# Patient Record
Sex: FEMALE | Race: WHITE | NOT HISPANIC OR LATINO | Employment: OTHER | URBAN - METROPOLITAN AREA
[De-identification: names, ages, dates, MRNs, and addresses within clinical notes are randomized per-mention and may not be internally consistent; named-entity substitution may affect disease eponyms.]

---

## 2017-01-06 ENCOUNTER — PATIENT MESSAGE (OUTPATIENT)
Dept: INTERNAL MEDICINE | Facility: CLINIC | Age: 72
End: 2017-01-06

## 2017-01-06 RX ORDER — LORAZEPAM 0.5 MG/1
TABLET ORAL
Qty: 30 TABLET | Refills: 1 | Status: SHIPPED | OUTPATIENT
Start: 2017-01-06 | End: 2017-02-16 | Stop reason: SDUPTHER

## 2017-01-06 RX ORDER — GABAPENTIN 300 MG/1
CAPSULE ORAL
Qty: 90 CAPSULE | Refills: 1 | Status: SHIPPED | OUTPATIENT
Start: 2017-01-06 | End: 2017-02-16 | Stop reason: SDUPTHER

## 2017-01-09 ENCOUNTER — OFFICE VISIT (OUTPATIENT)
Dept: INTERNAL MEDICINE | Facility: CLINIC | Age: 72
End: 2017-01-09
Payer: MEDICARE

## 2017-01-09 VITALS
HEART RATE: 92 BPM | BODY MASS INDEX: 32.55 KG/M2 | SYSTOLIC BLOOD PRESSURE: 102 MMHG | HEIGHT: 61 IN | WEIGHT: 172.38 LBS | DIASTOLIC BLOOD PRESSURE: 64 MMHG

## 2017-01-09 DIAGNOSIS — E11.69 DIABETES MELLITUS TYPE 2 IN OBESE: ICD-10-CM

## 2017-01-09 DIAGNOSIS — D50.0 IRON DEFICIENCY ANEMIA DUE TO CHRONIC BLOOD LOSS: ICD-10-CM

## 2017-01-09 DIAGNOSIS — Z01.818 PRE-OP EXAM: Primary | ICD-10-CM

## 2017-01-09 DIAGNOSIS — K25.9 MULTIPLE GASTRIC ULCERS: ICD-10-CM

## 2017-01-09 DIAGNOSIS — E66.9 DIABETES MELLITUS TYPE 2 IN OBESE: ICD-10-CM

## 2017-01-09 PROCEDURE — 1159F MED LIST DOCD IN RCRD: CPT | Mod: GC,S$GLB,, | Performed by: INTERNAL MEDICINE

## 2017-01-09 PROCEDURE — 1126F AMNT PAIN NOTED NONE PRSNT: CPT | Mod: GC,S$GLB,, | Performed by: INTERNAL MEDICINE

## 2017-01-09 PROCEDURE — 3078F DIAST BP <80 MM HG: CPT | Mod: GC,S$GLB,, | Performed by: INTERNAL MEDICINE

## 2017-01-09 PROCEDURE — 99999 PR PBB SHADOW E&M-EST. PATIENT-LVL IV: CPT | Mod: PBBFAC,GC,, | Performed by: INTERNAL MEDICINE

## 2017-01-09 PROCEDURE — 3074F SYST BP LT 130 MM HG: CPT | Mod: GC,S$GLB,, | Performed by: INTERNAL MEDICINE

## 2017-01-09 PROCEDURE — 1157F ADVNC CARE PLAN IN RCRD: CPT | Mod: GC,S$GLB,, | Performed by: INTERNAL MEDICINE

## 2017-01-09 PROCEDURE — 99213 OFFICE O/P EST LOW 20 MIN: CPT | Mod: GC,S$GLB,, | Performed by: INTERNAL MEDICINE

## 2017-01-09 PROCEDURE — 3044F HG A1C LEVEL LT 7.0%: CPT | Mod: GC,S$GLB,, | Performed by: INTERNAL MEDICINE

## 2017-01-09 PROCEDURE — 3060F POS MICROALBUMINURIA REV: CPT | Mod: GC,S$GLB,, | Performed by: INTERNAL MEDICINE

## 2017-01-09 PROCEDURE — 1160F RVW MEDS BY RX/DR IN RCRD: CPT | Mod: GC,S$GLB,, | Performed by: INTERNAL MEDICINE

## 2017-01-09 RX ORDER — TRETINOIN 1 MG/G
CREAM TOPICAL
COMMUNITY
Start: 2016-11-03 | End: 2017-04-02 | Stop reason: SDUPTHER

## 2017-01-09 NOTE — PROGRESS NOTES
I have personally interviewed and examined this patient and agree with resident's note as below.   Mckenna Monroe MD

## 2017-01-09 NOTE — MR AVS SNAPSHOT
[facility] Internal Medicine  [address] [address]  Phone: [phone]  Fax: [phone]                  Paris Burris   2017 1:00 PM   Office Visit    Description:  Female : 1945   Provider:  Roderick Miles MD   Department:  [facility] - Internal Medicine           Reason for Visit     Pre-op Exam           Diagnoses this Visit        Comments    Multiple gastric ulcers    -  Primary     Iron deficiency anemia due to chronic blood loss         Diabetes mellitus type 2 in obese                To Do List           Future Appointments        Provider Department Dept Phone    1/10/2017 1:00 PM [facility] Internal Medicine [phone]    2/3/2017 7:30 AM LAB, APPOINTMENT [facility] [phone]    2017 2:20 PM Mandi Huynh MD [facility] Internal Medicine [phone]    2/15/2017 10:40 AM Elina Morejon MD [facility] - Neurology [phone]    3/20/2017 10:00 AM Dev Hadley PA-C [facility] - Gastroenterology [phone]      Goals (5 Years of Data)     None      Follow-Up and Disposition     Return if symptoms worsen or fail to improve.      [facility]     [facility] -  Assistance  Registered nurses in the [facility] provide clinical advisement, health education, appointment booking, and other advisory services.  Call for this free service at [phone].             Medications           Message regarding Medications     Verify the changes and/or additions to your medication regime listed below are the same as discussed with your clinician today.  If any of these changes or additions are incorrect, please notify your healthcare provider.        STOP taking these medications     mirtazapine (REMERON) 7.5 MG Tab Take 1 tablet (7.5 mg total) by mouth every evening.           Verify that the below list of medications is an accurate representation of the medications  you are currently taking.  If none reported, the list may be blank. If incorrect, please contact your healthcare provider. Carry this list with you in case of emergency.           Current Medications     ascorbic acid (VITAMIN C) 500 MG tablet Take 1,000 mg by mouth once daily.     aspirin (ECOTRIN) 81 MG EC tablet Take 1 tablet (81 mg total) by mouth once daily.    blood sugar diagnostic (ACCU-CHEK MARIAN PLUS TEST STRP) Strp 2 strips by Misc.(Non-Drug; Combo Route) route 2 (two) times daily.    blood sugar diagnostic Strp 1 strip by Misc.(Non-Drug; Combo Route) route 2 (two) times daily.    DUREZOL 0.05 % Drop ophthalmic solution     ferrous sulfate 325 mg (65 mg iron) Tab tablet Take 1 tablet (325 mg total) by mouth 2 (two) times daily.    gabapentin (NEURONTIN) 300 MG capsule Take 1 cap nightly x 1 week; then take 1 cap twice daily x 1 week; then take 1 cap 3x/daily onwards.    ketorolac 0.5% (ACULAR) 0.5 % Drop     lancets Misc 1 lancet by Misc.(Non-Drug; Combo Route) route 2 (two) times daily.    lorazepam (ATIVAN) 0.5 MG tablet TAKE 1 TABLET BY MOUTH EVERY DAY AS NEEDED    metformin (GLUCOPHAGE-XR) 500 MG 24 hr tablet Take 1 tablet (500 mg total) by mouth 2 (two) times daily with meals.    nystatin-triamcinolone (MYCOLOG) ointment     omega-3 fatty acids-fish oil (FISH OIL) 340-1,000 mg Cap Take 1 capsule by mouth once daily.    omeprazole (PRILOSEC) 20 MG capsule Take 1 capsule (20 mg total) by mouth once daily.    pravastatin (PRAVACHOL) 80 MG tablet TAKE 1 TABLET EVERY DAY    sucralfate (CARAFATE) 1 gram tablet Take 1 tablet (1 g total) by mouth 2 (two) times daily.    tretinoin (RETIN-A) 0.05 % cream 1 application every evening. At bedtime    tretinoin (RETIN-A) 0.1 % cream     venlafaxine (EFFEXOR) 75 MG tablet Take 1 tablet (75 mg total) by mouth 2 (two) times daily.           Clinical Reference Information           Vital Signs - Last Recorded  Most recent update: 1/9/2017  1:17 PM by Simona Barbour,  "MA    BP Pulse Ht Wt BMI    102/64 92 5' 1" (1.549 m) 78.2 kg (172 lb 6.4 oz) 32.57 kg/m2      Blood Pressure          Most Recent Value    BP  102/64      Allergies as of 1/9/2017     Topamax [Topiramate]      Immunizations Administered on Date of Encounter - 1/9/2017     None      Orders Placed During Today's Visit     Future Labs/Procedures Expected by Expires    CBC auto differential  1/9/2017 3/10/2018      "

## 2017-01-09 NOTE — PROGRESS NOTES
Subjective:       Patient ID: Paris Burris is a 71 y.o. female.    Chief Complaint: Pre-op Exam    HPI     Ms. Burris is a 71 year old lady with PMH of anxiety, depression, cataracts, DM (well controlled), HLD, anemia secondary to GIB from gastric ulcers (now resolved), and migraine's here for pre-operative evaluation for cataract surgery. She is feeling well and has no complaints today. She does not have a personal nor family history of heart disease. She has had surgery in the past with general anesthesia with no complications. She is a never smoker and rare drinker. She is able to walk up a flight of stairs without difficulty and is overall very active engaging in water-based exercises regularly.     Cardiovascular Risk Assessment:  Non-emergent surgery.  No active cardiac problems (such as unstable angina, decompensated heart failure, significant uncontrolled arrhythmias or severe valvular disease).  Low risk surgery.  Functional Status: able to climb a flight of stairs (> 4 METS)  Her revised cardiac risk index is 1.     1 pt Each: Ischemic Heart Disease, Cerebrovascular Disease,                     CHF, DM, Creatinine > 2                 Recommendation:  1. Proceed to Surgery.  2. Continue medications      Review of Systems   Constitutional: Negative for activity change, appetite change, fatigue, fever and unexpected weight change.   HENT: Negative for congestion, postnasal drip, rhinorrhea, sinus pressure, sore throat, trouble swallowing and voice change.    Eyes: Negative for photophobia and visual disturbance.   Respiratory: Negative for cough, chest tightness, shortness of breath and wheezing.    Cardiovascular: Negative for chest pain and palpitations.   Gastrointestinal: Negative for abdominal distention, abdominal pain, blood in stool, constipation, diarrhea, nausea and vomiting.   Genitourinary: Negative for difficulty urinating, dysuria, flank pain, frequency, hematuria and urgency.    Musculoskeletal: Negative for arthralgias, back pain, gait problem, joint swelling, myalgias, neck pain and neck stiffness.   Skin: Negative for color change, pallor, rash and wound.   Neurological: Negative for dizziness, tremors, weakness, numbness and headaches.       Objective:      Physical Exam   Constitutional: She is oriented to person, place, and time. She appears well-developed and well-nourished. No distress.   HENT:   Head: Normocephalic and atraumatic.   Right Ear: External ear normal.   Left Ear: External ear normal.   Nose: Nose normal.   Mouth/Throat: Oropharynx is clear and moist. No oropharyngeal exudate.   Eyes: Conjunctivae and EOM are normal. Pupils are equal, round, and reactive to light. Right eye exhibits no discharge. Left eye exhibits no discharge. No scleral icterus.   Neck: Normal range of motion. Neck supple. No JVD present. No tracheal deviation present. No thyromegaly present.   Cardiovascular: Normal rate, regular rhythm, normal heart sounds and intact distal pulses.  Exam reveals no gallop and no friction rub.    No murmur heard.  Pulmonary/Chest: Effort normal and breath sounds normal. No stridor. No respiratory distress. She has no wheezes. She has no rales. She exhibits no tenderness.   Abdominal: Soft. Bowel sounds are normal. She exhibits no distension and no mass. There is no tenderness. There is no rebound and no guarding. No hernia.   Musculoskeletal: She exhibits no edema, tenderness or deformity.   Lymphadenopathy:     She has no cervical adenopathy.   Neurological: She is alert and oriented to person, place, and time.   Skin: Skin is warm and dry. No rash noted. She is not diaphoretic. No erythema. No pallor.   Psychiatric: She has a normal mood and affect. Her behavior is normal. Judgment and thought content normal.       Assessment:       1. Pre-op exam    2. Multiple gastric ulcers    3. Iron deficiency anemia due to chronic blood loss    4. Diabetes mellitus type 2  in obese        Plan:       Paris was seen today for pre-op exam.    Diagnoses and all orders for this visit:    Pre-op exam  -- low risk for a low risk surgery  -- proceed without modification to medication regimen    Multiple gastric ulcers  -     CBC auto differential; Future  -- given recent history of severe anemia due to asymptomatic gastric ulcers, will re-check CBC  -- do not anticipate this will pose barrier to surgery    Iron deficiency anemia due to chronic blood loss  -     CBC auto differential; Future    Diabetes mellitus type 2 in obese  -- well controlled      F/u with PCP MELANIE Miles MD  PGY-3

## 2017-01-10 ENCOUNTER — CLINICAL SUPPORT (OUTPATIENT)
Dept: INTERNAL MEDICINE | Facility: CLINIC | Age: 72
End: 2017-01-10
Payer: MEDICARE

## 2017-01-10 ENCOUNTER — PATIENT MESSAGE (OUTPATIENT)
Dept: INTERNAL MEDICINE | Facility: CLINIC | Age: 72
End: 2017-01-10

## 2017-01-10 VITALS — WEIGHT: 169.06 LBS | BODY MASS INDEX: 31.95 KG/M2

## 2017-01-10 PROCEDURE — 99999 PR PBB SHADOW E&M-EST. PATIENT-LVL I: CPT | Mod: PBBFAC,,,

## 2017-01-10 NOTE — PROGRESS NOTES
Health  Follow-Up Note   Date:   1/10/17               Time:  1300  [x] Office  [] Phone  Notes from previous session reviewed.   [x] Previous Session Goals unchanged.   [x] Patient/Caregiver Change in Goals.  Goals added or changed by Patient/Caregiver since program participation:  1.     Cataract surgery on 1/25/17     Additional/Changed support that patient/caregiver has experienced/sought?  (Indicate readiness, support from family, friends, others, community groups, etc)  1.      Additional/Changed obstacles that could prevent patient/caregiver from reaching their goals?  1.  Snacking between meals    Feedback provided:  1.  Praised for great job, gain of only 1.5 lbs with holidays and vacation to [place]    Diagnostic values/Desriptors for follow-up as needed for chronic condition(s)   Weight: 76.7 kg 169 lb 1.5 oz gain 1.5 lb/ total loss 17 lbs since last year  Blood Glucose:   139- 7 day  134-14 day  126-30 day  123- 90 day    Interventions:   1. Health  listened reflectively, validated thoughts and feelings, offered support and encouragement.   2. Allowed patient to express themselves in a non-biased atmosphere.  3. Health  assisted pt to problem-solve obstacles such as being in a challenging environment and dealing with these challenges.   4. Motivational Interviewed interventions utilized (OARS).   5. Patient responded favorably to interventions and remained actively engaged in the session.   6. Health  will remain available and connected for patient by phone and/or office visits.   7. Positive reinforcement, emotional support and encouragement provided.   8. Focused Education: MI, alternative snacks,  fiber    Plan:  [x] Pt will work on goals as stated above.   [x] Pt will contact Health  for any questions, concerns or needs.  [x] Pt will follow up with Health  in office on 2/7/17 at 1400.       [] Pt will follow up with Health  on phone in:        [x] Health   will remain available.   [] Health  will contact patient by phone in:        [] Health  will consult:        [] Health  will inform Provider via EPIC messaging.     Impression:  1. Behavior is consistent with   Action    Stage of Change.   2. Participation level:       [x] Receptive      [x] Interactive      [] Guarded and Resistant      [x] Self Motivated      [] Refused/Declined to participate   3. [x] Pt voiced understanding of all information presented.       [] Pt voiced needing further information/education. This will be arranged.       [x] Pt would benefit from further education/information as identified by this health . This will be arranged.     Jacqueline Perry RN HC

## 2017-01-11 ENCOUNTER — TELEPHONE (OUTPATIENT)
Dept: INTERNAL MEDICINE | Facility: CLINIC | Age: 72
End: 2017-01-11

## 2017-01-11 ENCOUNTER — PATIENT MESSAGE (OUTPATIENT)
Dept: INTERNAL MEDICINE | Facility: CLINIC | Age: 72
End: 2017-01-11

## 2017-01-11 DIAGNOSIS — E55.9 VITAMIN D DEFICIENCY: ICD-10-CM

## 2017-01-11 DIAGNOSIS — E66.9 DIABETES MELLITUS TYPE 2 IN OBESE: Primary | ICD-10-CM

## 2017-01-11 DIAGNOSIS — E11.69 DIABETES MELLITUS TYPE 2 IN OBESE: Primary | ICD-10-CM

## 2017-01-11 NOTE — TELEPHONE ENCOUNTER
----- Message from Jacqueline Perry RN sent at 1/10/2017  2:40 PM CST -----  Regarding: vitamin D and A1c  Hello,   Did you want Paris to repeat her A1c when she has her labs done next month prior to your visit?   Also could we repeat her vitamin D level to make sure she is staying within normal limits if not improving?   Thanks Jacqueline

## 2017-01-12 ENCOUNTER — DOCUMENTATION ONLY (OUTPATIENT)
Dept: REHABILITATION | Facility: HOSPITAL | Age: 72
End: 2017-01-12

## 2017-01-12 NOTE — PROGRESS NOTES
PHYSICAL THERAPY DISCHARGE SUMMARY     Name: Paris Burris  Clinic Number: [id number]    Past Medical History   Diagnosis Date    Allergy     Anemia     Anxiety     Cancer 2002     L breast s/p mastectomy    Decreased hearing     Depression     Diabetes mellitus     Gastric ulcer 9/10/13     EGD    Hiatal hernia     Hyperlipidemia     Migraine headache     Migraine headache 3/20/2015    Multiple gastric ulcers     Parathyroid disorder     Pre-diabetes     Substance abuse     Wrist fracture      Visit # 7 of 25  Evaluation date on 7/5/16  Insurance: [employer] authorized by 12/31/16    Initial visit: 7/5/2016  Date of Last visit: 11/28/16  Date of Discharge Note:  1/12/2017   Total Visits Received: 18  Missed Visits: 3  ASSESSMENT   Status Towards Goals Met:     Short Term GOALS: 3 weeks. Pt agrees with goals set.  1. Patient demonstrates independence with HEP for aquatic therapy. MET    Long Term GOALS: 7 weeks. Pt agrees with goals set.  1. Patient demonstrates increased lumbar AROM to 75% to improve tolerance to functional activities pain free.not met   2. Patient demonstrates increased strength BLE's in hip abductors and hip extensors BL to 5/5 or greater to improve tolerance to functional activities pain free. Not met  3. Patient demonstrates improved overall function per FOTO Lumbar Survey to 40% Limitation or less. MET 10/18/16    Goals Not achieved and why: spoke with patient in December after her last pool session as she was rescheduling to come into therapy. Her main goal was to be independent with her HEP for therapy. She stated that she was independent and felt she knew what she needed to do in the pool to be independent with her HEP. She decided that she did not need further therapy at that time and would need a MD order to continue therapy.     Discharge reason : Patient has maximized benefit from PT at this time    PLAN   This patient is discharged from Physical Therapy  Services.

## 2017-01-14 DIAGNOSIS — J31.0 CHRONIC RHINITIS: ICD-10-CM

## 2017-01-17 ENCOUNTER — PATIENT MESSAGE (OUTPATIENT)
Dept: INTERNAL MEDICINE | Facility: CLINIC | Age: 72
End: 2017-01-17

## 2017-01-17 RX ORDER — FLUTICASONE PROPIONATE 50 MCG
SPRAY, SUSPENSION (ML) NASAL
Qty: 16 G | Refills: 0 | Status: SHIPPED | OUTPATIENT
Start: 2017-01-17 | End: 2017-05-09 | Stop reason: SDUPTHER

## 2017-01-17 RX ORDER — TRAMADOL HYDROCHLORIDE 50 MG/1
TABLET ORAL
Qty: 60 TABLET | Refills: 0 | Status: SHIPPED | OUTPATIENT
Start: 2017-01-17 | End: 2017-02-15 | Stop reason: SDUPTHER

## 2017-01-18 ENCOUNTER — TELEPHONE (OUTPATIENT)
Dept: INTERNAL MEDICINE | Facility: CLINIC | Age: 72
End: 2017-01-18

## 2017-01-18 DIAGNOSIS — M54.6 CHRONIC THORACIC BACK PAIN, UNSPECIFIED BACK PAIN LATERALITY: Primary | ICD-10-CM

## 2017-01-18 DIAGNOSIS — G89.29 CHRONIC THORACIC BACK PAIN, UNSPECIFIED BACK PAIN LATERALITY: Primary | ICD-10-CM

## 2017-01-18 DIAGNOSIS — R29.898 WEAKNESS OF BOTH LOWER EXTREMITIES: ICD-10-CM

## 2017-01-18 NOTE — TELEPHONE ENCOUNTER
----- Message from Mandi Huynh MD sent at 1/11/2017  1:08 PM CST -----  May I have a referral to physical therapy? My lower back is better but my upper back is worse and my legs are not strong.

## 2017-02-03 ENCOUNTER — LAB VISIT (OUTPATIENT)
Dept: LAB | Facility: HOSPITAL | Age: 72
End: 2017-02-03
Attending: INTERNAL MEDICINE
Payer: MEDICARE

## 2017-02-03 ENCOUNTER — CLINICAL SUPPORT (OUTPATIENT)
Dept: INTERNAL MEDICINE | Facility: CLINIC | Age: 72
End: 2017-02-03
Payer: MEDICARE

## 2017-02-03 DIAGNOSIS — E78.5 HYPERLIPIDEMIA, UNSPECIFIED HYPERLIPIDEMIA TYPE: ICD-10-CM

## 2017-02-03 DIAGNOSIS — D50.9 IRON DEFICIENCY ANEMIA: ICD-10-CM

## 2017-02-03 DIAGNOSIS — E66.9 DIABETES MELLITUS TYPE 2 IN OBESE: ICD-10-CM

## 2017-02-03 DIAGNOSIS — E11.69 DIABETES MELLITUS TYPE 2 IN OBESE: ICD-10-CM

## 2017-02-03 DIAGNOSIS — E55.9 VITAMIN D DEFICIENCY: ICD-10-CM

## 2017-02-03 LAB
25(OH)D3+25(OH)D2 SERPL-MCNC: 51 NG/ML
ALBUMIN SERPL BCP-MCNC: 4.1 G/DL
ALP SERPL-CCNC: 60 U/L
ALT SERPL W/O P-5'-P-CCNC: 16 U/L
ANION GAP SERPL CALC-SCNC: 9 MMOL/L
AST SERPL-CCNC: 23 U/L
BASOPHILS # BLD AUTO: 0.03 K/UL
BASOPHILS NFR BLD: 0.4 %
BILIRUB SERPL-MCNC: 0.4 MG/DL
BUN SERPL-MCNC: 17 MG/DL
CALCIUM SERPL-MCNC: 9.4 MG/DL
CHLORIDE SERPL-SCNC: 102 MMOL/L
CHOLEST/HDLC SERPL: 6.4 {RATIO}
CO2 SERPL-SCNC: 29 MMOL/L
CREAT SERPL-MCNC: 0.8 MG/DL
DIFFERENTIAL METHOD: NORMAL
EOSINOPHIL # BLD AUTO: 0.2 K/UL
EOSINOPHIL NFR BLD: 2.4 %
ERYTHROCYTE [DISTWIDTH] IN BLOOD BY AUTOMATED COUNT: 14.3 %
EST. GFR  (AFRICAN AMERICAN): >60 ML/MIN/1.73 M^2
EST. GFR  (NON AFRICAN AMERICAN): >60 ML/MIN/1.73 M^2
ESTIMATED AVG GLUCOSE: 137 MG/DL
FERRITIN SERPL-MCNC: 29 NG/ML
GLUCOSE SERPL-MCNC: 124 MG/DL
HBA1C MFR BLD HPLC: 6.4 %
HCT VFR BLD AUTO: 47.3 %
HDL/CHOLESTEROL RATIO: 15.6 %
HDLC SERPL-MCNC: 218 MG/DL
HDLC SERPL-MCNC: 34 MG/DL
HGB BLD-MCNC: 15.5 G/DL
IRON SERPL-MCNC: 71 UG/DL
LDLC SERPL CALC-MCNC: 135 MG/DL
LYMPHOCYTES # BLD AUTO: 3.7 K/UL
LYMPHOCYTES NFR BLD: 46.2 %
MCH RBC QN AUTO: 29.2 PG
MCHC RBC AUTO-ENTMCNC: 32.8 %
MCV RBC AUTO: 89 FL
MONOCYTES # BLD AUTO: 0.6 K/UL
MONOCYTES NFR BLD: 7.4 %
NEUTROPHILS # BLD AUTO: 3.5 K/UL
NEUTROPHILS NFR BLD: 43.6 %
NONHDLC SERPL-MCNC: 184 MG/DL
PLATELET # BLD AUTO: 181 K/UL
PMV BLD AUTO: 11 FL
POTASSIUM SERPL-SCNC: 4.7 MMOL/L
PROT SERPL-MCNC: 7.9 G/DL
RBC # BLD AUTO: 5.31 M/UL
SATURATED IRON: 17 %
SODIUM SERPL-SCNC: 140 MMOL/L
TOTAL IRON BINDING CAPACITY: 422 UG/DL
TRANSFERRIN SERPL-MCNC: 285 MG/DL
TRIGL SERPL-MCNC: 245 MG/DL
WBC # BLD AUTO: 7.99 K/UL

## 2017-02-03 PROCEDURE — 82728 ASSAY OF FERRITIN: CPT

## 2017-02-03 PROCEDURE — 83036 HEMOGLOBIN GLYCOSYLATED A1C: CPT

## 2017-02-03 PROCEDURE — 85025 COMPLETE CBC W/AUTO DIFF WBC: CPT

## 2017-02-03 PROCEDURE — 36415 COLL VENOUS BLD VENIPUNCTURE: CPT

## 2017-02-03 PROCEDURE — 80061 LIPID PANEL: CPT

## 2017-02-03 PROCEDURE — 83540 ASSAY OF IRON: CPT

## 2017-02-03 PROCEDURE — 82306 VITAMIN D 25 HYDROXY: CPT

## 2017-02-03 PROCEDURE — 80053 COMPREHEN METABOLIC PANEL: CPT

## 2017-02-03 NOTE — PROGRESS NOTES
Health  Follow-Up Note   Date:  2/3/17                Time: 0800  [x] Office  [] Phone  Notes from previous session reviewed.   [x] Previous Session Goals unchanged.   [] Patient/Caregiver Change in Goals.  Goals added or changed by Patient/Caregiver since program participation:  1.    Continue same plan   2. Adding salad to breakfast     Additional/Changed support that patient/caregiver has experienced/sought?  (Indicate readiness, support from family, friends, others, community groups, etc)  1.   continue to work with her  2. Back at PT for back next week for almost daily back pain  3. Check on Healthy back program with Dr. Huynh    Additional/Changed obstacles that could prevent patient/caregiver from reaching their goals?  1.  stress from daughter/ depression    Feedback provided:  1.  Praised for great job down 3 lbs    Diagnostic values/Desriptors for follow-up as needed for chronic condition(s)   Weight: 75.4 kg  Blood Glucose:  7 d- 111  14-d 116  30 d-117  90 d-121    Interventions:   1. Health  listened reflectively, validated thoughts and feelings, offered support and encouragement.   2. Allowed patient to express themselves in a non-biased atmosphere.  3. Health  assisted pt to problem-solve obstacles such as being in a challenging environment and dealing with these challenges.   4. Motivational Interviewed interventions utilized (OARS).   5. Patient responded favorably to interventions and remained actively engaged in the session.   6. Health  will remain available and connected for patient by phone and/or office visits.   7. Positive reinforcement, emotional support and encouragement provided.   8. Focused Education: MI, healthy options, supplements    Plan:  [x] Pt will work on goals as stated above.   [x] Pt will contact Health  for any questions, concerns or needs.  [x] Pt will follow up with Health  in office 2/24/17 @0800        [] Pt will follow up with Health   on phone in:        [x] Health  will remain available.   [] Health  will contact patient by phone in:        [] Health  will consult:        [] Health  will inform Provider via EPIC messaging.     Impression:  1. Behavior is consistent with   Action    Stage of Change.   2. Participation level:       [x] Receptive      [x] Interactive      [] Guarded and Resistant      [x] Self Motivated      [] Refused/Declined to participate   3. [x] Pt voiced understanding of all information presented.       [] Pt voiced needing further information/education. This will be arranged.       [x] Pt would benefit from further education/information as identified by this health . This will be arranged.     Jacqueline Perry RN HC

## 2017-02-06 ENCOUNTER — PATIENT MESSAGE (OUTPATIENT)
Dept: INTERNAL MEDICINE | Facility: CLINIC | Age: 72
End: 2017-02-06

## 2017-02-06 RX ORDER — ROSUVASTATIN CALCIUM 20 MG/1
20 TABLET, COATED ORAL DAILY
Qty: 90 TABLET | Refills: 3 | Status: SHIPPED | OUTPATIENT
Start: 2017-02-06 | End: 2018-05-21 | Stop reason: SDUPTHER

## 2017-02-08 ENCOUNTER — PATIENT MESSAGE (OUTPATIENT)
Dept: PSYCHIATRY | Facility: CLINIC | Age: 72
End: 2017-02-08

## 2017-02-09 ENCOUNTER — OFFICE VISIT (OUTPATIENT)
Dept: INTERNAL MEDICINE | Facility: CLINIC | Age: 72
End: 2017-02-09
Payer: MEDICARE

## 2017-02-09 ENCOUNTER — CLINICAL SUPPORT (OUTPATIENT)
Dept: REHABILITATION | Facility: HOSPITAL | Age: 72
End: 2017-02-09
Attending: INTERNAL MEDICINE
Payer: MEDICARE

## 2017-02-09 ENCOUNTER — PATIENT MESSAGE (OUTPATIENT)
Dept: INTERNAL MEDICINE | Facility: CLINIC | Age: 72
End: 2017-02-09

## 2017-02-09 VITALS
RESPIRATION RATE: 17 BRPM | WEIGHT: 169.56 LBS | TEMPERATURE: 98 F | BODY MASS INDEX: 32.01 KG/M2 | DIASTOLIC BLOOD PRESSURE: 60 MMHG | HEART RATE: 87 BPM | HEIGHT: 61 IN | SYSTOLIC BLOOD PRESSURE: 101 MMHG

## 2017-02-09 DIAGNOSIS — E78.5 HYPERLIPIDEMIA, UNSPECIFIED HYPERLIPIDEMIA TYPE: ICD-10-CM

## 2017-02-09 DIAGNOSIS — F33.41 MDD (MAJOR DEPRESSIVE DISORDER), RECURRENT, IN PARTIAL REMISSION: ICD-10-CM

## 2017-02-09 DIAGNOSIS — Z12.31 ENCOUNTER FOR SCREENING MAMMOGRAM FOR BREAST CANCER: ICD-10-CM

## 2017-02-09 DIAGNOSIS — Z78.0 POSTMENOPAUSAL ESTROGEN DEFICIENCY: ICD-10-CM

## 2017-02-09 DIAGNOSIS — R29.898 LEG WEAKNESS, BILATERAL: ICD-10-CM

## 2017-02-09 DIAGNOSIS — R27.0 ATAXIA: Primary | ICD-10-CM

## 2017-02-09 DIAGNOSIS — E66.9 DIABETES MELLITUS TYPE 2 IN OBESE: ICD-10-CM

## 2017-02-09 DIAGNOSIS — E55.9 VITAMIN D DEFICIENCY: ICD-10-CM

## 2017-02-09 DIAGNOSIS — M54.6 THORACIC BACK PAIN, UNSPECIFIED BACK PAIN LATERALITY, UNSPECIFIED CHRONICITY: Primary | ICD-10-CM

## 2017-02-09 DIAGNOSIS — F10.21 ALCOHOL DEPENDENCE IN REMISSION: ICD-10-CM

## 2017-02-09 DIAGNOSIS — M51.36 DDD (DEGENERATIVE DISC DISEASE), LUMBAR: ICD-10-CM

## 2017-02-09 DIAGNOSIS — K76.0 FATTY LIVER: ICD-10-CM

## 2017-02-09 DIAGNOSIS — E11.42 DIABETIC POLYNEUROPATHY ASSOCIATED WITH TYPE 2 DIABETES MELLITUS: ICD-10-CM

## 2017-02-09 DIAGNOSIS — E11.69 DIABETES MELLITUS TYPE 2 IN OBESE: ICD-10-CM

## 2017-02-09 DIAGNOSIS — D50.0 IRON DEFICIENCY ANEMIA DUE TO CHRONIC BLOOD LOSS: ICD-10-CM

## 2017-02-09 PROBLEM — Z01.818 PRE-OP EXAM: Status: RESOLVED | Noted: 2017-01-09 | Resolved: 2017-02-09

## 2017-02-09 PROCEDURE — G8984 CARRY CURRENT STATUS: HCPCS | Mod: CJ

## 2017-02-09 PROCEDURE — 99999 PR PBB SHADOW E&M-EST. PATIENT-LVL III: CPT | Mod: PBBFAC,,, | Performed by: INTERNAL MEDICINE

## 2017-02-09 PROCEDURE — 97140 MANUAL THERAPY 1/> REGIONS: CPT

## 2017-02-09 PROCEDURE — 3060F POS MICROALBUMINURIA REV: CPT | Mod: S$GLB,,, | Performed by: INTERNAL MEDICINE

## 2017-02-09 PROCEDURE — 3078F DIAST BP <80 MM HG: CPT | Mod: S$GLB,,, | Performed by: INTERNAL MEDICINE

## 2017-02-09 PROCEDURE — 99215 OFFICE O/P EST HI 40 MIN: CPT | Mod: S$GLB,,, | Performed by: INTERNAL MEDICINE

## 2017-02-09 PROCEDURE — 1126F AMNT PAIN NOTED NONE PRSNT: CPT | Mod: S$GLB,,, | Performed by: INTERNAL MEDICINE

## 2017-02-09 PROCEDURE — G8985 CARRY GOAL STATUS: HCPCS | Mod: CJ

## 2017-02-09 PROCEDURE — 99499 UNLISTED E&M SERVICE: CPT | Mod: S$PBB,,, | Performed by: INTERNAL MEDICINE

## 2017-02-09 PROCEDURE — 3074F SYST BP LT 130 MM HG: CPT | Mod: S$GLB,,, | Performed by: INTERNAL MEDICINE

## 2017-02-09 PROCEDURE — 3044F HG A1C LEVEL LT 7.0%: CPT | Mod: S$GLB,,, | Performed by: INTERNAL MEDICINE

## 2017-02-09 PROCEDURE — 1160F RVW MEDS BY RX/DR IN RCRD: CPT | Mod: S$GLB,,, | Performed by: INTERNAL MEDICINE

## 2017-02-09 PROCEDURE — 1159F MED LIST DOCD IN RCRD: CPT | Mod: S$GLB,,, | Performed by: INTERNAL MEDICINE

## 2017-02-09 PROCEDURE — 97162 PT EVAL MOD COMPLEX 30 MIN: CPT

## 2017-02-09 PROCEDURE — 1157F ADVNC CARE PLAN IN RCRD: CPT | Mod: S$GLB,,, | Performed by: INTERNAL MEDICINE

## 2017-02-09 NOTE — PROGRESS NOTES
Physical Therapy Evaluation           Name: Paris Burris  Clinic Number: [id number]    Diagnosis:   Encounter Diagnoses   Name Primary?    Thoracic back pain, unspecified back pain laterality, unspecified chronicity Yes    Leg weakness, bilateral      Physician: Mandi Huynh MD  Treatment Orders: PT Eval and Treat  Past Medical History   Diagnosis Date    Allergy     Anemia     Anxiety     Cancer 2002     L breast s/p mastectomy    Decreased hearing     Depression     Diabetes mellitus     Gastric ulcer 9/10/13     EGD    Hiatal hernia     Hyperlipidemia     Migraine headache     Migraine headache 3/20/2015    Multiple gastric ulcers     Parathyroid disorder     Pre-diabetes     Substance abuse     Wrist fracture      Visit # 1 of 30  Evaluation date 2/9/2017   Insurance: [employer]are   Referring Physician: Tamika Huynh MD  Time in: 10:00am  Total out: 11:15am  Certification period 4/6/17.   Subjective     History:  Pt presents to OP physical therapy with dx of thoracic pain and leg weakness. Pt is familiar to me from her therapy last year for her back. She reports that she has been compliant with her pool therapy and her low back is feeling much better. She recently asked her primary care for order for leg weakness and has been experiencing pain her thoracic spine. She denies any trauma and her pain has been progressively getting worse. She also states that her pain is depressing her and feels that is contributing to her depression. Pt has an appt with psych soon to address these issues. Her goal for therapy is to decrease her thoracic pain, start a land-based exercise program to increase strength in this area. Based on FOTO survey given at start of session pt reports limitations in: recreational activities, cleaning the house, climbing stairs, lifting and carrying groceries.     Onset: insidious  Radicular symptoms:  denies  Aggravating  factors:  Unable to determine  Easing factors:  Rest, heat and tramadol  Prior Therapy: in 2016 for low back pain  Home Environment (Steps/Adaptations): stairs, lives with    Prior functional status: pt has been exercising at fitness center, able to drive and do all usual housework  DME owned/used: none  Occupation:  Retired teacher, but currently volunteers 1x week                    Pts goals:  Decrease pain in thoracic spine, get back to walking several miles     FMRI: Multilevel lumbar spondylosis most pronounced at T12-L1 noting moderate degenerative disc disease with mild associated vertebral endplate edema/inflammation. No significant spinal canal stenosis.  Severe bilateral facet arthropathy at L4-L5 with mild associated bone marrow edema/inflammation.  X-ray: AP lateral lumbar spine demonstrates the bones are demineralized.  Vertebral body heights and alignment is satisfactory.  There is disk space narrowing and hypertrophic osteophyte formation at the T12 -- L1 level.  No spondylolisthesis. Calcified plaque in the aorta.    Pain Scale: Paris rates her pain today to be a 1-2/10 in thoracic spine at T1-2 junction     Objective     Posture Alignment: slouched posture, L shoulder elevated, rounded shoulders - pt unable to perform scap retraction without manual cues, L shoulder girdle appears more anterior compared to R  Palpation: tender to palpation in BL traps, T1-2 junction along spinous processes    CERVICAL SPINE AROM:   Flexion: WFL   Extension: 10%   Left Sidebend: 25%   Right Sidebend: 25%   Left Rotation: 60%   Right Rotation: 60%     LUMBAR SPINE AROM:   Flexion: 60%   Extension: 10%   Left Sidebend: 50%   Right Sidebend: 50%   Left Rotation: 25%   Right Rotation: 25%     SEGMENTAL MOBILITY: decreased grossly as observed with AROM     LOWER EXTREMITY STRENGTH:   Left Right   Quadriceps 5/5 5/5   Hamstrings 5/5 5/5     Iliopsoas 4/5 4/5   PGM 4/5 4/5   Hip IR 4/5 4/5   Hip ER 4-/5 4-/5   Hip  Ext 4/5 4/5     UE strength measured  STRENGTH LEFT RIGHT   Flexion 4-/5 4-/5   Abduction 3+/5 3+/5   Extension 4-/5 4-/5   IR (neutral) 3+/5 3+/5   ER (neutral) 4-/5 4- /5   Middle Trap 3+/5 3+/5   Lower Trap 3+/5 3+/5     GAIT: Paris ambulates with no assistive device with independently.   GAIT DEVIATIONS: Paris displays slight antalgic gait    Pt/family was provided educational information, including: role of PT, goals for PT, scheduling - pt verbalized understanding.     Functional Limitations Reports - G Codes  Category: carrying  Tool: FOTO thoracic spine  Score: 39% Limitation   Modifier  Impairment Limitation Restriction    CH  0 % impaired, limited or restricted    CI  @ least 1% but less than 20% impaired, limited or restricted    CJ  @ least 20%<40% impaired, limited or restricted    CK  @ least 40%<60% impaired, limited or restricted    CL  @ least 60% <80% impaired, limited or restricted    CM  @ least 80%<100% impaired limited or restricted    CN  100% impaired, limited or restricted     Current/  CJ = 20-40% limitation  Goal/ : CJ =  20-40% limitation    TREATMENT      Time In: 10:45am  Time Out: 11:15am    PT Evaluation Completed? Yes  Discussed Plan of Care with patient: Yes    Pt received 15 minutes of therapeutic exercise & instruction including:  HEP for postural strengthening/stretching:  scap retraction, chest stretch as well as brugger horizontal retraction with orange TB - perform 1x day 3x 15 reps and stretching 1-2 day with 30 sec hold    Pt received 15 minutes of manual therapy including:  Manual therapy techniques were performed to improve joint and soft tissue mobility, decrease muscle tightness and pain to upper thoracic spine. Vacuum/cupping STM with manual therapy techniques was performed to  Thoracic spine and upper traps to decrease muscle tightness, increase circulation and promote healing process. The pt's skin was monitored for redness adjusting pressure as needed. The  pt was instructed in possible side effects of bruising and/or soreness. After manual therapy, pt with decreased pain and improved tissue mobility    Written Home Exercises Provided: see above under Ther Ex  Pt demo good understanding of the education provided. Patient demo good return demo of skill of exercises.    Assessment   Pt is a 72 yo female that present to OP PT with medical dx of thoracic pain and leg weakness. Pt presents with UE weakness and postural dysfunction as well as tenderness and increased tone along upper traps and thoracic spine. Pt also reports that her dependence on taking medication to decrease pain, her home situation as well as her hearing loss are depressing her as well. Pt reports being compliant with HEP given last year for LBP in the aquatic setting and states that her LBP is much better. Pt does report leg weakness however, will address thoracic pain impairments first then address LE as tolerated. Pt will benefit from OP PT to address her weakness and pain as well as instruct her in HEP to strengthen her UE weakness. Pt prognosis is Good.  Pt will benefit from skilled outpatient physical therapy to address the below stated deficits, provide pt/family education and to maximize pt's level of independence.     Medical necessity is demonstrated by the following IMPAIRMENTS/PROBLEMS:    History  Co-morbidities and personal factors that may impact the plan of care Examination  Body Structures and Functions, activity limitations and participation restrictions that may impact the plan of care Clinical Presentation   Decision Making/ Complexity Score   Co-morbidities:   - s/p mastectomy on L side   - hearing loss - has trouble hearing during visits       Personal Factors:   - depression regarding condition presently as well as hearing loss she finds depressing  - reports  is also experiencing some depression that is not helping her current condition Body Regions:  -thoracic spine  -  LE  Body Systems:   Musculoskeletal  - pain in thoracic spine  - decreased cervical and lumbar ROM  - decrease strength in UE and scap stabilizers  - impaired posture  - tender to palpation in traps and upper thoracic spine     Activity limitations:   Learning and applying knowledge:  - hearing loss, trouble listening/hearing instructions   Mobility:  - lifting and carrying objects is difficult  - negotiating steps  - walking long distances  Domestic Life:  - some difficulty doing usual housework    Participation Restrictions:          Evolving clinical presentation with changing clinical characteristics  moderate     Pt's spiritual, cultural and educational needs considered and pt agreeable to plan of care and goals as stated below:     Anticipated Barriers for physical therapy: chronic pain, depression, hearing loss    Short Term GOALS: 4 weeks. Pt agrees with goals set.  1. Patient will demonstrate independence with HEP for UE/postural strengthening.   2. Pt will report decrease in thoracic pain to 0/10 at best.     Long Term GOALS: 8 weeks. Pt agrees with goals set.  1. Patient will demonstrate increased strength in shoulder abduction 4+/5 in order to improve ability to do usual housework pain free.   2. Patient will demonstrate increased strength BUEs in middle trap and lower trap BL to 4/5 or greater to improve tolerance to functional activities pain free.   3. Patient demonstrates improved overall function per FOTO thoracic spine survey to 35% Limitation or less.   4. Patient will report an overall decrease in pain to 0/10 with all functional activities.     PLAN     Outpatient physical therapy 2 times weekly to include: pt ed, hep, therapeutic exercises, neuromuscular re-education/ balance exercises, manual therapy, integrative dry needling,  aquatic therapy and modalities prn. Cont PT for  6 more weeks. Pt may be seen by PTA as part of the rehabilitation team.   Certification period 4/6/17.     Therapist:  Shweta Guerrero, PT  2/9/2017

## 2017-02-09 NOTE — MR AVS SNAPSHOT
[facility]  [address]  [facility] [address]  Phone: [phone]  Fax: [phone]                  Paris Burris   2017 2:20 PM   Office Visit    Description:  Female : 1945   Provider:  Mandi Huynh MD   Department:  [facility]           Reason for Visit     Annual Exam           Diagnoses this Visit        Comments    Ataxia    -  Primary     Diabetic polyneuropathy associated with type 2 diabetes mellitus         Iron deficiency anemia due to chronic blood loss         Diabetes mellitus type 2 in obese         Vitamin D deficiency         DDD (degenerative disc disease), lumbar         MDD (major depressive disorder), recurrent, in partial remission         Alcohol dependence in remission         Fatty liver         Encounter for screening mammogram for breast cancer         Postmenopausal estrogen deficiency         Hyperlipidemia, unspecified hyperlipidemia type                To Do List           Future Appointments        Provider Department Dept Phone    2017 3:00 PM Domonique Holloway PTA [facility] [place]     2/15/2017 10:40 AM Elina Morejon MD [facility] [phone]    2017 3:00 PM Domonique Holloway PTA [facility]     2017 1:00 PM Shweta Guerrero PT [facility]     2017 1:00 PM Shweta Guerrero PT [facility]       Goals (5 Years of Data)     None      [facility]     [facility] Care [facility] - 24/7 Assistance  Registered nurses in the [facility] provide clinical advisement, health education, appointment booking, and other advisory services.  Call for this free service at [phone].             Medications           Message regarding Medications     Verify the changes and/or additions to your medication regime listed below are the same as discussed with your clinician today.  If any of these changes or additions are incorrect, please notify your  healthcare provider.        STOP taking these medications     nystatin-triamcinolone (MYCOLOG) ointment            Verify that the below list of medications is an accurate representation of the medications you are currently taking.  If none reported, the list may be blank. If incorrect, please contact your healthcare provider. Carry this list with you in case of emergency.           Current Medications     ascorbic acid (VITAMIN C) 500 MG tablet Take 1,000 mg by mouth once daily.     blood sugar diagnostic (ACCU-CHEK MARIAN PLUS TEST STRP) Strp 2 strips by Misc.(Non-Drug; Combo Route) route 2 (two) times daily.    blood sugar diagnostic Strp 1 strip by Misc.(Non-Drug; Combo Route) route 2 (two) times daily.    DUREZOL 0.05 % Drop ophthalmic solution     ferrous sulfate 325 mg (65 mg iron) Tab tablet Take 1 tablet (325 mg total) by mouth 2 (two) times daily.    fluticasone (FLONASE) 50 mcg/actuation nasal spray SPRAY ONCE IN EACH NOSTRIL EVERY DAY    gabapentin (NEURONTIN) 300 MG capsule Take 1 cap nightly x 1 week; then take 1 cap twice daily x 1 week; then take 1 cap 3x/daily onwards.    ketorolac 0.5% (ACULAR) 0.5 % Drop     lancets Misc 1 lancet by Misc.(Non-Drug; Combo Route) route 2 (two) times daily.    lorazepam (ATIVAN) 0.5 MG tablet TAKE 1 TABLET BY MOUTH EVERY DAY AS NEEDED    metformin (GLUCOPHAGE-XR) 500 MG 24 hr tablet Take 1 tablet (500 mg total) by mouth 2 (two) times daily with meals.    omega-3 fatty acids-fish oil (FISH OIL) 340-1,000 mg Cap Take 1 capsule by mouth once daily.    omeprazole (PRILOSEC) 20 MG capsule Take 1 capsule (20 mg total) by mouth once daily.    rosuvastatin (CRESTOR) 20 MG tablet Take 1 tablet (20 mg total) by mouth once daily.    sucralfate (CARAFATE) 1 gram tablet Take 1 tablet (1 g total) by mouth 2 (two) times daily.    tramadol (ULTRAM) 50 mg tablet TAKE 1 TABLET BY MOUTH EVERY 8 HOURS AS NEEDED FOR PAIN    tretinoin (RETIN-A) 0.05 % cream 1 application every evening. At  "bedtime    tretinoin (RETIN-A) 0.1 % cream     venlafaxine (EFFEXOR) 75 MG tablet Take 1 tablet (75 mg total) by mouth 2 (two) times daily.    aspirin (ECOTRIN) 81 MG EC tablet Take 1 tablet (81 mg total) by mouth once daily.           Clinical Reference Information           Your Vitals Were     BP Pulse Temp Resp Height Weight    101/60 87 98.3 °F (36.8 °C) (Oral) 17 5' 1" (1.549 m) 76.9 kg (169 lb 8.5 oz)    BMI                32.03 kg/m2          Blood Pressure          Most Recent Value    BP  101/60      Allergies as of 2/9/2017     Topamax [Topiramate]      Immunizations Administered on Date of Encounter - 2/9/2017     None      Orders Placed During Today's Visit     Future Labs/Procedures Expected by Expires    Comprehensive metabolic panel  2/9/2017 (Approximate) 4/10/2018    Lipid panel  2/9/2017 4/10/2018    Vitamin B12 Deficiency Panel  2/9/2017 4/10/2018    Mammo Digital Screening Bilateral With CAD  3/19/2017 4/9/2018    DXA Bone Density Spine And Hip_Axial Skeleton  4/1/2017 2/9/2018      Language Assistance Services     ATTENTION: Language assistance services are available, free of charge. Please call [phone].      ATENCIÓN: Si daniel av, tiene a ricks disposición servicios gratuitos de asistencia lingüística. Llame al [phone].     CHÚ Ý: N?u b?n nói Ti?ng Vi?t, có các d?ch v? h? tr? ngôn ng? mi?n phí dành cho b?n. G?i s? [phone].         [facility] complies with applicable Federal civil rights laws and does not discriminate on the basis of race, color, national origin, age, disability, or sex.        "

## 2017-02-09 NOTE — PLAN OF CARE
Physical Therapy Evaluation           Name: Paris Burris  Clinic Number: [id number]    Diagnosis:   Encounter Diagnoses   Name Primary?    Thoracic back pain, unspecified back pain laterality, unspecified chronicity Yes    Leg weakness, bilateral      Physician: Mandi Huynh MD  Treatment Orders: PT Eval and Treat  Past Medical History   Diagnosis Date    Allergy     Anemia     Anxiety     Cancer 2002     L breast s/p mastectomy    Decreased hearing     Depression     Diabetes mellitus     Gastric ulcer 9/10/13     EGD    Hiatal hernia     Hyperlipidemia     Migraine headache     Migraine headache 3/20/2015    Multiple gastric ulcers     Parathyroid disorder     Pre-diabetes     Substance abuse     Wrist fracture      Visit # 1 of 30  Evaluation date 2/9/2017   Insurance: [employer]are   Referring Physician: Tamika Huynh MD  Time in: 10:00am  Total out: 11:15am  Certification period 4/6/17.   Subjective     History:  Pt presents to OP physical therapy with dx of thoracic pain and leg weakness. Pt is familiar to me from her therapy last year for her back. She reports that she has been compliant with her pool therapy and her low back is feeling much better. She recently asked her primary care for order for leg weakness and has been experiencing pain her thoracic spine. She denies any trauma and her pain has been progressively getting worse. She also states that her pain is depressing her and feels that is contributing to her depression. Pt has an appt with psych soon to address these issues. Her goal for therapy is to decrease her thoracic pain, start a land-based exercise program to increase strength in this area. Based on FOTO survey given at start of session pt reports limitations in: recreational activities, cleaning the house, climbing stairs, lifting and carrying groceries.     Onset: insidious  Radicular symptoms:  denies  Aggravating  factors:  Unable to determine  Easing factors:  Rest, heat and tramadol  Prior Therapy: in 2016 for low back pain  Home Environment (Steps/Adaptations): stairs, lives with    Prior functional status: pt has been exercising at fitness center, able to drive and do all usual housework  DME owned/used: none  Occupation:  Retired teacher, but currently volunteers 1x week                    Pts goals:  Decrease pain in thoracic spine, get back to walking several miles     FMRI: Multilevel lumbar spondylosis most pronounced at T12-L1 noting moderate degenerative disc disease with mild associated vertebral endplate edema/inflammation. No significant spinal canal stenosis.  Severe bilateral facet arthropathy at L4-L5 with mild associated bone marrow edema/inflammation.  X-ray: AP lateral lumbar spine demonstrates the bones are demineralized.  Vertebral body heights and alignment is satisfactory.  There is disk space narrowing and hypertrophic osteophyte formation at the T12 -- L1 level.  No spondylolisthesis. Calcified plaque in the aorta.    Pain Scale: Paris rates her pain today to be a 1-2/10 in thoracic spine at T1-2 junction     Objective     Posture Alignment: slouched posture, L shoulder elevated, rounded shoulders - pt unable to perform scap retraction without manual cues, L shoulder girdle appears more anterior compared to R  Palpation: tender to palpation in BL traps, T1-2 junction along spinous processes    CERVICAL SPINE AROM:   Flexion: WFL   Extension: 10%   Left Sidebend: 25%   Right Sidebend: 25%   Left Rotation: 60%   Right Rotation: 60%     LUMBAR SPINE AROM:   Flexion: 60%   Extension: 10%   Left Sidebend: 50%   Right Sidebend: 50%   Left Rotation: 25%   Right Rotation: 25%     SEGMENTAL MOBILITY: decreased grossly as observed with AROM     LOWER EXTREMITY STRENGTH:   Left Right   Quadriceps 5/5 5/5   Hamstrings 5/5 5/5     Iliopsoas 4/5 4/5   PGM 4/5 4/5   Hip IR 4/5 4/5   Hip ER 4-/5 4-/5   Hip  Ext 4/5 4/5     UE strength measured  STRENGTH LEFT RIGHT   Flexion 4-/5 4-/5   Abduction 3+/5 3+/5   Extension 4-/5 4-/5   IR (neutral) 3+/5 3+/5   ER (neutral) 4-/5 4- /5   Middle Trap 3+/5 3+/5   Lower Trap 3+/5 3+/5     GAIT: Paris ambulates with no assistive device with independently.   GAIT DEVIATIONS: Paris displays slight antalgic gait    Pt/family was provided educational information, including: role of PT, goals for PT, scheduling - pt verbalized understanding.     Functional Limitations Reports - G Codes  Category: carrying  Tool: FOTO thoracic spine  Score: 39% Limitation   Modifier  Impairment Limitation Restriction    CH  0 % impaired, limited or restricted    CI  @ least 1% but less than 20% impaired, limited or restricted    CJ  @ least 20%<40% impaired, limited or restricted    CK  @ least 40%<60% impaired, limited or restricted    CL  @ least 60% <80% impaired, limited or restricted    CM  @ least 80%<100% impaired limited or restricted    CN  100% impaired, limited or restricted     Current/  CJ = 20-40% limitation  Goal/ : CJ =  20-40% limitation    TREATMENT      Time In: 10:45am  Time Out: 11:15am    PT Evaluation Completed? Yes  Discussed Plan of Care with patient: Yes    Pt received 15 minutes of therapeutic exercise & instruction including:  HEP for postural strengthening/stretching:  scap retraction, chest stretch as well as brugger horizontal retraction with orange TB - perform 1x day 3x 15 reps and stretching 1-2 day with 30 sec hold    Pt received 15 minutes of manual therapy including:  Manual therapy techniques were performed to improve joint and soft tissue mobility, decrease muscle tightness and pain to upper thoracic spine. Vacuum/cupping STM with manual therapy techniques was performed to  Thoracic spine and upper traps to decrease muscle tightness, increase circulation and promote healing process. The pt's skin was monitored for redness adjusting pressure as needed. The  pt was instructed in possible side effects of bruising and/or soreness. After manual therapy, pt with decreased pain and improved tissue mobility    Written Home Exercises Provided: see above under Ther Ex  Pt demo good understanding of the education provided. Patient demo good return demo of skill of exercises.    Assessment   Pt is a 70 yo female that present to OP PT with medical dx of thoracic pain and leg weakness. Pt presents with UE weakness and postural dysfunction as well as tenderness and increased tone along upper traps and thoracic spine. Pt also reports that her dependence on taking medication to decrease pain, her home situation as well as her hearing loss are depressing her as well. Pt reports being compliant with HEP given last year for LBP in the aquatic setting and states that her LBP is much better. Pt does report leg weakness however, will address thoracic pain impairments first then address LE as tolerated. Pt will benefit from OP PT to address her weakness and pain as well as instruct her in HEP to strengthen her UE weakness. Pt prognosis is Good.  Pt will benefit from skilled outpatient physical therapy to address the below stated deficits, provide pt/family education and to maximize pt's level of independence.     Medical necessity is demonstrated by the following IMPAIRMENTS/PROBLEMS:    History  Co-morbidities and personal factors that may impact the plan of care Examination  Body Structures and Functions, activity limitations and participation restrictions that may impact the plan of care Clinical Presentation   Decision Making/ Complexity Score   Co-morbidities:   - s/p mastectomy on L side   - hearing loss - has trouble hearing during visits       Personal Factors:   - depression regarding condition presently as well as hearing loss she finds depressing  - reports  is also experiencing some depression that is not helping her current condition Body Regions:  -thoracic spine  -  LE  Body Systems:   Musculoskeletal  - pain in thoracic spine  - decreased cervical and lumbar ROM  - decrease strength in UE and scap stabilizers  - impaired posture  - tender to palpation in traps and upper thoracic spine     Activity limitations:   Learning and applying knowledge:  - hearing loss, trouble listening/hearing instructions   Mobility:  - lifting and carrying objects is difficult  - negotiating steps  - walking long distances  Domestic Life:  - some difficulty doing usual housework    Participation Restrictions:          Evolving clinical presentation with changing clinical characteristics  moderate     Pt's spiritual, cultural and educational needs considered and pt agreeable to plan of care and goals as stated below:     Anticipated Barriers for physical therapy: chronic pain, depression, hearing loss    Short Term GOALS: 4 weeks. Pt agrees with goals set.  1. Patient will demonstrate independence with HEP for UE/postural strengthening.   2. Pt will report decrease in thoracic pain to 0/10 at best.     Long Term GOALS: 8 weeks. Pt agrees with goals set.  1. Patient will demonstrate increased strength in shoulder abduction 4+/5 in order to improve ability to do usual housework pain free.   2. Patient will demonstrate increased strength BUEs in middle trap and lower trap BL to 4/5 or greater to improve tolerance to functional activities pain free.   3. Patient demonstrates improved overall function per FOTO thoracic spine survey to 35% Limitation or less.   4. Patient will report an overall decrease in pain to 0/10 with all functional activities.     PLAN     Outpatient physical therapy 2 times weekly to include: pt ed, hep, therapeutic exercises, neuromuscular re-education/ balance exercises, manual therapy, integrative dry needling,  aquatic therapy and modalities prn. Cont PT for  6 more weeks. Pt may be seen by PTA as part of the rehabilitation team.   Certification period 4/6/17.     Therapist:  Shweta Guerrero, PT  2/9/2017

## 2017-02-09 NOTE — PROGRESS NOTES
Subjective:       Patient ID: Paris Burris is a 71 y.o. female.    Chief Complaint: wellness visit    HPI pt known to me.     DM2 - metformin XR 500mg BID  a1c 2/3/17 6.4.  MAC 9/2016    COOKIE - not using the CPAP; was not able to get a right pressure that helped her fall asleep.     H/o breast CA s/p L mastectomy 14 yrs ago.  Last MMG 3/18/16 - neg    HLD - crestor 20mg daily (recently changed from pravastatin 80mg daily due to uncontrolled LDL). Just started it.     Depression/anxiety/insomnia - effexor 75mg bid. Seeing Dr. Sweeney next week.     Migraine HA - hasn't had any migraines. Reports waking up more w/ HAs.     Multiple gastric ulcers on EGD 2015 - carafate 1g BID. Repeat EGD 12/2/16 - 7cm hiatal hernia, gastritis. ESTEFANIA on ferrous sulfate 325mg bid. Anemia resolved.     Fatty liver - US abdomen 10/13/16.    Increased thoracic back pain (L>R) and B leg weakness and undergoing PT w/ Shweta Pérezw. Had a few sessions of water therapy. Difficult to tell what movements may cause thoracic back pain. 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 No numbness/tingling in the hands.   TSPINE XR - 8/4/16 - aortic plaque. Moderate DJD and DISH. Chronic compression deformity mild at one of the lower thoracic level.  CSPINE XR 9/16/16 - C 7 is partially obscured by overlying soft tissue. Mild subluxation of C4 on C5. Disc narrowing C5-C6. Mild DJD of C spine.   L SPINE XR 8/2/16 - slight worsening of the focal T12-L1 kyphosis in the intervertebral disc height loss.     Some numbness in the L 3rd toe - gabapentin 3x/day works. Uncomfortable. Improves w/ walking. ABIs 6/2016 are normal.     Intentional weight loss of 20lbs - exercising and watching diet closely.     Hasn't gone back to drinking. Sober for many years.    Review of Systems  no abdominal pain/constipation. Comprehensive review of systems otherwise negative. See history/subjective section for more details.    HM  FLU 9/28/16 - yearly in the fall.  PREVNAR  "3/14/16  tDAP 6/16/16. Td q 10 yrs.   Pneumovax - 3/2017  Zostavax - done at outside facility 2008.  MMG - DUE AFTER 3/18/17. q1-2 yrs.  DEXA 3/31/15 - repeat in 2 yrs. Osteopenia.  Cscope 12/2/16 - repeat in 5 yrs.     Objective:      Physical Exam    Visit Vitals    /60    Pulse 87    Temp 98.3 °F (36.8 °C) (Oral)    Resp 17    Ht 5' 1" (1.549 m)    Wt 76.9 kg (169 lb 8.5 oz)    BMI 32.03 kg/m2     GEN - A+OX4, NAD   HEENT - PERRL, EOMI, OP clear. B hearing aides in place. MMM.   Neck - No thyromegaly or cervical LAD. Parathyroidectomy scar well healed.   CV - RRR  Chest - CTAB, no wheezing or rhonchi  Abd - S/NT/ND/+BS.   Ext - 2+BDP and radial pulses. No LE edema.   Neuro - PERRL, EOMI, no nystagmus, eyebrow raise, facial sensation, hearing, m of mastication, smile, palatal raise, shoulder shrug, tongue protrusion symmetric and intact. 5/5 BUE and BLE strength. Good toe dorsi and plantar flexion. Sensation to light touch intact. 1+ DTRs.   MSK - no spinal tenderness to palpation.   Skin - No rash.    Labs reviewed w/ pt.     Assessment/Plan     Paris was seen today for annual exam.    Diagnoses and all orders for this visit:    Ataxia  -     Vitamin B12 Deficiency Panel; Future    Diabetic polyneuropathy associated with type 2 diabetes mellitus - Stable and controlled. Continue current medications.    Iron deficiency anemia due to chronic blood loss - repeated. Anemia resolved. Repeat EGD w/ resolution of gastric ulcers. Recent EGD w/ large hiatal hernia w/ gastritis. On carafate.     Diabetes mellitus type 2 in obese - weight loss of over 25lbs! Continue diet and exercise.     Vitamin D deficiency - repleted    DDD (degenerative disc disease), lumbar - currently w/ minimal to no lower back pain. Mid/upper back pain occasionally. Undergoing PT. Doing well.     MDD (major depressive disorder), recurrent, in partial remission - cont effexor. F/u w/ Dr. Sweeney as scheduled.     Alcohol dependence in " remission - still sober and doing well!    Fatty liver - intentional weight loss. H/o transaminitis resolved.     Encounter for screening mammogram for breast cancer  -     Mammo Digital Screening Bilateral With CAD; Future    Postmenopausal estrogen deficiency  -     DXA Bone Density Spine And Hip_Axial Skeleton; Future    Hyperlipidemia, unspecified hyperlipidemia type - cont crestor 20. Recheck FLP and CMP. Consider increasing to 40 depending on levels.  -     Lipid panel; Future  -     Comprehensive metabolic panel; Future    45 minutes was spent on patient with over half the time was spent in coordination of care and/or counseling.  Return in about 4 months (around 6/9/2017).      Mandi Huynh MD  Department of Internal Medicine - [facility]  2:43 PM

## 2017-02-14 ENCOUNTER — CLINICAL SUPPORT (OUTPATIENT)
Dept: REHABILITATION | Facility: HOSPITAL | Age: 72
End: 2017-02-14
Attending: INTERNAL MEDICINE
Payer: MEDICARE

## 2017-02-14 DIAGNOSIS — M54.6 THORACIC BACK PAIN, UNSPECIFIED BACK PAIN LATERALITY, UNSPECIFIED CHRONICITY: ICD-10-CM

## 2017-02-14 DIAGNOSIS — R29.898 LEG WEAKNESS, BILATERAL: ICD-10-CM

## 2017-02-14 PROCEDURE — 97140 MANUAL THERAPY 1/> REGIONS: CPT

## 2017-02-14 PROCEDURE — 97110 THERAPEUTIC EXERCISES: CPT

## 2017-02-14 NOTE — PROGRESS NOTES
"                                                    Physical Therapy Daily Note     Name: Paris Burris  Clinic Number: [id number]  Diagnosis: No diagnosis found.  Physician: Mandi Huynh MD  Precautions: Standard  Visit #: 2 of 12  PTA Visit #: 1  Time In: 3:00 pm  Time Out: 4:05 pm  Total Treatment Time 1:1: 35 mins    Evaluation Date: 2/9/2017  Plan of care Expiration: 6 weeks - 3/23/2017    Cancellations: 0 No Shows: 0    Subjective     Pt reports: pain for a couple of days after previous session.   Pain Scale: Paris rates pain on a scale of 0-10 to be 1-2 currently.    Objective     Paris received individual therapeutic exercises to develop strength, endurance, ROM, flexibility and posture for 40 minutes (1:1 with PTA for 25 mins) including:  Supine lying on 1/2 roll X 3 minutes  Open book 10X each side  UE Bruegger's OTB 20X  Pec stretch 10X10"  SL Shoulder Flexion 10X BL  SL Shoulder Abduction 10X BL  SL Shoulder Horizontal Abd 10X BL  SL Shoulder ER 10X BL  SLR's BL 2X10  Squeezies RTB 30X  SL Clams RTB 2X10 BL    Paris received the following manual therapy techniques: Soft tissue Mobilization were applied to the: thoracic spine and upper trap for 10 minutes including:  Vacuum/cupping STM with manual therapy techniques was performed to thoracic spine and upper traps to decrease muscle tightness, increase circulation and promote healing process. The pt's skin was monitored for redness adjusting pressure as needed. The pt was instructed in possible side effects of bruising and/or soreness.      The patient received the following supervised modalities after being cleared for contradictions: MHP to thoracic spine x 15 mins post-tx.     Written Home Exercises Provided: added SL clams, SLR's, pec stretch, open book (2/14/17)  Pt demo good understanding of the education provided. Paris demonstrated good return demonstration of activities.     Education provided re: proper technique and mm activation  Paris verbalized " good understanding of education provided.   No spiritual or educational barriers to learning provided    Assessment     Patient tolerated treatment well. Pt denied increase in pain and reported relief with vacuum/cupping. VC's for proper mm activation with periscap exercises.   This is a 71 y.o. female referred to outpatient physical therapy and presents with a medical diagnosis of thoracic mid back pain and leg weakness and demonstrates limitations as described in the problem list. Pt prognosis is Good. Pt will continue to benefit from skilled outpatient physical therapy to address the deficits listed in the problem list, provide pt/family education and to maximize pt's level of independence in the home and community environment.     Goals as follows:  Short Term GOALS: 4 weeks. Pt agrees with goals set.  1. Patient will demonstrate independence with HEP for UE/postural strengthening.   2. Pt will report decrease in thoracic pain to 0/10 at best.     Long Term GOALS: 8 weeks. Pt agrees with goals set.  1. Patient will demonstrate increased strength in shoulder abduction 4+/5 in order to improve ability to do usual housework pain free.   2. Patient will demonstrate increased strength BUEs in middle trap and lower trap BL to 4/5 or greater to improve tolerance to functional activities pain free.   3. Patient demonstrates improved overall function per FOTO thoracic spine survey to 35% Limitation or less.   4. Patient will report an overall decrease in pain to 0/10 with all functional activities.      Plan     Continue with established Plan of Care towards PT goals.    Therapist: Domonique Holloway, PTA  2/14/2017

## 2017-02-15 ENCOUNTER — OFFICE VISIT (OUTPATIENT)
Dept: NEUROLOGY | Facility: CLINIC | Age: 72
End: 2017-02-15
Payer: MEDICARE

## 2017-02-15 VITALS
HEIGHT: 61 IN | HEART RATE: 54 BPM | SYSTOLIC BLOOD PRESSURE: 114 MMHG | WEIGHT: 169.56 LBS | DIASTOLIC BLOOD PRESSURE: 66 MMHG | BODY MASS INDEX: 32.01 KG/M2

## 2017-02-15 DIAGNOSIS — E11.42 DIABETIC POLYNEUROPATHY ASSOCIATED WITH TYPE 2 DIABETES MELLITUS: ICD-10-CM

## 2017-02-15 DIAGNOSIS — R29.898 LEG WEAKNESS, BILATERAL: Primary | ICD-10-CM

## 2017-02-15 DIAGNOSIS — M51.36 DDD (DEGENERATIVE DISC DISEASE), LUMBAR: ICD-10-CM

## 2017-02-15 PROCEDURE — 3060F POS MICROALBUMINURIA REV: CPT | Mod: S$GLB,,, | Performed by: PSYCHIATRY & NEUROLOGY

## 2017-02-15 PROCEDURE — 3044F HG A1C LEVEL LT 7.0%: CPT | Mod: S$GLB,,, | Performed by: PSYCHIATRY & NEUROLOGY

## 2017-02-15 PROCEDURE — 1126F AMNT PAIN NOTED NONE PRSNT: CPT | Mod: S$GLB,,, | Performed by: PSYCHIATRY & NEUROLOGY

## 2017-02-15 PROCEDURE — 99213 OFFICE O/P EST LOW 20 MIN: CPT | Mod: S$GLB,,, | Performed by: PSYCHIATRY & NEUROLOGY

## 2017-02-15 PROCEDURE — 1157F ADVNC CARE PLAN IN RCRD: CPT | Mod: S$GLB,,, | Performed by: PSYCHIATRY & NEUROLOGY

## 2017-02-15 PROCEDURE — 1159F MED LIST DOCD IN RCRD: CPT | Mod: S$GLB,,, | Performed by: PSYCHIATRY & NEUROLOGY

## 2017-02-15 PROCEDURE — 3078F DIAST BP <80 MM HG: CPT | Mod: S$GLB,,, | Performed by: PSYCHIATRY & NEUROLOGY

## 2017-02-15 PROCEDURE — 3074F SYST BP LT 130 MM HG: CPT | Mod: S$GLB,,, | Performed by: PSYCHIATRY & NEUROLOGY

## 2017-02-15 PROCEDURE — 99499 UNLISTED E&M SERVICE: CPT | Mod: S$GLB,,, | Performed by: PSYCHIATRY & NEUROLOGY

## 2017-02-15 PROCEDURE — 1160F RVW MEDS BY RX/DR IN RCRD: CPT | Mod: S$GLB,,, | Performed by: PSYCHIATRY & NEUROLOGY

## 2017-02-15 PROCEDURE — 99999 PR PBB SHADOW E&M-EST. PATIENT-LVL III: CPT | Mod: PBBFAC,,, | Performed by: PSYCHIATRY & NEUROLOGY

## 2017-02-15 RX ORDER — TRAMADOL HYDROCHLORIDE 50 MG/1
50 TABLET ORAL EVERY 8 HOURS PRN
Qty: 60 TABLET | Refills: 2 | Status: SHIPPED | OUTPATIENT
Start: 2017-02-15 | End: 2017-04-26 | Stop reason: SDUPTHER

## 2017-02-15 NOTE — LETTER
February 15, 2017      Mandi Huynh MD  [address]  [place] [address]           [facility]  [address]  [facility] [address]  Phone: [phone]  Fax: [phone]          Patient: Paris Burris   MR Number: [id number]   YOB: 1945   Date of Visit: 2/15/2017       Dear Dr. Mandi Huynh:    Thank you for referring Paris Burris to me for evaluation. Attached you will find relevant portions of my assessment and plan of care.    If you have questions, please do not hesitate to call me. I look forward to following Paris Burris along with you.    Sincerely,    Elina Morejon MD    Enclosure  CC:  No Recipients    If you would like to receive this communication electronically, please contact [email or web address] or [phone] to request more information on [facility] Link access.    For providers and/or their staff who would like to refer a patient to [facility], please contact us through our one-stop-shop provider referral line, [facility] , at [phone].    If you feel you have received this communication in error or would no longer like to receive these types of communications, please e-mail [email or web address]

## 2017-02-15 NOTE — ASSESSMENT & PLAN NOTE
Continued, but static intermittent weakness of legs.  Work-up has been unrevealing for vascular, spine or neuropathic causes, but I suspect she could have transient neurogenic claudication.   - No further work-up   - RTC if symptoms worsen

## 2017-02-15 NOTE — ASSESSMENT & PLAN NOTE
I am not so sure diagnosis is accurate (EMG neg), but her symptoms are stable and currently satisfied with gabapentin therapy.

## 2017-02-16 ENCOUNTER — PATIENT MESSAGE (OUTPATIENT)
Dept: INTERNAL MEDICINE | Facility: CLINIC | Age: 72
End: 2017-02-16

## 2017-02-16 RX ORDER — GABAPENTIN 300 MG/1
CAPSULE ORAL
Qty: 90 CAPSULE | Refills: 1 | Status: SHIPPED | OUTPATIENT
Start: 2017-02-16 | End: 2017-05-09 | Stop reason: SDUPTHER

## 2017-02-16 RX ORDER — LORAZEPAM 0.5 MG/1
0.5 TABLET ORAL DAILY PRN
Qty: 30 TABLET | Refills: 1 | Status: SHIPPED | OUTPATIENT
Start: 2017-02-16 | End: 2017-03-20 | Stop reason: SDUPTHER

## 2017-02-17 ENCOUNTER — PATIENT MESSAGE (OUTPATIENT)
Dept: PSYCHIATRY | Facility: CLINIC | Age: 72
End: 2017-02-17

## 2017-02-17 ENCOUNTER — PATIENT MESSAGE (OUTPATIENT)
Dept: INTERNAL MEDICINE | Facility: CLINIC | Age: 72
End: 2017-02-17

## 2017-02-17 ENCOUNTER — OFFICE VISIT (OUTPATIENT)
Dept: PSYCHIATRY | Facility: CLINIC | Age: 72
End: 2017-02-17
Payer: MEDICARE

## 2017-02-17 VITALS
HEART RATE: 95 BPM | SYSTOLIC BLOOD PRESSURE: 126 MMHG | HEIGHT: 61 IN | DIASTOLIC BLOOD PRESSURE: 60 MMHG | BODY MASS INDEX: 32.28 KG/M2 | WEIGHT: 171 LBS

## 2017-02-17 DIAGNOSIS — F10.21 ALCOHOL DEPENDENCE IN REMISSION: Primary | ICD-10-CM

## 2017-02-17 DIAGNOSIS — F41.1 GENERALIZED ANXIETY DISORDER: ICD-10-CM

## 2017-02-17 DIAGNOSIS — F33.41 RECURRENT MAJOR DEPRESSION IN PARTIAL REMISSION: ICD-10-CM

## 2017-02-17 DIAGNOSIS — R52 PAIN DISORDER: ICD-10-CM

## 2017-02-17 PROCEDURE — 99499 UNLISTED E&M SERVICE: CPT | Mod: S$GLB,,, | Performed by: PSYCHIATRY & NEUROLOGY

## 2017-02-17 PROCEDURE — 1159F MED LIST DOCD IN RCRD: CPT | Mod: S$GLB,,, | Performed by: PSYCHIATRY & NEUROLOGY

## 2017-02-17 PROCEDURE — 1157F ADVNC CARE PLAN IN RCRD: CPT | Mod: S$GLB,,, | Performed by: PSYCHIATRY & NEUROLOGY

## 2017-02-17 PROCEDURE — 3074F SYST BP LT 130 MM HG: CPT | Mod: S$GLB,,, | Performed by: PSYCHIATRY & NEUROLOGY

## 2017-02-17 PROCEDURE — 3078F DIAST BP <80 MM HG: CPT | Mod: S$GLB,,, | Performed by: PSYCHIATRY & NEUROLOGY

## 2017-02-17 PROCEDURE — 90833 PSYTX W PT W E/M 30 MIN: CPT | Mod: S$GLB,,, | Performed by: PSYCHIATRY & NEUROLOGY

## 2017-02-17 PROCEDURE — 1160F RVW MEDS BY RX/DR IN RCRD: CPT | Mod: S$GLB,,, | Performed by: PSYCHIATRY & NEUROLOGY

## 2017-02-17 PROCEDURE — 99999 PR PBB SHADOW E&M-EST. PATIENT-LVL III: CPT | Mod: PBBFAC,,, | Performed by: PSYCHIATRY & NEUROLOGY

## 2017-02-17 PROCEDURE — 99215 OFFICE O/P EST HI 40 MIN: CPT | Mod: S$GLB,,, | Performed by: PSYCHIATRY & NEUROLOGY

## 2017-02-17 RX ORDER — VENLAFAXINE HYDROCHLORIDE 150 MG/1
150 CAPSULE, EXTENDED RELEASE ORAL DAILY
Qty: 30 CAPSULE | Refills: 11 | Status: SHIPPED | OUTPATIENT
Start: 2017-02-17 | End: 2017-03-15

## 2017-02-17 NOTE — PROGRESS NOTES
Outpatient Psychiatry Follow-Up Visit (MD/NP)    2/17/2017 last seen June 2016     Clinical Status of Patient:  Outpatient (Ambulatory)    Chief Complaint:  Paris Burris is a 71 y.o. female who presents today for follow-up of depression and alcohol problem .  Met with patient.  Call Paris ; friend of Dr Rachel . Annita Braun.     Interval History and Content of Current Session:  Interim Events/Subjective Report/Content of Current Session: Pt s/p ABU program in Spring 2014 for alcohol dependence . 2-10-14 sober date . Now 3 years .      Grkids at their own  home are 14( [place])- anger issues / destroys property . 15 Fatuma andrade  Is well  .  Nikky has a [place] xchnge female .  Oldest grson Delfino ( 17) ADHD  ,  going  [employer]  [employer]. Younger kids at  [facility] . Very engaged in AA and sponsor attending 3-4 x per week . She completed 1st series of 12 steps . . Mood has been more anxious over the past few months . More nail biting    . In past she was Not sure she wanted to reengage with  Dr Lauren .     Pt has hyperparathyroidism  and had surgery in nov, 2015- stable .  Sleep apnea confirmed  by sleep specialist  . Did trauma therapy at [facility].  She and Annita Braun have moved into 2nf floor apmnt away from [facility]. 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    Has lost son yrs ago .     Dtr Yue ( Vira Rosas- employed with )  had had recent 4th suicide attempt ( 1st was OD  acetomin; 2nd and 3rd had gun)  and was admitted to [facility] on 4-9-15  X 5 weeks .    Yue also has been to tx in [facility] . Yue now in therapy  plus a group with [facility].  Pt does not ask details anymore .   Yue ( kaylie patel).  Yue continues to  cut herself off from others that were close to her  . Yue agreed to lunch with her mo in law    Yue has distanced santana songone's wife ( Yazmin) away . Santana is a family friend.     Dtr Yue ( Constantino rosendo) going though bitter divorce where Yue wants others to avoid [place]. Kids shuttle .  [place]'s live in gf has 3 kids in their home .       Diabetes controlled with hgb A1c -- 6.2 BUT has new onset  Neuropathy     S/p right cataract - went well     Psychiatric Review Of Systems - Is patient experiencing or having changes in:  sleep: no,   appetite: no, controlled   weight: no, loss of 20# 171 ( feb 2017)    energy/anergy: no, exercising at [place] with free  bc of comorbid condition; has PT incl in pool ( Shweta -very good)  as well  with  mechanical cupping ( upper back tension post Breast CA)  and accupuncture   interest/pleasure/anhedonia: no, volunteering   somatic symptoms: yes as above   libido: no  anxiety/panic: yes , worse  ; biting nails ; obsessive thinking about dtr   guilty/hopelessness: no  concentration: not baseline ; slower to  complete tasks   S.I.B.s/risky behavior: no  Irritability: no  Racing thoughts: no  Impulsive behaviors: no  Paranoia:no  AVH:no      Has compression fx in high thoracic area .  New onset  HbA1c from 10 plus to 6.2 . Dx 'd in Jan 2016 .No DM in family .     Santana Rachel MD is  fam friend .     Meds  effexor xr 37.5 mg  2 po q day; Vit D supplement ; tramadol 50 mg tid prn ( usually takes 50 mg  2 q day) ; Ativan prn     Past meds remeron - not needed     Her pain doc is concerned about potential interaction of tramadol and ssri in past     Psychotherapy:  · Target symptoms: alcohol abuse, depression  · Why chosen therapy is appropriate versus another modality: relevant to diagnosis, patient responds to this modality, evidence based practice  · Outcome monitoring methods: self-report, observation  · Therapeutic intervention type: supportive psychotherapy  · Topics discussed/themes: relationships difficulties, substance  abuse  · The patient's response to the intervention is accepting. The patient's progress toward treatment goals is good.   · Duration of intervention: 30  minutes.    Review of Systems   · Prob Pert. 1 sys, Ext. psych +2 add., Comp. 10-14 sys  · PSYCHIATRIC: Pertinant items are noted in the narrative.  · CONSTITUTIONAL: No weight gain or loss. Wedding dress size 5 . She is about a 14-16 .   · MUSCULOSKELETAL: Positive for joint stiffness, toe neuropathic  Pain ( DM JAN 2016). Leg weakness still but in PT  ( possibly vascular- neg neuro test )   · NEUROLOGIC: No weakness, sensory changes, seizures, confusion, memory loss, tremor or other abnormal movements.  · ENDOCRINE: No polydipsia or polyuria.  · INTEGUMENTARY: No rashes or lacerations.  · EYES: No exophthalmos, jaundice or blindness.  · ENT: No dizziness, tinnitus or hearing loss.  · RESPIRATORY: No shortness of breath.  · CARDIOVASCULAR: No tachycardia or chest pain.  · GASTROINTESTINAL: No nausea, vomiting, pain, constipation or diarrhea.  · GENITOURINARY: No frequency, dysuria or sexual dysfunction. S/p recent uti series and now kidney stones awaiting lithotripsy   · HEMATOLOGIC/LYMPHATIC: No excessive bleeding, prolonged or excessive bleeding after dental extraction/injury.  · ALLERGIC/IMMUNOLOGIC: No allergic response to materials, foods or animals at this time.    Past Medical, Family and Social History: The patient's past medical, family and social history have been reviewed and updated as appropriate within the electronic medical record - see encounter notes.    Compliance: yes    Side effects:  Sweating when others dont    Risk Parameters:  Patient reports no suicidal ideation  Patient reports no homicidal ideation  Patient reports no self-injurious behavior  Patient reports no violent behavior    Exam (detailed: at least 9 elements; comprehensive: all 15 elements)   Constitutional  Vitals:  Most recent vital signs, dated less than 90 days prior to this  "appointment, were reviewed.   Vitals:    02/17/17 0906   BP: 126/60   Pulse: 95   Weight: 77.6 kg (171 lb)   Height: 5' 1" (1.549 m)        General:  unremarkable, age appropriate, neatly groomed     Musculoskeletal  Muscle Strength/Tone:  no spasicity, no rigidity   Gait & Station:  non-ataxic     Psychiatric  Speech:  no latency; no press   Mood & Affect:  euthymic  congruent and appropriate   Thought Process:  normal and logical   Associations:  intact   Thought Content:  normal, no suicidality, no homicidality, delusions, or paranoia   Insight:  has awareness of illness   Judgement: behavior is adequate to circumstances   Orientation:  grossly intact   Memory: intact for content of interview   Language: grossly intact   Attention Span & Concentration:  able to focus   Fund of Knowledge:  intact and appropriate to age and level of education     Assessment and Diagnosis   Status/Progress: Based on the examination today, the patient's problem(s) is/are worsening.  New problems have been presented today.   Co-morbidities are complicating management of the primary condition.  There are no active rule-out diagnoses for this patient at this time.     General Impression: worsening anxiety ; more family discord; new onset diabetic neuropathy     1. Alcohol dependence in remission     2. Generalized anxiety disorder     3. Recurrent major depression in partial remission     4. Pain disorder               Intervention/Counseling/Treatment Plan   · Medication Management: Increase Effexor XR to 150 mg q day  The risks and benefits of medication were discussed with the patient regarding serotonin syndrome and withdrawal .   · Counseling provided with patient as follows: importance of compliance with chosen treatment options was emphasized, risks and benefits of treatment options, including medications, were discussed with the patient  · AA/NA/CA/ACOA/Abstinence       Return to Clinic: 6 months    "

## 2017-02-18 ENCOUNTER — PATIENT MESSAGE (OUTPATIENT)
Dept: PSYCHIATRY | Facility: CLINIC | Age: 72
End: 2017-02-18

## 2017-02-21 ENCOUNTER — CLINICAL SUPPORT (OUTPATIENT)
Dept: REHABILITATION | Facility: HOSPITAL | Age: 72
End: 2017-02-21
Attending: INTERNAL MEDICINE
Payer: MEDICARE

## 2017-02-21 DIAGNOSIS — R29.898 LEG WEAKNESS, BILATERAL: ICD-10-CM

## 2017-02-21 DIAGNOSIS — M54.6 THORACIC BACK PAIN, UNSPECIFIED BACK PAIN LATERALITY, UNSPECIFIED CHRONICITY: ICD-10-CM

## 2017-02-21 PROCEDURE — 97110 THERAPEUTIC EXERCISES: CPT

## 2017-02-21 PROCEDURE — 97140 MANUAL THERAPY 1/> REGIONS: CPT

## 2017-02-21 NOTE — PROGRESS NOTES
"                                                    Physical Therapy Daily Note     Name: Paris Burris  Clinic Number: [id number]  Diagnosis: B LE Weakness, Thoracic Back Pain  Physician: Mandi Huynh MD  Precautions: Standard  Visit #: 3 of 12  PTA Visit #: 2  Time In: 9:05 am   Time Out: 10:00 am  Total Treatment Time 1:1: 40 mins    Evaluation Date: 2/9/2017  Plan of care Expiration: 6 weeks - 3/23/2017    Cancellations: 0 No Shows: 0    Subjective     Pt reports: "I had a pain along my Right upper trap after last session." Pt states that her pain felt like a bruise.  Pain Scale: Paris rates pain on a scale of 0-10 to be 2 currently.    Objective     Paris received individual therapeutic exercises to develop strength, endurance, ROM, flexibility and posture for 45 minutes (1:1 with PTA for 30 mins) including:  Supine lying on 1/2 roll X 3 minutes  Open book 15X each side  UE Bruegger's OTB 20X  Pec stretch 10X10"  SL Shoulder Flexion 20X BL  SL Shoulder Abduction 20X BL  SL Shoulder Horizontal Abd 20X BL  SL Shoulder ER 20X BL  SLR's BL 3X10  Squeezies RTB 30X  SL Clams RTB 3X10 BL    Paris received the following manual therapy techniques: Soft tissue Mobilization were applied to the: thoracic spine and upper trap for 10 minutes including:   Vacuum/cupping STM with manual therapy techniques was performed to thoracic spine and upper traps to decrease muscle tightness, increase circulation and promote healing process. The pt's skin was monitored for redness adjusting pressure as needed. The pt was instructed in possible side effects of bruising and/or soreness.      The patient received the following supervised modalities after being cleared for contradictions: MHP to thoracic spine x 00 mins post-tx.     Written Home Exercises Provided: added SL clams, SLR's, pec stretch, open book (2/14/17)  Pt demo good understanding of the education provided. Paris demonstrated good return demonstration of activities. "     Education provided re: proper technique and mm activation  Paris verbalized good understanding of education provided.   No spiritual or educational barriers to learning provided    Assessment     Patient tolerated treatment well and denied increase in pain. VC's needed for proper mm activation with periscap exercises.    This is a 71 y.o. female referred to outpatient physical therapy and presents with a medical diagnosis of thoracic mid back pain and leg weakness and demonstrates limitations as described in the problem list. Pt prognosis is Good. Pt will continue to benefit from skilled outpatient physical therapy to address the deficits listed in the problem list, provide pt/family education and to maximize pt's level of independence in the home and community environment.     Goals as follows:  Short Term GOALS: 4 weeks. Pt agrees with goals set.  1. Patient will demonstrate independence with HEP for UE/postural strengthening.   2. Pt will report decrease in thoracic pain to 0/10 at best.     Long Term GOALS: 8 weeks. Pt agrees with goals set.  1. Patient will demonstrate increased strength in shoulder abduction 4+/5 in order to improve ability to do usual housework pain free.   2. Patient will demonstrate increased strength BUEs in middle trap and lower trap BL to 4/5 or greater to improve tolerance to functional activities pain free.   3. Patient demonstrates improved overall function per FOTO thoracic spine survey to 35% Limitation or less.   4. Patient will report an overall decrease in pain to 0/10 with all functional activities.      Plan     Continue with established Plan of Care towards PT goals.    Therapist: Domonique Holloway, PTA  2/21/2017

## 2017-02-23 ENCOUNTER — CLINICAL SUPPORT (OUTPATIENT)
Dept: REHABILITATION | Facility: HOSPITAL | Age: 72
End: 2017-02-23
Attending: INTERNAL MEDICINE
Payer: MEDICARE

## 2017-02-23 DIAGNOSIS — R29.898 LEG WEAKNESS, BILATERAL: ICD-10-CM

## 2017-02-23 DIAGNOSIS — M54.6 THORACIC BACK PAIN, UNSPECIFIED BACK PAIN LATERALITY, UNSPECIFIED CHRONICITY: ICD-10-CM

## 2017-02-23 PROCEDURE — 97140 MANUAL THERAPY 1/> REGIONS: CPT

## 2017-02-23 PROCEDURE — 97110 THERAPEUTIC EXERCISES: CPT

## 2017-02-23 NOTE — PROGRESS NOTES
"                                                    Physical Therapy Daily Note     Name: Paris Burris  Clinic Number: [id number]  Diagnosis: B LE Weakness, Thoracic Back Pain  Physician: Mandi Huynh MD  Precautions: Standard  Visit #: 4 of 12  PTA Visit #: 3  Time In: 9:00 am   Time Out: 9:50 am  Total Treatment Time 1:1: 40 mins    Evaluation Date: 2/9/2017  Plan of care Expiration: 6 weeks - 3/23/2017    Cancellations: 0 No Shows: 0    Subjective     Pt reports: "I've been trying to do my exercises at home."   Pain Scale: Paris rates pain on a scale of 0-10 to be 1-2 currently.    Objective     Paris received individual therapeutic exercises to develop strength, endurance, ROM, flexibility and posture for 35 minutes (1:1 with PTA for 25 mins) including:  Supine lying on 1/2 roll X 4 minutes  Open book 15X each side   UE Bruegger's OTB 30X  Pec stretch 10X10"   SL Shoulder Flexion 20X BL  SL Shoulder Abduction 20X BL  SL Shoulder Horizontal Abd 20X BL  SL Shoulder ER 20X BL  SLR's BL 3X10   Squeezies RTB 30X  SL Clams RTB 3X10 BL    Paris received the following manual therapy techniques: Soft tissue Mobilization were applied to the: thoracic spine and upper trap for 15 minutes including:   Vacuum/cupping STM with manual therapy techniques was performed to thoracic spine and upper traps to decrease muscle tightness, increase circulation and promote healing process. The pt's skin was monitored for redness adjusting pressure as needed. The pt was instructed in possible side effects of bruising and/or soreness.      The patient received the following supervised modalities after being cleared for contradictions: MHP to thoracic spine x 00 mins post-tx.     Written Home Exercises Provided: added SL clams, SLR's, pec stretch, open book (2/14/17)  Pt demo good understanding of the education provided. Paris demonstrated good return demonstration of activities.     Education provided re: proper technique and mm " activation  Paris verbalized good understanding of education provided.   No spiritual or educational barriers to learning provided    Assessment     Patient tolerated treatment well and denied increase in pain with exercises. VC's needed for proper mm activation with periscap exercises.  Pt continues to report relief with vacuum/cupping to bilateral UT's and periscapular muscles.   This is a 71 y.o. female referred to outpatient physical therapy and presents with a medical diagnosis of thoracic mid back pain and leg weakness and demonstrates limitations as described in the problem list. Pt prognosis is Good. Pt will continue to benefit from skilled outpatient physical therapy to address the deficits listed in the problem list, provide pt/family education and to maximize pt's level of independence in the home and community environment.     Goals as follows:  Short Term GOALS: 4 weeks. Pt agrees with goals set.  1. Patient will demonstrate independence with HEP for UE/postural strengthening.   2. Pt will report decrease in thoracic pain to 0/10 at best.     Long Term GOALS: 8 weeks. Pt agrees with goals set.  1. Patient will demonstrate increased strength in shoulder abduction 4+/5 in order to improve ability to do usual housework pain free.   2. Patient will demonstrate increased strength BUEs in middle trap and lower trap BL to 4/5 or greater to improve tolerance to functional activities pain free.   3. Patient demonstrates improved overall function per FOTO thoracic spine survey to 35% Limitation or less.   4. Patient will report an overall decrease in pain to 0/10 with all functional activities.      Plan     Continue with established Plan of Care towards PT goals.    Therapist: Domonique Holloway, PTA  2/23/2017

## 2017-02-24 ENCOUNTER — CLINICAL SUPPORT (OUTPATIENT)
Dept: INTERNAL MEDICINE | Facility: CLINIC | Age: 72
End: 2017-02-24
Payer: MEDICARE

## 2017-02-24 VITALS — BODY MASS INDEX: 31.49 KG/M2 | WEIGHT: 166.69 LBS

## 2017-02-24 NOTE — PROGRESS NOTES
Health  Follow-Up Note   Date:   2/24/17               Time:  800  [x] Office  [] Phone  Notes from previous session reviewed.   [x] Previous Session Goals unchanged.   [] Patient/Caregiver Change in Goals.  Goals added or changed by Patient/Caregiver since program participation:  1.     Lose 2-3 lbs a month  2. Eat more veggies, salads 2 x day  3. Drink more water  4. Fruit 1 per day     Additional/Changed support that patient/caregiver has experienced/sought?  (Indicate readiness, support from family, friends, others, community groups, etc)  1.      Feedback provided:  1.  praised for great job maintaining weight    Diagnostic values/Desriptors for follow-up as needed for chronic condition(s)   Weight: 75.6 kg  Blood Glucose: 116 this am  90 day 118 on meter    Interventions:   1. Health  listened reflectively, validated thoughts and feelings, offered support and encouragement.   2. Allowed patient to express themselves in a non-biased atmosphere.  3. Health  assisted pt to problem-solve obstacles such as being in a challenging environment and dealing with these challenges.   4. Motivational Interviewed interventions utilized (OARS).   5. Patient responded favorably to interventions and remained actively engaged in the session.   6. Health  will remain available and connected for patient by phone and/or office visits.   7. Positive reinforcement, emotional support and encouragement provided.   8. Focused Education: MI    Plan:  [x] Pt will work on goals as stated above.   [x] Pt will contact Health  for any questions, concerns or needs.  [x] Pt will follow up with Health  in office on 3/17/17 at 0800.        [] Pt will follow up with Health  on phone in:        [x] Health  will remain available.   [] Health  will contact patient by phone in:        [] Health  will consult:        [] Health  will inform Provider via EPIC messaging.     Impression:  1.  Behavior is consistent with   Action    Stage of Change.   2. Participation level:       [x] Receptive      [x] Interactive      [] Guarded and Resistant      [x] Self Motivated      [] Refused/Declined to participate   3. [x] Pt voiced understanding of all information presented.       [] Pt voiced needing further information/education. This will be arranged.       [x] Pt would benefit from further education/information as identified by this health . This will be arranged.     Jacqueline Perry RN HC

## 2017-02-27 ENCOUNTER — TELEPHONE (OUTPATIENT)
Dept: INTERNAL MEDICINE | Facility: CLINIC | Age: 72
End: 2017-02-27

## 2017-02-27 NOTE — TELEPHONE ENCOUNTER
----- Message from Anisha Doe sent at 2/27/2017  8:41 AM CST -----  Contact: self [phone]  Patient need an pre op appointment with MD for Eye  surgery , stated she need appointment before 03/25. Please advise Thanks !

## 2017-03-01 NOTE — TELEPHONE ENCOUNTER
Left message for patient to call office.  Patient needs a pre-op appt for eye surgery okay per Dr. Huynh to use a UC spot.

## 2017-03-02 ENCOUNTER — CLINICAL SUPPORT (OUTPATIENT)
Dept: REHABILITATION | Facility: HOSPITAL | Age: 72
End: 2017-03-02
Attending: INTERNAL MEDICINE
Payer: MEDICARE

## 2017-03-02 DIAGNOSIS — M54.6 THORACIC BACK PAIN, UNSPECIFIED BACK PAIN LATERALITY, UNSPECIFIED CHRONICITY: Primary | ICD-10-CM

## 2017-03-02 PROCEDURE — 97140 MANUAL THERAPY 1/> REGIONS: CPT

## 2017-03-02 PROCEDURE — 97110 THERAPEUTIC EXERCISES: CPT

## 2017-03-02 NOTE — PROGRESS NOTES
"                                                    Physical Therapy Daily Note     Name: Paris Burris  Clinic Number: [id number]  Diagnosis: B LE Weakness, Thoracic Back Pain  Physician: Mandi Huynh MD  Precautions: Standard  Visit #: 5 of 12  PTA Visit #: 3  Time In: 2:15 pm   Time Out: 3:15 pm  Total Treatment Time 1:1: 40 mins    Evaluation Date: 2/9/2017  Plan of care Expiration: 6 weeks - 3/23/2017    Cancellations: 0 No Shows: 0    Subjective     Pt reports: "I've been trying to do my exercises at home. The small pictures are hard to see" Pt reports not feeling well the last few days and had been sleeping for 12 hours a day.   Pain Scale: Paris rates pain on a scale of 0-10 to be 2/10 in her head (headache) - thinks it may be allergies. Says that she has been having some lower back pain with her exercises.      Objective     Paris received individual therapeutic exercises to develop strength, endurance, ROM, flexibility and posture for 40 mins  including:  Supine lying on 1/2 roll X 4 minutes  Open book 15X each side   UE Bruegger's OTB 30X - pt having difficulty performing this exercise with correct form   Standing rows with orange TB - 3 x 15 reps - added to HEP this session, manual cues for correct form   SL Shoulder Flexion 20X BL  SL Shoulder Abduction 20X BL using 2lb weight  SL Shoulder Horizontal Abd 20X BL NP  SL Shoulder ER 20X BL using 2lb weight   Reviewed HEP with patient given last session     Paris received the following manual therapy techniques: Soft tissue Mobilization were applied to the: thoracic spine and upper traps for 15 minutes    to decrease muscle tightness, increase circulation and promote healing process.     The patient received the following supervised modalities after being cleared for contradictions:no heat applied this session    Written Home Exercises Provided: continue HEP given last session added upright rows with orange TB  Pt demo good understanding of the education " provided. Paris demonstrated good return demonstration of activities.     Education provided re: proper technique and mm activation  Paris verbalized good understanding of education provided.   No spiritual or educational barriers to learning provided    Assessment     Patient reports that she is feeling better overall and that therapy is helping. Pt states that she has been compliant with her HEP. Pt progressed this session to adding weight to some of her exercises and her cervical/thoracic musculature has less tone - indicating cupping and soft tissue work is helping. Pt was given new exercises and is progressing well towards her goals - no goals met this session..   This is a 71 y.o. female referred to outpatient physical therapy and presents with a medical diagnosis of thoracic mid back pain and leg weakness and demonstrates limitations as described in the problem list. Pt prognosis is Good. Pt will continue to benefit from skilled outpatient physical therapy to address the deficits listed in the problem list, provide pt/family education and to maximize pt's level of independence in the home and community environment.     Goals as follows:  Short Term GOALS: 4 weeks. Pt agrees with goals set.  1. Patient will demonstrate independence with HEP for UE/postural strengthening. (progressing, not met)  2. Pt will report decrease in thoracic pain to 0/10 at best. (progressing, not met)    Long Term GOALS: 8 weeks. Pt agrees with goals set.  1. Patient will demonstrate increased strength in shoulder abduction 4+/5 in order to improve ability to do usual housework pain free. (progressing, not met)  2. Patient will demonstrate increased strength BUEs in middle trap and lower trap BL to 4/5 or greater to improve tolerance to functional activities pain free. (progressing, not met)  3. Patient demonstrates improved overall function per FOTO thoracic spine survey to 35% Limitation or less. (progressing, not met)  4. Patient  will report an overall decrease in pain to 0/10 with all functional activities. (progressing, not met)     Plan     Continue with established Plan of Care towards PT goals.    Therapist: Shweta Guerrero, PT  3/2/2017

## 2017-03-03 ENCOUNTER — PATIENT MESSAGE (OUTPATIENT)
Dept: PSYCHIATRY | Facility: CLINIC | Age: 72
End: 2017-03-03

## 2017-03-03 ENCOUNTER — PATIENT MESSAGE (OUTPATIENT)
Dept: INTERNAL MEDICINE | Facility: CLINIC | Age: 72
End: 2017-03-03

## 2017-03-07 ENCOUNTER — PATIENT MESSAGE (OUTPATIENT)
Dept: INTERNAL MEDICINE | Facility: CLINIC | Age: 72
End: 2017-03-07

## 2017-03-07 ENCOUNTER — PATIENT MESSAGE (OUTPATIENT)
Dept: PSYCHIATRY | Facility: CLINIC | Age: 72
End: 2017-03-07

## 2017-03-08 ENCOUNTER — CLINICAL SUPPORT (OUTPATIENT)
Dept: REHABILITATION | Facility: HOSPITAL | Age: 72
End: 2017-03-08
Attending: INTERNAL MEDICINE
Payer: MEDICARE

## 2017-03-08 PROCEDURE — 97110 THERAPEUTIC EXERCISES: CPT

## 2017-03-08 NOTE — PROGRESS NOTES
"                                                    Physical Therapy Daily Note     Name: Paris Burris  Clinic Number: [id number]  Diagnosis: B LE Weakness, Thoracic Back Pain  Physician: Mandi Huynh MD  Precautions: Standard  Visit #: 6 of 12  PTA Visit #: 1  Time In: 9:05 am   Time Out: 10:00 am  Total Treatment Time 1:1: 55 mins    Evaluation Date: 2/9/2017  Plan of care Expiration: 6 weeks - 3/23/2017    Cancellations: 0 No Shows: 0    Subjective     Pt reports: pain in BL legs at the hip joint this morning. Pt states the she feels better standing up.   Pain Scale: Paris rates pain on a scale of 0-10 to be 3/10 in BL legs around the hip.     Objective     Functional Limitations Reports  Tool: FOTO Thoracic Spine Survey  Score: 45% Limitation (39% at initial eval)     Paris received individual therapeutic exercises to develop strength, endurance, ROM, flexibility and posture for 55 mins including:  Supine lying on 1/2 roll X 4 minutes  Open book 15X each side   UE Bruegger's OTB 30X - heavy verbal/tactile cues for proper form  Standing rows with orange TB - 3 x 15 reps - added to HEP this session, manual cues for correct form   SL Shoulder Flexion 20X BL  SL Shoulder Abduction 20X BL using 2lb weight  SL Shoulder Horizontal Abd 20X BL   SL Shoulder ER 20X BL using 2lb weight   Active pec stretch /c towel roll 10X5" hold  L pec stretch at column 10X10" hold    Paris received the following manual therapy techniques: Soft tissue Mobilization were applied to the: thoracic spine and upper traps for 00 minutes to decrease muscle tightness, increase circulation and promote healing process.     The patient received the following supervised modalities after being cleared for contradictions: no heat applied this session    Written Home Exercises Provided: continue HEP given last session added upright rows with orange TB  Pt demo good understanding of the education provided. Paris demonstrated good return demonstration of " activities.     Education provided re: exercises on HEP to continue improving strength and ROM outside of therapy  Paris verbalized good understanding of education provided.   No spiritual or educational barriers to learning provided    Assessment     Patient tolerated treatment well. Added pec stretching exercises to open chest area. FOTO score increased 6% since initial eval despite subjective reports of improvement with ADL's and decreased pain. Reviewed questions on FOTO with pt to clarify change in responses to previous questions asked. Pt explained that a few questions were answered based on the pain in her legs and feet rather than her thoracic spine. Pt was educated on HEP again with a new handout supplied 2* to pt misplacing the first handout. Pt verbal understanding on exercises. No STM performed this session due to time constraints and focus on education of HEP.   This is a 71 y.o. female referred to outpatient physical therapy and presents with a medical diagnosis of thoracic mid back pain and leg weakness and demonstrates limitations as described in the problem list. Pt prognosis is Good. Pt will continue to benefit from skilled outpatient physical therapy to address the deficits listed in the problem list, provide pt/family education and to maximize pt's level of independence in the home and community environment.     Goals as follows:  Short Term GOALS: 4 weeks. Pt agrees with goals set.  1. Patient will demonstrate independence with HEP for UE/postural strengthening. (progressing, not met)  2. Pt will report decrease in thoracic pain to 0/10 at best. (progressing, not met)    Long Term GOALS: 8 weeks. Pt agrees with goals set.  1. Patient will demonstrate increased strength in shoulder abduction 4+/5 in order to improve ability to do usual housework pain free. (progressing, not met)  2. Patient will demonstrate increased strength BUEs in middle trap and lower trap BL to 4/5 or greater to improve  tolerance to functional activities pain free. (progressing, not met)  3. Patient demonstrates improved overall function per FOTO thoracic spine survey to 35% Limitation or less. (progressing, not met)  4. Patient will report an overall decrease in pain to 0/10 with all functional activities. (progressing, not met)     Plan     Continue with established Plan of Care towards PT goals.    Therapist: Domonique Holloway PTA  3/8/2017     Shweta Guerrero PT and Domonique Holloway PTA met face to face to discuss patient's treatment plan and progress towards established goals.  Treatment will be continued as described in initial report/eval and progress notes. Patient will be seen by physical therapist every sixth visit and minimally once per month.    Additional information: cont per POC

## 2017-03-10 ENCOUNTER — CLINICAL SUPPORT (OUTPATIENT)
Dept: REHABILITATION | Facility: HOSPITAL | Age: 72
End: 2017-03-10
Attending: INTERNAL MEDICINE
Payer: MEDICARE

## 2017-03-10 PROCEDURE — G8985 CARRY GOAL STATUS: HCPCS | Mod: CJ

## 2017-03-10 PROCEDURE — G8984 CARRY CURRENT STATUS: HCPCS | Mod: CK

## 2017-03-10 PROCEDURE — 97110 THERAPEUTIC EXERCISES: CPT

## 2017-03-10 NOTE — PROGRESS NOTES
"                                                    Physical Therapy Daily Note     Name: Paris Burris  Clinic Number: [id number]  Diagnosis: B LE Weakness, Thoracic Back Pain  Physician: Mandi Huynh MD  Precautions: Standard  Visit #: 7 of 12  PTA Visit #: 2  Time In: 10:05 am   Time Out: 10:50 am  Total Treatment Time 1:1: 45 mins    Evaluation Date: 2/9/2017  Plan of care Expiration: 6 weeks - 3/23/2017    Cancellations: 0 No Shows: 0    Subjective     Pt reports: "I stand up at the counter and read on my computer and I don't have any pain."  Pain Scale: Paris rates pain on a scale of 0-10 to be 1/10 along top of the shoulders.     Objective     Functional Limitations Reports  Tool: FOTO Thoracic Spine Survey  Score: 14% Limitation (39% at initial eval)     Paris received individual therapeutic exercises to develop strength, endurance, ROM, flexibility and posture for 45  mins including:  Supine lying on 1/2 roll X 4 minutes  Open book 15X each side   UE Bruegger's OTB 30X - heavy verbal/tactile cues for proper form  Standing rows with orange TB - 3 x 15 reps - added to HEP this session, manual cues for correct form   SL Shoulder Flexion 20X BL  SL Shoulder Abduction 20X BL using 2lb weight  SL Shoulder Horizontal Abd 20X BL   SL Shoulder ER 20X BL using 2lb weight   Active pec stretch /c towel roll 10X5" hold  L pec stretch at column 10X10" hold    Paris received the following manual therapy techniques: Soft tissue Mobilization were applied to the: thoracic spine and upper traps for 00 minutes to decrease muscle tightness, increase circulation and promote healing process.     The patient received the following supervised modalities after being cleared for contradictions: no heat applied this session    Written Home Exercises Provided: continue HEP given last session added upright rows with orange TB  Pt demo good understanding of the education provided. Paris demonstrated good return demonstration of activities. "     Education provided re: proper form and mm activation with exercises   Paris verbalized good understanding of education provided.   No spiritual or educational barriers to learning provided    Assessment     Patient tolerated treatment well. Session focused on education of HEP and reviewing proper technique and mm activation. FOTO was redone today, score decreased 25% since initial evaluation. Pt progressing well with current POC towards PT goals.   This is a 71 y.o. female referred to outpatient physical therapy and presents with a medical diagnosis of thoracic mid back pain and leg weakness and demonstrates limitations as described in the problem list. Pt prognosis is Good. Pt will continue to benefit from skilled outpatient physical therapy to address the deficits listed in the problem list, provide pt/family education and to maximize pt's level of independence in the home and community environment.     Goals as follows:  Short Term GOALS: 4 weeks. Pt agrees with goals set.  1. Patient will demonstrate independence with HEP for UE/postural strengthening. (progressing, not met)  2. Pt will report decrease in thoracic pain to 0/10 at best. (progressing, not met)    Long Term GOALS: 8 weeks. Pt agrees with goals set.  1. Patient will demonstrate increased strength in shoulder abduction 4+/5 in order to improve ability to do usual housework pain free. (progressing, not met)  2. Patient will demonstrate increased strength BUEs in middle trap and lower trap BL to 4/5 or greater to improve tolerance to functional activities pain free. (progressing, not met)  3. Patient demonstrates improved overall function per FOTO thoracic spine survey to 35% Limitation or less. (progressing, not met)  4. Patient will report an overall decrease in pain to 0/10 with all functional activities. (progressing, not met)     Plan     Continue with established Plan of Care towards PT goals.    Therapist: Domonique Holloway, PTA  3/10/2017      Shweta Guerrero, PT and Domonique Holloway PTA met face to face to discuss patient's treatment plan and progress towards established goals.  Treatment will be continued as described in initial report/eval and progress notes. Patient will be seen by physical therapist every sixth visit and minimally once per month.  Shweta Guerrero, PT 3/10/17   Additional information: cont per POC

## 2017-03-13 ENCOUNTER — PATIENT MESSAGE (OUTPATIENT)
Dept: INTERNAL MEDICINE | Facility: CLINIC | Age: 72
End: 2017-03-13

## 2017-03-14 ENCOUNTER — TELEPHONE (OUTPATIENT)
Dept: INTERNAL MEDICINE | Facility: CLINIC | Age: 72
End: 2017-03-14

## 2017-03-14 ENCOUNTER — CLINICAL SUPPORT (OUTPATIENT)
Dept: REHABILITATION | Facility: HOSPITAL | Age: 72
End: 2017-03-14
Attending: INTERNAL MEDICINE
Payer: MEDICARE

## 2017-03-14 DIAGNOSIS — M54.6 THORACIC BACK PAIN, UNSPECIFIED BACK PAIN LATERALITY, UNSPECIFIED CHRONICITY: ICD-10-CM

## 2017-03-14 DIAGNOSIS — M79.675 TOE PAIN, LEFT: ICD-10-CM

## 2017-03-14 DIAGNOSIS — M79.672 LEFT FOOT PAIN: Primary | ICD-10-CM

## 2017-03-14 DIAGNOSIS — R29.898 LEG WEAKNESS, BILATERAL: ICD-10-CM

## 2017-03-14 PROCEDURE — 97110 THERAPEUTIC EXERCISES: CPT

## 2017-03-14 PROCEDURE — 97140 MANUAL THERAPY 1/> REGIONS: CPT

## 2017-03-14 NOTE — PROGRESS NOTES
"                                                    Physical Therapy Daily Note     Name: Paris Burris  Clinic Number: [id number]  Diagnosis: B LE Weakness, Thoracic Back Pain  Physician: Mandi Huynh MD  Precautions: Standard  Visit #: 8 of 12  PTA Visit #: 3  Time In: 9:00 am   Time Out: 9:50 am  Total Treatment Time 1:1: 50 mins    Evaluation Date: 2/9/2017  Plan of care Expiration: 6 weeks - 3/23/2017    Cancellations: 0 No Shows: 0    Subjective     Pt reports: "I have pain across my low back today." Pt denies pain along top of her shoulders. Pt reports currently holding off on hip abd/add   Pain Scale: Paris rates pain on a scale of 0-10 to be 2 out of 10 in her LB.     Objective     Paris received individual therapeutic exercises to develop strength, endurance, ROM, flexibility and posture for 35 mins including:  Supine lying on 1/2 roll X 5 minutes - increased from last session  Open book 20X each side   UE Dhruv's OTB 30X - NP this session - heavy verbal/tactile cues for proper form  Standing rows with orange TB - 3X15 reps - manual cues for correct form   SL Shoulder Flexion 30X BL  SL Shoulder Abduction 30X BL using 2lb weight  SL Shoulder Horizontal Abd 30X BL   SL Shoulder ER 30X BL using 2lb weight   Active pec stretch /c towel roll 10X5" hold  L pec stretch at column 5X20" hold - increased from last session     Paris received the following manual therapy techniques: Soft tissue Mobilization were applied to the: thoracic spine and upper traps for 15 minutes to decrease muscle tightness, increase circulation and promote healing process.   Manual L pec stretch 5X20" hold  STM to L upper trap, pec, and periscap musculature    The patient received the following supervised modalities after being cleared for contradictions: no heat applied this session    Written Home Exercises Provided: continue HEP given in previous session, updated duration of stretches  Pt demo good understanding of the education " provided. Paris demonstrated good return demonstration of activities.     Education provided re: proper form and mm activation with exercises; increasing duration of stretches as tolerated  Paris verbalized good understanding of education provided.   No spiritual or educational barriers to learning provided    Assessment     Patient tolerated treatment well. STM performed this session to L upper trap, pec, and periscap musculature to decrease tightness and improve L pec ROM. Manual L pec stretch performed after STM with increased ROM and pt report of relief following treatment. Duration of supine lying on 1/2 roll and L pec stretch at column increased this session, pt tolerated well without adverse reaction.   This is a 71 y.o. female referred to outpatient physical therapy and presents with a medical diagnosis of thoracic mid back pain and leg weakness and demonstrates limitations as described in the problem list. Pt prognosis is Good. Pt will continue to benefit from skilled outpatient physical therapy to address the deficits listed in the problem list, provide pt/family education and to maximize pt's level of independence in the home and community environment.     Goals as follows:  Short Term GOALS: 4 weeks. Pt agrees with goals set.  1. Patient will demonstrate independence with HEP for UE/postural strengthening. (progressing, not met)  2. Pt will report decrease in thoracic pain to 0/10 at best. (progressing, not met)    Long Term GOALS: 8 weeks. Pt agrees with goals set.  1. Patient will demonstrate increased strength in shoulder abduction 4+/5 in order to improve ability to do usual housework pain free. (progressing, not met)  2. Patient will demonstrate increased strength BUEs in middle trap and lower trap BL to 4/5 or greater to improve tolerance to functional activities pain free. (progressing, not met)  3. Patient demonstrates improved overall function per FOTO thoracic spine survey to 35% Limitation or  less. (progressing, not met)  4. Patient will report an overall decrease in pain to 0/10 with all functional activities. (progressing, not met)     Plan     Continue with established Plan of Care towards PT goals.    Therapist: Domonique Holloway PTA  3/14/2017     Shweta Guerrero PT and Domonique Holloway PTA met face to face to discuss patient's treatment plan and progress towards established goals.  Treatment will be continued as described in initial report/eval and progress notes. Patient will be seen by physical therapist every sixth visit and minimally once per month.    Additional information: cont per POC

## 2017-03-15 ENCOUNTER — PATIENT MESSAGE (OUTPATIENT)
Dept: PSYCHIATRY | Facility: CLINIC | Age: 72
End: 2017-03-15

## 2017-03-15 ENCOUNTER — OFFICE VISIT (OUTPATIENT)
Dept: INTERNAL MEDICINE | Facility: CLINIC | Age: 72
End: 2017-03-15
Payer: MEDICARE

## 2017-03-15 VITALS
DIASTOLIC BLOOD PRESSURE: 72 MMHG | TEMPERATURE: 98 F | SYSTOLIC BLOOD PRESSURE: 114 MMHG | RESPIRATION RATE: 17 BRPM | WEIGHT: 170.44 LBS | BODY MASS INDEX: 32.18 KG/M2 | HEART RATE: 86 BPM | HEIGHT: 61 IN

## 2017-03-15 DIAGNOSIS — Z01.818 PRE-OP EXAMINATION: Primary | ICD-10-CM

## 2017-03-15 DIAGNOSIS — H25.9 SENILE CATARACT OF LEFT EYE, UNSPECIFIED AGE-RELATED CATARACT TYPE: ICD-10-CM

## 2017-03-15 DIAGNOSIS — M79.672 LEFT FOOT PAIN: ICD-10-CM

## 2017-03-15 DIAGNOSIS — E78.5 HYPERLIPIDEMIA ASSOCIATED WITH TYPE 2 DIABETES MELLITUS: ICD-10-CM

## 2017-03-15 DIAGNOSIS — E11.69 DIABETES MELLITUS TYPE 2 IN OBESE: ICD-10-CM

## 2017-03-15 DIAGNOSIS — E66.9 DIABETES MELLITUS TYPE 2 IN OBESE: ICD-10-CM

## 2017-03-15 DIAGNOSIS — E11.69 HYPERLIPIDEMIA ASSOCIATED WITH TYPE 2 DIABETES MELLITUS: ICD-10-CM

## 2017-03-15 PROCEDURE — 3074F SYST BP LT 130 MM HG: CPT | Mod: S$GLB,,, | Performed by: INTERNAL MEDICINE

## 2017-03-15 PROCEDURE — 1157F ADVNC CARE PLAN IN RCRD: CPT | Mod: S$GLB,,, | Performed by: INTERNAL MEDICINE

## 2017-03-15 PROCEDURE — 99214 OFFICE O/P EST MOD 30 MIN: CPT | Mod: S$GLB,,, | Performed by: INTERNAL MEDICINE

## 2017-03-15 PROCEDURE — 3078F DIAST BP <80 MM HG: CPT | Mod: S$GLB,,, | Performed by: INTERNAL MEDICINE

## 2017-03-15 PROCEDURE — 3060F POS MICROALBUMINURIA REV: CPT | Mod: S$GLB,,, | Performed by: INTERNAL MEDICINE

## 2017-03-15 PROCEDURE — 1125F AMNT PAIN NOTED PAIN PRSNT: CPT | Mod: S$GLB,,, | Performed by: INTERNAL MEDICINE

## 2017-03-15 PROCEDURE — 1159F MED LIST DOCD IN RCRD: CPT | Mod: S$GLB,,, | Performed by: INTERNAL MEDICINE

## 2017-03-15 PROCEDURE — 1160F RVW MEDS BY RX/DR IN RCRD: CPT | Mod: S$GLB,,, | Performed by: INTERNAL MEDICINE

## 2017-03-15 PROCEDURE — 99999 PR PBB SHADOW E&M-EST. PATIENT-LVL III: CPT | Mod: PBBFAC,,, | Performed by: INTERNAL MEDICINE

## 2017-03-15 PROCEDURE — 3044F HG A1C LEVEL LT 7.0%: CPT | Mod: S$GLB,,, | Performed by: INTERNAL MEDICINE

## 2017-03-15 PROCEDURE — 99499 UNLISTED E&M SERVICE: CPT | Mod: S$GLB,,, | Performed by: INTERNAL MEDICINE

## 2017-03-15 NOTE — PROGRESS NOTES
Subjective:       Patient ID: Paris Burris is a 71 y.o. female.    Chief Complaint: Pre-op Exam (left neuropathy 3)    HPI     Depression - Saw Dr. Sweeney in psychiatry 2/17/17. Increased Effexor XR to 150mg daily. Pt reports still w/ nightmares    EMG (done b/c c/o BLE intermittent weakness) - Tibial motor conduction borderline and all other nerves normal. C/o L foot tingling. Previously controlled on gabapentin 300mg tid but worsened symptoms - painful at the balls of her L foot. reports when this happens, she'll feel unsteady. Previously saw Dr. Fish but would like to see a new podiatrist. Sometimes also w/ L hip pain. Reports that when she wakes up the top of the L foot hurts, which improves w/ walking - pain improves after 10 minutes. No swelling in the feet.    Also here for preop exam for 2nd cataract surgery L. S/p R eye cataract removal and seeing well out of the R eye.   DM2 - metformin xr 500mg bid. a1c 2/3/17 was 6.4.  No CKD.   LDL controlled on crestor - 2/24/16 89.6.   No chest pains/palpitations/SOB. Still going w/ PT. No issues w/ exercising.   Has health  w/ Jacqueline.  Last EKG 12/2015 unchanged from 2014.    Past Medical History:   Diagnosis Date    Allergy     Anemia     Anxiety     Cancer 2002    L breast s/p mastectomy    Decreased hearing     Depression     Diabetes mellitus     Gastric ulcer 9/10/13    EGD    Hiatal hernia     Hyperlipidemia     Migraine headache     Migraine headache 3/20/2015    Multiple gastric ulcers     Parathyroid disorder     Pre-diabetes     Substance abuse     Wrist fracture      Current Outpatient Prescriptions on File Prior to Visit   Medication Sig Dispense Refill    ascorbic acid (VITAMIN C) 500 MG tablet Take 1,000 mg by mouth once daily.       blood sugar diagnostic (ACCU-CHEK MARIAN PLUS TEST STRP) Strp 2 strips by Misc.(Non-Drug; Combo Route) route 2 (two) times daily. 100 strip 6    blood sugar diagnostic Strp 1 strip by  Misc.(Non-Drug; Combo Route) route 2 (two) times daily. 300 each 10    DUREZOL 0.05 % Drop ophthalmic solution       ferrous sulfate 325 mg (65 mg iron) Tab tablet Take 1 tablet (325 mg total) by mouth 2 (two) times daily. 60 tablet 11    fluticasone (FLONASE) 50 mcg/actuation nasal spray SPRAY ONCE IN EACH NOSTRIL EVERY DAY 16 g 0    gabapentin (NEURONTIN) 300 MG capsule Take 1 cap nightly x 1 week; then take 1 cap twice daily x 1 week; then take 1 cap 3x/daily onwards. 90 capsule 1    ketorolac 0.5% (ACULAR) 0.5 % Drop       lancets Misc 1 lancet by Misc.(Non-Drug; Combo Route) route 2 (two) times daily. 100 each 6    lorazepam (ATIVAN) 0.5 MG tablet Take 1 tablet (0.5 mg total) by mouth daily as needed. 30 tablet 1    metformin (GLUCOPHAGE-XR) 500 MG 24 hr tablet Take 1 tablet (500 mg total) by mouth 2 (two) times daily with meals. 180 tablet 3    omega-3 fatty acids-fish oil (FISH OIL) 340-1,000 mg Cap Take 1 capsule by mouth once daily.      omeprazole (PRILOSEC) 20 MG capsule Take 1 capsule (20 mg total) by mouth once daily. 90 capsule 3    rosuvastatin (CRESTOR) 20 MG tablet Take 1 tablet (20 mg total) by mouth once daily. 90 tablet 3    sucralfate (CARAFATE) 1 gram tablet Take 1 tablet (1 g total) by mouth 2 (two) times daily. 60 tablet 1    tramadol (ULTRAM) 50 mg tablet Take 1 tablet (50 mg total) by mouth every 8 (eight) hours as needed. 60 tablet 2    tretinoin (RETIN-A) 0.05 % cream 1 application every evening. At bedtime  6    tretinoin (RETIN-A) 0.1 % cream       venlafaxine (EFFEXOR) 75 MG tablet Take 1 tablet (75 mg total) by mouth 2 (two) times daily. 60 tablet 11    [DISCONTINUED] venlafaxine (EFFEXOR-XR) 150 MG Cp24 Take 1 capsule (150 mg total) by mouth once daily. (Patient taking differently: Take 75 mg by mouth once daily. ) 30 capsule 11    aspirin (ECOTRIN) 81 MG EC tablet Take 1 tablet (81 mg total) by mouth once daily. 90 tablet 3     No current facility-administered  "medications on file prior to visit.      Review of patient's allergies indicates:   Allergen Reactions    Topamax [topiramate] Other (See Comments)     hallucinations       Review of Systems  as above in HPI.     Objective:      Physical Exam    /72  Pulse 86  Temp 98.3 °F (36.8 °C) (Oral)   Resp 17  Ht 5' 1" (1.549 m)  Wt 77.3 kg (170 lb 6.7 oz)  BMI 32.2 kg/m2    GEN - A+OX4, NAD   HEENT - PERRL, EOMI, OP clear. MMM. Hearing aids in place.  Neck - No thyromegaly or cervical LAD. No thyroid masses felt.  CV - RRR, no m/r   Chest - CTAB, no wheezing or rhonchi  Abd - S/NT/ND/+BS.   Ext - 2+BDP and radial pulses. No LE edema.   Neuro - 5/5 BUE and BLE strength. 2+ DTRs. PERRL, EOMI, no nystagmus, eyebrow raise, facial sensation, hearing, m of mastication, smile, palatal raise, shoulder shrug, tongue protrusion symmetric and intact. Sensation to light touch and monofilament intact.  Skin - No rash.    Assessment/Plan     Paris was seen today for pre-op exam.    Diagnoses and all orders for this visit:    Pre-op examination - DM controlled on current medicine. Medically optimized for cataract surgery. Will send letter to [facility].    Senile cataract of left eye, unspecified age-related cataract type - as above.     Diabetes mellitus type 2 in obese - controlled on metformin.     Hyperlipidemia associated with type 2 diabetes mellitus - cont crestor.     Left foot pain - foot xray and referred to podiatry.    Return in about 4 months (around 7/15/2017).      Mandi Huynh MD  Department of Internal Medicine - [facility]  10:02 AM  "

## 2017-03-15 NOTE — MR AVS SNAPSHOT
[facility]  [address]  [facility] [address]  Phone: [phone]  Fax: [phone]                  Paris Burris   3/15/2017 9:40 AM   Office Visit    Description:  Female : 1945   Provider:  Mandi Huynh MD   Department:  [facility]           Reason for Visit     Pre-op Exam           Diagnoses this Visit        Comments    Pre-op examination    -  Primary     Senile cataract of left eye, unspecified age-related cataract type         Diabetes mellitus type 2 in obese         Hyperlipidemia associated with type 2 diabetes mellitus         Left foot pain                To Do List           Future Appointments        Provider Department Dept Phone    3/16/2017 10:00 AM Domonique Holloway PTA [facility]     3/17/2017 8:00 AM [facility] - Internal Medicine [phone]    3/20/2017 10:00 AM Dev Hadley PA-C [facility] - Gastroenterology [phone]    3/21/2017 10:00 AM Shweta Guerrero PT [facility]     4/3/2017 8:20 AM [facility], DEXA1 [facility] [phone]      Goals (5 Years of Data)     None      Follow-Up and Disposition     Return in about 4 months (around 7/15/2017).      [facility]     [facility] -  Assistance  Registered nurses in the [facility] provide clinical advisement, health education, appointment booking, and other advisory services.  Call for this free service at [phone].             Medications           Message regarding Medications     Verify the changes and/or additions to your medication regime listed below are the same as discussed with your clinician today.  If any of these changes or additions are incorrect, please notify your healthcare provider.        STOP taking these medications     venlafaxine (EFFEXOR-XR) 150 MG Cp24 Take 1 capsule (150 mg total) by mouth once daily.           Verify that the below list of  medications is an accurate representation of the medications you are currently taking.  If none reported, the list may be blank. If incorrect, please contact your healthcare provider. Carry this list with you in case of emergency.           Current Medications     ascorbic acid (VITAMIN C) 500 MG tablet Take 1,000 mg by mouth once daily.     blood sugar diagnostic (ACCU-CHEK MARIAN PLUS TEST STRP) Strp 2 strips by Misc.(Non-Drug; Combo Route) route 2 (two) times daily.    blood sugar diagnostic Strp 1 strip by Misc.(Non-Drug; Combo Route) route 2 (two) times daily.    DUREZOL 0.05 % Drop ophthalmic solution     ferrous sulfate 325 mg (65 mg iron) Tab tablet Take 1 tablet (325 mg total) by mouth 2 (two) times daily.    fluticasone (FLONASE) 50 mcg/actuation nasal spray SPRAY ONCE IN EACH NOSTRIL EVERY DAY    gabapentin (NEURONTIN) 300 MG capsule Take 1 cap nightly x 1 week; then take 1 cap twice daily x 1 week; then take 1 cap 3x/daily onwards.    ketorolac 0.5% (ACULAR) 0.5 % Drop     lancets Misc 1 lancet by Misc.(Non-Drug; Combo Route) route 2 (two) times daily.    lorazepam (ATIVAN) 0.5 MG tablet Take 1 tablet (0.5 mg total) by mouth daily as needed.    metformin (GLUCOPHAGE-XR) 500 MG 24 hr tablet Take 1 tablet (500 mg total) by mouth 2 (two) times daily with meals.    omega-3 fatty acids-fish oil (FISH OIL) 340-1,000 mg Cap Take 1 capsule by mouth once daily.    omeprazole (PRILOSEC) 20 MG capsule Take 1 capsule (20 mg total) by mouth once daily.    rosuvastatin (CRESTOR) 20 MG tablet Take 1 tablet (20 mg total) by mouth once daily.    sucralfate (CARAFATE) 1 gram tablet Take 1 tablet (1 g total) by mouth 2 (two) times daily.    tramadol (ULTRAM) 50 mg tablet Take 1 tablet (50 mg total) by mouth every 8 (eight) hours as needed.    tretinoin (RETIN-A) 0.05 % cream 1 application every evening. At bedtime    tretinoin (RETIN-A) 0.1 % cream     venlafaxine (EFFEXOR) 75 MG tablet Take 1 tablet (75 mg total) by mouth  "2 (two) times daily.    aspirin (ECOTRIN) 81 MG EC tablet Take 1 tablet (81 mg total) by mouth once daily.           Clinical Reference Information           Your Vitals Were     BP Pulse Temp Resp Height Weight    114/72 86 98.3 °F (36.8 °C) (Oral) 17 5' 1" (1.549 m) 77.3 kg (170 lb 6.7 oz)    BMI                32.2 kg/m2          Blood Pressure          Most Recent Value    BP  114/72      Allergies as of 3/15/2017     Topamax [Topiramate]      Immunizations Administered on Date of Encounter - 3/15/2017     None      Language Assistance Services     ATTENTION: Language assistance services are available, free of charge. Please call [phone].      ATENCIÓN: Si habla av, tiene a ricks disposición servicios gratuitos de asistencia lingüística. Llame al [phone].     JOVAN Ý: N?u b?n nói Ti?ng Vi?t, có các d?ch v? h? tr? ngôn ng? mi?n phí dành cho b?n. G?i s? [phone].         [facility] complies with applicable Federal civil rights laws and does not discriminate on the basis of race, color, national origin, age, disability, or sex.        "

## 2017-03-16 ENCOUNTER — CLINICAL SUPPORT (OUTPATIENT)
Dept: REHABILITATION | Facility: HOSPITAL | Age: 72
End: 2017-03-16
Attending: INTERNAL MEDICINE
Payer: MEDICARE

## 2017-03-16 PROCEDURE — 97110 THERAPEUTIC EXERCISES: CPT

## 2017-03-16 NOTE — PROGRESS NOTES
"                                                    Physical Therapy Daily Note     Name: Paris Burris  Clinic Number: [id number]  Diagnosis: B LE Weakness, Thoracic Back Pain  Physician: Mandi Huynh MD  Precautions: Standard  Visit #: 9 of 12  PTA Visit #: 4  Time In: 10:05 am   Time Out: 10:30 am  Total Treatment Time 1:1: 25 mins    Evaluation Date: 2/9/2017  Plan of care Expiration: 6 weeks - 3/23/2017    Cancellations: 0 No Shows: 0    Subjective     Pt reports: pain on the outside of her legs. Pt denies thoracic back pain.   Pain Scale: Paris rates pain on a scale of 0-10 to be 2 out of 10 in her LB.     Objective     Paris received individual therapeutic exercises to develop strength, endurance, ROM, flexibility and posture for 25 mins (1:1 with PTA for 25 mins) including:  Supine lying on 1/2 roll X 5 minutes - NP this session per pt request  Open book 20X each side   UE Rachealegger's OTB 30X - NP this session - heavy verbal/tactile cues for proper form  Standing rows with orange TB - 3X15 reps - manual cues for correct form   SL Shoulder Flexion 30X BL  SL Shoulder Abduction 30X BL using 2lb weight - pt reported used 1# weight this session   SL Shoulder Horizontal Abd 30X BL   SL Shoulder ER 30X BL using 2lb weight   Active pec stretch /c towel roll 10X5" hold  L pec stretch at column 5X20" hold - increased from last session     Paris received the following manual therapy techniques: Soft tissue Mobilization were applied to the: thoracic spine and upper traps for 00 minutes to decrease muscle tightness, increase circulation and promote healing process.   Manual L pec stretch 5X20" hold  STM to L upper trap, pec, and periscap musculature    The patient received the following supervised modalities after being cleared for contradictions: no heat applied this session    Written Home Exercises Provided: continue HEP given in previous session, updated duration of stretches  Pt demo good understanding of the education " provided. Paris demonstrated good return demonstration of activities.     Education provided re: proper form and mm activation with exercises; increasing duration of stretches as tolerated  Paris verbalized good understanding of education provided.   No spiritual or educational barriers to learning provided    Assessment     Patient tolerated treatment well. Pt reported difficulty with 2# weight with sidelying exercises. Weight was decreased to 1# and pt was able to tolerate with no increase in pain or difficulty. No STM or supine on 1/2 roll performed this session per pt reqest. Pt progressing well with current POC. Pt will have cataract surgery next Wednesday, March 22nd.   This is a 71 y.o. female referred to outpatient physical therapy and presents with a medical diagnosis of thoracic mid back pain and leg weakness and demonstrates limitations as described in the problem list. Pt prognosis is Good. Pt will continue to benefit from skilled outpatient physical therapy to address the deficits listed in the problem list, provide pt/family education and to maximize pt's level of independence in the home and community environment.     Goals as follows:  Short Term GOALS: 4 weeks. Pt agrees with goals set.  1. Patient will demonstrate independence with HEP for UE/postural strengthening. (progressing, not met)  2. Pt will report decrease in thoracic pain to 0/10 at best. (progressing, not met)    Long Term GOALS: 8 weeks. Pt agrees with goals set.  1. Patient will demonstrate increased strength in shoulder abduction 4+/5 in order to improve ability to do usual housework pain free. (progressing, not met)  2. Patient will demonstrate increased strength BUEs in middle trap and lower trap BL to 4/5 or greater to improve tolerance to functional activities pain free. (progressing, not met)  3. Patient demonstrates improved overall function per FOTO thoracic spine survey to 35% Limitation or less. (progressing, not met)  4.  Patient will report an overall decrease in pain to 0/10 with all functional activities. (progressing, not met)     Plan     Continue with established Plan of Care towards PT goals.    Therapist: Domonique Holloway PTA  3/16/2017     Shweta Guerrero PT and Domonique Holloway PTA met face to face to discuss patient's treatment plan and progress towards established goals.  Treatment will be continued as described in initial report/eval and progress notes. Patient will be seen by physical therapist every sixth visit and minimally once per month.    Additional information: cont per POC

## 2017-03-17 ENCOUNTER — CLINICAL SUPPORT (OUTPATIENT)
Dept: INTERNAL MEDICINE | Facility: CLINIC | Age: 72
End: 2017-03-17
Payer: MEDICARE

## 2017-03-17 VITALS — BODY MASS INDEX: 31.78 KG/M2 | WEIGHT: 168.19 LBS

## 2017-03-17 NOTE — PROGRESS NOTES
Health  Follow-Up Note   [x] Office  [] Phone  Notes from previous session reviewed.   [x] Previous Session Goals unchanged.   [] Patient/Caregiver Change in Goals.  Goals added or changed by Patient/Caregiver since program participation:  1.     Lower cholesterol     Additional/Changed support that patient/caregiver has experienced/sought?  (Indicate readiness, support from family, friends, others, community groups, etc)  1.  [place] [facility]    Additional/Changed obstacles that could prevent patient/caregiver from reaching their goals?  1.  Pain, weak legs    Feedback provided:  1.  Praised for continued effort and great job BS 92 today in office    Diagnostic values/Desriptors for follow-up as needed for chronic condition(s)   Weight: 76.3 kg 168 lb 3.4 oz gain 1.6 lbs from last visit  Blood Glucose: 92 in office today  7 d-108  14 d 107  30 d 108  90 d115    Interventions:   1. Health  listened reflectively, validated thoughts and feelings, offered support and encouragement.   2. Allowed patient to express themselves in a non-biased atmosphere.  3. Health  assisted pt to problem-solve obstacles such as being in a challenging environment and dealing with these challenges.   4. Motivational Interviewed interventions utilized (OARS).   5. Patient responded favorably to interventions and remained actively engaged in the session.   6. Health  will remain available and connected for patient by phone and/or office visits.   7. Positive reinforcement, emotional support and encouragement provided.   8. Focused Education: MI, more veggies, sugar    Plan:  [x] Pt will work on goals as stated above.   [x] Pt will contact Health  for any questions, concerns or needs.  [x] Pt will follow up with Health  in office on 3/31/17 at 0930      [] Pt will follow up with Health  on phone :        [x] Health  will remain available.   [] Health  will contact patient by phone in:        [] Health   will consult:        [] Health  will inform Provider via EPIC messaging.     Impression:  1. Behavior is consistent with  Action     Stage of Change.   2. Participation level:       [x] Receptive      [x] Interactive      [] Guarded and Resistant      [x] Self Motivated      [] Refused/Declined to participate   3. [x] Pt voiced understanding of all information presented.       [] Pt voiced needing further information/education. This will be arranged.       [x] Pt would benefit from further education/information as identified by this health . This will be arranged.     Jacqueline Perry RN HC

## 2017-03-20 ENCOUNTER — OFFICE VISIT (OUTPATIENT)
Dept: GASTROENTEROLOGY | Facility: CLINIC | Age: 72
End: 2017-03-20
Payer: MEDICARE

## 2017-03-20 ENCOUNTER — OFFICE VISIT (OUTPATIENT)
Dept: PSYCHIATRY | Facility: CLINIC | Age: 72
End: 2017-03-20
Payer: MEDICARE

## 2017-03-20 VITALS
DIASTOLIC BLOOD PRESSURE: 74 MMHG | HEART RATE: 83 BPM | SYSTOLIC BLOOD PRESSURE: 118 MMHG | HEIGHT: 61 IN | BODY MASS INDEX: 31.97 KG/M2 | WEIGHT: 169.31 LBS

## 2017-03-20 VITALS
WEIGHT: 168 LBS | SYSTOLIC BLOOD PRESSURE: 127 MMHG | BODY MASS INDEX: 31.72 KG/M2 | HEIGHT: 61 IN | DIASTOLIC BLOOD PRESSURE: 75 MMHG | HEART RATE: 98 BPM

## 2017-03-20 DIAGNOSIS — F41.1 GAD (GENERALIZED ANXIETY DISORDER): ICD-10-CM

## 2017-03-20 DIAGNOSIS — R52 PAIN DISORDER: ICD-10-CM

## 2017-03-20 DIAGNOSIS — K29.70 GASTRITIS, PRESENCE OF BLEEDING UNSPECIFIED, UNSPECIFIED CHRONICITY, UNSPECIFIED GASTRITIS TYPE: Primary | ICD-10-CM

## 2017-03-20 DIAGNOSIS — F10.21 ALCOHOL DEPENDENCE IN REMISSION: ICD-10-CM

## 2017-03-20 DIAGNOSIS — F33.41 RECURRENT MAJOR DEPRESSION IN PARTIAL REMISSION: Primary | ICD-10-CM

## 2017-03-20 PROCEDURE — 1160F RVW MEDS BY RX/DR IN RCRD: CPT | Mod: S$GLB,,, | Performed by: PSYCHIATRY & NEUROLOGY

## 2017-03-20 PROCEDURE — 3078F DIAST BP <80 MM HG: CPT | Mod: S$GLB,,, | Performed by: PSYCHIATRY & NEUROLOGY

## 2017-03-20 PROCEDURE — 3078F DIAST BP <80 MM HG: CPT | Mod: S$GLB,,, | Performed by: PHYSICIAN ASSISTANT

## 2017-03-20 PROCEDURE — 1159F MED LIST DOCD IN RCRD: CPT | Mod: S$GLB,,, | Performed by: PHYSICIAN ASSISTANT

## 2017-03-20 PROCEDURE — 99499 UNLISTED E&M SERVICE: CPT | Mod: S$GLB,,, | Performed by: PSYCHIATRY & NEUROLOGY

## 2017-03-20 PROCEDURE — 90833 PSYTX W PT W E/M 30 MIN: CPT | Mod: S$GLB,,, | Performed by: PSYCHIATRY & NEUROLOGY

## 2017-03-20 PROCEDURE — 3074F SYST BP LT 130 MM HG: CPT | Mod: S$GLB,,, | Performed by: PSYCHIATRY & NEUROLOGY

## 2017-03-20 PROCEDURE — 99214 OFFICE O/P EST MOD 30 MIN: CPT | Mod: S$GLB,,, | Performed by: PSYCHIATRY & NEUROLOGY

## 2017-03-20 PROCEDURE — 3074F SYST BP LT 130 MM HG: CPT | Mod: S$GLB,,, | Performed by: PHYSICIAN ASSISTANT

## 2017-03-20 PROCEDURE — 99999 PR PBB SHADOW E&M-EST. PATIENT-LVL IV: CPT | Mod: PBBFAC,,, | Performed by: PHYSICIAN ASSISTANT

## 2017-03-20 PROCEDURE — 1160F RVW MEDS BY RX/DR IN RCRD: CPT | Mod: S$GLB,,, | Performed by: PHYSICIAN ASSISTANT

## 2017-03-20 PROCEDURE — 1157F ADVNC CARE PLAN IN RCRD: CPT | Mod: S$GLB,,, | Performed by: PHYSICIAN ASSISTANT

## 2017-03-20 PROCEDURE — 1159F MED LIST DOCD IN RCRD: CPT | Mod: S$GLB,,, | Performed by: PSYCHIATRY & NEUROLOGY

## 2017-03-20 PROCEDURE — 1126F AMNT PAIN NOTED NONE PRSNT: CPT | Mod: S$GLB,,, | Performed by: PHYSICIAN ASSISTANT

## 2017-03-20 PROCEDURE — 99999 PR PBB SHADOW E&M-EST. PATIENT-LVL III: CPT | Mod: PBBFAC,,, | Performed by: PSYCHIATRY & NEUROLOGY

## 2017-03-20 PROCEDURE — 1157F ADVNC CARE PLAN IN RCRD: CPT | Mod: S$GLB,,, | Performed by: PSYCHIATRY & NEUROLOGY

## 2017-03-20 PROCEDURE — 99213 OFFICE O/P EST LOW 20 MIN: CPT | Mod: S$GLB,,, | Performed by: PHYSICIAN ASSISTANT

## 2017-03-20 RX ORDER — MIRTAZAPINE 15 MG/1
15 TABLET, FILM COATED ORAL NIGHTLY
COMMUNITY
End: 2017-04-05 | Stop reason: SDUPTHER

## 2017-03-20 RX ORDER — LEVOMEFOLATE CALCIUM 7.5 MG
7.5 TABLET ORAL DAILY
Qty: 30 TABLET | Refills: 11 | Status: SHIPPED | OUTPATIENT
Start: 2017-03-20 | End: 2017-04-21 | Stop reason: SDUPTHER

## 2017-03-20 RX ORDER — LORAZEPAM 0.5 MG/1
0.5 TABLET ORAL DAILY PRN
Qty: 30 TABLET | Refills: 2 | Status: SHIPPED | OUTPATIENT
Start: 2017-03-20 | End: 2017-05-01 | Stop reason: SDUPTHER

## 2017-03-20 RX ORDER — VENLAFAXINE 75 MG/1
75 TABLET ORAL 2 TIMES DAILY
Qty: 60 TABLET | Refills: 11 | Status: SHIPPED | OUTPATIENT
Start: 2017-03-20 | End: 2017-05-01 | Stop reason: SDUPTHER

## 2017-03-20 NOTE — PROGRESS NOTES
[facility] Consultation Note    Reason for Consult:  The encounter diagnosis was Gastritis, presence of bleeding unspecified, unspecified chronicity, unspecified gastritis type.    PCP:   Mandi Gonzalez MD:  No referring provider defined for this encounter.    HPI:  This is a 71 y.o. female here to follow up on her ESTEFANIA.   She reports taking carafate 1g BID for 2 months  Today She has no complaints. She denies abdominal pain, nausea, vomiting, GERD, dysphagia, constipation, diarrhea, BRBPR, or melena.  Still taking iron  2/3/17 Hgb 15.5 compared to Hgb 8.3 6 months ago  Taking asa 81mg on an empty stomach    Her 12/2/16 EGD revealed gastric erosions, H. Pylori negative. 12/2/16 colonoscopy revealed two hyperplastic polyps which were removed.    2015 EGD gastric ulcers - she was not symptomatic for ulcers at this time, except for the anemia    ROS:  Constitutional: No fevers, chills, No weight loss  ENT: No allergies  CV: No chest pain  Pulm: No cough, No shortness of breath  Ophtho: No vision changes  GI: see HPI  Derm: No rash  Heme: No lymphadenopathy, No bruising  MSK: No arthritis  : No dysuria, No hematuria  Endo: No hot or cold intolerance  Neuro: No syncope, No seizure  Psych: No anxiety, No depression    Medical History:  has a past medical history of Allergy; Anemia; Anxiety; Cancer (2002); Decreased hearing; Depression; Diabetes mellitus; Gastric ulcer (9/10/13); Hiatal hernia; Hyperlipidemia; Migraine headache; Migraine headache (3/20/2015); Multiple gastric ulcers; Parathyroid disorder; Pre-diabetes; Substance abuse; and Wrist fracture.    Surgical History:  has a past surgical history that includes lipoma removal (1999); Breast lumpectomy; and Colonoscopy (N/A, 12/2/2016).    Family History: family history includes Alcohol abuse in her father; Depression in her mother; Diabetes in her sister; Headaches in her father; Suicide in her mother..     Social History:  reports  that she has never smoked. She has never used smokeless tobacco. She reports that she uses illicit drugs. She reports that she does not drink alcohol.    Review of patient's allergies indicates:   Allergen Reactions    Topamax [topiramate] Other (See Comments)     hallucinations       Current Outpatient Prescriptions on File Prior to Visit   Medication Sig Dispense Refill    ascorbic acid (VITAMIN C) 500 MG tablet Take 1,000 mg by mouth once daily.       aspirin (ECOTRIN) 81 MG EC tablet Take 1 tablet (81 mg total) by mouth once daily. 90 tablet 3    blood sugar diagnostic (ACCU-CHEK MARIAN PLUS TEST STRP) Strp 2 strips by Misc.(Non-Drug; Combo Route) route 2 (two) times daily. 100 strip 6    blood sugar diagnostic Strp 1 strip by Misc.(Non-Drug; Combo Route) route 2 (two) times daily. 300 each 10    DUREZOL 0.05 % Drop ophthalmic solution       ferrous sulfate 325 mg (65 mg iron) Tab tablet Take 1 tablet (325 mg total) by mouth 2 (two) times daily. 60 tablet 11    fluticasone (FLONASE) 50 mcg/actuation nasal spray SPRAY ONCE IN EACH NOSTRIL EVERY DAY 16 g 0    gabapentin (NEURONTIN) 300 MG capsule Take 1 cap nightly x 1 week; then take 1 cap twice daily x 1 week; then take 1 cap 3x/daily onwards. 90 capsule 1    ketorolac 0.5% (ACULAR) 0.5 % Drop       lancets Misc 1 lancet by Misc.(Non-Drug; Combo Route) route 2 (two) times daily. 100 each 6    metformin (GLUCOPHAGE-XR) 500 MG 24 hr tablet Take 1 tablet (500 mg total) by mouth 2 (two) times daily with meals. 180 tablet 3    omega-3 fatty acids-fish oil (FISH OIL) 340-1,000 mg Cap Take 1 capsule by mouth once daily.      omeprazole (PRILOSEC) 20 MG capsule Take 1 capsule (20 mg total) by mouth once daily. 90 capsule 3    rosuvastatin (CRESTOR) 20 MG tablet Take 1 tablet (20 mg total) by mouth once daily. 90 tablet 3    sucralfate (CARAFATE) 1 gram tablet Take 1 tablet (1 g total) by mouth 2 (two) times daily. 60 tablet 1    tramadol (ULTRAM) 50 mg  "tablet Take 1 tablet (50 mg total) by mouth every 8 (eight) hours as needed. 60 tablet 2    tretinoin (RETIN-A) 0.05 % cream 1 application every evening. At bedtime  6    tretinoin (RETIN-A) 0.1 % cream       [DISCONTINUED] lorazepam (ATIVAN) 0.5 MG tablet Take 1 tablet (0.5 mg total) by mouth daily as needed. 30 tablet 1    [DISCONTINUED] venlafaxine (EFFEXOR) 75 MG tablet Take 1 tablet (75 mg total) by mouth 2 (two) times daily. 60 tablet 11     No current facility-administered medications on file prior to visit.          Objective Findings:    Vital Signs:  /74  Pulse 83  Ht 5' 1" (1.549 m)  Wt 76.8 kg (169 lb 5 oz)  BMI 31.99 kg/m2  Body mass index is 31.99 kg/(m^2).    Physical Exam:  General Appearance: Well appearing in no acute distress  Head:   Normocephalic, without obvious abnormality  Eyes:    No scleral icterus  ENT: Neck supple, Lips, mucosa, and tongue normal  Lungs: CTA bilaterally in anterior and posterior fields, no wheezes, no crackles.  Heart:  Regular rate and rhythm, S1, S2 normal, no murmurs heard  Abdomen: Soft, non tender, non distended with positive bowel sounds in all four quadrants. Extremities: 2+ pulses, no edema  Skin: No rash  Neurologic: AAO x 3      Labs:  Lab Results   Component Value Date    WBC 7.99 02/03/2017    HGB 15.5 02/03/2017    HCT 47.3 02/03/2017     02/03/2017    CHOL 165 02/24/2017    TRIG 237 (H) 02/24/2017    HDL 28 (L) 02/24/2017    ALT 17 02/24/2017    AST 25 02/24/2017     02/24/2017    K 4.4 02/24/2017     02/24/2017    CREATININE 0.8 02/24/2017    BUN 20 02/24/2017    CO2 28 02/24/2017    TSH 1.216 09/14/2016    INR 0.9 04/24/2015    HGBA1C 6.4 (H) 02/03/2017     Lab Results   Component Value Date    IRON 71 02/03/2017    TIBC 422 02/03/2017    FERRITIN 29 02/03/2017       ttg - wnl    Imaging:  U/S abdomen 10/13/16:  Hepatomegaly, unchanged.    Hepatic steatosis, slightly improved from prior study.  Right hepatic lobe cyst, " unchanged.  One tiny gallstone.  Left renal cyst.    Endoscopy:    Multiple gastric ulcers seen in 2013 and in 2015 by EGD. Her gastrin level was normal. Negative for H pylori in two separate occasions.     History of intestinal metaplasia of the gastric mucosa - negative H pylori stains     History of adenomatous polyp of the colon removed 2013.    Assessment:  1. Gastritis, presence of bleeding unspecified, unspecified chronicity, unspecified gastritis type    7cm Hiatal hernia - asymptomatic    Iron deficiency anemia, responded to  Started iron therapy 9/2016    I suspect that erosions from ASA use may have contributed to her ESTEFANIA.    Recommendations:  1. She will likely benefit from staying on a PPI considering her Hx of ulcers, and 7cm hiatal hernia.     Has an upcoming Bone density scan. She says that her prior osteopenia was related to hyperparathyroidism, and she has since had a parathyroidectomy. Will f/u these results    2. Advise taking her aspirin with food    Consider VCE if CBC drops in future      Return in about 6 months (around 9/20/2017). with MD to determine whether VCE is necessary      Order summary:         Thank you so much for allowing me to participate in the care of Paris Hadley PA-C

## 2017-03-20 NOTE — PROGRESS NOTES
Outpatient Psychiatry Follow-Up Visit (MD/NP)    3/20/2017 last seen June 2016     Clinical Status of Patient:  Outpatient (Ambulatory)    Chief Complaint:  Paris Burris is a 71 y.o. female who presents today for follow-up of depression and alcohol problem .  Met with patient.  Call Paris ; friend of Dr Rachel . Annita Braun ( 9 yrs )     Interval History and Content of Current Session:  Interim Events/Subjective Report/Content of Current Session: Pt s/p ABU program in Spring 2014 for alcohol dependence . 2-10-14 sober date . Now 3 years .      Grkids at their own  home are 14( Tyr[place])- anger issues / destroys property . 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  Nikky has a [place] xchnge female .  Oldest grson Delfino ( 17) ADHD  ,  going  [employer]. Younger kids at  [facility] . Very engaged in AA and sponsor attending 3-4 x per week . She completed 1st series of 12 steps . . Mood has been more anxious over the past few months . More nail biting    . In past she was Not sure she wanted to reengage with  Dr Lauren .     Pt has hyperparathyroidism  and had surgery in nov, 2015- stable .  Sleep apnea confirmed  by sleep specialist  . Did trauma therapy at [facility].  She and Annita Braun have moved into 2nf floor apmnt away from [facility] [place]. 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    Has lost son yrs ago .     Dtr Yue ( Vira Rosas- employed with )  had had recent 4th suicide attempt ( 1st was OD  acetomin; 2nd and 3rd had gun)  and was admitted to [facility] on 4-9-15  X 5 weeks .    Yue also has been to tx in [facility] . Yue now in therapy  plus a group with [facility].  Pt does not ask details anymore .   Yue ( kaylie patel).  Yue continues to  cut herself off from others that were  close to her . Yue agreed to lunch with her mo in law    Yue has distanced santana daniela's wife ( Yazmin) away . Santana is a family friend.   Dtr Yue ( Constantino rosendo) going though bitter divorce where Yue wants others to avoid [place]. Kids shuttle .  [place]'s live in gf has 3 kids in their home .     Diabetes controlled with hgb A1c --  I just above 6  has relatively new onset  Neuropathy       Since Feb 2017 visit - she could not tolerate effexor XR at  Increased dose to 150 mg ( anxiety and nightmares increased ) then called me and we decreased to 75 mg ; She then added remeron 15 mg q hs . She  feels her mood has improved in response .     Psychiatric Review Of Systems - Is patient experiencing or having changes in:  sleep:  Added back  mirtazepine 15 mg q hs to help sleep   appetite: no, controlled   weight: no; 171 ( feb 2017)  ; 168 ( March 2017)   energy/anergy: no, less exercising at [place] with free  bc of comorbid condition; doing land based PT and   accupuncture   interest/pleasure/anhedonia: no, volunteering with teacher Mandi   somatic symptoms: yes as above and below - neck tension better with mechanical cupping   libido: no  anxiety/panic: yes , worse  ; still  biting nails ; obsessive thinking about dtr   guilty/hopelessness: no  concentration: not baseline ;  Improving as she is quicker  to  complete tasks   S.I.B.s/risky behavior: no  Irritability: no  Racing thoughts: no  Impulsive behaviors: no  Paranoia:no  AVH:no      Has compression fx in high thoracic area .  New onset  HbA1c from 10 plus to 6.2 . Dx 'd in Jan 2016 .No DM in family .     Santana Rachel MD is  fam friend .     Meds  effexor xr 75  mg   q day;  mirtazepine 7.5 mg q hs Vit D supplement ; tramadol 50 mg tid prn ( usually takes 50 mg  2 q day) ; Ativan 0.5 mg  prn     Past meds remeron - not needed     Her pain doc is concerned about potential interaction of tramadol and ssri in past     Reading book form health  --never  be sick again -- coffee, milk , white flour , sugar     Psychotherapy:  · Target symptoms: alcohol abuse, depression  · Why chosen therapy is appropriate versus another modality: relevant to diagnosis, patient responds to this modality, evidence based practice  · Outcome monitoring methods: self-report, observation  · Therapeutic intervention type: supportive psychotherapy  · Topics discussed/themes: relationships difficulties, substance abuse  · The patient's response to the intervention is accepting. The patient's progress toward treatment goals is good.   · Duration of intervention: 30  minutes.    Review of Systems   · Prob Pert. 1 sys, Ext. psych +2 add., Comp. 10-14 sys  · PSYCHIATRIC: Pertinant items are noted in the narrative.  · CONSTITUTIONAL: No weight gain or loss. Wedding dress size 5 . She is about a 14-16 .   · MUSCULOSKELETAL: Positive for joint stiffness, toe neuropathic  Pain ( DM JAN 2016). Leg ( hip and thighs)  weakness still but in PT  ( possibly vascular- neg neuro test ) ;mechanical cupping ( upper back tension post Breast CA)  and  · NEUROLOGIC: No weakness, sensory changes, seizures, confusion, memory loss, tremor or other abnormal movements.  · ENDOCRINE: No polydipsia or polyuria.  · INTEGUMENTARY: No rashes or lacerations.  · EYES: No exophthalmos, jaundice or blindness.  · ENT: No dizziness, tinnitus or hearing loss.  · RESPIRATORY: No shortness of breath.  · CARDIOVASCULAR: No tachycardia or chest pain.  · GASTROINTESTINAL: No nausea, vomiting, pain, constipation or diarrhea.  · GENITOURINARY: No frequency, dysuria or sexual dysfunction. S/p recent uti series and now kidney stones awaiting lithotripsy   · HEMATOLOGIC/LYMPHATIC: No excessive bleeding, prolonged or excessive bleeding after dental extraction/injury.  · ALLERGIC/IMMUNOLOGIC: No allergic response to materials, foods or animals at this time.    Past Medical, Family and Social History: The patient's past medical, family and  "social history have been reviewed and updated as appropriate within the electronic medical record - see encounter notes.    Compliance: yes    Side effects:  Sweating when others dont    Risk Parameters:  Patient reports no suicidal ideation  Patient reports no homicidal ideation  Patient reports no self-injurious behavior  Patient reports no violent behavior    Exam (detailed: at least 9 elements; comprehensive: all 15 elements)   Constitutional  Vitals:  Most recent vital signs, dated less than 90 days prior to this appointment, were reviewed.   Vitals:    03/20/17 0904   BP: 127/75   Pulse: 98   Weight: 76.2 kg (168 lb)   Height: 5' 1" (1.549 m)        General:  unremarkable, age appropriate, neatly groomed     Musculoskeletal  Muscle Strength/Tone:  no spasicity, no rigidity   Gait & Station:  non-ataxic     Psychiatric  Speech:  no latency; no press   Mood & Affect:  euthymic  congruent and appropriate   Thought Process:  normal and logical   Associations:  intact   Thought Content:  normal, no suicidality, no homicidality, delusions, or paranoia   Insight:  has awareness of illness   Judgement: behavior is adequate to circumstances   Orientation:  grossly intact   Memory: intact for content of interview   Language: grossly intact   Attention Span & Concentration:  able to focus   Fund of Knowledge:  intact and appropriate to age and level of education     Assessment and Diagnosis   Status/Progress: Based on the examination today, the patient's problem(s) is/are worsening.  New problems have been presented today.   Co-morbidities are complicating management of the primary condition.  There are no active rule-out diagnoses for this patient at this time.     General Impression:  Depression still present     1. Recurrent major depression in partial remission     2. GABBY (generalized anxiety disorder)     3. Alcohol dependence in remission     4. Pain disorder               Intervention/Counseling/Treatment Plan "   · Medication Management: Maintain Effexor XR at 75 mg q day   And mirtazepine 7.5 mg qhs  Add Deplin though it may not be covered The risks and benefits of medication were discussed with the patient regarding serotonin syndrome and withdrawal .   · Counseling provided with patient as follows: importance of compliance with chosen treatment options was emphasized, risks and benefits of treatment options, including medications, were discussed with the patient  · AA/NA/CA/ACOA/Abstinence       Return to Clinic: 6 months

## 2017-03-20 NOTE — MR AVS SNAPSHOT
[facility]  [address] [address]  Phone: [phone]  Fax: [phone]                  Paris Burris   3/20/2017 10:00 AM   Office Visit    Description:  Female : 1945   Provider:  Dev Hadley PA-C   Department:  [facility]           Reason for Visit     Follow-up           Diagnoses this Visit        Comments    Gastritis, presence of bleeding unspecified, unspecified chronicity, unspecified gastritis type    -  Primary            To Do List           Future Appointments        Provider Department Dept Phone    3/21/2017 10:00 AM Domonique Holloway PTA [facility]     3/31/2017 9:30 AM [facility] Internal Medicine [phone]    4/3/2017 8:20 AM [facility], DEXA1 [facility] [phone]    7/10/2017 8:40 AM Mandi Huynh MD [facility] Internal Medicine [phone]      Goals (5 Years of Data)     None      [facility]     [facility] -  Assistance  Registered nurses in the [facility] provide clinical advisement, health education, appointment booking, and other advisory services.  Call for this free service at [phone].             Medications           Message regarding Medications     Verify the changes and/or additions to your medication regime listed below are the same as discussed with your clinician today.  If any of these changes or additions are incorrect, please notify your healthcare provider.             Verify that the below list of medications is an accurate representation of the medications you are currently taking.  If none reported, the list may be blank. If incorrect, please contact your healthcare provider. Carry this list with you in case of emergency.           Current Medications     ascorbic acid (VITAMIN C) 500 MG tablet Take 1,000 mg by mouth once daily.     aspirin (ECOTRIN) 81 MG EC tablet Take 1 tablet (81 mg  "total) by mouth once daily.    blood sugar diagnostic (ACCU-CHEK MARIAN PLUS TEST STRP) Strp 2 strips by Misc.(Non-Drug; Combo Route) route 2 (two) times daily.    blood sugar diagnostic Strp 1 strip by Misc.(Non-Drug; Combo Route) route 2 (two) times daily.    DUREZOL 0.05 % Drop ophthalmic solution     ferrous sulfate 325 mg (65 mg iron) Tab tablet Take 1 tablet (325 mg total) by mouth 2 (two) times daily.    fluticasone (FLONASE) 50 mcg/actuation nasal spray SPRAY ONCE IN EACH NOSTRIL EVERY DAY    gabapentin (NEURONTIN) 300 MG capsule Take 1 cap nightly x 1 week; then take 1 cap twice daily x 1 week; then take 1 cap 3x/daily onwards.    ketorolac 0.5% (ACULAR) 0.5 % Drop     lancets Misc 1 lancet by Misc.(Non-Drug; Combo Route) route 2 (two) times daily.    lorazepam (ATIVAN) 0.5 MG tablet Take 1 tablet (0.5 mg total) by mouth daily as needed.    metformin (GLUCOPHAGE-XR) 500 MG 24 hr tablet Take 1 tablet (500 mg total) by mouth 2 (two) times daily with meals.    mirtazapine (REMERON) 15 MG tablet Take 15 mg by mouth every evening.    omega-3 fatty acids-fish oil (FISH OIL) 340-1,000 mg Cap Take 1 capsule by mouth once daily.    omeprazole (PRILOSEC) 20 MG capsule Take 1 capsule (20 mg total) by mouth once daily.    rosuvastatin (CRESTOR) 20 MG tablet Take 1 tablet (20 mg total) by mouth once daily.    sucralfate (CARAFATE) 1 gram tablet Take 1 tablet (1 g total) by mouth 2 (two) times daily.    tramadol (ULTRAM) 50 mg tablet Take 1 tablet (50 mg total) by mouth every 8 (eight) hours as needed.    tretinoin (RETIN-A) 0.05 % cream 1 application every evening. At bedtime    tretinoin (RETIN-A) 0.1 % cream     venlafaxine (EFFEXOR) 75 MG tablet Take 1 tablet (75 mg total) by mouth 2 (two) times daily.    levomefolate calcium (DEPLIN) 7.5 mg Tab tablet Take 1 tablet (7.5 mg total) by mouth once daily.           Clinical Reference Information           Your Vitals Were     BP Pulse Height Weight BMI    118/74 83 5' 1" " (1.549 m) 76.8 kg (169 lb 5 oz) 31.99 kg/m2      Blood Pressure          Most Recent Value    BP  118/74      Allergies as of 3/20/2017     Topamax [Topiramate]      Immunizations Administered on Date of Encounter - 3/20/2017     None      Instructions    Take your aspirin with food       Language Assistance Services     ATTENTION: Language assistance services are available, free of charge. Please call [phone].      ATENCIÓN: Si habla español, tiene a ricks disposición servicios gratuitos de asistencia lingüística. Llame al [phone].     CHÚ Ý: N?u b?n nói Ti?ng Vi?t, có các d?ch v? h? tr? ngôn ng? mi?n phí dành cho b?n. G?i s? [phone].         [facility] complies with applicable Federal civil rights laws and does not discriminate on the basis of race, color, national origin, age, disability, or sex.

## 2017-03-21 ENCOUNTER — CLINICAL SUPPORT (OUTPATIENT)
Dept: REHABILITATION | Facility: HOSPITAL | Age: 72
End: 2017-03-21
Attending: INTERNAL MEDICINE
Payer: MEDICARE

## 2017-03-21 PROCEDURE — 97110 THERAPEUTIC EXERCISES: CPT

## 2017-03-21 NOTE — PROGRESS NOTES
"                                                    Physical Therapy Daily Note     Name: Paris Burris  Clinic Number: [id number]  Diagnosis: B LE Weakness, Thoracic Back Pain  Physician: Mandi Huynh MD  Precautions: Standard  Visit #: 10 of 12  PTA Visit #: 5  Time In: 10:00 am   Time Out: 10:35 am  Total Treatment Time 1:1: 30 mins    Evaluation Date: 2/9/2017  Plan of care Expiration: 6 weeks - 3/23/2017    Cancellations: 0 No Shows: 0    Subjective     Pt reports: "I am feeling better and have not had to take any Tramadol recently." Pt is having cataract surgery tomorrow, 3/22, and hopes to return to PT on 3/30. Pt would like another round of therapy secondary to feeling improvement with her ADL's and decrease in overall subjective pain level.   Pain Scale: Paris rates pain on a scale of 0-10 to be 2 out of 10.     Objective     Functional Limitations Reports  Tool: FOTO Thoracic Spine Survey  Score: 14% Limitation     Paris received individual therapeutic exercises to develop strength, endurance, ROM, flexibility and posture for 35 mins (1:1 with PTA for 30 mins) including:  Supine lying on 1/2 roll X 5 minutes   Open book 20X each side   UE Bruegger's OTB 30X -  heavy verbal/tactile cues for proper form  Standing rows with orange TB - 3X15 reps - manual cues for correct form   SL Shoulder Flexion 30X BL using 1lb weight  SL Shoulder Abduction 30X BL using 2lb weight - 1lb weight used this session   SL Shoulder Horizontal Abd 30X BL   SL Shoulder ER 30X BL using 2lb weight   Active pec stretch /c towel roll 10X5" hold  L pec stretch at column 5X20" hold - increased from last session     Paris received the following manual therapy techniques: Soft tissue Mobilization were applied to the: thoracic spine and upper traps for 00 minutes to decrease muscle tightness, increase circulation and promote healing process.   Manual L pec stretch 5X20" hold  STM to L upper trap, pec, and periscap musculature    The patient " received the following supervised modalities after being cleared for contradictions: no heat applied this session    Written Home Exercises Provided: continue HEP given in previous session, updated duration of stretches  Pt demo good understanding of the education provided. Paris demonstrated good return demonstration of activities.     Education provided re: proper form and mm activation with exercises; increasing duration of stretches as tolerated  Paris verbalized good understanding of education provided.   No spiritual or educational barriers to learning provided    Assessment     Patient tolerated treatment well. Pt able to perform all exercises with very few verbal and tactile cues required from therapist. FOTO score remained at 14%, which is a 25% decrease since initial evaluation. Pt arrived to session upset because she was unable to see the PT who initially evaluated her (Shweta Guerrero) to discuss her concerns with only getting 10 out of 12 of her visits on her POC. At a previous appointment on 3/16/2017, pt spoke with both PTA and  about today being her last visit secondary to upcoming cataract surgery on March 22nd. When pt arrived to appointment today pt stated that she was upset over not receiving all 12 of her visits and would like the PT, Shweta Guerrero, to send her doctor an updated progress note to receive more visits secondary to subjective reports of decreased overall pain level and improvement with ADL's.   This is a 71 y.o. female referred to outpatient physical therapy and presents with a medical diagnosis of thoracic mid back pain and leg weakness and demonstrates limitations as described in the problem list. Pt prognosis is Good. Pt will continue to benefit from skilled outpatient physical therapy to address the deficits listed in the problem list, provide pt/family education and to maximize pt's level of independence in the home and community environment.     Goals as follows:  Short Term  GOALS: 4 weeks. Pt agrees with goals set.  1. Patient will demonstrate independence with HEP for UE/postural strengthening. (progressing, not met)  2. Pt will report decrease in thoracic pain to 0/10 at best. (progressing, not met)    Long Term GOALS: 8 weeks. Pt agrees with goals set.  1. Patient will demonstrate increased strength in shoulder abduction 4+/5 in order to improve ability to do usual housework pain free. (progressing, not met)  2. Patient will demonstrate increased strength BUEs in middle trap and lower trap BL to 4/5 or greater to improve tolerance to functional activities pain free. (progressing, not met)  3. Patient demonstrates improved overall function per FOTO thoracic spine survey to 35% Limitation or less. (progressing, not met)  4. Patient will report an overall decrease in pain to 0/10 with all functional activities. (progressing, not met)     Plan     Continue with established Plan of Care towards PT goals.    Therapist: Domonique Holloway PTA  3/21/2017     Shweta Guerrero PT and Domonique Holloway PTA met face to face to discuss patient's treatment plan and progress towards established goals.  Treatment will be continued as described in initial report/eval and progress notes. Patient will be seen by physical therapist every sixth visit and minimally once per month.

## 2017-03-22 ENCOUNTER — PATIENT MESSAGE (OUTPATIENT)
Dept: INTERNAL MEDICINE | Facility: CLINIC | Age: 72
End: 2017-03-22

## 2017-03-23 ENCOUNTER — PATIENT MESSAGE (OUTPATIENT)
Dept: PSYCHIATRY | Facility: CLINIC | Age: 72
End: 2017-03-23

## 2017-03-26 ENCOUNTER — PATIENT MESSAGE (OUTPATIENT)
Dept: INTERNAL MEDICINE | Facility: CLINIC | Age: 72
End: 2017-03-26

## 2017-03-28 ENCOUNTER — TELEPHONE (OUTPATIENT)
Dept: INTERNAL MEDICINE | Facility: CLINIC | Age: 72
End: 2017-03-28

## 2017-03-28 ENCOUNTER — PATIENT MESSAGE (OUTPATIENT)
Dept: INTERNAL MEDICINE | Facility: CLINIC | Age: 72
End: 2017-03-28

## 2017-03-30 ENCOUNTER — PATIENT MESSAGE (OUTPATIENT)
Dept: INTERNAL MEDICINE | Facility: CLINIC | Age: 72
End: 2017-03-30

## 2017-03-30 DIAGNOSIS — E66.9 OBESITY (BMI 30-39.9): Primary | ICD-10-CM

## 2017-03-30 DIAGNOSIS — E11.69 DIABETES MELLITUS TYPE 2 IN OBESE: ICD-10-CM

## 2017-03-30 DIAGNOSIS — E66.9 DIABETES MELLITUS TYPE 2 IN OBESE: ICD-10-CM

## 2017-04-02 ENCOUNTER — OFFICE VISIT (OUTPATIENT)
Dept: INTERNAL MEDICINE | Facility: CLINIC | Age: 72
End: 2017-04-02
Payer: MEDICARE

## 2017-04-02 VITALS
HEART RATE: 95 BPM | SYSTOLIC BLOOD PRESSURE: 101 MMHG | BODY MASS INDEX: 31.51 KG/M2 | HEIGHT: 61 IN | TEMPERATURE: 98 F | OXYGEN SATURATION: 95 % | WEIGHT: 166.88 LBS | DIASTOLIC BLOOD PRESSURE: 66 MMHG

## 2017-04-02 DIAGNOSIS — K11.8 SALIVARY GLAND OBSTRUCTION: Primary | ICD-10-CM

## 2017-04-02 PROCEDURE — 1157F ADVNC CARE PLAN IN RCRD: CPT | Mod: S$GLB,,, | Performed by: INTERNAL MEDICINE

## 2017-04-02 PROCEDURE — 3074F SYST BP LT 130 MM HG: CPT | Mod: S$GLB,,, | Performed by: INTERNAL MEDICINE

## 2017-04-02 PROCEDURE — 1125F AMNT PAIN NOTED PAIN PRSNT: CPT | Mod: S$GLB,,, | Performed by: INTERNAL MEDICINE

## 2017-04-02 PROCEDURE — 1159F MED LIST DOCD IN RCRD: CPT | Mod: S$GLB,,, | Performed by: INTERNAL MEDICINE

## 2017-04-02 PROCEDURE — 99999 PR PBB SHADOW E&M-EST. PATIENT-LVL III: CPT | Mod: PBBFAC,,, | Performed by: INTERNAL MEDICINE

## 2017-04-02 PROCEDURE — 3078F DIAST BP <80 MM HG: CPT | Mod: S$GLB,,, | Performed by: INTERNAL MEDICINE

## 2017-04-02 PROCEDURE — 1160F RVW MEDS BY RX/DR IN RCRD: CPT | Mod: S$GLB,,, | Performed by: INTERNAL MEDICINE

## 2017-04-02 PROCEDURE — 99213 OFFICE O/P EST LOW 20 MIN: CPT | Mod: S$GLB,,, | Performed by: INTERNAL MEDICINE

## 2017-04-02 RX ORDER — AMOXICILLIN 875 MG/1
875 TABLET, FILM COATED ORAL 2 TIMES DAILY
Qty: 20 TABLET | Refills: 0 | Status: SHIPPED | OUTPATIENT
Start: 2017-04-02 | End: 2017-05-01 | Stop reason: ALTCHOICE

## 2017-04-02 NOTE — PROGRESS NOTES
Subjective:       Patient ID: Paris Burris is a 71 y.o. female.    Chief Complaint: Adenopathy    HPI Comments: Complains of swollen gland in left neck for a week.  No fever, chills sore throat.  Size seems to change during the day    Review of Systems   Constitutional: Negative for activity change, chills, fatigue and fever.   HENT: Negative for congestion, ear pain, nosebleeds, postnasal drip, sinus pressure and sore throat.    Eyes: Negative.  Negative for visual disturbance.   Respiratory: Negative for cough, chest tightness, shortness of breath and wheezing.    Cardiovascular: Negative for chest pain.   Gastrointestinal: Negative for abdominal pain, diarrhea, nausea and vomiting.   Genitourinary: Negative for difficulty urinating, dysuria, frequency and urgency.   Musculoskeletal: Negative for arthralgias and neck stiffness.   Skin: Negative for rash.   Neurological: Negative for dizziness, weakness and headaches.   Psychiatric/Behavioral: Negative for sleep disturbance. The patient is not nervous/anxious.        Objective:      Physical Exam   HENT:   Mouth/Throat:       Neck:           Assessment:       1. Salivary gland obstruction        Plan:   Paris was seen today for adenopathy.    Diagnoses and all orders for this visit:    Salivary gland obstruction  -     Ambulatory consult to ENT    Other orders  -     amoxicillin (AMOXIL) 875 MG tablet; Take 1 tablet (875 mg total) by mouth 2 (two) times daily.

## 2017-04-02 NOTE — MR AVS SNAPSHOT
[facility]  [address]  [place] [address]  Phone: [phone]  Fax: [phone]                  Paris Burris   2017 10:00 AM   Office Visit    Description:  Female : 1945   Provider:  Fifi Lizama MD   Department:  [facility]           Reason for Visit     Adenopathy           Diagnoses this Visit        Comments    Salivary gland obstruction    -  Primary            To Do List           Future Appointments        Provider Department Dept Phone    4/3/2017 10:00 AM NOM [id number] 485 LB LIMIT [facility] [phone]    2017 10:30 AM NOM [id number] SCREEN INT MED [facility] [phone]    2017 12:40 PM NOMC, DEXA1 [facility] [phone]    2017 10:00 AM [facility] [phone]    2017 1:00 PM Lyly Mansfield MD [facility] - Bariatric Surgery [phone]      Goals (5 Years of Data)     None       These Medications        Disp Refills Start End    amoxicillin (AMOXIL) 875 MG tablet 20 tablet 0 2017     Take 1 tablet (875 mg total) by mouth 2 (two) times daily. - Oral    Pharmacy: [facility]/pharmacy #[id number] - [place], [place] - [address] #: [phone]         [facility]     [facility] -  Assistance  Unless otherwise directed by your provider, please contact [facility], our nurse care line that is available for  assistance.     Registered nurses in the [facility] provide: appointment scheduling, clinical advisement, health education, and other advisory services.  Call: [phone] (toll free)               Medications           Message regarding Medications     Verify the changes and/or additions to your medication regime listed below are the same as discussed with your clinician today.  If any of these changes or additions are incorrect, please notify your  healthcare provider.        START taking these NEW medications        Refills    amoxicillin (AMOXIL) 875 MG tablet 0    Sig: Take 1 tablet (875 mg total) by mouth 2 (two) times daily.    Class: Normal    Route: Oral      STOP taking these medications     tretinoin (RETIN-A) 0.1 % cream            Verify that the below list of medications is an accurate representation of the medications you are currently taking.  If none reported, the list may be blank. If incorrect, please contact your healthcare provider. Carry this list with you in case of emergency.           Current Medications     ascorbic acid (VITAMIN C) 500 MG tablet Take 1,000 mg by mouth once daily.     aspirin (ECOTRIN) 81 MG EC tablet Take 1 tablet (81 mg total) by mouth once daily.    DUREZOL 0.05 % Drop ophthalmic solution     ferrous sulfate 325 mg (65 mg iron) Tab tablet Take 1 tablet (325 mg total) by mouth 2 (two) times daily.    fluticasone (FLONASE) 50 mcg/actuation nasal spray SPRAY ONCE IN EACH NOSTRIL EVERY DAY    gabapentin (NEURONTIN) 300 MG capsule Take 1 cap nightly x 1 week; then take 1 cap twice daily x 1 week; then take 1 cap 3x/daily onwards.    ketorolac 0.5% (ACULAR) 0.5 % Drop     levomefolate calcium (DEPLIN) 7.5 mg Tab tablet Take 1 tablet (7.5 mg total) by mouth once daily.    lorazepam (ATIVAN) 0.5 MG tablet Take 1 tablet (0.5 mg total) by mouth daily as needed.    metformin (GLUCOPHAGE-XR) 500 MG 24 hr tablet Take 1 tablet (500 mg total) by mouth 2 (two) times daily with meals.    mirtazapine (REMERON) 15 MG tablet Take 15 mg by mouth every evening.    omega-3 fatty acids-fish oil (FISH OIL) 340-1,000 mg Cap Take 1 capsule by mouth once daily.    omeprazole (PRILOSEC) 20 MG capsule Take 1 capsule (20 mg total) by mouth once daily.    rosuvastatin (CRESTOR) 20 MG tablet Take 1 tablet (20 mg total) by mouth once daily.    sucralfate (CARAFATE) 1 gram tablet Take 1 tablet (1 g total) by mouth 2 (two) times daily.    tramadol  "(ULTRAM) 50 mg tablet Take 1 tablet (50 mg total) by mouth every 8 (eight) hours as needed.    tretinoin (RETIN-A) 0.05 % cream 1 application every evening. At bedtime    venlafaxine (EFFEXOR) 75 MG tablet Take 1 tablet (75 mg total) by mouth 2 (two) times daily.    amoxicillin (AMOXIL) 875 MG tablet Take 1 tablet (875 mg total) by mouth 2 (two) times daily.    blood sugar diagnostic (ACCU-CHEK MARIAN PLUS TEST STRP) Strp 2 strips by Misc.(Non-Drug; Combo Route) route 2 (two) times daily.    blood sugar diagnostic Strp 1 strip by Misc.(Non-Drug; Combo Route) route 2 (two) times daily.    lancets Misc 1 lancet by Misc.(Non-Drug; Combo Route) route 2 (two) times daily.           Clinical Reference Information           Your Vitals Were     BP Pulse Temp Height Weight SpO2    101/66 95 97.8 °F (36.6 °C) (Oral) 5' 1" (1.549 m) 75.7 kg (166 lb 14.2 oz) 95%    BMI                31.53 kg/m2          Blood Pressure          Most Recent Value    BP  101/66      Allergies as of 4/2/2017     Topamax [Topiramate]      Immunizations Administered on Date of Encounter - 4/2/2017     None      Orders Placed During Today's Visit      Normal Orders This Visit    Ambulatory consult to ENT       Language Assistance Services     ATTENTION: Language assistance services are available, free of charge. Please call [phone].      ATENCIÓN: Si habla radhaañol, tiene a ricks disposición servicios gratuitos de asistencia lingüística. Llame al [phone].     JOVAN Ý: N?u b?n nói Ti?ng Vi?t, có các d?ch v? h? tr? ngôn ng? mi?n phí dành cho b?n. G?i s? [phone].         [facility] complies with applicable Federal civil rights laws and does not discriminate on the basis of race, color, national origin, age, disability, or sex.        "

## 2017-04-03 ENCOUNTER — HOSPITAL ENCOUNTER (OUTPATIENT)
Dept: RADIOLOGY | Facility: HOSPITAL | Age: 72
Discharge: HOME | End: 2017-04-03
Attending: INTERNAL MEDICINE
Payer: MEDICARE

## 2017-04-03 DIAGNOSIS — M79.672 LEFT FOOT PAIN: ICD-10-CM

## 2017-04-03 DIAGNOSIS — M79.675 TOE PAIN, LEFT: ICD-10-CM

## 2017-04-03 PROCEDURE — 73630 X-RAY EXAM OF FOOT: CPT | Mod: 26,LT,, | Performed by: RADIOLOGY

## 2017-04-03 PROCEDURE — 73630 X-RAY EXAM OF FOOT: CPT | Mod: TC,LT

## 2017-04-04 ENCOUNTER — HOSPITAL ENCOUNTER (OUTPATIENT)
Dept: RADIOLOGY | Facility: HOSPITAL | Age: 72
Discharge: HOME | End: 2017-04-04
Attending: INTERNAL MEDICINE
Payer: MEDICARE

## 2017-04-04 DIAGNOSIS — Z12.31 ENCOUNTER FOR SCREENING MAMMOGRAM FOR BREAST CANCER: ICD-10-CM

## 2017-04-04 PROCEDURE — 77067 SCR MAMMO BI INCL CAD: CPT | Mod: 26,,, | Performed by: RADIOLOGY

## 2017-04-04 PROCEDURE — 77067 SCR MAMMO BI INCL CAD: CPT | Mod: TC

## 2017-04-05 ENCOUNTER — PATIENT MESSAGE (OUTPATIENT)
Dept: INTERNAL MEDICINE | Facility: CLINIC | Age: 72
End: 2017-04-05

## 2017-04-05 RX ORDER — MIRTAZAPINE 15 MG/1
30 TABLET, FILM COATED ORAL NIGHTLY
Qty: 60 TABLET | Refills: 3 | Status: SHIPPED | OUTPATIENT
Start: 2017-04-05 | End: 2017-05-01 | Stop reason: SDUPTHER

## 2017-04-05 NOTE — TELEPHONE ENCOUNTER
Sure. Insurance may only cover 1 of the 30mg daily though. Sent in for 2 of 15mg nightly. Let me know if issues.

## 2017-04-07 ENCOUNTER — HOSPITAL ENCOUNTER (OUTPATIENT)
Dept: RADIOLOGY | Facility: CLINIC | Age: 72
Discharge: HOME | End: 2017-04-07
Attending: INTERNAL MEDICINE
Payer: MEDICARE

## 2017-04-07 DIAGNOSIS — Z78.0 POSTMENOPAUSAL ESTROGEN DEFICIENCY: ICD-10-CM

## 2017-04-07 PROCEDURE — 77080 DXA BONE DENSITY AXIAL: CPT | Mod: TC

## 2017-04-07 PROCEDURE — 77080 DXA BONE DENSITY AXIAL: CPT | Mod: 26,,, | Performed by: INTERNAL MEDICINE

## 2017-04-08 ENCOUNTER — PATIENT MESSAGE (OUTPATIENT)
Dept: INTERNAL MEDICINE | Facility: CLINIC | Age: 72
End: 2017-04-08

## 2017-04-11 ENCOUNTER — CLINICAL SUPPORT (OUTPATIENT)
Dept: INTERNAL MEDICINE | Facility: CLINIC | Age: 72
End: 2017-04-11
Payer: MEDICARE

## 2017-04-11 ENCOUNTER — PATIENT MESSAGE (OUTPATIENT)
Dept: INTERNAL MEDICINE | Facility: CLINIC | Age: 72
End: 2017-04-11

## 2017-04-11 VITALS — WEIGHT: 166.44 LBS | BODY MASS INDEX: 31.45 KG/M2

## 2017-04-11 DIAGNOSIS — G89.29 CHRONIC MIDLINE LOW BACK PAIN WITHOUT SCIATICA: Primary | ICD-10-CM

## 2017-04-11 DIAGNOSIS — M54.50 CHRONIC MIDLINE LOW BACK PAIN WITHOUT SCIATICA: Primary | ICD-10-CM

## 2017-04-11 NOTE — PROGRESS NOTES
Health  Follow-Up Note   [x] Office  [] Phone  Notes from previous session reviewed.   [x] Previous Session Goals unchanged.   [] Patient/Caregiver Change in Goals.  Goals added or changed by Patient/Caregiver since program participation:  1.     Eliminating bread, decreasing carbs     Additional/Changed support that patient/caregiver has experienced/sought?  (Indicate readiness, support from family, friends, others, community groups, etc)  1.      Additional/Changed obstacles that could prevent patient/caregiver from reaching their goals?  1.  pain back and hips    Feedback provided:  1.  Praised for continued effort and progress down 2 lbs total loss 20 lbs since 1/20/16    Diagnostic values/Desriptors for follow-up as needed for chronic condition(s)   Weight: 75.5 kg 166 lb 7.2 oz  Blood Glucose: glucose averages  7 d-113  14 - 115  30 d- 113  90 d -113  Pain: back and hips going to PT going to request referral to [facility] from Dr. Huynh    Interventions:   1. Health  listened reflectively, validated thoughts and feelings, offered support and encouragement.   2. Allowed patient to express themselves in a non-biased atmosphere.  3. Health  assisted pt to problem-solve obstacles such as being in a challenging environment and dealing with these challenges.   4. Motivational Interviewed interventions utilized (OARS).   5. Patient responded favorably to interventions and remained actively engaged in the session.   6. Health  will remain available and connected for patient by phone and/or office visits.   7. Positive reinforcement, emotional support and encouragement provided.   8. Focused Education: MI, fiber, pairing    Plan:  [x] Pt will work on goals as stated above.   [x] Pt will contact Health  for any questions, concerns or needs.  [x] Pt will follow up with Health  in office on 5/11/17 at 1000        [] Pt will follow up with Health  on phone in:        [x] [employer]   will remain available.   [] Health  will contact patient by phone in:        [] Health  will consult:        [] Health  will inform Provider via EPIC messaging.     Impression:  1. Behavior is consistent with     Action  Stage of Change.   2. Participation level:       [x] Receptive      [x] Interactive      [] Guarded and Resistant      [x] Self Motivated      [] Refused/Declined to participate   3. [x] Pt voiced understanding of all information presented.       [] Pt voiced needing further information/education. This will be arranged.       [x] Pt would benefit from further education/information as identified by this health . This will be arranged.     Jacqueline Perry RN HC

## 2017-04-13 ENCOUNTER — PATIENT MESSAGE (OUTPATIENT)
Dept: INTERNAL MEDICINE | Facility: CLINIC | Age: 72
End: 2017-04-13

## 2017-04-13 ENCOUNTER — OFFICE VISIT (OUTPATIENT)
Dept: PODIATRY | Facility: CLINIC | Age: 72
End: 2017-04-13
Payer: MEDICARE

## 2017-04-13 VITALS
RESPIRATION RATE: 18 BRPM | DIASTOLIC BLOOD PRESSURE: 77 MMHG | HEART RATE: 93 BPM | SYSTOLIC BLOOD PRESSURE: 130 MMHG | BODY MASS INDEX: 31.72 KG/M2 | HEIGHT: 61 IN | WEIGHT: 168 LBS

## 2017-04-13 DIAGNOSIS — E11.9 DIABETES MELLITUS WITHOUT COMPLICATION: ICD-10-CM

## 2017-04-13 DIAGNOSIS — E11.42 DIABETIC POLYNEUROPATHY ASSOCIATED WITH TYPE 2 DIABETES MELLITUS: Primary | ICD-10-CM

## 2017-04-13 PROCEDURE — 1126F AMNT PAIN NOTED NONE PRSNT: CPT | Mod: S$GLB,,, | Performed by: PODIATRIST

## 2017-04-13 PROCEDURE — 1157F ADVNC CARE PLAN IN RCRD: CPT | Mod: 8P,S$GLB,, | Performed by: PODIATRIST

## 2017-04-13 PROCEDURE — 3078F DIAST BP <80 MM HG: CPT | Mod: S$GLB,,, | Performed by: PODIATRIST

## 2017-04-13 PROCEDURE — 99499 UNLISTED E&M SERVICE: CPT | Mod: S$GLB,,, | Performed by: PODIATRIST

## 2017-04-13 PROCEDURE — 3060F POS MICROALBUMINURIA REV: CPT | Mod: 8P,S$GLB,, | Performed by: PODIATRIST

## 2017-04-13 PROCEDURE — 99999 PR PBB SHADOW E&M-EST. PATIENT-LVL III: CPT | Mod: PBBFAC,,, | Performed by: PODIATRIST

## 2017-04-13 PROCEDURE — 1160F RVW MEDS BY RX/DR IN RCRD: CPT | Mod: S$GLB,,, | Performed by: PODIATRIST

## 2017-04-13 PROCEDURE — 3044F HG A1C LEVEL LT 7.0%: CPT | Mod: S$GLB,,, | Performed by: PODIATRIST

## 2017-04-13 PROCEDURE — 1159F MED LIST DOCD IN RCRD: CPT | Mod: S$GLB,,, | Performed by: PODIATRIST

## 2017-04-13 PROCEDURE — 3075F SYST BP GE 130 - 139MM HG: CPT | Mod: S$GLB,,, | Performed by: PODIATRIST

## 2017-04-13 PROCEDURE — 99213 OFFICE O/P EST LOW 20 MIN: CPT | Mod: S$GLB,,, | Performed by: PODIATRIST

## 2017-04-14 NOTE — PROGRESS NOTES
Subjective:      Patient ID: Paris Burris is a 71 y.o. female.    Chief Complaint: PCP (Fifi Lizama MD 4/02/17); Peripheral Neuropathy (pain and numbness ); and Foot Problem    Paris is a 71 y.o. female who presents to the clinic upon referral from Dr. Patricia nj. provider found  for evaluation and treatment of foot numbness. Paris has a past medical history of Allergy; Anemia; Anxiety; Cancer (2002); Decreased hearing; Depression; Diabetes mellitus; Gastric ulcer (9/10/13); Hiatal hernia; Hyperlipidemia; Migraine headache; Migraine headache (3/20/2015); Multiple gastric ulcers; Parathyroid disorder; Pre-diabetes; Substance abuse; and Wrist fracture. Patient relates burning and tingling has increased     Hemoglobin A1C   Date Value Ref Range Status   02/03/2017 6.4 (H) 4.5 - 6.2 % Final     Comment:     According to ADA guidelines, hemoglobin A1C <7.0% represents  optimal control in non-pregnant diabetic patients.  Different  metrics may apply to specific populations.   Standards of Medical Care in Diabetes - 2016.  For the purpose of screening for the presence of diabetes:  <5.7%     Consistent with the absence of diabetes  5.7-6.4%  Consistent with increasing risk for diabetes   (prediabetes)  >or=6.5%  Consistent with diabetes  Currently no consensus exists for use of hemoglobin A1C  for diagnosis of diabetes for children.     09/14/2016 6.2 4.5 - 6.2 % Final     Comment:     According to ADA guidelines, hemoglobin A1C <7.0% represents  optimal control in non-pregnant diabetic patients.  Different  metrics may apply to specific populations.   Standards of Medical Care in Diabetes - 2016.  For the purpose of screening for the presence of diabetes:  <5.7%     Consistent with the absence of diabetes  5.7-6.4%  Consistent with increasing risk for diabetes   (prediabetes)  >or=6.5%  Consistent with diabetes  Currently no consensus exists for use of hemoglobin A1C  for diagnosis of diabetes for children.      06/16/2016 6.3 (H) 4.5 - 6.2 % Final           Review of Systems   Constitution: Negative for chills, decreased appetite and fever.   Cardiovascular: Negative for leg swelling.   Musculoskeletal: Negative for arthritis, joint pain, joint swelling and myalgias.   Gastrointestinal: Negative for nausea and vomiting.   Neurological: Positive for numbness. Negative for loss of balance and paresthesias.           Objective:      Physical Exam   Constitutional: She is oriented to person, place, and time. She appears well-developed and well-nourished.   Cardiovascular:   Pulses:       Dorsalis pedis pulses are 2+ on the right side, and 2+ on the left side.        Posterior tibial pulses are 2+ on the right side, and 2+ on the left side.   Musculoskeletal: She exhibits no edema or tenderness.        Right ankle: Normal.        Left ankle: Normal.        Right foot: There is no swelling, no crepitus and no deformity.        Left foot: There is no swelling, no crepitus and no deformity.   Adequate joint range of motion without pain, limitation, nor crepitation Bilateral feet and ankle joints. Muscle strength is 5/5 in all groups bilaterally.         Feet:   Right Foot:   Protective Sensation: 10 sites tested. 10 sites sensed.   Left Foot:   Protective Sensation: 10 sites sensed.   Lymphadenopathy:   No palpable lymph nodes   Neurological: She is alert and oriented to person, place, and time. She has normal strength.   Skin: Skin is warm, dry and intact. No rash noted. No erythema. Nails show no clubbing.   Psychiatric: She has a normal mood and affect. Her behavior is normal.             Assessment:       Encounter Diagnosis   Name Primary?    Diabetic polyneuropathy associated with type 2 diabetes mellitus Yes         Plan:       Paris was seen today for pcp, peripheral neuropathy and foot problem.    Diagnoses and all orders for this visit:    Diabetic polyneuropathy associated with type 2 diabetes mellitus      I  counseled the patient on her conditions, their implications and medical management.    Discussed the  importance of maintaining  low blood sugar levels, and the direct affect elevated blood sugars have on progression of neuropathic symptoms  Will plan to increase gabapentin to 1 pill bid

## 2017-04-17 ENCOUNTER — CLINICAL SUPPORT (OUTPATIENT)
Dept: REHABILITATION | Facility: HOSPITAL | Age: 72
End: 2017-04-17
Attending: INTERNAL MEDICINE
Payer: MEDICARE

## 2017-04-17 DIAGNOSIS — M54.9 CHRONIC BACK PAIN, UNSPECIFIED BACK LOCATION, UNSPECIFIED BACK PAIN LATERALITY: Primary | ICD-10-CM

## 2017-04-17 DIAGNOSIS — G89.29 CHRONIC BACK PAIN, UNSPECIFIED BACK LOCATION, UNSPECIFIED BACK PAIN LATERALITY: Primary | ICD-10-CM

## 2017-04-17 DIAGNOSIS — R29.898 LEG WEAKNESS, BILATERAL: ICD-10-CM

## 2017-04-17 DIAGNOSIS — M51.36 DDD (DEGENERATIVE DISC DISEASE), LUMBAR: ICD-10-CM

## 2017-04-17 DIAGNOSIS — M62.81 WEAKNESS OF TRUNK MUSCULATURE: ICD-10-CM

## 2017-04-17 PROCEDURE — G8978 MOBILITY CURRENT STATUS: HCPCS | Mod: CJ | Performed by: INTERNAL MEDICINE

## 2017-04-17 PROCEDURE — G8979 MOBILITY GOAL STATUS: HCPCS | Mod: CJ | Performed by: INTERNAL MEDICINE

## 2017-04-17 PROCEDURE — 97110 THERAPEUTIC EXERCISES: CPT | Performed by: INTERNAL MEDICINE

## 2017-04-17 NOTE — PLAN OF CARE
"Please sign and return plan of care. Thank you for this referral.                                                        Physical Therapy Daily Note     Name: Paris Burris  Clinic Number: [id number]  Diagnosis: B LE Weakness, Thoracic Back Pain  Physician: Mandi Huynh MD  Precautions: Standard  Visit #: 11 of 12  PTA Visit #: na  Total Treatment Time 1:1: 55 mins    Evaluation Date: 2/9/2017      Subjective     Pt reports: "I am feeling better and have not had to take any Tramadol today." Pt had cataract surgery  And now would like to cont PT. She reports doing hep  And feeling stronger, but she cont with weakness with steps B hips. Pt would like another round of therapy secondary to feeling improvement with her ADL's and decrease in overall subjective pain level.   Pain Scale: Paris rates pain on a scale of 0-10 to be 0  out of 10 today. 4 at worst upper and LB    Objective       4/17 Functional Limitations Reports score 67  Tool: FOTO Thoracic Spine Survey  Score: 20-40% Limitation     cerv flex wfl  Ext 50 %  SB R 50 % R UT  SB L 50 % R UT    UE B 5/5 shoulders, biceps, triceps  Mid traps, lats, low traps B 4+/5    Lb flex arom 60 % l spine tightness  Ext 50 %   SB B 75%    B hip flex 4+ R post hip pain  Hip abd R 4, L 4-  Hip ext B 4+  ke B 4+  KF B 4+  DF B 5    slr neg b  HS- mild tightness B  fabers neg B  fadmr neg B      Paris received individual therapeutic exercises to develop strength, endurance, ROM, flexibility and posture for 55 mins (1:1 with PT for 30 mins) including:    Supine lying on 1/2 roll X 5 minutes pec stretch  Open book 20X each side   UE Bruegger's OTB 30X -    Standing rows with orange TB - 3X15 reps - manual cues for correct form   scaption 30x    sktc 20 sec x 3  hss seated 20 sec x 3  Prone hip ext 20x- vc to fire glut max  Sl clams 30x  Sl hip abd 20x    Cupping to B LB paraspinals  3 cups each side x 5 min .  Pt ed on potential bruising side effects and agreed to Rx.    Written Home " Exercises Provided: continue HEP given in previous session  Pt demo good understanding of the education provided. Paris demonstrated good return demonstration of activities.     Education provided re: proper form and mm activation with exercises  Paris verbalized good understanding of education provided.   No spiritual or educational barriers to learning provided    Assessment     Barry session well with no c/o and making good progress with improved B UE/ LE MMT and improved c and L spine arom. Cont stiffness LB / B LEs and weakness core/ hips.  This is a 71 y.o. female referred to outpatient physical therapy and presents with a medical diagnosis of thoracic mid back pain and leg weakness and demonstrates limitations as described in the problem list. Pt prognosis is Good. Pt will continue to benefit from skilled outpatient physical therapy to address the deficits listed in the problem list, provide pt/family education and to maximize pt's level of independence in the home and community environment.     Goals as follows:  Short Term GOALS: 4 weeks. Pt agrees with goals set.  1. Patient will demonstrate independence with HEP for UE/postural strengthening. (met)  2. Pt will report decrease in thoracic pain to 0/10 at best. ( met)    Long Term GOALS: 12 weeks. Pt agrees with goals set.  1. Patient will demonstrate increased strength in shoulder abduction 4+/5 in order to improve ability to do usual housework pain free. ( not met)  2. Patient will demonstrate increased strength BUEs in middle trap and lower trap BL to 4/5 or greater to improve tolerance to functional activities pain free. ( met)  3. Patient demonstrates improved overall function per FOTO thoracic spine survey to 35% Limitation or less. (progressing, not met)  4. Patient will report an overall decrease in pain to 0/10 with all functional activities. (progressing, not met)  5.Pt will report no weakness with stair amb 1 flight     Plan     Continue with  established Plan of Care towards PT goals.  PT/PTA met face to face to discuss patient's treatment plan and progress towards established goals.  Treatment will be continued as described in initial report/eval and progress notes.  Patient will be seen by physical therapist every sixth visit and minimally once per month.    I certify the need for these services furnished under this plan of treatment and while under my care    ____________________________________  Physician/Referring Practitioner    _______________  Date of Signature

## 2017-04-17 NOTE — PROGRESS NOTES
"                                                    Physical Therapy Daily Note     Name: Paris Burris  Clinic Number: [id number]  Diagnosis: B LE Weakness, Thoracic Back Pain  Physician: Mandi Huynh MD  Precautions: Standard  Visit #: 11 of 12  PTA Visit #: na  Total Treatment Time 1:1: 55 mins    Evaluation Date: 2/9/2017      Subjective     Pt reports: "I am feeling better and have not had to take any Tramadol today." Pt had cataract surgery  And now would like to cont PT. She reports doing hep  And feeling stronger, but she cont with weakness with steps B hips. Pt would like another round of therapy secondary to feeling improvement with her ADL's and decrease in overall subjective pain level.   Pain Scale: Paris rates pain on a scale of 0-10 to be 0  out of 10 today. 4 at worst upper and LB    Objective       4/17 Functional Limitations Reports score 67  Tool: FOTO Thoracic Spine Survey  Score: 20-40% Limitation     cerv flex wfl  Ext 50 %  SB R 50 % R UT  SB L 50 % R UT    UE B 5/5 shoulders, biceps, triceps  Mid traps, lats, low traps B 4+/5    Lb flex arom 60 % l spine tightness  Ext 50 %   SB B 75%    B hip flex 4+ R post hip pain  Hip abd R 4, L 4-  Hip ext B 4+  ke B 4+  KF B 4+  DF B 5    slr neg b  HS- mild tightness B  fabers neg B  fadmr neg B      Paris received individual therapeutic exercises to develop strength, endurance, ROM, flexibility and posture for 55 mins (1:1 with PT for 30 mins) including:    Supine lying on 1/2 roll X 5 minutes pec stretch  Open book 20X each side   UE Bruegger's OTB 30X -    Standing rows with orange TB - 3X15 reps - manual cues for correct form   scaption 30x    sktc 20 sec x 3  hss seated 20 sec x 3  Prone hip ext 20x- vc to fire glut max  Sl clams 30x  Sl hip abd 20x    Cupping to B LB paraspinals  3 cups each side x 5 min .  Pt ed on potential bruising side effects and agreed to Rx.    Written Home Exercises Provided: continue HEP given in previous session  Pt demo " good understanding of the education provided. Paris demonstrated good return demonstration of activities.     Education provided re: proper form and mm activation with exercises  Paris verbalized good understanding of education provided.   No spiritual or educational barriers to learning provided    Assessment     Barry session well with no c/o and making good progress with improved B UE/ LE MMT and improved c and L spine arom. Cont stiffness LB / B LEs and weakness core/ hips.  This is a 71 y.o. female referred to outpatient physical therapy and presents with a medical diagnosis of thoracic mid back pain and leg weakness and demonstrates limitations as described in the problem list. Pt prognosis is Good. Pt will continue to benefit from skilled outpatient physical therapy to address the deficits listed in the problem list, provide pt/family education and to maximize pt's level of independence in the home and community environment.     Goals as follows:  Short Term GOALS: 4 weeks. Pt agrees with goals set.  1. Patient will demonstrate independence with HEP for UE/postural strengthening. (met)  2. Pt will report decrease in thoracic pain to 0/10 at best. ( met)    Long Term GOALS: 12 weeks. Pt agrees with goals set.  1. Patient will demonstrate increased strength in shoulder abduction 4+/5 in order to improve ability to do usual housework pain free. ( not met)  2. Patient will demonstrate increased strength BUEs in middle trap and lower trap BL to 4/5 or greater to improve tolerance to functional activities pain free. ( met)  3. Patient demonstrates improved overall function per FOTO thoracic spine survey to 35% Limitation or less. (progressing, not met)  4. Patient will report an overall decrease in pain to 0/10 with all functional activities. (progressing, not met)  5.Pt will report no weakness with stair amb 1 flight     Plan     Continue with established Plan of Care towards PT goals.  PT/PTA met face to face to  discuss patient's treatment plan and progress towards established goals.  Treatment will be continued as described in initial report/eval and progress notes.  Patient will be seen by physical therapist every sixth visit and minimally once per month.    Domonique Holloway PTA    I certify the need for these services furnished under this plan of treatment and while under my care    ____________________________________  Physician/Referring Practitioner    _______________  Date of Signature

## 2017-04-18 ENCOUNTER — OFFICE VISIT (OUTPATIENT)
Dept: INTERNAL MEDICINE | Facility: CLINIC | Age: 72
End: 2017-04-18
Payer: MEDICARE

## 2017-04-18 VITALS
OXYGEN SATURATION: 95 % | BODY MASS INDEX: 32.67 KG/M2 | DIASTOLIC BLOOD PRESSURE: 70 MMHG | SYSTOLIC BLOOD PRESSURE: 123 MMHG | HEART RATE: 86 BPM | TEMPERATURE: 98 F | WEIGHT: 173.06 LBS | HEIGHT: 61 IN

## 2017-04-18 DIAGNOSIS — R59.0 CERVICAL ADENOPATHY: Primary | ICD-10-CM

## 2017-04-18 PROCEDURE — 1160F RVW MEDS BY RX/DR IN RCRD: CPT | Mod: S$GLB,,, | Performed by: NURSE PRACTITIONER

## 2017-04-18 PROCEDURE — 3078F DIAST BP <80 MM HG: CPT | Mod: S$GLB,,, | Performed by: NURSE PRACTITIONER

## 2017-04-18 PROCEDURE — 3074F SYST BP LT 130 MM HG: CPT | Mod: S$GLB,,, | Performed by: NURSE PRACTITIONER

## 2017-04-18 PROCEDURE — 1125F AMNT PAIN NOTED PAIN PRSNT: CPT | Mod: S$GLB,,, | Performed by: NURSE PRACTITIONER

## 2017-04-18 PROCEDURE — 99213 OFFICE O/P EST LOW 20 MIN: CPT | Mod: S$GLB,,, | Performed by: NURSE PRACTITIONER

## 2017-04-18 PROCEDURE — 1159F MED LIST DOCD IN RCRD: CPT | Mod: S$GLB,,, | Performed by: NURSE PRACTITIONER

## 2017-04-18 PROCEDURE — 99999 PR PBB SHADOW E&M-EST. PATIENT-LVL V: CPT | Mod: PBBFAC,,, | Performed by: NURSE PRACTITIONER

## 2017-04-18 NOTE — MR AVS SNAPSHOT
[facility]  [address] [address]  Phone: [phone]  Fax: [phone]                  Paris Burris   2017 7:00 PM   Office Visit    Description:  Female : 1945   Provider:  Elena Navas NP   Department:  [facility] - Internal Medicine           Reason for Visit     Adenopathy           Diagnoses this Visit        Comments    Cervical adenopathy    -  Primary            To Do List           Future Appointments        Provider Department Dept Phone    2017 3:30 PM [facility] 11 ALL [facility] [phone]    2017 9:00 AM Lamin Lara MD [facility] Otorhinolaryngology [phone]    2017 10:00 AM [facility] Internal [facility] [phone]    2017 1:00 PM Lyly Mansfield MD [facility] - Bariatric Surgery [phone]    2017 8:40 AM Mandi Huynh MD [facility] [phone]      Goals (5 Years of Data)     None      [facility]     [facility] -  Assistance  Unless otherwise directed by your provider, please contact [facility], our nurse care line that is available for  assistance.     Registered nurses in the [facility] provide: appointment scheduling, clinical advisement, health education, and other advisory services.  Call: [phone] (toll free)               Medications           Message regarding Medications     Verify the changes and/or additions to your medication regime listed below are the same as discussed with your clinician today.  If any of these changes or additions are incorrect, please notify your healthcare provider.             Verify that the below list of medications is an accurate representation of the medications you are currently taking.  If none reported, the list may be blank. If incorrect, please contact your healthcare provider. Carry this list with you in case of emergency.            Current Medications     ascorbic acid (VITAMIN C) 500 MG tablet Take 1,000 mg by mouth once daily.     aspirin (ECOTRIN) 81 MG EC tablet Take 1 tablet (81 mg total) by mouth once daily.    DUREZOL 0.05 % Drop ophthalmic solution     ferrous sulfate 325 mg (65 mg iron) Tab tablet Take 1 tablet (325 mg total) by mouth 2 (two) times daily.    fluticasone (FLONASE) 50 mcg/actuation nasal spray SPRAY ONCE IN EACH NOSTRIL EVERY DAY    gabapentin (NEURONTIN) 300 MG capsule Take 1 cap nightly x 1 week; then take 1 cap twice daily x 1 week; then take 1 cap 3x/daily onwards.    ketorolac 0.5% (ACULAR) 0.5 % Drop     levomefolate calcium (DEPLIN) 7.5 mg Tab tablet Take 1 tablet (7.5 mg total) by mouth once daily.    lorazepam (ATIVAN) 0.5 MG tablet Take 1 tablet (0.5 mg total) by mouth daily as needed.    metformin (GLUCOPHAGE-XR) 500 MG 24 hr tablet Take 1 tablet (500 mg total) by mouth 2 (two) times daily with meals.    mirtazapine (REMERON) 15 MG tablet Take 2 tablets (30 mg total) by mouth every evening.    omega-3 fatty acids-fish oil (FISH OIL) 340-1,000 mg Cap Take 1 capsule by mouth once daily.    omeprazole (PRILOSEC) 20 MG capsule Take 1 capsule (20 mg total) by mouth once daily.    rosuvastatin (CRESTOR) 20 MG tablet Take 1 tablet (20 mg total) by mouth once daily.    tramadol (ULTRAM) 50 mg tablet Take 1 tablet (50 mg total) by mouth every 8 (eight) hours as needed.    tretinoin (RETIN-A) 0.05 % cream 1 application every evening. At bedtime    venlafaxine (EFFEXOR) 75 MG tablet Take 1 tablet (75 mg total) by mouth 2 (two) times daily.    amoxicillin (AMOXIL) 875 MG tablet Take 1 tablet (875 mg total) by mouth 2 (two) times daily.    blood sugar diagnostic (ACCU-CHEK MARIAN PLUS TEST STRP) Strp 2 strips by Misc.(Non-Drug; Combo Route) route 2 (two) times daily.    blood sugar diagnostic Strp 1 strip by Misc.(Non-Drug; Combo Route) route 2 (two) times daily.    lancets Misc 1 lancet by Misc.(Non-Drug; Combo Route)  "route 2 (two) times daily.    sucralfate (CARAFATE) 1 gram tablet Take 1 tablet (1 g total) by mouth 2 (two) times daily.           Clinical Reference Information           Your Vitals Were     BP Pulse Temp Height Weight SpO2    123/70 86 98 °F (36.7 °C) (Oral) 5' 1" (1.549 m) 78.5 kg (173 lb 1 oz) 95%    BMI                32.7 kg/m2          Blood Pressure          Most Recent Value    BP  123/70      Allergies as of 4/18/2017     Topamax [Topiramate]      Immunizations Administered on Date of Encounter - 4/18/2017     None      Orders Placed During Today's Visit     Future Labs/Procedures Expected by Expires    US Soft Tissue Head Neck Thyroid  4/18/2017 4/18/2018      Language Assistance Services     ATTENTION: Language assistance services are available, free of charge. Please call [phone].      ATENCIÓN: Si habla radhaañol, tiene a ricks disposición servicios gratuitos de asistencia lingüística. Llame al [phone].     JOVAN Ý: N?u b?n nói Ti?ng Vi?t, có các d?ch v? h? tr? ngôn ng? mi?n phí dành cho b?n. G?i s? [phone].         [facility] complies with applicable Federal civil rights laws and does not discriminate on the basis of race, color, national origin, age, disability, or sex.        "

## 2017-04-19 ENCOUNTER — HOSPITAL ENCOUNTER (OUTPATIENT)
Dept: RADIOLOGY | Facility: HOSPITAL | Age: 72
Discharge: HOME | End: 2017-04-19
Attending: NURSE PRACTITIONER
Payer: MEDICARE

## 2017-04-19 DIAGNOSIS — R59.0 CERVICAL ADENOPATHY: ICD-10-CM

## 2017-04-19 PROCEDURE — 76536 US EXAM OF HEAD AND NECK: CPT | Mod: TC

## 2017-04-19 PROCEDURE — 76536 US EXAM OF HEAD AND NECK: CPT | Mod: 26,,, | Performed by: RADIOLOGY

## 2017-04-19 NOTE — PROGRESS NOTES
Subjective:       Patient ID: Paris Burris is a 71 y.o. female.    Chief Complaint: Adenopathy (Neck)  Ms. Burris presents today for recurrent adenopathy of the left submental and anterior cerivcal region. She was seen in urgent care for a similar complaint on 4/2/17 and diagnosed with a salivary gland obstruction.  She was placed on amoxil and told to follow up with ENT.  She has not scheduled a follow up with ENT.  She has numerous dental issues and has a dental appointment scheduled.   She took the entire course of prescribed amoxil and the swollen node resolved. However, within 4 days of completion, the lymph node was again palpable and tender to touch. So, Ms. Burris made a follow up appointment in this clinic.    HPI  Review of Systems   Constitutional: Negative for diaphoresis, fatigue and fever.   HENT: Negative for facial swelling, mouth sores, postnasal drip, rhinorrhea, sore throat and trouble swallowing.    Respiratory: Negative for cough and shortness of breath.    Cardiovascular: Negative for chest pain.   Gastrointestinal: Negative for diarrhea, nausea and vomiting.   Genitourinary: Negative for dysuria.   Skin: Negative for rash.   Neurological: Negative for headaches.   Hematological: Positive for adenopathy.   Psychiatric/Behavioral: Negative for confusion.       Objective:      Physical Exam   Constitutional: She is oriented to person, place, and time. She appears well-developed and well-nourished. No distress.   HENT:   Head: Normocephalic and atraumatic.   Right Ear: Tympanic membrane and ear canal normal.   Left Ear: Tympanic membrane and ear canal normal.   Nose: No mucosal edema, rhinorrhea or sinus tenderness. Right sinus exhibits no maxillary sinus tenderness and no frontal sinus tenderness. Left sinus exhibits no maxillary sinus tenderness and no frontal sinus tenderness.   Mouth/Throat: No oropharyngeal exudate, posterior oropharyngeal edema or posterior oropharyngeal erythema. Tonsils  are 0 on the right. Tonsils are 0 on the left. No tonsillar exudate.   Eyes: No scleral icterus.   Neck: Normal range of motion. Neck supple.   Cardiovascular: Normal rate, regular rhythm and normal heart sounds.    Pulmonary/Chest: Effort normal and breath sounds normal. No respiratory distress. She has no wheezes. She has no rales.   Musculoskeletal: Normal range of motion.   Lymphadenopathy:        Head (left side): Submental and submandibular adenopathy present.     She has cervical adenopathy.        Left cervical: Superficial cervical adenopathy present. No deep cervical and no posterior cervical adenopathy present.   Neurological: She is alert and oriented to person, place, and time.   Skin: She is not diaphoretic.   Nursing note and vitals reviewed.      Assessment:       1. Cervical adenopathy        Plan:   1. Cervical adenopathy  - Follow up with ENT scheduled  - US Soft Tissue Head Neck Thyroid; Future  - Reassurance given  - Suspect possible dental issue as cause    Pt has been given instructions populated from [employer] database and has verbalized understanding of the after visit summary and information contained wherein.    Follow up with a primary care provider. May go to ER for acute shortness of breath, lightheadedness, fever, or any other emergent complaints or changes in condition.

## 2017-04-21 RX ORDER — LEVOMEFOLATE CALCIUM 7.5 MG
7.5 TABLET ORAL DAILY
Qty: 30 TABLET | Refills: 5 | Status: SHIPPED | OUTPATIENT
Start: 2017-04-21 | End: 2017-05-01

## 2017-04-21 RX ORDER — BLOOD SUGAR DIAGNOSTIC
STRIP MISCELLANEOUS
Qty: 300 STRIP | Refills: 1 | Status: SHIPPED | OUTPATIENT
Start: 2017-04-21 | End: 2017-10-19 | Stop reason: SDUPTHER

## 2017-04-23 DIAGNOSIS — E11.9 DIABETES MELLITUS TYPE 2, CONTROLLED: ICD-10-CM

## 2017-04-24 ENCOUNTER — PATIENT MESSAGE (OUTPATIENT)
Dept: INTERNAL MEDICINE | Facility: CLINIC | Age: 72
End: 2017-04-24

## 2017-04-24 RX ORDER — METFORMIN HYDROCHLORIDE 500 MG/1
TABLET, EXTENDED RELEASE ORAL
Qty: 180 TABLET | Refills: 3 | Status: SHIPPED | OUTPATIENT
Start: 2017-04-24 | End: 2017-10-19

## 2017-04-26 RX ORDER — TRAMADOL HYDROCHLORIDE 50 MG/1
50 TABLET ORAL EVERY 8 HOURS PRN
Qty: 60 TABLET | Refills: 0 | Status: SHIPPED | OUTPATIENT
Start: 2017-04-26 | End: 2017-06-28 | Stop reason: SDUPTHER

## 2017-05-01 ENCOUNTER — OFFICE VISIT (OUTPATIENT)
Dept: PSYCHIATRY | Facility: CLINIC | Age: 72
End: 2017-05-01
Payer: MEDICARE

## 2017-05-01 VITALS
HEART RATE: 107 BPM | HEIGHT: 61 IN | BODY MASS INDEX: 32.1 KG/M2 | DIASTOLIC BLOOD PRESSURE: 75 MMHG | SYSTOLIC BLOOD PRESSURE: 162 MMHG | WEIGHT: 170 LBS

## 2017-05-01 DIAGNOSIS — F33.41 RECURRENT MAJOR DEPRESSION IN PARTIAL REMISSION: Primary | ICD-10-CM

## 2017-05-01 DIAGNOSIS — R52 PAIN DISORDER: ICD-10-CM

## 2017-05-01 DIAGNOSIS — F10.21 ALCOHOL DEPENDENCE IN REMISSION: ICD-10-CM

## 2017-05-01 DIAGNOSIS — F51.01 PRIMARY INSOMNIA: ICD-10-CM

## 2017-05-01 DIAGNOSIS — F41.1 GAD (GENERALIZED ANXIETY DISORDER): ICD-10-CM

## 2017-05-01 PROCEDURE — 90836 PSYTX W PT W E/M 45 MIN: CPT | Mod: S$GLB,,, | Performed by: PSYCHIATRY & NEUROLOGY

## 2017-05-01 PROCEDURE — 1159F MED LIST DOCD IN RCRD: CPT | Mod: S$GLB,,, | Performed by: PSYCHIATRY & NEUROLOGY

## 2017-05-01 PROCEDURE — 99214 OFFICE O/P EST MOD 30 MIN: CPT | Mod: S$GLB,,, | Performed by: PSYCHIATRY & NEUROLOGY

## 2017-05-01 PROCEDURE — 3078F DIAST BP <80 MM HG: CPT | Mod: S$GLB,,, | Performed by: PSYCHIATRY & NEUROLOGY

## 2017-05-01 PROCEDURE — 1160F RVW MEDS BY RX/DR IN RCRD: CPT | Mod: S$GLB,,, | Performed by: PSYCHIATRY & NEUROLOGY

## 2017-05-01 PROCEDURE — 99499 UNLISTED E&M SERVICE: CPT | Mod: S$PBB,,, | Performed by: PSYCHIATRY & NEUROLOGY

## 2017-05-01 PROCEDURE — 3077F SYST BP >= 140 MM HG: CPT | Mod: S$GLB,,, | Performed by: PSYCHIATRY & NEUROLOGY

## 2017-05-01 PROCEDURE — 99999 PR PBB SHADOW E&M-EST. PATIENT-LVL III: CPT | Mod: PBBFAC,,, | Performed by: PSYCHIATRY & NEUROLOGY

## 2017-05-01 RX ORDER — LORAZEPAM 0.5 MG/1
0.5 TABLET ORAL DAILY PRN
Qty: 30 TABLET | Refills: 3 | Status: SHIPPED | OUTPATIENT
Start: 2017-05-01 | End: 2017-07-21 | Stop reason: SDUPTHER

## 2017-05-01 RX ORDER — VENLAFAXINE 75 MG/1
75 TABLET ORAL 2 TIMES DAILY
Qty: 60 TABLET | Refills: 3 | Status: SHIPPED | OUTPATIENT
Start: 2017-05-01 | End: 2017-07-21 | Stop reason: SDUPTHER

## 2017-05-01 RX ORDER — MIRTAZAPINE 15 MG/1
30 TABLET, FILM COATED ORAL NIGHTLY
Qty: 60 TABLET | Refills: 3 | Status: SHIPPED | OUTPATIENT
Start: 2017-05-01 | End: 2017-07-17

## 2017-05-01 NOTE — PROGRESS NOTES
Outpatient Psychiatry Follow-Up Visit (MD/NP)    5/1/2017 last seen 3-20-17     Clinical Status of Patient:  Outpatient (Ambulatory)    Chief Complaint:  Paris Burris is a 71 y.o. female who presents today for follow-up of depression and alcohol problem .  Met with patient.  Call Paris ; friend of Dr Rachel . Annita Braun ( 9 yrs ) .  to [place] .    Interval History and Content of Current Session:  Interim Events/Subjective Report/Content of Current Session: Pt s/p ABU program in Spring 2014 for alcohol dependence . 2-10-14 sober date . Now 3 years .      Grkids at their own  home are 14( [place])- anger issues / destroys property . 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  Nikky has a [place] xchnge female .  Oldest grson Delfino ( 17) ADHD  ,  going  [place]  [employer]. Younger kids at  [facility] . Very engaged in AA and sponsor attending 3-4 x per week . She completed 1st series of 12 steps . .Mood has been more anxious over the past few months . More nail biting    . In past she was Not sure she wanted to reengage with  Dr Lauren .     Pt has hyperparathyroidism  and had surgery in nov, 2015- stable .  Sleep apnea confirmed  by sleep specialist  . Did trauma therapy at [facility].  She and Annita Braun have moved into 2nf floor apmnt away from [facility]. 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    Has lost son yrs ago .      EX  Son in law - [place] -- good terms ; Dtr Yue ( Vira Rosas- employed with )  had had recent 4th suicide attempt ( 1st was OD  acetomin; 2nd and 3rd had gun)  and was admitted to [facility] on 4-9-15  X 5 weeks .    Yue also has been to tx in [facility] . Yue now in therapy  plus a group with [facility].  Pt does not ask details anymore .   Yue ( kaylie patel).  Yue  continues to  cut herself off from others that were close to her . Yue has  Been even keeled .     Yue has distanced santana daniela's wife ( Yazmin) away . Santana is a family friend.   Dtr Yue ( Vira robbins) going though bitter divorce where Yue wants others to avoid [place]. Kids shuttle .  [place]'s live in  has 3 kids in their home .     Diabetes controlled with hgb A1c --   Feb 2017 --6.4   has relatively new onset  Neuropathy up to pain       Since Feb 2017 visit - she could not tolerate effexor XR at  Increased dose to 150 mg ( anxiety and nightmares increased ) then called me and we decreased to 75 mg ; She then added remeron 15 mg q hs . She  feels her mood has improved in response .     Psychiatric Review Of Systems - Is patient experiencing or having changes in:  sleep:  Added back  mirtazepine 15 mg q hs to help sleep   appetite: no, controlled   weight: no; 171 ( feb 2017)  ; 168 ( March 2017)   energy/anergy: no, less exercising at [employer] with free  bc of comorbid condition; doing land based PT and   accupuncture ; Joining healthy back program   interest/pleasure/anhedonia: no, volunteering with teacher , [employer]i ; enjoyed trip to [place] to see Delfino ramírez  for parents day at boarding school .   somatic symptoms: yes as above and below - neck tension better with mechanical cupping   libido: no  anxiety/panic: yes , worse  ; still  biting nails ; obsessive thinking about dtr   guilty/hopelessness: no  concentration: not baseline ;  Improving as she is quicker  to  complete tasks   S.I.B.s/risky behavior: no  Irritability: no  Racing thoughts: no  Impulsive behaviors: no  Paranoia:no  AVH:no      Has compression fx in high thoracic area .  New onset  HbA1c from 10 plus to 6.2 . Dx 'd in Jan 2016 .No DM in family .     Santana Rachel MD is  fam friend .     Meds  effexor xr 75  mg   q day;  mirtazepine 7.5 mg q hs;  Vit D supplement ; tramadol 50 mg tid prn ( usually takes 50 mg  2 q day)  Max would  be 300 in literature  ; Ativan 0.5 mg  prn     Did not fill deplin     Past meds remeron - not needed     Her pain doc is concerned about potential interaction of tramadol and ssri in past     Reading book form health  --never be sick again --  Avoiding  milk , white flour , sugar ; limited coffee    Psychotherapy:  · Target symptoms: alcohol abuse, depression  · Why chosen therapy is appropriate versus another modality: relevant to diagnosis, patient responds to this modality, evidence based practice  · Outcome monitoring methods: self-report, observation  · Therapeutic intervention type: supportive psychotherapy  · Topics discussed/themes: relationships difficulties, substance abuse  · The patient's response to the intervention is accepting. The patient's progress toward treatment goals is good.   · Duration of intervention: 45   minutes.    Review of Systems   · Prob Pert. 1 sys, Ext. psych +2 add., Comp. 10-14 sys  · PSYCHIATRIC: Pertinant items are noted in the narrative.  · CONSTITUTIONAL: No weight gain or loss. Wedding dress size 5 . She is about a 14-16 .   · MUSCULOSKELETAL: Positive for joint stiffness, toe neuropathic  Pain ( DM JAN 2016). Leg ( hip and thighs)  weakness still but in PT  ( possibly vascular- neg neuro test ) ;mechanical cupping ( upper back tension post Breast CA)  and  · NEUROLOGIC: No weakness, sensory changes, seizures, confusion, memory loss, tremor or other abnormal movements.  · ENDOCRINE: No polydipsia or polyuria.  · INTEGUMENTARY: No rashes or lacerations.  · EYES: No exophthalmos, jaundice or blindness.  · ENT: No dizziness, tinnitus or hearing loss.  · RESPIRATORY: No shortness of breath.  · CARDIOVASCULAR: No tachycardia or chest pain.  · GASTROINTESTINAL: No nausea, vomiting, pain, constipation or diarrhea.  · GENITOURINARY: No frequency, dysuria or sexual dysfunction. S/p recent uti series and now kidney stones awaiting lithotripsy   · HEMATOLOGIC/LYMPHATIC: No  "excessive bleeding, prolonged or excessive bleeding after dental extraction/injury.  · ALLERGIC/IMMUNOLOGIC: No allergic response to materials, foods or animals at this time.    Past Medical, Family and Social History: The patient's past medical, family and social history have been reviewed and updated as appropriate within the electronic medical record - see encounter notes.    Compliance: yes    Side effects:  Sweating when others dont    Risk Parameters:  Patient reports no suicidal ideation  Patient reports no homicidal ideation  Patient reports no self-injurious behavior  Patient reports no violent behavior    Exam (detailed: at least 9 elements; comprehensive: all 15 elements)   Constitutional  Vitals:  Most recent vital signs, dated less than 90 days prior to this appointment, were reviewed.   Vitals:    05/01/17 1459   BP: (!) 162/75   Pulse: 107   Weight: 77.1 kg (170 lb)   Height: 5' 1" (1.549 m)        General:  unremarkable, age appropriate, neatly groomed     Musculoskeletal  Muscle Strength/Tone:  no spasicity, no rigidity   Gait & Station:  non-ataxic     Psychiatric  Speech:  no latency; no press   Mood & Affect:  euthymic  congruent and appropriate   Thought Process:  normal and logical   Associations:  intact   Thought Content:  normal, no suicidality, no homicidality, delusions, or paranoia   Insight:  has awareness of illness   Judgement: behavior is adequate to circumstances   Orientation:  grossly intact   Memory: intact for content of interview   Language: grossly intact   Attention Span & Concentration:  able to focus   Fund of Knowledge:  intact and appropriate to age and level of education     Assessment and Diagnosis   Status/Progress: Based on the examination today, the patient's problem(s) is/are worsening.  New problems have been presented today.   Co-morbidities are complicating management of the primary condition.  There are no active rule-out diagnoses for this patient at this time. "     General Impression:  Depression still present     1. Recurrent major depression in partial remission     2. GABBY (generalized anxiety disorder)     3. Alcohol dependence in remission     4. Primary insomnia     5. Pain disorder               Intervention/Counseling/Treatment Plan   · Medication Management: Maintain Effexor XR at 75 mg q day   And mirtazepine 7.5 mg qhs .  The risks and benefits of medication were discussed with the patient regarding serotonin syndrome and withdrawal .   · Counseling provided with patient as follows: importance of compliance with chosen treatment options was emphasized, risks and benefits of treatment options, including medications, were discussed with the patient  · AA/NA/CA/ACOA/Abstinence       Return to Clinic: 6 months

## 2017-05-02 ENCOUNTER — PATIENT MESSAGE (OUTPATIENT)
Dept: PSYCHIATRY | Facility: CLINIC | Age: 72
End: 2017-05-02

## 2017-05-05 ENCOUNTER — CLINICAL SUPPORT (OUTPATIENT)
Dept: REHABILITATION | Facility: OTHER | Age: 72
End: 2017-05-05
Attending: INTERNAL MEDICINE
Payer: MEDICARE

## 2017-05-05 DIAGNOSIS — M51.36 DDD (DEGENERATIVE DISC DISEASE), LUMBAR: Primary | ICD-10-CM

## 2017-05-05 PROCEDURE — 97110 THERAPEUTIC EXERCISES: CPT

## 2017-05-05 PROCEDURE — 97161 PT EVAL LOW COMPLEX 20 MIN: CPT

## 2017-05-05 PROCEDURE — G8978 MOBILITY CURRENT STATUS: HCPCS | Mod: CK

## 2017-05-05 PROCEDURE — G8979 MOBILITY GOAL STATUS: HCPCS | Mod: CJ

## 2017-05-05 NOTE — PROGRESS NOTES
[facility] - PHYSICAL THERAPY EVALUATION     Name: Paris Burris  Clinic Number: [id number]    Paris is a 71 y.o. female evaluated on 05/05/2017.   Time In: 10:30  Time out: 12:00    Diagnosis:   Encounter Diagnosis   Name Primary?    DDD (degenerative disc disease), lumbar Yes     Physician: Mandi Huynh MD  Treatment Orders: PT Eval and Treat    Past Medical History:   Diagnosis Date    Allergy     Anemia     Anxiety     Cancer 2002    L breast s/p mastectomy    Decreased hearing     Depression     Diabetes mellitus     Gastric ulcer 9/10/13    EGD    Hiatal hernia     Hyperlipidemia     Migraine headache     Migraine headache 3/20/2015    Multiple gastric ulcers     Parathyroid disorder     Pre-diabetes     Wrist fracture      Current Outpatient Prescriptions   Medication Sig    ACCU-CHEK SMARTVIEW TEST STRIP Strp TEST BLOOD SUGAR TWICE A DAY    ascorbic acid (VITAMIN C) 500 MG tablet Take 1,000 mg by mouth once daily.     aspirin (ECOTRIN) 81 MG EC tablet Take 1 tablet (81 mg total) by mouth once daily.    blood sugar diagnostic (ACCU-CHEK MARIAN PLUS TEST STRP) Strp 2 strips by Misc.(Non-Drug; Combo Route) route 2 (two) times daily.    ferrous sulfate 325 mg (65 mg iron) Tab tablet Take 1 tablet (325 mg total) by mouth 2 (two) times daily.    fluticasone (FLONASE) 50 mcg/actuation nasal spray SPRAY ONCE IN EACH NOSTRIL EVERY DAY    gabapentin (NEURONTIN) 300 MG capsule Take 1 cap nightly x 1 week; then take 1 cap twice daily x 1 week; then take 1 cap 3x/daily onwards.    lancets Misc 1 lancet by Misc.(Non-Drug; Combo Route) route 2 (two) times daily.    lorazepam (ATIVAN) 0.5 MG tablet Take 1 tablet (0.5 mg total) by mouth daily as needed.    metformin (GLUCOPHAGE-XR) 500 MG 24 hr tablet TAKE 1 TABLET BY MOUTH TWICE A DAY WITH MEALS    mirtazapine (REMERON) 15 MG tablet Take 2 tablets (30 mg total) by mouth every evening.    omega-3 fatty acids-fish oil (FISH OIL) 340-1,000  mg Cap Take 1 capsule by mouth once daily.    omeprazole (PRILOSEC) 20 MG capsule Take 1 capsule (20 mg total) by mouth once daily.    rosuvastatin (CRESTOR) 20 MG tablet Take 1 tablet (20 mg total) by mouth once daily.    tramadol (ULTRAM) 50 mg tablet Take 1 tablet (50 mg total) by mouth every 8 (eight) hours as needed.    tretinoin (RETIN-A) 0.05 % cream 1 application every evening. At bedtime    venlafaxine (EFFEXOR) 75 MG tablet Take 1 tablet (75 mg total) by mouth 2 (two) times daily.     No current facility-administered medications for this visit.      Review of patient's allergies indicates:   Allergen Reactions    Topamax [topiramate] Other (See Comments)     hallucinations     Precautions: SEE PMH, Cx,neuropathy in L foot     Visit # authorized: 1  Authorization period: 4/13/2018  Plan of care Expiration: 8/5/2017      HISTORY   History of Present Illness: History: Patient presents to therapy with low back pain.  Patient was a patient at [facility] and was not making progress like she wanted.  Pt presented to [facility] physical therapy with dx of thoracic pain and leg weakness. Pt has tried aquatic therapy in the past and has not had great success.She denies any trauma and her pain has been progressively getting worse. She also states that her pain is depressing her and feels that is contributing to her depression. Pt has gone for a psychiatric counseling. Her goal for therapy is to decrease her thoracic pain, start a land-based exercise program to increase strength in this area.    Diagnostic Tests: From EPIC Radiographs  Technique: AP and lateral views of the lumbar spine including lateral flexion/extension views and focal lateral L5/sacral views.    Comparison: Radiograph 1/11/16, MR 4/13/16    Findings: Slight interval worsening of the focal kyphosis at T12-L1 with intervertebral disc space narrowing. There is also intervertebral disc height loss at L5-S1, unchanged. Multilevel facet hypertrophy. Remaining  intervertebral discs appear unchanged from prior exam.   Impression    Slight worsening of the focal T12-L1 kyphosis in intervertebral disc height loss compared to prior radiograph. Remaining degenerative changes of the lumbar spine are not significantly changed from prior.       Pain Scale: Paris rates pain on a scale of 0-10 to be 7 at worst; 0 currently; 3 at best using VAS.   Pain location: low back with B neuropathy    Aggravating factors: squatting, reading in sitting  Easing Factors: morning, evening, standing  Disturbed Sleep: no    Pattern of pain questions:  1.  Where is your pain the worst? back  2.  Is your pain constant or intermittent? intermittent  3.  Does bending forward make your typical pain worse? yes  4.  Since the start of your back pain, has there been a change in your bowel or bladder? Slight increase in time it takes to void  5.  What can't you do now that you use to be able to do? Sit for longer, stoop, bend down, clean, descending stairs easier.    Prior Treatment: PT at [facility]  Prior functional status: Independent  DME owned/used: no  Occupation:  Retired (taught reading)   Leisure: garden, walking, read, knit                     Pts goals:  Be able to sit longer, be able to descend stairs easier.     Red Flag Screening:   Cough  Sneeze  Strain: no  Bladder/ bowel: no  Falls: not recently  General health: good  Night pain: no  Unexplained weight loss: no    OBJECTIVE     POSTURE  Posture Alignment :slouched posture  Postural examination/scapula alignment: Rounded shoulder, Head forward and Affected scapula abducted  Joint integrity: WNL  Skin integrity: n/a  Edema: n/a  Sitting: poor  Standing: poor  Correction of posture: better with lumbar roll    MOVEMENT LOSS    ROM Loss   Flexion moderate loss   Extension minimal loss   Side bending Right moderate loss   Side bending Left moderate loss   Rotation Right moderate loss   Rotation Left moderate loss     Lower Extremity  Strength  Right LE  Left LE    Hip flexion: 4/5 Hip flexion: 4/5   Hip extension:  4/5 Hip extension: 4/5   Hip abduction: 4/5 Hip abduction: 4/5   Hip adduction:  5/5 Hip adduction:  5/5   Hip Internal rotation   nt Hip Internal rotation nt   Knee Flexion 5/5 Knee Flexion 4+/5   Knee Extension 5/5 Knee Extension 5/5   Ankle dorsiflexion: 5/5 Ankle dorsiflexion: 5/5   Ankle plantarflexion: 5/5 Ankle plantarflexion: 5/5       GAIT:  Assistive Device used: no assistive device  Level of Assistance: independent  Patient displays the following gait deviations:  decreased step length and increased base of support.     Special Tests:   Test Name  Test Result   Prone Instability Test (+)   SI Joint Provocation Test (--)   Straight Leg Raise (--)   Neural Tension Test (+)   Crossed Straight Leg Raise (--)   Walking on toes (+)   Walking on heels  (+)       NEUROLOGICAL SCREENING     Sensory deficit: no    Reflexes:    Left Right   Patella Tendon 2+ 2+   Achilles Tendon 2+ 2+   Babinski  (--) (--)   Clonus (--) (--)     REPEATED TEST MOVEMENTS:  Repeated Flexion in Standing no better   Repeated Extension in Standing better   Repeated Flexion in lying better   Repeated Extension in lying  better       STATIC TESTS   Sitting slouched  worse   Sitting erect better   Standing slouched worse   Standing erect  no effect   Lying prone in extension  better   Long sitting   NT       Baseline Isometric Testing on Med X equipment: Testing administered by PT      FOTO: Focus on Therapeutic Outcomes   Category: lumbar   % Impaired: 33  Current Score  = CJ = at least 20% but < 40% impaired, limited or restricted  Goal at Discharge Score = CI = at least 1% but < 20% impaired, limited or restricted    Score interpretation is as follows:     TEST SCORE  Modifier  Impairment Limitation Restriction    0/50  CH  0 % impaired, limited or restricted   1-9/50  CI  @ least 1% but less than 20% impaired, limited or restricted   10-19/50  CJ  @ least  20%<40% impaired, limited or restricted   20-29/50  CK  @ least 40%<60% impaired, limited or restricted   30-39/50  CL  @ least 60% <80% impaired, limited or restricted   40-49/50  CM  @ least 80%<100% impaired limited or restricted   50/50  CN  100% impaired, limited or restricted       Treatment   Time In: 11:30   Time Out: 12:00    PT Evaluation Completed? Yes  Discussed Plan of Care with patient: Yes      Written Home Exercises Provided:   Handouts were given to the patient. Pt demo good understanding of the education provided. Paris demonstrated good return demonstration of activities.     - Patient received education regarding proper posture and body mechanics.    - Natalie roll tried, recommended, and purchase information was provided.    - Patient received a handout regarding anticipated muscular soreness following the isometric test and strategies for management were reviewed with patient including stretching, using ice and scheduled rest.       Pt was instructed in and performed the following:   Cardiovascular exercise and therapeutic exercise to improve posture, lumbarROM, strength, and muscular endurance as follows:     Paris received therapeutic exercises to develop/improve posture, lumbar/cervical ROM, strength and muscular endurance for 30 minutes including the following exercises: medx lumbar testing, standing extensions, extensions in lying, flexion in lying.   [facility] 5/5/2017   Visit Number 1   VAS Pain Rating 0   Treadmill Time (in min.) 5   Speed 2   Extension in Lying 10   Extension in Standing 10   Flexion in Lying 10   Lumbar Extension Seat Pad 1   Femur Restraint 4   Top Dead Center 24   Counterweight 130   Lumbar Flexion 30   Lumbar Extension 0   Lumbar Peak Torque 111   Ice - Z Lie (in min.) 10     Percent from normal (12.33)  Assessment   This is a 71 y.o. female referred to [facility] and presents with a medical diagnosis of   Encounter Diagnosis   Name Primary?     DDD (degenerative disc disease), lumbar Yes    and demonstrates limitations as described below in the problem list. Pt rehab potential is Good. Pt presents with lumbar dysfunction, instability, poor posture.     Pain Pattern: 1 PEP       Patient received education on the [facility] program, purpose of the isometric test, progression of back strengthening as well as wellness approach and systemic strengthening.  Details of the program were discussed.  Reviewed that patient should feel support/pressure from med ex restraints but no pain or discomfort and patient expressed understanding.    Based on the above history and physical examination an active physical therapy program is recommended.  Pt will continue to benefit from skilled outpatient physical therapy to address the deficits listed below in the chart, provide pt/family education and to maximize pt's level of independence in the home and community environment. .     No environmental, cultural, spiritual, developmental or education needs expressed or noted    Medical necessity is demonstrated by the following problem list.    Pt presents with the following impairments:   History  Co-morbidities and personal factors that may impact the plan of care Examination  Body Structures and Functions, activity limitations and participation restrictions that may impact the plan of care Clinical Presentation   Decision Making/ Complexity Score   Co-morbidities:   advanced age        Personal Factors:   age Body Regions:   back  lower extremities    Body Systems:   ROM  strength    Activity limitations:   Learning and applying knowledge  no deficits    General Tasks and Commands  no deficits    Communication  communicating with/receiving spoken language  no deficits    Mobility  no deficits    Self care  no deficits    Domestic Life  no deficits    Interactions/Relationships  no deficits    Life Areas  no deficits    Community and Social Life  no  "deficits    Participation Restrictions:   Cleaning, reading in sitting     stable and uncomplicated   low         GOALS: Pt is in agreement with the following goals.    Short term goals:  6 weeks or 10 visits   1.  Pt will demonstrate increased lumbar ROM by at least 3 degrees from the initial ROM value with improvements noted in functional ROM and ability to perform ADLs  2.  Pt will demonstrate increased  3.  Patient report a reduction in worst pain score by 1-2 points for improved tolerance during work and recreational activities  4.  Pt able to perform HEP correctly with minimal cueing or supervision for therapist      Long term goals: 13 weeks or 20 visits   1. Pt will demonstrate increased lumbar ROM by at least 3 degrees from initial ROM value, resulting in improved ability to perform functional fwd bending while standing and sitting.   2. Pt will demonstrate increased maximum isometric torque value by 5% when compared to the initial value resulting in improved ability to perform bending, lifting, and carrying activities safely, confidently.  3. Pt to demonstrate ability to independently control and reduce their pain through posture positioning and mechanical movements throughout a typical day.  4.  Patient will demonstrate improved overall function per FOTO Survey to CI = at least 1% but < 20% impaired, limited or restricted score or less.      Plan   Outpatient physical therapy 2x week for 13 weeks or 20 visits to include the following:   - Patient education  - Therapeutic exercise  - Manual therapy  - Performance testing   - Neuromuscular Re-education  - Therapeutic activity   - Modalities    Pt may be seen by PTA as part of the rehabilitation team.     Therapist: Dustin Queen, PT  5/5/2017    "I certify the need for these services furnished under this plan of treatment and while under my care."    ____________________________________  Physician/Referring Practitioner    _______________  Date of " Signature

## 2017-05-05 NOTE — PLAN OF CARE
[facility] - PHYSICAL THERAPY EVALUATION     Name: Paris Burris  Clinic Number: [id number]    Paris is a 71 y.o. female evaluated on 05/05/2017.   Time In: 10:30  Time out: 12:00    Diagnosis:   Encounter Diagnosis   Name Primary?    DDD (degenerative disc disease), lumbar Yes     Physician: Mandi Huynh MD  Treatment Orders: PT Eval and Treat    Past Medical History:   Diagnosis Date    Allergy     Anemia     Anxiety     Cancer 2002    L breast s/p mastectomy    Decreased hearing     Depression     Diabetes mellitus     Gastric ulcer 9/10/13    EGD    Hiatal hernia     Hyperlipidemia     Migraine headache     Migraine headache 3/20/2015    Multiple gastric ulcers     Parathyroid disorder     Pre-diabetes     Wrist fracture      Current Outpatient Prescriptions   Medication Sig    ACCU-CHEK SMARTVIEW TEST STRIP Strp TEST BLOOD SUGAR TWICE A DAY    ascorbic acid (VITAMIN C) 500 MG tablet Take 1,000 mg by mouth once daily.     aspirin (ECOTRIN) 81 MG EC tablet Take 1 tablet (81 mg total) by mouth once daily.    blood sugar diagnostic (ACCU-CHEK MARIAN PLUS TEST STRP) Strp 2 strips by Misc.(Non-Drug; Combo Route) route 2 (two) times daily.    ferrous sulfate 325 mg (65 mg iron) Tab tablet Take 1 tablet (325 mg total) by mouth 2 (two) times daily.    fluticasone (FLONASE) 50 mcg/actuation nasal spray SPRAY ONCE IN EACH NOSTRIL EVERY DAY    gabapentin (NEURONTIN) 300 MG capsule Take 1 cap nightly x 1 week; then take 1 cap twice daily x 1 week; then take 1 cap 3x/daily onwards.    lancets Misc 1 lancet by Misc.(Non-Drug; Combo Route) route 2 (two) times daily.    lorazepam (ATIVAN) 0.5 MG tablet Take 1 tablet (0.5 mg total) by mouth daily as needed.    metformin (GLUCOPHAGE-XR) 500 MG 24 hr tablet TAKE 1 TABLET BY MOUTH TWICE A DAY WITH MEALS    mirtazapine (REMERON) 15 MG tablet Take 2 tablets (30 mg total) by mouth every evening.    omega-3 fatty acids-fish oil (FISH OIL) 340-1,000  mg Cap Take 1 capsule by mouth once daily.    omeprazole (PRILOSEC) 20 MG capsule Take 1 capsule (20 mg total) by mouth once daily.    rosuvastatin (CRESTOR) 20 MG tablet Take 1 tablet (20 mg total) by mouth once daily.    tramadol (ULTRAM) 50 mg tablet Take 1 tablet (50 mg total) by mouth every 8 (eight) hours as needed.    tretinoin (RETIN-A) 0.05 % cream 1 application every evening. At bedtime    venlafaxine (EFFEXOR) 75 MG tablet Take 1 tablet (75 mg total) by mouth 2 (two) times daily.     No current facility-administered medications for this visit.      Review of patient's allergies indicates:   Allergen Reactions    Topamax [topiramate] Other (See Comments)     hallucinations     Precautions: SEE PMH, Cx,neuropathy in L foot     Visit # authorized: 1  Authorization period: 4/13/2018  Plan of care Expiration: 8/5/2017      HISTORY   History of Present Illness: History: Patient presents to therapy with low back pain.  Patient was a patient at [facility] and was not making progress like she wanted.  Pt presented to [facility] physical therapy with dx of thoracic pain and leg weakness. Pt has tried aquatic therapy in the past and has not had great success.She denies any trauma and her pain has been progressively getting worse. She also states that her pain is depressing her and feels that is contributing to her depression. Pt has gone for a psychiatric counseling. Her goal for therapy is to decrease her thoracic pain, start a land-based exercise program to increase strength in this area.    Diagnostic Tests: From EPIC Radiographs  Technique: AP and lateral views of the lumbar spine including lateral flexion/extension views and focal lateral L5/sacral views.    Comparison: Radiograph 1/11/16, MR 4/13/16    Findings: Slight interval worsening of the focal kyphosis at T12-L1 with intervertebral disc space narrowing. There is also intervertebral disc height loss at L5-S1, unchanged. Multilevel facet hypertrophy. Remaining  intervertebral discs appear unchanged from prior exam.   Impression    Slight worsening of the focal T12-L1 kyphosis in intervertebral disc height loss compared to prior radiograph. Remaining degenerative changes of the lumbar spine are not significantly changed from prior.       Pain Scale: Paris rates pain on a scale of 0-10 to be 7 at worst; 0 currently; 3 at best using VAS.   Pain location: low back with B neuropathy    Aggravating factors: squatting, reading in sitting  Easing Factors: morning, evening, standing  Disturbed Sleep: no    Pattern of pain questions:  1.  Where is your pain the worst? back  2.  Is your pain constant or intermittent? intermittent  3.  Does bending forward make your typical pain worse? yes  4.  Since the start of your back pain, has there been a change in your bowel or bladder? Slight increase in time it takes to void  5.  What can't you do now that you use to be able to do? Sit for longer, stoop, bend down, clean, descending stairs easier.    Prior Treatment: PT at [facility]  Prior functional status: Independent  DME owned/used: no  Occupation:  Retired (taught reading)   Leisure: garden, walking, read, knit                     Pts goals:  Be able to sit longer, be able to descend stairs easier.     Red Flag Screening:   Cough  Sneeze  Strain: no  Bladder/ bowel: no  Falls: not recently  General health: good  Night pain: no  Unexplained weight loss: no    OBJECTIVE     POSTURE  Posture Alignment :slouched posture  Postural examination/scapula alignment: Rounded shoulder, Head forward and Affected scapula abducted  Joint integrity: WNL  Skin integrity: n/a  Edema: n/a  Sitting: poor  Standing: poor  Correction of posture: better with lumbar roll    MOVEMENT LOSS    ROM Loss   Flexion moderate loss   Extension minimal loss   Side bending Right moderate loss   Side bending Left moderate loss   Rotation Right moderate loss   Rotation Left moderate loss     Lower Extremity  Strength  Right LE  Left LE    Hip flexion: 4/5 Hip flexion: 4/5   Hip extension:  4/5 Hip extension: 4/5   Hip abduction: 4/5 Hip abduction: 4/5   Hip adduction:  5/5 Hip adduction:  5/5   Hip Internal rotation   nt Hip Internal rotation nt   Knee Flexion 5/5 Knee Flexion 4+/5   Knee Extension 5/5 Knee Extension 5/5   Ankle dorsiflexion: 5/5 Ankle dorsiflexion: 5/5   Ankle plantarflexion: 5/5 Ankle plantarflexion: 5/5       GAIT:  Assistive Device used: no assistive device  Level of Assistance: independent  Patient displays the following gait deviations:  decreased step length and increased base of support.     Special Tests:   Test Name  Test Result   Prone Instability Test (+)   SI Joint Provocation Test (--)   Straight Leg Raise (--)   Neural Tension Test (+)   Crossed Straight Leg Raise (--)   Walking on toes (+)   Walking on heels  (+)       NEUROLOGICAL SCREENING     Sensory deficit: no    Reflexes:    Left Right   Patella Tendon 2+ 2+   Achilles Tendon 2+ 2+   Babinski  (--) (--)   Clonus (--) (--)     REPEATED TEST MOVEMENTS:  Repeated Flexion in Standing no better   Repeated Extension in Standing better   Repeated Flexion in lying better   Repeated Extension in lying  better       STATIC TESTS   Sitting slouched  worse   Sitting erect better   Standing slouched worse   Standing erect  no effect   Lying prone in extension  better   Long sitting   NT       Baseline Isometric Testing on Med X equipment: Testing administered by PT      FOTO: Focus on Therapeutic Outcomes   Category: lumbar   % Impaired: see FOTO (next visit)  Current Score  = CK = at least 40% but < 60% impaired, limited or restricted  Goal at Discharge Score = CJ = at least 20% but < 40% impaired, limited or restricted    Score interpretation is as follows:     TEST SCORE  Modifier  Impairment Limitation Restriction    0/50  CH  0 % impaired, limited or restricted   1-9/50  CI  @ least 1% but less than 20% impaired, limited or restricted    10-19/50  CJ  @ least 20%<40% impaired, limited or restricted   20-29/50  CK  @ least 40%<60% impaired, limited or restricted   30-39/50  CL  @ least 60% <80% impaired, limited or restricted   40-49/50  CM  @ least 80%<100% impaired limited or restricted   50/50  CN  100% impaired, limited or restricted       Treatment   Time In: 11:30   Time Out: 12:00    PT Evaluation Completed? Yes  Discussed Plan of Care with patient: Yes      Written Home Exercises Provided:   Handouts were given to the patient. Pt demo good understanding of the education provided. Paris demonstrated good return demonstration of activities.     - Patient received education regarding proper posture and body mechanics.    - Natalie roll tried, recommended, and purchase information was provided.    - Patient received a handout regarding anticipated muscular soreness following the isometric test and strategies for management were reviewed with patient including stretching, using ice and scheduled rest.       Pt was instructed in and performed the following:   Cardiovascular exercise and therapeutic exercise to improve posture, lumbarROM, strength, and muscular endurance as follows:     Paris received therapeutic exercises to develop/improve posture, lumbar/cervical ROM, strength and muscular endurance for 30 minutes including the following exercises: medx lumbar testing, standing extensions, extensions in lying, flexion in lying.   [facility] 5/5/2017   Visit Number 1   VAS Pain Rating 0   Treadmill Time (in min.) 5   Speed 2   Extension in Lying 10   Extension in Standing 10   Flexion in Lying 10   Lumbar Extension Seat Pad 1   Femur Restraint 4   Top Dead Center 24   Counterweight 130   Lumbar Flexion 30   Lumbar Extension 0   Lumbar Peak Torque 111   Ice - Z Lie (in min.) 10     Percent from normal (12.33)  Assessment   This is a 71 y.o. female referred to [facility] and presents with a medical diagnosis of   Encounter  Diagnosis   Name Primary?    DDD (degenerative disc disease), lumbar Yes    and demonstrates limitations as described below in the problem list. Pt rehab potential is Good. Pt presents with lumbar dysfunction, instability, poor posture.     Pain Pattern: 1 PEP       Patient received education on the [facility] program, purpose of the isometric test, progression of back strengthening as well as wellness approach and systemic strengthening.  Details of the program were discussed.  Reviewed that patient should feel support/pressure from med ex restraints but no pain or discomfort and patient expressed understanding.    Based on the above history and physical examination an active physical therapy program is recommended.  Pt will continue to benefit from skilled outpatient physical therapy to address the deficits listed below in the chart, provide pt/family education and to maximize pt's level of independence in the home and community environment. .     No environmental, cultural, spiritual, developmental or education needs expressed or noted    Medical necessity is demonstrated by the following problem list.    Pt presents with the following impairments:   History  Co-morbidities and personal factors that may impact the plan of care Examination  Body Structures and Functions, activity limitations and participation restrictions that may impact the plan of care Clinical Presentation   Decision Making/ Complexity Score   Co-morbidities:   advanced age        Personal Factors:   age Body Regions:   back  lower extremities    Body Systems:   ROM  strength    Activity limitations:   Learning and applying knowledge  no deficits    General Tasks and Commands  no deficits    Communication  communicating with/receiving spoken language  no deficits    Mobility  no deficits    Self care  no deficits    Domestic Life  no deficits    Interactions/Relationships  no deficits    Life Areas  no deficits    Community and Social  "Life  no deficits    Participation Restrictions:   Cleaning, reading in sitting     stable and uncomplicated   low         GOALS: Pt is in agreement with the following goals.    Short term goals:  6 weeks or 10 visits   1.  Pt will demonstrate increased lumbar ROM by at least 3 degrees from the initial ROM value with improvements noted in functional ROM and ability to perform ADLs  2.  Pt will demonstrate increased  3.  Patient report a reduction in worst pain score by 1-2 points for improved tolerance during work and recreational activities  4.  Pt able to perform HEP correctly with minimal cueing or supervision for therapist      Long term goals: 13 weeks or 20 visits   1. Pt will demonstrate increased lumbar ROM by at least 3 degrees from initial ROM value, resulting in improved ability to perform functional fwd bending while standing and sitting.   2. Pt will demonstrate increased maximum isometric torque value by 5% when compared to the initial value resulting in improved ability to perform bending, lifting, and carrying activities safely, confidently.  3. Pt to demonstrate ability to independently control and reduce their pain through posture positioning and mechanical movements throughout a typical day.  4.  Patient will demonstrate improved overall function per FOTO Survey to CJ = at least 20% but < 40% impaired, limited or restricted score or less.      Plan   Outpatient physical therapy 2x week for 13 weeks or 20 visits to include the following:   - Patient education  - Therapeutic exercise  - Manual therapy  - Performance testing   - Neuromuscular Re-education  - Therapeutic activity   - Modalities    Pt may be seen by PTA as part of the rehabilitation team.     Therapist: Dustin Queen, PT  5/5/2017    "I certify the need for these services furnished under this plan of treatment and while under my care."    ____________________________________  Physician/Referring Practitioner    _______________  Date of " Signature

## 2017-05-08 ENCOUNTER — PATIENT MESSAGE (OUTPATIENT)
Dept: INTERNAL MEDICINE | Facility: CLINIC | Age: 72
End: 2017-05-08

## 2017-05-08 DIAGNOSIS — M54.9 UPPER BACK PAIN: Primary | ICD-10-CM

## 2017-05-09 DIAGNOSIS — J31.0 CHRONIC RHINITIS: ICD-10-CM

## 2017-05-09 RX ORDER — FLUTICASONE PROPIONATE 50 MCG
SPRAY, SUSPENSION (ML) NASAL
Qty: 16 G | Refills: 0 | Status: SHIPPED | OUTPATIENT
Start: 2017-05-09 | End: 2017-06-05 | Stop reason: SDUPTHER

## 2017-05-09 RX ORDER — GABAPENTIN 300 MG/1
300 CAPSULE ORAL 3 TIMES DAILY
Qty: 270 CAPSULE | Refills: 1 | Status: SHIPPED | OUTPATIENT
Start: 2017-05-09 | End: 2017-06-05

## 2017-05-10 ENCOUNTER — CLINICAL SUPPORT (OUTPATIENT)
Dept: REHABILITATION | Facility: OTHER | Age: 72
End: 2017-05-10
Attending: INTERNAL MEDICINE
Payer: MEDICARE

## 2017-05-10 DIAGNOSIS — M51.36 DDD (DEGENERATIVE DISC DISEASE), LUMBAR: Primary | ICD-10-CM

## 2017-05-10 PROCEDURE — 97110 THERAPEUTIC EXERCISES: CPT | Performed by: PHYSICAL MEDICINE & REHABILITATION

## 2017-05-10 NOTE — PROGRESS NOTES
[facility] Physical Therapy Treatment      Name: Paris Burris  Clinic Number: [id number]  Date of Treatment: 05/10/2017   Diagnosis:   Encounter Diagnosis   Name Primary?    DDD (degenerative disc disease), lumbar Yes     Physician: Mandi Huynh MD    Pain pattern determined: 1, PEP extensions and flexion tolerated    Plan of care signed: 5/8/17 with patient requesting by email orders for upper back and orders noted for upper back 5/8/17 - discussed with patient, continue POC for lumbar spine and address mid and upper back issues as approp, may recommend follow up with Dr. Woodall if neck orders needed, patient agreeable    POC due: 8/8/17      Time in: 9:30 am  Time Out: 10:30 am  Total Treatment time: 50 min    Precautions: SEE PMH, Cx,neuropathy in L foot      Visit # authorized: 12  Authorization period: 4/13/2018  Plan of care Expiration: 8/5/2017    Visit #: 2    Evaluation:  5/5/17  Assessment due:  6/5/17      Subjective   Paris reports improvement of symptoms.  She reports that she had no pain this morning when she woke up and no pain during her visit here.  She reports she did her HEP, but has had wrist issues in the past, and we modified her ext in lie to on ext using up on elbows, and encouraged ext in standing more.  She bought the lumbar roll and likes it a lot.  She is an [facility] member and was encouraged to use treadmill there, and start walking program, 10-15 min 3/week.  She agreed.  Discussed healthy lifestyle for back care.   She reports she called her doctor and had the orders changed for her upper back.  She feels her pain is worse in her upper back recently.  Discussed continuing plan of care for lower back - as managing low back pain will assist with  Mid and upper back pain.  Also discussed that lumbar med ex will strengthen paraspinal and multifidus musculature which will aid in low back and thoracic pain.  Will recommend follow up with Dr. Woodall if neck orders or  other orders needed in future, patient agreeable.     Patient reports their pain to be 0/10 on a 0-10 scale with 0 being no pain and 10 being the worst pain imaginable.  Pain Location: low back and mid-upper back     Prior Treatment: PT at [facility]  Prior functional status: Independent  DME owned/used: no  Occupation:  Retired (taught reading)   Leisure: garden, walking, read, knit   Pts goals: Be able to sit longer, be able to descend stairs easier.          Objective     Baseline IM Testing Results:   Date of testin2017  ROM 0-30 deg   Max Peak Torque 111 ft lbs    Min Peak Torque   28 ft lbs   Flex/Ext Ratio  4/1   % below normative data 12% below       Treatment    Pt was instructed in and performed the following:     Paris received therapeutic exercises to develop/improved posture, cardiovascular endurance, muscular endurance, lumbar  ROM, strength and muscular endurance for 50 minutes including the following exercises:     Ext in lie modified to ext using elbows to push up, tolerated well, due to right wrist irritation    [facility] 5/10/2017   Visit Number 2   VAS Pain Rating 0   Treadmill Time (in min.) 10   Speed 2   Extension in Lying 10   Extension in Standing 10   Flexion in Lying 10   Lumbar Extension Seat Pad -   Femur Restraint -   Top Dead Center -   Counterweight -   Lumbar Flexion -   Lumbar Extension -   Lumbar Peak Torque -   Lumbar Weight 48   Repetitions 20   Rating of Perceived Exertion 4   Ice - Z Lie (in min.) 10           Peripheral muscle strengthening which included 1 set of 15-20 repetitions at a slow, controlled 7 second per rep pace focused on strengthening supporting musculature for improved body mechanics and functional mobility.  Pt and therapist focused on proper form during treatment to ensure optimal strengthening of each targeted muscle group.  Machines were utilized including torso rotation, leg extension, leg curl, chest press.  Plan to introduce upright  row, tricep extension, bicep curl, leg press, and hip abduction.   Discussed exertion scale, slow progressive resistance protocol to promote safe and healthy strengthening of supportive musculature  by performing 15-20 reps, 7 sec per rep, and increasing weight by 5 % at 20 reps only if there ex done safely, slowly, using correct musculature, exertion and no pain.  Patient expressed understanding.                 Written Home Exercises Provided:   -walking at [facility] 10-15 min 2-3/week recommended  -use of lumbar roll -yes  -ext in standing 3/day   -flexion in lie 3/day  -ext on elbows prone 2/day  Handouts were given to the patient. Pt demo fair understanding of the education provided. Paris demonstrated fair return demonstration of activities.   Lumbar roll use compliance: yes  Additional exercises taught this treatment session: walking program    Assessment       Patient is making fair progress towards established goals.  She did mention mid back pain and new orders placed by Physician per her request.  Discussed strengthening, and postural changes will assist with mid back and upper back pain.   She had no pain today during visit and appeared happy to be in the program.  She tolerated introduction to dynamic strengthening and mechanical methods of reducing pain well.  She tolerated treadmill, stretching, lumbar strengthening with considerable cuing needed to activate back musculature, and peripheral strengthening well.   Pt will continue to benefit from skilled outpatient physical therapy to address the deficits stated in the impairment chart, provide pt/family education and to maximize pt's level of independence in the home and community environment.       Pt's spiritual, cultural and educational needs considered and pt agreeable to plan of care and goals as stated below:   Medical necessity is demonstrated by the following problem list.   Pt presents with the following impairments:    History  Co-morbidities and personal factors that may impact the plan of care Examination  Body Structures and Functions, activity limitations and participation restrictions that may impact the plan of care Clinical Presentation Decision Making/ Complexity Score   Co-morbidities:   advanced age           Personal Factors:   age Body Regions:   back  lower extremities     Body Systems:   ROM  strength     Activity limitations:   Learning and applying knowledge  no deficits     General Tasks and Commands  no deficits     Communication  communicating with/receiving spoken language  no deficits     Mobility  no deficits     Self care  no deficits     Domestic Life  no deficits     Interactions/Relationships  no deficits     Life Areas  no deficits     Community and Social Life  no deficits     Participation Restrictions:   Cleaning, reading in sitting    stable and uncomplicated    low            GOALS: Pt is in agreement with the following goals.     Short term goals: 6 weeks or 10 visits   1. Pt will demonstrate increased lumbar ROM by at least 3 degrees from the initial ROM value with improvements noted in functional ROM and ability to perform ADLs  2. Pt will demonstrate increased  3. Patient report a reduction in worst pain score by 1-2 points for improved tolerance during work and recreational activities  4. Pt able to perform HEP correctly with minimal cueing or supervision for therapist        Long term goals: 13 weeks or 20 visits   1. Pt will demonstrate increased lumbar ROM by at least 3 degrees from initial ROM value, resulting in improved ability to perform functional fwd bending while standing and sitting.   2. Pt will demonstrate increased maximum isometric torque value by 5% when compared to the initial value resulting in improved ability to perform bending, lifting, and carrying activities safely, confidently.  3. Pt to demonstrate ability to independently control and reduce their pain through posture  positioning and mechanical movements throughout a typical day.  4. Patient will demonstrate improved overall function per FOTO Survey to CJ = at least 20% but < 40% impaired, limited or restricted score or less.             Plan   Continue with established Plan of Care towards established PT goals.

## 2017-05-11 ENCOUNTER — OFFICE VISIT (OUTPATIENT)
Dept: OTOLARYNGOLOGY | Facility: CLINIC | Age: 72
End: 2017-05-11
Payer: MEDICARE

## 2017-05-11 ENCOUNTER — CLINICAL SUPPORT (OUTPATIENT)
Dept: INTERNAL MEDICINE | Facility: CLINIC | Age: 72
End: 2017-05-11
Payer: MEDICARE

## 2017-05-11 VITALS
HEIGHT: 61 IN | DIASTOLIC BLOOD PRESSURE: 78 MMHG | WEIGHT: 168.44 LBS | HEART RATE: 106 BPM | SYSTOLIC BLOOD PRESSURE: 121 MMHG | BODY MASS INDEX: 31.8 KG/M2

## 2017-05-11 VITALS — BODY MASS INDEX: 31.49 KG/M2 | WEIGHT: 166.69 LBS

## 2017-05-11 DIAGNOSIS — H61.23 IMPACTED CERUMEN OF BOTH EARS: ICD-10-CM

## 2017-05-11 DIAGNOSIS — H90.3 SENSORINEURAL HEARING LOSS OF BOTH EARS: ICD-10-CM

## 2017-05-11 DIAGNOSIS — K11.21 ACUTE SIALOADENITIS: Primary | ICD-10-CM

## 2017-05-11 PROCEDURE — 99999 PR PBB SHADOW E&M-EST. PATIENT-LVL III: CPT | Mod: PBBFAC,,, | Performed by: OTOLARYNGOLOGY

## 2017-05-11 PROCEDURE — 1125F AMNT PAIN NOTED PAIN PRSNT: CPT | Mod: S$GLB,,, | Performed by: OTOLARYNGOLOGY

## 2017-05-11 PROCEDURE — 3074F SYST BP LT 130 MM HG: CPT | Mod: S$GLB,,, | Performed by: OTOLARYNGOLOGY

## 2017-05-11 PROCEDURE — 99214 OFFICE O/P EST MOD 30 MIN: CPT | Mod: 25,S$GLB,, | Performed by: OTOLARYNGOLOGY

## 2017-05-11 PROCEDURE — 1160F RVW MEDS BY RX/DR IN RCRD: CPT | Mod: S$GLB,,, | Performed by: OTOLARYNGOLOGY

## 2017-05-11 PROCEDURE — 69210 REMOVE IMPACTED EAR WAX UNI: CPT | Mod: S$GLB,,, | Performed by: OTOLARYNGOLOGY

## 2017-05-11 PROCEDURE — 99999 PR PBB SHADOW E&M-EST. PATIENT-LVL I: CPT | Mod: PBBFAC,,,

## 2017-05-11 PROCEDURE — 3078F DIAST BP <80 MM HG: CPT | Mod: S$GLB,,, | Performed by: OTOLARYNGOLOGY

## 2017-05-11 PROCEDURE — 1159F MED LIST DOCD IN RCRD: CPT | Mod: S$GLB,,, | Performed by: OTOLARYNGOLOGY

## 2017-05-11 NOTE — Clinical Note
May 11, 2017      Fifi Lizama MD  [address]  [facility] [address]           [facility] O[facility]hinol[facility]ology  [address]  [facility] [address]  Phone: [phone]  Fax: [phone]          Patient: Paris Burris   MR Number: [id number]   YOB: 1945   Date of Visit: 5/11/2017       Dear Dr. Fifi Lizama:    Thank you for referring Paris Burris to me for evaluation. Attached you will find relevant portions of my assessment and plan of care.    If you have questions, please do not hesitate to call me. I look forward to following Paris Burris along with you.    Sincerely,    Lamin Lara MD    Enclosure  CC:  No Recipients    If you would like to receive this communication electronically, please contact [email or web address] or [phone] to request more information on [employer] Link access.    For providers and/or their staff who would like to refer a patient to [facility], please contact us through our one-stop-shop provider referral line, [facility], at [phone].    If you feel you have received this communication in error or would no longer like to receive these types of communications, please e-mail [email or web address]

## 2017-05-11 NOTE — LETTER
May 11, 2017    Fifi Lizama MD  [address] [place] [address]           OTOLARYNGOLOGY AND COMMUNICATION SCIENCES    Cam Sarkar MD, FACS          SURGICAL AND MEDICAL DISEASES OF HEAD AND NECK  MD Cam Winkler MD, FACS  Marty Laureano III, MD    OTOLOGY, NEUROTOLOGY and SKULL-BASE SURGERY  Aditya Betancur MD, FACS  Pranav Griffith MD, Director    PEDIATRIC OTOLARYNGOLOGY & OTOLOGY  DYLAN Guillermo MD, FAAP  Debbie Crocker MD, FACS    FACIAL PLASTIC and RECONSTRUCTIVE SURGERY  LUC Teixeira III, MD, FACS    RHINOLOGY and SINUS SURGERY  Lamin Lara MD, MPH, FACS  Director   LUC Teixeira III, MD, FACS    LARYNGOLOGY  Rohit Burch MD    SPEECH-LANGUAGE PATHOLOGY  Toshia Acosta, PhD, [facility]-SLP  Tanisha Ro, MS, CCC-SLP  Allison Mccauley, MS, CCC-SLP  Maira Mancuso MA, [facility]-SLP    AUDIOLOGY SECTION  Erica Godfrey, MS, CCC-A  MK Srivastava, CCC-A  Silverio Sinclair, CCC-A  Silverio Dougherty, CCC-A  William Goodrich Jr., MK, CCA-A  MK Beyer, CCC-A  Silverio Ramirez, CCC-A    [facility]  Head and Neck Surgical Oncology  FREDERICK Franz, FNP-C  Pedatric Otolaryngology  FREDERICK Bentley, PNP-C       Re:  Paris Burris  :  1945    Dear Dr. Lizama,    Thank you for referring your patient, Paris Burris, to us for evaluation and treatment.  I have enclosed a copy of my clinic note for your review and records.  If you have any questions please do not hesitate to contact our office.     Thank you for allowing me to participate in the care of your patient.    Sincerely,        Lamin Lara MD, MPH, FACS    Director, Rhinology and Sinus Surgery  Department of Otorhinolaryngology  [facility]    Encl:  Progress note       [facility] [address] [address]  phone [phone]  fax [phone]  [email or web address]

## 2017-05-11 NOTE — PROCEDURES
Ear Cerumen Removal  Date/Time: 5/11/2017 10:30 AM  Performed by: LUZ MARIA VANESSA  Authorized by: LUZ MARIA VANESSA     Consent Done?:  Yes (Verbal)    Local anesthetic:  None  Location details:  Both ears  Procedure type: curette    Cerumen  Removal Results:  Cerumen completely removed  Patient tolerance:  Patient tolerated the procedure well with no immediate complications

## 2017-05-11 NOTE — PROGRESS NOTES
Health  Follow-Up Note   [x] Office  [] Phone  Notes from previous session reviewed.   [x] Previous Session Goals unchanged.   [] Patient/Caregiver Change in Goals.  Goals added or changed by Patient/Caregiver since program participation:  1.   Continue same plan  2. Started Healthy back has been 2 x really likes       Additional/Changed support that patient/caregiver has experienced/sought?  (Indicate readiness, support from family, friends, others, community groups, etc)  1.    and sister    Additional/Changed obstacles that could prevent patient/caregiver from reaching their goals?  1.  none identified    Feedback provided:  1.  Praised for continued effort and determination    Diagnostic values/Desriptors for follow-up as needed for chronic condition(s)   Weight: 75.6 kg 166 lb 10.7 oz maintained from last visit, total lost 20 lbs  Blood Glucose: 93 in office today  Range 94 - 142  7-d-116  14-d-113  30-d-113  90-d-113  Pain: Started Healthy Back has been 2 x (evaluation and once with PT)    Interventions:   1. Health  listened reflectively, validated thoughts and feelings, offered support and encouragement.   2. Allowed patient to express themselves in a non-biased atmosphere.  3. Health  assisted pt to problem-solve obstacles such as being in a challenging environment and dealing with these challenges.   4. Motivational Interviewed interventions utilized (OARS).   5. Patient responded favorably to interventions and remained actively engaged in the session.   6. Health  will remain available and connected for patient by phone and/or office visits.   7. Positive reinforcement, emotional support and encouragement provided.   8. Focused Education: MI, recipes    Plan:  [x] Pt will work on goals as stated above.   [x] Pt will contact Health  for any questions, concerns or needs.  [x] Pt will follow up with Health  in office on on 6/14/17 at 1000.        [] Pt will follow up with  Health  on phone in:        [x] Health  will remain available.   [] Health  will contact patient by phone in:        [] Health  will consult:        [] Health  will inform Provider via EPIC messaging.     Impression:  1. Behavior is consistent with Action     Stage of Change.   2. Participation level:       [x] Receptive      [x] Interactive      [] Guarded and Resistant      [x] Self Motivated      [] Refused/Declined to participate   3. [x] Pt voiced understanding of all information presented.       [] Pt voiced needing further information/education. This will be arranged.       [x] Pt would benefit from further education/information as identified by this health . This will be arranged.     Jacqueline Perry RN HC

## 2017-05-11 NOTE — PROGRESS NOTES
Subjective:      Paris Burris is a 71 y.o. female who was referred to me by Dr. Fifi Lizama in consultation for swollen glands.    She was in her usual state of health until about 1 month ago when she had the acute onset of a left-sided swelling underneath the posterior mandible, with associated dysphagia, sore throat and tenderness to the neck.  She was seen in walk-in clinic and treated with amoxicillin.  The symptoms have resolved except for mild residual non-tender enlargement of the gland.  She denies throat pain or voice change now.  She denies prior similar episodes.  She denies sinonasal complaints.  She does wear hearing aids and feels that she is not hearing as well as previously in both ears.    QOL assessment deferred.    Global QOL = 65%    Days missed = Less than 5    PTC = deferred      Past Medical History  She has a past medical history of Allergy; Anemia; Anxiety; Cancer; Decreased hearing; Depression; Diabetes mellitus; Gastric ulcer; Hiatal hernia; Hyperlipidemia; Migraine headache; Migraine headache; Multiple gastric ulcers; Parathyroid disorder; Pre-diabetes; and Wrist fracture.    Past Surgical History  She has a past surgical history that includes lipoma removal (1999); Breast lumpectomy; and Colonoscopy (N/A, 12/2/2016).    Family History  Her family history includes Alcohol abuse in her father; Depression in her mother; Diabetes in her sister; Headaches in her father; Suicide in her mother.    Social History  She reports that she has never smoked. She has never used smokeless tobacco. She reports that she uses illicit drugs. She reports that she does not drink alcohol.    Allergies  She is allergic to topamax [topiramate].    Medications   She has a current medication list which includes the following prescription(s): accu-chek smartview test strip, ascorbic acid (vitamin c), blood sugar diagnostic, ferrous sulfate, fluticasone, gabapentin, lancets, lorazepam, metformin,  "mirtazapine, omega-3 fatty acids-fish oil, omeprazole, rosuvastatin, tramadol, tretinoin, venlafaxine, and aspirin.    Review of Systems  Review of Systems   Constitutional: Negative for fatigue, fever and unexpected weight change.   HENT: Positive for hearing loss. Negative for congestion, dental problem, ear discharge, ear pain, facial swelling, hoarse voice, nosebleeds, postnasal drip, rhinorrhea, sinus pressure, sore throat, tinnitus, trouble swallowing and voice change.    Eyes: Negative for photophobia, discharge, itching and visual disturbance.   Respiratory: Negative for apnea, cough, shortness of breath and wheezing.    Cardiovascular: Negative for chest pain and palpitations.   Gastrointestinal: Negative for abdominal pain, nausea and vomiting.   Endocrine: Positive for heat intolerance. Negative for cold intolerance.   Genitourinary: Negative for difficulty urinating.   Musculoskeletal: Negative for arthralgias, back pain, myalgias and neck pain.   Skin: Negative for rash.   Allergic/Immunologic: Negative for environmental allergies and food allergies.   Neurological: Negative for dizziness, seizures, syncope, weakness and headaches.   Hematological: Positive for adenopathy. Does not bruise/bleed easily.   Psychiatric/Behavioral: Positive for dysphoric mood. Negative for decreased concentration and sleep disturbance. The patient is nervous/anxious.           Objective:     /78  Pulse 106  Ht 5' 1" (1.549 m)  Wt 76.4 kg (168 lb 6.9 oz)  BMI 31.82 kg/m2       Constitutional:   She appears well-developed. She is cooperative. Normal speech.  No hoarse voice.      Head:  Normocephalic. Salivary glands normal.  Facial strength is normal.      Ears:    Right Ear: No drainage or tenderness. Tympanic membrane is not perforated. Tympanic membrane mobility is normal. No middle ear effusion. No decreased hearing is noted.   Left Ear: No drainage or tenderness. Tympanic membrane is not perforated. Tympanic " membrane mobility is normal.  No middle ear effusion. No decreased hearing is noted.     Nose:  No mucosal edema, rhinorrhea, septal deviation or polyps. No epistaxis. Turbinates normal, no turbinate masses and no turbinate hypertrophy.  Right sinus exhibits no maxillary sinus tenderness and no frontal sinus tenderness. Left sinus exhibits no maxillary sinus tenderness and no frontal sinus tenderness.     Mouth/Throat  Oropharynx clear and moist without lesions or asymmetry and normal uvula midline. She does not have dentures. Normal dentition. No oral lesions or mucous membrane lesions. No oropharyngeal exudate or posterior oropharyngeal erythema. Mirror exam not performed due to patient tolerance.  Mirror exam not performed due to patient tolerance.    Floor of mouth soft, no stone.      Neck:  Neck normal without thyromegaly masses, asymmetry, normal tracheal structure, crepitus, and tenderness, thyroid normal, trachea normal and no adenopathy. Normal range of motion present.     She has no cervical adenopathy.   Very slight enlargement of left SMG, nontender, no mass.     Cardiovascular:   Regular rhythm.      Pulmonary/Chest:   Effort normal.     Psychiatric:   She has a normal mood and affect. Her speech is normal and behavior is normal.     Neurological:   No cranial nerve deficit.     Skin:   No rash noted.       Procedure    Cerumen removal performed.  See procedure note.        Data Reviewed    WBC (K/uL)   Date Value   02/03/2017 7.99     Eosinophil% (%)   Date Value   02/03/2017 2.4     Eos # (K/uL)   Date Value   02/03/2017 0.2     Platelets (K/uL)   Date Value   02/03/2017 181     Glucose (mg/dL)   Date Value   02/24/2017 115 (H)          Assessment:     1. Acute sialoadenitis    2. Impacted cerumen of both ears    3. Sensorineural hearing loss of both ears         Plan:     I had a long discussion with the patient regarding her condition and the further workup and management options.  I reassured her  as to the benign nature of today's findings, including the absence of a salivary stone or other residual process.  If the problem recurs, then CT imaging would be considered.  I advised annual ear cleaning since she is at risk due to hearing aid use.    Return if symptoms worsen or fail to improve.

## 2017-05-12 ENCOUNTER — CLINICAL SUPPORT (OUTPATIENT)
Dept: REHABILITATION | Facility: OTHER | Age: 72
End: 2017-05-12
Attending: INTERNAL MEDICINE
Payer: MEDICARE

## 2017-05-12 DIAGNOSIS — M51.36 DDD (DEGENERATIVE DISC DISEASE), LUMBAR: Primary | ICD-10-CM

## 2017-05-12 PROCEDURE — 97110 THERAPEUTIC EXERCISES: CPT | Performed by: PHYSICAL THERAPIST

## 2017-05-12 NOTE — PROGRESS NOTES
[facility]      Name: Paris Burris  Clinic Number: [id number]  Date of Treatment: 05/12/2017   Diagnosis:   Encounter Diagnosis   Name Primary?    DDD (degenerative disc disease), lumbar Yes     Physician: Mandi Huynh MD    Pain pattern determined: 1, PEP extensions and flexion tolerated    Plan of care signed: 5/8/17 with patient requesting by email orders for upper back and orders noted for upper back 5/8/17 - discussed with patient, continue POC for lumbar spine and address mid and upper back issues as approp, may recommend follow up with Dr. Woodall if neck orders needed, patient agreeable    POC due: 8/8/17      Time in: 845am  Time Out: 945 am  Total Treatment time: 50 min    Precautions: SEE PMH, Cx,neuropathy in L foot      Visit # authorized: 12  Authorization period: 4/13/2018  Plan of care Expiration: 8/5/2017    Visit #: 3    Evaluation:  5/5/17  Assessment due:  6/5/17      Subjective       Paris reports improvement of symptoms overall.  She noted a gym type soreness after her last visit.   She reports that she had no pain this morning when she woke up and no pain during her visit here.  She reports she did her HEP, RICK and standing extensions due to wrist pain with EIL. She does not have a ball yet but will get this weekend.  She bought the lumbar roll and likes it a lot.  She is an [facility] member and was encouraged to use treadmill there, and start walking program, 10-15 min 3/week.   Also discussed that lumbar med ex will strengthen paraspinal and multifidus musculature which will aid in low back and thoracic pain.  Will recommend follow up with Dr. Woodall if neck orders or other orders needed in future, patient agreeable.     Patient reports their pain to be 0/10 on a 0-10 scale with 0 being no pain and 10 being the worst pain imaginable.  Pain Location: low back and mid-upper back     Prior Treatment: PT at [facility]  Prior functional status:  Independent  DME owned/used: no  Occupation:  Retired (taught reading)   Leisure: garden, walking, read, knit   Pts goals: Be able to sit longer, be able to descend stairs easier.          Objective     Baseline IM Testing Results:   Date of testin2017  ROM 0-30 deg   Max Peak Torque 111 ft lbs    Min Peak Torque   28 ft lbs   Flex/Ext Ratio  4/1   % below normative data 12% below       Treatment    Pt was instructed in and performed the following:     Prais received therapeutic exercises to develop/improved posture, cardiovascular endurance, muscular endurance, lumbar  ROM, strength and muscular endurance for 50 minutes including the following exercises:     Ext in lie modified to ext using elbows to push up, tolerated well, due to right wrist irritation    [facility] 2017   Visit Number 3   VAS Pain Rating 0   Treadmill Time (in min.) 10   Speed 2   Extension in Lying 10   Extension in Standing 10   Flexion in Lying 10   Lumbar Weight 50   Repetitions 15   Rating of Perceived Exertion 5   Ice - Z Lie (in min.) 10           Peripheral muscle strengthening which included 1 set of 15-20 repetitions at a slow, controlled 7 second per rep pace focused on strengthening supporting musculature for improved body mechanics and functional mobility.  Pt and therapist focused on proper form during treatment to ensure optimal strengthening of each targeted muscle group.  Machines were utilized including torso rotation, leg extension, leg curl, chest press,  upright row, tricep extension.  Discussed exertion scale, slow progressive resistance protocol to promote safe and healthy strengthening of supportive musculature  by performing 15-20 reps, 7 sec per rep, and increasing weight by 5 % at 20 reps only if there ex done safely, slowly, using correct musculature, exertion and no pain.  Patient expressed understanding.       Written Home Exercises Provided:   -walking at [facility] [employer] [place] 10-15 min  2-3/week recommended  -use of lumbar roll -yes  -ext in standing 3/day   -flexion in lie 3/day  -ext on elbows prone 2/day ( not doing as much 5/12/17)  Handouts were given to the patient. Pt demo fair understanding of the education provided. Paris demonstrated fair return demonstration of activities.   Lumbar roll use compliance: yes  Additional exercises taught this treatment session: walking program    Assessment       Patient is making fair progress towards established goals.  She did mention mid back pain and new orders placed by Physician per her request.  Continue with  strengthening, and postural changes to assist with mid back and upper back pain.   She had no pain today during visit and appeared happy to be in the program.  She tolerated dynamic strengthening and mechanical methods of reducing pain well.  She tolerated treadmill, stretching, lumbar strengthening with considerable cuing needed to activate back musculature, and peripheral strengthening well.   Pt will continue to benefit from skilled outpatient physical therapy to address the deficits stated in the impairment chart, provide pt/family education and to maximize pt's level of independence in the home and community environment.       Pt's spiritual, cultural and educational needs considered and pt agreeable to plan of care and goals as stated below:   Medical necessity is demonstrated by the following problem list.   Pt presents with the following impairments:   History  Co-morbidities and personal factors that may impact the plan of care Examination  Body Structures and Functions, activity limitations and participation restrictions that may impact the plan of care Clinical Presentation Decision Making/ Complexity Score   Co-morbidities:   advanced age           Personal Factors:   age Body Regions:   back  lower extremities     Body Systems:   ROM  strength     Activity limitations:   Learning and applying knowledge  no deficits     General  Tasks and Commands  no deficits     Communication  communicating with/receiving spoken language  no deficits     Mobility  no deficits     Self care  no deficits     Domestic Life  no deficits     Interactions/Relationships  no deficits     Life Areas  no deficits     Community and Social Life  no deficits     Participation Restrictions:   Cleaning, reading in sitting    stable and uncomplicated    low            GOALS: Pt is in agreement with the following goals.     Short term goals: 6 weeks or 10 visits   1. Pt will demonstrate increased lumbar ROM by at least 3 degrees from the initial ROM value with improvements noted in functional ROM and ability to perform ADLs  2. Pt will demonstrate increased  3. Patient report a reduction in worst pain score by 1-2 points for improved tolerance during work and recreational activities  4. Pt able to perform HEP correctly with minimal cueing or supervision for therapist        Long term goals: 13 weeks or 20 visits   1. Pt will demonstrate increased lumbar ROM by at least 3 degrees from initial ROM value, resulting in improved ability to perform functional fwd bending while standing and sitting.   2. Pt will demonstrate increased maximum isometric torque value by 5% when compared to the initial value resulting in improved ability to perform bending, lifting, and carrying activities safely, confidently.  3. Pt to demonstrate ability to independently control and reduce their pain through posture positioning and mechanical movements throughout a typical day.  4. Patient will demonstrate improved overall function per FOTO Survey to CJ = at least 20% but < 40% impaired, limited or restricted score or less.       Plan   Continue with established Plan of Care towards established PT goals.

## 2017-05-12 NOTE — MR AVS SNAPSHOT
[facility]  [address]  [facility] [address]  Phone: [phone]                  Paris Burris   2017 9:00 AM   Clinical Support    Description:  Female : 1945   Provider:  Leti Edmond, PT   Department:  [facility]           Reason for Visit     Back Pain           Diagnoses this Visit        Comments    DDD (degenerative disc disease), lumbar    -  Primary            To Do List           Future Appointments        Provider Department Dept Phone    2017 9:00 AM Domonique Gallego, PTA [facility] [phone]    2017 9:00 AM Dustin Queen, PT [facility] [phone] 9:30 AM Lindsay Guerrero, PT [facility] [phone]    2017 1:00 PM Lyly Mansfield MD [facility] - Bariatric Surgery [phone]    2017 9:00 AM Lamar Brooke, PT [facility] [phone]      Goals (5 Years of Data)     None      [facility]     [facility] Care [facility] -  Assistance  Unless otherwise directed by your provider, please contact [facility], our nurse care line that is available for  assistance.     Registered nurses in the [facility] provide: appointment scheduling, clinical advisement, health education, and other advisory services.  Call: [phone] (toll free)               Medications           Message regarding Medications     Verify the changes and/or additions to your medication regime listed below are the same as discussed with your clinician today.  If any of these changes or additions are incorrect, please notify your healthcare provider.             Verify that the below list of medications is an accurate representation of the medications you are currently taking.  If none reported, the list may be blank. If incorrect, please contact your healthcare provider. Carry this list with you in case  of emergency.           Current Medications     ACCU-CHEK SMARTVIEW TEST STRIP Strp TEST BLOOD SUGAR TWICE A DAY    ascorbic acid (VITAMIN C) 500 MG tablet Take 1,000 mg by mouth once daily.     aspirin (ECOTRIN) 81 MG EC tablet Take 1 tablet (81 mg total) by mouth once daily.    blood sugar diagnostic (ACCU-CHEK MARIAN PLUS TEST STRP) Strp 2 strips by Misc.(Non-Drug; Combo Route) route 2 (two) times daily.    ferrous sulfate 325 mg (65 mg iron) Tab tablet Take 1 tablet (325 mg total) by mouth 2 (two) times daily.    fluticasone (FLONASE) 50 mcg/actuation nasal spray USE 1 SPRAY IN EACH NOSTRIL EVERY DAY    gabapentin (NEURONTIN) 300 MG capsule Take 1 capsule (300 mg total) by mouth 3 (three) times daily.    lancets Misc 1 lancet by Misc.(Non-Drug; Combo Route) route 2 (two) times daily.    lorazepam (ATIVAN) 0.5 MG tablet Take 1 tablet (0.5 mg total) by mouth daily as needed.    metformin (GLUCOPHAGE-XR) 500 MG 24 hr tablet TAKE 1 TABLET BY MOUTH TWICE A DAY WITH MEALS    mirtazapine (REMERON) 15 MG tablet Take 2 tablets (30 mg total) by mouth every evening.    omega-3 fatty acids-fish oil (FISH OIL) 340-1,000 mg Cap Take 1 capsule by mouth once daily.    omeprazole (PRILOSEC) 20 MG capsule Take 1 capsule (20 mg total) by mouth once daily.    rosuvastatin (CRESTOR) 20 MG tablet Take 1 tablet (20 mg total) by mouth once daily.    tramadol (ULTRAM) 50 mg tablet Take 1 tablet (50 mg total) by mouth every 8 (eight) hours as needed.    tretinoin (RETIN-A) 0.05 % cream 1 application every evening. At bedtime    venlafaxine (EFFEXOR) 75 MG tablet Take 1 tablet (75 mg total) by mouth 2 (two) times daily.           Clinical Reference Information           Allergies as of 5/12/2017     Topamax [Topiramate]      Immunizations Administered on Date of Encounter - 5/12/2017     None      Language Assistance Services     ATTENTION: Language assistance services are available, free of charge. Please call [phone].       ATENCIÓN: Si habla español, tiene a ricks disposición servicios gratuitos de asistencia lingüística. Anirudh al [phone].     CHÚ Ý: N?u b?n nói Ti?ng Vi?t, có các d?ch v? h? tr? ngôn ng? mi?n phí dành cho b?n. G?i s? [phone].         [facility] complies with applicable Federal civil rights laws and does not discriminate on the basis of race, color, national origin, age, disability, or sex.

## 2017-05-12 NOTE — PROGRESS NOTES
Health  met with patient to review initial FOTO outcomes and to answer questions regarding program. Patient states she feels more flexible already, but that she would like to move slowly with weight and repetition progressions. She is enrolled in the lumbar program.

## 2017-05-17 ENCOUNTER — CLINICAL SUPPORT (OUTPATIENT)
Dept: REHABILITATION | Facility: OTHER | Age: 72
End: 2017-05-17
Attending: INTERNAL MEDICINE
Payer: MEDICARE

## 2017-05-17 DIAGNOSIS — M51.36 DDD (DEGENERATIVE DISC DISEASE), LUMBAR: Primary | ICD-10-CM

## 2017-05-17 PROCEDURE — 97110 THERAPEUTIC EXERCISES: CPT

## 2017-05-17 NOTE — PROGRESS NOTES
[facility]      Name: Paris Burris  Clinic Number: [id number]  Date of Treatment: 2017   Diagnosis:   Encounter Diagnosis   Name Primary?    DDD (degenerative disc disease), lumbar Yes     Physician: Mandi Huynh MD    Pain pattern determined: 1, PEP extensions and flexion tolerated    Plan of care signed: 17 with patient requesting by email orders for upper back and orders noted for upper back 17 - discussed with patient, continue POC for lumbar spine and address mid and upper back issues as approp, may recommend follow up with Dr. Woodall if neck orders needed, patient agreeable    POC due: 17      Time in: 9:00 am  Time Out: 10:00am  Total Treatment time: 50 min    Precautions: SEE PMH, Cx,neuropathy in L foot      Visit # authorized: 12  Authorization period: 2018  Plan of care Expiration: 2017    Visit #: 4    Evaluation:  17  Reasessment due:  17  Face to Face discussion of patient was done between PT and PTA.       Subjective     Paris reports improvement of symptoms overall. 4/10 LBP today.She noted a gym type soreness after her last visit.   She reports she did her HEP, RICK and standing extensions due to wrist pain with EIL. She purchased a theraball. She bought the lumbar roll and likes it a lot.  She will possibly follow up with Dr. Woodall to start  Neck program due to pt has neck pain also.     Patient reports their pain to be 4/10 on a 0-10 scale with 0 being no pain and 10 being the worst pain imaginable.  Pain Location: low back and mid-upper back     Prior Treatment: PT at [facility]  Prior functional status: Independent  DME owned/used: no  Occupation:  Retired (taught reading)   Leisure: garden, walking, read, knit   Pts goals: Be able to sit longer, be able to descend stairs easier.          Objective     Baseline IM Testing Results:   Date of testin2017  ROM 0-30 deg   Max Peak Torque 111 ft lbs    Min Peak Torque    28 ft lbs   Flex/Ext Ratio  4/1   % below normative data 12% below       Treatment    Pt was instructed in and performed the following:     Paris received therapeutic exercises to develop/improved posture, cardiovascular endurance, muscular endurance, lumbar  ROM, strength and muscular endurance for 50 minutes including the following exercises:   [facility] 5/17/2017   Visit Number 4   VAS Pain Rating 4   Treadmill Time (in min.) 10   Speed 2   Extension in Lying 10   Extension in Standing 10   Flexion in Lying 10   Lumbar Weight 50   Repetitions 20   Rating of Perceived Exertion 3   Ice - Z Lie (in min.) 10       Ext in lie modified to ext using elbows to push up, tolerated well, due to right wrist irritation            Peripheral muscle strengthening which included 1 set of 15-20 repetitions at a slow, controlled 7 second per rep pace focused on strengthening supporting musculature for improved body mechanics and functional mobility.  Pt and therapist focused on proper form during treatment to ensure optimal strengthening of each targeted muscle group.  Machines were utilized including torso rotation, leg extension, leg curl, chest press,  upright row, tricep extension.  Discussed exertion scale, slow progressive resistance protocol to promote safe and healthy strengthening of supportive musculature  by performing 15-20 reps, 7 sec per rep, and increasing weight by 5 % at 20 reps only if there ex done safely, slowly, using correct musculature, exertion and no pain.  Patient expressed understanding.       Written Home Exercises Provided:   -walking at [facility] 10-15 min 2-3/week recommended  -use of lumbar roll -yes  -ext in standing 3/day   -flexion in lie 3/day   -ext on elbows prone 2/day ( not doing as much 5/12/17)  Handouts were given to the patient. Pt demo fair understanding of the education provided. Paris demonstrated fair return demonstration of activities.   Lumbar roll use  compliance: yes and has ball.   Additional exercises taught this treatment session: walking program    Assessment   Patients pain decreased during and post session. She demo her HEP well with mod vc for tech.  She tolerated dynamic strengthening and mechanical methods of reducing pain well.  She tolerated treadmill, stretching, lumbar strengthening with considerable cuing needed to activate back musculature, and peripheral strengthening well. Last visit she felt the the medx machines where hard, but today she verbalized she felt more comfortable working out on machines today.  Pt will continue to benefit from skilled outpatient physical therapy to address the deficits stated in the impairment chart, provide pt/family education and to maximize pt's level of independence in the home and community environment.       Pt's spiritual, cultural and educational needs considered and pt agreeable to plan of care and goals as stated below:   Medical necessity is demonstrated by the following problem list.   Pt presents with the following impairments:   History  Co-morbidities and personal factors that may impact the plan of care Examination  Body Structures and Functions, activity limitations and participation restrictions that may impact the plan of care Clinical Presentation Decision Making/ Complexity Score   Co-morbidities:   advanced age           Personal Factors:   age Body Regions:   back  lower extremities     Body Systems:   ROM  strength     Activity limitations:   Learning and applying knowledge  no deficits     General Tasks and Commands  no deficits     Communication  communicating with/receiving spoken language  no deficits     Mobility  no deficits     Self care  no deficits     Domestic Life  no deficits     Interactions/Relationships  no deficits     Life Areas  no deficits     Community and Social Life  no deficits     Participation Restrictions:   Cleaning, reading in sitting    stable and uncomplicated     low            GOALS: Pt is in agreement with the following goals.     Short term goals: 6 weeks or 10 visits   1. Pt will demonstrate increased lumbar ROM by at least 3 degrees from the initial ROM value with improvements noted in functional ROM and ability to perform ADLs  2. Pt will demonstrate increased  3. Patient report a reduction in worst pain score by 1-2 points for improved tolerance during work and recreational activities  4. Pt able to perform HEP correctly with minimal cueing or supervision for therapist        Long term goals: 13 weeks or 20 visits   1. Pt will demonstrate increased lumbar ROM by at least 3 degrees from initial ROM value, resulting in improved ability to perform functional fwd bending while standing and sitting.   2. Pt will demonstrate increased maximum isometric torque value by 5% when compared to the initial value resulting in improved ability to perform bending, lifting, and carrying activities safely, confidently.  3. Pt to demonstrate ability to independently control and reduce their pain through posture positioning and mechanical movements throughout a typical day.  4. Patient will demonstrate improved overall function per FOTO Survey to CJ = at least 20% but < 40% impaired, limited or restricted score or less.       Plan   Continue with established Plan of Care towards established PT goals.

## 2017-05-19 ENCOUNTER — CLINICAL SUPPORT (OUTPATIENT)
Dept: REHABILITATION | Facility: OTHER | Age: 72
End: 2017-05-19
Attending: INTERNAL MEDICINE
Payer: MEDICARE

## 2017-05-19 DIAGNOSIS — M51.36 DDD (DEGENERATIVE DISC DISEASE), LUMBAR: Primary | ICD-10-CM

## 2017-05-19 PROCEDURE — 97110 THERAPEUTIC EXERCISES: CPT

## 2017-05-19 NOTE — PROGRESS NOTES
[facility]      Name: Paris Burris  Clinic Number: [id number]  Date of Treatment: 2017   Diagnosis:   No diagnosis found.  Physician: Mandi Huynh MD    Pain pattern determined: 1, PEP extensions and flexion tolerated    Plan of care signed: 17 with patient requesting by email orders for upper back and orders noted for upper back 17 - discussed with patient, continue POC for lumbar spine and address mid and upper back issues as approp, may recommend follow up with Dr. Woodall if neck orders needed, patient agreeable    POC due: 17      Time in: 9:00 am  Time Out: 10:00am  Total Treatment time: 50 min    Precautions: SEE PMH, Cx,neuropathy in L foot      Visit # authorized: 12  Authorization period: 2018  Plan of care Expiration: 2017    Visit #: 5    Evaluation:  17  Reasessment due:  17  Face to Face discussion of patient was done between PT and PTA.       Subjective     Paris reports improvement of symptoms overall. She states that she has 3/10 pain today and can tell that each time it gets better.  She states that she has been doing her HEP regularly and it is going well.     Patient reports their pain to be 4/10 on a 0-10 scale with 0 being no pain and 10 being the worst pain imaginable.  Pain Location: low back and mid-upper back     Prior Treatment: PT at [facility]  Prior functional status: Independent  DME owned/used: no  Occupation:  Retired (taught reading)   Leisure: garden, walking, read, knit   Pts goals: Be able to sit longer, be able to descend stairs easier.          Objective     Baseline IM Testing Results:   Date of testin2017  ROM 0-30 deg   Max Peak Torque 111 ft lbs    Min Peak Torque   28 ft lbs   Flex/Ext Ratio  4/1   % below normative data 12% below       Treatment    Pt was instructed in and performed the following:     Paris received therapeutic exercises to develop/improved posture, cardiovascular endurance,  muscular endurance, lumbar  ROM, strength and muscular endurance for 50 minutes including the following exercises:     [facility] 5/19/2017   Visit Number 5   VAS Pain Rating 3   Treadmill Time (in min.) 10   Speed 2   Extension in Lying 10   Extension in Standing 10   Flexion in Lying 10   Lumbar Extension Seat Pad -   Femur Restraint -   Top Dead Center -   Counterweight -   Lumbar Flexion -   Lumbar Extension -   Lumbar Peak Torque -   Lumbar Weight 53   Repetitions 19   Rating of Perceived Exertion 3   Ice - Z Lie (in min.) 10     Modified piriformis stretch 10 s/h 3x B  LTR 10x B    Ext in lie modified to ext using elbows to push up, tolerated well, due to right wrist irritation    Peripheral muscle strengthening which included 1 set of 15-20 repetitions at a slow, controlled 7 second per rep pace focused on strengthening supporting musculature for improved body mechanics and functional mobility.  Pt and therapist focused on proper form during treatment to ensure optimal strengthening of each targeted muscle group.  Machines were utilized including torso rotation, leg extension, leg curl, chest press,  upright row, tricep extension.  Discussed exertion scale, slow progressive resistance protocol to promote safe and healthy strengthening of supportive musculature  by performing 15-20 reps, 7 sec per rep, and increasing weight by 5 % at 20 reps only if there ex done safely, slowly, using correct musculature, exertion and no pain.  Patient expressed understanding.       Written Home Exercises Provided:   -[facility]ing at [facility] 10-15 min 2-3/week recommended  -use of lumbar roll -yes  -ext in standing 3/day   -flexion in lie 3/day   -ext on elbows prone 2/day ( not doing as much 5/12/17)  Handouts were given to the patient. Pt demo fair understanding of the education provided. Paris demonstrated fair return demonstration of activities.   Lumbar roll use compliance: yes and has ball.    Additional exercises taught this treatment session: Modified piriformis, LTR.     Assessment     Patients pain decreased during and post session. She demo her HEP well with mod vc for tech.  She tolerated dynamic strengthening and mechanical methods of reducing pain well.  She tolerated treadmill, stretching, lumbar strengthening with considerable cuing needed to activate back musculature, and peripheral strengthening well. Patient was able to tolerate increased weight on lumbar machine with no increased symptoms.   Pt will continue to benefit from skilled outpatient physical therapy to address the deficits stated in the impairment chart, provide pt/family education and to maximize pt's level of independence in the home and community environment.       Pt's spiritual, cultural and educational needs considered and pt agreeable to plan of care and goals as stated below:   Medical necessity is demonstrated by the following problem list.   Pt presents with the following impairments:   History  Co-morbidities and personal factors that may impact the plan of care Examination  Body Structures and Functions, activity limitations and participation restrictions that may impact the plan of care Clinical Presentation Decision Making/ Complexity Score   Co-morbidities:   advanced age           Personal Factors:   age Body Regions:   back  lower extremities     Body Systems:   ROM  strength     Activity limitations:   Learning and applying knowledge  no deficits     General Tasks and Commands  no deficits     Communication  communicating with/receiving spoken language  no deficits     Mobility  no deficits     Self care  no deficits     Domestic Life  no deficits     Interactions/Relationships  no deficits     Life Areas  no deficits     Community and Social Life  no deficits     Participation Restrictions:   Cleaning, reading in sitting    stable and uncomplicated    low            GOALS: Pt is in agreement with the following  goals.     Short term goals: 6 weeks or 10 visits   1. Pt will demonstrate increased lumbar ROM by at least 3 degrees from the initial ROM value with improvements noted in functional ROM and ability to perform ADLs  2. Pt will demonstrate increased  3. Patient report a reduction in worst pain score by 1-2 points for improved tolerance during work and recreational activities  4. Pt able to perform HEP correctly with minimal cueing or supervision for therapist        Long term goals: 13 weeks or 20 visits   1. Pt will demonstrate increased lumbar ROM by at least 3 degrees from initial ROM value, resulting in improved ability to perform functional fwd bending while standing and sitting.   2. Pt will demonstrate increased maximum isometric torque value by 5% when compared to the initial value resulting in improved ability to perform bending, lifting, and carrying activities safely, confidently.  3. Pt to demonstrate ability to independently control and reduce their pain through posture positioning and mechanical movements throughout a typical day.  4. Patient will demonstrate improved overall function per FOTO Survey to CJ = at least 1% but < 20% impaired, limited or restricted score or less.     FOTO:    1st visit: 33% limited  5th visit: 41% limited  Plan   Continue with established Plan of Care towards established PT goals.

## 2017-05-22 ENCOUNTER — CLINICAL SUPPORT (OUTPATIENT)
Dept: REHABILITATION | Facility: OTHER | Age: 72
End: 2017-05-22
Attending: INTERNAL MEDICINE
Payer: MEDICARE

## 2017-05-22 DIAGNOSIS — M51.36 DDD (DEGENERATIVE DISC DISEASE), LUMBAR: Primary | ICD-10-CM

## 2017-05-22 PROCEDURE — 97110 THERAPEUTIC EXERCISES: CPT | Performed by: PHYSICAL MEDICINE & REHABILITATION

## 2017-05-22 NOTE — PROGRESS NOTES
[facility]      Name: Paris Burris  Clinic Number: [id number]  Date of Treatment: 2017   Diagnosis:   Encounter Diagnosis   Name Primary?    DDD (degenerative disc disease), lumbar Yes     Physician: Mandi Huynh MD    Pain pattern determined: 1, PEP extensions and flexion tolerated    Plan of care signed: 17 with patient requesting by email orders for upper back and orders noted for upper back 17 - discussed with patient, continue POC for lumbar spine and address mid and upper back issues as approp, may recommend follow up with Dr. Woodall if neck orders needed, patient agreeable    POC signed 17  POC due: 17      Time in: 9:30 am  Time Out: 10:30am  Total Treatment time: 50 min    Precautions: SEE PMH, Cx,neuropathy in L foot      Visit # authorized: 12  Authorization period: 2018  Plan of care Expiration: 2017    Visit #: 6    Evaluation:  17  Reasessment due:  17  Face to Face discussion of patient was done between PT and PTA.       Subjective     Paris reports improvement of symptoms overall. She states that she has 0/10 pain today. She states that she has been doing her HEP regularly and it is going well. She is sitting with lumbar support. She has ball for stretches.  She feels the program is helping her.    Patient reports their pain to be 0/10 on a 0-10 scale with 0 being no pain and 10 being the worst pain imaginable.  Pain Location: low back and mid-upper back     Prior Treatment: PT at [facility]  Prior functional status: Independent  DME owned/used: no  Occupation:  Retired (taught reading)   Leisure: garden, walking, read, knit   Pts goals: Be able to sit longer, be able to descend stairs easier.          Objective     Baseline IM Testing Results:   Date of testin2017  ROM 0-30 deg   Max Peak Torque 111 ft lbs    Min Peak Torque   28 ft lbs   Flex/Ext Ratio  4/1   % below normative data 12% below       Treatment    Pt  was instructed in and performed the following:     Paris received therapeutic exercises to develop/improved posture, cardiovascular endurance, muscular endurance, lumbar  ROM, strength and muscular endurance for 50 minutes including the following exercises:     Flexion in lie  Modified piriformis stretch 10 s/h 3x B  LTR 10x B  Ext in standing  Ext in lie modified to ext using elbows to push up, tolerated well, due to right wrist irritation    [facility] 5/22/2017   Visit Number 6   VAS Pain Rating 0   Treadmill Time (in min.) 10   Speed 2   Extension in Lying 10   Extension in Standing 10   Flexion in Lying 10   Lumbar Extension Seat Pad -   Femur Restraint -   Top Dead Center -   Counterweight -   Lumbar Flexion -   Lumbar Extension -   Lumbar Peak Torque -   Lumbar Weight 53   Repetitions 20   Rating of Perceived Exertion 3   Ice - Z Lie (in min.) 10           Peripheral muscle strengthening which included 1 set of 15-20 repetitions at a slow, controlled 7 second per rep pace focused on strengthening supporting musculature for improved body mechanics and functional mobility.  Pt and therapist focused on proper form during treatment to ensure optimal strengthening of each targeted muscle group.  Machines were utilized including torso rotation, leg extension, leg curl, chest press,  upright row, tricep extension, biceps, leg press and hip abduction..  Discussed exertion scale, slow progressive resistance protocol to promote safe and healthy strengthening of supportive musculature  by performing 15-20 reps, 7 sec per rep, and increasing weight by 5 % at 20 reps only if there ex done safely, slowly, using correct musculature, exertion and no pain.  Patient expressed understanding.       Written Home Exercises Provided:   -walking at [facility] 10-15 min 2-3/week recommended  -use of lumbar roll -yes  -ext in standing 3/day   -flexion in lie 3/day   -ext on elbows prone 2/day ( not doing as much  5/12/17)   Modified piriformis, LTR.   Handouts were given to the patient. Pt demo fair understanding of the education provided. Paris demonstrated fair return demonstration of activities.   Lumbar roll use compliance: yes and has ball.   Additional exercises taught this treatment session:reviewed HEP      Assessment     Patients had no pain today. She demo her HEP well with mod vc for tech.  She tolerated dynamic strengthening and is responding to mechanical methods of reducing pain well.  She tolerated treadmill, stretching, lumbar strengthening with considerable cuing needed to activate back musculature, and peripheral strengthening well. Patient was able to tolerate 20 reps lumbar machine with no increased symptoms.   Pt will continue to benefit from skilled outpatient physical therapy to address the deficits stated in the impairment chart, provide pt/family education and to maximize pt's level of independence in the home and community environment.       Pt's spiritual, cultural and educational needs considered and pt agreeable to plan of care and goals as stated below:   Medical necessity is demonstrated by the following problem list.   Pt presents with the following impairments:   History  Co-morbidities and personal factors that may impact the plan of care Examination  Body Structures and Functions, activity limitations and participation restrictions that may impact the plan of care Clinical Presentation Decision Making/ Complexity Score   Co-morbidities:   advanced age           Personal Factors:   age Body Regions:   back  lower extremities     Body Systems:   ROM  strength     Activity limitations:   Learning and applying knowledge  no deficits     General Tasks and Commands  no deficits     Communication  communicating with/receiving spoken language  no deficits     Mobility  no deficits     Self care  no deficits     Domestic Life  no deficits     Interactions/Relationships  no deficits     Life  Areas  no deficits     Community and Social Life  no deficits     Participation Restrictions:   Cleaning, reading in sitting    stable and uncomplicated    low            GOALS: Pt is in agreement with the following goals.     Short term goals: 6 weeks or 10 visits   1. Pt will demonstrate increased lumbar ROM by at least 3 degrees from the initial ROM value with improvements noted in functional ROM and ability to perform ADLs  2. Pt will demonstrate increased  3. Patient report a reduction in worst pain score by 1-2 points for improved tolerance during work and recreational activities  4. Pt able to perform HEP correctly with minimal cueing or supervision for therapist        Long term goals: 13 weeks or 20 visits   1. Pt will demonstrate increased lumbar ROM by at least 3 degrees from initial ROM value, resulting in improved ability to perform functional fwd bending while standing and sitting.   2. Pt will demonstrate increased maximum isometric torque value by 5% when compared to the initial value resulting in improved ability to perform bending, lifting, and carrying activities safely, confidently.  3. Pt to demonstrate ability to independently control and reduce their pain through posture positioning and mechanical movements throughout a typical day.  4. Patient will demonstrate improved overall function per FOTO Survey to CJ = at least 1% but < 20% impaired, limited or restricted score or less.     FOTO:    1st visit: 33% limited  5th visit: 41% limited  Plan   Continue with established Plan of Care towards established PT goals.

## 2017-05-26 ENCOUNTER — CLINICAL SUPPORT (OUTPATIENT)
Dept: REHABILITATION | Facility: OTHER | Age: 72
End: 2017-05-26
Attending: INTERNAL MEDICINE
Payer: MEDICARE

## 2017-05-26 DIAGNOSIS — M51.36 DDD (DEGENERATIVE DISC DISEASE), LUMBAR: Primary | ICD-10-CM

## 2017-05-26 PROCEDURE — 97110 THERAPEUTIC EXERCISES: CPT

## 2017-05-26 NOTE — PROGRESS NOTES
[facility]      Name: Paris Burris  Clinic Number: [id number]  Date of Treatment: 2017   Diagnosis:   No diagnosis found.  Physician: Mandi Huynh MD    Pain pattern determined: 1, PEP extensions and flexion tolerated    Plan of care signed: 17 with patient requesting by email orders for upper back and orders noted for upper back 17 - discussed with patient, continue POC for lumbar spine and address mid and upper back issues as approp, may recommend follow up with Dr. Woodall if neck orders needed, patient agreeable    POC signed 17  POC due: 17      Time in: 9:30 am  Time Out: 10:30am  Total Treatment time: 50 min    Precautions: SEE PMH, Cx,neuropathy in L foot      Visit # authorized: 12  Authorization period: 2018  Plan of care Expiration: 2017    Visit #: 7    Evaluation:  17  Reasessment due:  17  Face to Face discussion of patient was done between PT and PTA.       Subjective     Paris reports improvement of symptoms overall. She states that she has 0/10 pain today. She states that she has been doing her HEP regularly and it is going well. She is sitting with lumbar support. She has ball for stretches.  She feels the program is helping her. She notes some upper back pain but no significant difference compared to last session.     Patient reports their pain to be 0/10 on a 0-10 scale with 0 being no pain and 10 being the worst pain imaginable.  Pain Location: low back and mid-upper back     Prior Treatment: PT at [facility]  Prior functional status: Independent  DME owned/used: no  Occupation:  Retired (taught reading)   Leisure: garden, walking, read, knit   Pts goals: Be able to sit longer, be able to descend stairs easier.          Objective     Baseline IM Testing Results:   Date of testin2017  ROM 0-30 deg   Max Peak Torque 111 ft lbs    Min Peak Torque   28 ft lbs   Flex/Ext Ratio  4/1   % below normative data 12% below        Treatment    Pt was instructed in and performed the following:     Paris received therapeutic exercises to develop/improved posture, cardiovascular endurance, muscular endurance, lumbar  ROM, strength and muscular endurance for 50 minutes including the following exercises:     [facility] 5/26/2017   Visit Number 7   VAS Pain Rating 0   Treadmill Time (in min.) 10   Speed 2   Extension in Lying 3   Extension in Standing 10   Flexion in Lying 10   Lumbar Extension Seat Pad -   Femur Restraint -   Top Dead Center -   Counterweight -   Lumbar Flexion -   Lumbar Extension -   Lumbar Peak Torque -   Lumbar Weight 56   Repetitions 18   Rating of Perceived Exertion 3   Ice - Z Lie (in min.) 10       Flexion in lie  Modified piriformis stretch 10 s/h 3x B  LTR 10x B  Ext in standing  Ext in lie modified to ext using elbows to push up, (Pt expressed increase wrist pain after 4 reps, defer for EIS)  Scapular retractions 10x       Peripheral muscle strengthening which included 1 set of 15-20 repetitions at a slow, controlled 7 second per rep pace focused on strengthening supporting musculature for improved body mechanics and functional mobility.  Pt and therapist focused on proper form during treatment to ensure optimal strengthening of each targeted muscle group.  Machines were utilized including torso rotation, leg extension, leg curl, chest press,  upright row, tricep extension, biceps, leg press and hip abduction.    Written Home Exercises Provided:   -walking at [facility] 10-15 min 2-3/week recommended  -use of lumbar roll -yes  -ext in standing 3/day   -flexion in lie 3/day   -ext on elbows prone 2/day ( not doing as much 5/12/17)   Modified piriformis, LTR.   Handouts were given to the patient. Pt demo fair understanding of the education provided. Paris demonstrated fair return demonstration of activities.   Lumbar roll use compliance: yes and has ball.   Additional exercises taught this  treatment session:  Scapular retractions       Assessment     Patients had no pain today. She demo her HEP well with mild vc for tech.  She tolerated dynamic strengthening and is responding to mechanical methods of reducing pain well. Added scapular retractions to improve postural alignment with good pt response.  She tolerated treadmill, stretching, lumbar strengthening with considerable cuing needed to activate back musculature, and peripheral strengthening well. Patient was able to tolerate 18 reps lumbar machine with no increased symptoms.   Pt will continue to benefit from skilled outpatient physical therapy to address the deficits stated in the impairment chart, provide pt/family education and to maximize pt's level of independence in the home and community environment.       Pt's spiritual, cultural and educational needs considered and pt agreeable to plan of care and goals as stated below:   Medical necessity is demonstrated by the following problem list.   Pt presents with the following impairments:   History  Co-morbidities and personal factors that may impact the plan of care Examination  Body Structures and Functions, activity limitations and participation restrictions that may impact the plan of care Clinical Presentation Decision Making/ Complexity Score   Co-morbidities:   advanced age           Personal Factors:   age Body Regions:   back  lower extremities     Body Systems:   ROM  strength     Activity limitations:   Learning and applying knowledge  no deficits     General Tasks and Commands  no deficits     Communication  communicating with/receiving spoken language  no deficits     Mobility  no deficits     Self care  no deficits     Domestic Life  no deficits     Interactions/Relationships  no deficits     Life Areas  no deficits     Community and Social Life  no deficits     Participation Restrictions:   Cleaning, reading in sitting    stable and uncomplicated    low            GOALS: Pt is in  agreement with the following goals.     Short term goals: 6 weeks or 10 visits   1. Pt will demonstrate increased lumbar ROM by at least 3 degrees from the initial ROM value with improvements noted in functional ROM and ability to perform ADLs  2. Pt will demonstrate increased  3. Patient report a reduction in worst pain score by 1-2 points for improved tolerance during work and recreational activities  4. Pt able to perform HEP correctly with minimal cueing or supervision for therapist        Long term goals: 13 weeks or 20 visits   1. Pt will demonstrate increased lumbar ROM by at least 3 degrees from initial ROM value, resulting in improved ability to perform functional fwd bending while standing and sitting.   2. Pt will demonstrate increased maximum isometric torque value by 5% when compared to the initial value resulting in improved ability to perform bending, lifting, and carrying activities safely, confidently.  3. Pt to demonstrate ability to independently control and reduce their pain through posture positioning and mechanical movements throughout a typical day.  4. Patient will demonstrate improved overall function per FOTO Survey to CJ = at least 1% but < 20% impaired, limited or restricted score or less.     FOTO:    1st visit: 33% limited  5th visit: 41% limited  Plan   Continue with established Plan of Care towards established PT goals.

## 2017-05-31 ENCOUNTER — CLINICAL SUPPORT (OUTPATIENT)
Dept: REHABILITATION | Facility: OTHER | Age: 72
End: 2017-05-31
Attending: INTERNAL MEDICINE
Payer: MEDICARE

## 2017-05-31 DIAGNOSIS — M51.36 DDD (DEGENERATIVE DISC DISEASE), LUMBAR: Primary | ICD-10-CM

## 2017-05-31 PROCEDURE — 97110 THERAPEUTIC EXERCISES: CPT | Performed by: PHYSICAL MEDICINE & REHABILITATION

## 2017-05-31 NOTE — PROGRESS NOTES
[facility]      Name: Paris Burris  Clinic Number: [id number]  Date of Treatment: 05/31/2017   Diagnosis:   Encounter Diagnosis   Name Primary?    DDD (degenerative disc disease), lumbar Yes     Physician: Mandi Huynh MD    Pain pattern determined: 1, PEP extensions and flexion tolerated    Plan of care signed: 5/8/17 with patient requesting by email orders for upper back and orders noted for upper back 5/8/17 - discussed with patient, continue POC for lumbar spine and address mid and upper back issues as approp, may recommend follow up with Dr. Woodall if neck orders needed, patient agreeable.  Patient expressing neck pain -she will schedule with Dr. Woodall 5/31/17    POC signed 5/8/17  POC due: 8/8/17      Time in: 9:30 am  Time Out: 10:30am  Total Treatment time: 50 min    Precautions: SEE PMH, Cx,neuropathy in L foot      Visit # authorized: 12  Authorization period: 4/13/2018  Plan of care Expiration: 8/5/2017    Visit #: 8    Evaluation:  5/5/17  Reasessment due:  6/5/17 done 5/31/17  Assessment due: 6/30/17  Face to Face discussion of patient was done between PT and PTA.       Subjective     Paris reports improvement of symptoms overall. She states that she has 0/10 back pain today. She states that she has been doing her HEP regularly and it is going well. She is sitting with lumbar support. She has ball for stretches.  She feels the program is helping her. She notes some upper back pain but no significant difference compared to last session, she wants to start program for neck and upper back issues and scheduled with Dr. Woodall.      Patient reports their pain to be 0/10 on a 0-10 scale with 0 being no pain and 10 being the worst pain imaginable.  Pain Location: low back and mid-upper back     Prior Treatment: PT at [facility]  Prior functional status: Independent  DME owned/used: no  Occupation:  Retired (taught reading)   Leisure: garden, walking, read, knit   Pts  goals: Be able to sit longer, be able to descend stairs easier.          Objective     Baseline IM Testing Results:   Date of testin2017  ROM 0-30 deg   Max Peak Torque 111 ft lbs    Min Peak Torque   28 ft lbs   Flex/Ext Ratio  4/1   % below normative data 12% below       MOVEMENT LOSS back-reassessed 17   ROM Loss   Flexion moderate loss improved to min loss   Extension minimal loss     Side bending Right moderate loss -improved to min loss   Side bending Left moderate loss - improved to min loss   Rotation Right moderate loss - improved to min loss   Rotation Left moderate loss - improved to min loss       Treatment    Pt was instructed in and performed the following:     Paris received therapeutic exercises to develop/improved posture, cardiovascular endurance, muscular endurance, lumbar  ROM, strength and muscular endurance for 50 minutes including the following exercises:           Flexion in lie  Modified piriformis stretch 10 s/h 3x B  LTR 10x B  Ext in standing  Ext in lie modified to ext using elbows to push up, (Pt expressed increase wrist pain after 4 reps, defer for EIS)  Scapular retractions 10x     [facility] 2017   Visit Number 8   VAS Pain Rating 0   Treadmill Time (in min.) 10   Speed 2   Extension in Lying 10   Extension in Standing 10   Flexion in Lying 10   Lumbar Extension Seat Pad -   Femur Restraint -   Top Dead Center -   Counterweight -   Lumbar Flexion -   Lumbar Extension -   Lumbar Peak Torque -   Lumbar Weight 56   Repetitions 20   Rating of Perceived Exertion 2   Ice - Z Lie (in min.) 10           Peripheral muscle strengthening which included 1 set of 15-20 repetitions at a slow, controlled 7 second per rep pace focused on strengthening supporting musculature for improved body mechanics and functional mobility.  Pt and therapist focused on proper form during treatment to ensure optimal strengthening of each targeted muscle group.  Machines were utilized  including torso rotation, leg extension, leg curl, chest press,  upright row, tricep extension, biceps, leg press and hip abduction.    Written Home Exercises Provided:   -walking at [facility] 10-15 min 2-3/week recommended  -use of lumbar roll -yes  -ext in standing 3/day   -flexion in lie 3/day   -ext on elbows prone 2/day ( not doing as much 5/12/17)   Modified piriformis, LTR.   Handouts were given to the patient. Pt demo fair understanding of the education provided. Paris demonstrated fair return demonstration of activities.   Lumbar roll use compliance: yes and has ball.   Additional exercises taught this treatment session:  Scapular retractions       Assessment     Patients had no pain today. She demo her HEP well with mild vc for tech.  She tolerated dynamic strengthening and is responding to mechanical methods of reducing pain well. Added scapular retractions to improve postural alignment with good pt response.  She tolerated treadmill, stretching, lumbar strengthening with considerable cuing needed to activate back musculature, and peripheral strengthening well. Patient was able to tolerate 18 reps lumbar machine with no increased symptoms.   Pt will continue to benefit from skilled outpatient physical therapy to address the deficits stated in the impairment chart, provide pt/family education and to maximize pt's level of independence in the home and community environment.   She reports her neck pain bothers her more than her back pain and she would like to start program for her neck.  She scheduled with Dr. Woodall.      Pt's spiritual, cultural and educational needs considered and pt agreeable to plan of care and goals as stated below:   Medical necessity is demonstrated by the following problem list.   Pt presents with the following impairments:   History  Co-morbidities and personal factors that may impact the plan of care Examination  Body Structures and Functions, activity limitations  and participation restrictions that may impact the plan of care Clinical Presentation Decision Making/ Complexity Score   Co-morbidities:   advanced age           Personal Factors:   age Body Regions:   back  lower extremities     Body Systems:   ROM  strength     Activity limitations:   Learning and applying knowledge  no deficits     General Tasks and Commands  no deficits     Communication  communicating with/receiving spoken language  no deficits     Mobility  no deficits     Self care  no deficits     Domestic Life  no deficits     Interactions/Relationships  no deficits     Life Areas  no deficits     Community and Social Life  no deficits     Participation Restrictions:   Cleaning, reading in sitting    stable and uncomplicated    low            GOALS: Pt is in agreement with the following goals.     Short term goals: 6 weeks or 10 visits   1. Pt will demonstrate increased lumbar ROM by at least 3 degrees from the initial ROM value with improvements noted in functional ROM and ability to perform ADLs  2. Pt will demonstrate increased  3. Patient report a reduction in worst pain score by 1-2 points for improved tolerance during work and recreational activities  4. Pt able to perform HEP correctly with minimal cueing or supervision for therapist        Long term goals: 13 weeks or 20 visits   1. Pt will demonstrate increased lumbar ROM by at least 3 degrees from initial ROM value, resulting in improved ability to perform functional fwd bending while standing and sitting.   2. Pt will demonstrate increased maximum isometric torque value by 5% when compared to the initial value resulting in improved ability to perform bending, lifting, and carrying activities safely, confidently.  3. Pt to demonstrate ability to independently control and reduce their pain through posture positioning and mechanical movements throughout a typical day.  4. Patient will demonstrate improved overall function per FOTO Survey to CJ = at  least 1% but < 20% impaired, limited or restricted score or less.     FOTO:    1st visit: 33% limited  5th visit: 41% limited  Plan   Continue with established Plan of Care towards established PT goals.   Patient scheduled with dr. Woodall for neck evaluation.

## 2017-06-01 ENCOUNTER — CLINICAL SUPPORT (OUTPATIENT)
Dept: REHABILITATION | Facility: OTHER | Age: 72
End: 2017-06-01
Attending: INTERNAL MEDICINE
Payer: MEDICARE

## 2017-06-01 ENCOUNTER — CLINICAL SUPPORT (OUTPATIENT)
Dept: REHABILITATION | Facility: OTHER | Age: 72
End: 2017-06-01
Attending: PHYSICAL MEDICINE & REHABILITATION
Payer: MEDICARE

## 2017-06-01 VITALS
BODY MASS INDEX: 30.96 KG/M2 | HEART RATE: 96 BPM | HEIGHT: 61 IN | DIASTOLIC BLOOD PRESSURE: 82 MMHG | WEIGHT: 164 LBS | SYSTOLIC BLOOD PRESSURE: 124 MMHG

## 2017-06-01 DIAGNOSIS — M50.30 DDD (DEGENERATIVE DISC DISEASE), CERVICAL: Primary | ICD-10-CM

## 2017-06-01 DIAGNOSIS — M54.2 NECK PAIN: ICD-10-CM

## 2017-06-01 DIAGNOSIS — M51.36 DDD (DEGENERATIVE DISC DISEASE), LUMBAR: ICD-10-CM

## 2017-06-01 PROCEDURE — 99499 UNLISTED E&M SERVICE: CPT | Mod: ,,, | Performed by: PHYSICAL MEDICINE & REHABILITATION

## 2017-06-01 PROCEDURE — 99203 OFFICE O/P NEW LOW 30 MIN: CPT | Mod: ,,, | Performed by: PHYSICAL MEDICINE & REHABILITATION

## 2017-06-01 NOTE — PROGRESS NOTES
Subjective:      Patient ID: Paris Burris is a 71 y.o. female.    Chief Complaint: No chief complaint on file.    Referred by: Mandi Huynh MD     Ms Burris is a 70 yo female here  for evaluation for the healthy back cervical program.  She was enrolled for the lumbar program and has been doing well with therapy, but would like to address upper back and neck pain.  she has had neck pain for 1 year, and it has gradually been getting worse.  The pain is middle neck dominant.  There is no radiating pain. There is no numbness and tingling.   The pain is intermittent ranging from 0-5/10.  The pain is an achy pain.  It is worse with reading, using the computer, and standing.  She feels it is better lying on her back, walking and using a thin pillow.  Her goals are to be able to read, watch movies, and stand for extended periods using the computer.  She has not seen anyone but her PCP, she has never done PT for her neck.  Pattern 1    X-ray cervical 8/2016  Cervical spine, 5 views with flexion and extension    C7 is partially obscured by overlying soft tissue.  Mild subluxation of C4 on C5 on flexion view.  Spinal alignment is otherwise maintained.  Intervertebral disc space narrowing C5-C6.  Mild degenerative changes of the cervical spine.  No prevertebral soft tissue thickening.  Odontoid is intact.  No evidence of fracture or dislocation.    X-ray thoracic 8/2016  2 views: There is aortic plaque.  There is mild kyphosis.  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  There is a chronic compression deformity mild one of the lower thoracic levels.  There is no change from 1/11/2016.    Past Medical History:  No date: Allergy  No date: Anemia  No date: Anxiety  2002: Cancer      Comment: L breast s/p mastectomy  No date: Decreased hearing  No date: Depression  No date: Diabetes mellitus  9/10/13: Gastric ulcer      Comment: EGD  No date: Hiatal hernia  No date: Hyperlipidemia  No date: Migraine headache  3/20/2015: Migraine headache  No  date: Multiple gastric ulcers  No date: Parathyroid disorder  No date: Pre-diabetes  No date: Wrist fracture    Past Surgical History:  No date: BREAST LUMPECTOMY  12/2/2016: COLONOSCOPY N/A      Comment: Procedure: COLONOSCOPY;  Surgeon: Dustin Patterson MD;  Location: [address];                 Service: Endoscopy;  Laterality: N/A;  PM prep  1999: lipoma removal    Review of patient's family history indicates:    Suicide                        Mother                    Depression                     Mother                    Alcohol abuse                  Father                    Headaches                      Father                    Diabetes                       Sister                      Social History    Marital status:              Spouse name:                       Years of education:                 Number of children:               Occupational History  Occupation          Employer            Comment                supervisor                            Social History Main Topics    Smoking status: Never Smoker                                                                Smokeless tobacco: Never Used                        Alcohol use: No                 Comment: quit 2014    Drug use: Yes             Sexual activity: Yes               Partners with: Male        Current Outpatient Prescriptions:  ACCU-CHEK SMARTVIEW TEST STRIP Strp, TEST BLOOD SUGAR TWICE A DAY, Disp: 300 strip, Rfl: 1  ascorbic acid (VITAMIN C) 500 MG tablet, Take 1,000 mg by mouth once daily. , Disp: , Rfl:   aspirin (ECOTRIN) 81 MG EC tablet, Take 1 tablet (81 mg total) by mouth once daily., Disp: 90 tablet, Rfl: 3  blood sugar diagnostic (ACCU-CHEK MARIAN PLUS TEST STRP) Strp, 2 strips by Misc.(Non-Drug; Combo Route) route 2 (two) times daily., Disp: 100 strip, Rfl: 6  ferrous sulfate 325 mg (65 mg iron) Tab tablet, Take 1 tablet (325 mg total) by mouth 2 (two) times daily., Disp: 60 tablet,  Rfl: 11  fluticasone (FLONASE) 50 mcg/actuation nasal spray, USE 1 SPRAY IN EACH NOSTRIL EVERY DAY, Disp: 16 g, Rfl: 0  gabapentin (NEURONTIN) 300 MG capsule, Take 1 capsule (300 mg total) by mouth 3 (three) times daily., Disp: 270 capsule, Rfl: 1  lancets Misc, 1 lancet by Misc.(Non-Drug; Combo Route) route 2 (two) times daily., Disp: 100 each, Rfl: 6  lorazepam (ATIVAN) 0.5 MG tablet, Take 1 tablet (0.5 mg total) by mouth daily as needed., Disp: 30 tablet, Rfl: 3  metformin (GLUCOPHAGE-XR) 500 MG 24 hr tablet, TAKE 1 TABLET BY MOUTH TWICE A DAY WITH MEALS, Disp: 180 tablet, Rfl: 3  mirtazapine (REMERON) 15 MG tablet, Take 2 tablets (30 mg total) by mouth every evening., Disp: 60 tablet, Rfl: 3  omega-3 fatty acids-fish oil (FISH OIL) 340-1,000 mg Cap, Take 1 capsule by mouth once daily., Disp: , Rfl:   omeprazole (PRILOSEC) 20 MG capsule, Take 1 capsule (20 mg total) by mouth once daily., Disp: 90 capsule, Rfl: 3  rosuvastatin (CRESTOR) 20 MG tablet, Take 1 tablet (20 mg total) by mouth once daily., Disp: 90 tablet, Rfl: 3  tramadol (ULTRAM) 50 mg tablet, Take 1 tablet (50 mg total) by mouth every 8 (eight) hours as needed., Disp: 60 tablet, Rfl: 0  tretinoin (RETIN-A) 0.05 % cream, 1 application every evening. At bedtime, Disp: , Rfl: 6  venlafaxine (EFFEXOR) 75 MG tablet, Take 1 tablet (75 mg total) by mouth 2 (two) times daily., Disp: 60 tablet, Rfl: 3    No current facility-administered medications for this visit.       Review of patient's allergies indicates:   -- Topamax (topiramate) -- Other (See Comments)    --  hallucinations                Review of Systems   Constitution: Negative for weight gain and weight loss.   Cardiovascular: Negative for chest pain.   Respiratory: Negative for shortness of breath.    Musculoskeletal: Positive for back pain and neck pain. Negative for joint pain and joint swelling.   Gastrointestinal: Negative for abdominal pain and bowel incontinence.   Genitourinary: Negative for  bladder incontinence.   Neurological: Negative for numbness.           Objective:        General    Vitals reviewed.  Constitutional: She is oriented to person, place, and time. She appears well-developed and well-nourished.   HENT:   Head: Normocephalic and atraumatic.   Pulmonary/Chest: Effort normal.   Neurological: She is alert and oriented to person, place, and time.   Psychiatric: She has a normal mood and affect. Her behavior is normal. Judgment and thought content normal.     General Musculoskeletal Exam   Gait: normal     Right Ankle/Foot Exam     Tests   Heel Walk: able to perform  Tiptoe Walk: able to perform    Left Ankle/Foot Exam     Tests   Heel Walk: able to perform  Tiptoe Walk: able to perform  Back (L-Spine & T-Spine) / Neck (C-Spine) Exam     Tenderness Right paramedian tenderness of the Lower C-Spine. Left paramedian tenderness of the Lower C-Spine.     Neck (C-Spine) Range of Motion   Flexion:     > 40  Extension: 40Right Lateral Bend: 10 Left Lateral Bend: 10 Right Rotation: 40 (mild pain) Left Rotation: 40 (mild pain)     Spinal Sensation   Right Side Sensation  C-Spine Level: normal   L-Spine Level: normal  S-Spine Level: normal  Left Side Sensation  C-Spine Level: normal  L-Spine Level: normal  S-Spine Level: normal    Back (L-Spine & T-Spine) Tests   Right Side Tests  Straight leg raise:      Sitting SLR: > 70 degrees      Left Side Tests  Straight leg raise:     Sitting SLR: > 70 degrees          Other She has no scoliosis .  Spinal Kyphosis:  Absent      Muscle Strength   Right Upper Extremity   Biceps: 5/5/5   Deltoid:  5/5  Triceps:  5/5  Wrist Extension: 5/5/5   Finger Flexors:  5/5  Left Upper Extremity  Biceps: 5/5/5   Deltoid:  5/5  Triceps:  5/5  Wrist Extension: 5/5/5   Finger Flexors:  5/5  Right Lower Extremity   Hip Flexion: 5/5   Quadriceps:  5/5   Anterior tibial:  5/5/5  EHL:  5/5  Left Lower Extremity   Hip Flexion: 5/5   Quadriceps:  5/5   Anterior tibial:  5/5/5   EHL:   5/5    Reflexes     Left Side  Biceps:  2+  Triceps:  2+  Brachioradialis:  2+  Quadriceps:  2+  Achilles:  2+  Left Garcia's Sign:  Absent  Babinski Sign:  absent    Right Side   Biceps:  2+  Triceps:  2+  Brachioradialis:  2+  Quadriceps:  2+  Achilles:  2+  Right Garcia's Sign:  absent  Babinski Sign:  absent    Vascular Exam     Right Pulses        Carotid:                  2+    Left Pulses        Carotid:                  2+        Assessment:       Encounter Diagnoses   Name Primary?    DDD (degenerative disc disease), cervical Yes    Neck pain     DDD (degenerative disc disease), lumbar          Plan:       Diagnoses and all orders for this visit:    DDD (degenerative disc disease), cervical  -     Ambulatory Referral to Physical/Occupational Therapy    Neck pain  -     Ambulatory Referral to Physical/Occupational Therapy    DDD (degenerative disc disease), lumbar         The patient has had a history of neck pain with limitations in there activities of Daily living    Previous treatment has not provided relief.    The situation was discussed at length with the patient.  More than 50% of the total time of 45 minutes was spent in counseling.  We discussed different causes of neck pain and different treatment options.  We discussed the importance of stretching and strengthening.  We discussed posture, and trying to keep head above spine.  We discussed the pros and cons of further diagnostic testing, alternative treatment and Medications    Based on the history, physical exam, and functional index, an active physical therapy program is recommended.  The goal is to restore the patients function and reduce pain.  A program of progressive resistance exercises, biomechanical, and mobility maneuvers, instructions in proper body mechanics, aerobic conditioning and HEP will be utilized. The program will continue as long as making improvements.    An assessment of patients progress will be made at each visit to  document change in status.    The patient will be actively involved in there own treatment, and responsible for appointments and home program    The patient's cervical isometric strength will be tested and they will be placed in a program of isolated strength training based on 50% of their total functional torque and advanced as clinically appropriate.      Directional preference of pain will further influence the patients active rehabilitation program    The patient was instructed there might be an initial increase in discomfort    They are enrolled in cervical program with good prognosis  Pattern 1

## 2017-06-01 NOTE — PROGRESS NOTES
Health  met with patient to complete initial outcomes for the Healthy Back cervical program.  Questions were reviewed with patient and discussed with Dr. Woodall. The patient will meet with Dr. Woodall to determine program enrollment.   HealthyBack Questionnaire  6/1/2017   Please select the location of your worst pain:  Neck   When did this pain begin?  1 yr   Please select the location of any additional pain: (hold down the control key, and click to select multiple responses) Low back   Did any event trigger your pain?  No   How long has this pain been going on?  1 year   Please indicate how the pain is changing.  No Change   What is the WORST level of pain that you have experienced in the last week?  5   What is the LEAST level of pain that you have experienced in the past week?  0   If you have any comments about your pain level, please provide below:  -   What can you NOT do not that you used to be able to do?  No limitations, but does feel more aware of pain while standing   Are your limitations mainly due to your pain?  Yes   What are your additional complaints, if any? (hold down the control key, and click to select multiple responses) None   Is there ever a time during the day when your pain stops, even for a brief moment, before returning? Yes   If yes, when your pain stops, does it disappear completely? Is it totally gone? Yes   Does looking down make your typical pain worse? Yes   Morning: Better during   Afternoon: Better during   Evening:  Worse during   Nighttime: Worse during   Standing:  Worse   Lying on stomach: Same   Lying on back: Better   Sitting:  Same   Walking: Better   Using a pillow: Better   Have all of your attempts to treat your neck/arm pain resulted in failure?  Yes   Do you believe your doctor(s) do NOT understand how much pain you have?  No   Do you believe that you have one or more conditions that have not been diagnosed by your doctor(s)?  Yes   Do you believe that you  deserve more understanding from others including your family than you are getting?  Yes   Do you feel relatively calm about how your neck/arm pain has impacted your life?  Yes   Are you OK with treatment taking a very long time (even years) before you feel relief from your neck/arm pain?  Yes   Do you believe that your pain has caused you to be more forgetful?  Yes   Do you feel that you have not received enough treatment for your pain?  Yes   Do you believe that your doctor(s) do not have the right training to treat your neck/arm pain effectively?  No   Do you believe you should not be allowed to work or attend school because of your neck/arm pain?  No   When did this pain begin?  1 yr   Did the pain begin after an injury or an accident? No   Is the pain work related?  No   Please fabien which of the following over-the-counter medicines you take. (hold down the control key, and click to select multiple responses) None   Please fabien which of the following prescription medicines you take. (hold down the control key, and click to select multiple responses) Narcotics   Emergency department  No   Health care providers (hold down the control key, and click to select multiple responses) Family doctor   Investigations done (hold down the control key, and click to select multiple responses) MRI   Procedures (hold down the control key, and click to select multiple responses) None   Emergency department  No   Health care providers (hold down the control key, and click to select multiple responses) None   Investigations done (hold down the control key, and click to select multiple responses) None   Procedures (hold down the control key, and click to select multiple responses) None   Have you had any surgery on your neck? No   Please fabien what you are experiencing. (hold down the control key, and click to select multiple responses) Problems with bowel functions, Excessive sweating, Arthritic joint pain, Difficulty with walking or  balance   First activity you would like to perform better:  Reading   Current ability score to perform first activity: 6   Second activity you would like to perform better: Watching movies   Current ability score to perform second activity: 5   Third activity you would like to perform better: Standing   Current ability score to perform third activity: 6   Pain intensity:  The pain is mild at the moment.   Personal care (washing, dressing, etc.): I can look after myself without causing extra pain.   Lifting: I cannot lift or carry anything at all.   Reading: I can read as much as I want with moderate pain in my neck.   Headaches: I have moderate headaches, which come frequently.   Concentration: I can concentrate fully when I want to with slight difficulty.   Working: I can do most of my usual work but no more.   Driving: I can drive my car as long as I want with slight pain in my neck.   Sleeping: I have no trouble sleeping.   Recreation: I am able to engage in all of my recreation activities, with some pain in my neck.   Do you need any help looking after yourself? I need no help at all.   When doing household tasks, e.g., preparing food, gardening, using the video recorder, radio, telephone, or washing the car: Occasionally I need some help with household tasks.   Thinking about how easily you can get around your home and community: I get around my home and community by myself without any difficulty.   Because of your health, your relationships, e.g., your friends, partner or parents, generally: Are sometimes close and warm   Thinking about your relationships with other people: Although I have friends, I am occasionally lonely.   Thinking about your health and your relationship with your  family:  There are some parts of my family role I cannot carry out.   Thinking about your vision, including when using your glasses or contact lenses if needed: I see normally.   Thinking about your hearing, including using your  hearing aid if needed: I have some difficulty hearing, or I do not hear clearly, e.g., I ask people to speak up or turn up the TV radio volume.   When you communicate with others, e.g., talking, listening, writing, or signing: I have no trouble speaking to them or understanding what they are saying.   Thinking about how you sleep: I am able to sleep without difficulty most of the time.   Thinking about how you generally feel: I am slightly anxious, worried or depressed.   How much pain or discomfort do you experience? I have moderate pain.   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 What are your hobbies? Knitting, reading, gardening   What is your highest level of education? Advanced/graduate   What is your work status? Retired   How did you hear about the Healthyback program?  Physician

## 2017-06-02 ENCOUNTER — CLINICAL SUPPORT (OUTPATIENT)
Dept: REHABILITATION | Facility: OTHER | Age: 72
End: 2017-06-02
Attending: INTERNAL MEDICINE
Payer: MEDICARE

## 2017-06-02 DIAGNOSIS — M51.36 DDD (DEGENERATIVE DISC DISEASE), LUMBAR: Primary | ICD-10-CM

## 2017-06-02 PROCEDURE — 97110 THERAPEUTIC EXERCISES: CPT | Performed by: PHYSICAL MEDICINE & REHABILITATION

## 2017-06-02 NOTE — PROGRESS NOTES
[facility]      Name: Paris Bruris  Clinic Number: [id number]  Date of Treatment: 06/02/2017   Diagnosis:   Encounter Diagnosis   Name Primary?    DDD (degenerative disc disease), lumbar Yes     Physician: Mandi Huynh MD    Pain pattern determined: 1, PEP extensions and flexion tolerated    Plan of care signed: 5/8/17 for low back with patient starting neck program in future, seen by Dr. Woodall with neck orders for program  POC signed 5/8/17  POC due: 8/8/17      Time in: 9:30 am  Time Out: 10:30am  Total Treatment time: 50 min    Precautions: SEE PMH, Cx,neuropathy in L foot      Visit # authorized: 12  Authorization period: 4/13/2018  Plan of care Expiration: 8/5/2017    Visit #: 9 lumbar    Evaluation:  5/5/17  Reasessment due:  6/5/17 done 5/31/17  Assessment due: 6/30/17  Face to Face discussion of patient was done between PT and PTA.       Subjective     Paris reports improvement of symptoms overall. She states that she has 2/10 back pain today which abolished with walking and stretching.  She reports she always feels better after coming. She states that she has been doing her HEP regularly and it is going well.  She did need cuing for hand position with extensions, and she asked for pictures of retractions.  We gave new handouts today for all there ex.  She did report she may be involved in alzheimer study but her memory is fair.  She is sitting with lumbar support. She has ball for stretches.  She feels the program is helping her. She saw Dr. Woodall and is eager to start program for her neck.      Patient reports their pain to be 0/10 on a 0-10 scale with 0 being no pain and 10 being the worst pain imaginable.  Pain Location: low back and mid-upper back     Prior Treatment: PT at [facility]  Prior functional status: Independent  DME owned/used: no  Occupation:  Retired (taught reading)   Leisure: garden, walking, read, knit   Pts goals: Be able to sit longer, be  able to descend stairs easier.          Objective     Baseline IM Testing Results:   Date of testin2017  ROM 0-30 deg   Max Peak Torque 111 ft lbs    Min Peak Torque   28 ft lbs   Flex/Ext Ratio  4/1   % below normative data 12% below       MOVEMENT LOSS back-reassessed 17   ROM Loss   Flexion moderate loss improved to min loss   Extension minimal loss     Side bending Right moderate loss -improved to min loss   Side bending Left moderate loss - improved to min loss   Rotation Right moderate loss - improved to min loss   Rotation Left moderate loss - improved to min loss       Treatment    Pt was instructed in and performed the following:     Paris received therapeutic exercises to develop/improved posture, cardiovascular endurance, muscular endurance, lumbar  ROM, strength and muscular endurance for 50 minutes including the following exercises:           Flexion in lie  Modified piriformis stretch 10 s/h 3x B  LTR 10x B  Ext in standing  Ext in lie modified to ext using elbows to push up, (Pt expressed increase wrist pain after 4 reps, defer for EIS)  Scapular retractions 10x   Handouts given for all there ex 17    [facility] 2017   Visit Number 9   VAS Pain Rating 2   Treadmill Time (in min.) 10   Speed 2.8   Extension in Lying 10   Extension in Standing 10   Flexion in Lying 10   Lumbar Extension Seat Pad -   Femur Restraint -   Top Dead Center -   Counterweight -   Lumbar Flexion -   Lumbar Extension -   Lumbar Peak Torque -   Lumbar Weight 59   Repetitions 18   Rating of Perceived Exertion 3   Ice - Z Lie (in min.) 10           Peripheral muscle strengthening which included 1 set of 15-20 repetitions at a slow, controlled 7 second per rep pace focused on strengthening supporting musculature for improved body mechanics and functional mobility.  Pt and therapist focused on proper form during treatment to ensure optimal strengthening of each targeted muscle group.  Machines were  utilized including torso rotation, leg extension, leg curl, chest press,  upright row, tricep extension, biceps, leg press and hip abduction.    Written Home Exercises Provided:   -walking at [facility] 10-15 min 2-3/week recommended  -use of lumbar roll -yes  -ext in standing 3/day   -flexion in lie 3/day   -ext on elbows prone 2/day ( not doing as much 5/12/17)   Modified piriformis, LTR.   Scapular retractions   Handouts were given to the patient. Pt demo fair understanding of the education provided. Paris demonstrated fair return demonstration of activities.   Lumbar roll use compliance: yes and has ball.   Additional exercises taught this treatment session:  Reviewed handouts and printed for patient again 6/2/17      Assessment     Patients had minimal pain today which abolished with stretching.   She demo her HEP well with mild vc for tech. She is not sure where her handouts are so all exercises reprinted.  She tolerated dynamic strengthening and is responding to mechanical methods of reducing pain well.   She tolerated treadmill, stretching, lumbar strengthening with considerable cuing needed to activate back musculature, and peripheral strengthening well. Pt will continue to benefit from skilled outpatient physical therapy to address the deficits stated in the impairment chart, provide pt/family education and to maximize pt's level of independence in the home and community environment.   She reports her neck pain bothers her more than her back pain and she would like to start program for her neck.  She scheduled with Dr. Woodall and is referred to the program for her neck.      Pt's spiritual, cultural and educational needs considered and pt agreeable to plan of care and goals as stated below:   Medical necessity is demonstrated by the following problem list.   Pt presents with the following impairments:   History  Co-morbidities and personal factors that may impact the plan of care  Examination  Body Structures and Functions, activity limitations and participation restrictions that may impact the plan of care Clinical Presentation Decision Making/ Complexity Score   Co-morbidities:   advanced age           Personal Factors:   age Body Regions:   back  lower extremities     Body Systems:   ROM  strength     Activity limitations:   Learning and applying knowledge  no deficits     General Tasks and Commands  no deficits     Communication  communicating with/receiving spoken language  no deficits     Mobility  no deficits     Self care  no deficits     Domestic Life  no deficits     Interactions/Relationships  no deficits     Life Areas  no deficits     Community and Social Life  no deficits     Participation Restrictions:   Cleaning, reading in sitting    stable and uncomplicated    low            GOALS: Pt is in agreement with the following goals.     Short term goals: 6 weeks or 10 visits   1. Pt will demonstrate increased lumbar ROM by at least 3 degrees from the initial ROM value with improvements noted in functional ROM and ability to perform ADLs  2. Pt will demonstrate increased  3. Patient report a reduction in worst pain score by 1-2 points for improved tolerance during work and recreational activities  4. Pt able to perform HEP correctly with minimal cueing or supervision for therapist        Long term goals: 13 weeks or 20 visits   1. Pt will demonstrate increased lumbar ROM by at least 3 degrees from initial ROM value, resulting in improved ability to perform functional fwd bending while standing and sitting.   2. Pt will demonstrate increased maximum isometric torque value by 5% when compared to the initial value resulting in improved ability to perform bending, lifting, and carrying activities safely, confidently.  3. Pt to demonstrate ability to independently control and reduce their pain through posture positioning and mechanical movements throughout a typical day.  4. Patient will  demonstrate improved overall function per FOTO Survey to CJ = at least 1% but < 20% impaired, limited or restricted score or less.     FOTO:    1st visit: 33% limited  5th visit: 41% limited  Plan   Continue with established Plan of Care towards established PT goals.

## 2017-06-05 ENCOUNTER — PATIENT MESSAGE (OUTPATIENT)
Dept: PODIATRY | Facility: CLINIC | Age: 72
End: 2017-06-05

## 2017-06-05 DIAGNOSIS — J31.0 CHRONIC RHINITIS: ICD-10-CM

## 2017-06-05 RX ORDER — FLUTICASONE PROPIONATE 50 MCG
SPRAY, SUSPENSION (ML) NASAL
Qty: 16 G | Refills: 0 | Status: SHIPPED | OUTPATIENT
Start: 2017-06-05 | End: 2017-07-07 | Stop reason: SDUPTHER

## 2017-06-05 RX ORDER — GABAPENTIN 600 MG/1
600 TABLET ORAL 3 TIMES DAILY
Qty: 90 TABLET | Refills: 11 | Status: SHIPPED | OUTPATIENT
Start: 2017-06-05 | End: 2017-07-17

## 2017-06-07 ENCOUNTER — PATIENT MESSAGE (OUTPATIENT)
Dept: PODIATRY | Facility: CLINIC | Age: 72
End: 2017-06-07

## 2017-06-08 ENCOUNTER — PATIENT MESSAGE (OUTPATIENT)
Dept: PODIATRY | Facility: CLINIC | Age: 72
End: 2017-06-08

## 2017-06-14 ENCOUNTER — OFFICE VISIT (OUTPATIENT)
Dept: INTERNAL MEDICINE | Facility: CLINIC | Age: 72
End: 2017-06-14
Payer: MEDICARE

## 2017-06-14 ENCOUNTER — CLINICAL SUPPORT (OUTPATIENT)
Dept: INTERNAL MEDICINE | Facility: CLINIC | Age: 72
End: 2017-06-14
Payer: MEDICARE

## 2017-06-14 VITALS
WEIGHT: 167.75 LBS | HEIGHT: 61 IN | SYSTOLIC BLOOD PRESSURE: 118 MMHG | BODY MASS INDEX: 31.67 KG/M2 | HEART RATE: 68 BPM | DIASTOLIC BLOOD PRESSURE: 78 MMHG

## 2017-06-14 VITALS — WEIGHT: 165.56 LBS | BODY MASS INDEX: 31.28 KG/M2

## 2017-06-14 DIAGNOSIS — I70.0 AORTIC ATHEROSCLEROSIS: ICD-10-CM

## 2017-06-14 DIAGNOSIS — E21.3 HYPERPARATHYROIDISM: ICD-10-CM

## 2017-06-14 DIAGNOSIS — G47.33 OBSTRUCTIVE SLEEP APNEA: ICD-10-CM

## 2017-06-14 DIAGNOSIS — Z85.3 HISTORY OF BREAST CANCER IN FEMALE: ICD-10-CM

## 2017-06-14 DIAGNOSIS — Z87.81 HISTORY OF VERTEBRAL COMPRESSION FRACTURE: ICD-10-CM

## 2017-06-14 DIAGNOSIS — E11.21 CONTROLLED TYPE 2 DIABETES MELLITUS WITH DIABETIC NEPHROPATHY, WITHOUT LONG-TERM CURRENT USE OF INSULIN: ICD-10-CM

## 2017-06-14 DIAGNOSIS — F33.41 MDD (MAJOR DEPRESSIVE DISORDER), RECURRENT, IN PARTIAL REMISSION: ICD-10-CM

## 2017-06-14 DIAGNOSIS — F10.21 ALCOHOL DEPENDENCE, IN REMISSION: ICD-10-CM

## 2017-06-14 DIAGNOSIS — D50.9 IRON DEFICIENCY ANEMIA, UNSPECIFIED IRON DEFICIENCY ANEMIA TYPE: ICD-10-CM

## 2017-06-14 DIAGNOSIS — E55.9 VITAMIN D DEFICIENCY: ICD-10-CM

## 2017-06-14 DIAGNOSIS — G89.29 CHRONIC MIDLINE THORACIC BACK PAIN: ICD-10-CM

## 2017-06-14 DIAGNOSIS — M54.6 CHRONIC MIDLINE THORACIC BACK PAIN: ICD-10-CM

## 2017-06-14 DIAGNOSIS — Z00.00 ENCOUNTER FOR PREVENTIVE HEALTH EXAMINATION: Primary | ICD-10-CM

## 2017-06-14 DIAGNOSIS — Z87.11 HISTORY OF GASTRIC ULCER: ICD-10-CM

## 2017-06-14 DIAGNOSIS — E66.9 OBESITY (BMI 30.0-34.9): ICD-10-CM

## 2017-06-14 DIAGNOSIS — E78.5 HYPERLIPIDEMIA, UNSPECIFIED HYPERLIPIDEMIA TYPE: ICD-10-CM

## 2017-06-14 DIAGNOSIS — E11.40 TYPE 2 DIABETES MELLITUS WITH DIABETIC NEUROPATHY, WITHOUT LONG-TERM CURRENT USE OF INSULIN: ICD-10-CM

## 2017-06-14 DIAGNOSIS — M51.36 DDD (DEGENERATIVE DISC DISEASE), LUMBAR: ICD-10-CM

## 2017-06-14 DIAGNOSIS — E66.9 DIABETES MELLITUS TYPE 2 IN OBESE: ICD-10-CM

## 2017-06-14 DIAGNOSIS — N18.2 STAGE 2 CHRONIC KIDNEY DISEASE: ICD-10-CM

## 2017-06-14 DIAGNOSIS — K76.0 FATTY LIVER: ICD-10-CM

## 2017-06-14 DIAGNOSIS — M47.812 CERVICAL SPINE ARTHRITIS: ICD-10-CM

## 2017-06-14 DIAGNOSIS — E11.42 DIABETIC POLYNEUROPATHY ASSOCIATED WITH TYPE 2 DIABETES MELLITUS: ICD-10-CM

## 2017-06-14 DIAGNOSIS — G47.00 INSOMNIA, UNSPECIFIED TYPE: ICD-10-CM

## 2017-06-14 DIAGNOSIS — E11.69 DIABETES MELLITUS TYPE 2 IN OBESE: ICD-10-CM

## 2017-06-14 PROBLEM — E66.811 OBESITY (BMI 30.0-34.9): Status: ACTIVE | Noted: 2017-06-14

## 2017-06-14 PROBLEM — E11.29 TYPE 2 DIABETES MELLITUS, CONTROLLED, WITH RENAL COMPLICATIONS: Status: ACTIVE | Noted: 2017-06-14

## 2017-06-14 PROCEDURE — 99999 PR PBB SHADOW E&M-EST. PATIENT-LVL I: CPT | Mod: PBBFAC,,,

## 2017-06-14 PROCEDURE — 99999 PR PBB SHADOW E&M-EST. PATIENT-LVL V: CPT | Mod: PBBFAC,,, | Performed by: NURSE PRACTITIONER

## 2017-06-14 PROCEDURE — 99499 UNLISTED E&M SERVICE: CPT | Mod: S$GLB,,, | Performed by: NURSE PRACTITIONER

## 2017-06-14 PROCEDURE — G0439 PPPS, SUBSEQ VISIT: HCPCS | Mod: S$GLB,,, | Performed by: NURSE PRACTITIONER

## 2017-06-14 RX ORDER — VITAMIN B COMPLEX
1 CAPSULE ORAL DAILY
COMMUNITY
End: 2022-02-24

## 2017-06-14 RX ORDER — IBUPROFEN 200 MG
1 CAPSULE ORAL DAILY
COMMUNITY
End: 2017-10-19

## 2017-06-14 RX ORDER — CHOLECALCIFEROL (VITAMIN D3) 25 MCG
0.5 TABLET ORAL DAILY
COMMUNITY
End: 2024-03-01

## 2017-06-14 RX ORDER — LANOLIN ALCOHOL/MO/W.PET/CERES
1 CREAM (GRAM) TOPICAL DAILY
COMMUNITY
End: 2017-12-14

## 2017-06-14 RX ORDER — UBIDECARENONE 30 MG
100 CAPSULE ORAL 3 TIMES DAILY
COMMUNITY
End: 2018-08-07

## 2017-06-14 NOTE — PATIENT INSTRUCTIONS
Counseling and Referral of Other Preventative  (Italic type indicates deductible and co-insurance are waived)    Patient Name: Paris Burris  Today's Date: 6/14/2017      SERVICE LIMITATIONS RECOMMENDATION    Vaccines    · Pneumococcal (once after 65)    · Influenza (annually)    · Hepatitis B (if medium/high risk)    · Prevnar 13      Hepatitis B medium/high risk factors:       - End-stage renal disease       - Hemophiliacs who received Factor VII or         IX concentrates       - Clients of institutions for the mentally             retarded       - Persons who live in the same house as          a HepB carrier       - Homosexual men       - Illicit injectable drug abusers     Pneumococcal: Done, no repeat necessary     Influenza: Due fall 2017     Hepatitis B: N/A     Prevnar 13: Done, no repeat necessary    Mammogram (biennial age 50-74)  Annually (age 40 or over)  Done this year, repeat every year    Pap (up to age 70 and after 70 if unknown history or abnormal study last 10 years)    N/A     The USPSTF recommends against screening for cervical cancer in women older than age 65 years who have had adequate prior screening and are not otherwise at high risk for cervical cancer.      Colorectal cancer screening (to age 75)    · Fecal occult blood test (annual)  · Flexible sigmoidoscopy (5y)  · Screening colonoscopy (10y)  · Barium enema   Last done 12/2016, recommend to repeat every 5  years    Diabetes self-management training (no USPSTF recommendations)  Requires referral by treating physician for patient with diabetes or renal disease. 10 hours of initial DSMT sessions of no less than 30 minutes each in a continuous 12-month period. 2 hours of follow-up DSMT in subsequent years.  Health     Bone mass measurements (age 65 & older, biennial)  Requires diagnosis related to osteoporosis or estrogen deficiency. Biennial benefit unless patient has history of long-term glucocorticoid  N/A - no repeat needed     Glaucoma screening (no USPSTF recommendation)  Diabetes mellitus, family history   , age 50 or over    American, age 65 or over  Last done 9/2016, recommend to repeat every 1  years    Medical nutrition therapy for diabetes or renal disease (no recommended schedule)  Requires referral by treating physician for patient with diabetes or renal disease or kidney transplant within the past 3 years.  Can be provided in same year as diabetes self-management training (DSMT), and CMS recommends medical nutrition therapy take place after DSMT. Up to 3 hours for initial year and 2 hours in subsequent years.  Health     Cardiovascular screening blood tests (every 5 years)  · Fasting lipid panel  Order as a panel if possible  Done this year, repeat every year    Diabetes screening tests (at least every 3 years, Medicare covers annually or at 6-month intervals for prediabetic patients)  · Fasting blood sugar (FBS) or glucose tolerance test (GTT)  Patient must be diagnosed with one of the following:       - Hypertension       - Dyslipidemia       - Obesity (BMI 30kg/m2)       - Previous elevated impaired FBS or GTT       ... or any two of the following:       - Overweight (BMI 25 but <30)       - Family history of diabetes       - Age 65 or older       - History of gestational diabetes or birth of baby weighing more than 9 pounds  Last done 2/2017, recommend to repeat every 4-6  months    Abdominal aortic aneurysm screening (once)  · Sonogram   Limited to patients who meet one of the following criteria:       - Men who are 65-75 years old and have smoked more than 100 cigarette in their lifetime       - Anyone with a family history of abdominal aortic aneurysm       - Anyone recommended for screening by the USPSTF  N/A    HIV screening (annually for increased risk patients)  · HIV-1 and HIV-2 by EIA, or ANDREE, rapid antibody test or oral mucosa transudate  Patients must be at increased risk for HIV  infection per USPSTF guidelines or pregnant. Tests covered annually for patient at increased risk or as requested by the patient. Pregnant patients may receive up to 3 tests during pregnancy.  Risks discussed, screening is not recommended    Smoking cessation counseling (up to 8 sessions per year)  Patients must be asymptomatic of tobacco-related conditions to receive as a preventative service.  Non-smoker    Subsequent annual wellness visit  At least 12 months since last AWV  Return in one year     The following information is provided to all patients.  This information is to help you find resources for any of the problems found today that may be affecting your health:                Living healthy guide: www.[facility].[place].[facility]      Understanding Diabetes: www.diabetes.org      Eating healthy: www.cdc.gov/healthyweight      CDC home safety checklist: www.cdc.gov/steadi/patient.html      Agency on Aging: www.goea.[place].[facility]      Alcoholics anonymous (AA): www.aa.org      Physical Activity: www.jenny.nih.gov/yk6pmbf      Tobacco use: www.quitwithusla.org

## 2017-06-14 NOTE — PROGRESS NOTES
Health  Follow-Up Note   [x] Office  [] Phone  Notes from previous session reviewed.   [x] Previous Session Goals unchanged.   [] Patient/Caregiver Change in Goals.  Goals added or changed by Patient/Caregiver since program participation:  1.     Continue same plan  2. Be diligent while on vacation to stay on track     Additional/Changed support that patient/caregiver has experienced/sought?  (Indicate readiness, support from family, friends, others, community groups, etc)  1.   eating same as she does now    Additional/Changed obstacles that could prevent patient/caregiver from reaching their goals?  1.  Vacation to [place] for 3 weeks    Feedback provided:  1.  Praised for continued effort and determination    Diagnostic values/Desriptors for follow-up as needed for chronic condition(s)   Weight: 75.1 kg 165 lb 9.1 oz  Blood Glucose: avg 90 day -115    Interventions:   1. Health  listened reflectively, validated thoughts and feelings, offered support and encouragement.   2. Allowed patient to express themselves in a non-biased atmosphere.  3. Health  assisted pt to problem-solve obstacles such as being in a challenging environment and dealing with these challenges.   4. Motivational Interviewed interventions utilized (OARS).   5. Patient responded favorably to interventions and remained actively engaged in the session.   6. Health  will remain available and connected for patient by phone and/or office visits.   7. Positive reinforcement, emotional support and encouragement provided.   8. Focused Education: MI    Plan:  [x] Pt will work on goals as stated above.   [x] Pt will contact Health  for any questions, concerns or needs.  [x] Pt will follow up with Health  in office on  7/18/17 at 1000      [] Pt will follow up with Health  on phone in:        [x] Health  will remain available.   [] Health  will contact patient by phone in:        [] Health  will  consult:        [] Health  will inform Provider via EPIC messaging.     Impression:  1. Behavior is consistent with   Action    Stage of Change.   2. Participation level:       [x] Receptive      [x] Interactive      [] Guarded and Resistant      [x] Self Motivated      [] Refused/Declined to participate   3. [x] Pt voiced understanding of all information presented.       [] Pt voiced needing further information/education. This will be arranged.       [x] Pt would benefit from further education/information as identified by this health . This will be arranged.     Jacqueline Perry RN HC

## 2017-06-14 NOTE — PROGRESS NOTES
"Paris Burris presented for a  Medicare AWV and comprehensive Health Risk Assessment today. The following components were reviewed and updated:    · Medical history  · Family History  · Social history  · Allergies and Current Medications  · Health Risk Assessment  · Health Maintenance  · Care Team     ** See Completed Assessments for Annual Wellness Visit within the encounter summary.**       The following assessments were completed:  · Living Situation  · CAGE  · Depression Screening  · Timed Get Up and Go  · Whisper Test  · Cognitive Function Screening  ·   ·   ·   · Nutrition Screening  · ADL Screening  · PAQ Screening    Vitals:    06/14/17 0903   BP: 118/78   BP Location: Left arm   Patient Position: Lying   BP Method: Manual   Pulse: 68   Weight: 76.1 kg (167 lb 12.3 oz)   Height: 5' 1" (1.549 m)     Body mass index is 31.7 kg/m².  Physical Exam   Constitutional: She is oriented to person, place, and time.   Obese   HENT:   Head: Normocephalic and atraumatic.   Cardiovascular: Normal rate, regular rhythm and normal heart sounds.    No murmur heard.  Pulmonary/Chest: Effort normal and breath sounds normal.   Abdominal: Soft. Bowel sounds are normal.   Obese   Musculoskeletal: Normal range of motion. She exhibits no edema.   Neurological: She is alert and oriented to person, place, and time.   Skin: Skin is warm and dry.   Psychiatric: She has a normal mood and affect. Her behavior is normal. Judgment and thought content normal.   Nursing note and vitals reviewed.        Diagnoses and health risks identified today and associated recommendations/orders:    1. Encounter for preventive health examination  Here for Health Risk Assessment. Follow up in one year.    2. Aortic atherosclerosis  Chronic, stable on current medications. Noted on CT chest 10/08/15. Followed by PCP.    3. Controlled type 2 diabetes mellitus with diabetic nephropathy, without long-term current use of insulin  Chronic, stable on current " medication. Followed by PCP.    4. Stage 2 chronic kidney disease  Chronic, mild, stable. GFR 72.  Followed by PCP.    5. Type 2 diabetes mellitus with diabetic neuropathy, without long-term current use of insulin  Chronic, stable on current medication. Followed by PCP.    6. Diabetic polyneuropathy associated with type 2 diabetes mellitus  Chronic, stable on current medications. Followed by PCP, Podiatry.    7. Hyperparathyroidism  Chronic, stable. Followed by PCP.    8. Diabetes mellitus type 2 in obese  Chronic, stable on current medication. Followed by PCP.    9. MDD (major depressive disorder), recurrent, in partial remission  Chronic, stable on current medication. PHQ-2 score 2. Followed by Psychiatry.    10. Insomnia, unspecified type  Chronic, stable on current medication. Followed by PCP, Psychiatry.    11. Obstructive sleep apnea  Chronic,unable to tolerate CPAP. Followed by PCP.    12. History of breast cancer in female  Stable. Followed by PCP.    13. Iron deficiency anemia, unspecified iron deficiency anemia type  Chronic, stable on current medication. Followed by PCP.    14. History of gastric ulcer  Stable. Followed by PCP.    15. Alcohol dependence, in remission  Stable, in remission.  Followed by PCP, Psychiatry.    16. Fatty liver  Chronic, stable. Followed by PCP.    17. Hyperlipidemia, unspecified hyperlipidemia type  Chronic, stable on current medications. Followed by PCP.    18. Vitamin D deficiency  Chronic, stable on current medications. Followed by PCP.    19. DDD (degenerative disc disease), lumbar  Chronic, stable on current medications. Followed by PCP.    20. Chronic midline thoracic back pain  Chronic, stable on current medications. Followed by PCP.    21. Cervical spine arthritis  Chronic, stable on current medications. Noted on XR Cervical Spine 1/11/16. Followed by PCP.    22. History of vertebral compression fracture  Stable mild T10 fracture. Followed by PCP.    23. Obesity (BMI  30.0 - 34.9)  Chronic, reports she has lost 18 pounds, working with Health . Followed by PCP.      Provided Paris with a 5-10 year written screening schedule and personal prevention plan. Recommendations were developed using the USPSTF age appropriate recommendations. Education, counseling, and referrals were provided as needed. After Visit Summary printed and given to patient which includes a list of additional screenings\tests needed.    Return in 5 weeks (on 7/17/2017).with PCP.    Yary Ferrari NP

## 2017-06-16 ENCOUNTER — CLINICAL SUPPORT (OUTPATIENT)
Dept: REHABILITATION | Facility: OTHER | Age: 72
End: 2017-06-16
Attending: PHYSICAL MEDICINE & REHABILITATION
Payer: MEDICARE

## 2017-06-16 DIAGNOSIS — M50.30 DDD (DEGENERATIVE DISC DISEASE), CERVICAL: Primary | ICD-10-CM

## 2017-06-16 PROCEDURE — G8979 MOBILITY GOAL STATUS: HCPCS | Mod: CI

## 2017-06-16 PROCEDURE — G8978 MOBILITY CURRENT STATUS: HCPCS | Mod: CJ

## 2017-06-16 PROCEDURE — 97110 THERAPEUTIC EXERCISES: CPT

## 2017-06-16 PROCEDURE — 97164 PT RE-EVAL EST PLAN CARE: CPT

## 2017-06-16 NOTE — PLAN OF CARE
[facility] - PHYSICAL THERAPY EVALUATION     Name: Paris Burris  Clinic Number: [id number]    Paris is a 71 y.o. female evaluated on 06/16/2017.   Time in: 8:40  Time out:10:00    Diagnosis: No diagnosis found.  Physician: Cee Woodall, *  Treatment Orders: PT Eval and Treat    Past Medical History:   Diagnosis Date    Allergy     Anemia     Anxiety     Cancer 2002    L breast s/p mastectomy    Decreased hearing     Depression     Diabetes mellitus     Diabetes with neurologic complications     Gastric ulcer 9/10/13    EGD    Hiatal hernia     Hyperlipidemia     Migraine headache     Migraine headache 3/20/2015    Multiple gastric ulcers     Parathyroid disorder     Pre-diabetes     Renal manifestation of secondary diabetes mellitus     Sleep apnea     no CPAP    Type 2 diabetes mellitus, controlled, 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    Wrist fracture      Current Outpatient Prescriptions   Medication Sig    ACCU-CHEK SMARTVIEW TEST STRIP Strp TEST BLOOD SUGAR TWICE A DAY    ascorbic acid (VITAMIN C) 500 MG tablet Take 1,000 mg by mouth once daily.     aspirin (ECOTRIN) 81 MG EC tablet Take 1 tablet (81 mg total) by mouth once daily.    b complex vitamins capsule Take 1 capsule by mouth once daily.    BERB TAO/HERBAL COMPLEX NO.18 (BERBERINE-HERBAL COMB NO.18 ORAL) Take 900 mg by mouth once daily at 6am.    blood sugar diagnostic (ACCU-CHEK MARIAN PLUS TEST STRP) Strp 2 strips by Misc.(Non-Drug; Combo Route) route 2 (two) times daily.    calcium citrate (CALCITRATE) 200 mg (950 mg) tablet Take 1 tablet by mouth once daily.    calcium citrate-vitamin D3 315-200 mg (CITRACAL+D) 315-200 mg-unit per tablet Take 1 tablet by mouth once daily. +magnesium    CITICOLINE SODIUM (CITICOLINE ORAL) Take 1 tablet by mouth once daily at 6am.    co-enzyme Q-10 30 mg capsule Take 100 mg by mouth 3 (three) times daily.    ferrous sulfate 325 mg (65 mg iron) Tab tablet Take 1 tablet  (325 mg total) by mouth 2 (two) times daily.    fluticasone (FLONASE) 50 mcg/actuation nasal spray USE 1 SPRAY IN EACH NOSTRIL EVERY DAY    gabapentin (NEURONTIN) 600 MG tablet Take 1 tablet (600 mg total) by mouth 3 (three) times daily. (Patient taking differently: Take 600 mg by mouth 3 (three) times daily. Taking 300 mg 2 times daily and 600 HG)    lancets Misc 1 lancet by Misc.(Non-Drug; Combo Route) route 2 (two) times daily.    lorazepam (ATIVAN) 0.5 MG tablet Take 1 tablet (0.5 mg total) by mouth daily as needed.    metformin (GLUCOPHAGE-XR) 500 MG 24 hr tablet TAKE 1 TABLET BY MOUTH TWICE A DAY WITH MEALS    mirtazapine (REMERON) 15 MG tablet Take 2 tablets (30 mg total) by mouth every evening. (Patient taking differently: Take 15 mg by mouth every evening. )    NON FORMULARY MEDICATION Take 90 mg by mouth once daily at 6am. AdrenaCort    omega-3 fatty acids-fish oil (FISH OIL) 340-1,000 mg Cap Take 1 capsule by mouth once daily.    rosuvastatin (CRESTOR) 20 MG tablet Take 1 tablet (20 mg total) by mouth once daily.    tramadol (ULTRAM) 50 mg tablet Take 1 tablet (50 mg total) by mouth every 8 (eight) hours as needed. (Patient taking differently: Take 100 mg by mouth once daily at 6am. )    tretinoin (RETIN-A) 0.05 % cream 1 application every evening. At bedtime    TURMERIC (CURCUMIN MISC) Take 800 mg by mouth once daily at 6am.    venlafaxine (EFFEXOR) 75 MG tablet Take 1 tablet (75 mg total) by mouth 2 (two) times daily.    vitamin D 1000 units Tab Take 500 Units by mouth once daily. With K2 50 mch     No current facility-administered medications for this visit.      Review of patient's allergies indicates:   Allergen Reactions    Topamax [topiramate] Other (See Comments)     hallucinations     Precautions: anxiety, cancer, depression, DM, wrist fx, neuropathy in L foot.     Visit # authorized: 1  Authorization period: 4/13/2018  Plan of care Expiration: 6/16/2017    HISTORY     History of  Present Illness: Ms Burris is a 70 yo female who presents with neck pain. She was enrolled for the lumbar program and has been doing well with therapy, but would like to address upper back and neck pain.  She has had neck pain for 1 year, and it has gradually been getting worse.  The pain is middle neck dominant.  There is no radiating pain. There is no numbness and tingling.   The pain is intermittent ranging from 0-5/10.  The pain is an achy pain.  It is worse with reading, using the computer, and standing.  She feels it is better lying on her back, walking and using a thin pillow.  Her goals are to be able to read, watch movies, and stand for extended periods using the computer.  She has not seen anyone but her PCP, she has never done PT for her neck.  Pattern 1      Diagnostic Tests: From EPIC Radiographs    X-ray cervical 8/2016  Cervical spine, 5 views with flexion and extension     C7 is partially obscured by overlying soft tissue.  Mild subluxation of C4 on C5 on flexion view.  Spinal alignment is otherwise maintained.  Intervertebral disc space narrowing C5-C6.  Mild degenerative changes of the cervical spine.  No prevertebral soft tissue thickening.  Odontoid is intact.  No evidence of fracture or dislocation.     X-ray thoracic 8/2016  2 views: There is aortic plaque.  There is mild kyphosis.  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  There is a chronic compression deformity mild one of the lower thoracic levels.  There is no change from 1/11/2016.    Pain Scale: Paris rates pain on a scale of 0-10 to be 5 at worst; 3 currently; 0 at best using VAS.   Pain location: Central neck( lower cervical upper thoracic spine)    Aggravating factors: sitting,turning head, looking down,   Easing Factors: lying, heat, stretches, standing  Disturbed Sleep: no    Pattern of pain questions:  1.  Where is your pain the worst? Neck  2.  Is your pain constant or intermittent? Intermittent  3.  Does bending forward make your typical  pain worse? Yes   4.  Since the start of your back pain, has there been a change in your bowel or bladder?  No  5.  What can't you do now that you use to be able to do? no    Prior Treatment: PT at [facility]  Prior functional status: Independent    DME owned/used: None    Occupation:  Retired (taught reading)   Leisure: garden, walking, read, knit, member of [facility] [employer]                  Pts goals:  Be able to sit longer, be able to descend stairs easier.     Red Flag Screening:   Cough  Sneeze  Strain: No  Bladder/ bowel: No  Falls:  Not recently  General health: Good   Night pain: No   Unexplained weight loss: No  Swallowing: No  Dizziness: No    OBJECTIVE     POSTURE  Posture Alignment :slouched posture  Postural examination/scapula alignment: Rounded shoulder, Head forward and Affected scapula abducted  Joint integrity: Slightly hypomobile as seen through spring testing  Skin integrity: WNL  Edema:Not significant   Sitting: Fair  Standing: Fair  Correction of posture: Better with slimline roll    Range of Motion - MOVEMENT LOSS    ROM Loss   Flexion minimal loss   Extension minimal loss   Side bending Right moderate loss   Side bending Left moderate loss   Rotation Right minimal loss   Rotation Left moderate loss   Protraction moderate loss   Retraction  moderate loss       Upper Extremity Strength  (R) UE  (L) UE    Shoulder flexion: 5/5 Shoulder flexion: 5/5   Shoulder Abduction: 5/5 Shoulder abduction: 5/5   Elbow flexion: 5/5 Elbow flexion: 5/5   Elbow extension: 5/5 Elbow extension: 5/5   Wrist flexion: 5/5 Wrist flexion: 5/5   Wrist extension: 5/5 Wrist extension: 5/5    5/5 : 5/5     NEUROLOGICAL SCREEN    Sensory deficit: Negative    Special Tests:   Test Name  Testing Result   Compression (--)   Distraction (+)   Neural Tension Test (--)   Saddle Sensation (--)     Reflexes:    Left Right   Biceps  2+ 2+   Brachioradialis  2+ 2+   Clonus - -   Babinski NT NT       REPEATED TEST  MOVEMENTS:   Repeated Protraction in Sitting better   Repeated Flexion in Sitting better   Repeated Retraction in Sitting  better   Repeated Retraction Extension in Sitting better   Repeated Protraction in lying better   Repeated Flexion in lying no effect   Repeated Retraction in lying better   Repeated Retraction Extension in lying better       Baseline Isometric Testing on Med X equipment:  Testing administered by PT    Baseline IM Testing Results:   Date of testin2017  ROM 45 - 99 deg   Max Peak Torque 226    Min Peak Torque 112    Flex/Ext Ratio 1.76:1   % below normative data -6.67     GAIT:  Assistive Device used: None  Level of Assistance: Independent  Patient displays the following gait deviations:  No significant limitations.     FOTO: Focus on Therapeutic Outcomes   Category: cervical   % Impaired: 33  Current Score  = CJ = at least 20% but < 40% impaired, limited or restricted  Goal  = CI = at least 1% but < 20% impaired, limited or restricted    Score interpretation is as follows:   TEST SCORE  Modifier  Impairment Limitation Restriction    0/50  CH  0 % impaired, limited or restricted   1-9/50  CI  @ least 1% but less than 20% impaired, limited or restricted   10-19/50  CJ  @ least 20%<40% impaired, limited or restricted   20-29/50  CK  @ least 40%<60% impaired, limited or restricted   30-39/50  CL  @ least 60% <80% impaired, limited or restricted   40-49/50  CM  @ least 80%<100% impaired limited or restricted   50/50  CN  100% impaired, limited or restricted       Treatment   Time In: 9:30  Time Out: 10:00    PT Evaluation Completed? Yes  Discussed Plan of Care with patient: Yes    Written Home Exercises Provided:   Handouts were given to the patient. Pt demo good understanding of the education provided. Paris demonstrated good return demonstration of activities.     - Patient received education regarding proper posture and body mechanics.    - Natalie patel tried, recommended, and purchase  information was provided.    - Patient received a handout regarding anticipated muscular soreness following the isometric test and strategies for management were reviewed with patient including stretching, using ice and scheduled rest.     HEP as follows    Retractions in sitting 10x 3x/day  Scapular retractions 10x 3x/day    Pt was instructed in and performed the following:   Cardiovascular exercise and therapeutic exercise to improve posture, lumbar/cervical ROM, strength, and muscular endurance as follows:     Paris received therapeutic exercises to develop/improve posture, lumbar/cervical ROM, strength and muscular endurance for 30 minutes including the following exercises: cervical med-x testing.     [facility] 6/16/2017   Visit Number 10   VAS Pain Rating 3   Treadmill Time (in min.) 10   Speed 2.8   Retraction in Sitting 10   Scapular Retraction 10   Extension in Lying -   Extension in Standing -   Flexion in Lying -   Cervical Extension Seat Pad 2   Seat Adjustment 453   Top Dead Center 66   Counterweight 1   Cervical Flexion 99   Cervical Extension 45   Cervical Peak Torque 226   Lumbar Extension Seat Pad -   Femur Restraint -   Top Dead Center -   Counterweight -   Lumbar Flexion -   Lumbar Extension -   Lumbar Peak Torque -   Min Torque -   Percent From Norm -   Lumbar Weight -   Repetitions -   Rating of Perceived Exertion -   Ice - Z Lie (in min.) 10       Assessment   This is a 71 y.o. female referred to [facility] and presents with a medical diagnosis of No diagnosis found. and demonstrates limitations as described below in the problem list. Pt rehab potential is Good. Pt presents with cervical dysfunction and poor posture.     Pain Pattern: 1 REP       Patient received education on the [facility] program, purpose of the isometric test, progression of back strengthening as well as wellness approach and systemic strengthening.  Details of the program were discussed.  Reviewed that  patient should feel support/pressure from med ex restraints but no pain or discomfort and patient expressed understanding.    Based on the above history and physical examination an active physical therapy program is recommended.  Pt will continue to benefit from skilled outpatient physical therapy to address the deficits listed below in the chart, provide pt/family education and to maximize pt's level of independence in the home and community environment. .     No environmental, cultural, spiritual, developmental or education needs expressed or noted    Medical necessity is demonstrated by the following problem list.    Pt presents with the following impairments:   History  Co-morbidities and personal factors that may impact the plan of care Examination  Body Structures and Functions, activity limitations and participation restrictions that may impact the plan of care Clinical Presentation   Decision Making/ Complexity Score   Co-morbidities:   see PMH        Personal Factors:   no deficits Body Regions:   neck  back  upper extremities    Body Systems:   gross symmetry  ROM  strength  motor control    Activity limitations:   Learning and applying knowledge  no deficits    General Tasks and Commands  no deficits    Communication  no deficits    Mobility  no deficits    Self care  no deficits    Domestic Life  no deficits    Interactions/Relationships  no deficits    Life Areas  no deficits    Community and Social Life  recreation and leisure    Participation Restrictions:  reading     stable and uncomplicated   low     GOALS: Pt is in agreement with the following goals.    Short term goals: 6 weeks or 10 visits   1.  Pt will demonstrate increased isometric torque by 5% from initial test indicating positive muscular response to program of progressive resistance training  2. Pt will demonstrate reduced pain and improved functional outcomes as reported on the patient centered questionnaires. Report 2-4 points reduction  "NDI indicating improvement in function and pain  3.. Pt will demonstrate independence with reducing or controlling symptoms with ther ex, movement, or position independently, able to reduce pain 1-2 points on pain scale using strategies taught in therapy      Long term goals: 13 weeks or 20 visits  1. Pt will demonstratte increased cervical ROM as measured by med ex by 6 degrees from initial test which results in full functional ROM of neck for ease with ADLs and driving  2. Pt will demonstrate increased isometric torque by 10% from initial test to improve ability to lift and carry.   3.Patient will demonstrate improved overall function per FOTO Survey to CI = at least 1% but < 20% impaired, limited or restricted score or less.   4. Pt will demonstrate independence with reducing or controlling symptoms with ther ex, movement, or position independently, able to reduce pain 2-4 points on pain scale using strategies taught in therapy  5. Pt will demonstrate independence with the HEP at discharge.     Plan   Outpatient physical therapy 2x week for 13 weeks or 20 visits to include the following:   - Patient education  - Therapeutic exercise  - Manual therapy  - Performance testing   - Neuromuscular Re-education  - Therapeutic activity   - Modalities    Pt may be seen by PTA as part of the rehabilitation team.     Therapist: Dustin Queen, PT  6/16/2017      "I certify the need for these services furnished under this plan of treatment and while under my care."    ____________________________________  Physician/Referring Practitioner    _______________  Date of Signature          "

## 2017-06-16 NOTE — PROGRESS NOTES
[facility] - PHYSICAL THERAPY EVALUATION     Name: Paris Burris  Clinic Number: [id number]    Paris is a 71 y.o. female evaluated on 06/16/2017.   Time in: 8:40  Time out:10:00    Diagnosis: No diagnosis found.  Physician: Cee Woodall, *  Treatment Orders: PT Eval and Treat    Past Medical History:   Diagnosis Date    Allergy     Anemia     Anxiety     Cancer 2002    L breast s/p mastectomy    Decreased hearing     Depression     Diabetes mellitus     Diabetes with neurologic complications     Gastric ulcer 9/10/13    EGD    Hiatal hernia     Hyperlipidemia     Migraine headache     Migraine headache 3/20/2015    Multiple gastric ulcers     Parathyroid disorder     Pre-diabetes     Renal manifestation of secondary diabetes mellitus     Sleep apnea     no CPAP    Type 2 diabetes mellitus, controlled, 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    Wrist fracture      Current Outpatient Prescriptions   Medication Sig    ACCU-CHEK SMARTVIEW TEST STRIP Strp TEST BLOOD SUGAR TWICE A DAY    ascorbic acid (VITAMIN C) 500 MG tablet Take 1,000 mg by mouth once daily.     aspirin (ECOTRIN) 81 MG EC tablet Take 1 tablet (81 mg total) by mouth once daily.    b complex vitamins capsule Take 1 capsule by mouth once daily.    BERB TAO/HERBAL COMPLEX NO.18 (BERBERINE-HERBAL COMB NO.18 ORAL) Take 900 mg by mouth once daily at 6am.    blood sugar diagnostic (ACCU-CHEK MARIAN PLUS TEST STRP) Strp 2 strips by Misc.(Non-Drug; Combo Route) route 2 (two) times daily.    calcium citrate (CALCITRATE) 200 mg (950 mg) tablet Take 1 tablet by mouth once daily.    calcium citrate-vitamin D3 315-200 mg (CITRACAL+D) 315-200 mg-unit per tablet Take 1 tablet by mouth once daily. +magnesium    CITICOLINE SODIUM (CITICOLINE ORAL) Take 1 tablet by mouth once daily at 6am.    co-enzyme Q-10 30 mg capsule Take 100 mg by mouth 3 (three) times daily.    ferrous sulfate 325 mg (65 mg iron) Tab tablet Take 1 tablet  (325 mg total) by mouth 2 (two) times daily.    fluticasone (FLONASE) 50 mcg/actuation nasal spray USE 1 SPRAY IN EACH NOSTRIL EVERY DAY    gabapentin (NEURONTIN) 600 MG tablet Take 1 tablet (600 mg total) by mouth 3 (three) times daily. (Patient taking differently: Take 600 mg by mouth 3 (three) times daily. Taking 300 mg 2 times daily and 600 HG)    lancets Misc 1 lancet by Misc.(Non-Drug; Combo Route) route 2 (two) times daily.    lorazepam (ATIVAN) 0.5 MG tablet Take 1 tablet (0.5 mg total) by mouth daily as needed.    metformin (GLUCOPHAGE-XR) 500 MG 24 hr tablet TAKE 1 TABLET BY MOUTH TWICE A DAY WITH MEALS    mirtazapine (REMERON) 15 MG tablet Take 2 tablets (30 mg total) by mouth every evening. (Patient taking differently: Take 15 mg by mouth every evening. )    NON FORMULARY MEDICATION Take 90 mg by mouth once daily at 6am. AdrenaCort    omega-3 fatty acids-fish oil (FISH OIL) 340-1,000 mg Cap Take 1 capsule by mouth once daily.    rosuvastatin (CRESTOR) 20 MG tablet Take 1 tablet (20 mg total) by mouth once daily.    tramadol (ULTRAM) 50 mg tablet Take 1 tablet (50 mg total) by mouth every 8 (eight) hours as needed. (Patient taking differently: Take 100 mg by mouth once daily at 6am. )    tretinoin (RETIN-A) 0.05 % cream 1 application every evening. At bedtime    TURMERIC (CURCUMIN MISC) Take 800 mg by mouth once daily at 6am.    venlafaxine (EFFEXOR) 75 MG tablet Take 1 tablet (75 mg total) by mouth 2 (two) times daily.    vitamin D 1000 units Tab Take 500 Units by mouth once daily. With K2 50 mch     No current facility-administered medications for this visit.      Review of patient's allergies indicates:   Allergen Reactions    Topamax [topiramate] Other (See Comments)     hallucinations     Precautions: anxiety, cancer, depression, DM, wrist fx, neuropathy in L foot.     Visit # authorized: 1  Authorization period: 4/13/2018  Plan of care Expiration: 6/16/2017    HISTORY     History of  Present Illness: Ms Burris is a 70 yo female who presents with neck pain. She was enrolled for the lumbar program and has been doing well with therapy, but would like to address upper back and neck pain.  She has had neck pain for 1 year, and it has gradually been getting worse.  The pain is middle neck dominant.  There is no radiating pain. There is no numbness and tingling.   The pain is intermittent ranging from 0-5/10.  The pain is an achy pain.  It is worse with reading, using the computer, and standing.  She feels it is better lying on her back, walking and using a thin pillow.  Her goals are to be able to read, watch movies, and stand for extended periods using the computer.  She has not seen anyone but her PCP, she has never done PT for her neck.  Pattern 1      Diagnostic Tests: From EPIC Radiographs    X-ray cervical 8/2016  Cervical spine, 5 views with flexion and extension     C7 is partially obscured by overlying soft tissue.  Mild subluxation of C4 on C5 on flexion view.  Spinal alignment is otherwise maintained.  Intervertebral disc space narrowing C5-C6.  Mild degenerative changes of the cervical spine.  No prevertebral soft tissue thickening.  Odontoid is intact.  No evidence of fracture or dislocation.     X-ray thoracic 8/2016  2 views: There is aortic plaque.  There is mild kyphosis.  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  There is a chronic compression deformity mild one of the lower thoracic levels.  There is no change from 1/11/2016.    Pain Scale: Paris rates pain on a scale of 0-10 to be 5 at worst; 3 currently; 0 at best using VAS.   Pain location: Central neck( lower cervical upper thoracic spine)    Aggravating factors: sitting,turning head, looking down,   Easing Factors: lying, heat, stretches, standing  Disturbed Sleep: no    Pattern of pain questions:  1.  Where is your pain the worst? Neck  2.  Is your pain constant or intermittent? Intermittent  3.  Does bending forward make your typical  pain worse? Yes   4.  Since the start of your back pain, has there been a change in your bowel or bladder?  No  5.  What can't you do now that you use to be able to do? no    Prior Treatment: PT at [facility]  Prior functional status: Independent    DME owned/used: None    Occupation:  Retired (taught reading)   Leisure: garden, walking, read, knit, member of [facility] [employer]                  Pts goals:  Be able to sit longer, be able to descend stairs easier.     Red Flag Screening:   Cough  Sneeze  Strain: No  Bladder/ bowel: No  Falls:  Not recently  General health: Good   Night pain: No   Unexplained weight loss: No  Swallowing: No  Dizziness: No    OBJECTIVE     POSTURE  Posture Alignment :slouched posture  Postural examination/scapula alignment: Rounded shoulder, Head forward and Affected scapula abducted  Joint integrity: Slightly hypomobile as seen through spring testing  Skin integrity: WNL  Edema:Not significant   Sitting: Fair  Standing: Fair  Correction of posture: Better with slimline roll    Range of Motion - MOVEMENT LOSS    ROM Loss   Flexion minimal loss   Extension minimal loss   Side bending Right moderate loss   Side bending Left moderate loss   Rotation Right minimal loss   Rotation Left moderate loss   Protraction moderate loss   Retraction  moderate loss       Upper Extremity Strength  (R) UE  (L) UE    Shoulder flexion: 5/5 Shoulder flexion: 5/5   Shoulder Abduction: 5/5 Shoulder abduction: 5/5   Elbow flexion: 5/5 Elbow flexion: 5/5   Elbow extension: 5/5 Elbow extension: 5/5   Wrist flexion: 5/5 Wrist flexion: 5/5   Wrist extension: 5/5 Wrist extension: 5/5    5/5 : 5/5     NEUROLOGICAL SCREEN    Sensory deficit: Negative    Special Tests:   Test Name  Testing Result   Compression (--)   Distraction (+)   Neural Tension Test (--)   Saddle Sensation (--)     Reflexes:    Left Right   Biceps  2+ 2+   Brachioradialis  2+ 2+   Clonus - -   Babinski NT NT       REPEATED TEST  MOVEMENTS:   Repeated Protraction in Sitting better   Repeated Flexion in Sitting better   Repeated Retraction in Sitting  better   Repeated Retraction Extension in Sitting better   Repeated Protraction in lying better   Repeated Flexion in lying no effect   Repeated Retraction in lying better   Repeated Retraction Extension in lying better       Baseline Isometric Testing on Med X equipment:  Testing administered by PT    Baseline IM Testing Results:   Date of testin2017  ROM 45 - 99 deg   Max Peak Torque 226    Min Peak Torque 112    Flex/Ext Ratio 1.76:1   % below normative data -6.67     GAIT:  Assistive Device used: None  Level of Assistance: Independent  Patient displays the following gait deviations:  No significant limitations.     FOTO: Focus on Therapeutic Outcomes   Category: cervical   % Impaired: 33  Current Score  = CJ = at least 20% but < 40% impaired, limited or restricted  Goal  = CI = at least 1% but < 20% impaired, limited or restricted    Score interpretation is as follows:   TEST SCORE  Modifier  Impairment Limitation Restriction    0/50  CH  0 % impaired, limited or restricted   1-9/50  CI  @ least 1% but less than 20% impaired, limited or restricted   10-19/50  CJ  @ least 20%<40% impaired, limited or restricted   20-29/50  CK  @ least 40%<60% impaired, limited or restricted   30-39/50  CL  @ least 60% <80% impaired, limited or restricted   40-49/50  CM  @ least 80%<100% impaired limited or restricted   50/50  CN  100% impaired, limited or restricted       Treatment   Time In: 9:30  Time Out: 10:00    PT Evaluation Completed? Yes  Discussed Plan of Care with patient: Yes    Written Home Exercises Provided:   Handouts were given to the patient. Pt demo good understanding of the education provided. Paris demonstrated good return demonstration of activities.     - Patient received education regarding proper posture and body mechanics.    - Natalie patel tried, recommended, and purchase  information was provided.    - Patient received a handout regarding anticipated muscular soreness following the isometric test and strategies for management were reviewed with patient including stretching, using ice and scheduled rest.     HEP as follows    Retractions in sitting 10x 3x/day  Scapular retractions 10x 3x/day    Pt was instructed in and performed the following:   Cardiovascular exercise and therapeutic exercise to improve posture, lumbar/cervical ROM, strength, and muscular endurance as follows:     Paris received therapeutic exercises to develop/improve posture, lumbar/cervical ROM, strength and muscular endurance for 30 minutes including the following exercises: cervical med-x testing.     [facility] 6/16/2017   Visit Number 10   VAS Pain Rating 3   Treadmill Time (in min.) 10   Speed 2.8   Retraction in Sitting 10   Scapular Retraction 10   Extension in Lying -   Extension in Standing -   Flexion in Lying -   Cervical Extension Seat Pad 2   Seat Adjustment 453   Top Dead Center 66   Counterweight 1   Cervical Flexion 99   Cervical Extension 45   Cervical Peak Torque 226   Lumbar Extension Seat Pad -   Femur Restraint -   Top Dead Center -   Counterweight -   Lumbar Flexion -   Lumbar Extension -   Lumbar Peak Torque -   Min Torque -   Percent From Norm -   Lumbar Weight -   Repetitions -   Rating of Perceived Exertion -   Ice - Z Lie (in min.) 10       Assessment   This is a 71 y.o. female referred to [facility] and presents with a medical diagnosis of No diagnosis found. and demonstrates limitations as described below in the problem list. Pt rehab potential is Good. Pt presents with cervical dysfunction and poor posture.     Pain Pattern: 1 REP       Patient received education on the [facility] program, purpose of the isometric test, progression of back strengthening as well as wellness approach and systemic strengthening.  Details of the program were discussed.  Reviewed that  patient should feel support/pressure from med ex restraints but no pain or discomfort and patient expressed understanding.    Based on the above history and physical examination an active physical therapy program is recommended.  Pt will continue to benefit from skilled outpatient physical therapy to address the deficits listed below in the chart, provide pt/family education and to maximize pt's level of independence in the home and community environment. .     No environmental, cultural, spiritual, developmental or education needs expressed or noted    Medical necessity is demonstrated by the following problem list.    Pt presents with the following impairments:   History  Co-morbidities and personal factors that may impact the plan of care Examination  Body Structures and Functions, activity limitations and participation restrictions that may impact the plan of care Clinical Presentation   Decision Making/ Complexity Score   Co-morbidities:   see PMH        Personal Factors:   no deficits Body Regions:   neck  back  upper extremities    Body Systems:   gross symmetry  ROM  strength  motor control    Activity limitations:   Learning and applying knowledge  no deficits    General Tasks and Commands  no deficits    Communication  no deficits    Mobility  no deficits    Self care  no deficits    Domestic Life  no deficits    Interactions/Relationships  no deficits    Life Areas  no deficits    Community and Social Life  recreation and leisure    Participation Restrictions:  reading     stable and uncomplicated   low     GOALS: Pt is in agreement with the following goals.    Short term goals: 6 weeks or 10 visits   1.  Pt will demonstrate increased isometric torque by 5% from initial test indicating positive muscular response to program of progressive resistance training  2. Pt will demonstrate reduced pain and improved functional outcomes as reported on the patient centered questionnaires. Report 2-4 points reduction  "NDI indicating improvement in function and pain  3.. Pt will demonstrate independence with reducing or controlling symptoms with ther ex, movement, or position independently, able to reduce pain 1-2 points on pain scale using strategies taught in therapy      Long term goals: 13 weeks or 20 visits  1. Pt will demonstratte increased cervical ROM as measured by med ex by 6 degrees from initial test which results in full functional ROM of neck for ease with ADLs and driving  2. Pt will demonstrate increased isometric torque by 10% from initial test to improve ability to lift and carry.   3.Patient will demonstrate improved overall function per FOTO Survey to CI = at least 1% but < 20% impaired, limited or restricted score or less.   4. Pt will demonstrate independence with reducing or controlling symptoms with ther ex, movement, or position independently, able to reduce pain 2-4 points on pain scale using strategies taught in therapy  5. Pt will demonstrate independence with the HEP at discharge.     Plan   Outpatient physical therapy 2x week for 13 weeks or 20 visits to include the following:   - Patient education  - Therapeutic exercise  - Manual therapy  - Performance testing   - Neuromuscular Re-education  - Therapeutic activity   - Modalities    Pt may be seen by PTA as part of the rehabilitation team.     Therapist: Dustin Queen, PT  6/16/2017      "I certify the need for these services furnished under this plan of treatment and while under my care."    ____________________________________  Physician/Referring Practitioner    _______________  Date of Signature        "

## 2017-06-20 ENCOUNTER — CLINICAL SUPPORT (OUTPATIENT)
Dept: REHABILITATION | Facility: OTHER | Age: 72
End: 2017-06-20
Attending: INTERNAL MEDICINE
Payer: MEDICARE

## 2017-06-20 DIAGNOSIS — M50.30 DDD (DEGENERATIVE DISC DISEASE), CERVICAL: Primary | ICD-10-CM

## 2017-06-20 DIAGNOSIS — M51.36 DDD (DEGENERATIVE DISC DISEASE), LUMBAR: ICD-10-CM

## 2017-06-20 PROCEDURE — 97110 THERAPEUTIC EXERCISES: CPT | Performed by: PHYSICAL MEDICINE & REHABILITATION

## 2017-06-20 PROCEDURE — 97750 PHYSICAL PERFORMANCE TEST: CPT | Performed by: PHYSICAL MEDICINE & REHABILITATION

## 2017-06-20 NOTE — PROGRESS NOTES
[facility]        Neck program started 5/5/17 and back program started 6/16/17, VISIT 10 for back, IM retest and visit 2 neck, visit 11 for program    Name: Paris Burris  Clinic Number: [id number]  Date of Treatment: 06/20/2017   Diagnosis:   No diagnosis found.  Physician: Mandi Huynh MD    Pain pattern determined: 1, PEP extensions and flexion tolerated for back and 1 REP for neck with retractions    Plan of care signed: 5/8/17 for low back and 6/16/17 for neck program  POC signed 5/8/17 and 6/16/17  POC due: 8/8/17      Time in:  6:50 am  Time Out: 8:00 am  Total Treatment time: 50 min    Precautions: SEE PMH,  anxiety, cancer, depression, DM, wrist fx, neuropathy in L foot.          Visit # authorized: 12  Authorization period: 4/13/2018  Plan of care Expiration: 8/5/2017    Visit #: 10 lumbar and 2 cervical, visit 11 program    Evaluation:  5/5/17  Reasessment due:  6/5/17 done 5/31/17  Assessment due: 6/30/17 done 6/20/17  Assess neck and back 7/20/17      Subjective     Paris reports mild soreness after neck test.  No neck pain today, doing the retractions without difficulty.  She is happy to start the neck.  She notes improvement of back symptoms overall. She states that she has 0/10 back pain today .  She reports she always feels better after coming. She states that she has been doing her HEP regularly and it is going well.  She did need cuing for hand position with extensions, and she asked for pictures of retractions.  We gave new handouts today for all there ex.  She did report she may be involved in alzheimer study but her memory is fair.  She is sitting with lumbar support. She has ball for stretches.  She feels the program is helping her.     Patient reports their pain to be 0/10 on a 0-10 scale with 0 being no pain and 10 being the worst pain imaginable.  Pain Location: low back and mid-upper back     Prior Treatment: PT at [facility]  Prior functional status:  Independent  DME owned/used: no  Occupation:  Retired (taught reading)   Leisure: garden, walking, read, knit   Pts goals: Be able to sit longer, be able to descend stairs easier.          Objective     Baseline IM Testing Results: back 17  Date of testin2017  ROM 0-30 deg   Max Peak Torque 111 ft lbs    Min Peak Torque   28 ft lbs   Flex/Ext Ratio  4/1   % below normative data 12% below         [facility]  IM Testing Results: back 17  Date of testing:   ROM 0-42 degrees   Max Peak Torque 140 ft lbs    Min Peak Torque   61 ft lbs   Flex/Ext Ratio  2.5/1   % below normative data 1% below     44% gain in average strength      MOVEMENT LOSS back-reassessed 17   ROM Loss   Flexion moderate loss improved to min loss   Extension minimal loss     Side bending Right moderate loss -improved to min loss   Side bending Left moderate loss - improved to min loss   Rotation Right moderate loss - improved to min loss   Rotation Left moderate loss - improved to min loss       Baseline IM Testing Results:   neck  Date of testin2017  ROM 45 - 99 deg   Max Peak Torque 226    Min Peak Torque 112    Flex/Ext Ratio 1.76:1   % below normative data -6.67              Treatment    Pt was instructed in and performed the following:     Paris received therapeutic exercises to develop/improved posture, cardiovascular endurance, muscular endurance, lumbar  ROM, strength and muscular endurance for 50 minutes including the following exercises:           Flexion in lie  Modified piriformis stretch 10 s/h 3x B  LTR 10x B  Ext in standing  Ext in lie modified to ext using elbows to push up, (Pt expressed increase wrist pain after 4 reps, defer for EIS)  Scapular retractions 10x   Handouts given for all there ex 17    Neck:  Retractions, min cuing  scap retractions    [facility] 2017   Visit Number 11   VAS Pain Rating 3   Treadmill Time (in min.) 10   Speed 3   Retraction in Sitting 10   Scapular  Retraction 10   Extension in Lying 10   Extension in Standing 10   Flexion in Lying 10   Cervical Extension Seat Pad -   Seat Adjustment -   Top Dead Center -   Counterweight -   Cervical Flexion -   Cervical Extension -   Cervical Peak Torque -   Cervical Weight 162   Repetitions 15   Rating of Perceived Exertion 4   Lumbar Flexion 42   Lumbar Extension 0   Lumbar Peak Torque 140   Min Torque 61   Percent From Norm 1   Percent Change from Initial 44   Lumbar Weight -   Repetitions -   Rating of Perceived Exertion -   Ice - Z Lie (in min.) 10           Peripheral muscle strengthening which included 1 set of 15-20 repetitions at a slow, controlled 7 second per rep pace focused on strengthening supporting musculature for improved body mechanics and functional mobility.  Pt and therapist focused on proper form during treatment to ensure optimal strengthening of each targeted muscle group.  Machines were utilized including torso rotation, leg extension, leg curl, chest press,  upright row, tricep extension, biceps, leg press and hip abduction.    Written Home Exercises Provided: BACK  -walking at [facility] 10-15 min 2-3/week recommended  -use of lumbar roll -yes  -ext in standing 3/day   -flexion in lie 3/day   -ext on elbows prone 2/day ( not doing as much 5/12/17)   Modified piriformis, LTR.   Scapular retractions   Handouts were given to the patient. Pt demo fair understanding of the education provided. Paris demonstrated fair return demonstration of activities.   Lumbar roll use compliance: yes and has ball.   HEP NECK  Retractions in sitting 10x 3x/day  Scapular retractions 10x 3x/day     Additional exercises taught this treatment session:  Reviewed handouts and printed for patient again 6/2/17      Assessment     Patient has attended 11 visits, 10 back and neck eval at [facility] for her low back which included MD evaluation, PT evaluation with isometric testing, and physical therapy  treatment including HEP instruction, education, aerobic work, dynamic strengthening on med ex equipment for the spine, and whole body strengthening on med ex equipment with increasing weight loads.  Patient  is demonstrating increased ability to reduce back symptoms, improved posture, improved  Back  ROM, and improved lumbar   strength on med ex test by  44% Average and within normative data.     She recently was evaluated for her neck and neck dynamic strengthening started today.  She tolerated cervical med ex well, with minimal cuing for speed, no pain. She tolerated back IM test well.  She demo her HEP well with mild vc for tech. She has asked for handouts regularly and suspect memory issues.  She tolerated dynamic strengthening for neck and IM retest for back and is responding to mechanical methods of reducing pain well.    Pt will continue to benefit from skilled outpatient physical therapy to address the deficits stated in the impairment chart, provide pt/family education and to maximize pt's level of independence in the home and community environment.   She reports her neck pain bothers her more than her back pain and she is happy to have started  program for her neck.      Pt's spiritual, cultural and educational needs considered and pt agreeable to plan of care and goals as stated below:   Medical necessity is demonstrated by the following problem list.   Pt presents with the following impairments:   History  Co-morbidities and personal factors that may impact the plan of care Examination  Body Structures and Functions, activity limitations and participation restrictions that may impact the plan of care Clinical Presentation Decision Making/ Complexity Score   Co-morbidities:   advanced age           Personal Factors:   age Body Regions:   back  lower extremities     Body Systems:   ROM  strength     Activity limitations:   Learning and applying knowledge  no deficits     General Tasks and Commands  no  deficits     Communication  communicating with/receiving spoken language  no deficits     Mobility  no deficits     Self care  no deficits     Domestic Life  no deficits     Interactions/Relationships  no deficits     Life Areas  no deficits     Community and Social Life  no deficits     Participation Restrictions:   Cleaning, reading in sitting    stable and uncomplicated    low            GOALS: Pt is in agreement with the following goals.     Short term goals: 6 weeks or 10 visits BACK  1. Pt will demonstrate increased lumbar ROM by at least 3 degrees from the initial ROM value with improvements noted in functional ROM and ability to perform ADLs  MET  2. Pt will demonstrate increased by > 10 % MET  3. Patient report a reduction in worst pain score by 1-2 points for improved tolerance during work and recreational activities  MET  4. Pt able to perform HEP correctly with minimal cueing or supervision for therapist  MET        Long term goals: 13 weeks or 20 visits BACK  1. Pt will demonstrate increased lumbar ROM by at least 3 degrees from initial ROM value, resulting in improved ability to perform functional fwd bending while standing and sitting. MET  2. Pt will demonstrate increased maximum isometric torque value by 5% when compared to the initial value resulting in improved ability to perform bending, lifting, and carrying activities safely, confidently.  MET  3. Pt to demonstrate ability to independently control and reduce their pain through posture positioning and mechanical movements throughout a typical day.  4. Patient will demonstrate improved overall function per FOTO Survey to CJ = at least 1% but < 20% impaired, limited or restricted score or less.    Short term goals: 6 weeks or 10 visits NECK  1.  Pt will demonstrate increased isometric torque by 5% from initial test indicating positive muscular response to program of progressive resistance training  2. Pt will demonstrate reduced pain and improved  functional outcomes as reported on the patient centered questionnaires. Report 2-4 points reduction NDI indicating improvement in function and pain  3.. Pt will demonstrate independence with reducing or controlling symptoms with ther ex, movement, or position independently, able to reduce pain 1-2 points on pain scale using strategies taught in therapy        Long term goals: 13 weeks or 20 visits  NECK  1. Pt will demonstratte increased cervical ROM as measured by med ex by 6 degrees from initial test which results in full functional ROM of neck for ease with ADLs and driving  2. Pt will demonstrate increased isometric torque by 10% from initial test to improve ability to lift and carry.   3.Patient will demonstrate improved overall function per FOTO Survey to CI = at least 1% but < 20% impaired, limited or restricted score or less.   4. Pt will demonstrate independence with reducing or controlling symptoms with ther ex, movement, or position independently, able to reduce pain 2-4 points on pain scale using strategies taught in therapy  5. Pt will demonstrate independence with the HEP at discharge.         FOTO:    1st visit: 33% limited  5th visit: 41% limited back  FOTO BACK VISIT 10 33 %  VISIT FOR NECK 6/16/17 % Impaired: 33      Plan   Continue with established Plan of Care towards established PT goals.

## 2017-06-26 NOTE — TELEPHONE ENCOUNTER
Please call to see if pt is taking the hydrocodone/APAP from podiatrist. If so, then do not combine w/ tramadol.

## 2017-06-28 ENCOUNTER — PATIENT MESSAGE (OUTPATIENT)
Dept: INTERNAL MEDICINE | Facility: CLINIC | Age: 72
End: 2017-06-28

## 2017-06-28 NOTE — TELEPHONE ENCOUNTER
----- Message from Shilpa Dunbar sent at 6/28/2017 11:50 AM CDT -----  Contact: [facility]/ [facility]/ [phone]   Type: Rx    Name of medication(s): tramadol (ULTRAM) 50 mg tablet    Is this a refill? New rx? Refill     Who prescribed medication? Dr. Huynh    Pharmacy Name, Phone, & Location: [facility] on file     Comments: Malu is calling to have a refill on the medication above. Please call and advise       Thank you

## 2017-06-29 ENCOUNTER — PATIENT MESSAGE (OUTPATIENT)
Dept: INTERNAL MEDICINE | Facility: CLINIC | Age: 72
End: 2017-06-29

## 2017-06-29 ENCOUNTER — CLINICAL SUPPORT (OUTPATIENT)
Dept: REHABILITATION | Facility: OTHER | Age: 72
End: 2017-06-29
Attending: INTERNAL MEDICINE
Payer: MEDICARE

## 2017-06-29 DIAGNOSIS — M54.2 NECK PAIN: ICD-10-CM

## 2017-06-29 DIAGNOSIS — M50.30 DDD (DEGENERATIVE DISC DISEASE), CERVICAL: ICD-10-CM

## 2017-06-29 PROCEDURE — 97110 THERAPEUTIC EXERCISES: CPT

## 2017-06-29 RX ORDER — TRAMADOL HYDROCHLORIDE 50 MG/1
100 TABLET ORAL DAILY
Qty: 60 TABLET | Refills: 2 | Status: SHIPPED | OUTPATIENT
Start: 2017-06-29 | End: 2018-01-07 | Stop reason: SDUPTHER

## 2017-06-29 RX ORDER — TRAMADOL HYDROCHLORIDE 50 MG/1
50 TABLET ORAL EVERY 8 HOURS PRN
Qty: 60 TABLET | Refills: 2 | OUTPATIENT
Start: 2017-06-29

## 2017-06-29 NOTE — PROGRESS NOTES
[facility]        Neck program started 5/5/17 and back program started 6/16/17, VISIT 11 for back, IM retest and visit 3 neck, visit 12 for program    Name: Paris Burris  Clinic Number: [id number]  Date of Treatment: 06/29/2017   Diagnosis:   No diagnosis found.  Physician: Cee Woodall, *    Pain pattern determined: 1, PEP extensions and flexion tolerated for back and 1 REP for neck with retractions    Plan of care signed: 5/8/17 for low back and 6/16/17 for neck program  POC signed 5/8/17 and 6/16/17  POC due: 8/8/17      Time in:  10;05 am  Time Out: 11:05 am  Total Treatment time: 50 min    Precautions: SEE PMH,  anxiety, cancer, depression, DM, wrist fx, neuropathy in L foot.          Visit # authorized: 12  Authorization period: 4/13/2018  Plan of care Expiration: 8/5/2017    Visit #: 11 lumbar and 2 cervical, visit 12 program    Evaluation:  5/5/17  Reasessment due:  6/5/17 done 5/31/17  Assessment due: 6/30/17 done 6/20/17  Assess neck and back 7/20/17      Subjective     Paris reports mild soreness after last session. Mild neck and low back pain today. She is performing HEP 1x daily in the morning.) She reports she always feels better after coming.   She did report she may be involved in alzheimer study but her memory is fair.  She is sitting with lumbar support. She has ball for stretches.  She feels the program is helping her. She notes she has some depression about increased neuropathy in feet and it makes it hard to be motivated to perform HEP.     Patient reports their pain to be 2/10 on a 0-10 scale with 0 being no pain and 10 being the worst pain imaginable.  Pain Location: low back and mid-upper back     Prior Treatment: PT at [facility]  Prior functional status: Independent  DME owned/used: no  Occupation:  Retired (taught reading)   Leisure: garden, walking, read, knit   Pts goals: Be able to sit longer, be able to descend stairs easier.           Objective     Baseline IM Testing Results: back 17  Date of testin2017  ROM 0-30 deg   Max Peak Torque 111 ft lbs    Min Peak Torque   28 ft lbs   Flex/Ext Ratio  4/1   % below normative data 12% below         [facility]  IM Testing Results: back 17  Date of testing:   ROM 0-42 degrees   Max Peak Torque 140 ft lbs    Min Peak Torque   61 ft lbs   Flex/Ext Ratio  2.5/1   % below normative data 1% below     44% gain in average strength      MOVEMENT LOSS back-reassessed 17   ROM Loss   Flexion moderate loss improved to min loss   Extension minimal loss     Side bending Right moderate loss -improved to min loss   Side bending Left moderate loss - improved to min loss   Rotation Right moderate loss - improved to min loss   Rotation Left moderate loss - improved to min loss       Baseline IM Testing Results:   neck  Date of testin2017  ROM 45 - 99 deg   Max Peak Torque 226    Min Peak Torque 112    Flex/Ext Ratio 1.76:1   % below normative data -6.67              Treatment    Pt was instructed in and performed the following:     Paris received therapeutic exercises to develop/improved posture, cardiovascular endurance, muscular endurance, lumbar  ROM, strength and muscular endurance for 50 minutes including the following exercises:     [facility] 2017   Visit Number 12   VAS Pain Rating 2   Treadmill Time (in min.) 5   Speed 3   Retraction in Sitting 10   Scapular Retraction 10   Extension in Lying    Extension in Standing 10   Flexion in Lying 10   Cervical Extension Seat Pad -   Seat Adjustment -   Top Dead Center -   Counterweight -   Cervical Flexion -   Cervical Extension -   Cervical Peak Torque -   Cervical Weight 162   Repetitions 20   Rating of Perceived Exertion 3   Lumbar Extension Seat Pad -   Femur Restraint -   Top Dead Center -   Counterweight -   Lumbar Flexion -   Lumbar Extension -   Lumbar Peak Torque -   Min Torque -   Percent From Norm -   Percent  Change from Initial -   Lumbar Weight 59   Repetitions 20   Rating of Perceived Exertion 3   Ice - Z Lie (in min.) 10     Flexion in lie  Modified piriformis stretch 10 s/h 3x B  LTR 10x B  Ext in standing 10x  Handouts given for all there ex 6/2/17    Neck:  Retractions 10x min cuing  scap retractions 15x      Peripheral muscle strengthening which included 1 set of 15-20 repetitions at a slow, controlled 7 second per rep pace focused on strengthening supporting musculature for improved body mechanics and functional mobility.  Pt and therapist focused on proper form during treatment to ensure optimal strengthening of each targeted muscle group.  Machines were utilized including torso rotation, leg extension, leg curl, chest press,  upright row, tricep extension, biceps, leg press and hip abduction.    Written Home Exercises Provided: BACK  -walking at [facility] 10-15 min 2-3/week recommended  -use of lumbar roll -yes  -ext in standing 3/day   -flexion in lie 3/day   -ext on elbows prone 2/day ( not doing as much 5/12/17)   Modified piriformis, LTR.   Scapular retractions   Handouts were given to the patient. Pt demo fair understanding of the education provided. Paris demonstrated fair return demonstration of activities.   Lumbar roll use compliance: yes and has ball.   HEP NECK  Retractions in sitting 10x 3x/day  Scapular retractions 10x 3x/day     Additional exercises taught this treatment session:  Reviewed handouts and printed for patient again 6/2/17      Assessment     Pt presents with mild low back and neck pain which improved during and post session. She tolerated cervical med ex well, with minimal cuing for speed, no pain. She tolerated Med X lumbar extension with no reports of pain or discomfort. She demo her HEP well with mild vc for tech. She tolerated all exercises without reports of pain or discomfort today.   Pt will continue to benefit from skilled outpatient physical therapy to address  the deficits stated in the impairment chart, provide pt/family education and to maximize pt's level of independence in the home and community environment.   She reports her neck pain bothers her more than her back pain and she is happy to have started  program for her neck.      Pt's spiritual, cultural and educational needs considered and pt agreeable to plan of care and goals as stated below:   Medical necessity is demonstrated by the following problem list.   Pt presents with the following impairments:   History  Co-morbidities and personal factors that may impact the plan of care Examination  Body Structures and Functions, activity limitations and participation restrictions that may impact the plan of care Clinical Presentation Decision Making/ Complexity Score   Co-morbidities:   advanced age           Personal Factors:   age Body Regions:   back  lower extremities     Body Systems:   ROM  strength     Activity limitations:   Learning and applying knowledge  no deficits     General Tasks and Commands  no deficits     Communication  communicating with/receiving spoken language  no deficits     Mobility  no deficits     Self care  no deficits     Domestic Life  no deficits     Interactions/Relationships  no deficits     Life Areas  no deficits     Community and Social Life  no deficits     Participation Restrictions:   Cleaning, reading in sitting    stable and uncomplicated    low            GOALS: Pt is in agreement with the following goals.     Short term goals: 6 weeks or 10 visits BACK  1. Pt will demonstrate increased lumbar ROM by at least 3 degrees from the initial ROM value with improvements noted in functional ROM and ability to perform ADLs  MET  2. Pt will demonstrate increased by > 10 % MET  3. Patient report a reduction in worst pain score by 1-2 points for improved tolerance during work and recreational activities  MET  4. Pt able to perform HEP correctly with minimal cueing or supervision for  therapist  MET        Long term goals: 13 weeks or 20 visits BACK  1. Pt will demonstrate increased lumbar ROM by at least 3 degrees from initial ROM value, resulting in improved ability to perform functional fwd bending while standing and sitting. MET  2. Pt will demonstrate increased maximum isometric torque value by 5% when compared to the initial value resulting in improved ability to perform bending, lifting, and carrying activities safely, confidently.  MET  3. Pt to demonstrate ability to independently control and reduce their pain through posture positioning and mechanical movements throughout a typical day.  4. Patient will demonstrate improved overall function per FOTO Survey to CJ = at least 1% but < 20% impaired, limited or restricted score or less.    Short term goals: 6 weeks or 10 visits NECK  1.  Pt will demonstrate increased isometric torque by 5% from initial test indicating positive muscular response to program of progressive resistance training  2. Pt will demonstrate reduced pain and improved functional outcomes as reported on the patient centered questionnaires. Report 2-4 points reduction NDI indicating improvement in function and pain  3.. Pt will demonstrate independence with reducing or controlling symptoms with ther ex, movement, or position independently, able to reduce pain 1-2 points on pain scale using strategies taught in therapy        Long term goals: 13 weeks or 20 visits  NECK  1. Pt will demonstratte increased cervical ROM as measured by med ex by 6 degrees from initial test which results in full functional ROM of neck for ease with ADLs and driving  2. Pt will demonstrate increased isometric torque by 10% from initial test to improve ability to lift and carry.   3.Patient will demonstrate improved overall function per FOTO Survey to CI = at least 1% but < 20% impaired, limited or restricted score or less.   4. Pt will demonstrate independence with reducing or controlling  symptoms with ther ex, movement, or position independently, able to reduce pain 2-4 points on pain scale using strategies taught in therapy  5. Pt will demonstrate independence with the HEP at discharge.         FOTO:    1st visit: 33% limited  5th visit: 41% limited back  FOTO BACK VISIT 10 33 %  VISIT FOR NECK 6/16/17 % Impaired: 33      Plan   Continue with established Plan of Care towards established PT goals.

## 2017-07-05 ENCOUNTER — CLINICAL SUPPORT (OUTPATIENT)
Dept: REHABILITATION | Facility: OTHER | Age: 72
End: 2017-07-05
Attending: INTERNAL MEDICINE
Payer: MEDICARE

## 2017-07-05 DIAGNOSIS — M50.30 DDD (DEGENERATIVE DISC DISEASE), CERVICAL: Primary | ICD-10-CM

## 2017-07-05 PROCEDURE — 97110 THERAPEUTIC EXERCISES: CPT

## 2017-07-05 NOTE — PROGRESS NOTES
[facility]        Neck program started 5/5/17 and back program started 6/16/17, VISIT 12 for back, IM retest and visit 4 neck, visit 13 for program    Name: Paris Burris  Clinic Number: [id number]  Date of Treatment: 07/05/2017   Diagnosis:   Encounter Diagnosis   Name Primary?    DDD (degenerative disc disease), cervical Yes     Physician: Cee Woodall, *    Pain pattern determined: 1, PEP extensions and flexion tolerated for back and 1 REP for neck with retractions    Plan of care signed: 5/8/17 for low back and 6/16/17 for neck program  POC signed 5/8/17 and 6/16/17  POC due: 8/8/17      Time in:  10:05 am  Time Out: 11:30 am  Total Treatment time: 60 min    Precautions: SEE PMH,  anxiety, cancer, depression, DM, wrist fx, neuropathy in L foot.          Visit # authorized: 12  Authorization period: 4/13/2018  Plan of care Expiration: 8/5/2017    Visit #: 12 lumbar and 3 cervical, visit 13 program    Evaluation:  5/5/17  Reasessment due:  6/5/17 done 5/31/17  Assessment due: 6/30/17 done 6/20/17  Assess neck and back 7/20/17      Subjective     Paris reports mild soreness after last session. No neck and low back pain today. She is performing HEP 1x daily in the morning. She reports she always feels better after coming.     She is sitting with lumbar support. She has ball for stretches.  She feels the program is helping her. She notes she has increased neuropathy in feet sometimes, but not today.     Patient reports their pain to be 2/10 on a 0-10 scale with 0 being no pain and 10 being the worst pain imaginable.  Pain Location: low back and mid-upper back     Prior Treatment: PT at [facility]  Prior functional status: Independent  DME owned/used: no  Occupation:  Retired (taught reading)   Leisure: garden, walking, read, knit   Pts goals: Be able to sit longer, be able to descend stairs easier.          Objective     Baseline IM Testing Results: back 5/5/17  Date  of testin2017  ROM 0-30 deg   Max Peak Torque 111 ft lbs    Min Peak Torque   28 ft lbs   Flex/Ext Ratio  4/1   % below normative data 12% below         [facility]  IM Testing Results: back 17  Date of testing:   ROM 0-42 degrees   Max Peak Torque 140 ft lbs    Min Peak Torque   61 ft lbs   Flex/Ext Ratio  2.5/1   % below normative data 1% below     44% gain in average strength      MOVEMENT LOSS back-reassessed 17   ROM Loss   Flexion moderate loss improved to min loss   Extension minimal loss     Side bending Right moderate loss -improved to min loss   Side bending Left moderate loss - improved to min loss   Rotation Right moderate loss - improved to min loss   Rotation Left moderate loss - improved to min loss       Baseline IM Testing Results:   neck  Date of testin2017  ROM 45 - 99 deg   Max Peak Torque 226    Min Peak Torque 112    Flex/Ext Ratio 1.76:1   % below normative data -6.67              Treatment    Pt was instructed in and performed the following:     Paris received therapeutic exercises to develop/improved posture, cardiovascular endurance, muscular endurance, lumbar  ROM, strength and muscular endurance for 50 minutes including the following exercises:   [facility] 2017   Visit Number 13   VAS Pain Rating 0   Treadmill Time (in min.) 10   Speed 3   Retraction in Sitting 10   Scapular Retraction 10   Extension in Lying -   Extension in Standing 10   Flexion in Lying 10   Cervical Weight 168   Repetitions 18   Rating of Perceived Exertion 3   Lumbar Weight 62   Repetitions 18   Rating of Perceived Exertion 3   Ice - Z Lie (in min.) 10         Flexion in lie  Modified piriformis stretch 10 s/h 3x B  LTR 10x B  Ext in standing 10x  Handouts given for all there ex 17    Neck:  Retractions 10x min cuing  scap retractions 15x      Peripheral muscle strengthening which included 1 set of 15-20 repetitions at a slow, controlled 7 second per rep pace focused on  strengthening supporting musculature for improved body mechanics and functional mobility.  Pt and therapist focused on proper form during treatment to ensure optimal strengthening of each targeted muscle group.  Machines were utilized including torso rotation, leg extension, leg curl, chest press,  upright row, tricep extension, biceps, leg press and hip abduction.    Written Home Exercises Provided: BACK  -walking at [facility] 10-15 min 2-3/week recommended  -use of lumbar roll -yes  -ext in standing 3/day   -flexion in lie 3/day   -ext on elbows prone 2/day ( not doing as much 5/12/17)   Modified piriformis, LTR.   Scapular retractions   Handouts were given to the patient. Pt demo fair understanding of the education provided. Paris demonstrated fair return demonstration of activities.   Lumbar roll use compliance: yes and has ball.   HEP NECK  Retractions in sitting 10x 3x/day  Scapular retractions 10x 3x/day     Additional exercises taught this treatment session:  Reviewed handouts and printed for patient again 6/2/17      Assessment     Pt presents with no low back and neck pain pre, during and ,post session. She tolerated cervical med ex well, with minimal cuing for speed, no pain. She tolerated Med X lumbar extension with no reports of pain or discomfort. She demo her HEP well with mild vc for tech. She tolerated all exercises without reports of pain or discomfort today.  Overall, pt is doing better since starting the program. Pt will continue to benefit from skilled outpatient physical therapy to address the deficits stated in the impairment chart, provide pt/family education and to maximize pt's level of independence in the home and community environment.   She reports her neck pain bothers her more than her back pain and she is happy to have started  program for her neck.      Pt's spiritual, cultural and educational needs considered and pt agreeable to plan of care and goals as stated below:    Medical necessity is demonstrated by the following problem list.   Pt presents with the following impairments:   History  Co-morbidities and personal factors that may impact the plan of care Examination  Body Structures and Functions, activity limitations and participation restrictions that may impact the plan of care Clinical Presentation Decision Making/ Complexity Score   Co-morbidities:   advanced age           Personal Factors:   age Body Regions:   back  lower extremities     Body Systems:   ROM  strength     Activity limitations:   Learning and applying knowledge  no deficits     General Tasks and Commands  no deficits     Communication  communicating with/receiving spoken language  no deficits     Mobility  no deficits     Self care  no deficits     Domestic Life  no deficits     Interactions/Relationships  no deficits     Life Areas  no deficits     Community and Social Life  no deficits     Participation Restrictions:   Cleaning, reading in sitting    stable and uncomplicated    low            GOALS: Pt is in agreement with the following goals.     Short term goals: 6 weeks or 10 visits BACK  1. Pt will demonstrate increased lumbar ROM by at least 3 degrees from the initial ROM value with improvements noted in functional ROM and ability to perform ADLs  MET  2. Pt will demonstrate increased by > 10 % MET  3. Patient report a reduction in worst pain score by 1-2 points for improved tolerance during work and recreational activities  MET  4. Pt able to perform HEP correctly with minimal cueing or supervision for therapist  MET        Long term goals: 13 weeks or 20 visits BACK  1. Pt will demonstrate increased lumbar ROM by at least 3 degrees from initial ROM value, resulting in improved ability to perform functional fwd bending while standing and sitting. MET  2. Pt will demonstrate increased maximum isometric torque value by 5% when compared to the initial value resulting in improved ability to perform  bending, lifting, and carrying activities safely, confidently.  MET  3. Pt to demonstrate ability to independently control and reduce their pain through posture positioning and mechanical movements throughout a typical day.  4. Patient will demonstrate improved overall function per FOTO Survey to CJ = at least 1% but < 20% impaired, limited or restricted score or less.    Short term goals: 6 weeks or 10 visits NECK  1.  Pt will demonstrate increased isometric torque by 5% from initial test indicating positive muscular response to program of progressive resistance training  2. Pt will demonstrate reduced pain and improved functional outcomes as reported on the patient centered questionnaires. Report 2-4 points reduction NDI indicating improvement in function and pain  3.. Pt will demonstrate independence with reducing or controlling symptoms with ther ex, movement, or position independently, able to reduce pain 1-2 points on pain scale using strategies taught in therapy        Long term goals: 13 weeks or 20 visits  NECK  1. Pt will demonstratte increased cervical ROM as measured by med ex by 6 degrees from initial test which results in full functional ROM of neck for ease with ADLs and driving  2. Pt will demonstrate increased isometric torque by 10% from initial test to improve ability to lift and carry.   3.Patient will demonstrate improved overall function per FOTO Survey to CI = at least 1% but < 20% impaired, limited or restricted score or less.   4. Pt will demonstrate independence with reducing or controlling symptoms with ther ex, movement, or position independently, able to reduce pain 2-4 points on pain scale using strategies taught in therapy  5. Pt will demonstrate independence with the HEP at discharge.         FOTO:    1st visit: 33% limited  5th visit: 41% limited back  FOTO BACK VISIT 10 33 %  VISIT FOR NECK 6/16/17 % Impaired: 33      Plan   Continue with established Plan of Care towards established  PT goals.

## 2017-07-07 ENCOUNTER — CLINICAL SUPPORT (OUTPATIENT)
Dept: REHABILITATION | Facility: OTHER | Age: 72
End: 2017-07-07
Attending: INTERNAL MEDICINE
Payer: MEDICARE

## 2017-07-07 DIAGNOSIS — J31.0 CHRONIC RHINITIS: ICD-10-CM

## 2017-07-07 DIAGNOSIS — M50.30 DDD (DEGENERATIVE DISC DISEASE), CERVICAL: Primary | ICD-10-CM

## 2017-07-07 PROCEDURE — 97110 THERAPEUTIC EXERCISES: CPT

## 2017-07-07 RX ORDER — FLUTICASONE PROPIONATE 50 MCG
1 SPRAY, SUSPENSION (ML) NASAL DAILY
Qty: 48 G | Refills: 3 | Status: SHIPPED | OUTPATIENT
Start: 2017-07-07 | End: 2017-10-19

## 2017-07-07 NOTE — PROGRESS NOTES
[facility]        Neck program started 5/5/17 and back program started 6/16/17, VISIT 13 for back, IM retest and visit 5 neck, visit 14 for program    Name: Paris Burris  Clinic Number: [id number]  Date of Treatment: 07/07/2017   Diagnosis:   Encounter Diagnosis   Name Primary?    DDD (degenerative disc disease), lumbar Yes     Physician: Cee Woodall, *    Pain pattern determined: 1, PEP extensions and flexion tolerated for back and 1 REP for neck with retractions    Plan of care signed: 5/8/17 for low back and 6/16/17 for neck program  POC signed 5/8/17 and 6/16/17  POC due: 8/8/17      Time in:  10:05 am  Time Out: 11:30 am  Total Treatment time: 60 min    Precautions: SEE PMH,  anxiety, cancer, depression, DM, wrist fx, neuropathy in L foot.          Visit # authorized: 12  Authorization period: 4/13/2018  Plan of care Expiration: 8/5/2017    Visit #: 13 lumbar and 4 cervical, visit 14 program    Evaluation:  5/5/17  Reasessment due:  6/5/17 done 5/31/17  Assessment due: 6/30/17 done 6/20/17  Assess neck and back 7/20/17      Subjective     Paris reports mild soreness after last session. Minimal neck and low back pain today. She is performing HEP 1x daily in the morning. She reports she always feels better after coming.     She is sitting with lumbar support. She has ball for stretches.  She feels the program is helping her. She notes she has increased neuropathy in feet sometimes, but not today.     Patient reports their pain to be 2/10 on a 0-10 scale with 0 being no pain and 10 being the worst pain imaginable.  Pain Location: low back and mid-upper back     Prior Treatment: PT at [facility]  Prior functional status: Independent  DME owned/used: no  Occupation:  Retired (taught reading)   Leisure: garden, walking, read, knit   Pts goals: Be able to sit longer, be able to descend stairs easier.          Objective     Baseline IM Testing Results: back  17  Date of testin2017  ROM 0-30 deg   Max Peak Torque 111 ft lbs    Min Peak Torque   28 ft lbs   Flex/Ext Ratio  4/1   % below normative data 12% below         [facility]  IM Testing Results: back 17  Date of testing:   ROM 0-42 degrees   Max Peak Torque 140 ft lbs    Min Peak Torque   61 ft lbs   Flex/Ext Ratio  2.5/1   % below normative data 1% below     44% gain in average strength      MOVEMENT LOSS back-reassessed 17   ROM Loss   Flexion moderate loss improved to min loss   Extension minimal loss     Side bending Right moderate loss -improved to min loss   Side bending Left moderate loss - improved to min loss   Rotation Right moderate loss - improved to min loss   Rotation Left moderate loss - improved to min loss       Baseline IM Testing Results:   neck  Date of testin2017  ROM 45 - 99 deg   Max Peak Torque 226    Min Peak Torque 112    Flex/Ext Ratio 1.76:1   % below normative data -6.67              Treatment    Pt was instructed in and performed the following:     Paris received therapeutic exercises to develop/improved posture, cardiovascular endurance, muscular endurance, lumbar  ROM, strength and muscular endurance for 50 minutes including the following exercises:       [facility] 2017   Visit Number 14   VAS Pain Rating 2   Treadmill Time (in min.) 10   Speed 3   Retraction in Sitting 10   Rotation 5   Scapular Retraction 10   Extension in Lying -   Extension in Standing 10   Flexion in Lying 10   Cervical Extension Seat Pad -   Seat Adjustment -   Top Dead Center -   Counterweight -   Cervical Flexion -   Cervical Extension -   Cervical Peak Torque -   Cervical Weight 168   Repetitions 20   Rating of Perceived Exertion 3   Ice - Z Lie (in min.) 10       Flexion in lie  Modified piriformis stretch 10 s/h 3x B  LTR 10x B  Ext in standing 10x  Handouts given for all there ex 17    Neck:  Retractions 10x min cuing  scap retractions 15x      Peripheral muscle  strengthening which included 1 set of 15-20 repetitions at a slow, controlled 7 second per rep pace focused on strengthening supporting musculature for improved body mechanics and functional mobility.  Pt and therapist focused on proper form during treatment to ensure optimal strengthening of each targeted muscle group.  Machines were utilized including torso rotation, leg extension, leg curl, chest press,  upright row, tricep extension, biceps, leg press and hip abduction.    Written Home Exercises Provided: BACK  -walking at [facility] 10-15 min 2-3/week recommended  -use of lumbar roll -yes  -ext in standing 3/day   -flexion in lie 3/day   -ext on elbows prone 2/day ( not doing as much 5/12/17)   Modified piriformis, LTR.   Scapular retractions   Handouts were given to the patient. Pt demo fair understanding of the education provided. Paris demonstrated fair return demonstration of activities.   Lumbar roll use compliance: yes and has ball.   HEP NECK  Retractions in sitting 10x 3x/day  Scapular retractions 10x 3x/day     Additional exercises taught this treatment session:  Reviewed handouts and printed for patient again 6/2/17      Assessment     Pt presents with minimal low back and neck pain pre, that did decrease during and ,post session. She tolerated cervical med ex well, with minimal cuing for speed, no pain. She tolerated No Med X lumbar machine today due to already exercised on it last session.  She demo her HEP well with mild vc for tech. Pt tolerated cervical medx machine at same weight with no c/o pain.She tolerated all exercises without reports of pain or discomfort today.  Overall, pt is doing better since starting the program. Pt will continue to benefit from skilled outpatient physical therapy to address the deficits stated in the impairment chart, provide pt/family education and to maximize pt's level of independence in the home and community environment.   She reports her neck pain  bothers her more than her back pain and she is happy to have started  program for her neck.      Pt's spiritual, cultural and educational needs considered and pt agreeable to plan of care and goals as stated below:   Medical necessity is demonstrated by the following problem list.   Pt presents with the following impairments:   History  Co-morbidities and personal factors that may impact the plan of care Examination  Body Structures and Functions, activity limitations and participation restrictions that may impact the plan of care Clinical Presentation Decision Making/ Complexity Score   Co-morbidities:   advanced age           Personal Factors:   age Body Regions:   back  lower extremities     Body Systems:   ROM  strength     Activity limitations:   Learning and applying knowledge  no deficits     General Tasks and Commands  no deficits     Communication  communicating with/receiving spoken language  no deficits     Mobility  no deficits     Self care  no deficits     Domestic Life  no deficits     Interactions/Relationships  no deficits     Life Areas  no deficits     Community and Social Life  no deficits     Participation Restrictions:   Cleaning, reading in sitting    stable and uncomplicated    low            GOALS: Pt is in agreement with the following goals.     Short term goals: 6 weeks or 10 visits BACK  1. Pt will demonstrate increased lumbar ROM by at least 3 degrees from the initial ROM value with improvements noted in functional ROM and ability to perform ADLs  MET  2. Pt will demonstrate increased by > 10 % MET  3. Patient report a reduction in worst pain score by 1-2 points for improved tolerance during work and recreational activities  MET  4. Pt able to perform HEP correctly with minimal cueing or supervision for therapist  MET        Long term goals: 13 weeks or 20 visits BACK  1. Pt will demonstrate increased lumbar ROM by at least 3 degrees from initial ROM value, resulting in improved ability  to perform functional fwd bending while standing and sitting. MET  2. Pt will demonstrate increased maximum isometric torque value by 5% when compared to the initial value resulting in improved ability to perform bending, lifting, and carrying activities safely, confidently.  MET  3. Pt to demonstrate ability to independently control and reduce their pain through posture positioning and mechanical movements throughout a typical day.  4. Patient will demonstrate improved overall function per FOTO Survey to CJ = at least 1% but < 20% impaired, limited or restricted score or less.    Short term goals: 6 weeks or 10 visits NECK  1.  Pt will demonstrate increased isometric torque by 5% from initial test indicating positive muscular response to program of progressive resistance training  2. Pt will demonstrate reduced pain and improved functional outcomes as reported on the patient centered questionnaires. Report 2-4 points reduction NDI indicating improvement in function and pain  3.. Pt will demonstrate independence with reducing or controlling symptoms with ther ex, movement, or position independently, able to reduce pain 1-2 points on pain scale using strategies taught in therapy        Long term goals: 13 weeks or 20 visits  NECK  1. Pt will demonstratte increased cervical ROM as measured by med ex by 6 degrees from initial test which results in full functional ROM of neck for ease with ADLs and driving  2. Pt will demonstrate increased isometric torque by 10% from initial test to improve ability to lift and carry.   3.Patient will demonstrate improved overall function per FOTO Survey to CI = at least 1% but < 20% impaired, limited or restricted score or less.   4. Pt will demonstrate independence with reducing or controlling symptoms with ther ex, movement, or position independently, able to reduce pain 2-4 points on pain scale using strategies taught in therapy  5. Pt will demonstrate independence with the HEP at  discharge.         FOTO:    1st visit: 33% limited  5th visit: 41% limited back  FOTO BACK VISIT 10 33 %  VISIT FOR NECK 6/16/17 % Impaired: 33      Plan   Continue with established Plan of Care towards established PT goals.

## 2017-07-10 ENCOUNTER — CLINICAL SUPPORT (OUTPATIENT)
Dept: REHABILITATION | Facility: OTHER | Age: 72
End: 2017-07-10
Attending: INTERNAL MEDICINE
Payer: MEDICARE

## 2017-07-10 DIAGNOSIS — M50.30 DDD (DEGENERATIVE DISC DISEASE), CERVICAL: Primary | ICD-10-CM

## 2017-07-10 PROCEDURE — 97110 THERAPEUTIC EXERCISES: CPT

## 2017-07-10 NOTE — PROGRESS NOTES
[facility]        Neck program started 5/5/17 and back program started 6/16/17, VISIT 14 for back, IM retest and visit 6 neck, visit 15 for program    Name: Paris Burris  Clinic Number: [id number]  Date of Treatment: 07/10/2017   Diagnosis:   Encounter Diagnosis   Name Primary?    DDD (degenerative disc disease), cervical Yes     Physician: Cee Woodall, *    Pain pattern determined: 1, PEP extensions and flexion tolerated for back and 1 REP for neck with retractions    Plan of care signed: 5/8/17 for low back and 6/16/17 for neck program  POC signed 5/8/17 and 6/16/17  POC due: 8/8/17      Time in:  9:00 am  Time Out: 10:00 am  Total Treatment time: 60 min    Precautions: SEE PMH,  anxiety, cancer, depression, DM, wrist fx, neuropathy in L foot.          Visit # authorized: 12  Authorization period: 4/13/2018  Plan of care Expiration: 8/5/2017    Visit #: 14 lumbar and 5 cervical, visit 15 program    Evaluation:  5/5/17  Reasessment due:  6/5/17 done 5/31/17  Assessment due: 6/30/17 done 6/20/17  Assess neck and back 7/20/17      Subjective     Paris reports mild soreness after last session. No  neck and low back pain today. She is performing HEP 1x daily in the morning. She reports she always feels better after coming.     She is sitting with lumbar support. She has ball for stretches.  She feels the program is helping her. She notes she has increased neuropathy in feet sometimes, but not today.     Patient reports their pain to be 2/10 on a 0-10 scale with 0 being no pain and 10 being the worst pain imaginable.  Pain Location: low back and mid-upper back     Prior Treatment: PT at [facility]  Prior functional status: Independent  DME owned/used: no  Occupation:  Retired (taught reading)   Leisure: garden, walking, read, knit   Pts goals: Be able to sit longer, be able to descend stairs easier.          Objective     Baseline IM Testing Results: back 5/5/17  Date  of testin2017  ROM 0-30 deg   Max Peak Torque 111 ft lbs    Min Peak Torque   28 ft lbs   Flex/Ext Ratio  4/1   % below normative data 12% below         [facility]  IM Testing Results: back 17  Date of testing:   ROM 0-42 degrees   Max Peak Torque 140 ft lbs    Min Peak Torque   61 ft lbs   Flex/Ext Ratio  2.5/1   % below normative data 1% below     44% gain in average strength      MOVEMENT LOSS back-reassessed 17   ROM Loss   Flexion moderate loss improved to min loss   Extension minimal loss     Side bending Right moderate loss -improved to min loss   Side bending Left moderate loss - improved to min loss   Rotation Right moderate loss - improved to min loss   Rotation Left moderate loss - improved to min loss       Baseline IM Testing Results:   neck  Date of testin2017  ROM 45 - 99 deg   Max Peak Torque 226    Min Peak Torque 112    Flex/Ext Ratio 1.76:1   % below normative data -6.67              Treatment    Pt was instructed in and performed the following:     Paris received therapeutic exercises to develop/improved posture, cardiovascular endurance, muscular endurance, lumbar  ROM, strength and muscular endurance for 50 minutes including the following exercises:     [facility] 7/10/2017   Visit Number 15   VAS Pain Rating 2   Treadmill Time (in min.) 10   Speed 3   Retraction in Sitting 10   Rotation 5   Scapular Retraction 10   Extension in Lying -   Extension in Standing 10   Flexion in Lying 10   Cervical Extension Seat Pad -   Seat Adjustment -   Top Dead Center -   Counterweight -   Cervical Flexion -   Cervical Extension -   Cervical Peak Torque -   Cervical Weight 174   Repetitions 18   Rating of Perceived Exertion 3   Lumbar Weight 62   Repetitions 20   Rating of Perceived Exertion 3   Ice - Z Lie (in min.) 10         Flexion in lie  Modified piriformis stretch 10 s/h 3x B  LTR 10x B  Ext in standing 10x  Handouts given for all there ex 17    Neck:  Retractions  10x min cuing  scap retractions 15x      Peripheral muscle strengthening which included 1 set of 15-20 repetitions at a slow, controlled 7 second per rep pace focused on strengthening supporting musculature for improved body mechanics and functional mobility.  Pt and therapist focused on proper form during treatment to ensure optimal strengthening of each targeted muscle group.  Machines were utilized including torso rotation, leg extension, leg curl, chest press,  upright row, tricep extension, biceps, leg press and hip abduction.    Written Home Exercises Provided: BACK  -walking at [facility] 10-15 min 2-3/week recommended  -use of lumbar roll -yes  -ext in standing 3/day   -flexion in lie 3/day   -ext on elbows prone 2/day ( not doing as much 5/12/17)   Modified piriformis, LTR.   Scapular retractions   Handouts were given to the patient. Pt demo fair understanding of the education provided. Paris demonstrated fair return demonstration of activities.   Lumbar roll use compliance: yes and has ball.   HEP NECK  Retractions in sitting 10x 3x/day  Scapular retractions 10x 3x/day     Additional exercises taught this treatment session:  Reviewed handouts and printed for patient again 6/2/17      Assessment     Pt presents with minimal low back and no neck pain pre, that did decrease during and ,post session. She tolerated cervical med ex well, with minimal cuing for speed, no pain. She tolerated Med X lumbar machine today with no c/o LBP.  She demo her HEP well with mild vc for tech. She tolerated all exercises without reports of pain or discomfort today.  Overall, pt is doing better since starting the program and states she is complaint with her HEP.  Pt will continue to benefit from skilled outpatient physical therapy to address the deficits stated in the impairment chart, provide pt/family education and to maximize pt's level of independence in the home and community environment.   She reports her neck  pain bothers her more than her back pain and she is happy to have started  program for her neck.      Pt's spiritual, cultural and educational needs considered and pt agreeable to plan of care and goals as stated below:   Medical necessity is demonstrated by the following problem list.   Pt presents with the following impairments:   History  Co-morbidities and personal factors that may impact the plan of care Examination  Body Structures and Functions, activity limitations and participation restrictions that may impact the plan of care Clinical Presentation Decision Making/ Complexity Score   Co-morbidities:   advanced age           Personal Factors:   age Body Regions:   back  lower extremities     Body Systems:   ROM  strength     Activity limitations:   Learning and applying knowledge  no deficits     General Tasks and Commands  no deficits     Communication  communicating with/receiving spoken language  no deficits     Mobility  no deficits     Self care  no deficits     Domestic Life  no deficits     Interactions/Relationships  no deficits     Life Areas  no deficits     Community and Social Life  no deficits     Participation Restrictions:   Cleaning, reading in sitting    stable and uncomplicated    low            GOALS: Pt is in agreement with the following goals.     Short term goals: 6 weeks or 10 visits BACK  1. Pt will demonstrate increased lumbar ROM by at least 3 degrees from the initial ROM value with improvements noted in functional ROM and ability to perform ADLs  MET  2. Pt will demonstrate increased by > 10 % MET  3. Patient report a reduction in worst pain score by 1-2 points for improved tolerance during work and recreational activities  MET  4. Pt able to perform HEP correctly with minimal cueing or supervision for therapist  MET        Long term goals: 13 weeks or 20 visits BACK  1. Pt will demonstrate increased lumbar ROM by at least 3 degrees from initial ROM value, resulting in improved  ability to perform functional fwd bending while standing and sitting. MET  2. Pt will demonstrate increased maximum isometric torque value by 5% when compared to the initial value resulting in improved ability to perform bending, lifting, and carrying activities safely, confidently.  MET  3. Pt to demonstrate ability to independently control and reduce their pain through posture positioning and mechanical movements throughout a typical day.  4. Patient will demonstrate improved overall function per FOTO Survey to CJ = at least 1% but < 20% impaired, limited or restricted score or less.    Short term goals: 6 weeks or 10 visits NECK  1.  Pt will demonstrate increased isometric torque by 5% from initial test indicating positive muscular response to program of progressive resistance training  2. Pt will demonstrate reduced pain and improved functional outcomes as reported on the patient centered questionnaires. Report 2-4 points reduction NDI indicating improvement in function and pain  3.. Pt will demonstrate independence with reducing or controlling symptoms with ther ex, movement, or position independently, able to reduce pain 1-2 points on pain scale using strategies taught in therapy        Long term goals: 13 weeks or 20 visits  NECK  1. Pt will demonstratte increased cervical ROM as measured by med ex by 6 degrees from initial test which results in full functional ROM of neck for ease with ADLs and driving  2. Pt will demonstrate increased isometric torque by 10% from initial test to improve ability to lift and carry.   3.Patient will demonstrate improved overall function per FOTO Survey to CI = at least 1% but < 20% impaired, limited or restricted score or less.   4. Pt will demonstrate independence with reducing or controlling symptoms with ther ex, movement, or position independently, able to reduce pain 2-4 points on pain scale using strategies taught in therapy  5. Pt will demonstrate independence with the  HEP at discharge.         FOTO:    1st visit: 33% limited  5th visit: 41% limited back  FOTO BACK VISIT 10 33 %  VISIT FOR NECK 6/16/17 % Impaired: 33      Plan   Continue with established Plan of Care towards established PT goals.

## 2017-07-12 ENCOUNTER — CLINICAL SUPPORT (OUTPATIENT)
Dept: REHABILITATION | Facility: OTHER | Age: 72
End: 2017-07-12
Attending: INTERNAL MEDICINE
Payer: MEDICARE

## 2017-07-12 DIAGNOSIS — M51.36 DDD (DEGENERATIVE DISC DISEASE), LUMBAR: Primary | ICD-10-CM

## 2017-07-12 DIAGNOSIS — M50.30 DDD (DEGENERATIVE DISC DISEASE), CERVICAL: ICD-10-CM

## 2017-07-12 PROCEDURE — 97110 THERAPEUTIC EXERCISES: CPT

## 2017-07-17 ENCOUNTER — CLINICAL SUPPORT (OUTPATIENT)
Dept: INTERNAL MEDICINE | Facility: CLINIC | Age: 72
End: 2017-07-17
Payer: MEDICARE

## 2017-07-17 ENCOUNTER — OFFICE VISIT (OUTPATIENT)
Dept: INTERNAL MEDICINE | Facility: CLINIC | Age: 72
End: 2017-07-17
Payer: MEDICARE

## 2017-07-17 VITALS — BODY MASS INDEX: 31.66 KG/M2 | WEIGHT: 167.56 LBS

## 2017-07-17 VITALS
WEIGHT: 169.06 LBS | SYSTOLIC BLOOD PRESSURE: 120 MMHG | HEIGHT: 61 IN | OXYGEN SATURATION: 95 % | HEART RATE: 88 BPM | TEMPERATURE: 98 F | BODY MASS INDEX: 31.92 KG/M2 | DIASTOLIC BLOOD PRESSURE: 68 MMHG

## 2017-07-17 DIAGNOSIS — E55.9 VITAMIN D DEFICIENCY: ICD-10-CM

## 2017-07-17 DIAGNOSIS — E78.5 HYPERLIPIDEMIA ASSOCIATED WITH TYPE 2 DIABETES MELLITUS: ICD-10-CM

## 2017-07-17 DIAGNOSIS — E11.69 HYPERLIPIDEMIA ASSOCIATED WITH TYPE 2 DIABETES MELLITUS: ICD-10-CM

## 2017-07-17 DIAGNOSIS — F33.42 RECURRENT MAJOR DEPRESSIVE DISORDER, IN FULL REMISSION: ICD-10-CM

## 2017-07-17 DIAGNOSIS — G62.9 NEUROPATHY: ICD-10-CM

## 2017-07-17 DIAGNOSIS — D50.0 IRON DEFICIENCY ANEMIA DUE TO CHRONIC BLOOD LOSS: ICD-10-CM

## 2017-07-17 DIAGNOSIS — E66.9 DIABETES MELLITUS TYPE 2 IN OBESE: Primary | ICD-10-CM

## 2017-07-17 DIAGNOSIS — E11.69 DIABETES MELLITUS TYPE 2 IN OBESE: Primary | ICD-10-CM

## 2017-07-17 PROCEDURE — 99499 UNLISTED E&M SERVICE: CPT | Mod: S$GLB,,, | Performed by: INTERNAL MEDICINE

## 2017-07-17 PROCEDURE — 1125F AMNT PAIN NOTED PAIN PRSNT: CPT | Mod: S$GLB,,, | Performed by: INTERNAL MEDICINE

## 2017-07-17 PROCEDURE — 99999 PR PBB SHADOW E&M-EST. PATIENT-LVL III: CPT | Mod: PBBFAC,,, | Performed by: INTERNAL MEDICINE

## 2017-07-17 PROCEDURE — 3044F HG A1C LEVEL LT 7.0%: CPT | Mod: S$GLB,,, | Performed by: INTERNAL MEDICINE

## 2017-07-17 PROCEDURE — 99214 OFFICE O/P EST MOD 30 MIN: CPT | Mod: S$GLB,,, | Performed by: INTERNAL MEDICINE

## 2017-07-17 PROCEDURE — 99999 PR PBB SHADOW E&M-EST. PATIENT-LVL I: CPT | Mod: PBBFAC,,,

## 2017-07-17 PROCEDURE — 1159F MED LIST DOCD IN RCRD: CPT | Mod: S$GLB,,, | Performed by: INTERNAL MEDICINE

## 2017-07-17 RX ORDER — GABAPENTIN 300 MG/1
300 CAPSULE ORAL 2 TIMES DAILY
COMMUNITY
End: 2018-05-21 | Stop reason: SDUPTHER

## 2017-07-17 RX ORDER — MIRTAZAPINE 7.5 MG/1
7.5 TABLET, FILM COATED ORAL NIGHTLY
Qty: 30 TABLET | Refills: 11
Start: 2017-07-17 | End: 2017-10-19

## 2017-07-17 RX ORDER — ALPHA LIPOIC ACID 300 MG
300 CAPSULE ORAL 2 TIMES DAILY
COMMUNITY
End: 2021-11-12

## 2017-07-17 NOTE — PROGRESS NOTES
Health  Follow-Up Note   [x] Office  [] Phone  Notes from previous session reviewed.   [x] Previous Session Goals unchanged.   [] Patient/Caregiver Change in Goals.  Goals added or changed by Patient/Caregiver since program participation:  1.     Continue to decrease gabapentin and increase the alpha lipoic acid per doctors orders  2. Continue same plan     Additional/Changed support that patient/caregiver has experienced/sought?  (Indicate readiness, support from family, friends, others, community groups, etc)  1.       Additional/Changed obstacles that could prevent patient/caregiver from reaching their goals?  1.  neuropathy pain left foot mainly middle toe    Feedback provided:  1.  Praised for continued effort still down 19 lbs.     Diagnostic values/Desriptors for follow-up as needed for chronic condition(s)   Weight: 76 kg 167 lb 8.8 oz gain 2 lb  Blood Glucose: 105   7 d -112  14 d -115  30 d- 130  90 d -123  Pain: Still having neuropathy improving and able to decrease the gabapentin taking alpha lipoic acid 3 x day    Interventions:   1. Health  listened reflectively, validated thoughts and feelings, offered support and encouragement.   2. Allowed patient to express themselves in a non-biased atmosphere.  3. Health  assisted pt to problem-solve obstacles such as being in a challenging environment and dealing with these challenges.   4. Motivational Interviewed interventions utilized (OARS).   5. Patient responded favorably to interventions and remained actively engaged in the session.   6. Health  will remain available and connected for patient by phone and/or office visits.   7. Positive reinforcement, emotional support and encouragement provided.   8. Focused Education: MI, exercise    Plan:  [x] Pt will work on goals as stated above.   [x] Pt will contact Health  for any questions, concerns or needs.  [x] Pt will follow up with Health  in office on  8/9/17 at 0900.       [] Pt will follow up with Health  on phone in:        [x] Health  will remain available.   [] Health  will contact patient by phone in:        [] Health  will consult:        [] Health  will inform Provider via EPIC messaging.     Impression:  1. Behavior is consistent with  Action     Stage of Change.   2. Participation level:       [x] Receptive      [x] Interactive      [] Guarded and Resistant      [x] Self Motivated      [] Refused/Declined to participate   3. [x] Pt voiced understanding of all information presented.       [] Pt voiced needing further information/education. This will be arranged.       [x] Pt would benefit from further education/information as identified by this health . This will be arranged.     Jacqueline Perry RN HC

## 2017-07-17 NOTE — PROGRESS NOTES
Subjective:       Patient ID: Paris Burris is a 71 y.o. female.    Chief Complaint: Follow-up    HPI   Chronic LBP w/ DDD. Undergoing healthy back program.  Reports stopped hydrocodone. Decreased gabapentin to 600mg BID. Started alpha lipoic acid 300mg daily, which helps.     DM2 - metformin xr 500mg bid. a1c 2/3/17 was 6.4. Reports she had a reading over 250. Thinks this was due to hydrocodone (caused a lot of anxiety and pain).   Checks glucose regularly - 80s-140s.  LDL controlled on crestor - 2/24/17 89.6.     COOKIE - not using the CPAP; was not able to get a right pressure that helped her fall asleep.     H/o breast CA s/p L mastectomy 14 yrs ago.  Last MMG 2017 - neg    Multiple gastric ulcers on EGD 2015 - carafate 1g BID. Repeat EGD 12/2/16 - 7cm hiatal hernia, gastritis. ESTEFANIA on ferrous sulfate 325mg bid. Anemia resolved.   EGD 12/2/16 w/ hiatal hernia, gastric erosions and H pylori neg. On carafate 1g BID x 2 mo. Saw KEYLA Allen and Dr. Rodríguez 3/20/17. ESTEFANIA on iron therapy since 9/2016.    Anxiety/MDD - on effexor xr 75mg daily. Added remeron 7.5mg qhs 5/1/17 by Dr. Sweeney. Upped to 15mg nightly. Kade has been depressed lately. Helped w/ sleep. Will try to lower dose.    Has been w/ Health , Jacqueline.     H/o sudden weakness of legs and almost falls.    Review of Systems   Constitutional: Positive for fatigue.   Respiratory: Negative for shortness of breath.    Cardiovascular: Negative for chest pain.   Gastrointestinal: Negative for blood in stool.   Endocrine: Positive for polyphagia. Negative for polydipsia and polyuria.   Musculoskeletal: Positive for back pain.   Skin: Negative for pallor.   Neurological: Positive for weakness. Negative for dizziness, tremors, seizures and headaches.   Psychiatric/Behavioral: Negative for confusion. The patient is nervous/anxious.          Objective:      Physical Exam    /68 (BP Location: Left arm, Patient Position: Sitting, BP Method: Manual)   Pulse  "88   Temp 97.6 °F (36.4 °C) (Oral)   Ht 5' 1" (1.549 m)   Wt 76.7 kg (169 lb 1.5 oz)   SpO2 95%   BMI 31.95 kg/m²     Gen - A+OX4, NAD  HEENT - PERRL, OP clear. MMM. Hearing aid in the L ear. Difficulty hearing.   Neck - no LAD  CV - RRR  Chest - CTAB, no wheezing/rhonchi/crackles.  Abd - S/NT/ND/+BS  Ext -2 + B radial pulses. 2+ L DP and 1+ R DP. No LE edema.   MSK - normal gait. Mild tenderness to palpation of lower back.     Labs reviewed w/ pt.      Assessment/Plan     Paris was seen today for follow-up.    Diagnoses and all orders for this visit:    Diabetes mellitus type 2 in obese - metformin XR 500mg daily.   -     INSULIN, RANDOM; Future  -     Hemoglobin A1c; Future  -     Comprehensive metabolic panel; Future    Neuropathy - L 3rd toe w/ tingling and pain. Increase alpha lipoic acid to 600mg daily from 300mg daily.     Recurrent major depressive disorder, in full remission  -     mirtazapine (REMERON) 7.5 MG Tab; Take 1 tablet (7.5 mg total) by mouth every evening.  -     Comprehensive metabolic panel; Future  -     TSH; Future    Iron deficiency anemia due to chronic blood loss  -     CBC auto differential; Future  -     Ferritin; Future  -     Iron and TIBC; Future    Hyperlipidemia associated with type 2 diabetes mellitus - controlled on crestor daily.     Vit D def - pt stopped taking her Vit D. Will recheck.     Return in about 3 months (around 10/17/2017).      Mandi Huynh MD  Department of Internal Medicine - [facility]  9:01 AM  "

## 2017-07-18 ENCOUNTER — PATIENT MESSAGE (OUTPATIENT)
Dept: PSYCHIATRY | Facility: CLINIC | Age: 72
End: 2017-07-18

## 2017-07-18 ENCOUNTER — CLINICAL SUPPORT (OUTPATIENT)
Dept: REHABILITATION | Facility: OTHER | Age: 72
End: 2017-07-18
Attending: INTERNAL MEDICINE
Payer: MEDICARE

## 2017-07-18 DIAGNOSIS — M50.30 DDD (DEGENERATIVE DISC DISEASE), CERVICAL: Primary | ICD-10-CM

## 2017-07-18 DIAGNOSIS — M51.36 DDD (DEGENERATIVE DISC DISEASE), LUMBAR: ICD-10-CM

## 2017-07-18 PROCEDURE — 97750 PHYSICAL PERFORMANCE TEST: CPT | Performed by: PHYSICAL MEDICINE & REHABILITATION

## 2017-07-18 PROCEDURE — G8979 MOBILITY GOAL STATUS: HCPCS | Mod: CJ | Performed by: PHYSICAL MEDICINE & REHABILITATION

## 2017-07-18 PROCEDURE — G8980 MOBILITY D/C STATUS: HCPCS | Mod: CJ | Performed by: PHYSICAL MEDICINE & REHABILITATION

## 2017-07-18 PROCEDURE — 97110 THERAPEUTIC EXERCISES: CPT | Performed by: PHYSICAL MEDICINE & REHABILITATION

## 2017-07-18 NOTE — PROGRESS NOTES
[facility]        IM RETEST OF NECK ON VISIT 8 NECK AND DISCHARGE NOTE - Neck program started 5/5/17 and back program started 6/16/17, VISIT 16 for back, IM retest and visit 8 neck, visit 16 for program    Name: Paris Burris  Clinic Number: [id number]  Date of Treatment: 07/18/2017   Diagnosis:   Encounter Diagnoses   Name Primary?    DDD (degenerative disc disease), cervical Yes    DDD (degenerative disc disease), lumbar      Physician: Cee Woodall, *    Pain pattern determined: 1, PEP extensions and flexion tolerated for back and 1 REP for neck with retractions    Plan of care signed: 5/8/17 for low back and 6/16/17 for neck program  POC signed 5/8/17 and 6/16/17      Time in:  9:00 am  Time Out: 10:30 am    Total Treatment time: 60 min    Precautions: SEE PMH,  anxiety, cancer, depression, DM, wrist fx, neuropathy in L foot.          Authorization period: 4/13/2018  Plan of care Expiration: 8/5/2017    Visit #: 16 lumbar and 8 cervical,  Discharge note      Evaluation:  5/5/17  Reasessment due:  6/5/17 done 5/31/17  Assessment due: 6/30/17 done 6/20/17  Assess neck and back 7/20/17 done 7/18/17 and discharge       Subjective     Paris reports no neck or back pain today.  She reports intermittent neck and back pain, less then when she started and manageable with her HEP.  She feels ready for wellness.  She is functioning fully at home, cooking, cleaning, and reports she rests and uses z lie and ice and stretches for symptoms as needed.   She states that she is sore after sitting for an extended period of time, she uses support to sit.  She states that she has no neck pain.  She feels stronger in her legs.  Pleased with the progress she has made so far.     Patient reports their pain to be 0/10 on a 0-10 scale with 0 being no pain and 10 being the worst pain imaginable.  Pain Location: low back and mid-upper back     Prior Treatment: PT at [facility]  Prior  functional status: Independent  DME owned/used: no  Occupation:  Retired (taught reading)   Leisure: garden, walking, read, knit   Pts goals: Be able to sit longer, be able to descend stairs easier. Reports met but sitting long periods still hard         Objective     Baseline IM Testing Results: back 17  Date of testin2017  ROM 0-30 deg   Max Peak Torque 111 ft lbs    Min Peak Torque   28 ft lbs   Flex/Ext Ratio  4/1   % below normative data 12% below         [facility]  IM Testing Results: back 17  Date of testing:   ROM 0-42 degrees   Max Peak Torque 140 ft lbs    Min Peak Torque   61 ft lbs   Flex/Ext Ratio  2.5/1   % below normative data 1% below     44% gain in average strength      MOVEMENT LOSS back-reassessed 17   ROM Loss   Flexion moderate loss improved to min loss   Extension minimal loss     Side bending Right moderate loss -improved to min loss   Side bending Left moderate loss - improved to min loss   Rotation Right moderate loss - improved to min loss   Rotation Left moderate loss - improved to min loss       Baseline IM Testing Results:   neck  Date of testin2017  ROM 45 - 99 deg   Max Peak Torque 226    Min Peak Torque 112    Flex/Ext Ratio 1.76:1   % below normative data -6.67        [facility]  IM Testing Results:   neck  Date of testin2017  ROM  deg   Max Peak Torque 294   Min Peak Torque 116   Flex/Ext Ratio 2.5/1   % below normative data -5       5.5% gain in average neck strength    Neck ROM 17  Flexion full  Retraction full  Ext full  Side bend min loss bilat  Rotation WFL    Treatment    Pt was instructed in and performed the following:     Paris received therapeutic exercises to develop/improved posture, cardiovascular endurance, muscular endurance, lumbar  ROM, strength and muscular endurance for 50 minutes including the following exercises:             Flexion in lie  Modified piriformis stretch 10 s/h 3x B  LTR 10x B  Ext in standing  10x  Handouts given for all there ex 6/2/17    Neck:  Retractions 10x min cuing  scap retractions 15x      [facility] 7/18/2017   Visit Number 17   VAS Pain Rating 0   Treadmill Time (in min.) 10   Speed 3   Retraction in Sitting 10   Rotation 5   Scapular Retraction 10   Extension in Lying -   Extension in Standing 10   Flexion in Lying 10   Cervical Extension Seat Pad -   Seat Adjustment -   Top Dead Center -   Counterweight -   Cervical Flexion 108   Cervical Extension 36   Cervical Peak Torque 294   Cervical Weight -   Repetitions -   Rating of Perceived Exertion -   Lumbar Extension Seat Pad -   Femur Restraint -   Top Dead Center -   Counterweight -   Lumbar Flexion -   Lumbar Extension -   Lumbar Peak Torque -   Min Torque -   Percent From Norm -   Percent Change from Initial -   Lumbar Weight 66   Repetitions 20   Rating of Perceived Exertion 4   Ice - Z Lie (in min.) 10         Peripheral muscle strengthening which included 1 set of 15-20 repetitions at a slow, controlled 7 second per rep pace focused on strengthening supporting musculature for improved body mechanics and functional mobility.  Pt and therapist focused on proper form during treatment to ensure optimal strengthening of each targeted muscle group.  Machines were utilized including torso rotation, leg extension, leg curl, chest press,  upright row, tricep extension, biceps, leg press and hip abduction.    Written Home Exercises Provided: BACK  -walking at [facility] 10-15 min 2-3/week recommended  -use of lumbar roll -yes  -ext in standing 3/day   -flexion in lie 3/day   -ext on elbows prone 2/day ( not doing as much 5/12/17)   Modified piriformis, LTR.   Scapular retractions   Handouts were given to the patient. Pt demo fair understanding of the education provided. Paris demonstrated fair return demonstration of activities.   Lumbar roll use compliance: yes and has ball.   HEP NECK  Retractions in sitting 10x  3x/day  Scapular retractions 10x 3x/day     Additional exercises taught this treatment session:  Reviewed handouts and printed for patient again 6/2/17 and again at discharge 7/18/17      Assessment       Patient has attended 17 visits of healthy back, 16 for lumbar and 8 for neck.  Retested neck today.   Visits of the [facility] program for aerobic work, med ex isometric testing with dynamic strengthening on med ex equipment for spine, whole body strengthening on med ex equipment, HEP, education.  Patient has completed Healthy Back Program and is ready to be transitioned into wellness program.  Importance of wellness program, and attending 2/month to maintain strength stressed.  Importance of continuing there ex and body mech and ergonomics stressed.   Patient demonstrates improvement in ability to reduce symptoms, improved posture, improved ROM and improved strength.   Reviewed HEP, and importance of maintaining a healthy spine through continued stretching and performance of HEP, good posture, good ergonomics, good body mech with ADL, leisure, and work.  Discharge handout with HEP given, and discussed aspects of exercise program, cardio, strengthening, and stretching.    Patient expressed understanding information given.  Patient plans to attend wellness and is ready to transition to wellness.  She has HEP which was reprinted today. She tolerated visit well without pain.  Testing reveals improvement of neck and back strength, and patient feels ready to manage symptoms with posture, stretching, and wellness.             GOALS: Pt is in agreement with the following goals.     Short term goals: 6 weeks or 10 visits BACK  1. Pt will demonstrate increased lumbar ROM by at least 3 degrees from the initial ROM value with improvements noted in functional ROM and ability to perform ADLs  MET  2. Pt will demonstrate increased by > 10 % MET  3. Patient report a reduction in worst pain score by 1-2 points for improved  tolerance during work and recreational activities  MET  4. Pt able to perform HEP correctly with minimal cueing or supervision for therapist  MET        Long term goals: 13 weeks or 20 visits BACK  1. Pt will demonstrate increased lumbar ROM by at least 3 degrees from initial ROM value, resulting in improved ability to perform functional fwd bending while standing and sitting. MET  2. Pt will demonstrate increased maximum isometric torque value by 5% when compared to the initial value resulting in improved ability to perform bending, lifting, and carrying activities safely, confidently.  MET  3. Pt to demonstrate ability to independently control and reduce their pain through posture positioning and mechanical movements throughout a typical day.MET  4. Patient will demonstrate improved overall function per FOTO Survey to CJ = at least 1% but < 20% impaired, limited or restricted score or less.   FROM 33 % to 31%    Short term goals: 6 weeks or 10 visits NECK  1.  Pt will demonstrate increased isometric torque by 5% from initial test indicating positive muscular response to program of progressive resistance training   MET  2. Pt will demonstrate reduced pain and improved functional outcomes as reported on the patient centered questionnaires. Report 2-4 points reduction NDI indicating improvement in function and pain  FOTO 33 % limited to 31%  3.. Pt will demonstrate independence with reducing or controlling symptoms with ther ex, movement, or position independently, able to reduce pain 1-2 points on pain scale using strategies taught in therapy  MET        Long term goals: 13 weeks or 20 visits  NECK  1. Pt will demonstratte increased cervical ROM as measured by med ex by 6 degrees from initial test which results in full functional ROM of neck for ease with ADLs and driving  MET  2. Pt will demonstrate increased isometric torque by 10% from initial test to improve ability to lift and carry. MET  3.Patient will  demonstrate improved overall function per FOTO Survey to CI = at least 1% but < 20% impaired, limited or restricted score or less.   FOTO 33% to 31%  4. Pt will demonstrate independence with reducing or controlling symptoms with ther ex, movement, or position independently, able to reduce pain 2-4 points on pain scale using strategies taught in therapy   MET  5. Pt will demonstrate independence with the HEP at discharge.   MET        FOTO:    1st visit: 33% limited  5th visit: 41% limited back  FOTO BACK VISIT 10 33 %  VISIT FOR NECK 6/16/17 % Impaired: 33  FOTO AT DISCHARGE 31%      Plan     Education reviewed, handouts with HEP regiven, she plans to attend wellness.  Discharge therapy.

## 2017-07-21 ENCOUNTER — OFFICE VISIT (OUTPATIENT)
Dept: PSYCHIATRY | Facility: CLINIC | Age: 72
End: 2017-07-21
Payer: MEDICARE

## 2017-07-21 ENCOUNTER — DOCUMENTATION ONLY (OUTPATIENT)
Dept: REHABILITATION | Facility: OTHER | Age: 72
End: 2017-07-21
Attending: INTERNAL MEDICINE
Payer: MEDICARE

## 2017-07-21 VITALS
HEART RATE: 106 BPM | DIASTOLIC BLOOD PRESSURE: 80 MMHG | SYSTOLIC BLOOD PRESSURE: 126 MMHG | WEIGHT: 169 LBS | BODY MASS INDEX: 31.91 KG/M2 | HEIGHT: 61 IN

## 2017-07-21 DIAGNOSIS — F41.1 GAD (GENERALIZED ANXIETY DISORDER): ICD-10-CM

## 2017-07-21 DIAGNOSIS — R52 PAIN DISORDER: ICD-10-CM

## 2017-07-21 DIAGNOSIS — F33.41 RECURRENT MAJOR DEPRESSION IN PARTIAL REMISSION: Primary | ICD-10-CM

## 2017-07-21 DIAGNOSIS — F10.21 ALCOHOL DEPENDENCE IN REMISSION: ICD-10-CM

## 2017-07-21 PROCEDURE — 99214 OFFICE O/P EST MOD 30 MIN: CPT | Mod: S$GLB,,, | Performed by: PSYCHIATRY & NEUROLOGY

## 2017-07-21 PROCEDURE — 1159F MED LIST DOCD IN RCRD: CPT | Mod: S$GLB,,, | Performed by: PSYCHIATRY & NEUROLOGY

## 2017-07-21 PROCEDURE — 90833 PSYTX W PT W E/M 30 MIN: CPT | Mod: S$GLB,,, | Performed by: PSYCHIATRY & NEUROLOGY

## 2017-07-21 PROCEDURE — 99999 PR PBB SHADOW E&M-EST. PATIENT-LVL II: CPT | Mod: PBBFAC,,, | Performed by: PSYCHIATRY & NEUROLOGY

## 2017-07-21 PROCEDURE — 99499 UNLISTED E&M SERVICE: CPT | Mod: S$GLB,,, | Performed by: PSYCHIATRY & NEUROLOGY

## 2017-07-21 RX ORDER — LORAZEPAM 0.5 MG/1
0.5 TABLET ORAL DAILY PRN
Qty: 30 TABLET | Refills: 3 | Status: SHIPPED | OUTPATIENT
Start: 2017-07-21 | End: 2018-02-05 | Stop reason: SDUPTHER

## 2017-07-21 RX ORDER — VENLAFAXINE 75 MG/1
75 TABLET ORAL 2 TIMES DAILY
Qty: 60 TABLET | Refills: 3 | Status: SHIPPED | OUTPATIENT
Start: 2017-07-21 | End: 2017-07-27

## 2017-07-21 NOTE — PROGRESS NOTES
"Outpatient Psychiatry Follow-Up Visit (MD/NP)    7/21/2017 last seen 5-1-17     Clinical Status of Patient:  Outpatient (Ambulatory)    Chief Complaint:   "Paris"   Paris Burris is a 71 y.o. female who presents today for follow-up of depression and alcohol problem .  Met with patient.   ; friend of Dr Rachel . Annita Braun ( 9 yrs ) .  to [place] .    Interval History and Content of Current Session:  Interim Events/Subjective Report/Content of Current Session: Pt s/p ABU program in Spring 2014 for alcohol dependence . 2-10-14 sober date . Now 3 years .      Grkids at their own  home are 14( [place])- anger issues / destroys property . 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  Nikky has a [place] xchnge female .  Oldest grson Delfino ( 17) ADHD  ,  going  [employer]. Younger kids at  [facility] . Very engaged in AA and sponsor attending 3-4 x per week . She completed 1st series of 12 steps . .Mood has been more anxious over the past few months . More nail biting    . In past she was not sure she wanted to reengage with  Dr Lauren .     Pt has hyperparathyroidism  and had surgery in nov, 2015- stable .  Sleep apnea confirmed  by sleep specialist  . Did trauma therapy at [facility].  She and Annita Braun have moved into 2nf floor apmnt away from [facility]. 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          EX  Son in law - [place] -- good terms ; Dtr Yue ( Vira Rosas- employed with )  had had recent 4th suicide attempt ( 1st was OD  acetomin; 2nd and 3rd had gun)  and was admitted to [facility] on 4-9-15  X 5 weeks .    Yue also has been to tx in [facility] . Yue now in therapy  plus a group with [facility].  Pt does not ask details anymore .   Yue ( kaylie patel).  Yue continues to  cut " herself off from others that were close to her . Yue has  Been even keeled .     Yue has distanced santana suarez's wife ( Yazmin) away . Santana is a family friend.   Dtr Yue ( Vira robbins) going though bitter divorce where Yue wants others to avoid [place]. Kids shuttle .  Quincy's live in gf has 3 kids in their home .     Diabetes controlled with hgb A1c --   Feb 2017 --6.4   has relatively new onset  Neuropathic  pain  is worse from toe to balll of foot to heel then associated hip  Pian .  Podiatry a has added Neurontin 300 mg bid  To tid  near early 2017  but may prevent weight loss for her  . Sitting is worse for her pain.       Since Feb 2017 visit - she could not tolerate effexor XR at  Increased dose to 150 mg ( anxiety and nightmares increased ) then decreased to 75 mg ; She then added remeron 15 mg q hs and is at half tab q hs  .     Jacqueline Burns [facility]  has been very helpful to dec HgbA1c     Psychiatric Review Of Systems - Is patient experiencing or having changes in:  sleep:  Added back  mirtazepine 15 mg  Half tab q hs to help sleep   appetite: no, controlled   weight: no; 171 ( feb 2017)  ; 168 ( March 2017)  169  ( July 2017)  Wants ot lose more ; A1c is good at 5.9 down from >10     energy/anergy: no, less exercising at [place] with free  bc of comorbid condition; still doing land based PT and   accupuncture ; doing  healthy back program at [facility] with machines ; helps hus   interest/pleasure/anhedonia: no, volunteering with teacher   somatic symptoms: yes as above and below - neck tension better with mechanical cupping   libido: no  anxiety/panic: yes , still  biting nails ; obsessive thinking about dtr   guilty/hopelessness: no  concentration: not baseline ;  Improving as she is quicker  to  complete tasks   S.I.B.s/risky behavior: no  Irritability: no  Racing thoughts: no  Impulsive behaviors: no  Paranoia:no  AVH:no      Has compression fx in high thoracic area .  New onset  HbA1c  from 10 plus to 5.9 Dx 'd in Jan 2016 .No DM in family .     Santana Rachel MD is  fam friend .     Meds  effexor xr 75  mg   q day;  mirtazepine 7.5 mg q hs;  Vit D supplement ; tramadol 50 mg tid prn ( usually takes 50 mg  2 q day)  Max would be 300 in literature  ; Ativan 0.5 mg  prn     Did not fill deplin     Past meds remeron - not needed     Her pain doc is concerned about potential interaction of tramadol and ssri in past     Reading book form health  --never be sick again --  Avoiding  milk , white flour , sugar ; limited coffee    Psychotherapy:  · Target symptoms: alcohol abuse, depression  · Why chosen therapy is appropriate versus another modality: relevant to diagnosis, patient responds to this modality, evidence based practice  · Outcome monitoring methods: self-report, observation  · Therapeutic intervention type: supportive psychotherapy  · Topics discussed/themes: relationships difficulties, substance abuse  · The patient's response to the intervention is accepting. The patient's progress toward treatment goals is good.   · Duration of intervention: 35 minutes.    Review of Systems   · Prob Pert. 1 sys, Ext. psych +2 add., Comp. 10-14 sys  · PSYCHIATRIC: Pertinant items are noted in the narrative.  · CONSTITUTIONAL: No weight gain or loss. Wedding dress size 5 . She is about a 14-16 .   · MUSCULOSKELETAL: Positive for joint stiffness, toe neuropathic  Pain ( DM JAN 2016). Leg ( hip and thighs)  weakness still but in PT  ( possibly vascular- neg neuro test ) ;mechanical cupping ( upper back tension post Breast CA)  and  · NEUROLOGIC: No weakness, sensory changes, seizures, confusion, memory loss, tremor or other abnormal movements.  · ENDOCRINE: No polydipsia or polyuria.  · INTEGUMENTARY: No rashes or lacerations.  · EYES: No exophthalmos, jaundice or blindness.  · ENT: No dizziness, tinnitus or hearing loss.  · RESPIRATORY: No shortness of breath.  · CARDIOVASCULAR: No tachycardia or chest  "pain.  · GASTROINTESTINAL: No nausea, vomiting, pain, constipation or diarrhea.  · GENITOURINARY: No frequency, dysuria or sexual dysfunction. S/p recent uti series and now kidney stones awaiting lithotripsy   · HEMATOLOGIC/LYMPHATIC: No excessive bleeding, prolonged or excessive bleeding after dental extraction/injury.  · ALLERGIC/IMMUNOLOGIC: No allergic response to materials, foods or animals at this time.    Past Medical, Family and Social History: The patient's past medical, family and social history have been reviewed and updated as appropriate within the electronic medical record - see encounter notes.  Has lost son yrs ago .    Compliance: yes    Side effects:  Sweating when others dont    Risk Parameters:  Patient reports no suicidal ideation  Patient reports no homicidal ideation  Patient reports no self-injurious behavior  Patient reports no violent behavior    Exam (detailed: at least 9 elements; comprehensive: all 15 elements)   Constitutional  Vitals:  Most recent vital signs, dated less than 90 days prior to this appointment, were reviewed.   Vitals:    07/21/17 0833   BP: 126/80   Pulse: 106   Weight: 76.7 kg (169 lb)   Height: 5' 1" (1.549 m)        General:  unremarkable, age appropriate, neatly groomed     Musculoskeletal  Muscle Strength/Tone:  no spasicity, no rigidity   Gait & Station:  non-ataxic     Psychiatric  Speech:  no latency; no press   Mood & Affect:  euthymic  congruent and appropriate   Thought Process:  normal and logical   Associations:  intact   Thought Content:  normal, no suicidality, no homicidality, delusions, or paranoia   Insight:  has awareness of illness   Judgement: behavior is adequate to circumstances   Orientation:  grossly intact   Memory: intact for content of interview   Language: grossly intact   Attention Span & Concentration:  able to focus   Fund of Knowledge:  intact and appropriate to age and level of education     Assessment and Diagnosis   Status/Progress: " Based on the examination today, the patient's problem(s) is/are worsening.  New problems have been presented today.   Co-morbidities are complicating management of the primary condition.  There are no active rule-out diagnoses for this patient at this time.     General Impression:  Depression still present     1. Recurrent major depression in partial remission     2. GABBY (generalized anxiety disorder)     3. Alcohol dependence in remission     4. Pain disorder               Intervention/Counseling/Treatment Plan   · Medication Management: Maintain Effexor XR at 75 mg q day   And mirtazepine 7.5 mg qhs .  The risks and benefits of medication were discussed with the patient regarding serotonin syndrome and withdrawal .   · Counseling provided with patient as follows: importance of compliance with chosen treatment options was emphasized, risks and benefits of treatment options, including medications, were discussed with the patient  · AA/NA/CA/ACOA/Abstinence     Consider Lyrica for pain - to see podiatry       Return to Clinic: 6 months

## 2017-07-21 NOTE — PROGRESS NOTES
Health  Wellness Visit Note    Name: Paris Burris  Clinic Number: [id number]  Physician: Cee Woodall, *  Diagnosis: No diagnosis found.  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   Time In: 11:00 AM  Time Out: 11:55 AM  Total Treatment Time: 55 minutes    Subjective:   Patient reports her neck and back have been feeling well since her last PT visit.  Was able to warm up going .1 faster on treadmill and reports no increase in symptoms or soreness after IM Testing. States she is pleased with progress. Is exercising 1x/wk at [facility].  Would like to learn core lumbar setup and to get a copy of the exercise card so she can try it at [facility].     Objective:   Paris completed therapeutic stretches (EIS, EIL, RICK, Neck Retractions, scapular retractions) and the following MedX exercise machines: core cervical, core lumbar, torso rotation l/r, leg extension, leg curl, upright row, chest press, biceps curl, triceps extension, leg press    Please see exercise log in patient folder for rate of exertion and repetitions completed.     Assessment:   Patient tolerated all exercises without complaint or increase in symptoms.     Plan:   Patient will attend 2nd complimentary visit. Give copy of exercise card for [facility]  and show core lumbar set-up.  Discuss plan purchase for Wellness with patient.

## 2017-07-25 ENCOUNTER — DOCUMENTATION ONLY (OUTPATIENT)
Dept: REHABILITATION | Facility: OTHER | Age: 72
End: 2017-07-25
Attending: INTERNAL MEDICINE
Payer: MEDICARE

## 2017-07-25 NOTE — PROGRESS NOTES
"Health  Wellness Visit Note    Name: Paris Burris  Clinic Number: [id number]  Physician: Cee Woodall, *  Diagnosis: No diagnosis found.  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   Time In: 9:30 AM  Time Out: 10:27 AM  Total Treatment Time: 57 minutes    Subjective:   Patient reports that her neck is feeling fine this morning-does not have any neck pain presently; states that her back was slightly painful over the weekend-says that she did not stretch as frequently as she does normally; mentioned that she was not feeling too well over the weekend-was feeling very fatigued and tired; says that her  was a "little under the weather" as well but she is feeling better today.    Objective:   Paris completed therapeutic stretches (EIS, EIL, RICK, Neck Retractions, scapular retractions) and the following MedX exercise machines: core cervical, core lumbar, torso rotation l/r, leg extension, leg curl, upright row, chest press, biceps curl, triceps extension, leg press    Please see exercise log in patient folder for rate of exertion and repetitions completed.     Assessment:   Patient tolerated all exercises without complaint or increase in symptoms.     Plan:    Continue with established plan of care towards wellness goals; patient " will continue to attend wellness with her ; is unsure of what plan she will purchase.

## 2017-07-26 ENCOUNTER — PATIENT MESSAGE (OUTPATIENT)
Dept: INTERNAL MEDICINE | Facility: CLINIC | Age: 72
End: 2017-07-26

## 2017-07-27 RX ORDER — VENLAFAXINE HYDROCHLORIDE 75 MG/1
75 CAPSULE, EXTENDED RELEASE ORAL DAILY
Qty: 30 CAPSULE | Refills: 11 | Status: SHIPPED | OUTPATIENT
Start: 2017-07-27 | End: 2017-10-19

## 2017-07-28 ENCOUNTER — DOCUMENTATION ONLY (OUTPATIENT)
Dept: REHABILITATION | Facility: OTHER | Age: 72
End: 2017-07-28
Attending: INTERNAL MEDICINE
Payer: MEDICARE

## 2017-07-28 NOTE — PROGRESS NOTES
"Health  Wellness Visit Note    Name: Paris Burris  Clinic Number: [id number]  Physician: Cee Woodall, *  Diagnosis: No diagnosis found.  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   Time In: 8:00AM  Time Out: 8:55AM  Total Treatment Time: 55 minutes    Subjective:   Patient reports that her neck is feeling fine this morning-does not have any neck pain presently. Not having any back pain or stiffness as well. Says "I'm feeling pretty good today". Completes her HEP and ices daily.Icing helps with her diabetic neuropathy. No new symptoms to reports, says she wants to transition to [facility] in a couple of months.     Objective:   Paris completed therapeutic stretches (EIS, EIL, RICK, Neck Retractions, scapular retractions) and the following MedX exercise machines: core cervical, core lumbar, torso rotation l/r, leg extension, leg curl, upright row, chest press, biceps curl, triceps extension, leg press    Please see exercise log in patient folder for rate of exertion and repetitions completed.     Assessment:   Patient tolerated all exercises without complaint or increase in symptoms.     Plan:    Continue with established plan of care towards wellness goals> Continue with Plan A for about 2-3 months then transition to [facility].      "

## 2017-07-31 ENCOUNTER — DOCUMENTATION ONLY (OUTPATIENT)
Dept: REHABILITATION | Facility: OTHER | Age: 72
End: 2017-07-31
Attending: INTERNAL MEDICINE
Payer: MEDICARE

## 2017-07-31 NOTE — PROGRESS NOTES
"Health  Wellness Visit Note    Name: Paris Burris  Clinic Number: [id number]  Physician: Cee Woodall, *  Diagnosis: No diagnosis found.  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   Time In: 9:51 AM  Time Out: 10:30 AM  Total Treatment Time: 39 minutes    Subjective:   Patient reports that she is not feeling too well today; states that she recently began a new medication for her neuropathy and it has her feeling a little "out of it"; says that she was feeling nauseated this morning and vomited after she ate breakfast-unsure if it is related to her new medication; advised patient to notify HC of any type of sickness prior to her wellness session; did not feel nauseated as she was exercising; does not have any neck or lower back pain-both are continuing to feel well.  Objective:   Paris completed therapeutic stretches (EIS, EIL, RICK, Neck Retractions, scapular retractions) and the following [employer] exercise machines: core cervical, core lumbar, torso rotation l/r, leg extension, leg curl, upright row, chest press, biceps curl, triceps extension, leg press    Please see exercise log in patient folder for rate of exertion and repetitions completed.     Assessment:   Patient tolerated all exercises without complaint or increase in symptoms; " skipped ice.    Plan:    Continue with established plan of care towards wellness goals> Continue with Plan A for about 2-3 months then transition to [facility].

## 2017-08-02 ENCOUNTER — DOCUMENTATION ONLY (OUTPATIENT)
Dept: REHABILITATION | Facility: OTHER | Age: 72
End: 2017-08-02
Attending: PHYSICAL MEDICINE & REHABILITATION
Payer: MEDICARE

## 2017-08-02 NOTE — PROGRESS NOTES
Health  Wellness Visit Note    Name: Paris Burris  Clinic Number: [id number]  Physician: Cee Woodall, *  Diagnosis: No diagnosis found.  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   Time In: 10:02 AM  Time Out: 10:58 AM  Total Treatment Time: 56 minutes    Subjective:   Patient reports that she is feeling better compared to her previous visit; says that her neuropathy has worsened since she began her new medication; noticed more numbness in her big toe and heel on her left foot; will be contacting her podiatrist to discuss her symptoms; says that her neck and lower back are both symptom free; patient has been compliant with her stretching routine; mentioned that she would like to begin exercising at [facility] at least twice a week in addition to attending her wellness visits; advised patient to only perform resistance training once a week so that she can allow a day to recuperate-also discussed swimming.    Objective:   Paris completed therapeutic stretches (EIS, EIL, RICK, Neck Retractions, scapular retractions) and the following MedX exercise machines: core cervical, core lumbar, torso rotation l/r, leg extension, leg curl, upright row, chest press, biceps curl, triceps extension, leg press    Please see exercise log in patient  folder for rate of exertion and repetitions completed.     Assessment:   Patient tolerated all exercises without complaint or increase in symptoms; iced lower back after completion of exercises.    Plan:    Continue with established plan of care towards wellness goals> Continue with Plan A for about 2-3 months then transition to [facility].

## 2017-08-09 ENCOUNTER — DOCUMENTATION ONLY (OUTPATIENT)
Dept: REHABILITATION | Facility: OTHER | Age: 72
End: 2017-08-09
Attending: PHYSICAL MEDICINE & REHABILITATION
Payer: MEDICARE

## 2017-08-09 NOTE — PROGRESS NOTES
Health  Wellness Visit Note    Name: Paris Burris  Clinic Number: [id number]  Physician: Cee Woodall, *  Diagnosis: No diagnosis found.  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   Time In: 10:00AM  Time Out: 10:45 AM  Total Treatment Time: 45 minutes    Subjective:   Patient reports that she is feeling sluggish and fatigued today. Complained of her legs being tired, while walking on the treadmill she was ok. Only feeling fatigued when walking on the ground. Her Podiatrist changed her medicine last week, only has been on her new scrip for 4 days, hoping that this will change her energy level. Going to [place] in August, hoping that her nuropathy will be under control so she can manage flying. Has not been to [facility] yet due to feeling like this. No complaints of symptoms in her low back or neck.     Objective:   Paris completed therapeutic stretches (EIS, EIL, RICK, Neck Retractions, scapular retractions) and the following MedX exercise machines: core cervical, core lumbar, torso rotation l/r, leg extension, leg curl, upright row, chest press, biceps curl, triceps extension, leg press    Please see exercise log in patient folder for rate of exertion and repetitions completed.     Assessment:   Patient tolerated all exercises without complaint  or increase in symptoms; iced lower back after completion of exercises.    Plan:    Continue with established plan of care towards wellness goals. Continue with Plan A for about 2-3 months then transition to [facility].

## 2017-08-10 ENCOUNTER — CLINICAL SUPPORT (OUTPATIENT)
Dept: INTERNAL MEDICINE | Facility: CLINIC | Age: 72
End: 2017-08-10
Payer: MEDICARE

## 2017-08-10 VITALS — BODY MASS INDEX: 31.62 KG/M2 | WEIGHT: 167.31 LBS

## 2017-08-10 NOTE — PROGRESS NOTES
Health  Follow-Up Note   [x] Office  [] Phone  Notes from previous session reviewed.   [x] Previous Session Goals unchanged.   [] Patient/Caregiver Change in Goals.  Goals added or changed by Patient/Caregiver since program participation:  1.  Continue same plan     2. Stopped the lyrica due to side effects and is back on the gabapentin     Additional/Changed obstacles that could prevent patient/caregiver from reaching their goals?  1.   not feeling well so has not wanted to leave him to go out to exercise    Feedback provided:  1.  Praised for great job and determination  2. Praised for decrease in A1c down to 5.7 from 6.4 and from 10.6- 01/16     7/21/17 2/3/17 9/14/16 6/16/16 3/16/16 1/8/16 1/6/16    Hemoglobin A1C 4.0 - 5.6 % 5.7  6.4R, CM  6.2R, CM 6.3R  6.8R  10.4R  10.6R        Diagnostic values/Desriptors for follow-up as needed for chronic condition(s)   Weight: 75.9 kg 167 lb 5.3 oz  Blood Glucose: Averages  7 d -110  14 d 110  30 d -112  90 d -123    Interventions:   1. Health  listened reflectively, validated thoughts and feelings, offered support and encouragement.   2. Allowed patient to express themselves in a non-biased atmosphere.  3. Health  assisted pt to problem-solve obstacles such as being in a challenging environment and dealing with these challenges.   4. Motivational Interviewed interventions utilized (OARS).   5. Patient responded favorably to interventions and remained actively engaged in the session.   6. Health  will remain available and connected for patient by phone and/or office visits.   7. Positive reinforcement, emotional support and encouragement provided.   8. Focused Education: MI    Plan:  [x] Pt will work on goals as stated above.   [x] Pt will contact Health  for any questions, concerns or needs.  [x] Pt will follow up with Health  in office on  8/31/17 at 0900      [] Pt will follow up with Health  on phone in:        [x] [employer]   will remain available.   [] Health  will contact patient by phone in:        [] Health  will consult:        [] Health  will inform Provider via EPIC messaging.     Impression:  1. Behavior is consistent with   Action    Stage of Change.   2. Participation level:       [x] Receptive      [x] Interactive      [] Guarded and Resistant      [x] Self Motivated      [] Refused/Declined to participate   3. [x] Pt voiced understanding of all information presented.       [] Pt voiced needing further information/education. This will be arranged.       [x] Pt would benefit from further education/information as identified by this health . This will be arranged.     Jacqueline Perry RN HC

## 2017-08-11 ENCOUNTER — DOCUMENTATION ONLY (OUTPATIENT)
Dept: REHABILITATION | Facility: OTHER | Age: 72
End: 2017-08-11
Attending: PHYSICAL MEDICINE & REHABILITATION
Payer: MEDICARE

## 2017-08-11 NOTE — PROGRESS NOTES
Health  Wellness Visit Note    Name: Paris Burris  Clinic Number: [id number]  Physician: Cee Woodall, *  Diagnosis: No diagnosis found.  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   Time In: 10:00 AM  Time Out: 10:50  AM  Total Treatment Time: 50 minutes    Subjective:   Patient reports feeling more energized and that both her back and neck are doing well.  Has not yet been to [facility]; looking forward to upcoming trip to [place].  States she feels she is getting stronger and is pleased with her overall progress thus far.  Is continuing to increase in weights and repetitions.     Objective:   Paris completed therapeutic stretches (EIS, EIL, RICK, Neck Retractions, scapular retractions) and the following MedX exercise machines: core cervical, core lumbar, torso rotation l/r, leg extension, leg curl, upright row, chest press, biceps curl, triceps extension, leg press    Please see exercise log in patient folder for rate of exertion and repetitions completed.     Assessment:   Patient tolerated all exercises without complaint or increase in symptoms; iced lower back after completion of exercises. Skipped ice.     Plan:    Continue with established plan of care towards wellness goals. Continue with Plan A for about 2-3 months then transition to  [facility].

## 2017-08-14 ENCOUNTER — DOCUMENTATION ONLY (OUTPATIENT)
Dept: REHABILITATION | Facility: OTHER | Age: 72
End: 2017-08-14
Attending: PHYSICAL MEDICINE & REHABILITATION
Payer: MEDICARE

## 2017-08-14 NOTE — PROGRESS NOTES
Health  Wellness Visit Note    Name: Paris Burris  Clinic Number: [id number]  Physician: Cee Woodall, *  Diagnosis: No diagnosis found.  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   Time In: 10:02 AM  Time Out: 10:53  AM  Total Treatment Time: 53 minutes    Subjective:   Patient reports she is feeling well overall today; does not have any lower back pain or symptoms today; neck is feeling symptom free as well; has been compliant with her stretching routine; mentioned that she often uses essential oils to alleviate any other problematic symptoms (peppermint, lavender). Patient will be traveling to [place] in the upcoming weeks and is interested in continuing her workout routine while she is on vacation; has not attended [facility] but would try to go this week-will try swimming and weight training.    Objective:   Paris completed therapeutic stretches (EIS, EIL, RICK, Neck Retractions, scapular retractions) and the following [employer] exercise machines: core cervical, core lumbar, torso rotation l/r, leg extension, leg curl, upright row, chest press, biceps curl, triceps extension, leg press    Please see exercise log in patient folder for rate of exertion and repetitions completed.     Assessment:   Patient tolerated all exercises  without complaint or increase in symptoms. Skipped ice.     Plan:    Continue with established plan of care towards wellness goals. Continue with Plan A for about 2-3 months then transition to [facility].

## 2017-08-16 ENCOUNTER — DOCUMENTATION ONLY (OUTPATIENT)
Dept: REHABILITATION | Facility: OTHER | Age: 72
End: 2017-08-16
Attending: PHYSICAL MEDICINE & REHABILITATION
Payer: MEDICARE

## 2017-08-16 NOTE — PROGRESS NOTES
Health  Wellness Visit Note    Name: Paris Burris  Clinic Number: [id number]  Physician: Cee Woodall, *  Diagnosis: No diagnosis found.  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   Time In: 10:00 AM  Time Out: 11:00 AM  Total Treatment Time: 60 minutes    Subjective:   Patient reports that she experienced some lower back discomfort this morning; states that she was sitting down at her computer responding to emails, reading the news, etc.for about 50 minutes; says that when she was standing up, she felt soreness/discomfort in her lower back; used her lumbar roll while she was sitting down; advised patient to take breaks from sitting and perform her stretches to help with symptoms; neck is continuing to feel well-says that it feels better than her lower back.    Objective:   Paris completed therapeutic stretches (EIS, EIL, RICK, Neck Retractions, scapular retractions) and the following [employer] exercise machines: core cervical, core lumbar, torso rotation l/r, leg extension, leg curl, upright row, chest press, biceps curl, triceps extension, leg press    Please see exercise log in patient folder for rate of exertion and repetitions completed.     Assessment:   Patient tolerated all exercises without complaint or increase in symptoms. Iced  neck and lower back after completion of exercises.    Plan:    Continue with established plan of care towards wellness goals. Continue with Plan A for about 2-3 months then transition to [facility].

## 2017-08-21 ENCOUNTER — PATIENT MESSAGE (OUTPATIENT)
Dept: INTERNAL MEDICINE | Facility: CLINIC | Age: 72
End: 2017-08-21

## 2017-08-21 ENCOUNTER — DOCUMENTATION ONLY (OUTPATIENT)
Dept: REHABILITATION | Facility: OTHER | Age: 72
End: 2017-08-21
Attending: PHYSICAL MEDICINE & REHABILITATION
Payer: MEDICARE

## 2017-08-21 NOTE — PROGRESS NOTES
Health  Wellness Visit Note    Name: Paris Burris  Clinic Number: [id number]  Physician: Cee Woodall, *  Diagnosis: No diagnosis found.  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   Time In: 9:53 AM  Time Out: 10:45 AM  Total Treatment Time: 52 minutes    Subjective:   Patient reports that she is having some soreness in her lower back this morning; states that she was sitting at her computer reading, which caused her to have soreness; did not take a break today as discussed previously but has incorporated breaks since her last visit; believes that it has helped. Patient says that her neck is continuing to feel fine-no issues or symptoms.    Objective:   Paris completed therapeutic stretches (EIS, EIL, RICK, Neck Retractions, scapular retractions) and the following MedX exercise machines: core cervical, core lumbar, torso rotation l/r, leg extension, leg curl, upright row, chest press, biceps curl, triceps extension, leg press    Please see exercise log in patient folder for rate of exertion and repetitions completed.     Assessment:   Patient tolerated all exercises without complaint or increase in symptoms. Iced neck and lower back after completion of exercises.    Plan:    Continue with established plan of care towards wellness  goals. Continue with Plan A for about 2-3 months then transition to [facility].

## 2017-08-22 RX ORDER — DEXTROSE 4 G
1 TABLET,CHEWABLE ORAL 2 TIMES DAILY
Qty: 1 EACH | Refills: 0 | Status: SHIPPED | OUTPATIENT
Start: 2017-08-22 | End: 2018-12-31 | Stop reason: SDUPTHER

## 2017-08-23 ENCOUNTER — DOCUMENTATION ONLY (OUTPATIENT)
Dept: REHABILITATION | Facility: OTHER | Age: 72
End: 2017-08-23
Attending: PHYSICAL MEDICINE & REHABILITATION
Payer: MEDICARE

## 2017-08-23 NOTE — PROGRESS NOTES
Health  Wellness Visit Note    Name: Paris Burris  Clinic Number: [id number]  Physician: Cee Woodall, *  Diagnosis: No diagnosis found.  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   Time In: 10:26 AM  Time Out: 11:20 AM  Total Treatment Time: 56 minutes    Subjective:   Patient reports that both her neck and lower back are feeling well this morning; says that there are times when her neck feels better  states that she experienced some discomfort in her lower back after standing for about 30 minutes. Patient did not remember to stretch or take a break while standing; reminded patient that she could perform the EIS stretch to prevent her lower back from stiffening; mentioned that she will feel increased discomfort in her quadriceps as she walking down stairs-advised patient to perform quadricep stretches.    Objective:   Paris completed therapeutic stretches (EIS, EIL, RICK, Neck Retractions, scapular retractions) and the following [employer] exercise machines: core cervical, core lumbar, torso rotation l/r, leg extension, leg curl, upright row, chest press, biceps curl, triceps extension, leg press    Please see exercise log in patient folder for rate of exertion and repetitions completed.     Assessment:   Patient tolerated all exercises  without complaint or increase in symptoms. Iced neck and lower back after completion of exercises.    Plan:    Continue with established plan of care towards wellness goals. Continue with Plan A for about 2-3 months then transition to [facility].

## 2017-08-30 ENCOUNTER — DOCUMENTATION ONLY (OUTPATIENT)
Dept: REHABILITATION | Facility: OTHER | Age: 72
End: 2017-08-30
Attending: PHYSICAL MEDICINE & REHABILITATION
Payer: MEDICARE

## 2017-08-30 NOTE — PROGRESS NOTES
"Health  Wellness Visit Note    Name: Paris Burris  Clinic Number: [id number]  Physician: Cee Woodall, *  Diagnosis: No diagnosis found.  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   Time In: 9:52 AM  Time Out: 10:55 AM  Total Treatment Time: 61 minutes    Subjective:   Patient reports that both her neck and lower back are feeling relatively well; does not have any lower back or neck pain; mentioned that she was experiencing some increased neuropathy in her foot yesterday-says that she was feeling "really bad" yesterday and was about to take her medication; says that normally her neuropathy feels like "an achyness"-today her symptoms have improved; patient says that she did not have any lower back pain this morning as she was reading the news-has been making a conscious effort to take breaks and stretch while reading ; patient will be traveling to [place] next week-advised her to bring her lumbar roll while flying and to stretch and ice frequently.    Objective:   Paris completed therapeutic stretches (EIS, EIL, RICK, Neck Retractions, scapular retractions) and the following [employer] exercise machines: core cervical, core lumbar, torso rotation l/r, leg extension, leg curl, upright row, chest press, biceps curl, triceps extension, " leg press    Please see exercise log in patient folder for rate of exertion and repetitions completed.     Assessment:   Patient tolerated all exercises without complaint or increase in symptoms. Iced neck and lower back after completion of exercises.    Plan:    Continue with established plan of care towards wellness goals. Continue with Plan A for about 2-3 months then transition to [facility].

## 2017-08-31 ENCOUNTER — CLINICAL SUPPORT (OUTPATIENT)
Dept: INTERNAL MEDICINE | Facility: CLINIC | Age: 72
End: 2017-08-31
Payer: MEDICARE

## 2017-08-31 VITALS — BODY MASS INDEX: 31.53 KG/M2 | WEIGHT: 166.88 LBS

## 2017-08-31 NOTE — PROGRESS NOTES
Health  Follow-Up Note   [x] Office  [] Phone  Notes from previous session reviewed.   [x] Previous Session Goals unchanged.   [] Patient/Caregiver Change in Goals.  Goals added or changed by Patient/Caregiver since program participation:  1.  Continue same plan  2. Eat healthy at sisters house    3. Start doing my fitness pal      Additional/Changed support that patient/caregiver has experienced/sought?  (Indicate readiness, support from family, friends, others, community groups, etc)  1.      Additional/Changed obstacles that could prevent patient/caregiver from reaching their goals?  1.  Going on trip to [place] to see sister    Feedback provided:  1.  Praised for continued effort and determination    Diagnostic values/Desriptors for follow-up as needed for chronic condition(s)   Weight: 75.7 kg 166.8 lb down 0.5 lb  Blood Glucose: Averages   7 d 117  14 d 116  30 d 116  90 d 116    Interventions:   1. Health  listened reflectively, validated thoughts and feelings, offered support and encouragement.   2. Allowed patient to express themselves in a non-biased atmosphere.  3. Health  assisted pt to problem-solve obstacles such as being in a challenging environment and dealing with these challenges.   4. Motivational Interviewed interventions utilized (OARS).   5. Patient responded favorably to interventions and remained actively engaged in the session.   6. Health  will remain available and connected for patient by phone and/or office visits.   7. Positive reinforcement, emotional support and encouragement provided.   8. Focused Education: MI    Plan:  [x] Pt will work on goals as stated above.   [x] Pt will contact Health  for any questions, concerns or needs.  [x] Pt will follow up with Health  in office on   9/26/17 0900     [] Pt will follow up with Health  on phone in:        [x] Health  will remain available.   [] Health  will contact patient by phone in:         [] Health  will consult:        [] Health  will inform Provider via EPIC messaging.     Impression:  1. Behavior is consistent with   Action    Stage of Change.   2. Participation level:       [x] Receptive      [x] Interactive      [] Guarded and Resistant      [x] Self Motivated      [] Refused/Declined to participate   3. [x] Pt voiced understanding of all information presented.       [] Pt voiced needing further information/education. This will be arranged.       [x] Pt would benefit from further education/information as identified by this health . This will be arranged.     Jacqueline Perry RN HC

## 2017-09-11 ENCOUNTER — PATIENT MESSAGE (OUTPATIENT)
Dept: PSYCHIATRY | Facility: CLINIC | Age: 72
End: 2017-09-11

## 2017-09-12 ENCOUNTER — PATIENT MESSAGE (OUTPATIENT)
Dept: INTERNAL MEDICINE | Facility: CLINIC | Age: 72
End: 2017-09-12

## 2017-09-26 ENCOUNTER — CLINICAL SUPPORT (OUTPATIENT)
Dept: INTERNAL MEDICINE | Facility: CLINIC | Age: 72
End: 2017-09-26
Payer: MEDICARE

## 2017-09-26 VITALS — WEIGHT: 164.69 LBS | BODY MASS INDEX: 31.12 KG/M2

## 2017-09-27 ENCOUNTER — DOCUMENTATION ONLY (OUTPATIENT)
Dept: REHABILITATION | Facility: OTHER | Age: 72
End: 2017-09-27
Attending: PHYSICAL MEDICINE & REHABILITATION
Payer: MEDICARE

## 2017-09-27 NOTE — PROGRESS NOTES
Health  Wellness Visit Note    Name: Paris Burris  Clinic Number: [id number]  Physician: Cee Woodall, *  Diagnosis: No diagnosis found.  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   Time In: 9:30 AM  Time Out: 10:15 AM  Total Treatment Time: 45 minutes    Subjective:   Patient reports that both her neck and lower back are feeling relatively well; does not have any lower back or neck pain. Has been in [place] the past couple of weeks, did not have any pain or symptoms while there. Was able to walk around and be very active without any disruptions. Stayed compliant with HEP, praised pt. While doing the cervical machine she had some soreness immediately after,but after exercising the soreness went away. Compliant with HEP and icing.   .    Objective:   Paris completed therapeutic stretches (EIS, EIL, RICK, Neck Retractions, scapular retractions) and the following MedX exercise machines: core cervical, core lumbar, torso rotation l/r, leg extension, leg curl, upright row, chest press, biceps curl, triceps extension, leg press    Please see exercise log in patient folder for rate of exertion and repetitions completed.     Assessment:   Patient tolerated all exercises without complaint or increase in symptoms. Iced neck and lower back  after completion of exercises.    Plan:    Continue with established plan of care towards wellness goals. Continue with Plan A for about 2-3 months then transition to [facility].

## 2017-09-29 ENCOUNTER — DOCUMENTATION ONLY (OUTPATIENT)
Dept: REHABILITATION | Facility: OTHER | Age: 72
End: 2017-09-29
Attending: PHYSICAL MEDICINE & REHABILITATION
Payer: MEDICARE

## 2017-09-29 NOTE — PROGRESS NOTES
Health  Wellness Visit Note    Name: Paris Burris  Clinic Number: [id number]  Physician: Cee Woodall, *  Diagnosis: No diagnosis found.  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   Time In: 8:00 AM  Time Out: 8:50AM  Total Treatment Time: 50 minutes    Subjective:   Patient reports that both her neck and lower back are feeling fine today. Says she feels stronger today and more energized since returning from her trip to [place]. Was able to do 20 reps on all the machines except for chest press. No added soreness after doing the cervical machine today. Compliant with HEP, no other symptoms to report.     Objective:   Paris completed therapeutic stretches (EIS, EIL, RICK, Neck Retractions, scapular retractions) and the following MedX exercise machines: core cervical, core lumbar, torso rotation l/r, leg extension, leg curl, upright row, chest press, biceps curl, triceps extension, leg press    Please see exercise log in patient folder for rate of exertion and repetitions completed.     Assessment:   Patient tolerated all exercises without complaint or increase in symptoms. Iced neck and lower back after completion of exercises.    Plan:    Continue with established plan of care towards wellness goals. Continue with Plan A for about  2-3 months then transition to [facility].

## 2017-10-03 ENCOUNTER — DOCUMENTATION ONLY (OUTPATIENT)
Dept: REHABILITATION | Facility: OTHER | Age: 72
End: 2017-10-03
Attending: PHYSICAL MEDICINE & REHABILITATION
Payer: MEDICARE

## 2017-10-03 NOTE — PROGRESS NOTES
Health  Wellness Visit Note    Name: Paris Burris  Clinic Number: [id number]  Physician: Cee Woodall, *  Diagnosis: No diagnosis found.  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   Time In:  9:39 AM  Time Out: 10:15  AM  Total Treatment Time:  36 minutes    Subjective:   Patient reports that both her neck and lower back are feeling good today.  She has been stretching daily and icing at least 3 times per week.  PT is working up to stretching twice per day.  She has been walking 5 times per week for 30 minutes.  Patient felt pain on her ribs (right side) today when doing the rotary torso, she was only able to do 15 reps.  Shoulders were burning on the Chest press, she stopped at 15 reps. Paris will be going up on weights for the upright row and leg press machine next time.  Compliant with HEP, no other symptoms to report.     Objective:   Paris completed therapeutic stretches (EIS, EIL, RICK, Neck Retractions, scapular retractions) and the following MedX exercise machines: core cervical, core lumbar, torso rotation l/r, leg extension, leg curl, upright row, chest press, biceps curl, triceps extension, leg press    Please see exercise log in patient folder for rate of exertion and repetitions completed.     Assessment:   Patient  tolerated all exercises without complaint or increase in symptoms. Iced neck and lower back after completion of exercises.    Plan:    Continue with established plan of care towards wellness goals. Continue with Plan A for about 2-3 months then transition to [facility].

## 2017-10-05 ENCOUNTER — DOCUMENTATION ONLY (OUTPATIENT)
Dept: REHABILITATION | Facility: OTHER | Age: 72
End: 2017-10-05
Attending: PHYSICAL MEDICINE & REHABILITATION
Payer: MEDICARE

## 2017-10-05 NOTE — PROGRESS NOTES
Health  Wellness Visit Note    Name: Paris Burris  Clinic Number: [id number]  Physician: Cee Woodall, *  Diagnosis: No diagnosis found.  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   Time In:  10:12 AM  Time Out: 10:59 AM  Total Treatment Time: 47 minutes    Subjective:   Patient reports that her neck and lower back are continuing to feel well today; states that she is pain free and symptom free in both areas; mentioned that she is only stretching once per day and is working towards twice per day; continues to walk roughly 30 minutes per day; patient has a FitBit that she uses to track her steps; encouraged patient to wear her FitBit to her wellness sessions so that she can monitor her caloric output.  Objective:   Paris completed therapeutic stretches (EIS, EIL, RICK, Neck Retractions, scapular retractions) and the following [employer] exercise machines: core cervical, core lumbar, torso rotation l/r, leg extension, leg curl, upright row, chest press, biceps curl, triceps extension, leg press    Please see exercise log in patient folder for rate of exertion and repetitions completed.     Assessment:   Patient tolerated all exercises without complaint or increase in symptoms. Iced neck and lower back after completion of exercises.    Plan:     Continue with established plan of care towards wellness goals. Continue with Plan A for about 2-3 months then transition to [facility].

## 2017-10-10 ENCOUNTER — DOCUMENTATION ONLY (OUTPATIENT)
Dept: REHABILITATION | Facility: OTHER | Age: 72
End: 2017-10-10
Attending: PHYSICAL MEDICINE & REHABILITATION
Payer: MEDICARE

## 2017-10-10 NOTE — PROGRESS NOTES
Health  Wellness Visit Note    Name: Paris Burris  Clinic Number: [id number]  Physician: Cee Woodall, *  Diagnosis: No diagnosis found.  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   Time In:  9:35 AM  Time Out: 10:25 AM  Total Treatment Time: 50 minutes    Subjective:   Patient reports that she is feeling tired/fatigued today; states that she has been having trouble sleeping so she decided to take some medication (has not taken sleeping medication in a while); believes it maybe having a residual effect on her this morning; neck is feeling slightly stiff-reminded patient to stretch in the mornings even before she attends her wellness session; having some moderate back pain as well; encouraged patient to start icing consistently    Objective:   Paris completed therapeutic stretches (EIS, EIL, RICK, Neck Retractions, scapular retractions) and the following [employer] exercise machines: core cervical, core lumbar, torso rotation l/r, leg extension, leg curl, upright row, chest press, biceps curl, triceps extension, leg press    Please see exercise log in patient folder for rate of exertion and repetitions completed.     Assessment:   Patient tolerated all exercises without complaint or increase in symptoms. Iced neck and lower back after  completion of exercises.    Plan:    Continue with established plan of care towards wellness goals. Continue with Plan A for about 2-3 months then transition to [facility].

## 2017-10-13 ENCOUNTER — DOCUMENTATION ONLY (OUTPATIENT)
Dept: REHABILITATION | Facility: OTHER | Age: 72
End: 2017-10-13
Attending: PHYSICAL MEDICINE & REHABILITATION
Payer: MEDICARE

## 2017-10-13 NOTE — PROGRESS NOTES
Health  Wellness Visit Note    Name: Paris Burris  Clinic Number: [id number]  Physician: Cee Woodall, *  Diagnosis: No diagnosis found.  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   Time In:  8:39 AM  Time Out:  9:26 AM  Total Treatment Time:  47 minutes    Subjective:   Patient reports that she feels tightness in the area under her neck toward the middle section. PT does not recall doing anything different this week, not sure why her neck feels so tight.   She has been stretching daily but not icing.  I recommend that she ice daily.  She has only walked three times for 30-40 minutes this week; PT usually likes to walk every day.  Her and her  are taking a couple of their grandchildren to [place] for their birthdays this weekend. She is looking forward to the outing with her family.     Objective:   Paris completed therapeutic stretches (EIS, EIL, RICK, Neck Retractions, scapular retractions) and the following [employer] exercise machines: core cervical, core lumbar, torso rotation l/r, leg extension, leg curl, upright row, chest press, biceps curl, triceps extension, leg press    Please see exercise log in patient folder for rate of exertion and repetitions completed.     Assessment:   Patient tolerated all exercises  without complaint or increase in symptoms. Iced neck and lower back after completion of exercises.    Plan:    Continue with established plan of care towards wellness goals. Continue with Plan A for about 2-3 months then transition to [facility].

## 2017-10-16 ENCOUNTER — DOCUMENTATION ONLY (OUTPATIENT)
Dept: REHABILITATION | Facility: OTHER | Age: 72
End: 2017-10-16
Attending: PHYSICAL MEDICINE & REHABILITATION
Payer: MEDICARE

## 2017-10-16 NOTE — PROGRESS NOTES
Health  Wellness Visit Note    Name: Paris Burris  Clinic Number: [id number]  Physician: Cee Woodall, *  Diagnosis: No diagnosis found.  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   Time In:  8:55 AM  Time Out:  10:06 AM  Total Treatment Time:  71 minutes    Subjective:   Patient reports that the tightness in the middle of her neck feels better today. Her lower back is hurting today; she thinks it may be because she went for a longer walk yesterday (40 Minutes).   Patient plans on walking every day this week. She has been stretching daily but not icing.  I recommend that she ice daily. She enjoyed taking her grand children to [place] this weekend; Mr. Braun was not feeling well and had to skip dinner.  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 She will be going to the school today after her workout.      Objective:   Paris completed therapeutic stretches (EIS, EIL, RICK, Neck Retractions, scapular retractions) and the following [employer] exercise machines: core cervical, core lumbar, torso rotation l/r, leg extension, leg curl, upright row, chest press, biceps curl, triceps extension, leg press    Please see exercise log in patient folder for rate of exertion and repetitions completed.     Assessment:   Patient  tolerated all exercises without complaint or increase in symptoms. Iced neck and lower back after completion of exercises. PT asked to use a towel on her neck because it is too cold just using the pillow case.  Paris did not ice for the entire 10 minutes because she thinks she has a bladder infection; she has a Doctors' appointment this week.      Plan:    Continue with established plan of care towards wellness goals. Continue with Plan A for about 2-3 months then transition to [facility].

## 2017-10-18 ENCOUNTER — DOCUMENTATION ONLY (OUTPATIENT)
Dept: REHABILITATION | Facility: OTHER | Age: 72
End: 2017-10-18
Attending: PHYSICAL MEDICINE & REHABILITATION
Payer: MEDICARE

## 2017-10-18 NOTE — PROGRESS NOTES
Health  Wellness Visit Note    Name: Paris Burris  Clinic Number: [id number]  Physician: Cee Woodall, *  Diagnosis: No diagnosis found.  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   Time In:  10:35 AM  Time Out:  11:28 AM  Total Treatment Time:  53 minutes    Subjective:   Patient reports that she is feeling fine overall today; not having any neck or lower back pain, stiffness, or soreness; mentioned that she has been trying to walk for about 40 minutes each day but has only been able to walk every other day; does not have any lower back pain when she is walking; states that after she sat down to read the newspaper this morning, she did not have any back pain (when she got up); reminded patient to stretch (ice) in the mornings (upon waking) and at night to prevent any pain.    Objective:   Paris completed therapeutic stretches (EIS, EIL, RICK, Neck Retractions, scapular retractions) and the following [employer] exercise machines: core cervical, core lumbar, torso rotation l/r, leg extension, leg curl, upright row, chest press, biceps curl, triceps extension, leg press    Please see exercise log in patient folder for rate of exertion and repetitions completed.     Assessment:   Patient tolerated all exercises without complaint or increase in  symptoms. Iced neck and lower back after completion of exercises. .      Plan:    Continue with established plan of care towards wellness goals. Continue with Plan A for about 2-3 months then transition to [facility].

## 2017-10-19 ENCOUNTER — IMMUNIZATION (OUTPATIENT)
Dept: INTERNAL MEDICINE | Facility: CLINIC | Age: 72
End: 2017-10-19
Payer: MEDICARE

## 2017-10-19 ENCOUNTER — OFFICE VISIT (OUTPATIENT)
Dept: INTERNAL MEDICINE | Facility: CLINIC | Age: 72
End: 2017-10-19
Payer: MEDICARE

## 2017-10-19 ENCOUNTER — LAB VISIT (OUTPATIENT)
Dept: LAB | Facility: HOSPITAL | Age: 72
End: 2017-10-19
Attending: INTERNAL MEDICINE
Payer: MEDICARE

## 2017-10-19 VITALS
SYSTOLIC BLOOD PRESSURE: 118 MMHG | BODY MASS INDEX: 31.09 KG/M2 | HEART RATE: 88 BPM | HEIGHT: 61 IN | RESPIRATION RATE: 16 BRPM | DIASTOLIC BLOOD PRESSURE: 82 MMHG | WEIGHT: 164.69 LBS | TEMPERATURE: 98 F

## 2017-10-19 DIAGNOSIS — E66.9 DIABETES MELLITUS TYPE 2 IN OBESE: ICD-10-CM

## 2017-10-19 DIAGNOSIS — M79.672 LEFT FOOT PAIN: ICD-10-CM

## 2017-10-19 DIAGNOSIS — E78.5 HYPERLIPIDEMIA ASSOCIATED WITH TYPE 2 DIABETES MELLITUS: ICD-10-CM

## 2017-10-19 DIAGNOSIS — E11.69 DIABETES MELLITUS TYPE 2 IN OBESE: ICD-10-CM

## 2017-10-19 DIAGNOSIS — E11.69 HYPERLIPIDEMIA ASSOCIATED WITH TYPE 2 DIABETES MELLITUS: ICD-10-CM

## 2017-10-19 DIAGNOSIS — F33.41 MDD (MAJOR DEPRESSIVE DISORDER), RECURRENT, IN PARTIAL REMISSION: Primary | ICD-10-CM

## 2017-10-19 LAB
ALBUMIN SERPL BCP-MCNC: 3.9 G/DL
ALP SERPL-CCNC: 60 U/L
ALT SERPL W/O P-5'-P-CCNC: 16 U/L
ANION GAP SERPL CALC-SCNC: 10 MMOL/L
AST SERPL-CCNC: 19 U/L
BILIRUB SERPL-MCNC: 0.4 MG/DL
BUN SERPL-MCNC: 14 MG/DL
CALCIUM SERPL-MCNC: 9.5 MG/DL
CHLORIDE SERPL-SCNC: 104 MMOL/L
CHOLEST SERPL-MCNC: 298 MG/DL
CHOLEST/HDLC SERPL: 8.5 {RATIO}
CO2 SERPL-SCNC: 25 MMOL/L
CREAT SERPL-MCNC: 0.8 MG/DL
EST. GFR  (AFRICAN AMERICAN): >60 ML/MIN/1.73 M^2
EST. GFR  (NON AFRICAN AMERICAN): >60 ML/MIN/1.73 M^2
ESTIMATED AVG GLUCOSE: 111 MG/DL
GLUCOSE SERPL-MCNC: 92 MG/DL
HBA1C MFR BLD HPLC: 5.5 %
HDLC SERPL-MCNC: 35 MG/DL
HDLC SERPL: 11.7 %
INSULIN COLLECTION INTERVAL: NORMAL
INSULIN SERPL-ACNC: 10.4 UU/ML
LDLC SERPL CALC-MCNC: ABNORMAL MG/DL
NONHDLC SERPL-MCNC: 263 MG/DL
POTASSIUM SERPL-SCNC: 4.5 MMOL/L
PROT SERPL-MCNC: 8 G/DL
SODIUM SERPL-SCNC: 139 MMOL/L
TRIGL SERPL-MCNC: 457 MG/DL

## 2017-10-19 PROCEDURE — 99999 PR PBB SHADOW E&M-EST. PATIENT-LVL IV: CPT | Mod: PBBFAC,,, | Performed by: INTERNAL MEDICINE

## 2017-10-19 PROCEDURE — 90662 IIV NO PRSV INCREASED AG IM: CPT | Mod: S$GLB,,, | Performed by: INTERNAL MEDICINE

## 2017-10-19 PROCEDURE — G0008 ADMIN INFLUENZA VIRUS VAC: HCPCS | Mod: S$GLB,,, | Performed by: INTERNAL MEDICINE

## 2017-10-19 PROCEDURE — 83525 ASSAY OF INSULIN: CPT

## 2017-10-19 PROCEDURE — 99499 UNLISTED E&M SERVICE: CPT | Mod: S$GLB,,, | Performed by: INTERNAL MEDICINE

## 2017-10-19 PROCEDURE — 83036 HEMOGLOBIN GLYCOSYLATED A1C: CPT

## 2017-10-19 PROCEDURE — 36415 COLL VENOUS BLD VENIPUNCTURE: CPT

## 2017-10-19 PROCEDURE — 99214 OFFICE O/P EST MOD 30 MIN: CPT | Mod: S$GLB,,, | Performed by: INTERNAL MEDICINE

## 2017-10-19 PROCEDURE — 80053 COMPREHEN METABOLIC PANEL: CPT

## 2017-10-19 PROCEDURE — 80061 LIPID PANEL: CPT

## 2017-10-19 RX ORDER — VENLAFAXINE 37.5 MG/1
TABLET ORAL
Qty: 15 TABLET | Refills: 0 | Status: SHIPPED | OUTPATIENT
Start: 2017-10-19 | End: 2017-10-30 | Stop reason: SDUPTHER

## 2017-10-19 NOTE — PROGRESS NOTES
"Subjective:       Patient ID: Paris Burris is a 72 y.o. female.    Chief Complaint: follow up for MDD    HPI   MDD - weaned herself off of remeron last week. Also taking 10mg of melatonin. Sleeping well. Reports mood is pretty good. Doing well. Has been exercising a lot more. Wants to come off of effexor also. Weight stable since Sept bt overall lost about 5lbs in the last few mo.   occ use of ativan when she has panic attacks from nightmares.     DM2 - Metformin xr 1/2 of 500mg BID. Checks glucose at home - running around 118.   A1C - 5.7  MAC 4/13/17  FOOT 4/13/17  EYE 4/13/17  LDL 89.6 - On crestor 20mg daily.     Some pain on the top of L foot. Uncertain associations. Doesn't stop her from exercise. Hasn't tried OTC topical.     Back pain - undergoing healthy back program. Rare use of tramadol. Currently on gabapentin 300mg daily, alpha lipoic acid twice daily and turmeric.     Review of Systems  Comprehensive review of systems otherwise negative. See history/subjective section for more details.    Objective:      Physical Exam    /82   Pulse 88   Temp 97.9 °F (36.6 °C) (Oral)   Resp 16   Ht 5' 1" (1.549 m)   Wt 74.7 kg (164 lb 10.9 oz)   BMI 31.12 kg/m²     GEN - A+OX4, NAD   HEENT - PERRL, EOMI, OP clear  Neck - No thyromegaly or cervical LAD. No thyroid masses felt.  CV - RRR, no m/r   Chest - CTAB, no wheezing or rhonchi  Abd - S/NT/ND/+BS.   Ext - 2+B radial pulses. No LE edema. .  Neuro - 5/5 BUE and BLE strength. 2+ DTRs. Normal gait.   Skin - No rash.    Labs reviewed.    Assessment/Plan     Diagnoses and all orders for this visit:    MDD (major depressive disorder), recurrent, in remission - doing well. Wean off effexor. Cont melatonin at night for sleep.   -     venlafaxine (EFFEXOR) 37.5 MG Tab; Take 1 tablet daily for 1 week. Then 1/2 tablet daily for 8 days. Then 1/2 tablet every other day for another week and then stop.    Diabetes mellitus type 2 in obese - d/c metformin. Trial of " diet control. Cont exercise and diet. Pt wanted to get urine glycosylate and Lyme disease testing due to a book she is reading on diabetes. Discussed that I don't think this is indicated at this time but will refer to endo for further opinion on the matter.  -     Hemoglobin A1c; Future  -     Comprehensive metabolic panel; Future  -     blood sugar diagnostic (ACCU-CHEK SMARTVIEW TEST STRIP) Strp; Test once daily.  -     Insulin, random; Future  -     Ambulatory Referral to Endocrinology    Left foot pain - does not stop her from exercising. Trial of OTC topical creams.    Hyperlipidemia associated with type 2 diabetes mellitus  -     Comprehensive metabolic panel; Future  -     Lipid panel; Future      Flu vaccine today.  Return in about 3 months (around 1/19/2018).      Mandi Huynh MD  Department of Internal Medicine - [facility]  8:56 AM

## 2017-10-19 NOTE — PATIENT INSTRUCTIONS
Venlafaxine 37.5 tabs - Take 1 tablet daily for 1 week. Then 1/2 tablet daily for 8 days. Then 1/2 tablet every other day for another week and then stop.

## 2017-10-20 ENCOUNTER — TELEPHONE (OUTPATIENT)
Dept: INTERNAL MEDICINE | Facility: CLINIC | Age: 72
End: 2017-10-20

## 2017-10-20 NOTE — TELEPHONE ENCOUNTER
Please call and let her know that her a1c is in the normal range. Plan as discussed to stop metformin. Fasting insulin is normal. Electrolytes, liver and kidney functions are normal.    Cholesterol is through the roof though. Please confirm she is taking crestor 20mg daily. If she is not, start taking it daily and we'll recheck at next visit. If she is, then I'll have to inc dosage to 40mg daily.

## 2017-10-24 ENCOUNTER — DOCUMENTATION ONLY (OUTPATIENT)
Dept: REHABILITATION | Facility: OTHER | Age: 72
End: 2017-10-24
Attending: PHYSICAL MEDICINE & REHABILITATION
Payer: MEDICARE

## 2017-10-24 NOTE — PROGRESS NOTES
Health  Wellness Visit Note    Name: Paris Burris  Clinic Number: [id number]  Physician: Cee Woodall, *  Diagnosis: No diagnosis found.  Past Medical History:   Diagnosis Date    Allergy     Anemia     Anxiety     Cancer 2002    L breast s/p mastectomy    Decreased hearing     Depression     Diabetes mellitus     Diabetes with neurologic complications     Gastric ulcer 9/10/13    EGD    Hiatal hernia     Hyperlipidemia     Migraine headache     Migraine headache 3/20/2015    Multiple gastric ulcers     Parathyroid disorder     Pre-diabetes     Renal manifestation of secondary diabetes mellitus     Sleep apnea     no CPAP    Type 2 diabetes mellitus, controlled, 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    Wrist fracture      Visit Number: C29/ L37  Precautions: SEE PMH,  anxiety, cancer, depression, DM, wrist fx, neuropathy in L foot.   Time In:  9:30AM  Time Out:  10:20AM  Total Treatment Time:  50minutes    Subjective:   Patient reports that both neck and back are feeling fine this morning. She reads the newspaper for about 45 minutes each morning, HC recommended that she do shoulder retractions to stretch out after reading. Says this has made a difference. She went to [facility] Friday, completed the core lumbar and some other machines (she left her list at home). Says she didn't feel as though she worked out as hard as she would here. Wanted to do the elliptical today to elevate her HR. Shared great news that she is no longer diabetic. Trying to stretch two times a day wants to shoot for three.        Objective:   Paris completed therapeutic stretches (EIS, EIL, RICK, Neck Retractions, scapular retractions) and the following MedX exercise machines: core cervical, core lumbar, torso rotation l/r, leg extension, leg curl, upright row, chest press, biceps curl, triceps extension, leg press    Please see exercise log in patient folder for rate of exertion and repetitions completed.      Assessment:   Patient tolerated all exercises without complaint or increase in symptoms. Did elliptical for 3 minutes then 10 min treadmill.  Iced neck and lower back after completion of exercises. .      Plan:    Continue with established plan of care towards wellness goals. Continue with Plan A for about 2-3 months then transition to [facility].

## 2017-10-26 ENCOUNTER — CLINICAL SUPPORT (OUTPATIENT)
Dept: INTERNAL MEDICINE | Facility: CLINIC | Age: 72
End: 2017-10-26
Payer: MEDICARE

## 2017-10-26 NOTE — PROGRESS NOTES
Health  Follow-Up Note   [] Office  [x] Phone patient came in for visit 1 hour early, she left went home then called back for visit  Notes from previous session reviewed.   [x] Previous Session Goals unchanged.   [] Patient/Caregiver Change in Goals.  Goals added or changed by Patient/Caregiver since program participation:  1.     Resumed taking Crestor      Additional/Changed support that patient/caregiver has experienced/sought?  (Indicate readiness, support from family, friends, others, community groups, etc)  1.      Feedback provided:  1.  Praised for great job brought A1C down to 5.5    Diagnostic values/Desriptors for follow-up as needed for chronic condition(s)   Blood Glucose: 110 this am    Interventions:   1. Health  listened reflectively, validated thoughts and feelings, offered support and encouragement.   2. Allowed patient to express themselves in a non-biased atmosphere.  3. Health  assisted pt to problem-solve obstacles such as being in a challenging environment and dealing with these challenges.   4. Motivational Interviewed interventions utilized (OARS).   5. Patient responded favorably to interventions and remained actively engaged in the session.   6. Health  will remain available and connected for patient by phone and/or office visits.   7. Positive reinforcement, emotional support and encouragement provided.   8. Focused Education: MI    Plan:  [x] Pt will work on goals as stated above.   [x] Pt will contact Health  for any questions, concerns or needs.  [x] Pt will follow up with Health  in office on  10/31/17 at 1300.      [] Pt will follow up with Health  on phone in:        [x] Health  will remain available.   [] Health  will contact patient by phone in:        [] Health  will consult:        [] Health  will inform Provider via EPIC messaging.     Impression:  1. Behavior is consistent with    Action   Stage of Change.   2.  Participation level:       [x] Receptive      [x] Interactive      [] Guarded and Resistant      [x] Self Motivated      [] Refused/Declined to participate   3. [x] Pt voiced understanding of all information presented.       [] Pt voiced needing further information/education. This will be arranged.       [x] Pt would benefit from further education/information as identified by this health . This will be arranged.     Jacqueline Perry RN HC

## 2017-10-30 ENCOUNTER — OFFICE VISIT (OUTPATIENT)
Dept: ENDOCRINOLOGY | Facility: CLINIC | Age: 72
End: 2017-10-30
Payer: MEDICARE

## 2017-10-30 ENCOUNTER — PATIENT MESSAGE (OUTPATIENT)
Dept: INTERNAL MEDICINE | Facility: CLINIC | Age: 72
End: 2017-10-30

## 2017-10-30 ENCOUNTER — TELEPHONE (OUTPATIENT)
Dept: INTERNAL MEDICINE | Facility: CLINIC | Age: 72
End: 2017-10-30

## 2017-10-30 VITALS
SYSTOLIC BLOOD PRESSURE: 118 MMHG | BODY MASS INDEX: 31.94 KG/M2 | HEART RATE: 88 BPM | WEIGHT: 169.19 LBS | DIASTOLIC BLOOD PRESSURE: 78 MMHG | HEIGHT: 61 IN

## 2017-10-30 DIAGNOSIS — K76.0 FATTY LIVER: ICD-10-CM

## 2017-10-30 DIAGNOSIS — F33.41 MDD (MAJOR DEPRESSIVE DISORDER), RECURRENT, IN PARTIAL REMISSION: ICD-10-CM

## 2017-10-30 DIAGNOSIS — E78.5 HYPERLIPIDEMIA, UNSPECIFIED HYPERLIPIDEMIA TYPE: ICD-10-CM

## 2017-10-30 DIAGNOSIS — E11.40 TYPE 2 DIABETES MELLITUS WITH DIABETIC NEUROPATHY, WITHOUT LONG-TERM CURRENT USE OF INSULIN: Primary | ICD-10-CM

## 2017-10-30 DIAGNOSIS — E66.9 OBESITY (BMI 30-39.9): ICD-10-CM

## 2017-10-30 LAB
CREAT UR-MCNC: 22 MG/DL
MICROALBUMIN UR DL<=1MG/L-MCNC: <2.5 UG/ML
MICROALBUMIN/CREATININE RATIO: NORMAL UG/MG

## 2017-10-30 PROCEDURE — 82570 ASSAY OF URINE CREATININE: CPT

## 2017-10-30 PROCEDURE — 99499 UNLISTED E&M SERVICE: CPT | Mod: S$GLB,,, | Performed by: NURSE PRACTITIONER

## 2017-10-30 PROCEDURE — 99999 PR PBB SHADOW E&M-EST. PATIENT-LVL III: CPT | Mod: PBBFAC,,, | Performed by: NURSE PRACTITIONER

## 2017-10-30 PROCEDURE — 99214 OFFICE O/P EST MOD 30 MIN: CPT | Mod: S$GLB,,, | Performed by: NURSE PRACTITIONER

## 2017-10-30 RX ORDER — VENLAFAXINE 37.5 MG/1
TABLET ORAL
Qty: 30 TABLET | Refills: 0 | Status: SHIPPED | OUTPATIENT
Start: 2017-10-30 | End: 2017-10-31 | Stop reason: SDUPTHER

## 2017-10-30 NOTE — TELEPHONE ENCOUNTER
Returned call to r/s appt. per patient request.   Left voicemail message and sent patient message in epic.   Jacqueline Perry RN

## 2017-10-30 NOTE — LETTER
October 30, 2017      Mandi Huynh MD  [address]  [place] [facility] [address]           [facility]  [address]  [facility] [address]  Phone: [phone]          Patient: Paris Burris   MR Number: [id number]   YOB: 1945   Date of Visit: 10/30/2017       Dear Dr. Mandi Huynh:    Thank you for referring Paris Burris to me for evaluation. Attached you will find relevant portions of my assessment and plan of care.    If you have questions, please do not hesitate to call me. I look forward to following Paris Burris along with you.    Sincerely,    Yary Loyola, NP    Enclosure  CC:  No Recipients    If you would like to receive this communication electronically, please contact [email or web address] or [phone] to request more information on [employer] access.    For providers and/or their staff who would like to refer a patient to [facility], please contact us through our one-stop-shop provider referral line, Genna Gallego, at [phone].    If you feel you have received this communication in error or would no longer like to receive these types of communications, please e-mail [email or web address]

## 2017-10-30 NOTE — PROGRESS NOTES
CC: Paris Burris arrives today for management of Type 2 DM and review of chronic medical conditions.      HPI: Mrs. Paris Burris is a 72 y.o. White female who was diagnosed with Type 2 DM in January 2016 on routine lab work.     She was previously followed by Dr. Castaneda for hyperparathyroidism. S/p surgical removal of 2 parathyroid glands. Seen last in Endocrine in 08/2015.   Dr. Huynh (PCP) manages diabetes. T2DM well controlled.     She presents today requesting hormonal testing (testosterone, FT4 and FT3, estradiol, and progesterone) and mercury, lead, cadmium testing.  No s/s. Reviewed previously labs and TSH WNL.   She reports she is reading a book on the correlation of hormone and exposure to metals with regard to Dementia. Denies known exposure. May have been expose to metal as a child. She grew up in [place].     DIABETES COMPLICATIONS: peripheral neuropathy     HOSPITALIZATIONS FOR DIABETES OR RELATED COMPLICATIONS:  No    CURRENT A1C:    Hemoglobin A1C   Date Value Ref Range Status   10/19/2017 5.5 4.0 - 5.6 % Final     Comment:     According to ADA guidelines, hemoglobin A1c <7.0% represents  optimal control in non-pregnant diabetic patients. Different  metrics may apply to specific patient populations.   Standards of Medical Care in Diabetes-2016.  For the purpose of screening for the presence of diabetes:  <5.7%     Consistent with the absence of diabetes  5.7-6.4%  Consistent with increasing risk for diabetes   (prediabetes)  >or=6.5%  Consistent with diabetes  Currently, no consensus exists for use of hemoglobin A1c  for diagnosis of diabetes for children.  This Hemoglobin A1c assay has significant interference with fetal   hemoglobin   (HbF). The results are invalid for patients with abnormal amounts of   HbF,   including those with known Hereditary Persistence   of Fetal Hemoglobin. Heterozygous hemoglobin variants (HbAS, HbAC,   HbAD, HbAE, HbA2) do not significantly interfere with this  assay;   however, presence of multiple variants in a sample may impact the %   interference.     07/21/2017 5.7 (H) 4.0 - 5.6 % Final     Comment:     According to ADA guidelines, hemoglobin A1c <7.0% represents  optimal control in non-pregnant diabetic patients. Different  metrics may apply to specific patient populations.   Standards of Medical Care in Diabetes-2016.  For the purpose of screening for the presence of diabetes:  <5.7%     Consistent with the absence of diabetes  5.7-6.4%  Consistent with increasing risk for diabetes   (prediabetes)  >or=6.5%  Consistent with diabetes  Currently, no consensus exists for use of hemoglobin A1c  for diagnosis of diabetes for children.  This Hemoglobin A1c assay has significant interference with fetal   hemoglobin   (HbF). The results are invalid for patients with abnormal amounts of   HbF,   including those with known Hereditary Persistence   of Fetal Hemoglobin. Heterozygous hemoglobin variants (HbAS, HbAC,   HbAD, HbAE, HbA2) do not significantly interfere with this assay;   however, presence of multiple variants in a sample may impact the %   interference.     02/03/2017 6.4 (H) 4.5 - 6.2 % Final     Comment:     According to ADA guidelines, hemoglobin A1C <7.0% represents  optimal control in non-pregnant diabetic patients.  Different  metrics may apply to specific populations.   Standards of Medical Care in Diabetes - 2016.  For the purpose of screening for the presence of diabetes:  <5.7%     Consistent with the absence of diabetes  5.7-6.4%  Consistent with increasing risk for diabetes   (prediabetes)  >or=6.5%  Consistent with diabetes  Currently no consensus exists for use of hemoglobin A1C  for diagnosis of diabetes for children.         GOAL A1C: 7%- she is aiming for BG readings in the 80-90's.     DM MEDICATIONS USED IN THE PAST: Metformin.       CURRENT DIABETIC MEDS: Metformin 250 mg BID- recently cut back by 50% recently.   Uses Vials or Pens: N/A  "    STANDARDS of CARE:  Statin:  Noncompliant with statin.   ACEI or ARB:  No  ASA:  No  Last eye exam: April 2017- outside facility.  Last Podiatry Exam: April 2017  Microalbumin/Creatinine ratio:  Lab Results   Component Value Date    MICALBCREAT 10.4 09/13/2016          BG readings are checked every other day. Reports BG readings run ~ 116.     Hypoglycemia: No    Skipped/missed glycemic medications: rarely     Prandial injections taken with meals: N/A    Physical Activity: yes- almost daily- healthy back program- 2x/week. Walks 1-2x/week. Gym 1-2x week.-     Skipping meals and/or snacking: eating 3 meals per day. + occ snacking. Drinks water and coffee.       OCCUPATION: Retired     MEDICATIONS, ALLERGIES, LABS, VS's, MEDICAL, SURGICAL, SOCIAL, AND FAMILY HISTORY reviewed and updated in the appropriate sections during this encounter    ROS  Constitutional: Negative for weight change  Endocrine:  Denies polyuria, polydipsia, nocturia.  HENT: Denies neck swelling, lumps, hoarseness or trouble swallowing. Denies any personal or family history of thyroid cancer.    Eyes: Negative for visual disturbance.   Respiratory: Negative for cough or shortness of breath.  Cardiovascular: Negative for chest pain or claudication.   Gastrointestinal: Negative for nausea, diarrhea, vomiting, bloating.  No history of pancreatitis, pancreatic cancer, or gastroparesis.   Genitourinary: Negative for urgency, frequency, frequent urinary tract infections.  Skin: Denies prolonged wound healing.  Neurological: Negative for syncope, + intermittent numbness/burning of extremities. relief with gabapentin   Psychiatric/Behavioral: + depression- on medication.       Vital Signs  /78 (BP Location: Left arm, Patient Position: Sitting)   Pulse 88   Ht 5' 1" (1.549 m)   Wt 76.7 kg (169 lb 3.2 oz)   BMI 31.97 kg/m²         Chemistry        Component Value Date/Time     10/19/2017 0930    K 4.5 10/19/2017 0930     10/19/2017 " 0930    CO2 25 10/19/2017 0930    BUN 14 10/19/2017 0930    CREATININE 0.8 10/19/2017 0930    GLU 92 10/19/2017 0930        Component Value Date/Time    CALCIUM 9.5 10/19/2017 0930    ALKPHOS 60 10/19/2017 0930    AST 19 10/19/2017 0930    ALT 16 10/19/2017 0930    BILITOT 0.4 10/19/2017 0930    ESTGFRAFRICA >60.0 10/19/2017 0930    EGFRNONAA >60.0 10/19/2017 0930          Lab Results   Component Value Date    CHOL 298 (H) 10/19/2017    CHOL 165 02/24/2017    CHOL 218 (H) 02/03/2017     Lab Results   Component Value Date    HDL 35 (L) 10/19/2017    HDL 28 (L) 02/24/2017    HDL 34 (L) 02/03/2017     Lab Results   Component Value Date    LDLCALC Invalid, Trig>400.0 10/19/2017    LDLCALC 89.6 02/24/2017    LDLCALC 135.0 02/03/2017     Lab Results   Component Value Date    TRIG 457 (H) 10/19/2017    TRIG 237 (H) 02/24/2017    TRIG 245 (H) 02/03/2017     Lab Results   Component Value Date    CHOLHDL 11.7 (L) 10/19/2017    CHOLHDL 17.0 (L) 02/24/2017    CHOLHDL 15.6 (L) 02/03/2017       Lab Results   Component Value Date    TSH 1.316 07/21/2017       Lab Results   Component Value Date    MICALBCREAT 10.4 09/13/2016       Vit D, 25-Hydroxy   Date Value Ref Range Status   07/21/2017 80 30 - 96 ng/mL Final     Comment:     Vitamin D deficiency.........<10 ng/mL                              Vitamin D insufficiency......10-29 ng/mL       Vitamin D sufficiency........> or equal to 30 ng/mL  Vitamin D toxicity............>100 ng/mL           PHYSICAL EXAMINATION  Constitutional: Well developed, well nourished, NAD.   Psych: AAOx3, appropriate mood and affect, pleasant expression, conversant, appears relaxed, well groomed.   Eyes: Conjunctiva and corneas clear.  Neck: Supple, trachea midline, FROM, no thyromegaly or nodules, carotid pulses strong, no bruits.  Respiratory: Resp even and unlabored, CTA bilateral.  Cardiac: RRR, S1, S2 heard, no murmurs; radial pulses +2 bilaterally.   Abdomen: Soft, non-tender, non-distended; +BSs  x  4.  Vascular: Same temperature peripherally to centrally, DP pulses +2 bilaterally, no edema.   Neuro: Gait steady.   Skin: Normal skin turgor. Skin warm and dry. No acanthosis nigracans observed.   Feet: No pressure areas, blisters, ulcers. + calluses bilaterally. Footwear appropriate, nails in good condition.     Diabetes Foot Exam:   Positive vibratory response to 128 Hz tuning fork bilaterally.   Protective Sensation (w/ 10 gram monofilament):  Right: Intact  Left: Intact    Visual Inspection:  Nails Intact - without Evidence of Foot Deformity- Bilateral and Callus -  Bilateral    Pedal Pulses:   Right: Present  Left: Present    Posterior tibialis:   Right:Present  Left: Present      Assessment/Plan    1. Type 2 diabetes mellitus with diabetic neuropathy, without long-term current use of insulin  Controlled.   Reviewed A1c and BG goals.     Discussed that she could trial off the Metformin and follow diet and exercise for DM management.     BG monitoring a couple times per week. Keep logs. Bring logs to your PCP visits.    Reviewed diet and low CHO snacks.     Urine MAC today.     Notify clinic of any hypoglycemia episodes with BG readings < 70, if BG readings consistently run <80 or > 180, or for any BG concerns.         2. Hyperlipidemia, unspecified hyperlipidemia type - LDL goal < 100. Triglycerides are elevated. Discussed Statin compliance. On fish oil.  Continue Crestor. Lipids managed per PCP.          3. Fatty liver - discussed diet and exercise.        4. Obesity (BMI 30-39.9) - Body mass index is 31.97 kg/m².  Increases insulin resistance. Discussed DM diet.   Continue to exercise.         FOLLOW UP     DM management per PCP. Follow up with insurance for hormonal testing. Unsure if insurance will cover due to the lack of s/s. Follow up with homeopathic medicine for metal exposure testing.       Orders Placed This Encounter   Procedures    Microalbumin/creatinine urine ratio     Order Specific  Question:   Specimen Source     Answer:   Urine

## 2017-10-30 NOTE — Clinical Note
Good afternoon,   Mrs. Burris was seen today in diabetes clinic. She is requesting hormonal and metal screening testing. I informed her that I was not sure her insurance would cover hormonal testing as she is not having any symptoms. I told her to follow p with her insurance regarding that. I recommended seeing  homeopathic medicine for metal exposure testing as that is out of my scope of practice. As for diabetes, I think it is ok to trial off of Metformin and focus on diet and exercise.   Thank you.  Sincerely,  ARLEEN Reed

## 2017-10-31 ENCOUNTER — DOCUMENTATION ONLY (OUTPATIENT)
Dept: REHABILITATION | Facility: OTHER | Age: 72
End: 2017-10-31
Attending: PHYSICAL MEDICINE & REHABILITATION
Payer: MEDICARE

## 2017-10-31 RX ORDER — VENLAFAXINE 37.5 MG/1
37.5 TABLET ORAL 2 TIMES DAILY
Qty: 30 TABLET | Refills: 0 | Status: SHIPPED | OUTPATIENT
Start: 2017-10-31 | End: 2017-11-14

## 2017-10-31 NOTE — PROGRESS NOTES
Health  Wellness Visit Note    Name: Paris Burris  Clinic Number: [id number]  Physician: Cee Woodall, *  Diagnosis: No diagnosis found.  Past Medical History:   Diagnosis Date    Allergy     Anemia     Anxiety     Cancer 2002    L breast s/p mastectomy    Decreased hearing     Depression     Diabetes mellitus     Diabetes with neurologic complications     Gastric ulcer 9/10/13    EGD    Hiatal hernia     Hyperlipidemia     Migraine headache     Migraine headache 3/20/2015    Multiple gastric ulcers     Parathyroid disorder     Pre-diabetes     Renal manifestation of secondary diabetes mellitus     Sleep apnea     no CPAP    Type 2 diabetes mellitus, controlled, 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    Wrist fracture      Visit Number: C30/ L38  Precautions: SEE PMH,  anxiety, cancer, depression, DM, wrist fx, neuropathy in L foot.   Time In:  9:29 AM  Time Out:  10:28 AM  Total Treatment Time:  59 minutes    Subjective:   Patient reports that she is experiencing some lower cervical pain this morning; states that she felt the pain this morning as she was getting out of bed; rates her pain currently about a 2; was able to perform her stretches this morning but is working on incorporating her stretches at night as well; also suggested that patient start icing; did not exercise at [facility] this weekend; lower back is feeling fine this morning-no pain stiffness or soreness.    Objective:   Paris completed therapeutic stretches (EIS, EIL, RICK, Neck Retractions, scapular retractions) and the following MedX exercise machines: core cervical, core lumbar, torso rotation l/r, leg extension, leg curl, upright row, chest press, biceps curl, triceps extension, leg press    Please see exercise log in patient folder for rate of exertion and repetitions completed.     Assessment:   Patient tolerated all exercises without complaint or increase in symptoms. Did elliptical for 3 minutes then 10 min  treadmill.  Iced neck and lower back after completion of exercises. .      Plan:    Continue with established plan of care towards wellness goals. Continue with Plan A for about 2-3 months then transition to [facility].

## 2017-11-06 ENCOUNTER — DOCUMENTATION ONLY (OUTPATIENT)
Dept: REHABILITATION | Facility: OTHER | Age: 72
End: 2017-11-06
Attending: PHYSICAL MEDICINE & REHABILITATION
Payer: MEDICARE

## 2017-11-06 NOTE — PROGRESS NOTES
Health  Wellness Visit Note    Name: Paris Burris  Clinic Number: [id number]  Physician: Cee Woodall, *  Diagnosis: No diagnosis found.  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   Time In:  9:30 AM  Time Out: 10:21 AM  Total Treatment Time: 51 minutes    Subjective:   Patient reports that her neck and lower back feel good today; she has been stretching daily but only icing when needed. Paris walked twice this past week for 40 minutes.  She had flu like symptoms and could not exercise as much as she usually does.  She did not go to [facility] but plans on doing so this week. 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      Objective:   Paris completed therapeutic stretches (EIS, EIL, RICK, Neck Retractions, scapular retractions) and the following [employer] exercise machines: core cervical, core lumbar, torso rotation l/r, leg extension, leg curl, upright row, chest press, biceps curl, triceps extension, leg press    Please see exercise log in patient folder for rate of exertion and repetitions completed.     Assessment:   Patient tolerated all exercises without complaint or increase in symptoms. Did elliptical for 10 minutes.  Iced neck and lower back after completion  of exercises. Leg curl exercise felt easy today; weight will be increasing next week, by 2 lbs.      Plan:    Continue with established plan of care towards wellness goals. Continue with Plan A for about 2-3 months then transition to [facility].

## 2017-11-07 ENCOUNTER — CLINICAL SUPPORT (OUTPATIENT)
Dept: INTERNAL MEDICINE | Facility: CLINIC | Age: 72
End: 2017-11-07
Payer: MEDICARE

## 2017-11-07 VITALS — WEIGHT: 163.81 LBS | BODY MASS INDEX: 30.95 KG/M2

## 2017-11-07 NOTE — PROGRESS NOTES
Health  Follow-Up Note   [x] Office  [] Phone  Notes from previous session reviewed.   [x] Previous Session Goals unchanged.   [] Patient/Caregiver Change in Goals.  Goals added or changed by Patient/Caregiver since program participation:  1.     Continue same plan  2. Lower Cholesterol, increase exercise and meditation     Additional/Changed obstacles that could prevent patient/caregiver from reaching their goals?  1.  Stress from  depressed    Feedback provided:  1.  Praised for continued effort and determination down 1 lb   2. Praised for great A1c down to 5.5    Diagnostic values/Desriptors for follow-up as needed for chronic condition(s)   Weight: 74.3 kg 163.8  lb  Blood Glucose:averages  7 d 126  14 d 118  30 d 117  90 d 117    Interventions:   1. Health  listened reflectively, validated thoughts and feelings, offered support and encouragement.   2. Allowed patient to express themselves in a non-biased atmosphere.  3. Health  assisted pt to problem-solve obstacles such as being in a challenging environment and dealing with these challenges.   4. Motivational Interviewed interventions utilized (OARS).   5. Patient responded favorably to interventions and remained actively engaged in the session.   6. Health  will remain available and connected for patient by phone and/or office visits.   7. Positive reinforcement, emotional support and encouragement provided.   8. Focused Education: MI    Plan:  [x] Pt will work on goals as stated above.   [x] Pt will contact Health  for any questions, concerns or needs.  [x] Pt will follow up with Health  in office on  11/28/17 at 1330.      [] Pt will follow up with Health  on phone in:        [x] Health  will remain available.   [] Health  will contact patient by phone in:        [] Health  will consult:        [] Health  will inform Provider via EPIC messaging.     Impression:  1. Behavior is consistent with    Action    Stage of Change.   2. Participation level:       [x] Receptive      [x] Interactive      [] Guarded and Resistant      [x] Self Motivated      [] Refused/Declined to participate   3. [x] Pt voiced understanding of all information presented.       [] Pt voiced needing further information/education. This will be arranged.       [x] Pt would benefit from further education/information as identified by this health . This will be arranged.     Jacqueline Perry RN HC

## 2017-11-10 ENCOUNTER — DOCUMENTATION ONLY (OUTPATIENT)
Dept: REHABILITATION | Facility: OTHER | Age: 72
End: 2017-11-10
Attending: PHYSICAL MEDICINE & REHABILITATION
Payer: MEDICARE

## 2017-11-10 NOTE — PROGRESS NOTES
Health  Wellness Visit Note    Name: Paris Burris  Clinic Number: [id number]  Physician: Cee Woodall, *  Diagnosis: No diagnosis found.  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   Time In:  9:00AM  Time Out: 9:45 AM  Total Treatment Time: 45  minutes    Subjective:   Patient reports that her neck and lower back are feeling ok today. Not having pain but is a tad stressed and dperessed right now due to family issues. Believes this has caused some discomfort in those areas. Reminded her to stay compliant with her HEP even more now. Feels much better after working out. Discussed her transitioning to [facility] on her own maybe in January. Has not gone to [facility] this week. Encouraged her to try and exercise or walk outside to help clear her head.      Objective:   Paris completed therapeutic stretches (EIS, EIL, RICK, Neck Retractions, scapular retractions) and the following MedX exercise machines: core cervical, core lumbar, torso rotation l/r, leg extension, leg curl, upright row, chest press, biceps curl, triceps extension, leg press    Please see exercise log in patient folder for rate of exertion and repetitions completed.     Assessment:   Patient tolerated all exercises without complaint or increase in symptoms. Did elliptical for 10  minutes.  Iced neck and lower back after completion of exercises..      Plan:    Continue with established plan of care towards wellness goals. Continue with Plan A. May transition to [facility] in January.

## 2017-11-13 ENCOUNTER — DOCUMENTATION ONLY (OUTPATIENT)
Dept: REHABILITATION | Facility: OTHER | Age: 72
End: 2017-11-13
Attending: PHYSICAL MEDICINE & REHABILITATION
Payer: MEDICARE

## 2017-11-13 NOTE — PROGRESS NOTES
Health  Wellness Visit Note    Name: Paris Burris  Clinic Number: [id number]  Physician: Cee Woodall, *  Diagnosis: No diagnosis found.  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   Time In:  9:30 AM  Time Out: 10:14 AM  Total Treatment Time: 44 minutes    Subjective:   Patient reports that she was experiencing some lower back soreness/pain this morning; states that she began to feel soreness yesterday-was picking up lint off of her floor and does not believe that she was using proper body mechanics the entire time-feels that she may have been rounding her back; says that her soreness was located around her waist; symptoms were reduced once patient came to her wellness visit; neck is pain free and symptom free.    Objective:   Parsi completed therapeutic stretches (EIS, EIL, RICK, Neck Retractions, scapular retractions) and the following [employer] exercise machines: core cervical, core lumbar, torso rotation l/r, leg extension, leg curl, upright row, chest press, biceps curl, triceps extension, leg press    Please see exercise log in patient folder for rate of exertion and repetitions completed.     Assessment:   Patient tolerated all exercises without complaint or increase in symptoms.  Iced neck and lower back after completion of  exercises..      Plan:    Continue with established plan of care towards wellness goals. Continue with Plan A. May transition to [facility] in January.

## 2017-11-14 ENCOUNTER — PATIENT MESSAGE (OUTPATIENT)
Dept: INTERNAL MEDICINE | Facility: CLINIC | Age: 72
End: 2017-11-14

## 2017-11-14 DIAGNOSIS — F33.41 RECURRENT MAJOR DEPRESSIVE DISORDER, IN PARTIAL REMISSION: Primary | ICD-10-CM

## 2017-11-14 RX ORDER — VENLAFAXINE 75 MG/1
75 TABLET ORAL 2 TIMES DAILY
Qty: 60 TABLET | Refills: 2 | Status: SHIPPED | OUTPATIENT
Start: 2017-11-14 | End: 2018-03-14

## 2017-11-17 ENCOUNTER — OFFICE VISIT (OUTPATIENT)
Dept: INTERNAL MEDICINE | Facility: CLINIC | Age: 72
End: 2017-11-17
Payer: MEDICARE

## 2017-11-17 ENCOUNTER — DOCUMENTATION ONLY (OUTPATIENT)
Dept: REHABILITATION | Facility: OTHER | Age: 72
End: 2017-11-17
Attending: PHYSICAL MEDICINE & REHABILITATION
Payer: MEDICARE

## 2017-11-17 VITALS
DIASTOLIC BLOOD PRESSURE: 82 MMHG | BODY MASS INDEX: 31.13 KG/M2 | HEART RATE: 86 BPM | HEIGHT: 61 IN | RESPIRATION RATE: 15 BRPM | WEIGHT: 164.88 LBS | TEMPERATURE: 98 F | SYSTOLIC BLOOD PRESSURE: 118 MMHG

## 2017-11-17 DIAGNOSIS — L30.9 DERMATITIS: Primary | ICD-10-CM

## 2017-11-17 PROCEDURE — 99999 PR PBB SHADOW E&M-EST. PATIENT-LVL IV: CPT | Mod: PBBFAC,,, | Performed by: INTERNAL MEDICINE

## 2017-11-17 PROCEDURE — 99213 OFFICE O/P EST LOW 20 MIN: CPT | Mod: S$GLB,,, | Performed by: INTERNAL MEDICINE

## 2017-11-17 RX ORDER — TRIAMCINOLONE ACETONIDE 1 MG/ML
LOTION TOPICAL
Qty: 60 ML | Refills: 0 | Status: SHIPPED | OUTPATIENT
Start: 2017-11-17 | End: 2018-08-07

## 2017-11-17 NOTE — PROGRESS NOTES
Health  Wellness Visit Note    Name: Paris Burris  Clinic Number: [id number]  Physician: Cee Woodall, *  Diagnosis: No diagnosis found.  Past Medical History:   Diagnosis Date    Allergy     Anemia     Anxiety     Cancer 2002    L breast s/p mastectomy    Decreased hearing     Depression     Diabetes mellitus     Diabetes with neurologic complications     Gastric ulcer 9/10/13    EGD    Hiatal hernia     Hyperlipidemia     Migraine headache     Migraine headache 3/20/2015    Multiple gastric ulcers     Parathyroid disorder     Pre-diabetes     Renal manifestation of secondary diabetes mellitus     Sleep apnea     no CPAP    Type 2 diabetes mellitus, controlled, 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    Wrist fracture      Visit Number: C34/ L42  Precautions: SEE PMH,  anxiety, cancer, depression, DM, wrist fx, neuropathy in L foot.   Time In:  10:32 AM  Time Out: 11:20 AM  Total Treatment Time: 48 minutes    Subjective:   Patient reports that her neck and lower back feel good today; she has been stretching daily but only icing as needed.  I advised that she start icing on a daily basis.  PT feels feverish today, she just came from her doctor's visit.  Paris has a rash on the right side of her face but did not have fever when she was at the doctor.  She was prescribed some antibiotic cream and referred to a dermatologist.  She was supposed to  today but decided to cancel so that she can go home to rest.  PT has been walking every other day for 30-40 minutes.  I talked to Paris about the cardio challenge that starts in December, she is definitely interested.  I told her that the rules and instructions will be posted sometime next week.      Objective:   Paris completed therapeutic stretches (EIS, EIL, RICK, Neck Retractions, scapular retractions) and the following MedX exercise machines: core cervical, core lumbar, torso rotation l/r, leg extension, leg curl, upright row, chest  press, biceps curl, triceps extension, leg press    Please see exercise log in patient folder for rate of exertion and repetitions completed.     Assessment:   Patient tolerated all exercises without complaint or increase in symptoms.  Iced neck and lower back after completion of exercises. Weight will be increasing on the Leg press next week.     Plan:    Continue with established plan of care towards wellness goals. Continue with Plan A. May transition to [facility] in January.

## 2017-11-17 NOTE — PROGRESS NOTES
"Subjective:       Patient ID: Paris Burris is a 72 y.o. female.    Chief Complaint: Rash (on wrist , sores on face)    HPI urgent  Reports she has bumps on her face for a while. Seen a dermatologist and per pt told no issues. Bothersome to patient though. Reports that she has been using Retin-A for years. No change in face hygiene/lotions/scents, etc.  Reports a week ago, dry skin and rash started getting worse. Mildly itchy.     Last week had some bumps on the wrist and was itchy. That's resolved.     No rash anywhere else.     Review of Systems  as above in HPI.     Objective:      Physical Exam    /82   Pulse 86   Temp 98.1 °F (36.7 °C) (Oral)   Resp 15   Ht 5' 1" (1.549 m)   Wt 74.8 kg (164 lb 14.4 oz)   BMI 31.16 kg/m²     gen - A+OX4, NAD  HEENT - PERRL, OP clear. No lesions inside the mouth.  Skin - scaly, red patch by the L eyebrow. L temporal raised nodule w/ a central excoriation fabien. Flat erythematous patch at the right cheek. No rash around the neck or arms/hands.    Assessment/Plan     Paris was seen today for rash.    Diagnoses and all orders for this visit:    Dermatitis  -     Ambulatory Referral to Dermatology  -     triamcinolone acetonide 0.1% (KENALOG) 0.1 % Lotn; Apply topically to affected area twice a day.      Return if symptoms worsen or fail to improve.      Mandi Huynh MD  Department of Internal Medicine - [facility]  8:57 AM  "

## 2017-11-20 ENCOUNTER — DOCUMENTATION ONLY (OUTPATIENT)
Dept: REHABILITATION | Facility: OTHER | Age: 72
End: 2017-11-20
Attending: PHYSICAL MEDICINE & REHABILITATION
Payer: MEDICARE

## 2017-11-20 NOTE — PROGRESS NOTES
Health  Wellness Visit Note    Name: Paris Burris  Clinic Number: [id number]  Physician: Cee Woodall, *  Diagnosis: No diagnosis found.  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   Time In:  9:30AM  Time Out: 10:30 AM  Total Treatment Time: 60 minutes    Subjective:   Patient reports that her neck and lower back feel good today; she has been stretching daily but only icing as needed. Says she is feeling better, not feverish. Her  is coming down with a cold so she was concerned it may have been passes. Paris mentioned she downloaded the Topp[employer] erin so her and her  can track there mileage when walking, says its simple will try using it sometime this week. She is compliant with HEP.      Objective:   Paris completed therapeutic stretches (EIS, EIL, RICK, Neck Retractions, scapular retractions) and the following MedX exercise machines: core cervical, core lumbar, torso rotation l/r, leg extension, leg curl, upright row, chest press, biceps curl, triceps extension, leg press    Please see exercise log in patient folder for rate of exertion and repetitions completed.     Assessment:   Patient tolerated all exercises without complaint or increase in symptoms.  Iced neck and lower back after completion of exercises. Weight  will be increasing on the Leg press next week.     Plan:    Continue with established plan of care towards wellness goals. Continue with Plan A. May transition to [facility] in January.

## 2017-11-23 ENCOUNTER — PATIENT MESSAGE (OUTPATIENT)
Dept: INTERNAL MEDICINE | Facility: CLINIC | Age: 72
End: 2017-11-23

## 2017-11-24 ENCOUNTER — LAB VISIT (OUTPATIENT)
Dept: LAB | Facility: HOSPITAL | Age: 72
End: 2017-11-24
Attending: FAMILY MEDICINE
Payer: MEDICARE

## 2017-11-24 ENCOUNTER — OFFICE VISIT (OUTPATIENT)
Dept: INTERNAL MEDICINE | Facility: CLINIC | Age: 72
End: 2017-11-24
Payer: MEDICARE

## 2017-11-24 VITALS
HEART RATE: 91 BPM | OXYGEN SATURATION: 97 % | SYSTOLIC BLOOD PRESSURE: 112 MMHG | WEIGHT: 167.31 LBS | BODY MASS INDEX: 31.59 KG/M2 | HEIGHT: 61 IN | DIASTOLIC BLOOD PRESSURE: 64 MMHG

## 2017-11-24 DIAGNOSIS — E11.21 CONTROLLED TYPE 2 DIABETES MELLITUS WITH DIABETIC NEPHROPATHY, WITHOUT LONG-TERM CURRENT USE OF INSULIN: ICD-10-CM

## 2017-11-24 DIAGNOSIS — Z51.81 ENCOUNTER FOR MONITORING STATIN THERAPY: ICD-10-CM

## 2017-11-24 DIAGNOSIS — R53.83 FATIGUE, UNSPECIFIED TYPE: Primary | ICD-10-CM

## 2017-11-24 DIAGNOSIS — Z79.899 ENCOUNTER FOR MONITORING STATIN THERAPY: ICD-10-CM

## 2017-11-24 DIAGNOSIS — R53.83 FATIGUE, UNSPECIFIED TYPE: ICD-10-CM

## 2017-11-24 LAB
ALBUMIN SERPL BCP-MCNC: 3.7 G/DL
ALP SERPL-CCNC: 73 U/L
ALT SERPL W/O P-5'-P-CCNC: 16 U/L
ANION GAP SERPL CALC-SCNC: 7 MMOL/L
AST SERPL-CCNC: 19 U/L
BASOPHILS # BLD AUTO: 0.06 K/UL
BASOPHILS NFR BLD: 0.7 %
BILIRUB SERPL-MCNC: 0.2 MG/DL
BUN SERPL-MCNC: 18 MG/DL
CALCIUM SERPL-MCNC: 9.2 MG/DL
CHLORIDE SERPL-SCNC: 104 MMOL/L
CHOLEST SERPL-MCNC: 163 MG/DL
CHOLEST/HDLC SERPL: 4.8 {RATIO}
CK SERPL-CCNC: 56 U/L
CO2 SERPL-SCNC: 30 MMOL/L
CREAT SERPL-MCNC: 1 MG/DL
CRP SERPL-MCNC: 0.7 MG/L
DIFFERENTIAL METHOD: ABNORMAL
EOSINOPHIL # BLD AUTO: 0.7 K/UL
EOSINOPHIL NFR BLD: 7 %
ERYTHROCYTE [DISTWIDTH] IN BLOOD BY AUTOMATED COUNT: 13.3 %
ERYTHROCYTE [SEDIMENTATION RATE] IN BLOOD BY WESTERGREN METHOD: 7 MM/HR
EST. GFR  (AFRICAN AMERICAN): >60 ML/MIN/1.73 M^2
EST. GFR  (NON AFRICAN AMERICAN): 56 ML/MIN/1.73 M^2
ESTIMATED AVG GLUCOSE: 103 MG/DL
GLUCOSE SERPL-MCNC: 107 MG/DL
HBA1C MFR BLD HPLC: 5.2 %
HCT VFR BLD AUTO: 35.9 %
HDLC SERPL-MCNC: 34 MG/DL
HDLC SERPL: 20.9 %
HGB BLD-MCNC: 11.5 G/DL
LDLC SERPL CALC-MCNC: 77.8 MG/DL
LYMPHOCYTES # BLD AUTO: 3.4 K/UL
LYMPHOCYTES NFR BLD: 37.3 %
MCH RBC QN AUTO: 30 PG
MCHC RBC AUTO-ENTMCNC: 32 G/DL
MCV RBC AUTO: 94 FL
MONOCYTES # BLD AUTO: 0.7 K/UL
MONOCYTES NFR BLD: 7.7 %
NEUTROPHILS # BLD AUTO: 4.4 K/UL
NEUTROPHILS NFR BLD: 47.2 %
NONHDLC SERPL-MCNC: 129 MG/DL
NRBC BLD-RTO: 0 /100 WBC
PLATELET # BLD AUTO: 262 K/UL
PMV BLD AUTO: 11.3 FL
POTASSIUM SERPL-SCNC: 4.7 MMOL/L
PROT SERPL-MCNC: 7.1 G/DL
RBC # BLD AUTO: 3.83 M/UL
SODIUM SERPL-SCNC: 141 MMOL/L
TRIGL SERPL-MCNC: 256 MG/DL
TSH SERPL DL<=0.005 MIU/L-ACNC: 2.15 UIU/ML
WBC # BLD AUTO: 9.23 K/UL

## 2017-11-24 PROCEDURE — 84443 ASSAY THYROID STIM HORMONE: CPT

## 2017-11-24 PROCEDURE — 99213 OFFICE O/P EST LOW 20 MIN: CPT | Mod: S$GLB,,, | Performed by: FAMILY MEDICINE

## 2017-11-24 PROCEDURE — 85651 RBC SED RATE NONAUTOMATED: CPT

## 2017-11-24 PROCEDURE — 80053 COMPREHEN METABOLIC PANEL: CPT

## 2017-11-24 PROCEDURE — 99499 UNLISTED E&M SERVICE: CPT | Mod: S$GLB,,, | Performed by: FAMILY MEDICINE

## 2017-11-24 PROCEDURE — 82550 ASSAY OF CK (CPK): CPT

## 2017-11-24 PROCEDURE — 85025 COMPLETE CBC W/AUTO DIFF WBC: CPT

## 2017-11-24 PROCEDURE — 36415 COLL VENOUS BLD VENIPUNCTURE: CPT

## 2017-11-24 PROCEDURE — 93005 ELECTROCARDIOGRAM TRACING: CPT | Mod: S$GLB,,, | Performed by: FAMILY MEDICINE

## 2017-11-24 PROCEDURE — 93010 ELECTROCARDIOGRAM REPORT: CPT | Mod: S$GLB,,, | Performed by: INTERNAL MEDICINE

## 2017-11-24 PROCEDURE — 83036 HEMOGLOBIN GLYCOSYLATED A1C: CPT

## 2017-11-24 PROCEDURE — 99999 PR PBB SHADOW E&M-EST. PATIENT-LVL IV: CPT | Mod: PBBFAC,,, | Performed by: FAMILY MEDICINE

## 2017-11-24 PROCEDURE — 80061 LIPID PANEL: CPT

## 2017-11-24 PROCEDURE — 86140 C-REACTIVE PROTEIN: CPT

## 2017-11-24 NOTE — PROGRESS NOTES
S/  [facility] visit, PCP Mandi Huynh MD,   Sees psychiatry Dr. Sweeney  C/o 3 weeks of extreme fatigue - sleeping for 9 hours, hard to get up and do things, naps for a few more hours during the day.  She feels depressed, denies crying spells. Her mom committed suicide many years ago when mom was in 50s. Her daughter has been depressed over a divorce for the past 3 years and that has been getting to her more. But onset of this complete fatigue was sudden 3 weeks ago and depression has been an ongoing issue whe sees Dr. Sweeney about. She is on effexor but would like t get off.  Hx sleep apnea, but that was never a cause of these types of symptoms, she lost lots of weight recently and  denies she has apneic periods he sees at night.  Only temporal thing she can attribute to this time frame is stopping crestor a few months ago for theoretical concerns of risk-benefit ratio, then when a lipid test was done that was very bad she restarted it 4 weeks ago. No MIs or CVAs in her family. Pt has aortic atherosclerosis.  Hemoglobin A1C   Date Value Ref Range Status   10/19/2017 5.5 4.0 - 5.6 % Final     Comment:     According to ADA guidelines, hemoglobin A1c <7.0% represents  optimal control in non-pregnant diabetic patients. Different  metrics may apply to specific patient populations.   Standards of Medical Care in Diabetes-2016.  For the purpose of screening for the presence of diabetes:  <5.7%     Consistent with the absence of diabetes  5.7-6.4%  Consistent with increasing risk for diabetes   (prediabetes)  >or=6.5%  Consistent with diabetes  Currently, no consensus exists for use of hemoglobin A1c  for diagnosis of diabetes for children.  This Hemoglobin A1c assay has significant interference with fetal   hemoglobin   (HbF). The results are invalid for patients with abnormal amounts of   HbF,   including those with known Hereditary Persistence   of Fetal Hemoglobin. Heterozygous hemoglobin variants (HbAS, HbAC,   HbAD,  "HbAE, HbA2) do not significantly interfere with this assay;   however, presence of multiple variants in a sample may impact the %   interference.     07/21/2017 5.7 (H) 4.0 - 5.6 % Final     Comment:     According to ADA guidelines, hemoglobin A1c <7.0% represents  optimal control in non-pregnant diabetic patients. Different  metrics may apply to specific patient populations.   Standards of Medical Care in Diabetes-2016.  For the purpose of screening for the presence of diabetes:  <5.7%     Consistent with the absence of diabetes  5.7-6.4%  Consistent with increasing risk for diabetes   (prediabetes)  >or=6.5%  Consistent with diabetes  Currently, no consensus exists for use of hemoglobin A1c  for diagnosis of diabetes for children.  This Hemoglobin A1c assay has significant interference with fetal   hemoglobin   (HbF). The results are invalid for patients with abnormal amounts of   HbF,   including those with known Hereditary Persistence   of Fetal Hemoglobin. Heterozygous hemoglobin variants (HbAS, HbAC,   HbAD, HbAE, HbA2) do not significantly interfere with this assay;   however, presence of multiple variants in a sample may impact the %   interference.     02/03/2017 6.4 (H) 4.5 - 6.2 % Final     Comment:     According to ADA guidelines, hemoglobin A1C <7.0% represents  optimal control in non-pregnant diabetic patients.  Different  metrics may apply to specific populations.   Standards of Medical Care in Diabetes - 2016.  For the purpose of screening for the presence of diabetes:  <5.7%     Consistent with the absence of diabetes  5.7-6.4%  Consistent with increasing risk for diabetes   (prediabetes)  >or=6.5%  Consistent with diabetes  Currently no consensus exists for use of hemoglobin A1C  for diagnosis of diabetes for children.           O/  /64   Pulse 91   Ht 5' 1" (1.549 m)   Wt 75.9 kg (167 lb 5.3 oz)   SpO2 97%   BMI 31.62 kg/m²   Pt appears in NAD but looks sad.   Often asks me to repeat  " something I say presumably due to hearing issues?  EOMI  Neck supple, no thyroidmegly  Cor s1s2  Lungs CTA  Abd benign    Paris was seen today for fatigue.    Diagnoses and all orders for this visit:    Fatigue, unspecified type - had labs not long ago but not int he past 3 weeks  -     CBC auto differential; Future  -     Lipid panel; Future  -     TSH; Future  -     Hemoglobin A1c; Future  -     Comprehensive metabolic panel; Future  -     C-reactive protein; Future  -     Sedimentation rate, manual; Future  -     IN OFFICE EKG 12-LEAD (to [place])    Controlled type 2 diabetes mellitus with diabetic nephropathy, without long-term current use of insulin  -     CBC auto differential; Future  -     Lipid panel; Future  -     TSH; Future  -     Hemoglobin A1c; Future  -     Comprehensive metabolic panel; Future  -     C-reactive protein; Future  -     Sedimentation rate, manual; Future    Encounter for monitoring statin therapy  -     CK; Future    We discussed possible causes of this extreme fatigue x 3 weeks  1. Depression may be the culprit. Bit no crying spells. She is on effexor at low dose and sees psychiatry. Neither of us are confident depression is the cause, but I will copy Dr. Sweeney on this note. Though it does not appear the pt is immediately a risk to herself, given her mom's history and the prolonged 3 weeks of extreme fatigue, and prolonged depression of daughter for 3 years associated with divorce - may eventually be a risk.  2. Temporal association with crestor restart. I expressed doubt this was caused by crestor though anything is possible. Pt stated she went on their website and these symptoms are know side effects of crestor. She would feel better holding crestor to see if this resolves. I would not object, and she has to balance the risks of sooner cardiovascular events that shorten life/cause disability with the quality of life she has. She can f/u with her PCP Dr. Huynh about what should happen  long term.   3. Alternative cause we haven't yet identified. Will repeat blood work and see if anything comes up. FRANCISCO JAVIER done in office does nto seem to yield any cause.

## 2017-11-27 ENCOUNTER — DOCUMENTATION ONLY (OUTPATIENT)
Dept: REHABILITATION | Facility: OTHER | Age: 72
End: 2017-11-27
Attending: PHYSICAL MEDICINE & REHABILITATION
Payer: MEDICARE

## 2017-11-27 NOTE — PROGRESS NOTES
"Health  Wellness Visit Note    Name: Paris Burris  Clinic Number: [id number]  Physician: Cee Woodall, *  Diagnosis: No diagnosis found.  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   Time In:  9:35 AM  Time Out: 10:33 AM  Total Treatment Time: 58 minutes    Subjective:   Patient reports that both her neck and lower back are feeling "okay overall; states that she is having some pain/discomfort in her upper thoracic region; says that she experiences the discomfort normally in the mornings but it is progressing into feeling painful; HC discussed patient's sleeping position- encouraged patient to consider changing her pillows in addition to sleeping with a roll underneath her knees; compliant with her stretching and icing routine; lower back is feeling good today as well-finds that she has mild lower back pain this morning; mentioned that she has been feeling very fatigued the past couple of days-discussed eating habits with patient (eats relatively well); only eats roughly 20g of carbs per day (says that it is the recommended amount for patients with diabetes); advised patient to eat complex carbs-will discuss with her health  at [facility] [place].    Objective:   Paris completed therapeutic stretches (EIS, EIL, RICK, Neck Retractions, " scapular retractions) and the following [employer] exercise machines: core cervical, core lumbar, torso rotation l/r, leg extension, leg curl, upright row, chest press, biceps curl, triceps extension, leg press    Please see exercise log in patient folder for rate of exertion and repetitions completed.     Assessment:   Patient tolerated all exercises on the MedX equipment without complaint or increase in symptoms.  Iced neck and lower back after completion of exercises.  Plan:    Continue with established plan of care towards wellness goals. Continue with Plan A. May transition to [facility] in January.

## 2017-12-01 ENCOUNTER — DOCUMENTATION ONLY (OUTPATIENT)
Dept: REHABILITATION | Facility: OTHER | Age: 72
End: 2017-12-01
Attending: PHYSICAL MEDICINE & REHABILITATION
Payer: MEDICARE

## 2017-12-01 NOTE — PROGRESS NOTES
Health  Wellness Visit Note    Name: Paris Burris  Clinic Number: [id number]  Physician: Cee Woodall, *  Diagnosis: No diagnosis found.  Past Medical History:   Diagnosis Date    Allergy     Anemia     Anxiety     Cancer 2002    L breast s/p mastectomy    Decreased hearing     Depression     Diabetes mellitus     Diabetes with neurologic complications     Gastric ulcer 9/10/13    EGD    Hiatal hernia     Hyperlipidemia     Migraine headache     Migraine headache 3/20/2015    Multiple gastric ulcers     Parathyroid disorder     Pre-diabetes     Renal manifestation of secondary diabetes mellitus     Sleep apnea     no CPAP    Type 2 diabetes mellitus, controlled, 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    Wrist fracture      Visit Number: C37/ L45  Precautions: SEE PMH,  anxiety, cancer, depression, DM, wrist fx, neuropathy in L foot.   Time In:  9:30AM  Time Out: 10:15AM  Total Treatment Time: 45 minutes    Subjective:   Patient reports that both her neck and lower back are feeling well. Again mentioned she is having some upper thoracic region discomfort. Showed her scap retraction stretch, says she will try over the weekend and see what happens. Says she has noticed a small increase in her hip ROM when doing piriformis stretch. She has been pulling her leg closer to her. Notices she can walk up steps better now. Paris and her  have now been stretching 2 times each day.Has appointment with [facility] next week.    Objective:   Paris completed therapeutic stretches (EIS, EIL, RICK, Neck Retractions, scapular retractions) and the following MedX exercise machines: core cervical, core lumbar, torso rotation l/r, leg extension, leg curl, upright row, chest press, biceps curl, triceps extension, leg press    Please see exercise log in patient folder for rate of exertion and repetitions completed.     Assessment:   Patient tolerated all exercises on the MedX equipment without  complaint or increase in symptoms.  Iced neck and lower back after completion of exercises.  Plan:    Continue with established plan of care towards wellness goals. Continue with Plan A. May transition to [facility] in January. Check in with thoracic pain.

## 2017-12-07 ENCOUNTER — PATIENT MESSAGE (OUTPATIENT)
Dept: INTERNAL MEDICINE | Facility: CLINIC | Age: 72
End: 2017-12-07

## 2017-12-07 DIAGNOSIS — G47.33 OSA (OBSTRUCTIVE SLEEP APNEA): Primary | ICD-10-CM

## 2017-12-11 ENCOUNTER — DOCUMENTATION ONLY (OUTPATIENT)
Dept: REHABILITATION | Facility: OTHER | Age: 72
End: 2017-12-11
Attending: PHYSICAL MEDICINE & REHABILITATION
Payer: MEDICARE

## 2017-12-11 NOTE — PROGRESS NOTES
"Health  Wellness Visit Note    Name: Paris Burris  Clinic Number: [id number]  Physician: Cee Woodall, *  Diagnosis: No diagnosis found.  Past Medical History:   Diagnosis Date    Allergy     Anemia     Anxiety     Cancer 2002    L breast s/p mastectomy    Decreased hearing     Depression     Diabetes mellitus     Diabetes with neurologic complications     Gastric ulcer 9/10/13    EGD    Hiatal hernia     Hyperlipidemia     Migraine headache     Migraine headache 3/20/2015    Multiple gastric ulcers     Parathyroid disorder     Pre-diabetes     Renal manifestation of secondary diabetes mellitus     Sleep apnea     no CPAP    Type 2 diabetes mellitus, controlled, 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    Wrist fracture      Visit Number: C38/ L46  Precautions: SEE PMH,  anxiety, cancer, depression, DM, wrist fx, neuropathy in L foot.   Time In:  9:01AM  Time Out: 9:57AM  Total Treatment Time: 56 minutes    Subjective:   Patient reports that she has been feeling weak and fatigued over the past week; has not attended wellness in almost two weeks; believes that her fatigue maybe related to her sleeping habits; patient normally sleeps with a CPAP machine but decided to stop because she felt she did not have any breathing issues; does not have any issues falling asleep at night but will wake up about 2-3 times a night; advised patient to stop drinking liquids at least two hours before bedtime; discussed carbohydrate intake as well; will start using her CPAP machine again; mentioned that she has been having neck and lower back pain, mostly neck pain; pain is considered "minor"-has been stretching and icing consistently.    Objective:   Paris completed therapeutic stretches (EIS, EIL, RICK, Neck Retractions, scapular retractions) and the following MedX exercise machines: core cervical, core lumbar, torso rotation l/r, leg extension, leg curl, upright row, chest press, biceps curl, triceps " extension, leg press    Please see exercise log in patient folder for rate of exertion and repetitions completed.     Assessment:   Patient tolerated all exercises on the MedX equipment without complaint or increase in symptoms.  Iced neck and lower back after completion of exercises.  Plan:    Continue with established plan of care towards wellness goals. Continue with Plan A. May transition to [facility] in January. Check in with thoracic pain.

## 2017-12-14 ENCOUNTER — OFFICE VISIT (OUTPATIENT)
Dept: SLEEP MEDICINE | Facility: CLINIC | Age: 72
End: 2017-12-14
Payer: MEDICARE

## 2017-12-14 ENCOUNTER — CLINICAL SUPPORT (OUTPATIENT)
Dept: INTERNAL MEDICINE | Facility: CLINIC | Age: 72
End: 2017-12-14
Payer: MEDICARE

## 2017-12-14 VITALS
HEART RATE: 90 BPM | BODY MASS INDEX: 31.59 KG/M2 | DIASTOLIC BLOOD PRESSURE: 76 MMHG | HEIGHT: 61 IN | SYSTOLIC BLOOD PRESSURE: 116 MMHG | WEIGHT: 167.31 LBS

## 2017-12-14 VITALS — WEIGHT: 164.69 LBS | BODY MASS INDEX: 31.12 KG/M2

## 2017-12-14 DIAGNOSIS — G47.30 SLEEP APNEA, UNSPECIFIED TYPE: Primary | ICD-10-CM

## 2017-12-14 PROCEDURE — 99999 PR PBB SHADOW E&M-EST. PATIENT-LVL I: CPT | Mod: PBBFAC,,,

## 2017-12-14 PROCEDURE — 99204 OFFICE O/P NEW MOD 45 MIN: CPT | Mod: S$GLB,,, | Performed by: PSYCHIATRY & NEUROLOGY

## 2017-12-14 PROCEDURE — 99999 PR PBB SHADOW E&M-EST. PATIENT-LVL III: CPT | Mod: PBBFAC,,, | Performed by: PSYCHIATRY & NEUROLOGY

## 2017-12-14 NOTE — PROGRESS NOTES
Health  Follow-Up Note   [x] Office  [] Phone  Notes from previous session reviewed.   [x] Previous Session Goals unchanged.   [] Patient/Caregiver Change in Goals.  Goals added or changed by Patient/Caregiver since program participation:  1.     Continue same plan  2. Add exercise  3. Sleep apnea appt today     Additional/Changed support that patient/caregiver has experienced/sought?  (Indicate readiness, support from family, friends, others, community groups, etc)  1.   big support    Additional/Changed obstacles that could prevent patient/caregiver from reaching their goals?  1.  no energy    Feedback provided:  1.  Praised for continued effort and success labs great A1c down to 5.2 !!!    Diagnostic values/Desriptors for follow-up as needed for chronic condition(s)   Weight: 74.7 kg 164.68 lbs gain 0.88 lb from last visit 11/7/17  Blood Glucose: 106 this am  Average   7d- 106  14d- 114  30d- 117  90d- 115  Last A1c is 5.2 was 10.4 when started Jan 2016 total weight loss 22 lbs.  Interventions:   1. Health  listened reflectively, validated thoughts and feelings, offered support and encouragement.   2. Allowed patient to express themselves in a non-biased atmosphere.  3. Health  assisted pt to problem-solve obstacles such as being in a challenging environment and dealing with these challenges.   4. Motivational Interviewed interventions utilized (OARS).   5. Patient responded favorably to interventions and remained actively engaged in the session.   6. Health  will remain available and connected for patient by phone and/or office visits.   7. Positive reinforcement, emotional support and encouragement provided.   8. Focused Education: MI    Plan:  [x] Pt will work on goals as stated above.   [x] Pt will contact Health  for any questions, concerns or needs.  [x] Pt will follow up with Health  in office on    1/16/18 at 1000.    [] Pt will follow up with Health  on phone in:         [x] Health  will remain available.   [] Health  will contact patient by phone in:        [] Health  will consult:        [] Health  will inform Provider via EPIC messaging.     Impression:  1. Behavior is consistent with    Action   Stage of Change.   2. Participation level:       [x] Receptive      [x] Interactive      [] Guarded and Resistant      [x] Self Motivated      [] Refused/Declined to participate   3. [x] Pt voiced understanding of all information presented.       [] Pt voiced needing further information/education. This will be arranged.       [x] Pt would benefit from further education/information as identified by this health . This will be arranged.     Jacqueline Perry RN HC

## 2017-12-14 NOTE — PROGRESS NOTES
Paris Burris  was seen as a new patient at the request of  No ref. provider found for the evaluation of  uche.    CHIEF COMPLAINT:    Chief Complaint   Patient presents with    Sleep Apnea       HISTORY OF PRESENT ILLNESS: Paris Burris is a 72 y.o. female is here for sleep evaluation.   Patient with h/o migraine and is here for evaluation of uche.  +snoring.  No witnessed sleep apnea.  +fatigue upon awake 3-4 times per week.  No sleepiness a/w driving.  No parasomnia.  +frequent nightmare.  No RLS symptoms.  No cataplexy.  Patient was diagnosed with uche in 2009 at [place].  Patient was prescribed CPAP 12 cm H20.  Patient stopped cpap due to choking sensation and dry mouth.  +frequent nasal congestion.  +intermittent sore throat.  No fever/chills.  Patient [place] allerg[place] and was told not to have allergy.      McAllister Sleepiness Scale score during initial sleep evaluation was 1-2.    SLEEP ROUTINE:  Activity the hour prior to sleep: read    Bed partner:    Time to bed:  9 pm   Lights off:  yes  Sleep onset latency:  15 minutes to 60 minutes with remeron(average 30 minutes)        Disruptions or awakenings:    1 time to urinate (no difficulty going back to sleep)    Wakeup time:      6:30 am   Perceived sleep quality:  Tire on most days       Daytime naps:      Occasional 60 minutes  Weekend sleep routine:      9-10 pm till 7:30 am (feel better)  Caffeine use: 2 cups of coffee per day  exercise habit:   None    SLEEP STUDIES:     n/a from [place]      CPAP titration on 6/2715: Effective control of respiratory events was achieved at 16 cm of H2O. Weight 184 lbs.      PAST MEDICAL HISTORY:    Active Ambulatory Problems     Diagnosis Date Noted    Insomnia 03/23/2014    MDD (major depressive disorder), recurrent, in partial remission 03/28/2014    History of breast cancer in female 03/20/2015    Vitamin D deficiency 03/20/2015    Hyperlipidemia 03/20/2015    Multiple gastric ulcers 04/27/2015     Hyperparathyroidism 05/20/2015    Leg weakness, bilateral 09/16/2015    Back pain 02/23/2016    Diabetes mellitus type 2 in obese 03/14/2016    DDD (degenerative disc disease), lumbar 06/15/2016    Midline low back pain without sciatica 09/13/2016    Diabetic polyneuropathy associated with type 2 diabetes mellitus 11/17/2016    ESTEFANIA (iron deficiency anemia) 12/02/2016    Thoracic back pain 02/09/2017    Fatty liver 02/09/2017    Type 2 diabetes mellitus, controlled, 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    Stage 2 chronic kidney disease 06/14/2017    Type 2 diabetes mellitus with diabetic neuropathy 06/14/2017    Obstructive sleep apnea 06/14/2017    Aortic atherosclerosis 06/14/2017    Alcohol dependence, in remission 06/14/2017    History of gastric ulcer 06/14/2017    Cervical spine arthritis 06/14/2017    History of vertebral compression fracture 06/14/2017    Obesity (BMI 30-39.9) 10/30/2017     Resolved Ambulatory Problems     Diagnosis Date Noted    Other and unspecified alcohol dependence, in remission 03/20/2014    GABBY (generalized anxiety disorder) 03/23/2014    Migraine headache 03/20/2015    Sleep apnea 03/20/2015    Iron deficiency anemia 03/20/2015    Elevated LFTs 04/27/2015    Hypercalcemia 04/27/2015    Tachycardia 04/27/2015    Depression 05/20/2015    Weakness of trunk musculature 02/23/2016    Alcohol dependence in remission 03/14/2016    BPPV (benign paroxysmal positional vertigo) 04/13/2016    Pain in limb     Pre-op exam 01/09/2017    Substance abuse      Past Medical History:   Diagnosis Date    Allergy     Anemia     Anxiety     Cancer 2002    Decreased hearing     Depression     Diabetes mellitus     Diabetes with neurologic complications     Gastric ulcer 9/10/13    Hiatal hernia     Hyperlipidemia     Migraine headache     Migraine headache 3/20/2015    Multiple gastric ulcers     Parathyroid disorder     Pre-diabetes     Renal  manifestation of secondary diabetes mellitus     Sleep apnea     Type 2 diabetes mellitus, controlled, 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    Wrist fracture                 PAST SURGICAL HISTORY:    Past Surgical History:   Procedure Laterality Date    ADENOIDECTOMY      BREAST LUMPECTOMY      COLONOSCOPY N/A 12/2/2016    Procedure: COLONOSCOPY;  Surgeon: Dustin Patterson MD;  Location: [address]);  Service: Endoscopy;  Laterality: N/A;  PM prep    EYE SURGERY Bilateral     cataracts    lipoma removal  1999    TONSILLECTOMY           FAMILY HISTORY:                Family History   Problem Relation Age of Onset    Suicide Mother     Depression Mother     Alcohol abuse Father     Headaches Father     Diabetes Sister      prediabetes    Psoriasis Sister     Depression Sister     Depression Daughter        SOCIAL HISTORY:          Tobacco:   History   Smoking Status    Never Smoker   Smokeless Tobacco    Never Used       alcohol use:    History   Alcohol Use No     Comment: quit 2014 - alcohol abuse                 Occupation:  Teacher     ALLERGIES:    Allergies   Allergen Reactions    Topamax [Topiramate] Other (See Comments)     hallucinations       CURRENT MEDICATIONS:    Current Outpatient Prescriptions   Medication Sig Dispense Refill    alpha lipoic acid 300 mg Cap Take 300 mg by mouth 2 (two) times daily.       ascorbic acid (VITAMIN C) 500 MG tablet Take 1,000 mg by mouth once daily.       b complex vitamins capsule Take 1 capsule by mouth once daily.      blood sugar diagnostic (ACCU-CHEK SMARTVIEW TEST STRIP) Strp Test once daily. 100 strip 11    blood-glucose meter Misc 1 Device by Misc.(Non-Drug; Combo Route) route 2 (two) times daily. 1 each 0    CITICOLINE SODIUM (CITICOLINE ORAL) Take 1 tablet by mouth once daily at 6am.      co-enzyme Q-10 30 mg capsule Take 100 mg by mouth 3 (three) times daily.      gabapentin (NEURONTIN) 300 MG capsule Take 300 mg by mouth 2 (two)  "times daily.      lancets Misc 1 lancet by Misc.(Non-Drug; Combo Route) route 2 (two) times daily. 100 each 6    lorazepam (ATIVAN) 0.5 MG tablet Take 1 tablet (0.5 mg total) by mouth daily as needed. 30 tablet 3    omega-3 fatty acids-fish oil (FISH OIL) 340-1,000 mg Cap Take 1 capsule by mouth once daily.      rosuvastatin (CRESTOR) 20 MG tablet Take 1 tablet (20 mg total) by mouth once daily. 90 tablet 3    tramadol (ULTRAM) 50 mg tablet Take 2 tablets (100 mg total) by mouth once daily at 6am. 60 tablet 2    tretinoin (RETIN-A) 0.05 % cream 1 application every evening. At bedtime  6    triamcinolone acetonide 0.1% (KENALOG) 0.1 % Lotn Apply topically to affected area twice a day. 60 mL 0    TURMERIC (CURCUMIN MISC) Take 500 mg by mouth 2 (two) times daily.       venlafaxine (EFFEXOR) 75 MG tablet Take 1 tablet (75 mg total) by mouth 2 (two) times daily. 60 tablet 2    vitamin D 1000 units Tab Take 500 Units by mouth once daily. With  [facility]       No current facility-administered medications for this visit.                   REVIEW OF SYSTEMS:     Sleep related symptoms as per HPI.  CONST:Denies weight gain    HEENT: + sinus congestion  PULM: denies dyspnea  CARD:  Denies palpitations   GI:  occasional acid reflux  : Denies polyuria  NEURO: + headaches  PSYCH: Denies mood disturbance  HEME: Denies anemia   Otherwise, a balance of systems reviewed is negative.          PHYSICAL EXAM:  Vitals:    12/14/17 1322   BP: 116/76   Pulse: 90   Weight: 75.9 kg (167 lb 5.3 oz)   Height: 5' 1" (1.549 m)   PainSc: 0-No pain     Body mass index is 31.62 kg/m².     GENERAL: Normal development, well groomed  HEENT:  Conjunctivae are non-erythematous; Pupils equal, round, and reactive to light; Nose is symmetrical; Nasal mucosa is pink and moist; Septum is midline; Inferior turbinates are normal; Nasal airflow is normal; Posterior pharynx is pink; Modified Mallampati: 4; Posterior palate is normal; Tonsils +1; Uvula " is normal and pink;Tongue is normal; Dentition is fair; No TMJ tenderness; Jaw opening and protrusion without click and without discomfort.  NECK: Supple. Neck circumference is 15 inches. No thyromegaly. No palpable nodes.     SKIN: On face and neck: No abrasions, no rashes, no lesions.  No subcutaneous nodules are palpable.  RESPIRATORY: Chest is clear to auscultation.  Normal chest expansion and non-labored breathing at rest.  CARDIOVASCULAR: Normal S1, S2.  No murmurs, gallops or rubs. No carotid bruits bilaterally.  EXTREMITIES: No edema. No clubbing. No cyanosis. Station normal. Gait normal.        NEURO/PSYCH: Oriented to time, place and person. Normal attention span and concentration. Affect is full. Mood is normal.                                              DATA   3-6-09 ahi of 33.9 with min sat of 78%  5/5/09 optimal cpap of 12 cm H20  Lab Results   Component Value Date    TSH 2.146 11/24/2017     ASSESSMENT:      COOKIE. Improvement after 20 lbs weight loss, but recently sleepiness deteriorated.     PLAN:    PSG - requal - 20 lbs weight loss; study from [place] n/a - > followed by BPAP titration    Insomnia - difficulty with initiation.  Stimulus control d/w patient.  Will address in greater detail after sleep study.  Patient will bring remeron on the day of sleep study    Nasal congestion - sinus irrigation and nasal steroid.      Education: During our discussion today, we talked about the etiology of obstructive sleep apnea as well as the potential ramifications of untreated sleep apnea, which could include daytime sleepiness, hypertension, heart disease and/or stroke.     Precautions: The patient was advised to abstain from driving should they feel sleepy or drowsy.       Thank you for allowing me the opportunity to participate in the care of your patient.    Patient will No Follow-up on file.    Please cc note to  No ref. provider found.  This is 45 minutes visit, over 50% of time spent in direct  consultation with patient.

## 2017-12-14 NOTE — PATIENT INSTRUCTIONS
SLEEP LAB ([facility]) will contact you to schedulethe sleep study. Their number is [phone] (ext 2). Please call them if you do not hear from them in 10 business days from now.  The [facility] Lab is located on 7th floor of the [facility]; [place] lab is located in [facility].    SLEEP CLINIC (my assistant) will call you when the sleep study results are ready - if you have not heard from us by 2 weeks from the date of the study, please call [phone] (ext 1) or you can use [facility] to contact me.    You are advised to abstain from driving should you feel sleepy or drowsy.

## 2017-12-15 ENCOUNTER — DOCUMENTATION ONLY (OUTPATIENT)
Dept: REHABILITATION | Facility: OTHER | Age: 72
End: 2017-12-15
Attending: PHYSICAL MEDICINE & REHABILITATION
Payer: MEDICARE

## 2017-12-15 NOTE — PROGRESS NOTES
Health  Wellness Visit Note    Name: Paris Burris  Clinic Number: [id number]  Physician: Cee Woodall, *  Diagnosis: No diagnosis found.  Past Medical History:   Diagnosis Date    Allergy     Anemia     Anxiety     Cancer 2002    L breast s/p mastectomy    Decreased hearing     Depression     Diabetes mellitus     Diabetes with neurologic complications     Gastric ulcer 9/10/13    EGD    Hiatal hernia     Hyperlipidemia     Migraine headache     Migraine headache 3/20/2015    Multiple gastric ulcers     Parathyroid disorder     Pre-diabetes     Renal manifestation of secondary diabetes mellitus     Sleep apnea     no CPAP    Type 2 diabetes mellitus, controlled, 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    Wrist fracture      Visit Number: C39/ L47  Precautions: SEE PMH,  anxiety, cancer, depression, DM, wrist fx, neuropathy in L foot.   Time In:  9:33 AM  Time Out: 10:20 AM  Total Treatment Time: 47 minutes    Subjective:   Patient reports that her lower back feels good; she has not been stretching or icing as much as she should.  PT is feeling exhausted and a little depressed from lack of sleep.  She has not started using the CPAP machine again because she went to see a different sleep doctor yesterday and she wants her to do a sleep test.  Once she does the test the doctor will see if they can remodel the machine she currently has or she will set her up with a new one.  PT is able to fall asleep but waking up 2-3 per night.   Paris has not exercised much this week for the same reason (exhaustion).  I advised her to try to do some walking, this may help with some of the depression that she is dealing with.      Objective:   Paris completed therapeutic stretches (EIS, EIL, RICK, Neck Retractions, scapular retractions) and the following MedX exercise machines: core cervical, core lumbar, torso rotation l/r, leg extension, leg curl, upright row, chest press, biceps curl, triceps extension,  leg press    Please see exercise log in patient folder for rate of exertion and repetitions completed.     Assessment:   Patient tolerated all exercises on the MedX equipment without complaint or increase in symptoms.  Skipped ice today.  Weight will increase on the Leg extension and Chest press on her next visit.      Plan:    Continue with established plan of care towards wellness goals. Continue with Plan A. May transition to [facility] in January. Check in with thoracic pain.

## 2017-12-18 ENCOUNTER — DOCUMENTATION ONLY (OUTPATIENT)
Dept: REHABILITATION | Facility: OTHER | Age: 72
End: 2017-12-18
Attending: PHYSICAL MEDICINE & REHABILITATION
Payer: MEDICARE

## 2017-12-20 ENCOUNTER — TELEPHONE (OUTPATIENT)
Dept: SLEEP MEDICINE | Facility: OTHER | Age: 72
End: 2017-12-20

## 2017-12-21 ENCOUNTER — OFFICE VISIT (OUTPATIENT)
Dept: INTERNAL MEDICINE | Facility: CLINIC | Age: 72
End: 2017-12-21
Payer: MEDICARE

## 2017-12-21 ENCOUNTER — LAB VISIT (OUTPATIENT)
Dept: LAB | Facility: HOSPITAL | Age: 72
End: 2017-12-21
Attending: INTERNAL MEDICINE
Payer: MEDICARE

## 2017-12-21 VITALS
RESPIRATION RATE: 16 BRPM | HEIGHT: 61 IN | WEIGHT: 167 LBS | HEART RATE: 103 BPM | BODY MASS INDEX: 31.53 KG/M2 | SYSTOLIC BLOOD PRESSURE: 122 MMHG | TEMPERATURE: 99 F | DIASTOLIC BLOOD PRESSURE: 72 MMHG

## 2017-12-21 DIAGNOSIS — D64.9 NORMOCYTIC ANEMIA: ICD-10-CM

## 2017-12-21 DIAGNOSIS — R27.0 ATAXIA: ICD-10-CM

## 2017-12-21 DIAGNOSIS — G47.33 OSA (OBSTRUCTIVE SLEEP APNEA): ICD-10-CM

## 2017-12-21 DIAGNOSIS — F33.41 MDD (MAJOR DEPRESSIVE DISORDER), RECURRENT, IN PARTIAL REMISSION: ICD-10-CM

## 2017-12-21 DIAGNOSIS — E11.69 DIABETES MELLITUS TYPE 2 IN OBESE: ICD-10-CM

## 2017-12-21 DIAGNOSIS — E66.9 DIABETES MELLITUS TYPE 2 IN OBESE: ICD-10-CM

## 2017-12-21 DIAGNOSIS — R53.83 FATIGUE, UNSPECIFIED TYPE: Primary | ICD-10-CM

## 2017-12-21 LAB
BASOPHILS # BLD AUTO: 0.05 K/UL
BASOPHILS NFR BLD: 0.7 %
DIFFERENTIAL METHOD: ABNORMAL
EOSINOPHIL # BLD AUTO: 0.5 K/UL
EOSINOPHIL NFR BLD: 6.8 %
ERYTHROCYTE [DISTWIDTH] IN BLOOD BY AUTOMATED COUNT: 14.6 %
FERRITIN SERPL-MCNC: 6 NG/ML
FOLATE SERPL-MCNC: 15.3 NG/ML
HCT VFR BLD AUTO: 30.2 %
HGB BLD-MCNC: 9.5 G/DL
IRON SERPL-MCNC: 16 UG/DL
LYMPHOCYTES # BLD AUTO: 2.4 K/UL
LYMPHOCYTES NFR BLD: 31.6 %
MCH RBC QN AUTO: 27.1 PG
MCHC RBC AUTO-ENTMCNC: 31.5 G/DL
MCV RBC AUTO: 86 FL
MONOCYTES # BLD AUTO: 0.8 K/UL
MONOCYTES NFR BLD: 10 %
NEUTROPHILS # BLD AUTO: 3.9 K/UL
NEUTROPHILS NFR BLD: 50.6 %
NRBC BLD-RTO: 0 /100 WBC
PLATELET # BLD AUTO: 289 K/UL
PMV BLD AUTO: 11.1 FL
RBC # BLD AUTO: 3.5 M/UL
SATURATED IRON: 3 %
TOTAL IRON BINDING CAPACITY: 482 UG/DL
TRANSFERRIN SERPL-MCNC: 326 MG/DL
VIT B12 SERPL-MCNC: 660 PG/ML
WBC # BLD AUTO: 7.63 K/UL

## 2017-12-21 PROCEDURE — 82746 ASSAY OF FOLIC ACID SERUM: CPT

## 2017-12-21 PROCEDURE — 83540 ASSAY OF IRON: CPT

## 2017-12-21 PROCEDURE — 36415 COLL VENOUS BLD VENIPUNCTURE: CPT

## 2017-12-21 PROCEDURE — 99999 PR PBB SHADOW E&M-EST. PATIENT-LVL III: CPT | Mod: PBBFAC,,, | Performed by: INTERNAL MEDICINE

## 2017-12-21 PROCEDURE — 99499 UNLISTED E&M SERVICE: CPT | Mod: S$GLB,,, | Performed by: INTERNAL MEDICINE

## 2017-12-21 PROCEDURE — 99214 OFFICE O/P EST MOD 30 MIN: CPT | Mod: S$GLB,,, | Performed by: INTERNAL MEDICINE

## 2017-12-21 PROCEDURE — 82607 VITAMIN B-12: CPT

## 2017-12-21 PROCEDURE — 85025 COMPLETE CBC W/AUTO DIFF WBC: CPT

## 2017-12-21 PROCEDURE — 82728 ASSAY OF FERRITIN: CPT

## 2017-12-21 NOTE — PROGRESS NOTES
"Subjective:       Patient ID: Paris Burris is a 72 y.o. female.    Chief Complaint: Hyperlipidemia and Diabetes    HPI   DM2 - Was on metformin 250mg qam. Pt reports that she wants to come off after [place].   a1c 11/24/17 5.2  Foot - 10/30/17  MAC 10/30/17  Eye - recent cataract surgery w/ Dr. Mejia  LDL 11/24/17 Crestor 10mg daily. 77.8. TG was better from 457-->256. She stopped it thinking it was causing her fatigue. Restarted 1/2 pill daily.     COOKIE. Stopped using CPAP 2/2 intolerance. Lost 20lbs since intentionally. However increased fatigue and daytime sleepiness for the last 1.5 mo. Referred pt to see Dr. Feng. Saw her 12/18/17. Plan for BIPAP titration tomorrow night.   Due to the fatigue, not exercising and eating more. Gained back 4lbs. No pains. No night sweats/fevers/chills.     MDD - weaned off of remeron. Was weaning off effexor but then started w/ crying spells again so restarted effexor 75mg BID.     Mild normocytic anemia, which is new - found when she went to  for the fatigue.   No bloody stools or urine. No palpitations/SOB/CP.     No falls. No dizziness. Goes to PT twice a week for LBP.     Review of Systems  Comprehensive review of systems otherwise negative. See history/subjective section for more details.    Objective:      Physical Exam    /72   Pulse 103   Temp 99.2 °F (37.3 °C) (Oral)   Resp 16   Ht 5' 1" (1.549 m)   Wt 75.8 kg (167 lb)   BMI 31.55 kg/m²     GEN - A+OX4, NAD   HEENT - PERRL, EOMI, OP clear. MMM. Hearing aid.   Neck - No thyromegaly or cervical LAD. No thyroid masses felt.  CV - RRR, no m/r   Chest - CTAB, no wheezing or rhonchi  Abd - S/NT/ND/+BS.   Ext - 2+BDP and radial pulses. No LE edema.  Neuro - 5/5 BUE and BLE strength. 2+ DTRs.  Skin - No rash.    Labs reviewed.    Assessment/Plan     Paris was seen today for hyperlipidemia and diabetes.    Diagnoses and all orders for this visit:    Fatigue, unspecified type - TSH WNL. New anemia. Recheck " along w/ anemia w/u. Consider FOBT.     Diabetes mellitus type 2 in obese - well-controlled.  Discussed stopping metformin half tablet daily but patient would like to wait until after [place] to do this. Will get medical records for last eye exam w/ Dr. Mejia.    Normocytic anemia  -     CBC auto differential; Future  -     Ferritin; Future  -     Iron and TIBC; Future  -     Folate; Future  -     Vitamin B12; Future    COOKIE (obstructive sleep apnea) - repeating titration tomorrow. F/U w/ Dr. Feng.     Ataxia  -     Folate; Future  -     Vitamin B12; Future    MDD (major depressive disorder), recurrent, in partial remission - cont effexor 75mg BID. Pt does not wish to go up on dosage.         Return in about 4 months (around 4/21/2018).      Mandi Huynh MD  Department of Internal Medicine - [facility]  2:33 PM

## 2017-12-22 ENCOUNTER — LAB VISIT (OUTPATIENT)
Dept: LAB | Facility: HOSPITAL | Age: 72
End: 2017-12-22
Attending: INTERNAL MEDICINE
Payer: MEDICARE

## 2017-12-22 ENCOUNTER — DOCUMENTATION ONLY (OUTPATIENT)
Dept: REHABILITATION | Facility: OTHER | Age: 72
End: 2017-12-22
Attending: PHYSICAL MEDICINE & REHABILITATION
Payer: MEDICARE

## 2017-12-22 ENCOUNTER — TELEPHONE (OUTPATIENT)
Dept: INTERNAL MEDICINE | Facility: CLINIC | Age: 72
End: 2017-12-22

## 2017-12-22 ENCOUNTER — PATIENT MESSAGE (OUTPATIENT)
Dept: INTERNAL MEDICINE | Facility: CLINIC | Age: 72
End: 2017-12-22

## 2017-12-22 DIAGNOSIS — D50.0 IRON DEFICIENCY ANEMIA DUE TO CHRONIC BLOOD LOSS: ICD-10-CM

## 2017-12-22 DIAGNOSIS — D50.0 IRON DEFICIENCY ANEMIA DUE TO CHRONIC BLOOD LOSS: Primary | ICD-10-CM

## 2017-12-22 LAB
BASOPHILS # BLD AUTO: 0.04 K/UL
BASOPHILS NFR BLD: 0.6 %
DIFFERENTIAL METHOD: ABNORMAL
EOSINOPHIL # BLD AUTO: 0.6 K/UL
EOSINOPHIL NFR BLD: 7.8 %
ERYTHROCYTE [DISTWIDTH] IN BLOOD BY AUTOMATED COUNT: 14.6 %
HCT VFR BLD AUTO: 30.6 %
HGB BLD-MCNC: 9.5 G/DL
LYMPHOCYTES # BLD AUTO: 2.1 K/UL
LYMPHOCYTES NFR BLD: 29.9 %
MCH RBC QN AUTO: 27 PG
MCHC RBC AUTO-ENTMCNC: 31 G/DL
MCV RBC AUTO: 87 FL
MONOCYTES # BLD AUTO: 0.6 K/UL
MONOCYTES NFR BLD: 9.1 %
NEUTROPHILS # BLD AUTO: 3.7 K/UL
NEUTROPHILS NFR BLD: 52.5 %
PLATELET # BLD AUTO: 258 K/UL
PMV BLD AUTO: 11 FL
RBC # BLD AUTO: 3.52 M/UL
WBC # BLD AUTO: 7.06 K/UL

## 2017-12-22 PROCEDURE — 85025 COMPLETE CBC W/AUTO DIFF WBC: CPT

## 2017-12-22 PROCEDURE — 36415 COLL VENOUS BLD VENIPUNCTURE: CPT

## 2017-12-22 RX ORDER — FERROUS SULFATE 325(65) MG
TABLET ORAL
Qty: 90 TABLET | Refills: 5 | Status: SHIPPED | OUTPATIENT
Start: 2017-12-22 | End: 2018-06-01 | Stop reason: SDUPTHER

## 2017-12-22 NOTE — PROGRESS NOTES
Health  Wellness Visit Note    Name: Paris Burris  Clinic Number: [id number]  Physician: Cee Woodall, *  Diagnosis: No diagnosis found.  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   Time In:  9:35 AM  Time Out: 10:25 AM  Total Treatment Time: 50 minutes    Subjective:   Patient reports that her lower back feels good today; she has been stretching and icing daily.  PT is still not getting enough rest because she is not using the CPAP machine.  She has an appointment for the sleep study next week and hoping that she can have a new machine or that they can adjust the one that she has so that she can start getting some rest.  PT has not been exercising because she feels exhausted all the time.      Objective:   Paris completed therapeutic stretches (EIS, EIL, RICK, Neck Retractions, scapular retractions) and the following MedX exercise machines: core cervical, core lumbar, torso rotation l/r, leg extension, leg curl, upright row, chest press, biceps curl, triceps extension, leg press    Please see exercise log in patient folder for rate of exertion and repetitions completed.     Assessment:   Patient tolerated all exercises on the MedX equipment without complaint or increase in symptoms.  Skipped ice today.      Plan:    Continue with  established plan of care towards wellness goals. Continue with Plan A. May transition to [facility] in January. Check in with thoracic pain.

## 2017-12-22 NOTE — TELEPHONE ENCOUNTER
Called twice today and no answer. Left detailed message. Avoid NSAIDs. Start ferrous sulfate 325mg daily x 3 days, bid x 3 days and then TID onwards. Needs to schedule appt w/ GI. Last EGD was 12/2016, which did not show ulcers but in 2015, there were gastric ulcers. If she develops more fatigue, lightheadedness, CP, SOB, palpitations, go to ER for transfusion.

## 2017-12-26 ENCOUNTER — TELEPHONE (OUTPATIENT)
Dept: SLEEP MEDICINE | Facility: OTHER | Age: 72
End: 2017-12-26

## 2017-12-26 ENCOUNTER — DOCUMENTATION ONLY (OUTPATIENT)
Dept: REHABILITATION | Facility: OTHER | Age: 72
End: 2017-12-26
Attending: PHYSICAL MEDICINE & REHABILITATION
Payer: MEDICARE

## 2017-12-26 NOTE — PROGRESS NOTES
Health  Wellness Visit Note    Name: Paris Burris  Clinic Number: [id number]  Physician: Cee Woodall, *  Diagnosis: No diagnosis found.  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   Time In:  9:27 AM  Time Out: 10:25 AM  Total Treatment Time: 58 minutes    Subjective:   Patient reports that her neck and lower back are continuing to feel well today; states that she does not have any neck or lower back pain presently; complained of having soreness at base of her neck (C7-T1) area and minimal discomfort between her shoulder blades; has been consistent with her stretching and icing routine; had lab work conducted and it was determined that she has anemia, which contributes to her feeling fatigued/tired; has to start taking iron pills for anemia; rescheduled her appointment for the sleep study-has not started using her CPAP machine yet (has trouble staying asleep at night).    Objective:   Paris completed therapeutic stretches (EIS, EIL, RICK, Neck Retractions, scapular retractions) and the following MedX exercise machines: core cervical, core lumbar, torso rotation l/r, leg extension, leg curl, upright row, chest press, biceps curl, triceps extension, leg press    Please see exercise log in patient folder for rate of exertion and  repetitions completed.     Assessment:   Patient tolerated all exercises on the MedX equipment without complaint or increase in symptoms. Iced lower back after completion of exercises.    Plan:    Continue with established plan of care towards wellness goals. Continue with Plan A. May transition to [facility] in January. Check in with thoracic pain.

## 2017-12-28 ENCOUNTER — TELEPHONE (OUTPATIENT)
Dept: GASTROENTEROLOGY | Facility: CLINIC | Age: 72
End: 2017-12-28

## 2017-12-28 NOTE — TELEPHONE ENCOUNTER
----- Message from Bernabe Mcduffie sent at 12/28/2017  3:41 PM CST -----  Contact: Pt  Pt calling to schedule an apt referred by Dr. Mandi Huynh.           Call back number: [phone]

## 2017-12-28 NOTE — TELEPHONE ENCOUNTER
Patient returned call.    Patient is calling to schedule an follow up appointment with Dr. Patterson due to iron deficiency anemia. Patient is requesting a sooner appointment than next available with Dr. Patterson.

## 2018-01-02 ENCOUNTER — HOSPITAL ENCOUNTER (OUTPATIENT)
Dept: SLEEP MEDICINE | Facility: OTHER | Age: 73
Discharge: HOME | End: 2018-01-02
Attending: PSYCHIATRY & NEUROLOGY
Payer: MEDICARE

## 2018-01-02 ENCOUNTER — TELEPHONE (OUTPATIENT)
Dept: GASTROENTEROLOGY | Facility: CLINIC | Age: 73
End: 2018-01-02

## 2018-01-02 DIAGNOSIS — G47.30 SLEEP APNEA, UNSPECIFIED TYPE: ICD-10-CM

## 2018-01-02 DIAGNOSIS — G47.33 OSA (OBSTRUCTIVE SLEEP APNEA): ICD-10-CM

## 2018-01-02 PROCEDURE — 95810 POLYSOM 6/> YRS 4/> PARAM: CPT

## 2018-01-02 PROCEDURE — 95810 POLYSOM 6/> YRS 4/> PARAM: CPT | Mod: 26,,, | Performed by: PSYCHIATRY & NEUROLOGY

## 2018-01-02 NOTE — TELEPHONE ENCOUNTER
----- Message from Mandi Berman sent at 1/2/2018  3:39 PM CST -----  Contact: self   [facility] - returning your call - please call patient at  [phone]

## 2018-01-03 ENCOUNTER — OFFICE VISIT (OUTPATIENT)
Dept: GASTROENTEROLOGY | Facility: CLINIC | Age: 73
End: 2018-01-03
Payer: MEDICARE

## 2018-01-03 VITALS
HEART RATE: 98 BPM | SYSTOLIC BLOOD PRESSURE: 127 MMHG | WEIGHT: 170.88 LBS | DIASTOLIC BLOOD PRESSURE: 73 MMHG | BODY MASS INDEX: 32.26 KG/M2 | HEIGHT: 61 IN

## 2018-01-03 DIAGNOSIS — R19.5 OCCULT GI BLEEDING: ICD-10-CM

## 2018-01-03 DIAGNOSIS — D50.0 IRON DEFICIENCY ANEMIA DUE TO CHRONIC BLOOD LOSS: Primary | ICD-10-CM

## 2018-01-03 PROCEDURE — 99999 PR PBB SHADOW E&M-EST. PATIENT-LVL III: CPT | Mod: PBBFAC,,, | Performed by: INTERNAL MEDICINE

## 2018-01-03 PROCEDURE — 99213 OFFICE O/P EST LOW 20 MIN: CPT | Mod: S$GLB,,, | Performed by: INTERNAL MEDICINE

## 2018-01-03 NOTE — PROGRESS NOTES
[facility] Established Patient Visit    Reason for Visit:    Chief Complaint   Patient presents with    Anemia       PCP: Mandi Huynh    HPI:  Paris Burris is a 72 y.o. female here for follow-up of iron deficiency anemia.  Last seen in clinic in March by my PA, Dev Hadley.  I last saw her in October 2016 for anemia.  She has history of gastric ulcers.  She was on oral iron replacement therapy, but stopped taking it around August since her blood counts were looking good for several months.  She was found to be anemic again and started back on iron at the end of December.  She is taking it TID without problems.  She denies blood in stool or black stools.  She is having regular bowel movements.  She never had a VCE since her blood counts had improved.  Her last EGD and colonoscopy were in December 2016.  Her ulcers had healed at the time of her last EGD.  She denies abdominal complaints.            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    PSHX:  has a past surgical history that includes lipoma removal (1999); Breast lumpectomy; Colonoscopy (N/A, 12/2/2016); Tonsillectomy; Adenoidectomy; and Eye surgery (Bilateral).    The patient's social and family histories were reviewed by me and updated in the appropriate section of the electronic medical record.    Review of patient's allergies indicates:   Allergen Reactions    Topamax [topiramate] Other (See  Comments)     hallucinations       Prior to Admission medications    Medication Sig Start Date End Date Taking? Authorizing Provider   alpha lipoic acid 300 mg Cap Take 300 mg by mouth 2 (two) times daily.    Yes Historical Provider, MD   ascorbic acid (VITAMIN C) 500 MG tablet Take 1,000 mg by mouth once daily.    Yes Historical Provider, MD   b complex vitamins capsule Take 1 capsule by mouth once daily.   Yes Historical Provider, MD   blood sugar diagnostic (ACCU-CHEK SMARTVIEW TEST STRIP) Strp Test once daily. 10/19/17  Yes Mandi Huynh MD   blood-glucose meter Misc 1 Device by Misc.(Non-Drug; Combo Route) route 2 (two) times daily. 8/22/17 8/22/18 Yes Mandi Huynh MD   CITICOLINE SODIUM (CITICOLINE ORAL) Take 1 tablet by mouth once daily at 6am.   Yes Historical Provider, MD   co-enzyme Q-10 30 mg capsule Take 100 mg by mouth 3 (three) times daily.   Yes Historical Provider, MD   ferrous sulfate 325 mg (65 mg iron) Tab tablet Start 1 tablet daily x 3 days, then twice daily x 3 days and then three times daily onwards. 12/22/17  Yes Mandi Huynh MD   gabapentin (NEURONTIN) 300 MG capsule Take 300 mg by mouth 2 (two) times daily.   Yes Historical Provider, MD   lancets Misc 1 lancet by Misc.(Non-Drug; Combo Route) route 2 (two) times daily. 7/13/16  Yes Mandi Huynh MD   lorazepam (ATIVAN) 0.5 MG tablet Take 1 tablet (0.5 mg total) by mouth daily as needed. 7/21/17  Yes Dwaine Sweeney MD   omega-3 fatty acids-fish oil (FISH OIL) 340-1,000 mg Cap Take 1 capsule by mouth once daily.   Yes Historical Provider, MD   rosuvastatin (CRESTOR) 20 MG tablet Take 1 tablet (20 mg total) by mouth once daily.  Patient taking differently: Take 10 mg by mouth once daily.  2/6/17 2/6/18 Yes Mandi Huynh MD   tramadol (ULTRAM) 50 mg tablet Take 2 tablets (100 mg total) by mouth once daily at 6am. 6/29/17  Yes Mandi Huynh MD   tretinoin (RETIN-A) 0.05 % cream 1 application every evening. At bedtime 7/22/16  Yes Historical Provider, MD   triamcinolone  "acetonide 0.1% (KENALOG) 0.1 % Lotn Apply topically to affected area twice a day. 11/17/17  Yes Mandi Huynh MD   TURMERIC (CURCUMIN MISC) Take 500 mg by mouth 2 (two) times daily.    Yes Historical Provider, MD   venlafaxine (EFFEXOR) 75 MG tablet Take 1 tablet (75 mg total) by mouth 2 (two) times daily. 11/14/17 11/14/18 Yes Mandi Huynh MD   vitamin D 1000 units Tab Take 500 Units by mouth once daily. With K2 50 mch   Yes Historical Provider, MD         Objective Findings:  Vital Signs:  /73   Pulse 98   Ht 5' 1" (1.549 m)   Wt 77.5 kg (170 lb 13.7 oz)   BMI 32.28 kg/m²  Body mass index is 32.28 kg/m².    Physical Exam:  General Appearance:  Well appearing in no acute distress, appears stated age  Head:  Normocephalic, atraumatic  Eyes:  No scleral icterus or pallor, EOMI      Labs:  Lab Results   Component Value Date    WBC 7.06 12/22/2017    HGB 9.5 (L) 12/22/2017    HCT 30.6 (L) 12/22/2017    MCV 87 12/22/2017    RDW 14.6 (H) 12/22/2017     12/22/2017    GRAN 3.7 12/22/2017    GRAN 52.5 12/22/2017    LYMPH 2.1 12/22/2017    LYMPH 29.9 12/22/2017    MONO 0.6 12/22/2017    MONO 9.1 12/22/2017    EOS 0.6 (H) 12/22/2017    BASO 0.04 12/22/2017     Lab Results   Component Value Date     11/24/2017    K 4.7 11/24/2017     11/24/2017    CO2 30 (H) 11/24/2017     11/24/2017    BUN 18 11/24/2017    CREATININE 1.0 11/24/2017    CALCIUM 9.2 11/24/2017    PROT 7.1 11/24/2017    ALBUMIN 3.7 11/24/2017    BILITOT 0.2 11/24/2017    ALKPHOS 73 11/24/2017    AST 19 11/24/2017    ALT 16 11/24/2017                   Assessment:  Paris Burris is a 72 y.o. female here with iron deficiency anemia which appears likely due to chronic GI blood loss.  This would be occult since she has no overt signs of GI bleeding.  Her last EGD and colonoscopy were in December 2016 and noted a large hiatal hernia and healed gastric ulcers.  The ulcers had been in her gastric body and may have been Jordi's ulcers " associated with the hiatal hernia.        Recommendations:  We discussed options for evaluation.  She opts for pill camera endoscopy.  If this is negative, then EGD may be necessary.  Continue iron TID for now.    Follow-up pending the results of the VCE.      Order summary:  Orders Placed This Encounter   Procedures    Capsule Video Endoscopy         Dustin Patterson MD

## 2018-01-03 NOTE — PROGRESS NOTES
"Baseline PSG study was performed on Paris Burris.The following was explained to the pt either prior to or during the set-up procedure: the set-up procedure (what each wire is for), time to bed, time of waking in morning, reasons tech might be needing to enter room during the night, and the possibility of meeting criteria for a split-night study with CPAP mask. It was explained to the pt that the physician will analyze the study and the results will be sent to their PCP. A "Thank You" letter was also given to the pt upon leaving the lab that thoroughly explains the next steps in communication regarding this study and of treatment, if needed.     EKG:  Tachycardia, Occasional PVC    Difficulties:  Averaged channels for EKG artifact      Pt did not qualify for a split-night study.  "

## 2018-01-05 ENCOUNTER — DOCUMENTATION ONLY (OUTPATIENT)
Dept: REHABILITATION | Facility: OTHER | Age: 73
End: 2018-01-05
Attending: PHYSICAL MEDICINE & REHABILITATION
Payer: MEDICARE

## 2018-01-05 NOTE — PROGRESS NOTES
Health  Wellness Visit Note    Name: Paris Burris  Clinic Number: [id number]  Physician: Cee Woodall, *  Diagnosis: No diagnosis found.  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   Time In:  8:15AM  Time Out: 9:00 AM  Total Treatment Time: 45 minutes    Subjective:   Patient reports that her neck and lower back are feeling well today. Earlier in the week she had some soreness in both areas some she stretched and used ice. Iterated to HC how much stretching and ice can help her and her  when they are having some neck and back symptoms.Taking iron pills for anemia. Did not talk about her sleep study, discuss next session.       Objective:   Paris completed therapeutic stretches (EIS, EIL, RICK, Neck Retractions, scapular retractions) and the following MedX exercise machines: core cervical, core lumbar, torso rotation l/r, leg extension, leg curl, upright row, chest press, biceps curl, triceps extension, leg press    Please see exercise log in patient folder for rate of exertion and repetitions completed.     Assessment:   Patient tolerated all exercises on the MedX equipment without complaint or increase in symptoms. Iced lower back after completion of exercises.    Plan:    Continue with established plan of care towards  wellness goals. Continue with Plan A. May transition to [facility] in January. Check in with thoracic pain.

## 2018-01-08 RX ORDER — TRAMADOL HYDROCHLORIDE 50 MG/1
100 TABLET ORAL
Qty: 60 TABLET | Refills: 1 | Status: SHIPPED | OUTPATIENT
Start: 2018-01-08 | End: 2018-04-15 | Stop reason: SDUPTHER

## 2018-01-09 ENCOUNTER — TELEPHONE (OUTPATIENT)
Dept: GASTROENTEROLOGY | Facility: CLINIC | Age: 73
End: 2018-01-09

## 2018-01-09 ENCOUNTER — TELEPHONE (OUTPATIENT)
Dept: SLEEP MEDICINE | Facility: CLINIC | Age: 73
End: 2018-01-09

## 2018-01-09 ENCOUNTER — DOCUMENTATION ONLY (OUTPATIENT)
Dept: REHABILITATION | Facility: OTHER | Age: 73
End: 2018-01-09
Attending: PHYSICAL MEDICINE & REHABILITATION
Payer: MEDICARE

## 2018-01-09 NOTE — PROGRESS NOTES
Health  Wellness Visit Note    Name: Paris Burris  Clinic Number: [id number]  Physician: Cee Woodall, *  Diagnosis: No diagnosis found.  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   Time In:  9:00 AM  Time Out: 9:48 AM  Total Treatment Time: 45 minutes    Subjective:   Patient reports that she is feeling some slight discomfort in her neck this morning; states that she rates her pain currently about a 2-3; patient has been stretching in the mornings but finds that she is often too tired to stretch at night; will start setting an alarm a few minutes prior to bedtime; had her sleep study conducted last week and she should be receiving the results in the next two weeks; was told that her current sleep apnea mask could be adjusted to make it more comfortable; says that she was able to get a good night's rest on Sunday; felt less 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       Objective:   Paris completed therapeutic stretches (EIS, EIL, RICK, Neck Retractions, scapular retractions) and the following [employer] exercise machines: core cervical, core lumbar, torso rotation l/r, leg extension, leg curl, upright row, chest press, biceps curl, triceps  extension, leg press    Please see exercise log in patient folder for rate of exertion and repetitions completed.     Assessment:   Patient tolerated all exercises on the MedX equipment without complaint or increase in symptoms. Iced lower back and neck after completion of exercises.    Plan:    Continue with established plan of care towards wellness goals. Continue with Plan A. May transition to [facility] in January. Check in with\patient about sleep study results.

## 2018-01-09 NOTE — TELEPHONE ENCOUNTER
----- Message from Mandi Berman sent at 1/8/2018  3:31 PM CST -----  Contact: self   Leonardo - is asking to speak to [facility] - please call patient at

## 2018-01-09 NOTE — TELEPHONE ENCOUNTER
Please inform the patient - recent study is positive for significant (mild to moderate) sleep apnea.  Since she is still snoring, and feeling tired, treatment is recommended. We could not do BPAP the same night, as it took her most of the night to meet sleep apnea criteria.    Would she like me to order a BPAP titration study for her? This is required step before ordering BAPp machine    Please let me know.    Thanks~!

## 2018-01-09 NOTE — TELEPHONE ENCOUNTER
----- Message from Mandi Berman sent at 1/9/2018 11:00 AM CST -----  Contact: self - [phone]  Rice - returning your call - please call patient at

## 2018-01-09 NOTE — TELEPHONE ENCOUNTER
----- Message from Yamileth Beck MA sent at 1/9/2018 11:57 AM CST -----  Contact: [phone]   Leonardo Dumont to r/s her video cap. procedure on 1/11/18.     [phone]

## 2018-01-09 NOTE — TELEPHONE ENCOUNTER
----- Message from Juanita Marion sent at 1/9/2018  3:18 PM CST -----  Contact: Pt #[phone]  Pt states she was returning the nurse phone call and Pt requesting an Appt Jan 18 Pt would like a phone call back #[phone]

## 2018-01-09 NOTE — TELEPHONE ENCOUNTER
Returned patient's call, but she did not answer.    Patient left message to rescheduled her VCE to Jan. 18th. VCE rescheduled to 1/18 and voicemail was left to inform patient appointment was rescheduled.

## 2018-01-11 ENCOUNTER — PATIENT MESSAGE (OUTPATIENT)
Dept: INTERNAL MEDICINE | Facility: CLINIC | Age: 73
End: 2018-01-11

## 2018-01-12 ENCOUNTER — DOCUMENTATION ONLY (OUTPATIENT)
Dept: REHABILITATION | Facility: OTHER | Age: 73
End: 2018-01-12
Attending: PHYSICAL MEDICINE & REHABILITATION
Payer: MEDICARE

## 2018-01-12 NOTE — PROGRESS NOTES
Health  Wellness Visit Note    Name: Paris Burris  Clinic Number: [id number]  Physician: Cee Woodall, *  Diagnosis: No diagnosis found.  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   Time In:  7:57 AM  Time Out: 8:50 AM  Total Treatment Time: 53 minutes    Subjective:   Patient reports that her neck and lower back feel good today; she has been stretching and icing on a daily basis.  PT has started doing cardio again and plans on sticking to her routine.  Her and Mr. Braun walked yesterday for 30 minutes.  She plans on going out for another walk sometime this weekend.  Paris did her sleep study about a week and a half ago and is now waiting to hear from the doctor.  She is hoping to be able to get another CPAP machine as soon as possible.  PT has been able to get more rest and not feeling as down as she was a few weeks ago.  She is also feeling more energized.         Objective:   Paris completed therapeutic stretches (EIS, EIL, RICK, Neck Retractions, scapular retractions) and the following MedX exercise machines: core cervical, core lumbar, torso rotation l/r, leg extension, leg curl, upright row, chest press, biceps curl, triceps extension, leg press    Please see exercise log in patient folder for rate of exertion and repetitions  completed.     Assessment:   Patient tolerated all exercises on the MedX equipment without complaint or increase in symptoms. Skipped ice today.  The weight will be increasing on the Leg press next time she comes in.      Plan:    Continue with established plan of care towards wellness goals. Continue with Plan A. May transition to [facility] in January. Check in with\patient about sleep study results.

## 2018-01-15 ENCOUNTER — DOCUMENTATION ONLY (OUTPATIENT)
Dept: REHABILITATION | Facility: OTHER | Age: 73
End: 2018-01-15
Attending: PHYSICAL MEDICINE & REHABILITATION
Payer: MEDICARE

## 2018-01-15 NOTE — PROGRESS NOTES
"Health  Wellness Visit Note    Name: Paris Burris  Clinic Number: [id number]  Physician: Cee Woodall, *  Diagnosis: No diagnosis found.  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       Objective:   Paris completed therapeutic stretches (EIS, EIL, RICK, Neck Retractions, scapular retractions) and the following MedX exercise machines: core cervical, core lumbar, torso rotation l/r, leg extension, leg curl, upright row, chest press, biceps curl, triceps extension, leg " press    Please see exercise log in patient folder for rate of exertion and repetitions completed.     Assessment:   Patient tolerated all exercises on the MedX equipment without complaint or increase in symptoms. Skipped ice today.     Plan:    Continue with established plan of care towards wellness goals. Continue with Plan A. May transition to [facility] in January. Check in with\patient about sleep study results.

## 2018-01-16 ENCOUNTER — CLINICAL SUPPORT (OUTPATIENT)
Dept: INTERNAL MEDICINE | Facility: CLINIC | Age: 73
End: 2018-01-16
Payer: MEDICARE

## 2018-01-16 VITALS — WEIGHT: 167.75 LBS | BODY MASS INDEX: 31.7 KG/M2

## 2018-01-16 NOTE — PROGRESS NOTES
Health  Follow-Up Note   [x] Office  [] Phone  Notes from previous session reviewed.   [x] Previous Session Goals unchanged.   [] Patient/Caregiver Change in Goals.  Goals added or changed by Patient/Caregiver since program participation:  1.    Continue same plan      Additional/Changed support that patient/caregiver has experienced/sought?  (Indicate readiness, support from family, friends, others, community groups, etc)  1.      Additional/Changed obstacles that could prevent patient/caregiver from reaching their goals?  1.  Holidays    Feedback provided:  1.  Praised for continued effort keeping glucose down and determination    Diagnostic values/Desriptors for follow-up as needed for chronic condition(s)   Weight: 76.1 kg 167.77 lb gain 3 lbs total loss 19 lbs   Blood Glucose:  121 today  7 day 118  14 day 115  30 day 112  90 day 115    Interventions:   1. Health  listened reflectively, validated thoughts and feelings, offered support and encouragement.   2. Allowed patient to express themselves in a non-biased atmosphere.  3. Health  assisted pt to problem-solve obstacles such as being in a challenging environment and dealing with these challenges.   4. Motivational Interviewed interventions utilized (OARS).   5. Patient responded favorably to interventions and remained actively engaged in the session.   6. Health  will remain available and connected for patient by phone and/or office visits.   7. Positive reinforcement, emotional support and encouragement provided.   8. Focused Education: MI, lectins    Plan:  [x] Pt will work on goals as stated above.   [x] Pt will contact Health  for any questions, concerns or needs.  [x] Pt will follow up with Health  in office on    2/6/18 at 1000.    [] Pt will follow up with Health  on phone in:        [x] Health  will remain available.   [] Health  will contact patient by phone in:        [] Health  will consult:         [] Health  will inform Provider via EPIC messaging.     Impression:  1. Behavior is consistent with   Action      Stage of Change.   2. Participation level:       [x] Receptive      [x] Interactive      [] Guarded and Resistant      [x] Self Motivated      [] Refused/Declined to participate   3. [x] Pt voiced understanding of all information presented.       [] Pt voiced needing further information/education. This will be arranged.       [x] Pt would benefit from further education/information as identified by this health . This will be arranged.     Jacqueline Perry RN HC

## 2018-01-19 ENCOUNTER — DOCUMENTATION ONLY (OUTPATIENT)
Dept: REHABILITATION | Facility: OTHER | Age: 73
End: 2018-01-19
Attending: PHYSICAL MEDICINE & REHABILITATION
Payer: MEDICARE

## 2018-01-19 ENCOUNTER — PATIENT MESSAGE (OUTPATIENT)
Dept: SLEEP MEDICINE | Facility: CLINIC | Age: 73
End: 2018-01-19

## 2018-01-19 ENCOUNTER — TELEPHONE (OUTPATIENT)
Dept: GASTROENTEROLOGY | Facility: CLINIC | Age: 73
End: 2018-01-19

## 2018-01-19 NOTE — TELEPHONE ENCOUNTER
----- Message from Juanita Marion sent at 1/19/2018  2:57 PM CST -----  Contact: Pt #[phone]  Pt states she was calling to reschedule her VIDEO CAPSULE and would like to speak to the nurse #[phone]

## 2018-01-19 NOTE — PROGRESS NOTES
Health  Wellness Visit Note    Name: Paris Burris  Clinic Number: [id number]  Physician: Cee Woodall, *  Diagnosis: No diagnosis found.  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   Time In:  8:05AM  Time Out:9:00 AM  Total Treatment Time:55  minutes    Subjective:   Patient reports her lower back is doing well today, but her neck has been bothering her, this has been going on for about 2 weeks now, says the pain never gets over a 3. She is starting to become concerned about it, thought it would go away after days of trenching and icing. Stated she wants to go back to therapy for her neck, recommended that a therapist come check her neck out before making a appointment with therapy. She is compliant with her HEP at least once a day sometime twice. Says the past couple of days she would wake up then do her neck stretches but it has caused extra pain. Discussed different times of days for her to stretch and try stretching before night time. Has not made a appointment with podiatrist yet, will next week.       Objective:   Paris completed therapeutic stretches (EIS, EIL, RICK, Neck Retractions, scapular retractions) and the following MedX exercise machines: core cervical, core lumbar, torso rotation l/r, leg extension, leg curl, upright  row, chest press, biceps curl, triceps extension, leg press    Please see exercise log in patient folder for rate of exertion and repetitions completed.     Assessment:   Patient tolerated all exercises on the MedX equipment without complaint or increase in symptoms. Skipped ice today.     Plan:    Continue with established plan of care towards wellness goals. Continue with Plan A. May transition to [facility] in January. Check in with\patient about sleep study results. Will postpone 6 month cervical testing due to neck pain(wants to meet with Lindsay)

## 2018-01-22 ENCOUNTER — DOCUMENTATION ONLY (OUTPATIENT)
Dept: REHABILITATION | Facility: OTHER | Age: 73
End: 2018-01-22
Attending: PHYSICAL MEDICINE & REHABILITATION
Payer: MEDICARE

## 2018-01-22 NOTE — PROGRESS NOTES
Health  Wellness Visit Note    Name: Paris Burris  Clinic Number: [id number]  Physician: Cee Woodall, *  Diagnosis: No diagnosis found.  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   Time In:  9:00 AM  Time Out:10:07 AM  Total Treatment Time:55  minutes    Subjective:   Patient reports her lower back is continuing to feel well; not having any pain, stiffness, or soreness-patient's main complaint is neck/upper back pain; states that she would like to consult with Abbie since she feels that her symptoms have not improved to her liking; patient is only stretching 1-2 times per day; suggested that patient become more consistent with her stretching and icing routine before she decides to meet with Abbie; patient also mentioned that she has been trying to decrease her intake of tramadol-only takes it 6 days per week and tries to wait until later on in the day to take her medication; reminded patient that she maybe feeling increased neck pain due to her medication reduction; says that she has been feeling slightly depressed (limits her ability to want to be active); encouraged patient to walk daily, to be mindful of her food intake (can affect her mood), and also look into essential oils that may help with depression.      Objective:    Paris completed therapeutic stretches (EIS, EIL, RICK, Neck Retractions, scapular retractions) and the following MedX exercise machines: core cervical, core lumbar, torso rotation l/r, leg extension, leg curl, upright row, chest press, biceps curl, triceps extension, leg press    Please see exercise log in patient folder for rate of exertion and repetitions completed.     Assessment:   Patient tolerated all exercises on the MedX equipment without complaint or increase in symptoms. Iced neck and lower back after completion of exercises.     Plan:    Continue with established plan of care towards wellness goals. Continue with Plan A. May transition to [facility] in January. Check in with\patient about sleep study results. Will postpone 6 month cervical testing due to neck pain(wants to meet with Lindsay)

## 2018-01-23 ENCOUNTER — TELEPHONE (OUTPATIENT)
Dept: GASTROENTEROLOGY | Facility: CLINIC | Age: 73
End: 2018-01-23

## 2018-01-23 NOTE — TELEPHONE ENCOUNTER
----- Message from Mandi Antonella sent at 1/23/2018 10:55 AM CST -----  Contact: self - [phone]  Leonardo - has questions re her vce - please call patient at

## 2018-01-25 ENCOUNTER — TELEPHONE (OUTPATIENT)
Dept: GASTROENTEROLOGY | Facility: CLINIC | Age: 73
End: 2018-01-25

## 2018-01-25 ENCOUNTER — CLINICAL SUPPORT (OUTPATIENT)
Dept: GASTROENTEROLOGY | Facility: CLINIC | Age: 73
End: 2018-01-25
Payer: MEDICARE

## 2018-01-25 DIAGNOSIS — R19.5 OCCULT GI BLEEDING: ICD-10-CM

## 2018-01-25 DIAGNOSIS — D50.0 IRON DEFICIENCY ANEMIA DUE TO CHRONIC BLOOD LOSS: ICD-10-CM

## 2018-01-25 NOTE — TELEPHONE ENCOUNTER
Returned patient's call. Patient had VCE done today and would like to know if it is ok to drink water.    Patient advised it is ok to drink as much water as she like. She verbalized understanding.

## 2018-01-25 NOTE — TELEPHONE ENCOUNTER
----- Message from Juanita Marion sent at 1/25/2018  8:07 AM CST -----  Contact: Pt #[phone]  Pt states she had a VIDEO CAPSULE today 1/25 and have some questions would like to speak to the nurse #[phone]

## 2018-01-25 NOTE — TELEPHONE ENCOUNTER
----- Message from Juanita Marion sent at 1/25/2018  8:07 AM CST -----  Contact: Pt #[phone]  Pt states she had a VIDEO CAPSULE today 1/25 and have some questions would like to speak to the nurse #[phone]

## 2018-01-26 ENCOUNTER — DOCUMENTATION ONLY (OUTPATIENT)
Dept: REHABILITATION | Facility: OTHER | Age: 73
End: 2018-01-26
Attending: PHYSICAL MEDICINE & REHABILITATION
Payer: MEDICARE

## 2018-01-26 NOTE — PROGRESS NOTES
Health  Wellness Visit Note    Name: Paris Burris  Clinic Number: [id number]  Physician: Cee Woodall, *  Diagnosis: No diagnosis found.  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   Time In: 8:50 AM  Time Out:9:35 AM  Total Treatment Time:45  minutes    Subjective:   Patient reports that her neck and back are feeling good today. She felt a little pain from the core lumbar on last week so we lowered her weight today to 60, on the next visit we will follow up to see if the weight change helped with the pain. She went to the fitness center this week and enjoyed it. She was given a weight card so she can continue her exercises she does here in the gym. She has been compliant with ice and stretching this past week.     Objective:   Paris completed therapeutic stretches (EIS, EIL, RICK, Neck Retractions, scapular retractions) and the following MedX exercise machines: core cervical, core lumbar, torso rotation l/r, leg extension, leg curl, upright row, chest press, biceps curl, triceps extension, leg press    Please see exercise log in patient folder for rate of exertion and repetitions completed.     Assessment:   Patient tolerated all exercises on the MedX equipment without complaint or increase in symptoms. Did not ice neck and lower back  after completion of exercises she had an appoint ment to go to. She will be going to 64 on the core lumbar machine and going up on torso rotation, leg extension, and leg curl on her next visit.    Plan:    Continue with established plan of care towards wellness goals. Continue with Plan A. May transition to [facility] in January. Check with patient about gym regimen with weight card she received today. Will postpone 6 month cervical testing due to neck pain(wants to meet with Lindsay)

## 2018-01-29 ENCOUNTER — DOCUMENTATION ONLY (OUTPATIENT)
Dept: REHABILITATION | Facility: OTHER | Age: 73
End: 2018-01-29
Attending: PHYSICAL MEDICINE & REHABILITATION
Payer: MEDICARE

## 2018-01-29 NOTE — PROGRESS NOTES
Health  Wellness Visit Note    Name: Paris Burris  Clinic Number: [id number]  Physician: Cee Woodall, *  Diagnosis: No diagnosis found.  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   Time In: 9:00AM  Time Out:10:00 AM  Total Treatment Time:60  minutes    Subjective:   Patient reports that her neck has started to feel a little better but states her back has been giving her some problems, feels some achyness at times. Can not recall any activity that could have brought this on. She is compliant with HEP and has started to ice more. She is taking Tramadol once later on in the day. She has now gone to the [facility] 3 times in the past week, yesterday she did some weight training. She is very proud of herself. Reminded her to ice and stretch after which she says she ties to do. Wants to meet a HC at [facility] to help show other machines.        Objective:   Paris completed therapeutic stretches (EIS, EIL, RICK, Neck Retractions, scapular retractions) and the following MedX exercise machines: core cervical, core lumbar, torso rotation l/r, leg extension, leg curl, upright row, chest press, biceps curl, triceps extension, leg press    Please see exercise log in patient folder for rate of exertion and repetitions completed.      Assessment:   Patient tolerated all exercises on the MedX equipment without complaint or increase in symptoms. Did not ice neck and lower back after completion of exercises, will at home.     Plan:    Continue with established plan of care towards wellness goals. Continue with Plan B.  Will postpone 6 month cervical testing due to neck pain.

## 2018-02-02 ENCOUNTER — DOCUMENTATION ONLY (OUTPATIENT)
Dept: REHABILITATION | Facility: OTHER | Age: 73
End: 2018-02-02
Attending: PHYSICAL MEDICINE & REHABILITATION
Payer: MEDICARE

## 2018-02-02 ENCOUNTER — TELEPHONE (OUTPATIENT)
Dept: ADMINISTRATIVE | Facility: OTHER | Age: 73
End: 2018-02-02

## 2018-02-02 NOTE — PROGRESS NOTES
Health  Wellness Visit Note    Name: Paris Burris  Clinic Number: [id number]  Physician: Cee Woodall, *  Diagnosis: No diagnosis found.  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   Time In: 9:07 AM  Time Out: 9:56 AM  Total Treatment Time: 49 minutes    Subjective:   Patient reports that her neck and lower back feel good today; she has been stretching and icing twice per day and on a daily basis.  PT went to the gym yesterday and was able to walk on the treadmill for 11 minutes at 2.9 mph and worked out her upper body.  She also used the Core lumbar at [facility].  PT plans on going to the gym one more time this week and will probably do some more walking and a lower body workout.  Mrs. Toledo has been sleeping better but has not been able to get her CPAP machine because the clinic is really backed up.  Her appointment to see the doctor is not until March.       Objective:   Paris completed therapeutic stretches (EIS, EIL, RICK, Neck Retractions, scapular retractions) and the following MedX exercise machines: core cervical, core lumbar, torso rotation l/r, leg extension, leg curl, upright row, chest press, biceps curl, triceps extension, leg press    Please see exercise log in patient folder for rate of exertion and repetitions completed.      Assessment:   Patient tolerated all exercises on the MedX equipment without complaint or increase in symptoms.  Skipped ice today.  The weight on the Chest press will increase on her next visit.  She needs to make an appointment with a HC on the day that we go to [facility].      Plan:    Continue with established plan of care towards wellness goals. Continue with Plan B.  Will postpone 6 month cervical testing due to neck pain.

## 2018-02-02 NOTE — TELEPHONE ENCOUNTER
----- Message from Dwaine Sweeney MD sent at 2/2/2018 10:01 AM CST -----  Please call pt and offer for me  her Monday at 2 pm or Tuesday at 11 am next week

## 2018-02-05 ENCOUNTER — OFFICE VISIT (OUTPATIENT)
Dept: PSYCHIATRY | Facility: CLINIC | Age: 73
End: 2018-02-05
Payer: MEDICARE

## 2018-02-05 VITALS
WEIGHT: 170.38 LBS | HEART RATE: 104 BPM | BODY MASS INDEX: 32.17 KG/M2 | DIASTOLIC BLOOD PRESSURE: 74 MMHG | SYSTOLIC BLOOD PRESSURE: 132 MMHG | HEIGHT: 61 IN

## 2018-02-05 DIAGNOSIS — F33.41 RECURRENT MAJOR DEPRESSIVE DISORDER, IN PARTIAL REMISSION: ICD-10-CM

## 2018-02-05 DIAGNOSIS — F10.21 ALCOHOL DEPENDENCE IN REMISSION: ICD-10-CM

## 2018-02-05 DIAGNOSIS — F41.1 GAD (GENERALIZED ANXIETY DISORDER): Primary | ICD-10-CM

## 2018-02-05 PROCEDURE — 1159F MED LIST DOCD IN RCRD: CPT | Mod: S$GLB,,, | Performed by: PSYCHIATRY & NEUROLOGY

## 2018-02-05 PROCEDURE — 99499 UNLISTED E&M SERVICE: CPT | Mod: S$GLB,,, | Performed by: PSYCHIATRY & NEUROLOGY

## 2018-02-05 PROCEDURE — 99999 PR PBB SHADOW E&M-EST. PATIENT-LVL III: CPT | Mod: PBBFAC,,, | Performed by: PSYCHIATRY & NEUROLOGY

## 2018-02-05 PROCEDURE — 99213 OFFICE O/P EST LOW 20 MIN: CPT | Mod: S$GLB,,, | Performed by: PSYCHIATRY & NEUROLOGY

## 2018-02-05 PROCEDURE — 90833 PSYTX W PT W E/M 30 MIN: CPT | Mod: S$GLB,,, | Performed by: PSYCHIATRY & NEUROLOGY

## 2018-02-05 PROCEDURE — 3008F BODY MASS INDEX DOCD: CPT | Mod: S$GLB,,, | Performed by: PSYCHIATRY & NEUROLOGY

## 2018-02-05 RX ORDER — LORAZEPAM 0.5 MG/1
0.5 TABLET ORAL DAILY PRN
Qty: 30 TABLET | Refills: 3 | Status: SHIPPED | OUTPATIENT
Start: 2018-02-05 | End: 2018-08-17 | Stop reason: SDUPTHER

## 2018-02-05 RX ORDER — FLUOCINONIDE 0.5 MG/G
CREAM TOPICAL
COMMUNITY
Start: 2018-01-29 | End: 2018-08-07

## 2018-02-05 RX ORDER — DICLOFENAC SODIUM 10 MG/G
GEL TOPICAL
Refills: 2 | COMMUNITY
Start: 2018-01-26 | End: 2018-08-07

## 2018-02-05 RX ORDER — METFORMIN HYDROCHLORIDE 500 MG/1
TABLET, EXTENDED RELEASE ORAL
Status: ON HOLD | COMMUNITY
Start: 2017-11-07 | End: 2018-03-20 | Stop reason: HOSPADM

## 2018-02-05 NOTE — PROGRESS NOTES
"Outpatient Psychiatry Follow-Up Visit (MD/NP)    2/5/2018    last seen July 2017     Clinical Status of Patient:  Outpatient (Ambulatory)    Chief Complaint:   "Paris"   Paris Burris is a 72 y.o. female who presents today for follow-up of depression and alcohol problem .  Met with patient.   ; friend of Dr Rachel . Annita Braun ( 9 yrs ) .  to [place] .    Interval History and Content of Current Session:  Interim Events/Subjective Report/Content of Current Session: Pt s/p ABU program in Spring 2014 for alcohol dependence . 2-10-14 sober date . Very engaged in AA and sponsor attending 3-4 x per week . She completed 1st series of 12 steps .       In past she was not sure she wanted to reengage with  Dr Lauren .      Did trauma therapy at [facility] justice Kila.  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        EX  Son in [facility] -- good terms  ; studied at  [employer] ; Dtr smoker Yue ( Vira Robbins)  employed in past with [employer])  S/p 4th suicide attempt ( 1st was OD  acetomin; 2nd and 3rd had gun)  and was admitted to [facility] on 4-9-15  X 5 weeks .    Yue also has been to tx in [facility] . Yue now in therapy  plus a group with [facility].  Pt does not ask details anymore .   Yue ( kaylie patel).  Yue continues to  cut herself off from others that were close to her . Yue has  Been even keeled .     Yue has distanced dennis songone's wife ( Yazmin) away . Dennis is a family friend.   Dtr Yue ( Vira robbins) going though bitter divorce where Yue wants others to avoid [place]. Kids shuttle .  [place]'s live in  has 3 kids in their home .        Grkids at their own  home are 14( Yoni)- anger issues / destroys property . 15 Fatuma chenetoleader  is well  .  Nikky has a [place] xchnge female .  Oldest grson Delfino ( 17) ADHD  ,  going to  [place]  boarding school. Younger kids at  [facility] .       Diabetes controlled with " hgb A1c.  Neuropathic  pain  is worse from toe to balll of foot to heel then associated hip  Pian .  Podiatry a has added Neurontin 300 mg bid  To tid  near early 2017  but may prevent weight loss for her  . Sitting is worse for her pain.     IShe then added remeron 15 mg q hs and is at half tab q hs  .     Jacqueline [facility]  has been very helpful to dec HgbA1c     Mood has been impacted by fatigue from anemia . Now back on iron and feels improved       Ever [place] , a [employer]  has become depressed but is against meds     Psychiatric Review Of Systems - Is patient experiencing or having changes in:  sleep: good Sleep apnea confirmed  by sleep specialist    appetite: no, controlled  With very health conscious diet   weight: no; 171 ( feb 2017)  ; 168 ( March 2017)  169  ( July 2017)  170 ( feb 2018)  A1c is <6;  down from >10     energy/anergy: no, less exercising at [employer] with free  bc of comorbid condition; still doing healthy back  PT which is  doing  healthy back program at [facility] with machines ; helps ever ; sig fatigue in nov 2017 improved   interest/pleasure/anhedonia: no, 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     Has compression fx in high thoracic area .  New onset DM has resolved for the mos  part .No DM in family .     Santana Rachel MD is  fam friend .     Meds  effexor xr 75  mg  q day ( at 150 mg had anxiety / nightmares ) ;  mirtazepine  15 mg tab - takes 7.5 mg q hs;  Vit D supplement ; tramadol 50 mg tid prn ( usually takes 50 mg  2 q day)  Max  would be 300 in literature  ; Ativan 0.5 mg  prn       Other meds Tramadol -pt in healthy back program but has increased pain     Past meds ; did not fill deplin     Her pain doc is concerned about potential interaction of tramadol and ssri in past     Reading book from  health  --never be sick again --  Avoiding  milk , white flour , sugar ; limited coffee    Psychotherapy:  · Target symptoms: alcohol abuse, depression  · Why chosen therapy is appropriate versus another modality: relevant to diagnosis, patient responds to this modality, evidence based practice  · Outcome monitoring methods: self-report, observation  · Therapeutic intervention type: supportive psychotherapy  · Topics discussed/themes: relationships difficulties, substance abuse  · The patient's response to the intervention is accepting. The patient's progress toward treatment goals is good.   · Duration of intervention:  30 minutes.    Review of Systems   · Prob Pert. 1 sys, Ext. psych +2 add., Comp. 10-14 sys  · PSYCHIATRIC: Pertinant items are noted in the narrative.  · CONSTITUTIONAL: No weight gain or loss. Wedding dress size 5 . She is about a 14-16 .   · MUSCULOSKELETAL: Positive for joint stiffness, toe neuropathic  Pain ( DM JAN 2016). Leg ( hip and thighs)  weakness still but in PT  ( possibly vascular- neg neuro test ) ;mechanical cupping ( upper back tension post Breast CA)  and  · NEUROLOGIC: No weakness, sensory changes, seizures, confusion, memory loss, tremor or other abnormal movements.  · ENDOCRINE: No polydipsia or polyuria.  · INTEGUMENTARY: No rashes or lacerations.  · EYES: No exophthalmos, jaundice or blindness.  · ENT: No dizziness, tinnitus or hearing loss.  · RESPIRATORY: No shortness of breath.  · CARDIOVASCULAR: No tachycardia or chest pain.  · GASTROINTESTINAL: No nausea, vomiting, pain, constipation or diarrhea.  · GENITOURINARY: No frequency, dysuria or sexual dysfunction. S/p recent uti series and now kidney  "stones awaiting lithotripsy   · HEMATOLOGIC/LYMPHATIC: No excessive bleeding, prolonged or excessive bleeding after dental extraction/injury.  · ALLERGIC/IMMUNOLOGIC: No allergic response to materials, foods or animals at this time.    Past Medical, Family and Social History: The patient's past medical, family and social history have been reviewed and updated as appropriate within the electronic medical record - see encounter notes.  Has lost son yrs ago .She and Annita Braun live in a 2nd floor apmnt away from [facility]. Her mom suicided in her 50's     Compliance: yes    Side effects:  Sweating when others dont    Risk Parameters:  Patient reports no suicidal ideation  Patient reports no homicidal ideation  Patient reports no self-injurious behavior  Patient reports no violent behavior    Exam (detailed: at least 9 elements; comprehensive: all 15 elements)   Constitutional  Vitals:  Most recent vital signs, dated less than 90 days prior to this appointment, were reviewed.   Vitals:    02/05/18 1405   BP: 132/74   Pulse: 104   Weight: 77.3 kg (170 lb 6.4 oz)   Height: 5' 1" (1.549 m)        General:  unremarkable, age appropriate, neatly groomed     Musculoskeletal  Muscle Strength/Tone:  no spasicity, no rigidity   Gait & Station:  non-ataxic     Psychiatric  Speech:  no latency; no press   Mood & Affect:  euthymic  congruent and appropriate   Thought Process:  normal and logical   Associations:  intact   Thought Content:  normal, no suicidality, no homicidality, delusions, or paranoia   Insight:  has awareness of illness   Judgement: behavior is adequate to circumstances   Orientation:  grossly intact   Memory: intact for content of interview   Language: grossly intact   Attention Span & Concentration:  able to focus   Fund of Knowledge:  intact and appropriate to age and level of education     Assessment and Diagnosis   Status/Progress: Based on the examination today, the patient's problem(s) is/are worsening.  New " problems have been presented today.   Co-morbidities are complicating management of the primary condition.  There are no active rule-out diagnoses for this patient at this time.     General Impression:  Depression still present     1. GABBY (generalized anxiety disorder)     2. Recurrent major depressive disorder, in partial remission     3. Alcohol dependence in remission               Intervention/Counseling/Treatment Plan   · Medication Management: Maintain Effexor XR at 75 mg q day  ; continue mirtazepine 15mg  1/2 tab qhs continue lorazepam prn .  The risks and benefits of medication were discussed with the patient regarding serotonin syndrome and withdrawal .   · Counseling provided with patient as follows: importance of compliance with chosen treatment options was emphasized, risks and benefits of treatment options, including medications, were discussed with the patient  · AA/NA/CA/ACOA/Abstinence     Consider Lyrica for pain - to see podiatry       Return to Clinic: 6 months

## 2018-02-06 ENCOUNTER — CLINICAL SUPPORT (OUTPATIENT)
Dept: INTERNAL MEDICINE | Facility: CLINIC | Age: 73
End: 2018-02-06
Payer: MEDICARE

## 2018-02-06 VITALS — WEIGHT: 166.31 LBS | BODY MASS INDEX: 31.43 KG/M2

## 2018-02-08 ENCOUNTER — PATIENT MESSAGE (OUTPATIENT)
Dept: INTERNAL MEDICINE | Facility: CLINIC | Age: 73
End: 2018-02-08

## 2018-02-08 DIAGNOSIS — M54.2 CHRONIC NECK PAIN: Primary | ICD-10-CM

## 2018-02-08 DIAGNOSIS — M54.50 CHRONIC LOW BACK PAIN WITHOUT SCIATICA, UNSPECIFIED BACK PAIN LATERALITY: ICD-10-CM

## 2018-02-08 DIAGNOSIS — G89.29 CHRONIC LOW BACK PAIN WITHOUT SCIATICA, UNSPECIFIED BACK PAIN LATERALITY: ICD-10-CM

## 2018-02-08 DIAGNOSIS — G89.29 CHRONIC NECK PAIN: Primary | ICD-10-CM

## 2018-02-09 ENCOUNTER — DOCUMENTATION ONLY (OUTPATIENT)
Dept: REHABILITATION | Facility: OTHER | Age: 73
End: 2018-02-09
Attending: PHYSICAL MEDICINE & REHABILITATION
Payer: MEDICARE

## 2018-02-12 ENCOUNTER — DOCUMENTATION ONLY (OUTPATIENT)
Dept: REHABILITATION | Facility: OTHER | Age: 73
End: 2018-02-12
Attending: PHYSICAL MEDICINE & REHABILITATION
Payer: MEDICARE

## 2018-02-12 NOTE — PROGRESS NOTES
Health  Wellness Visit Note    Name: Paris Burris  Clinic Number: [id number]  Physician: Cee Woodall, *  Diagnosis: No diagnosis found.  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   Time In: 9:30AM  Time Out:10:20 AM  Total Treatment Time: 50 minutes    Subjective:   Patient reports that her neck and lower back feel good today. She has been consistent with her stretching and icing daily. She has noticed  a increase in flexibility in her leg since doing the piriformis stretch. She is very pleased with this result. Also excited to meet HC at [facility] to go over exercise machines next Monday.       Objective:   Paris completed therapeutic stretches (EIS, EIL, RICK, Neck Retractions, scapular retractions) and the following MedX exercise machines: core cervical, core lumbar, torso rotation l/r, leg extension, leg curl, upright row, chest press, biceps curl, triceps extension, leg press    Please see exercise log in patient folder for rate of exertion and repetitions completed.     Assessment:   Patient tolerated all exercises on the MedX equipment without complaint or increase in symptoms.  Skipped ice today.  Wants to increase cervical Med-X leg curl and leg ext next session    Plan:    Continue with established plan of care towards wellness  goals. Continue with Plan B.  Will postpone 6 month cervical testing due to neck pain.

## 2018-02-14 ENCOUNTER — TELEPHONE (OUTPATIENT)
Dept: GASTROENTEROLOGY | Facility: CLINIC | Age: 73
End: 2018-02-14

## 2018-02-14 NOTE — TELEPHONE ENCOUNTER
VCE reviewed.  Her small bowel mucosa is a little congested, but unclear if this is clinically significant.  No other concerning findings.     I would like to discuss with her further in clinic.

## 2018-02-16 ENCOUNTER — DOCUMENTATION ONLY (OUTPATIENT)
Dept: REHABILITATION | Facility: OTHER | Age: 73
End: 2018-02-16
Attending: PHYSICAL MEDICINE & REHABILITATION
Payer: MEDICARE

## 2018-02-16 NOTE — PROGRESS NOTES
Health  Wellness Visit Note    Name: Paris Burris  Clinic Number: [id number]  Physician: Cee Woodall, *  Diagnosis: No diagnosis found.  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   Time In: 10:05 AM  Time Out:10:50 AM  Total Treatment Time: 45 minutes    Subjective:   Patient reports that her neck is feeling good today but her back is a little sore. Her son rides in the [employer] so carnival time is something they usually look forward to. They were unable to participate this year because they were just not feeling up to it physically. She has been stretching and icing. She is continuing to go the gym and working out there about 3 times per week.     Objective:   Paris completed therapeutic stretches (EIS, EIL, RICK, Neck Retractions, scapular retractions) and the following MedX exercise machines: core cervical, core lumbar, torso rotation l/r, leg extension, leg curl, upright row, chest press, biceps curl, triceps extension, leg press    Please see exercise log in patient folder for rate of exertion and repetitions completed.     Assessment:   Patient tolerated all exercises on the MedX equipment without complaint or increase in symptoms.  Skipped ice today.  Wants to increase  leg curl next session.    Plan:    Continue with  established plan of care towards wellness goals. Continue with Plan B.  Will postpone 6 month cervical testing due to neck pain.

## 2018-02-19 ENCOUNTER — DOCUMENTATION ONLY (OUTPATIENT)
Dept: REHABILITATION | Facility: OTHER | Age: 73
End: 2018-02-19
Attending: PHYSICAL MEDICINE & REHABILITATION
Payer: MEDICARE

## 2018-02-19 NOTE — PROGRESS NOTES
Health  Wellness Visit Note    Name: Paris Burris  Clinic Number: [id number]  Physician: Cee Woodall, *  Diagnosis: No diagnosis found.  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   Time In: 1:00 PM  Time Out:1:56 PM  Total Treatment Time: 56 minutes    Subjective:   Patient reports that her neck is continuing to feel well today; states that she does not have any neck pain or stiffness presently; lower back is feeling relatively well today but still has instances of lower back pain; consistent with her stretching routine in addition to icing; encouraged patient to start utilizing [facility] an additional day out of the week so that she can familiarize herself with the equipment.     Objective:   Paris completed therapeutic stretches (EIS, EIL, RICK, Neck Retractions, scapular retractions) and the following MedX exercise machines: core cervical, core lumbar, torso rotation l/r, leg extension, leg curl, upright row, chest press, biceps curl, triceps extension, leg press    Please see exercise log in patient folder for rate of exertion and repetitions completed.     Assessment:   Patient was shown  at [facility] and was given a copy of her weight card.    Plan:    Continue with established plan of care towards wellness goals. Continue  with Plan B.  Will postpone 6 month cervical testing due to neck pain.

## 2018-02-23 ENCOUNTER — DOCUMENTATION ONLY (OUTPATIENT)
Dept: REHABILITATION | Facility: OTHER | Age: 73
End: 2018-02-23
Attending: PHYSICAL MEDICINE & REHABILITATION
Payer: MEDICARE

## 2018-02-23 ENCOUNTER — TELEPHONE (OUTPATIENT)
Dept: INTERNAL MEDICINE | Facility: CLINIC | Age: 73
End: 2018-02-23

## 2018-02-23 ENCOUNTER — TELEPHONE (OUTPATIENT)
Dept: GASTROENTEROLOGY | Facility: CLINIC | Age: 73
End: 2018-02-23

## 2018-02-23 DIAGNOSIS — F33.41 MDD (MAJOR DEPRESSIVE DISORDER), RECURRENT, IN PARTIAL REMISSION: ICD-10-CM

## 2018-02-23 DIAGNOSIS — E66.9 DIABETES MELLITUS TYPE 2 IN OBESE: ICD-10-CM

## 2018-02-23 DIAGNOSIS — E11.69 DIABETES MELLITUS TYPE 2 IN OBESE: ICD-10-CM

## 2018-02-23 DIAGNOSIS — D50.9 IRON DEFICIENCY ANEMIA, UNSPECIFIED IRON DEFICIENCY ANEMIA TYPE: Primary | ICD-10-CM

## 2018-02-23 NOTE — TELEPHONE ENCOUNTER
----- Message from Sofie Maki sent at 2/22/2018  3:58 PM CST -----  Contact: pt [phone]  Leonardo Love states she is returning [facility]'s call.

## 2018-02-23 NOTE — TELEPHONE ENCOUNTER
Returned patient call. Results given, patient verbalized understanding and follow up appointment scheduled.

## 2018-02-23 NOTE — TELEPHONE ENCOUNTER
Results given to patient per Dr. Patterson. Patient verbalized understanding. Follow up appointment scheduled and mailed to address on file.

## 2018-02-23 NOTE — TELEPHONE ENCOUNTER
----- Message from Shari Fowler sent at 2/22/2018  9:40 AM CST -----  Contact: self/ [phone]  Doctor appointment and lab have been scheduled.  Please link lab orders to the lab appointment.  Date of doctor appointment:  5/31/18  Physical or EP:  phys  Date of lab appointment: 5/24/18   Comments:

## 2018-02-23 NOTE — PROGRESS NOTES
Called pt and left vm for pt to call back .  A 7 day supply  Xifaxan 550 mg take one tab po every 8 hrs for 7 days  placed at  for pt to .    Health  Wellness Visit Note    Name: Paris Burris  Clinic Number: [id number]  Physician: Cee Woodall, *  Diagnosis: No diagnosis found.  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   Time In: 1:00 PM  Time Out:1:56 PM  Total Treatment Time: 56 minutes    Subjective:   Patient reports that her neck is continuing to feel well today; states that she does not have any neck pain or stiffness presently; Her lower pack is giving her a lot of pain. She fell this past week and skinned her knee and bruised her leg luckily she did not break any bones. She says overall today she felt good after she finished her exercises.     Objective:   Paris completed therapeutic stretches (EIS, EIL, RICK, Neck Retractions, scapular retractions) and the following MedX exercise machines: core cervical, core lumbar, torso rotation l/r, leg extension, leg curl, upright row, chest press, biceps curl, triceps extension, leg press    Please see exercise log in patient folder for rate of exertion and repetitions completed.     Assessment:   Patient was shown  at [facility] and was given a copy of her weight card. She will increase weight on upright row and leg press on next visit.    Plan:    Continue with established plan of care towards wellness goals.  Continue with Plan B.  Will postpone 6 month cervical testing due to neck pain.

## 2018-02-26 ENCOUNTER — DOCUMENTATION ONLY (OUTPATIENT)
Dept: REHABILITATION | Facility: OTHER | Age: 73
End: 2018-02-26
Attending: PHYSICAL MEDICINE & REHABILITATION
Payer: MEDICARE

## 2018-02-26 NOTE — PROGRESS NOTES
Health  Wellness Visit Note    Name: Paris Burris  Clinic Number: [id number]  Physician: Cee Woodall, *  Diagnosis: No diagnosis found.  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   Time In: 9:30 PM  Time Out:10:28 PM  Total Treatment Time: 58 minutes    Subjective:   Patient reports that her neck and lower back are feeling well today; no stiffness or soreness presently; experienced mild lower back pain over the weekend-states that she may have sat for too long while reading; will begin to set timers/reminders to stand/stretch; suggested that patient utilize her lumbar roll or a pillow for added support; has not been walking for exercise but will try to start attending [facility] independently; patient said that she was having some stomach issues this morning-ate breakfast prior to her wellness visit but is unsure as to what has her stomach upset.     Objective:   Paris completed therapeutic stretches (EIS, EIL, RICK, Neck Retractions, scapular retractions) and the following MedX exercise machines: core cervical, core lumbar, torso rotation l/r, leg extension, leg curl, upright row, chest press, biceps curl, triceps extension, leg press    Please see exercise log in patient folder for rate of exertion and repetitions completed.      Assessment:   Patient tolerated all exercises on the MedX equipment without any increase in symptoms; weights will increase on leg curl and upright row next visit; skipped ice.    Plan:    Continue with established plan of care towards wellness goals. Continue with Plan B.  Will postpone 6 month cervical testing due to neck pain.

## 2018-02-27 ENCOUNTER — CLINICAL SUPPORT (OUTPATIENT)
Dept: INTERNAL MEDICINE | Facility: CLINIC | Age: 73
End: 2018-02-27
Payer: MEDICARE

## 2018-02-27 VITALS — BODY MASS INDEX: 30.91 KG/M2 | WEIGHT: 163.56 LBS

## 2018-02-27 NOTE — PROGRESS NOTES
Health  Follow-Up Note   [x] Office  [] Phone  Notes from previous session reviewed.   [x] Previous Session Goals unchanged.   [] Patient/Caregiver Change in Goals.  Goals added or changed by Patient/Caregiver since program participation:  1.     Continue same plan  2. Goal to lose 2 lbs in 3 weeks Increased from 1 lb per month     Additional/Changed support that patient/caregiver has experienced/sought?  (Indicate readiness, support from family, friends, others, community groups, etc)  1.      Additional/Changed obstacles that could prevent patient/caregiver from reaching their goals?  1.  Not exercising like she wants to because Kade not wanting to walk with her gets her down.     Feedback provided:  1.  Praised for continued effort and determination down 2.76 lb in 3 weeks total loss 23 lbs.    Diagnostic values/Desriptors for follow-up as needed for chronic condition(s)   Weight: 74.2 kg 163.58 lb   Blood Glucose: Averages  7 d 118  14 d 112  30 d 116  90 d 116    Interventions:   1. Health  listened reflectively, validated thoughts and feelings, offered support and encouragement.   2. Allowed patient to express themselves in a non-biased atmosphere.  3. Health  assisted pt to problem-solve obstacles such as being in a challenging environment and dealing with these challenges.   4. Motivational Interviewed interventions utilized (OARS).   5. Patient responded favorably to interventions and remained actively engaged in the session.   6. Health  will remain available and connected for patient by phone and/or office visits.   7. Positive reinforcement, emotional support and encouragement provided.   8. Focused Education: MI    Plan:  [x] Pt will work on goals as stated above.   [x] Pt will contact Health  for any questions, concerns or needs.  [x] Pt will follow up with Health  in office on    3/19/18 at 0900.    [] Pt will follow up with Health  on phone in:        [x]  Health  will remain available.   [] Health  will contact patient by phone in:        [] Health  will consult:        [] Health  will inform Provider via EPIC messaging.     Impression:  1. Behavior is consistent with   Action    Stage of Change.   2. Participation level:       [x] Receptive      [x] Interactive      [] Guarded and Resistant      [x] Self Motivated      [] Refused/Declined to participate   3. [x] Pt voiced understanding of all information presented.       [] Pt voiced needing further information/education. This will be arranged.       [x] Pt would benefit from further education/information as identified by this health . This will be arranged.     Jacqueline Perry RN HC

## 2018-03-02 ENCOUNTER — OFFICE VISIT (OUTPATIENT)
Dept: GASTROENTEROLOGY | Facility: CLINIC | Age: 73
End: 2018-03-02
Payer: MEDICARE

## 2018-03-02 ENCOUNTER — DOCUMENTATION ONLY (OUTPATIENT)
Dept: REHABILITATION | Facility: OTHER | Age: 73
End: 2018-03-02
Attending: PHYSICAL MEDICINE & REHABILITATION
Payer: MEDICARE

## 2018-03-02 VITALS
HEIGHT: 61 IN | DIASTOLIC BLOOD PRESSURE: 72 MMHG | SYSTOLIC BLOOD PRESSURE: 144 MMHG | BODY MASS INDEX: 31.39 KG/M2 | WEIGHT: 166.25 LBS | HEART RATE: 97 BPM

## 2018-03-02 DIAGNOSIS — D50.9 IRON DEFICIENCY ANEMIA, UNSPECIFIED IRON DEFICIENCY ANEMIA TYPE: Primary | ICD-10-CM

## 2018-03-02 DIAGNOSIS — K63.89 SMALL BOWEL EDEMA: ICD-10-CM

## 2018-03-02 DIAGNOSIS — Z87.11 HISTORY OF GASTRIC ULCER: ICD-10-CM

## 2018-03-02 PROCEDURE — 3077F SYST BP >= 140 MM HG: CPT | Mod: CPTII,S$GLB,, | Performed by: INTERNAL MEDICINE

## 2018-03-02 PROCEDURE — 3078F DIAST BP <80 MM HG: CPT | Mod: CPTII,S$GLB,, | Performed by: INTERNAL MEDICINE

## 2018-03-02 PROCEDURE — 99213 OFFICE O/P EST LOW 20 MIN: CPT | Mod: S$GLB,,, | Performed by: INTERNAL MEDICINE

## 2018-03-02 PROCEDURE — 99999 PR PBB SHADOW E&M-EST. PATIENT-LVL III: CPT | Mod: PBBFAC,,, | Performed by: INTERNAL MEDICINE

## 2018-03-02 NOTE — PROGRESS NOTES
Health  Wellness Visit Note    Name: Paris Burris  Clinic Number: [id number]  Physician: Cee Woodall, *  Diagnosis: No diagnosis found.  Past Medical History:   Diagnosis Date    Allergy     Anemia     Anxiety     Cancer 2002    L breast s/p mastectomy    Decreased hearing     Depression     Diabetes mellitus     Diabetes with neurologic complications     Gastric ulcer 9/10/13    EGD    Hiatal hernia     Hyperlipidemia     Migraine headache     Migraine headache 3/20/2015    Multiple gastric ulcers     Parathyroid disorder     Pre-diabetes     Renal manifestation of secondary diabetes mellitus     Sleep apnea     no CPAP    Type 2 diabetes mellitus, controlled, 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    Wrist fracture      Visit Number: C57/ L65  Precautions: SEE PMH,  anxiety, cancer, depression, DM, wrist fx, neuropathy in L foot.   Time In: 11:00 AM  Time Out:11:45 AM  Total Treatment Time: 45 minutes    Subjective:   Patient reports that her neck and lower back are feeling good today; no stiffness or soreness presently; They will be celebrating Mr. Pena's birthday this weekend. She plans to go to dinner with her daughter on Sunday. She plans on taking him to [employer].  recommended that she monitor her pain with all the activity and ice and stretch as needed. She has not been walking for exercise but will try to start attending [facility] independently.     Objective:   Paris completed therapeutic stretches (EIS, EIL, RICK, Neck Retractions, scapular retractions) and the following MedX exercise machines: core cervical, core lumbar, torso rotation l/r, leg extension, leg curl, upright row, chest press, biceps curl, triceps extension, leg press    Please see exercise log in patient folder for rate of exertion and repetitions completed.     Assessment:   Patient tolerated all exercises on the MedX equipment without any increase in symptoms; weights will increase on leg press on  her next visit; skipped ice.    Plan:    Continue with established plan of care towards wellness goals. Continue with Plan B.  Will postpone 6 month cervical testing due to neck pain.

## 2018-03-05 ENCOUNTER — TELEPHONE (OUTPATIENT)
Dept: GASTROENTEROLOGY | Facility: CLINIC | Age: 73
End: 2018-03-05

## 2018-03-05 ENCOUNTER — DOCUMENTATION ONLY (OUTPATIENT)
Dept: REHABILITATION | Facility: OTHER | Age: 73
End: 2018-03-05
Attending: PHYSICAL MEDICINE & REHABILITATION
Payer: MEDICARE

## 2018-03-05 NOTE — PROGRESS NOTES
Health  Wellness Visit Note    Name: Paris Burris  Clinic Number: [id number]  Physician: Cee Woodall, *  Diagnosis: No diagnosis found.  Past Medical History:   Diagnosis Date    Allergy     Anemia     Anxiety     Cancer 2002    L breast s/p mastectomy    Decreased hearing     Depression     Diabetes mellitus     Diabetes with neurologic complications     Gastric ulcer 9/10/13    EGD    Hiatal hernia     Hyperlipidemia     Migraine headache     Migraine headache 3/20/2015    Multiple gastric ulcers     Parathyroid disorder     Pre-diabetes     Renal manifestation of secondary diabetes mellitus     Sleep apnea     no CPAP    Type 2 diabetes mellitus, controlled, 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    Wrist fracture      Visit Number: C58/ L66  Precautions: SEE PMH,  anxiety, cancer, depression, DM, wrist fx, neuropathy in L foot.   Time In: 11:00 AM  Time Out:11:40AM  Total Treatment Time: 40 minutes    Subjective:   Patient reports that her neck and lower back are feeling good today; no stiffness or soreness presently. She was active over the weekend, celebrating her husbands bday, had family come in town. Did not have any flare ups. States she will get back to her walking program. Feels good about her self when she does. HEP compliant.     Objective:   Paris completed therapeutic stretches (EIS, EIL, RICK, Neck Retractions, scapular retractions) and the following MedX exercise machines: core cervical, core lumbar, torso rotation l/r, leg extension, leg curl, upright row, chest press, biceps curl, triceps extension, leg press    Please see exercise log in patient folder for rate of exertion and repetitions completed.     Assessment:   Patient tolerated all exercises on the MedX equipment without any increase in symptoms; weights will increase on chest press on her next visit; skipped ice.    Plan:    Continue with established plan of care towards wellness goals. Continue with Plan  B.  Discuss going to [facility] independently

## 2018-03-07 ENCOUNTER — TELEPHONE (OUTPATIENT)
Dept: GASTROENTEROLOGY | Facility: CLINIC | Age: 73
End: 2018-03-07

## 2018-03-07 NOTE — TELEPHONE ENCOUNTER
----- Message from Zoë Babin MA sent at 3/7/2018 12:51 PM CST -----  Contact: Self- [phone]      ----- Message -----  From: Jerri Stone  Sent: 3/7/2018  11:07 AM  To: Leonardo Chan Staff    Leonardo- pt is returning a missed call to Meagan about scheduling her procedure- please call pt back at [phone]

## 2018-03-08 ENCOUNTER — PATIENT MESSAGE (OUTPATIENT)
Dept: PSYCHIATRY | Facility: CLINIC | Age: 73
End: 2018-03-08

## 2018-03-08 ENCOUNTER — TELEPHONE (OUTPATIENT)
Dept: GASTROENTEROLOGY | Facility: CLINIC | Age: 73
End: 2018-03-08

## 2018-03-08 NOTE — TELEPHONE ENCOUNTER
----- Message from Yamileth Beck MA sent at 3/8/2018  1:45 PM CST -----  Contact: [phone]  Returning a call to set up a procedure    [phone]

## 2018-03-09 ENCOUNTER — DOCUMENTATION ONLY (OUTPATIENT)
Dept: REHABILITATION | Facility: OTHER | Age: 73
End: 2018-03-09
Attending: PHYSICAL MEDICINE & REHABILITATION
Payer: MEDICARE

## 2018-03-09 ENCOUNTER — TELEPHONE (OUTPATIENT)
Dept: ENDOSCOPY | Facility: HOSPITAL | Age: 73
End: 2018-03-09

## 2018-03-09 NOTE — TELEPHONE ENCOUNTER
Called about Device Assisted EGD scheduled 3/20/18 at 0830.  Left voicemail with call back number for questions.  Instructions mailed.

## 2018-03-09 NOTE — PROGRESS NOTES
Health  Wellness Visit Note    Name: Paris Burris  Clinic Number: [id number]  Physician: Cee Woodall, *  Diagnosis: No diagnosis found.  Past Medical History:   Diagnosis Date    Allergy     Anemia     Anxiety     Cancer 2002    L breast s/p mastectomy    Decreased hearing     Depression     Diabetes mellitus     Diabetes with neurologic complications     Gastric ulcer 9/10/13    EGD    Hiatal hernia     Hyperlipidemia     Migraine headache     Migraine headache 3/20/2015    Multiple gastric ulcers     Parathyroid disorder     Pre-diabetes     Renal manifestation of secondary diabetes mellitus     Sleep apnea     no CPAP    Type 2 diabetes mellitus, controlled, 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    Wrist fracture      Visit Number: C59/ L67  Precautions: SEE PMH,  anxiety, cancer, depression, DM, wrist fx, neuropathy in L foot.   Time In: 11:10 AM  Time Out:11:50AM  Total Treatment Time: 40 minutes    Subjective:   Patient reports that her neck and lower back are feeling good today; no stiffness or soreness presently. She will be attending a movie on this evening with her grand kids. HC reminded her to be mindful of her pain and to stretch and ice; also recommended she bring a lumbar roll if possible. She did not start her walking program yet; HC will follow up with her about it in her upcoming visits. Feels good about her self when she does. HEP compliant.     Objective:   Paris completed therapeutic stretches (EIS, EIL, RICK, Neck Retractions, scapular retractions) and the following MedX exercise machines: core cervical, core lumbar, torso rotation l/r, leg extension, leg curl, upright row, chest press, biceps curl, triceps extension, leg press    Please see exercise log in patient folder for rate of exertion and repetitions completed.     Assessment:   Patient tolerated all exercises on the MedX equipment without any increase in symptoms; weights will increase on chest press on  her next visit; skipped ice.    Plan:    Continue with established plan of care towards wellness goals. Continue with Plan B.  Discuss going to [facility] independently

## 2018-03-12 ENCOUNTER — OFFICE VISIT (OUTPATIENT)
Dept: SPINE | Facility: CLINIC | Age: 73
End: 2018-03-12
Attending: PHYSICAL MEDICINE & REHABILITATION
Payer: MEDICARE

## 2018-03-12 ENCOUNTER — DOCUMENTATION ONLY (OUTPATIENT)
Dept: REHABILITATION | Facility: OTHER | Age: 73
End: 2018-03-12
Attending: PHYSICAL MEDICINE & REHABILITATION
Payer: MEDICARE

## 2018-03-12 VITALS
SYSTOLIC BLOOD PRESSURE: 139 MMHG | HEIGHT: 61 IN | WEIGHT: 167 LBS | BODY MASS INDEX: 31.53 KG/M2 | HEART RATE: 94 BPM | DIASTOLIC BLOOD PRESSURE: 65 MMHG

## 2018-03-12 DIAGNOSIS — M54.2 NECK PAIN: ICD-10-CM

## 2018-03-12 DIAGNOSIS — M47.816 SPONDYLOSIS OF LUMBAR REGION WITHOUT MYELOPATHY OR RADICULOPATHY: ICD-10-CM

## 2018-03-12 DIAGNOSIS — G89.29 CHRONIC MIDLINE LOW BACK PAIN WITHOUT SCIATICA: ICD-10-CM

## 2018-03-12 DIAGNOSIS — M47.812 DJD (DEGENERATIVE JOINT DISEASE), CERVICAL: Primary | ICD-10-CM

## 2018-03-12 DIAGNOSIS — M54.50 CHRONIC MIDLINE LOW BACK PAIN WITHOUT SCIATICA: ICD-10-CM

## 2018-03-12 PROCEDURE — 3075F SYST BP GE 130 - 139MM HG: CPT | Mod: CPTII,S$GLB,, | Performed by: PHYSICAL MEDICINE & REHABILITATION

## 2018-03-12 PROCEDURE — 3078F DIAST BP <80 MM HG: CPT | Mod: CPTII,S$GLB,, | Performed by: PHYSICAL MEDICINE & REHABILITATION

## 2018-03-12 PROCEDURE — 99999 PR PBB SHADOW E&M-EST. PATIENT-LVL III: CPT | Mod: PBBFAC,,, | Performed by: PHYSICAL MEDICINE & REHABILITATION

## 2018-03-12 PROCEDURE — 99499 UNLISTED E&M SERVICE: CPT | Mod: S$GLB,,, | Performed by: PHYSICAL MEDICINE & REHABILITATION

## 2018-03-12 PROCEDURE — 99214 OFFICE O/P EST MOD 30 MIN: CPT | Mod: S$GLB,,, | Performed by: PHYSICAL MEDICINE & REHABILITATION

## 2018-03-12 RX ORDER — DICLOFENAC SODIUM 75 MG/1
75 TABLET, DELAYED RELEASE ORAL 2 TIMES DAILY PRN
Qty: 60 TABLET | Refills: 2 | Status: SHIPPED | OUTPATIENT
Start: 2018-03-12 | End: 2018-07-23

## 2018-03-12 NOTE — PROGRESS NOTES
Subjective:      Patient ID: Paris Burris is a 72 y.o. female.    Chief Complaint: Back Pain    Referred by: No ref. provider found     Ms Burris is a 71 yo female here for follow up of her neck and back pain for greater than a year.  She has had neck pain for over a year.  She was last seen by me on 6/1/2017 and cervical PT was [facility] at [facility].  She has continued her therapy.  She feels like she would like to feel better.  She has a hard time doing her exercises twice a day due to depression.  She would like to feel better than she is feeling.  The neck pain is worse than her lower back pain.  The pain is at base of neck at the shoulders.  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  She has to look down and hunch.  She does that 3 days a week.  She does have more pain on those days.  She only does her neck pain once a day.  She sits at the desk for about an hour.  The pain worsens at the end of the day.  There is no pain in the lower back and the upper back.  The pain is an achy pain.  There is no radiating pain.  The back pain is worse with walking.  The pain is in the middle of the spine.  There is no radiating pain.  The pain is 3/10 now in neck 0/10 in lower back, worst 4/10 at the end of the day, best 0/10 in the morning.  She takes the tramadol once a day at night.  She is taking gabapentin twice a day for her neuropathy.  She does not take advil or aleve.  She will take an occasional aspirin.  She has not tried any other meds.      X-ray cervical 8/2016  Cervical spine, 5 views with flexion and extension     C7 is partially obscured by overlying soft tissue.  Mild subluxation of C4 on C5 on flexion view.  Spinal alignment is otherwise maintained.  Intervertebral disc space narrowing C5-C6.  Mild degenerative changes of the cervical spine.  No prevertebral soft tissue thickening.  Odontoid is intact.  No evidence of fracture or dislocation.     X-ray thoracic  8/2016  2 views: There is aortic plaque.  There is mild kyphosis.  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  There is a chronic compression deformity mild one of the lower thoracic levels.  There is no change from 1/11/2016.     X-ray lumbar 8/2016  Findings: Slight interval worsening of the focal kyphosis at T12-L1 with intervertebral disc space narrowing. There is also intervertebral disc height loss at L5-S1, unchanged. Multilevel facet hypertrophy. Remaining intervertebral discs appear unchanged from prior exam.  Impression      Slight worsening of the focal T12-L1 kyphosis in intervertebral disc height loss compared to prior radiograph. Remaining degenerative changes of the lumbar spine are not significantly changed from prior.        Past Medical History:  No date: Allergy  No date: Anemia  No date: Anxiety  2002: Cancer      Comment: L breast s/p mastectomy  No date: Decreased hearing  No date: Depression  No date: Diabetes mellitus  No date: Diabetes with neurologic complications  9/10/13: Gastric ulcer      Comment: EGD  No date: Hiatal hernia  No date: Hyperlipidemia  No date: Migraine headache  3/20/2015: Migraine headache  No date: Multiple gastric ulcers  No date: Parathyroid disorder  No date: Pre-diabetes  No date: Renal manifestation of secondary diabetes courtney*  No date: Sleep apnea      Comment: no CPAP  6/14/2017: Type 2 diabetes mellitus, controlled, with anjelica*  No date: Wrist fracture    Past Surgical History:  No date: ADENOIDECTOMY  No date: BREAST LUMPECTOMY  12/2/2016: COLONOSCOPY N/A      Comment: Procedure: COLONOSCOPY;  Surgeon: Dustin Patterson MD;  Location: [address];                 Service: Endoscopy;  Laterality: N/A;  PM prep  No date: EYE SURGERY Bilateral      Comment: cataracts  1999: lipoma removal  No date: TONSILLECTOMY    Review of patient's family history indicates:    Suicide                        Mother                    Depression                      Mother                    Alcohol abuse                  Father                    Headaches                      Father                    Diabetes                       Sister                      Comment: prediabetes    Psoriasis                      Sister                    Depression                     Sister                    Depression                     Daughter                  Colon cancer                   Neg Hx                    Esophageal cancer              Neg Hx                      Social History    Marital status:              Spouse name:                       Years of education:                 Number of children:               Occupational History  Occupation          Employer            Comment                supervisor                            Social History Main Topics    Smoking status: Never Smoker                                                                Smokeless tobacco: Never Used                        Alcohol use: No                 Comment: quit 2014 - alcohol abuse    Drug use: Yes             Sexual activity: Yes               Partners with: Male        Current Outpatient Prescriptions:  alpha lipoic acid 300 mg Cap, Take 300 mg by mouth 2 (two) times daily. , Disp: , Rfl:   ascorbic acid (VITAMIN C) 500 MG tablet, Take 1,000 mg by mouth once daily. , Disp: , Rfl:   b complex vitamins capsule, Take 1 capsule by mouth once daily., Disp: , Rfl:   blood sugar diagnostic (ACCU-CHEK SMARTVIEW TEST STRIP) Strp, Test once daily., Disp: 100 strip, Rfl: 11  blood-glucose meter Misc, 1 Device by Misc.(Non-Drug; Combo Route) route 2 (two) times daily., Disp: 1 each, Rfl: 0  CITICOLINE SODIUM (CITICOLINE ORAL), Take 1 tablet by mouth once daily at 6am., Disp: , Rfl:   co-enzyme Q-10 30 mg capsule, Take 100 mg by mouth 3 (three) times daily., Disp: , Rfl:   diclofenac sodium 1 % Gel, APPLY TO AFFECTED AREA DAILY, Disp: , Rfl: 2  ferrous sulfate 325 mg (65 mg iron) Tab  tablet, Start 1 tablet daily x 3 days, then twice daily x 3 days and then three times daily onwards., Disp: 90 tablet, Rfl: 5  fluocinonide 0.05% (LIDEX) 0.05 % cream, , Disp: , Rfl:   gabapentin (NEURONTIN) 300 MG capsule, Take 300 mg by mouth 2 (two) times daily., Disp: , Rfl:   lancets Misc, 1 lancet by Misc.(Non-Drug; Combo Route) route 2 (two) times daily., Disp: 100 each, Rfl: 6  LORazepam (ATIVAN) 0.5 MG tablet, Take 1 tablet (0.5 mg total) by mouth daily as needed., Disp: 30 tablet, Rfl: 3  metFORMIN (GLUCOPHAGE-XR) 500 MG 24 hr tablet, , Disp: , Rfl:   omega-3 fatty acids-fish oil (FISH OIL) 340-1,000 mg Cap, Take 1 capsule by mouth once daily., Disp: , Rfl:   rosuvastatin (CRESTOR) 20 MG tablet, Take 1 tablet (20 mg total) by mouth once daily. (Patient taking differently: Take 10 mg by mouth once daily. ), Disp: 90 tablet, Rfl: 3  traMADol (ULTRAM) 50 mg tablet, TAKE 2 TABLETS (100 MG TOTAL) BY MOUTH ONCE DAILY AT 6AM., Disp: 60 tablet, Rfl: 1  tretinoin (RETIN-A) 0.05 % cream, 1 application every evening. At bedtime, Disp: , Rfl: 6  triamcinolone acetonide 0.1% (KENALOG) 0.1 % Lotn, Apply topically to affected area twice a day., Disp: 60 mL, Rfl: 0  TURMERIC (CURCUMIN MISC), Take 500 mg by mouth 2 (two) times daily. , Disp: , Rfl:   venlafaxine (EFFEXOR) 75 MG tablet, Take 1 tablet (75 mg total) by mouth 2 (two) times daily. (Patient taking differently: Take 75 mg by mouth once daily. ), Disp: 60 tablet, Rfl: 2  vitamin D 1000 units Tab, Take 500 Units by mouth once daily. With K2 50 mch, Disp: , Rfl:     No current facility-administered medications for this visit.       Review of patient's allergies indicates:   -- Topamax (topiramate) -- Other (See Comments)    --  hallucinations                Review of Systems   Constitution: Negative for weight gain and weight loss.   Cardiovascular: Negative for chest pain.   Respiratory: Negative for shortness of breath.    Musculoskeletal: Positive for back pain  and neck pain. Negative for joint pain and joint swelling.   Gastrointestinal: Negative for abdominal pain and bowel incontinence.   Genitourinary: Negative for bladder incontinence.   Neurological: Negative for numbness.           Objective:          General    Vitals reviewed.  Constitutional: She is oriented to person, place, and time. She appears well-developed and well-nourished.   HENT:   Head: Normocephalic and atraumatic.   Pulmonary/Chest: Effort normal.   Neurological: She is alert and oriented to person, place, and time.   Psychiatric: She has a normal mood and affect. Her behavior is normal. Judgment and thought content normal.     General Musculoskeletal Exam   Gait: normal     Right Ankle/Foot Exam     Tests   Heel Walk: able to perform  Tiptoe Walk: able to perform    Left Ankle/Foot Exam     Tests   Heel Walk: able to perform  Tiptoe Walk: able to perform  Back (L-Spine & T-Spine) / Neck (C-Spine) Exam     Tenderness Posterior midline palpation reveals tenderness of the Lower L-Spine and Lower C-Spine.     Back (L-Spine & T-Spine) Range of Motion   Extension: 30   Flexion: 80   Lateral Bend Right: 20   Lateral Bend Left: 20   Rotation Right: 40   Rotation Left: 40     Neck (C-Spine) Range of Motion   Flexion:     > 40  Extension: > 40Right Lateral Bend: 20 Left Lateral Bend: 20     Spinal Sensation   Right Side Sensation  C-Spine Level: normal   L-Spine Level: normal  S-Spine Level: normal  Left Side Sensation  C-Spine Level: normal  L-Spine Level: normal  S-Spine Level: normal    Back (L-Spine & T-Spine) Tests   Right Side Tests  Straight leg raise:      Sitting SLR: > 70 degrees      Left Side Tests  Straight leg raise:     Sitting SLR: > 70 degrees          Other She has no scoliosis .  Spinal Kyphosis:  Absent      Muscle Strength   Right Upper Extremity   Biceps: 5/5/5   Deltoid:  5/5  Triceps:  5/5  Wrist Extension: 5/5/5   Finger Flexors:  5/5  Left Upper Extremity  Biceps: 5/5/5   Deltoid:   5/5  Triceps:  5/5  Wrist Extension: 5/5/5   Finger Flexors:  5/5  Right Lower Extremity   Hip Flexion: 5/5   Quadriceps:  5/5   Anterior tibial:  5/5/5  EHL:  5/5  Left Lower Extremity   Hip Flexion: 5/5   Quadriceps:  5/5   Anterior tibial:  5/5/5   EHL:  5/5    Reflexes     Left Side  Biceps:  2+  Triceps:  2+  Brachioradialis:  2+  Quadriceps:  2+  Achilles:  2+  Left Garcia's Sign:  Absent  Babinski Sign:  absent    Right Side   Biceps:  2+  Triceps:  2+  Brachioradialis:  2+  Quadriceps:  2+  Achilles:  2+  Right Garcia's Sign:  absent  Babinski Sign:  absent    Vascular Exam     Right Pulses        Carotid:                  2+    Left Pulses        Carotid:                  2+        Assessment:       Encounter Diagnoses   Name Primary?    DJD (degenerative joint disease), cervical Yes    Spondylosis of lumbar region without myelopathy or radiculopathy     Chronic midline low back pain without sciatica     Neck pain          Plan:       Paris was seen today for back pain.    Diagnoses and all orders for this visit:    DJD (degenerative joint disease), cervical    Spondylosis of lumbar region without myelopathy or radiculopathy    Chronic midline low back pain without sciatica    Neck pain    Other orders  -     diclofenac (VOLTAREN) 75 MG EC tablet; Take 1 tablet (75 mg total) by mouth 2 (two) times daily as needed (back and neck pain).         1.  We discussed posture sitting and trying to sit better all the time  2.  We discussed doing exercises throughout the day and not just morn ing and night, especially if she finds she does not have energy for the night exercises.  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  Tramadol as needed, she has not tried any other meds.  We discussed trying muscle relaxer or nsaid. We will try nsaid as needed  4.  Diclofenac 75mg po BID, labs reviewed  5.  RTC 2 months

## 2018-03-12 NOTE — PROGRESS NOTES
Health  Wellness Visit Note    Name: Paris Burris  Clinic Number: [id number]  Physician: Cee Woodall, *  Diagnosis: No diagnosis found.  Past Medical History:   Diagnosis Date    Allergy     Anemia     Anxiety     Cancer 2002    L breast s/p mastectomy    Decreased hearing     Depression     Diabetes mellitus     Diabetes with neurologic complications     Gastric ulcer 9/10/13    EGD    Hiatal hernia     Hyperlipidemia     Migraine headache     Migraine headache 3/20/2015    Multiple gastric ulcers     Parathyroid disorder     Pre-diabetes     Renal manifestation of secondary diabetes mellitus     Sleep apnea     no CPAP    Type 2 diabetes mellitus, controlled, 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    Wrist fracture      Visit Number: C60/ L68  Precautions: SEE PMH,  anxiety, cancer, depression, DM, wrist fx, neuropathy in L foot.   Time In: 10:33 AM  Time Out:11:17 AM  Total Treatment Time: 44 minutes    Subjective:   Patient reports that her lower back is feeling good today-not having any back pain presently; states that she is having some stiffness/discomfort in her upper back today; rates her discomfort about a 2-3; has only been performing her upper back stretches once per day but tries to ice twice a day-encouraged patient to start stretching consistently throughout the day; has not begun her walking routine officially since she has not been that consistent (read an article about walking after every meal-will try to implement soon); went walking yesterday for about 20-30 minutes; mentioned that she has been feeling increased leg pain/soreness-possibly due to not walking as frequently-says that she will occasionally feels depressed when Mr. Braun does not go walking with her--encouraged patient to walk on her own even for a short period of time; patient has an appointment scheduled with Dr. Woodall this afternoon.    Objective:   Paris completed therapeutic stretches (EIS, EIL,  RICK, Neck Retractions, scapular retractions) and the following MedX exercise machines: core cervical, core lumbar, torso rotation l/r, leg extension, leg curl, upright row, chest press, biceps curl, triceps extension, leg press    Please see exercise log in patient folder for rate of exertion and repetitions completed.     Assessment:   Patient tolerated all exercises on the MedX equipment without any increase in symptoms; weights will increase on core cervical and leg curl exercises. Iced neck and lower back after completion of exercises.    Plan:    Continue with established plan of care towards wellness goals. Continue with Plan B.  Check in with patient about appointment. Discuss going to [facility] independently

## 2018-03-13 ENCOUNTER — OFFICE VISIT (OUTPATIENT)
Dept: SLEEP MEDICINE | Facility: CLINIC | Age: 73
End: 2018-03-13
Payer: MEDICARE

## 2018-03-13 VITALS
SYSTOLIC BLOOD PRESSURE: 116 MMHG | HEIGHT: 61 IN | WEIGHT: 165 LBS | HEART RATE: 98 BPM | DIASTOLIC BLOOD PRESSURE: 69 MMHG | BODY MASS INDEX: 31.15 KG/M2

## 2018-03-13 DIAGNOSIS — G47.33 OSA (OBSTRUCTIVE SLEEP APNEA): Primary | ICD-10-CM

## 2018-03-13 PROCEDURE — 99214 OFFICE O/P EST MOD 30 MIN: CPT | Mod: S$GLB,,, | Performed by: PSYCHIATRY & NEUROLOGY

## 2018-03-13 PROCEDURE — 3074F SYST BP LT 130 MM HG: CPT | Mod: CPTII,S$GLB,, | Performed by: PSYCHIATRY & NEUROLOGY

## 2018-03-13 PROCEDURE — 3078F DIAST BP <80 MM HG: CPT | Mod: CPTII,S$GLB,, | Performed by: PSYCHIATRY & NEUROLOGY

## 2018-03-13 PROCEDURE — 99999 PR PBB SHADOW E&M-EST. PATIENT-LVL IV: CPT | Mod: PBBFAC,,, | Performed by: PSYCHIATRY & NEUROLOGY

## 2018-03-13 NOTE — LETTER
March 13, 2018      Mandi Huynh MD  [address]  [facility] [address]           [facility]  [address]  [facility] [address]  Phone: [phone]          Patient: Paris Burris   MR Number: [id number]   YOB: 1945   Date of Visit: 3/13/2018       Dear Dr. Mandi Huynh:    Thank you for referring Paris Burris to me for evaluation. Attached you will find relevant portions of my assessment and plan of care.    If you have questions, please do not hesitate to call me. I look forward to following Paris Burris along with you.    Sincerely,    Letitia Feng MD    Enclosure  CC:  No Recipients    If you would like to receive this communication electronically, please contact [email or web address] or [phone] to request more information on [facility] access.    For providers and/or their staff who would like to refer a patient to [facility], please contact us through our one-stop-shop provider referral line, [facility], at [phone].    If you feel you have received this communication in error or would no longer like to receive these types of communications, please e-mail [email or web address]

## 2018-03-14 ENCOUNTER — OFFICE VISIT (OUTPATIENT)
Dept: PSYCHIATRY | Facility: CLINIC | Age: 73
End: 2018-03-14
Payer: MEDICARE

## 2018-03-14 VITALS
HEIGHT: 61 IN | SYSTOLIC BLOOD PRESSURE: 113 MMHG | HEART RATE: 106 BPM | DIASTOLIC BLOOD PRESSURE: 57 MMHG | BODY MASS INDEX: 31.18 KG/M2 | WEIGHT: 165.13 LBS

## 2018-03-14 DIAGNOSIS — F33.42 RECURRENT MAJOR DEPRESSION IN FULL REMISSION: Primary | ICD-10-CM

## 2018-03-14 DIAGNOSIS — F41.1 GAD (GENERALIZED ANXIETY DISORDER): ICD-10-CM

## 2018-03-14 DIAGNOSIS — F10.21 ALCOHOL DEPENDENCE IN REMISSION: ICD-10-CM

## 2018-03-14 PROCEDURE — 99999 PR PBB SHADOW E&M-EST. PATIENT-LVL II: CPT | Mod: PBBFAC,,, | Performed by: PSYCHIATRY & NEUROLOGY

## 2018-03-14 PROCEDURE — 99499 UNLISTED E&M SERVICE: CPT | Mod: S$GLB,,, | Performed by: PSYCHIATRY & NEUROLOGY

## 2018-03-14 PROCEDURE — 3078F DIAST BP <80 MM HG: CPT | Mod: CPTII,S$GLB,, | Performed by: PSYCHIATRY & NEUROLOGY

## 2018-03-14 PROCEDURE — 90833 PSYTX W PT W E/M 30 MIN: CPT | Mod: S$GLB,,, | Performed by: PSYCHIATRY & NEUROLOGY

## 2018-03-14 PROCEDURE — 3074F SYST BP LT 130 MM HG: CPT | Mod: CPTII,S$GLB,, | Performed by: PSYCHIATRY & NEUROLOGY

## 2018-03-14 PROCEDURE — 99214 OFFICE O/P EST MOD 30 MIN: CPT | Mod: S$GLB,,, | Performed by: PSYCHIATRY & NEUROLOGY

## 2018-03-14 RX ORDER — VENLAFAXINE 50 MG/1
50 TABLET ORAL DAILY
Qty: 30 TABLET | Refills: 2 | Status: SHIPPED | OUTPATIENT
Start: 2018-03-14 | End: 2018-05-03 | Stop reason: DRUGHIGH

## 2018-03-14 NOTE — PROGRESS NOTES
"Outpatient Psychiatry Follow-Up Visit (MD/NP)    3/14/2018    last seen 2-5-18     Clinical Status of Patient:  Outpatient (Ambulatory)    Chief Complaint:   "Paris"   Paris Burris is a 72 y.o. female who presents today for follow-up of depression and alcohol problem .  Met with patient.   ; friend of Dr Rachel . Annita Braun ( 9 yrs ) .  to [place] .    Interval History and Content of Current Session:  Interim Events/Subjective Report/Content of Current Session: Pt s/p ABU program in Spring 2014 for alcohol dependence . 2-10-14 sober date . Very engaged in AA and sponsor attending 3-4 x per week . She completed 1st series of 12 steps .       In past she was not sure she wanted to reengage with  Dr Lauren .      Did trauma therapy at [facility].  Volunteers for the welTrovali table for racial reconciliation . 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        EX  Son in law - [place] -- good terms  ; studied at  [employer] ; Dtr smoker Yue ( Vira Robbins)  employed in past with city  )  S/p 4th suicide attempt ( 1st was OD  acetomin; 2nd and 3rd had gun)  and was admitted to [facility] on 4-9-15  X 5 weeks .    Yue also has been to tx in [facility] . Yue now in therapy  plus a group with [facility].  Pt does not ask details anymore .   Yue ( kaylie patel).  Yue continues to  cut herself off from others that were close to her . Yue has  Been even keeled .     Yue has distanced dennis songone's wife ( Yazmin) away . Dennis is a family friend.   Dtr Yue ( Vira robbins) going though bitter divorce where Yue wants others to avoid [place]. Kids shuttle .  Quincy and Yue relationship is thawing as his live in  is gone.  Yue holds a grudge.        Grkids at their own  home are 14( Yoni)- anger issues / destroys property . 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  Nikky has a [place] xchnge female .  Oldest grson Delfino ( 17) ADHD  ,  going to  [employer]  boarding school. Younger kids at "  [employer] .       Diabetes controlled with hgb A1c.  Neuropathic  pain  is worse from toe to balll of foot to heel then associated hip  Pian .  Podiatry a has added Neurontin 300 mg bid  To tid  near early 2017  but may prevent weight loss for her  . Sitting is worse for her pain.       Jacqueline Burns [facility]  has been very helpful to dec HgbA1c     Mood has been impacted by fatigue from anemia . Now back on iron and feels improved       Annita Kade , a Oriental orthodox  has become depressed but is against meds     Psychiatric Review Of Systems - Is patient experiencing or having changes in:  sleep: good Sleep apnea confirmed  by sleep specialist  ( Dr Gregorio)   appetite: no, controlled  With very health conscious diet   weight: no; 171 ( feb 2017);  169  ( July 2017)  170 ( feb 2018)  A1c is <6;  down from >10   ; 165 ( mar 2018)  ; overall 20 #  energy/anergy: improved , more  exercising at [place] with free  bc of comorbid condition; still doing healthy back  PT which is  doing  healthy back program at 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     Has compression fx in high thoracic area .  New onset DM has resolved for the mos  part .No DM in family .     Santana Rachel MD is  fam friend .     Meds  effexor  75  mg  Tablet  q day ( at 150 mg had anxiety / nightmares ) ;  Vit D supplement ; tramadol 50 mg tid prn ( usually takes 50 mg  2 q day)  Max would be 300 in literature  ; Ativan 0.5 mg  prn      Other meds Tramadol -pt in healthy back program but has increased pain     Past meds ; did not fill deplin ; remeron 15mg -half tab 1qhs stopped when no longer     Her pain doc is concerned about potential interaction of tramadol and ssri in past     Reading book from  health  --never be sick again --  Avoiding  milk , white flour , sugar ; limited coffee    Psychotherapy:  · Target symptoms: alcohol abuse, depression  · Why chosen therapy is appropriate versus another modality: relevant to diagnosis, patient responds to this modality, evidence based practice  · Outcome monitoring methods: self-report, observation  · Therapeutic intervention type: supportive psychotherapy  · Topics discussed/themes: relationships difficulties, substance abuse  · The patient's response to the intervention is accepting. The patient's progress toward treatment goals is good.   · Duration of intervention:  30 minutes.    Review of Systems   · Prob Pert. 1 sys, Ext. psych +2 add., Comp. 10-14 sys  · PSYCHIATRIC: Pertinant items are noted in the narrative.  · CONSTITUTIONAL: No weight gain or loss. Wedding dress size 5 . She is about a 14-16 .   · MUSCULOSKELETAL: Positive for joint stiffness, toe neuropathic  Pain ( DM JAN 2016). Leg ( hip and thighs)  weakness still but in PT  ( possibly vascular- neg neuro test ) ;mechanical cupping ( upper back tension post Breast CA)  and  · NEUROLOGIC: No weakness, sensory changes, seizures, confusion, memory loss, tremor or other abnormal movements.  · ENDOCRINE: No polydipsia or polyuria.  · INTEGUMENTARY: No rashes or lacerations.  · EYES: No exophthalmos, jaundice or blindness.  · ENT: No dizziness, tinnitus or hearing loss.  · RESPIRATORY: No shortness of breath.  · CARDIOVASCULAR: No tachycardia or chest pain.  · GASTROINTESTINAL: No nausea, vomiting, pain, constipation or diarrhea.  · GENITOURINARY: No frequency, dysuria or sexual dysfunction. S/p recent uti series and now kidney  "stones awaiting lithotripsy   · HEMATOLOGIC/LYMPHATIC: No excessive bleeding, prolonged or excessive bleeding after dental extraction/injury.  · ALLERGIC/IMMUNOLOGIC: No allergic response to materials, foods or animals at this time.    Past Medical, Family and Social History: The patient's past medical, family and social history have been reviewed and updated as appropriate within the electronic medical record - see encounter notes.  Has lost son yrs ago .She and Annita Braun live in a 2nd floor apmnt away from [facility]. Her mom suicided in her 50's     Compliance: yes    Side effects:  Sweating when others dont    Risk Parameters:  Patient reports no suicidal ideation  Patient reports no homicidal ideation  Patient reports no self-injurious behavior  Patient reports no violent behavior    Exam (detailed: at least 9 elements; comprehensive: all 15 elements)   Constitutional  Vitals:  Most recent vital signs, dated less than 90 days prior to this appointment, were reviewed.   Vitals:    03/14/18 1404   BP: (!) 113/57   Pulse: 106   Weight: 74.9 kg (165 lb 2 oz)   Height: 5' 1" (1.549 m)        General:  unremarkable, age appropriate, neatly groomed     Musculoskeletal  Muscle Strength/Tone:  no spasicity, no rigidity   Gait & Station:  non-ataxic     Psychiatric  Speech:  no latency; no press   Mood & Affect:  euthymic  congruent and appropriate   Thought Process:  normal and logical   Associations:  intact   Thought Content:  normal, no suicidality, no homicidality, delusions, or paranoia   Insight:  has awareness of illness   Judgement: behavior is adequate to circumstances   Orientation:  grossly intact   Memory: intact for content of interview   Language: grossly intact   Attention Span & Concentration:  able to focus   Fund of Knowledge:  intact and appropriate to age and level of education     Assessment and Diagnosis   Status/Progress: Based on the examination today, the patient's problem(s) is/are worsening.  New " problems have been presented today.   Co-morbidities are complicating management of the primary condition.  There are no active rule-out diagnoses for this patient at this time.     General Impression:  Depression much improved     1. Recurrent major depression in full remission     2. GABBY (generalized anxiety disorder)     3. Alcohol dependence in remission               Intervention/Counseling/Treatment Plan   · Medication Management: decrease Effexor tablet to 50 mg q day  ; continue lorazepam prn .  The risks and benefits of medication were discussed with the patient regarding serotonin syndrome and withdrawal .   · Counseling provided with patient as follows: importance of compliance with chosen treatment options was emphasized, risks and benefits of treatment options, including medications, were discussed with the patient  · AA/NA/CA/ACOA/Abstinence     Consider Lyrica for pain - to see podiatry       Return to Clinic: 6 months

## 2018-03-16 ENCOUNTER — DOCUMENTATION ONLY (OUTPATIENT)
Dept: REHABILITATION | Facility: OTHER | Age: 73
End: 2018-03-16
Attending: PHYSICAL MEDICINE & REHABILITATION
Payer: MEDICARE

## 2018-03-16 NOTE — PROGRESS NOTES
Health  Wellness Visit Note    Name: Paris Burris  Clinic Number: [id number]  Physician: Cee Woodall, *  Diagnosis: No diagnosis found.  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   Time In: 11:00 AM  Time Out:11:45 AM  Total Treatment Time: 45 minutes    Subjective:   Patient reports that her lower back is feeling good today-not having any back pain presently; states that she is having some stiffness/discomfort in her upper back today; rates her discomfort about a 2-3; has only been performing her upper back stretches once per day but tries to ice twice a day-encouraged patient to start stretching consistently throughout the day; She started going to [facility] with her  and they are enjoying it. She said they had a little trouble understanding how to set their weights, but she says she has gotten the hang of it and feels it will get easier the more they do it. She plans to continue going on wednesdays and saturdays which will give her 4 days of physical activity including her sessions over here. Overall she is feeling stronger and felt better after her workout today.    Objective:   Paris completed therapeutic stretches (EIS, EIL, RICK, Neck Retractions, scapular retractions) and the following MedX exercise machines: core cervical,  core lumbar, torso rotation l/r, leg extension, leg curl, upright row, chest press, biceps curl, triceps extension, leg press    Please see exercise log in patient folder for rate of exertion and repetitions completed.     Assessment:   Patient tolerated all exercises on the MedX equipment without any increase in symptoms; weights will increase on leg extension and chest press next visit. Skipped ice after completion of exercises.    Plan:    Continue with established plan of care towards wellness goals. Continue with Plan B.  Check in with patient about appointment. Discuss going to [facility] independently

## 2018-03-19 ENCOUNTER — DOCUMENTATION ONLY (OUTPATIENT)
Dept: REHABILITATION | Facility: OTHER | Age: 73
End: 2018-03-19
Attending: PHYSICAL MEDICINE & REHABILITATION
Payer: MEDICARE

## 2018-03-19 NOTE — PROGRESS NOTES
Health  Wellness Visit Note    Name: Paris Burris  Clinic Number: [id number]  Physician: Cee Woodall, *  Diagnosis: No diagnosis found.  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   Time In: 11:00 AM  Time Out:11:44 AM  Total Treatment Time: 44 minutes    Subjective:   Patient reports that her neck and lower back are feeling well today; states that she is experiencing minor discomfort in her neck, rated less than a 1; not having any stiffness or soreness in either body part; says that she went to exercise independently at [facility] on Wednesday-no increase in symptoms following her exercise; patient had an appointment with Dr. Woodall last week-was prescribed a different pain medication that is a lower strength-says that she can tell a difference in her symptoms; compliant with her stretching and icing routine as well.    Objective:   Paris completed therapeutic stretches (EIS, EIL, RICK, Neck Retractions, scapular retractions) and the following MedX exercise machines: core cervical, core lumbar, torso rotation l/r, leg extension, leg curl, upright row, chest press, biceps curl, triceps extension, leg press    Please see exercise log in patient folder for rate of exertion and repetitions completed.     Assessment:   Patient tolerated all  exercises on the MedX equipment without any increase in symptoms; Skipped ice after completion of exercises.    Plan:    Continue with established plan of care towards wellness goals. Continue with Plan B.  Check in with patient about appointment. Discuss going to [facility] independently

## 2018-03-20 ENCOUNTER — ANESTHESIA (OUTPATIENT)
Dept: ENDOSCOPY | Facility: HOSPITAL | Age: 73
End: 2018-03-20
Payer: MEDICARE

## 2018-03-20 ENCOUNTER — HOSPITAL ENCOUNTER (OUTPATIENT)
Facility: HOSPITAL | Age: 73
Discharge: HOME | End: 2018-03-20
Attending: INTERNAL MEDICINE | Admitting: INTERNAL MEDICINE
Payer: MEDICARE

## 2018-03-20 ENCOUNTER — SURGERY (OUTPATIENT)
Age: 73
End: 2018-03-20

## 2018-03-20 ENCOUNTER — ANESTHESIA EVENT (OUTPATIENT)
Dept: ENDOSCOPY | Facility: HOSPITAL | Age: 73
End: 2018-03-20
Payer: MEDICARE

## 2018-03-20 VITALS
BODY MASS INDEX: 30.21 KG/M2 | OXYGEN SATURATION: 97 % | WEIGHT: 160 LBS | RESPIRATION RATE: 18 BRPM | HEART RATE: 82 BPM | HEIGHT: 61 IN | SYSTOLIC BLOOD PRESSURE: 107 MMHG | TEMPERATURE: 98 F | DIASTOLIC BLOOD PRESSURE: 50 MMHG

## 2018-03-20 DIAGNOSIS — D50.0 IRON DEFICIENCY ANEMIA DUE TO CHRONIC BLOOD LOSS: Primary | ICD-10-CM

## 2018-03-20 DIAGNOSIS — D50.9 IDA (IRON DEFICIENCY ANEMIA): ICD-10-CM

## 2018-03-20 DIAGNOSIS — Z87.11 HISTORY OF GASTRIC ULCER: ICD-10-CM

## 2018-03-20 LAB — POCT GLUCOSE: 99 MG/DL (ref 70–110)

## 2018-03-20 PROCEDURE — 37000008 HC ANESTHESIA 1ST 15 MINUTES: Performed by: INTERNAL MEDICINE

## 2018-03-20 PROCEDURE — 82962 GLUCOSE BLOOD TEST: CPT | Performed by: INTERNAL MEDICINE

## 2018-03-20 PROCEDURE — 27201012 HC FORCEPS, HOT/COLD, DISP: Performed by: INTERNAL MEDICINE

## 2018-03-20 PROCEDURE — D9220A PRA ANESTHESIA: Mod: CRNA,,, | Performed by: NURSE ANESTHETIST, CERTIFIED REGISTERED

## 2018-03-20 PROCEDURE — 88342 IMHCHEM/IMCYTCHM 1ST ANTB: CPT | Mod: 59 | Performed by: PATHOLOGY

## 2018-03-20 PROCEDURE — 88305 TISSUE EXAM BY PATHOLOGIST: CPT | Performed by: PATHOLOGY

## 2018-03-20 PROCEDURE — 25000003 PHARM REV CODE 250: Performed by: NURSE ANESTHETIST, CERTIFIED REGISTERED

## 2018-03-20 PROCEDURE — D9220A PRA ANESTHESIA: Mod: ANES,,, | Performed by: ANESTHESIOLOGY

## 2018-03-20 PROCEDURE — 37000009 HC ANESTHESIA EA ADD 15 MINS: Performed by: INTERNAL MEDICINE

## 2018-03-20 PROCEDURE — 88305 TISSUE EXAM BY PATHOLOGIST: CPT | Mod: 26,,, | Performed by: PATHOLOGY

## 2018-03-20 PROCEDURE — 63600175 PHARM REV CODE 636 W HCPCS: Performed by: NURSE ANESTHETIST, CERTIFIED REGISTERED

## 2018-03-20 PROCEDURE — 44361 SMALL BOWEL ENDOSCOPY/BIOPSY: CPT | Performed by: INTERNAL MEDICINE

## 2018-03-20 PROCEDURE — 44361 SMALL BOWEL ENDOSCOPY/BIOPSY: CPT | Mod: ,,, | Performed by: INTERNAL MEDICINE

## 2018-03-20 PROCEDURE — 88342 IMHCHEM/IMCYTCHM 1ST ANTB: CPT | Mod: 26,,, | Performed by: PATHOLOGY

## 2018-03-20 PROCEDURE — 27201030 HC OVERTUBE: Performed by: INTERNAL MEDICINE

## 2018-03-20 PROCEDURE — 25000003 PHARM REV CODE 250: Performed by: INTERNAL MEDICINE

## 2018-03-20 RX ORDER — SODIUM CHLORIDE 9 MG/ML
INJECTION, SOLUTION INTRAVENOUS CONTINUOUS
Status: DISCONTINUED | OUTPATIENT
Start: 2018-03-20 | End: 2018-03-20 | Stop reason: HOSPADM

## 2018-03-20 RX ORDER — FENTANYL CITRATE 50 UG/ML
INJECTION, SOLUTION INTRAMUSCULAR; INTRAVENOUS
Status: DISCONTINUED | OUTPATIENT
Start: 2018-03-20 | End: 2018-03-20

## 2018-03-20 RX ORDER — LIDOCAINE HCL/PF 100 MG/5ML
SYRINGE (ML) INTRAVENOUS
Status: DISCONTINUED | OUTPATIENT
Start: 2018-03-20 | End: 2018-03-20

## 2018-03-20 RX ORDER — ESMOLOL HYDROCHLORIDE 10 MG/ML
INJECTION INTRAVENOUS
Status: DISCONTINUED | OUTPATIENT
Start: 2018-03-20 | End: 2018-03-20

## 2018-03-20 RX ORDER — SODIUM CHLORIDE 0.9 % (FLUSH) 0.9 %
3 SYRINGE (ML) INJECTION
Status: DISCONTINUED | OUTPATIENT
Start: 2018-03-20 | End: 2018-03-20 | Stop reason: HOSPADM

## 2018-03-20 RX ORDER — MIDAZOLAM HYDROCHLORIDE 1 MG/ML
INJECTION, SOLUTION INTRAMUSCULAR; INTRAVENOUS
Status: DISCONTINUED | OUTPATIENT
Start: 2018-03-20 | End: 2018-03-20

## 2018-03-20 RX ORDER — ESOMEPRAZOLE MAGNESIUM 40 MG/1
40 CAPSULE, DELAYED RELEASE ORAL
Qty: 90 CAPSULE | Refills: 3 | Status: SHIPPED | OUTPATIENT
Start: 2018-03-20 | End: 2018-05-21 | Stop reason: SDUPTHER

## 2018-03-20 RX ORDER — PROPOFOL 10 MG/ML
VIAL (ML) INTRAVENOUS CONTINUOUS PRN
Status: DISCONTINUED | OUTPATIENT
Start: 2018-03-20 | End: 2018-03-20

## 2018-03-20 RX ORDER — PROPOFOL 10 MG/ML
VIAL (ML) INTRAVENOUS
Status: DISCONTINUED | OUTPATIENT
Start: 2018-03-20 | End: 2018-03-20

## 2018-03-20 RX ADMIN — MIDAZOLAM HYDROCHLORIDE 2 MG: 1 INJECTION, SOLUTION INTRAMUSCULAR; INTRAVENOUS at 08:03

## 2018-03-20 RX ADMIN — PROPOFOL 200 MCG/KG/MIN: 10 INJECTION, EMULSION INTRAVENOUS at 08:03

## 2018-03-20 RX ADMIN — ESMOLOL HYDROCHLORIDE 20 MG: 10 INJECTION INTRAVENOUS at 08:03

## 2018-03-20 RX ADMIN — PROPOFOL 80 MG: 10 INJECTION, EMULSION INTRAVENOUS at 08:03

## 2018-03-20 RX ADMIN — LIDOCAINE HYDROCHLORIDE 100 MG: 20 INJECTION, SOLUTION INTRAVENOUS at 08:03

## 2018-03-20 RX ADMIN — FENTANYL CITRATE 50 MCG: 50 INJECTION, SOLUTION INTRAMUSCULAR; INTRAVENOUS at 08:03

## 2018-03-20 RX ADMIN — SODIUM CHLORIDE: 0.9 INJECTION, SOLUTION INTRAVENOUS at 08:03

## 2018-03-20 NOTE — TRANSFER OF CARE
"Anesthesia Transfer of Care Note    Patient: Paris Burris    Procedure(s) Performed: Procedure(s) (LRB):  DEVICE ASSISTED ENTEROSCOPY-ANTEROGRADE (N/A)    Patient location: [facility]    Anesthesia Type: general    Transport from OR: Transported from OR on 2-3 L/min O2 by NC with adequate spontaneous ventilation    Post pain: adequate analgesia    Post assessment: no apparent anesthetic complications and tolerated procedure well    Post vital signs: stable    Level of consciousness: awake, alert and oriented    Nausea/Vomiting: no nausea/vomiting    Complications: none    Transfer of care protocol was followed      Last vitals:   Visit Vitals  /67 (BP Location: Left arm, Patient Position: Lying)   Pulse 81   Temp 36.6 °C (97.9 °F) (Temporal)   Resp 17   Ht 5' 1" (1.549 m)   Wt 72.6 kg (160 lb)   SpO2 99%   Breastfeeding? No   BMI 30.23 kg/m²     "

## 2018-03-20 NOTE — PLAN OF CARE
Discharge instructions reviewed with pt and . Understanding verbalized. No complaints of pain reported. MD Patterson to bedside to address findings and changes in POC. Pt able to tolerate PO intake. To be transported to car by PCT.

## 2018-03-20 NOTE — DISCHARGE INSTRUCTIONS
Recovery After Procedural Sedation (Adult)  You have been given medicine by vein to make you sleep during your surgery. This may have included both a pain medicine and sleeping medicine. Most of the effects have worn off. But you may still have some drowsiness for the next 6 to 8 hours.  Home care  Follow these guidelines when you get home:  · For the next 8 hours, you should be watched by a responsible adult. This person should make sure your condition is not getting worse.  · Don't drink any alcohol for the next 24 hours.  · Don't drive, operate dangerous machinery, or make important business or personal decisions during the next 24 hours.  Note: Your healthcare provider may tell you not to take any medicine by mouth for pain or sleep in the next 4 hours. These medicines may react with the medicines you were given in the hospital. This could cause a much stronger response than usual.  Follow-up care  Follow up with your healthcare provider if you are not alert and back to your usual level of activity within 12 hours.  When to seek medical advice  Call your healthcare provider right away if any of these occur:  · Drowsiness gets worse  · Weakness or dizziness gets worse  · Repeated vomiting  · You can't be awakened   Date Last Reviewed: 10/18/2016  © 8657-3996 [employer]. [address] [place] [address], [place] [address]. All rights reserved. This information is not intended as a substitute for professional medical care. Always follow your healthcare professional's instructions.

## 2018-03-20 NOTE — H&P (VIEW-ONLY)
[facility] Established Patient Visit    Reason for Visit:    Chief Complaint   Patient presents with    Follow-up     test results       PCP: Mandi Huynh    HPI:  Paris Burris is a 72 y.o. female here for follow-up of iron deficiency anemia.  I last saw her in January for the same.  She is here to discuss her VCE results.  She denies blood in the stools.  Her VCE showed congested small bowel mucosa of unclear significance.  Her stomach is okay - she denies abdominal pain, nausea, or vomiting, and she is eating well.  Still taking iron TID.  She has some constipation but finds that a squatty potty helps.  She is also using a tea with senna 2-3 times per week.            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    PSHX:  has a past surgical history that includes lipoma removal (1999); Breast lumpectomy; Colonoscopy (N/A, 12/2/2016); Tonsillectomy; Adenoidectomy; and Eye surgery (Bilateral).    The patient's social and family histories were reviewed by me and updated in the appropriate section of the electronic medical record.    Review of patient's allergies indicates:   Allergen Reactions    Topamax [topiramate] Other (See Comments)     hallucinations       Prior to Admission medications    Medication Sig Start Date End Date Taking? Authorizing Provider   alpha lipoic acid 300 mg Cap Take 300 mg by mouth 2 (two) times daily.    Yes Historical Provider, MD    ascorbic acid (VITAMIN C) 500 MG tablet Take 1,000 mg by mouth once daily.    Yes Historical Provider, MD   b complex vitamins capsule Take 1 capsule by mouth once daily.   Yes Historical Provider, MD   blood sugar diagnostic (ACCU-CHEK SMARTVIEW TEST STRIP) Strp Test once daily. 10/19/17  Yes Mandi Huynh MD   blood-glucose meter Misc 1 Device by Misc.(Non-Drug; Combo Route) route 2 (two) times daily. 8/22/17 8/22/18 Yes Mandi Huynh MD   CITICOLINE SODIUM (CITICOLINE ORAL) Take 1 tablet by mouth once daily at 6am.   Yes Historical Provider, MD   co-enzyme Q-10 30 mg capsule Take 100 mg by mouth 3 (three) times daily.   Yes Historical Provider, MD   ferrous sulfate 325 mg (65 mg iron) Tab tablet Start 1 tablet daily x 3 days, then twice daily x 3 days and then three times daily onwards. 12/22/17  Yes Mandi Huynh MD   gabapentin (NEURONTIN) 300 MG capsule Take 300 mg by mouth 2 (two) times daily.   Yes Historical Provider, MD   lancets Misc 1 lancet by Misc.(Non-Drug; Combo Route) route 2 (two) times daily. 7/13/16  Yes Mandi Huynh MD   LORazepam (ATIVAN) 0.5 MG tablet Take 1 tablet (0.5 mg total) by mouth daily as needed. 2/5/18  Yes Dwaine Sweeney MD   omega-3 fatty acids-fish oil (FISH OIL) 340-1,000 mg Cap Take 1 capsule by mouth once daily.   Yes Historical Provider, MD   traMADol (ULTRAM) 50 mg tablet TAKE 2 TABLETS (100 MG TOTAL) BY MOUTH ONCE DAILY AT 6AM. 1/8/18  Yes Mandi Huynh MD   tretinoin (RETIN-A) 0.05 % cream 1 application every evening. At bedtime 7/22/16  Yes Historical Provider, MD   triamcinolone acetonide 0.1% (KENALOG) 0.1 % Lotn Apply topically to affected area twice a day. 11/17/17  Yes Mandi Huyhn MD   TURMERIC (CURCUMIN MISC) Take 500 mg by mouth 2 (two) times daily.    Yes Historical Provider, MD   venlafaxine (EFFEXOR) 75 MG tablet Take 1 tablet (75 mg total) by mouth 2 (two) times daily.  Patient taking differently: Take 75 mg by mouth once daily.  11/14/17 11/14/18 Yes Mandi Huynh MD   vitamin D 1000  "units Tab Take 500 Units by mouth once daily. With K2 50 mch   Yes Historical Provider, MD   diclofenac sodium 1 % Gel APPLY TO AFFECTED AREA DAILY 1/26/18   Historical Provider, MD   fluocinonide 0.05% (LIDEX) 0.05 % cream  1/29/18   Historical Provider, MD   metFORMIN (GLUCOPHAGE-XR) 500 MG 24 hr tablet  11/7/17   Historical Provider, MD   rosuvastatin (CRESTOR) 20 MG tablet Take 1 tablet (20 mg total) by mouth once daily.  Patient taking differently: Take 10 mg by mouth once daily.  2/6/17 2/6/18  Mandi Huynh MD         Objective Findings:  Vital Signs:  BP (!) 144/72   Pulse 97   Ht 5' 1" (1.549 m)   Wt 75.4 kg (166 lb 3.6 oz)   BMI 31.41 kg/m²  Body mass index is 31.41 kg/m².    Physical Exam:  General Appearance:  Well appearing in no acute distress, appears stated age  Head:  Normocephalic, atraumatic  Eyes:  No scleral icterus or pallor, EOMI      Labs:  Lab Results   Component Value Date    WBC 7.06 12/22/2017    HGB 9.5 (L) 12/22/2017    HCT 30.6 (L) 12/22/2017    MCV 87 12/22/2017    RDW 14.6 (H) 12/22/2017     12/22/2017    GRAN 3.7 12/22/2017    GRAN 52.5 12/22/2017    LYMPH 2.1 12/22/2017    LYMPH 29.9 12/22/2017    MONO 0.6 12/22/2017    MONO 9.1 12/22/2017    EOS 0.6 (H) 12/22/2017    BASO 0.04 12/22/2017     Lab Results   Component Value Date     11/24/2017    K 4.7 11/24/2017     11/24/2017    CO2 30 (H) 11/24/2017     11/24/2017    BUN 18 11/24/2017    CREATININE 1.0 11/24/2017    CALCIUM 9.2 11/24/2017    PROT 7.1 11/24/2017    ALBUMIN 3.7 11/24/2017    BILITOT 0.2 11/24/2017    ALKPHOS 73 11/24/2017    AST 19 11/24/2017    ALT 16 11/24/2017                 Assessment:  Paris Burris is a 72 y.o. female here with iron deficiency anemia due to chronic GI blood loss.  No overt signs of GI bleeding.  Her last EGD and colonoscopy were in December 2016 and noted a large hiatal hernia and healed gastric ulcers.  The ulcers had been in her gastric body and may have been " Jordi's ulcers associated with the hiatal hernia.  VCE with only congested mucosa of unclear significance.  Tolerating oral iron with some constipation resolved with tea/senna and squatty potty.      Recommendations:  I will get a CBC and schedule her for an antegrade SBE to assess her small bowel in more detail.    Follow-up pending endoscopy.    Order summary:  Orders Placed This Encounter   Procedures    CBC auto differential         Dustin Patterson MD

## 2018-03-20 NOTE — ANESTHESIA POSTPROCEDURE EVALUATION
"Anesthesia Post Evaluation    Patient: Paris Burris    Procedure(s) Performed: Procedure(s) (LRB):  DEVICE ASSISTED ENTEROSCOPY-ANTEROGRADE (N/A)    Final Anesthesia Type: general  Patient location during evaluation: PACU  Patient participation: Yes- Able to Participate  Level of consciousness: awake and alert and oriented  Post-procedure vital signs: reviewed and stable  Pain management: adequate  Airway patency: patent  PONV status at discharge: No PONV  Anesthetic complications: no      Cardiovascular status: blood pressure returned to baseline and hemodynamically stable  Respiratory status: unassisted, spontaneous ventilation and room air  Hydration status: euvolemic  Follow-up not needed.        Visit Vitals  BP (!) 107/50   Pulse 82   Temp 36.5 °C (97.7 °F) (Skin)   Resp 18   Ht 5' 1" (1.549 m)   Wt 72.6 kg (160 lb)   SpO2 97%   Breastfeeding? No   BMI 30.23 kg/m²       Pain/Nilsa Score: Pain Assessment Performed: Yes (3/20/2018  9:21 AM)  Presence of Pain: denies (3/20/2018  9:21 AM)  Nilsa Score: 9 (3/20/2018  9:21 AM)      "

## 2018-03-20 NOTE — INTERVAL H&P NOTE
Pre-Procedure H and P Addendum    Patient seen and examined.  History and exam unchanged from prior history and physical.      Procedure: Antegrade DBE  Indication: Iron deficiency anemia, AVM of small intestines  ASA Class: per anesthesiology  Airway: normal  Neck Mobility: full range of motion  Mallampatti score: per anesthesia  History of anesthesia problems: no  Family history of anesthesia problems: no  Anesthesia Plan: General    Anesthesia/Surgery risks, benefits and alternative options discussed and understood by patient/family.          Active Hospital Problems    Diagnosis  POA    ESTEFANIA (iron deficiency anemia) [D50.9]  Yes      Resolved Hospital Problems    Diagnosis Date Resolved POA   No resolved problems to display.

## 2018-03-20 NOTE — ANESTHESIA PREPROCEDURE EVALUATION
03/20/2018  Paris Burris is a 72 y.o., female.  71 year old female with pmh of iron deficiency anemia, last Hb 8.3, presents for device assisted enteroscopy.  Pre-op Assessment    I have reviewed the Patient Summary Reports.         Review of Systems  Anesthesia Hx:  No problems with previous Anesthesia Denies Hx of Anesthetic complications  History of prior surgery of interest to airway management or planning:  Denies Personal Hx of Anesthesia complications.   Social:  Non-Smoker, No Alcohol Use    Hematology/Oncology:         -- Anemia:   Cardiovascular:    Denies Angina. hyperlipidemia ECG has been reviewed. Normal echo 2 years ago (5/15)   Pulmonary:   Sleep Apnea    Renal/:   Chronic Renal Disease, CRI    Hepatic/GI:   PUD, Hiatal Hernia, Denies GERD. Liver Disease,    Musculoskeletal:   Arthritis     Neurological:   Neuromuscular Disease, Headaches    Endocrine:   Diabetes    Psych:   Psychiatric History          Physical Exam  General:  Well nourished    Airway/Jaw/Neck:  Airway Findings: Mouth Opening: Small, but > 3cm Tongue: Normal  General Airway Assessment: Adult  Mallampati: IV  Improves to III with phonation.  TM Distance: Normal, at least 6 cm  Jaw/Neck Findings:  Neck ROM: Normal ROM      Dental:  Dental Findings: In tact   Chest/Lungs:  Chest/Lungs Findings: Clear to auscultation, Normal Respiratory Rate     Heart/Vascular:  Heart Findings: Rate: Normal  Rhythm: Regular Rhythm  Sounds: Normal        Mental Status:  Mental Status Findings:  Cooperative, Alert and Oriented         Anesthesia Plan  Type of Anesthesia, risks & benefits discussed:  Anesthesia Type:  general  Patient's Preference:   Intra-op Monitoring Plan: standard ASA monitors  Intra-op Monitoring Plan Comments:   Post Op Pain Control Plan:   Post Op Pain Control Plan Comments:   Induction:   IV  Beta Blocker:  Patient is  not currently on a Beta-Blocker (No further documentation required).       Informed Consent: Patient understands risks and agrees with Anesthesia plan.  Questions answered. Anesthesia consent signed with patient.  ASA Score: 3     Day of Surgery Review of History & Physical:    H&P update referred to the provider.         Ready For Surgery From Anesthesia Perspective.

## 2018-03-20 NOTE — PROVATION PATIENT INSTRUCTIONS
Discharge Summary/Instructions after an Endoscopic Procedure  Patient Name: Paris Burris  Patient MRN: [id number]  Patient YOB: 1945  Tuesday, March 20, 2018  Dustin Patterson MD  RESTRICTIONS:  During your procedure today, you received medications for sedation.  These   medications may affect your judgment, balance and coordination.  Therefore,   for 24 hours, you have the following restrictions:   - DO NOT drive a car, operate machinery, make legal/financial decisions,   sign important papers or drink alcohol.    ACTIVITY:  The following day: return to full activity including work, except no heavy   lifting, straining or running for 3 days if polyps were removed.  DIET:  Eat and drink normally unless instructed otherwise.     TREATMENT FOR COMMON SIDE EFFECTS:  - Mild abdominal pain, nausea, belching, bloating or excessive gas:  rest,   eat lightly and use a heating pad.  - Sore Throat: treat with throat lozenges and/or gargle with warm salt   water.  - Because air was used during the procedure, expelling large amounts of air   from your rectum or belching is normal.  - If a bowel prep was taken, you may not have a bowel movement for 1-3 days.    This is normal.  SYMPTOMS TO WATCH FOR AND REPORT TO YOUR PHYSICIAN:  1. Abdominal pain or bloating, other than gas cramps.  2. Chest pain.  3. Back pain.  4. Signs of infection such as: chills or fever occurring within 24 hours   after the procedure.  5. Rectal bleeding, which would show as bright red, maroon, or black stools.   (A tablespoon of blood from the rectum is not serious, especially if   hemorrhoids are present.)  6. Vomiting.  7. Weakness or dizziness.  GO DIRECTLY TO THE NEAREST EMERGENCY ROOM IF YOU HAVE ANY OF THE FOLLOWING:      Difficulty breathing              Chills and/or fever over 101 F   Persistent vomiting and/or vomiting blood   Severe abdominal pain   Severe chest pain   Black, tarry stools   Bleeding- more than one tablespoon   Any  other symptom or condition that you feel may need urgent attention  Your doctor recommends these additional instructions:  If any biopsies were taken, your doctors clinic will contact you in 1 to 2   weeks with any results.  You are being discharged to home.   You have a contact number available for emergencies.  The signs and symptoms   of potential delayed complications were discussed with you.  You may return   to normal activities tomorrow.  Written discharge instructions were   provided to you.   Resume your previous diet.   We are waiting for your pathology results.   Telephone your GI clinic for pathology results in one week.   Continue your present medications.   Take Nexium (esomeprazole) 40 mg by mouth once a day indefinitely.   Your physician has recommended an upper GI endoscopy in eight weeks.   Do not take any aspirin, ibuprofen (including Advil, Motrin or Nuprin),   naproxen (including Aleve), or any other non-steroidal anti-inflammatory   drugs.  For questions, problems or results please call your physician - Dustin Patterson MD at Work:  [phone].  [facility] [place] [facility], EMERGENCY ROOM PHONE NUMBER: [phone]  IF A COMPLICATION OR EMERGENCY SITUATION ARISES AND YOU ARE UNABLE TO REACH   YOUR PHYSICIAN - GO DIRECTLY TO THE EMERGENCY ROOM.  Dustin Patterson MD  3/20/2018 9:33:17 AM  This report has been verified and signed electronically.

## 2018-03-23 ENCOUNTER — DOCUMENTATION ONLY (OUTPATIENT)
Dept: INTERNAL MEDICINE | Facility: CLINIC | Age: 73
End: 2018-03-23

## 2018-03-27 ENCOUNTER — DOCUMENTATION ONLY (OUTPATIENT)
Dept: REHABILITATION | Facility: OTHER | Age: 73
End: 2018-03-27
Attending: PHYSICAL MEDICINE & REHABILITATION
Payer: MEDICARE

## 2018-03-27 NOTE — PROGRESS NOTES
Health  Wellness Visit Note    Name: Paris Burris  Clinic Number: [id number]  Physician: Cee Woodall, *  Diagnosis: No diagnosis found.  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   Time In: 10:30 AM  Time Out:11:10AM  Total Treatment Time: 40 minutes    Subjective:   Patient reports that her neck and lower back are feeling well today. Has been more compliant these past few days with stretching. Took two walks over the weekend, and exercised at [facility]. No increase in symptoms following her exercise. Patient did not have any problems exercising today, energy level was good. Has not yet started the core exercises that her  learned during the Exercise workshop.     Objective:   Paris completed therapeutic stretches (EIS, EIL, RICK, Neck Retractions, scapular retractions) and the following MedX exercise machines: core cervical, core lumbar, torso rotation l/r, leg extension, leg curl, upright row, chest press, biceps curl, triceps extension, leg press    Please see exercise log in patient folder for rate of exertion and repetitions completed.     Assessment:   Patient tolerated all exercises on the MedX equipment without any increase in symptoms; Skipped ice after completion of exercises.    Plan:    Continue with established plan of  Sore Throat  When you have a sore throat, your throat may:  · Hurt.  · Burn.  · Feel irritated.  · Feel scratchy.  Many things can cause a sore throat, including:  · An infection.  · Allergies.  · Dryness in the air.  · Smoke or pollution.  · Gastroesophageal reflux disease (GERD).  · A tumor.  A sore throat can be the first sign of another sickness. It can happen with other problems, like coughing or a fever. Most sore throats go away without treatment.  HOME CARE  · Take over-the-counter medicines only as told by your doctor.  · Drink enough fluids to keep your pee (urine) clear or pale yellow.  · Rest when you feel you need to.  · To help with pain, try:    Sipping warm liquids, such as broth, herbal tea, or warm water.    Eating or drinking cold or frozen liquids, such as frozen ice pops.    Gargling with a salt-water mixture 3-4 times a day or as needed. To make a salt-water mixture, add ½-1 tsp of salt in 1 cup of warm water. Mix it until you cannot see the salt anymore.    Sucking on hard candy or throat lozenges.    Putting a cool-mist humidifier in your bedroom at night.    Sitting in the bathroom with the door closed for 5-10 minutes while you run hot water in the shower.  · Do not use any tobacco products, such as cigarettes, chewing tobacco, and e-cigarettes. If you need help quitting, ask your doctor.  GET HELP IF:  · You have a fever for more than 2-3 days.  · You keep having symptoms for more than 2-3 days.  · Your throat does not get better in 7 days.  · You have a fever and your symptoms suddenly get worse.  GET HELP RIGHT AWAY IF:   · You have trouble breathing.  · You cannot swallow fluids, soft foods, or your saliva.  · You have swelling in your throat or neck that gets worse.  · You keep feeling like you are going to throw up (vomit).  · You keep throwing up.     This information is not intended to replace advice given to you by your health care provider. Make sure you discuss any questions you  care towards wellness goals. Continue with Plan B.  Check in with patient about appointment. Discuss going to [facility] independently    have with your health care provider.     Document Released: 09/26/2009 Document Revised: 04/10/2017 Document Reviewed: 10/07/2016  [employer] Interactive Patient Education ©2017 [employer]  feverFever, Adult  A fever is an increase in the body's temperature. It is usually defined as a temperature of 100°F (38°C) or higher. Brief mild or moderate fevers generally have no long-term effects, and they often do not require treatment. Moderate or high fevers may make you feel uncomfortable and can sometimes be a sign of a serious illness or disease. The sweating that may occur with repeated or prolonged fever may also cause dehydration.  Fever is confirmed by taking a temperature with a thermometer. A measured temperature can vary with:  · Age.  · Time of day.  · Location of the thermometer:    Mouth (oral).    Rectum (rectal).    Ear (tympanic).    Underarm (axillary).    Forehead (temporal).  HOME CARE INSTRUCTIONS  Pay attention to any changes in your symptoms. Take these actions to help with your condition:  · Take over-the counter and prescription medicines only as told by your health care provider. Follow the dosing instructions carefully.  · If you were prescribed an antibiotic medicine, take it as told by your health care provider. Do not stop taking the antibiotic even if you start to feel better.  · Rest as needed.  · Drink enough fluid to keep your urine clear or pale yellow. This helps to prevent dehydration.  · Sponge yourself or bathe with room-temperature water to help reduce your body temperature as needed. Do not use ice water.  · Do not overbundle yourself in blankets or heavy clothes.  SEEK MEDICAL CARE IF:  · You vomit.  · You cannot eat or drink without vomiting.  · You have diarrhea.  · You have pain when you urinate.  · Your symptoms do not improve with treatment.  · You develop new symptoms.  · You develop excessive weakness.  SEEK IMMEDIATE MEDICAL CARE IF:  · You have shortness of breath or have  trouble breathing.  · You are dizzy or you faint.  · You are disoriented or confused.  · You develop signs of dehydration, such as a dry mouth, decreased urination, or paleness.  · You develop severe pain in your abdomen.  · You have persistent vomiting or diarrhea.  · You develop a skin rash.  · Your symptoms suddenly get worse.     This information is not intended to replace advice given to you by your health care provider. Make sure you discuss any questions you have with your health care provider.     Document Released: 06/13/2002 Document Revised: 09/07/2016 Document Reviewed: 02/11/2016  [employer] Interactive Patient Education ©2017 [employer]

## 2018-03-28 ENCOUNTER — PATIENT MESSAGE (OUTPATIENT)
Dept: SLEEP MEDICINE | Facility: CLINIC | Age: 73
End: 2018-03-28

## 2018-04-02 ENCOUNTER — DOCUMENTATION ONLY (OUTPATIENT)
Dept: REHABILITATION | Facility: OTHER | Age: 73
End: 2018-04-02
Attending: PHYSICAL MEDICINE & REHABILITATION
Payer: MEDICARE

## 2018-04-02 ENCOUNTER — PES CALL (OUTPATIENT)
Dept: ADMINISTRATIVE | Facility: CLINIC | Age: 73
End: 2018-04-02

## 2018-04-02 NOTE — PROGRESS NOTES
Health  Wellness Visit Note    Name: Paris Burris  Clinic Number: [id number]  Physician: Cee Woodall, *  Diagnosis: No diagnosis found.  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   Time In: 9:51 AM  Time Out:10:35 AM  Total Treatment Time: 43 minutes    Subjective:   Patient reports that her neck and lower back are continuing to feel well today; states that she was able to attend [facility] last week and exercise on the equipment-did not get to perform the entire circuit due to possible fatigue; also went walking about 2-3 times over the weekend; did not experience any increase in symptoms after her walking; has been compliant with her stretching and icing routine as well.     Objective:   Paris completed therapeutic stretches (EIS, EIL, RICK, Neck Retractions, scapular retractions) and the following MedX exercise machines: core cervical, core lumbar, torso rotation l/r, leg extension, leg curl, upright row, chest press, biceps curl, triceps extension, leg press    Please see exercise log in patient folder for rate of exertion and repetitions completed.     Assessment:   Patient tolerated all exercises on the MedX equipment without any increase in symptoms; Skipped ice after completion of exercises.    Plan:    Continue with established  plan of care towards wellness goals. Continue with Plan B.  Check in with patient about appointment. Discuss going to [facility] independently

## 2018-04-04 ENCOUNTER — TELEPHONE (OUTPATIENT)
Dept: SLEEP MEDICINE | Facility: CLINIC | Age: 73
End: 2018-04-04

## 2018-04-04 DIAGNOSIS — G47.33 OSA (OBSTRUCTIVE SLEEP APNEA): Primary | ICD-10-CM

## 2018-04-05 ENCOUNTER — TELEPHONE (OUTPATIENT)
Dept: INTERNAL MEDICINE | Facility: CLINIC | Age: 73
End: 2018-04-05

## 2018-04-05 NOTE — TELEPHONE ENCOUNTER
..Called patient to reschedule appointment, left message, request call back [phone].  Jacqueline Perry RN HC

## 2018-04-06 ENCOUNTER — DOCUMENTATION ONLY (OUTPATIENT)
Dept: REHABILITATION | Facility: OTHER | Age: 73
End: 2018-04-06
Attending: PHYSICAL MEDICINE & REHABILITATION
Payer: MEDICARE

## 2018-04-06 NOTE — PROGRESS NOTES
Health  Wellness Visit Note    Name: Paris Burris  Clinic Number: [id number]  Physician: Cee Woodall, *  Diagnosis: No diagnosis found.  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   Time In: 9:50 AM  Time Out:10:35 AM  Total Treatment Time: 45 minutes    Subjective:   Patient reports that her neck and lower back are continuing to feel well today; states that she was able to attend [facility] last week and exercise on the equipment-did not get to perform the entire circuit due to possible fatigue; She lost her weight card so she wasn't sure about her weight settings the last time she went;  made her another copy today. She plans on walking about 2-3 times over the weekend;  reminded her to stretch and ice after her walks and workouts at [facility]; has been compliant with her stretching and icing routine as well.     Objective:   Paris completed therapeutic stretches (EIS, EIL, RICK, Neck Retractions, scapular retractions) and the following MedX exercise machines: core cervical, core lumbar, torso rotation l/r, leg extension, leg curl, upright row, chest press, biceps curl, triceps extension, leg press    Please see exercise log in patient folder for rate of exertion and repetitions completed.     Assessment:   Patient tolerated all exercises on  the MedX equipment without any increase in symptoms; Skipped ice after completion of exercises.    Plan:    Continue with established plan of care towards wellness goals. Continue with Plan B.  Check in with patient about appointment. Discuss going to [facility] independently

## 2018-04-09 ENCOUNTER — TELEPHONE (OUTPATIENT)
Dept: INTERNAL MEDICINE | Facility: CLINIC | Age: 73
End: 2018-04-09

## 2018-04-09 ENCOUNTER — DOCUMENTATION ONLY (OUTPATIENT)
Dept: REHABILITATION | Facility: OTHER | Age: 73
End: 2018-04-09
Attending: PHYSICAL MEDICINE & REHABILITATION
Payer: MEDICARE

## 2018-04-09 NOTE — PROGRESS NOTES
"Health  Wellness Visit Note    Name: Paris Burris  Clinic Number: [id number]  Physician: Cee Woodall, *  Diagnosis: No diagnosis found.  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   Time In: 10:00 AM  Time Out:10:52 AM  Total Treatment Time: 52 minutes    Subjective:   Patient reports that she is feeling "okay" today; states that she is having increased pain/discomfort in her middle back/thoracic area; says that she has not been as consistent with her stretching routine; was able to exercise at [facility] last week and completed the entire circuit-did not mention having any increase in symptoms following her exercise; patient has been taking brisk walks a couple of days during the week, which also makes her feel better; encouraged patient to start her stretching routine again as well. Patient requested an increase in ROM on the core lumbar machine her next wellness visit.    Objective:   Paris completed therapeutic stretches (EIS, EIL, RICK, Neck Retractions, scapular retractions) and the following MedX exercise machines: core cervical, core lumbar, torso rotation l/r, leg extension, leg curl, upright row, chest press, biceps curl, triceps extension, leg press    Please see exercise log in patient folder for rate of exertion and repetitions " completed.     Assessment:   Patient tolerated all exercises on the MedX equipment without any increase in symptoms; Skipped ice after completion of exercises.    Plan:    Continue with established plan of care towards wellness goals. Continue with Plan B.  Check in with patient about appointment. Discuss going to [facility] independently

## 2018-04-11 ENCOUNTER — TELEPHONE (OUTPATIENT)
Dept: INTERNAL MEDICINE | Facility: CLINIC | Age: 73
End: 2018-04-11

## 2018-04-11 ENCOUNTER — PATIENT MESSAGE (OUTPATIENT)
Dept: SLEEP MEDICINE | Facility: CLINIC | Age: 73
End: 2018-04-11

## 2018-04-12 ENCOUNTER — PATIENT MESSAGE (OUTPATIENT)
Dept: INTERNAL MEDICINE | Facility: CLINIC | Age: 73
End: 2018-04-12

## 2018-04-13 ENCOUNTER — DOCUMENTATION ONLY (OUTPATIENT)
Dept: REHABILITATION | Facility: OTHER | Age: 73
End: 2018-04-13
Attending: PHYSICAL MEDICINE & REHABILITATION
Payer: MEDICARE

## 2018-04-13 NOTE — TELEPHONE ENCOUNTER
Returned patients call to reschedule appt.   Left appt times available on phone for patient, request call back to confirm.   Received message back for confirmation and appt set for 4/17/18 at 1100.  ..Jacqueline Perry RN HC

## 2018-04-13 NOTE — PROGRESS NOTES
"Health  Wellness Visit Note    Name: Paris Burris  Clinic Number: [id number]  Physician: Cee Woodall, *  Diagnosis: No diagnosis found.  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   Time In: 10:00 AM  Time Out:10:50 AM  Total Treatment Time: 50 minutes    Subjective:   Patient reports that she is feeling "okay" today; says that she still  been inconsistent with her stretching routine; was able to exercise at [facility] last week and completed the entire circuit-did not mention having any increase in symptoms following her exercise; patient has been taking brisk walks a couple of days during the week, which also makes her feel better; HC encouraged patient to start her stretching routine again this weekend. Patient will be going to a movie on this evening, HC reminded her to stretch if she flares up from the prolonged sitting.    Objective:   Paris completed therapeutic stretches (EIS, EIL, RICK, Neck Retractions, scapular retractions) and the following MedX exercise machines: core cervical, core lumbar, torso rotation l/r, leg extension, leg curl, upright row, chest press, biceps curl, triceps extension, leg press    Please see exercise log in patient folder for rate of exertion and repetitions completed.     Assessment:   Patient tolerated " all exercises on the MedX equipment without any increase in symptoms; Skipped ice after completion of exercises.    Plan:    Continue with established plan of care towards wellness goals. Continue with Plan B.  Check in with patient about appointment. Discuss going to [facility] independently

## 2018-04-16 ENCOUNTER — DOCUMENTATION ONLY (OUTPATIENT)
Dept: REHABILITATION | Facility: OTHER | Age: 73
End: 2018-04-16
Attending: PHYSICAL MEDICINE & REHABILITATION
Payer: MEDICARE

## 2018-04-16 RX ORDER — TRAMADOL HYDROCHLORIDE 50 MG/1
100 TABLET ORAL
Qty: 60 TABLET | Refills: 1 | Status: SHIPPED | OUTPATIENT
Start: 2018-04-16 | End: 2018-07-17 | Stop reason: SDUPTHER

## 2018-04-16 NOTE — PROGRESS NOTES
Health  Wellness Visit Note    Name: Paris Burris  Clinic Number: [id number]  Physician: Cee Woodall, *  Diagnosis: No diagnosis found.  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   Time In: 9:37 AM  Time Out:10:29 AM  Total Treatment Time: 30 minutes    Subjective:   Patient reports having mild upper back/neck pain today; states that her pain level is no more than a 3; lower back is feeling good; mentioned that she has not been to [facility] this past week nor has she been able to go for a walk; compliant with her stretching routine but noticed that she has not been as consistent as she should, but she continues to ice; encouraged patient to try to exercise this week.    Objective:   Paris completed therapeutic stretches (EIS, EIL, RICK, Neck Retractions, scapular retractions) and the following MedX exercise machines: core cervical, core lumbar, torso rotation l/r, leg extension, leg curl, upright row, chest press, biceps curl, triceps extension, leg press    Please see exercise log in patient folder for rate of exertion and repetitions completed.     Assessment:   Patient tolerated all exercises on the MedX equipment without any increase in symptoms; Skipped ice after completion of exercises.    Plan:    Continue with established plan of  care towards wellness goals. Continue with Plan B.  Check in with patient about appointment. Discuss going to [facility] independently

## 2018-04-17 ENCOUNTER — CLINICAL SUPPORT (OUTPATIENT)
Dept: INTERNAL MEDICINE | Facility: CLINIC | Age: 73
End: 2018-04-17
Payer: MEDICARE

## 2018-04-17 VITALS — BODY MASS INDEX: 31.41 KG/M2 | WEIGHT: 166.25 LBS

## 2018-04-17 NOTE — PROGRESS NOTES
Health  Follow-Up Note   [x] Office  [] Phone  Notes from previous session reviewed.   [x] Previous Session Goals unchanged.   [] Patient/Caregiver Change in Goals.  Goals added or changed by Patient/Caregiver since program participation:  1.  Increase exercise  2. Cut back on chips     Additional/Changed support that patient/caregiver has experienced/sought?  (Indicate readiness, support from family, friends, others, community groups, etc)  1.      Additional/Changed obstacles that could prevent patient/caregiver from reaching their goals?  1.   not feeling good and she doesn't want to leave him alone to go out for walk    Feedback provided:  1.  Praised for continued effort and determination    Diagnostic values/Desriptors for follow-up as needed for chronic condition(s)   Weight: 75.4 kg 166 lb   Blood Glucose:Averages slightly up from February still off Metformin states takes occasionally  7d -126  14 d-123  30 d-120  90 d-117    Interventions:   1. Health  listened reflectively, validated thoughts and feelings, offered support and encouragement.   2. Allowed patient to express themselves in a non-biased atmosphere.  3. Health  assisted pt to problem-solve obstacles such as being in a challenging environment and dealing with these challenges.   4. Motivational Interviewed interventions utilized (OARS).   5. Patient responded favorably to interventions and remained actively engaged in the session.   6. Health  will remain available and connected for patient by phone and/or office visits.   7. Positive reinforcement, emotional support and encouragement provided.   8. Focused Education: MI, recipes, processed foods, reading labels    Plan:  [x] Pt will work on goals as stated above.   [x] Pt will contact Health  for any questions, concerns or needs.  [x] Pt will follow up with Health  in office on  5/8/18 at 1100.      [] Pt will follow up with Health  on phone in:         [x] Health  will remain available.   [] Health  will contact patient by phone in:        [] Health  will consult:        [] Health  will inform Provider via EPIC messaging.     Impression:  1. Behavior is consistent with   Action    Stage of Change.   2. Participation level:       [x] Receptive      [x] Interactive      [] Guarded and Resistant      [x] Self Motivated      [] Refused/Declined to participate   3. [x] Pt voiced understanding of all information presented.       [] Pt voiced needing further information/education. This will be arranged.       [x] Pt would benefit from further education/information as identified by this health . This will be arranged.     Jacqueline Perry RN HC

## 2018-04-18 ENCOUNTER — TELEPHONE (OUTPATIENT)
Dept: OTOLARYNGOLOGY | Facility: CLINIC | Age: 73
End: 2018-04-18

## 2018-04-19 ENCOUNTER — TELEPHONE (OUTPATIENT)
Dept: OTOLARYNGOLOGY | Facility: CLINIC | Age: 73
End: 2018-04-19

## 2018-04-19 NOTE — TELEPHONE ENCOUNTER
Left message with daughter, Janiya, to please call us to reschedule today's appt.  Left message on patient's cell voice mail and home voice mail also.

## 2018-04-20 ENCOUNTER — DOCUMENTATION ONLY (OUTPATIENT)
Dept: REHABILITATION | Facility: OTHER | Age: 73
End: 2018-04-20
Attending: PHYSICAL MEDICINE & REHABILITATION
Payer: MEDICARE

## 2018-04-20 NOTE — PROGRESS NOTES
Health  Wellness Visit Note    Name: Paris Burris  Clinic Number: [id number]  Physician: Cee Woodall, *  Diagnosis: No diagnosis found.  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   Time In: 10:00 AM  Time Out:10:50 AM  Total Treatment Time: 50 minutes    Subjective:   Patient reports having mild upper back/neck pain today; states that her pain level is no more than a 3; lower back is feeling good; mentioned that she has not able to make it to [facility] this week but she has she been able to go for a long walk; compliant with her stretching routine but noticed that she has not been as consistent as she should be, but she continues to ice regularly; encouraged patient to try to exercise this week.    Objective:   Paris completed therapeutic stretches (EIS, EIL, RICK, Neck Retractions, scapular retractions) and the following MedX exercise machines: core cervical, core lumbar, torso rotation l/r, leg extension, leg curl, upright row, chest press, biceps curl, triceps extension, leg press    Please see exercise log in patient folder for rate of exertion and repetitions completed.     Assessment:   Patient tolerated all exercises on the MedX equipment without any increase in symptoms; Skipped ice after completion of exercises.    Plan:     Continue with established plan of care towards wellness goals. Continue with Plan B.  Check in with patient about appointment. Discuss going to [facility] independently    negative...

## 2018-04-23 ENCOUNTER — OFFICE VISIT (OUTPATIENT)
Dept: DERMATOLOGY | Facility: CLINIC | Age: 73
End: 2018-04-23
Payer: MEDICARE

## 2018-04-23 ENCOUNTER — PATIENT MESSAGE (OUTPATIENT)
Dept: INTERNAL MEDICINE | Facility: CLINIC | Age: 73
End: 2018-04-23

## 2018-04-23 VITALS — WEIGHT: 166 LBS | BODY MASS INDEX: 31.37 KG/M2

## 2018-04-23 DIAGNOSIS — L82.1 SEBORRHEIC KERATOSES: Primary | ICD-10-CM

## 2018-04-23 DIAGNOSIS — R20.9 DISTURBANCE OF SKIN SENSATION: ICD-10-CM

## 2018-04-23 DIAGNOSIS — L81.4 LENTIGINES: ICD-10-CM

## 2018-04-23 DIAGNOSIS — D18.00 ANGIOMA: ICD-10-CM

## 2018-04-23 PROCEDURE — 17110 DESTRUCTION B9 LES UP TO 14: CPT | Mod: S$GLB,,, | Performed by: DERMATOLOGY

## 2018-04-23 PROCEDURE — 99202 OFFICE O/P NEW SF 15 MIN: CPT | Mod: 25,S$GLB,, | Performed by: DERMATOLOGY

## 2018-04-23 PROCEDURE — 99999 PR PBB SHADOW E&M-EST. PATIENT-LVL III: CPT | Mod: PBBFAC,,, | Performed by: DERMATOLOGY

## 2018-04-23 RX ORDER — TRETINOIN 0.5 MG/G
CREAM TOPICAL
Qty: 45 G | Refills: 6 | Status: SHIPPED | OUTPATIENT
Start: 2018-04-23 | End: 2019-04-26 | Stop reason: SDUPTHER

## 2018-04-23 NOTE — PROGRESS NOTES
Subjective:       Patient ID:  Paris Burris is a 72 y.o. female who presents for   Chief Complaint   Patient presents with    Mole     face    Skin Check     UBSE     History of Present Illness: The patient presents with chief complaint of spot.  Location: right chest  Duration: months  Signs/Symptoms: irritated    Prior treatments: scratched the top off          Review of Systems   Constitutional: Negative for fever.   Skin: Negative for itching and rash.   Hematologic/Lymphatic: Does not bruise/bleed easily.        Objective:    Physical Exam   Constitutional: She appears well-developed and well-nourished. No distress.   Neurological: She is alert and oriented to person, place, and time. She is not disoriented.   Psychiatric: She has a normal mood and affect.   Skin:   Areas Examined (abnormalities noted in diagram):   Head / Face Inspection Performed  Neck Inspection Performed  Chest / Axilla Inspection Performed  Abdomen Inspection Performed  Back Inspection Performed  RUE Inspected  LUE Inspection Performed              Diagram Legend     Erythematous scaling macule/papule c/w actinic keratosis       Vascular papule c/w angioma      Pigmented verrucoid papule/plaque c/w seborrheic keratosis      Yellow umbilicated papule c/w sebaceous hyperplasia      Irregularly shaped tan macule c/w lentigo     1-2 mm smooth white papules consistent with Milia      Movable subcutaneous cyst with punctum c/w epidermal inclusion cyst      Subcutaneous movable cyst c/w pilar cyst      Firm pink to brown papule c/w dermatofibroma      Pedunculated fleshy papule(s) c/w skin tag(s)      Evenly pigmented macule c/w junctional nevus     Mildly variegated pigmented, slightly irregular-bordered macule c/w mildly atypical nevus      Flesh colored to evenly pigmented papule c/w intradermal nevus       Pink pearly papule/plaque c/w basal cell carcinoma      Erythematous hyperkeratotic cursted plaque c/w SCC      Surgical scar with  "no sign of skin cancer recurrence      Open and closed comedones      Inflammatory papules and pustules      Verrucoid papule consistent consistent with wart     Erythematous eczematous patches and plaques     Dystrophic onycholytic nail with subungual debris c/w onychomycosis     Umbilicated papule    Erythematous-base heme-crusted tan verrucoid plaque consistent with inflamed seborrheic keratosis     Erythematous Silvery Scaling Plaque c/w Psoriasis     See annotation      Assessment / Plan:        Seborrheic keratoses  reassurance  Brochure provided  Cryosurgery procedure note:    Verbal consent from the patient is obtained. Liquid nitrogen cryosurgery is applied to 1 lesion to produce a freeze injury,  instructed to call if lesions do not completely resolve.        Lentigines  The "ABCD" rules to observe pigmented lesions were reviewed.    Angiomas  reassurance    Pt request  -     tretinoin (RETIN-A) 0.05 % cream; Use hs  Dispense: 45 g; Refill: 6             Follow-up in about 1 year (around 4/23/2019).  "

## 2018-04-24 ENCOUNTER — TELEPHONE (OUTPATIENT)
Dept: INTERNAL MEDICINE | Facility: CLINIC | Age: 73
End: 2018-04-24

## 2018-04-24 ENCOUNTER — PATIENT MESSAGE (OUTPATIENT)
Dept: INTERNAL MEDICINE | Facility: CLINIC | Age: 73
End: 2018-04-24

## 2018-04-24 NOTE — TELEPHONE ENCOUNTER
Labs are already ordered in Feb - if you look at the labs tab, the labs are there. Please schedule w/ pt.

## 2018-04-25 ENCOUNTER — DOCUMENTATION ONLY (OUTPATIENT)
Dept: REHABILITATION | Facility: OTHER | Age: 73
End: 2018-04-25
Attending: PHYSICAL MEDICINE & REHABILITATION
Payer: MEDICARE

## 2018-04-25 NOTE — PROGRESS NOTES
Health  Wellness Visit Note    Name: Paris Burris  Clinic Number: [id number]  Physician: Cee Woodall, *  Diagnosis: No diagnosis found.  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   Time In: 10:10AM  Time Out:10:50 AM  Total Treatment Time: 40 minutes    Subjective:   Patient reports her neck and back are feeling good today. Has become more compliant with stretching routine and also being more active outside, loves to walk. States she wants to switch to Plan A for next month and do strength training at [facility]. Right now she only does cardio there. Will meet [facility] next month to go over  again. HEP and Ice compliant.     Objective:   Paris completed therapeutic stretches (EIS, EIL, RICK, Neck Retractions, scapular retractions) and the following MedX exercise machines: core cervical, core lumbar, torso rotation l/r, leg extension, leg curl, upright row, chest press, biceps curl, triceps extension, leg press    Please see exercise log in patient folder for rate of exertion and repetitions completed.     Assessment:   Patient tolerated all exercises on the MedX equipment without any increase in symptoms; Skipped ice after completion of exercises.    Plan:    Continue with established plan of care towards wellness goals. Continue  with Plan B.  Check in with patient about appointment. Discuss going to [facility] independently

## 2018-04-30 ENCOUNTER — DOCUMENTATION ONLY (OUTPATIENT)
Dept: REHABILITATION | Facility: OTHER | Age: 73
End: 2018-04-30
Attending: PHYSICAL MEDICINE & REHABILITATION
Payer: MEDICARE

## 2018-04-30 NOTE — PROGRESS NOTES
Health  Wellness Visit Note    Name: Paris Burris  Clinic Number: [id number]  Physician: Cee Woodall, *  Diagnosis: No diagnosis found.  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   Time In: 9:35AM  Time Out:10:30AM  Total Treatment Time:  55minutes    Subjective:   Patient reports her neck and back are feeling good today, no reports of pain or stiffness. Went to [facility] over the weekend, no problems during or after, she did stretch. Felt fatigued the next day, had to cancel a family dinner, thinks the sun really drained her. Talked about proper aerobic exercise and what's a good amount for her. Her resting HR is about 100 +/-, does not know what it is after walking. Will document her  rates after walking.     Objective:   Paris completed therapeutic stretches (EIS, EIL, RICK, Neck Retractions, scapular retractions) and the following MedX exercise machines: core cervical, core lumbar, torso rotation l/r, leg extension, leg curl, upright row, chest press, biceps curl, triceps extension, leg press    Please see exercise log in patient folder for rate of exertion and repetitions completed.     Assessment:   Patient tolerated all exercises on the MedX equipment without any increase in symptoms; Skipped ice after completion of  exercises.    Plan:    Continue with established plan of care towards wellness goals. Continue with Plan A month of May, wants to start weight lifting at [facility]. Check in with [facility] after aerobic exercise.

## 2018-05-01 ENCOUNTER — OFFICE VISIT (OUTPATIENT)
Dept: OTOLARYNGOLOGY | Facility: CLINIC | Age: 73
End: 2018-05-01
Payer: MEDICARE

## 2018-05-01 ENCOUNTER — PATIENT MESSAGE (OUTPATIENT)
Dept: SLEEP MEDICINE | Facility: CLINIC | Age: 73
End: 2018-05-01

## 2018-05-01 VITALS — HEART RATE: 57 BPM | SYSTOLIC BLOOD PRESSURE: 126 MMHG | DIASTOLIC BLOOD PRESSURE: 76 MMHG

## 2018-05-01 DIAGNOSIS — H61.23 IMPACTED CERUMEN OF BOTH EARS: ICD-10-CM

## 2018-05-01 DIAGNOSIS — H90.3 SENSORINEURAL HEARING LOSS OF BOTH EARS: Primary | ICD-10-CM

## 2018-05-01 PROCEDURE — 99212 OFFICE O/P EST SF 10 MIN: CPT | Mod: 25,S$GLB,, | Performed by: OTOLARYNGOLOGY

## 2018-05-01 PROCEDURE — 99999 PR PBB SHADOW E&M-EST. PATIENT-LVL II: CPT | Mod: PBBFAC,,, | Performed by: OTOLARYNGOLOGY

## 2018-05-01 PROCEDURE — 3074F SYST BP LT 130 MM HG: CPT | Mod: CPTII,S$GLB,, | Performed by: OTOLARYNGOLOGY

## 2018-05-01 PROCEDURE — 3078F DIAST BP <80 MM HG: CPT | Mod: CPTII,S$GLB,, | Performed by: OTOLARYNGOLOGY

## 2018-05-01 PROCEDURE — 69210 REMOVE IMPACTED EAR WAX UNI: CPT | Mod: S$GLB,,, | Performed by: OTOLARYNGOLOGY

## 2018-05-01 NOTE — PROCEDURES
Ear Cerumen Removal  Date/Time: 5/1/2018 3:19 PM  Performed by: LUZ MARIA VANESSA  Authorized by: LUZ MARIA VANESSA     Consent Done?:  Yes (Verbal)    Local anesthetic:  None  Location details:  Both ears  Procedure type: curette    Cerumen  Removal Results:  Cerumen completely removed  Patient tolerance:  Patient tolerated the procedure well with no immediate complications

## 2018-05-01 NOTE — PROGRESS NOTES
Subjective:      Paris is a 72 y.o. female who comes for follow-up of ceruminosis.  Her last visit with me was on 5/11/2017.  Over past few weeks has some intermittent aural fullness, wax accumulation.  No problem with hearing aids.    QOL assessment deferred.    The patient's medications, allergies, past medical, surgical, social and family histories were reviewed and updated as appropriate.    A detailed review of systems was obtained with pertinent positives as per the above HPI, and otherwise negative.        Objective:     /76   Pulse (!) 57        Constitutional:   She appears well-developed. She is cooperative.     Head:  Normocephalic. Facial strength is normal.      Ears:    Right Ear: No drainage or tenderness. Tympanic membrane is not perforated. Tympanic membrane mobility is normal. No middle ear effusion. No decreased hearing is noted.   Left Ear: No drainage or tenderness. Tympanic membrane is not perforated. Tympanic membrane mobility is normal.  No middle ear effusion. No decreased hearing is noted.     Nose:  No mucosal edema, rhinorrhea, septal deviation or polyps. No epistaxis. Turbinates normal, no turbinate masses and no turbinate hypertrophy.  Right sinus exhibits no maxillary sinus tenderness and no frontal sinus tenderness. Left sinus exhibits no maxillary sinus tenderness and no frontal sinus tenderness.     Mouth/Throat  Oropharynx clear and moist without lesions or asymmetry. No oropharyngeal exudate or posterior oropharyngeal erythema.     Neck:  No adenopathy. Normal range of motion present.     She has no cervical adenopathy.     Neurological:   No cranial nerve deficit.     Skin:   No rash noted.       Procedure    Cerumen removal performed.  See procedure note.        Data Reviewed    WBC (K/uL)   Date Value   03/02/2018 8.95     Eosinophil% (%)   Date Value   03/02/2018 4.5     Eos # (K/uL)   Date Value   03/02/2018 0.4     Platelets (K/uL)   Date Value   03/02/2018 212      Glucose (mg/dL)   Date Value   11/24/2017 107     No results found for: IGE        Assessment:     1. Sensorineural hearing loss of both ears    2. Impacted cerumen of both ears         Plan:     Discussed ear cleaning, advised not to try at home.  Discussed hearing aid maintenance and options.  Follow-up in about 1 year (around 5/1/2019).

## 2018-05-02 ENCOUNTER — TELEPHONE (OUTPATIENT)
Dept: INTERNAL MEDICINE | Facility: CLINIC | Age: 73
End: 2018-05-02

## 2018-05-02 ENCOUNTER — PATIENT MESSAGE (OUTPATIENT)
Dept: INTERNAL MEDICINE | Facility: CLINIC | Age: 73
End: 2018-05-02

## 2018-05-02 DIAGNOSIS — R35.0 URINARY FREQUENCY: Primary | ICD-10-CM

## 2018-05-03 ENCOUNTER — LAB VISIT (OUTPATIENT)
Dept: LAB | Facility: HOSPITAL | Age: 73
End: 2018-05-03
Attending: INTERNAL MEDICINE
Payer: MEDICARE

## 2018-05-03 ENCOUNTER — TELEPHONE (OUTPATIENT)
Dept: INTERNAL MEDICINE | Facility: CLINIC | Age: 73
End: 2018-05-03

## 2018-05-03 DIAGNOSIS — D50.9 IRON DEFICIENCY ANEMIA, UNSPECIFIED IRON DEFICIENCY ANEMIA TYPE: ICD-10-CM

## 2018-05-03 DIAGNOSIS — F33.41 MDD (MAJOR DEPRESSIVE DISORDER), RECURRENT, IN PARTIAL REMISSION: ICD-10-CM

## 2018-05-03 DIAGNOSIS — E11.69 DIABETES MELLITUS TYPE 2 IN OBESE: ICD-10-CM

## 2018-05-03 DIAGNOSIS — E66.9 DIABETES MELLITUS TYPE 2 IN OBESE: ICD-10-CM

## 2018-05-03 LAB
BASOPHILS # BLD AUTO: 0.02 K/UL
BASOPHILS NFR BLD: 0.3 %
CHOLEST SERPL-MCNC: 204 MG/DL
CHOLEST/HDLC SERPL: 6.6 {RATIO}
DIFFERENTIAL METHOD: NORMAL
EOSINOPHIL # BLD AUTO: 0.2 K/UL
EOSINOPHIL NFR BLD: 2.5 %
ERYTHROCYTE [DISTWIDTH] IN BLOOD BY AUTOMATED COUNT: 13.1 %
ESTIMATED AVG GLUCOSE: 120 MG/DL
FERRITIN SERPL-MCNC: 44 NG/ML
HBA1C MFR BLD HPLC: 5.8 %
HCT VFR BLD AUTO: 47 %
HDLC SERPL-MCNC: 31 MG/DL
HDLC SERPL: 15.2 %
HGB BLD-MCNC: 15.4 G/DL
IRON SERPL-MCNC: 97 UG/DL
LDLC SERPL CALC-MCNC: 118.4 MG/DL
LYMPHOCYTES # BLD AUTO: 2.6 K/UL
LYMPHOCYTES NFR BLD: 34.4 %
MCH RBC QN AUTO: 29.4 PG
MCHC RBC AUTO-ENTMCNC: 32.8 G/DL
MCV RBC AUTO: 90 FL
MONOCYTES # BLD AUTO: 0.7 K/UL
MONOCYTES NFR BLD: 8.5 %
NEUTROPHILS # BLD AUTO: 4.1 K/UL
NEUTROPHILS NFR BLD: 54 %
NONHDLC SERPL-MCNC: 173 MG/DL
PLATELET # BLD AUTO: 187 K/UL
PMV BLD AUTO: 11.1 FL
RBC # BLD AUTO: 5.23 M/UL
SATURATED IRON: 25 %
TOTAL IRON BINDING CAPACITY: 385 UG/DL
TRANSFERRIN SERPL-MCNC: 260 MG/DL
TRIGL SERPL-MCNC: 273 MG/DL
TSH SERPL DL<=0.005 MIU/L-ACNC: 1.44 UIU/ML
WBC # BLD AUTO: 7.65 K/UL

## 2018-05-03 PROCEDURE — 83036 HEMOGLOBIN GLYCOSYLATED A1C: CPT

## 2018-05-03 PROCEDURE — 85025 COMPLETE CBC W/AUTO DIFF WBC: CPT

## 2018-05-03 PROCEDURE — 82728 ASSAY OF FERRITIN: CPT

## 2018-05-03 PROCEDURE — 84443 ASSAY THYROID STIM HORMONE: CPT

## 2018-05-03 PROCEDURE — 83540 ASSAY OF IRON: CPT

## 2018-05-03 PROCEDURE — 36415 COLL VENOUS BLD VENIPUNCTURE: CPT

## 2018-05-03 PROCEDURE — 80061 LIPID PANEL: CPT

## 2018-05-03 RX ORDER — VENLAFAXINE HYDROCHLORIDE 37.5 MG/1
37.5 CAPSULE, EXTENDED RELEASE ORAL DAILY
Qty: 30 CAPSULE | Refills: 0
Start: 2018-05-03 | End: 2018-05-29

## 2018-05-03 NOTE — TELEPHONE ENCOUNTER
----- Message from Heather Sylvester sent at 5/3/2018  4:15 PM CDT -----  Contact: self/[phone]  Pt called in regards to a missed call from arturo. If it is in regards to the lab appointment I made the appointment for her.       Please advise

## 2018-05-04 ENCOUNTER — TELEPHONE (OUTPATIENT)
Dept: INTERNAL MEDICINE | Facility: CLINIC | Age: 73
End: 2018-05-04

## 2018-05-04 ENCOUNTER — LAB VISIT (OUTPATIENT)
Dept: LAB | Facility: HOSPITAL | Age: 73
End: 2018-05-04
Attending: INTERNAL MEDICINE
Payer: MEDICARE

## 2018-05-04 DIAGNOSIS — R35.0 URINARY FREQUENCY: ICD-10-CM

## 2018-05-04 LAB
BACTERIA #/AREA URNS AUTO: ABNORMAL /HPF
BILIRUB UR QL STRIP: NEGATIVE
CLARITY UR REFRACT.AUTO: ABNORMAL
COLOR UR AUTO: YELLOW
GLUCOSE UR QL STRIP: NEGATIVE
HGB UR QL STRIP: NEGATIVE
HYALINE CASTS UR QL AUTO: 6 /LPF
KETONES UR QL STRIP: ABNORMAL
LEUKOCYTE ESTERASE UR QL STRIP: ABNORMAL
MICROSCOPIC COMMENT: ABNORMAL
NITRITE UR QL STRIP: NEGATIVE
PH UR STRIP: 5 [PH] (ref 5–8)
PROT UR QL STRIP: ABNORMAL
RBC #/AREA URNS AUTO: 4 /HPF (ref 0–4)
SP GR UR STRIP: 1.02 (ref 1–1.03)
SQUAMOUS #/AREA URNS AUTO: 1 /HPF
URN SPEC COLLECT METH UR: ABNORMAL
UROBILINOGEN UR STRIP-ACNC: NEGATIVE EU/DL
WBC #/AREA URNS AUTO: >100 /HPF (ref 0–5)
WBC CLUMPS UR QL AUTO: ABNORMAL

## 2018-05-04 PROCEDURE — 87086 URINE CULTURE/COLONY COUNT: CPT

## 2018-05-04 PROCEDURE — 87077 CULTURE AEROBIC IDENTIFY: CPT

## 2018-05-04 PROCEDURE — 87186 SC STD MICRODIL/AGAR DIL: CPT

## 2018-05-04 PROCEDURE — 87088 URINE BACTERIA CULTURE: CPT

## 2018-05-04 PROCEDURE — 81001 URINALYSIS AUTO W/SCOPE: CPT

## 2018-05-04 RX ORDER — CIPROFLOXACIN 500 MG/1
500 TABLET ORAL EVERY 12 HOURS
Qty: 14 TABLET | Refills: 0 | Status: SHIPPED | OUTPATIENT
Start: 2018-05-04 | End: 2018-05-11

## 2018-05-04 NOTE — TELEPHONE ENCOUNTER
Urine consistent w/ UTI. Will send in cipro 500mg twice daily. Hold crestor while on med and can restart after finishing antibiotics. Urine culture pending.     Please call and notify pt

## 2018-05-07 ENCOUNTER — PATIENT MESSAGE (OUTPATIENT)
Dept: SLEEP MEDICINE | Facility: CLINIC | Age: 73
End: 2018-05-07

## 2018-05-07 LAB — BACTERIA UR CULT: NORMAL

## 2018-05-08 ENCOUNTER — CLINICAL SUPPORT (OUTPATIENT)
Dept: INTERNAL MEDICINE | Facility: CLINIC | Age: 73
End: 2018-05-08
Payer: MEDICARE

## 2018-05-08 VITALS — WEIGHT: 164 LBS | BODY MASS INDEX: 30.99 KG/M2

## 2018-05-08 NOTE — PROGRESS NOTES
Health  Follow-Up Note   [x] Office  [] Phone  Notes from previous session reviewed.   [x] Previous Session Goals unchanged.   [] Patient/Caregiver Change in Goals.  Goals added or changed by Patient/Caregiver since program participation:  1.   Increase exercise monitoring heart rate to try to get closer to target zone       Additional/Changed support that patient/caregiver has experienced/sought?  (Indicate readiness, support from family, friends, others, community groups, etc)  1.      Additional/Changed obstacles that could prevent patient/caregiver from reaching their goals?  1.  Going to [place] for graduation at end of May  2. UTI taking antibiotics increasing glucose    Feedback provided:  1.  Praised for continued effort and determination    Diagnostic values/Desriptors for follow-up as needed for chronic condition(s)   Weight:74.4 kg 164 lb down 2 lbs since 4/17/18.  Blood Glucose:  Averages  7d -126  14d-119  30d-120  90d-117    Interventions:   1. Health  listened reflectively, validated thoughts and feelings, offered support and encouragement.   2. Allowed patient to express themselves in a non-biased atmosphere.  3. Health  assisted pt to problem-solve obstacles such as being in a challenging environment and dealing with these challenges.   4. Motivational Interviewed interventions utilized (OARS).   5. Patient responded favorably to interventions and remained actively engaged in the session.   6. Health  will remain available and connected for patient by phone and/or office visits.   7. Positive reinforcement, emotional support and encouragement provided.   8. Focused Education: MI, A1c, labs    Plan:  [x] Pt will work on goals as stated above.   [x] Pt will contact Health  for any questions, concerns or needs.  [x] Pt will follow up with Health  in office on   5/29/18 at 0930     [] Pt will follow up with Health  on phone in:        [x] Health  will remain  available.   [] Health  will contact patient by phone in:        [] Health  will consult:        [] Health  will inform Provider via EPIC messaging.     Impression:  1. Behavior is consistent with   Action    Stage of Change.   2. Participation level:       [x] Receptive      [x] Interactive      [] Guarded and Resistant      [x] Self Motivated      [] Refused/Declined to participate   3. [x] Pt voiced understanding of all information presented.       [] Pt voiced needing further information/education. This will be arranged.       [x] Pt would benefit from further education/information as identified by this health . This will be arranged.     Jacqueline Perry RN HC

## 2018-05-10 ENCOUNTER — PATIENT MESSAGE (OUTPATIENT)
Dept: INTERNAL MEDICINE | Facility: CLINIC | Age: 73
End: 2018-05-10

## 2018-05-10 ENCOUNTER — DOCUMENTATION ONLY (OUTPATIENT)
Dept: REHABILITATION | Facility: OTHER | Age: 73
End: 2018-05-10
Attending: PHYSICAL MEDICINE & REHABILITATION
Payer: MEDICARE

## 2018-05-10 NOTE — PROGRESS NOTES
Health  Wellness Visit Note    Name: Paris Burris  Clinic Number: [id number]  Physician: Cee Woodall, *  Diagnosis: No diagnosis found.  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   Time In: 11:00AM  Time Out: 11:40AM  Total Treatment Time:  40minutes    Subjective:   Patient reports her neck and back are feeling good today, no reports of pain or stiffness. Monitored her resting HR (100)and post treadmill HR (115). Last session she was concerned with her HR while exercising. Wants to lower her resting HR, discussed important factors such as stress,sleep, diet, and exercise. Wants to get to walking at least three times a week, prefers to walk on treadmill (easier than outside walking). HEP compliant.      Objective:   Paris completed therapeutic stretches (EIS, EIL, RICK, Neck Retractions, scapular retractions) and the following MedX exercise machines: core cervical, core lumbar, torso rotation l/r, leg extension, leg curl, upright row, chest press, biceps curl, triceps extension, leg press    Please see exercise log in patient folder for rate of exertion and repetitions completed.     Assessment:   Patient tolerated all exercises on the MedX equipment without any increase in symptoms; Skipped ice after completion of  exercises.    Plan:    Continue with established plan of care towards wellness goals. Continue with [facility] A month of May, wants to start weight lifting at [facility]. Check in with [facility] after aerobic exercise.

## 2018-05-12 DIAGNOSIS — E11.9 DIABETES MELLITUS TYPE 2, CONTROLLED: ICD-10-CM

## 2018-05-12 RX ORDER — METFORMIN HYDROCHLORIDE 500 MG/1
TABLET, EXTENDED RELEASE ORAL
Qty: 180 TABLET | Refills: 3 | OUTPATIENT
Start: 2018-05-12

## 2018-05-16 ENCOUNTER — PATIENT MESSAGE (OUTPATIENT)
Dept: INTERNAL MEDICINE | Facility: CLINIC | Age: 73
End: 2018-05-16

## 2018-05-16 DIAGNOSIS — R31.9 URINARY TRACT INFECTION WITH HEMATURIA, SITE UNSPECIFIED: Primary | ICD-10-CM

## 2018-05-16 DIAGNOSIS — N39.0 URINARY TRACT INFECTION WITH HEMATURIA, SITE UNSPECIFIED: Primary | ICD-10-CM

## 2018-05-17 ENCOUNTER — DOCUMENTATION ONLY (OUTPATIENT)
Dept: REHABILITATION | Facility: OTHER | Age: 73
End: 2018-05-17
Attending: PHYSICAL MEDICINE & REHABILITATION
Payer: MEDICARE

## 2018-05-17 ENCOUNTER — LAB VISIT (OUTPATIENT)
Dept: LAB | Facility: OTHER | Age: 73
End: 2018-05-17
Payer: MEDICARE

## 2018-05-17 DIAGNOSIS — N39.0 URINARY TRACT INFECTION WITH HEMATURIA, SITE UNSPECIFIED: ICD-10-CM

## 2018-05-17 DIAGNOSIS — R31.9 URINARY TRACT INFECTION WITH HEMATURIA, SITE UNSPECIFIED: ICD-10-CM

## 2018-05-17 LAB
BACTERIA #/AREA URNS HPF: ABNORMAL /HPF
BILIRUB UR QL STRIP: NEGATIVE
CLARITY UR: CLEAR
COLOR UR: YELLOW
GLUCOSE UR QL STRIP: NEGATIVE
HGB UR QL STRIP: NEGATIVE
KETONES UR QL STRIP: NEGATIVE
LEUKOCYTE ESTERASE UR QL STRIP: ABNORMAL
MICROSCOPIC COMMENT: ABNORMAL
NITRITE UR QL STRIP: NEGATIVE
PH UR STRIP: 6 [PH] (ref 5–8)
PROT UR QL STRIP: NEGATIVE
SP GR UR STRIP: 1.02 (ref 1–1.03)
URN SPEC COLLECT METH UR: ABNORMAL
UROBILINOGEN UR STRIP-ACNC: NEGATIVE EU/DL
WBC #/AREA URNS HPF: 15 /HPF (ref 0–5)
WBC CLUMPS URNS QL MICRO: ABNORMAL

## 2018-05-17 PROCEDURE — 81000 URINALYSIS NONAUTO W/SCOPE: CPT

## 2018-05-17 PROCEDURE — 87086 URINE CULTURE/COLONY COUNT: CPT

## 2018-05-17 NOTE — PROGRESS NOTES
Health  Wellness Visit Note    Name: Paris Burris  Clinic Number: [id number]  Physician: Cee Woodall, *  Diagnosis: No diagnosis found.  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   Time In: 11:10AM  Time Out: 11:55AM  Total Treatment Time: 45 minutes    Subjective:   Patient reports her back is feeling pretty good today, having some moderate neck pain. Says she has been walking more, went for a 30 min walk earlier in the week, which really helped her neuropathy in her foot. Notices her resting HR has decreased to around 85, for the past few weeks it was hovering around 100.  Discussed transition options, last month is June. HEP compliant.      Objective:   Paris completed therapeutic stretches (EIS, EIL, RICK, Neck Retractions, scapular retractions) and the following MedX exercise machines: core cervical, core lumbar, torso rotation l/r, leg extension, leg curl, upright row, chest press, biceps curl, triceps extension, leg press    Please see exercise log in patient folder for rate of exertion and repetitions completed.     Assessment:   Patient tolerated all exercises on the MedX equipment without any increase in symptoms; Skipped ice after completion of exercises.    Plan:    Continue with established plan of care towards wellness  goals. Continue with Plan A month of May, wants to start weight lifting at [facility]. Check in with [facility] after aerobic exercise. 12 month is over in June.

## 2018-05-18 ENCOUNTER — TELEPHONE (OUTPATIENT)
Dept: INTERNAL MEDICINE | Facility: CLINIC | Age: 73
End: 2018-05-18

## 2018-05-18 LAB — BACTERIA UR CULT: NO GROWTH

## 2018-05-18 RX ORDER — CIPROFLOXACIN 500 MG/1
500 TABLET ORAL EVERY 12 HOURS
Qty: 10 TABLET | Refills: 0 | Status: SHIPPED | OUTPATIENT
Start: 2018-05-18 | End: 2018-05-21

## 2018-05-18 NOTE — TELEPHONE ENCOUNTER
Urine still w/ some white blood cells. Culture is pending. Please call and let pt know that I'll continue the cipro for another 5 days.

## 2018-05-21 ENCOUNTER — OFFICE VISIT (OUTPATIENT)
Dept: INTERNAL MEDICINE | Facility: CLINIC | Age: 73
End: 2018-05-21
Payer: MEDICARE

## 2018-05-21 ENCOUNTER — DOCUMENTATION ONLY (OUTPATIENT)
Dept: REHABILITATION | Facility: OTHER | Age: 73
End: 2018-05-21
Attending: PHYSICAL MEDICINE & REHABILITATION
Payer: MEDICARE

## 2018-05-21 VITALS
BODY MASS INDEX: 31.09 KG/M2 | HEIGHT: 61 IN | DIASTOLIC BLOOD PRESSURE: 68 MMHG | TEMPERATURE: 98 F | HEART RATE: 86 BPM | SYSTOLIC BLOOD PRESSURE: 106 MMHG | WEIGHT: 164.69 LBS

## 2018-05-21 DIAGNOSIS — Z12.31 ENCOUNTER FOR SCREENING MAMMOGRAM FOR BREAST CANCER: ICD-10-CM

## 2018-05-21 DIAGNOSIS — E11.9 CONTROLLED TYPE 2 DIABETES MELLITUS WITHOUT COMPLICATION, WITHOUT LONG-TERM CURRENT USE OF INSULIN: Primary | ICD-10-CM

## 2018-05-21 DIAGNOSIS — R35.0 URINARY FREQUENCY: ICD-10-CM

## 2018-05-21 DIAGNOSIS — G47.33 OSA (OBSTRUCTIVE SLEEP APNEA): ICD-10-CM

## 2018-05-21 DIAGNOSIS — K21.9 GASTROESOPHAGEAL REFLUX DISEASE, ESOPHAGITIS PRESENCE NOT SPECIFIED: ICD-10-CM

## 2018-05-21 DIAGNOSIS — F33.41 MDD (MAJOR DEPRESSIVE DISORDER), RECURRENT, IN PARTIAL REMISSION: ICD-10-CM

## 2018-05-21 DIAGNOSIS — E78.5 HYPERLIPIDEMIA, UNSPECIFIED HYPERLIPIDEMIA TYPE: ICD-10-CM

## 2018-05-21 DIAGNOSIS — D50.9 IRON DEFICIENCY ANEMIA, UNSPECIFIED IRON DEFICIENCY ANEMIA TYPE: ICD-10-CM

## 2018-05-21 DIAGNOSIS — F41.1 GAD (GENERALIZED ANXIETY DISORDER): ICD-10-CM

## 2018-05-21 PROCEDURE — 99999 PR PBB SHADOW E&M-EST. PATIENT-LVL IV: CPT | Mod: PBBFAC,,, | Performed by: INTERNAL MEDICINE

## 2018-05-21 PROCEDURE — 3074F SYST BP LT 130 MM HG: CPT | Mod: CPTII,S$GLB,, | Performed by: INTERNAL MEDICINE

## 2018-05-21 PROCEDURE — 99215 OFFICE O/P EST HI 40 MIN: CPT | Mod: S$GLB,,, | Performed by: INTERNAL MEDICINE

## 2018-05-21 PROCEDURE — 3044F HG A1C LEVEL LT 7.0%: CPT | Mod: CPTII,S$GLB,, | Performed by: INTERNAL MEDICINE

## 2018-05-21 PROCEDURE — 3078F DIAST BP <80 MM HG: CPT | Mod: CPTII,S$GLB,, | Performed by: INTERNAL MEDICINE

## 2018-05-21 PROCEDURE — 99499 UNLISTED E&M SERVICE: CPT | Mod: S$GLB,,, | Performed by: INTERNAL MEDICINE

## 2018-05-21 PROCEDURE — 87086 URINE CULTURE/COLONY COUNT: CPT

## 2018-05-21 RX ORDER — ROSUVASTATIN CALCIUM 20 MG/1
20 TABLET, COATED ORAL DAILY
Qty: 90 TABLET | Refills: 3 | Status: SHIPPED | OUTPATIENT
Start: 2018-05-21 | End: 2019-05-27

## 2018-05-21 RX ORDER — VENLAFAXINE 37.5 MG/1
TABLET ORAL
Refills: 0 | COMMUNITY
Start: 2018-05-03 | End: 2018-05-21

## 2018-05-21 RX ORDER — ESOMEPRAZOLE MAGNESIUM 40 MG/1
40 CAPSULE, DELAYED RELEASE ORAL
Qty: 90 CAPSULE | Refills: 3 | Status: SHIPPED | OUTPATIENT
Start: 2018-05-21 | End: 2019-03-06 | Stop reason: DRUGHIGH

## 2018-05-21 RX ORDER — GABAPENTIN 300 MG/1
300 CAPSULE ORAL 2 TIMES DAILY
Qty: 180 CAPSULE | Refills: 3 | Status: SHIPPED | OUTPATIENT
Start: 2018-05-21 | End: 2018-12-07

## 2018-05-21 NOTE — PROGRESS NOTES
Health  Wellness Visit Note    Name: Paris Burris  Clinic Number: [id number]  Physician: Cee Woodall, *  Diagnosis: No diagnosis found.  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   Time In: 9:05AM  Time Out: 9:50AM  Total Treatment Time: 45minutes    Subjective:   Patient reports some back soreness today, cleaned her hardwood floors over the weekend. Her  has not been feeling well the past weeks, so she has started to take more responsibility around the house and with other activities. Suggested patient try a Swiffer mop and to do things in moderation. She was compliant with stretching. Neck is feeling good today. Felt better after session.       Objective:   Paris completed therapeutic stretches (EIS, EIL, RICK, Neck Retractions, scapular retractions) and the following MedX exercise machines: core cervical, core lumbar, torso rotation l/r, leg extension, leg curl, upright row, chest press, biceps curl, triceps extension, leg press    Please see exercise log in patient folder for rate of exertion and repetitions completed.     Assessment:   Patient tolerated all exercises on the MedX equipment without any increase in symptoms; Skipped ice after completion of exercises. Walking more throughout the week outside her home or at  [facility].     Plan:    Continue with established plan of care towards wellness goals. Continue with Plan A month of May, wants to start weight lifting at [facility]. Check in with [facility] after aerobic exercise. 12 month is over in June.

## 2018-05-22 LAB — BACTERIA UR CULT: NO GROWTH

## 2018-05-25 ENCOUNTER — PATIENT MESSAGE (OUTPATIENT)
Dept: INTERNAL MEDICINE | Facility: CLINIC | Age: 73
End: 2018-05-25

## 2018-05-25 ENCOUNTER — TELEPHONE (OUTPATIENT)
Dept: UROGYNECOLOGY | Facility: CLINIC | Age: 73
End: 2018-05-25

## 2018-05-25 NOTE — TELEPHONE ENCOUNTER
----- Message from Lisa Jeffery sent at 5/25/2018  4:29 PM CDT -----  Contact: self  Pt called in wanting to schedule an appt. She has a referral in from Dr. Mandi Francois. She can be reached at [phone]

## 2018-05-25 NOTE — TELEPHONE ENCOUNTER
Spoke to pt to schedule consult. Appointment scheduled 8/1/2018 @ 3p at [facility] location. Pt aware and verbalizes understanding.

## 2018-05-28 ENCOUNTER — DOCUMENTATION ONLY (OUTPATIENT)
Dept: REHABILITATION | Facility: OTHER | Age: 73
End: 2018-05-28
Attending: PHYSICAL MEDICINE & REHABILITATION
Payer: MEDICARE

## 2018-05-28 NOTE — PROGRESS NOTES
Health  Wellness Visit Note    Name: Paris Burris  Clinic Number: [id number]  Physician: Cee Woodall, *  Diagnosis: No diagnosis found.  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   Time In: 9:05AM  Time Out: 9:50AM  Total Treatment Time: 45minutes    Subjective:   Patient reports her neck is doing ok today, had to ice it over the week which really helped. Says her back was bothering her last week but is feeling better today. Recently getting over a UTI, which caused her back to flare up. Continuing to help fulfill some of her husbands responsibilities around the house, says he is almost back to 100 percent. Reminded Paris to be more active with HEP since her activity level has increased.       Objective:   Paris completed therapeutic stretches (EIS, EIL, RICK, Neck Retractions, scapular retractions) and the following MedX exercise machines: core cervical, core lumbar, torso rotation l/r, leg extension, leg curl, upright row, chest press, biceps curl, triceps extension, leg press    Please see exercise log in patient folder for rate of exertion and repetitions completed.     Assessment:   Patient tolerated all exercises on the MedX equipment without any increase in symptoms; Skipped ice after completion of exercises. Walking more  throughout the week outside her home or at [facility].     Plan:    Continue with established plan of care towards wellness goals. Continue with Plan A month of May, wants to start weight lifting at [facility]. Check in with [facility] after aerobic exercise. 12 month is over in June.

## 2018-05-29 ENCOUNTER — CLINICAL SUPPORT (OUTPATIENT)
Dept: INTERNAL MEDICINE | Facility: CLINIC | Age: 73
End: 2018-05-29
Payer: MEDICARE

## 2018-05-29 VITALS — BODY MASS INDEX: 30.78 KG/M2 | WEIGHT: 162.94 LBS

## 2018-05-29 DIAGNOSIS — F33.41 MDD (MAJOR DEPRESSIVE DISORDER), RECURRENT, IN PARTIAL REMISSION: ICD-10-CM

## 2018-05-29 RX ORDER — VENLAFAXINE 37.5 MG/1
37.5 TABLET ORAL DAILY
Qty: 30 TABLET | Refills: 11 | Status: SHIPPED | OUTPATIENT
Start: 2018-05-29 | End: 2018-06-20 | Stop reason: SDUPTHER

## 2018-05-29 NOTE — PROGRESS NOTES
Health  Follow-Up Note   [x] Office  [] Phone  Notes from previous session reviewed.   [x] Previous Session Goals unchanged.   [] Patient/Caregiver Change in Goals.  Goals added or changed by Patient/Caregiver since program participation:  1.  Continue same plan        Additional/Changed support that patient/caregiver has experienced/sought?  (Indicate readiness, support from family, friends, others, community groups, etc)  1.   came to appt with her today    Additional/Changed obstacles that could prevent patient/caregiver from reaching their goals?  1.  No help from Kade with him being sick too tired to make salad at times    Feedback provided:  1.  Praised for continued effort and determination down 1.1 lb total now 23 lbs now    Diagnostic values/Desriptors for follow-up as needed for chronic condition(s)   Weight: 73.9 kg 162.92 lb down 1.1 lb    Interventions:   1. Health  listened reflectively, validated thoughts and feelings, offered support and encouragement.   2. Allowed patient to express themselves in a non-biased atmosphere.  3. Health  assisted pt to problem-solve obstacles such as being in a challenging environment and dealing with these challenges.   4. Motivational Interviewed interventions utilized (OARS).   5. Patient responded favorably to interventions and remained actively engaged in the session.   6. Health  will remain available and connected for patient by phone and/or office visits.   7. Positive reinforcement, emotional support and encouragement provided.   8. Focused Education: MI, emulsifiers    Plan:  [x] Pt will work on goals as stated above.   [x] Pt will contact Health  for any questions, concerns or needs.  [x] Pt will follow up with Health  in office on    6/19/18 at 930.    [] Pt will follow up with Health  on phone in:        [x] Health  will remain available.   [] Health  will contact patient by phone in:        [] Health   will consult:        [] Health  will inform Provider via EPIC messaging.     Impression:  1. Behavior is consistent with   Action    Stage of Change.   2. Participation level:       [x] Receptive      [x] Interactive      [] Guarded and Resistant      [x] Self Motivated      [] Refused/Declined to participate   3. [x] Pt voiced understanding of all information presented.       [] Pt voiced needing further information/education. This will be arranged.       [x] Pt would benefit from further education/information as identified by this health . This will be arranged.     Jacqueline Perry RN HC

## 2018-05-30 ENCOUNTER — OFFICE VISIT (OUTPATIENT)
Dept: INTERNAL MEDICINE | Facility: CLINIC | Age: 73
End: 2018-05-30
Payer: MEDICARE

## 2018-05-30 VITALS
SYSTOLIC BLOOD PRESSURE: 118 MMHG | BODY MASS INDEX: 31.18 KG/M2 | HEIGHT: 61 IN | HEART RATE: 88 BPM | DIASTOLIC BLOOD PRESSURE: 74 MMHG | TEMPERATURE: 99 F | WEIGHT: 165.13 LBS

## 2018-05-30 DIAGNOSIS — N32.81 OAB (OVERACTIVE BLADDER): ICD-10-CM

## 2018-05-30 DIAGNOSIS — R39.15 URINARY URGENCY: Primary | ICD-10-CM

## 2018-05-30 LAB
BACTERIA #/AREA URNS AUTO: ABNORMAL /HPF
BILIRUB UR QL STRIP: NEGATIVE
CLARITY UR REFRACT.AUTO: CLEAR
COLOR UR AUTO: YELLOW
GLUCOSE UR QL STRIP: NEGATIVE
HGB UR QL STRIP: NEGATIVE
KETONES UR QL STRIP: NEGATIVE
LEUKOCYTE ESTERASE UR QL STRIP: ABNORMAL
MICROSCOPIC COMMENT: ABNORMAL
NITRITE UR QL STRIP: NEGATIVE
PH UR STRIP: 5 [PH] (ref 5–8)
PROT UR QL STRIP: NEGATIVE
RBC #/AREA URNS AUTO: 0 /HPF (ref 0–4)
SP GR UR STRIP: 1.01 (ref 1–1.03)
SQUAMOUS #/AREA URNS AUTO: 1 /HPF
URN SPEC COLLECT METH UR: ABNORMAL
UROBILINOGEN UR STRIP-ACNC: NEGATIVE EU/DL
WBC #/AREA URNS AUTO: 10 /HPF (ref 0–5)

## 2018-05-30 PROCEDURE — 99999 PR PBB SHADOW E&M-EST. PATIENT-LVL III: CPT | Mod: PBBFAC,,, | Performed by: INTERNAL MEDICINE

## 2018-05-30 PROCEDURE — 3074F SYST BP LT 130 MM HG: CPT | Mod: CPTII,S$GLB,, | Performed by: INTERNAL MEDICINE

## 2018-05-30 PROCEDURE — 99213 OFFICE O/P EST LOW 20 MIN: CPT | Mod: S$GLB,,, | Performed by: INTERNAL MEDICINE

## 2018-05-30 PROCEDURE — 3078F DIAST BP <80 MM HG: CPT | Mod: CPTII,S$GLB,, | Performed by: INTERNAL MEDICINE

## 2018-05-30 PROCEDURE — 87086 URINE CULTURE/COLONY COUNT: CPT

## 2018-05-30 PROCEDURE — 81001 URINALYSIS AUTO W/SCOPE: CPT

## 2018-05-30 RX ORDER — OXYBUTYNIN CHLORIDE 5 MG/1
5 TABLET, EXTENDED RELEASE ORAL DAILY
Qty: 30 TABLET | Refills: 11 | Status: SHIPPED | OUTPATIENT
Start: 2018-05-30 | End: 2018-06-07

## 2018-05-30 NOTE — PROGRESS NOTES
"Subjective:       Patient ID: Paris Burris is a 72 y.o. female.    Chief Complaint: Dysuria (a month)    HPI urgent.   Earlier in the mo, had UA w/ UTI and s/p cipro. Continued symptoms. Repeat UA neg.     Now w/ urinary frequency and urgency. When she does go to urinate, it's about a tablespoon. No hematuria. No fevers/chills. Symptoms are worse over the course of the last few weeks. No pelvic pressure.  Has appt in Aug w/ urogynecology.     Reports lower back is aching more. Doesn't want to exercise due to back ache. No numbness/tingling.   Part of healthy back program. Feels like it's helping.   Not assoc w/ urination.     Review of Systems  as above in HPI.     Objective:      Physical Exam    /74 (BP Location: Left arm, Patient Position: Sitting, BP Method: Large (Manual))   Pulse 88   Temp 98.5 °F (36.9 °C)   Ht 5' 1" (1.549 m)   Wt 74.9 kg (165 lb 1.6 oz)   BMI 31.20 kg/m²     GEN - A+OX4, NAD   HEENT - PERRL, EOMI, OP clear.   Neck - No thyromegaly or cervical LAD. No thyroid masses felt.  CV - RRR, no m/r   Chest - CTAB, no wheezing or rhonchi  Abd - S/NT/ND/+BS.   Ext - 2+BDP and radial pulses. No LE edema.   MSK - no spinal, hip or CVA tenderness to palpation. Normal gait. 5/5 BLE m strength.     Assessment/Plan     Paris was seen today for dysuria.    Diagnoses and all orders for this visit:    Urinary urgency  -     Urinalysis  -     Urine culture  -     Urinalysis Microscopic  -     oxybutynin (DITROPAN-XL) 5 MG TR24; Take 1 tablet (5 mg total) by mouth once daily.    OAB (overactive bladder)  -     oxybutynin (DITROPAN-XL) 5 MG TR24; Take 1 tablet (5 mg total) by mouth once daily.    Follow-up if symptoms worsen or fail to improve.      Mandi Huynh MD  Department of Internal Medicine - [facility]  10:30 AM  "

## 2018-05-31 LAB — BACTERIA UR CULT: NORMAL

## 2018-06-01 DIAGNOSIS — D50.0 IRON DEFICIENCY ANEMIA DUE TO CHRONIC BLOOD LOSS: ICD-10-CM

## 2018-06-01 RX ORDER — FERROUS SULFATE 325(65) MG
TABLET ORAL
Qty: 90 TABLET | Refills: 11 | Status: SHIPPED | OUTPATIENT
Start: 2018-06-01 | End: 2018-07-31 | Stop reason: SDUPTHER

## 2018-06-04 ENCOUNTER — PATIENT MESSAGE (OUTPATIENT)
Dept: INTERNAL MEDICINE | Facility: CLINIC | Age: 73
End: 2018-06-04

## 2018-06-04 ENCOUNTER — TELEPHONE (OUTPATIENT)
Dept: GASTROENTEROLOGY | Facility: CLINIC | Age: 73
End: 2018-06-04

## 2018-06-04 NOTE — TELEPHONE ENCOUNTER
----- Message from Jerri Chase sent at 6/4/2018  8:54 AM CDT -----  Contact: Self- [phone]  Leonardo- pt called to speak with staff in regards to rescheduling her appt she had to cancel- please contact pt at [phone]

## 2018-06-06 ENCOUNTER — HOSPITAL ENCOUNTER (OUTPATIENT)
Dept: RADIOLOGY | Facility: HOSPITAL | Age: 73
Discharge: HOME | End: 2018-06-06
Attending: INTERNAL MEDICINE
Payer: MEDICARE

## 2018-06-06 ENCOUNTER — DOCUMENTATION ONLY (OUTPATIENT)
Dept: REHABILITATION | Facility: OTHER | Age: 73
End: 2018-06-06
Attending: PHYSICAL MEDICINE & REHABILITATION
Payer: MEDICARE

## 2018-06-06 ENCOUNTER — TELEPHONE (OUTPATIENT)
Dept: UROGYNECOLOGY | Facility: CLINIC | Age: 73
End: 2018-06-06

## 2018-06-06 VITALS — WEIGHT: 165 LBS | HEIGHT: 61 IN | BODY MASS INDEX: 31.15 KG/M2

## 2018-06-06 DIAGNOSIS — Z12.31 ENCOUNTER FOR SCREENING MAMMOGRAM FOR BREAST CANCER: ICD-10-CM

## 2018-06-06 PROCEDURE — 77063 BREAST TOMOSYNTHESIS BI: CPT | Mod: 26,,, | Performed by: RADIOLOGY

## 2018-06-06 PROCEDURE — 77067 SCR MAMMO BI INCL CAD: CPT | Mod: TC

## 2018-06-06 PROCEDURE — 77067 SCR MAMMO BI INCL CAD: CPT | Mod: 26,,, | Performed by: RADIOLOGY

## 2018-06-06 NOTE — PROGRESS NOTES
Health  Wellness Visit Note    Name: Paris Burris  Clinic Number: [id number]  Physician: Cee Woodall, *  Diagnosis: No diagnosis found.  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   Time In: 11:00AM  Time Out: 11:35AM  Total Treatment Time: 35minutes    Subjective:   Patient reports some soreness in her neck today and back is doing ok. She did not feel well over the weekend, did not stay compliant with HEP even had to cancel her trip to [place]. Says she has started to feel better over the days. States she will be more active with her HEP, has noticed her self becoming less compliant this past week.       Objective:   Paris completed therapeutic stretches (EIS, EIL, RICK, Neck Retractions, scapular retractions) and the following MedX exercise machines: core cervical, core lumbar, torso rotation l/r, leg extension, leg curl, upright row, chest press, biceps curl, triceps extension, leg press    Please see exercise log in patient folder for rate of exertion and repetitions completed.     Assessment:   Patient tolerated all exercises on the MedX equipment without any increase in symptoms; Skipped ice after completion of exercises. Walking more throughout the week outside her home or at [facility].     Plan:     Continue with established plan of care towards wellness goals. Continue with Plan A month, wants to start weight lifting at [facility]. 12 month is over in June.

## 2018-06-06 NOTE — TELEPHONE ENCOUNTER
----- Message from Natalee Lira sent at 6/6/2018 10:26 AM CDT -----  Contact: self  Patient is requesting to be seen sooner for a frequent UTI consult. I wasn't sure if Jose Francisco would be able to see the patient. Patient can be reached at [phone].

## 2018-06-07 ENCOUNTER — PATIENT MESSAGE (OUTPATIENT)
Dept: INTERNAL MEDICINE | Facility: CLINIC | Age: 73
End: 2018-06-07

## 2018-06-07 ENCOUNTER — TELEPHONE (OUTPATIENT)
Dept: DERMATOLOGY | Facility: CLINIC | Age: 73
End: 2018-06-07

## 2018-06-07 RX ORDER — OXYBUTYNIN CHLORIDE 10 MG/1
10 TABLET, EXTENDED RELEASE ORAL DAILY
Qty: 30 TABLET | Refills: 11 | Status: SHIPPED | OUTPATIENT
Start: 2018-06-07 | End: 2018-08-07

## 2018-06-12 ENCOUNTER — PATIENT MESSAGE (OUTPATIENT)
Dept: INTERNAL MEDICINE | Facility: CLINIC | Age: 73
End: 2018-06-12

## 2018-06-13 ENCOUNTER — DOCUMENTATION ONLY (OUTPATIENT)
Dept: REHABILITATION | Facility: OTHER | Age: 73
End: 2018-06-13
Attending: PHYSICAL MEDICINE & REHABILITATION
Payer: MEDICARE

## 2018-06-13 NOTE — PROGRESS NOTES
Health  Wellness Visit Note    Name: Paris Burris  Clinic Number: [id number]  Physician: Cee Woodall, *  Diagnosis: No diagnosis found.  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   Time In: 2:30 PM  Time Out: 3:00 PM  Total Treatment Time: 30 minutes    Subjective:   Patient reports some soreness in her neck today and back is doing ok. She felt better this week taking it easy over the weekend helped with that. Says she has started to feel better over the days and has been keeping up with her stretches. States she will be more active with her HEP, has noticed her self becoming less compliant this past week.       Objective:   Paris completed therapeutic stretches (EIS, EIL, RICK, Neck Retractions, scapular retractions) and the following MedX exercise machines: core cervical, core lumbar, torso rotation l/r, leg extension, leg curl, upright row, chest press, biceps curl, triceps extension, leg press    Please see exercise log in patient folder for rate of exertion and repetitions completed.     Assessment:   Patient tolerated all exercises on the MedX equipment without any increase in symptoms; Skipped ice after completion of exercises. Walking more throughout the week outside her home or at [facility].      Plan:    Continue with established plan of care towards wellness goals. Continue with Plan A month, wants to start weight lifting at [facility]. 12 month is over in June.

## 2018-06-18 ENCOUNTER — DOCUMENTATION ONLY (OUTPATIENT)
Dept: REHABILITATION | Facility: OTHER | Age: 73
End: 2018-06-18
Attending: PHYSICAL MEDICINE & REHABILITATION
Payer: MEDICARE

## 2018-06-18 NOTE — PROGRESS NOTES
Health  Wellness Visit Note    Name: Paris Burris  Clinic Number: [id number]  Physician: Cee Woodall, *  Diagnosis: No diagnosis found.  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   Time In: 9:00AM  Time Out:9:45 M  Total Treatment Time: 45minutes    Subjective:   Patient reports both her neck and back have been doing well. Admits she has not been stretching as frequently, but she is icing once a day, sometimes more. Encouraged to keep up with stretches. Has not been to [facility] in about 2 weeks, but keeping with a  Walking routine at home.  was starting to feel better but had a small set back, continuing to help car for him and take care of his responsibilities around the house.     Objective:   Paris completed therapeutic stretches (EIS, EIL, RICK, Neck Retractions, scapular retractions) and the following MedX exercise machines: core cervical, core lumbar, torso rotation l/r, leg extension, leg curl, upright row, chest press, biceps curl, triceps extension, leg press    Please see exercise log in patient folder for rate of exertion and repetitions completed.     Assessment:   Patient tolerated all exercises on the MedX equipment without any increase in symptoms; Skipped ice after completion of  exercises. Walking more throughout the week outside her home or at [facility].     Plan:    Continue with established plan of care towards wellness goals. Continue with Plan A month, wants to start weight lifting at [facility]. 12 month is over in June.

## 2018-06-20 DIAGNOSIS — E11.9 DIABETES MELLITUS TYPE 2, CONTROLLED: ICD-10-CM

## 2018-06-20 DIAGNOSIS — F33.41 MDD (MAJOR DEPRESSIVE DISORDER), RECURRENT, IN PARTIAL REMISSION: ICD-10-CM

## 2018-06-20 RX ORDER — VENLAFAXINE 37.5 MG/1
75 TABLET ORAL DAILY
Qty: 30 TABLET | Refills: 5 | Status: SHIPPED | OUTPATIENT
Start: 2018-06-20 | End: 2019-04-01 | Stop reason: SDUPTHER

## 2018-06-21 RX ORDER — METFORMIN HYDROCHLORIDE 500 MG/1
TABLET, EXTENDED RELEASE ORAL
Qty: 180 TABLET | Refills: 3 | OUTPATIENT
Start: 2018-06-21

## 2018-06-22 ENCOUNTER — TELEPHONE (OUTPATIENT)
Dept: GASTROENTEROLOGY | Facility: CLINIC | Age: 73
End: 2018-06-22

## 2018-06-22 NOTE — TELEPHONE ENCOUNTER
Returned patient's call, she is calling to reschedule her follow up appointment with Dr. Patterson. GI appointment scheduled and mailed to address on file.

## 2018-06-22 NOTE — TELEPHONE ENCOUNTER
----- Message from Florentino Jones sent at 6/22/2018  2:13 PM CDT -----  Contact: Pt:[phone]  .Needs Advice    Reason for call: Pt has a question for 's nurse and would like to speak with the nurse.     Communication Preference:Pt:[phone]  Additional Information:

## 2018-06-25 ENCOUNTER — DOCUMENTATION ONLY (OUTPATIENT)
Dept: REHABILITATION | Facility: OTHER | Age: 73
End: 2018-06-25
Attending: PHYSICAL MEDICINE & REHABILITATION
Payer: MEDICARE

## 2018-06-25 DIAGNOSIS — E11.9 DIABETES MELLITUS TYPE 2, CONTROLLED: ICD-10-CM

## 2018-06-25 RX ORDER — METFORMIN HYDROCHLORIDE 500 MG/1
TABLET, EXTENDED RELEASE ORAL
Qty: 180 TABLET | Refills: 3 | OUTPATIENT
Start: 2018-06-25

## 2018-06-25 NOTE — PROGRESS NOTES
Health  Wellness Visit Note    Name: Paris Burris  Clinic Number: [id number]  Physician: Cee Woodall, *  Diagnosis: No diagnosis found.  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   Time In: 1:00 PM  Time Out:1:40 PM  Total Treatment Time: 40 minutes    Subjective:   Patient reports she feels comfortable with transitioning to [facility] on her own. She had been going on her own prior to this visit just wanted some clarification and another copy of her weight card. She also wanted to learn some other core exercises and the HC assisted her with that. She felt comfortable at the end of the session, HC let her know that she can always give us a call if she has any questions or concerns.    Objective:   Paris completed therapeutic stretches (EIS, EIL, RICK, Neck Retractions, scapular retractions) and the following MedX exercise machines: core cervical, core lumbar, torso rotation l/r, leg extension, leg curl, upright row, chest press, biceps curl, triceps extension, leg press    Please see exercise log in patient folder for rate of exertion and repetitions completed.     Assessment:   Patient tolerated all exercises on the MedX equipment without any increase in symptoms; Skipped ice after completion of  exercises. Walking more throughout the week outside her home or at [facility].     Plan:    Pt will continue at [facility] going forward.

## 2018-06-27 ENCOUNTER — TELEPHONE (OUTPATIENT)
Dept: INTERNAL MEDICINE | Facility: CLINIC | Age: 73
End: 2018-06-27

## 2018-06-27 ENCOUNTER — CLINICAL SUPPORT (OUTPATIENT)
Dept: INTERNAL MEDICINE | Facility: CLINIC | Age: 73
End: 2018-06-27
Payer: MEDICARE

## 2018-06-27 VITALS — WEIGHT: 164 LBS | BODY MASS INDEX: 30.99 KG/M2

## 2018-06-27 DIAGNOSIS — R73.03 PRE-DIABETES: Primary | ICD-10-CM

## 2018-06-27 PROCEDURE — 99999 PR PBB SHADOW E&M-EST. PATIENT-LVL I: CPT | Mod: PBBFAC,,,

## 2018-06-27 RX ORDER — METFORMIN HYDROCHLORIDE 500 MG/1
250 TABLET ORAL
Qty: 45 TABLET | Refills: 3 | Status: SHIPPED | OUTPATIENT
Start: 2018-06-27 | End: 2018-12-07

## 2018-06-27 NOTE — TELEPHONE ENCOUNTER
Called patient to let know her Metformin 250 mg refill was sent to [facility].   ..Jacqueline Perry RN HC

## 2018-06-27 NOTE — PROGRESS NOTES
Health  Follow-Up Note   [x] Office  [] Phone  Notes from previous session reviewed.   [x] Previous Session Goals unchanged.   [] Patient/Caregiver Change in Goals.  Goals added or changed by Patient/Caregiver since program participation:  1.  Get back to exercising     2. Wanting to get refill on Metformin taking 250 mg Qd msg sent to Dr. Huynh.     Additional/Changed support that patient/caregiver has experienced/sought?  (Indicate readiness, support from family, friends, others, community groups, etc)  1.      Additional/Changed obstacles that could prevent patient/caregiver from reaching their goals?  1.   having gout and not feeling so good her self    Feedback provided:  1.  Praised for great job and continued effort    Diagnostic values/Desriptors for follow-up as needed for chronic condition(s)   Weight:74.4 kg 164 lb gain 1 lb  Blood Glucose:  114 on 6/18  Averages  14 day -123  30 day 116  90 day 118    Interventions:   1. Health  listened reflectively, validated thoughts and feelings, offered support and encouragement.   2. Allowed patient to express themselves in a non-biased atmosphere.  3. Health  assisted pt to problem-solve obstacles such as being in a challenging environment and dealing with these challenges.   4. Motivational Interviewed interventions utilized (OARS).   5. Patient responded favorably to interventions and remained actively engaged in the session.   6. Health  will remain available and connected for patient by phone and/or office visits.   7. Positive reinforcement, emotional support and encouragement provided.   8. Focused Education: MI    Plan:  [x] Pt will work on goals as stated above.   [x] Pt will contact Health  for any questions, concerns or needs.  [x] Pt will follow up with Health  in office on   7/20/18 at 0930     [] Pt will follow up with Health  on phone in:        [x] Health  will remain available.   [] Health   will contact patient by phone in:        [] Health  will consult:        [] Health  will inform Provider via EPIC messaging.     Impression:  1. Behavior is consistent with    Action   Stage of Change.   2. Participation level:       [x] Receptive      [x] Interactive      [] Guarded and Resistant      [x] Self Motivated      [] Refused/Declined to participate   3. [x] Pt voiced understanding of all information presented.       [] Pt voiced needing further information/education. This will be arranged.       [x] Pt would benefit from further education/information as identified by this health . This will be arranged.     Jacqueline Perry RN HC

## 2018-06-29 ENCOUNTER — PATIENT MESSAGE (OUTPATIENT)
Dept: PSYCHIATRY | Facility: CLINIC | Age: 73
End: 2018-06-29

## 2018-07-17 ENCOUNTER — PATIENT MESSAGE (OUTPATIENT)
Dept: INTERNAL MEDICINE | Facility: CLINIC | Age: 73
End: 2018-07-17

## 2018-07-17 RX ORDER — TRAMADOL HYDROCHLORIDE 50 MG/1
TABLET ORAL
Qty: 60 TABLET | Refills: 1 | OUTPATIENT
Start: 2018-07-17

## 2018-07-17 RX ORDER — TRAMADOL HYDROCHLORIDE 50 MG/1
100 TABLET ORAL DAILY PRN
Qty: 60 TABLET | Refills: 1 | Status: SHIPPED | OUTPATIENT
Start: 2018-07-17 | End: 2018-10-18 | Stop reason: SDUPTHER

## 2018-07-20 ENCOUNTER — CLINICAL SUPPORT (OUTPATIENT)
Dept: INTERNAL MEDICINE | Facility: CLINIC | Age: 73
End: 2018-07-20
Payer: MEDICARE

## 2018-07-20 VITALS — BODY MASS INDEX: 30.95 KG/M2 | WEIGHT: 163.81 LBS

## 2018-07-20 NOTE — PROGRESS NOTES
Health  Follow-Up Note   [x] Office  [] Phone  Notes from previous session reviewed.   [x] Previous Session Goals unchanged.   [] Patient/Caregiver Change in Goals.  Goals added or changed by Patient/Caregiver since program participation:  1.   Continue same plan    2. Walking more and not depending on Kade to go with her.      Additional/Changed support that patient/caregiver has experienced/sought?  (Indicate readiness, support from family, friends, others, community groups, etc)  1.      Additional/Changed obstacles that could prevent patient/caregiver from reaching their goals?  1.  Going to [place] for vacation on 7/26    Feedback provided:  1. Praised for great job and continued effort and determination     Diagnostic values/Desriptors for follow-up as needed for chronic condition(s)   Weight: 74.3 kg 163.8 lb down 0.2 lb  Blood Glucose: Averages  7 d-116  14 d- 116  30 d- 118  90 d- 118    Interventions:   1. Health  listened reflectively, validated thoughts and feelings, offered support and encouragement.   2. Allowed patient to express themselves in a non-biased atmosphere.  3. Health  assisted pt to problem-solve obstacles such as being in a challenging environment and dealing with these challenges.   4. Motivational Interviewed interventions utilized (OARS).   5. Patient responded favorably to interventions and remained actively engaged in the session.   6. Health  will remain available and connected for patient by phone and/or office visits.   7. Positive reinforcement, emotional support and encouragement provided.   8. Focused Education: MI    Plan:  [x] Pt will work on goals as stated above.   [x] Pt will contact Health  for any questions, concerns or needs.  [x] Pt will follow up with Health  in office on    8/14/18 at 0900.    [] Pt will follow up with Health  on phone in:        [x] Health  will remain available.   [] Health  will contact patient by  phone in:        [] Health  will consult:        [] Health  will inform Provider via EPIC messaging.     Impression:  1. Behavior is consistent with   Action    Stage of Change.   2. Participation level:       [x] Receptive      [x] Interactive      [] Guarded and Resistant      [x] Self Motivated      [] Refused/Declined to participate   3. [x] Pt voiced understanding of all information presented.       [] Pt voiced needing further information/education. This will be arranged.       [x] Pt would benefit from further education/information as identified by this health . This will be arranged.     Jacqueline Perry RN HC

## 2018-07-23 ENCOUNTER — OFFICE VISIT (OUTPATIENT)
Dept: GASTROENTEROLOGY | Facility: CLINIC | Age: 73
End: 2018-07-23
Payer: MEDICARE

## 2018-07-23 ENCOUNTER — PATIENT MESSAGE (OUTPATIENT)
Dept: PSYCHIATRY | Facility: CLINIC | Age: 73
End: 2018-07-23

## 2018-07-23 VITALS
BODY MASS INDEX: 31.13 KG/M2 | SYSTOLIC BLOOD PRESSURE: 113 MMHG | WEIGHT: 164.88 LBS | HEIGHT: 61 IN | HEART RATE: 90 BPM | DIASTOLIC BLOOD PRESSURE: 71 MMHG

## 2018-07-23 DIAGNOSIS — D50.0 IRON DEFICIENCY ANEMIA DUE TO CHRONIC BLOOD LOSS: ICD-10-CM

## 2018-07-23 DIAGNOSIS — K25.9 MULTIPLE GASTRIC ULCERS: Primary | ICD-10-CM

## 2018-07-23 DIAGNOSIS — K52.9 ENTERITIS: ICD-10-CM

## 2018-07-23 PROCEDURE — 99214 OFFICE O/P EST MOD 30 MIN: CPT | Mod: S$GLB,,, | Performed by: INTERNAL MEDICINE

## 2018-07-23 PROCEDURE — 3078F DIAST BP <80 MM HG: CPT | Mod: CPTII,S$GLB,, | Performed by: INTERNAL MEDICINE

## 2018-07-23 PROCEDURE — 3074F SYST BP LT 130 MM HG: CPT | Mod: CPTII,S$GLB,, | Performed by: INTERNAL MEDICINE

## 2018-07-23 PROCEDURE — 99999 PR PBB SHADOW E&M-EST. PATIENT-LVL III: CPT | Mod: PBBFAC,,, | Performed by: INTERNAL MEDICINE

## 2018-07-23 NOTE — PROGRESS NOTES
[facility] Established Patient Visit    Reason for Visit:    Chief Complaint   Patient presents with    Follow-up       PCP: Mandi Huynh    HPI:  Paris Burris is a 72 y.o. female here for follow-up of ESTEFANIA, gastric ulcers, and enteritis.  I saw her last in clinic in March for the same.  I performed a single balloon enteroscopy 3/20/18 to evaluate for congested mucosa seen by pill cam.  The exam noted a paraesophageal hernia, diffuse gastric inflammation with aundrea ulcers (biopsies HP neg), and erosions in the proximal to mid jejunum of which biopsies revealed patchy chronic inflammation with increased eosinophiles and villous blunting (CMV stains negative).   I recommended a follow-up EGD which has not yet been done.  She was using frequent aspirin for headaches before the last procedure.  Since then she switched to ibuprofen.  She has not tried tylenol.  She is still taking iron three times daily.            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    PSHX:  has a past surgical history that includes lipoma removal (1999); Colonoscopy (N/A, 12/2/2016); Tonsillectomy; Adenoidectomy; Eye surgery (Bilateral); and Breast lumpectomy (Left, 2002).    The patient's social and family histories were reviewed by me and updated in the appropriate section of the electronic medical record.    Review of patient's allergies indicates:   Allergen  Reactions    Topamax [topiramate] Other (See Comments)     hallucinations       Prior to Admission medications    Medication Sig Start Date End Date Taking? Authorizing Provider   alpha lipoic acid 300 mg Cap Take 300 mg by mouth 2 (two) times daily.    Yes Historical Provider, MD   ascorbic acid (VITAMIN C) 500 MG tablet Take 1,000 mg by mouth once daily.    Yes Historical Provider, MD   b complex vitamins capsule Take 1 capsule by mouth once daily.   Yes Historical Provider, MD   blood sugar diagnostic (ACCU-CHEK SMARTVIEW TEST STRIP) Strp Test once daily. 10/19/17  Yes Mandi Huynh MD   CITICOLINE SODIUM (CITICOLINE ORAL) Take 1 tablet by mouth once daily at 6am.   Yes Historical Provider, MD   esomeprazole (NEXIUM) 40 MG capsule Take 1 capsule (40 mg total) by mouth before breakfast. 5/21/18 5/21/19 Yes Mandi Huynh MD   ferrous sulfate 325 mg (65 mg iron) Tab tablet Start 1 tablet daily x 3 days, then twice daily x 3 days and then three times daily onwards. 6/1/18  Yes Mandi Huynh MD   gabapentin (NEURONTIN) 300 MG capsule Take 1 capsule (300 mg total) by mouth 2 (two) times daily. 5/21/18  Yes Mandi Huynh MD   lancets Misc 1 lancet by Misc.(Non-Drug; Combo Route) route 2 (two) times daily. 7/13/16  Yes Mandi Huynh MD   LORazepam (ATIVAN) 0.5 MG tablet Take 1 tablet (0.5 mg total) by mouth daily as needed. 2/5/18  Yes Dwaine Sweeney MD   metFORMIN (GLUCOPHAGE) 500 MG tablet Take 0.5 tablets (250 mg total) by mouth daily with breakfast. 6/27/18 6/27/19 Yes Mandi Huynh MD   omega-3 fatty acids-fish oil (FISH OIL) 340-1,000 mg Cap Take 1 capsule by mouth once daily.   Yes Historical Provider, MD   rosuvastatin (CRESTOR) 20 MG tablet Take 1 tablet (20 mg total) by mouth once daily. 5/21/18 5/21/19 Yes Mandi Huynh MD   traMADol (ULTRAM) 50 mg tablet Take 2 tablets (100 mg total) by mouth daily as needed for Pain. 7/17/18  Yes Domonique Urban MD   tretinoin (RETIN-A) 0.05 % cream Use hs 4/23/18  Yes MD MARIE Gentile  "(CURCUMIN MISC) Take 500 mg by mouth 2 (two) times daily.    Yes Historical Provider, MD   venlafaxine (EFFEXOR) 37.5 MG Tab Take 2 tablets (75 mg total) by mouth once daily. 6/20/18  Yes Dwaine Sweeney MD   vitamin D 1000 units Tab Take 500 Units by mouth once daily. With K2 50 mch   Yes Historical Provider, MD   blood-glucose meter Misc 1 Device by Misc.(Non-Drug; Combo Route) route 2 (two) times daily. 8/22/17 8/22/18  Mandi Huynh MD   co-enzyme Q-10 30 mg capsule Take 100 mg by mouth 3 (three) times daily.    Historical Provider, MD   diclofenac sodium 1 % Gel APPLY TO AFFECTED AREA DAILY 1/26/18   Historical Provider, MD   fluocinonide 0.05% (LIDEX) 0.05 % cream  1/29/18   Historical Provider, MD   oxybutynin (DITROPAN-XL) 10 MG 24 hr tablet Take 1 tablet (10 mg total) by mouth once daily. 6/7/18 6/7/19  Mandi Huynh MD   triamcinolone acetonide 0.1% (KENALOG) 0.1 % Lotn Apply topically to affected area twice a day. 11/17/17   Mandi Huynh MD   diclofenac (VOLTAREN) 75 MG EC tablet Take 1 tablet (75 mg total) by mouth 2 (two) times daily as needed (back and neck pain). 3/12/18 7/23/18  Cee Woodall MD         Objective Findings:  Vital Signs:  /71   Pulse 90   Ht 5' 1" (1.549 m)   Wt 74.8 kg (164 lb 14.5 oz)   BMI 31.16 kg/m²  Body mass index is 31.16 kg/m².    Physical Exam:  General Appearance:  Well appearing in no acute distress, appears stated age  Head:  Normocephalic, atraumatic  Eyes:  No scleral icterus or pallor, EOMI      Labs:  Lab Results   Component Value Date    WBC 7.65 05/03/2018    HGB 15.4 05/03/2018    HCT 47.0 05/03/2018    MCV 90 05/03/2018    RDW 13.1 05/03/2018     05/03/2018    GRAN 4.1 05/03/2018    GRAN 54.0 05/03/2018    LYMPH 2.6 05/03/2018    LYMPH 34.4 05/03/2018    MONO 0.7 05/03/2018    MONO 8.5 05/03/2018    EOS 0.2 05/03/2018    BASO 0.02 05/03/2018     Lab Results   Component Value Date     11/24/2017    K 4.7 11/24/2017     11/24/2017    CO2 30 " (H) 11/24/2017     11/24/2017    BUN 18 11/24/2017    CREATININE 1.0 11/24/2017    CALCIUM 9.2 11/24/2017    PROT 7.1 11/24/2017    ALBUMIN 3.7 11/24/2017    BILITOT 0.2 11/24/2017    ALKPHOS 73 11/24/2017    AST 19 11/24/2017    ALT 16 11/24/2017               Assessment:  Paris Burris is a 72 y.o. female here with:  1. Multiple gastric ulcers    2. Iron deficiency anemia due to chronic blood loss    3. Enteritis      The pathology and clinical findings tend to support that the gastrointestinal ulcers may be due to NSAID use.  This is further complicated by her hiatal hernia.  Her iron and blood counts have improved on TID iron.  She has not yet tried Tylenol for her headaches.      Recommendations:  1. Decreased oral iron to BID  2. Repeat EGD with push enteroscopy in 6-8 weeks  3. Try Tylenol for headaches  4. Avoid NSAIDs  5. Continue Nexium as is for now    Follow-up pending above      MDM:  Visit time 25 minutes, more than 50% of time spent counseling and discussing natural course and prognosis of ulcers and iron deficiency.        Dustin Patterson MD

## 2018-07-23 NOTE — PATIENT INSTRUCTIONS
Avoid all anti-inflammatory medications (NSAIDs) such as aspirin, ibuprofen, naproxen, etc.    Okay to use Tylenol for headaches.      Continue Nexium as is for now and may be able to adjust after our next endoscopy.    Okay to reduce iron to twice daily instead of three times daily.    Repeat endoscopy in 6-8 weeks.

## 2018-07-25 ENCOUNTER — INITIAL CONSULT (OUTPATIENT)
Dept: UROGYNECOLOGY | Facility: CLINIC | Age: 73
End: 2018-07-25
Payer: MEDICARE

## 2018-07-25 VITALS
WEIGHT: 164.25 LBS | DIASTOLIC BLOOD PRESSURE: 68 MMHG | BODY MASS INDEX: 31.01 KG/M2 | SYSTOLIC BLOOD PRESSURE: 124 MMHG | HEIGHT: 61 IN

## 2018-07-25 DIAGNOSIS — R35.0 URINARY FREQUENCY: ICD-10-CM

## 2018-07-25 DIAGNOSIS — R39.15 URINARY URGENCY: ICD-10-CM

## 2018-07-25 DIAGNOSIS — N39.8 VOIDING DYSFUNCTION: ICD-10-CM

## 2018-07-25 DIAGNOSIS — Z87.440 HISTORY OF UTI: ICD-10-CM

## 2018-07-25 DIAGNOSIS — R39.11 URINARY HESITANCY: ICD-10-CM

## 2018-07-25 DIAGNOSIS — R30.0 DYSURIA: Primary | ICD-10-CM

## 2018-07-25 DIAGNOSIS — N95.2 VAGINAL ATROPHY: ICD-10-CM

## 2018-07-25 DIAGNOSIS — N39.0 FREQUENT UTI: ICD-10-CM

## 2018-07-25 DIAGNOSIS — N39.46 MIXED STRESS AND URGE URINARY INCONTINENCE: ICD-10-CM

## 2018-07-25 DIAGNOSIS — R15.1 FECAL SOILING: ICD-10-CM

## 2018-07-25 PROCEDURE — 51701 INSERT BLADDER CATHETER: CPT | Mod: S$GLB,,, | Performed by: OBSTETRICS & GYNECOLOGY

## 2018-07-25 PROCEDURE — 3074F SYST BP LT 130 MM HG: CPT | Mod: CPTII,S$GLB,, | Performed by: OBSTETRICS & GYNECOLOGY

## 2018-07-25 PROCEDURE — 99499 UNLISTED E&M SERVICE: CPT | Mod: HCNC,S$GLB,, | Performed by: OBSTETRICS & GYNECOLOGY

## 2018-07-25 PROCEDURE — 87086 URINE CULTURE/COLONY COUNT: CPT

## 2018-07-25 PROCEDURE — 99999 PR PBB SHADOW E&M-EST. PATIENT-LVL III: CPT | Mod: PBBFAC,,, | Performed by: OBSTETRICS & GYNECOLOGY

## 2018-07-25 PROCEDURE — 99205 OFFICE O/P NEW HI 60 MIN: CPT | Mod: 25,S$GLB,, | Performed by: OBSTETRICS & GYNECOLOGY

## 2018-07-25 PROCEDURE — 3078F DIAST BP <80 MM HG: CPT | Mod: CPTII,S$GLB,, | Performed by: OBSTETRICS & GYNECOLOGY

## 2018-07-25 NOTE — PATIENT INSTRUCTIONS
Bladder Irritants  Certain foods and drinks have been associated with worsening symptoms of urinary frequency, urgency, urge incontinence, or bladder pain. If you suffer from any of these conditions, you may wish to try eliminating one or more of these foods from your diet and see if your symptoms improve. If bladder symptoms are related to dietary factors, strict adherence to a diet thateliminates the food should bring marked relief in 10 days. Once you are feeling better, you can begin to add foods back into your diet, one at a time. If symptoms return, you will be able to identify the irritant. As you add foods back to your diet it is very important that you drink significant amounts of water.    -----------------------------------------------------------------------------------------------  List of Common Bladder Irritants*  Alcoholic beverages  Apples and apple juice  Cantaloupe  Carbonated beverages  Chili and spicy foods  Chocolate  Citrus fruit  Coffee (including decaffeinated)  Cranberries and cranberry juice  Grapes  Guava  Milk Products: milk, cheese, cottage cheese, yogurt, ice cream  Peaches  Pineapple  Plums  Strawberries  Sugar especially artificial sweeteners, saccharin, aspartame, corn sweeteners, honey, fructose, sucrose, lactose  Tea  Tomatoes and tomato juice  Vitamin B complex  Vinegar  *Most people are not sensitive to ALL of these products; your goal is to find the foods that make YOUR symptoms worse.  ---------------------------------------------------------------------------------------------------    Low-acid fruit substitutions include apricots, papaya, pears and watermelon. Coffee drinkers can drink Kava or other lowacid instant drinks. Tea drinkers can substitute non-citrus herbal and sun brewed teas. Calcium carbonate co-buffered with calcium ascorbate can be substituted for Vitamin C. Prelief is a dietary supplement that works as an acid blocker for the bladder.    Where to get more  information:        Overcoming Bladder Disorders by Tiffani Hanks and Jung Jones, 1990        You Dont Have to Live with Cystitis! By Xuan Keith, 1988  · http://www.urologymanagement.org/oab    --------------------------------------------------------  Fiber Information Sheet  Your doctor has recommended that you follow a high fiber diet. The addition of fiber to your diet can make an enormous difference in your bowel control and regularity. Fiber helps people whether they lose stool or have trouble with constipation. Fiber works by bulking the stool and keeping it formed, yet making the movement soft and easy to pass. Fiber helps keep moisture within the stool so that neither diarrhea nor hard stool occurs. Fiber makes the bowels work more regularly, but it is not a laxative. An additional bonus from eating a high fiber diet is that your risk of cancer is reduced, too.    Most of us eat some high fiber foods already, but nearly all of us do not eat the necessary amount. For example, a slice of whole wheat bread contains only about 10% of the daily recommended amount of fiber. This means if you are relying on only whole wheat bread to meet the recommended fiber requirements, you would need to eat  between 10-18 slices every day! Please note that fiber is NOT in any meat or dairy product. It is only found in grains, vegetables and fruits. The recommended daily fiber intake is 20-25 grams. Foods having high fiber content include:     Fiber One Cereal, ½ cup 13.0 g   Cool beans, ¾ cup 10.4 g   Wheat bran cereal, 1 oz 10.0 g   Kidney beans, ¾ cup 9.3 g   All Bran Cereal, ½ cup 6.0 g   Oat Bran Cereal, hot, 1 oz 4.0 g   Banana, 1medium 3.8 g   Canned pears, ½ cup 3.7 g   3 prunes or ¼ cup raisins 3.5 g   Whole Wheat Total, 1 cup 3.0 g   Carrots, ½ cup 3.2 g   Apple, small 2.8 g   Broccoli, ½ cup 2.8 g   Cauliflower, ½ cup 2.6 g   Oatmeal, 1 oz 2.5 g   Whole Wheat Toast 2.0  g   Cheerios, 1 1/3 cup 2.0 g   Baked potato with skin 2.0 g   Corn, ½ cup 1.9 g   Popcorn, 3 cups 1.9 g   Orange, medium 1.9 g   Granola bar 1.0 g   Lettuce, ½ cup 0.9 g    If you dont think that you can get enough fiber through your everyday diet, there are many good fiber supplements you can take along with eating your high fiber diet. Some of these are: Metamucil (1 heaping teaspoon or 1-2 wafers), Citrucel (1 tablespoon), Fiberall (1-2 wafers or 1 teaspoon), Perdium (2 rounded teaspoons) and 1-2 teaspoons unprocessed bran (to mix with foods)    You may need to use the fiber supplement up to 3-4 times daily to produce normal elimination. Please follow specific package directions or call us for help in regulating the dose. You may notice some bloating and/or increased gas at first. These symptoms can be relieved by adding fiber to your diet slowly. Once your body gets used to this increased fiber, these symptoms will go away.   --------------------------------------------------------------    UTI PREVENTION: (from [employer] Continence)  Urinary Tract Infection (UTI)  More than 4 million doctor office visits per year in the [place] are for urinary tract infections (UTI). About 12% of men and 50% of women will have a UTI during his or her lifetime. Most UTIs arise from bacteria that normally live in the colon and rectum and are present in bowel movements. These bacteria cling to the opening of the urethra, begin to multiply, & travel up to the bladder. Urine flow from the bladder usually washes bacteria out of the body. However, because women have a shorter urethra than men, bacteria can reach the bladder more easily and settle into the bladder wall. This is why women are more likely to develop UTIs than men. This risk factor is exacerbated by the greater likelihood that women introduce bacteria from fecal matter, following a bowel movement, into the urinary tract. Less often, bacteria can  spread to the kidney from the bloodstream. A recurrent UTI is classified as three or more a year.  Risk Factors for UTI  Several factors can contribute to the risk of UTI. Sexual intercourse, use of contraceptive spermicide, low levels of estrogen, catheterization, diabetes, pregnancy, and immune suppression increase susceptibility to UTI.  Naturally occurring estrogen helps prevent recurrent UTI in women. After menopause, estrogen levels drop along with the number of vaginal lactobacilli, the good bacteria which prevent growth of intestinal bacteria in the vagina. This makes postmenopausal women especially susceptible to UTI.  Catheters also present a risk of recurring UTI. Catheters are associated with colonization of bacteria and increased risks of clinical infection. Using the techniques described previously can help keep catheters clean and prevent recurrent UTI. While single-use of sterile catheters reduces the risks, it does not prevent UTI from occurring. It is therefore important to maintain proper care and use of catheters at all times while remaining alert to symptoms of UTI.  Common Symptoms of UTI   Painful urination   Frequency   Urgency   Lower abdominal or pelvic pain or pressure   Blood in the urine   Milky, cloudy, or pink/red urine   Fever   Strong smelling urine  In the elderly populations, symptoms of a urinary tract infection, can be easily overlooked, causing a delay in diagnosis. Elderly people with a UTI are more likely than younger people not to be diagnosed until the complication of sepsis occurs. The elderly often do not experience or report obvious symptoms that younger people have, such as difficult urination, or frequent urination. Instead they may exhibit far more vague symptoms that are similiar to many disease or may be assumed to be due to the aging process. These symptoms include: confusion, feelings of general discomfort, disorientation, fatigue, weakness, behavior  changes, falling, and/or a new, acute incontinence. In addition, because incontinence is common in the elderly, they may not be aware of the symptom of frequency. If you experience any of these symptoms, see your healthcare professional.  Types of UTIs   Cystitis - an inflammation of the bladder and the most common UTI. Almost always, it occurs in women. The infection typically affects only the surface of the bladder and is brief & acute.   Pyelonephritis - more commonly known as a kidney infection and usually occurs when a lower UTI spreads to the kidneys. Occasionally, bacteria will spread to the kidney from the bloodstresam. Kidney infections are more serious and less common than bladder infections. Symptoms include a fever, pain in the back or side below the ribs, nausea, or vomiting.   Urethritis - is an inflammation of the urethra. Men commonly contract urethritis through sexual intercourse with partners infected with sexually transmitted infections. Urethritis may also result from trauma to the area or from the catheterization process.  Prevention of UTI  There are several ways to prevent bladder infections.  Bathroom Behavior:Periodically emptying the bladder by trying to urinate every two to three hours.  Diet:The use of cranberry products seems to decrease the ability of bacteria to adhere to the lining of the urethra and bladder. As cranberry juice can have a high amount of sugar, cranberry extract can be taken in capsule or pill form instead. Increasing water intake by one or two glasses per day may help limit the length of time that you have symptoms and reduce the infections.  Hygiene: Proper hygiene in caring for the urethral area is another way to limit the amount or type of bacteria that can be drawn into the urethra. This is especially true in people who have decreased sensation in the perineal region such as those with MS or who experience any amount of fecal incontinence. Using soap & water  or commercially available cleansing wipes several times per day & frequent changing of incontinence pads as they become wet can minimize the amount of bacteria in the urethral area. Women should always wipe from the front of the vagina to the back of the anus after urination or a bowel movement and wear cotton underwear. It is also reported that wearing thong undergarments may increase one's risk of developing an infection.  Sexual Activity: Can be the source of UTIs in women. Insuring proper lubrication to the vagina and voiding before and after intercourse are tactics to help prevent infection. Using diaphragms and spermicidal jelly and/or foam may increase the risk of infection, so it is important to evaluate what type of birth control you use. Some physicians encourage women who have a history of recurrent infections to take an prophylactic antibiotic after intercourse, as it reduces risk of recurrent UTI by about 85%. At a minimum, restrict your number of sexual partners and urinate immediately after sexual intimacy to lower the risk of recurrent UTIs.  Vaginal Estrogen: reduces risk of recurrent UTI by repopulating the normal vaginal lactobacilli that keep bacteria from the rectum from multiplying and causing a bladder infection. Forms of vaginal estrogen are available at very low dosages that have minimal systemic absorption.  Catheter Use: proper cleansing and storage of catheters is important in one who intermittently catheterizes, as the catheter can be a vehicle to introduce infection if the technique is incorrect or if the catheters are not properly cleaned between each use. A closed system catheter provides a reliable means of sterile IC because the introducer tip is surrounded by a urine collection bag and never exposed to bacteria typically found at the urethral opening. This greatly reduces the risk of infection.  NOTE: Vaginal estrogens and antibiotics are medications that need to be prescribed  by your healthcare provider.  Treatment of UTI  Depending on the severity of infection, UTI can be treated with oral antibiotics. A three-day course of antibiotics can treat a simple UTI. However, some infections may need to be treated for several days or weeks. Length of antibiotic treatment also depends on the type of antibiotic prescribed.  Even if a few doses of medication relieve some symptoms, you should still complete the full course of medication prescribed by your doctor. Taking aspirin, Tylenol, or non-steroidal, anti-inflammatory medications may reduce symptoms during this episode, as they may be a result of increased body temperature.  For more information regarding UTIs please visit: http://www.ncbi.nlm.nih.gov/pubmedhealth/MNW6681762/    ------------------------------------------------------------------    1)  Frequent UTIs (bladder infections):  --urine C&S sent today  --control diabetes  --If you feel like you have a UTI, please call our office so that we can place an order for you to drop off a urine specimen at the closest [facility] lab (we will arrange).     --We will call in antibiotics for you to start right after you drop off specimen.     --In this way, we can determine:     1)  Do you have a UTI?      2) If you have a UTI, is it sensitive to the antibiotics we prescribed?   --follow UTI prevention tips (see attached)  --control bowel movements/fecal cross-contamination  --treat vaginal atrophy (dryness)  --empty bladder before and after intercourse  --consider need for further evaluation (pelvic imaging and cystoscopy) if issue persists    2)  Voiding dysfunction, hesitancy, urinary urgency/frequency, mild stress incontinence:   --urine C&S  --control diabetes  --Empty bladder every 3 hours.  Empty well: wait a minute, lean forward on toilet.    --Avoid dietary irritants (see sheet).  Keep diary x 3-5 days to determine your irritants.  --start pelvic floor PT.  Call in a few days to make appt.  Lauren Shepley ([facility]).  (p) [phone].  Or [phone].   --URGE: consider medication in future.  Takes 2-4 weeks to see if will have effect.  For dry mouth: get sour, sugar free lozenge or gum.    --STRESS:  Pessary vs. Sling.     3)  Vaginal atrophy (dryness):    --get information about breast cancer; does not sound E2+but make sure  --if not estrogen receptor +, would start vaginal estrogen cream ([place], [place])--release of records signed  --please try to call surgeon to find out; if can't get it, we can send request for information--just need information  Dr. Kari Torre  Medical oncology   [address] [place] [address] [phone]    4)  Occasional fecal smearing:  --continue high fiber diet  --avoid dietary triggers  --try to avoid smooth move tea unless necessary  --consider taking fiber supplement +/- stool softener daily to maintain good consistency  --Start daily fiber.  Take 1 tsp of fiber powder (psyllium or other sugar-free powder).  Mix in 8 oz of water.  Take x 3-5 days.  Then, increase fiber by 1 tsp every 3-5 days until stool is easy to pass/not to hard and not too soft.  Stop and continue at that dose.   Do not exceed 6 tsps/day.  May also use over the counter stool softener 1-2 x/day.  AVOID laxatives.    5)  RTC 3 months.

## 2018-07-25 NOTE — LETTER
July 30, 2018      Other  [address] [place] [address]           [facility] [address] [place] [address]  Phone: [phone]          Patient: Paris Burris   MR Number: [id number]   YOB: 1945   Date of Visit: 7/25/2018       Dear Other:    Thank you for referring Paris Burris to me for evaluation. Attached you will find relevant portions of my assessment and plan of care.    If you have questions, please do not hesitate to call me. I look forward to following Paris Burris along with you.    Sincerely,    Graeme Shore MD    Enclosure  CC:  No Recipients    If you would like to receive this communication electronically, please contact [email or web address] or [phone] to request more information on [facility] access.    For providers and/or their staff who would like to refer a patient to [facility], please contact us through our one-stop-shop provider referral line, [facility], at [phone].    If you feel you have received this communication in error or would no longer like to receive these types of communications, please e-mail [email or web address]

## 2018-07-25 NOTE — PROGRESS NOTES
"[facility] [address] [place] [address]    Paris Burris  [id number]  1945  July 30, 2018    Consulting Physician: Other   GYN: none  Primary M.D.: Mandi Huynh MD    Chief Complaint   Patient presents with    Consult     Dysuria       HPI:   **Main bothers: urinary frequency, hesitancy    First started having trouble urinating about 2 months ago- hesitancy and "vibration sensation". No pain, no  Burning, but bothersome.     1)  UI:  (+) GIAN > (+) UUI  X 20 years.  (--) pads: Daytime frequency: 1-2 times/month  Nocturia: No: 1/night. Wakes up at night 0-2 times to urinate. Takes Citurizine   (--) dysuria,  (--) hematuria,  (--) frequent UTIs: 6 in lifetime. Last UTI 1 yr ago (--) complete bladder emptying. Sometimes urinates again 15 minutes after.    2)  POP:  Absent.  (--) vaginal bleeding. (--) vaginal discharge. (+) sexually active.  (--) dyspareunia.   (+)  Vaginal dryness.  (--) vaginal estrogen use. Interested in cream.    3)  BM:  (--) constipation/straining. Drinks "smooth move" tea, takes fiber, eats greens often  (--) chronic diarrhea. (--) hematochezia.  (-) fecal incontinence  (+) fecal smearing/urgency:: Rarely (1 or 2 times/year--mostly after drinking laxative tea).   (+) complete evacuation.  Occasional Splinting (1x/weel) before started drinking smooth move tea.    4)  Frequent UTIs:  --urine C&S    --5/30/18, 5/21/18, 5/17/18 NEG   --5/4/18: e coli   --3/16 NEG  --typical symptoms: frequency, urgency  --how diagnosed: presented to PCP for urine test  --triggers: none known  --previous testing: as above    Past Medical History  Past Medical History:   Diagnosis Date    Allergy     Anemia     Anxiety     Breast cancer 2002    Left breast    Cancer 2002    L breast s/p lumpectomy    Decreased hearing     Depression     Diabetes mellitus     Diabetes with neurologic complications     Gastric ulcer 9/10/13    EGD    Hiatal hernia     " Hyperlipidemia     Migraine headache     Migraine headache 3/20/2015    Multiple gastric ulcers     Parathyroid disorder     Pre-diabetes     Renal manifestation of secondary diabetes mellitus     Sleep apnea     no CPAP    Type 2 diabetes mellitus, controlled, with renal complications 2017    Wrist fracture    DM:  FBG avg (checks QOD) 100-130.  Secondary disease: none.  Neuropathy:  Mild mid L toe/L heel.   Lab Results   Component Value Date    HGBA1C 5.8 (H) 2018     Breast CA:  Dx . S/p L lumpectomy.  PO brachyTx and chemoTx.  Did not take SERM.  Not sure if E2 sensitive.     Past Surgical History  Past Surgical History:   Procedure Laterality Date    ADENOIDECTOMY      BREAST LUMPECTOMY Left     COLONOSCOPY N/A 2016    Procedure: COLONOSCOPY;  Surgeon: Dustin Patterson MD;  Location: [facility] ([address] [place] [address]);  Service: Endoscopy;  Laterality: N/A;  PM prep    EYE SURGERY Bilateral     cataracts    lipoma removal      TONSILLECTOMY          Hysterectomy: No.      Past Ob History    1 vaginal birth, 1 adopted  Largest infant weight: 6lb 11  no FAVD. no episiotomy.      Gynecologic History  LMP: No LMP recorded. Patient is postmenopausal.  Age of menarche: 11  Age of menopause: 59  Menstrual history: normal, occasionally heavy  Pap test: last one normal, few years ago.  History of abnormal paps: Yes - no cancer.  History of STIs:  Genital herpes, no recent flare ups  Mammogram: Date of last: .  Result: Normal  Colonoscopy: Date of last: .  Result:  Polyps, benign.  Repeat due:  .    DEXA:  Date of last: .  Result:  normal.     Family History  Family History   Problem Relation Age of Onset    Suicide Mother     Depression Mother     Alcohol abuse Father     Headaches Father     Diabetes Sister         prediabetes    Psoriasis Sister     Depression Sister     Depression Daughter     Colon cancer Neg Hx     Esophageal cancer Neg Hx       Colon CA:  No  Breast CA: No  GYN CA: No   CA: No    Social History  History   Smoking Status    Never Smoker   Smokeless Tobacco    Never Used   .    History   Alcohol Use No     Comment: quit 2014 - alcohol abuse   .    History   Drug Use     Resides in [place] [address].  Employment status: retired.    Taught primary K-3, bilingual education    Allergies  Review of patient's allergies indicates:   Allergen Reactions    Topamax [topiramate] Other (See Comments)     hallucinations       Medications  Current Outpatient Prescriptions on File Prior to Visit   Medication Sig Dispense Refill    alpha lipoic acid 300 mg Cap Take 300 mg by mouth 2 (two) times daily.       ascorbic acid (VITAMIN C) 500 MG tablet Take 1,000 mg by mouth once daily.       b complex vitamins capsule Take 1 capsule by mouth once daily.      blood sugar diagnostic (ACCU-CHEK SMARTVIEW TEST STRIP) Strp Test once daily. 100 strip 11    blood-glucose meter Misc 1 Device by Misc.(Non-Drug; Combo Route) route 2 (two) times daily. 1 each 0    CITICOLINE SODIUM (CITICOLINE ORAL) Take 1 tablet by mouth once daily at 6am.      co-enzyme Q-10 30 mg capsule Take 100 mg by mouth 3 (three) times daily.      diclofenac sodium 1 % Gel APPLY TO AFFECTED AREA DAILY  2    esomeprazole (NEXIUM) 40 MG capsule Take 1 capsule (40 mg total) by mouth before breakfast. 90 capsule 3    ferrous sulfate 325 mg (65 mg iron) Tab tablet Start 1 tablet daily x 3 days, then twice daily x 3 days and then three times daily onwards. 90 tablet 11    fluocinonide 0.05% (LIDEX) 0.05 % cream       gabapentin (NEURONTIN) 300 MG capsule Take 1 capsule (300 mg total) by mouth 2 (two) times daily. 180 capsule 3    lancets Misc 1 lancet by Misc.(Non-Drug; Combo Route) route 2 (two) times daily. 100 each 6    LORazepam (ATIVAN) 0.5 MG tablet Take 1 tablet (0.5 mg total) by mouth daily as needed. 30 tablet 3    metFORMIN (GLUCOPHAGE) 500 MG tablet Take 0.5 tablets (250 mg total)  "by mouth daily with breakfast. 45 tablet 3    omega-3 fatty acids-fish oil (FISH OIL) 340-1,000 mg Cap Take 1 capsule by mouth once daily.      oxybutynin (DITROPAN-XL) 10 MG 24 hr tablet Take 1 tablet (10 mg total) by mouth once daily. 30 tablet 11    rosuvastatin (CRESTOR) 20 MG tablet Take 1 tablet (20 mg total) by mouth once daily. 90 tablet 3    traMADol (ULTRAM) 50 mg tablet Take 2 tablets (100 mg total) by mouth daily as needed for Pain. 60 tablet 1    tretinoin (RETIN-A) 0.05 % cream Use hs 45 g 6    triamcinolone acetonide 0.1% (KENALOG) 0.1 % Lotn Apply topically to affected area twice a day. 60 mL 0    TURMERIC (CURCUMIN MISC) Take 500 mg by mouth 2 (two) times daily.       venlafaxine (EFFEXOR) 37.5 MG Tab Take 2 tablets (75 mg total) by mouth once daily. 30 tablet 5    vitamin D 1000 units Tab Take 500 Units by mouth once daily. With [address]       No current facility-administered medications on file prior to visit.        Review of Systems A 14 point ROS was reviewed with pertinent positives as noted above in the history of present illness.      Constitutional: negative  Eyes: negative  Endocrine: negative  Gastrointestinal: negative  Cardiovascular: negative  Respiratory: negative  Allergic/Immunologic: negative  Integumentary: negative  Psychiatric: negative  Musculoskeletal: negative   Ear/Nose/Throat: negative  Neurologic: negative  Genitourinary: SEE HPI  Hematologic/Lymphatic: negative   Breast: negative    Urogynecologic Exam  /68 (BP Location: Left arm, Patient Position: Sitting, BP Method: Large (Manual))   Ht 5' 1" (1.549 m)   Wt 74.5 kg (164 lb 3.9 oz)   BMI 31.03 kg/m²     GENERAL APPEARANCE:  The patient is well-developed, well-nourished.  Neck:  Supple with no thyromegaly, no carotid bruits.  Heart:  Regular rate and rhythm, no murmurs, rubs or gallops.  Lungs:  Clear.  No CVA tenderness.  Abdomen:  Soft, nontender, nondistended, no hepatosplenomegaly.  Incisions:  " none    PELVIC:    External genitalia:  Normal Bartholins, Skenes and labia bilaterally.  Mild agglutination of labia minora and narrowing of introitus c/w lichen process.    Urethra:  No caruncle, diverticulum or masses.  (--) hypermobility.    Vagina:  Atrophy (+) , no bladder masses or tender, no discharge.    Cervix:  normal appearance  Uterus: normal size, contour, position, consistency, mobility, non-tender  Adnexa: Not palpable.    POP-Q:    Deferred.  No obvious POP present with valsalva.     NEUROLOGIC:  Cranial nerves 2 through 12 intact.  Strength 5/5.  DTRs 2+ lower extremities.  S2 through 4 normal.  Sacral reflexes intact.    EXT: LOCKWOOD, 2+ pulses bilaterally, no C/C/E    COUGH STRESS TEST:  negative  KEGEL: 3 /5; does Kegel for valsalva    RECTAL:    External:  Normal, (--) hemorrhoids, (--) dovetailing.   Internal:  deferred    PVR: 30 mL    Impression    1. Dysuria    2. Voiding dysfunction    3. Urinary urgency    4. Urinary frequency    5. Urinary hesitancy    6. Vaginal atrophy    7. History of UTI    8. Fecal soiling    9. Mixed stress and urge urinary incontinence    10. Frequent UTI        Initial Plan  The patient was counseled regarding these issues. The patient was given a summary sheet containing each of these issues with possible options for evaluation and management. When appropriate, we also reviewed computer-generated diagrams specific to their diagnoses..  All questions were addressed to the patient's satisfaction.    1)  Frequent UTIs (bladder infections):  --urine C&S sent today  --control diabetes  --If you feel like you have a UTI, please call our office so that we can place an order for you to drop off a urine specimen at the closest [facility] lab (we will arrange).     --We will call in antibiotics for you to start right after you drop off specimen.     --In this way, we can determine:     1)  Do you have a UTI?      2) If you have a UTI, is it sensitive to the antibiotics we  prescribed?   --follow UTI prevention tips (see attached)  --control bowel movements/fecal cross-contamination  --treat vaginal atrophy (dryness)  --empty bladder before and after intercourse  --consider need for further evaluation (pelvic imaging and cystoscopy) if issue persists    2)  Voiding dysfunction, hesitancy, urinary urgency/frequency, mild stress incontinence:   --urine C&S  --control diabetes  --Empty bladder every 3 hours.  Empty well: wait a minute, lean forward on toilet.    --Avoid dietary irritants (see sheet).  Keep diary x 3-5 days to determine your irritants.  --start pelvic floor PT.  Call in a few days to make appt. Lauren Shepley ([facility]).  (p) [phone].  Or [phone].   --URGE: consider medication in future.  Takes 2-4 weeks to see if will have effect.  For dry mouth: get sour, sugar free lozenge or gum.    --STRESS:  Pessary vs. Sling.     3)  Vaginal atrophy (dryness):    --get information about breast cancer; does not sound E2+but make sure  --if not estrogen receptor +, would start vaginal estrogen cream ([place], [place])--release of records signed  --please try to call surgeon to find out; if can't get it, we can send request for information--just need information  Dr. Kari Torre  Medical oncology   [address] [place] [id number] [phone]    4)  Occasional fecal smearing:  --continue high fiber diet  --avoid dietary triggers  --try to avoid smooth move tea unless necessary  --consider taking fiber supplement +/- stool softener daily to maintain good consistency  --Start daily fiber.  Take 1 tsp of fiber powder (psyllium or other sugar-free powder).  Mix in 8 oz of water.  Take x 3-5 days.  Then, increase fiber by 1 tsp every 3-5 days until stool is easy to pass/not to hard and not too soft.  Stop and continue at that dose.   Do not exceed 6 tsps/day.  May also use over the counter stool softener 1-2 x/day.  AVOID laxatives.    5)  RTC 3 months.      Approximately 60 min were spent in consult, 90 % in discussion.     Thank you for requesting consultation of your patient.  I look forward to participating in their care.    Graeme Shore  Female Pelvic Medicine and Reconstructive Surgery  [facility] [place], [place]

## 2018-07-27 LAB — BACTERIA UR CULT: NO GROWTH

## 2018-07-30 ENCOUNTER — TELEPHONE (OUTPATIENT)
Dept: UROGYNECOLOGY | Facility: CLINIC | Age: 73
End: 2018-07-30

## 2018-07-30 ENCOUNTER — PATIENT MESSAGE (OUTPATIENT)
Dept: ENDOSCOPY | Facility: HOSPITAL | Age: 73
End: 2018-07-30

## 2018-07-30 DIAGNOSIS — D50.0 IRON DEFICIENCY ANEMIA DUE TO CHRONIC BLOOD LOSS: ICD-10-CM

## 2018-07-30 PROBLEM — N39.0 FREQUENT UTI: Status: ACTIVE | Noted: 2018-07-30

## 2018-07-30 PROBLEM — N39.46 MIXED STRESS AND URGE URINARY INCONTINENCE: Status: ACTIVE | Noted: 2018-07-30

## 2018-07-30 NOTE — TELEPHONE ENCOUNTER
Called pt no answer, Left voice message for pt to give the office a call back at [phone] regarding test results.

## 2018-07-30 NOTE — TELEPHONE ENCOUNTER
Called pt no answer, Left voice message for pt to give the office a call back at [phone] regarding test results.

## 2018-07-30 NOTE — TELEPHONE ENCOUNTER
----- Message from Graeme Shore MD sent at 7/27/2018  8:27 PM CDT -----  Please let the patient know urine C&S was negative for infection.  Thanks!

## 2018-07-31 RX ORDER — FERROUS SULFATE 325(65) MG
325 TABLET ORAL 2 TIMES DAILY
Qty: 60 TABLET | Refills: 5 | Status: SHIPPED | OUTPATIENT
Start: 2018-07-31 | End: 2019-01-27

## 2018-08-03 ENCOUNTER — OFFICE VISIT (OUTPATIENT)
Dept: INTERNAL MEDICINE | Facility: CLINIC | Age: 73
End: 2018-08-03
Payer: MEDICARE

## 2018-08-03 VITALS
HEIGHT: 61 IN | DIASTOLIC BLOOD PRESSURE: 78 MMHG | SYSTOLIC BLOOD PRESSURE: 126 MMHG | TEMPERATURE: 98 F | BODY MASS INDEX: 30.88 KG/M2 | WEIGHT: 163.56 LBS | HEART RATE: 96 BPM | OXYGEN SATURATION: 96 %

## 2018-08-03 DIAGNOSIS — M54.50 ACUTE BILATERAL LOW BACK PAIN WITHOUT SCIATICA: Primary | ICD-10-CM

## 2018-08-03 PROCEDURE — 99213 OFFICE O/P EST LOW 20 MIN: CPT | Mod: S$GLB,,, | Performed by: INTERNAL MEDICINE

## 2018-08-03 PROCEDURE — 99999 PR PBB SHADOW E&M-EST. PATIENT-LVL III: CPT | Mod: PBBFAC,,, | Performed by: INTERNAL MEDICINE

## 2018-08-03 PROCEDURE — 3074F SYST BP LT 130 MM HG: CPT | Mod: CPTII,S$GLB,, | Performed by: INTERNAL MEDICINE

## 2018-08-03 PROCEDURE — 3078F DIAST BP <80 MM HG: CPT | Mod: CPTII,S$GLB,, | Performed by: INTERNAL MEDICINE

## 2018-08-03 RX ORDER — TIZANIDINE 2 MG/1
4 TABLET ORAL EVERY 6 HOURS PRN
Qty: 40 TABLET | Refills: 0 | Status: SHIPPED | OUTPATIENT
Start: 2018-08-03 | End: 2018-08-07

## 2018-08-03 NOTE — PROGRESS NOTES
Subjective:       Patient ID: Paris Burris is a 72 y.o. female.    Chief Complaint: Back Pain (lower back pain due to a fall)    Cannot take NSAIDs      Back Pain   This is a new problem. The current episode started in the past 7 days. The problem occurs constantly. The problem has been gradually worsening since onset. The pain is present in the sacro-iliac. The quality of the pain is described as aching. The pain does not radiate. The pain is at a severity of 5/10. The pain is moderate. The pain is the same all the time. The symptoms are aggravated by standing. Pertinent negatives include no abdominal pain, chest pain, dysuria, fever, headaches or weakness. (None) Risk factors include menopause. She has tried ice (tylenol) for the symptoms. The treatment provided no relief.     Review of Systems   Constitutional: Negative for activity change, chills, fatigue and fever.   HENT: Negative for congestion, ear pain, nosebleeds, postnasal drip, sinus pressure and sore throat.    Eyes: Negative.  Negative for visual disturbance.   Respiratory: Negative for cough, chest tightness, shortness of breath and wheezing.    Cardiovascular: Negative for chest pain.   Gastrointestinal: Negative for abdominal pain, diarrhea, nausea and vomiting.   Genitourinary: Negative for difficulty urinating, dysuria, frequency and urgency.   Musculoskeletal: Positive for back pain. Negative for arthralgias and neck stiffness.   Skin: Negative for rash.   Neurological: Negative for dizziness, weakness and headaches.   Psychiatric/Behavioral: Negative for sleep disturbance. The patient is not nervous/anxious.        Objective:      Physical Exam   Musculoskeletal:        Back:        Assessment:       1. Acute bilateral low back pain without sciatica        Plan:   Paris was seen today for back pain.    Diagnoses and all orders for this visit:    Acute bilateral low back pain without sciatica  -     Ambulatory consult to Physical  Therapy    Other orders  -     tiZANidine (ZANAFLEX) 2 MG tablet; Take 2 tablets (4 mg total) by mouth every 6 (six) hours as needed.

## 2018-08-06 ENCOUNTER — PATIENT MESSAGE (OUTPATIENT)
Dept: INTERNAL MEDICINE | Facility: CLINIC | Age: 73
End: 2018-08-06

## 2018-08-07 ENCOUNTER — PATIENT MESSAGE (OUTPATIENT)
Dept: UROGYNECOLOGY | Facility: CLINIC | Age: 73
End: 2018-08-07

## 2018-08-07 ENCOUNTER — OFFICE VISIT (OUTPATIENT)
Dept: INTERNAL MEDICINE | Facility: CLINIC | Age: 73
End: 2018-08-07
Payer: MEDICARE

## 2018-08-07 VITALS
HEART RATE: 88 BPM | DIASTOLIC BLOOD PRESSURE: 84 MMHG | HEIGHT: 61 IN | BODY MASS INDEX: 30.53 KG/M2 | WEIGHT: 161.69 LBS | SYSTOLIC BLOOD PRESSURE: 100 MMHG | OXYGEN SATURATION: 97 %

## 2018-08-07 DIAGNOSIS — M54.50 ACUTE LOW BACK PAIN WITHOUT SCIATICA, UNSPECIFIED BACK PAIN LATERALITY: Primary | ICD-10-CM

## 2018-08-07 DIAGNOSIS — I70.0 AORTIC ATHEROSCLEROSIS: ICD-10-CM

## 2018-08-07 DIAGNOSIS — M47.812 CERVICAL SPINE ARTHRITIS: ICD-10-CM

## 2018-08-07 DIAGNOSIS — E66.9 DIABETES MELLITUS TYPE 2 IN OBESE: ICD-10-CM

## 2018-08-07 DIAGNOSIS — E11.69 DIABETES MELLITUS TYPE 2 IN OBESE: ICD-10-CM

## 2018-08-07 DIAGNOSIS — E11.42 DIABETIC POLYNEUROPATHY ASSOCIATED WITH TYPE 2 DIABETES MELLITUS: ICD-10-CM

## 2018-08-07 DIAGNOSIS — Z98.890 STATUS POST PARATHYROIDECTOMY: ICD-10-CM

## 2018-08-07 DIAGNOSIS — Z90.89 STATUS POST PARATHYROIDECTOMY: ICD-10-CM

## 2018-08-07 PROBLEM — E89.2 STATUS POST PARATHYROIDECTOMY: Status: ACTIVE | Noted: 2018-08-07

## 2018-08-07 PROCEDURE — 99214 OFFICE O/P EST MOD 30 MIN: CPT | Mod: S$GLB,,, | Performed by: INTERNAL MEDICINE

## 2018-08-07 PROCEDURE — 3074F SYST BP LT 130 MM HG: CPT | Mod: CPTII,S$GLB,, | Performed by: INTERNAL MEDICINE

## 2018-08-07 PROCEDURE — 3079F DIAST BP 80-89 MM HG: CPT | Mod: CPTII,S$GLB,, | Performed by: INTERNAL MEDICINE

## 2018-08-07 PROCEDURE — 99499 UNLISTED E&M SERVICE: CPT | Mod: HCNC,S$GLB,, | Performed by: INTERNAL MEDICINE

## 2018-08-07 PROCEDURE — 3044F HG A1C LEVEL LT 7.0%: CPT | Mod: CPTII,S$GLB,, | Performed by: INTERNAL MEDICINE

## 2018-08-07 PROCEDURE — 99999 PR PBB SHADOW E&M-EST. PATIENT-LVL III: CPT | Mod: PBBFAC,,, | Performed by: INTERNAL MEDICINE

## 2018-08-07 NOTE — PROGRESS NOTES
Subjective:       Patient ID: Paris Burris is a 72 y.o. female.    Chief Complaint: Fall (pt fell a week ago and is having lower back pain) and Back Pain (lower back)    HPI   Fell walking on uneven side walk 1 week ago.  Fell forward.  Later that day developed lower back pain, which has not improved.  Pain better sitting, walking.  Worse when changing position in bed.  No leg pain.  She saw Dr Lizama aug 3 , and was prescribed zanaflex.  unablet o take nsaids due to recent h/o gastric ulcers.  Tramadol hasn't helped.   Takes gabapentin for neuropathy.  No fever.      Was having difficulty emptying bladder x 3 mo.  Saw Dr Shore.     Has DM, well controlled, complicated by neuropathy, for which she takes gabapentin.    She has aortic atherosclerosis tx with crestor.  Cervical spine oa.  Uses ice and tramadol, which may help.    When wears tenis shoes, doens't feel unsteady.  Avoid clogs when walker.     S/p parathyroidectomy.  Calcium in November - wnl.  Review of Systems   Constitutional: Negative for fever and unexpected weight change.   HENT: Negative for congestion and postnasal drip.    Eyes: Negative for pain, discharge and visual disturbance.   Respiratory: Negative for cough, chest tightness, shortness of breath and wheezing.    Cardiovascular: Negative for chest pain and leg swelling.   Gastrointestinal: Negative for abdominal pain, constipation, diarrhea and nausea.   Genitourinary: Negative for difficulty urinating, dysuria and hematuria.   Musculoskeletal: Positive for back pain.   Skin: Negative for rash.   Neurological: Negative for headaches.   Psychiatric/Behavioral: Negative for dysphoric mood and sleep disturbance. The patient is not nervous/anxious.        Objective:      Physical Exam   Constitutional: She is oriented to person, place, and time. She appears well-developed and well-nourished. No distress.   Musculoskeletal: She exhibits no tenderness (of back).   Neurological: She is alert and  oriented to person, place, and time.   Reflex Scores:       Patellar reflexes are 2+ on the right side and 2+ on the left side.       Achilles reflexes are 0 on the right side and 0 on the left side.  Psychiatric: She has a normal mood and affect. Her behavior is normal.       Assessment:       1. Acute low back pain without sciatica, unspecified back pain laterality    2. Diabetes mellitus type 2 in obese    3. Diabetic polyneuropathy associated with type 2 diabetes mellitus    4. Aortic atherosclerosis    5. Cervical spine arthritis    6. Status post parathyroidectomy        Plan:       Paris was seen today for fall and back pain.    Diagnoses and all orders for this visit:    Acute low back pain without sciatica, unspecified back pain laterality  -     Ambulatory consult to Physical Therapy    Diabetes mellitus type 2 in obese    Diabetic polyneuropathy associated with type 2 diabetes mellitus    Aortic atherosclerosis    Cervical spine arthritis    Status post parathyroidectomy

## 2018-08-08 ENCOUNTER — OFFICE VISIT (OUTPATIENT)
Dept: PODIATRY | Facility: CLINIC | Age: 73
End: 2018-08-08
Payer: MEDICARE

## 2018-08-08 VITALS
HEART RATE: 91 BPM | HEIGHT: 61 IN | DIASTOLIC BLOOD PRESSURE: 80 MMHG | BODY MASS INDEX: 30.96 KG/M2 | SYSTOLIC BLOOD PRESSURE: 134 MMHG | WEIGHT: 164 LBS

## 2018-08-08 DIAGNOSIS — E11.42 DIABETIC POLYNEUROPATHY ASSOCIATED WITH TYPE 2 DIABETES MELLITUS: Primary | ICD-10-CM

## 2018-08-08 PROCEDURE — 3075F SYST BP GE 130 - 139MM HG: CPT | Mod: CPTII,S$GLB,, | Performed by: PODIATRIST

## 2018-08-08 PROCEDURE — 99212 OFFICE O/P EST SF 10 MIN: CPT | Mod: S$GLB,,, | Performed by: PODIATRIST

## 2018-08-08 PROCEDURE — 3044F HG A1C LEVEL LT 7.0%: CPT | Mod: CPTII,S$GLB,, | Performed by: PODIATRIST

## 2018-08-08 PROCEDURE — 3079F DIAST BP 80-89 MM HG: CPT | Mod: CPTII,S$GLB,, | Performed by: PODIATRIST

## 2018-08-08 PROCEDURE — 99999 PR PBB SHADOW E&M-EST. PATIENT-LVL III: CPT | Mod: PBBFAC,,, | Performed by: PODIATRIST

## 2018-08-08 NOTE — PROGRESS NOTES
Subjective:      Patient ID: Paris Burris is a 72 y.o. female.    Chief Complaint: Diabetes Mellitus (dr porter/art/08/07/2018) and Diabetic Foot Exam    Paris is a 72 y.o. female who presents to the clinic upon referral from Dr. Patricia nj. provider found  for evaluation and treatment of foot numbness. 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 Patient relates burning and tingling has increased as well as subjective numbness right heel and 2nd toe.    CC2 Diabetes, increased risk amputation needing evaluation/management/optomization of foot care.     Hemoglobin A1C   Date Value Ref Range Status   05/03/2018 5.8 (H) 4.0 - 5.6 % Final     Comment:     According to ADA guidelines, hemoglobin A1c <7.0% represents  optimal control in non-pregnant diabetic patients. Different  metrics may apply to specific patient populations.   Standards of Medical Care in Diabetes-2016.  For the purpose of screening for the presence of diabetes:  <5.7%     Consistent with the absence of diabetes  5.7-6.4%  Consistent with increasing risk for diabetes   (prediabetes)  >or=6.5%  Consistent with diabetes  Currently, no consensus exists for use of hemoglobin A1c  for diagnosis of diabetes for children.  This Hemoglobin A1c assay has significant interference with fetal   hemoglobin   (HbF). The results are invalid for patients with abnormal amounts of   HbF,   including those with known Hereditary Persistence   of Fetal Hemoglobin. Heterozygous hemoglobin variants (HbAS,  HbAC,   HbAD, HbAE, HbA2) do not significantly interfere with this assay;   however, presence of multiple variants in a sample may impact the %   interference.     11/24/2017 5.2 4.0 - 5.6 % Final     Comment:     According to ADA guidelines, hemoglobin A1c <7.0% represents  optimal control in non-pregnant diabetic patients. Different  metrics may apply to specific patient populations.   Standards of Medical Care in Diabetes-2016.  For the purpose of screening for the presence of diabetes:  <5.7%     Consistent with the absence of diabetes  5.7-6.4%  Consistent with increasing risk for diabetes   (prediabetes)  >or=6.5%  Consistent with diabetes  Currently, no consensus exists for use of hemoglobin A1c  for diagnosis of diabetes for children.  This Hemoglobin A1c assay has significant interference with fetal   hemoglobin   (HbF). The results are invalid for patients with abnormal amounts of   HbF,   including those with known Hereditary Persistence   of Fetal Hemoglobin. Heterozygous hemoglobin variants (HbAS, HbAC,   HbAD, HbAE, HbA2) do not significantly interfere with this assay;   however, presence of multiple variants in a sample may impact the %   interference.     10/19/2017 5.5 4.0 - 5.6 % Final     Comment:     According to ADA guidelines, hemoglobin A1c <7.0% represents  optimal control in non-pregnant diabetic patients. Different  metrics may apply to specific patient populations.   Standards of Medical Care in Diabetes-2016.  For the purpose of screening for the presence of diabetes:  <5.7%     Consistent with the absence of diabetes  5.7-6.4%  Consistent with increasing risk for diabetes   (prediabetes)  >or=6.5%  Consistent with diabetes  Currently, no consensus exists for use of hemoglobin A1c  for diagnosis of diabetes for children.  This Hemoglobin A1c assay has significant interference with fetal   hemoglobin   (HbF). The results are invalid for patients with abnormal amounts of   HbF,   including  those with known Hereditary Persistence   of Fetal Hemoglobin. Heterozygous hemoglobin variants (HbAS, HbAC,   HbAD, HbAE, HbA2) do not significantly interfere with this assay;   however, presence of multiple variants in a sample may impact the %   interference.             Review of Systems   Constitution: Negative for chills, decreased appetite, diaphoresis, fever, weakness, malaise/fatigue and night sweats.   Cardiovascular: Negative for claudication, cyanosis, leg swelling and syncope.   Skin: Negative for dry skin, itching, nail changes and poor wound healing.   Musculoskeletal: Negative for arthritis, joint pain, joint swelling and myalgias.   Gastrointestinal: Negative for nausea and vomiting.   Neurological: Positive for numbness and sensory change. Negative for loss of balance and paresthesias.           Objective:      Physical Exam   Constitutional: She is oriented to person, place, and time. She appears well-developed and well-nourished.   Cardiovascular:   Pulses:       Dorsalis pedis pulses are 2+ on the right side, and 2+ on the left side.        Posterior tibial pulses are 2+ on the right side, and 2+ on the left side.   All toes warm, pink.   Musculoskeletal: She exhibits no edema or tenderness.        Right ankle: Normal.        Left ankle: Normal.        Right foot: There is no swelling, no crepitus and no deformity.        Left foot: There is no swelling, no crepitus and no deformity.   Adequate joint range of motion without pain, limitation, nor crepitation Bilateral feet and ankle joints. Muscle strength is 5/5 in all groups bilaterally.         Feet:   Right Foot:   Protective Sensation: 10 sites tested. 10 sites sensed.   Left Foot:   Protective Sensation: 10 sites sensed.   Lymphadenopathy:   No palpable lymph nodes   Neurological: She is alert and oriented to person, place, and time. She has normal strength. A sensory deficit is present.   Diminished/loss of protective sensation all toes  bilateral to 10 gram monofilament.  Negative tinel sign to percussion sural, superficial peroneal, deep peroneal, saphenous, and posterior tibial nerves right and left ankles and feet.     Skin: Skin is warm, dry and intact. No rash noted. No erythema. Nails show no clubbing.   Skin is normal age and health appropriate color, turgor, texture, and temperature bilateral lower extremities without ulceration, hyperpigmentation, discoloration, masses nodules or cords palpated.  No ecchymosis, erythema, edema, or cardinal signs of infection bilateral lower extremities.     Psychiatric: She has a normal mood and affect. Her behavior is normal.             Assessment:       Encounter Diagnosis   Name Primary?    Diabetic polyneuropathy associated with type 2 diabetes mellitus Yes         Plan:       Paris was seen today for diabetes mellitus and diabetic foot exam.    Diagnoses and all orders for this visit:    Diabetic polyneuropathy associated with type 2 diabetes mellitus      I counseled the patient on her conditions, their implications and medical management.    Discussed the  importance of maintaining  low blood sugar levels, and the direct affect elevated blood sugars have on progression of neuropathic symptoms    The patient has received literature on basic diabetic foot care.  Patient will inspect feet daily, wear protective shoe gear when ambulatory, and apply moisturizer to skin as needed to maintain elasticity and help prevent ulceration.

## 2018-08-11 ENCOUNTER — HOSPITAL ENCOUNTER (EMERGENCY)
Facility: HOSPITAL | Age: 73
Discharge: HOME | End: 2018-08-11
Attending: EMERGENCY MEDICINE
Payer: MEDICARE

## 2018-08-11 VITALS
OXYGEN SATURATION: 95 % | HEART RATE: 94 BPM | TEMPERATURE: 99 F | RESPIRATION RATE: 16 BRPM | SYSTOLIC BLOOD PRESSURE: 138 MMHG | DIASTOLIC BLOOD PRESSURE: 65 MMHG

## 2018-08-11 DIAGNOSIS — M54.50 LOW BACK PAIN: ICD-10-CM

## 2018-08-11 PROCEDURE — 99284 EMERGENCY DEPT VISIT MOD MDM: CPT | Mod: ,,, | Performed by: PHYSICIAN ASSISTANT

## 2018-08-11 PROCEDURE — 99283 EMERGENCY DEPT VISIT LOW MDM: CPT | Mod: 25

## 2018-08-11 PROCEDURE — 63600175 PHARM REV CODE 636 W HCPCS: Performed by: PHYSICIAN ASSISTANT

## 2018-08-11 PROCEDURE — 96372 THER/PROPH/DIAG INJ SC/IM: CPT

## 2018-08-11 RX ORDER — KETOROLAC TROMETHAMINE 30 MG/ML
30 INJECTION, SOLUTION INTRAMUSCULAR; INTRAVENOUS
Status: COMPLETED | OUTPATIENT
Start: 2018-08-11 | End: 2018-08-11

## 2018-08-11 RX ADMIN — KETOROLAC TROMETHAMINE 30 MG: 30 INJECTION, SOLUTION INTRAMUSCULAR at 04:08

## 2018-08-11 NOTE — ED TRIAGE NOTES
Presents to ER with complaint of low back pain since 7/31/18.  Patient's name and date of birth checked and is correct.  LOC: The patient is awake, alert and aware of environment with an appropriate affect, the patient is oriented x 3 and speaking appropriately.  APPEARANCE: Patient resting comfortably and in no acute distress, patient is clean and well groomed, patient's clothing is properly fastened.  CARDIOVASCULAR:  Heart rate regular and even with no peripheral edema noted.  SKIN: The skin is warm and dry, patient has normal skin turgor and moist mucus membranes, skin intact, no breakdown or brusing noted. MUSKULOSKELETAL: Patient moving all extremities well, no obvious swelling or deformities noted.  RESPIRATORY: Airway is open and patent, respirations are spontaneous, patient has a normal effort and rate.

## 2018-08-11 NOTE — ED PROVIDER NOTES
Encounter Date: 8/11/2018       History     Chief Complaint   Patient presents with    Back Pain     s/p fall in july, has been gonig to urgent care but not improving.      72 y.o. F with PMH significant for anxiety, depression, DM II, Breast Cancer, anemia and Gastric Ulcer. She is here today with complaint of low back pain x 2 weeks. She sustained a fall with no LOC secondary to uneven sidewalk on 07/31/2018 and has had intermittent low back pain since her fall. Pain is worse with changing position in bed and sitting. Improved with walking. She sustained no other injuries from the fall. She is anxious that she is not able to exercise or do her daily house chores secondary to low back pain. She has been seen twice in internal medicine since her fall (08/03/2018 and 08/07/2018), she was prescribed Zanaflex with no improvement of her pain and referred to Physical Therapy which she has not started but is scheduled for Evaluation on 08/20/2018. She denies radiating pain or lower extremity weakness. Bowel and bladder function are normal.           Review of patient's allergies indicates:   Allergen Reactions    Topamax [topiramate] Other (See Comments)     hallucinations     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    Wrist fracture      Past Surgical History:   Procedure Laterality Date    ADENOIDECTOMY      BREAST LUMPECTOMY Left 2002     COLONOSCOPY N/A 12/2/2016    Procedure: COLONOSCOPY;  Surgeon: Dustin Patterson MD;  Location: [facility] ([address] [place] [address]);  Service: Endoscopy;  Laterality: N/A;  PM prep    EYE SURGERY Bilateral     cataracts    lipoma removal  1999    TONSILLECTOMY       Family History   Problem Relation Age of Onset    Suicide Mother     Depression Mother     Alcohol abuse Father     Headaches Father     Diabetes Sister         prediabetes    Psoriasis Sister     Depression Sister     Depression Daughter     Colon cancer Neg Hx     Esophageal cancer Neg Hx      Social History   Substance Use Topics    Smoking status: Never Smoker    Smokeless tobacco: Never Used    Alcohol use No      Comment: quit 2014 - alcohol abuse     Review of Systems   Musculoskeletal: Positive for back pain (low, non radiating). Negative for arthralgias, gait problem, joint swelling, myalgias, neck pain and neck stiffness.   All other systems reviewed and are negative.      Physical Exam     Initial Vitals [08/11/18 1514]   BP Pulse Resp Temp SpO2   134/65 101 18 98.2 °F (36.8 °C) 95 %      MAP       --         Physical Exam    Constitutional: She appears well-developed and well-nourished. She is not diaphoretic. No distress.   HENT:   Head: Normocephalic and atraumatic.   Cardiovascular: Normal rate, regular rhythm and normal heart sounds.   Pulmonary/Chest: Breath sounds normal. No respiratory distress. She has no wheezes. She exhibits no tenderness.   Abdominal: Soft. Bowel sounds are normal. She exhibits no distension. There is no tenderness.   Musculoskeletal: Normal range of motion. She exhibits no edema or tenderness.        Lumbar back: She exhibits pain. She exhibits normal range of motion, no tenderness, no bony tenderness, no swelling, no edema, no deformity, no laceration and no spasm.   Skin: Skin is warm and dry. No rash noted. No erythema.   Psychiatric: She has a normal mood and affect.         ED Course   Procedures  Labs  Reviewed - No data to display       Imaging Results          X-Ray Lumbar Spine Ap And Lateral (Final result)  Result time 08/11/18 17:13:28    Final result by Vish Acosta MD (08/11/18 17:13:28)                 Impression:      1. No acute displaced fracture or dislocation of the lumbar spine noting degenerative changes.  2. Possible constipation.      Electronically signed by: Vish Acosta MD  Date:    08/11/2018  Time:    17:13             Narrative:    EXAMINATION:  XR LUMBAR SPINE AP AND LATERAL    CLINICAL HISTORY:  Low back pain, <6wks, no red flags, no prior management;Low back pain    TECHNIQUE:  AP, lateral and spot images were performed of the lumbar spine.    COMPARISON:  08/02/2016    FINDINGS:  Three views.    Lateral imaging demonstrates minimal grade 1 anterolisthesis of L4 on L5, similar to the previous exam.  No significant vertebral body height loss.  There is disc space height loss involving L5-S1, L3-L4, and T12-L1.  Endplate degenerative changes are most prominent involving T12-L1.  There is multilevel lower lumbar facet arthropathy.  The sacral segments appear aligned.  AP spinal alignment is remarkable for levo scoliotic curvature at the thoracolumbar junction.  The sacroiliac joints are grossly intact.  There is moderate stool throughout the colon.  There is atherosclerotic calcification of the aorta.                               X-Ray Thoracic Spine AP And Lateral (Final result)  Result time 08/11/18 17:15:18    Final result by Vish Acosta MD (08/11/18 17:15:18)                 Impression:      1. No acute displaced fracture or dislocation of the thoracic spine noting stable compression deformity of presumed T10.      Electronically signed by: Vish Acosta MD  Date:    08/11/2018  Time:    17:15             Narrative:    EXAMINATION:  XR THORACIC SPINE AP LATERAL    CLINICAL HISTORY:  Low back pain    COMPARISON:  08/04/2016    FINDINGS:  Two views.    Lateral imaging  demonstrates grossly adequate alignment of the thoracic spine.  There is stable anterior height loss involving presumably T10, stable as compared to the previous exam.  Vertebral body heights are otherwise maintained.  There is multilevel disc space height loss.  AP spinal alignment is remarkable for mild levo scoliotic curvature.  There is atherosclerotic calcification of the aorta.  The lung zones are grossly clear.                                 Medical Decision Making:   Initial Assessment:   72 y.o. F presents to EDwith ongoing non radiating, low back pain since a fall on 07/31/2018. She was seen in internal medicine twice since her injury, prescribed Zanaflex which does not relieve her pain and referred to PT which she has not started. She denies lower extremity numbness, weakness or radiating pain to lower extremities. Bowel and bladder function is normal.     Toradol 30mg IM given with complete resolution of pain. Xray of Lumbar and Thoracic spine ordered, no sign of acute lumbar or thoracic spine fracture. Stable compression of T10 deformity when compared to previous studies.        Discussed Xray findings with patient in detail. No sign of acute fracture. Signs of degeneration, spondylolisthesis, and compression (T10) all of which are stable when compared to prior films. Patient states that she is aware of all of the above findings. Recommend she continue as previously ordered with Physical Therapy, she states she is scheduled for [place] eval on 08/20/2018. Recommend she continue with rest and no exercising until she is evaluated by PT as they will make recommendation.     Discharge instructions were reviewed with verbal understanding. All questions were answered. Patient agreed to current plan for discharge home with instructions to continue with PT.    I discussed the care of this patient with my supervising MD.    Differential Diagnosis:   Low back strain, muscle spasm, spine fracture  Clinical Tests:    Radiological Study: Ordered and Reviewed                      Clinical Impression:   Diagnoses of Low back pain and Low back pain were pertinent to this visit.      Disposition:   Disposition: Discharged  Condition: Stable                        KEYLA Matute  08/11/18 3031

## 2018-08-11 NOTE — DISCHARGE INSTRUCTIONS
Recommend you continue with current plan to proceed with Physical Therapy, scheduled for PT evaluation on 08/20/2018.     Please return to ED with any worsening in current condition or any new concerns.

## 2018-08-14 ENCOUNTER — PATIENT MESSAGE (OUTPATIENT)
Dept: INTERNAL MEDICINE | Facility: CLINIC | Age: 73
End: 2018-08-14

## 2018-08-14 ENCOUNTER — CLINICAL SUPPORT (OUTPATIENT)
Dept: INTERNAL MEDICINE | Facility: CLINIC | Age: 73
End: 2018-08-14
Payer: MEDICARE

## 2018-08-14 VITALS — BODY MASS INDEX: 30.42 KG/M2 | WEIGHT: 162.06 LBS

## 2018-08-14 NOTE — PROGRESS NOTES
Health  Follow-Up Note   [x] Office  [] Phone  Notes from previous session reviewed.   [x] Previous Session Goals unchanged.   [] Patient/Caregiver Change in Goals.  Goals added or changed by Patient/Caregiver since program participation:  1.  Continue same plan        Additional/Changed support that patient/caregiver has experienced/sought?  (Indicate readiness, support from family, friends, others, community groups, etc)  1.   and daughter    Additional/Changed obstacles that could prevent patient/caregiver from reaching their goals?  1. Fell walking on sidewalk went to ER nothing broke, lots of back pain, has kept from exercising last few weeks     Feedback provided:  1. Praised for continued effort and determination down 1.8 lb had lost 4 lbs gained 2 back she states     Diagnostic values/Desriptors for follow-up as needed for chronic condition(s)   Weight: 73.5 kg 162.04 lb  Blood Glucose:Averages   7 d- 109  14 d- 112  30 d-111  90 d 115  Pain: 3/10 now taking tramadol for the pain    Interventions:   1. Health  listened reflectively, validated thoughts and feelings, offered support and encouragement.   2. Allowed patient to express themselves in a non-biased atmosphere.  3. Health  assisted pt to problem-solve obstacles such as being in a challenging environment and dealing with these challenges.   4. Motivational Interviewed interventions utilized (OARS).   5. Patient responded favorably to interventions and remained actively engaged in the session.   6. Health  will remain available and connected for patient by phone and/or office visits.   7. Positive reinforcement, emotional support and encouragement provided.   8. Focused Education: [place]    Plan:  [x] Pt will work on goals as stated above.   [x] Pt will contact Health  for any questions, concerns or needs.  [x] Pt will follow up with Health  in office on    9/4/18 at 0900.    [] Pt will follow up with Health  on  phone in:        [x] Health  will remain available.   [] Health  will contact patient by phone in:        [] Health  will consult:        [] Health  will inform Provider via EPIC messaging.     Impression:  1. Behavior is consistent with   Action    Stage of Change.   2. Participation level:       [x] Receptive      [x] Interactive      [] Guarded and Resistant      [x] Self Motivated      [] Refused/Declined to participate   3. [x] Pt voiced understanding of all information presented.       [] Pt voiced needing further information/education. This will be arranged.       [x] Pt would benefit from further education/information as identified by this health . This will be arranged.     Jacqueline Perry RN HC

## 2018-08-15 ENCOUNTER — PATIENT MESSAGE (OUTPATIENT)
Dept: INTERNAL MEDICINE | Facility: CLINIC | Age: 73
End: 2018-08-15

## 2018-08-15 ENCOUNTER — OFFICE VISIT (OUTPATIENT)
Dept: INTERNAL MEDICINE | Facility: CLINIC | Age: 73
End: 2018-08-15
Payer: MEDICARE

## 2018-08-15 VITALS
WEIGHT: 163.38 LBS | HEART RATE: 85 BPM | SYSTOLIC BLOOD PRESSURE: 118 MMHG | HEIGHT: 61 IN | BODY MASS INDEX: 30.85 KG/M2 | OXYGEN SATURATION: 99 % | DIASTOLIC BLOOD PRESSURE: 86 MMHG

## 2018-08-15 DIAGNOSIS — M54.50 ACUTE MIDLINE LOW BACK PAIN WITHOUT SCIATICA: Primary | ICD-10-CM

## 2018-08-15 PROCEDURE — 3079F DIAST BP 80-89 MM HG: CPT | Mod: CPTII,S$GLB,, | Performed by: INTERNAL MEDICINE

## 2018-08-15 PROCEDURE — 99999 PR PBB SHADOW E&M-EST. PATIENT-LVL V: CPT | Mod: PBBFAC,,, | Performed by: INTERNAL MEDICINE

## 2018-08-15 PROCEDURE — 3074F SYST BP LT 130 MM HG: CPT | Mod: CPTII,S$GLB,, | Performed by: INTERNAL MEDICINE

## 2018-08-15 PROCEDURE — 99214 OFFICE O/P EST MOD 30 MIN: CPT | Mod: S$GLB,,, | Performed by: INTERNAL MEDICINE

## 2018-08-15 RX ORDER — MELOXICAM 7.5 MG/1
7.5 TABLET ORAL DAILY
Qty: 7 TABLET | Refills: 0 | Status: SHIPPED | OUTPATIENT
Start: 2018-08-15 | End: 2018-09-24

## 2018-08-15 RX ORDER — CYCLOBENZAPRINE HCL 5 MG
5 TABLET ORAL NIGHTLY
Qty: 7 TABLET | Refills: 0 | Status: SHIPPED | OUTPATIENT
Start: 2018-08-15 | End: 2018-08-22

## 2018-08-15 NOTE — PROGRESS NOTES
INTERNAL MEDICINE SAME DAY PRIMARY CARE VISIT NOTE    Subjective:     Chief Complaint: Fall (2 weeks ago) and Back Pain (2 weeks ago)       Patient ID: Paris Burris is a 72 y.o. female with type 2 DM, depression with anxiety, hx breast CA, COOKIE, hx PUD, hx anemia now resolved, pt of Dr Huynh, here today for focused same-day primary care visit.    Today, patient with complaint of back pain since fall about 2 weeks ago.  Was stepping up an uneven sidewalk and fell forward onto her knees and elbow c subsequent back pain which started that evening, located in her lower back, only in the middle.  Has had back pain in the past but sx have worsened since the fall.    Was seen by Dr. Lizama 8/3 who rx'ed Zanaflex which pt says caused nightmares.  Was then seen by Dr. Szymanski who referred pt to PT which is pending for 8/20.    Was then seen in ED on 8/11 c xrays t and Lspine at that time showing degenerative changes but no acute fx.  Was given toradol injection which helped.  Has been alternating heat and cold which has not really helped.    Denies sciatic sx, denies LE weakness, no issues c fecal or urinary incontinence.    Past Medical History:  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    Wrist fracture        Home Medications:  Prior to Admission medications    Medication Sig Start Date End Date Taking? Authorizing Provider   alpha lipoic acid 300 mg  Cap Take 300 mg by mouth 2 (two) times daily.    Yes Historical Provider, MD   ascorbic acid (VITAMIN C) 500 MG tablet Take 1,000 mg by mouth once daily.    Yes Historical Provider, MD   b complex vitamins capsule Take 1 capsule by mouth once daily.   Yes Historical Provider, MD   blood sugar diagnostic (ACCU-CHEK SMARTVIEW TEST STRIP) Strp Test once daily. 10/19/17  Yes Mandi Huynh MD   blood-glucose meter Misc 1 Device by Misc.(Non-Drug; Combo Route) route 2 (two) times daily. 8/22/17 8/22/18 Yes Mandi Huynh MD   CITICOLINE SODIUM (CITICOLINE ORAL) Take 1 tablet by mouth once daily at 6am.   Yes Historical Provider, MD   esomeprazole (NEXIUM) 40 MG capsule Take 1 capsule (40 mg total) by mouth before breakfast. 5/21/18 5/21/19 Yes Mandi Huynh MD   ferrous sulfate 325 mg (65 mg iron) Tab tablet Take 1 tablet (325 mg total) by mouth 2 (two) times daily. 7/31/18 1/27/19 Yes Dustin Patterson MD   gabapentin (NEURONTIN) 300 MG capsule Take 1 capsule (300 mg total) by mouth 2 (two) times daily. 5/21/18  Yes Mandi Huynh MD   lancets Misc 1 lancet by Misc.(Non-Drug; Combo Route) route 2 (two) times daily. 7/13/16  Yes Mandi Huynh MD   LORazepam (ATIVAN) 0.5 MG tablet Take 1 tablet (0.5 mg total) by mouth daily as needed. 2/5/18  Yes Dwaine Sweeney MD   metFORMIN (GLUCOPHAGE) 500 MG tablet Take 0.5 tablets (250 mg total) by mouth daily with breakfast. 6/27/18 6/27/19 Yes Mandi Huynh MD   omega-3 fatty acids-fish oil (FISH OIL) 340-1,000 mg Cap Take 1 capsule by mouth once daily.   Yes Historical Provider, MD   rosuvastatin (CRESTOR) 20 MG tablet Take 1 tablet (20 mg total) by mouth once daily. 5/21/18 5/21/19 Yes Mandi Huynh MD   traMADol (ULTRAM) 50 mg tablet Take 2 tablets (100 mg total) by mouth daily as needed for Pain. 7/17/18  Yes Domonique Urban MD   tretinoin (RETIN-A) 0.05 % cream Use hs 4/23/18  Yes Starla Rodríguez MD   TURMERIC (CURCUMIN MISC) Take 500 mg by mouth 2 (two) times daily.    Yes Historical Provider, MD  "  venlafaxine (EFFEXOR) 37.5 MG Tab Take 2 tablets (75 mg total) by mouth once daily. 6/20/18  Yes Dwaine Sweeney MD   vitamin D 1000 units Tab Take 500 Units by mouth once daily. With K2 50 mch   Yes Historical Provider, MD       Allergies:  Review of patient's allergies indicates:   Allergen Reactions    Topamax [topiramate] Other (See Comments)     hallucinations       Social History:  Social History     Tobacco Use    Smoking status: Never Smoker    Smokeless tobacco: Never Used   Substance Use Topics    Alcohol use: No     Comment: quit 2014 - alcohol abuse    Drug use: Yes         Review of Systems   Constitutional: Negative for chills, fatigue and fever.   Respiratory: Negative for cough, chest tightness and shortness of breath.    Cardiovascular: Negative for chest pain.   Gastrointestinal: Negative for abdominal pain and blood in stool.   Genitourinary: Negative for dysuria and frequency.   Musculoskeletal: Positive for back pain.           Objective:   /86 (BP Location: Left arm, Patient Position: Sitting, BP Method: Medium (Manual))   Pulse 85   Ht 5' 1" (1.549 m)   Wt 74.1 kg (163 lb 5.8 oz)   SpO2 99%   BMI 30.87 kg/m²        General: AAO x3, no apparent distress  CV: RRR, no m/r/g  Pulm: Lungs CTAB, no crackles, no wheezes  Abd: s/NT/ND +BS  Extremities: no c/c/e    Labs:         Assessment/Plan     Paris was seen today for fall and back pain.    Diagnoses and all orders for this visit:    Acute midline low back pain without sciatica  Would avoid long-term NSAIDS based on hx of PUD; since sx have persisted, will try one week of meloxicam, take c food.  Advised to increase nexium to bid for the next week.  Will also try short course of flexeril  Has PT scheduled for the 20th which she was advised to keep  -     meloxicam (MOBIC) 7.5 MG tablet; Take 1 tablet (7.5 mg total) by mouth once daily.  -     cyclobenzaprine (FLEXERIL) 5 MG tablet; Take 1 tablet (5 mg total) by mouth nightly. " for 7 days  -     [facility]    RTC prn and with PCP as per routine.    Karen Sharpe MD  Department of Internal Medicine - [facility]  08/15/2018

## 2018-08-17 ENCOUNTER — TELEPHONE (OUTPATIENT)
Dept: SPINE | Facility: CLINIC | Age: 73
End: 2018-08-17

## 2018-08-17 NOTE — TELEPHONE ENCOUNTER
Called and left message for patient that the x-rays that are in the system are ok for the appointment. Patient to call if any questions

## 2018-08-19 RX ORDER — LORAZEPAM 0.5 MG/1
0.5 TABLET ORAL DAILY PRN
Qty: 30 TABLET | Refills: 1 | Status: SHIPPED | OUTPATIENT
Start: 2018-08-19 | End: 2018-11-30

## 2018-08-20 ENCOUNTER — CLINICAL SUPPORT (OUTPATIENT)
Dept: REHABILITATION | Facility: OTHER | Age: 73
End: 2018-08-20
Payer: MEDICARE

## 2018-08-20 ENCOUNTER — PATIENT MESSAGE (OUTPATIENT)
Dept: SPINE | Facility: CLINIC | Age: 73
End: 2018-08-20

## 2018-08-20 DIAGNOSIS — G89.29 CHRONIC BILATERAL LOW BACK PAIN WITHOUT SCIATICA: ICD-10-CM

## 2018-08-20 DIAGNOSIS — M54.50 CHRONIC BILATERAL LOW BACK PAIN WITHOUT SCIATICA: ICD-10-CM

## 2018-08-20 DIAGNOSIS — R29.3 POSTURAL IMBALANCE: ICD-10-CM

## 2018-08-20 DIAGNOSIS — R53.1 DECREASED STRENGTH: ICD-10-CM

## 2018-08-20 PROCEDURE — G8978 MOBILITY CURRENT STATUS: HCPCS | Mod: CK,PN

## 2018-08-20 PROCEDURE — G8979 MOBILITY GOAL STATUS: HCPCS | Mod: CJ,PN

## 2018-08-20 PROCEDURE — 97110 THERAPEUTIC EXERCISES: CPT | Mod: PN

## 2018-08-20 PROCEDURE — 97162 PT EVAL MOD COMPLEX 30 MIN: CPT | Mod: PN

## 2018-08-20 NOTE — PLAN OF CARE
TIME RECORD    Date: 08/20/2018    Start Time:  12:00  Stop Time:  1:00  Total Timed Minutes:  60 min  Functional Outcome Measure: FOTO limitation = 56% Disability  G-codes + Modifier + % Impairment: (mobility):   Initial =  (40% - 60% disability), Goal =  (20% - 40% disability)      OUTPATIENT PHYSICAL THERAPY   PATIENT EVALUATION  Onset Date: August 1, 2018  Primary Diagnosis:   1. Chronic bilateral low back pain without sciatica     2. Postural imbalance     3. Decreased strength       Treatment Diagnosis: low back pain, decreased ROM, flexibility, joint mobility, strength, poor postural awareness/endurance, decreased NM control/coordination  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    Wrist fracture      Precautions: DDD in LSP, Compression Fx in the LSP, DM Type 2, depression  Prior Therapy: none for c/c  Medications: Paris Burris has a current medication list which includes the following prescription(s): alpha lipoic acid, ascorbic acid (vitamin c), b complex vitamins, blood sugar diagnostic, blood-glucose meter, citicoline sodium, cyclobenzaprine, esomeprazole, ferrous sulfate, gabapentin, lancets, lorazepam, meloxicam, metformin, omega-3 fatty acids-fish oil, rosuvastatin, tramadol, tretinoin, turmeric, venlafaxine, and vitamin d.  Nutrition:  Obese  History of Present Illness: acute onset  Prior Level of Function:  "Independent  Social History: not working  Place of Residence (Steps/Adaptations): lives with spouse, 2 story home  Functional Deficits Leading to Referral/Nature of Injury: pain and difficulty with ADLs, HHCs  Patient Therapy Goals: reduce pain and be able to sit and sleep without pain disturbance    Subjective     Paris Burris states that she was stepping up onto a curb at the beginning of August 2018 and she slipped and fell forward onto her knees and elbow. She notes that she has a compression fracture and arthritis in her back, which made her back pain worse than it would normally after a fall. She notes bruising on the R elbow and knees. Pt notes improvements in back pain since onset.    Pain:  Location: low back   Description: Aching, Dull, Sharp and Variable  Activities Which Increase Pain: Lifting, Getting out of bed/chair and sleeping, stair climbing, HHCs  Activities Which Decrease Pain: pain medication, ice, lying down, rest and sitting, lumbar supports  Pain Scale: 3/10 at best 4/10 now  8/10 at worst    Objective     Palpation: TLJ to L-S1 interspinous spaces and adjacent LSP paraspinals  Sensation: intact  DTRs: 2+  Postural examination/scapula alignment: Rounded shoulder, Head forward, Slouched posture, Increased kyphosis and sway back posture    Thoracic/Lumbar AROM: Pain/Dysfunction with Movement:   Flexion Max dean, poor lumbar lordosis, c/o tension in LB  Repeated standing: no worse sx   Extension Min-Mod dean, fair lordosis  Repeated standing: improved sx   Right side bending Mod dean, fingertips 1.5" above knee joint line, c/o stiffness/tension in contralat LB   Left side bending Mod dean, fingertips 1.5" above knee joint line, c/o stiffness/tension in contralat LB   Right rotation Sitting - Mod dean, no c/o pain   Left rotation Sitting - Mod-max dean, c/o tension in R LB     Hip ROM: WNL, c/o adductor pain with L hip IR  Knee ROM: WNL  Lower Extremity Strength  Right LE  Left LE    Hip flexion: " "4-/5 Hip flexion: 4-/5   Hip extension:  4-/5 Hip extension: 4-/5   Hip abduction: 4-/5 Hip abduction: 3+/5   Hip adduction:  4-/5 Hip adduction:  4-/5   Hip Internal rotation   4-/5 Hip Internal rotation 4-/5   Knee Flexion 4+/5 Knee Flexion 4+/5   Knee Extension 4+/5 Knee Extension 4+/5   Ankle dorsiflexion: 5/5 Ankle dorsiflexion: 5/5   Ankle plantarflexion: 5/5 Ankle plantarflexion: 5/5     Flexibility: hamstrings = fair, hip flexors/quads = fair+  Joint Mobility: NT today    Special Tests:   Test Name  Test Result   Prone Instability Test NT   Lumbar Quadrant test (--)   Straight Leg Raise (--)   Neural Tension Test (Slump) (--)   Crossed Straight Leg Raise (--)   Walking on toes (--)   Walking on heels  (--)   Clonus (--)   SHANITA (--)   FADIR (--)   SI Joint Provocation Test (compression / distraction) NT     Balance: SLS R = 10", L 5" before LOB  Gait: Without AD  Analysis: Assistance - none  Bed Mobility:Independent  Transfers: Independent     Other: FOTO limitation = 56% disability  Examination time: 25 min    Treatment:   LTR 15x  PPT 15x 3" holds  Open books: 15x  SLR 15x  Scap retractions: 15 x 3" holds    Patient education: Patient educated regarding surgical procedure, pathogenesis, diagnosis, protocol, prognosis, POC, and HEP. Written Home Exercises Provided with written and verbal instructions for frequency and duration of the following exercises: see clinical reference tab for patient instructions. Pt educated on HEP and activity modifications to reduce c/o pain and improve overall function. Pt was educated in posture and body mechanics.  Use of a lumbar roll was recommended and demonstrated here today.  Purchase information provided. Pt also educated on use of modalities prn to reduce c/o pain and dysfunction. Pt educated on clinic's cancellation/no-show policy of missing 3 consecutive PT appointments, which will result in an automatic discharge from therapy services 2* to non-compliance, unless " otherwise stated. Patient demo good understanding of the education provided. Patient demo good return demo of skill of exercises.    Assessment       Initial Assessment (Pertinent finding, problem list and factors affecting outcome): Patient presents with sudden onset of central low back pain following a fall. Current comorbidity of a compression fracture in the spine also contributes to c/o pain. Pt presents with marked limitations in ROM, flexibility, strength, as well as poor postural awareness and endurance. Current impairments limits patient with all functional activities. Patient requires skilled PT to address remaining deficits and return patient to OF. Pt has set realistic goals and has verbalized good understanding and agreement with reported diagnosis, prognosis and treatment. Pt demonstrates no additional cultural, spiritual or educational need and currently has no barriers to learning.     Rehab Potiential: good     Medical necessity is demonstrated by the following problem list.  Pt presents with the following impairments:     History  Co-morbidities and personal factors that may impact the plan of care Examination  Body Structures and Functions, activity limitations and participation restrictions that may impact the plan of care Clinical Presentation   Decision Making/ Complexity Score     Co-morbidities:   advanced age and DDD in LSP, Compression Fx in the LSP, DM Type 2, depression                Personal Factors:   age  education level  coping style  social background  lifestyle Body Regions: trunk, back, BLE    Body Systems: Musculoskeletal (ROM, strength, symmetry, joint mobility, soft tissue or myofascial mobility, flexibility), Neuromuscular (postural alignment, body mechanics, balance, gait, transfers, motor control, motor learning), cardiovascular (endurance), Integumentary (skin integrity)    Activity limitations:   Learning and applying knowledge  no deficits    General Tasks and  Commands  no deficits    Communication  no deficits    Mobility  lifting and carrying objects  walking  driving (bike, car, motorcycle)    Self care  looking after one's health    Domestic Life  shopping  cooking  doing house work (cleaning house, washing dishes, laundry)  assisting others    Interactions/Relationships  no deficits    Life Areas  no deficits    Community and Social Life  community life  recreation and leisure  [employer] and spirituality      Participation Restrictions:   Lifting, Getting out of bed/chair and sleeping, stair climbing, HHCs         Evolving clinical presentation with changing clinical characteristics           Moderate      FOTO: 56% disability       Short Term Goals (4 Weeks):   1. Pt will report 20% reduction in pain of the lumbar spine and LE for ease with ADL's  2. PT will demonstrate improved trunk strength by a half grade in for ease with upright posture during standing activities.  3. Pt will demonstrate improved lumbar spine ROM in all directions by a half grade for ease with bending activities.   4. Pt to demonstrate improved functional ability with FOTO limitation <=46% disability.    Long Term Goals (8 Weeks):   1. Pt will report being independent with HEP for maintenance of improvements gained during therapy sessions  2. PT will report 50% reduction of pain of the back and LE for ease with dressing and grooming activities.   3. Pt will demonstrate trunk and extremity strength to >=4+/5 without the provocation of pain for ease with household chores  4. Pt will demonstrate appropriate upright posture without external cueing for ease with work related activities.   5. Pt to demonstrate improved functional ability with FOTO limitation <=36% disability.      Plan     Certification Period: 8/20/18 to 11/20/18  Recommended Treatment Plan: 1-2 times per week for 12 weeks: Aquatic Therapy, Cervical/Lumbar Traction, Electrical Stimulation prn, Gait Training, Iontophoresis (with  dexamethasone prn), Manual Therapy, Moist Heat/ Ice, Neuromuscular Re-ed, Patient Education, Self Care, Therapeutic Activites, Therapeutic Exercise and IASTYM, dry needling  Other Recommendations: Progress HEP towards D/C. Recommend F/U with MD if symptoms worsen or do not resolve. Patient may be seen by a PTA for treatment to carry out their plan of care.  Face-to-face conferences will be held.          Therapist: Patsy Elaine, PT    I CERTIFY THE NEED FOR THESE SERVICES FURNISHED UNDER THIS PLAN OF TREATMENT AND WHILE UNDER MY CARE    Physician's comments: ________________________________________________________________________________________________________________________________________________      Physician's Name: ___________________________________

## 2018-08-21 ENCOUNTER — TELEPHONE (OUTPATIENT)
Dept: SPINE | Facility: CLINIC | Age: 73
End: 2018-08-21

## 2018-08-22 ENCOUNTER — OFFICE VISIT (OUTPATIENT)
Dept: SPINE | Facility: CLINIC | Age: 73
End: 2018-08-22
Payer: MEDICARE

## 2018-08-22 VITALS
DIASTOLIC BLOOD PRESSURE: 67 MMHG | HEIGHT: 61 IN | BODY MASS INDEX: 31.12 KG/M2 | WEIGHT: 164.81 LBS | SYSTOLIC BLOOD PRESSURE: 145 MMHG | HEART RATE: 104 BPM

## 2018-08-22 DIAGNOSIS — M54.50 CHRONIC BILATERAL LOW BACK PAIN WITHOUT SCIATICA: ICD-10-CM

## 2018-08-22 DIAGNOSIS — M51.37 DDD (DEGENERATIVE DISC DISEASE), LUMBOSACRAL: ICD-10-CM

## 2018-08-22 DIAGNOSIS — G89.29 CHRONIC BILATERAL LOW BACK PAIN WITHOUT SCIATICA: ICD-10-CM

## 2018-08-22 DIAGNOSIS — M54.14 THORACIC AND LUMBOSACRAL NEURITIS: ICD-10-CM

## 2018-08-22 DIAGNOSIS — M47.819 SPONDYLOSIS WITHOUT MYELOPATHY: ICD-10-CM

## 2018-08-22 DIAGNOSIS — M54.17 THORACIC AND LUMBOSACRAL NEURITIS: ICD-10-CM

## 2018-08-22 DIAGNOSIS — M43.10 ACQUIRED SPONDYLOLISTHESIS: ICD-10-CM

## 2018-08-22 PROCEDURE — 99213 OFFICE O/P EST LOW 20 MIN: CPT | Mod: S$GLB,,, | Performed by: PHYSICIAN ASSISTANT

## 2018-08-22 PROCEDURE — 3078F DIAST BP <80 MM HG: CPT | Mod: CPTII,S$GLB,, | Performed by: PHYSICIAN ASSISTANT

## 2018-08-22 PROCEDURE — 99999 PR PBB SHADOW E&M-EST. PATIENT-LVL IV: CPT | Mod: PBBFAC,,, | Performed by: PHYSICIAN ASSISTANT

## 2018-08-22 PROCEDURE — 3077F SYST BP >= 140 MM HG: CPT | Mod: CPTII,S$GLB,, | Performed by: PHYSICIAN ASSISTANT

## 2018-08-22 RX ORDER — CETIRIZINE HYDROCHLORIDE 10 MG/1
10 TABLET ORAL DAILY
COMMUNITY
End: 2019-02-20 | Stop reason: SDUPTHER

## 2018-08-22 NOTE — PROGRESS NOTES
Subjective:     Patient ID:  Paris Burris is a 72 y.o. female.    [facility]    Chief Complaint: Low back pain    HPI    Paris Burris is a 72 y.o. female who presents with the above CC.  Patient has seen Dr. Woodall before.  Has been to [facility] and had and [facility].    Here today for acute back pain for three weeks.  She fell forward and after started to have back pain in the middle low back region.  She has been to urgent care and the ED for this problem.  Overall doing better.  Had taken seven days of mobic which helped.  Tramadol PRN.    Pain in the middle low back rated 6/10 and worse with sitting and throughout the day and better in the morning and laying down.  No leg pain or paresthesias.    Patient just started PT.   One JENNIFER in the past.  No spine surgery.  Patient is currently taking gabapentin and tramadol prn.    Patient denies any recent accidents or trauma, no saddle anesthesias, and no bowel or bladder incontinence.      Review of Systems:  Please refer to page three of the spine center intake form for a complete review of systems.    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    Wrist fracture      Past Surgical History:   Procedure Laterality Date    ADENOIDECTOMY      BREAST LUMPECTOMY Left 2002    EYE SURGERY Bilateral     cataracts    lipoma removal  1999    TONSILLECTOMY       Current Outpatient Medications on  File Prior to Visit   Medication Sig Dispense Refill    alpha lipoic acid 300 mg Cap Take 300 mg by mouth 2 (two) times daily.       ascorbic acid (VITAMIN C) 500 MG tablet Take 1,000 mg by mouth once daily.       b complex vitamins capsule Take 1 capsule by mouth once daily.      blood sugar diagnostic (ACCU-CHEK SMARTVIEW TEST STRIP) Strp Test once daily. 100 strip 11    blood-glucose meter Misc 1 Device by Misc.(Non-Drug; Combo Route) route 2 (two) times daily. 1 each 0    cetirizine (ZYRTEC) 10 MG tablet Take 10 mg by mouth once daily.      CITICOLINE SODIUM (CITICOLINE ORAL) Take 1 tablet by mouth once daily at 6am.      esomeprazole (NEXIUM) 40 MG capsule Take 1 capsule (40 mg total) by mouth before breakfast. 90 capsule 3    ferrous sulfate 325 mg (65 mg iron) Tab tablet Take 1 tablet (325 mg total) by mouth 2 (two) times daily. 60 tablet 5    gabapentin (NEURONTIN) 300 MG capsule Take 1 capsule (300 mg total) by mouth 2 (two) times daily. 180 capsule 3    lancets Misc 1 lancet by Misc.(Non-Drug; Combo Route) route 2 (two) times daily. 100 each 6    LORazepam (ATIVAN) 0.5 MG tablet TAKE 1 TABLET (0.5 MG TOTAL) BY MOUTH DAILY AS NEEDED. 30 tablet 1    metFORMIN (GLUCOPHAGE) 500 MG tablet Take 0.5 tablets (250 mg total) by mouth daily with breakfast. 45 tablet 3    omega-3 fatty acids-fish oil (FISH OIL) 340-1,000 mg Cap Take 1 capsule by mouth once daily.      rosuvastatin (CRESTOR) 20 MG tablet Take 1 tablet (20 mg total) by mouth once daily. 90 tablet 3    traMADol (ULTRAM) 50 mg tablet Take 2 tablets (100 mg total) by mouth daily as needed for Pain. 60 tablet 1    tretinoin (RETIN-A) 0.05 % cream Use hs 45 g 6    TURMERIC (CURCUMIN MISC) Take 500 mg by mouth 2 (two) times daily.       venlafaxine (EFFEXOR) 37.5 MG Tab Take 2 tablets (75 mg total) by mouth once daily. 30 tablet 5    vitamin D 1000 units Tab Take 500 Units by mouth once daily. With K2 50 mch      cyclobenzaprine  (FLEXERIL) 5 MG tablet Take 1 tablet (5 mg total) by mouth nightly. for 7 days 7 tablet 0    meloxicam (MOBIC) 7.5 MG tablet Take 1 tablet (7.5 mg total) by mouth once daily. 7 tablet 0     No current facility-administered medications on file prior to visit.      Review of patient's allergies indicates:   Allergen Reactions    Topamax [topiramate] Other (See Comments)     hallucinations     Social History     Socioeconomic History    Marital status:      Spouse name: Not on file    Number of children: Not on file    Years of education: Not on file    Highest education level: Not on file   Social Needs    Financial resource strain: Not on file    Food insecurity - worry: Not on file    Food insecurity - inability: Not on file    Transportation needs - medical: Not on file    Transportation needs - non-medical: Not on file   Occupational History    Occupation:  supervisor   Tobacco Use    Smoking status: Never Smoker    Smokeless tobacco: Never Used   Substance and Sexual Activity    Alcohol use: No     Comment: quit 2014 - alcohol abuse    Drug use: Yes    Sexual activity: Yes     Partners: Male   Other Topics Concern    Patient feels they ought to cut down on drinking/drug use Not Asked    Patient annoyed by others criticizing their drinking/drug use Not Asked    Patient has felt bad or guilty about drinking/drug use Not Asked    Patient has had a drink/used drugs as an eye opener in the AM Not Asked    Are you pregnant or think you may be? Not Asked    Breast-feeding Not Asked   Social History Narrative    Not on file     Family History   Problem Relation Age of Onset    Suicide Mother     Depression Mother     Alcohol abuse Father     Headaches Father     Diabetes Sister         prediabetes    Psoriasis Sister     Depression Sister     Depression Daughter     Colon cancer Neg Hx     Esophageal cancer Neg Hx        Objective:      Vitals:    08/22/18 0841   BP: (!)  "145/67   Pulse: 104   Weight: 74.8 kg (164 lb 12.8 oz)   Height: 5' 1" (1.549 m)   PainSc:   5   PainLoc: Back         Physical Exam:    General:  Paris Burris is well-developed, well-nourished, appears stated age, in no acute distress, alert and oriented to person, place, and time.    Pulmonary/Chest:  Respiratory effort normal  Abdominal: Exhibits no distension  Psychiatric:  Normal mood and affect.  Behavior is normal.  Judgement and thought content normal    Musculoskeletal:    Patient arises from a sitting to standing position without difficulty.  Patient walks to the door without evidence of limp, pain, or abnormality of gait. Patient is able to walk on heels and toes without difficulty.    Lumbar ROM:   No pain in lumbar flexion, extension, right lateral bending, and left lateral bending.  Mild pain in flexion and left lateral bending.    Lumbar Spine Inspection:  Normal with no surgical scars and no visible rashes.    Lumbar Spine Palpation:  No tenderness to low back palpation or thoracic spine palpation.    SI Joint Palpation:  No tenderness to SI Joint palpation.    Straight Leg Raise:  Negative right and left SLR.    Neurological: Alert and oriented to person, place, and time    Muscle strength against resistance:     Right Left   Hip flexion  5 / 5 5 / 5   Hip extension 5 / 5 5 / 5   Hip abduction 5 / 5 5 / 5   Hip adduction  5 / 5 5 / 5   Knee extension  5 / 5 5 / 5   Knee flexion 5 / 5 5 / 5   Dorsiflexion  5 / 5 5 / 5   EHL  5 / 5 5 / 5   Plantar flexion  5 / 5 5 / 5   Inversion of the feet 5 / 5 5 / 5   Eversion of the feet  5 / 5 5 / 5     Reflexes:     Right Left   Patellar 2+ 2+   Achilles 2+ 2+     Clonus:  Negative bilaterally    On gross examination of the bilateral upper extremities, patient has full painfree ROM with no signs of clubbing, cyanosis, edema, or weakness.     XRAY Interpretation:     Lumbar spine ap/lateral/flexion/extension xrays were personally reviewed today.  No " fractures.  No movement on flexion and extension.  DDD at L5-S1.  Slight anterolisthesis at L4-5.    Thoracic spine ap/lateral xrays were personally reviewed.  Stable T10 compression deformity.  No new fractures.      Assessment:          1. Spondylosis without myelopathy    2. DDD (degenerative disc disease), lumbosacral    3. Thoracic and lumbosacral neuritis    4. Acquired spondylolisthesis    5. Chronic bilateral low back pain without sciatica            Plan:             L5-S1 DDD and slight anterolisthesis at L4-5.  Flare up after recent fall    -Continue PT she just started  -Continue tramadol PRN from another provider  -OTC NSAIDs prn with food  -FU PRN.  May need MRI in the future if symptoms worsen      Follow-Up:  Follow-up if symptoms worsen or fail to improve. If there are any questions prior to this, the patient was instructed to contact the office.       BENNETT Escoto, PA-C  Neurosurgery  [facility]  [facility]

## 2018-08-22 NOTE — LETTER
August 22, 2018      Karen Sharpe MD  [address]  [facility] [address]           [facility]  [address], [address]  [facility] [address]  Phone: [phone]  Fax: [phone]          Patient: Paris Burris   MR Number: [id number]   YOB: 1945   Date of Visit: 8/22/2018       Dear Dr. Ramon:    Thank you for referring Paris Burris to me for evaluation. Attached you will find relevant portions of my assessment and plan of care.    If you have questions, please do not hesitate to call me. I look forward to following Paris Burris along with you.    Sincerely,    Shani Goldstein PA-C    Enclosure  CC:  No Recipients    If you would like to receive this communication electronically, please contact [email or web address] or [phone] to request more information on [employer] Link access.    For providers and/or their staff who would like to refer a patient to [facility], please contact us through our one-stop-shop provider referral line, [facility] , at [phone].    If you feel you have received this communication in error or would no longer like to receive these types of communications, please e-mail [email or web address]

## 2018-08-24 NOTE — PROGRESS NOTES
"                                                    Physical Therapy Daily Note     Name: Paris Burris  Clinic Number: [id number]  Diagnosis:   Encounter Diagnoses   Name Primary?    Chronic bilateral low back pain without sciatica     Postural imbalance     Decreased strength      Physician: Landy Szymanski MD  Precautions: Compression Fx in the LSP, DM Type 2, depression  Visit #: 2 of 20  PTA Visit #: 0  Time In: 11:00  Time Out: 12:00  Total treatment time: 60 min (1:1 25 min)    Subjective     Pt reports: that she felt OK after the initial visit, but when she tried to perform her HEP 1x she had "a lot of soreness the nxt day to where I couldn't move. So I really have not done them since then." Pt reports that she bought a physioball when she was in [facility],. She notes that she would like to get back to working out at [employer].  Pain Scale: Paris rates pain on a scale of 0-10 to be 5 currently.    Objective     Paris received individual therapeutic exercises to develop strength, endurance, ROM, flexibility, posture and core stabilization for 60 minutes including:    LTR on PB: 20x  DKTC on PB: 20x  PPT: 20 x 5" holds  Open books: 15x  Figure 4 IR/ER stretch (piriformis): 2 x 30"  HS stretch with strap: 2 x 30"  SLR: 2 x 10  scap retractions: 2 x 10    Bridge with band: 2 x 10 x orange TB  BKFO: 2 x 10 x orange TB  SL clams: 2 x 10 x orange TB    SL hip add - add next visit  Prone hip ext - add next visit  PPT with marches - add next visit  PPT with unilat flex OH - add next visit  PPT with Patel flex OH - add next visit  Narrow rows - add next visit  SALPD:  - add next visit      Paris received the following manual therapy techniques: none  Paris received the following supervised modalities after being cleared for contradictions: none    Written Home Exercises Provided: advised pt to continue using PB at home, and to include it with LTR and DKTC  Pt demo good understanding of the education provided. " Paris demonstrated good return demonstration of activities.     Education provided re:  Paris verbalized good understanding of education provided.   No spiritual or educational barriers to learning provided    Assessment     Patient tolerated overall treatment well today. Pt reported good training effect with new exercises. Pt peter's fair return technique with HEP indicating fair compliance at home.  This is a 72 y.o. female referred to outpatient physical therapy and presents with a medical diagnosis of back pain and demonstrates limitations as described in the problem list. Pt prognosis is Good. Pt will continue to benefit from skilled outpatient physical therapy to address the deficits listed in the problem list, provide pt/family education and to maximize pt's level of independence in the home and community environment.     Goals as follows: See IE on 8/20/18 (PN due by 9/20/18)     Plan     Continue with established Plan of Care towards PT goals.    Therapist: Patsy Elaine, PT  8/27/2018

## 2018-08-27 ENCOUNTER — CLINICAL SUPPORT (OUTPATIENT)
Dept: REHABILITATION | Facility: OTHER | Age: 73
End: 2018-08-27
Payer: MEDICARE

## 2018-08-27 DIAGNOSIS — R53.1 DECREASED STRENGTH: ICD-10-CM

## 2018-08-27 DIAGNOSIS — M54.50 CHRONIC BILATERAL LOW BACK PAIN WITHOUT SCIATICA: ICD-10-CM

## 2018-08-27 DIAGNOSIS — R29.3 POSTURAL IMBALANCE: ICD-10-CM

## 2018-08-27 DIAGNOSIS — G89.29 CHRONIC BILATERAL LOW BACK PAIN WITHOUT SCIATICA: ICD-10-CM

## 2018-08-27 PROCEDURE — 97110 THERAPEUTIC EXERCISES: CPT | Mod: PN

## 2018-09-04 ENCOUNTER — CLINICAL SUPPORT (OUTPATIENT)
Dept: INTERNAL MEDICINE | Facility: CLINIC | Age: 73
End: 2018-09-04
Payer: MEDICARE

## 2018-09-04 VITALS — WEIGHT: 161.19 LBS | BODY MASS INDEX: 30.45 KG/M2

## 2018-09-04 NOTE — PROGRESS NOTES
Health  Follow-Up Note   [x] Office   [] Phone  Notes from previous session reviewed.   [x] Previous Session Goals unchanged.   [] Patient/Caregiver Change in Goals.  Goals added or changed by Patient/Caregiver since program participation:  1. Try to take sugar out of coffee in am    2. Continue PT       Additional/Changed support that patient/caregiver has experienced/sought?  (Indicate readiness, support from family, friends, others, community groups, etc)  1.      Additional/Changed obstacles that could prevent patient/caregiver from reaching their goals?  1. Balance, not exercising as much while going to PT     Feedback provided:  1.  Praised for continued effort and determination    Diagnostic values/Desriptors for follow-up as needed for chronic condition(s)   Weight: 73.1 kg 161.16 lb   Blood Glucose: 129 in office this am   7d-121  14d- 122  30d- 118  90d- 116    Interventions:   1. Health  listened reflectively, validated thoughts and feelings, offered support and encouragement.   2. Allowed patient to express themselves in a non-biased atmosphere.  3. Health  assisted pt to problem-solve obstacles such as being in a challenging environment and dealing with these challenges.   4. Motivational Interviewed interventions utilized (OARS).   5. Patient responded favorably to interventions and remained actively engaged in the session.   6. Health  will remain available and connected for patient by phone and/or office visits.   7. Positive reinforcement, emotional support and encouragement provided.   8. Focused Education: MI    Plan:  [x] Pt will work on goals as stated above.   [x] Pt will contact Health  for any questions, concerns or needs.  [x] Pt will follow up with Health  in office on   9/25/18 at 0930.     [] Pt will follow up with Health  on phone in:        [x] Health  will remain available.   [] Health  will contact patient by phone in:        []  Health  will consult:        [] Health  will inform Provider via EPIC messaging.     Impression:  1. Behavior is consistent with    Action   Stage of Change.   2. Participation level:       [x] Receptive      [x] Interactive      [] Guarded and Resistant      [x] Self Motivated      [] Refused/Declined to participate   3. [x] Pt voiced understanding of all information presented.       [] Pt voiced needing further information/education. This will be arranged.       [x] Pt would benefit from further education/information as identified by this health . This will be arranged.     Jacqueline Perry RN HC

## 2018-09-05 NOTE — PROGRESS NOTES
"                                                    Physical Therapy Daily Note     Name: Paris Burris  Clinic Number: [id number]  Diagnosis:   Encounter Diagnoses   Name Primary?    Chronic bilateral low back pain without sciatica     Postural imbalance     Decreased strength      Physician: Fifi Lizama MD  Precautions: Compression Fx in the LSP, DM Type 2, depression  Visit #: 3 of 20  PTA Visit #: 0  Time In: 9:00  Time Out: 9:53  Total treatment time: 53 min (1:1 53 min)    Subjective     Pt reports: that if she does too many exercises then she gets sore in her low back and backs of her legs. "That SLR really makes it worse." She reports neck and lower back pain today. Pt reports that symptoms resolved after LTR and DKTC today (0/10).  Pain Scale: Paris rates pain on a scale of 0-10 to be 6 currently.    Objective     Paris received individual therapeutic exercises to develop strength, endurance, ROM, flexibility, posture and core stabilization for 60 minutes including:    LTR on PB: 15x  DKTC on PB: 15x  PPT: 20 x 5" holds  Open books: 15x  Figure 4 IR/ER stretch (piriformis): 2 x 30"  HS stretch with strap: 2 x 30"  SLR: 2 x 10 with PPT  scap retractions: 2 x 10    Bridge with band: 2 x 10 x orange TB  BKFO: 2 x 10 x orange TB  SL clams: 2 x 10 x orange TB      +PPT with marches - 15x  +PPT with unilat flex OH - 2 x 10  +PPT with Patel flex OH - 15x  +Narrow rows - 2 x 10 x GTB  +SALPD:  - 2 x 10 x OTB  +Antirotations: 2 x 10 x YTB    SL hip add - add next visit  Prone hip ext - add next visit      Paris received the following manual therapy techniques: none  Paris received the following supervised modalities after being cleared for contradictions: none    Written Home Exercises Provided: advised pt to continue using PB at home, and to include it with LTR and DKTC  Pt demo good understanding of the education provided. Paris demonstrated good return demonstration of activities.     Education provided " re:  Paris verbalized good understanding of education provided.   No spiritual or educational barriers to learning provided    Assessment     Patient tolerated overall treatment well today. Heavy education today on side effects and benefits of exercising, assuring patient that stiffness and soreness that she feels is a normal side effect of exercise. Encouraged pt to perform small amounts of exercise several times throughout the day to reduce c/o pain or stiffness, as pt exhibited complete reduction of pain from 6/10 to 0/10 following DKTC and LTR today. Reviewed HEP today for understanding and technique. Encouraged PPT during SLR for increased LSP stability; pt reports reduction of pain with increased ease of exercise. Core and trunk strengthening exercises added today to promote stability and strength.    This is a 72 y.o. female referred to outpatient physical therapy and presents with a medical diagnosis of back pain and demonstrates limitations as described in the problem list. Pt prognosis is Good. Pt will continue to benefit from skilled outpatient physical therapy to address the deficits listed in the problem list, provide pt/family education and to maximize pt's level of independence in the home and community environment.     Goals as follows: See IE on 8/20/18 (PN due by 9/20/18)     Plan     Continue with established Plan of Care towards PT goals.    Therapist: Patsy Elaine, PT  9/10/2018

## 2018-09-10 ENCOUNTER — CLINICAL SUPPORT (OUTPATIENT)
Dept: REHABILITATION | Facility: OTHER | Age: 73
End: 2018-09-10
Payer: MEDICARE

## 2018-09-10 DIAGNOSIS — M54.50 CHRONIC BILATERAL LOW BACK PAIN WITHOUT SCIATICA: ICD-10-CM

## 2018-09-10 DIAGNOSIS — R29.3 POSTURAL IMBALANCE: ICD-10-CM

## 2018-09-10 DIAGNOSIS — R53.1 DECREASED STRENGTH: ICD-10-CM

## 2018-09-10 DIAGNOSIS — G89.29 CHRONIC BILATERAL LOW BACK PAIN WITHOUT SCIATICA: ICD-10-CM

## 2018-09-10 PROCEDURE — 97110 THERAPEUTIC EXERCISES: CPT | Mod: PN

## 2018-09-12 ENCOUNTER — HOSPITAL ENCOUNTER (OUTPATIENT)
Facility: HOSPITAL | Age: 73
Discharge: HOME | End: 2018-09-12
Attending: INTERNAL MEDICINE | Admitting: INTERNAL MEDICINE
Payer: MEDICARE

## 2018-09-12 ENCOUNTER — ANESTHESIA (OUTPATIENT)
Dept: ENDOSCOPY | Facility: HOSPITAL | Age: 73
End: 2018-09-12
Payer: MEDICARE

## 2018-09-12 ENCOUNTER — ANESTHESIA EVENT (OUTPATIENT)
Dept: ENDOSCOPY | Facility: HOSPITAL | Age: 73
End: 2018-09-12
Payer: MEDICARE

## 2018-09-12 VITALS
HEIGHT: 61 IN | BODY MASS INDEX: 30.02 KG/M2 | DIASTOLIC BLOOD PRESSURE: 57 MMHG | HEART RATE: 72 BPM | RESPIRATION RATE: 17 BRPM | OXYGEN SATURATION: 95 % | WEIGHT: 159 LBS | TEMPERATURE: 99 F | SYSTOLIC BLOOD PRESSURE: 105 MMHG

## 2018-09-12 DIAGNOSIS — K25.9 GASTRIC ULCER, UNSPECIFIED CHRONICITY, UNSPECIFIED WHETHER GASTRIC ULCER HEMORRHAGE OR PERFORATION PRESENT: ICD-10-CM

## 2018-09-12 DIAGNOSIS — K44.9 PARAESOPHAGEAL HIATAL HERNIA: Primary | ICD-10-CM

## 2018-09-12 DIAGNOSIS — K25.9 GASTRIC ULCER: ICD-10-CM

## 2018-09-12 LAB — POCT GLUCOSE: 99 MG/DL (ref 70–110)

## 2018-09-12 PROCEDURE — 63600175 PHARM REV CODE 636 W HCPCS: Performed by: NURSE ANESTHETIST, CERTIFIED REGISTERED

## 2018-09-12 PROCEDURE — 27201012 HC FORCEPS, HOT/COLD, DISP: Performed by: INTERNAL MEDICINE

## 2018-09-12 PROCEDURE — 44361 SMALL BOWEL ENDOSCOPY/BIOPSY: CPT | Mod: ,,, | Performed by: INTERNAL MEDICINE

## 2018-09-12 PROCEDURE — 88302 TISSUE EXAM BY PATHOLOGIST: CPT | Performed by: PATHOLOGY

## 2018-09-12 PROCEDURE — 25000003 PHARM REV CODE 250: Performed by: INTERNAL MEDICINE

## 2018-09-12 PROCEDURE — 44361 SMALL BOWEL ENDOSCOPY/BIOPSY: CPT | Performed by: INTERNAL MEDICINE

## 2018-09-12 PROCEDURE — 37000009 HC ANESTHESIA EA ADD 15 MINS: Performed by: INTERNAL MEDICINE

## 2018-09-12 PROCEDURE — 88302 TISSUE EXAM BY PATHOLOGIST: CPT | Mod: 26,,, | Performed by: PATHOLOGY

## 2018-09-12 PROCEDURE — 37000008 HC ANESTHESIA 1ST 15 MINUTES: Performed by: INTERNAL MEDICINE

## 2018-09-12 PROCEDURE — E9220 PRA ENDO ANESTHESIA: HCPCS | Mod: ,,, | Performed by: NURSE ANESTHETIST, CERTIFIED REGISTERED

## 2018-09-12 RX ORDER — SODIUM CHLORIDE 9 MG/ML
INJECTION, SOLUTION INTRAVENOUS CONTINUOUS
Status: DISCONTINUED | OUTPATIENT
Start: 2018-09-12 | End: 2018-09-12 | Stop reason: HOSPADM

## 2018-09-12 RX ORDER — SODIUM CHLORIDE 0.9 % (FLUSH) 0.9 %
3 SYRINGE (ML) INJECTION
Status: DISCONTINUED | OUTPATIENT
Start: 2018-09-12 | End: 2018-09-12 | Stop reason: HOSPADM

## 2018-09-12 RX ORDER — PROPOFOL 10 MG/ML
VIAL (ML) INTRAVENOUS CONTINUOUS PRN
Status: DISCONTINUED | OUTPATIENT
Start: 2018-09-12 | End: 2018-09-12

## 2018-09-12 RX ORDER — PROPOFOL 10 MG/ML
VIAL (ML) INTRAVENOUS
Status: DISCONTINUED | OUTPATIENT
Start: 2018-09-12 | End: 2018-09-12

## 2018-09-12 RX ORDER — LIDOCAINE HCL/PF 100 MG/5ML
SYRINGE (ML) INTRAVENOUS
Status: DISCONTINUED | OUTPATIENT
Start: 2018-09-12 | End: 2018-09-12

## 2018-09-12 RX ADMIN — PROPOFOL 150 MCG/KG/MIN: 10 INJECTION, EMULSION INTRAVENOUS at 08:09

## 2018-09-12 RX ADMIN — PROPOFOL 70 MG: 10 INJECTION, EMULSION INTRAVENOUS at 08:09

## 2018-09-12 RX ADMIN — LIDOCAINE HYDROCHLORIDE 50 MG: 20 INJECTION, SOLUTION INTRAVENOUS at 08:09

## 2018-09-12 RX ADMIN — SODIUM CHLORIDE: 0.9 INJECTION, SOLUTION INTRAVENOUS at 07:09

## 2018-09-12 NOTE — TRANSFER OF CARE
"Anesthesia Transfer of Care Note    Patient: Paris Burris    Procedure(s) Performed: Procedure(s) (LRB):  ESOPHAGOGASTRODUODENOSCOPY (EGD) (N/A)    Patient location: PACU    Anesthesia Type: general    Transport from OR: Transported from OR on room air with adequate spontaneous ventilation    Post pain: adequate analgesia    Post assessment: tolerated procedure well and no apparent anesthetic complications    Post vital signs: stable    Level of consciousness: awake, alert and oriented    Nausea/Vomiting: no nausea/vomiting    Complications: none    Transfer of care protocol was followed      Last vitals:   Visit Vitals  BP (!) 105/58   Pulse 90   Temp 37.1 °C (98.8 °F)   Resp 17   Ht 5' 1" (1.549 m)   Wt 72.1 kg (159 lb)   SpO2 96%   Breastfeeding? No   BMI 30.04 kg/m²     "

## 2018-09-12 NOTE — ANESTHESIA POSTPROCEDURE EVALUATION
"Anesthesia Post Evaluation    Patient: Paris Burris    Procedure(s) Performed: Procedure(s) (LRB):  ESOPHAGOGASTRODUODENOSCOPY (EGD) (N/A)    Final Anesthesia Type: general  Patient location during evaluation: GI PACU  Patient participation: Yes- Able to Participate  Level of consciousness: awake and alert and oriented  Post-procedure vital signs: reviewed and stable  Pain management: adequate  Airway patency: patent  PONV status at discharge: No PONV  Anesthetic complications: no      Cardiovascular status: stable  Respiratory status: unassisted, spontaneous ventilation and room air  Hydration status: euvolemic  Follow-up not needed.        Visit Vitals  BP (!) 105/57   Pulse 72   Temp 37.1 °C (98.8 °F)   Resp 17   Ht 5' 1" (1.549 m)   Wt 72.1 kg (159 lb)   SpO2 95%   Breastfeeding? No   BMI 30.04 kg/m²       Pain/Nilsa Score: Pain Assessment Performed: Yes (9/12/2018  9:09 AM)  Presence of Pain: denies (9/12/2018  9:09 AM)  Pain Rating Prior to Med Admin: 0 (9/12/2018  9:09 AM)  Nilsa Score: 10 (9/12/2018  9:09 AM)        "

## 2018-09-12 NOTE — ANESTHESIA PREPROCEDURE EVALUATION
09/12/2018  Paris Burris is a 72 y.o., female.    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    Wrist fracture      Past Surgical History:   Procedure Laterality Date    ADENOIDECTOMY      BREAST LUMPECTOMY Left 2002    COLONOSCOPY N/A 12/2/2016    Procedure: COLONOSCOPY;  Surgeon: Dustin Patterson MD;  Location: [facility] (4TH FLR);  Service: Endoscopy;  Laterality: N/A;  PM prep    COLONOSCOPY N/A 12/2/2016    Performed by Dustin Patterson MD at [facility] (4TH FLR)    DEVICE ASSISTED ENTEROSCOPY-ANTEROGRADE N/A 3/20/2018    Performed by Dustin Patterson MD at [facility] (2ND FLR)    ESOPHAGOGASTRODUODENOSCOPY (EGD) N/A 12/2/2016    Performed by Dustin Patterson MD at [facility] ENDO (4TH FLR)    ESOPHAGOGASTRODUODENOSCOPY (EGD) N/A 4/20/2015    Performed by Marcial Dewitt MD at [facility] (4TH FLR)    EYE SURGERY Bilateral     cataracts    INJECTION-STEROID-EPIDURAL-LUMBAR N/A 6/15/2016    Performed by Michelle Frias MD at [facility] PAIN MGT    lipoma removal  1999    PARATHYROIDECTOMY minimally invasive N/A 11/4/2015    Performed by Amna Aponte MD at [facility] OR 2ND FLR    TONSILLECTOMY           Anesthesia Evaluation    I have reviewed the Patient Summary Reports.     I have reviewed the Medications.     Review of  Systems  Anesthesia Hx:  No problems with previous Anesthesia Denies Hx of Anesthetic complications  Neg history of prior surgery. Denies Family Hx of Anesthesia complications.   Denies Personal Hx of Anesthesia complications.   Social:  Non-Smoker    Hematology/Oncology:  Hematology Normal   Oncology Normal     EENT/Dental:EENT/Dental Normal   Cardiovascular:  Cardiovascular Normal Exercise tolerance: good     Pulmonary:   Sleep Apnea    Renal/:  Renal/ Normal     Hepatic/GI:   PUD, Hiatal Hernia,    Musculoskeletal:   Arthritis     Neurological:   Headaches    Endocrine:   Diabetes, well controlled    Dermatological:  Skin Normal    Psych:  Psychiatric Normal           Physical Exam  General:  Well nourished    Airway/Jaw/Neck:  Airway Findings: Mouth Opening: Normal Tongue: Normal  General Airway Assessment: Adult  Mallampati: II  Jaw/Neck Findings:  Micrognathia: Negative Mandibular Fracture: Negative    Neck ROM: Normal ROM     Eyes/Ears/Nose:  EYES/EARS/NOSE FINDINGS: Normal   Dental:  Dental Findings: In tact   Chest/Lungs:  Chest/Lungs Findings: Clear to auscultation, Normal Respiratory Rate     Heart/Vascular:  Heart Findings: Rate: Normal  Rhythm: Regular Rhythm  Sounds: Normal  Heart murmur: negative Vascular Findings: Normal    Abdomen:  Abdomen Findings:  Normal, Soft, Nontender     Musculoskeletal:  Musculoskeletal Findings: Normal   Skin:  Skin Findings: Normal    Mental Status:  Mental Status Findings:  Cooperative, Alert and Oriented         Anesthesia Plan  Type of Anesthesia, risks & benefits discussed:  Anesthesia Type:  general  Patient's Preference:   Intra-op Monitoring Plan: standard ASA monitors  Intra-op Monitoring Plan Comments:   Post Op Pain Control Plan:   Post Op Pain Control Plan Comments:   Induction:   IV  Beta Blocker:  Patient is not currently on a Beta-Blocker (No further documentation required).       Informed Consent: Patient understands risks and agrees with Anesthesia  plan.  Questions answered. Anesthesia consent signed with patient.  ASA Score: 2     Day of Surgery Review of History & Physical:    H&P update referred to the provider.         Ready For Surgery From Anesthesia Perspective.

## 2018-09-12 NOTE — H&P
Short Stay Endoscopy History and Physical    PCP - Mandi Huynh MD    Procedure - EGD  ASA - per anesthesia  Mallampati - per anesthesia  History of Anesthesia problems - no  Family history Anesthesia problems -  no   Plan of anesthesia - MAC    HPI:  This is a 72 y.o. female here for evaluation of gastric and jejunal inflammation and ulcers.  Doing well.  Avoiding NSAIDs now.  Taking omeprazole daily.  No GI complaints.         ROS:  Constitutional: No fevers, chills, No weight loss  CV: No chest pain  Pulm: No cough, No shortness of breath  Ophtho: No vision changes  GI: see HPI    Medical History:  has a past medical history of Allergy, Anemia, Anxiety, Breast cancer (2002), Cancer (2002), Decreased hearing, Depression, Diabetes mellitus, Diabetes with neurologic complications, Gastric ulcer (9/10/13), Hiatal hernia, Hyperlipidemia, Migraine headache, Migraine headache (3/20/2015), Multiple gastric ulcers, Parathyroid disorder, Pre-diabetes, Renal manifestation of secondary diabetes mellitus, Sleep apnea, Type 2 diabetes mellitus, controlled, with renal complications (6/14/2017), and Wrist fracture.    Surgical History:  has a past surgical history that includes lipoma removal (1999); Tonsillectomy; Adenoidectomy; Eye surgery (Bilateral); Breast lumpectomy (Left, 2002); DEVICE ASSISTED ENTEROSCOPY-ANTEROGRADE (N/A, 3/20/2018); ESOPHAGOGASTRODUODENOSCOPY (EGD) (N/A, 12/2/2016); COLONOSCOPY (N/A, 12/2/2016); INJECTION-STEROID-EPIDURAL-LUMBAR (N/A, 6/15/2016); PARATHYROIDECTOMY minimally invasive (N/A, 11/4/2015); and ESOPHAGOGASTRODUODENOSCOPY (EGD) (N/A, 4/20/2015).    Family History: family history includes Alcohol abuse in her father; Depression in her daughter, mother, and sister; Diabetes in her sister; Headaches in her father; Psoriasis in her sister; Suicide in her mother.. Otherwise no colon cancer, inflammatory bowel disease, or GI malignancies.    Social History:  reports that  has never smoked. she has  never used smokeless tobacco. She reports that she uses drugs. She reports that she does not drink alcohol.    Review of patient's allergies indicates:   Allergen Reactions    Topamax [topiramate] Other (See Comments)     hallucinations       Medications:   Medications Prior to Admission   Medication Sig Dispense Refill Last Dose    alpha lipoic acid 300 mg Cap Take 300 mg by mouth 2 (two) times daily.    9/11/2018 at Unknown time    ascorbic acid (VITAMIN C) 500 MG tablet Take 1,000 mg by mouth once daily.    9/11/2018 at Unknown time    b complex vitamins capsule Take 1 capsule by mouth once daily.   9/11/2018 at Unknown time    blood sugar diagnostic (ACCU-CHEK SMARTVIEW TEST STRIP) Strp Test once daily. 100 strip 11 9/11/2018 at Unknown time    cetirizine (ZYRTEC) 10 MG tablet Take 10 mg by mouth once daily.   9/11/2018 at Unknown time    CITICOLINE SODIUM (CITICOLINE ORAL) Take 1 tablet by mouth once daily at 6am.   9/11/2018 at Unknown time    esomeprazole (NEXIUM) 40 MG capsule Take 1 capsule (40 mg total) by mouth before breakfast. 90 capsule 3 9/12/2018 at Unknown time    ferrous sulfate 325 mg (65 mg iron) Tab tablet Take 1 tablet (325 mg total) by mouth 2 (two) times daily. 60 tablet 5 9/12/2018 at Unknown time    gabapentin (NEURONTIN) 300 MG capsule Take 1 capsule (300 mg total) by mouth 2 (two) times daily. 180 capsule 3 9/12/2018 at Unknown time    lancets Misc 1 lancet by Misc.(Non-Drug; Combo Route) route 2 (two) times daily. 100 each 6 9/11/2018 at Unknown time    LORazepam (ATIVAN) 0.5 MG tablet TAKE 1 TABLET (0.5 MG TOTAL) BY MOUTH DAILY AS NEEDED. 30 tablet 1 Past Week at Unknown time    meloxicam (MOBIC) 7.5 MG tablet Take 1 tablet (7.5 mg total) by mouth once daily. 7 tablet 0 Past Week at Unknown time    metFORMIN (GLUCOPHAGE) 500 MG tablet Take 0.5 tablets (250 mg total) by mouth daily with breakfast. 45 tablet 3 9/11/2018 at Unknown time    omega-3 fatty acids-fish oil (FISH  OIL) 340-1,000 mg Cap Take 1 capsule by mouth once daily.   9/11/2018 at Unknown time    rosuvastatin (CRESTOR) 20 MG tablet Take 1 tablet (20 mg total) by mouth once daily. 90 tablet 3 9/12/2018 at Unknown time    traMADol (ULTRAM) 50 mg tablet Take 2 tablets (100 mg total) by mouth daily as needed for Pain. 60 tablet 1 9/11/2018 at Unknown time    tretinoin (RETIN-A) 0.05 % cream Use hs 45 g 6 9/11/2018 at Unknown time    TURMERIC (CURCUMIN MISC) Take 500 mg by mouth 2 (two) times daily.    9/11/2018 at Unknown time    venlafaxine (EFFEXOR) 37.5 MG Tab Take 2 tablets (75 mg total) by mouth once daily. 30 tablet 5 9/12/2018 at Unknown time    vitamin D 1000 units Tab Take 500 Units by mouth once daily. With K2 50 mch   9/11/2018 at Unknown time    blood-glucose meter Misc 1 Device by Misc.(Non-Drug; Combo Route) route 2 (two) times daily. 1 each 0 Taking       Physical Exam:    Vital Signs:   Vitals:    09/12/18 0800   BP: 133/61   Pulse: 81   Resp: 16   Temp: 98.1 °F (36.7 °C)       General Appearance: Well appearing in no acute distress  Eyes:    No scleral icterus  ENT: Neck supple, Lips, mucosa, and tongue normal; teeth and gums normal  Lungs: CTA anteriorly  Heart:  Regular rate, S1, S2 normal, no murmurs heard.  Abdomen: Soft, non tender, non distended with normal bowel sounds. No hepatosplenomegaly, ascites, or mass.      Labs:  Lab Results   Component Value Date    WBC 7.65 05/03/2018    HGB 15.4 05/03/2018    HCT 47.0 05/03/2018     05/03/2018    CHOL 204 (H) 05/03/2018    TRIG 273 (H) 05/03/2018    HDL 31 (L) 05/03/2018    ALT 16 11/24/2017    AST 19 11/24/2017     11/24/2017    K 4.7 11/24/2017     11/24/2017    CREATININE 1.0 11/24/2017    BUN 18 11/24/2017    CO2 30 (H) 11/24/2017    TSH 1.444 05/03/2018    INR 0.9 04/24/2015    HGBA1C 5.8 (H) 05/03/2018         Assessment:  72 y.o. female with     Plan:  Proceed with EGD with push enteroscopy today.  I have explained the  risks and benefits of endoscopy procedures to the patient including but not limited to bleeding, perforation, infection, and death.  All questions answered.      Dustin Patterson MD

## 2018-09-12 NOTE — ANESTHESIA RELEASE NOTE
"Anesthesia Release from PACU Note    Patient: Paris Burris    Procedure(s) Performed: Procedure(s) (LRB):  ESOPHAGOGASTRODUODENOSCOPY (EGD) (N/A)    Anesthesia type: GEN    Post pain: Adequate analgesia reported    Post assessment: no apparent anesthetic complications, tolerated procedure well and no evidence of recall    Post vital signs: BP (!) 105/58   Pulse 90   Temp 37.1 °C (98.8 °F)   Resp 17   Ht 5' 1" (1.549 m)   Wt 72.1 kg (159 lb)   SpO2 96%   Breastfeeding? No   BMI 30.04 kg/m²     Level of consciousness: awake, alert and oriented    Nausea/Vomiting: no nausea/no vomiting    Complications: none    Airway Patency: patent    Respiratory: unassisted, spontaneous ventilation, room air    Cardiovascular: stable and blood pressure at baseline    Hydration: euvolemic      "

## 2018-09-12 NOTE — DISCHARGE INSTRUCTIONS

## 2018-09-12 NOTE — [employer] PATIENT INSTRUCTIONS
Discharge Summary/Instructions after an Endoscopic Procedure  Patient Name: Paris Burris  Patient MRN: [id number]  Patient YOB: 1945  Wednesday, September 12, 2018  Dustin Pattreson MD  RESTRICTIONS:  During your procedure today, you received medications for sedation.  These   medications may affect your judgment, balance and coordination.  Therefore,   for 24 hours, you have the following restrictions:   - DO NOT drive a car, operate machinery, make legal/financial decisions,   sign important papers or drink alcohol.    ACTIVITY:  Today: no heavy lifting, straining or running due to procedural   sedation/anesthesia.  The following day: return to full activity including work.  DIET:  Eat and drink normally unless instructed otherwise.     TREATMENT FOR COMMON SIDE EFFECTS:  - Mild abdominal pain, nausea, belching, bloating or excessive gas:  rest,   eat lightly and use a heating pad.  - Sore Throat: treat with throat lozenges and/or gargle with warm salt   water.  - Because air was used during the procedure, expelling large amounts of air   from your rectum or belching is normal.  - If a bowel prep was taken, you may not have a bowel movement for 1-3 days.    This is normal.  SYMPTOMS TO WATCH FOR AND REPORT TO YOUR PHYSICIAN:  1. Abdominal pain or bloating, other than gas cramps.  2. Chest pain.  3. Back pain.  4. Signs of infection such as: chills or fever occurring within 24 hours   after the procedure.  5. Rectal bleeding, which would show as bright red, maroon, or black stools.   (A tablespoon of blood from the rectum is not serious, especially if   hemorrhoids are present.)  6. Vomiting.  7. Weakness or dizziness.  GO DIRECTLY TO THE NEAREST EMERGENCY ROOM IF YOU HAVE ANY OF THE FOLLOWING:      Difficulty breathing              Chills and/or fever over 101 F   Persistent vomiting and/or vomiting blood   Severe abdominal pain   Severe chest pain   Black, tarry stools   Bleeding- more than one  tablespoon   Any other symptom or condition that you feel may need urgent attention  Your doctor recommends these additional instructions:  If any biopsies were taken, your doctors clinic will contact you in 1 to 2   weeks with any results.  - Discharge patient to home.   - Patient has a contact number available for emergencies.  The signs and   symptoms of potential delayed complications were discussed with the   patient.  Return to normal activities tomorrow.  Written discharge   instructions were provided to the patient.   - Resume previous diet.   - Continue present medications.   - Await pathology results.   - Refer to a surgeon (Dr. Infante or Chris) to address hiatal   hernia.  For questions, problems or results please call your physician - Dustin Patterson MD at Work:  [phone].  [facility] [place], EMERGENCY ROOM PHONE NUMBER: [phone]  IF A COMPLICATION OR EMERGENCY SITUATION ARISES AND YOU ARE UNABLE TO REACH   YOUR PHYSICIAN - GO DIRECTLY TO THE EMERGENCY ROOM.  Dustin Patterson MD  9/12/2018 8:50:08 AM  This report has been verified and signed electronically.  [employer]

## 2018-09-13 ENCOUNTER — TELEPHONE (OUTPATIENT)
Dept: GASTROENTEROLOGY | Facility: CLINIC | Age: 73
End: 2018-09-13

## 2018-09-13 NOTE — TELEPHONE ENCOUNTER
----- Message from Dustin Patterson MD sent at 9/12/2018  8:38 AM CDT -----  Regarding: surgery referral  Will you please find a time for her to see either Dr. Infante or Dr. Perez for her hiatal hernia

## 2018-09-14 NOTE — PROGRESS NOTES
"                                                    Physical Therapy Daily Note     Name: Paris Burris  Clinic Number: [id number]  Diagnosis:   Encounter Diagnoses   Name Primary?    Chronic bilateral low back pain without sciatica     Postural imbalance     Decreased strength      Physician: Fifi Lizama MD  Precautions: Compression Fx in the LSP, DM Type 2, depression  Visit #: 4 of 20  PTA Visit #: 0  Time In: 10:05  Time Out: 10:55  Total treatment time: 50 min (1:1 30 min)    Subjective     Pt reports: that today is a good day and she has less back pain today.  Pain Scale: Paris rates pain on a scale of 0-10 to be 2 currently.    Objective     Paris received individual therapeutic exercises to develop strength, endurance, ROM, flexibility, posture and core stabilization for 60 minutes including:    LTR on PB: 15x  DKTC on PB: 15x  PPT: 20 x 5" holds  Open books: 15x  Figure 4 IR/ER stretch (piriformis): 2 x 30"  HS stretch with strap: 2 x 30"  SLR: 2 x 10 with PPT  scap retractions: 2 x 10    Bridge with band: 2 x 10 x orange TB  BKFO: 2 x 10 x orange TB  SL clams: 2 x 10 x orange TB      PPT with marches - 15x  PPT with unilat flex OH - 2 x 10  PPT with Patel flex OH - 15x  Narrow rows - 2 x 10 x GTB  SALPD:  - 2 x 10 x OTB  Antirotations: 2 x 10 x YTB    SL hip add - add next visit  Prone hip ext - add next visit      Paris received the following manual therapy techniques: none  Paris received the following supervised modalities after being cleared for contradictions: none    Written Home Exercises Provided: advised pt to continue using PB at home, and to include it with LTR and DKTC  Pt demo good understanding of the education provided. Paris demonstrated good return demonstration of activities.     Education provided re:  Paris verbalized good understanding of education provided.   No spiritual or educational barriers to learning provided    Assessment     Patient tolerated overall treatment well " today. New HEP issued (see media tab for patient instructions) which includes narrow rows, SALPD, antirotations, core with marching and BKFO. Appropriate resistance bands issued today (orange, blue). Pt peter's good return technique with stretching and is independent with her HEP.    This is a 72 y.o. female referred to outpatient physical therapy and presents with a medical diagnosis of back pain and demonstrates limitations as described in the problem list. Pt prognosis is Good. Pt will continue to benefit from skilled outpatient physical therapy to address the deficits listed in the problem list, provide pt/family education and to maximize pt's level of independence in the home and community environment.     Goals as follows: See IE on 8/20/18 (PN due by 9/20/18)     Plan     Continue with established Plan of Care towards PT goals.    Therapist: Patsy Elaine, PT  9/17/2018

## 2018-09-17 ENCOUNTER — TELEPHONE (OUTPATIENT)
Dept: UROGYNECOLOGY | Facility: CLINIC | Age: 73
End: 2018-09-17

## 2018-09-17 ENCOUNTER — CLINICAL SUPPORT (OUTPATIENT)
Dept: REHABILITATION | Facility: OTHER | Age: 73
End: 2018-09-17
Payer: MEDICARE

## 2018-09-17 DIAGNOSIS — R53.1 DECREASED STRENGTH: ICD-10-CM

## 2018-09-17 DIAGNOSIS — M62.89 PELVIC FLOOR DYSFUNCTION: Primary | ICD-10-CM

## 2018-09-17 DIAGNOSIS — R29.3 POSTURAL IMBALANCE: ICD-10-CM

## 2018-09-17 DIAGNOSIS — G89.29 CHRONIC BILATERAL LOW BACK PAIN WITHOUT SCIATICA: ICD-10-CM

## 2018-09-17 DIAGNOSIS — M54.50 CHRONIC BILATERAL LOW BACK PAIN WITHOUT SCIATICA: ICD-10-CM

## 2018-09-17 DIAGNOSIS — R39.15 URINARY URGENCY: ICD-10-CM

## 2018-09-17 DIAGNOSIS — R35.0 INCREASED FREQUENCY OF URINATION: ICD-10-CM

## 2018-09-17 PROCEDURE — 97110 THERAPEUTIC EXERCISES: CPT | Mod: PN

## 2018-09-17 NOTE — TELEPHONE ENCOUNTER
----- Message from Natalee Lira sent at 9/17/2018  1:57 PM CDT -----  Contact: self  Patient is returning a missed call and can be reached at [phone]

## 2018-09-17 NOTE — TELEPHONE ENCOUNTER
----- Message from Maile Guillen sent at 9/17/2018  4:03 PM CDT -----  Contact: pt            Name of Who is Calling: Paris      What is the request in detail: requesting a new referral be put in to Physical Medicine. Please call and advise      Can the clinic reply by [facility]: yes      What [facility]: [phone]

## 2018-09-17 NOTE — TELEPHONE ENCOUNTER
----- Message from Ubaldo Byrne sent at 9/17/2018  9:32 AM CDT -----  Please call pt she needs to schedule appt with a physical therapy unable to schedule pt says she is in your dept [phone]

## 2018-09-17 NOTE — TELEPHONE ENCOUNTER
Spoke to pt, she states she's ready to schedule PT. p) [phone].  Or [phone] was given to pt to schedule with Darcie.

## 2018-09-17 NOTE — TELEPHONE ENCOUNTER
Consult placed.  It probably  because she saw me .  Please let patient know that she needs to call to make appt in near future, as Rx's do .  Please move her follow up appt to end of Nov or after, as it will take 2-3 months of PT to see if it's going to help. Thanks!

## 2018-09-18 ENCOUNTER — TELEPHONE (OUTPATIENT)
Dept: UROGYNECOLOGY | Facility: CLINIC | Age: 73
End: 2018-09-18

## 2018-09-18 NOTE — TELEPHONE ENCOUNTER
----- Message from Mando Rock sent at 9/18/2018  1:00 PM CDT -----  Contact: Paris Burris            Name of Who is Calling: Paris Burris      What is the request in detail: Patient called in regards to scheduling with Physical Therapy      Can the clinic reply by [facility]: No      What [facility]: [phone]

## 2018-09-18 NOTE — TELEPHONE ENCOUNTER
Spoke to pt, she was informed that orders were placed for PT and someone would contact her to schedule. Pt verbalizes understanding. She was also informed that her upcoming FU appointment would need to be rescheduled to later in November, pt verbalizes understanding. Appointment rescheduled.

## 2018-09-18 NOTE — TELEPHONE ENCOUNTER
Attempted to contact pt to inform her that orders were placed to start PT and that her previous orders  probably  because her last visit was . And also wanted to inform pt that we needed to move her follow up to the end of Nov, as it takes 2-3 months of PT to see if it helps. Pt did not answer. Lm to call office

## 2018-09-18 NOTE — TELEPHONE ENCOUNTER
----- Message from Enrique Smith sent at 9/18/2018  9:21 AM CDT -----  Contact: SANCHO CLIFFORD [[id number]]            Name of Who is Calling: SANCHO CLIFFORD [[id number]]      What is the request in detail: Patient returning in regards to PT; patient would like to set up PT appointment.       Can the clinic reply by [facility]: yes      What Number to Call Back if not in [facility]: [phone]

## 2018-09-19 ENCOUNTER — TELEPHONE (OUTPATIENT)
Dept: ENDOSCOPY | Facility: HOSPITAL | Age: 73
End: 2018-09-19

## 2018-09-19 ENCOUNTER — CLINICAL SUPPORT (OUTPATIENT)
Dept: REHABILITATION | Facility: OTHER | Age: 73
End: 2018-09-19
Attending: OBSTETRICS & GYNECOLOGY
Payer: MEDICARE

## 2018-09-19 DIAGNOSIS — N39.8 VOIDING DYSFUNCTION: Primary | ICD-10-CM

## 2018-09-19 DIAGNOSIS — M62.89 PELVIC FLOOR DYSFUNCTION: ICD-10-CM

## 2018-09-19 PROCEDURE — 97163 PT EVAL HIGH COMPLEX 45 MIN: CPT

## 2018-09-19 PROCEDURE — G8991 OTHER PT/OT GOAL STATUS: HCPCS | Mod: CI

## 2018-09-19 PROCEDURE — G8990 OTHER PT/OT CURRENT STATUS: HCPCS | Mod: CJ

## 2018-09-19 PROCEDURE — 97112 NEUROMUSCULAR REEDUCATION: CPT
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
## 2018-09-20 ENCOUNTER — PATIENT MESSAGE (OUTPATIENT)
Dept: UROGYNECOLOGY | Facility: CLINIC | Age: 73
End: 2018-09-20

## 2018-09-20 ENCOUNTER — CLINICAL SUPPORT (OUTPATIENT)
Dept: REHABILITATION | Facility: OTHER | Age: 73
End: 2018-09-20
Payer: MEDICARE

## 2018-09-20 DIAGNOSIS — G89.29 CHRONIC BILATERAL LOW BACK PAIN WITHOUT SCIATICA: ICD-10-CM

## 2018-09-20 DIAGNOSIS — R29.3 POSTURAL IMBALANCE: ICD-10-CM

## 2018-09-20 DIAGNOSIS — R53.1 DECREASED STRENGTH: ICD-10-CM

## 2018-09-20 DIAGNOSIS — M54.50 CHRONIC BILATERAL LOW BACK PAIN WITHOUT SCIATICA: ICD-10-CM

## 2018-09-20 PROCEDURE — 97110 THERAPEUTIC EXERCISES: CPT | Mod: PN | Performed by: PHYSICAL THERAPIST
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
## 2018-09-20 NOTE — PROGRESS NOTES
"                                                    Physical Therapy Daily Note     Name: Paris Burris  Clinic Number: [id number]  Diagnosis:   Encounter Diagnoses   Name Primary?    Chronic bilateral low back pain without sciatica     Postural imbalance     Decreased strength      Physician: Fifi Lizama MD  Precautions: Compression Fx in the LSP, DM Type 2, depression  Visit #: 5 of 20  PTA Visit #: 0  Time In: 8:10  Time Out: 09:00  Total treatment time: 50 min (1:1 25 min)    Subjective     Pt reports: Feeling her belly tighten and the exercises working  Pain Scale: Paris rates pain on a scale of 0-10 to be 2 currently in low back    Objective     9/20/2018  Thoracic/Lumbar AROM: Pain/Dysfunction with Movement:   Flexion 50%* no reversal lumbar lordosis   Extension 75%   Right side bending 75%   Left side bending 50%   Right rotation 50%   Left rotation 30%*      Lower Extremity Strength  Right LE   Left LE     Hip flexion: 4/5 Hip flexion: 4-/5   Hip extension:  4/5 Hip extension: 4-/5   Hip abduction: 4-/5 Hip abduction: 3+/5   Hip adduction:  4/5 Hip adduction:  4-/5   Hip Internal rotation    4/5 Hip Internal rotation 4-/5   Knee Flexion 4+/5 Knee Flexion 4+/5   Knee Extension 4+/5 Knee Extension 4+/5   Ankle dorsiflexion: 5/5 Ankle dorsiflexion: 5/5   Ankle plantarflexion: 5/5 Ankle plantarflexion: 5/5      Flexibility: hamstrings = fair, hip flexors/quads = fair+    Paris received individual therapeutic exercises to develop strength, endurance, ROM, flexibility, posture and core stabilization for 50 minutes including:    Recumbent bike: 5 min to warm up    LTR on PB: 15x  DKTC on PB: 15x  PPT: 20 x 5" holds  Open books: 15x  Figure 4 IR/ER stretch (piriformis): 2 x 30"  HS stretch with strap: 3 x 30"  SLR: 2 x 10 with PPT  scap retractions: 2 x 10    Bridge with band: 2 x 10 x orange TB  BKFO: 2 x 10 x orange TB  SL clams: 2 x 10 x orange TB      PPT with marches - 15x  PPT with unilat flex OH - 2 " x 10  PPT with Patel flex OH - 15x  Narrow rows - 2 x 10 x GTB  SALPD:  - 2 x 10 x OTB  Antirotations: 2 x 10 x YTB    SL hip add - add next visit  Prone hip ext - add next visit      Paris received the following manual therapy techniques: none  Paris received the following supervised modalities after being cleared for contradictions: none    Written Home Exercises Provided: advised pt to continue using PB at home, and to include it with LTR and DKTC  Pt demo good understanding of the education provided. Paris demonstrated good return demonstration of activities.     Education provided re:  Paris verbalized good understanding of education provided.   No spiritual or educational barriers to learning provided    Assessment     Patient tolerated well with One month reassessment performed today. Patient demonstrating improved lumbar spine extension and RLE strength as noted by recent objective measures. Patient progressing with decreased subjective report of pain and demonstrating good compliance with HEP. Patient to benefit from continued skilled physical therapy to progress towards remaining goals.     This is a 72 y.o. female referred to outpatient physical therapy and presents with a medical diagnosis of back pain and demonstrates limitations as described in the problem list. Pt prognosis is Good. Pt will continue to benefit from skilled outpatient physical therapy to address the deficits listed in the problem list, provide pt/family education and to maximize pt's level of independence in the home and community environment.     Goals as follows: See IE on 8/20/18 (PN due by 10/20/18)    Short Term Goals (4 Weeks):   1. Pt will report 20% reduction in pain of the lumbar spine and LE for ease with ADL's-met  2. PT will demonstrate improved trunk strength by a half grade in for ease with upright posture during standing activities.  3. Pt will demonstrate improved lumbar spine ROM in all directions by a half grade for  ease with bending activities. - in progress  4. Pt to demonstrate improved functional ability with FOTO limitation <=46% disability.     Long Term Goals (8 Weeks):   1. Pt will report being independent with HEP for maintenance of improvements gained during therapy sessions- met  2. PT will report 50% reduction of pain of the back and LE for ease with dressing and grooming activities.   3. Pt will demonstrate trunk and extremity strength to >=4+/5 without the provocation of pain for ease with household chores  4. Pt will demonstrate appropriate upright posture without external cueing for ease with work related activities.   5. Pt to demonstrate improved functional ability with FOTO limitation <=36% disability.     Plan     Continue with established Plan of Care towards PT goals.    Therapist: Neris Mejias, PT  9/20/2018

## 2018-09-21 ENCOUNTER — TELEPHONE (OUTPATIENT)
Dept: UROGYNECOLOGY | Facility: CLINIC | Age: 73
End: 2018-09-21

## 2018-09-21 NOTE — TELEPHONE ENCOUNTER
----- Message from Nasima Cardenas sent at 9/20/2018  4:27 PM CDT -----  Contact: self   Pt is needing to r/s her pelvic floor appt. The pt can be reached at [phone].

## 2018-09-21 NOTE — TELEPHONE ENCOUNTER
Spoke with pt who stated that all the numbers she received to contact PT for an appointment were incorrect, pt is requesting a number to schedule an appointment explain to pt the numbers we had listed supply her with one more number [phone] and also told her Darcie Meier was in the office at the time and I will relay to her that she is trying to schedule an appointment, pt voiced understanding and call was ended.

## 2018-09-24 ENCOUNTER — OFFICE VISIT (OUTPATIENT)
Dept: INTERNAL MEDICINE | Facility: CLINIC | Age: 73
End: 2018-09-24
Payer: MEDICARE

## 2018-09-24 ENCOUNTER — PATIENT MESSAGE (OUTPATIENT)
Dept: INTERNAL MEDICINE | Facility: CLINIC | Age: 73
End: 2018-09-24

## 2018-09-24 ENCOUNTER — IMMUNIZATION (OUTPATIENT)
Dept: INTERNAL MEDICINE | Facility: CLINIC | Age: 73
End: 2018-09-24
Payer: MEDICARE

## 2018-09-24 VITALS
HEART RATE: 87 BPM | HEIGHT: 61 IN | OXYGEN SATURATION: 96 % | SYSTOLIC BLOOD PRESSURE: 108 MMHG | BODY MASS INDEX: 30.47 KG/M2 | DIASTOLIC BLOOD PRESSURE: 70 MMHG | WEIGHT: 161.38 LBS

## 2018-09-24 DIAGNOSIS — F33.41 MDD (MAJOR DEPRESSIVE DISORDER), RECURRENT, IN PARTIAL REMISSION: Primary | ICD-10-CM

## 2018-09-24 PROCEDURE — 3288F FALL RISK ASSESSMENT DOCD: CPT | Mod: CPTII,,, | Performed by: INTERNAL MEDICINE

## 2018-09-24 PROCEDURE — 90662 IIV NO PRSV INCREASED AG IM: CPT | Mod: PBBFAC

## 2018-09-24 PROCEDURE — 99213 OFFICE O/P EST LOW 20 MIN: CPT | Mod: PBBFAC | Performed by: INTERNAL MEDICINE

## 2018-09-24 PROCEDURE — 99999 PR PBB SHADOW E&M-EST. PATIENT-LVL III: CPT | Mod: PBBFAC,,, | Performed by: INTERNAL MEDICINE

## 2018-09-24 PROCEDURE — 3074F SYST BP LT 130 MM HG: CPT | Mod: CPTII,,, | Performed by: INTERNAL MEDICINE

## 2018-09-24 PROCEDURE — 1100F PTFALLS ASSESS-DOCD GE2>/YR: CPT | Mod: CPTII,,, | Performed by: INTERNAL MEDICINE

## 2018-09-24 PROCEDURE — 99213 OFFICE O/P EST LOW 20 MIN: CPT | Mod: S$PBB,,, | Performed by: INTERNAL MEDICINE

## 2018-09-24 PROCEDURE — 3078F DIAST BP <80 MM HG: CPT | Mod: CPTII,,, | Performed by: INTERNAL MEDICINE

## 2018-09-24 NOTE — PROGRESS NOTES
INTERNAL MEDICINE SAME DAY PRIMARY CARE VISIT NOTE    Subjective:     Chief Complaint: Depression and Fatigue       Patient ID: Paris Burris is a 72 y.o. female with depression, chronic low back pain and neck pain, COOKIE c insomnia, type 2 DM c CKD2 and DM neuropathy, Vit D def, ESTEFANIA, obesity, stress and urge incontinence, here today for focused same-day primary care visit.    Today, patient with complaint of worsening depression.  Had a fall a couple of mos ago c resultant low back pain and now doing PT.  Reports her lack of activity due to pain causing mood issues.    Sees Dr. Sweeney but reports she has had difficulty getting an appt c him.    Has been on Ativan for anxiety which she feels is too strong for her now and has not been taking it for several days.  Has also had difficulty sleeping.    Past Medical History:  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    Wrist fracture        Home Medications:  Prior to Admission medications    Medication Sig Start Date End Date Taking? Authorizing Provider   alpha lipoic acid 300 mg Cap Take 300 mg by mouth 2 (two) times daily.    Yes Historical Provider, MD   ascorbic acid (VITAMIN C) 500 MG tablet Take 1,000 mg by mouth once daily.    Yes Historical Provider, MD   b complex vitamins capsule Take 1 capsule by mouth once daily.   Yes Historical Provider, MD   blood sugar diagnostic (ACCU-CHEK  SMARTVIEW TEST STRIP) Strp Test once daily. 10/19/17  Yes Mandi Huynh MD   cetirizine (ZYRTEC) 10 MG tablet Take 10 mg by mouth once daily.   Yes Historical Provider, MD   CITICOLINE SODIUM (CITICOLINE ORAL) Take 1 tablet by mouth once daily at 6am.   Yes Historical Provider, MD   esomeprazole (NEXIUM) 40 MG capsule Take 1 capsule (40 mg total) by mouth before breakfast. 5/21/18 5/21/19 Yes Mandi Huynh MD   ferrous sulfate 325 mg (65 mg iron) Tab tablet Take 1 tablet (325 mg total) by mouth 2 (two) times daily. 7/31/18 1/27/19 Yes Dustin Patterson MD   gabapentin (NEURONTIN) 300 MG capsule Take 1 capsule (300 mg total) by mouth 2 (two) times daily. 5/21/18  Yes Mandi Huynh MD   lancets Misc 1 lancet by Misc.(Non-Drug; Combo Route) route 2 (two) times daily. 7/13/16  Yes Mandi Huynh MD   LORazepam (ATIVAN) 0.5 MG tablet TAKE 1 TABLET (0.5 MG TOTAL) BY MOUTH DAILY AS NEEDED. 8/19/18  Yes Dwaine Sweeney MD   metFORMIN (GLUCOPHAGE) 500 MG tablet Take 0.5 tablets (250 mg total) by mouth daily with breakfast. 6/27/18 6/27/19 Yes Mandi Huynh MD   omega-3 fatty acids-fish oil (FISH OIL) 340-1,000 mg Cap Take 1 capsule by mouth once daily.   Yes Historical Provider, MD   rosuvastatin (CRESTOR) 20 MG tablet Take 1 tablet (20 mg total) by mouth once daily. 5/21/18 5/21/19 Yes Mandi Huynh MD   traMADol (ULTRAM) 50 mg tablet Take 2 tablets (100 mg total) by mouth daily as needed for Pain. 7/17/18  Yes Domonique Urban MD   tretinoin (RETIN-A) 0.05 % cream Use hs 4/23/18  Yes Starla Rodríguez MD   TURMERIC (CURCUMIN MISC) Take 500 mg by mouth 2 (two) times daily.    Yes Historical Provider, MD   venlafaxine (EFFEXOR) 37.5 MG Tab Take 2 tablets (75 mg total) by mouth once daily. 6/20/18  Yes Dwaine Sweeney MD   vitamin D 1000 units Tab Take 500 Units by mouth once daily. With K2 50 mch   Yes Historical Provider, MD   blood-glucose meter Misc 1 Device by Misc.(Non-Drug; Combo Route) route 2 (two) times daily. 8/22/17 8/22/18  Mandi Huynh MD  "  meloxicam (MOBIC) 7.5 MG tablet Take 1 tablet (7.5 mg total) by mouth once daily. 8/15/18 9/24/18  Karen Sharpe MD       Allergies:  Review of patient's allergies indicates:   Allergen Reactions    Topamax [topiramate] Other (See Comments)     hallucinations       Social History:  Social History     Tobacco Use    Smoking status: Never Smoker    Smokeless tobacco: Never Used   Substance Use Topics    Alcohol use: No     Comment: quit 2014 - alcohol abuse    Drug use: Yes         Review of Systems   Constitutional: Negative for chills, fatigue and fever.   Respiratory: Negative for cough, chest tightness and shortness of breath.    Cardiovascular: Negative for chest pain.   Gastrointestinal: Negative for abdominal pain and blood in stool.   Genitourinary: Negative for dysuria and frequency.   Psychiatric/Behavioral: Positive for dysphoric mood and sleep disturbance. Negative for hallucinations, self-injury and suicidal ideas. The patient is nervous/anxious.            Objective:   /70 (BP Location: Right arm, Patient Position: Sitting, BP Method: Large (Manual))   Pulse 87   Ht 5' 1" (1.549 m)   Wt 73.2 kg (161 lb 6 oz)   SpO2 96%   BMI 30.49 kg/m²        General: AAO x3, no apparent distress  CV: RRR, no m/r/g  Pulm: Lungs CTAB, no crackles, no wheezes  Abd: s/NT/ND +BS      Labs:         Assessment/Plan     Paris was seen today for depression and fatigue.    Diagnoses and all orders for this visit:    MDD (major depressive disorder), recurrent, in partial remission  As per HPI  Since already established c psych, I am reluctant to make any med changes at this time.  Pt requesting referral as she was told that would help her get an appt, referral placed for urgent appt.  Advised to resume ativan but to cut the pill in half which can help c her anxiety and sleep issues.  -     Ambulatory Referral to Psychiatry    RTC prn and with PCP as per routine.    Karen Sharpe MD  Department of Internal Medicine - " [facility]  09/24/2018

## 2018-09-25 ENCOUNTER — CLINICAL SUPPORT (OUTPATIENT)
Dept: INTERNAL MEDICINE | Facility: CLINIC | Age: 73
End: 2018-09-25
Payer: MEDICARE

## 2018-09-25 VITALS — BODY MASS INDEX: 30.24 KG/M2 | WEIGHT: 160.06 LBS

## 2018-09-25 PROCEDURE — 99999 PR PBB SHADOW E&M-EST. PATIENT-LVL I: CPT | Mod: PBBFAC,,,

## 2018-09-25 PROCEDURE — 99211 OFF/OP EST MAY X REQ PHY/QHP: CPT | Mod: PBBFAC

## 2018-09-26 ENCOUNTER — OFFICE VISIT (OUTPATIENT)
Dept: SURGERY | Facility: CLINIC | Age: 73
End: 2018-09-26
Payer: MEDICARE

## 2018-09-26 VITALS
DIASTOLIC BLOOD PRESSURE: 78 MMHG | TEMPERATURE: 98 F | HEIGHT: 61 IN | HEART RATE: 94 BPM | SYSTOLIC BLOOD PRESSURE: 133 MMHG | WEIGHT: 162.13 LBS | BODY MASS INDEX: 30.61 KG/M2

## 2018-09-26 DIAGNOSIS — K25.7 CHRONIC CAMERON ULCER: ICD-10-CM

## 2018-09-26 DIAGNOSIS — D64.9 ANEMIA, UNSPECIFIED TYPE: ICD-10-CM

## 2018-09-26 DIAGNOSIS — K44.9 HIATAL HERNIA: Primary | ICD-10-CM

## 2018-09-26 PROCEDURE — 1101F PT FALLS ASSESS-DOCD LE1/YR: CPT | Mod: CPTII,,, | Performed by: SURGERY

## 2018-09-26 PROCEDURE — 99213 OFFICE O/P EST LOW 20 MIN: CPT | Mod: S$PBB,,, | Performed by: SURGERY

## 2018-09-26 PROCEDURE — 99213 OFFICE O/P EST LOW 20 MIN: CPT | Mod: PBBFAC | Performed by: SURGERY

## 2018-09-26 PROCEDURE — 99999 PR PBB SHADOW E&M-EST. PATIENT-LVL III: CPT | Mod: PBBFAC,,, | Performed by: SURGERY

## 2018-09-26 PROCEDURE — 3078F DIAST BP <80 MM HG: CPT | Mod: CPTII,,, | Performed by: SURGERY

## 2018-09-26 PROCEDURE — 3075F SYST BP GE 130 - 139MM HG: CPT | Mod: CPTII,,, | Performed by: SURGERY

## 2018-09-26 NOTE — LETTER
September 26, 2018      Dustin Patterson MD  [address] [address]           [facility] - General Surgery  [address]  [place] [address]  Phone: [phone]          Patient: Paris Burris   MR Number: [id number]   YOB: 1945   Date of Visit: 9/26/2018       Dear Dr. Dustin Patterson:    Thank you for referring Paris Burris to me for evaluation. Attached you will find relevant portions of my assessment and plan of care.    If you have questions, please do not hesitate to call me. I look forward to following Paris Burris along with you.    Sincerely,    Renato Perez Jr., MD    Enclosure  CC:  No Recipients    If you would like to receive this communication electronically, please contact [email or web address] or [phone] to request more information on [employer] access.    For providers and/or their staff who would like to refer a patient to [facility], please contact us through our one-stop-shop provider referral line, [facility] , at [phone].    If you feel you have received this communication in error or would no longer like to receive these types of communications, please e-mail [email or web address]

## 2018-09-26 NOTE — MEDICAL/APP STUDENT
[facility] - General Surgery  General Surgery  History & Physical    Patient Name: Paris Burris  MRN: [id number]  Attending Physician: Renato Perez MD  Primary Care Provider: Mandi Huynh MD    Subjective:     Chief Complaint/Reason for Admission: hiatal hernia    History of Present Illness:  Patient is a 72 y.o. female presents with hiatal hernia. Pt reports that she was referred for surgery after her gastroenterologist noted that her paraesophageal hiatal hernia was worsening on enteroscopy. She reports no reflux symptoms, no dysphagia, no regurgitation, no abdominal pain. Pt does have a history of multiple gastric ulcers and ESTEFANIA.    Current Outpatient Medications on File Prior to Visit   Medication Sig    alpha lipoic acid 300 mg Cap Take 300 mg by mouth 2 (two) times daily.     ascorbic acid (VITAMIN C) 500 MG tablet Take 1,000 mg by mouth once daily.     b complex vitamins capsule Take 1 capsule by mouth once daily.    blood sugar diagnostic (ACCU-CHEK SMARTVIEW TEST STRIP) Strp Test once daily.    cetirizine (ZYRTEC) 10 MG tablet Take 10 mg by mouth once daily.    CITICOLINE SODIUM (CITICOLINE ORAL) Take 1 tablet by mouth once daily at 6am.    esomeprazole (NEXIUM) 40 MG capsule Take 1 capsule (40 mg total) by mouth before breakfast.    ferrous sulfate 325 mg (65 mg iron) Tab tablet Take 1 tablet (325 mg total) by mouth 2 (two) times daily.    gabapentin (NEURONTIN) 300 MG capsule Take 1 capsule (300 mg total) by mouth 2 (two) times daily.    lancets Misc 1 lancet by Misc.(Non-Drug; Combo Route) route 2 (two) times daily.    LORazepam (ATIVAN) 0.5 MG tablet TAKE 1 TABLET (0.5 MG TOTAL) BY MOUTH DAILY AS NEEDED.    metFORMIN (GLUCOPHAGE) 500 MG tablet Take 0.5 tablets (250 mg total) by mouth daily with breakfast.    omega-3 fatty acids-fish oil (FISH OIL) 340-1,000 mg Cap Take 1 capsule by mouth once daily.    rosuvastatin (CRESTOR) 20 MG tablet Take 1 tablet (20 mg total) by mouth once daily.     traMADol (ULTRAM) 50 mg tablet Take 2 tablets (100 mg total) by mouth daily as needed for Pain.    tretinoin (RETIN-A) 0.05 % cream Use hs    TURMERIC (CURCUMIN MISC) Take 500 mg by mouth 2 (two) times daily.     venlafaxine (EFFEXOR) 37.5 MG Tab Take 2 tablets (75 mg total) by mouth once daily.    vitamin D 1000 units Tab Take 500 Units by mouth once daily. With K2 50 mch    blood-glucose meter Misc 1 Device by Misc.(Non-Drug; Combo Route) route 2 (two) times daily.     No current facility-administered medications on file prior to visit.        Review of patient's allergies indicates:   Allergen Reactions    Topamax [topiramate] Other (See Comments)     hallucinations       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    Wrist fracture      Past Surgical History:   Procedure Laterality Date    ADENOIDECTOMY      BREAST LUMPECTOMY Left 2002    COLONOSCOPY N/A 12/2/2016    Procedure: COLONOSCOPY;  Surgeon: Dustin Patterson MD;  [facility]  Service: Endoscopy;  Laterality: N/A;  PM prep    COLONOSCOPY N/A 12/2/2016    Performed by Dustin Patterson MD at [facility] (4TH FLR)    DEVICE ASSISTED ENTEROSCOPY-ANTEROGRADE N/A 3/20/2018    Performed by Dustin Patterson MD at [facility] (2ND FLR)    ESOPHAGOGASTRODUODENOSCOPY N/A 9/12/2018    Procedure: ESOPHAGOGASTRODUODENOSCOPY (EGD);  Surgeon: Dustin Patterson MD;   Location: [facility] (4TH FLR);  Service: Endoscopy;  Laterality: N/A;  6-8 weeks from visit    ESOPHAGOGASTRODUODENOSCOPY (EGD) N/A 9/12/2018    [facility] Dust[facility] Patterson MD at [facility] END[facility] (4TH FLR)    ESOPHAGOGASTRODUODENOSCOPY (EGD) N/A 12/2/2016    Performed by Dustin Patterson MD at [facility] ENDO (4TH FLR)    ESOPHAGOGASTRODUODENOSCOPY (EGD) N/A 4/20/2015    Performed by Marcial Dewitt MD at [facility] (4TH FLR)    EYE SURGERY Bilateral     cataracts    INJECTION-STEROID-EPIDURAL-LUMBAR N/A 6/15/2016    [facility] PAIN MGT    lipoma removal  1999    PARATHYROIDECTOMY minimally invasive N/A 11/4/2015    Performed by Amna Aponte MD at [facility] OR 2ND FLR    TONSILLECTOMY       Family History     Problem Relation (Age of Onset)    Alcohol abuse Father    Depression Mother, Sister, Daughter    Diabetes Sister    Headaches Father    Psoriasis Sister    Suicide Mother        Tobacco Use    Smoking status: Never Smoker    Smokeless tobacco: Never Used   Substance and Sexual Activity    Alcohol use: No     Comment: quit 2014 - alcohol abuse    Drug use: Yes    Sexual activity: Yes     Partners: Male     Review of Systems   Constitutional: Negative for chills, fever and unexpected weight change.        Pt reports intentional weight loss   Respiratory: Negative for cough and shortness of breath.    Cardiovascular: Negative for chest pain and palpitations.   Gastrointestinal: Negative for abdominal distention, abdominal pain, constipation, diarrhea, nausea and vomiting.   Neurological: Negative for seizures.   Hematological: Does not bruise/bleed easily.     Objective:     Vital Signs (Most Recent):  Temp: 97.9 °F (36.6 °C) (09/26/18 0917)  Pulse: 94 (09/26/18 0917)  BP: 133/78 (09/26/18 0917) Vital Signs (24h Range):  [unfilled]     Weight: 73.5 kg (162 lb 2.4 oz)  Body mass index is 30.64 kg/m².    Physical Exam   Constitutional: She is oriented to person, place, and time.  She appears well-developed and well-nourished.   Cardiovascular: Normal rate, regular rhythm and normal heart sounds.   Pulmonary/Chest: Effort normal and breath sounds normal.   Abdominal: Soft. Bowel sounds are normal.   Neurological: She is alert and oriented to person, place, and time.     Significant Diagnostics:  Small bowel enteroscopy (9/12/2018) -   - Normal esophagus.  - Large, 6 cm type-III paraesophageal hernia, not fully represented by just the length.  - Gastritis within the hernia sac. Biopsied. This area of inflammation is in the same area as her  prior aundrea ulcer and has worsened compared to last. The rest of the stomach has  improved and her prior antral ulcer has healed.  - Normal examined duodenum.  - The examined portion of the jejunum was normal. Prior jejunitis has healed and was likely  due to NSAIDs.  - Even though she is asymptomatic, the inflammation today is worse than during her last visit  and I have concerns that this could lead to incarceration and perforation.    Assessment/Plan:     Paraesophageal hiatal hernia   - Upper GI study, manometry, CT scan ordered   - Plan for hiatal hernia repair and fundoplication   - Discussed results of enteroscopy with patient, benefits/risk of surgery explained    Treasure Murrieta  General Surgery  [facility] - General Surgery

## 2018-09-26 NOTE — PROGRESS NOTES
[facility] - General Surgery  General Surgery  History & Physical     Patient Name: Paris Burrsi  MRN: [id number]  Attending Physician: Renato Perez MD  Primary Care Provider: Mandi uHynh MD     Subjective:      Chief Complaint/Reason for Admission: hiatal hernia     History of Present Illness:  Patient is a 72 y.o. female presents with hiatal hernia. Pt reports that she was referred for surgery after her gastroenterologist noted that her paraesophageal hiatal hernia was worsening on enteroscopy. She reports no reflux symptoms, no dysphagia, no regurgitation, no abdominal pain. Pt does have a history of multiple gastric ulcers and iron defeciency anemia.          Current Outpatient Medications on File Prior to Visit   Medication Sig    alpha lipoic acid 300 mg Cap Take 300 mg by mouth 2 (two) times daily.     ascorbic acid (VITAMIN C) 500 MG tablet Take 1,000 mg by mouth once daily.     b complex vitamins capsule Take 1 capsule by mouth once daily.    blood sugar diagnostic (ACCU-CHEK SMARTVIEW TEST STRIP) Strp Test once daily.    cetirizine (ZYRTEC) 10 MG tablet Take 10 mg by mouth once daily.    CITICOLINE SODIUM (CITICOLINE ORAL) Take 1 tablet by mouth once daily at 6am.    esomeprazole (NEXIUM) 40 MG capsule Take 1 capsule (40 mg total) by mouth before breakfast.    ferrous sulfate 325 mg (65 mg iron) Tab tablet Take 1 tablet (325 mg total) by mouth 2 (two) times daily.    gabapentin (NEURONTIN) 300 MG capsule Take 1 capsule (300 mg total) by mouth 2 (two) times daily.    lancets Misc 1 lancet by Misc.(Non-Drug; Combo Route) route 2 (two) times daily.    LORazepam (ATIVAN) 0.5 MG tablet TAKE 1 TABLET (0.5 MG TOTAL) BY MOUTH DAILY AS NEEDED.    metFORMIN (GLUCOPHAGE) 500 MG tablet Take 0.5 tablets (250 mg total) by mouth daily with breakfast.    omega-3 fatty acids-fish oil (FISH OIL) 340-1,000 mg Cap Take 1 capsule by mouth once daily.    rosuvastatin (CRESTOR) 20 MG tablet Take 1 tablet (20 mg  total) by mouth once daily.    traMADol (ULTRAM) 50 mg tablet Take 2 tablets (100 mg total) by mouth daily as needed for Pain.    tretinoin (RETIN-A) 0.05 % cream Use hs    TURMERIC (CURCUMIN MISC) Take 500 mg by mouth 2 (two) times daily.     venlafaxine (EFFEXOR) 37.5 MG Tab Take 2 tablets (75 mg total) by mouth once daily.    vitamin D 1000 units Tab Take 500 Units by mouth once daily. With K2 50 mch    blood-glucose meter Misc 1 Device by Misc.(Non-Drug; Combo Route) route 2 (two) times daily.      No current facility-administered medications on file prior to visit.                Review of patient's allergies indicates:   Allergen Reactions    Topamax [topiramate] Other (See Comments)       hallucinations         Past Medical History:   Diagnosis Date    Allergy      Anemia      Anxiety      Breast cancer 2002     Left breast    Cancer 2002     L breast s/p lumpectomy    Decreased hearing      Depression      Diabetes mellitus      Diabetes with neurologic complications      Gastric ulcer 9/10/13     EGD    Hiatal hernia      Hyperlipidemia      Migraine headache      Migraine headache 3/20/2015    Multiple gastric ulcers      Parathyroid disorder      Pre-diabetes      Renal manifestation of secondary diabetes mellitus      Sleep apnea       no CPAP    Type 2 diabetes mellitus, controlled, 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    Wrist fracture              Past Surgical History:   Procedure Laterality Date    ADENOIDECTOMY        BREAST LUMPECTOMY Left 2002    COLONOSCOPY N/A 12/2/2016     Procedure: COLONOSCOPY;  Surgeon: Dustin Patterson MD;  Location: [facility] (4TH FLR);  Service: Endoscopy;  Laterality: N/A;  PM prep    COLONOSCOPY N/A 12/2/2016     Performed by Dustin Patterson MD at [facility] (4TH FLR)    DEVICE ASSISTED ENTEROSCOPY-ANTEROGRADE N/A 3/20/2018     Performed by Dustin Patterson MD at [facility] (2ND FLR)    ESOPHAGOGASTRODUODENOSCOPY N/A 9/12/2018      Procedure: ESOPHAGOGASTRODUODENOSCOPY (EGD);  Surgeon: Dustin Patterson MD;  Location: [facility] (4TH FLR);  Service: Endoscopy;  Laterality: N/A;  6-8 weeks from visit    ESOPHAGOGASTRODUODENOSCOPY (EGD) N/A 9/12/2018     Performed by Dustin Patterson MD at [facility] END[facility] (4TH FLR)    ESOPHAGOGASTRODUODENOSCOPY (EGD) N/A 12/2/2016     Performed by Dustin Patterson MD at [facility] (4TH FLR)    ESOPHAGOGASTRODUODENOSCOPY (EGD) N/A 4/20/2015     Performed by Marcial Dewitt MD at [facility] (4TH FLR)    EYE SURGERY Bilateral       cataracts    INJECTION-STEROID-EPIDURAL-LUMBAR N/A 6/15/2016     Per[facility] at [facility]    lipoma removal   1999    PARATHYROIDECTOMY minimally invasive N/A 11/4/2015     Performed by Amna Aponte MD at [facility] OR 2ND FLR    TONSILLECTOMY               Family History      Problem Relation (Age of Onset)     Alcohol abuse Father     Depression Mother, Sister, Daughter     Diabetes Sister     Headaches Father     Psoriasis Sister     Suicide Mother          Tobacco Use    Smoking status: Never Smoker    Smokeless tobacco: Never Used   Substance and Sexual Activity    Alcohol use: No       Comment: quit 2014 - alcohol abuse    Drug use: Yes    Sexual activity: Yes       Partners: Male      Review of Systems   Constitutional: Negative for chills, fever and unexpected weight change.        Pt reports intentional weight loss   Respiratory: Negative for cough and shortness of breath.    Cardiovascular: Negative for chest pain and palpitations.   Gastrointestinal: Negative for abdominal distention, abdominal pain, constipation, diarrhea, nausea and vomiting.   Neurological: Negative for seizures.   Hematological: Does not bruise/bleed easily.      Objective:      Vital Signs (Most Recent):  Temp: 97.9 °F (36.6 °C) (09/26/18 0917)  Pulse: 94 (09/26/18 0917)  BP: 133/78 (09/26/18 0917) Vital Signs (24h Range):  [unfilled]      Weight: 73.5 kg (162 lb 2.4  oz)  Body mass index is 30.64 kg/m².     Physical Exam   Constitutional: She is oriented to person, place, and time. She appears well-developed and well-nourished.   Cardiovascular: Normal rate, regular rhythm and normal heart sounds.   Pulmonary/Chest: Effort normal and breath sounds normal.   Abdominal: Soft. Bowel sounds are normal.   Neurological: She is alert and oriented to person, place, and time.      Significant Diagnostics:  Small bowel enteroscopy (9/12/2018) -   - Normal esophagus.  - Large, 6 cm type-III paraesophageal hernia, not fully represented by just the length.  - Gastritis within the hernia sac. Biopsied. This area of inflammation is in the same area as her  prior aundrea ulcer and has worsened compared to last. The rest of the stomach has  improved and her prior antral ulcer has healed.  - Normal examined duodenum.  - The examined portion of the jejunum was normal. Prior jejunitis has healed and was likely  due to NSAIDs.  - Even though she is asymptomatic, the inflammation today is worse than during her last visit  and I have concerns that this could lead to incarceration and perforation.     Assessment/Plan:      Paraesophageal hiatal hernia              - Upper GI study, manometry, CT scan ordered              - Plan for hiatal hernia repair and fundoplication after testing              - Discussed results of enteroscopy with patient, benefits/risk of surgery explained

## 2018-09-26 NOTE — PROGRESS NOTES
Health  Follow-Up Note   [x] Office  [] Phone  Notes from previous session reviewed.   [x] Previous Session Goals unchanged.   [] Patient/Caregiver Change in Goals.  Goals added or changed by Patient/Caregiver since program participation:  1.  Continue same plan        Additional/Changed support that patient/caregiver has experienced/sought?  (Indicate readiness, support from family, friends, others, community groups, etc)  1.      Additional/Changed obstacles that could prevent patient/caregiver from reaching their goals?  1.  not exercising at PT recommendation will ask if can start to walk again    Feedback provided:  1.  Praised for continued effort and determination total loss 26 lbs    Diagnostic values/Desriptors for follow-up as needed for chronic condition(s)   Weight: 72.6 kg 160.05 lb down 1.1 lb  Blood Glucose: Averages  7d 110  14 d 119  30 d 121  90 d 117    Interventions:   1. Health  listened reflectively, validated thoughts and feelings, offered support and encouragement.   2. Allowed patient to express themselves in a non-biased atmosphere.  3. Health  assisted pt to problem-solve obstacles such as being in a challenging environment and dealing with these challenges.   4. Motivational Interviewed interventions utilized (OARS).   5. Patient responded favorably to interventions and remained actively engaged in the session.   6. Health  will remain available and connected for patient by phone and/or office visits.   7. Positive reinforcement, emotional support and encouragement provided.   8. Focused Education: MI, get back to exercise if released from PT    Plan:  [x] Pt will work on goals as stated above.   [x] Pt will contact Health  for any questions, concerns or needs.  [x] Pt will follow up with Health  in office on   10/18/18 at 1100.     [] Pt will follow up with Health  on phone in:        [x] Health  will remain available.   [] Health  will  contact patient by phone in:        [] Health  will consult:        [] Health  will inform Provider via EPIC messaging.     Impression:  1. Behavior is consistent with   Action    Stage of Change.   2. Participation level:       [x] Receptive      [x] Interactive      [] Guarded and Resistant      [x] Self Motivated      [] Refused/Declined to participate   3. [x] Pt voiced understanding of all information presented.       [] Pt voiced needing further information/education. This will be arranged.       [x] Pt would benefit from further education/information as identified by this health . This will be arranged.     Jacqueline Perry RN HC

## 2018-09-27 ENCOUNTER — CLINICAL SUPPORT (OUTPATIENT)
Dept: REHABILITATION | Facility: OTHER | Age: 73
End: 2018-09-27
Payer: MEDICARE

## 2018-09-27 DIAGNOSIS — R53.1 DECREASED STRENGTH: ICD-10-CM

## 2018-09-27 DIAGNOSIS — M54.50 CHRONIC BILATERAL LOW BACK PAIN WITHOUT SCIATICA: ICD-10-CM

## 2018-09-27 DIAGNOSIS — G89.29 CHRONIC BILATERAL LOW BACK PAIN WITHOUT SCIATICA: ICD-10-CM

## 2018-09-27 DIAGNOSIS — R29.3 POSTURAL IMBALANCE: ICD-10-CM

## 2018-09-27 PROCEDURE — 97110 THERAPEUTIC EXERCISES: CPT | Mod: PN | Performed by: INTERNAL MEDICINE

## 2018-09-27 NOTE — PROGRESS NOTES
"                                                    Physical Therapy Daily Note     Name: Paris Burris  Clinic Number: [id number]  Diagnosis:   Encounter Diagnoses   Name Primary?    Chronic bilateral low back pain without sciatica     Postural imbalance     Decreased strength      Physician: Fifi Lizama MD  Precautions: Compression Fx in the LSP, DM Type 2, depression  Visit #: 6 of 20  PTA Visit #: 0   Time In: 8:10  Time Out: 09:00  Total treatment time: 50 min (1:1 25 min)    Subjective     Pt reports: anixety about upcoming hernia sx and arriving at wrong time for her visit today. Feeling better with only present c/o tightness in her LB.  Pain Scale: Paris rates pain on a scale of 0-10 to be 2 currently in low back    Objective     9/20/2018  Thoracic/Lumbar AROM: Pain/Dysfunction with Movement:   Flexion 50%* no reversal lumbar lordosis   Extension 75%   Right side bending 75%   Left side bending 50%   Right rotation 50%   Left rotation 30%*      Lower Extremity Strength  Right LE   Left LE     Hip flexion: 4/5 Hip flexion: 4-/5   Hip extension:  4/5 Hip extension: 4-/5   Hip abduction: 4-/5 Hip abduction: 3+/5   Hip adduction:  4/5 Hip adduction:  4-/5   Hip Internal rotation    4/5 Hip Internal rotation 4-/5   Knee Flexion 4+/5 Knee Flexion 4+/5   Knee Extension 4+/5 Knee Extension 4+/5   Ankle dorsiflexion: 5/5 Ankle dorsiflexion: 5/5   Ankle plantarflexion: 5/5 Ankle plantarflexion: 5/5      Flexibility: hamstrings = fair, hip flexors/quads = fair+    Paris received individual therapeutic exercises to develop strength, endurance, ROM, flexibility, posture and core stabilization for 50 minutes including:    Recumbent bike: 5 min to warm up np    LTR on PB: 15x  DKTC on PB: 15x  TA block squeeze: 10 x 10" holds  Open books: 15x   piriformis stretch  3 x 30"  HS stretch with strap: 3 x 30"  SLR: 2 x 10 with PPT  scap retractions: 2 x 10 np    Bridge with band: 2 x 10 x orange TB  Butterfly U : 2 x " 10 x orange TB  SL clams: 2 x 10 x orange TB      PPT with marches - 15x  PPT with unilat flex OH - 2 x 10  PPT with Patel flex OH - 15x    rows - 3 x 10 x oTB  U shd ext :  - 3  x 10 x OTB  parloff press: 2 x 10 x YTB    SL hip add - 15x B  Prone hip ext - 15x B    Paris received the following manual therapy techniques: none  Paris received the following supervised modalities after being cleared for contradictions: none    Written Home Exercises Provided: none  Pt demo good understanding of the education provided. Paris demonstrated good return demonstration of activities.     Education provided re:  Paris verbalized good understanding of education provided.   No spiritual or educational barriers to learning provided    Assessment     Patient tolerated well with only occas anxiety about performing ex correctly, vc to tighten TA.  This is a 72 y.o. female referred to outpatient physical therapy and presents with a medical diagnosis of back pain and demonstrates limitations as described in the problem list. Pt prognosis is Good. Pt will continue to benefit from skilled outpatient physical therapy to address the deficits listed in the problem list, provide pt/family education and to maximize pt's level of independence in the home and community environment.     Goals as follows: See IE on 8/20/18 (PN due by 10/20/18)    Short Term Goals (4 Weeks):   1. Pt will report 20% reduction in pain of the lumbar spine and LE for ease with ADL's-met  2. PT will demonstrate improved trunk strength by a half grade in for ease with upright posture during standing activities.  3. Pt will demonstrate improved lumbar spine ROM in all directions by a half grade for ease with bending activities. - in progress  4. Pt to demonstrate improved functional ability with FOTO limitation <=46% disability.     Long Term Goals (8 Weeks):   1. Pt will report being independent with HEP for maintenance of improvements gained during therapy sessions-  met  2. PT will report 50% reduction of pain of the back and LE for ease with dressing and grooming activities.   3. Pt will demonstrate trunk and extremity strength to >=4+/5 without the provocation of pain for ease with household chores  4. Pt will demonstrate appropriate upright posture without external cueing for ease with work related activities.   5. Pt to demonstrate improved functional ability with FOTO limitation <=36% disability.     Plan     Continue with established Plan of Care towards PT goals.    Therapist:  TOMMIE 9/28

## 2018-09-28 ENCOUNTER — HOSPITAL ENCOUNTER (OUTPATIENT)
Dept: RADIOLOGY | Facility: HOSPITAL | Age: 73
Discharge: HOME | End: 2018-09-28
Attending: SURGERY
Payer: MEDICARE

## 2018-09-28 DIAGNOSIS — K44.9 HIATAL HERNIA: ICD-10-CM

## 2018-09-28 DIAGNOSIS — D64.9 ANEMIA, UNSPECIFIED TYPE: ICD-10-CM

## 2018-09-28 PROCEDURE — 74176 CT ABD & PELVIS W/O CONTRAST: CPT | Mod: TC

## 2018-09-28 PROCEDURE — 74176 CT ABD & PELVIS W/O CONTRAST: CPT | Mod: 26,,, | Performed by: RADIOLOGY

## 2018-10-01 ENCOUNTER — TELEPHONE (OUTPATIENT)
Dept: RADIOLOGY | Facility: HOSPITAL | Age: 73
End: 2018-10-01

## 2018-10-02 ENCOUNTER — HOSPITAL ENCOUNTER (OUTPATIENT)
Dept: RADIOLOGY | Facility: HOSPITAL | Age: 73
Discharge: HOME | End: 2018-10-02
Attending: SURGERY
Payer: MEDICARE

## 2018-10-02 DIAGNOSIS — D64.9 ANEMIA, UNSPECIFIED TYPE: ICD-10-CM

## 2018-10-02 DIAGNOSIS — K44.9 HIATAL HERNIA: ICD-10-CM

## 2018-10-02 PROCEDURE — 74241 FL UPPER GI W KUB: CPT | Mod: 26,,, | Performed by: RADIOLOGY

## 2018-10-02 PROCEDURE — 74241 FL UPPER GI W KUB: CPT | Mod: TC,FY

## 2018-10-03 ENCOUNTER — PATIENT MESSAGE (OUTPATIENT)
Dept: PSYCHIATRY | Facility: CLINIC | Age: 73
End: 2018-10-03

## 2018-10-05 ENCOUNTER — PATIENT MESSAGE (OUTPATIENT)
Dept: PSYCHIATRY | Facility: CLINIC | Age: 73
End: 2018-10-05

## 2018-10-08 ENCOUNTER — HOSPITAL ENCOUNTER (OUTPATIENT)
Facility: HOSPITAL | Age: 73
Discharge: HOME | End: 2018-10-08
Attending: INTERNAL MEDICINE | Admitting: INTERNAL MEDICINE
Payer: MEDICARE

## 2018-10-08 VITALS
SYSTOLIC BLOOD PRESSURE: 132 MMHG | BODY MASS INDEX: 29.27 KG/M2 | DIASTOLIC BLOOD PRESSURE: 73 MMHG | RESPIRATION RATE: 16 BRPM | TEMPERATURE: 99 F | OXYGEN SATURATION: 94 % | HEIGHT: 61 IN | HEART RATE: 88 BPM | WEIGHT: 155 LBS

## 2018-10-08 DIAGNOSIS — K44.9 HIATAL HERNIA: ICD-10-CM

## 2018-10-08 LAB — POCT GLUCOSE: 147 MG/DL (ref 70–110)

## 2018-10-08 PROCEDURE — 91037 ESOPH IMPED FUNCTION TEST: CPT | Performed by: INTERNAL MEDICINE

## 2018-10-08 PROCEDURE — 91010 ESOPHAGUS MOTILITY STUDY: CPT | Performed by: INTERNAL MEDICINE

## 2018-10-08 PROCEDURE — 25000003 PHARM REV CODE 250: Performed by: INTERNAL MEDICINE

## 2018-10-08 RX ORDER — LIDOCAINE HYDROCHLORIDE 20 MG/ML
JELLY TOPICAL ONCE
Status: COMPLETED | OUTPATIENT
Start: 2018-10-08 | End: 2018-10-08

## 2018-10-08 RX ADMIN — LIDOCAINE HYDROCHLORIDE 10 ML: 20 JELLY TOPICAL at 08:10

## 2018-10-08 NOTE — DISCHARGE INSTRUCTIONS
Esophageal Manometry     A catheter measures pressure along the esophagus.     Esophageal manometry is a test to measure the strength and function of the esophagus (the food pipe). Results can help identify causes of heartburn, swallowing problems, or chest pain. The test can also help plan surgery and determine the success of previous surgery.  Preparing for the test  Be sure to talk to your healthcare provider about any medicines you take. Some medicines can affect the test results. Also ask any questions you have about the risks of the test. These include irritation to the nose and throat. Be sure not to smoke, eat, or drink for up to 12 hours before the test.  During the test  Manometry takes about an hour. Usually you lie down during the test. Your nose and throat are numbed. Then a soft, thin tube is placed through the nose and down the esophagus. At first you may notice a gagging feeling. You will be asked to swallow several times. Holes along the tube measure the pressure while you swallow. Measurements are printed out as tracings, much like a heart test tracing. After the test, another catheter may be left in the esophagus for up to 24 hours to measure acid (pH) levels.  After esophageal manometry  Youll probably discuss the results of the test with your healthcare provider at another appointment. This is because time is needed to review the tracings. You may have a mild sore throat for a short time. As soon as the numbness in your throat is gone, you can return to eating and your normal activities.  Date Last Reviewed: 6/1/2016  © 8262-3975 [employer]. [address] [place] [address], [place] [address]. All rights reserved. This information is not intended as a substitute for professional medical care. Always follow your healthcare professional's instructions.

## 2018-10-10 ENCOUNTER — OFFICE VISIT (OUTPATIENT)
Dept: PSYCHIATRY | Facility: CLINIC | Age: 73
End: 2018-10-10
Payer: MEDICARE

## 2018-10-10 ENCOUNTER — TELEPHONE (OUTPATIENT)
Dept: SURGERY | Facility: CLINIC | Age: 73
End: 2018-10-10

## 2018-10-10 VITALS
WEIGHT: 158.38 LBS | HEIGHT: 61 IN | DIASTOLIC BLOOD PRESSURE: 63 MMHG | SYSTOLIC BLOOD PRESSURE: 124 MMHG | HEART RATE: 105 BPM | BODY MASS INDEX: 29.9 KG/M2

## 2018-10-10 DIAGNOSIS — F10.21 ALCOHOL DEPENDENCE IN REMISSION: ICD-10-CM

## 2018-10-10 DIAGNOSIS — F33.42 RECURRENT MAJOR DEPRESSION IN FULL REMISSION: Primary | ICD-10-CM

## 2018-10-10 DIAGNOSIS — F41.1 GAD (GENERALIZED ANXIETY DISORDER): ICD-10-CM

## 2018-10-10 PROCEDURE — 99999 PR PBB SHADOW E&M-EST. PATIENT-LVL II: CPT | Mod: PBBFAC,,, | Performed by: PSYCHIATRY & NEUROLOGY

## 2018-10-10 PROCEDURE — 99499 UNLISTED E&M SERVICE: CPT | Mod: HCNC,S$GLB,, | Performed by: PSYCHIATRY & NEUROLOGY

## 2018-10-10 PROCEDURE — 3078F DIAST BP <80 MM HG: CPT | Mod: CPTII,,, | Performed by: PSYCHIATRY & NEUROLOGY

## 2018-10-10 PROCEDURE — 1101F PT FALLS ASSESS-DOCD LE1/YR: CPT | Mod: CPTII,,, | Performed by: PSYCHIATRY & NEUROLOGY

## 2018-10-10 PROCEDURE — 90833 PSYTX W PT W E/M 30 MIN: CPT | Mod: S$PBB,,, | Performed by: PSYCHIATRY & NEUROLOGY

## 2018-10-10 PROCEDURE — 90833 PSYTX W PT W E/M 30 MIN: CPT | Mod: PBBFAC | Performed by: PSYCHIATRY & NEUROLOGY

## 2018-10-10 PROCEDURE — 3074F SYST BP LT 130 MM HG: CPT | Mod: CPTII,,, | Performed by: PSYCHIATRY & NEUROLOGY

## 2018-10-10 PROCEDURE — 99214 OFFICE O/P EST MOD 30 MIN: CPT | Mod: S$PBB,,, | Performed by: PSYCHIATRY & NEUROLOGY

## 2018-10-10 PROCEDURE — 99212 OFFICE O/P EST SF 10 MIN: CPT | Mod: PBBFAC | Performed by: PSYCHIATRY & NEUROLOGY

## 2018-10-10 RX ORDER — BUPROPION HYDROCHLORIDE 100 MG/1
100 TABLET, EXTENDED RELEASE ORAL 2 TIMES DAILY
Qty: 60 TABLET | Refills: 2 | Status: SHIPPED | OUTPATIENT
Start: 2018-10-10 | End: 2018-11-30

## 2018-10-10 NOTE — TELEPHONE ENCOUNTER
----- Message from Prema Roy sent at 10/10/2018  4:43 PM CDT -----  Contact: Pt.Self   Pt. States she is returning call from nurse  please call Pt. Back @ [phone] Thank You

## 2018-10-10 NOTE — TELEPHONE ENCOUNTER
----- Message from Carly Dugan sent at 10/10/2018  3:32 PM CDT -----  Contact: pt  Pt would like to be called back regarding a appt    Pt can be reached at [phone]

## 2018-10-10 NOTE — PROGRESS NOTES
"Outpatient Psychiatry Follow-Up Visit (MD/NP)    10/10/2018    last seen  3/18     Clinical Status of Patient:  Outpatient (Ambulatory)    Chief Complaint:   "Paris"   Paris Burris is a 73 y.o. female who presents today for follow-up of depression and alcohol problem .  Met with patient.   ; friend of Dr Rachel . Annita Braun ( 9 yrs ) .  to [place] .    Interval History and Content of Current Session:  Interim Events/Subjective Report/Content of Current Session: Pt s/p ABU program in Spring 2014 for alcohol dependence . 2-10-14 sober date . Very engaged in AA and sponsor attending 3-4 x per week . She completed 1st series of 12 steps .       In past she was not sure she wanted to reengage with  Dr Lauren .       EX  Son in law - [place] -- good terms  ; studied at  [employer] ; Dtr smoker Yue ( Vira Robbins)  employed in past with [employer])  S/p 4th suicide attempt ( 1st was OD  acetomin; 2nd and 3rd had gun)  and was admitted to [facility] on 4-9-15  X 5 weeks .    Yue also has been to tx in [facility] [place] [place] . Yue now in therapy  plus a group with [facility].  Pt does not ask details anymore .   Yue ( kaylie patel).  Yue continues to  cut herself off from others that were close to her . Yue has  Been even keeled .     Yue has distanced dennis daniela's wife ( Yazmin) away . Dennis is a family friend.   Dtr Yue ( Vira robbins) going though bitter divorce where Yue wants others to avoid [place]. Kids shuttle .  [place] and Yue relationship is thawing as his live in  is gone.  Yue holds a grudge.      Grkids at their own  home are 14( Yoni)- anger issues / destroys property . 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 Oldest grson Delfino 6'3"  ( 19) ADHD  ,  went to  [employer]  boarding school.: just told he was transitioning. He is to seek skilled job in [facility] . Pt accepts  Him as she is a [employer] .      Younger kids at  [facility] .     Diabetes controlled with hgb A1c.  Neuropathic  pain  is worse from toe " to balll of foot to heel then associated hip  Pain .  Podiatry  has added Neurontin 300 mg bid  To tid  near early 2017  but may prevent weight loss for her  . Sitting is worse for her pain.       Jacqueline Burns [facility]  has been very helpful to dec HgbA1c     Mood has been impacted by fatigue from anemia . Improved with iron.       Annita Braun , a [employer]  has become depressed in past but is partially in favor of meds . He is 81 and is to engage MD for anger mngmnt .      Psychiatric Review Of Systems - Is patient experiencing or having changes in:    Mood has been more depressed   sleep: good Sleep apnea confirmed  by sleep specialist  ( Dr Gregorio) ; not using cpap   appetite: no, controlled  With very health conscious diet   weight: no; 171 ( feb 2017);   A1c is <6;  down from >10  ; 165 ( mar 2018)  ; 158 ( oct 2018)   energy/anergy: fair ; less   exercising  ( post fall) at [place] with free  bc of comorbid condition; completed healthy back ; in new  PT on [place]   interest/pleasure/anhedonia: yes ; chore lists has shrunken as she is more apathetic ; in [employer] 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   somatic symptoms: lower back problems on prn tramadol   libido: no  anxiety/panic: improved;   Less obsessive thinking about dtr   guilty/hopelessness: no  concentration: stable but compromised by procrastination   S.I.B.s/risky behavior: no  Irritability: no  Racing thoughts: no  Impulsive behaviors: no  Paranoia:no  AVH:no    Pt has hyperparathyroidism  and had surgery in nov, 2015- stable .     Has compression fx in high thoracic area .    Coping with relatively new DM     Santana Rachel MD is  fam friend .     Meds  effexor  37.5  mg  Tablet  2  q day ( at 150 mg had anxiety / nightmares ) ;  Vit D supplement ; tramadol 50 mg tid prn ( usually takes 50 mg  2 q day)  Max would be 300 in literature  ; Ativan 0.5 mg  prn   Holding ; Neurontin 300 mg bid     Other meds Tramadol -pt in healthy back program but has increased pain     Past meds ; did not fill deplin ; remeron 15mg -half tab 1qhs stopped when no longer ; Cymbalta     Her pain doc is concerned about potential interaction of tramadol and ssri in past     Reading book from  health  --never be sick again --  Avoiding  milk , white flour , sugar ; limited coffee    Psychotherapy:  · Target symptoms: alcohol abuse, depression  · Why chosen therapy is appropriate versus another modality: relevant to diagnosis, patient responds to this modality, evidence based practice  · Outcome monitoring methods: self-report, observation  · Therapeutic intervention type: supportive psychotherapy  · Topics discussed/themes: relationships difficulties, substance abuse  · The patient's response to the intervention is accepting. The patient's progress toward treatment goals is good.   · Duration of intervention:  30 minutes.    Review of Systems   · Prob Pert. 1 sys, Ext. psych +2 add., Comp. 10-14 sys  · PSYCHIATRIC: Pertinant items are noted in the narrative.  · CONSTITUTIONAL: No weight gain or loss. Wedding dress size 5 . She is about a 14-16 .   · MUSCULOSKELETAL: Positive for joint stiffness, toe neuropathic  Pain ( DM JAN 2016). Leg ( hip and thighs)  weakness still but in PT  ( possibly vascular- neg neuro test ) ;mechanical cupping ( upper back tension post Breast CA)  and  · NEUROLOGIC: No weakness, sensory changes, seizures, confusion, memory loss, tremor or other abnormal movements.  · ENDOCRINE: No polydipsia or polyuria.  · INTEGUMENTARY: No rashes or lacerations.  · EYES: No exophthalmos, jaundice or blindness.  · ENT: No dizziness, tinnitus or hearing loss.  · RESPIRATORY: No shortness of breath.  · CARDIOVASCULAR: No tachycardia or chest pain.  · GASTROINTESTINAL: No nausea, vomiting, pain, constipation or diarrhea.  · GENITOURINARY: No frequency, dysuria or sexual  "dysfunction. S/p recent uti series and now kidney stones awaiting lithotripsy   · HEMATOLOGIC/LYMPHATIC: No excessive bleeding, prolonged or excessive bleeding after dental extraction/injury.  · ALLERGIC/IMMUNOLOGIC: No allergic response to materials, foods or animals at this time.    Past Medical, Family and Social History: The patient's past medical, family and social history have been reviewed and updated as appropriate within the electronic medical record - see encounter notes.  Has lost son yrs ago .She and Annita Braun live in a 2nd floor apmnt away from [facility]. Her mom suicided in her 50's     Compliance: yes    Side effects:  Sweating when others dont    Risk Parameters:  Patient reports no suicidal ideation  Patient reports no homicidal ideation  Patient reports no self-injurious behavior  Patient reports no violent behavior    Exam (detailed: at least 9 elements; comprehensive: all 15 elements)   Constitutional  Vitals:  Most recent vital signs, dated less than 90 days prior to this appointment, were reviewed.   Vitals:    10/10/18 1404   BP: 124/63   Pulse: 105   Weight: 71.9 kg (158 lb 6.4 oz)   Height: 5' 1" (1.549 m)        General:  unremarkable, age appropriate, neatly groomed     Musculoskeletal  Muscle Strength/Tone:  no spasicity, no rigidity   Gait & Station:  non-ataxic     Psychiatric  Speech:  no latency; no press   Mood & Affect:  euthymic  congruent and appropriate   Thought Process:  normal and logical   Associations:  intact   Thought Content:  normal, no suicidality, no homicidality, delusions, or paranoia   Insight:  has awareness of illness   Judgement: behavior is adequate to circumstances   Orientation:  grossly intact   Memory: intact for content of interview   Language: grossly intact   Attention Span & Concentration:  able to focus   Fund of Knowledge:  intact and appropriate to age and level of education     Assessment and Diagnosis   Status/Progress: Based on the examination today, the " patient's problem(s) is/are worsening.  New problems have been presented today.   Co-morbidities are complicating management of the primary condition.  There are no active rule-out diagnoses for this patient at this time.     General Impression:  Depression much improved     No diagnosis found.          Intervention/Counseling/Treatment Plan   · Medication Management: decrease Effexor tablet to 50 mg q day  ; continue lorazepam prn .  The risks and benefits of medication were discussed with the patient regarding serotonin syndrome and withdrawal .   · Counseling provided with patient as follows: importance of compliance with chosen treatment options was emphasized, risks and benefits of treatment options, including medications, were discussed with the patient  · AA/NA/CA/ACOA/Abstinence     Consider Lyrica for pain - to see podiatry       Return to Clinic: 6 months

## 2018-10-11 ENCOUNTER — TELEPHONE (OUTPATIENT)
Dept: SURGERY | Facility: CLINIC | Age: 73
End: 2018-10-11

## 2018-10-11 PROCEDURE — 91037 ESOPH IMPED FUNCTION TEST: CPT | Mod: 26,,, | Performed by: INTERNAL MEDICINE

## 2018-10-11 PROCEDURE — 91010 ESOPHAGUS MOTILITY STUDY: CPT | Mod: 26,,, | Performed by: INTERNAL MEDICINE

## 2018-10-11 NOTE — TELEPHONE ENCOUNTER
----- Message from Giuseppe Doe sent at 10/11/2018  9:04 AM CDT -----  Patient Returning Call from [facility]    Who Left Message for Patient:Shweta  Communication Preference:[phone]  Additional Information:

## 2018-10-12 ENCOUNTER — PATIENT MESSAGE (OUTPATIENT)
Dept: INTERNAL MEDICINE | Facility: CLINIC | Age: 73
End: 2018-10-12

## 2018-10-15 ENCOUNTER — CLINICAL SUPPORT (OUTPATIENT)
Dept: REHABILITATION | Facility: OTHER | Age: 73
End: 2018-10-15
Payer: MEDICARE

## 2018-10-15 ENCOUNTER — PATIENT MESSAGE (OUTPATIENT)
Dept: SURGERY | Facility: CLINIC | Age: 73
End: 2018-10-15

## 2018-10-15 ENCOUNTER — OFFICE VISIT (OUTPATIENT)
Dept: SURGERY | Facility: CLINIC | Age: 73
End: 2018-10-15
Payer: MEDICARE

## 2018-10-15 VITALS
BODY MASS INDEX: 29.64 KG/M2 | DIASTOLIC BLOOD PRESSURE: 63 MMHG | SYSTOLIC BLOOD PRESSURE: 120 MMHG | TEMPERATURE: 98 F | HEART RATE: 99 BPM | HEIGHT: 61 IN | WEIGHT: 157 LBS

## 2018-10-15 DIAGNOSIS — K44.9 HIATAL HERNIA: Primary | ICD-10-CM

## 2018-10-15 DIAGNOSIS — R53.1 DECREASED STRENGTH: ICD-10-CM

## 2018-10-15 DIAGNOSIS — R29.3 POSTURAL IMBALANCE: ICD-10-CM

## 2018-10-15 DIAGNOSIS — G89.29 CHRONIC BILATERAL LOW BACK PAIN WITHOUT SCIATICA: ICD-10-CM

## 2018-10-15 DIAGNOSIS — G47.33 OSA (OBSTRUCTIVE SLEEP APNEA): ICD-10-CM

## 2018-10-15 DIAGNOSIS — M54.50 CHRONIC BILATERAL LOW BACK PAIN WITHOUT SCIATICA: ICD-10-CM

## 2018-10-15 PROCEDURE — 1101F PT FALLS ASSESS-DOCD LE1/YR: CPT | Mod: CPTII,,, | Performed by: SURGERY

## 2018-10-15 PROCEDURE — 97110 THERAPEUTIC EXERCISES: CPT | Mod: PN | Performed by: PHYSICAL THERAPIST

## 2018-10-15 PROCEDURE — 3074F SYST BP LT 130 MM HG: CPT | Mod: CPTII,,, | Performed by: SURGERY

## 2018-10-15 PROCEDURE — 99213 OFFICE O/P EST LOW 20 MIN: CPT | Mod: S$PBB,,, | Performed by: SURGERY

## 2018-10-15 PROCEDURE — 3078F DIAST BP <80 MM HG: CPT | Mod: CPTII,,, | Performed by: SURGERY

## 2018-10-15 PROCEDURE — 99999 PR PBB SHADOW E&M-EST. PATIENT-LVL III: CPT | Mod: PBBFAC,,, | Performed by: SURGERY

## 2018-10-15 PROCEDURE — 99213 OFFICE O/P EST LOW 20 MIN: CPT | Mod: PBBFAC | Performed by: SURGERY

## 2018-10-15 RX ORDER — LIDOCAINE HYDROCHLORIDE 10 MG/ML
1 INJECTION, SOLUTION EPIDURAL; INFILTRATION; INTRACAUDAL; PERINEURAL ONCE
Status: CANCELLED | OUTPATIENT
Start: 2018-10-15 | End: 2018-10-15

## 2018-10-15 NOTE — PROGRESS NOTES
Patient ID: Paris Burris is a 73 y.o. female.     Chief Complaint: Follow-up        HPI:  72 yo female with asymptomatic large hiatal hernia. Patient denies reflux, dysphagia, regurgitation or abdominal pain. History of camerons ulcers.  ROJAS complete.    UGI: large hiatal hernia. 6cm, type 3.  CT: moderate sized hiatal hernia  Manometry: ineffective esophageal motility- incomplete bolus clearance in 30%.  Presents to schedule surgery.  No sig changes from her last visit.    Review of Systems   Constitutional: Negative for fever and unexpected weight change.   Respiratory: Negative for shortness of breath.    Cardiovascular: Negative for chest pain.   Gastrointestinal: Positive for diarrhea. Negative for abdominal pain, constipation, nausea and vomiting.        3 days with diarrhea last week for unknown reason, resolved.   Genitourinary: Positive for difficulty urinating. Negative for dysuria.        Chronic difficulty urinating, following with uro-gyn and pelvic    Skin: Negative for rash and wound.   Neurological: Negative for seizures and headaches.   Hematological: Bruises/bleeds easily.        Easy bruising, no easy bleeding         Current Medications          Current Outpatient Medications   Medication Sig Dispense Refill    alpha lipoic acid 300 mg Cap Take 300 mg by mouth 2 (two) times daily.         ascorbic acid (VITAMIN C) 500 MG tablet Take 1,000 mg by mouth once daily.         b complex vitamins capsule Take 1 capsule by mouth once daily.        blood sugar diagnostic (ACCU-CHEK SMARTVIEW TEST STRIP) Strp Test once daily. 100 strip 11    blood-glucose meter Misc 1 Device by Misc.(Non-Drug; Combo Route) route 2 (two) times daily. 1 each 0    buPROPion (WELLBUTRIN SR) 100 MG TBSR 12 hr tablet Take 1 tablet (100 mg total) by mouth 2 (two) times daily. 60 tablet 2    cetirizine (ZYRTEC) 10 MG tablet Take 10 mg by mouth once daily.        CITICOLINE SODIUM (CITICOLINE ORAL) Take 1  tablet by mouth once daily at 6am.        clonazePAM (KLONOPIN) 0.5 MG tablet Take 1 tablet (0.5 mg total) by mouth daily as needed for Anxiety. 30 tablet 0    esomeprazole (NEXIUM) 40 MG capsule Take 1 capsule (40 mg total) by mouth before breakfast. 90 capsule 3    ferrous sulfate 325 mg (65 mg iron) Tab tablet Take 1 tablet (325 mg total) by mouth 2 (two) times daily. 60 tablet 5    gabapentin (NEURONTIN) 300 MG capsule Take 1 capsule (300 mg total) by mouth 2 (two) times daily. 180 capsule 3    lancets Misc 1 lancet by Misc.(Non-Drug; Combo Route) route 2 (two) times daily. 100 each 6    LORazepam (ATIVAN) 0.5 MG tablet TAKE 1 TABLET (0.5 MG TOTAL) BY MOUTH DAILY AS NEEDED. 30 tablet 1    metFORMIN (GLUCOPHAGE) 500 MG tablet Take 0.5 tablets (250 mg total) by mouth daily with breakfast. 45 tablet 3    omega-3 fatty acids-fish oil (FISH OIL) 340-1,000 mg Cap Take 1 capsule by mouth once daily.        rosuvastatin (CRESTOR) 20 MG tablet Take 1 tablet (20 mg total) by mouth once daily. 90 tablet 3    traMADol (ULTRAM) 50 mg tablet Take 2 tablets (100 mg total) by mouth daily as needed for Pain. 60 tablet 1    tretinoin (RETIN-A) 0.05 % cream Use hs 45 g 6    TURMERIC (CURCUMIN MISC) Take 500 mg by mouth 2 (two) times daily.         venlafaxine (EFFEXOR) 37.5 MG Tab Take 2 tablets (75 mg total) by mouth once daily. 30 tablet 5    vitamin D 1000 units Tab Take 500 Units by mouth once daily. With K2 50 [facility]          No current facility-administered medications for this visit.                   Review of patient's allergies indicates:   Allergen Reactions    Topamax [topiramate] Other (See Comments)       hallucinations              Past Medical History:   Diagnosis Date    Allergy      Anemia      Anxiety      Breast cancer 2002     Left breast    Cancer 2002     L breast s/p lumpectomy    Decreased hearing      Depression      Diabetes mellitus      Diabetes with neurologic complications       Gastric ulcer 9/10/13     EGD    Hiatal hernia      Hyperlipidemia      Migraine headache      Migraine headache 3/20/2015    Multiple gastric ulcers      Parathyroid disorder      Pre-diabetes      Renal manifestation of secondary diabetes mellitus      Sleep apnea       no CPAP    Type 2 diabetes mellitus, controlled, 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    Wrist fracture                 Past Surgical History:   Procedure Laterality Date    ADENOIDECTOMY        BREAST LUMPECTOMY Left 2002    COLONOSCOPY N/A 12/2/2016     Procedure: COLONOSCOPY;  Surgeon: Dustin Patterson MD;  Location: [facility] (4TH FLR);  Service: Endoscopy;  Laterality: N/A;  PM prep    COLONOSCOPY N/A 12/2/2016     Performed by Dustin Patterson MD at [facility] (4TH FLR)    DEVICE ASSISTED ENTEROSCOPY-ANTEROGRADE N/A 3/20/2018     Performed by Dustin Patterson MD at [facility] (2ND FLR)    ESOPHAGEAL MANOMETRY WITH MEASUREMENT OF IMPEDANCE N/A 10/8/2018     Procedure: MANOMETRY, ESOPHAGUS, WITH IMPEDANCE MEASUREMENT;  Surgeon: Renato Maria MD;  Location: [facility] (4TH FLR);  Service: Endoscopy;  Laterality: N/A;    ESOPHAGOGASTRODUODENOSCOPY N/A 9/12/2018     Procedure: ESOPHAGOGASTRODUODENOSCOPY (EGD);  Surgeon: Dustin Patterson MD;  Location: [facility] (4TH FLR);  Service: Endoscopy;  Laterality: N/A;  6-8 weeks from visit    ESOPHAGOGASTRODUODENOSCOPY (EGD) N/A 9/12/2018     Performed by Dustin Patterson MD at [facility] (4TH FLR)    ESOPHAGOGASTRODUODENOSCOPY (EGD) N/A 12/2/2016     Performed by Dustin Patterson MD at [facility] (4TH FLR)    ESOPHAGOGASTRODUODENOSCOPY (EGD) N/A 4/20/2015     Performed by Marcial Dewitt MD at [facility] (4TH FLR)    EYE SURGERY Bilateral       cataracts    INJECTION-STEROID-EPIDURAL-LUMBAR N/A 6/15/2016     [facility]    lipoma removal   1999    MANOMETRY, ESOPHAGUS, WITH IMPEDANCE MEASUREMENT N/A 10/8/2018     Performed by Renato Maria MD at  [facility] ENDO (4TH FLR)    PARATHYROIDECTOMY minimally invasive N/A 11/4/2015     Performed by [facility] Aponte [facility] at [facility] OR 2ND FLR    TONSILLECTOMY                   Family History   Problem Relation Age of Onset    Suicide Mother      Depression Mother      Alcohol abuse Father      Headaches Father      Diabetes Sister           prediabetes    Psoriasis Sister      Depression Sister      Depression Daughter      Colon cancer Neg Hx      Esophageal cancer Neg Hx           Social History               Socioeconomic History    Marital status:        Spouse name: Not on file    Number of children: Not on file    Years of education: Not on file    Highest education level: Not on file   Social Needs    Financial resource strain: Not on file    Food insecurity - worry: Not on file    Food insecurity - inability: Not on file    Transportation needs - medical: Not on file    Transportation needs - non-medical: Not on file   Occupational History    Occupation:  supervisor   Tobacco Use    Smoking status: Never Smoker    Smokeless tobacco: Never Used   Substance and Sexual Activity    Alcohol use: No       Comment: quit 2014 - alcohol abuse    Drug use: Yes    Sexual activity: Yes       Partners: Male   Other Topics Concern    Patient feels they ought to cut down on drinking/drug use Not Asked    Patient annoyed by others criticizing their drinking/drug use Not Asked    Patient has felt bad or guilty about drinking/drug use Not Asked    Patient has had a drink/used drugs as an eye opener in the AM Not Asked    Are you pregnant or think you may be? Not Asked    Breast-feeding Not Asked   Social History Narrative    Not on file                Vitals:     10/15/18 1426   BP: 120/63   Pulse: 99   Temp: 98.3 °F (36.8 °C)         Physical Exam   Constitutional: She is oriented to person, place, and time. She appears well-developed and well-nourished.   Neck: Normal range of motion.  Neck supple.   Cardiovascular: Normal rate, regular rhythm and normal heart sounds.   Pulmonary/Chest: Effort normal and breath sounds normal.   Abdominal: Soft. Bowel sounds are normal. There is no tenderness.   Neurological: She is alert and oriented to person, place, and time.   Skin: Skin is warm and dry.   Vitals reviewed.        UGI: large hiatal hernia. 6cm, type 3.  CT: moderate sized hiatal hernia  Manometry: ineffective esophageal motility- incomplete bolus clearance in 30%.     Assessment & Plan:  Schedule for laparoscopic hiatal hernia repair, Toupet fundoplication and possible esophageal lengthening procedure.   Consent obtained

## 2018-10-15 NOTE — MEDICAL/APP STUDENT
Patient ID: Paris Burris is a 73 y.o. female.    Chief Complaint: Follow-up      HPI:  74 yo female with asymptomatic hiatal hernia. Patient denies reflux, dysphagia, regurgitation or abdominal pain.   Review of Systems   Constitutional: Negative for fever and unexpected weight change.   Respiratory: Negative for shortness of breath.    Cardiovascular: Negative for chest pain.   Gastrointestinal: Positive for diarrhea. Negative for abdominal pain, constipation, nausea and vomiting.        3 days with diarrhea last week for unknown reason, resolved.   Genitourinary: Positive for difficulty urinating. Negative for dysuria.        Chronic difficulty urinating, following with uro-gyn and pelvic    Skin: Negative for rash and wound.   Neurological: Negative for seizures and headaches.   Hematological: Bruises/bleeds easily.        Easy bruising, no easy bleeding       Current Outpatient Medications   Medication Sig Dispense Refill    alpha lipoic acid 300 mg Cap Take 300 mg by mouth 2 (two) times daily.       ascorbic acid (VITAMIN C) 500 MG tablet Take 1,000 mg by mouth once daily.       b complex vitamins capsule Take 1 capsule by mouth once daily.      blood sugar diagnostic (ACCU-CHEK SMARTVIEW TEST STRIP) Strp Test once daily. 100 strip 11    blood-glucose meter Misc 1 Device by Misc.(Non-Drug; Combo Route) route 2 (two) times daily. 1 each 0    buPROPion (WELLBUTRIN SR) 100 MG TBSR 12 hr tablet Take 1 tablet (100 mg total) by mouth 2 (two) times daily. 60 tablet 2    cetirizine (ZYRTEC) 10 MG tablet Take 10 mg by mouth once daily.      CITICOLINE SODIUM (CITICOLINE ORAL) Take 1 tablet by mouth once daily at 6am.      clonazePAM (KLONOPIN) 0.5 MG tablet Take 1 tablet (0.5 mg total) by mouth daily as needed for Anxiety. 30 tablet 0    esomeprazole (NEXIUM) 40 MG capsule Take 1 capsule (40 mg total) by mouth before breakfast. 90 capsule 3    ferrous sulfate 325 mg (65 mg iron) Tab tablet  Take 1 tablet (325 mg total) by mouth 2 (two) times daily. 60 tablet 5    gabapentin (NEURONTIN) 300 MG capsule Take 1 capsule (300 mg total) by mouth 2 (two) times daily. 180 capsule 3    lancets Misc 1 lancet by Misc.(Non-Drug; Combo Route) route 2 (two) times daily. 100 each 6    LORazepam (ATIVAN) 0.5 MG tablet TAKE 1 TABLET (0.5 MG TOTAL) BY MOUTH DAILY AS NEEDED. 30 tablet 1    metFORMIN (GLUCOPHAGE) 500 MG tablet Take 0.5 tablets (250 mg total) by mouth daily with breakfast. 45 tablet 3    omega-3 fatty acids-fish oil (FISH OIL) 340-1,000 mg Cap Take 1 capsule by mouth once daily.      rosuvastatin (CRESTOR) 20 MG tablet Take 1 tablet (20 mg total) by mouth once daily. 90 tablet 3    traMADol (ULTRAM) 50 mg tablet Take 2 tablets (100 mg total) by mouth daily as needed for Pain. 60 tablet 1    tretinoin (RETIN-A) 0.05 % cream Use hs 45 g 6    TURMERIC (CURCUMIN MISC) Take 500 mg by mouth 2 (two) times daily.       venlafaxine (EFFEXOR) 37.5 MG Tab Take 2 tablets (75 mg total) by mouth once daily. 30 tablet 5    vitamin D 1000 units Tab Take 500 Units by mouth once daily. With  [facility]       No current facility-administered medications for this visit.        Review of patient's allergies indicates:   Allergen Reactions    Topamax [topiramate] Other (See Comments)     hallucinations       Past Medical History:   Diagnosis Date    Allergy     Anemia     Anxiety     Breast cancer 2002    Left breast    Cancer 2002    L breast s/p lumpectomy    Decreased hearing     Depression     Diabetes mellitus     Diabetes with neurologic complications     Gastric ulcer 9/10/13    EGD    Hiatal hernia     Hyperlipidemia     Migraine headache     Migraine headache 3/20/2015    Multiple gastric ulcers     Parathyroid disorder     Pre-diabetes     Renal manifestation of secondary diabetes mellitus     Sleep apnea     no CPAP    Type 2 diabetes mellitus, controlled, with renal complications  6/14/2017    Wrist fracture        Past Surgical History:   Procedure Laterality Date    ADENOIDECTOMY      BREAST LUMPECTOMY Left 2002    COLONOSCOPY N/A 12/2/2016    Procedure: COLONOSCOPY;  Surgeon: Dustin Patterson MD;  Location: [facility] (4TH FLR);  Service: Endoscopy;  Laterality: N/A;  PM prep    COLONOSCOPY N/A 12/2/2016    Performed by Dustin Patterson MD at [facility] (4TH FLR)    DEVICE ASSISTED ENTEROSCOPY-ANTEROGRADE N/A 3/20/2018    Performed by Dustin Patterson MD at [facility] (2ND FLR)    ESOPHAGEAL MANOMETRY WITH MEASUREMENT OF IMPEDANCE N/A 10/8/2018    Procedure: MANOMETRY, ESOPHAGUS, WITH IMPEDANCE MEASUREMENT;  Surgeon: Renato Maria MD;  Location: [facility]4TH FLR);  Service: Endoscopy;  Laterality: N/A;    ESOPHAGOGASTRODUODENOSCOPY N/A 9/12/2018    Procedure: ESOPHAGOGASTRODUODENOSCOPY (EGD);  Surgeon: Dustin Patterson MD;  Location: [facility] (4TH FLR);  Service: Endoscopy;  Laterality: N/A;  6-8 weeks from visit    ESOPHAGOGASTRODUODENOSCOPY (EGD) N/A 9/12/2018    Performed by Dustin Patterson MD at [facility] (4TH FLR)    ESOPHAGOGASTRODUODENOSCOPY (EGD) N/A 12/2/2016    Performed by Dustin Patterson MD at [facility] (4TH FLR)    ESOPHAGOGASTRODUODENOSCOPY (EGD) N/A 4/20/2015    Performed by Marcial Dewitt MD at [facility] (4TH FLR)    EYE SURGERY Bilateral     cataracts    INJECTION-STEROID-EPIDURAL-LUMBAR N/A 6/15/2016    Per[facility] at [facility]    lipoma removal  1999    MANOMETRY, ESOPHAGUS, WITH IMPEDANCE MEASUREMENT N/A 10/8/2018    Performed by Renato Maria MD at [facility] (4TH FLR)    PARATHYROIDECTOMY minimally invasive N/A 11/4/2015    Performed by Amna Aponte MD at [facility] OR 2ND FLR    TONSILLECTOMY         Family History   Problem Relation Age of Onset    Suicide Mother     Depression Mother     Alcohol abuse Father     Headaches Father     Diabetes Sister         prediabetes    Psoriasis Sister     Depression Sister      Depression Daughter     Colon cancer Neg Hx     Esophageal cancer Neg Hx        Social History     Socioeconomic History    Marital status:      Spouse name: Not on file    Number of children: Not on file    Years of education: Not on file    Highest education level: Not on file   Social Needs    Financial resource strain: Not on file    Food insecurity - worry: Not on file    Food insecurity - inability: Not on file    Transportation needs - medical: Not on file    Transportation needs - non-medical: Not on file   Occupational History    Occupation:  supervisor   Tobacco Use    Smoking status: Never Smoker    Smokeless tobacco: Never Used   Substance and Sexual Activity    Alcohol use: No     Comment: quit 2014 - alcohol abuse    Drug use: Yes    Sexual activity: Yes     Partners: Male   Other Topics Concern    Patient feels they ought to cut down on drinking/drug use Not Asked    Patient annoyed by others criticizing their drinking/drug use Not Asked    Patient has felt bad or guilty about drinking/drug use Not Asked    Patient has had a drink/used drugs as an eye opener in the AM Not Asked    Are you pregnant or think you may be? Not Asked    Breast-feeding Not Asked   Social History Narrative    Not on file       Vitals:    10/15/18 1426   BP: 120/63   Pulse: 99   Temp: 98.3 °F (36.8 °C)       Physical Exam   Constitutional: She is oriented to person, place, and time. She appears well-developed and well-nourished.   Neck: Normal range of motion. Neck supple.   Cardiovascular: Normal rate, regular rhythm and normal heart sounds.   Pulmonary/Chest: Effort normal and breath sounds normal.   Abdominal: Soft. Bowel sounds are normal. There is no tenderness.   Neurological: She is alert and oriented to person, place, and time.   Skin: Skin is warm and dry.   Vitals reviewed.      UGI: large hiatal hernia. 6cm, type 3.  CT: moderate sized hiatal hernia  Manometry: ineffective  esophageal motility- incomplete bolus clearance in 30%.    Assessment & Plan:  Schedule for laparoscopic hiatal hernia repair.

## 2018-10-15 NOTE — PROGRESS NOTES
"                                                    Physical Therapy Daily Note     Name: Paris Burris  Clinic Number: [id number]  Diagnosis:   Encounter Diagnoses   Name Primary?    Chronic bilateral low back pain without sciatica     Postural imbalance     Decreased strength      Physician: Fifi Lizama MD  Precautions: Compression Fx in the LSP, DM Type 2, depression  Visit #: 7 of 20  PTA Visit #: 0   Time In: 10:00  Time Out: 10:50  Total treatment time: 50 min (1:1 25 min)    Subjective     Pt reports: She would like an updated print out with all of her therapy exercises. She does them 20-30 minutes per day.   Pain Scale: Paris rates pain on a scale of 0-10 to be 2 currently in low back    Objective     9/20/2018  Thoracic/Lumbar AROM: Pain/Dysfunction with Movement:   Flexion 50%* no reversal lumbar lordosis   Extension 75%   Right side bending 75%   Left side bending 50%   Right rotation 50%   Left rotation 30%*      Lower Extremity Strength  Right LE   Left LE     Hip flexion: 4/5 Hip flexion: 4-/5   Hip extension:  4/5 Hip extension: 4-/5   Hip abduction: 4-/5 Hip abduction: 3+/5   Hip adduction:  4/5 Hip adduction:  4-/5   Hip Internal rotation    4/5 Hip Internal rotation 4-/5   Knee Flexion 4+/5 Knee Flexion 4+/5   Knee Extension 4+/5 Knee Extension 4+/5   Ankle dorsiflexion: 5/5 Ankle dorsiflexion: 5/5   Ankle plantarflexion: 5/5 Ankle plantarflexion: 5/5      Flexibility: hamstrings = fair, hip flexors/quads = fair+    Paris received individual therapeutic exercises to develop strength, endurance, ROM, flexibility, posture and core stabilization for 50 minutes including:    Recumbent bike: 0 min - pt requested to start on mat  LTR on PB: 15x  DKTC on PB: 15x  TA block squeeze: 10 x 10" holds  Open books: 15x   piriformis stretch  3 x 30"  HS stretch with strap: 3 x 30"  SLR: 2 x 10 with PPT  scap retractions: 2 x 10 np    Bridge with band: 2 x 10 x orange TB  Butterfly U : 2 x 10 x orange " TB  SL clams: 2 x 10 x orange TB      PPT with marches - 15x  PPT with unilat flex OH - 2 x 10  PPT with Patel flex OH - 15x   rows - 3 x 10 x oTB  U shd ext :  - 3  x 10 x OTB  parloff press: 2 x 10 x YTB    SL hip add - 15x B  Prone hip ext - 15x B    Paris received the following manual therapy techniques: none  Paris received the following supervised modalities after being cleared for contradictions: none    Written Home Exercises Provided: issued HEP on 10/15/2018 (see scanned into media)  Pt demo good understanding of the education provided. Paris demonstrated good return demonstration of activities.     Education provided re:  Paris verbalized good understanding of education provided.   No spiritual or educational barriers to learning provided    Assessment     Paris able to complete all exercises without visible distress. Pt requiring verbal/tactile cueing for correct performance of posterior pelvic tilt and maintaining contact with mat.   This is a 72 y.o. female referred to outpatient physical therapy and presents with a medical diagnosis of back pain and demonstrates limitations as described in the problem list. Pt prognosis is Good. Pt will continue to benefit from skilled outpatient physical therapy to address the deficits listed in the problem list, provide pt/family education and to maximize pt's level of independence in the home and community environment.     Goals as follows: See IE on 8/20/18 (PN due by 10/20/18)    Short Term Goals (4 Weeks):   1. Pt will report 20% reduction in pain of the lumbar spine and LE for ease with ADL's-met  2. PT will demonstrate improved trunk strength by a half grade in for ease with upright posture during standing activities.  3. Pt will demonstrate improved lumbar spine ROM in all directions by a half grade for ease with bending activities. - in progress  4. Pt to demonstrate improved functional ability with FOTO limitation <=46% disability.     Long Term Goals (8  Weeks):   1. Pt will report being independent with HEP for maintenance of improvements gained during therapy sessions- met  2. PT will report 50% reduction of pain of the back and LE for ease with dressing and grooming activities.   3. Pt will demonstrate trunk and extremity strength to >=4+/5 without the provocation of pain for ease with household chores  4. Pt will demonstrate appropriate upright posture without external cueing for ease with work related activities.   5. Pt to demonstrate improved functional ability with FOTO limitation <=36% disability.     Plan     Continue with established Plan of Care towards PT goals.    Therapist:  Neris Mejias, PT

## 2018-10-17 ENCOUNTER — CLINICAL SUPPORT (OUTPATIENT)
Dept: REHABILITATION | Facility: OTHER | Age: 73
End: 2018-10-17
Payer: MEDICARE

## 2018-10-17 DIAGNOSIS — R53.1 DECREASED STRENGTH: ICD-10-CM

## 2018-10-17 DIAGNOSIS — G89.29 CHRONIC BILATERAL LOW BACK PAIN WITHOUT SCIATICA: ICD-10-CM

## 2018-10-17 DIAGNOSIS — R29.3 POSTURAL IMBALANCE: ICD-10-CM

## 2018-10-17 DIAGNOSIS — M54.50 CHRONIC BILATERAL LOW BACK PAIN WITHOUT SCIATICA: ICD-10-CM

## 2018-10-17 PROCEDURE — 97110 THERAPEUTIC EXERCISES: CPT | Mod: PN | Performed by: PHYSICAL THERAPIST

## 2018-10-17 NOTE — PROGRESS NOTES
"                                                    Physical Therapy Daily Note     Name: Paris Burris  Clinic Number: [id number]  Diagnosis:   Encounter Diagnoses   Name Primary?    Chronic bilateral low back pain without sciatica     Postural imbalance     Decreased strength      Physician: Fifi Lizama MD  Precautions: Compression Fx in the LSP, DM Type 2, depression  Visit #: 7 of 20  PTA Visit #: 0   Time In: 10:00  Time Out: 10:55  Total treatment time: 55 min 1:1     Subjective     Pt reports: Minimal to no pain in her back since beginning therapy. She thinks the stretching is helping her. She would like to walk [employer] but worried about falls.   Pain Scale: Paris rates pain on a scale of 0-10 to be 1 currently in low back    Objective     9/20/2018  Thoracic/Lumbar AROM: Pain/Dysfunction with Movement:   Flexion 50%* no reversal lumbar lordosis   Extension 75%   Right side bending 75%   Left side bending 50%   Right rotation 50%   Left rotation 30%*      Lower Extremity Strength  Right LE   Left LE     Hip flexion: 4/5 Hip flexion: 4-/5   Hip extension:  4/5 Hip extension: 4-/5   Hip abduction: 4-/5 Hip abduction: 3+/5   Hip adduction:  4/5 Hip adduction:  4-/5   Hip Internal rotation    4/5 Hip Internal rotation 4-/5   Knee Flexion 4+/5 Knee Flexion 4+/5   Knee Extension 4+/5 Knee Extension 4+/5   Ankle dorsiflexion: 5/5 Ankle dorsiflexion: 5/5   Ankle plantarflexion: 5/5 Ankle plantarflexion: 5/5      Flexibility: hamstrings = fair, hip flexors/quads = fair+    10/17:  Dynamic gait index: 20/24 (<19/25 indicates increased risk for falls in elderly population)    Paris received individual therapeutic exercises to develop strength, endurance, ROM, flexibility, posture and core stabilization for 55 minutes including:    Recumbent bike: 0 min - pt requested to start on mat  LTR on PB: 15x  DKTC on PB: 15x  TA block squeeze: 10 x 10" holds  Open books: 15x   piriformis stretch  3 x 30"  HS stretch " "with strap: 3 x 30"  SLR: 2 x 10 with PPT  scap retractions: 2 x 10 np    Bridge with band: 2 x 10 x orange TB  Butterfly U : 2 x 10 x orange TB  SL clams: 2 x 10 x orange TB    PPT with marches - 15x  PPT with unilat flex OH - 2 x 10  PPT with Patel flex OH - 15x   rows - 3 x 10 x oTB  U shd ext :  - 3  x 10 x OTB  parloff press: 2 x 10 x YTB    SL hip add - 15x B  Prone hip ext - 15x B    Paris received the following manual therapy techniques: none  Paris received the following supervised modalities after being cleared for contradictions: none    Written Home Exercises Provided: issued HEP on 10/15/2018 (see scanned into media)  Pt demo good understanding of the education provided. Paris demonstrated good return demonstration of activities.     Education provided re:  Paris verbalized good understanding of education provided.   No spiritual or educational barriers to learning provided    Assessment     Paris tolerated treatment well. Progressing with decreased subjective report of pain. Pt demonstrating low risk for falls as noted by dynamic balance. Mild balance impairment with head turns and changes in gait speed. PT recommended starting with 10 minutes of walking and avoidance of dual tasking for improved safety.     This is a 72 y.o. female referred to outpatient physical therapy and presents with a medical diagnosis of back pain and demonstrates limitations as described in the problem list. Pt prognosis is Good. Pt will continue to benefit from skilled outpatient physical therapy to address the deficits listed in the problem list, provide pt/family education and to maximize pt's level of independence in the home and community environment.     Goals as follows: See IE on 8/20/18 (PN due by 10/20/18)    Short Term Goals (4 Weeks):   1. Pt will report 20% reduction in pain of the lumbar spine and LE for ease with ADL's-met  2. PT will demonstrate improved trunk strength by a half grade in for ease with upright " posture during standing activities.  3. Pt will demonstrate improved lumbar spine ROM in all directions by a half grade for ease with bending activities. - in progress  4. Pt to demonstrate improved functional ability with FOTO limitation <=46% disability.     Long Term Goals (8 Weeks):   1. Pt will report being independent with HEP for maintenance of improvements gained during therapy sessions- met  2. PT will report 50% reduction of pain of the back and LE for ease with dressing and grooming activities.   3. Pt will demonstrate trunk and extremity strength to >=4+/5 without the provocation of pain for ease with household chores  4. Pt will demonstrate appropriate upright posture without external cueing for ease with work related activities.   5. Pt to demonstrate improved functional ability with FOTO limitation <=36% disability.     Plan     Continue with established Plan of Care towards PT goals.    Therapist:  Neris Mejias, PT

## 2018-10-18 ENCOUNTER — TELEPHONE (OUTPATIENT)
Dept: INTERNAL MEDICINE | Facility: CLINIC | Age: 73
End: 2018-10-18

## 2018-10-18 ENCOUNTER — CLINICAL SUPPORT (OUTPATIENT)
Dept: INTERNAL MEDICINE | Facility: CLINIC | Age: 73
End: 2018-10-18
Payer: MEDICARE

## 2018-10-18 VITALS — WEIGHT: 157.44 LBS | BODY MASS INDEX: 29.74 KG/M2

## 2018-10-18 DIAGNOSIS — E66.9 DIABETES MELLITUS TYPE 2 IN OBESE: Primary | ICD-10-CM

## 2018-10-18 DIAGNOSIS — E11.69 DIABETES MELLITUS TYPE 2 IN OBESE: Primary | ICD-10-CM

## 2018-10-18 PROCEDURE — 99211 OFF/OP EST MAY X REQ PHY/QHP: CPT | Mod: PBBFAC

## 2018-10-18 PROCEDURE — 99999 PR PBB SHADOW E&M-EST. PATIENT-LVL I: CPT | Mod: PBBFAC,,,

## 2018-10-18 RX ORDER — TRAMADOL HYDROCHLORIDE 50 MG/1
100 TABLET ORAL DAILY PRN
Qty: 60 TABLET | Refills: 1 | Status: SHIPPED | OUTPATIENT
Start: 2018-10-18 | End: 2019-02-22 | Stop reason: SDUPTHER

## 2018-10-18 NOTE — PROGRESS NOTES
Health  Follow-Up Note   [x] Office  [] Phone  Notes from previous session reviewed.   [x] Previous Session Goals unchanged.   [] Patient/Caregiver Change in Goals.  Goals added or changed by Patient/Caregiver since program participation:  1. Continue same plan  2. Surgery on 11/6/18 for hernia repair         Additional/Changed support that patient/caregiver has experienced/sought?  (Indicate readiness, support from family, friends, others, community groups, etc)  1.  [place]     Additional/Changed obstacles that could prevent patient/caregiver from reaching their goals?  1.   Post op surgery food restrictions    Feedback provided:  1. Praised for great job down 2.65 lb total now 32 lbs      Diagnostic values/Desriptors for follow-up as needed for chronic condition(s)   Weight: 71.4 kg 157.4 lb  Blood Glucose: Averages coming down  7d- 106  14 d-106  30 d 109  90 d 116    Interventions:   1. Health  listened reflectively, validated thoughts and feelings, offered support and encouragement.   2. Allowed patient to express themselves in a non-biased atmosphere.  3. Health  assisted pt to problem-solve obstacles such as being in a challenging environment and dealing with these challenges.   4. Motivational Interviewed interventions utilized (OARS).   5. Patient responded favorably to interventions and remained actively engaged in the session.   6. Health  will remain available and connected for patient by phone and/or office visits.   7. Positive reinforcement, emotional support and encouragement provided.   8. Focused Education: MI    Plan:  [x] Pt will work on goals as stated above.   [x] Pt will contact Health  for any questions, concerns or needs.  [x] Pt will follow up with Health  in office on    11/1/18 at 0900.    [] Pt will follow up with Health  on phone in:        [x] Health  will remain available.   [] Health  will contact patient by phone in:        [] Health   will consult:        [] Health  will inform Provider via EPIC messaging.     Impression:  1. Behavior is consistent with  Action     Stage of Change.   2. Participation level:       [x] Receptive      [x] Interactive      [] Guarded and Resistant      [x] Self Motivated      [] Refused/Declined to participate   3. [x] Pt voiced understanding of all information presented.       [] Pt voiced needing further information/education. This will be arranged.       [x] Pt would benefit from further education/information as identified by this health . This will be arranged.     Jacqueline Perry RN HC

## 2018-10-24 ENCOUNTER — CLINICAL SUPPORT (OUTPATIENT)
Dept: REHABILITATION | Facility: OTHER | Age: 73
End: 2018-10-24
Payer: MEDICARE

## 2018-10-24 ENCOUNTER — PATIENT MESSAGE (OUTPATIENT)
Dept: SURGERY | Facility: CLINIC | Age: 73
End: 2018-10-24

## 2018-10-24 DIAGNOSIS — R53.1 DECREASED STRENGTH: ICD-10-CM

## 2018-10-24 DIAGNOSIS — M54.50 CHRONIC BILATERAL LOW BACK PAIN WITHOUT SCIATICA: ICD-10-CM

## 2018-10-24 DIAGNOSIS — R29.3 POSTURAL IMBALANCE: ICD-10-CM

## 2018-10-24 DIAGNOSIS — G89.29 CHRONIC BILATERAL LOW BACK PAIN WITHOUT SCIATICA: ICD-10-CM

## 2018-10-24 PROCEDURE — G8979 MOBILITY GOAL STATUS: HCPCS | Mod: CJ,PN

## 2018-10-24 PROCEDURE — 97110 THERAPEUTIC EXERCISES: CPT | Mod: PN

## 2018-10-24 PROCEDURE — G8978 MOBILITY CURRENT STATUS: HCPCS | Mod: CK,PN

## 2018-10-24 NOTE — PROGRESS NOTES
"                                                    Physical Therapy Daily Note     Name: Paris Burris  Clinic Number: [id number]  Diagnosis:   Encounter Diagnoses   Name Primary?    Chronic bilateral low back pain without sciatica     Postural imbalance     Decreased strength      Physician: Fifi Lizama MD  Precautions: Compression Fx in the LSP, DM Type 2, depression  Visit #: 7 of 20  PTA Visit #: 0   Time In: 11:00  Time Out: 11:55  Total treatment time: 30 min 1:1   Functional Outcome Measure: FOTO limitation = 48% Disability  G-codes + Modifier + % Impairment: (mobility):   Current =  (40% - 60% disability), Goal =  (20% - 40% disability)      Subjective     Pt reports: much improvement in her back pain since starting therapy. Bending forwards has gotten easier to do with less pain. Stair climbing and standing up after prolonged sitting remains painful and challenging. She says that prolonged walking also remains difficult but it is due to a fear of falling than poor endurance.    Pain Scale: Paris rates pain on a scale of 0-10 to be 1 currently in low back    Objective        Thoracic/Lumbar AROM: Pain/Dysfunction with Movement:   Flexion Min dean, fingertips 4" below patella, poor lordosis, no pain   Extension Min-Mod dean, fair lordosis   Right side bending WFL, fingertips to knee joint line, c/o stiffness/tension in contralat LB   Left side bending WFL dean, fingertips to knee joint line, c/o stiffness/tension in contralat LB   Right rotation Sitting - Min dean, no c/o tension in LB   Left rotation Sitting - Min-Mod dean, c/o tension in R LB       Lower Extremity Strength  Right LE   Left LE     Hip flexion: 4/5 Hip flexion: 4/5   Hip extension:  4/5 Hip extension: 4/5   Hip abduction: 4+/5 Hip abduction: 4/5   Hip adduction:  5/5 Hip adduction:  4+/5   Hip Internal rotation    4/5 Hip Internal rotation 4/5   Knee Flexion 5/5 Knee Flexion 5/5   Knee Extension 5/5 Knee Extension 5/5   Ankle " "dorsiflexion: 5/5 Ankle dorsiflexion: 5/5   Ankle plantarflexion: 5/5 Ankle plantarflexion: 5/5      Flexibility: hamstrings = fair, hip flexors/quads = fair+    10/17: Dynamic gait index: 20/24 (<19/25 indicates increased risk for falls in elderly population)    Paris received individual therapeutic exercises to develop strength, endurance, ROM, flexibility, posture and core stabilization for 55 minutes including:    Recumbent bike: 0 min - pt requested to start on mat  LTR: 10 x 10" holds - no PB today  DKTC: 10 x 10" holds - no PB today  TA block squeeze: 10 x 10" holds  Open books: 15x   piriformis stretch  3 x 30"  HS stretch with strap: 3 x 30"  SLR: 2 x 10 with PPT  scap retractions: 2 x 10 np    Bridge with band: 2 x 10 x orange TB  Butterfly U : 2 x 10 x orange TB  SL clams: 2 x 10 x orange TB    PPT with marches - 15x  PPT with unilat flex OH - 2 x 10  PPT with Patel flex OH - 15x   rows - 3 x 10 x oTB  U shd ext :  - 3  x 10 x OTB  parloff press: 2 x 10 x YTB    SL hip add - 15x B  Prone hip ext - 15x B    Paris received the following manual therapy techniques: none  Paris received the following supervised modalities after being cleared for contradictions: none    Written Home Exercises Provided: issued HEP on 10/15/2018 (see scanned into media)  Pt demo good understanding of the education provided. Paris demonstrated good return demonstration of activities.     Education provided re:  Paris verbalized good understanding of education provided.   No spiritual or educational barriers to learning provided    Assessment     Paris tolerated treatment well. Pt demo's marked improvements in LE strength but continues to lack sufficient flexibility as well as hip strength necessary for pelvic stability with ADLs. Pt also presents with improving trunk ROM but remains limited with normal lumabr curvature due to joint stiffness and decreased MF mobility. Decreased trunk strength and postural awareness continue to limit " dynamic spine stability with HHCs.    This is a 72 y.o. female referred to outpatient physical therapy and presents with a medical diagnosis of back pain and demonstrates limitations as described in the problem list. Pt prognosis is Good. Pt will continue to benefit from skilled outpatient physical therapy to address the deficits listed in the problem list, provide pt/family education and to maximize pt's level of independence in the home and community environment.     Goals as follows: See PN on 10/24/18 (PN due by 11/24/18)    Short Term Goals (4 Weeks):   1. Pt will report 20% reduction in pain of the lumbar spine and LE for ease with ADL's-met  2. PT will demonstrate improved trunk strength by a half grade in for ease with upright posture during standing activities. - in progress  3. Pt will demonstrate improved lumbar spine ROM in all directions by a half grade for ease with bending activities. - in progress  4. Pt to demonstrate improved functional ability with FOTO limitation <=46% disability. - in progress     Long Term Goals (8 Weeks):   1. Pt will report being independent with HEP for maintenance of improvements gained during therapy sessions- met  2. PT will report 50% reduction of pain of the back and LE for ease with dressing and grooming activities. - in progress  3. Pt will demonstrate trunk and extremity strength to >=4+/5 without the provocation of pain for ease with household chores- in progress  4. Pt will demonstrate appropriate upright posture without external cueing for ease with work related activities. - in progress  5. Pt to demonstrate improved functional ability with FOTO limitation <=36% disability. - in progress  Plan     Continue with established Plan of Care towards PT goals.    Therapist:  Patsy Elaine, PT

## 2018-10-29 ENCOUNTER — CLINICAL SUPPORT (OUTPATIENT)
Dept: REHABILITATION | Facility: OTHER | Age: 73
End: 2018-10-29
Payer: MEDICARE

## 2018-10-29 DIAGNOSIS — R29.3 POSTURAL IMBALANCE: ICD-10-CM

## 2018-10-29 DIAGNOSIS — M54.50 CHRONIC BILATERAL LOW BACK PAIN WITHOUT SCIATICA: ICD-10-CM

## 2018-10-29 DIAGNOSIS — G89.29 CHRONIC BILATERAL LOW BACK PAIN WITHOUT SCIATICA: ICD-10-CM

## 2018-10-29 DIAGNOSIS — R53.1 DECREASED STRENGTH: ICD-10-CM

## 2018-10-29 PROCEDURE — G8979 MOBILITY GOAL STATUS: HCPCS | Mod: CJ,PN | Performed by: PHYSICAL THERAPIST

## 2018-10-29 PROCEDURE — G8978 MOBILITY CURRENT STATUS: HCPCS | Mod: CJ,PN | Performed by: PHYSICAL THERAPIST

## 2018-10-29 PROCEDURE — 97110 THERAPEUTIC EXERCISES: CPT | Mod: PN | Performed by: PHYSICAL THERAPIST

## 2018-10-29 NOTE — PROGRESS NOTES
"                                                    Physical Therapy Daily Note     Name: Paris Burris  Clinic Number: [id number]  Diagnosis:   Encounter Diagnoses   Name Primary?    Chronic bilateral low back pain without sciatica     Postural imbalance     Decreased strength      Physician: Fifi Lizama MD  Precautions: Compression Fx in the LSP, DM Type 2, depression  Visit #: 10 of 20  PTA Visit #: 0   Time In: 10:00  Time Out: 10:50  Total treatment time: 30 min 1:1   Functional Outcome Measure: FOTO limitation = 35%% Disability  G-codes + Modifier + % Impairment: (mobility):   Current =  (20% - 40% disability), Goal =  (20% - 40% disability)      Subjective     Pt reports: prolonged soreness in back after last treatment session    Pain Scale: Paris rates pain on a scale of 0-10 to be 1 currently in low back    Objective      10/24/2018  Thoracic/Lumbar AROM: Pain/Dysfunction with Movement:   Flexion Min dean, fingertips 4" below patella, poor lordosis, no pain   Extension Min-Mod dean, fair lordosis   Right side bending WFL, fingertips to knee joint line, c/o stiffness/tension in contralat LB   Left side bending WFL dean, fingertips to knee joint line, c/o stiffness/tension in contralat LB   Right rotation Sitting - Min dean, no c/o tension in LB   Left rotation Sitting - Min-Mod dean, c/o tension in R LB       Lower Extremity Strength  Right LE   Left LE     Hip flexion: 4/5 Hip flexion: 4/5   Hip extension:  4/5 Hip extension: 4/5   Hip abduction: 4+/5 Hip abduction: 4/5   Hip adduction:  5/5 Hip adduction:  4+/5   Hip Internal rotation    4/5 Hip Internal rotation 4/5   Knee Flexion 5/5 Knee Flexion 5/5   Knee Extension 5/5 Knee Extension 5/5   Ankle dorsiflexion: 5/5 Ankle dorsiflexion: 5/5   Ankle plantarflexion: 5/5 Ankle plantarflexion: 5/5      Flexibility: hamstrings = fair, hip flexors/quads = fair+    10/17: Dynamic gait index: 20/24 (<19/25 indicates increased risk for falls in " "elderly population)    Paris received individual therapeutic exercises to develop strength, endurance, ROM, flexibility, posture and core stabilization for 50 minutes including:    LTR: 10 x 10" holds - no PB today  DKTC: 10 x 10" holds - no PB today  TA block squeeze: 10 x 10" holds  Open books: 15x   piriformis stretch  3 x 30"  HS stretch with strap: 3 x 30"  SLR: 2 x 10 with PPT  scap retractions: 2 x 10 np    Bridge with band: 2 x 10 x orange TB  Butterfly U : 2 x 10 x orange TB  SL clams: 2 x 10 x orange TB    PPT with marches - 15x  PPT with unilat flex OH - x 15  PPT with Patel flex OH - 15x   rows - 3 x 10 x oTB  U shd ext :  - 3  x 10 x OTB  parloff press: 2 x 10 x OTB    SL hip add - 15x B  Prone hip ext - 15x B    Paris received the following manual therapy techniques: none  Paris received the following supervised modalities after being cleared for contradictions: none    Written Home Exercises Provided: issued HEP on 10/15/2018 (see scanned into media)  Pt demo good understanding of the education provided. Paris demonstrated good return demonstration of activities.     Education provided re:  Paris verbalized good understanding of education provided.   No spiritual or educational barriers to learning provided    Assessment     Paris tolerated treatment well. Pt progressing with decreased subjective report of disability as noted by recent FOTO score  This is a 72 y.o. female referred to outpatient physical therapy and presents with a medical diagnosis of back pain and demonstrates limitations as described in the problem list. Pt prognosis is Good. Pt will continue to benefit from skilled outpatient physical therapy to address the deficits listed in the problem list, provide pt/family education and to maximize pt's level of independence in the home and community environment.     Goals as follows: See PN on 10/24/18 (PN due by 11/24/18)    Short Term Goals (4 Weeks):   1. Pt will report 20% reduction in pain " of the lumbar spine and LE for ease with ADL's-met  2. PT will demonstrate improved trunk strength by a half grade in for ease with upright posture during standing activities. - in progress  3. Pt will demonstrate improved lumbar spine ROM in all directions by a half grade for ease with bending activities. - in progress  4. Pt to demonstrate improved functional ability with FOTO limitation <=46% disability. - in progress     Long Term Goals (8 Weeks):   1. Pt will report being independent with HEP for maintenance of improvements gained during therapy sessions- met  2. PT will report 50% reduction of pain of the back and LE for ease with dressing and grooming activities. - in progress  3. Pt will demonstrate trunk and extremity strength to >=4+/5 without the provocation of pain for ease with household chores- in progress  4. Pt will demonstrate appropriate upright posture without external cueing for ease with work related activities. - in progress  5. Pt to demonstrate improved functional ability with FOTO limitation <=36% disability. - in progress  Plan     Continue with established Plan of Care towards PT goals.    Therapist:  Neris Mejias, PT

## 2018-10-30 ENCOUNTER — PATIENT MESSAGE (OUTPATIENT)
Dept: SURGERY | Facility: HOSPITAL | Age: 73
End: 2018-10-30

## 2018-11-01 ENCOUNTER — CLINICAL SUPPORT (OUTPATIENT)
Dept: REHABILITATION | Facility: OTHER | Age: 73
End: 2018-11-01
Payer: MEDICARE

## 2018-11-01 DIAGNOSIS — M54.50 CHRONIC BILATERAL LOW BACK PAIN WITHOUT SCIATICA: ICD-10-CM

## 2018-11-01 DIAGNOSIS — R29.3 POSTURAL IMBALANCE: ICD-10-CM

## 2018-11-01 DIAGNOSIS — G89.29 CHRONIC BILATERAL LOW BACK PAIN WITHOUT SCIATICA: ICD-10-CM

## 2018-11-01 DIAGNOSIS — R53.1 DECREASED STRENGTH: ICD-10-CM

## 2018-11-01 PROCEDURE — 97110 THERAPEUTIC EXERCISES: CPT | Mod: PN | Performed by: PHYSICAL THERAPIST

## 2018-11-01 NOTE — PROGRESS NOTES
"                                                    Physical Therapy Daily Note     Name: Paris Burris  Clinic Number: [id number]  Diagnosis:   Encounter Diagnoses   Name Primary?    Chronic bilateral low back pain without sciatica     Postural imbalance     Decreased strength      Physician: Fifi Lizama MD  Precautions: Compression Fx in the LSP, DM Type 2, depression  Visit #: 11 of 20  PTA Visit #: 0   Time In: 11:00  Time Out: 11:55  Total treatment time: 25 min 1:1   Functional Outcome Measure: FOTO limitation = 35%% Disability  G-codes + Modifier + % Impairment: (mobility):   Current =  (20% - 40% disability), Goal =  (20% - 40% disability)      Subjective     Pt reports: not having ankle weights at home and doing more repetitions with the straight leg raises (80x). She didn't feel pain at the time but had a lot of soreness afterwards.     Pain Scale: Paris rates pain on a scale of 0-10 to be 1 currently in low back    Objective      10/24/2018  Thoracic/Lumbar AROM: Pain/Dysfunction with Movement:   Flexion Min dean, fingertips 4" below patella, poor lordosis, no pain   Extension Min-Mod dean, fair lordosis   Right side bending WFL, fingertips to knee joint line, c/o stiffness/tension in contralat LB   Left side bending WFL dean, fingertips to knee joint line, c/o stiffness/tension in contralat LB   Right rotation Sitting - Min dean, no c/o tension in LB   Left rotation Sitting - Min-Mod dean, c/o tension in R LB       Lower Extremity Strength  Right LE   Left LE     Hip flexion: 4/5 Hip flexion: 4/5   Hip extension:  4/5 Hip extension: 4/5   Hip abduction: 4+/5 Hip abduction: 4/5   Hip adduction:  5/5 Hip adduction:  4+/5   Hip Internal rotation    4/5 Hip Internal rotation 4/5   Knee Flexion 5/5 Knee Flexion 5/5   Knee Extension 5/5 Knee Extension 5/5   Ankle dorsiflexion: 5/5 Ankle dorsiflexion: 5/5   Ankle plantarflexion: 5/5 Ankle plantarflexion: 5/5      Flexibility: hamstrings = fair, " "hip flexors/quads = fair+    10/17: Dynamic gait index: 20/24 (<19/25 indicates increased risk for falls in elderly population)    Paris received individual therapeutic exercises to develop strength, endurance, ROM, flexibility, posture and core stabilization for 55 minutes including:    LTR: 10 x 10" holds - PB  DKTC: 10 x 10" holds - PB  TA block squeeze: 10 x 10" holds  Open books: 15x   piriformis stretch  3 x 30"  HS stretch with strap: 3 x 30"  SLR: 2 x 10 with PPT  scap retractions: 2 x 10 np    Bridge with band: 2 x 10 x orange TB  Butterfly U : 2 x 10 x orange TB  SL clams: 2 x 10 x orange TB    PPT with marches - 15x  PPT with unilat flex OH - x 15  PPT with Patel flex OH - 15x   rows - 3 x 10 x oTB  U shd ext :  - 3  x 10 x OTB  parloff press: 2 x 10 x OTB    SL hip add - 15x B- np  Prone hip ext - 15x B- np    Paris received the following manual therapy techniques: none  Paris received the following supervised modalities after being cleared for contradictions: none    Written Home Exercises Provided: issued HEP on 11/1/2018 (see pt instructions)  Pt demo good understanding of the education provided. Paris demonstrated good return demonstration of activities.     Education provided re:  Paris verbalized good understanding of education provided.   No spiritual or educational barriers to learning provided    Assessment     Paris tolerated treatment well. Pt demonstrating improved muscular endurance and isolation of core musculature. Educated pt on DOMS and to not exceed 30 repetitions with exercises in home program.  Emphasis on controlled ROM and fewer repetitions performed.   This is a 72 y.o. female referred to outpatient physical therapy and presents with a medical diagnosis of back pain and demonstrates limitations as described in the problem list. Pt prognosis is Good. Pt will continue to benefit from skilled outpatient physical therapy to address the deficits listed in the problem list, provide " pt/family education and to maximize pt's level of independence in the home and community environment.     Goals as follows: See PN on 10/24/18 (PN due by 11/24/18)    Short Term Goals (4 Weeks):   1. Pt will report 20% reduction in pain of the lumbar spine and LE for ease with ADL's-met  2. PT will demonstrate improved trunk strength by a half grade in for ease with upright posture during standing activities. - in progress  3. Pt will demonstrate improved lumbar spine ROM in all directions by a half grade for ease with bending activities. - in progress  4. Pt to demonstrate improved functional ability with FOTO limitation <=46% disability. - in progress     Long Term Goals (8 Weeks):   1. Pt will report being independent with HEP for maintenance of improvements gained during therapy sessions- met  2. PT will report 50% reduction of pain of the back and LE for ease with dressing and grooming activities. - in progress  3. Pt will demonstrate trunk and extremity strength to >=4+/5 without the provocation of pain for ease with household chores- in progress  4. Pt will demonstrate appropriate upright posture without external cueing for ease with work related activities. - in progress  5. Pt to demonstrate improved functional ability with FOTO limitation <=36% disability. - in progress  Plan     Continue with established Plan of Care towards PT goals.    Therapist:  Neris Mejias, PT

## 2018-11-02 ENCOUNTER — PATIENT MESSAGE (OUTPATIENT)
Dept: INTERNAL MEDICINE | Facility: CLINIC | Age: 73
End: 2018-11-02

## 2018-11-02 DIAGNOSIS — E66.9 DIABETES MELLITUS TYPE 2 IN OBESE: Primary | ICD-10-CM

## 2018-11-02 DIAGNOSIS — E11.69 DIABETES MELLITUS TYPE 2 IN OBESE: Primary | ICD-10-CM

## 2018-11-04 ENCOUNTER — PATIENT MESSAGE (OUTPATIENT)
Dept: SURGERY | Facility: CLINIC | Age: 73
End: 2018-11-04

## 2018-11-05 ENCOUNTER — PATIENT MESSAGE (OUTPATIENT)
Dept: INTERNAL MEDICINE | Facility: CLINIC | Age: 73
End: 2018-11-05

## 2018-11-05 NOTE — TELEPHONE ENCOUNTER
----- Message from Jacqueline Perry RN sent at 10/18/2018  1:37 PM CDT -----  Regarding: Labs   Hello Dr. Huynh,   Do you want Mrs. Burris to have labs before your f/u visit on 11/19/18?  The notes in the appt site say physical labs schedule on 11/14/18.  She is having surgery on 11/6/18 for a hernia repair and will have labs done prior to that (CBC and BMP is what Dr Perez ordered). If you want any labs could we link them together please.   Thanks Jacqueline    Impression: Age-related nuclear cataract, bilateral: H25.13. OU. Plan: Observe for now.

## 2018-11-07 ENCOUNTER — CLINICAL SUPPORT (OUTPATIENT)
Dept: REHABILITATION | Facility: OTHER | Age: 73
End: 2018-11-07
Attending: OBSTETRICS & GYNECOLOGY
Payer: MEDICARE

## 2018-11-07 DIAGNOSIS — N39.8 VOIDING DYSFUNCTION: Primary | ICD-10-CM

## 2018-11-07 DIAGNOSIS — M62.81 WEAKNESS OF TRUNK MUSCULATURE: ICD-10-CM

## 2018-11-07 DIAGNOSIS — M62.89 PELVIC FLOOR DYSFUNCTION: ICD-10-CM

## 2018-11-07 DIAGNOSIS — N39.46 MIXED STRESS AND URGE URINARY INCONTINENCE: ICD-10-CM

## 2018-11-07 PROCEDURE — G8991 OTHER PT/OT GOAL STATUS: HCPCS | Mod: CJ,HCNC

## 2018-11-07 PROCEDURE — 97112 NEUROMUSCULAR REEDUCATION: CPT | Mod: HCNC

## 2018-11-07 PROCEDURE — G8990 OTHER PT/OT CURRENT STATUS: HCPCS | Mod: CJ,HCNC

## 2018-11-07 NOTE — PATIENT INSTRUCTIONS
Pelvic floor activation with diaphragmatic breathin. Lie comfortably and take a few deep breaths to relax completely - relax the legs, inner thighs, gluts, and pelvic floor.     2. Take a deep breath in through your nose, feel your belly rise and your pelvic floor muscles relax (opening).     3. As you exhale (out through the mouth as if blowing through a straw) gently bear down. Relax and release your muscles. Pause, take a few resting breaths.     4. Repeat for 5 rounds.    5. Inhale again to prepare. This time as you exhale gently squeeze and lift your pelvic floor. Take a few resting breaths. Repeat for 5 rounds (inhaling to relax the pelvic floor, exhaling to engage).

## 2018-11-07 NOTE — PROGRESS NOTES
Patient: Paris Burris   Clinic #: [id number]   Diagnosis:   Encounter Diagnoses   Name Primary?    Voiding dysfunction Yes    Pelvic floor dysfunction     Weakness of trunk musculature       Date of start of care: 9/19/18  Date of treatment: 11/07/2018   Time in: 9:13  Time out: 10:00  Total treatment time: 47 minutes  # Visits: 2 (20 auth)  Auth expiration: 9/17/19  POC expiration: 12/31/18  Outpatient Physical Therapy   Daily Note    Subjective     Pt states she is doing pretty well. She is in PT for her back pain and that's getting better. The home exercises are helping. Pt states she thinks her UI is a little bit better. It happens most frequently when she sneezes. Or if she call out loudly to a friend.    Pain: Current: 0/10     Patient's Goals: strengthen pelvic floor; prevent any more incontinence    Objective     Written and verbal consent given    VAGINAL PELVIC FLOOR EXAM  EXTERNAL ASSESSMENT  Skin Condition: WNL  Scarring: none  Sensation: WNL  Pain: none  Introitus: stenotic   Voluntary Contraction: visible lift  Voluntary Relaxation: drop  Bearing down: present      INTERNAL ASSESSMENT  Pain: none  Sensation: able to localize pressure appropriately, able to distinguish pressure from side to side, and able to feel the difference between lift and drop    Vaginal Vault: stenotic  Muscle Bulk: WFL  Muscle Power: strong 3/5  Muscle Endurance: NT  # Reps To Fatigue: NT    Fast Contractions: NT    Quality of Contraction: strong quick lift with max pressure around vaginal sphincter, pt instructed to perform at 50%  Specificity: strong abdominal pressure    TREATMENT    Pt received individual neuromuscular reeducation for 47 minutes including:    - PFM lengthening (bearing down) with DB with verbal and tactile cueing  - PFM activation (lift) with DB with verbal and tactile cueing  - DB with verbal and tactile  - Down training body scan    Education: Discussed progression of plan of care with patient;  educated pt in activity modification; pt instructed to continue with diaphragmatic breathing, TA activation with DB, and legs in a chair; added PFM activation with breath today (bearing down and lifting); pt demonstrated and verbalized understanding and was provided with a handout.    Assessment     Pt tolerated session well without visible adverse effects. Pt with fair PFM coordination, she displays overuse of abdominals with increase in pressure. Improved with verbal cueing. Pt will continue to benefit from skilled PT intervention in order to maximize pain free functional independence. Good response to down training, will provided resources for home training.    Pt is making good progress towards established goals.  No educational, cultural, or spiritual barriers to learning identified.    GOALS 12/31/18  Short Term Goals:   1. Pt will verbalize improved awareness of PFM activity as palpated by PT in order to improve activity involvement with HEP.  2. Pt will be independent with restful breathing/diaphragmatic breathing in order to improve central stabilization and management of changes in intraabdominal pressure.  3. Pt will be able to activate, relax, and bear down with her PFM in order to improve PFM function and bladder relaxation.  4. Pt will tolerate HEP to improve impairments and independence with ADL's. Met 11/7/18     Long Term Goals:   1. Pt will be able to perform 10 x 5'' kegals in order to improve core strength and stability.  2. Pt will report improvement in symptoms.  3. Pt will be independent with HEP and self management.     ANGELINA-6    11/7/18  Score: 7/6x25 = 29%  8/6x25 = 33%    Plan     Continue with established POC, working toward PT goals.

## 2018-11-08 ENCOUNTER — CLINICAL SUPPORT (OUTPATIENT)
Dept: INTERNAL MEDICINE | Facility: CLINIC | Age: 73
End: 2018-11-08
Payer: MEDICARE

## 2018-11-08 VITALS — WEIGHT: 157.63 LBS | BODY MASS INDEX: 29.78 KG/M2

## 2018-11-08 NOTE — PROGRESS NOTES
Health  Follow-Up Note   [x] Office  [] Phone  Notes from previous session reviewed.   [x] Previous Session Goals unchanged.   [] Patient/Caregiver Change in Goals.  Goals added or changed by Patient/Caregiver since program participation:  1. Continue same plan   2. Labs drawn 11/14/18     Additional/Changed support that patient/caregiver has experienced/sought?  (Indicate readiness, support from family, friends, others, community groups, etc)  1.      Additional/Changed obstacles that could prevent patient/caregiver from reaching their goals?  1.  Realizing this is lifetime, struggles with  eating whatever he wants makes hard on her     Feedback provided:  1.  Praised for continued effort and determination up 0.22 lb total loss now 29 lbs    Diagnostic values/Desriptors for follow-up as needed for chronic condition(s)   Weight: 71.5 kg 157.63 lb   Blood Glucose:  Averages  7d-111  14d-114  30d-112  90d-117    Interventions:   1. Health  listened reflectively, validated thoughts and feelings, offered support and encouragement.   2. Allowed patient to express themselves in a non-biased atmosphere.  3. Health  assisted pt to problem-solve obstacles such as being in a challenging environment and dealing with these challenges.   4. Motivational Interviewed interventions utilized (OARS).   5. Patient responded favorably to interventions and remained actively engaged in the session.   6. Health  will remain available and connected for patient by phone and/or office visits.   7. Positive reinforcement, emotional support and encouragement provided.   8. Focused Education: MI    Plan:  [x] Pt will work on goals as stated above.   [x] Pt will contact Health  for any questions, concerns or needs.  [x] Pt will follow up with Health  in office on    11/29/18 at 0900.    [] Pt will follow up with Health  on phone in:        [x] Health  will remain available.   [] Health   will contact patient by phone in:        [] Health  will consult:        [] Health  will inform Provider via EPIC messaging.     Impression:  1. Behavior is consistent with   Action    Stage of Change.   2. Participation level:       [x] Receptive      [x] Interactive      [] Guarded and Resistant      [x] Self Motivated      [] Refused/Declined to participate   3. [x] Pt voiced understanding of all information presented.       [] Pt voiced needing further information/education. This will be arranged.       [x] Pt would benefit from further education/information as identified by this health . This will be arranged.     Jacqueline Perry RN HC

## 2018-11-12 DIAGNOSIS — E11.9 TYPE 2 DIABETES MELLITUS WITHOUT COMPLICATION, WITHOUT LONG-TERM CURRENT USE OF INSULIN: Primary | ICD-10-CM

## 2018-11-13 ENCOUNTER — CLINICAL SUPPORT (OUTPATIENT)
Dept: REHABILITATION | Facility: OTHER | Age: 73
End: 2018-11-13
Attending: OBSTETRICS & GYNECOLOGY
Payer: MEDICARE

## 2018-11-13 DIAGNOSIS — N39.8 VOIDING DYSFUNCTION: Primary | ICD-10-CM

## 2018-11-13 DIAGNOSIS — M62.81 WEAKNESS OF TRUNK MUSCULATURE: ICD-10-CM

## 2018-11-13 DIAGNOSIS — M62.89 PELVIC FLOOR DYSFUNCTION: ICD-10-CM

## 2018-11-13 DIAGNOSIS — N39.46 MIXED STRESS AND URGE URINARY INCONTINENCE: ICD-10-CM

## 2018-11-13 PROCEDURE — 97112 NEUROMUSCULAR REEDUCATION: CPT | Mod: HCNC

## 2018-11-13 NOTE — PATIENT INSTRUCTIONS
Pelvic floor activation with diaphragmatic breathin. Lie comfortably and take a few deep breaths to relax completely - relax the legs, inner thighs, gluts, and pelvic floor.     2. Take a deep breath in through your nose, feel your belly rise and your pelvic floor muscles relax (opening).     3. As you exhale (out through the mouth as if blowing through a straw) gently bear down (pushing out). Relax and release your muscles. Pause, take 2 resting breaths.     4. Repeat for 5 rounds.    5. Inhale again to prepare. This time as you exhale gently squeeze and lift your pelvic floor. Repeat for 5 rounds (inhaling to relax the pelvic floor, exhaling to engage). Pause, rest. Perform second set of 5 kegels.      3-5 minutes, breathing with hands on belly or down by sides          Sit to stand: take a deep breath in. As you exhale, slowly come up to stand. Pause. Slowly lower back down. Repeat 10 times

## 2018-11-13 NOTE — PROGRESS NOTES
Patient: Paris Burris   Clinic #: [id number]   Diagnosis:   Encounter Diagnoses   Name Primary?    Voiding dysfunction Yes    Pelvic floor dysfunction     Weakness of trunk musculature     Mixed stress and urge urinary incontinence       Date of start of care: 9/19/18  Date of treatment: 11/13/2018   Time in: 10:00  Time out: 10:55  Total treatment time: 55 minutes  # Visits: 3 (20 auth)  Auth expiration: 9/17/19  POC expiration: 12/31/18  Outpatient Physical Therapy   Daily Note    Subjective     Pt states she is doing well today. She lost her handout and would like to review the exercises.    Pain: Current: 0/10     Patient's Goals: strengthen pelvic floor; prevent any more incontinence    Objective     TREATMENT    Pt received individual neuromuscular reeducation for 55 minutes including:    - Diaphragmatic breathing, verbal cueing proviided, good return with min-mod cueing  - PFM lengthening (bearing down) with DB with verbal and tactile cueing  - PFM activation (lift) with DB with verbal and tactile cueing x 5  - Down training body scan  - Sit to stand squat with core breath x 10    Education: Discussed progression of plan of care with patient; educated pt in activity modification; pt instructed to continue with diaphragmatic breathing, TA activation with DB, legs in a chair, PFM activation with breath (bearing down and lifting); added sit to stand squat; pt demonstrated and verbalized understanding and was provided with a handout.    Assessment     Pt tolerated session well without visible adverse effects. Pt with good return of PFM activation/lift today. Pt will continue to benefit from skilled PT intervention in order to maximize pain free functional independence.     Pt is making good progress towards established goals.  No educational, cultural, or spiritual barriers to learning identified.    GOALS 12/31/18  Short Term Goals:   1. Pt will verbalize improved awareness of PFM activity as palpated by PT  in order to improve activity involvement with HEP. Met 11/13/18  2. Pt will be independent with restful breathing/diaphragmatic breathing in order to improve central stabilization and management of changes in intraabdominal pressure. Met 11/13/18  3. Pt will be able to activate, relax, and bear down with her PFM in order to improve PFM function and bladder relaxation. Met 11/7/18  4. Pt will tolerate HEP to improve impairments and independence with ADL's. Met 11/7/18     Long Term Goals:   1. Pt will be able to perform 10 x 5'' kegals in order to improve core strength and stability.  2. Pt will report improvement in symptoms.  3. Pt will be independent with HEP and self management.     ANGELINA-6    11/7/18  Score: 7/6x25 = 29%  8/6x25 = 33%    Plan     Continue with established POC, working toward PT goals.

## 2018-11-14 ENCOUNTER — LAB VISIT (OUTPATIENT)
Dept: LAB | Facility: HOSPITAL | Age: 73
End: 2018-11-14
Attending: INTERNAL MEDICINE
Payer: MEDICARE

## 2018-11-14 ENCOUNTER — PATIENT MESSAGE (OUTPATIENT)
Dept: INTERNAL MEDICINE | Facility: CLINIC | Age: 73
End: 2018-11-14

## 2018-11-14 DIAGNOSIS — E11.69 DIABETES MELLITUS TYPE 2 IN OBESE: ICD-10-CM

## 2018-11-14 DIAGNOSIS — E66.9 DIABETES MELLITUS TYPE 2 IN OBESE: ICD-10-CM

## 2018-11-14 LAB
ALBUMIN SERPL BCP-MCNC: 4.5 G/DL
ALP SERPL-CCNC: 56 U/L
ALT SERPL W/O P-5'-P-CCNC: 20 U/L
ANION GAP SERPL CALC-SCNC: 9 MMOL/L
AST SERPL-CCNC: 18 U/L
BILIRUB SERPL-MCNC: 0.6 MG/DL
BUN SERPL-MCNC: 18 MG/DL
CALCIUM SERPL-MCNC: 9.9 MG/DL
CHLORIDE SERPL-SCNC: 105 MMOL/L
CO2 SERPL-SCNC: 26 MMOL/L
CREAT SERPL-MCNC: 0.8 MG/DL
EST. GFR  (AFRICAN AMERICAN): >60 ML/MIN/1.73 M^2
EST. GFR  (NON AFRICAN AMERICAN): >60 ML/MIN/1.73 M^2
ESTIMATED AVG GLUCOSE: 114 MG/DL
GLUCOSE SERPL-MCNC: 119 MG/DL
HBA1C MFR BLD HPLC: 5.6 %
POTASSIUM SERPL-SCNC: 4.6 MMOL/L
PROT SERPL-MCNC: 7.7 G/DL
SODIUM SERPL-SCNC: 140 MMOL/L

## 2018-11-14 PROCEDURE — 83036 HEMOGLOBIN GLYCOSYLATED A1C: CPT | Mod: HCNC

## 2018-11-14 PROCEDURE — 80053 COMPREHEN METABOLIC PANEL: CPT | Mod: HCNC

## 2018-11-14 PROCEDURE — 36415 COLL VENOUS BLD VENIPUNCTURE: CPT | Mod: HCNC

## 2018-11-15 ENCOUNTER — CLINICAL SUPPORT (OUTPATIENT)
Dept: REHABILITATION | Facility: OTHER | Age: 73
End: 2018-11-15
Payer: MEDICARE

## 2018-11-15 ENCOUNTER — TELEPHONE (OUTPATIENT)
Dept: INTERNAL MEDICINE | Facility: CLINIC | Age: 73
End: 2018-11-15

## 2018-11-15 DIAGNOSIS — R53.1 DECREASED STRENGTH: ICD-10-CM

## 2018-11-15 DIAGNOSIS — M54.50 CHRONIC BILATERAL LOW BACK PAIN WITHOUT SCIATICA: ICD-10-CM

## 2018-11-15 DIAGNOSIS — G89.29 CHRONIC BILATERAL LOW BACK PAIN WITHOUT SCIATICA: ICD-10-CM

## 2018-11-15 DIAGNOSIS — R29.3 POSTURAL IMBALANCE: ICD-10-CM

## 2018-11-15 PROCEDURE — 97110 THERAPEUTIC EXERCISES: CPT | Mod: HCNC,PN | Performed by: PHYSICAL THERAPIST

## 2018-11-15 NOTE — TELEPHONE ENCOUNTER
Spoke with Germain at [facility] in regards to patient. She states that patient had labs done on yesterday and she brought along with her, a urine sample that were found in a basket which had instructions on how to collect. Germain verbalized that they will need a re collect (urine) from patient that says either routine clinic collect or future home collect. They will another order placed as well.  Informed her that since you and Nikki was ou for the day that I would take the message.  Please adv.

## 2018-11-15 NOTE — PROGRESS NOTES
"                                                    Physical Therapy Daily Note     Name: Paris Burris  Clinic Number: [id number]  Diagnosis:   Encounter Diagnoses   Name Primary?    Chronic bilateral low back pain without sciatica     Postural imbalance     Decreased strength      Physician: Fifi Lizama MD  Precautions: Compression Fx in the LSP, DM Type 2, depression  Visit #: 12 of 20  PTA Visit #: 0   Time In: 08:05  Time Out: 0850  Total treatment time: 25 min 1:1   Functional Outcome Measure: FOTO limitation = 35%% Disability  G-codes + Modifier + % Impairment: (mobility):   Current =  (20% - 40% disability), Goal =  (20% - 40% disability)      Subjective     Pt reports: starting pelvic floor therapy and felt some increased pain in low back afterwards. She is feeling the exercises in her home program getting easier    Pain Scale: Paris rates pain on a scale of 0-10 to be 1 currently in low back    Objective      10/24/2018  Thoracic/Lumbar AROM: Pain/Dysfunction with Movement:   Flexion Min dean, fingertips 4" below patella, poor lordosis, no pain   Extension Min-Mod dean, fair lordosis   Right side bending WFL, fingertips to knee joint line, c/o stiffness/tension in contralat LB   Left side bending WFL dean, fingertips to knee joint line, c/o stiffness/tension in contralat LB   Right rotation Sitting - Min dean, no c/o tension in LB   Left rotation Sitting - Min-Mod dean, c/o tension in R LB       Lower Extremity Strength  Right LE   Left LE     Hip flexion: 4/5 Hip flexion: 4/5   Hip extension:  4/5 Hip extension: 4/5   Hip abduction: 4+/5 Hip abduction: 4/5   Hip adduction:  5/5 Hip adduction:  4+/5   Hip Internal rotation    4/5 Hip Internal rotation 4/5   Knee Flexion 5/5 Knee Flexion 5/5   Knee Extension 5/5 Knee Extension 5/5   Ankle dorsiflexion: 5/5 Ankle dorsiflexion: 5/5   Ankle plantarflexion: 5/5 Ankle plantarflexion: 5/5      Flexibility: hamstrings = fair, hip flexors/quads = " "fair+    10/17: Dynamic gait index: 20/24 (<19/25 indicates increased risk for falls in elderly population)    Paris received individual therapeutic exercises to develop strength, endurance, ROM, flexibility, posture and core stabilization for 45 minutes including:    LTR: 10 x 10" holds - PB  DKTC: 10 x 10" holds - PB  TA block squeeze: 10 x 10" holds  Open books: 15x   piriformis stretch  3 x 30"  HS stretch with strap: 3 x 30"  SLR: 2 x 10 with PPT  scap retractions: 2 x 10 np    Bridge with band: 2 x 10 x orange TB- np  Butterfly U : 2 x 10 x orange TB- np  SL clams: 2 x 10 x orange TB    PPT with marches - 15x  PPT with unilat flex OH - x 15  PPT with Patel flex OH - 15x   rows - 3 x 10 x oTB  U shd ext :  - 3  x 10 x OTB  parloff press: 2 x 10 x OTB    SL hip abd - 15x B  Prone hip ext - 15x B    Paris received the following manual therapy techniques: none  Paris received the following supervised modalities after being cleared for contradictions: none    Written Home Exercises Provided: issued HEP on 11/1/2018 (see pt instructions)  Pt demo good understanding of the education provided. Paris demonstrated good return demonstration of activities.     Education provided re:  Paris verbalized good understanding of education provided.   No spiritual or educational barriers to learning provided    Assessment     Paris tolerated treatment well without exacerbation of pain. Pt requiring tactile cueing for level pelvis and LE alignment for gluteus medius activation during AL hip abduction.    This is a 72 y.o. female referred to outpatient physical therapy and presents with a medical diagnosis of back pain and demonstrates limitations as described in the problem list. Pt prognosis is Good. Pt will continue to benefit from skilled outpatient physical therapy to address the deficits listed in the problem list, provide pt/family education and to maximize pt's level of independence in the home and community environment. "     Goals as follows: See PN on 10/24/18 (PN due by 11/24/18)    Short Term Goals (4 Weeks):   1. Pt will report 20% reduction in pain of the lumbar spine and LE for ease with ADL's-met  2. PT will demonstrate improved trunk strength by a half grade in for ease with upright posture during standing activities. - in progress  3. Pt will demonstrate improved lumbar spine ROM in all directions by a half grade for ease with bending activities. - in progress  4. Pt to demonstrate improved functional ability with FOTO limitation <=46% disability. - in progress     Long Term Goals (8 Weeks):   1. Pt will report being independent with HEP for maintenance of improvements gained during therapy sessions- met  2. PT will report 50% reduction of pain of the back and LE for ease with dressing and grooming activities. - in progress  3. Pt will demonstrate trunk and extremity strength to >=4+/5 without the provocation of pain for ease with household chores- in progress  4. Pt will demonstrate appropriate upright posture without external cueing for ease with work related activities. - in progress  5. Pt to demonstrate improved functional ability with FOTO limitation <=36% disability. - in progress  Plan     Continue with established Plan of Care towards PT goals.    Therapist:  Neris Mejias, PT

## 2018-11-21 ENCOUNTER — CLINICAL SUPPORT (OUTPATIENT)
Dept: REHABILITATION | Facility: OTHER | Age: 73
End: 2018-11-21
Attending: OBSTETRICS & GYNECOLOGY
Payer: MEDICARE

## 2018-11-21 DIAGNOSIS — R15.1 FECAL SOILING: ICD-10-CM

## 2018-11-21 DIAGNOSIS — N39.8 VOIDING DYSFUNCTION: Primary | ICD-10-CM

## 2018-11-21 DIAGNOSIS — N39.46 MIXED STRESS AND URGE URINARY INCONTINENCE: ICD-10-CM

## 2018-11-21 DIAGNOSIS — M62.89 PELVIC FLOOR DYSFUNCTION: ICD-10-CM

## 2018-11-21 PROCEDURE — 97112 NEUROMUSCULAR REEDUCATION: CPT | Mod: HCNC

## 2018-11-21 NOTE — PROGRESS NOTES
Patient: Paris Burris   Clinic #: [id number]   Diagnosis:   Encounter Diagnoses   Name Primary?    Voiding dysfunction Yes    Pelvic floor dysfunction     Mixed stress and urge urinary incontinence       Date of start of care: 9/19/18  Date of treatment: 11/21/2018   Time in: 9:10  Time out: 9:50  Total treatment time: 40 minutes  # Visits: 4 (20 auth)  Auth expiration: 9/17/19  POC expiration: 12/31/18  Outpatient Physical Therapy   Daily Note    Subjective     Pt reports she is feeling tired today and a bit more depressed than usual. Her phone is charged so she is ready/would like to get the erin downloaded.    Pain: Current: 0/10     Patient's Goals: strengthen pelvic floor; prevent any more incontinence    Objective     TREATMENT    Pt received individual neuromuscular reeducation for 40 minutes including:    - Diaphragmatic breathing  - PFM activation with DB with SEMG assist, external perianal leads; good return demonstration, normal resting baseline, fair WR rise  - 5'' kegels in supine with SEMG assist; normal resting baseline, fair WR rise, poor holding with tendency to activation PFM with short sharp inhale, max verbal cueing provided with instructions to activate at end of exhale and to count out loud    Not performed:  - PFM lengthening (bearing down) with DB with verbal and tactile cueing  - Down training body scan  - Sit to stand squat with core breath x 10    Pt tolerated treatment well with no visible adverse affects. No skin irritation, errythemia, or other adverse affects observed from electrode placement.    Education: Discussed progression of plan of care with patient; educated pt in activity modification; pt instructed to continue with diaphragmatic breathing, TA activation with DB, legs in a chair, PFM activation with breath (bearing down and lifting), sit to stand squat; pt demonstrated and verbalized understanding.    Assessment     Pt tolerated session well without visible adverse  effects. Pt with some difficulty with progression to 5'' kegels today, will follow up at her next visit. Pt will continue to benefit from skilled PT intervention in order to maximize pain free functional independence.     Pt is making good progress towards established goals.  No educational, cultural, or spiritual barriers to learning identified.    GOALS 12/31/18  Short Term Goals:   1. Pt will verbalize improved awareness of PFM activity as palpated by PT in order to improve activity involvement with HEP. Met 11/13/18  2. Pt will be independent with restful breathing/diaphragmatic breathing in order to improve central stabilization and management of changes in intraabdominal pressure. Met 11/13/18  3. Pt will be able to activate, relax, and bear down with her PFM in order to improve PFM function and bladder relaxation. Met 11/7/18  4. Pt will tolerate HEP to improve impairments and independence with ADL's. Met 11/7/18     Long Term Goals:   1. Pt will be able to perform 10 x 5'' kegals in order to improve core strength and stability.  2. Pt will report improvement in symptoms.  3. Pt will be independent with HEP and self management.     ANGELINA-6    11/7/18  Score: 7/6x25 = 29%  8/6x25 = 33%    Plan     Continue with established POC, working toward PT goals.

## 2018-11-23 ENCOUNTER — CLINICAL SUPPORT (OUTPATIENT)
Dept: REHABILITATION | Facility: OTHER | Age: 73
End: 2018-11-23
Payer: MEDICARE

## 2018-11-23 DIAGNOSIS — R53.1 DECREASED STRENGTH: ICD-10-CM

## 2018-11-23 DIAGNOSIS — R29.3 POSTURAL IMBALANCE: ICD-10-CM

## 2018-11-23 DIAGNOSIS — M54.50 CHRONIC BILATERAL LOW BACK PAIN WITHOUT SCIATICA: ICD-10-CM

## 2018-11-23 DIAGNOSIS — G89.29 CHRONIC BILATERAL LOW BACK PAIN WITHOUT SCIATICA: ICD-10-CM

## 2018-11-23 PROCEDURE — 97110 THERAPEUTIC EXERCISES: CPT | Mod: HCNC,PN | Performed by: PHYSICAL THERAPIST

## 2018-11-23 NOTE — PROGRESS NOTES
"                                                    Physical Therapy Daily Note     Name: Paris Burris  Clinic Number: [id number]  Diagnosis:   Encounter Diagnoses   Name Primary?    Chronic bilateral low back pain without sciatica     Postural imbalance     Decreased strength      Physician: Fifi Lizama MD  Precautions: Compression Fx in the LSP, DM Type 2, depression  Visit #: 13 of 20  PTA Visit #: 0   Time In: 09:00  Time Out: 0950  Total treatment time: 25 min 1:1   Functional Outcome Measure: FOTO limitation = 35%% Disability  G-codes + Modifier + % Impairment: (mobility):   Current =  (20% - 40% disability), Goal =  (20% - 40% disability)      Subjective     Pt reports: Feeling a little depressed Doing Z lying with weighted heat on belly as recommended by pelvic floor therapist. She finds it helps her relax and improved back pain.     Pain Scale: Paris rates pain on a scale of 0-10 to be 1 currently in low back    Objective      10/24/2018  Thoracic/Lumbar AROM: Pain/Dysfunction with Movement:   Flexion Min dean, fingertips 4" below patella, poor lordosis, no pain   Extension Min-Mod dean, fair lordosis   Right side bending WFL, fingertips to knee joint line, c/o stiffness/tension in contralat LB   Left side bending WFL dean, fingertips to knee joint line, c/o stiffness/tension in contralat LB   Right rotation Sitting - Min dean, no c/o tension in LB   Left rotation Sitting - Min-Mod dean, c/o tension in R LB       Lower Extremity Strength  Right LE   Left LE     Hip flexion: 4/5 Hip flexion: 4/5   Hip extension:  4/5 Hip extension: 4/5   Hip abduction: 4+/5 Hip abduction: 4/5   Hip adduction:  5/5 Hip adduction:  4+/5   Hip Internal rotation    4/5 Hip Internal rotation 4/5   Knee Flexion 5/5 Knee Flexion 5/5   Knee Extension 5/5 Knee Extension 5/5   Ankle dorsiflexion: 5/5 Ankle dorsiflexion: 5/5   Ankle plantarflexion: 5/5 Ankle plantarflexion: 5/5      Flexibility: hamstrings = fair, hip " "flexors/quads = fair+    10/17: Dynamic gait index: 20/24 (<19/25 indicates increased risk for falls in elderly population)    Paris received individual therapeutic exercises to develop strength, endurance, ROM, flexibility, posture and core stabilization for 45 minutes including:    LTR: 10 x 10" holds - PB  DKTC: 10 x 10" holds - PB  TA block squeeze: 10 x 10" holds  Open books: 15x   piriformis stretch  3 x 30"  HS stretch with strap: 3 x 30"  SLR: 2 x 10 with PPT  scap retractions: 2 x 10 np    Bridge with band: 2 x 10 x orange TB- np  Butterfly U : 2 x 10 x orange TB- np  SL clams: 2 x 10 x orange TB    PPT with marches - 15x  PPT with unilat flex OH - x 15  PPT with Patel flex OH - 15x   rows - 3 x 10 x oTB  U shd ext :  - 3  x 10 x OTB  parloff press: 2 x 10 x OTB    SL hip abd - 15x B  Prone hip ext - 15x B    Paris received the following manual therapy techniques: none  Paris received the following supervised modalities after being cleared for contradictions: none    Written Home Exercises Provided: issued HEP on 11/1/2018 (see pt instructions)  Pt demo good understanding of the education provided. Paris demonstrated good return demonstration of activities.     Education provided re:  Paris verbalized good understanding of education provided.   No spiritual or educational barriers to learning provided    Assessment     Paris tolerated treatment well without exacerbation of pain. Good recall of previous exercises and independence with HEP  This is a 72 y.o. female referred to outpatient physical therapy and presents with a medical diagnosis of back pain and demonstrates limitations as described in the problem list. Pt prognosis is Good. Pt will continue to benefit from skilled outpatient physical therapy to address the deficits listed in the problem list, provide pt/family education and to maximize pt's level of independence in the home and community environment.     Goals as follows: See PN on 10/24/18 (PN " due by 11/24/18)    Short Term Goals (4 Weeks):   1. Pt will report 20% reduction in pain of the lumbar spine and LE for ease with ADL's-met  2. PT will demonstrate improved trunk strength by a half grade in for ease with upright posture during standing activities. - in progress  3. Pt will demonstrate improved lumbar spine ROM in all directions by a half grade for ease with bending activities. - in progress  4. Pt to demonstrate improved functional ability with FOTO limitation <=46% disability. - in progress     Long Term Goals (8 Weeks):   1. Pt will report being independent with HEP for maintenance of improvements gained during therapy sessions- met  2. PT will report 50% reduction of pain of the back and LE for ease with dressing and grooming activities. - in progress  3. Pt will demonstrate trunk and extremity strength to >=4+/5 without the provocation of pain for ease with household chores- in progress  4. Pt will demonstrate appropriate upright posture without external cueing for ease with work related activities. - in progress  5. Pt to demonstrate improved functional ability with FOTO limitation <=36% disability. - in progress  Plan     Continue with established Plan of Care towards PT goals. Reassessment next visit    Therapist:  Neris Mejias, PT

## 2018-11-26 ENCOUNTER — CLINICAL SUPPORT (OUTPATIENT)
Dept: REHABILITATION | Facility: OTHER | Age: 73
End: 2018-11-26
Payer: MEDICARE

## 2018-11-26 DIAGNOSIS — M54.50 CHRONIC BILATERAL LOW BACK PAIN WITHOUT SCIATICA: ICD-10-CM

## 2018-11-26 DIAGNOSIS — R29.3 POSTURAL IMBALANCE: ICD-10-CM

## 2018-11-26 DIAGNOSIS — G89.29 CHRONIC BILATERAL LOW BACK PAIN WITHOUT SCIATICA: ICD-10-CM

## 2018-11-26 DIAGNOSIS — R53.1 DECREASED STRENGTH: ICD-10-CM

## 2018-11-26 PROCEDURE — 97110 THERAPEUTIC EXERCISES: CPT | Mod: HCNC,PN | Performed by: PHYSICAL THERAPIST

## 2018-11-26 NOTE — PLAN OF CARE
"                                                    Physical Therapy Daily Note     Name: Paris Burris  Clinic Number: [id number]  Diagnosis:   Encounter Diagnoses   Name Primary?    Chronic bilateral low back pain without sciatica     Postural imbalance     Decreased strength      Physician: Fifi Lizama MD  Precautions: Compression Fx in the LSP, DM Type 2, depression  Visit #: 14 of 20  PTA Visit #: 0   Time In: 08:50  Time Out: 0950  Total treatment time: 30 min 1:1   Functional Outcome Measure: FOTO limitation = 35%% Disability  G-codes + Modifier + % Impairment: (mobility):   Current =  (20% - 40% disability), Goal =  (20% - 40% disability)      Subjective     Pt reports: Being able to reach down to  items from the floor more easily. She finds the therapy very beneficial and would like to continue therapy.     Pain Scale: Paris rates pain on a scale of 0-10 to be 1 currently in low back    Objective      11/26/2018  Thoracic/Lumbar AROM: Pain/Dysfunction with Movement:   Flexion Min dean, fingertips 4" below patella, poor lordosis, no pain   Extension Min dean, fair lordosis   Right side bending WFL, fingertips to knee joint line   Left side bending WFL dean, fingertips to knee joint line   Right rotation Sitting - Min dean, no C/O pain   Left rotation Sitting - Min dean, c/o pain     Lower Extremity Strength  Right LE   Left LE     Hip flexion: 5/5 Hip flexion: 5/5   Hip extension:  4+/5 Hip extension: 4+/5   Hip abduction: 4/5 Hip abduction: 5/5   Hip adduction:  5/5 Hip adduction:  5/5      Flexibility: hamstrings = fair, hip flexors/quads = fair+ R    11/26: Dynamic gait index: 22/24 (<19/25 indicates increased risk for falls in elderly population)    Paris received individual therapeutic exercises to develop strength, endurance, ROM, flexibility, posture and core stabilization for 45 minutes including:    LTR: 10 x 10" holds - PB  DKTC: 10 x 10" holds - PB  TA block squeeze: 10 x 10" " "holds  Open books: 15x   piriformis stretch  3 x 30"  HS stretch with strap: 3 x 30"  SLR: 2 x 10 with PPT  scap retractions: 2 x 10 np    Bridge with band: 2 x 10 x orange TB- np  Butterfly U : 2 x 10 x orange TB- np  SL clams: 2 x 10 x orange TB    PPT with marches - 15x  PPT with unilat flex OH - x 15  PPT with Patel flex OH - 15x   rows - 3 x 10 x oTB  U shd ext :  - 3  x 10 x OTB  parloff press: 2 x 10 x OTB    SL hip abd - 15x B  Prone hip ext - 15x B    Paris received the following manual therapy techniques: none  Paris received the following supervised modalities after being cleared for contradictions: none    Written Home Exercises Provided: issued HEP on 11/1/2018 (see pt instructions)  Pt demo good understanding of the education provided. Paris demonstrated good return demonstration of activities.     Education provided re:  Paris verbalized good understanding of education provided.   No spiritual or educational barriers to learning provided    Assessment     Paris tolerated treatment well with month reassessment performed. Pt progressing well with improved lumbar spine AROM, BLE stregnth, postural awareness, dynamic balance, and pain. Pt continues with decreased flexibility hamstrings and rectus femoris and dynamic balance during head turns/ changes in speed. Pt appears very anxious when discussing discharge planning and uncomfortable continuing HEP without our guidance at this time. Pt to benefit from continued skilled physical therapy to progress HEP, dyanmic balance, stabilization, and LE flexibility.    This is a 72 y.o. female referred to outpatient physical therapy and presents with a medical diagnosis of back pain and demonstrates limitations as described in the problem list. Pt prognosis is Good. Pt will continue to benefit from skilled outpatient physical therapy to address the deficits listed in the problem list, provide pt/family education and to maximize pt's level of independence in the home " and community environment.     Goals as follows: See PN on 10/24/18 (PN due by 12/26/18)    Short Term Goals (4 Weeks):   1. Pt will report 20% reduction in pain of the lumbar spine and LE for ease with ADL's-met  2. PT will demonstrate improved trunk strength by a half grade in for ease with upright posture during standing activities. -met  3. Pt will demonstrate improved lumbar spine ROM in all directions by a half grade for ease with bending activities. -met  4. Pt to demonstrate improved functional ability with FOTO limitation <=46% disability. -met     Long Term Goals (8 Weeks):   1. Pt will report being independent with HEP for maintenance of improvements gained during therapy sessions- ongoing  2. PT will report 50% reduction of pain of the back and LE for ease with dressing and grooming activities. -met  3. Pt will demonstrate trunk and extremity strength to >=4+/5 without the provocation of pain for ease with household chores-met  4. Pt will demonstrate appropriate upright posture without external cueing for ease with work related activities. -met  5. Pt to demonstrate improved functional ability with FOTO limitation <=36% disability. - met (35%)  Plan     Updated Certification Period: 8/20/18 to 12/31/18  Recommended Treatment Plan: 2 times per week for 4 more weeks:  Gait Training,  Manual Therapy, Moist Heat/ Ice, Neuromuscular Re-ed, Patient Education, Self Care, Therapeutic Activites,   Other Recommendations: Progress HEP towards D/C.    Therapist:  Neris Mejias, PT

## 2018-11-28 ENCOUNTER — CLINICAL SUPPORT (OUTPATIENT)
Dept: REHABILITATION | Facility: OTHER | Age: 73
End: 2018-11-28
Attending: OBSTETRICS & GYNECOLOGY
Payer: MEDICARE

## 2018-11-28 DIAGNOSIS — N39.46 MIXED STRESS AND URGE URINARY INCONTINENCE: ICD-10-CM

## 2018-11-28 DIAGNOSIS — M62.89 PELVIC FLOOR DYSFUNCTION: Primary | ICD-10-CM

## 2018-11-28 DIAGNOSIS — R15.1 FECAL SOILING: ICD-10-CM

## 2018-11-28 PROCEDURE — 97110 THERAPEUTIC EXERCISES: CPT | Mod: HCNC

## 2018-11-28 NOTE — PLAN OF CARE
Patient: Paris Burris   Clinic #: [id number]   Diagnosis:   Encounter Diagnoses   Name Primary?    Pelvic floor dysfunction Yes    Mixed stress and urge urinary incontinence     Fecal soiling       Date of start of care: 9/19/18  Date of treatment: 11/28/2018   Time in: 9:40  Time out: 9:55  Total treatment time: 15 minutes  # Visits: 5 (20 auth)  Auth expiration: 9/17/19  POC expiration: 3/1/19  Outpatient Physical Therapy   Updated POC    Subjective     Pt reports she is doing well today, she is doing well with her exercises. It is difficult to tell if her urinary symptoms are changing because each incidence occurs with something different. She thinks it's getting better and states it is definitely not getting worse.    Pain: Current: 0/10     Patient's Goals: strengthen pelvic floor; prevent any more incontinence    Objective     TREATMENT    Pt received individual therapeutic exercise for 15 minutes including:    - Review of all exercises, addressed all questions regarding HEP, management, discussed POC    Pt received individual neuromuscular reeducation for 00 minutes including:    - Diaphragmatic breathing  - PFM activation with DB with SEMG assist, external perianal leads; good return demonstration, normal resting baseline, fair WR rise  - 5'' kegels in supine with SEMG assist; normal resting baseline, fair WR rise, poor holding with tendency to activation PFM with short sharp inhale, max verbal cueing provided with instructions to activate at end of exhale and to count out loud  - PFM lengthening (bearing down) with DB with verbal and tactile cueing  - Down training body scan  - Sit to stand squat with core breath x 10    Pt tolerated treatment well with no visible adverse affects. No skin irritation, errythemia, or other adverse affects observed from electrode placement.    Education: Discussed progression of plan of care with patient; educated pt in activity modification; pt instructed to continue with  diaphragmatic breathing, TA activation with DB, legs in a chair, PFM activation with breath (bearing down and lifting), sit to stand squat; pt demonstrated and verbalized understanding.    Assessment     Pt tolerated session well without visible adverse effects. Pt arrived 40 minutes late today. She is compliant with her exercises and doing well. Will follow up in 2 months to reassess, progress as appropriate and ensure good independent management and return of HEP. Pt continues to present with PFM and central weakness with UI and will continue to benefit from skilled PT intervention in order to maximize pain free functional independence.     Pt is making good progress towards established goals.  No educational, cultural, or spiritual barriers to learning identified.    GOALS 12/31/18, continue through 3/1/19  Short Term Goals:   1. Pt will verbalize improved awareness of PFM activity as palpated by PT in order to improve activity involvement with HEP. Met 11/13/18  2. Pt will be independent with restful breathing/diaphragmatic breathing in order to improve central stabilization and management of changes in intraabdominal pressure. Met 11/13/18  3. Pt will be able to activate, relax, and bear down with her PFM in order to improve PFM function and bladder relaxation. Met 11/7/18  4. Pt will tolerate HEP to improve impairments and independence with ADL's. Met 11/7/18     Long Term Goals:   1. Pt will be able to perform 10 x 5'' kegals in order to improve core strength and stability.  2. Pt will report improvement in symptoms.  3. Pt will be independent with HEP and self management.     ANGELINA-6    11/7/18  Score: 7/6x25 = 29%  8/6x25 = 33%    Plan     Continue with established POC, working toward PT goals. Decrease frequency.

## 2018-11-28 NOTE — PROGRESS NOTES
Patient: Paris Burris   Clinic #: [id number]   Diagnosis:   Encounter Diagnoses   Name Primary?    Pelvic floor dysfunction Yes    Mixed stress and urge urinary incontinence     Fecal soiling       Date of start of care: 9/19/18  Date of treatment: 11/28/2018   Time in: 9:40  Time out: 9:55  Total treatment time: 15 minutes  # Visits: 5 (20 auth)  Auth expiration: 9/17/19  POC expiration: 3/1/19  Outpatient Physical Therapy   Updated POC    Subjective     Pt reports she is doing well today, she is doing well with her exercises. It is difficult to tell if her urinary symptoms are changing because each incidence occurs with something different. She thinks it's getting better and states it is definitely not getting worse.    Pain: Current: 0/10     Patient's Goals: strengthen pelvic floor; prevent any more incontinence    Objective     TREATMENT    Pt received individual therapeutic exercise for 15 minutes including:    - Review of all exercises, addressed all questions regarding HEP, management, discussed POC    Pt received individual neuromuscular reeducation for 00 minutes including:    - Diaphragmatic breathing  - PFM activation with DB with SEMG assist, external perianal leads; good return demonstration, normal resting baseline, fair WR rise  - 5'' kegels in supine with SEMG assist; normal resting baseline, fair WR rise, poor holding with tendency to activation PFM with short sharp inhale, max verbal cueing provided with instructions to activate at end of exhale and to count out loud  - PFM lengthening (bearing down) with DB with verbal and tactile cueing  - Down training body scan  - Sit to stand squat with core breath x 10    Pt tolerated treatment well with no visible adverse affects. No skin irritation, errythemia, or other adverse affects observed from electrode placement.    Education: Discussed progression of plan of care with patient; educated pt in activity modification; pt instructed to continue with  diaphragmatic breathing, TA activation with DB, legs in a chair, PFM activation with breath (bearing down and lifting), sit to stand squat; pt demonstrated and verbalized understanding.    Assessment     Pt tolerated session well without visible adverse effects. Pt arrived 40 minutes late today. She is compliant with her exercises and doing well. Will follow up in 2 months to reassess, progress as appropriate and ensure good independent management and return of HEP. Pt continues to present with PFM and central weakness with UI and will continue to benefit from skilled PT intervention in order to maximize pain free functional independence.     Pt is making good progress towards established goals.  No educational, cultural, or spiritual barriers to learning identified.    GOALS 12/31/18, continue through 3/1/19  Short Term Goals:   1. Pt will verbalize improved awareness of PFM activity as palpated by PT in order to improve activity involvement with HEP. Met 11/13/18  2. Pt will be independent with restful breathing/diaphragmatic breathing in order to improve central stabilization and management of changes in intraabdominal pressure. Met 11/13/18  3. Pt will be able to activate, relax, and bear down with her PFM in order to improve PFM function and bladder relaxation. Met 11/7/18  4. Pt will tolerate HEP to improve impairments and independence with ADL's. Met 11/7/18     Long Term Goals:   1. Pt will be able to perform 10 x 5'' kegals in order to improve core strength and stability.  2. Pt will report improvement in symptoms.  3. Pt will be independent with HEP and self management.     ANGELINA-6    11/7/18  Score: 7/6x25 = 29%  8/6x25 = 33%    Plan     Continue with established POC, working toward PT goals. Decrease frequency.

## 2018-11-29 ENCOUNTER — CLINICAL SUPPORT (OUTPATIENT)
Dept: REHABILITATION | Facility: OTHER | Age: 73
End: 2018-11-29
Payer: MEDICARE

## 2018-11-29 DIAGNOSIS — R53.1 DECREASED STRENGTH: ICD-10-CM

## 2018-11-29 DIAGNOSIS — G89.29 CHRONIC BILATERAL LOW BACK PAIN WITHOUT SCIATICA: ICD-10-CM

## 2018-11-29 DIAGNOSIS — M54.50 CHRONIC BILATERAL LOW BACK PAIN WITHOUT SCIATICA: ICD-10-CM

## 2018-11-29 DIAGNOSIS — R29.3 POSTURAL IMBALANCE: ICD-10-CM

## 2018-11-29 PROCEDURE — 97110 THERAPEUTIC EXERCISES: CPT | Mod: HCNC,PN | Performed by: PHYSICAL THERAPIST

## 2018-11-29 NOTE — PROGRESS NOTES
"                                                    Physical Therapy Daily Note     Name: Paris Burris  Clinic Number: [id number]  Diagnosis:   Encounter Diagnoses   Name Primary?    Chronic bilateral low back pain without sciatica     Postural imbalance     Decreased strength      Physician: Fifi Lizama MD  Precautions: Compression Fx in the LSP, DM Type 2, depression  Visit #: 15 of 20  PTA Visit #: 0   Time In: 08:00  Time Out: 0855  Total treatment time: 25 min 1:1   Functional Outcome Measure: FOTO limitation = 35%% Disability  G-codes + Modifier + % Impairment: (mobility):   Current =  (20% - 40% disability), Goal =  (20% - 40% disability)      Subjective     Pt reports: continued compliance with HEP    Pain Scale: Paris rates pain on a scale of 0-10 to be 1 currently in low back    Objective     Paris received individual therapeutic exercises to develop strength, endurance, ROM, flexibility, posture and core stabilization for 50 minutes including:    Recumbent bike x 6 min    Bridge with band: 2 x 10 x orange TB  Butterfly U : 2 x 10 x orange TB  SL clams: 2 x 10 x orange TB    PPT with marches on PB- 15x  PPT with unilat flex OH on PB- x 15   rows - 3 x 10 x oTB  U shd ext :  - 3  x 10 x OTB  parloff press: 2 x 10 x OTB    Standing hip abd - 15x B, orange TB  Head turns on airex pad x 20 ea  Marches on airex pad x 2 min    reviewed for HEP  LTR: 10 x 10" holds - PB  DKTC: 10 x 10" holds - PB  TA block squeeze: 10 x 10" holds  Open books: 15x   piriformis stretch  3 x 30"      Paris received the following manual therapy techniques: none  Paris received the following supervised modalities after being cleared for contradictions: none    Written Home Exercises Provided: issued HEP on 11/1/2018 (see pt instructions)  Pt demo good understanding of the education provided. Paris demonstrated good return demonstration of activities.     Education provided re:  Paris verbalized good understanding " of education provided.   No spiritual or educational barriers to learning provided    Assessment     Paris tolerated treatment well. Good progression of standing balance and strengthening exercises without exacerbation of pain  This is a 72 y.o. female referred to outpatient physical therapy and presents with a medical diagnosis of back pain and demonstrates limitations as described in the problem list. Pt prognosis is Good. Pt will continue to benefit from skilled outpatient physical therapy to address the deficits listed in the problem list, provide pt/family education and to maximize pt's level of independence in the home and community environment.     Goals as follows:     Short Term Goals (4 Weeks):   1. Pt will report 20% reduction in pain of the lumbar spine and LE for ease with ADL's-met  2. PT will demonstrate improved trunk strength by a half grade in for ease with upright posture during standing activities. -met  3. Pt will demonstrate improved lumbar spine ROM in all directions by a half grade for ease with bending activities. -met  4. Pt to demonstrate improved functional ability with FOTO limitation <=46% disability. -met     Long Term Goals (8 Weeks):   1. Pt will report being independent with HEP for maintenance of improvements gained during therapy sessions- ongoing  2. PT will report 50% reduction of pain of the back and LE for ease with dressing and grooming activities. -met  3. Pt will demonstrate trunk and extremity strength to >=4+/5 without the provocation of pain for ease with household chores-met  4. Pt will demonstrate appropriate upright posture without external cueing for ease with work related activities. -met  5. Pt to demonstrate improved functional ability with FOTO limitation <=36% disability. - met (35%)    Plan     Continue with established Plan of Care towards PT goals.     Therapist:  Neris Mejias, PT

## 2018-11-30 ENCOUNTER — OFFICE VISIT (OUTPATIENT)
Dept: UROGYNECOLOGY | Facility: CLINIC | Age: 73
End: 2018-11-30
Payer: MEDICARE

## 2018-11-30 ENCOUNTER — PATIENT MESSAGE (OUTPATIENT)
Dept: UROGYNECOLOGY | Facility: CLINIC | Age: 73
End: 2018-11-30

## 2018-11-30 VITALS
DIASTOLIC BLOOD PRESSURE: 78 MMHG | HEIGHT: 61 IN | BODY MASS INDEX: 30.8 KG/M2 | SYSTOLIC BLOOD PRESSURE: 110 MMHG | WEIGHT: 163.13 LBS

## 2018-11-30 DIAGNOSIS — N95.2 VAGINAL ATROPHY: Primary | ICD-10-CM

## 2018-11-30 DIAGNOSIS — M62.89 PELVIC FLOOR DYSFUNCTION: ICD-10-CM

## 2018-11-30 DIAGNOSIS — R15.1 FECAL SOILING: ICD-10-CM

## 2018-11-30 DIAGNOSIS — N39.46 MIXED STRESS AND URGE URINARY INCONTINENCE: ICD-10-CM

## 2018-11-30 DIAGNOSIS — R39.15 URINARY URGENCY: ICD-10-CM

## 2018-11-30 DIAGNOSIS — N39.8 VOIDING DYSFUNCTION: ICD-10-CM

## 2018-11-30 DIAGNOSIS — R35.0 URINARY FREQUENCY: ICD-10-CM

## 2018-11-30 DIAGNOSIS — R39.11 URINARY HESITANCY: ICD-10-CM

## 2018-11-30 DIAGNOSIS — F33.41 MDD (MAJOR DEPRESSIVE DISORDER), RECURRENT, IN PARTIAL REMISSION: ICD-10-CM

## 2018-11-30 PROBLEM — Z87.440 HISTORY OF UTI: Status: RESOLVED | Noted: 2018-07-25 | Resolved: 2018-11-30

## 2018-11-30 PROBLEM — N39.0 FREQUENT UTI: Status: RESOLVED | Noted: 2018-07-30 | Resolved: 2018-11-30

## 2018-11-30 PROBLEM — R30.0 DYSURIA: Status: RESOLVED | Noted: 2018-07-25 | Resolved: 2018-11-30

## 2018-11-30 PROCEDURE — 99214 OFFICE O/P EST MOD 30 MIN: CPT | Mod: HCNC,S$GLB,, | Performed by: OBSTETRICS & GYNECOLOGY

## 2018-11-30 PROCEDURE — 3074F SYST BP LT 130 MM HG: CPT | Mod: CPTII,HCNC,S$GLB, | Performed by: OBSTETRICS & GYNECOLOGY

## 2018-11-30 PROCEDURE — 1101F PT FALLS ASSESS-DOCD LE1/YR: CPT | Mod: CPTII,HCNC,S$GLB, | Performed by: OBSTETRICS & GYNECOLOGY

## 2018-11-30 PROCEDURE — 99999 PR PBB SHADOW E&M-EST. PATIENT-LVL III: CPT | Mod: PBBFAC,HCNC,, | Performed by: OBSTETRICS & GYNECOLOGY

## 2018-11-30 PROCEDURE — 3078F DIAST BP <80 MM HG: CPT | Mod: CPTII,HCNC,S$GLB, | Performed by: OBSTETRICS & GYNECOLOGY

## 2018-11-30 RX ORDER — SILVER SULFADIAZINE 10 G/1000G
CREAM TOPICAL DAILY
Qty: 50 G | Refills: 11 | Status: SHIPPED | OUTPATIENT
Start: 2018-11-30 | End: 2019-07-18

## 2018-11-30 RX ORDER — CLONAZEPAM 0.5 MG/1
TABLET ORAL
COMMUNITY
Start: 2018-10-04 | End: 2018-11-30

## 2018-11-30 NOTE — PATIENT INSTRUCTIONS
Bladder Irritants  Certain foods and drinks have been associated with worsening symptoms of urinary frequency, urgency, urge incontinence, or bladder pain. If you suffer from any of these conditions, you may wish to try eliminating one or more of these foods from your diet and see if your symptoms improve. If bladder symptoms are related to dietary factors, strict adherence to a diet thateliminates the food should bring marked relief in 10 days. Once you are feeling better, you can begin to add foods back into your diet, one at a time. If symptoms return, you will be able to identify the irritant. As you add foods back to your diet it is very important that you drink significant amounts of water.    -----------------------------------------------------------------------------------------------  List of Common Bladder Irritants*  Alcoholic beverages  Apples and apple juice  Cantaloupe  Carbonated beverages  Chili and spicy foods  Chocolate  Citrus fruit  Coffee (including decaffeinated)  Cranberries and cranberry juice  Grapes  Guava  Milk Products: milk, cheese, cottage cheese, yogurt, ice cream  Peaches  Pineapple  Plums  Strawberries  Sugar especially artificial sweeteners, saccharin, aspartame, corn sweeteners, honey, fructose, sucrose, lactose  Tea  Tomatoes and tomato juice  Vitamin B complex  Vinegar  *Most people are not sensitive to ALL of these products; your goal is to find the foods that make YOUR symptoms worse.  ---------------------------------------------------------------------------------------------------    Low-acid fruit substitutions include apricots, papaya, pears and watermelon. Coffee drinkers can drink Kava or other lowacid instant drinks. Tea drinkers can substitute non-citrus herbal and sun brewed teas. Calcium carbonate co-buffered with calcium ascorbate can be substituted for Vitamin C. Prelief is a dietary supplement that works as an acid blocker for the bladder.    Where to get more  information:        Overcoming Bladder Disorders by Tiffani Hanks and Jung Jones, 1990        You Dont Have to Live with Cystitis! By Xuan Keith, 1988  · [email or web address]    -----------------------------------------------------    1)  Frequent UTIs (bladder infections):  --no current symptoms  --control diabetes  --If you feel like you have a UTI, please call our office so that we can place an order for you to drop off a urine specimen at the closest [facility] lab (we will arrange).     --We will call in antibiotics for you to start right after you drop off specimen.     --In this way, we can determine:     1)  Do you have a UTI?      2) If you have a UTI, is it sensitive to the antibiotics we prescribed?   --follow UTI prevention tips (see attached)  --control bowel movements/fecal cross-contamination  --treat vaginal atrophy (dryness)  --empty bladder before and after intercourse  --consider need for further evaluation (pelvic imaging and cystoscopy) if issue persists    2)  Voiding dysfunction, hesitancy, urinary urgency/frequency, mild stress incontinence:   --control diabetes  --Empty bladder every 3 hours.  Empty well: wait a minute, lean forward on toilet.    --Avoid dietary irritants (see sheet).  Keep diary x 3-5 days to determine your irritants.  --finish PT with Darcie; then continue exercises at home.  --URGE: consider medication in future.  Takes 2-4 weeks to see if will have effect.  For dry mouth: get sour, sugar free lozenge or gum.    --STRESS:  Pessary vs. Sling.     3)  Vaginal atrophy (dryness):    --start silvadene cream:  Use 2-3 times/week (at least), but may use nightly.  Apply dime-sized amount just inside vagina, around opening/inner lips.   --get information about breast cancer; does not sound E2+but make sure  --if not estrogen receptor +, would start vaginal estrogen cream ([place], [place])--release of records signed  --please  try to call surgeon to find out; if can't get it, we can send request for information--just need information  Dr. Kari Torre  Medical oncology   [address] [address], [place] [address]   [phone]    4)  Occasional fecal smearing:  --get wiping extension:  [email or web address], bed bath and beyong, etc:  New MTN-G Long Reach Comfort Wipe Toilet Paper Jeronimo Arm Extension Butt Wiping Aid Handle  --continue high fiber diet  --avoid dietary triggers  --try to avoid smooth move tea unless necessary  --consider taking fiber supplement +/- stool softener daily to maintain good consistency  --take fiber DAILY    5)  Depression, episodic:  --call insurance to get names therapists who you can see  --send Dr. Sweeney a message through [facility] [employer] to let him know your having an exacerbation  [facility]  Stephania Fowler  [address], [place]    [id number] [phone]  [email or web address]    [facility]:  Kari Renteria [facility] -    [phone]    [phone]     [facility]:  Dr. Lyle Head-Bella -    [phone]     Alonso Olvera [facility], [facility]  (b) [phone]  [address] [place]    [address]    [facility]  (o) [phone]  [facility]   [address]  [place]     [address]    Michelle Dickerson and her cell phone number is [phone].     [facility] [phone].      Dr. Quincy Lyn [facility], LMFT  (g) [phone]  [address]   [address]  [place]     [address]  Website:  [email or web address]    [facility]  Mental Health Facility in [place], [place]  Address:  [address] [place] [address]  phone: [phone]    6)  RTC 1 year for well-check, urinary/fecal check with Radha Felton NP.

## 2018-11-30 NOTE — PROGRESS NOTES
"Urogyn follow up  11/30/2018    [facility] [address] [address]    Paris Burris  [id number]  1945      Paris Burris is a 73 y.o. here for a urogyn follow up.    **Main bothers: urinary frequency, hesitancy    First started having trouble urinating about 2 months ago- hesitancy and "vibration sensation". No pain, no  Burning, but bothersome.     1)  UI:  (+) GIAN > (+) UUI  X 20 years.  (--) pads: Daytime frequency: 1-2 times/month  Nocturia: No: 1/night. Wakes up at night 0-2 times to urinate. Takes Citurizine   (--) dysuria,  (--) hematuria,  (--) frequent UTIs: 6 in lifetime. Last UTI 1 yr ago (--) complete bladder emptying. Sometimes urinates again 15 minutes after.    2)  POP:  Absent.  (--) vaginal bleeding. (--) vaginal discharge. (+) sexually active.  (--) dyspareunia.   (+)  Vaginal dryness.  (--) vaginal estrogen use. Interested in cream.    3)  BM:  (--) constipation/straining. Drinks "smooth move" tea, takes fiber, eats greens often  (--) chronic diarrhea. (--) hematochezia.  (-) fecal incontinence  (+) fecal smearing/urgency:: Rarely (1 or 2 times/year--mostly after drinking laxative tea).   (+) complete evacuation.  Occasional Splinting (1x/weel) before started drinking smooth move tea.    4)  Frequent UTIs:  --urine C&S    --5/30/18, 5/21/18, 5/17/18 NEG   --5/4/18: e coli   --3/16 NEG  --typical symptoms: frequency, urgency  --how diagnosed: presented to PCP for urine test  --triggers: none known  --previous testing: as above    --initial exam:  PELVIC:    External genitalia:  Normal Bartholins, Skenes and labia bilaterally.  Mild agglutination of labia minora and narrowing of introitus c/w lichen process.    Urethra:  No caruncle, diverticulum or masses.  (--) hypermobility.    Vagina:  Atrophy (+) , no bladder masses or tender, no discharge.    Cervix:  normal appearance  Uterus: normal size, contour, position, consistency, mobility, " non-tender  Adnexa: Not palpable.    Past Medical History  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    Wrist fracture    DM:  FBG avg (checks QOD) 100-130.  Secondary disease: none.  Neuropathy:  Mild mid L toe/L heel.   Lab Results   Component Value Date    HGBA1C 5.6 11/14/2018     Breast CA:  Dx 2002. S/p L lumpectomy.  PO brachyTx and chemoTx.  Did not take SERM.  Not sure if E2 sensitive.     Past Surgical History  Past Surgical History:   Procedure Laterality Date    ADENOIDECTOMY      BREAST LUMPECTOMY Left 2002    COLONOSCOPY N/A 12/2/2016    Procedure: COLONOSCOPY;  Surgeon: Dustin Patterson MD;  [facility]  Service: Endoscopy;  Laterality: N/A;  PM prep    COLONOSCOPY N/A 12/2/2016    Performed by Dustin Patterson MD at [facility] (4TH FLR)    DEVICE ASSISTED ENTEROSCOPY-ANTEROGRADE N/A 3/20/2018    Performed by Dustin Patterson MD at [facility] (2ND FLR)    ESOPHAGEAL MANOMETRY WITH MEASUREMENT OF IMPEDANCE N/A 10/8/2018    Procedure: MANOMETRY, ESOPHAGUS, WITH IMPEDANCE MEASUREMENT;  Surgeon: Renato Maria MD;  Location: [facility] (4TH FLR);  Service: Endoscopy;  Laterality: N/A;    ESOPHAGOGASTRODUODENOSCOPY N/A 9/12/2018    Procedure: ESOPHAGOGASTRODUODENOSCOPY (EGD);  Surgeon: Dustin Patterson MD;  Location: [facility] (4TH FLR);  Service: Endoscopy;  Laterality: N/A;  6-8 weeks from visit     ESOPHAGOGASTRODUODENOSCOPY (EGD) N/A 2018    Performed by Dustin Patterson MD at [facility] ENDO (4TH FLR)    ESOPHAGOGASTRODUODENOSCOPY (EGD) N/A 2016    Performed by Dustin Patterson MD at [facility] END[facility] (4TH FLR)    ESOPHAGOGASTRODUODENOSCOPY (EGD) N/A 2015    Performed by Marcial Dewitt MD at [facility] ENDO (4TH FLR)    EYE SURGERY Bilateral     cataracts    INJECTION-STEROID-EPIDURAL-LUMBAR N/A 6/15/2016    Performed by Michelle Frias [facility] at [facility] PAIN MGT    lipoma removal      MANOMETRY, ESOPHAGUS, WITH IMPEDANCE MEASUREMENT N/A 10/8/2018    Performed by Renato Maria MD at [facility] ENDO (4TH FLR)    PARATHYROIDECTOMY minimally invasive N/A 2015    Performed by Amna Aponte MD at [facility] OR 2ND FLR    TONSILLECTOMY        Hysterectomy: No.      Past Ob History    1 vaginal birth, 1 adopted  Largest infant weight: 6lb 11  no FAVD. no episiotomy.      Gynecologic History  LMP: No LMP recorded. Patient is postmenopausal.  Age of menarche: 11  Age of menopause: 59  Menstrual history: normal, occasionally heavy  Pap test: last one normal, few years ago.  History of abnormal paps: Yes - no cancer.  History of STIs:  Genital herpes, no recent flare ups  Mammogram: Date of last: .  Result: Normal  Colonoscopy: Date of last: .  Result:  Polyps, benign.  Repeat due:  .    DEXA:  Date of last: .  Result:  normal.     Issues include:  Patient Active Problem List   Diagnosis    Insomnia    MDD (major depressive disorder), recurrent, in partial remission    Sleep apnea    History of breast cancer in female    Vitamin D deficiency    Hyperlipidemia    Multiple gastric ulcers    Leg weakness, bilateral    Back pain    Weakness of trunk musculature    Diabetes mellitus type 2 in obese    DDD (degenerative disc disease), lumbar    Midline low back pain without sciatica    Diabetic polyneuropathy associated with type 2 diabetes mellitus    ESTEFANIA (iron deficiency anemia)     Thoracic back pain    Fatty liver    Type 2 diabetes mellitus, controlled, with renal complications    Stage 2 chronic kidney disease    Type 2 diabetes mellitus with diabetic neuropathy    COOKIE (obstructive sleep apnea)    Aortic atherosclerosis    Alcohol dependence, in remission    History of gastric ulcer    Cervical spine arthritis    History of vertebral compression fracture    Obesity (BMI 30-39.9)    Voiding dysfunction    Urinary urgency    Urinary frequency    Urinary hesitancy    Vaginal atrophy    Fecal soiling    Mixed stress and urge urinary incontinence    Status post parathyroidectomy    Chronic bilateral low back pain without sciatica    Postural imbalance    Decreased strength    Gastric ulcer    Hiatal hernia    Pelvic floor dysfunction     History since last visit:     1)  Frequent UTIs (bladder infections):  --no recent symptoms    2)  Voiding dysfunction, hesitancy, urinary urgency/frequency, mild stress incontinence:   --has been going to PT with Darcie: urinary U/F, hesistancy resolved; has 1 more PT session in Jan  --GIAN: had 1 major episode when cutting peppers but OTW ok    3)  Vaginal atrophy (dryness):    --get information about breast cancer; does not sound E2+but make sure--never got response from ONC in [place]  --if not estrogen receptor +, would start vaginal estrogen cream ([place], [place])--release of records signed  --please try to call surgeon to find out; if can't get it, we can send request for information--just need information  Dr. Kari Torre  Medical oncology   [facility] [address] [address] [phone]    4)  Occasional fecal smearing:  --continue high fiber diet  --avoid dietary triggers  --try to avoid smooth move tea unless necessary  --consider taking fiber supplement +/- stool softener daily to maintain good consistency  --started daily fiber: only takes when doesn't laxative.  2T.  Has been helpful.  But hasn't been feeling well  (depressed).  So, sometimes, has issues with diet/high fiber because isn't feeling well.  Hasn't seen anyone recently.      Medications:    Current Outpatient Medications:     alpha lipoic acid 300 mg Cap, Take 300 mg by mouth 2 (two) times daily. , Disp: , Rfl:     ascorbic acid (VITAMIN C) 500 MG tablet, Take 1,000 mg by mouth once daily. , Disp: , Rfl:     b complex vitamins capsule, Take 1 capsule by mouth once daily., Disp: , Rfl:     blood sugar diagnostic (ACCU-CHEK SMARTVIEW TEST STRIP) Strp, Test once daily., Disp: 100 strip, Rfl: 11    cetirizine (ZYRTEC) 10 MG tablet, Take 10 mg by mouth once daily., Disp: , Rfl:     CITICOLINE SODIUM (CITICOLINE ORAL), Take 1 tablet by mouth once daily at 6am., Disp: , Rfl:     clonazePAM (KLONOPIN) 0.5 MG tablet, Take 1 tablet (0.5 mg total) by mouth daily as needed for Anxiety., Disp: 30 tablet, Rfl: 0    esomeprazole (NEXIUM) 40 MG capsule, Take 1 capsule (40 mg total) by mouth before breakfast., Disp: 90 capsule, Rfl: 3    ferrous sulfate 325 mg (65 mg iron) Tab tablet, Take 1 tablet (325 mg total) by mouth 2 (two) times daily., Disp: 60 tablet, Rfl: 5    gabapentin (NEURONTIN) 300 MG capsule, Take 1 capsule (300 mg total) by mouth 2 (two) times daily., Disp: 180 capsule, Rfl: 3    lancets Misc, 1 lancet by Misc.(Non-Drug; Combo Route) route 2 (two) times daily., Disp: 100 each, Rfl: 6    metFORMIN (GLUCOPHAGE) 500 MG tablet, Take 0.5 tablets (250 mg total) by mouth daily with breakfast., Disp: 45 tablet, Rfl: 3    omega-3 fatty acids-fish oil (FISH OIL) 340-1,000 mg Cap, Take 1 capsule by mouth once daily., Disp: , Rfl:     rosuvastatin (CRESTOR) 20 MG tablet, Take 1 tablet (20 mg total) by mouth once daily., Disp: 90 tablet, Rfl: 3    traMADol (ULTRAM) 50 mg tablet, TAKE 2 TABLETS (100 MG TOTAL) BY MOUTH DAILY AS NEEDED FOR PAIN., Disp: 60 tablet, Rfl: 1    tretinoin (RETIN-A) 0.05 % cream, Use hs, Disp: 45 g, Rfl: 6    TURMERIC (CURCUMIN MISC),  "Take 500 mg by mouth 2 (two) times daily. , Disp: , Rfl:     venlafaxine (EFFEXOR) 37.5 MG Tab, Take 2 tablets (75 mg total) by mouth once daily., Disp: 30 tablet, Rfl: 5    vitamin D 1000 units Tab, Take 500 Units by mouth once daily. With K2 50 mch, Disp: , Rfl:     blood-glucose meter Misc, 1 Device by Misc.(Non-Drug; Combo Route) route 2 (two) times daily., Disp: 1 each, Rfl: 0    silver sulfADIAZINE 1% (SILVADENE) 1 % cream, Apply topically once daily., Disp: 50 g, Rfl: 11    ROS:  As per HPI.      Exam  /78   Ht 5' 1" (1.549 m)   Wt 74 kg (163 lb 2.3 oz)   BMI 30.83 kg/m²   General: alert and oriented, no acute distress  Remainder of PE deferred.     Impression  1. Vaginal atrophy  silver sulfADIAZINE 1% (SILVADENE) 1 % cream   2. MDD (major depressive disorder), recurrent, in partial remission     3. Pelvic floor dysfunction     4. Mixed stress and urge urinary incontinence     5. Urinary frequency     6. Urinary hesitancy     7. Urinary urgency     8. Voiding dysfunction     9. Fecal soiling       We reviewed the above issues and discussed options for short-term versus long-term management of her problems.   Plan:   1)  Frequent UTIs (bladder infections):  --no current symptoms  --control diabetes  --If you feel like you have a UTI, please call our office so that we can place an order for you to drop off a urine specimen at the closest [facility] lab (we will arrange).     --We will call in antibiotics for you to start right after you drop off specimen.     --In this way, we can determine:     1)  Do you have a UTI?      2) If you have a UTI, is it sensitive to the antibiotics we prescribed?   --follow UTI prevention tips (see attached)  --control bowel movements/fecal cross-contamination  --treat vaginal atrophy (dryness)  --empty bladder before and after intercourse  --consider need for further evaluation (pelvic imaging and cystoscopy) if issue persists    2)  Voiding dysfunction, hesitancy, " urinary urgency/frequency, mild stress incontinence:   --control diabetes  --Empty bladder every 3 hours.  Empty well: wait a minute, lean forward on toilet.    --Avoid dietary irritants (see sheet).  Keep diary x 3-5 days to determine your irritants.  --finish PT with Darcie; then continue exercises at home.  --URGE: consider medication in future.  Takes 2-4 weeks to see if will have effect.  For dry mouth: get sour, sugar free lozenge or gum.    --STRESS:  Pessary vs. Sling.     3)  Vaginal atrophy (dryness):    --start silvadene cream:  Use 2-3 times/week (at least), but may use nightly.  Apply dime-sized amount just inside vagina, around opening/inner lips.   --get information about breast cancer; does not sound E2+but make sure  --if not estrogen receptor +, would start vaginal estrogen cream ([place], [place])--release of records signed  --please try to call surgeon to find out; if can't get it, we can send request for information--just need information  Dr. Kari Torre  Medical oncology   [address] [id number] [phone]    4)  Occasional fecal smearing:  --get wiping extension:  [email or web address], bed bath and beyong, etc:  New MTN-G Long Reach Comfort Wipe Toilet Paper Jeronimo Arm Extension Butt Wiping Aid Handle  --continue high fiber diet  --avoid dietary triggers  --try to avoid smooth move tea unless necessary  --consider taking fiber supplement +/- stool softener daily to maintain good consistency  --take fiber DAILY    5)  Depression, episodic:  --call insurance to get names therapists who you can see  --send Dr. Sweeney a message through [employer] to let him know your having an exacerbation  [facility] (ART)  Stephania Fowler  [facility] [place], [place]    [id number] [phone]  [email or web address]  [email or web address]    [facility]:  Kari Rocha -    [phone]      [facility] [place] [facility]:  Dr. Lyle Head-[facility] -    [phone]    [phone]     Alonso Olvera [facility], FT  (r) [phone]  [address] [place]    [address]    Yue Floyd \[facility]\""W  (p) [phone]  [address]   [address]  [place]     [address]    Michelle Dickerson and her cell phone number is [phone].     [facility] [phone].      Dr. Quincy Lyn [facility], FT  (p) [phone]  [address]   [address]  [place]     [address]  Website:  [email or web address] (acquired by [employer])    [facility]  M[facility] Health Facility in [place], [place]  Address:  [address] [place] [address]  phone: [phone]    6)  RTC 1 year for well-check, urinary/fecal check with Radha Felton NP.      40 minutes were spent in face to face time with this patient  100 % of this time was spent in counseling and/or coordination of care

## 2018-12-02 ENCOUNTER — PATIENT MESSAGE (OUTPATIENT)
Dept: INTERNAL MEDICINE | Facility: CLINIC | Age: 73
End: 2018-12-02

## 2018-12-03 ENCOUNTER — PATIENT MESSAGE (OUTPATIENT)
Dept: INTERNAL MEDICINE | Facility: CLINIC | Age: 73
End: 2018-12-03

## 2018-12-04 ENCOUNTER — OFFICE VISIT (OUTPATIENT)
Dept: PSYCHIATRY | Facility: CLINIC | Age: 73
End: 2018-12-04
Payer: MEDICARE

## 2018-12-04 VITALS
BODY MASS INDEX: 31.09 KG/M2 | SYSTOLIC BLOOD PRESSURE: 124 MMHG | WEIGHT: 164.69 LBS | DIASTOLIC BLOOD PRESSURE: 74 MMHG | HEIGHT: 61 IN | HEART RATE: 88 BPM

## 2018-12-04 DIAGNOSIS — F10.21 ALCOHOL DEPENDENCE IN REMISSION: ICD-10-CM

## 2018-12-04 DIAGNOSIS — F33.42 RECURRENT MAJOR DEPRESSION IN FULL REMISSION: Primary | ICD-10-CM

## 2018-12-04 DIAGNOSIS — F41.1 GAD (GENERALIZED ANXIETY DISORDER): ICD-10-CM

## 2018-12-04 PROCEDURE — 99499 UNLISTED E&M SERVICE: CPT | Mod: HCNC,S$GLB,, | Performed by: PSYCHIATRY & NEUROLOGY

## 2018-12-04 PROCEDURE — 90833 PSYTX W PT W E/M 30 MIN: CPT | Mod: HCNC,S$GLB,, | Performed by: PSYCHIATRY & NEUROLOGY

## 2018-12-04 PROCEDURE — 3074F SYST BP LT 130 MM HG: CPT | Mod: CPTII,HCNC,S$GLB, | Performed by: PSYCHIATRY & NEUROLOGY

## 2018-12-04 PROCEDURE — 99999 PR PBB SHADOW E&M-EST. PATIENT-LVL II: CPT | Mod: PBBFAC,HCNC,, | Performed by: PSYCHIATRY & NEUROLOGY

## 2018-12-04 PROCEDURE — 99213 OFFICE O/P EST LOW 20 MIN: CPT | Mod: HCNC,S$GLB,, | Performed by: PSYCHIATRY & NEUROLOGY

## 2018-12-04 PROCEDURE — 3078F DIAST BP <80 MM HG: CPT | Mod: CPTII,HCNC,S$GLB, | Performed by: PSYCHIATRY & NEUROLOGY

## 2018-12-04 PROCEDURE — 1101F PT FALLS ASSESS-DOCD LE1/YR: CPT | Mod: CPTII,HCNC,S$GLB, | Performed by: PSYCHIATRY & NEUROLOGY

## 2018-12-04 RX ORDER — LORAZEPAM 0.5 MG/1
0.5 TABLET ORAL EVERY 12 HOURS PRN
Qty: 60 TABLET | Refills: 1 | Status: SHIPPED | OUTPATIENT
Start: 2018-12-04 | End: 2019-05-27

## 2018-12-04 RX ORDER — ZALEPLON 5 MG/1
5 CAPSULE ORAL NIGHTLY
Qty: 30 CAPSULE | Refills: 1 | Status: SHIPPED | OUTPATIENT
Start: 2018-12-04 | End: 2019-01-03

## 2018-12-04 NOTE — PROGRESS NOTES
"Outpatient Psychiatry Follow-Up Visit (MD/NP)    12/4/2018    last seen  3/18     Clinical Status of Patient:  Outpatient (Ambulatory)    Chief Complaint:   "Paris"   Paris Burris is a 73 y.o. female who presents today for follow-up of depression and alcohol problem .  Met with patient.   ; friend of Dr Rachel . Annita Braun ( 9 yrs ) .  to [place] .    Interval History and Content of Current Session:  Interim Events/Subjective Report/Content of Current Session: Pt s/p ABU program in Spring 2014 for alcohol dependence . 2-10-14 sober date . Very engaged in AA and sponsor attending 3-4 x per week . She completed 1st series of 12 steps .       In past she was not sure she wanted to reengage with  Dr Lauren .       EX  Son in law - [place]cy -- good terms  ; studied at  [employer] ; Dtr smoker Yue ( Vira Robbins)  employed in past with [employer])  S/p 4th suicide attempt ( 1st was OD  acetomin; 2nd and 3rd had gun)  and was admitted to [facility] on 4-9-15  X 5 weeks .    Yue also has been to tx in [facility] . Yue now in therapy  plus a group with [facility].  Pt does not ask details anymore .   Yue ( kaylie patel).  Yue continues to  cut herself off from others that were close to her . Yue has  Been even keeled .     Yue has distanced dennis daniela's wife ( Yazmin) away . Dennis is a family friend.   Dtr Yue ( Vira robbins) going though bitter divorce where Yue wants others to avoid Quincy. Kids shuttle .  Quincy and Yue relationship is thawing as his live in  is gone.  Yue holds a grudge.      Grkids at their own  home are 14( Yoni)- anger issues / destroys property . 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 Oldest grson Delfino 6'3"  ( 19) ADHD  ,  went to  [employer]  boarding school.: just told he was transitioning. He is to seek skilled job in [facility] . Pt accepts  Him as she is a Roman [employer] .      Younger kids at  [facility] .     Diabetes controlled with hgb A1c.  Neuropathic  pain  is worse from toe " to balll of foot to heel then associated hip  Pain .  Podiatry   added Neurontin 300 mg bid  To tid  near early 2017  but may prevent weight loss for her  . Sitting is worse for her pain.       Jacqueline Burns [facility]  has been very helpful to dec HgbA1c     Mood has been impacted by fatigue from anemia . Improved with iron.       Annita Braun , a [employer]  has become depressed in past but is partially in favor of meds . He is 81 and is to engage MD for anger mngmnt .      Psychiatric Review Of Systems - Is patient experiencing or having changes in:    Mood has been more depressed   sleep: good Sleep apnea confirmed  by sleep specialist  ( Dr Gregorio) ; not using cpap as she lost sig weight since diagnosis  ; sleeps within 30 mins most nights ; occ may take 5 hours to sleep   appetite: no, controlled  With very health conscious diet   weight: no; 171 ( feb 2017);   A1c is <6;  down from >10  ; 165 ( mar 2018)  ; 158 ( oct 2018) ; 164 ( dec 2018)   energy/anergy: fair ; less   exercising  ( post fall) at [employer] with free  bc of comorbid condition; completed healthy back ; in new  PT on [place]   interest/pleasure/anhedonia: yes ; chore lists has shrunken as she is more apathetic ; 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     Has compression fx in high thoracic area .    Coping with relatively new DM     Santana Rachel MD is  fam friend .     Meds  effexor  75  mg  Tablet   Q day ( at 150 mg had anxiety / nightmares ) ;  Vit D  supplement ; tramadol 50 mg tid prn ( usually takes 50 mg  2 q day)  Max would be 300 in literature  ;  Klonopin prn  ; Neurontin 300 mg up to  bid       Hgb a1 c - 5 plus great       Other meds Tramadol -pt in healthy back program but has increased pain     Past meds ; did not fill deplin ; remeron 15mg -half tab 1qhs stopped when no longer ; Cymbalta     Her pain doc is concerned about potential interaction of tramadol and ssri in past     Reading book from  health  --never be sick again --  Avoiding  milk , white flour , sugar ; limited coffee    Psychotherapy:  · Target symptoms: alcohol abuse, depression  · Why chosen therapy is appropriate versus another modality: relevant to diagnosis, patient responds to this modality, evidence based practice  · Outcome monitoring methods: self-report, observation  · Therapeutic intervention type: supportive psychotherapy  · Topics discussed/themes: relationships difficulties, substance abuse  · The patient's response to the intervention is accepting. The patient's progress toward treatment goals is good.   · Duration of intervention:  30 minutes.    Review of Systems   · Prob Pert. 1 sys, Ext. psych +2 add., Comp. 10-14 sys  · PSYCHIATRIC: Pertinant items are noted in the narrative.  · CONSTITUTIONAL: No weight gain or loss. Wedding dress size 5 . She is about a 14-16 .   · MUSCULOSKELETAL: Positive for joint stiffness, toe neuropathic  Pain ( DM JAN 2016). Leg ( hip and thighs)  weakness still but in PT  ( possibly vascular- neg neuro test ) ;mechanical cupping ( upper back tension post Breast CA)  and  · NEUROLOGIC: No weakness, sensory changes, seizures, confusion, memory loss, tremor or other abnormal movements. Has had dizziness at  Times - to see PCP   · ENDOCRINE: No polydipsia or polyuria.  · INTEGUMENTARY: No rashes or lacerations.  · EYES: No exophthalmos, jaundice or blindness.  · ENT: No dizziness, tinnitus or hearing loss.  · RESPIRATORY: No shortness of  "breath.  · CARDIOVASCULAR: No tachycardia or chest pain.  · GASTROINTESTINAL: No nausea, vomiting, pain, constipation or diarrhea.  · GENITOURINARY: No frequency, dysuria or sexual dysfunction. S/p recent uti series and now kidney stones awaiting lithotripsy   · HEMATOLOGIC/LYMPHATIC: No excessive bleeding, prolonged or excessive bleeding after dental extraction/injury.  · ALLERGIC/IMMUNOLOGIC: No allergic response to materials, foods or animals at this time.    Past Medical, Family and Social History: The patient's past medical, family and social history have been reviewed and updated as appropriate within the electronic medical record - see encounter notes.  Has lost son yrs ago .She and Annita Braun live in a 2nd floor apmnt away from [facility]. Her mom suicided in her 50's     Compliance: yes    Side effects:  Sweating when others dont    Risk Parameters:  Patient reports no suicidal ideation  Patient reports no homicidal ideation  Patient reports no self-injurious behavior  Patient reports no violent behavior    Exam (detailed: at least 9 elements; comprehensive: all 15 elements)   Constitutional  Vitals:  Most recent vital signs, dated less than 90 days prior to this appointment, were reviewed.   Vitals:    12/04/18 1533   BP: 124/74   Pulse: 88   Weight: 74.7 kg (164 lb 10.9 oz)   Height: 5' 1" (1.549 m)        General:  unremarkable, age appropriate, neatly groomed     Musculoskeletal  Muscle Strength/Tone:  no spasicity, no rigidity   Gait & Station:  non-ataxic     Psychiatric  Speech:  no latency; no press   Mood & Affect:  euthymic  congruent and appropriate   Thought Process:  normal and logical   Associations:  intact   Thought Content:  normal, no suicidality, no homicidality, delusions, or paranoia   Insight:  has awareness of illness   Judgement: behavior is adequate to circumstances   Orientation:  grossly intact   Memory: intact for content of interview   Language: grossly intact   Attention Span & " Concentration:  able to focus   Fund of Knowledge:  intact and appropriate to age and level of education     Assessment and Diagnosis   Status/Progress: Based on the examination today, the patient's problem(s) is/are worsening.  New problems have been presented today.   Co-morbidities are complicating management of the primary condition.  There are no active rule-out diagnoses for this patient at this time.     General Impression:  Depression much improved     1. Recurrent major depression in full remission     2. GABBY (generalized anxiety disorder)     3. Alcohol dependence in remission               Intervention/Counseling/Treatment Plan   · Medication Management: continue Effexor tablet 75  mg q day  ; continue Neurontin ;  continue klonopin  prn .  The risks and benefits of medication were discussed with the patient regarding serotonin syndrome and withdrawal .   · Counseling provided with patient as follows: importance of compliance with chosen treatment options was emphasized, risks and benefits of treatment options, including medications, were discussed with the patient  · AA/NA/CA/ACOA/Abstinence     Consider Lyrica for pain - to see podiatry       Return to Clinic: 6 months

## 2018-12-05 ENCOUNTER — CLINICAL SUPPORT (OUTPATIENT)
Dept: INTERNAL MEDICINE | Facility: CLINIC | Age: 73
End: 2018-12-05
Payer: MEDICARE

## 2018-12-05 VITALS — WEIGHT: 163.81 LBS | BODY MASS INDEX: 30.95 KG/M2

## 2018-12-05 NOTE — PROGRESS NOTES
Health  Follow-Up Note   [x] Office  [] Phone  Notes from previous session reviewed.   [x] Previous Session Goals unchanged.   [] Patient/Caregiver Change in Goals.  Goals added or changed by Patient/Caregiver since program participation:  1.   Continue same plan get back on track       Additional/Changed support that patient/caregiver has experienced/sought?  (Indicate readiness, support from family, friends, others, community groups, etc)  1.      Additional/Changed obstacles that could prevent patient/caregiver from reaching their goals?  1.  not exercising, feeling dizzy    Feedback provided:  1.  Praised for great job in bringing down her A1c to 5.6    Diagnostic values/Desriptors for follow-up as needed for chronic condition(s)   Weight: 74.3 kg 163.8 lb gain 6.2 lb  Blood Glucose:  Averages:  7d 111  14 d 112  30 d 115  90 d 114 (5.6) current A1c 11/14/18    Interventions:   1. Health  listened reflectively, validated thoughts and feelings, offered support and encouragement.   2. Allowed patient to express themselves in a non-biased atmosphere.  3. Health  assisted pt to problem-solve obstacles such as being in a challenging environment and dealing with these challenges.   4. Motivational Interviewed interventions utilized (OARS).   5. Patient responded favorably to interventions and remained actively engaged in the session.   6. Health  will remain available and connected for patient by phone and/or office visits.   7. Positive reinforcement, emotional support and encouragement provided.   8. Focused Education: MI    Plan:  [x] Pt will work on goals as stated above.   [x] Pt will contact Health  for any questions, concerns or needs.  [x] Pt will follow up with Health  in office on   12/28/18 at 0830.     [] Pt will follow up with Health  on phone in:        [x] Health  will remain available.   [] Health  will contact patient by phone in:        [] Health   will consult:        [] Health  will inform Provider via EPIC messaging.     Impression:  1. Behavior is consistent with  Action     Stage of Change.   2. Participation level:       [x] Receptive      [x] Interactive      [] Guarded and Resistant      [x] Self Motivated      [] Refused/Declined to participate   3. [x] Pt voiced understanding of all information presented.       [] Pt voiced needing further information/education. This will be arranged.       [x] Pt would benefit from further education/information as identified by this health . This will be arranged.     Jacqueline Perry RN HC

## 2018-12-07 ENCOUNTER — OFFICE VISIT (OUTPATIENT)
Dept: INTERNAL MEDICINE | Facility: CLINIC | Age: 73
End: 2018-12-07
Payer: MEDICARE

## 2018-12-07 VITALS
TEMPERATURE: 98 F | WEIGHT: 161.81 LBS | SYSTOLIC BLOOD PRESSURE: 115 MMHG | BODY MASS INDEX: 30.55 KG/M2 | HEIGHT: 61 IN | DIASTOLIC BLOOD PRESSURE: 72 MMHG | HEART RATE: 101 BPM

## 2018-12-07 DIAGNOSIS — E66.9 DIABETES MELLITUS TYPE 2 IN OBESE: ICD-10-CM

## 2018-12-07 DIAGNOSIS — I95.1 ORTHOSTASIS: ICD-10-CM

## 2018-12-07 DIAGNOSIS — R42 DIZZINESS: Primary | ICD-10-CM

## 2018-12-07 DIAGNOSIS — E11.69 DIABETES MELLITUS TYPE 2 IN OBESE: ICD-10-CM

## 2018-12-07 PROCEDURE — 1101F PT FALLS ASSESS-DOCD LE1/YR: CPT | Mod: CPTII,HCNC,S$GLB, | Performed by: INTERNAL MEDICINE

## 2018-12-07 PROCEDURE — 3074F SYST BP LT 130 MM HG: CPT | Mod: CPTII,HCNC,S$GLB, | Performed by: INTERNAL MEDICINE

## 2018-12-07 PROCEDURE — 3044F HG A1C LEVEL LT 7.0%: CPT | Mod: CPTII,HCNC,S$GLB, | Performed by: INTERNAL MEDICINE

## 2018-12-07 PROCEDURE — 99213 OFFICE O/P EST LOW 20 MIN: CPT | Mod: HCNC,S$GLB,, | Performed by: INTERNAL MEDICINE

## 2018-12-07 PROCEDURE — 99999 PR PBB SHADOW E&M-EST. PATIENT-LVL III: CPT | Mod: PBBFAC,HCNC,, | Performed by: INTERNAL MEDICINE

## 2018-12-07 PROCEDURE — 3078F DIAST BP <80 MM HG: CPT | Mod: CPTII,HCNC,S$GLB, | Performed by: INTERNAL MEDICINE

## 2018-12-07 RX ORDER — BUPROPION HYDROCHLORIDE 100 MG/1
TABLET, EXTENDED RELEASE ORAL
COMMUNITY
Start: 2018-12-06 | End: 2019-03-06 | Stop reason: SDUPTHER

## 2018-12-07 NOTE — PROGRESS NOTES
"Subjective:       Patient ID: Paris Burris is a 73 y.o. female.    Chief Complaint: Dizziness (3 weeks)    HPI   3 weeks - most acute when getting up from bed after icing at PT or laying down in the bed to read (consistent but not sure if it's 100%). Doesn't feel like room spinning but does feel head spinning. Last for a min or less and goes away spontaneously. No CP/palpitations/SOB. Doesn't occur other times.     Had a fall a few days ago and fell onto shelves - not due to dizziness; was walking down steps from door and also locking door so lost balance.  Also had a fall about 3 mo ago while walking on sidewalk - tripped.   Takes gabapentin 300mg daily for peripheral neuropathy.    DM2 - metformin 250mg daily. Last a1c 11/14/18. Reports home glucose controlled.     Review of Systems  as above in HPI.     Objective:      Physical Exam    /78 (BP Location: Left arm, Patient Position: Sitting, BP Method: Medium (Manual))   Pulse 100   Temp 98 °F (36.7 °C)   Ht 5' 1" (1.549 m)   Wt 73.4 kg (161 lb 13.1 oz)   BMI 30.58 kg/m²     Gen - A+OX4, NAD  HEENT - PERRL, OP clear. MMM. Hearing aids in place. No nystagmus.  Neck - no LAD  CV - RRR  Chest - CTAB, no wheezing/rhonchi  Abd - S/NT/ND/+BS  Ext -2 + B radial pulses. Palp B DP pulses. No LE edema.   MSK - no spinal tenderness to palpation. Normal gait.     Previous labs reviewed.     Assessment/Plan     Paris was seen today for dizziness.    Diagnoses and all orders for this visit:    Dizziness - sounds like orthostasis but not orthostatic. Discussed leg exercises at side of bed if laying down for long periods of time prior to standing up. Stand up and wait 10 seconds before walking.   -     COMPRESSION STOCKINGS    Diabetes mellitus type 2 in obese - trial of stopping metformin. Recheck a1c in 3 mo.    Orthostasis  -     COMPRESSION STOCKINGS      Follow-up in about 3 months (around 3/7/2019), or if symptoms worsen or fail to improve.      Mandi Huynh, " MD  Department of Internal Medicine - [facility]  12:07 PM

## 2018-12-10 ENCOUNTER — CLINICAL SUPPORT (OUTPATIENT)
Dept: REHABILITATION | Facility: OTHER | Age: 73
End: 2018-12-10
Payer: MEDICARE

## 2018-12-10 DIAGNOSIS — M54.50 CHRONIC BILATERAL LOW BACK PAIN WITHOUT SCIATICA: ICD-10-CM

## 2018-12-10 DIAGNOSIS — R53.1 DECREASED STRENGTH: ICD-10-CM

## 2018-12-10 DIAGNOSIS — G89.29 CHRONIC BILATERAL LOW BACK PAIN WITHOUT SCIATICA: ICD-10-CM

## 2018-12-10 DIAGNOSIS — R29.3 POSTURAL IMBALANCE: ICD-10-CM

## 2018-12-10 PROCEDURE — 97110 THERAPEUTIC EXERCISES: CPT | Mod: HCNC,PN | Performed by: PHYSICAL THERAPIST

## 2018-12-10 NOTE — PROGRESS NOTES
"                                                    Physical Therapy Daily Note     Name: Paris Burris  Clinic Number: [id number]  Diagnosis:   Encounter Diagnoses   Name Primary?    Chronic bilateral low back pain without sciatica     Postural imbalance     Decreased strength      Physician: Fifi Lizama MD  Precautions: Compression Fx in the LSP, DM Type 2, depression  Visit #: 16 of 20  PTA Visit #: 0   Time In: 09:00  Time Out: 0950  Total treatment time: 25 min 1:1   Functional Outcome Measure: FOTO limitation = 35%% Disability  G-codes + Modifier + % Impairment: (mobility):   Current =  (20% - 40% disability), Goal =  (20% - 40% disability)      Subjective     Pt reports: some tightness left shoulder following breast cancer. The open book stretches have been helping    Pain Scale: Paris rates pain on a scale of 0-10 to be 1 currently in low back    Objective     Paris received individual therapeutic exercises to develop strength, endurance, ROM, flexibility, posture and core stabilization for 50 minutes including:    Recumbent bike x 0 min    Bridge with band: 2 x 10 x orange TB  Butterfly U : 2 x 10 x orange TB  SL clams: 2 x 10 x orange TB    PPT with marches on PB- 15x  PPT with unilat flex OH on PB- x 15   rows - 3 x 10 x oTB  U shd ext :  - 3  x 10 x OTB  parloff press: 2 x 10 x OTB    Standing hip abd - 15x B, orange TB  Head turns on airex pad x 20 ea  Marches on airex pad x 2 min  +pulleys' flex/abd 2' ea    reviewed for HEP  LTR: 10 x 10" holds - PB  DKTC: 10 x 10" holds - PB  TA block squeeze: 10 x 10" holds  Open books: 15x   piriformis stretch  3 x 30"    Paris received the following manual therapy techniques: none  Paris received the following supervised modalities after being cleared for contradictions: none    Written Home Exercises Provided: issued HEP on 11/1/2018 (see pt instructions)  Pt demo good understanding of the education provided. Paris demonstrated good return " demonstration of activities.     Education provided re:  Paris verbalized good understanding of education provided.   No spiritual or educational barriers to learning provided    Assessment     Paris tolerated treatment well without exacerbation of pain. Pt challenged with narrow base of support on airex pad requiring occasional two finger touch for correction of balance.   This is a 72 y.o. female referred to outpatient physical therapy and presents with a medical diagnosis of back pain and demonstrates limitations as described in the problem list. Pt prognosis is Good. Pt will continue to benefit from skilled outpatient physical therapy to address the deficits listed in the problem list, provide pt/family education and to maximize pt's level of independence in the home and community environment.     Goals as follows:     Short Term Goals (4 Weeks):   1. Pt will report 20% reduction in pain of the lumbar spine and LE for ease with ADL's-met  2. PT will demonstrate improved trunk strength by a half grade in for ease with upright posture during standing activities. -met  3. Pt will demonstrate improved lumbar spine ROM in all directions by a half grade for ease with bending activities. -met  4. Pt to demonstrate improved functional ability with FOTO limitation <=46% disability. -met     Long Term Goals (8 Weeks):   1. Pt will report being independent with HEP for maintenance of improvements gained during therapy sessions- ongoing  2. PT will report 50% reduction of pain of the back and LE for ease with dressing and grooming activities. -met  3. Pt will demonstrate trunk and extremity strength to >=4+/5 without the provocation of pain for ease with household chores-met  4. Pt will demonstrate appropriate upright posture without external cueing for ease with work related activities. -met  5. Pt to demonstrate improved functional ability with FOTO limitation <=36% disability. - met (35%)    Plan     Continue with  established Plan of Care towards PT goals.     Therapist:  Neris Mejias, PT

## 2018-12-13 ENCOUNTER — CLINICAL SUPPORT (OUTPATIENT)
Dept: REHABILITATION | Facility: OTHER | Age: 73
End: 2018-12-13
Payer: MEDICARE

## 2018-12-13 DIAGNOSIS — G89.29 CHRONIC BILATERAL LOW BACK PAIN WITHOUT SCIATICA: ICD-10-CM

## 2018-12-13 DIAGNOSIS — R29.3 POSTURAL IMBALANCE: ICD-10-CM

## 2018-12-13 DIAGNOSIS — M54.50 CHRONIC BILATERAL LOW BACK PAIN WITHOUT SCIATICA: ICD-10-CM

## 2018-12-13 DIAGNOSIS — R53.1 DECREASED STRENGTH: ICD-10-CM

## 2018-12-13 PROCEDURE — 97110 THERAPEUTIC EXERCISES: CPT | Mod: HCNC,PN | Performed by: PHYSICAL THERAPIST

## 2018-12-13 NOTE — PROGRESS NOTES
"                                                    Physical Therapy Daily Note     Name: Paris Burris  Clinic Number: [id number]  Diagnosis:   Encounter Diagnoses   Name Primary?    Chronic bilateral low back pain without sciatica     Postural imbalance     Decreased strength      Physician: Fifi Lizama MD  Precautions: Compression Fx in the LSP, DM Type 2, depression  Visit #: 17 of 20  PTA Visit #: 0   Time In: 08:55  Time Out: 0945  Total treatment time: 25 min 1:1   Progress note due 12/26/2018  Functional Outcome Measure: FOTO limitation = 35%% Disability  G-codes + Modifier + % Impairment: (mobility):   Current =  (20% - 40% disability), Goal =  (20% - 40% disability)      Subjective     Pt reports: Having increased pain in her lower back today. She is compliant with her stretches at home and doesn't understand why she still has pain. She would like to have no pain without taking pain medication. She would joe to continue physical therapy for as long as possible since the exercises have been helping.     Pain Scale: Paris rates pain on a scale of 0-10 to be 2-3 currently in low back    Objective     Paris received individual therapeutic exercises to develop strength, endurance, ROM, flexibility, posture and core stabilization for 50 minutes including:    SL clams: 2 x 10 x orange TB    PPT with marches on PB- 15x  PPT with unilat flex OH on PB- x 15   rows - 3 x 10 x oTB  U shd ext :  - 3  x 10 x OTB  parloff press: 2 x 10 x OTB    Standing hip abd - 15x B, orange TB  +tandem stance with arm swings 5 x 2    LTR: 10 x 10" holds - PB  DKTC: 10 x 10" holds - PB  TA block squeeze: 10 x 10" holds  Open books: 15x   piriformis stretch  3 x 30"  HSS with strap 30" x 3  Supine hip flexor stretch with strap 30" x 3    Paris received the following manual therapy techniques: none  Paris received the following supervised modalities after being cleared for contradictions: none    Written Home Exercises " Provided: issued HEP on 11/1/2018 (see pt instructions)  Pt demo good understanding of the education provided. Paris demonstrated good return demonstration of activities.     Education provided re:  Paris verbalized good understanding of education provided.   No spiritual or educational barriers to learning provided    Assessment     Paris tolerated treatment well. Pt progressing with improved static standing balance narrow base of support. Pt challenged with dynamic arm reaches and modified tandem stance to staggered stance. Pt demonstrating independence with HEP for management of pain however appears anxious with discussion of discharge planning. Pt may benefit from referral to medical fitness program to assist in transition to a wellness program following discharge from physical therapy.     This is a 72 y.o. female referred to outpatient physical therapy and presents with a medical diagnosis of back pain and demonstrates limitations as described in the problem list. Pt prognosis is Good. Pt will continue to benefit from skilled outpatient physical therapy to address the deficits listed in the problem list, provide pt/family education and to maximize pt's level of independence in the home and community environment.     Goals as follows:     Short Term Goals (4 Weeks):   1. Pt will report 20% reduction in pain of the lumbar spine and LE for ease with ADL's-met  2. PT will demonstrate improved trunk strength by a half grade in for ease with upright posture during standing activities. -met  3. Pt will demonstrate improved lumbar spine ROM in all directions by a half grade for ease with bending activities. -met  4. Pt to demonstrate improved functional ability with FOTO limitation <=46% disability. -met     Long Term Goals (8 Weeks):   1. Pt will report being independent with HEP for maintenance of improvements gained during therapy sessions- ongoing  2. PT will report 50% reduction of pain of the back and LE for  ease with dressing and grooming activities. -met  3. Pt will demonstrate trunk and extremity strength to >=4+/5 without the provocation of pain for ease with household chores-met  4. Pt will demonstrate appropriate upright posture without external cueing for ease with work related activities. -met  5. Pt to demonstrate improved functional ability with FOTO limitation <=36% disability. - met (35%)    Plan     Continue with established Plan of Care towards PT goals. Transition to discharge to [facility]    Therapist:  Neris Mejias, PT

## 2018-12-18 ENCOUNTER — CLINICAL SUPPORT (OUTPATIENT)
Dept: REHABILITATION | Facility: OTHER | Age: 73
End: 2018-12-18
Payer: MEDICARE

## 2018-12-18 DIAGNOSIS — G89.29 CHRONIC BILATERAL LOW BACK PAIN WITHOUT SCIATICA: ICD-10-CM

## 2018-12-18 DIAGNOSIS — M54.50 CHRONIC BILATERAL LOW BACK PAIN WITHOUT SCIATICA: ICD-10-CM

## 2018-12-18 DIAGNOSIS — R29.3 POSTURAL IMBALANCE: ICD-10-CM

## 2018-12-18 DIAGNOSIS — R53.1 DECREASED STRENGTH: ICD-10-CM

## 2018-12-18 PROCEDURE — 97110 THERAPEUTIC EXERCISES: CPT | Mod: HCNC,PN

## 2018-12-18 NOTE — PROGRESS NOTES
"                                                    Physical Therapy Daily Note     Name: Paris Burris  Clinic Number: [id number]  Diagnosis:   Encounter Diagnoses   Name Primary?    Chronic bilateral low back pain without sciatica     Postural imbalance     Decreased strength      Physician: Fifi Lizama MD  Precautions: Compression Fx in the LSP, DM Type 2, depression  Visit #: 18 of 20  PTA Visit #: 0   Time In: 8:00 am  Time Out: 8:50 am  Total treatment time: 50 min 1:1   Progress note due 12/26/2018  Functional Outcome Measure: FOTO limitation = 35%% Disability  G-codes + Modifier + % Impairment: (mobility):   Current =  (20% - 40% disability), Goal =  (20% - 40% disability)      Subjective     Pt reports: "today is the best I've ever felt. I've been more consistent with my HEP at home and I'm really noticing a difference. I feel stronger and better."   Pain Scale: Paris rates pain on a scale of 0-10 to be 1 currently in low back    Objective     Paris received individual therapeutic exercises to develop strength, endurance, ROM, flexibility, posture and core stabilization for 50 minutes including:    SL clams: 2 x 10 x orange TB    PPT with marches on PB- 15x  PPT with unilat flex OH on PB- x 15   rows - 3 x 10 x oTB  U shd ext :  - 3  x 10 x OTB  parloff press: 2 x 10 x OTB    Standing hip abd - 15x B, orange TB  tandem stance with arm swings 5 x 2    LTR: 10 x 10" holds - PB  DKTC: 10 x 10" holds - PB  TA block squeeze: 10 x 10" holds  Open books: 15x   piriformis stretch  3 x 30"  HSS with strap 30" x 3  Supine hip flexor stretch with strap 30" x 3    Paris received the following manual therapy techniques: none  Paris received the following supervised modalities after being cleared for contradictions: none    Written Home Exercises Provided: issued HEP on 11/1/2018 (see pt instructions)  +12/18/18 - Issued new TBs (orange, green) for HEP and instructed on progression of theraband " resistance.  Pt demo good understanding of the education provided. Paris demonstrated good return demonstration of activities.     Education provided re:  Paris verbalized good understanding of education provided.   No spiritual or educational barriers to learning provided    Assessment     Paris tolerated treatment well with good training effect. Pt is independent with therex without VC or TC for technique. Pt demo's good return technique of HEP, indicating good compliance at home. Pt will be ready for D/C in 2 visits.    This is a 72 y.o. female referred to outpatient physical therapy and presents with a medical diagnosis of back pain and demonstrates limitations as described in the problem list. Pt prognosis is Good. Pt will continue to benefit from skilled outpatient physical therapy to address the deficits listed in the problem list, provide pt/family education and to maximize pt's level of independence in the home and community environment.     Goals as follows:     Short Term Goals (4 Weeks):   1. Pt will report 20% reduction in pain of the lumbar spine and LE for ease with ADL's-met  2. PT will demonstrate improved trunk strength by a half grade in for ease with upright posture during standing activities. -met  3. Pt will demonstrate improved lumbar spine ROM in all directions by a half grade for ease with bending activities. -met  4. Pt to demonstrate improved functional ability with FOTO limitation <=46% disability. -met     Long Term Goals (8 Weeks):   1. Pt will report being independent with HEP for maintenance of improvements gained during therapy sessions- ongoing  2. PT will report 50% reduction of pain of the back and LE for ease with dressing and grooming activities. -met  3. Pt will demonstrate trunk and extremity strength to >=4+/5 without the provocation of pain for ease with household chores-met  4. Pt will demonstrate appropriate upright posture without external cueing for ease with work  related activities. -met  5. Pt to demonstrate improved functional ability with FOTO limitation <=36% disability. - met (35%)    Plan     Continue with established Plan of Care towards PT goals. Transition [facility] discharge to [facility]    Therapist:  Patsy Elaine, PT

## 2018-12-21 ENCOUNTER — LAB VISIT (OUTPATIENT)
Dept: LAB | Facility: HOSPITAL | Age: 73
End: 2018-12-21
Attending: INTERNAL MEDICINE
Payer: MEDICARE

## 2018-12-21 ENCOUNTER — OFFICE VISIT (OUTPATIENT)
Dept: INTERNAL MEDICINE | Facility: CLINIC | Age: 73
End: 2018-12-21
Payer: MEDICARE

## 2018-12-21 ENCOUNTER — PATIENT MESSAGE (OUTPATIENT)
Dept: INTERNAL MEDICINE | Facility: CLINIC | Age: 73
End: 2018-12-21

## 2018-12-21 VITALS
TEMPERATURE: 98 F | WEIGHT: 161.63 LBS | HEIGHT: 61 IN | HEART RATE: 97 BPM | BODY MASS INDEX: 30.51 KG/M2 | DIASTOLIC BLOOD PRESSURE: 78 MMHG | SYSTOLIC BLOOD PRESSURE: 122 MMHG

## 2018-12-21 DIAGNOSIS — E78.5 HYPERLIPIDEMIA ASSOCIATED WITH TYPE 2 DIABETES MELLITUS: Primary | ICD-10-CM

## 2018-12-21 DIAGNOSIS — R73.03 PREDIABETES: ICD-10-CM

## 2018-12-21 DIAGNOSIS — F33.41 MDD (MAJOR DEPRESSIVE DISORDER), RECURRENT, IN PARTIAL REMISSION: ICD-10-CM

## 2018-12-21 DIAGNOSIS — M47.816 LUMBAR FACET ARTHROPATHY: ICD-10-CM

## 2018-12-21 DIAGNOSIS — F41.9 ANXIETY: ICD-10-CM

## 2018-12-21 DIAGNOSIS — G47.33 OSA (OBSTRUCTIVE SLEEP APNEA): ICD-10-CM

## 2018-12-21 DIAGNOSIS — E11.69 HYPERLIPIDEMIA ASSOCIATED WITH TYPE 2 DIABETES MELLITUS: Primary | ICD-10-CM

## 2018-12-21 DIAGNOSIS — Z12.83 SKIN CANCER SCREENING: ICD-10-CM

## 2018-12-21 DIAGNOSIS — K44.9 HIATAL HERNIA: ICD-10-CM

## 2018-12-21 PROBLEM — K25.9 GASTRIC ULCER: Status: RESOLVED | Noted: 2018-09-12 | Resolved: 2018-12-21

## 2018-12-21 LAB
ALBUMIN/CREAT UR: 11.8 UG/MG
CREAT UR-MCNC: 76 MG/DL
MICROALBUMIN UR DL<=1MG/L-MCNC: 9 UG/ML

## 2018-12-21 PROCEDURE — 99215 OFFICE O/P EST HI 40 MIN: CPT | Mod: HCNC,S$GLB,, | Performed by: INTERNAL MEDICINE

## 2018-12-21 PROCEDURE — 82043 UR ALBUMIN QUANTITATIVE: CPT | Mod: HCNC

## 2018-12-21 PROCEDURE — 3078F DIAST BP <80 MM HG: CPT | Mod: CPTII,HCNC,S$GLB, | Performed by: INTERNAL MEDICINE

## 2018-12-21 PROCEDURE — 3074F SYST BP LT 130 MM HG: CPT | Mod: CPTII,HCNC,S$GLB, | Performed by: INTERNAL MEDICINE

## 2018-12-21 PROCEDURE — 1101F PT FALLS ASSESS-DOCD LE1/YR: CPT | Mod: CPTII,HCNC,S$GLB, | Performed by: INTERNAL MEDICINE

## 2018-12-21 PROCEDURE — 3044F HG A1C LEVEL LT 7.0%: CPT | Mod: CPTII,HCNC,S$GLB, | Performed by: INTERNAL MEDICINE

## 2018-12-21 PROCEDURE — 99999 PR PBB SHADOW E&M-EST. PATIENT-LVL III: CPT | Mod: PBBFAC,HCNC,, | Performed by: INTERNAL MEDICINE

## 2018-12-21 NOTE — PROGRESS NOTES
INTERNAL MEDICINE YEARLY VISIT NOTE      CHIEF COMPLAINT     YEARLY    HPI     Pairs uBrris is a 73 y.o. C female who presents for yearly exam.    Pt is well known to me.   Seen for dizziness earlier this mo w/ orthostatic symptoms. Has been doing some leg exercises prior to standing and the symptoms have resolved. Not using compression stocking.     Reports feeling more tired than usual (started a little before zaleplon). Reports feels lack of motivation to do things.  Her  is depressed and this stresses her out.   Follows w/ Dr. Sweeney - last seen 12/11/8. Continued on effexor 75mg daily, wellbutrin sr 100mg BID and lorazepam prn (klonopin doesn't start seeing effects soon enough after taking it). On zaleplon 5mg nightly prn insomnia (started on 12/5/18). Feels this medicine helps w/ sleep. Uncertain if this helped w/ fatigue.   COOKIE - not using CPAP as it's not comfortable.   TSH 1.444 5/2018    LBP - better. Part of [facility]. On tramadol 50mg prn - hasn't had to take it daily.    Spots on her leg, back and arm to make sure not cancerous.     HLD - on crestor 20mg daily.     DM2 - 90 day average 110s.    Large hiatal hernia - on nexium.     Past Medical History:  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    Wrist fracture        Past Surgical History:  Past Surgical History:   Procedure Laterality Date    ADENOIDECTOMY       BREAST LUMPECTOMY Left 2002    COLONOSCOPY N/A 12/2/2016    Performed by Dustin Patterson MD at [facility] ENDO (4TH FLR)    DEVICE ASSISTED ENTEROSCOPY-ANTEROGRADE N/A 3/20/2018    Performed by Dustin Patterson MD at [facility] ENDO (2ND FLR)    ESOPHAGOGASTRODUODENOSCOPY (EGD) N/A 9/12/2018    Performed by Dustin Patterson MD at [facility] ENDO (4TH FLR)    ESOPHAGOGASTRODUODENOSCOPY (EGD) N/A 12/2/2016    Performed by Dustin Patterson MD at [facility] ENDO (4TH FLR)    ESOPHAGOGASTRODUODENOSCOPY (EGD) N/A 4/20/2015    Performed by Marcial Dewitt MD at [facility] ENDO (4TH FLR)    EYE SURGERY Bilateral     cataracts    INJECTION-STEROID-EPIDURAL-LUMBAR N/A 6/15/2016    Performed by Michelle Frias MD at [facility] PAIN MGT    lipoma removal  1999    MANOMETRY, ESOPHAGUS, WITH IMPEDANCE MEASUREMENT N/A 10/8/2018    Performed by Renato Maria MD at [facility] ENDO (4TH FLR)    PARATHYROIDECTOMY minimally invasive N/A 11/4/2015    Performed by Amna Aponte MD at [facility] OR 2ND FLR    TONSILLECTOMY         Allergies:  Review of patient's allergies indicates:   Allergen Reactions    Topamax [topiramate] Other (See Comments)     hallucinations       Home Medications:  Prior to Admission medications    Medication Sig Start Date End Date Taking? Authorizing Provider   alpha lipoic acid 300 mg Cap Take 300 mg by mouth 2 (two) times daily.    Yes Historical Provider, MD   ascorbic acid (VITAMIN C) 500 MG tablet Take 1,000 mg by mouth once daily.    Yes Historical Provider, MD   b complex vitamins capsule Take 1 capsule by mouth once daily.   Yes Historical Provider, MD   blood sugar diagnostic (ACCU-CHEK SMARTVIEW TEST STRIP) Strp Test once daily. 10/19/17  Yes Mandi Huyhn MD   buPROPion (WELLBUTRIN SR) 100 MG TBSR 12 hr tablet  12/6/18  Yes Historical Provider, MD   cetirizine (ZYRTEC) 10 MG tablet Take 10 mg by mouth once daily.   Yes Historical Provider, MD   CITICOLINE SODIUM (CITICOLINE ORAL) Take 1 tablet by mouth once daily at 6am.   Yes  Historical Provider, MD   esomeprazole (NEXIUM) 40 MG capsule Take 1 capsule (40 mg total) by mouth before breakfast. 5/21/18 5/21/19 Yes Mandi Huynh MD   ferrous sulfate 325 mg (65 mg iron) Tab tablet Take 1 tablet (325 mg total) by mouth 2 (two) times daily. 7/31/18 1/27/19 Yes Dustin Patterson MD   lancets Misc 1 lancet by Misc.(Non-Drug; Combo Route) route 2 (two) times daily. 7/13/16  Yes Mandi Huynh MD   LORazepam (ATIVAN) 0.5 MG tablet Take 1 tablet (0.5 mg total) by mouth every 12 (twelve) hours as needed for Anxiety. 12/4/18 1/3/19 Yes Dwaine Sweeney MD   omega-3 fatty acids-fish oil (FISH OIL) 340-1,000 mg Cap Take 1 capsule by mouth once daily.   Yes Historical Provider, MD   rosuvastatin (CRESTOR) 20 MG tablet Take 1 tablet (20 mg total) by mouth once daily. 5/21/18 5/21/19 Yes Mandi Huynh MD   silver sulfADIAZINE 1% (SILVADENE) 1 % cream Apply topically once daily. 11/30/18  Yes Graeme Shore MD   traMADol (ULTRAM) 50 mg tablet TAKE 2 TABLETS (100 MG TOTAL) BY MOUTH DAILY AS NEEDED FOR PAIN. 10/18/18  Yes Mandi Huynh MD   tretinoin (RETIN-A) 0.05 % cream Use hs 4/23/18  Yes Starla Rodríguez MD   TURMERIC (CURCUMIN MISC) Take 500 mg by mouth 2 (two) times daily.    Yes Historical Provider, MD   venlafaxine (EFFEXOR) 37.5 MG Tab Take 2 tablets (75 mg total) by mouth once daily. 6/20/18  Yes Dwaine Sweeney MD   vitamin D 1000 units Tab Take 500 Units by mouth once daily. With K2 50 mch   Yes Historical Provider, MD   zaleplon (SONATA) 5 MG Cap Take 1 capsule (5 mg total) by mouth every evening. 12/4/18 1/3/19 Yes Dwaine Sweeney MD   blood-glucose meter Misc 1 Device by Misc.(Non-Drug; Combo Route) route 2 (two) times daily. 8/22/17 8/22/18  Mandi Huynh MD       Family History:  Family History   Problem Relation Age of Onset    Suicide Mother     Depression Mother     Alcohol abuse Father     Headaches Father     Diabetes Sister         prediabetes    Psoriasis Sister     Depression Sister     Depression  "Daughter     Colon cancer Neg Hx     Esophageal cancer Neg Hx        Social History:  Social History     Tobacco Use    Smoking status: Never Smoker    Smokeless tobacco: Never Used   Substance Use Topics    Alcohol use: No     Comment: quit 2014 - alcohol abuse    Drug use: Yes       Review of Systems:  Review of Systems Comprehensive review of systems otherwise negative. See history/subjective section for more details.    Health Maintainence:   Reviewed.     PHYSICAL EXAM     /78 (BP Location: Left arm, Patient Position: Sitting, BP Method: Medium (Manual))   Pulse 97   Temp 97.5 °F (36.4 °C)   Ht 5' 1" (1.549 m)   Wt 73.3 kg (161 lb 9.6 oz)   BMI 30.53 kg/m²     GEN - A+OX4, NAD   HEENT - PERRL, EOMI, OP clear. MMM. B hearing aids in place.   Neck - No thyromegaly or cervical LAD. No thyroid masses felt.  CV - RRR, no m/r   Chest - CTAB, no wheezing or rhonchi  Abd - S/NT/ND/+BS.   Ext - 1+BDP and radial pulses. No LE edema.   Neuro - PERRL, EOMI, no nystagmus, eyebrow raise, facial sensation, hearing, m of mastication, smile, palatal raise, shoulder shrug, tongue protrusion symmetric and intact. 5/5 BUE and BLE strength. Sensation to light touch intact throughout. 1+ DTRs. Normal gait.   MSK - No spinal tenderness to palpation. Normal gait.   Skin - No rash. 2 scabbed lesions - 1 on L lower leg and 1 on R forearm.    LABS     Previous labs reviewed.    ASSESSMENT/PLAN     Paris Burris is a 73 y.o. female with  Paris was seen today for annual exam.    Diagnoses and all orders for this visit:    Hyperlipidemia associated with type 2 diabetes mellitus - cont medicine.   -     Lipid panel; Future; Expected date: 12/21/2018    Prediabetes - cont to work on diet and exercise.   -     blood sugar diagnostic (ACCU-CHEK SMARTVIEW TEST STRIP) Strp; Test once daily.  -     Microalbumin/creatinine urine ratio; Future; Expected date: 12/21/2018    Lumbar facet arthropathy - tramadol prn. Cont Healthy " Back program.     Hiatal hernia - cont PPI daily.     Anxiety - f/u w/ Dr. Sweeney. Cont current medicines.     MDD (major depressive disorder), recurrent, in partial remission - as above.     Skin cancer screening  -     Ambulatory Referral to Dermatology    COOKIE - not using CPAP, which may be contributing to her fatigue. Pt reports will call sleep d/o clinic and schedule appt after holidays.     45 minutes was spent on patient with over half the time was spent in coordination of care and/or counseling. Cont w/ health  Jacqueline.    RTC in 6 months, sooner if needed and depending on labs.    Mandi Huynh MD  Department of Internal Medicine - [facility] Erich Denney  8:52 AM

## 2018-12-26 ENCOUNTER — CLINICAL SUPPORT (OUTPATIENT)
Dept: REHABILITATION | Facility: OTHER | Age: 73
End: 2018-12-26
Payer: MEDICARE

## 2018-12-26 DIAGNOSIS — R53.1 DECREASED STRENGTH: ICD-10-CM

## 2018-12-26 DIAGNOSIS — G89.29 CHRONIC BILATERAL LOW BACK PAIN WITHOUT SCIATICA: ICD-10-CM

## 2018-12-26 DIAGNOSIS — M54.50 CHRONIC BILATERAL LOW BACK PAIN WITHOUT SCIATICA: ICD-10-CM

## 2018-12-26 DIAGNOSIS — R29.3 POSTURAL IMBALANCE: ICD-10-CM

## 2018-12-26 PROCEDURE — 97110 THERAPEUTIC EXERCISES: CPT | Mod: HCNC,PN | Performed by: PHYSICAL THERAPIST

## 2018-12-26 NOTE — PROGRESS NOTES
Physical Therapy Daily Note     Name: Paris Burris  Clinic Number: [id number]  Diagnosis:   Encounter Diagnoses   Name Primary?    Chronic bilateral low back pain without sciatica     Postural imbalance     Decreased strength      Physician: Fifi Lizama MD  Precautions: Compression Fx in the LSP, DM Type 2, depression  Visit #: 19 of 20  PTA Visit #: 0   Time In: 11:10 am  Time Out: 1200pm  Total treatment time: 25 min 1:1     Functional Outcome Measure: FOTO limitation = 35%% Disability  G-codes + Modifier + % Impairment: (mobility):   Current =  (20% - 40% disability), Goal =  (20% - 40% disability)      Subjective     Pt reports: Feeling increased pain and soreness after last session and may have over-done it. She has also slacked off on the exercises around the holidays. She is hoping getting back on her regular routine and to go walking at the fitness center at [facility]. She still feels unsteady on her feet and reluctant to discharge in 2 visits.   Pain Scale: Paris rates pain on a scale of 0-10 to be 1 currently in low back    Objective       Thoracic/Lumbar AROM: Pain/Dysfunction with Movement:   Flexion WFL    Extension WFL fair lordosis    Right side bending WFL, fingertips to knee joint line   Left side bending WFL  fingertips to knee joint line   Right rotation WFL   Left rotation WFL      12/26/2018  Lower Extremity Strength  Right LE   Left LE     Hip flexion: 5/5 Hip flexion: 5/5   Hip extension:  5/5 Hip extension: 5/5   Hip abduction: 5/5 Hip abduction: 5/5   Hip adduction:  5/5 Hip adduction:  5/5      Flexibility: hamstrings = fair, hip flexors/quads = fair+ R     11/26: Dynamic gait index: 22/24 (<19/25 indicates increased risk for falls in elderly population)     Paris received individual therapeutic exercises to develop strength, endurance, ROM, flexibility, posture and core stabilization for 50 minutes including:    SL  "clams: 2 x 10 x orange TB    PPT with marches on PB- 15x  PPT with unilat flex OH on PB- x 15   rows - 3 x 10 x oTB  U shd ext :  - 3  x 10 x OTB (np)  parloff press: 2 x 10 x OTB (np)    Standing hip abd - 15x B, orange TB  tandem stance with arm swings 5 x 2    LTR: 10 x 10" holds - PB  DKTC: 10 x 10" holds - PB  TA block squeeze: 10 x 10" holds  Open books: 15x   piriformis stretch  3 x 30"  HSS with strap 30" x 3  Supine hip flexor stretch with strap 30" x 3    Paris received the following manual therapy techniques: none  Paris received the following supervised modalities after being cleared for contradictions: none    Written Home Exercises Provided: issued HEP on 11/1/2018 (see pt instructions)  +12/18/18 - Issued new TBs (orange, green) for HEP and instructed on progression of theraband resistance.  Pt demo good understanding of the education provided. Paris demonstrated good return demonstration of activities.     Education provided re:  Paris verbalized good understanding of education provided.   No spiritual or educational barriers to learning provided    Assessment     Paris with good tolerance to treatment and is independent with therex without curing for technique. Pt has met all goals with ROM and strength within functional limits. Emphasized importance with continuing with HEP for management of condition.      This is a 72 y.o. female referred to outpatient physical therapy and presents with a medical diagnosis of back pain and demonstrates limitations as described in the problem list. Pt prognosis is Good. Pt will continue to benefit from skilled outpatient physical therapy to address the deficits listed in the problem list, provide pt/family education and to maximize pt's level of independence in the home and community environment.     Goals as follows:     Short Term Goals (4 Weeks):   1. Pt will report 20% reduction in pain of the lumbar spine and LE for ease with ADL's-met  2. PT will demonstrate " improved trunk strength by a half grade in for ease with upright posture during standing activities. -met  3. Pt will demonstrate improved lumbar spine ROM in all directions by a half grade for ease with bending activities. -met  4. Pt to demonstrate improved functional ability with FOTO limitation <=46% disability. -met     Long Term Goals (8 Weeks):   1. Pt will report being independent with HEP for maintenance of improvements gained during therapy sessions- ongoing  2. PT will report 50% reduction of pain of the back and LE for ease with dressing and grooming activities. -met  3. Pt will demonstrate trunk and extremity strength to >=4+/5 without the provocation of pain for ease with household chores-met  4. Pt will demonstrate appropriate upright posture without external cueing for ease with work related activities. -met  5. Pt to demonstrate improved functional ability with FOTO limitation <=36% disability. - met (35%)    Plan     Continue with established Plan of Care towards PT goals. Transition to discharge to [facility]. Pt may benefit from medical fitness referral and appointment with  to assist with transition to wellness program    Therapist:  Neris Mejias, PT

## 2018-12-26 NOTE — PROGRESS NOTES
"                                                    Physical Therapy Daily Note     Name: Paris Burris  Clinic Number: [id number]  Diagnosis:   Encounter Diagnoses   Name Primary?    Chronic bilateral low back pain without sciatica     Postural imbalance     Decreased strength      Physician: Fifi Lizama MD  Precautions: Compression Fx in the LSP, DM Type 2, depression  Visit #: 20 of 20  PTA Visit #: 0   Time In: 10:00 AM  Time Out:10:40 AM  Total treatment time: 40 min, 30 min 1:1     Functional Outcome Measure: FOTO limitation = 35%% Disability  G-codes + Modifier + % Impairment: (mobility):   Current =  (20% - 40% disability), Goal =  (20% - 40% disability)      Subjective     Pt reports: only minimal pain today   Pain Scale: Paris rates pain on a scale of 0-10 to be 2 currently in low back    Objective       Thoracic/Lumbar AROM: Pain/Dysfunction with Movement:   Flexion WFL    Extension WFL fair lordosis    Right side bending WFL, fingertips to knee joint line   Left side bending WFL  fingertips to knee joint line   Right rotation WFL   Left rotation WFL      12/26/2018  Lower Extremity Strength  Right LE   Left LE     Hip flexion: 5/5 Hip flexion: 5/5   Hip extension:  5/5 Hip extension: 5/5   Hip abduction: 5/5 Hip abduction: 5/5   Hip adduction:  5/5 Hip adduction:  5/5      Flexibility: hamstrings = fair, hip flexors/quads = fair+ R     11/26: Dynamic gait index: 22/24 (<19/25 indicates increased risk for falls in elderly population)     Paris received individual therapeutic exercises to develop strength, endurance, ROM, flexibility, posture and core stabilization for 40 minutes including:    SL clams: 2 x 10 x orange TB    PPT with marches on PB- 15x  PPT with unilat flex OH on PB- x 15   rows - 3 x 10 x oTB  U shd ext :  - 3  x 10 x OTB (np)  parloff press: 2 x 10 x OTB (np)    Standing hip abd - 15x B, orange TB  tandem stance with arm swings 5 x 2    LTR: 10 x 10" holds - PB  DKTC: " "10 x 10" holds - PB  TA block squeeze: 10 x 10" holds  Open books: 15x   piriformis stretch  3 x 30"  HSS with strap 30" x 3  Supine hip flexor stretch with strap 30" x 3    Paris received the following manual therapy techniques: none  Paris received the following supervised modalities after being cleared for contradictions: none    Written Home Exercises Provided: issued HEP on 11/1/2018 (see pt instructions)  +12/18/18 - Issued new TBs (orange, green) for HEP and instructed on progression of theraband resistance.  Pt demo good understanding of the education provided. Paris demonstrated good return demonstration of activities.     Education provided re:  Paris verbalized good understanding of education provided.   No spiritual or educational barriers to learning provided    Assessment     Good tolerance to all therex today. Pt requires only very minimal cuing for therex, she demonstrates good independence with her program. Pt provided with information regarding personal trainers at [facility] as option for transition following upcoming discharge.      This is a 72 y.o. female referred to outpatient physical therapy and presents with a medical diagnosis of back pain and demonstrates limitations as described in the problem list. Pt prognosis is Good. Pt will continue to benefit from skilled outpatient physical therapy to address the deficits listed in the problem list, provide pt/family education and to maximize pt's level of independence in the home and community environment.     Goals as follows:     Short Term Goals (4 Weeks):   1. Pt will report 20% reduction in pain of the lumbar spine and LE for ease with ADL's-met  2. PT will demonstrate improved trunk strength by a half grade in for ease with upright posture during standing activities. -met  3. Pt will demonstrate improved lumbar spine ROM in all directions by a half grade for ease with bending activities. -met  4. Pt to demonstrate improved " functional ability with FOTO limitation <=46% disability. -met     Long Term Goals (8 Weeks):   1. Pt will report being independent with HEP for maintenance of improvements gained during therapy sessions- ongoing  2. PT will report 50% reduction of pain of the back and LE for ease with dressing and grooming activities. -met  3. Pt will demonstrate trunk and extremity strength to >=4+/5 without the provocation of pain for ease with household chores-met  4. Pt will demonstrate appropriate upright posture without external cueing for ease with work related activities. -met  5. Pt to demonstrate improved functional ability with FOTO limitation <=36% disability. - met (35%)    Plan     Continue with established Plan of Care towards PT goals. Transition to discharge to HEP. Pt may benefit from medical fitness referral and appointment with  to assist with transition to wellness program    Therapist:  Domonique Licona, PT

## 2018-12-28 ENCOUNTER — CLINICAL SUPPORT (OUTPATIENT)
Dept: INTERNAL MEDICINE | Facility: CLINIC | Age: 73
End: 2018-12-28
Payer: MEDICARE

## 2018-12-28 ENCOUNTER — CLINICAL SUPPORT (OUTPATIENT)
Dept: REHABILITATION | Facility: OTHER | Age: 73
End: 2018-12-28
Payer: MEDICARE

## 2018-12-28 VITALS — WEIGHT: 160.5 LBS | BODY MASS INDEX: 30.33 KG/M2

## 2018-12-28 DIAGNOSIS — R53.1 DECREASED STRENGTH: ICD-10-CM

## 2018-12-28 DIAGNOSIS — R29.3 POSTURAL IMBALANCE: ICD-10-CM

## 2018-12-28 DIAGNOSIS — M54.50 CHRONIC BILATERAL LOW BACK PAIN WITHOUT SCIATICA: ICD-10-CM

## 2018-12-28 DIAGNOSIS — G89.29 CHRONIC BILATERAL LOW BACK PAIN WITHOUT SCIATICA: ICD-10-CM

## 2018-12-28 PROCEDURE — G8979 MOBILITY GOAL STATUS: HCPCS | Mod: CJ,HCNC,PN | Performed by: PHYSICAL THERAPIST

## 2018-12-28 PROCEDURE — G8978 MOBILITY CURRENT STATUS: HCPCS | Mod: CJ,HCNC,PN | Performed by: PHYSICAL THERAPIST

## 2018-12-28 PROCEDURE — 99999 PR PBB SHADOW E&M-EST. PATIENT-LVL I: CPT | Mod: PBBFAC,HCNC,,

## 2018-12-28 PROCEDURE — 97110 THERAPEUTIC EXERCISES: CPT | Mod: HCNC,PN | Performed by: PHYSICAL THERAPIST

## 2018-12-28 NOTE — PROGRESS NOTES
Health  Follow-Up Note   [x] Office  [] Phone  Notes from previous session reviewed.   [x] Previous Session Goals unchanged.   [] Patient/Caregiver Change in Goals.  Goals added or changed by Patient/Caregiver since program participation:  1. Start exercising more  2. Continue same plan         Additional/Changed support that patient/caregiver has experienced/sought?  (Indicate readiness, support from family, friends, others, community groups, etc)  1.      Additional/Changed obstacles that could prevent patient/caregiver from reaching their goals?  1.  Going out of town to [place] for 2 weeks    Feedback provided:  1.  Praised for great job down 3.3 lb     Diagnostic values/Desriptors for follow-up as needed for chronic condition(s)   Weight:  72.8 kg 160.5 lb   Blood Glucose: averages   7 d 111  14 d 118  30 d 116  90 d 115    Interventions:   1. Health  listened reflectively, validated thoughts and feelings, offered support and encouragement.   2. Allowed patient to express themselves in a non-biased atmosphere.  3. Health  assisted pt to problem-solve obstacles such as being in a challenging environment and dealing with these challenges.   4. Motivational Interviewed interventions utilized (OARS).   5. Patient responded favorably to interventions and remained actively engaged in the session.   6. Health  will remain available and connected for patient by phone and/or office visits.   7. Positive reinforcement, emotional support and encouragement provided.   8. Focused Education: MI    Plan:  [x] Pt will work on goals as stated above.   [x] Pt will contact Health  for any questions, concerns or needs.  [x] Pt will follow up with Health  in office on   1/21/19 at 0900.     [] Pt will follow up with Health  on phone in:        [x] Health  will remain available.   [] Health  will contact patient by phone in:        [] Health  will consult:        [] Health   will inform Provider via EPIC messaging.     Impression:  1. Behavior is consistent with   Action    Stage of Change.   2. Participation level:       [x] Receptive      [x] Interactive      [] Guarded and Resistant      [x] Self Motivated      [] Refused/Declined to participate   3. [x] Pt voiced understanding of all information presented.       [] Pt voiced needing further information/education. This will be arranged.       [x] Pt would benefit from further education/information as identified by this health . This will be arranged.     Jacqueline Perry RN HC

## 2018-12-31 ENCOUNTER — CLINICAL SUPPORT (OUTPATIENT)
Dept: REHABILITATION | Facility: OTHER | Age: 73
End: 2018-12-31
Payer: MEDICARE

## 2018-12-31 ENCOUNTER — PATIENT MESSAGE (OUTPATIENT)
Dept: INTERNAL MEDICINE | Facility: CLINIC | Age: 73
End: 2018-12-31

## 2018-12-31 ENCOUNTER — PATIENT MESSAGE (OUTPATIENT)
Dept: PSYCHIATRY | Facility: CLINIC | Age: 73
End: 2018-12-31

## 2018-12-31 DIAGNOSIS — R53.1 DECREASED STRENGTH: ICD-10-CM

## 2018-12-31 DIAGNOSIS — R29.3 POSTURAL IMBALANCE: ICD-10-CM

## 2018-12-31 DIAGNOSIS — G89.29 CHRONIC BILATERAL LOW BACK PAIN WITHOUT SCIATICA: ICD-10-CM

## 2018-12-31 DIAGNOSIS — M54.50 CHRONIC BILATERAL LOW BACK PAIN WITHOUT SCIATICA: ICD-10-CM

## 2018-12-31 PROCEDURE — 97110 THERAPEUTIC EXERCISES: CPT | Mod: HCNC,PN

## 2018-12-31 RX ORDER — DEXTROSE 4 G
1 TABLET,CHEWABLE ORAL 2 TIMES DAILY
Qty: 1 EACH | Refills: 0 | Status: SHIPPED | OUTPATIENT
Start: 2018-12-31 | End: 2020-03-25 | Stop reason: SDUPTHER

## 2018-12-31 NOTE — PROGRESS NOTES
Physical Therapy Daily Note     Name: Paris Burris  Clinic Number: [id number]  Diagnosis:   Encounter Diagnoses   Name Primary?    Chronic bilateral low back pain without sciatica     Postural imbalance     Decreased strength      Physician: Fifi Lizama MD  Precautions: Compression Fx in the LSP, DM Type 2, depression  Visit #: 20 of 20  PTA Visit #: 0   Time In: 11:00 AM  Time Out:12:00 AM  Total treatment time: 60 min, 30 min 1:1     Functional Outcome Measure: FOTO limitation = 42%% Disability  G-codes + Modifier + % Impairment: (mobility):   DC=  (40% - 60% disability), Goal =  (20% - 40% disability)      Subjective     Pt reports: only minimal pain today. Pt says that she is independent with her exercises and is ready to be done with PT.  Pain Scale: Paris rates pain on a scale of 0-10 to be 1 currently in low back    Objective       Thoracic/Lumbar AROM: Pain/Dysfunction with Movement:   Flexion WFL    Extension WFL fair lordosis    Right side bending WFL, fingertips to knee joint line   Left side bending WFL  fingertips to knee joint line   Right rotation WFL   Left rotation WFL      Lower Extremity Strength  Right LE   Left LE     Hip flexion: 5/5 Hip flexion: 5/5   Hip extension:  5/5 Hip extension: 5/5   Hip abduction: 5/5 Hip abduction: 5/5   Hip adduction:  5/5 Hip adduction:  5/5      Flexibility: hamstrings = fair, hip flexors/quads = fair+ R  Dynamic gait index: 22/24 (<19/25 indicates increased risk for falls in elderly population)       Paris received individual therapeutic exercises to develop strength, endurance, ROM, flexibility, posture and core stabilization for 45 minutes including:    SL clams: 2 x 10 x orange TB    PPT with marches on PB- 15x  PPT with unilat flex OH on PB- x 15   rows - 3 x 10 x oTB  U shd ext :  - 3  x 10 x OTB (np)  parloff press: 2 x 10 x OTB (np)    Standing hip abd - 15x B, orange TB  tandem stance  "with arm swings 5 x 2    LTR: 10 x 10" holds - PB  DKTC: 10 x 10" holds - PB  TA block squeeze: 10 x 10" holds  Open books: 15x   piriformis stretch  3 x 30"  HSS with strap 30" x 3  Supine hip flexor stretch with strap 30" x 3    Paris received the following manual therapy techniques: none  Paris received the following supervised modalities after being cleared for contradictions: none    Written Home Exercises Provided: issued HEP on 11/1/2018 (see pt instructions)  +12/18/18 - Issued new TBs (orange, green) for HEP and instructed on progression of theraband resistance.  Pt demo good understanding of the education provided. Paris demonstrated good return demonstration of activities.     Education provided re:  Paris verbalized good understanding of education provided.   No spiritual or educational barriers to learning provided    Assessment     Good tolerance to all therex today. Pt presents with improved ROM and strength to WFL to allow for dynamic stability with ADLs and HHCs. Pt is independent with HEP and demonstrates good return technique. Pt no longer has pain or difficulty with functional activities and has returned to PLOF. Pt has progressed well and has met 9 of 9 therapeutic goals. Pt no longer requires skilled PT. Recommend D/C from skilled care.      Goals as follows:     Short Term Goals (4 Weeks):   1. Pt will report 20% reduction in pain of the lumbar spine and LE for ease with ADL's-met  2. PT will demonstrate improved trunk strength by a half grade in for ease with upright posture during standing activities. -met  3. Pt will demonstrate improved lumbar spine ROM in all directions by a half grade for ease with bending activities. -met  4. Pt to demonstrate improved functional ability with FOTO limitation <=46% disability. -met     Long Term Goals (8 Weeks):   1. Pt will report being independent with HEP for maintenance of improvements gained during therapy sessions- Met  2. PT will report 50% " reduction of pain of the back and LE for ease with dressing and grooming activities. -met  3. Pt will demonstrate trunk and extremity strength to >=4+/5 without the provocation of pain for ease with household chores-met  4. Pt will demonstrate appropriate upright posture without external cueing for ease with work related activities. -met  5. Pt to demonstrate improved functional ability with FOTO limitation <=36% disability. - met     Plan     D/C to HEP and strength/conditioning program at [facility].    Therapist:  Patsy Elaine, PT

## 2019-01-02 RX ORDER — BUPROPION HYDROCHLORIDE 100 MG/1
TABLET, EXTENDED RELEASE ORAL
Qty: 60 TABLET | Refills: 2 | Status: SHIPPED | OUTPATIENT
Start: 2019-01-02 | End: 2019-06-01 | Stop reason: SDUPTHER

## 2019-01-03 ENCOUNTER — PATIENT MESSAGE (OUTPATIENT)
Dept: SURGERY | Facility: HOSPITAL | Age: 74
End: 2019-01-03

## 2019-01-07 ENCOUNTER — PATIENT MESSAGE (OUTPATIENT)
Dept: INTERNAL MEDICINE | Facility: CLINIC | Age: 74
End: 2019-01-07

## 2019-01-08 ENCOUNTER — PATIENT MESSAGE (OUTPATIENT)
Dept: DERMATOLOGY | Facility: CLINIC | Age: 74
End: 2019-01-08

## 2019-01-10 ENCOUNTER — LAB VISIT (OUTPATIENT)
Dept: LAB | Facility: HOSPITAL | Age: 74
End: 2019-01-10
Attending: INTERNAL MEDICINE
Payer: MEDICARE

## 2019-01-10 DIAGNOSIS — E11.69 HYPERLIPIDEMIA ASSOCIATED WITH TYPE 2 DIABETES MELLITUS: ICD-10-CM

## 2019-01-10 DIAGNOSIS — E78.5 HYPERLIPIDEMIA ASSOCIATED WITH TYPE 2 DIABETES MELLITUS: ICD-10-CM

## 2019-01-10 LAB
CHOLEST SERPL-MCNC: 168 MG/DL
CHOLEST/HDLC SERPL: 4.9 {RATIO}
HDLC SERPL-MCNC: 34 MG/DL
HDLC SERPL: 20.2 %
LDLC SERPL CALC-MCNC: 88.2 MG/DL
NONHDLC SERPL-MCNC: 134 MG/DL
TRIGL SERPL-MCNC: 229 MG/DL

## 2019-01-10 PROCEDURE — 36415 COLL VENOUS BLD VENIPUNCTURE: CPT | Mod: HCNC

## 2019-01-10 PROCEDURE — 80061 LIPID PANEL: CPT | Mod: HCNC

## 2019-01-13 ENCOUNTER — PATIENT MESSAGE (OUTPATIENT)
Dept: SURGERY | Facility: HOSPITAL | Age: 74
End: 2019-01-13

## 2019-01-14 ENCOUNTER — PATIENT MESSAGE (OUTPATIENT)
Dept: PSYCHIATRY | Facility: CLINIC | Age: 74
End: 2019-01-14

## 2019-01-23 ENCOUNTER — CLINICAL SUPPORT (OUTPATIENT)
Dept: REHABILITATION | Facility: OTHER | Age: 74
End: 2019-01-23
Attending: OBSTETRICS & GYNECOLOGY
Payer: MEDICARE

## 2019-01-23 ENCOUNTER — PATIENT MESSAGE (OUTPATIENT)
Dept: SURGERY | Facility: HOSPITAL | Age: 74
End: 2019-01-23

## 2019-01-23 DIAGNOSIS — M62.81 WEAKNESS OF TRUNK MUSCULATURE: ICD-10-CM

## 2019-01-23 DIAGNOSIS — M62.89 PELVIC FLOOR DYSFUNCTION: Primary | ICD-10-CM

## 2019-01-23 DIAGNOSIS — R15.1 FECAL SOILING: ICD-10-CM

## 2019-01-23 DIAGNOSIS — N39.46 MIXED STRESS AND URGE URINARY INCONTINENCE: ICD-10-CM

## 2019-01-23 PROCEDURE — 97110 THERAPEUTIC EXERCISES: CPT | Mod: HCNC

## 2019-01-23 NOTE — PROGRESS NOTES
Patient: Paris Burris   Clinic #: [id number]   Diagnosis:   Encounter Diagnoses   Name Primary?    Pelvic floor dysfunction Yes    Mixed stress and urge urinary incontinence     Fecal soiling     Weakness of trunk musculature       Date of start of care: 9/19/18  Date of treatment: 01/23/2019   Time in: 9:02  Time out: 9:55  Total treatment time: 53 minutes  # Visits: 6 (20 auth)  Auth expiration: 9/17/19  POC expiration: 3/1/19  Outpatient Physical Therapy   Daily Note    Subjective     Pt reports that the only thing that is still an issue is that when she coughs hard she leaks quite a bit.    Pain: Current: 0/10     Patient's Goals: strengthen pelvic floor; prevent any more incontinence    Objective     TREATMENT    Pt received individual therapeutic exercise for 53 minutes including:    - PFM activation in supine with SEMG assist without any cueing in order to assess carry over; pt tends to engage her pelvic floor with a deep inhale, pt was reminded to try to engage with her exhale to improve management of intra-abodminal pressure  - 10 x 5'' kegels in sitting with SEMG assist, normal resting baseline, poor-fair WR rise, fair holding, complete derecruitment, able to count out loud with holding to prevent breathholding  - Computer malfunction post kegels in sitting, pt was instructed to try kegel in standing to see if she can engage, pt reports ability to do so, will assess intravaginally at her next visit  - Review of all exercises, addressed all questions regarding HEP; discussed indication for leva    Pt received individual neuromuscular reeducation for 00 minutes including:    - Diaphragmatic breathing  - PFM activation with DB with SEMG assist, external perianal leads; good return demonstration, normal resting baseline, fair WR rise  - 5'' kegels in supine with SEMG assist; normal resting baseline, fair WR rise, poor holding with tendency to activation PFM with short sharp inhale, max verbal cueing  provided with instructions to activate at end of exhale and to count out loud  - PFM lengthening (bearing down) with DB with verbal and tactile cueing  - Down training body scan  - Sit to stand squat with core breath x 10    Pt tolerated treatment well with no visible adverse affects. No skin irritation, errythemia, or other adverse affects observed from electrode placement.    Education: Discussed progression of plan of care with patient; educated pt in activity modification; pt instructed to continue with diaphragmatic breathing, TA activation with DB, legs in a chair, PFM activation with breath (bearing down and lifting)/added 5'' kegels in sitting and standing, sit to stand squat; pt demonstrated and verbalized understanding.    Assessment     Pt tolerated session well without visible adverse effects. Pt with fair return of exercises, cueing required. Pt has made good progress with overall improvement in symptoms and good compliance with HEP though needing verbal cues for proper technique. Pt continues to present with stress UI and would benefit from having a home device, recommending leva for patient in order to improve pt progress and pt independence with pelvic floor strengthening and coordination. Pt will continue to benefit from skilled PT intervention in order to maximize pain free functional independence.     Pt is making good progress towards established goals.  No educational, cultural, or spiritual barriers to learning identified.    GOALS 12/31/18, continue through 3/1/19  Short Term Goals:   1. Pt will verbalize improved awareness of PFM activity as palpated by PT in order to improve activity involvement with HEP. Met 11/13/18  2. Pt will be independent with restful breathing/diaphragmatic breathing in order to improve central stabilization and management of changes in intraabdominal pressure. Met 11/13/18  3. Pt will be able to activate, relax, and bear down with her PFM in order to improve PFM  function and bladder relaxation. Met 11/7/18  4. Pt will tolerate HEP to improve impairments and independence with ADL's. Met 11/7/18     Long Term Goals:   1. Pt will be able to perform 10 x 5'' kegals in order to improve core strength and stability. Progressing  2. Pt will report improvement in symptoms. Met 1/23/19  3. Pt will be independent with HEP and self management.     ANGELINA-6    11/7/18 1/23/19  Score: 7/6x25 = 29%  8/6x25 = 33%  2/6x25 = 8.3%    Plan     Continue with established POC, working toward PT goals. Decrease frequency.

## 2019-01-28 ENCOUNTER — TELEPHONE (OUTPATIENT)
Dept: SURGERY | Facility: CLINIC | Age: 74
End: 2019-01-28

## 2019-01-28 ENCOUNTER — ANESTHESIA EVENT (OUTPATIENT)
Dept: SURGERY | Facility: HOSPITAL | Age: 74
DRG: 244 | End: 2019-01-28
Payer: MEDICARE

## 2019-01-28 ENCOUNTER — CLINICAL SUPPORT (OUTPATIENT)
Dept: INTERNAL MEDICINE | Facility: CLINIC | Age: 74
End: 2019-01-28
Payer: MEDICARE

## 2019-01-28 VITALS — BODY MASS INDEX: 29.87 KG/M2 | WEIGHT: 158.06 LBS

## 2019-01-28 PROCEDURE — 99999 PR PBB SHADOW E&M-EST. PATIENT-LVL I: ICD-10-PCS | Mod: PBBFAC,HCNC,,

## 2019-01-28 PROCEDURE — 99999 PR PBB SHADOW E&M-EST. PATIENT-LVL I: CPT | Mod: PBBFAC,HCNC,,

## 2019-01-28 NOTE — PROGRESS NOTES
Health  Follow-Up Note   [x] Office  [] Phone  Notes from previous session reviewed.   [x] Previous Session Goals unchanged.   [] Patient/Caregiver Change in Goals.  Goals added or changed by Patient/Caregiver since program participation:  1. Continue same plan    2. Walk more     Additional/Changed support that patient/caregiver has experienced/sought?  (Indicate readiness, support from family, friends, others, community groups, etc)  1.  Family    Additional/Changed obstacles that could prevent patient/caregiver from reaching their goals?  1.  Surgery tomorrow hiatal hernia repair    Feedback provided:  1.  Praised for great job down 2.43 lb     Diagnostic values/Desriptors for follow-up as needed for chronic condition(s)   Weight: 71.7 kg 158.07 lb   Blood Glucose: range     Interventions:   1. Health  listened reflectively, validated thoughts and feelings, offered support and encouragement.   2. Allowed patient to express themselves in a non-biased atmosphere.  3. Health  assisted pt to problem-solve obstacles such as being in a challenging environment and dealing with these challenges.   4. Motivational Interviewed interventions utilized (OARS).   5. Patient responded favorably to interventions and remained actively engaged in the session.   6. Health  will remain available and connected for patient by phone and/or office visits.   7. Positive reinforcement, emotional support and encouragement provided.   8. Focused Education: MI    Plan:  [x] Pt will work on goals as stated above.   [x] Pt will contact Health  for any questions, concerns or needs.  [x] Pt will follow up with Health  in office on  2/18/19 at 0830.  [] Pt will follow up with Health  on phone in:        [x] Health  will remain available.   [] Health  will contact patient by phone in:        [] Health  will consult:        [] Health  will inform Provider via EPIC messaging.      Impression:  1. Behavior is consistent with Action Stage of Change.   2. Participation level:       [x] Receptive      [x] Interactive      [] Guarded and Resistant      [x] Self Motivated      [] Refused/Declined to participate   3. [x] Pt voiced understanding of all information presented.       [] Pt voiced needing further information/education. This will be arranged.       [x] Pt would benefit from further education/information as identified by this health . This will be arranged.     Jacqueline Perry RN HC

## 2019-01-28 NOTE — TELEPHONE ENCOUNTER
----- Message from Giuseppe Doe sent at 1/28/2019  1:09 PM CST -----  Patient Returning Call from [facility]    Who Left Message for Patient:Shweta  Communication Preference:[phone]  Additional Information:

## 2019-01-29 ENCOUNTER — ANESTHESIA (OUTPATIENT)
Dept: SURGERY | Facility: HOSPITAL | Age: 74
DRG: 244 | End: 2019-01-29
Payer: MEDICARE

## 2019-01-29 ENCOUNTER — HOSPITAL ENCOUNTER (INPATIENT)
Facility: HOSPITAL | Age: 74
LOS: 2 days | Discharge: HOME | DRG: 244 | End: 2019-01-31
Attending: SURGERY | Admitting: SURGERY
Payer: MEDICARE

## 2019-01-29 DIAGNOSIS — I44.1 AV BLOCK, MOBITZ II: ICD-10-CM

## 2019-01-29 DIAGNOSIS — K44.9 HIATAL HERNIA: Primary | ICD-10-CM

## 2019-01-29 DIAGNOSIS — I49.9 ARRHYTHMIA: ICD-10-CM

## 2019-01-29 DIAGNOSIS — I47.10 SUPRAVENTRICULAR TACHYCARDIA: ICD-10-CM

## 2019-01-29 DIAGNOSIS — I44.1 HEART BLOCK AV SECOND DEGREE: ICD-10-CM

## 2019-01-29 LAB
ANION GAP SERPL CALC-SCNC: 10 MMOL/L
BUN SERPL-MCNC: 14 MG/DL
CALCIUM SERPL-MCNC: 9.5 MG/DL
CHLORIDE SERPL-SCNC: 110 MMOL/L
CO2 SERPL-SCNC: 22 MMOL/L
CREAT SERPL-MCNC: 0.9 MG/DL
EST. GFR  (AFRICAN AMERICAN): >60 ML/MIN/1.73 M^2
EST. GFR  (NON AFRICAN AMERICAN): >60 ML/MIN/1.73 M^2
GLUCOSE SERPL-MCNC: 214 MG/DL
MAGNESIUM SERPL-MCNC: 3.1 MG/DL
POCT GLUCOSE: 114 MG/DL (ref 70–110)
POCT GLUCOSE: 125 MG/DL (ref 70–110)
POCT GLUCOSE: 137 MG/DL (ref 70–110)
POCT GLUCOSE: 174 MG/DL (ref 70–110)
POTASSIUM SERPL-SCNC: 4.1 MMOL/L
SODIUM SERPL-SCNC: 142 MMOL/L
TROPONIN I SERPL DL<=0.01 NG/ML-MCNC: 0.01 NG/ML

## 2019-01-29 PROCEDURE — D9220A PRA ANESTHESIA: ICD-10-PCS | Mod: HCNC,ANES,, | Performed by: ANESTHESIOLOGY

## 2019-01-29 PROCEDURE — 27201423 OPTIME MED/SURG SUP & DEVICES STERILE SUPPLY: Mod: HCNC | Performed by: SURGERY

## 2019-01-29 PROCEDURE — 36000711: Mod: HCNC | Performed by: SURGERY

## 2019-01-29 PROCEDURE — 93010 ELECTROCARDIOGRAM REPORT: CPT | Mod: HCNC,,, | Performed by: INTERNAL MEDICINE

## 2019-01-29 PROCEDURE — 93010 EKG 12-LEAD: ICD-10-PCS | Mod: HCNC,,, | Performed by: INTERNAL MEDICINE

## 2019-01-29 PROCEDURE — 37000008 HC ANESTHESIA 1ST 15 MINUTES: Mod: HCNC | Performed by: SURGERY

## 2019-01-29 PROCEDURE — 94799 UNLISTED PULMONARY SVC/PX: CPT | Mod: HCNC

## 2019-01-29 PROCEDURE — C1781 MESH (IMPLANTABLE): HCPCS | Mod: HCNC | Performed by: SURGERY

## 2019-01-29 PROCEDURE — 63600175 PHARM REV CODE 636 W HCPCS: Mod: HCNC | Performed by: NURSE ANESTHETIST, CERTIFIED REGISTERED

## 2019-01-29 PROCEDURE — 27100025 HC TUBING, SET FLUID WARMER: Mod: HCNC | Performed by: NURSE ANESTHETIST, CERTIFIED REGISTERED

## 2019-01-29 PROCEDURE — 88302 TISSUE EXAM BY PATHOLOGIST: CPT | Mod: HCNC | Performed by: PATHOLOGY

## 2019-01-29 PROCEDURE — 43282 PR LAP, REPAIR PARAESOPHAGEAL HERNIA, INCL FUNDOPLASTY W/ MESH: ICD-10-PCS | Mod: HCNC,,, | Performed by: SURGERY

## 2019-01-29 PROCEDURE — 63600175 PHARM REV CODE 636 W HCPCS: Mod: HCNC | Performed by: SURGERY

## 2019-01-29 PROCEDURE — S0028 INJECTION, FAMOTIDINE, 20 MG: HCPCS | Mod: HCNC | Performed by: ANESTHESIOLOGY

## 2019-01-29 PROCEDURE — 71000033 HC RECOVERY, INTIAL HOUR: Mod: HCNC | Performed by: SURGERY

## 2019-01-29 PROCEDURE — D9220A PRA ANESTHESIA: Mod: HCNC,ANES,, | Performed by: ANESTHESIOLOGY

## 2019-01-29 PROCEDURE — 82962 GLUCOSE BLOOD TEST: CPT | Mod: HCNC | Performed by: SURGERY

## 2019-01-29 PROCEDURE — C9113 INJ PANTOPRAZOLE SODIUM, VIA: HCPCS | Mod: HCNC | Performed by: SURGERY

## 2019-01-29 PROCEDURE — 25000003 PHARM REV CODE 250: Mod: HCNC | Performed by: SURGERY

## 2019-01-29 PROCEDURE — 80048 BASIC METABOLIC PNL TOTAL CA: CPT | Mod: HCNC

## 2019-01-29 PROCEDURE — 25000003 PHARM REV CODE 250: Mod: HCNC | Performed by: NURSE ANESTHETIST, CERTIFIED REGISTERED

## 2019-01-29 PROCEDURE — 36000710: Mod: HCNC | Performed by: SURGERY

## 2019-01-29 PROCEDURE — 94761 N-INVAS EAR/PLS OXIMETRY MLT: CPT | Mod: HCNC

## 2019-01-29 PROCEDURE — 88302 TISSUE SPECIMEN TO PATHOLOGY - SURGERY: ICD-10-PCS | Mod: 26,HCNC,, | Performed by: PATHOLOGY

## 2019-01-29 PROCEDURE — 43282 LAP PARAESOPH HER RPR W/MESH: CPT | Mod: HCNC,,, | Performed by: SURGERY

## 2019-01-29 PROCEDURE — 93005 ELECTROCARDIOGRAM TRACING: CPT | Mod: HCNC

## 2019-01-29 PROCEDURE — 83735 ASSAY OF MAGNESIUM: CPT | Mod: HCNC

## 2019-01-29 PROCEDURE — 93010 ELECTROCARDIOGRAM REPORT: CPT | Mod: HCNC,76,, | Performed by: INTERNAL MEDICINE

## 2019-01-29 PROCEDURE — G0378 HOSPITAL OBSERVATION PER HR: HCPCS | Mod: HCNC

## 2019-01-29 PROCEDURE — 88302 TISSUE EXAM BY PATHOLOGIST: CPT | Mod: 26,HCNC,, | Performed by: PATHOLOGY

## 2019-01-29 PROCEDURE — 71000039 HC RECOVERY, EACH ADD'L HOUR: Mod: HCNC | Performed by: SURGERY

## 2019-01-29 PROCEDURE — 63600175 PHARM REV CODE 636 W HCPCS: Mod: HCNC

## 2019-01-29 PROCEDURE — 36415 COLL VENOUS BLD VENIPUNCTURE: CPT | Mod: HCNC

## 2019-01-29 PROCEDURE — 37000009 HC ANESTHESIA EA ADD 15 MINS: Mod: HCNC | Performed by: SURGERY

## 2019-01-29 PROCEDURE — 25000003 PHARM REV CODE 250: Mod: HCNC | Performed by: ANESTHESIOLOGY

## 2019-01-29 PROCEDURE — 84484 ASSAY OF TROPONIN QUANT: CPT | Mod: HCNC

## 2019-01-29 DEVICE — REINFORCEMENT BIO TISSUE: Type: IMPLANTABLE DEVICE | Site: ABDOMEN | Status: FUNCTIONAL

## 2019-01-29 RX ORDER — DEXAMETHASONE SODIUM PHOSPHATE 4 MG/ML
INJECTION, SOLUTION INTRA-ARTICULAR; INTRALESIONAL; INTRAMUSCULAR; INTRAVENOUS; SOFT TISSUE
Status: DISCONTINUED | OUTPATIENT
Start: 2019-01-29 | End: 2019-01-29

## 2019-01-29 RX ORDER — SODIUM CHLORIDE 9 MG/ML
INJECTION, SOLUTION INTRAVENOUS CONTINUOUS
Status: DISCONTINUED | OUTPATIENT
Start: 2019-01-29 | End: 2019-01-31

## 2019-01-29 RX ORDER — GLYCOPYRROLATE 0.2 MG/ML
INJECTION INTRAMUSCULAR; INTRAVENOUS
Status: DISCONTINUED | OUTPATIENT
Start: 2019-01-29 | End: 2019-01-29

## 2019-01-29 RX ORDER — FENTANYL CITRATE 50 UG/ML
INJECTION, SOLUTION INTRAMUSCULAR; INTRAVENOUS
Status: DISCONTINUED | OUTPATIENT
Start: 2019-01-29 | End: 2019-01-29

## 2019-01-29 RX ORDER — ONDANSETRON 2 MG/ML
4 INJECTION INTRAMUSCULAR; INTRAVENOUS EVERY 6 HOURS PRN
Status: DISCONTINUED | OUTPATIENT
Start: 2019-01-29 | End: 2019-01-31 | Stop reason: HOSPADM

## 2019-01-29 RX ORDER — LIDOCAINE HYDROCHLORIDE 10 MG/ML
INJECTION, SOLUTION EPIDURAL; INFILTRATION; INTRACAUDAL; PERINEURAL
Status: DISCONTINUED | OUTPATIENT
Start: 2019-01-29 | End: 2019-01-29

## 2019-01-29 RX ORDER — HYDROCODONE BITARTRATE AND ACETAMINOPHEN 7.5; 325 MG/15ML; MG/15ML
15 SOLUTION ORAL EVERY 4 HOURS PRN
Status: DISCONTINUED | OUTPATIENT
Start: 2019-01-30 | End: 2019-01-31 | Stop reason: HOSPADM

## 2019-01-29 RX ORDER — LIDOCAINE HCL/PF 100 MG/5ML
SYRINGE (ML) INTRAVENOUS
Status: DISCONTINUED | OUTPATIENT
Start: 2019-01-29 | End: 2019-01-29

## 2019-01-29 RX ORDER — SODIUM CHLORIDE 0.9 % (FLUSH) 0.9 %
3 SYRINGE (ML) INJECTION
Status: DISCONTINUED | OUTPATIENT
Start: 2019-01-29 | End: 2019-01-29 | Stop reason: HOSPADM

## 2019-01-29 RX ORDER — SODIUM CHLORIDE 9 MG/ML
INJECTION, SOLUTION INTRAVENOUS CONTINUOUS PRN
Status: DISCONTINUED | OUTPATIENT
Start: 2019-01-29 | End: 2019-01-29

## 2019-01-29 RX ORDER — CEFAZOLIN SODIUM 1 G/3ML
2 INJECTION, POWDER, FOR SOLUTION INTRAMUSCULAR; INTRAVENOUS
Status: DISCONTINUED | OUTPATIENT
Start: 2019-01-29 | End: 2019-01-29

## 2019-01-29 RX ORDER — HYDROMORPHONE HCL IN 0.9% NACL 6 MG/30 ML
PATIENT CONTROLLED ANALGESIA SYRINGE INTRAVENOUS
Status: COMPLETED
Start: 2019-01-29 | End: 2019-01-29

## 2019-01-29 RX ORDER — INSULIN ASPART 100 [IU]/ML
1-10 INJECTION, SOLUTION INTRAVENOUS; SUBCUTANEOUS EVERY 6 HOURS PRN
Status: DISCONTINUED | OUTPATIENT
Start: 2019-01-29 | End: 2019-01-31 | Stop reason: HOSPADM

## 2019-01-29 RX ORDER — ROCURONIUM BROMIDE 10 MG/ML
INJECTION, SOLUTION INTRAVENOUS
Status: DISCONTINUED | OUTPATIENT
Start: 2019-01-29 | End: 2019-01-29

## 2019-01-29 RX ORDER — PANTOPRAZOLE SODIUM 40 MG/10ML
40 INJECTION, POWDER, LYOPHILIZED, FOR SOLUTION INTRAVENOUS 2 TIMES DAILY
Status: DISCONTINUED | OUTPATIENT
Start: 2019-01-29 | End: 2019-01-31 | Stop reason: HOSPADM

## 2019-01-29 RX ORDER — BACITRACIN 50000 [IU]/1
INJECTION, POWDER, FOR SOLUTION INTRAMUSCULAR
Status: DISCONTINUED | OUTPATIENT
Start: 2019-01-29 | End: 2019-01-29

## 2019-01-29 RX ORDER — FAMOTIDINE 10 MG/ML
20 INJECTION INTRAVENOUS
Status: COMPLETED | OUTPATIENT
Start: 2019-01-29 | End: 2019-01-29

## 2019-01-29 RX ORDER — NALOXONE HCL 0.4 MG/ML
0.02 VIAL (ML) INJECTION
Status: DISCONTINUED | OUTPATIENT
Start: 2019-01-29 | End: 2019-01-31 | Stop reason: HOSPADM

## 2019-01-29 RX ORDER — MAGNESIUM SULFATE HEPTAHYDRATE 40 MG/ML
INJECTION, SOLUTION INTRAVENOUS
Status: DISCONTINUED | OUTPATIENT
Start: 2019-01-29 | End: 2019-01-29

## 2019-01-29 RX ORDER — SUCCINYLCHOLINE CHLORIDE 20 MG/ML
INJECTION INTRAMUSCULAR; INTRAVENOUS
Status: DISCONTINUED | OUTPATIENT
Start: 2019-01-29 | End: 2019-01-29

## 2019-01-29 RX ORDER — ONDANSETRON 2 MG/ML
INJECTION INTRAMUSCULAR; INTRAVENOUS
Status: DISCONTINUED | OUTPATIENT
Start: 2019-01-29 | End: 2019-01-29

## 2019-01-29 RX ORDER — ONDANSETRON 2 MG/ML
4 INJECTION INTRAMUSCULAR; INTRAVENOUS DAILY PRN
Status: DISCONTINUED | OUTPATIENT
Start: 2019-01-29 | End: 2019-01-29 | Stop reason: HOSPADM

## 2019-01-29 RX ORDER — PHENYLEPHRINE HYDROCHLORIDE 10 MG/ML
INJECTION INTRAVENOUS
Status: DISCONTINUED | OUTPATIENT
Start: 2019-01-29 | End: 2019-01-29

## 2019-01-29 RX ORDER — PROPOFOL 10 MG/ML
VIAL (ML) INTRAVENOUS
Status: DISCONTINUED | OUTPATIENT
Start: 2019-01-29 | End: 2019-01-29

## 2019-01-29 RX ORDER — ENOXAPARIN SODIUM 100 MG/ML
40 INJECTION SUBCUTANEOUS EVERY 24 HOURS
Status: DISCONTINUED | OUTPATIENT
Start: 2019-01-29 | End: 2019-01-30

## 2019-01-29 RX ORDER — SODIUM CITRATE AND CITRIC ACID MONOHYDRATE 334; 500 MG/5ML; MG/5ML
30 SOLUTION ORAL
Status: COMPLETED | OUTPATIENT
Start: 2019-01-29 | End: 2019-01-29

## 2019-01-29 RX ORDER — CEFAZOLIN SODIUM 1 G/3ML
INJECTION, POWDER, FOR SOLUTION INTRAMUSCULAR; INTRAVENOUS
Status: DISCONTINUED | OUTPATIENT
Start: 2019-01-29 | End: 2019-01-29

## 2019-01-29 RX ORDER — GLUCAGON 1 MG
1 KIT INJECTION
Status: DISCONTINUED | OUTPATIENT
Start: 2019-01-29 | End: 2019-01-31 | Stop reason: HOSPADM

## 2019-01-29 RX ORDER — FENTANYL CITRATE 50 UG/ML
25 INJECTION, SOLUTION INTRAMUSCULAR; INTRAVENOUS EVERY 5 MIN PRN
Status: DISCONTINUED | OUTPATIENT
Start: 2019-01-29 | End: 2019-01-29 | Stop reason: HOSPADM

## 2019-01-29 RX ORDER — ACETAMINOPHEN 10 MG/ML
INJECTION, SOLUTION INTRAVENOUS
Status: DISCONTINUED | OUTPATIENT
Start: 2019-01-29 | End: 2019-01-29

## 2019-01-29 RX ORDER — LIDOCAINE HYDROCHLORIDE 10 MG/ML
1 INJECTION, SOLUTION EPIDURAL; INFILTRATION; INTRACAUDAL; PERINEURAL ONCE
Status: COMPLETED | OUTPATIENT
Start: 2019-01-29 | End: 2019-01-29

## 2019-01-29 RX ORDER — BUPIVACAINE HYDROCHLORIDE AND EPINEPHRINE 5; 5 MG/ML; UG/ML
INJECTION, SOLUTION EPIDURAL; INTRACAUDAL; PERINEURAL
Status: DISCONTINUED | OUTPATIENT
Start: 2019-01-29 | End: 2019-01-29

## 2019-01-29 RX ORDER — METOPROLOL TARTRATE 1 MG/ML
INJECTION, SOLUTION INTRAVENOUS
Status: DISCONTINUED | OUTPATIENT
Start: 2019-01-29 | End: 2019-01-29

## 2019-01-29 RX ORDER — ACETAMINOPHEN 10 MG/ML
1000 INJECTION, SOLUTION INTRAVENOUS EVERY 8 HOURS
Status: COMPLETED | OUTPATIENT
Start: 2019-01-29 | End: 2019-01-30

## 2019-01-29 RX ORDER — HYDROMORPHONE HCL IN 0.9% NACL 6 MG/30 ML
PATIENT CONTROLLED ANALGESIA SYRINGE INTRAVENOUS CONTINUOUS
Status: DISCONTINUED | OUTPATIENT
Start: 2019-01-29 | End: 2019-01-31

## 2019-01-29 RX ADMIN — SODIUM CHLORIDE 1 MCG/KG/MIN: 9 INJECTION, SOLUTION INTRAVENOUS at 09:01

## 2019-01-29 RX ADMIN — PHENYLEPHRINE HYDROCHLORIDE 200 MCG: 10 INJECTION INTRAVENOUS at 09:01

## 2019-01-29 RX ADMIN — PHENYLEPHRINE HYDROCHLORIDE 100 MCG: 10 INJECTION INTRAVENOUS at 09:01

## 2019-01-29 RX ADMIN — PROPOFOL 120 MG: 10 INJECTION, EMULSION INTRAVENOUS at 08:01

## 2019-01-29 RX ADMIN — PHENYLEPHRINE HYDROCHLORIDE 100 MCG: 10 INJECTION INTRAVENOUS at 10:01

## 2019-01-29 RX ADMIN — ROCURONIUM BROMIDE 45 MG: 10 INJECTION, SOLUTION INTRAVENOUS at 08:01

## 2019-01-29 RX ADMIN — LIDOCAINE HYDROCHLORIDE 10 MG: 10 INJECTION, SOLUTION EPIDURAL; INFILTRATION; INTRACAUDAL; PERINEURAL at 07:01

## 2019-01-29 RX ADMIN — PANTOPRAZOLE SODIUM 40 MG: 40 INJECTION, POWDER, FOR SOLUTION INTRAVENOUS at 09:01

## 2019-01-29 RX ADMIN — ACETAMINOPHEN 1000 MG: 10 INJECTION, SOLUTION INTRAVENOUS at 08:01

## 2019-01-29 RX ADMIN — SODIUM CHLORIDE, SODIUM GLUCONATE, SODIUM ACETATE, POTASSIUM CHLORIDE, MAGNESIUM CHLORIDE, SODIUM PHOSPHATE, DIBASIC, AND POTASSIUM PHOSPHATE: .53; .5; .37; .037; .03; .012; .00082 INJECTION, SOLUTION INTRAVENOUS at 09:01

## 2019-01-29 RX ADMIN — ENOXAPARIN SODIUM 40 MG: 100 INJECTION SUBCUTANEOUS at 05:01

## 2019-01-29 RX ADMIN — ACETAMINOPHEN 1000 MG: 10 INJECTION, SOLUTION INTRAVENOUS at 09:01

## 2019-01-29 RX ADMIN — FENTANYL CITRATE 50 MCG: 50 INJECTION, SOLUTION INTRAMUSCULAR; INTRAVENOUS at 09:01

## 2019-01-29 RX ADMIN — ONDANSETRON 4 MG: 2 INJECTION INTRAMUSCULAR; INTRAVENOUS at 10:01

## 2019-01-29 RX ADMIN — PHENYLEPHRINE HYDROCHLORIDE 100 MCG: 10 INJECTION INTRAVENOUS at 08:01

## 2019-01-29 RX ADMIN — FENTANYL CITRATE 100 MCG: 50 INJECTION, SOLUTION INTRAMUSCULAR; INTRAVENOUS at 08:01

## 2019-01-29 RX ADMIN — INSULIN ASPART 2 UNITS: 100 INJECTION, SOLUTION INTRAVENOUS; SUBCUTANEOUS at 11:01

## 2019-01-29 RX ADMIN — CALCIUM CHLORIDE 100 MG: 100 INJECTION, SOLUTION INTRAVENOUS at 10:01

## 2019-01-29 RX ADMIN — FAMOTIDINE 20 MG: 10 INJECTION, SOLUTION INTRAVENOUS at 07:01

## 2019-01-29 RX ADMIN — Medication: at 11:01

## 2019-01-29 RX ADMIN — Medication 40 MG: at 08:01

## 2019-01-29 RX ADMIN — METOPROLOL TARTRATE 2 MG: 5 INJECTION, SOLUTION INTRAVENOUS at 09:01

## 2019-01-29 RX ADMIN — SUGAMMADEX 400 MG: 100 INJECTION, SOLUTION INTRAVENOUS at 10:01

## 2019-01-29 RX ADMIN — LIDOCAINE HYDROCHLORIDE 100 MG: 20 INJECTION, SOLUTION INTRAVENOUS at 10:01

## 2019-01-29 RX ADMIN — GLYCOPYRROLATE 0.1 MG: 0.2 INJECTION, SOLUTION INTRAMUSCULAR; INTRAVENOUS at 10:01

## 2019-01-29 RX ADMIN — PANTOPRAZOLE SODIUM 40 MG: 40 INJECTION, POWDER, FOR SOLUTION INTRAVENOUS at 11:01

## 2019-01-29 RX ADMIN — CEFAZOLIN 2 G: 330 INJECTION, POWDER, FOR SOLUTION INTRAMUSCULAR; INTRAVENOUS at 09:01

## 2019-01-29 RX ADMIN — METOPROLOL TARTRATE 3 MG: 5 INJECTION, SOLUTION INTRAVENOUS at 09:01

## 2019-01-29 RX ADMIN — MAGNESIUM SULFATE IN WATER 1 G: 40 INJECTION, SOLUTION INTRAVENOUS at 10:01

## 2019-01-29 RX ADMIN — SUCCINYLCHOLINE CHLORIDE 120 MG: 20 INJECTION, SOLUTION INTRAMUSCULAR; INTRAVENOUS at 08:01

## 2019-01-29 RX ADMIN — DEXAMETHASONE SODIUM PHOSPHATE 4 MG: 4 INJECTION, SOLUTION INTRAMUSCULAR; INTRAVENOUS at 09:01

## 2019-01-29 RX ADMIN — ROCURONIUM BROMIDE 20 MG: 10 INJECTION, SOLUTION INTRAVENOUS at 09:01

## 2019-01-29 RX ADMIN — SODIUM CHLORIDE: 9 INJECTION, SOLUTION INTRAVENOUS at 08:01

## 2019-01-29 RX ADMIN — SODIUM CITRATE AND CITRIC ACID MONOHYDRATE 30 ML: 500; 334 SOLUTION ORAL at 07:01

## 2019-01-29 RX ADMIN — SODIUM CHLORIDE: 0.9 INJECTION, SOLUTION INTRAVENOUS at 11:01

## 2019-01-29 RX ADMIN — CALCIUM CHLORIDE 700 MG: 100 INJECTION, SOLUTION INTRAVENOUS at 10:01

## 2019-01-29 RX ADMIN — CALCIUM CHLORIDE 100 MG: 100 INJECTION, SOLUTION INTRAVENOUS at 09:01

## 2019-01-29 RX ADMIN — LIDOCAINE HYDROCHLORIDE 100 MG: 20 INJECTION, SOLUTION INTRAVENOUS at 08:01

## 2019-01-29 RX ADMIN — METOPROLOL TARTRATE 2 MG: 5 INJECTION, SOLUTION INTRAVENOUS at 08:01

## 2019-01-29 RX ADMIN — ROCURONIUM BROMIDE 5 MG: 10 INJECTION, SOLUTION INTRAVENOUS at 08:01

## 2019-01-29 NOTE — PROGRESS NOTES
Vital signs stable. Afebrile, Alert, oriented and following commands. Pain controlled with PCA. Denies nausea. Lap sites remain CDI. IV fluids and PCA per md order.

## 2019-01-29 NOTE — BRIEF OP NOTE
[facility]  Brief Operative Note    SUMMARY     Surgery Date: 1/29/2019     Surgeon(s) and Role:     * Renato Perez Jr., MD - Primary     * Dayo Dickinson Jr., MD - Resident - Assisting    Pre-op Diagnosis:  Hiatal hernia [K44.9]    Post-op Diagnosis:  Post-Op Diagnosis Codes:     * Hiatal hernia [K44.9]    Procedure(s) (LRB):  REPAIR, HERNIA, HIATAL, LAPAROSCOPIC with Mesh (N/A)  FUNDOPLICATION, LAPAROSCOPIC, TOUPET (N/A)  EGD (ESOPHAGOGASTRODUODENOSCOPY) intraop (N/A)    Anesthesia: General    Description of Procedure: Laparoscopic hiatal hernia repair with mesh    Description of the findings of the procedure: Large hiatal hernia; use of Bio-A mesh; Toupe fundoplication    Estimated Blood Loss: minimal         Specimens:   Specimen (12h ago, onward)    Start     Ordered    01/29/19 1045  Specimen to Pathology - Surgery  Once     Comments:  1. Hernia sac- permanent     Start Status   01/29/19 1045 Collected (01/29/19 1104)       01/29/19 1109

## 2019-01-29 NOTE — ANESTHESIA PREPROCEDURE EVALUATION
01/29/2019  Paris Burris is a 73 y.o., female presenting for lap fundo for hiatal hernia.    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    Wrist fracture      Past Surgical History:   Procedure Laterality Date    ADENOIDECTOMY      BREAST LUMPECTOMY Left 2002    COLONOSCOPY N/A 12/2/2016    Performed by [facility] at [facility] ([facility] F[facility])    DEVICE ASSISTED ENTEROSCOPY-ANTEROGRADE N/A 3/20/2018    Performed by [facility]in [facility] at [facility] ENDO (2ND FLR)    ESOPHAGOGASTRODUODENOSCOPY (EGD) N/A 9/12/2018    Performed by Dustin Patterson [facility] at [facility] END[facility] (4TH FLR)    ESOPHAGOGASTRODUODENOSCOPY (EGD) N/A 12/2/2016    Performed by Dustin Patterson MD at [facility] END[facility] (4TH FLR)    ESOPHAGOGASTRODUODENOSCOPY (EGD) N/A 4/20/2015    Performed by Marcial Dewitt MD at [facility] (4TH FLR)    EYE SURGERY Bilateral     cataracts    INJECTION-STEROID-EPIDURAL-LUMBAR N/A 6/15/2016    Performed by Michelle Frias [facility] at [facility]GT    lipoma removal  1999    MANOMETRY, ESOPHAGUS, WITH IMPEDANCE MEASUREMENT N/A 10/8/2018    Performed by Renato Maria MD at [facility] ENDO (4TH FLR)    PARATHYROIDECTOMY minimally invasive N/A 11/4/2015    Performed by Amna Aponte MD at [facility] OR 2ND FLR    TONSILLECTOMY       Review of patient's  allergies indicates:   Allergen Reactions    Topamax [topiramate] Other (See Comments)     hallucinations     No current facility-administered medications on file prior to encounter.      Current Outpatient Medications on File Prior to Encounter   Medication Sig Dispense Refill    alpha lipoic acid 300 mg Cap Take 300 mg by mouth 2 (two) times daily.       ascorbic acid (VITAMIN C) 500 MG tablet Take 1,000 mg by mouth once daily.       b complex vitamins capsule Take 1 capsule by mouth once daily.      cetirizine (ZYRTEC) 10 MG tablet Take 10 mg by mouth once daily.      CITICOLINE SODIUM (CITICOLINE ORAL) Take 1 tablet by mouth once daily at 6am.      esomeprazole (NEXIUM) 40 MG capsule Take 1 capsule (40 mg total) by mouth before breakfast. 90 capsule 3    lancets Misc 1 lancet by Misc.(Non-Drug; Combo Route) route 2 (two) times daily. 100 each 6    omega-3 fatty acids-fish oil (FISH OIL) 340-1,000 mg Cap Take 1 capsule by mouth once daily.      rosuvastatin (CRESTOR) 20 MG tablet Take 1 tablet (20 mg total) by mouth once daily. 90 tablet 3    traMADol (ULTRAM) 50 mg tablet TAKE 2 TABLETS (100 MG TOTAL) BY MOUTH DAILY AS NEEDED FOR PAIN. 60 tablet 1    tretinoin (RETIN-A) 0.05 % cream Use hs 45 g 6    TURMERIC (CURCUMIN MISC) Take 500 mg by mouth 2 (two) times daily.       venlafaxine (EFFEXOR) 37.5 MG Tab Take 2 tablets (75 mg total) by mouth once daily. 30 tablet 5    vitamin D 1000 units Tab Take 500 Units by mouth once daily. With K2 50 mch       Lab Results   Component Value Date    WBC 7.65 05/03/2018    HGB 15.4 05/03/2018    HCT 47.0 05/03/2018    MCV 90 05/03/2018     05/03/2018     BMP  Lab Results   Component Value Date     11/14/2018    K 4.6 11/14/2018     11/14/2018    CO2 26 11/14/2018    BUN 18 11/14/2018    CREATININE 0.8 11/14/2018    CALCIUM 9.9 11/14/2018    ANIONGAP 9 11/14/2018    ESTGFRAFRICA >60 11/14/2018    EGFRNONAA >60 11/14/2018         Anesthesia  Evaluation    I have reviewed the Patient Summary Reports.     I have reviewed the Medications.     Review of Systems  Anesthesia Hx:  No problems with previous Anesthesia   Denies Personal Hx of Anesthesia complications.   Cardiovascular:   Exercise tolerance: good    Pulmonary:   Denies COPD.  Denies Asthma. Sleep Apnea No CPAP use   Renal/:  Renal/ Normal     Hepatic/GI:   Hiatal Hernia, Denies GERD.    Neurological:   Denies CVA. Denies Seizures.        Physical Exam  General:  Well nourished    Airway/Jaw/Neck:  Airway Findings: Mouth Opening: Normal Tongue: Large  General Airway Assessment: Adult  Mallampati: III  TM Distance: Normal, at least 6 cm  Jaw/Neck Findings:  Neck ROM: Normal ROM      Dental:  Dental Findings: In tact        Mental Status:  Mental Status Findings:  Cooperative, Alert and Oriented         Anesthesia Plan  Type of Anesthesia, risks & benefits discussed:  Anesthesia Type:  general  Patient's Preference:   Intra-op Monitoring Plan: standard ASA monitors  Intra-op Monitoring Plan Comments:   Post Op Pain Control Plan: per primary service following discharge from PACU, IV/PO Opioids PRN and multimodal analgesia  Post Op Pain Control Plan Comments:   Induction:   IV  Beta Blocker:  Patient is not currently on a Beta-Blocker (No further documentation required).       Informed Consent: Patient understands risks and agrees with Anesthesia plan.  Questions answered. Anesthesia consent signed with patient.  ASA Score: 3     Day of Surgery Review of History & Physical:    H&P update referred to the surgeon.     Anesthesia Plan Notes: RSI, video laryngoscopy, reflux prophylaxis        Ready For Surgery From Anesthesia Perspective.

## 2019-01-29 NOTE — ASSESSMENT & PLAN NOTE
Initial rhythm appeared junctional on telemetry however second EKG revealed Mobitz II block  No electrolyte abnormalities noted  At this time patient remains asymptomatic  However given evidence of Mobitz II block would recommend evaluation by EP consult service for further assessment and to evaluate if EP study/device therapy necessitated   Would avoid all sharad blockade agents at this time   Recommend TTE with CFD     Please call on call cardiology with any concerning rhythm(profound bradycardia, high grade block, etc.) changes on telemetry

## 2019-01-29 NOTE — SUBJECTIVE & OBJECTIVE
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    Wrist fracture        Past Surgical History:   Procedure Laterality Date    ADENOIDECTOMY      BREAST LUMPECTOMY Left 2002    COLONOSCOPY N/A 12/2/2016    Performed by Dustin Patterson MD at [facility] END[facility] (4TH FLR)    DEVICE ASSISTED ENTEROSCOPY-ANTEROGRADE N/A 3/20/2018    Performed by Dustin Patterson MD at [facility] END[facility] (2ND FLR)    ESOPHAGOGASTRODUODENOSCOPY (EGD) N/A 9/12/2018    Performed by Dustin Patterson MD at [facility] END[facility] (4TH FLR)    ESOPHAGOGASTRODUODENOSCOPY (EGD) N/A 12/2/2016    Performed by Dustin Patterson MD at [facility] (4TH FLR)    ESOPHAGOGASTRODUODENOSCOPY (EGD) N/A 4/20/2015    Performed by Marcial Dewitt MD at [facility] (4TH FLR)    EYE SURGERY Bilateral     cataracts    INJECTION-STEROID-EPIDURAL-LUMBAR N/A 6/15/2016    Performed by [facility] at [facility]    lipoma removal  1999    MANOMETRY, ESOPHAGUS, WITH IMPEDANCE MEASUREMENT N/A 10/8/2018    Performed by Renato Maria MD at [facility] ENDO (4TH FLR)    PARATHYROIDECTOMY minimally invasive N/A 11/4/2015    Performed by Amna Aponte MD at [facility] OR 2ND FLR    TONSILLECTOMY         Review of patient's allergies indicates:   Allergen Reactions    Topamax [topiramate] Other (See Comments)     hallucinations       No current facility-administered medications on file prior to encounter.      Current  Outpatient Medications on File Prior to Encounter   Medication Sig    alpha lipoic acid 300 mg Cap Take 300 mg by mouth 2 (two) times daily.     ascorbic acid (VITAMIN C) 500 MG tablet Take 1,000 mg by mouth once daily.     b complex vitamins capsule Take 1 capsule by mouth once daily.    cetirizine (ZYRTEC) 10 MG tablet Take 10 mg by mouth once daily.    CITICOLINE SODIUM (CITICOLINE ORAL) Take 1 tablet by mouth once daily at 6am.    esomeprazole (NEXIUM) 40 MG capsule Take 1 capsule (40 mg total) by mouth before breakfast.    omega-3 fatty acids-fish oil (FISH OIL) 340-1,000 mg Cap Take 1 capsule by mouth once daily.    rosuvastatin (CRESTOR) 20 MG tablet Take 1 tablet (20 mg total) by mouth once daily.    traMADol (ULTRAM) 50 mg tablet TAKE 2 TABLETS (100 MG TOTAL) BY MOUTH DAILY AS NEEDED FOR PAIN.    tretinoin (RETIN-A) 0.05 % cream Use hs    TURMERIC (CURCUMIN MISC) Take 500 mg by mouth 2 (two) times daily.     vitamin D 1000 units Tab Take 500 Units by mouth once daily. With K2 50 mch    lancets Misc 1 lancet by Misc.(Non-Drug; Combo Route) route 2 (two) times daily.    venlafaxine (EFFEXOR) 37.5 MG Tab Take 2 tablets (75 mg total) by mouth once daily.     Family History     Problem Relation (Age of Onset)    Alcohol abuse Father    Depression Mother, Sister, Daughter    Diabetes Sister    Headaches Father    Psoriasis Sister    Suicide Mother        Tobacco Use    Smoking status: Never Smoker    Smokeless tobacco: Never Used   Substance and Sexual Activity    Alcohol use: No     Comment: quit 2014 - alcohol abuse    Drug use: Yes    Sexual activity: Yes     Partners: Male     Review of Systems   Constitution: Negative for chills, diaphoresis, fever and weight loss.   HENT: Negative for congestion, hoarse voice and sore throat.    Eyes: Negative for blurred vision and double vision.   Cardiovascular: Negative for chest pain, dyspnea on exertion, irregular heartbeat, palpitations, paroxysmal  nocturnal dyspnea and syncope.   Respiratory: Negative for shortness of breath.    Endocrine: Negative for cold intolerance and heat intolerance.   Hematologic/Lymphatic: Does not bruise/bleed easily.   Gastrointestinal: Negative for abdominal pain, constipation, diarrhea, nausea and vomiting.   Genitourinary: Negative for dysuria.   Neurological: Negative for focal weakness and sensory change.     Objective:     Vital Signs (Most Recent):  Temp: 96.8 °F (36 °C) (01/29/19 1521)  Pulse: 79 (01/29/19 1521)  Resp: 13 (01/29/19 1521)  BP: 115/68 (01/29/19 1521)  SpO2: (!) 90 % (01/29/19 1521) Vital Signs (24h Range):  Temp:  [96.8 °F (36 °C)-98.8 °F (37.1 °C)] 96.8 °F (36 °C)  Pulse:  [57-98] 79  Resp:  [12-23] 13  SpO2:  [90 %-100 %] 90 %  BP: (112-133)/(55-99) 115/68     Weight: 69.9 kg (154 lb)  Body mass index is 29.1 kg/m².    SpO2: (!) 90 %  O2 Device (Oxygen Therapy): room air      Intake/Output Summary (Last 24 hours) at 1/29/2019 1742  Last data filed at 1/29/2019 1105  Gross per 24 hour   Intake 1600 ml   Output --   Net 1600 ml       Lines/Drains/Airways     Peripheral Intravenous Line                 Peripheral IV - Single Lumen 01/29/19 0810 Right Hand less than 1 day         Peripheral IV - Single Lumen 01/29/19 0845 Left Hand less than 1 day                Physical Exam   Constitutional: She is oriented to person, place, and time. She appears well-developed and well-nourished.   HENT:   Head: Normocephalic and atraumatic.   Eyes: EOM are normal.   Neck: No JVD present.   Cardiovascular: Normal rate, regular rhythm, normal heart sounds and intact distal pulses. Exam reveals no gallop and no friction rub.   No murmur heard.  Pulmonary/Chest: Effort normal and breath sounds normal. No respiratory distress.   Abdominal: Soft. Bowel sounds are normal. She exhibits no distension. There is tenderness. There is no rebound.   Musculoskeletal: Normal range of motion. She exhibits no edema.   Neurological: She is  oriented to person, place, and time.   Skin: Skin is warm and dry.       Significant Labs:   Recent Labs   Lab 01/29/19  1117      K 4.1      CO2 22*   BUN 14   CREATININE 0.9   ANIONGAP 10     No results for input(s): AST, ALT, ALKPHOS, BILITOT, BILIDIR, ALBUMIN in the last 168 hours.  Recent Labs   Lab 01/29/19  1117   TROPONINI 0.008    No results for input(s): WBC, HGB, HCT, PLT, GRAN in the last 168 hours.  No results for input(s): PTT, INR in the last 168 hours.  Lab Results   Component Value Date    CHOL 168 01/10/2019    HDL 34 (L) 01/10/2019    LDLCALC 88.2 01/10/2019    TRIG 229 (H) 01/10/2019     Lab Results   Component Value Date    HGBA1C 5.6 11/14/2018

## 2019-01-29 NOTE — Clinical Note
5 ml injected throughout the case. 95 mL total wasted during the case. 100 mL total used in the case.

## 2019-01-29 NOTE — PROGRESS NOTES
EKG and stat labs done on arrival to pacu per Dr Hickey. Bundle branch block noted on EKG. Dr Hickey at bedside to see pt and EKG. Pt denies chest pain or shortness of breath. No further orders at this time.

## 2019-01-29 NOTE — HPI
73 year old female with a history HLD, pre-DM, COOKIE, Left Breast CA s/p lumpectomy 2002(treated with brachytherapy) who presented for hiatal hernia repair/Toupe fundoplication who developed SVT responsive to esmolol and metoprolol IV during the case. After the case a new LBBB was noted however patient had no symptoms of chest pain or dyspnea.     On interview the patient notes some nausea post operatively that is resolving. She denies any chest pain or shortness of breath. She denies a history of syncopal events or palpitations.    Review of her EKGs shows NSR with LVH in 2017. Her first post-operative EKG appears to be a junctional rhythm with LBBB and heart rate of 58. Her second post-op EKG shows NSR with a 2nd degree Mobitz II block as well as LBBB. Review of her telemetry shows the Mobitz II as well as a sinus rhythm.

## 2019-01-29 NOTE — TRANSFER OF CARE
"Anesthesia Transfer of Care Note    Patient: Paris Burris    Procedure(s) Performed: Procedure(s) (LRB):  REPAIR, HERNIA, HIATAL, LAPAROSCOPIC with Mesh (N/A)  FUNDOPLICATION, LAPAROSCOPIC, TOUPET (N/A)  EGD (ESOPHAGOGASTRODUODENOSCOPY) intraop (N/A)    Patient location: PACU    Anesthesia Type: general    Transport from OR: Transported from OR on 6-10 L/min O2 by face mask with adequate spontaneous ventilation    Post pain: adequate analgesia    Post assessment: no apparent anesthetic complications    Post vital signs: stable    Level of consciousness: sedated    Nausea/Vomiting: no nausea/vomiting    Complications: none    Transfer of care protocol was followed      Last vitals:   Visit Vitals  BP (!) 120/58 (BP Location: Left arm, Patient Position: Lying)   Pulse 60   Temp 36.6 °C (97.9 °F) (Temporal)   Resp 16   Ht 5' 1" (1.549 m)   Wt 69.9 kg (154 lb)   SpO2 100%   Breastfeeding? No   BMI 29.10 kg/m²     "

## 2019-01-29 NOTE — NURSING TRANSFER
Nursing Transfer Note      1/29/2019     Transfer to 54    Transfer via stretcher    Transfer with portable tele, pca, etco2, hearingg aids and glasses    Transported by pct    Medicines sent: dilaudid pca    Chart send with patient: yes    Notified:     Patient reassessed at: 1/29/19 @ 1400

## 2019-01-29 NOTE — ASSESSMENT & PLAN NOTE
Rhythm not seen as strips not available so unclear what arrhythmia present during the case  Given evidence of mobitz II block would not use any sharad blockers at this time

## 2019-01-29 NOTE — H&P
Patient ID: Paris Burris is a 73 y.o. female.     Chief Complaint: Follow-up        HPI:  72 yo female with asymptomatic large hiatal hernia. Patient denies reflux, dysphagia, regurgitation or abdominal pain. History of camerons ulcers.  ROJAS complete.    UGI: large hiatal hernia. 6cm, type 3.  CT: moderate sized hiatal hernia  Manometry: ineffective esophageal motility- incomplete bolus clearance in 30%.      Interval:  No sig changes from her last visit.    Review of Systems   Constitutional: Negative for fever and unexpected weight change.   Respiratory: Negative for shortness of breath.    Cardiovascular: Negative for chest pain.   Gastrointestinal: Positive for diarrhea. Negative for abdominal pain, constipation, nausea and vomiting.        3 days with diarrhea last week for unknown reason, resolved.   Genitourinary: Positive for difficulty urinating. Negative for dysuria.        Chronic difficulty urinating, following with uro-gyn and pelvic    Skin: Negative for rash and wound.   Neurological: Negative for seizures and headaches.   Hematological: Bruises/bleeds easily.        Easy bruising, no easy bleeding         Current Medications          Current Outpatient Medications   Medication Sig Dispense Refill    alpha lipoic acid 300 mg Cap Take 300 mg by mouth 2 (two) times daily.         ascorbic acid (VITAMIN C) 500 MG tablet Take 1,000 mg by mouth once daily.         b complex vitamins capsule Take 1 capsule by mouth once daily.        blood sugar diagnostic (ACCU-CHEK SMARTVIEW TEST STRIP) Strp Test once daily. 100 strip 11    blood-glucose meter Misc 1 Device by Misc.(Non-Drug; Combo Route) route 2 (two) times daily. 1 each 0    buPROPion (WELLBUTRIN SR) 100 MG TBSR 12 hr tablet Take 1 tablet (100 mg total) by mouth 2 (two) times daily. 60 tablet 2    cetirizine (ZYRTEC) 10 MG tablet Take 10 mg by mouth once daily.        CITICOLINE SODIUM (CITICOLINE ORAL) Take 1 tablet by mouth  once daily at 6am.        clonazePAM (KLONOPIN) 0.5 MG tablet Take 1 tablet (0.5 mg total) by mouth daily as needed for Anxiety. 30 tablet 0    esomeprazole (NEXIUM) 40 MG capsule Take 1 capsule (40 mg total) by mouth before breakfast. 90 capsule 3    ferrous sulfate 325 mg (65 mg iron) Tab tablet Take 1 tablet (325 mg total) by mouth 2 (two) times daily. 60 tablet 5    gabapentin (NEURONTIN) 300 MG capsule Take 1 capsule (300 mg total) by mouth 2 (two) times daily. 180 capsule 3    lancets Misc 1 lancet by Misc.(Non-Drug; Combo Route) route 2 (two) times daily. 100 each 6    LORazepam (ATIVAN) 0.5 MG tablet TAKE 1 TABLET (0.5 MG TOTAL) BY MOUTH DAILY AS NEEDED. 30 tablet 1    metFORMIN (GLUCOPHAGE) 500 MG tablet Take 0.5 tablets (250 mg total) by mouth daily with breakfast. 45 tablet 3    omega-3 fatty acids-fish oil (FISH OIL) 340-1,000 mg Cap Take 1 capsule by mouth once daily.        rosuvastatin (CRESTOR) 20 MG tablet Take 1 tablet (20 mg total) by mouth once daily. 90 tablet 3    traMADol (ULTRAM) 50 mg tablet Take 2 tablets (100 mg total) by mouth daily as needed for Pain. 60 tablet 1    tretinoin (RETIN-A) 0.05 % cream Use hs 45 g 6    TURMERIC (CURCUMIN MISC) Take 500 mg by mouth 2 (two) times daily.         venlafaxine (EFFEXOR) 37.5 MG Tab Take 2 tablets (75 mg total) by mouth once daily. 30 tablet 5    vitamin D 1000 units Tab Take 500 Units by mouth once daily. With K2 50 mch          No current facility-administered medications for this visit.                   Review of patient's allergies indicates:   Allergen Reactions    Topamax [topiramate] Other (See Comments)       hallucinations              Past Medical History:   Diagnosis Date    Allergy      Anemia      Anxiety      Breast cancer 2002     Left breast    Cancer 2002     L breast s/p lumpectomy    Decreased hearing      Depression      Diabetes mellitus      Diabetes with neurologic complications      Gastric ulcer  9/10/13     EGD    Hiatal hernia      Hyperlipidemia      Migraine headache      Migraine headache 3/20/2015    Multiple gastric ulcers      Parathyroid disorder      Pre-diabetes      Renal manifestation of secondary diabetes mellitus      Sleep apnea       no CPAP    Type 2 diabetes mellitus, controlled, 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    Wrist fracture                 Past Surgical History:   Procedure Laterality Date    ADENOIDECTOMY        BREAST LUMPECTOMY Left 2002    COLONOSCOPY N/A 12/2/2016     Procedure: COLONOSCOPY;  Surgeon: Dustin Patterson MD;  Location: [facility] (4TH FLR);  Service: Endoscopy;  Laterality: N/A;  PM prep    COLONOSCOPY N/A 12/2/2016     Performed by Dustin Patterson MD at [facility] (4TH FLR)    DEVICE ASSISTED ENTEROSCOPY-ANTEROGRADE N/A 3/20/2018     Performed by Dustin Patterson MD at [facility] (2ND FLR)    ESOPHAGEAL MANOMETRY WITH MEASUREMENT OF IMPEDANCE N/A 10/8/2018     Procedure: MANOMETRY, ESOPHAGUS, WITH IMPEDANCE MEASUREMENT;  Surgeon: Renato Maria MD;  Location: [facility] (4TH FLR);  Service: Endoscopy;  Laterality: N/A;    ESOPHAGOGASTRODUODENOSCOPY N/A 9/12/2018     Procedure: ESOPHAGOGASTRODUODENOSCOPY (EGD);  Surgeon: Dustin Patterson MD;  Location: [facility] (4TH FLR);  Service: Endoscopy;  Laterality: N/A;  6-8 weeks from visit    ESOPHAGOGASTRODUODENOSCOPY (EGD) N/A 9/12/2018     Performed by Dustin Patterson MD at [facility] (4TH FLR)    ESOPHAGOGASTRODUODENOSCOPY (EGD) N/A 12/2/2016     Performed by Dustin Patterson MD at [facility] (4TH FLR)    ESOPHAGOGASTRODUODENOSCOPY (EGD) N/A 4/20/2015     Performed by Marcial Dewitt MD at [facility] (4TH FLR)    EYE SURGERY Bilateral       cataracts    INJECTION-STEROID-EPIDURAL-LUMBAR N/A 6/15/2016     [facility] by [facility] at [facility] PAIN MGT    lipoma removal   1999    MANOMETRY, ESOPHAGUS, WITH IMPEDANCE MEASUREMENT N/A 10/8/2018     Performed by Renato Maria MD at [facility] (4TH  FLR)    PARATHYROIDECTOMY minimally invasive N/A 11/4/2015     Performed by [facility] at [facility] OR [facility]    TONSILLECTOMY                   Family History   Problem Relation Age of Onset    Suicide Mother      Depression Mother      Alcohol abuse Father      Headaches Father      Diabetes Sister           prediabetes    Psoriasis Sister      Depression Sister      Depression Daughter      Colon cancer Neg Hx      Esophageal cancer Neg Hx           Social History               Socioeconomic History    Marital status:        Spouse name: Not on file    Number of children: Not on file    Years of education: Not on file    Highest education level: Not on file   Social Needs    Financial resource strain: Not on file    Food insecurity - worry: Not on file    Food insecurity - inability: Not on file    Transportation needs - medical: Not on file    Transportation needs - non-medical: Not on file   Occupational History    Occupation:  supervisor   Tobacco Use    Smoking status: Never Smoker    Smokeless tobacco: Never Used   Substance and Sexual Activity    Alcohol use: No       Comment: quit 2014 - alcohol abuse    Drug use: Yes    Sexual activity: Yes       Partners: Male   Other Topics Concern    Patient feels they ought to cut down on drinking/drug use Not Asked    Patient annoyed by others criticizing their drinking/drug use Not Asked    Patient has felt bad or guilty about drinking/drug use Not Asked    Patient has had a drink/used drugs as an eye opener in the AM Not Asked    Are you pregnant or think you may be? Not Asked    Breast-feeding Not Asked   Social History Narrative    Not on file                Vitals:     10/15/18 1426   BP: 120/63   Pulse: 99   Temp: 98.3 °F (36.8 °C)         Physical Exam   Constitutional: She is oriented to person, place, and time. She appears well-developed and well-nourished.   Neck: Normal range of motion. Neck supple.    Cardiovascular: Normal rate, regular rhythm and normal heart sounds.   Pulmonary/Chest: Effort normal and breath sounds normal.   Abdominal: Soft. Bowel sounds are normal. There is no tenderness.   Neurological: She is alert and oriented to person, place, and time.   Skin: Skin is warm and dry.   Vitals reviewed.        UGI: large hiatal hernia. 6cm, type 3.  CT: moderate sized hiatal hernia  Manometry: ineffective esophageal motility- incomplete bolus clearance in 30%.     Assessment & Plan:  To OR for laparoscopic hiatal hernia repair, Toupet fundoplication and possible esophageal lengthening procedure.

## 2019-01-29 NOTE — CONSULTS
[facility]  Cardiology  Consult Note    Patient Name: Paris Burris  MRN: [id number]  Admission Date: 1/29/2019  Hospital Length of Stay: 1 days  Code Status: Prior   Attending Provider: Renato Perez Jr.,*   Consulting Provider: Ace James MD  Primary Care Physician: Mandi Huynh MD  Principal Problem:Hiatal hernia    Patient information was obtained from patient and ER records.     Consults  Subjective:     Chief Complaint: SVT, Post operative EKG changes     HPI:   73 year old female with a history HLD, pre-DM, COOKIE, Left Breast CA s/p lumpectomy 2002 who presented for hiatal hernia repair/Toupe fundoplication who developed SVT responsive to esmolol and metoprolol IV during the case. After the case a new LBBB was noted however patient had no symptoms of chest pain or dyspnea.     On interview the patient notes some nausea post operatively that is resolving. She denies any chest pain or shortness of breath. She denies a history of syncopal events or palpitations.    Review of her EKGs shows NSR with LVH in 2017. Her first post-operative EKG appears to be a junctional rhythm with LBBB and heart rate of 58. Her second post-op EKG shows NSR with a 2nd degree Mobitz II block as well as LBBB. Review of her telemetry shows the Mobitz II as well as a sinus rhythm.     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    Wrist  fracture        Past Surgical History:   Procedure Laterality Date    ADENOIDECTOMY      BREAST LUMPECTOMY Left 2002    COLONOSCOPY N/A 12/2/2016    Performed by Dustin Patterson [facility] at [facility] ENDO (4TH FLR)    DEVICE ASSISTED ENTEROSCOPY-ANTEROGRADE N/A 3/20/2018    Performed by Dustin Patterson MD at [facility] ENDO (2ND FLR)    ESOPHAGOGASTRODUODENOSCOPY (EGD) N/A 9/12/2018    Performed by Dustin Patterson MD at [facility] ENDO (4TH FLR)    ESOPHAGOGASTRODUODENOSCOPY (EGD) N/A 12/2/2016    Performed by Dustin Patterson MD at [facility] ENDO (4TH FLR)    ESOPHAGOGASTRODUODENOSCOPY (EGD) N/A 4/20/2015    Per[facility] by [facility] at [facility] ([facility]LR)    EYE SURGERY Bilateral     cataracts    INJECTION-STEROID-EPIDURAL-LUMBAR N/A 6/15/2016    [facility] by [facility] at [facility]    lipoma removal  1999    MANOMETRY, ESOPHAGUS, WITH IMPEDANCE MEASUREMENT N/A 10/8/2018    Performed by Renato Maria MD at [facility] ENDO (4TH FLR)    PARATHYROIDECTOMY minimally invasive N/A 11/4/2015    Performed by Amna Aponte MD at [facility] OR 2ND FLR    TONSILLECTOMY         Review of patient's allergies indicates:   Allergen Reactions    Topamax [topiramate] Other (See Comments)     hallucinations       No current facility-administered medications on file prior to encounter.      Current Outpatient Medications on File Prior to Encounter   Medication Sig    alpha lipoic acid 300 mg Cap Take 300 mg by mouth 2 (two) times daily.     ascorbic acid (VITAMIN C) 500 MG tablet Take 1,000 mg by mouth once daily.     b complex vitamins capsule Take 1 capsule by mouth once daily.    cetirizine (ZYRTEC) 10 MG tablet Take 10 mg by mouth once daily.    CITICOLINE SODIUM (CITICOLINE ORAL) Take 1 tablet by mouth once daily at 6am.    esomeprazole (NEXIUM) 40 MG capsule Take 1 capsule (40 mg total) by mouth before breakfast.    omega-3 fatty acids-fish oil (FISH OIL) 340-1,000 mg Cap Take 1 capsule by mouth once daily.     rosuvastatin (CRESTOR) 20 MG tablet Take 1 tablet (20 mg total) by mouth once daily.    traMADol (ULTRAM) 50 mg tablet TAKE 2 TABLETS (100 MG TOTAL) BY MOUTH DAILY AS NEEDED FOR PAIN.    tretinoin (RETIN-A) 0.05 % cream Use hs    TURMERIC (CURCUMIN MISC) Take 500 mg by mouth 2 (two) times daily.     vitamin D 1000 units Tab Take 500 Units by mouth once daily. With K2 50 mch    lancets Misc 1 lancet by Misc.(Non-Drug; Combo Route) route 2 (two) times daily.    venlafaxine (EFFEXOR) 37.5 MG Tab Take 2 tablets (75 mg total) by mouth once daily.     Family History     Problem Relation (Age of Onset)    Alcohol abuse Father    Depression Mother, Sister, Daughter    Diabetes Sister    Headaches Father    Psoriasis Sister    Suicide Mother        Tobacco Use    Smoking status: Never Smoker    Smokeless tobacco: Never Used   Substance and Sexual Activity    Alcohol use: No     Comment: quit 2014 - alcohol abuse    Drug use: Yes    Sexual activity: Yes     Partners: Male     Review of Systems   Constitution: Negative for chills, diaphoresis, fever and weight loss.   HENT: Negative for congestion, hoarse voice and sore throat.    Eyes: Negative for blurred vision and double vision.   Cardiovascular: Negative for chest pain, dyspnea on exertion, irregular heartbeat, palpitations, paroxysmal nocturnal dyspnea and syncope.   Respiratory: Negative for shortness of breath.    Endocrine: Negative for cold intolerance and heat intolerance.   Hematologic/Lymphatic: Does not bruise/bleed easily.   Gastrointestinal: Negative for abdominal pain, constipation, diarrhea, nausea and vomiting.   Genitourinary: Negative for dysuria.   Neurological: Negative for focal weakness and sensory change.     Objective:     Vital Signs (Most Recent):  Temp: 96.8 °F (36 °C) (01/29/19 1521)  Pulse: 79 (01/29/19 1521)  Resp: 13 (01/29/19 1521)  BP: 115/68 (01/29/19 1521)  SpO2: (!) 90 % (01/29/19 1521) Vital Signs (24h Range):  Temp:  [96.8 °F  (36 °C)-98.8 °F (37.1 °C)] 96.8 °F (36 °C)  Pulse:  [57-98] 79  Resp:  [12-23] 13  SpO2:  [90 %-100 %] 90 %  BP: (112-133)/(55-99) 115/68     Weight: 69.9 kg (154 lb)  Body mass index is 29.1 kg/m².    SpO2: (!) 90 %  O2 Device (Oxygen Therapy): room air      Intake/Output Summary (Last 24 hours) at 1/29/2019 1742  Last data filed at 1/29/2019 1105  Gross per 24 hour   Intake 1600 ml   Output --   Net 1600 ml       Lines/Drains/Airways     Peripheral Intravenous Line                 Peripheral IV - Single Lumen 01/29/19 0810 Right Hand less than 1 day         Peripheral IV - Single Lumen 01/29/19 0845 Left Hand less than 1 day                Physical Exam   Constitutional: She is oriented to person, place, and time. She appears well-developed and well-nourished.   HENT:   Head: Normocephalic and atraumatic.   Eyes: EOM are normal.   Neck: No JVD present.   Cardiovascular: Normal rate, regular rhythm, normal heart sounds and intact distal pulses. Exam reveals no gallop and no friction rub.   No murmur heard.  Pulmonary/Chest: Effort normal and breath sounds normal. No respiratory distress.   Abdominal: Soft. Bowel sounds are normal. She exhibits no distension. There is tenderness. There is no rebound.   Musculoskeletal: Normal range of motion. She exhibits no edema.   Neurological: She is oriented to person, place, and time.   Skin: Skin is warm and dry.       Significant Labs:   Recent Labs   Lab 01/29/19  1117      K 4.1      CO2 22*   BUN 14   CREATININE 0.9   ANIONGAP 10     No results for input(s): AST, ALT, ALKPHOS, BILITOT, BILIDIR, ALBUMIN in the last 168 hours.  Recent Labs   Lab 01/29/19  1117   TROPONINI 0.008    No results for input(s): WBC, HGB, HCT, PLT, GRAN in the last 168 hours.  No results for input(s): PTT, INR in the last 168 hours.  Lab Results   Component Value Date    CHOL 168 01/10/2019    HDL 34 (L) 01/10/2019    LDLCALC 88.2 01/10/2019    TRIG 229 (H) 01/10/2019     Lab Results    Component Value Date    HGBA1C 5.6 11/14/2018          Assessment and Plan:     SVT (supraventricular tachycardia)    Rhythm not seen as strips not available so unclear what arrhythmia present during the case  Given evidence of mobitz II block would not use any sharad blockers at this time     AV block, Mobitz II    Initial rhythm appeared junctional on telemetry however second EKG revealed Mobitz II block  No electrolyte abnormalities noted  At this time patient remains asymptomatic  However given evidence of Mobitz II block would recommend evaluation by EP consult service for further assessment and to evaluate if EP study/device therapy necessitated   Would avoid all sharad blockade agents at this time   Recommend TTE with CFD     Please call on call cardiology with any concerning rhythm(profound bradycardia, high grade block, etc.) changes on telemetry         VTE Risk Mitigation (From admission, onward)        Ordered     enoxaparin injection 40 mg  Daily      01/29/19 1121     Place RICKI hose  Until discontinued      01/29/19 1121     IP VTE LOW RISK PATIENT  Once      01/29/19 0751     Place sequential compression device  Until discontinued      01/29/19 0751          Thank you for your consult.    Ace James DO  Cardiology Fellow, PGY-6    Cardiology   [facility]

## 2019-01-29 NOTE — Clinical Note
A venogram was performed in the left axillary vein. The vessel was injected with hand injection  with 5 mL of contrast.

## 2019-01-30 ENCOUNTER — ANESTHESIA EVENT (OUTPATIENT)
Dept: MEDSURG UNIT | Facility: HOSPITAL | Age: 74
DRG: 244 | End: 2019-01-30
Payer: MEDICARE

## 2019-01-30 ENCOUNTER — ANESTHESIA (OUTPATIENT)
Dept: MEDSURG UNIT | Facility: HOSPITAL | Age: 74
DRG: 244 | End: 2019-01-30
Payer: MEDICARE

## 2019-01-30 LAB
ANION GAP SERPL CALC-SCNC: 7 MMOL/L
ASCENDING AORTA: 3.14 CM
AV INDEX (PROSTH): 0.45
AV MEAN GRADIENT: 11.07 MMHG
AV PEAK GRADIENT: 17.81 MMHG
AV VALVE AREA: 1.42 CM2
AV VELOCITY RATIO: 0.58
BASOPHILS # BLD AUTO: 0.03 K/UL
BASOPHILS NFR BLD: 0.3 %
BSA FOR ECHO PROCEDURE: 1.73 M2
BUN SERPL-MCNC: 10 MG/DL
CALCIUM SERPL-MCNC: 8.3 MG/DL
CHLORIDE SERPL-SCNC: 114 MMOL/L
CO2 SERPL-SCNC: 24 MMOL/L
CREAT SERPL-MCNC: 0.7 MG/DL
CV ECHO LV RWT: 0.3 CM
DIFFERENTIAL METHOD: ABNORMAL
DOP CALC AO PEAK VEL: 2.11 M/S
DOP CALC AO VTI: 39.73 CM
DOP CALC LVOT AREA: 3.17 CM2
DOP CALC LVOT DIAMETER: 2.01 CM
DOP CALC LVOT PEAK VEL: 1.22 M/S
DOP CALC LVOT STROKE VOLUME: 56.36 CM3
DOP CALCLVOT PEAK VEL VTI: 17.77 CM
E WAVE DECELERATION TIME: 216.34 MSEC
E/A RATIO: 0.71
ECHO LV POSTERIOR WALL: 0.75 CM (ref 0.6–1.1)
EOSINOPHIL # BLD AUTO: 0 K/UL
EOSINOPHIL NFR BLD: 0.1 %
ERYTHROCYTE [DISTWIDTH] IN BLOOD BY AUTOMATED COUNT: 13.1 %
EST. GFR  (AFRICAN AMERICAN): >60 ML/MIN/1.73 M^2
EST. GFR  (NON AFRICAN AMERICAN): >60 ML/MIN/1.73 M^2
GLUCOSE SERPL-MCNC: 132 MG/DL
HCT VFR BLD AUTO: 42.9 %
HGB BLD-MCNC: 13.4 G/DL
IMM GRANULOCYTES # BLD AUTO: 0.04 K/UL
IMM GRANULOCYTES NFR BLD AUTO: 0.4 %
INTERVENTRICULAR SEPTUM: 0.94 CM (ref 0.6–1.1)
IVRT: 0.09 MSEC
LA MAJOR: 5.3 CM
LA MINOR: 5.3 CM
LA WIDTH: 3.99 CM
LEFT ATRIUM SIZE: 4 CM
LEFT ATRIUM VOLUME INDEX: 42.5 ML/M2
LEFT ATRIUM VOLUME: 71.9 CM3
LEFT VENTRICLE DIASTOLIC VOLUME INDEX: 69.84 ML/M2
LEFT VENTRICLE DIASTOLIC VOLUME: 118.07 ML
LEFT VENTRICLE MASS INDEX: 86.2 G/M2
LEFT VENTRICLE SYSTOLIC VOLUME INDEX: 17.6 ML/M2
LEFT VENTRICLE SYSTOLIC VOLUME: 29.69 ML
LEFT VENTRICULAR INTERNAL DIMENSION IN DIASTOLE: 5 CM (ref 3.5–6)
LEFT VENTRICULAR MASS: 145.72 G
LYMPHOCYTES # BLD AUTO: 1.5 K/UL
LYMPHOCYTES NFR BLD: 13 %
MAGNESIUM SERPL-MCNC: 1.9 MG/DL
MCH RBC QN AUTO: 30.7 PG
MCHC RBC AUTO-ENTMCNC: 31.2 G/DL
MCV RBC AUTO: 98 FL
MONOCYTES # BLD AUTO: 1.2 K/UL
MONOCYTES NFR BLD: 10.3 %
MV PEAK A VEL: 1.17 M/S
MV PEAK E VEL: 0.83 M/S
NEUTROPHILS # BLD AUTO: 8.7 K/UL
NEUTROPHILS NFR BLD: 75.9 %
NRBC BLD-RTO: 0 /100 WBC
PHOSPHATE SERPL-MCNC: 3.6 MG/DL
PISA TR MAX VEL: 2.48 M/S
PLATELET # BLD AUTO: 156 K/UL
PMV BLD AUTO: 11.2 FL
POCT GLUCOSE: 109 MG/DL (ref 70–110)
POTASSIUM SERPL-SCNC: 4.3 MMOL/L
PULM VEIN S/D RATIO: 2.38
PV PEAK D VEL: 0.29 M/S
PV PEAK S VEL: 0.69 M/S
RA MAJOR: 5 CM
RA PRESSURE: 3 MMHG
RA WIDTH: 3.42 CM
RBC # BLD AUTO: 4.36 M/UL
RETIRED EF AND QEF - SEE NOTES: 38 %
RIGHT VENTRICULAR END-DIASTOLIC DIMENSION: 3.3 CM
SINUS: 2.96 CM
SODIUM SERPL-SCNC: 145 MMOL/L
STJ: 2.39 CM
TR MAX PG: 24.6 MMHG
TRICUSPID ANNULAR PLANE SYSTOLIC EXCURSION: 2.16 CM
TV REST PULMONARY ARTERY PRESSURE: 28 MMHG
WBC # BLD AUTO: 11.39 K/UL

## 2019-01-30 PROCEDURE — 63600175 PHARM REV CODE 636 W HCPCS: Mod: HCNC | Performed by: NURSE ANESTHETIST, CERTIFIED REGISTERED

## 2019-01-30 PROCEDURE — 63600175 PHARM REV CODE 636 W HCPCS: Mod: HCNC | Performed by: INTERNAL MEDICINE

## 2019-01-30 PROCEDURE — 85025 COMPLETE CBC W/AUTO DIFF WBC: CPT | Mod: HCNC

## 2019-01-30 PROCEDURE — C1887 CATHETER, GUIDING: HCPCS | Mod: HCNC | Performed by: INTERNAL MEDICINE

## 2019-01-30 PROCEDURE — 12000002 HC ACUTE/MED SURGE SEMI-PRIVATE ROOM: Mod: HCNC

## 2019-01-30 PROCEDURE — 63600175 PHARM REV CODE 636 W HCPCS: Mod: HCNC | Performed by: SURGERY

## 2019-01-30 PROCEDURE — 33208 INSRT HEART PM ATRIAL & VENT: CPT | Mod: KX,HCNC | Performed by: INTERNAL MEDICINE

## 2019-01-30 PROCEDURE — C1730 CATH, EP, 19 OR FEW ELECT: HCPCS | Mod: HCNC | Performed by: INTERNAL MEDICINE

## 2019-01-30 PROCEDURE — 99219 PR INITIAL OBSERVATION CARE,LEVL II: CPT | Mod: HCNC,GC,, | Performed by: INTERNAL MEDICINE

## 2019-01-30 PROCEDURE — 84100 ASSAY OF PHOSPHORUS: CPT | Mod: HCNC

## 2019-01-30 PROCEDURE — 83735 ASSAY OF MAGNESIUM: CPT | Mod: HCNC

## 2019-01-30 PROCEDURE — 93010 EKG 12-LEAD: ICD-10-PCS | Mod: HCNC,59,, | Performed by: INTERNAL MEDICINE

## 2019-01-30 PROCEDURE — 33225 PR INSRT PACING ELECT,AT INSERT NEW DEVICE: ICD-10-PCS | Mod: HCNC,,, | Performed by: INTERNAL MEDICINE

## 2019-01-30 PROCEDURE — 93005 ELECTROCARDIOGRAM TRACING: CPT | Mod: HCNC

## 2019-01-30 PROCEDURE — C2621 PMKR, OTHER THAN SING/DUAL: HCPCS | Mod: HCNC | Performed by: INTERNAL MEDICINE

## 2019-01-30 PROCEDURE — D9220A PRA ANESTHESIA: Mod: HCNC,ANES,, | Performed by: ANESTHESIOLOGY

## 2019-01-30 PROCEDURE — C1893 INTRO/SHEATH, FIXED,NON-PEEL: HCPCS | Mod: HCNC | Performed by: INTERNAL MEDICINE

## 2019-01-30 PROCEDURE — C1898 LEAD, PMKR, OTHER THAN TRANS: HCPCS | Mod: HCNC | Performed by: INTERNAL MEDICINE

## 2019-01-30 PROCEDURE — 36415 COLL VENOUS BLD VENIPUNCTURE: CPT | Mod: HCNC

## 2019-01-30 PROCEDURE — 33208 PR INSER HART PACER XVENOUS ATR/VENTR: ICD-10-PCS | Mod: KX,HCNC,, | Performed by: INTERNAL MEDICINE

## 2019-01-30 PROCEDURE — 33225 L VENTRIC PACING LEAD ADD-ON: CPT | Mod: HCNC,,, | Performed by: INTERNAL MEDICINE

## 2019-01-30 PROCEDURE — 93010 ELECTROCARDIOGRAM REPORT: CPT | Mod: HCNC,59,, | Performed by: INTERNAL MEDICINE

## 2019-01-30 PROCEDURE — 27201423 OPTIME MED/SURG SUP & DEVICES STERILE SUPPLY: Mod: HCNC | Performed by: INTERNAL MEDICINE

## 2019-01-30 PROCEDURE — 37000009 HC ANESTHESIA EA ADD 15 MINS: Mod: HCNC | Performed by: INTERNAL MEDICINE

## 2019-01-30 PROCEDURE — 99223 PR INITIAL HOSPITAL CARE,LEVL III: ICD-10-PCS | Mod: HCNC,,, | Performed by: INTERNAL MEDICINE

## 2019-01-30 PROCEDURE — C1894 INTRO/SHEATH, NON-LASER: HCPCS | Mod: HCNC | Performed by: INTERNAL MEDICINE

## 2019-01-30 PROCEDURE — D9220A PRA ANESTHESIA: ICD-10-PCS | Mod: HCNC,ANES,, | Performed by: ANESTHESIOLOGY

## 2019-01-30 PROCEDURE — 82962 GLUCOSE BLOOD TEST: CPT | Mod: HCNC | Performed by: INTERNAL MEDICINE

## 2019-01-30 PROCEDURE — 25000003 PHARM REV CODE 250: Mod: HCNC | Performed by: INTERNAL MEDICINE

## 2019-01-30 PROCEDURE — C9113 INJ PANTOPRAZOLE SODIUM, VIA: HCPCS | Mod: HCNC | Performed by: SURGERY

## 2019-01-30 PROCEDURE — C1769 GUIDE WIRE: HCPCS | Mod: HCNC | Performed by: INTERNAL MEDICINE

## 2019-01-30 PROCEDURE — 80048 BASIC METABOLIC PNL TOTAL CA: CPT | Mod: HCNC

## 2019-01-30 PROCEDURE — 99219 PR INITIAL OBSERVATION CARE,LEVL II: ICD-10-PCS | Mod: HCNC,GC,, | Performed by: INTERNAL MEDICINE

## 2019-01-30 PROCEDURE — 99223 1ST HOSP IP/OBS HIGH 75: CPT | Mod: HCNC,,, | Performed by: INTERNAL MEDICINE

## 2019-01-30 PROCEDURE — 25000003 PHARM REV CODE 250: Mod: HCNC | Performed by: SURGERY

## 2019-01-30 PROCEDURE — 25000003 PHARM REV CODE 250: Mod: HCNC | Performed by: NURSE ANESTHETIST, CERTIFIED REGISTERED

## 2019-01-30 PROCEDURE — 33208 INSRT HEART PM ATRIAL & VENT: CPT | Mod: KX,HCNC,, | Performed by: INTERNAL MEDICINE

## 2019-01-30 PROCEDURE — C1900 LEAD, CORONARY VENOUS: HCPCS | Mod: HCNC | Performed by: INTERNAL MEDICINE

## 2019-01-30 PROCEDURE — 33225 L VENTRIC PACING LEAD ADD-ON: CPT | Mod: HCNC | Performed by: INTERNAL MEDICINE

## 2019-01-30 PROCEDURE — 97162 PT EVAL MOD COMPLEX 30 MIN: CPT | Mod: HCNC

## 2019-01-30 PROCEDURE — 37000008 HC ANESTHESIA 1ST 15 MINUTES: Mod: HCNC | Performed by: INTERNAL MEDICINE

## 2019-01-30 DEVICE — CARDIAC RESYNCHRONIZATION THERAPY DEVICE, PULSE GENERATOR DDDRV
Type: IMPLANTABLE DEVICE | Site: HEART | Status: FUNCTIONAL
Brand: QUADRA ALLURE MP™

## 2019-01-30 DEVICE — PACING LEAD
Type: IMPLANTABLE DEVICE | Site: HEART | Status: FUNCTIONAL
Brand: TENDRIL™

## 2019-01-30 DEVICE — LEFT HEART LEAD
Type: IMPLANTABLE DEVICE | Site: HEART | Status: FUNCTIONAL
Brand: QUARTET™

## 2019-01-30 RX ORDER — CEFAZOLIN SODIUM 1 G/3ML
2 INJECTION, POWDER, FOR SOLUTION INTRAMUSCULAR; INTRAVENOUS ONCE
Status: DISCONTINUED | OUTPATIENT
Start: 2019-01-30 | End: 2019-01-30

## 2019-01-30 RX ORDER — BUPIVACAINE HYDROCHLORIDE 2.5 MG/ML
INJECTION, SOLUTION EPIDURAL; INFILTRATION; INTRACAUDAL
Status: DISCONTINUED | OUTPATIENT
Start: 2019-01-30 | End: 2019-01-31 | Stop reason: HOSPADM

## 2019-01-30 RX ORDER — PROPOFOL 10 MG/ML
VIAL (ML) INTRAVENOUS
Status: DISCONTINUED | OUTPATIENT
Start: 2019-01-30 | End: 2019-01-30

## 2019-01-30 RX ORDER — LIDOCAINE HCL/PF 100 MG/5ML
SYRINGE (ML) INTRAVENOUS
Status: DISCONTINUED | OUTPATIENT
Start: 2019-01-30 | End: 2019-01-30

## 2019-01-30 RX ORDER — ONDANSETRON 2 MG/ML
INJECTION INTRAMUSCULAR; INTRAVENOUS
Status: DISCONTINUED | OUTPATIENT
Start: 2019-01-30 | End: 2019-01-30

## 2019-01-30 RX ORDER — FENTANYL CITRATE 50 UG/ML
INJECTION, SOLUTION INTRAMUSCULAR; INTRAVENOUS
Status: DISCONTINUED | OUTPATIENT
Start: 2019-01-30 | End: 2019-01-30

## 2019-01-30 RX ORDER — LIDOCAINE HYDROCHLORIDE 20 MG/ML
INJECTION, SOLUTION INFILTRATION; PERINEURAL
Status: DISCONTINUED | OUTPATIENT
Start: 2019-01-30 | End: 2019-01-31 | Stop reason: HOSPADM

## 2019-01-30 RX ORDER — EPHEDRINE SULFATE 50 MG/ML
INJECTION, SOLUTION INTRAVENOUS
Status: DISCONTINUED | OUTPATIENT
Start: 2019-01-30 | End: 2019-01-30

## 2019-01-30 RX ORDER — ACETAMINOPHEN 325 MG/1
650 TABLET ORAL EVERY 4 HOURS PRN
Status: DISCONTINUED | OUTPATIENT
Start: 2019-01-30 | End: 2019-01-31 | Stop reason: HOSPADM

## 2019-01-30 RX ORDER — PROPOFOL 10 MG/ML
VIAL (ML) INTRAVENOUS CONTINUOUS PRN
Status: DISCONTINUED | OUTPATIENT
Start: 2019-01-30 | End: 2019-01-30

## 2019-01-30 RX ORDER — CEFAZOLIN SODIUM 1 G/3ML
INJECTION, POWDER, FOR SOLUTION INTRAMUSCULAR; INTRAVENOUS
Status: DISCONTINUED | OUTPATIENT
Start: 2019-01-30 | End: 2019-01-30

## 2019-01-30 RX ORDER — PHENYLEPHRINE HYDROCHLORIDE 10 MG/ML
INJECTION INTRAVENOUS
Status: DISCONTINUED | OUTPATIENT
Start: 2019-01-30 | End: 2019-01-30

## 2019-01-30 RX ORDER — CEFAZOLIN SODIUM 1 G/3ML
1 INJECTION, POWDER, FOR SOLUTION INTRAMUSCULAR; INTRAVENOUS
Status: DISCONTINUED | OUTPATIENT
Start: 2019-01-30 | End: 2019-01-31 | Stop reason: HOSPADM

## 2019-01-30 RX ORDER — HYDROMORPHONE HYDROCHLORIDE 1 MG/ML
0.2 INJECTION, SOLUTION INTRAMUSCULAR; INTRAVENOUS; SUBCUTANEOUS EVERY 5 MIN PRN
Status: DISCONTINUED | OUTPATIENT
Start: 2019-01-30 | End: 2019-01-31 | Stop reason: HOSPADM

## 2019-01-30 RX ORDER — MIDAZOLAM HYDROCHLORIDE 1 MG/ML
INJECTION, SOLUTION INTRAMUSCULAR; INTRAVENOUS
Status: DISCONTINUED | OUTPATIENT
Start: 2019-01-30 | End: 2019-01-30

## 2019-01-30 RX ORDER — SODIUM CHLORIDE 0.9 % (FLUSH) 0.9 %
3 SYRINGE (ML) INJECTION
Status: DISCONTINUED | OUTPATIENT
Start: 2019-01-30 | End: 2019-01-31 | Stop reason: HOSPADM

## 2019-01-30 RX ADMIN — Medication: at 07:01

## 2019-01-30 RX ADMIN — ACETAMINOPHEN 1000 MG: 10 INJECTION, SOLUTION INTRAVENOUS at 03:01

## 2019-01-30 RX ADMIN — EPHEDRINE SULFATE 5 MG: 50 INJECTION, SOLUTION INTRAMUSCULAR; INTRAVENOUS; SUBCUTANEOUS at 12:01

## 2019-01-30 RX ADMIN — PANTOPRAZOLE SODIUM 40 MG: 40 INJECTION, POWDER, FOR SOLUTION INTRAVENOUS at 11:01

## 2019-01-30 RX ADMIN — PANTOPRAZOLE SODIUM 40 MG: 40 INJECTION, POWDER, FOR SOLUTION INTRAVENOUS at 09:01

## 2019-01-30 RX ADMIN — FENTANYL CITRATE 25 MCG: 50 INJECTION, SOLUTION INTRAMUSCULAR; INTRAVENOUS at 02:01

## 2019-01-30 RX ADMIN — PROPOFOL 100 MCG/KG/MIN: 10 INJECTION, EMULSION INTRAVENOUS at 12:01

## 2019-01-30 RX ADMIN — SODIUM CHLORIDE: 0.9 INJECTION, SOLUTION INTRAVENOUS at 03:01

## 2019-01-30 RX ADMIN — SODIUM CHLORIDE, SODIUM GLUCONATE, SODIUM ACETATE, POTASSIUM CHLORIDE, MAGNESIUM CHLORIDE, SODIUM PHOSPHATE, DIBASIC, AND POTASSIUM PHOSPHATE: .53; .5; .37; .037; .03; .012; .00082 INJECTION, SOLUTION INTRAVENOUS at 12:01

## 2019-01-30 RX ADMIN — HUMAN ALBUMIN MICROSPHERES AND PERFLUTREN 0.66 MG: 10; .22 INJECTION, SOLUTION INTRAVENOUS at 08:01

## 2019-01-30 RX ADMIN — ACETAMINOPHEN 650 MG: 325 TABLET, FILM COATED ORAL at 10:01

## 2019-01-30 RX ADMIN — FENTANYL CITRATE 25 MCG: 50 INJECTION, SOLUTION INTRAMUSCULAR; INTRAVENOUS at 01:01

## 2019-01-30 RX ADMIN — ONDANSETRON 4 MG: 2 INJECTION INTRAMUSCULAR; INTRAVENOUS at 02:01

## 2019-01-30 RX ADMIN — CEFAZOLIN 1 G: 330 INJECTION, POWDER, FOR SOLUTION INTRAMUSCULAR; INTRAVENOUS at 09:01

## 2019-01-30 RX ADMIN — PHENYLEPHRINE HYDROCHLORIDE 100 MCG: 10 INJECTION INTRAVENOUS at 12:01

## 2019-01-30 RX ADMIN — SODIUM CHLORIDE: 0.9 INJECTION, SOLUTION INTRAVENOUS at 10:01

## 2019-01-30 RX ADMIN — LIDOCAINE HYDROCHLORIDE 50 MG: 20 INJECTION, SOLUTION INTRAVENOUS at 12:01

## 2019-01-30 RX ADMIN — ACETAMINOPHEN 1000 MG: 10 INJECTION, SOLUTION INTRAVENOUS at 05:01

## 2019-01-30 RX ADMIN — CEFAZOLIN 2 G: 330 INJECTION, POWDER, FOR SOLUTION INTRAMUSCULAR; INTRAVENOUS at 12:01

## 2019-01-30 RX ADMIN — PROPOFOL 10 MG: 10 INJECTION, EMULSION INTRAVENOUS at 02:01

## 2019-01-30 RX ADMIN — MIDAZOLAM 2 MG: 1 INJECTION INTRAMUSCULAR; INTRAVENOUS at 12:01

## 2019-01-30 RX ADMIN — SODIUM CHLORIDE: 0.9 INJECTION, SOLUTION INTRAVENOUS at 11:01

## 2019-01-30 RX ADMIN — PROPOFOL 30 MG: 10 INJECTION, EMULSION INTRAVENOUS at 12:01

## 2019-01-30 NOTE — NURSING
Patient identified by 2 identifiers. Denies previous reactions to blood transfusions, allergies reviewed & procedure explained.  18 g IV in place to Lt Hand, flushed w/ 10cc NS pre & post contrast administration.  3cc Optison administered, echo images obtained.  Pt tolerated procedure well.

## 2019-01-30 NOTE — PT/OT/SLP EVAL
"Physical Therapy  Evaluation and Discharge Note    Patient Name:  Paris Burris   MRN:  [id number]    Recommendations:     Discharge Recommendations: home health PT/OT    Discharge Equipment Recommendations: none     Barriers to discharge: None    Assessment:     Paris Burris is a 73 y.o. female admitted to [facility] on 1/29/19 for hernia repair. She tolerated evaluation well this morning. Had just returned from echo/stress test, she was able to ambulate from stretcher in hallway back to her bed (20 ft) with stand-by assistance. She refused longer walk in hallway due to feeling uneasy at her stomach, requesting some time to rest with plan for PT to check back at 1330 for longer ambulation trial. Upon my return, patient was off to floor for what appears to be a pacemaker per chart review. I will discharge PT orders and patient from PT services due to pacemaker placement. Recommend that primary team or cardiology team places new PT/OT orders for re-assessment s/p pacemaker placement. Will now discharge from acute PT services.    Recent Surgery: Procedure(s) (LRB):  INSERTION, CARDIAC PACEMAKER, BIVENTRICULAR (N/A) Day of Surgery    Plan:     Discharge from acute PT services due to change in medical status. She went for pacemaker placement after this evaluation, which requires a discharge of PT orders and new orders placed for re-assessment.     Subjective     Chief Complaint: uneasy at her abdomen  Patient/Family Comments/goals: Patient: "I don't think I could walk in the hallway right now because my stomach is uneasy. I want to try through. If you come back this afternoon, I'll absolutely do it."    Pain/Comfort:  · Pain Rating 1: 2/10  · Location - Orientation 1: generalized  · Location 1: abdomen  · Pain Addressed 1: Reposition, Distraction, Cessation of Activity  · Pain Rating Post-Intervention 1: 2/10    Patients cultural, spiritual, Yarsanism conflicts given the current situation: no    Living Environment:  Pt " lives with her spouse in a 2  with 0 AMBER; bed/bath upstairs, typical 10-12 steps with (L) HR.    PLOF:  Prior to admission, patients level of function was independent with mobility and ADL's.    DME:  Equipment used at home: none.  DME owned (not currently used): none.  Upon discharge, patient will have assistance from spouse.    Objective:     Communicated with RN prior to session.  Patient found supine on stretcher returning from echo/stress test upon PT attempt to evaluate;  found with: peripheral IV, PCA, telemetry     General Precautions: Standard, fall, cardiac   Orthopedic Precautions:N/A   Braces: N/A     Exams:  · Cognitive Exam:  Patient is oriented to Person, Place, Time and Situation    · RLE ROM: WFL  · RLE Strength: WFL    · LLE ROM: WFL  · LLE Strength: WFL    Functional Mobility:    · Bed Mobility:  · Supine to Sit: supervision from stretcher  · Sit to Supine: stand by assistance with HOB elevated    · Transfers  · Sit to Stand:  supervision with no AD x 1 trial from stretcher without device    · Gait:  · 20 ft from stretcher in hallway back to bed with stand-by assist of therapist, no device utilized. Distance limited by pt stating her stomach feels uneasy, nauseated    · Balance:  · Static Sit: independent at EOB  · Static Stand: supervision without device    AM-PAC 6 CLICK MOBILITY  Total Score:23     Patient left supine with all lines intact, call button in reach and spouse present.    GOALS:   Multidisciplinary Problems     Physical Therapy Goals        Problem: Physical Therapy Goal    Goal Priority Disciplines Outcome Goal Variances Interventions   Physical Therapy Goal     PT, PT/OT      Description:  Patient was evaluated by PT on 1/30/19. Later in day she went to EP for pacemaker placement. Plan to d/c orders and once MD places re-evaluation orders, PT to follow-up with patient for mobility.                  History:     Past Medical History:   Diagnosis Date    Allergy     Anemia      Anxiety     Breast cancer 2002    Left breast    Cancer 2002    L breast s/p lumpectomy    Decreased hearing     Depression     Diabetes mellitus     Diabetes with neurologic complications     Gastric ulcer 9/10/13    EGD    Hiatal hernia     Hyperlipidemia     Migraine headache     Migraine headache 3/20/2015    Multiple gastric ulcers     Parathyroid disorder     Pre-diabetes     Renal manifestation of secondary diabetes mellitus     Sleep apnea     no CPAP    Type 2 diabetes mellitus, controlled, 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    Wrist fracture      Past Surgical History:   Procedure Laterality Date    ADENOIDECTOMY      BREAST LUMPECTOMY Left 2002    COLONOSCOPY N/A 12/2/2016    Performed by Dustin Patterson [facility] at [facility] ENDO (4TH FLR)    DEVICE ASSISTED ENTEROSCOPY-ANTEROGRADE N/A 3/20/2018    Performed by Dustin Patterson MD at [facility] ENDO (2ND FLR)    EGD (ESOPHAGOGASTRODUODENOSCOPY) intraop N/A 1/29/2019    Performed by Renato Perez Jr., MD at [facility] OR 2ND FLR    ESOPHAGOGASTRODUODENOSCOPY (EGD) N/A 9/12/2018    Performed by Dustin Patterson MD at [facility] ENDO (4TH FLR)    ESOPHAGOGASTRODUODENOSCOPY (EGD) N/A 12/2/2016    Performed by Dustin Patterson MD at [facility] ENDO (4TH FLR)    ESOPHAGOGASTRODUODENOSCOPY (EGD) N/A 4/20/2015    Performed by Marcial Dewitt MD at [facility] ENDO (4TH FLR)    EYE SURGERY Bilateral     cataracts    FUNDOPLICATION, LAPAROSCOPIC, TOUPET N/A 1/29/2019    Performed by Renato Perez Jr., MD at [facility] OR 2ND FLR    INJECTION-STEROID-EPIDURAL-LUMBAR N/A 6/15/2016    Performed by Michelle [facility] MD at [facility] PAIN MGT    lipoma removal  1999    MANOMETRY, ESOPHAGUS, WITH IMPEDANCE MEASUREMENT N/A 10/8/2018    Performed by Renato Maria MD at [facility] ENDO (4TH FLR)    PARATHYROIDECTOMY minimally invasive N/A 11/4/2015    Performed by Amna Aponte MD at [facility] OR 2ND FLR    REPAIR, HERNIA, HIATAL, LAPAROSCOPIC with Mesh N/A 1/29/2019    Performed by  Renato Perez Jr., [facility] at [facility] OR [facility] F[facility]    TONSILLECTOMY       Clinical Decision Making:     History  Co-morbidities and personal factors that may impact the plan of care Examination  Body Structures and Functions, activity limitations and participation restrictions that may impact the plan of care Clinical Presentation   Decision Making/ Complexity Score   Co-morbidities:   [] Time since onset of injury / illness / exacerbation  [] Status of current condition  []Patient's cognitive status and safety concerns    [] Multiple Medical Problems (see med hx)  Personal Factors:   [] Patient's age  [] Prior Level of function   [x] Patient's home situation (environment and family support)  [] Patient's level of motivation  [] Expected progression of patient      HISTORY:(criteria)    [] 08517 - no personal factors/history    [x] 72769 - has 1-2 personal factor/comorbidity     [] 94545 - has >3 personal factor/comorbidity     Body Regions:  [x] Objective examination findings  [] Head     []  Neck  [] Trunk   [] Upper Extremity  [] Lower Extremity    Body Systems:  [] For communication ability, affect, cognition, language, and learning style: the assessment of the ability to make needs known, consciousness, orientation (person, place, and time), expected emotional /behavioral responses, and learning preferences (eg, learning barriers, education  needs)  [x] For the neuromuscular system: a general assessment of gross coordinated movement (eg, balance, gait, locomotion, transfers, and transitions) and motor function  (motor control and motor learning)  [x] For the musculoskeletal system: the assessment of gross symmetry, gross range of motion, gross strength, height, and weight  [] For the integumentary system: the assessment of pliability(texture), presence of scar formation, skin color, and skin integrity  [x] For cardiovascular/pulmonary system: the assessment of heart rate, respiratory rate, blood pressure, and  edema     Activity limitations:    [] Patient's cognitive status and saf ety concerns          [x] Status of current condition      [] Weight bearing restriction  [] Cardiopulmunary Restriction    Participation Restrictions:   [] Goals and goal agreement with the patient     [] Rehab potential (prognosis) and probable outcome      Examination of Body System: (criteria)    [] 48383 - addressing 1-2 elements    [] 69233 - addressing a total of 3 or more elements     [] 25332 -  Addressing a total of 4 or more elements         Clinical Presentation: (criteria)  Evolving - 36621     On examination of body system using standardized tests and measures patient presents with 3 or more elements from any of the following: body structures and functions, activity limitations, and/or participation restrictions.  Leading to a clinical presentation that is considered evolving with changing characteristics                              Clinical Decision Making  (Eval Complexity):  Moderate - 79279     Time Tracking:     PT Received On: 01/30/19  PT Start Time: 1030     PT Stop Time: 1042  PT Total Time (min): 12 min     Billable Minutes: Evaluation 12    Kade Brewer, PT  01/30/2019

## 2019-01-30 NOTE — OP NOTE
DATE OF PROCEDURE: 01/29/2019   SERVICE: Bariatric Surgery.   Surgeon(s) and Role:     * Renato Perez Jr., MD - Primary     * Dayo Dickinson Jr., MD - Resident - Assisting  PREOPERATIVE DIAGNOSES: Hiatal Hernia and significant   gastroesophageal reflux disease.  POSTOPERATIVE DIAGNOSES: Hiatal Hernia and significant   gastroesophageal reflux disease.   PROCEDURE: Laparoscopic repair of hiatal hernia with mesh and  Nissen fundoplication and EGD.  ANESTHESIA: General endotracheal and local.   DESCRIPTION OF PROCEDURE: The patient was taken to the Operating Room and   placed under general anesthesia and prepped and draped in sterile fashion. At   this time, an incision was made 15 cm from the xiphoid, 5 cm to the left of   midline after infiltrating with local anesthetic. Using the Optiview trocar,   intraabdominal access was obtained under direct visualization without   difficulty. Pneumoperitoneum was obtained. Further ports were then placed   including bilateral anterior axillary subcostal 5-mm ports, a midclavicular   subcostal port on the right. These were all 5-mm ports and a second 12-mm port   approximately 15 cm from the xiphoid just to the right of midline. These were   all placed under direct visualization after infiltrating with local anesthetic.  A liver retractor was placed and the patient was placed in a steep reverse Trendelenburg and we began dissection. We began at the pars flaccida and opened the to the right felisha.    We dissected the right felisha and continued anterior dissection including the phrenoesophageal ligament with Harmonic scalpel and dissected anteriorly around the felisha and laterally as well on the left side.  In order to get the left side down and for formation of our wrap we proceeded to take down the attatchments on the greater curve of the stomach including the short gastric vessels for some distance down the curve.  We dissected the hiatal opening posteriorly again with  Harmonic scalpel. We proceeded to carefully dissect this until the stomach was completely reduced. We completed circumferential dissection and placed a vesi loop at the GE junction.  We used this for retraction and continued dissection on the mediastinum to allow for further intra abdominal esophageal length.  Once we completed reduction of the hernia and noted that there was significant   intraabdominal length of the esophagus with no tension pulling this into the   abdominal cavity and approximately 3 to 4 cm of   intra-abdominal esophagus. Once we noted that we had good intraabdominal   esophagus, we proceeded with the closure of the hiatal hernia. This was done   with 2-0 Surgidac suture on an EndoStitch device and with Bio-A pledgets and   closing the posterior aspect with 5 sutures. At this time, a 56-Kazakh bougie   was placed and it was noted to be closed around the bougie without difficulty.   There was enough room for a grasper but it was not noted to be tight. Once this was closed, we proceeded with placement of the mesh. A piece of BIO-A   hiatal hernia mesh was placed retro-esophageally after being cut to size and was   tacked in place using 2-0 Surgidac suture x 3 on the felisha on either side and   posteriorly to cover the hiatal closure. This completed closure of the   hiatus and placement of mesh along with reduction of the hernia  Once this was closed we proceeded with a Toupet fundoplication. At this time, the fundus was brought back posteriorly retro-esophageally and around the 56-Kazakh bougie, we wrapped the stomach. At this time, we took fundus on the left side to felisha to esophagus at the 2 o'clock position beginning the toupet. We then completed two further sutures approximately 1cm apart at the 2 o'clock position. We then proceeded to complete this on the right with the retroesophageal portion of stomach taking a bite of  fundus on the right side to felisha to esophagus at the 10 o'clock position.  We then completed two further sutures approximately 1cm apart at the 10 o'clock position. This completed a 3cm 270 degree posterior wrap around the 56-Serbian bougie.  Once   we completed, the wrap appeared to be in very good condition and we proceeded   with an EGD. The scope was placed in the oropharynx, got down into the   esophagus into the stomach through the wrap. We noted that the GE   junction was able to be easily traversed without significant tightness around   the scope. A retroflexion view showed a good wrap in good condition. Once this   was done, we suctioned the air from the stomach and reinspected   laparoscopically. The wrap was in good condition from both the EGD and   laparoscopic view and we proceeded with closure. At this time, the liver   retractor was removed.  The ports were removed and the skin of all five port sites were closed with 4-0 Monocryl suture in a subcuticular fashion. Mastisol and Steri-Strips were   placed. The patient was allowed to awake from general anesthesia and   transferred to bed for transfer to Recovery.   COMPLICATIONS: None.   SPONGE COUNT: Correct.   BLOOD LOSS: 15 mL.   FLUIDS: Per Anesthesia.   BLOOD GIVEN: None.   DRAINS: None.   SPECIMENS: None  CONDITION OF PATIENT: Good.   I was present for the entire procedure.

## 2019-01-30 NOTE — ANESTHESIA POSTPROCEDURE EVALUATION
"Anesthesia Post Evaluation    Patient: Paris Burris    Procedure(s) Performed: Procedure(s) (LRB):  REPAIR, HERNIA, HIATAL, LAPAROSCOPIC with Mesh (N/A)  FUNDOPLICATION, LAPAROSCOPIC, TOUPET (N/A)  EGD (ESOPHAGOGASTRODUODENOSCOPY) intraop (N/A)    Final Anesthesia Type: general  Patient location during evaluation: PACU  Patient participation: Yes- Able to Participate  Level of consciousness: oriented and awake and alert  Post-procedure vital signs: reviewed and stable  Pain management: adequate  Airway patency: patent  PONV status at discharge: No PONV  Anesthetic complications: yes  Perioperative Events: arrhythmias requiring treatment  Albertina-operative Events Comments: Intraoperative SVT controlled with beta blockers.  Later in case developed LBBB.  Cardiovascular status: hemodynamically stable  Respiratory status: spontaneous ventilation  Hydration status: euvolemic  Follow-up needed         Visit Vitals  /68   Pulse 79   Temp 36 °C (96.8 °F) (Oral)   Resp 13   Ht 5' 1" (1.549 m)   Wt 69.9 kg (154 lb)   SpO2 (!) 90%   Breastfeeding? No   BMI 29.10 kg/m²       Pain/Nilsa Score: Pain Rating Prior to Med Admin: 0 (1/29/2019 11:34 AM)  Nilsa Score: 10 (1/29/2019  2:00 PM)        "

## 2019-01-30 NOTE — TRANSFER OF CARE
"Anesthesia Transfer of Care Note    Patient: Paris Burris    Procedure(s) Performed: Procedure(s) (LRB):  INSERTION, CARDIAC PACEMAKER, BIVENTRICULAR (N/A)    Patient location: PACU    Anesthesia Type: general    Transport from OR: Transported from OR on 6-10 L/min O2 by face mask with adequate spontaneous ventilation    Post pain: adequate analgesia    Post assessment: no apparent anesthetic complications and tolerated procedure well    Post vital signs: stable    Level of consciousness: awake and alert    Nausea/Vomiting: no nausea/vomiting    Complications: none    Transfer of care protocol was followed      Last vitals:   Visit Vitals  BP (!) 112/57 (BP Location: Left arm, Patient Position: Lying)   Pulse 103   Temp 36.8 °C (98.2 °F) (Oral)   Resp 14   Ht 5' 1" (1.549 m)   Wt 69.9 kg (154 lb)   SpO2 (!) 93%   Breastfeeding? No   BMI 29.10 kg/m²     "

## 2019-01-30 NOTE — ANESTHESIA PREPROCEDURE EVALUATION
01/30/2019  Paris Burris is a 73 y.o., female s/p laparascopic surgery for hiatal hernia.  Developed 2nd degree heart block post op and now scheduled for pacemaker.  During the case pt had runs of SVT which responded to IV esmolol.    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    Wrist fracture      Past Surgical History:   Procedure Laterality Date    ADENOIDECTOMY      BREAST LUMPECTOMY Left 2002    COLONOSCOPY N/A 12/2/2016    [facility]formed by [facility] at [facility] END[facility] (4TH FLR)    DEVICE ASSISTED ENTEROSCOPY-ANTEROGRADE N/A 3/20/2018    Performed by [facility]in Patterson MD at [facility] END[facility] (2ND FLR)    EGD (ESOPHAGOGASTRODUODENOSCOPY) intraop N/A 1/29/2019    Performed by Renato Perez Jr., MD at [facility] OR 2ND FLR    ESOPHAGOGASTRODUODENOSCOPY (EGD) N/A 9/12/2018    Performed by [facility]in [facility] at [facility] END[facility] (4TH FLR)    ESOPHAGOGASTRODUODENOSCOPY (EGD) N/A 12/2/2016    Performed by [facility]in [facility] at [facility] (4TH FLR)    ESOPHAGOGASTRODUODENOSCOPY (EGD) N/A 4/20/2015    Performed by Marcial Dewitt MD at [facility] END[facility] (4TH FLR)    EYE SURGERY Bilateral     cataracts    FUNDOPLICATION, LAPAROSCOPIC, TOUPET N/A 1/29/2019    Performed by Renato Perez Jr[facility] at [facility] OR 2ND FLR     INJECTION-STEROID-EPIDURAL-LUMBAR N/A 6/15/2016    Performed by [facility] at [facility]    lipoma removal  1999    MANOMETRY, ESOPHAGUS, WITH IMPEDANCE MEASUREMENT N/A 10/8/2018    Performed by Renato Maria MD at [facility] ENDO (4TH FLR)    PARATHYROIDECTOMY minimally invasive N/A 11/4/2015    Performed by Amna Aponte MD at [facility] OR 2ND FLR    REPAIR, HERNIA, HIATAL, LAPAROSCOPIC with Mesh N/A 1/29/2019    Performed by Renato Perez Jr., MD at [facility] OR 2ND FLR    TONSILLECTOMY       Review of patient's allergies indicates:   Allergen Reactions    Topamax [topiramate] Other (See Comments)     hallucinations     Current Facility-Administered Medications on File Prior to Visit   Medication Dose Route Frequency Provider Last Rate Last Dose    0.9%  NaCl infusion   Intravenous Continuous Dayo Dickinson Jr.,  mL/hr at 01/30/19 1142      acetaminophen (10 mg/mL) injection 1,000 mg  1,000 mg Intravenous Q8H Dayo Dickinson Jr.,  mL/hr at 01/30/19 0527 1,000 mg at 01/30/19 0527    dextrose 50% injection 12.5 g  12.5 g Intravenous PRN Dayo Dickinson Jr., MD        glucagon (human recombinant) injection 1 mg  1 mg Intramuscular PRN Dayo Dickinson Jr., MD        hydrocodone-apap 7.5-325 MG/15 ML oral solution 15 mL  15 mL Oral Q4H PRN Dayo Dickinson Jr., MD        HYDROmorphone PCA in 0.9 % NaCl 6 Mg/30 mL (0.2 mg/mL)   Intravenous Continuous Dayo Dickinson Jr., MD        insulin aspart U-100 pen 1-10 Units  1-10 Units Subcutaneous Q6H PRN Dayo Dickinson Jr., MD   2 Units at 01/29/19 1153    naloxone 0.4 mg/mL injection 0.02 mg  0.02 mg Intravenous PRN Dayo Dickinson Jr., MD        ondansetron injection 4 mg  4 mg Intravenous Q6H PRN Dayo Dickinson Jr., MD        pantoprazole injection 40 mg  40 mg Intravenous BID Dayo Dickinson Jr., MD   40 mg at 01/30/19 1111    promethazine (PHENERGAN) 6.25 mg in dextrose 5 % 50 mL IVPB  6.25 mg Intravenous Q6H PRN Dayo  JOSE DE JESUS Dickinson Jr., MD         Current Outpatient Medications on File Prior to Visit   Medication Sig Dispense Refill    alpha lipoic acid 300 mg Cap Take 300 mg by mouth 2 (two) times daily.       ascorbic acid (VITAMIN C) 500 MG tablet Take 1,000 mg by mouth once daily.       b complex vitamins capsule Take 1 capsule by mouth once daily.      blood sugar diagnostic (ACCU-CHEK SMARTVIEW TEST STRIP) Strp Test once daily. 100 strip 3    blood-glucose meter Misc 1 Device by Misc.(Non-Drug; Combo Route) route 2 (two) times daily. 1 each 0    buPROPion (WELLBUTRIN SR) 100 MG TBSR 12 hr tablet       buPROPion (WELLBUTRIN SR) 100 MG TBSR 12 hr tablet TAKE 1 TABLET BY MOUTH TWICE A DAY 60 tablet 2    cetirizine (ZYRTEC) 10 MG tablet Take 10 mg by mouth once daily.      CITICOLINE SODIUM (CITICOLINE ORAL) Take 1 tablet by mouth once daily at 6am.      esomeprazole (NEXIUM) 40 MG capsule Take 1 capsule (40 mg total) by mouth before breakfast. 90 capsule 3    lancets Misc 1 lancet by Misc.(Non-Drug; Combo Route) route 2 (two) times daily. 100 each 6    LORazepam (ATIVAN) 0.5 MG tablet Take 1 tablet (0.5 mg total) by mouth every 12 (twelve) hours as needed for Anxiety. 60 tablet 1    omega-3 fatty acids-fish oil (FISH OIL) 340-1,000 mg Cap Take 1 capsule by mouth once daily.      rosuvastatin (CRESTOR) 20 MG tablet Take 1 tablet (20 mg total) by mouth once daily. 90 tablet 3    silver sulfADIAZINE 1% (SILVADENE) 1 % cream Apply topically once daily. 50 g 11    traMADol (ULTRAM) 50 mg tablet TAKE 2 TABLETS (100 MG TOTAL) BY MOUTH DAILY AS NEEDED FOR PAIN. 60 tablet 1    traZODone (DESYREL) 50 MG tablet Take 1 tablet (50 mg total) by mouth every evening. 30 tablet 0    tretinoin (RETIN-A) 0.05 % cream Use hs 45 g 6    TURMERIC (CURCUMIN MISC) Take 500 mg by mouth 2 (two) times daily.       venlafaxine (EFFEXOR) 37.5 MG Tab Take 2 tablets (75 mg total) by mouth once daily. 30 tablet 5    vitamin D 1000 units Tab  Take 500 Units by mouth once daily. With K2 50 mch       Lab Results   Component Value Date    WBC 11.39 01/30/2019    HGB 13.4 01/30/2019    HCT 42.9 01/30/2019    MCV 98 01/30/2019     01/30/2019     BMP  Lab Results   Component Value Date     01/30/2019    K 4.3 01/30/2019     (H) 01/30/2019    CO2 24 01/30/2019    BUN 10 01/30/2019    CREATININE 0.7 01/30/2019    CALCIUM 8.3 (L) 01/30/2019    ANIONGAP 7 (L) 01/30/2019    ESTGFRAFRICA >60.0 01/30/2019    EGFRNONAA >60.0 01/30/2019         Pre-op Assessment    I have reviewed the Patient Summary Reports.      I have reviewed the Medications.     Review of Systems  Anesthesia Hx:  No problems with previous Anesthesia   Denies Personal Hx of Anesthesia complications.   Cardiovascular:   Exercise tolerance: good    Pulmonary:   Denies COPD.  Denies Asthma. Sleep Apnea No CPAP use   Renal/:  Renal/ Normal     Hepatic/GI:   Hiatal Hernia, Denies GERD.    Neurological:   Denies CVA. Denies Seizures.        Physical Exam  General:  Well nourished    Airway/Jaw/Neck:  Airway Findings: Mouth Opening: Normal Tongue: Large  General Airway Assessment: Adult  Mallampati: III  TM Distance: Normal, at least 6 cm  Jaw/Neck Findings:  Neck ROM: Normal ROM      Dental:  Dental Findings: In tact        Mental Status:  Mental Status Findings:  Cooperative, Alert and Oriented         Anesthesia Plan  Type of Anesthesia, risks & benefits discussed:  Anesthesia Type:  general  Patient's Preference:   Intra-op Monitoring Plan: standard ASA monitors  Intra-op Monitoring Plan Comments:   Post Op Pain Control Plan: per primary service following discharge from PACU, IV/PO Opioids PRN and multimodal analgesia  Post Op Pain Control Plan Comments:   Induction:   IV  Beta Blocker:  Patient is not currently on a Beta-Blocker (No further documentation required).       Informed Consent: Patient understands risks and agrees with Anesthesia plan.  Questions answered. Anesthesia  consent signed with patient.  ASA Score: 3     Day of Surgery Review of History & Physical:    H&P update referred to the surgeon.     Anesthesia Plan Notes: RSI, video laryngoscopy, reflux prophylaxis        Ready For Surgery From Anesthesia Perspective.

## 2019-01-30 NOTE — NURSING
Pt arrived to floor AAO x 4 ambulated to bed, Dressing and sling intact instructed on bedrest, IV and PCA hooked up, SCD's on Pt educated on IS.

## 2019-01-30 NOTE — HPI
73 year old female with a history HLD, pre-DM, COOKIE, Left Breast CA s/p lumpectomy 2002 who presented for hiatal hernia repair/Toupe fundoplication who developed SVT responsive to esmolol and metoprolol IV during the case. After the case a new LBBB was noted however patient had no symptoms of chest pain or dyspnea.      On interview the patient notes some nausea post operatively that is resolving. She denies any chest pain or shortness of breath. She denies a history of syncopal events or palpitations.     Review of her EKGs shows NSR with LVH in 2017. Her first post-operative EKG appears to be a junctional rhythm with LBBB and heart rate of 58. Her second post-op EKG shows NSR with a 2nd degree Mobitz II block as well as LBBB. Review of her telemetry shows the Mobitz II as well as a sinus rhythm.      Last TTE (2015) showed:    1 - Normal left ventricular systolic function (EF 60-65%).     2 - Normal right ventricular systolic function .     3 - Trivial mitral regurgitation.     4 - Trivial pericardial effusion.

## 2019-01-30 NOTE — SUBJECTIVE & OBJECTIVE
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    Wrist fracture        Past Surgical History:   Procedure Laterality Date    ADENOIDECTOMY      BREAST LUMPECTOMY Left 2002    COLONOSCOPY N/A 12/2/2016    Performed by Dustin Patterson MD at [facility] ENDO (4TH FLR)    DEVICE ASSISTED ENTEROSCOPY-ANTEROGRADE N/A 3/20/2018    Performed by Dustin Patterson MD at [facility] ENDO (2ND FLR)    ESOPHAGOGASTRODUODENOSCOPY (EGD) N/A 9/12/2018    Performed by Dustin Patterson MD at [facility] ENDO (4TH FLR)    ESOPHAGOGASTRODUODENOSCOPY (EGD) N/A 12/2/2016    Performed by Dustin Patterson MD at [facility] ENDO (4TH FLR)    ESOPHAGOGASTRODUODENOSCOPY (EGD) N/A 4/20/2015    Performed by Marcial Dewitt MD at [facility] ENDO (4TH FLR)    EYE SURGERY Bilateral     cataracts    INJECTION-STEROID-EPIDURAL-LUMBAR N/A 6/15/2016    [facility] at [facility] PAIN M[facility]    lipoma removal  1999    MANOMETRY, ESOPHAGUS, WITH IMPEDANCE MEASUREMENT N/A 10/8/2018    Performed by Renato Maria MD at [facility] ENDO (4TH FLR)    PARATHYROIDECTOMY minimally invasive N/A 11/4/2015    Performed by Amna Aponte MD at [facility] OR 2ND FLR    TONSILLECTOMY         Review of patient's allergies indicates:   Allergen Reactions    Topamax [topiramate] Other (See Comments)     hallucinations       No current facility-administered medications on file prior to encounter.      Current  Outpatient Medications on File Prior to Encounter   Medication Sig    alpha lipoic acid 300 mg Cap Take 300 mg by mouth 2 (two) times daily.     ascorbic acid (VITAMIN C) 500 MG tablet Take 1,000 mg by mouth once daily.     b complex vitamins capsule Take 1 capsule by mouth once daily.    cetirizine (ZYRTEC) 10 MG tablet Take 10 mg by mouth once daily.    CITICOLINE SODIUM (CITICOLINE ORAL) Take 1 tablet by mouth once daily at 6am.    esomeprazole (NEXIUM) 40 MG capsule Take 1 capsule (40 mg total) by mouth before breakfast.    omega-3 fatty acids-fish oil (FISH OIL) 340-1,000 mg Cap Take 1 capsule by mouth once daily.    rosuvastatin (CRESTOR) 20 MG tablet Take 1 tablet (20 mg total) by mouth once daily.    traMADol (ULTRAM) 50 mg tablet TAKE 2 TABLETS (100 MG TOTAL) BY MOUTH DAILY AS NEEDED FOR PAIN.    tretinoin (RETIN-A) 0.05 % cream Use hs    TURMERIC (CURCUMIN MISC) Take 500 mg by mouth 2 (two) times daily.     vitamin D 1000 units Tab Take 500 Units by mouth once daily. With K2 50 mch    lancets Misc 1 lancet by Misc.(Non-Drug; Combo Route) route 2 (two) times daily.    venlafaxine (EFFEXOR) 37.5 MG Tab Take 2 tablets (75 mg total) by mouth once daily.     Family History     Problem Relation (Age of Onset)    Alcohol abuse Father    Depression Mother, Sister, Daughter    Diabetes Sister    Headaches Father    Psoriasis Sister    Suicide Mother        Tobacco Use    Smoking status: Never Smoker    Smokeless tobacco: Never Used   Substance and Sexual Activity    Alcohol use: No     Comment: quit 2014 - alcohol abuse    Drug use: Yes    Sexual activity: Yes     Partners: Male     ROS  Objective:     Vital Signs (Most Recent):  Temp: 97.9 °F (36.6 °C) (01/29/19 2328)  Pulse: 74 (01/29/19 2344)  Resp: 18 (01/29/19 2328)  BP: 117/63 (01/29/19 2328)  SpO2: 96 % (01/29/19 2328) Vital Signs (24h Range):  Temp:  [96.8 °F (36 °C)-98.8 °F (37.1 °C)] 97.9 °F (36.6 °C)  Pulse:  [] 74  Resp:   [12-23] 18  SpO2:  [90 %-100 %] 96 %  BP: (107-133)/(55-99) 117/63       Weight: 69.9 kg (154 lb)  Body mass index is 29.1 kg/m².    SpO2: 96 %  O2 Device (Oxygen Therapy): nasal cannula    Physical Exam   Constitutional: She is oriented to person, place, and time. She appears well-developed and well-nourished.   HENT:   Head: Normocephalic and atraumatic.   Nose: Nose normal.   Mouth/Throat: No oropharyngeal exudate.   Eyes: Right eye exhibits no discharge. Left eye exhibits no discharge. No scleral icterus.   Neck: Normal range of motion. Neck supple. No JVD present.   Cardiovascular: Normal rate, S1 normal and S2 normal. Exam reveals no gallop, no S3, no S4, no distant heart sounds, no friction rub, no midsystolic click and no opening snap.   No murmur heard.  Pulses:       Radial pulses are 2+ on the right side, and 2+ on the left side.        Femoral pulses are 2+ on the right side, and 2+ on the left side.  Irregular rhythm   Pulmonary/Chest: Effort normal and breath sounds normal. No respiratory distress. She has no wheezes. She has no rales. She exhibits no tenderness.   Abdominal: Soft. Bowel sounds are normal. She exhibits no distension. There is tenderness. There is no rebound.   Musculoskeletal: Normal range of motion. She exhibits no edema, tenderness or deformity.   Lymphadenopathy:     She has no cervical adenopathy.   Neurological: She is alert and oriented to person, place, and time. No cranial nerve deficit.   Skin: Skin is warm and dry.   Psychiatric: She has a normal mood and affect. Her behavior is normal.       Significant Labs: All pertinent lab results from the last 24 hours have been reviewed.    Significant Imaging: All pertinent images from the last 24h have been reviewed.

## 2019-01-30 NOTE — ASSESSMENT & PLAN NOTE
1st post op EKG: Junctional rhythm with LBBB  2nd post op EKG: Mobitz II w LBBB  Absence of electrolyte abnormalities  Asymptomatic  -Avoid AV sharad blockers (CCB, BB)  -TTE

## 2019-01-30 NOTE — SUBJECTIVE & OBJECTIVE
Interval History: Overnight pt did well.  She states that she had very minimal nausea that was controlled with PRN medications.  Her only request was that she have something to drink.  She had been NPO because of possible cardiac procedures.  Overnight, had a recorded HR of 103, but otherwise stayed in the 90s for most of the night.    Medications:  Continuous Infusions:   sodium chloride 0.9% 125 mL/hr at 01/30/19 0359    hydromorphone in 0.9 % NaCl 6 mg/30 ml       Scheduled Meds:   acetaminophen  1,000 mg Intravenous Q8H    pantoprazole  40 mg Intravenous BID     PRN Meds:dextrose 50%, glucagon (human recombinant), hydrocodone-apap 7.5-325 MG/15 ML, insulin aspart U-100, naloxone, ondansetron, promethazine (PHENERGAN) IVPB     Review of patient's allergies indicates:   Allergen Reactions    Topamax [topiramate] Other (See Comments)     hallucinations     Objective:     Vital Signs (Most Recent):  Temp: 98.5 °F (36.9 °C) (01/30/19 0341)  Pulse: 93 (01/30/19 0441)  Resp: 16 (01/30/19 0341)  BP: (!) 105/56 (01/30/19 0441)  SpO2: 96 % (01/30/19 0341) Vital Signs (24h Range):  Temp:  [96.8 °F (36 °C)-98.5 °F (36.9 °C)] 98.5 °F (36.9 °C)  Pulse:  [] 93  Resp:  [12-23] 16  SpO2:  [90 %-100 %] 96 %  BP: (105-127)/(55-74) 105/56     Weight: 69.9 kg (154 lb)  Body mass index is 29.1 kg/m².    Intake/Output - Last 3 Shifts       01/28 0700 - 01/29 0659 01/29 0700 - 01/30 0659 01/30 0700 - 01/31 0659    P.O.  0     I.V. (mL/kg)  1600 (22.9)     Total Intake(mL/kg)  1600 (22.9)     Net  +1600            Urine Occurrence  0 x           Physical Exam   Constitutional: She is oriented to person, place, and time. She appears well-developed and well-nourished.   HENT:   Head: Normocephalic and atraumatic.   Eyes: Right eye exhibits no discharge. Left eye exhibits no discharge.   Neck: Normal range of motion. Neck supple.   Cardiovascular: Normal rate and regular rhythm.   Pulmonary/Chest: Effort normal. No respiratory  distress.   Abdominal:   Appropriately tender to palpation.  Incisions c/d/i.   Musculoskeletal: Normal range of motion.   Neurological: She is alert and oriented to person, place, and time.   Skin: Skin is warm and dry.   Psychiatric: She has a normal mood and affect. Her behavior is normal.   Vitals reviewed.      Significant Labs:  CBC:   Recent Labs   Lab 01/30/19  0741   WBC 11.39   RBC 4.36   HGB 13.4   HCT 42.9      MCV 98   MCH 30.7   MCHC 31.2*     BMP:   Recent Labs   Lab 01/30/19  0741   *      K 4.3   *   CO2 24   BUN 10   CREATININE 0.7   CALCIUM 8.3*   MG 1.9       Significant Diagnostics:  I have reviewed all pertinent imaging results/findings within the past 24 hours.

## 2019-01-30 NOTE — ASSESSMENT & PLAN NOTE
Paris Burris is a 73 y.o. female is s/p lap hiatal hernia repair with mesh on 1/29.  She experienced tachycardia followed by bradycardia in OR after intubation, but before surgery was started.  Ultimately, proceeded with surgery.  Cards and anesthesia intially thought it was from a BBB, but now more concerned for Mobitz 2.  From a surgery perspective, she is doing well.    -Will maintain IVF, PCA, NPO status for now as cardiology is still planning for procedures.  Once she can have PO intake from their perspective, will advance to clears and then fulls if she tolerates.

## 2019-01-30 NOTE — PROGRESS NOTES
definity given by dr fall via left forearm sl. Denies transfusion rxn. Tolerated well.flushed after

## 2019-01-30 NOTE — PROGRESS NOTES
[facility]  General Surgery  Progress Note    Subjective:     History of Present Illness:  No notes on file    Post-Op Info:  Procedure(s) (LRB):  REPAIR, HERNIA, HIATAL, LAPAROSCOPIC with Mesh (N/A)  FUNDOPLICATION, LAPAROSCOPIC, TOUPET (N/A)  EGD (ESOPHAGOGASTRODUODENOSCOPY) intraop (N/A)   1 Day Post-Op     Interval History: Overnight pt did well.  She states that she had very minimal nausea that was controlled with PRN medications.  Her only request was that she have something to drink.  She had been NPO because of possible cardiac procedures.  Overnight, had a recorded HR of 103, but otherwise stayed in the 90s for most of the night.    Medications:  Continuous Infusions:   sodium chloride 0.9% 125 mL/hr at 01/30/19 0359    hydromorphone in 0.9 % NaCl 6 mg/30 ml       Scheduled Meds:   acetaminophen  1,000 mg Intravenous Q8H    pantoprazole  40 mg Intravenous BID     PRN Meds:dextrose 50%, glucagon (human recombinant), hydrocodone-apap 7.5-325 MG/15 ML, insulin aspart U-100, naloxone, ondansetron, promethazine (PHENERGAN) IVPB     Review of patient's allergies indicates:   Allergen Reactions    Topamax [topiramate] Other (See Comments)     hallucinations     Objective:     Vital Signs (Most Recent):  Temp: 98.5 °F (36.9 °C) (01/30/19 0341)  Pulse: 93 (01/30/19 0441)  Resp: 16 (01/30/19 0341)  BP: (!) 105/56 (01/30/19 0441)  SpO2: 96 % (01/30/19 0341) Vital Signs (24h Range):  Temp:  [96.8 °F (36 °C)-98.5 °F (36.9 °C)] 98.5 °F (36.9 °C)  Pulse:  [] 93  Resp:  [12-23] 16  SpO2:  [90 %-100 %] 96 %  BP: (105-127)/(55-74) 105/56     Weight: 69.9 kg (154 lb)  Body mass index is 29.1 kg/m².    Intake/Output - Last 3 Shifts       01/28 0700 - 01/29 0659 01/29 0700 - 01/30 0659 01/30 0700 - 01/31 0659    P.O.  0     I.V. (mL/kg)  1600 (22.9)     Total Intake(mL/kg)  1600 (22.9)     Net  +1600            Urine Occurrence  0 x           Physical Exam   Constitutional: She is oriented to  person, place, and time. She appears well-developed and well-nourished.   HENT:   Head: Normocephalic and atraumatic.   Eyes: Right eye exhibits no discharge. Left eye exhibits no discharge.   Neck: Normal range of motion. Neck supple.   Cardiovascular: Normal rate and regular rhythm.   Pulmonary/Chest: Effort normal. No respiratory distress.   Abdominal:   Appropriately tender to palpation.  Incisions c/d/i.   Musculoskeletal: Normal range of motion.   Neurological: She is alert and oriented to person, place, and time.   Skin: Skin is warm and dry.   Psychiatric: She has a normal mood and affect. Her behavior is normal.   Vitals reviewed.      Significant Labs:  CBC:   Recent Labs   Lab 01/30/19  0741   WBC 11.39   RBC 4.36   HGB 13.4   HCT 42.9      MCV 98   MCH 30.7   MCHC 31.2*     BMP:   Recent Labs   Lab 01/30/19  0741   *      K 4.3   *   CO2 24   BUN 10   CREATININE 0.7   CALCIUM 8.3*   MG 1.9       Significant Diagnostics:  I have reviewed all pertinent imaging results/findings within the past 24 hours.    Assessment/Plan:     * Hiatal hernia    Paris Burris is a 73 y.o. female is s/p lap hiatal hernia repair with mesh on 1/29.  She experienced tachycardia followed by bradycardia in OR after intubation, but before surgery was started.  Ultimately, proceeded with surgery.  Cards and anesthesia intially thought it was from a BBB, but now more concerned for Mobitz 2.  From a surgery perspective, she is doing well.    -Will maintain IVF, PCA, NPO status for now as cardiology is still planning for procedures.  Once she can have PO intake from their perspective, will advance to clears and then fulls if she tolerates.     AV block, Mobitz II    TTE today, being seen by EP cardiology  -Appreciate recs         Giuseppe Garcia MD  General Surgery  [facility]

## 2019-01-30 NOTE — PLAN OF CARE
Problem: Adult Inpatient Plan of Care  Goal: Plan of Care Review  Paris Burris is a 73 y.o. female admitted [place] [facility] [place] for hernia repair. She tolerated evaluation well this morning. Had just returned from echo/stress test, she was able to ambulate from stretcher in hallway back to her bed (20 ft) with stand-by assistance. She refused longer walk in hallway due to feeling uneasy at her stomach, requesting some time to rest with plan for PT to check back at 1330 for longer ambulation trial. Upon my return, patient was off to floor for what appears to be a pacemaker per chart review. I will discharge PT orders and patient from PT services due to pacemaker placement. Recommend that primary team or cardiology team places new PT/OT orders for re-assessment s/p pacemaker placement. Will now discharge from acute PT services.    Kade Brewer, PT  1/30/2019

## 2019-01-30 NOTE — PLAN OF CARE
Plan of Care Note:    Patient seen and evaluated. She underwent successful Bi-Ventricular pacemaker placement. As her ejection fraction is 38 percent, would recommend starting GDMT with low dose ARB and Beta-blocker. Can start Candesartan 4mg daily and Toprol-XL(metoprolol succinate) 25mg daily and continue at discharge. Will arrange for close follow up with cardiology for medication titration and workup of decreased EF.     Ace James,   Cardiology Fellow, PGY-6

## 2019-01-30 NOTE — CONSULTS
[facility]  Cardiac Electrophysiology  Consult Note    Admission Date: 1/29/2019  Code Status: Prior   Attending Provider: Renato Perez Jr.,*  Consulting Provider: Mark Kelly MD  Principal Problem:Hiatal hernia    Inpatient consult to Electrophysiology  Consult performed by: Mark Kelly MD  Consult ordered by: Giuseppe Garcia MD        Subjective:     Chief Complaint:  Mobitz II    HPI:   73 year old female with HLD, pre-DM, COOKIE, Left Breast CA s/p lumpectomy 2002 who presented for hiatal hernia repair/Toupe fundoplication who developed SVT responsive to esmolol and metoprolol IV during the case. After the case a new LBBB was noted however patient had no symptoms of chest pain or dyspnea. Troponin was negative.      Review of her EKGs shows NSR with LVH in 2017. Her first post-operative EKG appears to be a junctional rhythm with LBBB and heart rate of 58. Her second post-op EKG shows NSR with a 2nd degree Mobitz II block as well as LBBB. Review of her telemetry shows the Mobitz II with P waves fusing with T, as well as a sinus rhythm.     Last TTE (2015) showed:    1 - Normal left ventricular systolic function (EF 60-65%).     2 - Normal right ventricular systolic function .     3 - Trivial mitral regurgitation.     4 - Trivial pericardial effusion.       Past Medical History:   Diagnosis Date    Allergy     Anemia     Anxiety     Breast cancer 2002    Left breast    Cancer 2002    L breast s/p lumpectomy    Decreased hearing     Depression     Diabetes mellitus     Diabetes with neurologic complications     Gastric ulcer 9/10/13    EGD    Hiatal hernia     Hyperlipidemia     Migraine headache     Migraine headache 3/20/2015    Multiple gastric ulcers     Parathyroid disorder     Pre-diabetes     Renal manifestation of secondary diabetes mellitus     Sleep apnea     no CPAP    Type 2 diabetes mellitus, controlled, with renal  complications 6/14/2017    Wrist fracture        Past Surgical History:   Procedure Laterality Date    ADENOIDECTOMY      BREAST LUMPECTOMY Left 2002    COLONOSCOPY N/A 12/2/2016    [facility]formed by [facility] ([facility] F[facility])    DEVICE ASSISTED ENTEROSCOPY-ANTEROGRADE N/A 3/20/2018    Performed by [facility] at [facility] ENDO (2ND FLR)    ESOPHAGOGASTRODUODENOSCOPY (EGD) N/A 9/12/2018    Performed by Dustin Patterson MD at [facility] ENDO (4TH FLR)    ESOPHAGOGASTRODUODENOSCOPY (EGD) N/A 12/2/2016    Performed by Dustin Patterson MD at [facility] END[facility] (4TH FLR)    ESOPHAGOGASTRODUODENOSCOPY (EGD) N/A 4/20/2015    Performed by Marcial Dewitt MD at [facility] END[facility] (4TH FLR)    EYE SURGERY Bilateral     cataracts    INJECTION-STEROID-EPIDURAL-LUMBAR N/A 6/15/2016    [facility] PAIN MGT    lipoma removal  1999    MANOMETRY, ESOPHAGUS, WITH IMPEDANCE MEASUREMENT N/A 10/8/2018    Performed by Renato Maria MD at [facility] ENDO (4TH FLR)    PARATHYROIDECTOMY minimally invasive N/A 11/4/2015    Performed by Amna Aponte MD at [facility] OR 2ND FLR    TONSILLECTOMY         Review of patient's allergies indicates:   Allergen Reactions    Topamax [topiramate] Other (See Comments)     hallucinations       No current facility-administered medications on file prior to encounter.      Current Outpatient Medications on File Prior to Encounter   Medication Sig    alpha lipoic acid 300 mg Cap Take 300 mg by mouth 2 (two) times daily.     ascorbic acid (VITAMIN C) 500 MG tablet Take 1,000 mg by mouth once daily.     b complex vitamins capsule Take 1 capsule by mouth once daily.    cetirizine (ZYRTEC) 10 MG tablet Take 10 mg by mouth once daily.    CITICOLINE SODIUM (CITICOLINE ORAL) Take 1 tablet by mouth once daily at 6am.    esomeprazole (NEXIUM) 40 MG capsule Take 1 capsule (40 mg total) by mouth before breakfast.    omega-3 fatty acids-fish oil (FISH OIL) 340-1,000 mg Cap Take 1  capsule by mouth once daily.    rosuvastatin (CRESTOR) 20 MG tablet Take 1 tablet (20 mg total) by mouth once daily.    traMADol (ULTRAM) 50 mg tablet TAKE 2 TABLETS (100 MG TOTAL) BY MOUTH DAILY AS NEEDED FOR PAIN.    tretinoin (RETIN-A) 0.05 % cream Use hs    TURMERIC (CURCUMIN MISC) Take 500 mg by mouth 2 (two) times daily.     vitamin D 1000 units Tab Take 500 Units by mouth once daily. With K2 50 mch    lancets Misc 1 lancet by Misc.(Non-Drug; Combo Route) route 2 (two) times daily.    venlafaxine (EFFEXOR) 37.5 MG Tab Take 2 tablets (75 mg total) by mouth once daily.     Family History     Problem Relation (Age of Onset)    Alcohol abuse Father    Depression Mother, Sister, Daughter    Diabetes Sister    Headaches Father    Psoriasis Sister    Suicide Mother        Tobacco Use    Smoking status: Never Smoker    Smokeless tobacco: Never Used   Substance and Sexual Activity    Alcohol use: No     Comment: quit 2014 - alcohol abuse    Drug use: Yes    Sexual activity: Yes     Partners: Male     ROS  Objective:     Vital Signs (Most Recent):  Temp: 97.9 °F (36.6 °C) (01/29/19 2328)  Pulse: 74 (01/29/19 2344)  Resp: 18 (01/29/19 2328)  BP: 117/63 (01/29/19 2328)  SpO2: 96 % (01/29/19 2328) Vital Signs (24h Range):  Temp:  [96.8 °F (36 °C)-98.8 °F (37.1 °C)] 97.9 °F (36.6 °C)  Pulse:  [] 74  Resp:  [12-23] 18  SpO2:  [90 %-100 %] 96 %  BP: (107-133)/(55-99) 117/63       Weight: 69.9 kg (154 lb)  Body mass index is 29.1 kg/m².    SpO2: 96 %  O2 Device (Oxygen Therapy): nasal cannula    Physical Exam   Constitutional: She is oriented to person, place, and time. She appears well-developed and well-nourished.   HENT:   Head: Normocephalic and atraumatic.   Nose: Nose normal.   Mouth/Throat: No oropharyngeal exudate.   Eyes: Right eye exhibits no discharge. Left eye exhibits no discharge. No scleral icterus.   Neck: Normal range of motion. Neck supple. No JVD present.   Cardiovascular: Normal rate, S1  normal and S2 normal. Exam reveals no gallop, no S3, no S4, no distant heart sounds, no friction rub, no midsystolic click and no opening snap.   No murmur heard.  Pulses:       Radial pulses are 2+ on the right side, and 2+ on the left side.        Femoral pulses are 2+ on the right side, and 2+ on the left side.  Irregular rhythm   Pulmonary/Chest: Effort normal and breath sounds normal. No respiratory distress. She has no wheezes. She has no rales. She exhibits no tenderness.   Abdominal: Soft. Bowel sounds are normal. She exhibits no distension. There is tenderness. There is no rebound.   Musculoskeletal: Normal range of motion. She exhibits no edema, tenderness or deformity.   Lymphadenopathy:     She has no cervical adenopathy.   Neurological: She is alert and oriented to person, place, and time. No cranial nerve deficit.   Skin: Skin is warm and dry.   Psychiatric: She has a normal mood and affect. Her behavior is normal.       Significant Labs: All pertinent lab results from the last 24 hours have been reviewed.    Significant Imaging: All pertinent images from the last 24h have been reviewed.              Assessment and Plan:     AV block, Mobitz II    1st post op EKG: Junctional rhythm with LBBB   2nd post op EKG: Mobitz II w LBBB  Absence of electrolyte abnormalities  Asymptomatic  -TTE  -NPO after midnight in case of EPS  -Hold AV sharad blockers  -To be seen by EP day team  -Hold heparin products     Discussed w Dr Santa Kelly MD  Cardiac Electrophysiology  [facility]

## 2019-01-30 NOTE — PLAN OF CARE
Patient ambulating steadily to bathroom, stretcher outside of hospital room/to take her for cardiology test. Patient lives in a 2nd floor apartment, w/1 flight of stairs to entrance, w/spouse. Spouse is at her BS. Patient is independent & agile. No needs determined.     [facility] given to patient after informed about it;patient verbalized their understanding.        01/30/19 1230   Discharge Assessment   Assessment Type Discharge Planning Assessment   Confirmed/corrected address and phone number on facesheet? Yes   Assessment information obtained from? Medical Record;Other  (Spouse)   Expected Length of Stay (days) (TBD/2+)   Communicated expected length of stay with patient/caregiver no  (Per MD)   Prior to hospitilization cognitive status: Alert/Oriented;No Deficits   Prior to hospitalization functional status: Independent   Current cognitive status: Alert/Oriented;No Deficits   Current Functional Status: Independent   Facility Arrived From: (N/A)   Lives With spouse   Able to Return to Prior Arrangements yes   Is patient able to care for self after discharge? Yes   Who are your caregiver(s) and their phone number(s)? (FatumaKade Spouse [phone], WaldoJaniya kraus Daughter     [phone]  )   Patient's perception of discharge disposition home or selfcare   Readmission Within the Last 30 Days no previous admission in last 30 days   Patient currently being followed by outpatient case management? No   Patient currently receives any other outside agency services? No   Equipment Currently Used at Home none   Do you have any problems affording any of your prescribed medications? No   Is the patient taking medications as prescribed? yes   Does the patient have transportation home? Yes   Transportation Anticipated family or friend will provide   Dialysis Name and Scheduled days N/A   Does the patient receive services at the Coumadin Clinic? No   Discharge Plan A Home with family   Discharge  Plan B Home with family   DME Needed Upon Discharge  none   Patient/Family in Agreement with Plan yes

## 2019-01-30 NOTE — BRIEF OP NOTE
Patient is s/p SJM BIV pacemaker placement for Mobitz Ty II and alternating left and right bundle branch block indicating severe infrahisian disease:    Tolerated procedure well. No acute complication noted.  Post op care per protocol.  Will monitor in recovery on tele overnight  Ancef 1 gram q8 hours x 2 doses (ordered)  NO HEPARIN PRODUCTS  Keflex 500 mg TID for 4 days at discharge  Dressing will be removed in AM by EP  Chest Xray (ordered)    Other instruction:   ==============================  Sling to left arm - wear for 48 hours, then only at night for 6 weeks.  No lifting left elbow above shoulder height  No lifting over 5 pounds  No driving for 1 week and for 4 weeks if patient uses left arm to make turns  Do not let beam of shower/water hit site directly and no scrubbing in area  Follow up in device clinic in 1 week and with Ep clinic  in 3 months.  Notify Cardiology/EP increased redness, warmth, drainage, or re-opening of the wound   Please call [phone] option 2 during business hours or the [facility] number and ask for on-call for device clinic after hours.

## 2019-01-31 VITALS
TEMPERATURE: 99 F | OXYGEN SATURATION: 92 % | BODY MASS INDEX: 29.07 KG/M2 | DIASTOLIC BLOOD PRESSURE: 68 MMHG | RESPIRATION RATE: 21 BRPM | SYSTOLIC BLOOD PRESSURE: 135 MMHG | WEIGHT: 154 LBS | HEART RATE: 100 BPM | HEIGHT: 61 IN

## 2019-01-31 DIAGNOSIS — I44.1 HEART BLOCK AV SECOND DEGREE: ICD-10-CM

## 2019-01-31 DIAGNOSIS — Z95.0 CARDIAC PACEMAKER IN SITU: Primary | ICD-10-CM

## 2019-01-31 LAB
ANION GAP SERPL CALC-SCNC: 7 MMOL/L
BASOPHILS # BLD AUTO: 0.02 K/UL
BASOPHILS NFR BLD: 0.2 %
BUN SERPL-MCNC: 6 MG/DL
CALCIUM SERPL-MCNC: 7.9 MG/DL
CHLORIDE SERPL-SCNC: 111 MMOL/L
CO2 SERPL-SCNC: 24 MMOL/L
CREAT SERPL-MCNC: 0.6 MG/DL
DIFFERENTIAL METHOD: ABNORMAL
EOSINOPHIL # BLD AUTO: 0.1 K/UL
EOSINOPHIL NFR BLD: 1.1 %
ERYTHROCYTE [DISTWIDTH] IN BLOOD BY AUTOMATED COUNT: 13.2 %
EST. GFR  (AFRICAN AMERICAN): >60 ML/MIN/1.73 M^2
EST. GFR  (NON AFRICAN AMERICAN): >60 ML/MIN/1.73 M^2
GLUCOSE SERPL-MCNC: 107 MG/DL
HCT VFR BLD AUTO: 35.9 %
HGB BLD-MCNC: 11.2 G/DL
IMM GRANULOCYTES # BLD AUTO: 0.02 K/UL
IMM GRANULOCYTES NFR BLD AUTO: 0.2 %
LYMPHOCYTES # BLD AUTO: 1.9 K/UL
LYMPHOCYTES NFR BLD: 24.1 %
MAGNESIUM SERPL-MCNC: 1.9 MG/DL
MCH RBC QN AUTO: 30.5 PG
MCHC RBC AUTO-ENTMCNC: 31.2 G/DL
MCV RBC AUTO: 98 FL
MONOCYTES # BLD AUTO: 0.7 K/UL
MONOCYTES NFR BLD: 9 %
NEUTROPHILS # BLD AUTO: 5.3 K/UL
NEUTROPHILS NFR BLD: 65.4 %
NRBC BLD-RTO: 0 /100 WBC
PHOSPHATE SERPL-MCNC: 2.4 MG/DL
PLATELET # BLD AUTO: 123 K/UL
PMV BLD AUTO: 11.7 FL
POTASSIUM SERPL-SCNC: 3.5 MMOL/L
RBC # BLD AUTO: 3.67 M/UL
SODIUM SERPL-SCNC: 142 MMOL/L
WBC # BLD AUTO: 8.04 K/UL

## 2019-01-31 PROCEDURE — 99024 PR POST-OP FOLLOW-UP VISIT: ICD-10-PCS | Mod: HCNC,,, | Performed by: INTERNAL MEDICINE

## 2019-01-31 PROCEDURE — 25000003 PHARM REV CODE 250: Mod: HCNC | Performed by: INTERNAL MEDICINE

## 2019-01-31 PROCEDURE — 80048 BASIC METABOLIC PNL TOTAL CA: CPT | Mod: HCNC

## 2019-01-31 PROCEDURE — 25000003 PHARM REV CODE 250: Mod: HCNC | Performed by: STUDENT IN AN ORGANIZED HEALTH CARE EDUCATION/TRAINING PROGRAM

## 2019-01-31 PROCEDURE — 83735 ASSAY OF MAGNESIUM: CPT | Mod: HCNC

## 2019-01-31 PROCEDURE — 63600175 PHARM REV CODE 636 W HCPCS: Mod: HCNC | Performed by: SURGERY

## 2019-01-31 PROCEDURE — 36415 COLL VENOUS BLD VENIPUNCTURE: CPT | Mod: HCNC

## 2019-01-31 PROCEDURE — 63600175 PHARM REV CODE 636 W HCPCS: Mod: HCNC | Performed by: STUDENT IN AN ORGANIZED HEALTH CARE EDUCATION/TRAINING PROGRAM

## 2019-01-31 PROCEDURE — 25000003 PHARM REV CODE 250: Mod: HCNC | Performed by: SURGERY

## 2019-01-31 PROCEDURE — 63600175 PHARM REV CODE 636 W HCPCS: Mod: HCNC | Performed by: INTERNAL MEDICINE

## 2019-01-31 PROCEDURE — 85025 COMPLETE CBC W/AUTO DIFF WBC: CPT | Mod: HCNC

## 2019-01-31 PROCEDURE — C9113 INJ PANTOPRAZOLE SODIUM, VIA: HCPCS | Mod: HCNC | Performed by: SURGERY

## 2019-01-31 PROCEDURE — 99024 POSTOP FOLLOW-UP VISIT: CPT | Mod: HCNC,,, | Performed by: INTERNAL MEDICINE

## 2019-01-31 PROCEDURE — 84100 ASSAY OF PHOSPHORUS: CPT | Mod: HCNC

## 2019-01-31 RX ORDER — CEPHALEXIN 250 MG/1
500 CAPSULE ORAL EVERY 8 HOURS
Qty: 12 CAPSULE | Refills: 0 | Status: SHIPPED | OUTPATIENT
Start: 2019-01-31 | End: 2019-07-18

## 2019-01-31 RX ORDER — ONDANSETRON 8 MG/1
8 TABLET, ORALLY DISINTEGRATING ORAL ONCE
Qty: 1 TABLET | Refills: 0 | Status: SHIPPED | OUTPATIENT
Start: 2019-01-31 | End: 2019-02-01

## 2019-01-31 RX ORDER — HYDROCODONE BITARTRATE AND ACETAMINOPHEN 7.5; 325 MG/15ML; MG/15ML
15 SOLUTION ORAL EVERY 4 HOURS PRN
Qty: 473 ML | Refills: 0 | Status: SHIPPED | OUTPATIENT
Start: 2019-01-31 | End: 2019-01-31

## 2019-01-31 RX ORDER — HYDROCODONE BITARTRATE AND ACETAMINOPHEN 7.5; 325 MG/15ML; MG/15ML
15 SOLUTION ORAL EVERY 4 HOURS PRN
Qty: 473 ML | Refills: 0 | Status: SHIPPED | OUTPATIENT
Start: 2019-01-31 | End: 2019-01-31 | Stop reason: HOSPADM

## 2019-01-31 RX ORDER — CANDESARTAN 4 MG/1
4 TABLET ORAL DAILY
Status: DISCONTINUED | OUTPATIENT
Start: 2019-01-31 | End: 2019-01-31 | Stop reason: HOSPADM

## 2019-01-31 RX ORDER — CANDESARTAN 4 MG/1
4 TABLET ORAL DAILY
Qty: 90 TABLET | Refills: 3 | Status: SHIPPED | OUTPATIENT
Start: 2019-02-01 | End: 2019-02-04 | Stop reason: DRUGHIGH

## 2019-01-31 RX ORDER — HYDROCODONE BITARTRATE AND ACETAMINOPHEN 7.5; 325 MG/15ML; MG/15ML
15 SOLUTION ORAL EVERY 4 HOURS PRN
Status: DISCONTINUED | OUTPATIENT
Start: 2019-01-31 | End: 2019-01-31 | Stop reason: HOSPADM

## 2019-01-31 RX ORDER — METOPROLOL SUCCINATE 25 MG/1
25 TABLET, EXTENDED RELEASE ORAL DAILY
Qty: 30 TABLET | Refills: 11 | Status: SHIPPED | OUTPATIENT
Start: 2019-02-01 | End: 2019-02-04 | Stop reason: DRUGHIGH

## 2019-01-31 RX ORDER — OXYCODONE HCL 5 MG/5 ML
7.5 SOLUTION, ORAL ORAL EVERY 4 HOURS PRN
Qty: 473 ML | Refills: 0 | Status: SHIPPED | OUTPATIENT
Start: 2019-01-31 | End: 2019-02-22

## 2019-01-31 RX ORDER — METOPROLOL SUCCINATE 25 MG/1
25 TABLET, EXTENDED RELEASE ORAL DAILY
Status: DISCONTINUED | OUTPATIENT
Start: 2019-01-31 | End: 2019-01-31 | Stop reason: HOSPADM

## 2019-01-31 RX ADMIN — CEFAZOLIN 1 G: 330 INJECTION, POWDER, FOR SOLUTION INTRAMUSCULAR; INTRAVENOUS at 11:01

## 2019-01-31 RX ADMIN — DIBASIC SODIUM PHOSPHATE, MONOBASIC POTASSIUM PHOSPHATE AND MONOBASIC SODIUM PHOSPHATE 2 TABLET: 852; 155; 130 TABLET ORAL at 11:01

## 2019-01-31 RX ADMIN — METOPROLOL SUCCINATE 25 MG: 25 TABLET, EXTENDED RELEASE ORAL at 08:01

## 2019-01-31 RX ADMIN — SODIUM CHLORIDE: 0.9 INJECTION, SOLUTION INTRAVENOUS at 05:01

## 2019-01-31 RX ADMIN — ACETAMINOPHEN 650 MG: 325 TABLET, FILM COATED ORAL at 05:01

## 2019-01-31 RX ADMIN — PANTOPRAZOLE SODIUM 40 MG: 40 INJECTION, POWDER, FOR SOLUTION INTRAVENOUS at 08:01

## 2019-01-31 RX ADMIN — CEFAZOLIN 1 G: 330 INJECTION, POWDER, FOR SOLUTION INTRAMUSCULAR; INTRAVENOUS at 04:01

## 2019-01-31 RX ADMIN — CALCIUM GLUCONATE 2000 MG: 98 INJECTION, SOLUTION INTRAVENOUS at 09:01

## 2019-01-31 NOTE — ANESTHESIA POSTPROCEDURE EVALUATION
"Anesthesia Post Evaluation    Patient: Paris Burris    Procedure(s) Performed: Procedure(s) (LRB):  INSERTION, CARDIAC PACEMAKER, BIVENTRICULAR (N/A)    Final Anesthesia Type: general  Patient location during evaluation: PACU  Patient participation: Yes- Able to Participate  Level of consciousness: awake and alert and oriented  Post-procedure vital signs: reviewed and stable  Pain management: adequate  Airway patency: patent  PONV status at discharge: No PONV  Anesthetic complications: no      Cardiovascular status: blood pressure returned to baseline and hemodynamically stable  Respiratory status: unassisted and spontaneous ventilation  Hydration status: euvolemic  Follow-up not needed.        Visit Vitals  /68 (BP Location: Right arm, Patient Position: Lying)   Pulse 100   Temp 37 °C (98.6 °F) (Oral)   Resp (!) 21   Ht 5' 1" (1.549 m)   Wt 69.9 kg (154 lb)   SpO2 (!) 92%   Breastfeeding? No   BMI 29.10 kg/m²       Pain/Nilsa Score: Pain Rating Prior to Med Admin: 4 (1/31/2019  8:00 AM)  Pain Rating Post Med Admin: 3 (1/31/2019  8:00 AM)  Nilsa Score: 10 (1/31/2019  8:00 AM)        "

## 2019-01-31 NOTE — SUBJECTIVE & OBJECTIVE
Interval History:   No overnight issues  On PCA pump   Denies any pain / discomfort at the ICD site, sling on    Review of Systems   Constitution: Negative for chills, decreased appetite, diaphoresis, fever, weight gain and weight loss.   Eyes: Negative for blurred vision.   Cardiovascular: Negative for chest pain, dyspnea on exertion, irregular heartbeat, leg swelling, near-syncope, orthopnea and palpitations.   Respiratory: Negative for cough, shortness of breath, snoring and wheezing.    Gastrointestinal: Negative for abdominal pain, nausea and vomiting.   Genitourinary: Negative for bladder incontinence and urgency.     Objective:     Vital Signs (Most Recent):  Temp: 98.6 °F (37 °C) (01/31/19 0809)  Pulse: 100 (01/31/19 0815)  Resp: (!) 21 (01/31/19 0809)  BP: 135/68 (01/31/19 0809)  SpO2: (!) 92 % (01/31/19 0809) Vital Signs (24h Range):  Temp:  [98.2 °F (36.8 °C)-99.7 °F (37.6 °C)] 98.6 °F (37 °C)  Pulse:  [] 100  Resp:  [14-21] 21  SpO2:  [92 %-98 %] 92 %  BP: (112-144)/(53-75) 135/68     Weight: 69.9 kg (154 lb)  Body mass index is 29.1 kg/m².     SpO2: (!) 92 %  O2 Device (Oxygen Therapy): room air    Physical Exam   Constitutional: She is oriented to person, place, and time. She appears well-developed and well-nourished. No distress.   HENT:   Head: Normocephalic and atraumatic.   Eyes: Conjunctivae are normal. Pupils are equal, round, and reactive to light.   Neck: Normal range of motion. Neck supple. No thyromegaly present.   Cardiovascular: Regular rhythm, normal heart sounds and intact distal pulses. Tachycardia present. Exam reveals no gallop and no friction rub.   No murmur heard.  Pulses:       Carotid pulses are 2+ on the right side, and 2+ on the left side.       Radial pulses are 2+ on the right side, and 2+ on the left side.   V- paced rhythm   Pulmonary/Chest: Effort normal. No respiratory distress. She has no wheezes. She has rales in the left lower field.       Abdominal: Soft. Bowel  sounds are normal. She exhibits no distension. There is no tenderness.   Neurological: She is alert and oriented to person, place, and time.   Skin: She is not diaphoretic.       Significant Labs:   BMP:   Recent Labs   Lab 01/29/19  1117 01/30/19  0741 01/31/19  0539   * 132* 107    145 142   K 4.1 4.3 3.5    114* 111*   CO2 22* 24 24   BUN 14 10 6*   CREATININE 0.9 0.7 0.6   CALCIUM 9.5 8.3* 7.9*   MG 3.1* 1.9 1.9   , CMP:   Recent Labs   Lab 01/29/19  1117 01/30/19  0741 01/31/19  0539    145 142   K 4.1 4.3 3.5    114* 111*   CO2 22* 24 24   * 132* 107   BUN 14 10 6*   CREATININE 0.9 0.7 0.6   CALCIUM 9.5 8.3* 7.9*   ANIONGAP 10 7* 7*   ESTGFRAFRICA >60.0 >60.0 >60.0   EGFRNONAA >60.0 >60.0 >60.0   , CBC:   Recent Labs   Lab 01/30/19  0741 01/31/19  0539   WBC 11.39 8.04   HGB 13.4 11.2*   HCT 42.9 35.9*    123*   , INR: No results for input(s): INR, PROTIME in the last 48 hours. and Lipid Panel No results for input(s): CHOL, HDL, LDLCALC, TRIG, CHOLHDL in the last 48 hours.    Significant Imaging: X-Ray: CXR: X-Ray Chest 1 View (CXR): No results found for this visit on 01/29/19.

## 2019-01-31 NOTE — HPI
74 yo female with asymptomatic large hiatal hernia. Patient denies reflux, dysphagia, regurgitation or abdominal pain. History of camerons ulcers.  ROJAS complete.    UGI: large hiatal hernia. 6cm, type 3.  CT: moderate sized hiatal hernia  Manometry: ineffective esophageal motility- incomplete bolus clearance in 30%.

## 2019-01-31 NOTE — ASSESSMENT & PLAN NOTE
Post operatively Mobitz Type II and alternating left and right bundle branch block indicating severe infrahisian disease:     - TTE 38%, Grade 1 DD,    - POD 1 S/p [facility] CRT-P (Dr. Livingston)   - Post op CXR: wnl     - EP will sign off, please call for any further questions or concerns,  - Follow up Device clinic in 1 week   - Follow up Dr. Livingston in 3 months   - Will interrogate device with [facility] rep this AM ( telemetry with V-paced )  - NO HEPARIN PRODUCTS  - Keflex 500 mg TID for 4 days at discharge       Other instruction:   ==============================  Sling to left arm - wear for 48 hours, then only at night for 6 weeks.  No lifting left elbow above shoulder height  No lifting over 5 pounds  No driving for 1 week and for 4 weeks if patient uses left arm to make turns  Do not let beam of shower/water hit site directly and no scrubbing in area  Follow up in device clinic in 1 week and with Ep clinic  in 3 months.  Notify Cardiology/EP increased redness, warmth, drainage, or re-opening of the wound   Please call [phone] option 2 during business hours or the main [facility] number and ask for on-call for device clinic after hours.

## 2019-01-31 NOTE — PROGRESS NOTES
[facility]  General Surgery  Progress Note    Subjective:     History of Present Illness:  No notes on file    Post-Op Info:  Procedure(s) (LRB):  INSERTION, CARDIAC PACEMAKER, BIVENTRICULAR (N/A)   1 Day Post-Op     Interval History: Yesterday, pt had Bi-Ventricular pacemaker placement.  She tolerated the procedure well.  Overnight she was slightly tachycardic, but otherwise VS stable.  From a surgical standpoint, she is doing quiet well.    Medications:  Continuous Infusions:   sodium chloride 0.9% 125 mL/hr at 01/31/19 0519    hydromorphone in 0.9 % NaCl 6 mg/30 ml       Scheduled Meds:   calcium chloride IVPB  2 g Intravenous Once    candesartan  4 mg Oral Daily    ceFAZolin (ANCEF) IVPB  1 g Intravenous Q8H    metoprolol succinate  25 mg Oral Daily    pantoprazole  40 mg Intravenous BID     PRN Meds:acetaminophen, bupivacaine (PF) 0.25% (2.5 mg/ml), dextrose 50%, glucagon (human recombinant), hydrocodone-apap 7.5-325 MG/15 ML, HYDROmorphone, insulin aspart U-100, lidocaine HCL 20 mg/ml (2%), naloxone, ondansetron, promethazine (PHENERGAN) IVPB, sodium chloride 0.9%     Review of patient's allergies indicates:   Allergen Reactions    Topamax [topiramate] Other (See Comments)     hallucinations     Objective:     Vital Signs (Most Recent):  Temp: 98.7 °F (37.1 °C) (01/31/19 0413)  Pulse: 105 (01/31/19 0413)  Resp: 18 (01/31/19 0413)  BP: (!) 144/75 (01/31/19 0413)  SpO2: 95 % (01/31/19 0413) Vital Signs (24h Range):  Temp:  [98.2 °F (36.8 °C)-99.7 °F (37.6 °C)] 98.7 °F (37.1 °C)  Pulse:  [] 105  Resp:  [14-20] 18  SpO2:  [93 %-98 %] 95 %  BP: (112-144)/(53-75) 144/75     Weight: 69.9 kg (154 lb)  Body mass index is 29.1 kg/m².    Intake/Output - Last 3 Shifts       01/29 0700 - 01/30 0659 01/30 0700 - 01/31 0659 01/31 0700 - 02/01 0659    P.O. 0 360     I.V. (mL/kg) 1600 (22.9) 900 (12.9)     Total Intake(mL/kg) 1600 (22.9) 1260 (18)     Blood  100     Total Output  100     Net  +1600 +1160            Urine Occurrence 0 x 5 x           Physical Exam   Constitutional: She is oriented to person, place, and time. She appears well-developed and well-nourished.   HENT:   Head: Normocephalic and atraumatic.   Eyes: Right eye exhibits no discharge. Left eye exhibits no discharge.   Neck: Normal range of motion. Neck supple.   Cardiovascular: Normal rate and regular rhythm.   Pacemaker in place.  Incision c/d/i   Pulmonary/Chest: Effort normal. No respiratory distress.   Abdominal:   Appropriately tender to palpation.  Incisions c/d/i.   Musculoskeletal: Normal range of motion.   Neurological: She is alert and oriented to person, place, and time.   Skin: Skin is warm and dry.   Psychiatric: She has a normal mood and affect. Her behavior is normal.   Vitals reviewed.      Significant Labs:  CBC:   Recent Labs   Lab 01/31/19  0539   WBC 8.04   RBC 3.67*   HGB 11.2*   HCT 35.9*   *   MCV 98   MCH 30.5   MCHC 31.2*     BMP:   Recent Labs   Lab 01/31/19  0539         K 3.5   *   CO2 24   BUN 6*   CREATININE 0.6   CALCIUM 7.9*   MG 1.9       Significant Diagnostics:  I have reviewed all pertinent imaging results/findings within the past 24 hours.    Assessment/Plan:     * Hiatal hernia    Paris Burris is a 73 y.o. female is s/p lap hiatal hernia repair with mesh on 1/29.  She experienced tachycardia followed by bradycardia in OR after intubation, but before surgery was started.  Ultimately, proceeded with surgery.  Cards and anesthesia intially thought it was from a BBB, but now more concerned for Mobitz 2.  She is now s/p Bi-Ventricular pacemaker placement on 1/30.  From a surgery perspective, she is doing well.    -NPO order in from EP cards right now, after rounds they will let us know if they plan for any further procedures.  -Once she can have PO intake from their perspective, will advance to clears and then fulls if she tolerates.    Dispo: From a surgical perspective,  she is ready to go home, so will await decision from EP cards on whether she needs to stay in hospital.     AV block, Mobitz II    Pacemaker put in.  Per cards have started a BB and ARB         Giuseppe Garcia MD  General Surgery  [facility]

## 2019-01-31 NOTE — PROGRESS NOTES
[facility]  Cardiac Electrophysiology  Progress Note    Admission Date: 1/29/2019  Code Status: Full Code   Attending Physician: Renato Perez Jr.,*   Expected Discharge Date: 1/31/2019  Principal Problem:Hiatal hernia    Subjective:     Interval History:   No overnight issues  On PCA pump   Denies any pain / discomfort at the ICD site, sling on    Review of Systems   Constitution: Negative for chills, decreased appetite, diaphoresis, fever, weight gain and weight loss.   Eyes: Negative for blurred vision.   Cardiovascular: Negative for chest pain, dyspnea on exertion, irregular heartbeat, leg swelling, near-syncope, orthopnea and palpitations.   Respiratory: Negative for cough, shortness of breath, snoring and wheezing.    Gastrointestinal: Negative for abdominal pain, nausea and vomiting.   Genitourinary: Negative for bladder incontinence and urgency.     Objective:     Vital Signs (Most Recent):  Temp: 98.6 °F (37 °C) (01/31/19 0809)  Pulse: 100 (01/31/19 0815)  Resp: (!) 21 (01/31/19 0809)  BP: 135/68 (01/31/19 0809)  SpO2: (!) 92 % (01/31/19 0809) Vital Signs (24h Range):  Temp:  [98.2 °F (36.8 °C)-99.7 °F (37.6 °C)] 98.6 °F (37 °C)  Pulse:  [] 100  Resp:  [14-21] 21  SpO2:  [92 %-98 %] 92 %  BP: (112-144)/(53-75) 135/68     Weight: 69.9 kg (154 lb)  Body mass index is 29.1 kg/m².     SpO2: (!) 92 %  O2 Device (Oxygen Therapy): room air    Physical Exam   Constitutional: She is oriented to person, place, and time. She appears well-developed and well-nourished. No distress.   HENT:   Head: Normocephalic and atraumatic.   Eyes: Conjunctivae are normal. Pupils are equal, round, and reactive to light.   Neck: Normal range of motion. Neck supple. No thyromegaly present.   Cardiovascular: Regular rhythm, normal heart sounds and intact distal pulses. Tachycardia present. Exam reveals no gallop and no friction rub.   No murmur heard.  Pulses:       Carotid pulses are 2+ on the right  side, and 2+ on the left side.       Radial pulses are 2+ on the right side, and 2+ on the left side.   V- paced rhythm   Pulmonary/Chest: Effort normal. No respiratory distress. She has no wheezes. She has rales in the left lower field.       Abdominal: Soft. Bowel sounds are normal. She exhibits no distension. There is no tenderness.   Neurological: She is alert and oriented to person, place, and time.   Skin: She is not diaphoretic.       Significant Labs:   BMP:   Recent Labs   Lab 01/29/19  1117 01/30/19  0741 01/31/19  0539   * 132* 107    145 142   K 4.1 4.3 3.5    114* 111*   CO2 22* 24 24   BUN 14 10 6*   CREATININE 0.9 0.7 0.6   CALCIUM 9.5 8.3* 7.9*   MG 3.1* 1.9 1.9   , CMP:   Recent Labs   Lab 01/29/19  1117 01/30/19  0741 01/31/19  0539    145 142   K 4.1 4.3 3.5    114* 111*   CO2 22* 24 24   * 132* 107   BUN 14 10 6*   CREATININE 0.9 0.7 0.6   CALCIUM 9.5 8.3* 7.9*   ANIONGAP 10 7* 7*   ESTGFRAFRICA >60.0 >60.0 >60.0   EGFRNONAA >60.0 >60.0 >60.0   , CBC:   Recent Labs   Lab 01/30/19  0741 01/31/19  0539   WBC 11.39 8.04   HGB 13.4 11.2*   HCT 42.9 35.9*    123*   , INR: No results for input(s): INR, PROTIME in the last 48 hours. and Lipid Panel No results for input(s): CHOL, HDL, LDLCALC, TRIG, CHOLHDL in the last 48 hours.    Significant Imaging: X-Ray: CXR: X-Ray Chest 1 View (CXR): No results found for this visit on 01/29/19.    Assessment and Plan:     AV block, Mobitz II    Post operatively Mobitz Type II and alternating left and right bundle branch block indicating severe infrahisian disease:     - TTE 38%, Grade 1 DD,    - POD 1 S/p [facility] CRT-P (Dr. Livingston)   - Post op CXR: wnl     - EP will sign off, please call for any further questions or concerns,  - Follow up Device clinic in 1 week   - Follow up Dr. Livingston in 3 months   - Will interrogate device with [facility] rep this AM ( telemetry with V-paced )  - NO HEPARIN PRODUCTS  - Keflex  500 mg TID for 4 days (END 2/4/19)       Other instruction:   ==============================  Sling to left arm - wear for 48 hours, then only at night for 6 weeks.  No lifting left elbow above shoulder height  No lifting over 5 pounds  No driving for 1 week and for 4 weeks if patient uses left arm to make turns  Do not let beam of shower/water hit site directly and no scrubbing in area  Follow up in device clinic in 1 week and with Ep clinic  in 3 months.  Notify Cardiology/EP increased redness, warmth, drainage, or re-opening of the wound   Please call [phone] option 2 during business hours or the main [facility] number and ask for on-call for device clinic after hours.               Dennise Malloy MD  Cardiac Electrophysiology  [facility]

## 2019-01-31 NOTE — DISCHARGE SUMMARY
[facility]  General Surgery  Discharge Summary      Patient Name: Paris Burris  MRN: [id number]  Admission Date: 1/29/2019  Hospital Length of Stay: 2 days  Discharge Date and Time:  01/31/2019 11:11 AM  Attending Physician: Renato Perez Jr.,*   Discharging Provider: Enzo Heart MD  Primary Care Provider: Mandi Huynh MD    HPI:   74 yo female with asymptomatic large hiatal hernia. Patient denies reflux, dysphagia, regurgitation or abdominal pain. History of camerons ulcers.  ROJAS complete.    UGI: large hiatal hernia. 6cm, type 3.  CT: moderate sized hiatal hernia  Manometry: ineffective esophageal motility- incomplete bolus clearance in 30%.           Procedure(s) (LRB):  INSERTION, CARDIAC PACEMAKER, BIVENTRICULAR (N/A)      Indwelling Lines/Drains at time of discharge:   Lines/Drains/Airways          None        Hospital Course: Paris Burris underwent the above procedure on 1/29 as treatment for symptomatic hiatal hernia.  After she was intubated for surgery, she had episode of tachycardia and bradycardia.  Decision was made to proceed with surgery after talking to anesthesia and cardiology.  After surgery, cardiology elected to put in a pacemaker.  She did fine with this procedure as well as her surgery.  Prior to discharge home on 01/31/2019 her pain was well controlled on oral medications, tolerated diet, ambulated, spontaneously voided and experienced return of bowel function. She was discharged home in good condition on POD#2.      Consults:   Consults (From admission, onward)        Status Ordering Provider     Inpatient consult to Electrophysiology  Once     Provider:  (Not yet assigned)    Completed ENZO HEART     Inpatient consult to Registered Dietitian/Nutritionist  Once     Provider:  (Not yet assigned)    HARDIK Pelaez JR.          Significant Diagnostic Studies: Labs:   BMP:   Recent Labs   Lab 01/29/19  1117 01/30/19  0741 01/31/19  0539   GLU  214* 132* 107    145 142   K 4.1 4.3 3.5    114* 111*   CO2 22* 24 24   BUN 14 10 6*   CREATININE 0.9 0.7 0.6   CALCIUM 9.5 8.3* 7.9*   MG 3.1* 1.9 1.9       Pending Diagnostic Studies:     None        Final Active Diagnoses:    Diagnosis Date Noted POA    PRINCIPAL PROBLEM:  Hiatal hernia [K44.9] 10/08/2018 Yes    AV block, Mobitz II [I44.1] 01/29/2019 No    SVT (supraventricular tachycardia) [I47.1] 01/29/2019 No      Problems Resolved During this Admission:      Discharged Condition: good    Disposition: Home or Self Care    Follow Up:  Follow-up Information     Renato Perez Jr, MD On 2/11/2019.    Specialties:  General Surgery, Bariatrics  Why:  Post-op Appt: 2/11/19 at 3:15 PM  Contact information:  [address] [facility] [address]  [phone]                 Patient Instructions:      Diet full liquid     Lifting restrictions   Order Comments: No more than 10 lbs for 2 weeks.     No driving until:   Order Comments: No longer taking narcotic pain medications     Notify your health care provider if you experience any of the following:  temperature >100.4     Notify your health care provider if you experience any of the following:  persistent nausea and vomiting or diarrhea     Notify your health care provider if you experience any of the following:  severe uncontrolled pain     Notify your health care provider if you experience any of the following:  redness, tenderness, or signs of infection (pain, swelling, redness, odor or green/yellow discharge around incision site)     Notify your health care provider if you experience any of the following:  difficulty breathing or increased cough     Notify your health care provider if you experience any of the following:  severe persistent headache     Notify your health care provider if you experience any of the following:  worsening rash     Notify your health care provider if you experience any of the following:  persistent dizziness,  light-headedness, or visual disturbances     Notify your health care provider if you experience any of the following:  increased confusion or weakness     No dressing needed     Medications:  Reconciled Home Medications:      Medication List      START taking these medications    candesartan 4 MG tablet  Commonly known as:  ATACAND  Take 1 tablet (4 mg total) by mouth once daily.  Start taking on:  2/1/2019     cephALEXin 250 MG capsule  Commonly known as:  KEFLEX  Take 2 capsules (500 mg total) by mouth every 8 (eight) hours.     hydrocodone-apap 7.5-325 MG/15 ML oral solution  Commonly known as:  HYCET  Take 15 mLs by mouth every 4 (four) hours as needed for Pain.     metoprolol succinate 25 MG 24 hr tablet  Commonly known as:  TOPROL-XL  Take 1 tablet (25 mg total) by mouth once daily.  Start taking on:  2/1/2019     ondansetron 8 MG Tbdl  Commonly known as:  ZOFRAN-ODT  Take 1 tablet (8 mg total) by mouth once. for 1 dose        CONTINUE taking these medications    alpha lipoic acid 300 mg Cap  Take 300 mg by mouth 2 (two) times daily.     b complex vitamins capsule  Take 1 capsule by mouth once daily.     blood sugar diagnostic Strp  Commonly known as:  ACCU-CHEK SMARTVIEW TEST STRIP  Test once daily.     blood-glucose meter Misc  1 Device by Misc.(Non-Drug; Combo Route) route 2 (two) times daily.     * buPROPion 100 MG TBSR 12 hr tablet  Commonly known as:  WELLBUTRIN SR     * buPROPion 100 MG TBSR 12 hr tablet  Commonly known as:  WELLBUTRIN SR  TAKE 1 TABLET BY MOUTH TWICE A DAY     cetirizine 10 MG tablet  Commonly known as:  ZYRTEC  Take 10 mg by mouth once daily.     CITICOLINE ORAL  Take 1 tablet by mouth once daily at 6am.     CURCUMIN MISC  Take 500 mg by mouth 2 (two) times daily.     esomeprazole 40 MG capsule  Commonly known as:  NEXIUM  Take 1 capsule (40 mg total) by mouth before breakfast.     FISH -1,000 mg Cap  Generic drug:  omega-3 fatty acids-fish oil  Take 1 capsule by mouth once  daily.     lancets Misc  1 lancet by Misc.(Non-Drug; Combo Route) route 2 (two) times daily.     LORazepam 0.5 MG tablet  Commonly known as:  ATIVAN  Take 1 tablet (0.5 mg total) by mouth every 12 (twelve) hours as needed for Anxiety.     rosuvastatin 20 MG tablet  Commonly known as:  CRESTOR  Take 1 tablet (20 mg total) by mouth once daily.     silver sulfADIAZINE 1% 1 % cream  Commonly known as:  SILVADENE  Apply topically once daily.     traMADol 50 mg tablet  Commonly known as:  ULTRAM  TAKE 2 TABLETS (100 MG TOTAL) BY MOUTH DAILY AS NEEDED FOR PAIN.     traZODone 50 MG tablet  Commonly known as:  DESYREL  Take 1 tablet (50 mg total) by mouth every evening.     tretinoin 0.05 % cream  Commonly known as:  RETIN-A  Use hs     venlafaxine 37.5 MG Tab  Commonly known as:  EFFEXOR  Take 2 tablets (75 mg total) by mouth once daily.     VITAMIN C 500 MG tablet  Generic drug:  ascorbic acid (vitamin C)  Take 1,000 mg by mouth once daily.     vitamin D 1000 units Tab  Commonly known as:  VITAMIN D3  Take 500 Units by mouth once daily. With K2 50 mch         * This list has 2 medication(s) that are the same as other medications prescribed for you. Read the directions carefully, and ask your doctor or other care provider to review them with you.              Time spent on the discharge of patient: 20 minutes    Giuseppe Garcia MD  General Surgery  [facility]

## 2019-01-31 NOTE — HOSPITAL COURSE
Paris Burris underwent the above procedure on 1/29 as treatment for symptomatic hiatal hernia.  After she was intubated for surgery, she had episode of tachycardia and bradycardia.  Decision was made to proceed with surgery after talking to anesthesia and cardiology.  After surgery, cardiology elected to put in a pacemaker.  She did fine with this procedure as well as her surgery.  Prior to discharge home on 01/31/2019 her pain was well controlled on oral medications, tolerated diet, ambulated, spontaneously voided and experienced return of bowel function. She was discharged home in good condition on POD#2.

## 2019-01-31 NOTE — SUBJECTIVE & OBJECTIVE
Interval History: Yesterday, pt had Bi-Ventricular pacemaker placement.  She tolerated the procedure well.  Overnight she was slightly tachycardic, but otherwise VS stable.  From a surgical standpoint, she is doing quiet well.    Medications:  Continuous Infusions:   sodium chloride 0.9% 125 mL/hr at 01/31/19 0519    hydromorphone in 0.9 % NaCl 6 mg/30 ml       Scheduled Meds:   calcium chloride IVPB  2 g Intravenous Once    candesartan  4 mg Oral Daily    ceFAZolin (ANCEF) IVPB  1 g Intravenous Q8H    metoprolol succinate  25 mg Oral Daily    pantoprazole  40 mg Intravenous BID     PRN Meds:acetaminophen, bupivacaine (PF) 0.25% (2.5 mg/ml), dextrose 50%, glucagon (human recombinant), hydrocodone-apap 7.5-325 MG/15 ML, HYDROmorphone, insulin aspart U-100, lidocaine HCL 20 mg/ml (2%), naloxone, ondansetron, promethazine (PHENERGAN) IVPB, sodium chloride 0.9%     Review of patient's allergies indicates:   Allergen Reactions    Topamax [topiramate] Other (See Comments)     hallucinations     Objective:     Vital Signs (Most Recent):  Temp: 98.7 °F (37.1 °C) (01/31/19 0413)  Pulse: 105 (01/31/19 0413)  Resp: 18 (01/31/19 0413)  BP: (!) 144/75 (01/31/19 0413)  SpO2: 95 % (01/31/19 0413) Vital Signs (24h Range):  Temp:  [98.2 °F (36.8 °C)-99.7 °F (37.6 °C)] 98.7 °F (37.1 °C)  Pulse:  [] 105  Resp:  [14-20] 18  SpO2:  [93 %-98 %] 95 %  BP: (112-144)/(53-75) 144/75     Weight: 69.9 kg (154 lb)  Body mass index is 29.1 kg/m².    Intake/Output - Last 3 Shifts       01/29 0700 - 01/30 0659 01/30 0700 - 01/31 0659 01/31 0700 - 02/01 0659    P.O. 0 360     I.V. (mL/kg) 1600 (22.9) 900 (12.9)     Total Intake(mL/kg) 1600 (22.9) 1260 (18)     Blood  100     Total Output  100     Net +1600 +1160            Urine Occurrence 0 x 5 x           Physical Exam   Constitutional: She is oriented to person, place, and time. She appears well-developed and well-nourished.   HENT:   Head: Normocephalic and atraumatic.   Eyes:  Right eye exhibits no discharge. Left eye exhibits no discharge.   Neck: Normal range of motion. Neck supple.   Cardiovascular: Normal rate and regular rhythm.   Pacemaker in place.  Incision c/d/i   Pulmonary/Chest: Effort normal. No respiratory distress.   Abdominal:   Appropriately tender to palpation.  Incisions c/d/i.   Musculoskeletal: Normal range of motion.   Neurological: She is alert and oriented to person, place, and time.   Skin: Skin is warm and dry.   Psychiatric: She has a normal mood and affect. Her behavior is normal.   Vitals reviewed.      Significant Labs:  CBC:   Recent Labs   Lab 01/31/19  0539   WBC 8.04   RBC 3.67*   HGB 11.2*   HCT 35.9*   *   MCV 98   MCH 30.5   MCHC 31.2*     BMP:   Recent Labs   Lab 01/31/19  0539         K 3.5   *   CO2 24   BUN 6*   CREATININE 0.6   CALCIUM 7.9*   MG 1.9       Significant Diagnostics:  I have reviewed all pertinent imaging results/findings within the past 24 hours.

## 2019-01-31 NOTE — ASSESSMENT & PLAN NOTE
Paris Burris is a 73 y.o. female is s/p lap hiatal hernia repair with mesh on 1/29.  She experienced tachycardia followed by bradycardia in OR after intubation, but before surgery was started.  Ultimately, proceeded with surgery.  Cards and anesthesia intially thought it was from a BBB, but now more concerned for Mobitz 2.  She is now s/p Bi-Ventricular pacemaker placement on 1/30.  From a surgery perspective, she is doing well.    -NPO order in from EP cards right now, after rounds they will let us know if they plan for any further procedures.  -Once she can have PO intake from their perspective, will advance to clears and then fulls if she tolerates.    Dispo: From a surgical perspective, she is ready to go home, so will await decision from EP cards on whether she needs to stay in hospital.

## 2019-01-31 NOTE — ASSESSMENT & PLAN NOTE
Paris Burris is a 73 y.o. female is s/p lap hiatal hernia repair with mesh on 1/29.  She experienced tachycardia followed by bradycardia in OR after intubation, but before surgery was started.  Ultimately, proceeded with surgery.  Cards and anesthesia intially thought it was from a BBB, but now more concerned for Mobitz 2.  She is now s/p Bi-Ventricular pacemaker placement on 1/30.  From a surgery perspective, she is doing well.    -Tolerating fulls, has talked with dietician, cleared from cards, can go home.

## 2019-01-31 NOTE — DISCHARGE INSTRUCTIONS
Other instruction:   ==============================  Sling to left arm - wear for 48 hours, then only at night for 6 weeks.  No lifting left elbow above shoulder height  No lifting over 5 pounds  No driving for 1 week and for 4 weeks if patient uses left arm to make turns  Do not let beam of shower/water hit site directly and no scrubbing in area  Follow up in device clinic in 1 week and with Ep clinic  in 3 months.  Notify Cardiology/EP increased redness, warmth, drainage, or re-opening of the wound   Please call [phone] option 2 during business hours or the [facility] number and ask for on-call for device clinic after hours.

## 2019-01-31 NOTE — ASSESSMENT & PLAN NOTE
Pacemaker put in.  Per cards have started a BB and ARB  Follow instructions for sling  Remember to crush all meds that are larger than a pencil eraser  F/U in cards clinic, they will contact pt.

## 2019-01-31 NOTE — CONSULTS
Food & Nutrition  Education    Diet Education: Nissen   Time Spent: 15 minutes  Learners: Pt       Nutrition Education provided with handouts: Diet After Gastric Surgery       Comments: POD2 hiatal hernia with mesh. POD1 pacemaker. PMH: breast cancer s/p lumpectomy, T2DM, HLD, depression. BMI 29.16. Labs: BUN 6, phos 2.4. Educated pt on Full Liquid diet x 1 week and Soft diet x 1 week. Dicussed preventing stomach stretching and excess gas. Pt verbalized understanding.       All questions and concerns answered. Dietitian's contact information provided.       Follow-Up: Yes    Please re-consult as needed.

## 2019-02-01 ENCOUNTER — PATIENT MESSAGE (OUTPATIENT)
Dept: ELECTROPHYSIOLOGY | Facility: CLINIC | Age: 74
End: 2019-02-01

## 2019-02-01 DIAGNOSIS — I49.9 CARDIAC ARRHYTHMIA, UNSPECIFIED CARDIAC ARRHYTHMIA TYPE: Primary | ICD-10-CM

## 2019-02-04 ENCOUNTER — OFFICE VISIT (OUTPATIENT)
Dept: CARDIOLOGY | Facility: CLINIC | Age: 74
End: 2019-02-04
Payer: MEDICARE

## 2019-02-04 VITALS
HEART RATE: 83 BPM | HEIGHT: 61 IN | SYSTOLIC BLOOD PRESSURE: 140 MMHG | DIASTOLIC BLOOD PRESSURE: 66 MMHG | BODY MASS INDEX: 32.01 KG/M2 | WEIGHT: 169.56 LBS

## 2019-02-04 DIAGNOSIS — I45.2: ICD-10-CM

## 2019-02-04 DIAGNOSIS — E78.2 MIXED HYPERLIPIDEMIA: ICD-10-CM

## 2019-02-04 DIAGNOSIS — I42.9 CARDIOMYOPATHY, UNSPECIFIED TYPE: Primary | ICD-10-CM

## 2019-02-04 PROCEDURE — 3077F SYST BP >= 140 MM HG: CPT | Mod: HCNC,CPTII,GC,S$GLB | Performed by: STUDENT IN AN ORGANIZED HEALTH CARE EDUCATION/TRAINING PROGRAM

## 2019-02-04 PROCEDURE — 3077F PR MOST RECENT SYSTOLIC BLOOD PRESSURE >= 140 MM HG: ICD-10-PCS | Mod: HCNC,CPTII,GC,S$GLB | Performed by: STUDENT IN AN ORGANIZED HEALTH CARE EDUCATION/TRAINING PROGRAM

## 2019-02-04 PROCEDURE — 99203 OFFICE O/P NEW LOW 30 MIN: CPT | Mod: HCNC,GC,S$GLB, | Performed by: STUDENT IN AN ORGANIZED HEALTH CARE EDUCATION/TRAINING PROGRAM

## 2019-02-04 PROCEDURE — 3078F PR MOST RECENT DIASTOLIC BLOOD PRESSURE < 80 MM HG: ICD-10-PCS | Mod: HCNC,CPTII,GC,S$GLB | Performed by: STUDENT IN AN ORGANIZED HEALTH CARE EDUCATION/TRAINING PROGRAM

## 2019-02-04 PROCEDURE — 1101F PR PT FALLS ASSESS DOC 0-1 FALLS W/OUT INJ PAST YR: ICD-10-PCS | Mod: HCNC,CPTII,GC,S$GLB | Performed by: STUDENT IN AN ORGANIZED HEALTH CARE EDUCATION/TRAINING PROGRAM

## 2019-02-04 PROCEDURE — 1101F PT FALLS ASSESS-DOCD LE1/YR: CPT | Mod: HCNC,CPTII,GC,S$GLB | Performed by: STUDENT IN AN ORGANIZED HEALTH CARE EDUCATION/TRAINING PROGRAM

## 2019-02-04 PROCEDURE — 99999 PR PBB SHADOW E&M-EST. PATIENT-LVL V: CPT | Mod: PBBFAC,HCNC,GC, | Performed by: STUDENT IN AN ORGANIZED HEALTH CARE EDUCATION/TRAINING PROGRAM

## 2019-02-04 PROCEDURE — 99999 PR PBB SHADOW E&M-EST. PATIENT-LVL V: ICD-10-PCS | Mod: PBBFAC,HCNC,GC, | Performed by: STUDENT IN AN ORGANIZED HEALTH CARE EDUCATION/TRAINING PROGRAM

## 2019-02-04 PROCEDURE — 99203 PR OFFICE/OUTPT VISIT, NEW, LEVL III, 30-44 MIN: ICD-10-PCS | Mod: HCNC,GC,S$GLB, | Performed by: STUDENT IN AN ORGANIZED HEALTH CARE EDUCATION/TRAINING PROGRAM

## 2019-02-04 PROCEDURE — 3078F DIAST BP <80 MM HG: CPT | Mod: HCNC,CPTII,GC,S$GLB | Performed by: STUDENT IN AN ORGANIZED HEALTH CARE EDUCATION/TRAINING PROGRAM

## 2019-02-04 RX ORDER — METOPROLOL SUCCINATE 50 MG/1
50 TABLET, EXTENDED RELEASE ORAL DAILY
Qty: 30 TABLET | Refills: 11 | Status: SHIPPED | OUTPATIENT
Start: 2019-02-04 | End: 2020-02-17 | Stop reason: SDUPTHER

## 2019-02-04 RX ORDER — CANDESARTAN 8 MG/1
8 TABLET ORAL DAILY
Qty: 90 TABLET | Refills: 3 | Status: SHIPPED | OUTPATIENT
Start: 2019-02-04 | End: 2019-03-20

## 2019-02-04 RX ORDER — FUROSEMIDE 40 MG/1
40 TABLET ORAL DAILY PRN
Qty: 60 TABLET | Refills: 11 | Status: SHIPPED | OUTPATIENT
Start: 2019-02-04 | End: 2019-04-11

## 2019-02-04 NOTE — ASSESSMENT & PLAN NOTE
- increase candesartan to 8 mg QD and toprol XL to 50 mg QD  - CPET stress  - currently compensated  - lasix PRN  - BMP in 1 week

## 2019-02-04 NOTE — PROGRESS NOTES
Subjective:   Patient ID:  Paris Burris is a 73 y.o. female who presents for evaluation of new-onset cardiomyopathy after recent hospitalization.      HPI: 73 year old female with HLD, pre-DM, COOKIE, Left Breast CA s/p lumpectomy 2002 who presented to the hospital on 1/29/18 for hiatal hernia repair/Toupe fundoplication. Her operative course was complicated by SVT responsive to esmolol and metoprolol IV during the case. After the case a new LBBB was noted however patient had no symptoms of chest pain or dyspnea. Troponin was negative. Her first post-operative EKG appears to be a junctional rhythm with LBBB and heart rate of 58. Her second post-op EKG shows NSR with a 2nd degree Mobitz II block as well as LBBB. Review of her telemetry shows the Mobitz II with P waves fusing with T, as well as a sinus rhythm. The patient underwent BIV pacemaker on 1/30/19 by EP for Mobitz Ty II and alternating left and right bundle branch block indicating severe infrahisian disease. Her TTE in the hospital revealed an EF of 38% with global hypokinesis.She was started on candesartan 4mg daily and metoprolol succinate 25mg daily.     Since her discharge, she feels well and denies any major complaints. She is compliant with her medications.    EKG 1/30/19 - A sense, B iV paced rhythm, rate 104    TTE 5/27/15:    1 - Normal left ventricular systolic function (EF 60-65%).     2 - Normal right ventricular systolic function .     3 - Trivial mitral regurgitation.     4 - Trivial pericardial effusion.    TTE 1/30/19:  Moderately decreased left ventricular systolic function. The estimated ejection fraction is 38% (Quantitative with echo contrast), mild LVE, global hypokinesis  · Moderate left atrial enlargement.  · Grade I (mild) left ventricular diastolic dysfunction consistent with impaired relaxation.  · Normal right ventricular systolic function.  · Mild mitral regurgitation.  Normal central venous pressure (3 mm Hg).    Past Medical  History:   Diagnosis Date    Allergy     Anemia     Anxiety     Breast cancer 2002    Left breast    Cancer 2002    L breast s/p lumpectomy    Decreased hearing     Depression     Diabetes mellitus     Diabetes with neurologic complications     Gastric ulcer 9/10/13    EGD    Hiatal hernia     Hyperlipidemia     Migraine headache     Migraine headache 3/20/2015    Multiple gastric ulcers     Parathyroid disorder     Pre-diabetes     Renal manifestation of secondary diabetes mellitus     Sleep apnea     no CPAP    Type 2 diabetes mellitus, controlled, 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    Wrist fracture        Past Surgical History:   Procedure Laterality Date    ADENOIDECTOMY      BREAST LUMPECTOMY Left 2002    COLONOSCOPY N/A 12/2/2016    Performed by Dustin Patterson MD at [facility] (4TH FLR)    DEVICE ASSISTED ENTEROSCOPY-ANTEROGRADE N/A 3/20/2018    Performed by Dustin Patterson MD at [facility] (2ND FLR)    EGD (ESOPHAGOGASTRODUODENOSCOPY) intraop N/A 1/29/2019    Performed by Renato Perez Jr., MD at [facility] OR 2ND FLR    ESOPHAGOGASTRODUODENOSCOPY (EGD) N/A 9/12/2018    Performed by Dustin Patterson MD at [facility] (4TH FLR)    ESOPHAGOGASTRODUODENOSCOPY (EGD) N/A 12/2/2016    Performed by Dustin Patterson MD at [facility] (4TH FLR)    ESOPHAGOGASTRODUODENOSCOPY (EGD) N/A 4/20/2015    Performed by Marcial Dewitt MD at [facility] (4TH FLR)    EYE SURGERY Bilateral     cataracts    FUNDOPLICATION, LAPAROSCOPIC, TOUPET N/A 1/29/2019    Performed by Renato Perez Jr., MD at [facility] OR 2ND FLR    INJECTION-STEROID-EPIDURAL-LUMBAR N/A 6/15/2016    Performed by Michelle Frias [facility] at [facility] [place] [facility]    INSERTION, CARDIAC PACEMAKER, BIVENTRICULAR N/A 1/30/2019    Performed by Stone Livingston MD at [facility] EP LAB    lipoma removal  1999    MANOMETRY, ESOPHAGUS, WITH IMPEDANCE MEASUREMENT N/A 10/8/2018    Performed by Renato Maria MD at [facility] (4TH FLR)    PARATHYROIDECTOMY  minimally invasive N/A 11/4/2015    Performed by [facility] at [facility] OR 2ND FLR    REPAIR, HERNIA, HIATAL, LAPAROSCOPIC with Mesh N/A 1/29/2019    Performed by [facility] Jr[facility] at [facility] OR 2ND FLR    TONSILLECTOMY         Social History     Socioeconomic History    Marital status:      Spouse name: None    Number of children: None    Years of education: None    Highest education level: None   Social Needs    Financial resource strain: None    Food insecurity - worry: None    Food insecurity - inability: None    Transportation needs - medical: None    Transportation needs - non-medical: None   Occupational History    Occupation:  supervisor   Tobacco Use    Smoking status: Never Smoker    Smokeless tobacco: Never Used   Substance and Sexual Activity    Alcohol use: No     Comment: quit 2014 - alcohol abuse    Drug use: Yes    Sexual activity: Yes     Partners: Male   Other Topics Concern    Patient feels they ought to cut down on drinking/drug use Not Asked    Patient annoyed by others criticizing their drinking/drug use Not Asked    Patient has felt bad or guilty about drinking/drug use Not Asked    Patient has had a drink/used drugs as an eye opener in the AM Not Asked    Are you pregnant or think you may be? Not Asked    Breast-feeding Not Asked   Social History Narrative    None       Family History   Problem Relation Age of Onset    Suicide Mother     Depression Mother     Alcohol abuse Father     Headaches Father     Diabetes Sister         prediabetes    Psoriasis Sister     Depression Sister     Depression Daughter     Colon cancer Neg Hx     Esophageal cancer Neg Hx        Patient's Medications   New Prescriptions    CANDESARTAN (ATACAND) 8 MG TABLET    Take 1 tablet (8 mg total) by mouth once daily.    FUROSEMIDE (LASIX) 40 MG TABLET    Take 1 tablet (40 mg total) by mouth daily as needed (weight gain).    METOPROLOL SUCCINATE (TOPROL-XL)  50 MG 24 HR TABLET    Take 1 tablet (50 mg total) by mouth once daily.   Previous Medications    ALPHA LIPOIC ACID 300 MG CAP    Take 300 mg by mouth 2 (two) times daily.     ASCORBIC ACID (VITAMIN C) 500 MG TABLET    Take 1,000 mg by mouth once daily.     B COMPLEX VITAMINS CAPSULE    Take 1 capsule by mouth once daily.    BLOOD SUGAR DIAGNOSTIC (ACCU-CHEK SMARTVIEW TEST STRIP) STRP    Test once daily.    BLOOD-GLUCOSE METER MISC    1 Device by Misc.(Non-Drug; Combo Route) route 2 (two) times daily.    BUPROPION (WELLBUTRIN SR) 100 MG TBSR 12 HR TABLET        BUPROPION (WELLBUTRIN SR) 100 MG TBSR 12 HR TABLET    TAKE 1 TABLET BY MOUTH TWICE A DAY    CEPHALEXIN (KEFLEX) 250 MG CAPSULE    Take 2 capsules (500 mg total) by mouth every 8 (eight) hours.    CETIRIZINE (ZYRTEC) 10 MG TABLET    Take 10 mg by mouth once daily.    CITICOLINE SODIUM (CITICOLINE ORAL)    Take 1 tablet by mouth once daily at 6am.    ESOMEPRAZOLE (NEXIUM) 40 MG CAPSULE    Take 1 capsule (40 mg total) by mouth before breakfast.    LANCETS MISC    1 lancet by Misc.(Non-Drug; Combo Route) route 2 (two) times daily.    LORAZEPAM (ATIVAN) 0.5 MG TABLET    Take 1 tablet (0.5 mg total) by mouth every 12 (twelve) hours as needed for Anxiety.    OMEGA-3 FATTY ACIDS-FISH OIL (FISH OIL) 340-1,000 MG CAP    Take 1 capsule by mouth once daily.    OXYCODONE (ROXICODONE) 5 MG/5 ML SOLN    Take 7.5 mLs (7.5 mg total) by mouth every 4 (four) hours as needed (pain).    ROSUVASTATIN (CRESTOR) 20 MG TABLET    Take 1 tablet (20 mg total) by mouth once daily.    SILVER SULFADIAZINE 1% (SILVADENE) 1 % CREAM    Apply topically once daily.    TRAMADOL (ULTRAM) 50 MG TABLET    TAKE 2 TABLETS (100 MG TOTAL) BY MOUTH DAILY AS NEEDED FOR PAIN.    TRAZODONE (DESYREL) 50 MG TABLET    Take 1 tablet (50 mg total) by mouth every evening.    TRETINOIN (RETIN-A) 0.05 % CREAM    Use hs    TURMERIC (CURCUMIN MISC)    Take 500 mg by mouth 2 (two) times daily.     VENLAFAXINE  "(EFFEXOR) 37.5 MG TAB    Take 2 tablets (75 mg total) by mouth once daily.    VITAMIN D 1000 UNITS TAB    Take 500 Units by mouth once daily. With K2 50 mch   Modified Medications    No medications on file   Discontinued Medications    CANDESARTAN (ATACAND) 4 MG TABLET    Take 1 tablet (4 mg total) by mouth once daily.    METOPROLOL SUCCINATE (TOPROL-XL) 25 MG 24 HR TABLET    Take 1 tablet (25 mg total) by mouth once daily.       ROS  Constitution: Negative for fever, chills, weight loss or gain.   HENT: Negative for sore throat, rhinorrhea, or headache.  Eyes: Negative for blurred or double vision.   Cardiovascular: See above  Pulmonary: Negative for SOB   Gastrointestinal: Negative for abdominal pain, nausea, vomiting, or diarrhea.   : Negative for dysuria.   Neurological: Negative for focal weakness or sensory changes.    BP (!) 140/66   Pulse 83   Ht 5' 1" (1.549 m)   Wt 76.9 kg (169 lb 8.5 oz)   BMI 32.03 kg/m²     Objective:   Physical Exam  Constitutional: NAD, conversant  HEENT: Sclera anicteric, PERRLA, EOMI  Neck: No JVD, no carotid bruits  CV: RRR, no murmur, normal S1/S2, No Pericardial rub  Pulm: CTAB with no wheezes, rales, or rhonchi  GI: Abdomen soft, NTND, +BS  Extremities: No LE edema, warm and well perfused, No cyanosis, No clubbing  Skin: No ecchymosis, erythema, or ulcers  Psych: AOx3, appropriate affect  Neuro: CNII-XII intact, no focal deficits    Lab Results   Component Value Date     01/31/2019    K 3.5 01/31/2019     (H) 01/31/2019    CO2 24 01/31/2019    BUN 6 (L) 01/31/2019    CREATININE 0.6 01/31/2019     01/31/2019    HGBA1C 5.6 11/14/2018    MG 1.9 01/31/2019    AST 18 11/14/2018    ALT 20 11/14/2018    ALBUMIN 4.5 11/14/2018    PROT 7.7 11/14/2018    BILITOT 0.6 11/14/2018    WBC 8.04 01/31/2019    HGB 11.2 (L) 01/31/2019    HCT 35.9 (L) 01/31/2019    MCV 98 01/31/2019     (L) 01/31/2019    INR 0.9 04/24/2015    TSH 1.444 05/03/2018    CHOL 168 " 01/10/2019    HDL 34 (L) 01/10/2019    LDLCALC 88.2 01/10/2019    TRIG 229 (H) 01/10/2019       Assessment:     1. Cardiomyopathy, unspecified type    2. Right bundle branch block (RBBB) with left bundle branch block (LBBB)    3. Mixed hyperlipidemia        Plan:     Paris was seen today for hiatal hernia and svt (supraventricular tachycardia).    Diagnoses and all orders for this visit:    Cardiomyopathy  - increase candesartan to 8 mg QD and toprol XL to 50 mg QD  - CPET stress  - currently compensated  - lasix PRN  - BMP in 1 week    Right bundle branch block (RBBB) with left bundle branch block (LBBB)  - intermittent RBBB with LBBB with Mobitz Type II HB s/p CRT-P  - F/u in EP clinic  - wound evaluated and looks C/D/I w/o any tenderness    Hyperlipidemia  - c/w crestor 20 mg QD  - LDL 88, HDL 34 (1/10/19)    Thank you for allowing me to participate in this patient's care. Please do not hesitate to contact me with any questions or concerns.    Jack Rosenbaum MD  Cardiology Fellow  Pager: [phone]  2/4/2019 12:06 PM

## 2019-02-04 NOTE — PATIENT INSTRUCTIONS
CPET stress  Increase candesartan to 8 mg QD and check BMP in 1 week  Increase toprol XL to 50 mg QD  Lasix 40 mg QD PRN for weight gain  Monitor weight daily - if weight goes up by 3 lbs in 2-3 days or 5 lbs in 1 week, take lasix daily until you reach your dry weight  TTE in 2-3 months

## 2019-02-04 NOTE — ASSESSMENT & PLAN NOTE
- intermittent RBBB with LBBB with Mobitz Type II HB s/p CRT-P  - F/u in EP clinic  - wound evaluated and looks C/D/I w/o any tenderness

## 2019-02-06 ENCOUNTER — TELEPHONE (OUTPATIENT)
Dept: DERMATOLOGY | Facility: CLINIC | Age: 74
End: 2019-02-06

## 2019-02-06 NOTE — PLAN OF CARE
01/31/19 1147   Final Note   Assessment Type Final Discharge Note   Anticipated Discharge Disposition Home   What phone number can be called within the next 1-3 days to see how you are doing after discharge? ([phone])   Hospital Follow Up  Appt(s) scheduled? Yes   Discharge plans and expectations educations in teach back method with documentation complete? Yes   Right Care Referral Info   Post Acute Recommendation No Care

## 2019-02-07 ENCOUNTER — PATIENT MESSAGE (OUTPATIENT)
Dept: DERMATOLOGY | Facility: CLINIC | Age: 74
End: 2019-02-07

## 2019-02-07 ENCOUNTER — TELEPHONE (OUTPATIENT)
Dept: DERMATOLOGY | Facility: CLINIC | Age: 74
End: 2019-02-07

## 2019-02-07 ENCOUNTER — PES CALL (OUTPATIENT)
Dept: ADMINISTRATIVE | Facility: CLINIC | Age: 74
End: 2019-02-07

## 2019-02-11 ENCOUNTER — TELEPHONE (OUTPATIENT)
Dept: CARDIOLOGY | Facility: CLINIC | Age: 74
End: 2019-02-11

## 2019-02-11 ENCOUNTER — OFFICE VISIT (OUTPATIENT)
Dept: SURGERY | Facility: CLINIC | Age: 74
End: 2019-02-11
Payer: MEDICARE

## 2019-02-11 ENCOUNTER — LAB VISIT (OUTPATIENT)
Dept: LAB | Facility: HOSPITAL | Age: 74
End: 2019-02-11
Payer: MEDICARE

## 2019-02-11 VITALS
DIASTOLIC BLOOD PRESSURE: 56 MMHG | BODY MASS INDEX: 29.64 KG/M2 | HEART RATE: 69 BPM | WEIGHT: 157 LBS | SYSTOLIC BLOOD PRESSURE: 111 MMHG | HEIGHT: 61 IN

## 2019-02-11 DIAGNOSIS — I45.2: ICD-10-CM

## 2019-02-11 DIAGNOSIS — I42.9 CARDIOMYOPATHY, UNSPECIFIED TYPE: ICD-10-CM

## 2019-02-11 DIAGNOSIS — Z09 POSTOP CHECK: Primary | ICD-10-CM

## 2019-02-11 LAB
ANION GAP SERPL CALC-SCNC: 11 MMOL/L
BUN SERPL-MCNC: 20 MG/DL
CALCIUM SERPL-MCNC: 9.4 MG/DL
CHLORIDE SERPL-SCNC: 103 MMOL/L
CO2 SERPL-SCNC: 25 MMOL/L
CREAT SERPL-MCNC: 0.8 MG/DL
EST. GFR  (AFRICAN AMERICAN): >60 ML/MIN/1.73 M^2
EST. GFR  (NON AFRICAN AMERICAN): >60 ML/MIN/1.73 M^2
GLUCOSE SERPL-MCNC: 81 MG/DL
POTASSIUM SERPL-SCNC: 3.9 MMOL/L
SODIUM SERPL-SCNC: 139 MMOL/L

## 2019-02-11 PROCEDURE — 99024 PR POST-OP FOLLOW-UP VISIT: ICD-10-PCS | Mod: HCNC,S$GLB,, | Performed by: SURGERY

## 2019-02-11 PROCEDURE — 99024 POSTOP FOLLOW-UP VISIT: CPT | Mod: HCNC,S$GLB,, | Performed by: SURGERY

## 2019-02-11 PROCEDURE — 80048 BASIC METABOLIC PNL TOTAL CA: CPT | Mod: HCNC

## 2019-02-11 PROCEDURE — 36415 COLL VENOUS BLD VENIPUNCTURE: CPT | Mod: HCNC

## 2019-02-11 PROCEDURE — 99999 PR PBB SHADOW E&M-EST. PATIENT-LVL III: ICD-10-PCS | Mod: PBBFAC,HCNC,, | Performed by: SURGERY

## 2019-02-11 PROCEDURE — 99999 PR PBB SHADOW E&M-EST. PATIENT-LVL III: CPT | Mod: PBBFAC,HCNC,, | Performed by: SURGERY

## 2019-02-11 RX ORDER — HYDROCODONE BITARTRATE AND ACETAMINOPHEN 7.5; 325 MG/15ML; MG/15ML
SOLUTION ORAL
Refills: 0 | COMMUNITY
Start: 2019-01-31 | End: 2019-02-22

## 2019-02-11 RX ORDER — ZALEPLON 5 MG/1
5 CAPSULE ORAL NIGHTLY
Refills: 1 | COMMUNITY
Start: 2019-01-03 | End: 2019-04-30 | Stop reason: SDUPTHER

## 2019-02-11 NOTE — PROGRESS NOTES
Reviewed the patient's BMP after increasing her candesartan. Her BMP shows stable electrolyte and renal function.

## 2019-02-12 ENCOUNTER — PATIENT MESSAGE (OUTPATIENT)
Dept: PSYCHIATRY | Facility: CLINIC | Age: 74
End: 2019-02-12

## 2019-02-12 ENCOUNTER — PATIENT MESSAGE (OUTPATIENT)
Dept: ELECTROPHYSIOLOGY | Facility: CLINIC | Age: 74
End: 2019-02-12

## 2019-02-13 NOTE — PROGRESS NOTES
HPI:  The patient is status post-lap hh repair and fundoplication.  Subsequent pacemaker placement.  Doing well.  No sig complaints.  Barry a diet without difficulty.  Pain controlled.  Some fatigue but states that she is improving daily.  Minimal dysphagia    PHYSICAL EXAM:  Physical Exam     In NAD  Incisions c/d/i    ASSESSMENT:    The patient is doing well after surgery.     PLAN:    Follow up PRN.  Activity: light duty for 4 weeks  Continue to slowly advance diet to regular at one month post op.        Renato Perez MD

## 2019-02-14 ENCOUNTER — TELEPHONE (OUTPATIENT)
Dept: ELECTROPHYSIOLOGY | Facility: CLINIC | Age: 74
End: 2019-02-14

## 2019-02-14 ENCOUNTER — PATIENT MESSAGE (OUTPATIENT)
Dept: INTERNAL MEDICINE | Facility: CLINIC | Age: 74
End: 2019-02-14

## 2019-02-14 DIAGNOSIS — G47.00 INSOMNIA, UNSPECIFIED TYPE: Primary | ICD-10-CM

## 2019-02-14 RX ORDER — TRAZODONE HYDROCHLORIDE 100 MG/1
100 TABLET ORAL NIGHTLY
Qty: 30 TABLET | Refills: 11 | Status: SHIPPED | OUTPATIENT
Start: 2019-02-14 | End: 2019-08-05

## 2019-02-15 ENCOUNTER — PATIENT MESSAGE (OUTPATIENT)
Dept: INTERNAL MEDICINE | Facility: CLINIC | Age: 74
End: 2019-02-15

## 2019-02-17 ENCOUNTER — PATIENT MESSAGE (OUTPATIENT)
Dept: INTERNAL MEDICINE | Facility: CLINIC | Age: 74
End: 2019-02-17

## 2019-02-18 ENCOUNTER — CLINICAL SUPPORT (OUTPATIENT)
Dept: INTERNAL MEDICINE | Facility: CLINIC | Age: 74
End: 2019-02-18
Payer: MEDICARE

## 2019-02-18 ENCOUNTER — TELEPHONE (OUTPATIENT)
Dept: CARDIOLOGY | Facility: HOSPITAL | Age: 74
End: 2019-02-18

## 2019-02-18 ENCOUNTER — PATIENT MESSAGE (OUTPATIENT)
Dept: INTERNAL MEDICINE | Facility: CLINIC | Age: 74
End: 2019-02-18

## 2019-02-18 VITALS — WEIGHT: 157.44 LBS | BODY MASS INDEX: 29.74 KG/M2

## 2019-02-18 PROCEDURE — 99999 PR PBB SHADOW E&M-EST. PATIENT-LVL I: CPT | Mod: PBBFAC,HCNC,,

## 2019-02-18 PROCEDURE — 99999 PR PBB SHADOW E&M-EST. PATIENT-LVL I: ICD-10-PCS | Mod: PBBFAC,HCNC,,

## 2019-02-18 NOTE — TELEPHONE ENCOUNTER
----- Message from Natty Saldaña RN sent at 2/18/2019 10:24 AM CST -----  I scheduled her for 2/20 @ 9AM. She has another appt that same day @ 9:30AM.

## 2019-02-18 NOTE — PROGRESS NOTES
Health  Follow-Up Note   [x] Office  [] Phone  Notes from previous session reviewed.   [x] Previous Session Goals unchanged.   [] Patient/Caregiver Change in Goals.  Goals added or changed by Patient/Caregiver since program participation:  1. Exercise  Do sit and be fit     Additional/Changed support that patient/caregiver has experienced/sought?  (Indicate readiness, support from family, friends, others, community groups, etc)  1.   very supportive since surgery hernia repair and pacemaker insertion    Additional/Changed obstacles that could prevent patient/caregiver from reaching their goals?  1.  Anxiety about exercising and depression, not feeling like exercising since surgery    Feedback provided:  1.  Praised for good job down 0.66 lb    Diagnostic values/Desriptors for follow-up as needed for chronic condition(s)   Weight: 71.4 kg 157.41 lb down .66 lb  Blood Glucose: 90 day average 113 was 105 yesterday forgot meter today  Pacemaker site checked is clean and dry no redness, drainage or swelling, tegaderm (or similar drsg) is intact    Interventions:   1. Health  listened reflectively, validated thoughts and feelings, offered support and encouragement.   2. Allowed patient to express themselves in a non-biased atmosphere.  3. Health  assisted pt to problem-solve obstacles such as being in a challenging environment and dealing with these challenges.   4. Motivational Interviewed interventions utilized (OARS).   5. Patient responded favorably to interventions and remained actively engaged in the session.   6. Health  will remain available and connected for patient by phone and/or office visits.   7. Positive reinforcement, emotional support and encouragement provided.   8. Focused Education: MI, U tube Sit and Be Fit, message to cardiology Kiana Fitch about r/s post op appt request contact to patient to r/s postop appt.     Plan:  [x] Pt will work on goals as stated above.   [x]  Pt will contact Health  for any questions, concerns or needs.  [x] Pt will follow up with Health  in office on  3/12/19 at 0900.  [] Pt will follow up with Health  on phone in:        [x] Health  will remain available.   [] Health  will contact patient by phone in:        [] Health  will consult:        [] Health  will inform Provider via EPIC messaging.     Impression:  1. Behavior is consistent with Action Stage of Change.   2. Participation level:       [x] Receptive      [x] Interactive      [] Guarded and Resistant      [x] Self Motivated      [] Refused/Declined to participate   3. [x] Pt voiced understanding of all information presented.       [] Pt voiced needing further information/education. This will be arranged.       [x] Pt would benefit from further education/information as identified by this health . This will be arranged.     Jacqueline Perry RN HC

## 2019-02-20 ENCOUNTER — LAB VISIT (OUTPATIENT)
Dept: LAB | Facility: HOSPITAL | Age: 74
End: 2019-02-20
Attending: INTERNAL MEDICINE
Payer: MEDICARE

## 2019-02-20 ENCOUNTER — OFFICE VISIT (OUTPATIENT)
Dept: INTERNAL MEDICINE | Facility: CLINIC | Age: 74
End: 2019-02-20
Payer: MEDICARE

## 2019-02-20 ENCOUNTER — CLINICAL SUPPORT (OUTPATIENT)
Dept: CARDIOLOGY | Facility: HOSPITAL | Age: 74
End: 2019-02-20
Attending: INTERNAL MEDICINE
Payer: MEDICARE

## 2019-02-20 VITALS
HEIGHT: 61 IN | HEART RATE: 79 BPM | SYSTOLIC BLOOD PRESSURE: 116 MMHG | BODY MASS INDEX: 30.26 KG/M2 | WEIGHT: 160.25 LBS | DIASTOLIC BLOOD PRESSURE: 76 MMHG | TEMPERATURE: 98 F

## 2019-02-20 DIAGNOSIS — R42 DIZZINESS: ICD-10-CM

## 2019-02-20 DIAGNOSIS — Z95.0 S/P PLACEMENT OF CARDIAC PACEMAKER: ICD-10-CM

## 2019-02-20 DIAGNOSIS — J31.0 CHRONIC RHINITIS: ICD-10-CM

## 2019-02-20 DIAGNOSIS — M54.5 CHRONIC MIDLINE LOW BACK PAIN, WITH SCIATICA PRESENCE UNSPECIFIED: ICD-10-CM

## 2019-02-20 DIAGNOSIS — Z09 HOSPITAL DISCHARGE FOLLOW-UP: Primary | ICD-10-CM

## 2019-02-20 DIAGNOSIS — K44.9 HIATAL HERNIA: ICD-10-CM

## 2019-02-20 DIAGNOSIS — Z98.890 S/P LAPAROSCOPIC FUNDOPLICATION: ICD-10-CM

## 2019-02-20 DIAGNOSIS — G89.29 CHRONIC MIDLINE LOW BACK PAIN, WITH SCIATICA PRESENCE UNSPECIFIED: ICD-10-CM

## 2019-02-20 DIAGNOSIS — Z95.0 CARDIAC PACEMAKER IN SITU: ICD-10-CM

## 2019-02-20 DIAGNOSIS — Z09 HOSPITAL DISCHARGE FOLLOW-UP: ICD-10-CM

## 2019-02-20 DIAGNOSIS — I44.1 HEART BLOCK AV SECOND DEGREE: ICD-10-CM

## 2019-02-20 LAB
ALBUMIN SERPL BCP-MCNC: 4.2 G/DL
ALP SERPL-CCNC: 70 U/L
ALT SERPL W/O P-5'-P-CCNC: 14 U/L
ANION GAP SERPL CALC-SCNC: 8 MMOL/L
AST SERPL-CCNC: 15 U/L
BASOPHILS # BLD AUTO: 0.02 K/UL
BASOPHILS NFR BLD: 0.3 %
BILIRUB SERPL-MCNC: 0.3 MG/DL
BUN SERPL-MCNC: 27 MG/DL
CALCIUM SERPL-MCNC: 9.9 MG/DL
CHLORIDE SERPL-SCNC: 105 MMOL/L
CO2 SERPL-SCNC: 25 MMOL/L
CREAT SERPL-MCNC: 0.9 MG/DL
DIFFERENTIAL METHOD: ABNORMAL
EOSINOPHIL # BLD AUTO: 0.2 K/UL
EOSINOPHIL NFR BLD: 2.9 %
ERYTHROCYTE [DISTWIDTH] IN BLOOD BY AUTOMATED COUNT: 13.1 %
EST. GFR  (AFRICAN AMERICAN): >60 ML/MIN/1.73 M^2
EST. GFR  (NON AFRICAN AMERICAN): >60 ML/MIN/1.73 M^2
GLUCOSE SERPL-MCNC: 106 MG/DL
HCT VFR BLD AUTO: 43.4 %
HGB BLD-MCNC: 13.8 G/DL
LYMPHOCYTES # BLD AUTO: 2 K/UL
LYMPHOCYTES NFR BLD: 27.9 %
MCH RBC QN AUTO: 30.4 PG
MCHC RBC AUTO-ENTMCNC: 31.8 G/DL
MCV RBC AUTO: 96 FL
MONOCYTES # BLD AUTO: 0.6 K/UL
MONOCYTES NFR BLD: 8.1 %
NEUTROPHILS # BLD AUTO: 4.3 K/UL
NEUTROPHILS NFR BLD: 60.7 %
PLATELET # BLD AUTO: 164 K/UL
PMV BLD AUTO: 11 FL
POTASSIUM SERPL-SCNC: 4.4 MMOL/L
PROT SERPL-MCNC: 7.4 G/DL
RBC # BLD AUTO: 4.54 M/UL
SODIUM SERPL-SCNC: 138 MMOL/L
TSH SERPL DL<=0.005 MIU/L-ACNC: 0.8 UIU/ML
WBC # BLD AUTO: 7.14 K/UL

## 2019-02-20 PROCEDURE — 36415 COLL VENOUS BLD VENIPUNCTURE: CPT | Mod: HCNC

## 2019-02-20 PROCEDURE — 80053 COMPREHEN METABOLIC PANEL: CPT | Mod: HCNC

## 2019-02-20 PROCEDURE — 99214 OFFICE O/P EST MOD 30 MIN: CPT | Mod: HCNC,S$GLB,, | Performed by: INTERNAL MEDICINE

## 2019-02-20 PROCEDURE — 99999 PR PBB SHADOW E&M-EST. PATIENT-LVL IV: CPT | Mod: PBBFAC,HCNC,, | Performed by: INTERNAL MEDICINE

## 2019-02-20 PROCEDURE — 85025 COMPLETE CBC W/AUTO DIFF WBC: CPT | Mod: HCNC

## 2019-02-20 PROCEDURE — 84443 ASSAY THYROID STIM HORMONE: CPT | Mod: HCNC

## 2019-02-20 PROCEDURE — 93000 EKG 12-LEAD: ICD-10-PCS | Mod: HCNC,S$GLB,, | Performed by: INTERNAL MEDICINE

## 2019-02-20 PROCEDURE — 99999 PR PBB SHADOW E&M-EST. PATIENT-LVL IV: ICD-10-PCS | Mod: PBBFAC,HCNC,, | Performed by: INTERNAL MEDICINE

## 2019-02-20 PROCEDURE — 93000 ELECTROCARDIOGRAM COMPLETE: CPT | Mod: HCNC,S$GLB,, | Performed by: INTERNAL MEDICINE

## 2019-02-20 PROCEDURE — 93281 PM DEVICE PROGR EVAL MULTI: CPT | Mod: HCNC

## 2019-02-20 PROCEDURE — 99214 PR OFFICE/OUTPT VISIT, EST, LEVL IV, 30-39 MIN: ICD-10-PCS | Mod: HCNC,S$GLB,, | Performed by: INTERNAL MEDICINE

## 2019-02-20 RX ORDER — CETIRIZINE HYDROCHLORIDE 10 MG/1
10 TABLET ORAL DAILY
Qty: 30 TABLET | Refills: 1
Start: 2019-02-20 | End: 2019-07-18

## 2019-02-20 NOTE — PROGRESS NOTES
Transitional Care Note  Subjective:       Patient ID: Paris Burris is a 73 y.o. female.  Chief Complaint: HOSPITAL F/U     Family and/or Caretaker present at visit?  Yes,  Kade  Diagnostic tests reviewed/disposition: No diagnosic tests pending after this hospitalization.  Disease/illness education: yes  Home health/community services discussion/referrals: Patient does not have home health established from hospital visit.  They do not need home health.  If needed, we will set up home health for the patient.   Establishment or re-establishment of referral orders for community resources: No other necessary community resources.   Discussion with other health care providers: No discussion with other health care providers necessary.     HPI   Pt is s/p lap hiatal hernia repair due to significant GERD 1/29/19 w/ Dr. Perez  Intraoperatively, pt was found to have Mobitz Type II w/ alternating L and RBBB. Cardiology was consulted and pt is s/p PM placement 1/30/19. Started on keflex 500mg TID X 4 days.  At discharge, pt was started on candisartan 4mg daily and toprol xl 25mg daily.     Post op followed w/ Dr. Rosenbaum and Dr. Acevedo 2/4/19. Increased candesartan to 8mg daily and toprol XL to 50mg daily. Ordered cardiac PET and lasix prn. BMP in 1 week.     Followed w/ Dr. Perez 2/13/19 - follow up prn.    Pt is here for a lot of complaints.   Reports that she's still feeling unsteady on her feet. Has had work up for this previously. S/p PT. H/o orthostatic hypotension. Sometimes just bending down to do laundry caused dizziness. Dizziness felt like room spinning.   Also can occur while walking and getting out of shower.   No CP/palpitations associated w/ it.   Does not check BP when she feels the dizziness.  Reports that the dizziness is more distressing as she hasn't been walking as she's afraid of walking. Wonders about a walker.  Uncertain if this started before or after she had increased the toprol and  "candesartan.   Does have hearing loss and wears hearing aids.     C/o the lower back hurting due to not exercising and laying down during surgery. Currently on oxycodone 7.5mg daily but sometimes don't need it. Would like to get off of it. Chronically on tramadol 100mg once a day.     She stopped taking cetirizine mo before surgery. Recently increased sneezing/rhinorrhea/cough. No fevers/chills.     Wonders b/c she thinks venlafaxine may contribute to her hiatal hernia and wonders about that. I did some article research and it seems that SNRI actually decreased reflux symptoms.    Review of Systems   Musculoskeletal: Positive for back pain.   Neurological: Positive for headaches.       Objective:      Physical Exam    /76 (BP Location: Left arm, Patient Position: Sitting, BP Method: Medium (Manual))   Pulse 79   Temp 97.6 °F (36.4 °C)   Ht 5' 1" (1.549 m)   Wt 72.7 kg (160 lb 4.4 oz)   BMI 30.28 kg/m²     Gen - A+OX4, NAD  HEENT - PERRL, OP clear. MMM. TM normal.   Neck - no thyromegaly.   CV - RRR, no m/r  Chest - CTAB, no wheezing/rhonchi/crackles  Abd - S/NT/ND/+BS  Ext - Palp B DP and radial pulses. No LE edema.   Neuro - normal gait. 2+ DTRs. PERRL, EOMI, no nystagmus, eyebrow raise, facial sensation, hearing aids in place, m of mastication, smile, palatal raise, shoulder shrug, tongue protrusion symmetric and intact. Sensation to light touch intact.      ECHO 1/30/19  LVEF 38%; mod LAE; grade 1 diastolic dysfunction. Mild MRTrell    Reviewed EKG myself. Pacer rhythm.     Assessment/pLAN       Paris was seen today for dizziness.    Diagnoses and all orders for this visit:    Hospital discharge follow-up  -     CBC auto differential; Future; Expected date: 02/20/2019  -     Comprehensive metabolic panel; Future; Expected date: 02/20/2019  -     TSH; Future; Expected date: 02/20/2019    Hiatal hernia  -     CBC auto differential; Future; Expected date: 02/20/2019    S/P laparoscopic fundoplication  -     " CBC auto differential; Future; Expected date: 02/20/2019    S/P placement of cardiac pacemaker  -     CBC auto differential; Future; Expected date: 02/20/2019    Dizziness  Cut down on the candesartan from the 8mg daily to 4mg daily.   Check blood pressure when you feel dizzy.   Restart zyrtec daily.  If dizziness persist after decreasing candesartan for 1 week then proceed with CT of the head.   -     CT Head Without Contrast; Future; Expected date: 02/20/2019  -     CBC auto differential; Future; Expected date: 02/20/2019  -     Comprehensive metabolic panel; Future; Expected date: 02/20/2019  -     TSH; Future; Expected date: 02/20/2019  -     IN OFFICE EKG 12-LEAD (to Muse)  -     WALKER FOR HOME USE    Chronic midline low back pain, with sciatica presence unspecified  Can stop oxycodone.   Maximum dosage of tramadol recommended is 200mg in 24 hours. You can increase your tramadol 50mg to 2 pills twice daily as needed.     Chronic rhinitis  -     cetirizine (ZYRTEC) 10 MG tablet; Take 1 tablet (10 mg total) by mouth once daily.    Consider neurology referral if dizziness persists.     Mandi Huynh MD  Department of Internal Medicine - [facility]  10:49 AM

## 2019-02-20 NOTE — PATIENT INSTRUCTIONS
Cut down on the candesartan from the 8mg daily to 4mg daily.   Check blood pressure when you feel dizzy.   Restart zyrtec daily.    If dizziness persist after decreasing candesartan for 1 week then proceed with CT of the head.     Maximum dosage of tramadol recommended is 200mg in 24 hours. You can increase your tramadol 50mg to 2 pills twice daily as needed.

## 2019-02-22 ENCOUNTER — PATIENT MESSAGE (OUTPATIENT)
Dept: SURGERY | Facility: CLINIC | Age: 74
End: 2019-02-22

## 2019-02-22 ENCOUNTER — PATIENT MESSAGE (OUTPATIENT)
Dept: ELECTROPHYSIOLOGY | Facility: CLINIC | Age: 74
End: 2019-02-22

## 2019-02-22 ENCOUNTER — PATIENT MESSAGE (OUTPATIENT)
Dept: INTERNAL MEDICINE | Facility: CLINIC | Age: 74
End: 2019-02-22

## 2019-02-22 RX ORDER — TRAMADOL HYDROCHLORIDE 50 MG/1
100 TABLET ORAL EVERY 12 HOURS PRN
Qty: 120 TABLET | Refills: 5 | Status: SHIPPED | OUTPATIENT
Start: 2019-02-22 | End: 2019-09-18 | Stop reason: SDUPTHER

## 2019-02-25 ENCOUNTER — PATIENT MESSAGE (OUTPATIENT)
Dept: INTERNAL MEDICINE | Facility: CLINIC | Age: 74
End: 2019-02-25

## 2019-02-26 ENCOUNTER — PATIENT MESSAGE (OUTPATIENT)
Dept: INTERNAL MEDICINE | Facility: CLINIC | Age: 74
End: 2019-02-26

## 2019-02-28 ENCOUNTER — PATIENT MESSAGE (OUTPATIENT)
Dept: INTERNAL MEDICINE | Facility: CLINIC | Age: 74
End: 2019-02-28

## 2019-03-01 ENCOUNTER — PATIENT MESSAGE (OUTPATIENT)
Dept: GASTROENTEROLOGY | Facility: CLINIC | Age: 74
End: 2019-03-01

## 2019-03-06 ENCOUNTER — OFFICE VISIT (OUTPATIENT)
Dept: INTERNAL MEDICINE | Facility: CLINIC | Age: 74
End: 2019-03-06
Payer: MEDICARE

## 2019-03-06 VITALS
OXYGEN SATURATION: 96 % | DIASTOLIC BLOOD PRESSURE: 66 MMHG | HEIGHT: 61 IN | HEART RATE: 87 BPM | WEIGHT: 157.63 LBS | SYSTOLIC BLOOD PRESSURE: 102 MMHG | BODY MASS INDEX: 29.76 KG/M2

## 2019-03-06 DIAGNOSIS — G89.29 CHRONIC BILATERAL LOW BACK PAIN WITHOUT SCIATICA: ICD-10-CM

## 2019-03-06 DIAGNOSIS — M54.50 CHRONIC BILATERAL LOW BACK PAIN WITHOUT SCIATICA: ICD-10-CM

## 2019-03-06 DIAGNOSIS — R10.32 BILATERAL LOWER ABDOMINAL DISCOMFORT: Primary | ICD-10-CM

## 2019-03-06 DIAGNOSIS — R10.31 BILATERAL LOWER ABDOMINAL DISCOMFORT: Primary | ICD-10-CM

## 2019-03-06 LAB
BILIRUB UR QL STRIP: NEGATIVE
CLARITY UR REFRACT.AUTO: CLEAR
COLOR UR AUTO: ABNORMAL
GLUCOSE UR QL STRIP: NEGATIVE
HGB UR QL STRIP: NEGATIVE
HYALINE CASTS UR QL AUTO: 3 /LPF
KETONES UR QL STRIP: NEGATIVE
LEUKOCYTE ESTERASE UR QL STRIP: ABNORMAL
MICROSCOPIC COMMENT: ABNORMAL
NITRITE UR QL STRIP: NEGATIVE
NON-SQ EPI CELLS #/AREA URNS AUTO: <1 /HPF
PH UR STRIP: 5 [PH] (ref 5–8)
PROT UR QL STRIP: NEGATIVE
RBC #/AREA URNS AUTO: 1 /HPF (ref 0–4)
SP GR UR STRIP: 1.01 (ref 1–1.03)
SQUAMOUS #/AREA URNS AUTO: 0 /HPF
URN SPEC COLLECT METH UR: ABNORMAL
WBC #/AREA URNS AUTO: 2 /HPF (ref 0–5)

## 2019-03-06 PROCEDURE — 81001 URINALYSIS AUTO W/SCOPE: CPT | Mod: HCNC

## 2019-03-06 PROCEDURE — 3074F PR MOST RECENT SYSTOLIC BLOOD PRESSURE < 130 MM HG: ICD-10-PCS | Mod: HCNC,CPTII,S$GLB, | Performed by: INTERNAL MEDICINE

## 2019-03-06 PROCEDURE — 87086 URINE CULTURE/COLONY COUNT: CPT | Mod: HCNC

## 2019-03-06 PROCEDURE — 99214 PR OFFICE/OUTPT VISIT, EST, LEVL IV, 30-39 MIN: ICD-10-PCS | Mod: HCNC,S$GLB,, | Performed by: INTERNAL MEDICINE

## 2019-03-06 PROCEDURE — 99999 PR PBB SHADOW E&M-EST. PATIENT-LVL IV: CPT | Mod: PBBFAC,HCNC,, | Performed by: INTERNAL MEDICINE

## 2019-03-06 PROCEDURE — 3078F DIAST BP <80 MM HG: CPT | Mod: HCNC,CPTII,S$GLB, | Performed by: INTERNAL MEDICINE

## 2019-03-06 PROCEDURE — 99999 PR PBB SHADOW E&M-EST. PATIENT-LVL IV: ICD-10-PCS | Mod: PBBFAC,HCNC,, | Performed by: INTERNAL MEDICINE

## 2019-03-06 PROCEDURE — 99214 OFFICE O/P EST MOD 30 MIN: CPT | Mod: HCNC,S$GLB,, | Performed by: INTERNAL MEDICINE

## 2019-03-06 PROCEDURE — 1101F PT FALLS ASSESS-DOCD LE1/YR: CPT | Mod: HCNC,CPTII,S$GLB, | Performed by: INTERNAL MEDICINE

## 2019-03-06 PROCEDURE — 3074F SYST BP LT 130 MM HG: CPT | Mod: HCNC,CPTII,S$GLB, | Performed by: INTERNAL MEDICINE

## 2019-03-06 PROCEDURE — 3078F PR MOST RECENT DIASTOLIC BLOOD PRESSURE < 80 MM HG: ICD-10-PCS | Mod: HCNC,CPTII,S$GLB, | Performed by: INTERNAL MEDICINE

## 2019-03-06 PROCEDURE — 1101F PR PT FALLS ASSESS DOC 0-1 FALLS W/OUT INJ PAST YR: ICD-10-PCS | Mod: HCNC,CPTII,S$GLB, | Performed by: INTERNAL MEDICINE

## 2019-03-06 RX ORDER — HYDROGEN PEROXIDE 3 %
20 SOLUTION, NON-ORAL MISCELLANEOUS
Qty: 90 CAPSULE | Refills: 3 | Status: SHIPPED | OUTPATIENT
Start: 2019-03-06 | End: 2019-04-12

## 2019-03-06 NOTE — PROGRESS NOTES
"INTERNAL MEDICINE ESTABLISHED PATIENT VISIT NOTE    Subjective:     Chief Complaint: Follow-up; Abdominal Pain; Spasms; and Back Pain     Patient ID: Paris Burris is a 73 y.o. female with depression, chronic low back pain and neck pain, COOKIE c insomnia, type 2 DM c CKD2 and DM neuropathy (currently diet controlled c normal A1C on last check in Nov), Vit D def, ESTEFANIA now resolved, obesity, stress and urge incontinence, GERD c hx hiatal hernia, pt of Dr. Huynh, here today for focused visit for c/o abd discomfort and low back pain.    Reports she had a hiatal hernia repair done by Dr. Perez in Jan and intraoperatively was found to have Mobitz type II c alternating L and RBBB so was seen by Cards and a pacemaker placed by Dr. Livingston on 1/30.    Pt was started on Carsartan 4 mg daily and Toprol XL 25 mg daily at discharge by renetta.    Was seen by Dr. Huynh on 2/20 for hospital f/u at which time had several complaints including "feeling unsteady on her feet" and orthostatic sx and fatigue.    Labs at that time showed normal TSH, normal CMP x BUN slightly elevated, and normal CBC (anemia resolved).    Also had some low back pain and on tramadol prn.  Was previously on oxycodone for about 3.5 weeks p surgery and usually takes tylenol prn and tramadol for severe pain.  Back pain currently 3/10.    Had difficulty getting appt c Dr. Huynh so here for evaluation of sx.    Today c/o lower abd pain for about the last week.  BM occur daily.  No n/v.  Does note some heartburn.  No dysuria.  No urinary frequency or urgency.  Does note some occasional cramping.  Takes Nexium 40 and states she would like to decrease the dose soon.  Was seen by Dr. Perez mid Feb who noted to slowly advance diet.  Denies blood in stools.    Past Medical History:  Past Medical History:   Diagnosis Date    Allergy     Anemia     Anxiety     Breast cancer 2002    Left breast    Cancer 2002    L breast s/p lumpectomy    Decreased hearing     " Depression     Diabetes mellitus     Diabetes with neurologic complications     Gastric ulcer 9/10/13    EGD    Hiatal hernia     Hyperlipidemia     Migraine headache     Migraine headache 3/20/2015    Multiple gastric ulcers     Parathyroid disorder     Pre-diabetes     Renal manifestation of secondary diabetes mellitus     Sleep apnea     no CPAP    Type 2 diabetes mellitus, controlled, 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    Wrist fracture        Home Medications:  Prior to Admission medications    Medication Sig Start Date End Date Taking? Authorizing Provider   alpha lipoic acid 300 mg Cap Take 300 mg by mouth 2 (two) times daily.     Historical Provider, MD   ascorbic acid (VITAMIN C) 500 MG tablet Take 1,000 mg by mouth once daily.     Historical Provider, MD   b complex vitamins capsule Take 1 capsule by mouth once daily.    Historical Provider, MD   blood sugar diagnostic (ACCU-CHEK SMARTVIEW TEST STRIP) Strp Test once daily. 12/21/18   Mandi Huynh MD   blood-glucose meter Misc 1 Device by Misc.(Non-Drug; Combo Route) route 2 (two) times daily. 12/31/18 12/31/19  Mandi Huynh MD   buPROPion (WELLBUTRIN SR) 100 MG TBSR 12 hr tablet  12/6/18   Historical Provider, MD   buPROPion (WELLBUTRIN SR) 100 MG TBSR 12 hr tablet TAKE 1 TABLET BY MOUTH TWICE A DAY 1/2/19   Dwaine Sweeney MD   candesartan (ATACAND) 8 MG tablet Take 1 tablet (8 mg total) by mouth once daily. 2/4/19 2/4/20  Jack Rosenbaum MD   cephALEXin (KEFLEX) 250 MG capsule Take 2 capsules (500 mg total) by mouth every 8 (eight) hours. 1/31/19   Giuseppe Garcia MD   cetirizine (ZYRTEC) 10 MG tablet Take 1 tablet (10 mg total) by mouth once daily. 2/20/19   Mandi Huynh MD   CITICOLINE SODIUM (CITICOLINE ORAL) Take 1 tablet by mouth once daily at 6am.    Historical Provider, MD   esomeprazole (NEXIUM) 40 MG capsule Take 1 capsule (40 mg total) by mouth before breakfast. 5/21/18 5/21/19  Mandi Huynh MD   furosemide (LASIX) 40 MG tablet Take 1  tablet (40 mg total) by mouth daily as needed (weight gain). 2/4/19 2/4/20  Jack Rosenbaum MD   lancets Misc 1 lancet by Misc.(Non-Drug; Combo Route) route 2 (two) times daily. 7/13/16   Mandi Huynh MD   LORazepam (ATIVAN) 0.5 MG tablet Take 1 tablet (0.5 mg total) by mouth every 12 (twelve) hours as needed for Anxiety. 12/4/18 1/3/19  Dwaine Sweeney MD   metoprolol succinate (TOPROL-XL) 50 MG 24 hr tablet Take 1 tablet (50 mg total) by mouth once daily. 2/4/19 2/4/20  Jack Rosenbaum MD   omega-3 fatty acids-fish oil (FISH OIL) 340-1,000 mg Cap Take 1 capsule by mouth once daily.    Historical Provider, MD   rosuvastatin (CRESTOR) 20 MG tablet Take 1 tablet (20 mg total) by mouth once daily. 5/21/18 5/21/19  Mandi Huynh MD   silver sulfADIAZINE 1% (SILVADENE) 1 % cream Apply topically once daily. 11/30/18   Graeme Shore MD   traMADol (ULTRAM) 50 mg tablet Take 2 tablets (100 mg total) by mouth every 12 (twelve) hours as needed for Pain. 2/22/19   Mandi Huynh MD   traZODone (DESYREL) 100 MG tablet Take 1 tablet (100 mg total) by mouth every evening. 2/14/19 2/14/20  Mandi Huynh MD   tretinoin (RETIN-A) 0.05 % cream Use hs 4/23/18   Starla Rodríguez MD   TURMERIC (CURCUMIN MISC) Take 500 mg by mouth 2 (two) times daily.     Historical Provider, MD   venlafaxine (EFFEXOR) 37.5 MG Tab Take 2 tablets (75 mg total) by mouth once daily. 6/20/18   Dwaine Sweeney MD   vitamin D 1000 units Tab Take 500 Units by mouth once daily. With K2 50 mch    Historical Provider, MD   zaleplon (SONATA) 5 MG Cap Take 5 mg by mouth every evening. 1/3/19   Historical Provider, MD       Allergies:  Review of patient's allergies indicates:   Allergen Reactions    Topamax [topiramate] Other (See Comments)     hallucinations       Social History:  Social History     Tobacco Use    Smoking status: Never Smoker    Smokeless tobacco: Never Used   Substance Use Topics    Alcohol use: No     Comment: quit 2014 - alcohol abuse    Drug use:  "Yes        Review of Systems   Respiratory: Negative for chest tightness and shortness of breath.    Cardiovascular: Negative for chest pain, palpitations and leg swelling.   Gastrointestinal: Positive for abdominal pain. Negative for blood in stool, constipation, diarrhea, nausea and vomiting.   Musculoskeletal: Positive for back pain.           Objective:   /66 (BP Location: Left arm, Patient Position: Sitting, BP Method: Large (Manual))   Pulse 87   Ht 5' 1" (1.549 m)   Wt 71.5 kg (157 lb 10.1 oz)   SpO2 96%   BMI 29.78 kg/m²        General: AAO x3, no apparent distress  CV: RRR, no m/r/g  Pulm: Lungs CTAB, no crackles, no wheezes  Abd: soft, minimal ttp in epigastric region, no rebound or guarding, lap incisions all c/d/i.  Extremities: no c/c/e    Labs:     Lab Results   Component Value Date    WBC 7.14 02/20/2019    HGB 13.8 02/20/2019    HCT 43.4 02/20/2019    MCV 96 02/20/2019     02/20/2019     CMP  Sodium   Date Value Ref Range Status   02/20/2019 138 136 - 145 mmol/L Final     Potassium   Date Value Ref Range Status   02/20/2019 4.4 3.5 - 5.1 mmol/L Final     Chloride   Date Value Ref Range Status   02/20/2019 105 95 - 110 mmol/L Final     CO2   Date Value Ref Range Status   02/20/2019 25 23 - 29 mmol/L Final     Glucose   Date Value Ref Range Status   02/20/2019 106 70 - 110 mg/dL Final     BUN, Bld   Date Value Ref Range Status   02/20/2019 27 (H) 8 - 23 mg/dL Final     Creatinine   Date Value Ref Range Status   02/20/2019 0.9 0.5 - 1.4 mg/dL Final     Calcium   Date Value Ref Range Status   02/20/2019 9.9 8.7 - 10.5 mg/dL Final     Total Protein   Date Value Ref Range Status   02/20/2019 7.4 6.0 - 8.4 g/dL Final     Albumin   Date Value Ref Range Status   02/20/2019 4.2 3.5 - 5.2 g/dL Final     Total Bilirubin   Date Value Ref Range Status   02/20/2019 0.3 0.1 - 1.0 mg/dL Final     Comment:     For infants and newborns, interpretation of results should be based  on gestational age, " weight and in agreement with clinical  observations.  Premature Infant recommended reference ranges:  Up to 24 hours.............<8.0 mg/dL  Up to 48 hours............<12.0 mg/dL  3-5 days..................<15.0 mg/dL  6-29 days.................<15.0 mg/dL       Alkaline Phosphatase   Date Value Ref Range Status   02/20/2019 70 55 - 135 U/L Final     AST   Date Value Ref Range Status   02/20/2019 15 10 - 40 U/L Final     ALT   Date Value Ref Range Status   02/20/2019 14 10 - 44 U/L Final     Anion Gap   Date Value Ref Range Status   02/20/2019 8 8 - 16 mmol/L Final     eGFR if    Date Value Ref Range Status   02/20/2019 >60 >60 mL/min/1.73 m^2 Final     eGFR if non    Date Value Ref Range Status   02/20/2019 >60 >60 mL/min/1.73 m^2 Final     Comment:     Calculation used to obtain the estimated glomerular filtration  rate (eGFR) is the CKD-EPI equation.        Lab Results   Component Value Date    LDLCALC 88.2 01/10/2019     Lab Results   Component Value Date    HGBA1C 5.6 11/14/2018     Lab Results   Component Value Date    TSH 0.797 02/20/2019         Assessment/Plan     Paris was seen today for follow-up, abdominal pain, spasms and back pain.    Diagnoses and all orders for this visit:    Bilateral lower abdominal discomfort  Suspect sx related to post op healing.  Will check urine studies to make sure no UTI  Advised to f/u c PCP or surg if sx persist for possible imaging if sx fail to improve  -     URINALYSIS  -     Urine culture    Chronic bilateral low back pain without sciatica  Slightly increased mm tone across B lower back.  rec tylenol prn, heating pad, stretching exercises and massage.      HM as above  RTC prn and as per routine c PCP    Karen Sharpe MD  Department of Internal Medicine - [facility]  03/06/2019  Answers for HPI/ROS submitted by the patient on 2/28/2019   Back pain  Chronicity: new  Onset: 1 to 4 weeks ago  Frequency: constantly  Progression since  onset: waxing and waning  Pain location: sacro-iliac  Pain quality: burning  Pain - numeric: 6/10  Pain is: worse during the day  Aggravated by: sitting  Stiffness is present: all day  Risk factors: history of cancer, lack of exercise  Pain severity: moderate  Treatments tried: analgesics, bed rest, heat, ice  Improvement on treatment: mild

## 2019-03-07 LAB — BACTERIA UR CULT: NORMAL

## 2019-03-07 RX ORDER — CANDESARTAN 4 MG/1
4 TABLET ORAL DAILY
Qty: 90 TABLET | Refills: 3 | Status: CANCELLED | OUTPATIENT
Start: 2019-03-07 | End: 2020-03-06

## 2019-03-12 ENCOUNTER — DOCUMENTATION ONLY (OUTPATIENT)
Dept: ELECTROPHYSIOLOGY | Facility: CLINIC | Age: 74
End: 2019-03-12

## 2019-03-12 ENCOUNTER — CLINICAL SUPPORT (OUTPATIENT)
Dept: INTERNAL MEDICINE | Facility: CLINIC | Age: 74
End: 2019-03-12
Payer: MEDICARE

## 2019-03-12 VITALS — WEIGHT: 155.88 LBS | BODY MASS INDEX: 29.45 KG/M2

## 2019-03-12 PROCEDURE — 99999 PR PBB SHADOW E&M-EST. PATIENT-LVL I: ICD-10-PCS | Mod: PBBFAC,HCNC,,

## 2019-03-12 PROCEDURE — 99999 PR PBB SHADOW E&M-EST. PATIENT-LVL I: CPT | Mod: PBBFAC,HCNC,,

## 2019-03-12 NOTE — PROGRESS NOTES
RE: BiV pacing percent   Received: Today   Message Contents   MD Clemencia Crowder RN             Sounds good    Previous Messages      ----- Message -----   From: Clemencia Trujillo RN   Sent: 3/12/2019   8:28 AM   To: Stone Livingston MD   Subject: RE: BiV pacing percent                           We can try at 3mos follow up .   ThanksClemencia   ----- Message -----   From: Stone Livingston MD   Sent: 3/6/2019   4:37 PM   To: Clemencia Trujillo RN   Subject: RE: BiV pacing percent                           Can we adjust her AV delays? MB   ----- Message -----   From: Clemencia Trujillo RN   Sent: 2/26/2019   5:21 PM   To: Stone Livingston MD   Subject: BiV pacing percent                               ,   New implant from 1/30/19.   BiV pacing 89%   PVC burden 5.9%   3mos follow up appt with you 4/30/19.     Please adviseClemencia

## 2019-03-12 NOTE — PROGRESS NOTES
Health  Follow-Up Note   [x] Office  [] Phone  Notes from previous session reviewed.   [x] Previous Session Goals unchanged.   [] Patient/Caregiver Change in Goals.  Goals added or changed by Patient/Caregiver since program participation:  1. Continue same plan       Additional/Changed support that patient/caregiver has experienced/sought?  (Indicate readiness, support from family, friends, others, community groups, etc)  1.   and friends     Additional/Changed obstacles that could prevent patient/caregiver from reaching their goals?  1.  not up to par after hiatal hernia and pacemaker surgery, not walking like wants too    Feedback provided:  1.  Praised for continued effort and determination     Diagnostic values/Desriptors for follow-up as needed for chronic condition(s)   Weight: 70.7 kg 155.87 lb down 1.54 lb total now 31 lb   Blood Glucose: averages  7 d 106  14 d 108  30 d 110  90 d 112    Interventions:   1. Health  listened reflectively, validated thoughts and feelings, offered support and encouragement.   2. Allowed patient to express themselves in a non-biased atmosphere.  3. Health  assisted pt to problem-solve obstacles such as being in a challenging environment and dealing with these challenges.   4. Motivational Interviewed interventions utilized (OARS).   5. Patient responded favorably to interventions and remained actively engaged in the session.   6. Health  will remain available and connected for patient by phone and/or office visits.   7. Positive reinforcement, emotional support and encouragement provided.   8. Focused Education: MI    Plan:  [x] Pt will work on goals as stated above.   [x] Pt will contact Health  for any questions, concerns or needs.  [x] Pt will follow up with Health  in office on  4/2/19 at 1030.  [] Pt will follow up with Health  on phone in:        [x] Health  will remain available.   [] Health  will contact patient by  phone in:        [] Health  will consult:        [] Health  will inform Provider via EPIC messaging.     Impression:  1. Behavior is consistent with Action Stage of Change.   2. Participation level:       [x] Receptive      [x] Interactive      [] Guarded and Resistant      [x] Self Motivated      [] Refused/Declined to participate   3. [x] Pt voiced understanding of all information presented.       [] Pt voiced needing further information/education. This will be arranged.       [x] Pt would benefit from further education/information as identified by this health . This will be arranged.     Jacqueline Perry RN HC

## 2019-03-15 ENCOUNTER — PATIENT MESSAGE (OUTPATIENT)
Dept: INTERNAL MEDICINE | Facility: CLINIC | Age: 74
End: 2019-03-15

## 2019-03-15 DIAGNOSIS — R26.81 UNSTEADY GAIT: Primary | ICD-10-CM

## 2019-03-15 DIAGNOSIS — R53.81 DEBILITY: ICD-10-CM

## 2019-03-18 ENCOUNTER — PATIENT MESSAGE (OUTPATIENT)
Dept: SURGERY | Facility: CLINIC | Age: 74
End: 2019-03-18

## 2019-03-20 ENCOUNTER — PATIENT MESSAGE (OUTPATIENT)
Dept: INTERNAL MEDICINE | Facility: CLINIC | Age: 74
End: 2019-03-20

## 2019-03-20 RX ORDER — CANDESARTAN 4 MG/1
4 TABLET ORAL DAILY
Qty: 90 TABLET | Refills: 3 | Status: SHIPPED | OUTPATIENT
Start: 2019-03-20 | End: 2020-02-26 | Stop reason: SDUPTHER

## 2019-03-21 ENCOUNTER — LAB VISIT (OUTPATIENT)
Dept: LAB | Facility: HOSPITAL | Age: 74
End: 2019-03-21
Attending: SURGERY
Payer: MEDICARE

## 2019-03-21 DIAGNOSIS — R53.83 FATIGUE, UNSPECIFIED TYPE: ICD-10-CM

## 2019-03-21 DIAGNOSIS — Z98.890 HISTORY OF FUNDOPLICATION: ICD-10-CM

## 2019-03-21 DIAGNOSIS — R53.83 FATIGUE, UNSPECIFIED TYPE: Primary | ICD-10-CM

## 2019-03-21 LAB
ALBUMIN SERPL BCP-MCNC: 4.4 G/DL
ALP SERPL-CCNC: 79 U/L
ALT SERPL W/O P-5'-P-CCNC: 17 U/L
ANION GAP SERPL CALC-SCNC: 12 MMOL/L
AST SERPL-CCNC: 18 U/L
BASOPHILS # BLD AUTO: 0.03 K/UL
BASOPHILS NFR BLD: 0.4 %
BILIRUB SERPL-MCNC: 0.3 MG/DL
BUN SERPL-MCNC: 19 MG/DL
CALCIUM SERPL-MCNC: 9.9 MG/DL
CHLORIDE SERPL-SCNC: 102 MMOL/L
CO2 SERPL-SCNC: 26 MMOL/L
CREAT SERPL-MCNC: 0.9 MG/DL
DIFFERENTIAL METHOD: ABNORMAL
EOSINOPHIL # BLD AUTO: 0.2 K/UL
EOSINOPHIL NFR BLD: 3 %
ERYTHROCYTE [DISTWIDTH] IN BLOOD BY AUTOMATED COUNT: 12.1 %
EST. GFR  (AFRICAN AMERICAN): >60 ML/MIN/1.73 M^2
EST. GFR  (NON AFRICAN AMERICAN): >60 ML/MIN/1.73 M^2
GLUCOSE SERPL-MCNC: 110 MG/DL
HCT VFR BLD AUTO: 43.1 %
HGB BLD-MCNC: 14.5 G/DL
IMM GRANULOCYTES # BLD AUTO: 0.01 K/UL
IMM GRANULOCYTES NFR BLD AUTO: 0.1 %
LYMPHOCYTES # BLD AUTO: 2.6 K/UL
LYMPHOCYTES NFR BLD: 36.6 %
MCH RBC QN AUTO: 31.5 PG
MCHC RBC AUTO-ENTMCNC: 33.6 G/DL
MCV RBC AUTO: 94 FL
MONOCYTES # BLD AUTO: 0.6 K/UL
MONOCYTES NFR BLD: 8.9 %
NEUTROPHILS # BLD AUTO: 3.6 K/UL
NEUTROPHILS NFR BLD: 51 %
NRBC BLD-RTO: 0 /100 WBC
PLATELET # BLD AUTO: 154 K/UL
PMV BLD AUTO: 11.3 FL
POTASSIUM SERPL-SCNC: 4.2 MMOL/L
PROT SERPL-MCNC: 7.8 G/DL
RBC # BLD AUTO: 4.6 M/UL
SODIUM SERPL-SCNC: 140 MMOL/L
WBC # BLD AUTO: 6.99 K/UL

## 2019-03-21 PROCEDURE — 80053 COMPREHEN METABOLIC PANEL: CPT | Mod: HCNC

## 2019-03-21 PROCEDURE — 85025 COMPLETE CBC W/AUTO DIFF WBC: CPT | Mod: HCNC

## 2019-03-21 PROCEDURE — 36415 COLL VENOUS BLD VENIPUNCTURE: CPT | Mod: HCNC

## 2019-04-01 ENCOUNTER — PATIENT MESSAGE (OUTPATIENT)
Dept: PSYCHIATRY | Facility: CLINIC | Age: 74
End: 2019-04-01

## 2019-04-01 ENCOUNTER — PATIENT MESSAGE (OUTPATIENT)
Dept: SURGERY | Facility: CLINIC | Age: 74
End: 2019-04-01

## 2019-04-01 DIAGNOSIS — F33.41 MDD (MAJOR DEPRESSIVE DISORDER), RECURRENT, IN PARTIAL REMISSION: ICD-10-CM

## 2019-04-01 RX ORDER — VENLAFAXINE 37.5 MG/1
75 TABLET ORAL DAILY
Qty: 30 TABLET | Refills: 5 | Status: SHIPPED | OUTPATIENT
Start: 2019-04-01 | End: 2019-06-19 | Stop reason: SDUPTHER

## 2019-04-02 ENCOUNTER — CLINICAL SUPPORT (OUTPATIENT)
Dept: REHABILITATION | Facility: OTHER | Age: 74
End: 2019-04-02
Payer: MEDICARE

## 2019-04-02 ENCOUNTER — PATIENT MESSAGE (OUTPATIENT)
Dept: SURGERY | Facility: CLINIC | Age: 74
End: 2019-04-02

## 2019-04-02 ENCOUNTER — CLINICAL SUPPORT (OUTPATIENT)
Dept: INTERNAL MEDICINE | Facility: CLINIC | Age: 74
End: 2019-04-02
Payer: MEDICARE

## 2019-04-02 VITALS — WEIGHT: 159.63 LBS | BODY MASS INDEX: 30.16 KG/M2

## 2019-04-02 DIAGNOSIS — Z74.09 IMPAIRED FUNCTIONAL MOBILITY, BALANCE, GAIT, AND ENDURANCE: ICD-10-CM

## 2019-04-02 DIAGNOSIS — R53.1 GENERAL WEAKNESS: ICD-10-CM

## 2019-04-02 PROCEDURE — 97110 THERAPEUTIC EXERCISES: CPT | Mod: HCNC,PN

## 2019-04-02 PROCEDURE — 97161 PT EVAL LOW COMPLEX 20 MIN: CPT | Mod: HCNC,PN

## 2019-04-02 PROCEDURE — 99999 PR PBB SHADOW E&M-EST. PATIENT-LVL I: CPT | Mod: PBBFAC,HCNC,,

## 2019-04-02 PROCEDURE — G8978 MOBILITY CURRENT STATUS: HCPCS | Mod: CK,HCNC,PN

## 2019-04-02 PROCEDURE — 99999 PR PBB SHADOW E&M-EST. PATIENT-LVL I: ICD-10-PCS | Mod: PBBFAC,HCNC,,

## 2019-04-02 PROCEDURE — G8979 MOBILITY GOAL STATUS: HCPCS | Mod: CJ,HCNC,PN

## 2019-04-02 NOTE — PLAN OF CARE
[facility]  Physical Therapy Initial Evaluation    Name: Paris Burris  Clinic Number: [id number]    Therapy Diagnosis:   Encounter Diagnoses   Name Primary?    Impaired functional mobility, balance, gait, and endurance     General weakness      Physician: Mandi Huynh MD    Physician Orders: PT Eval and Treat   Medical Diagnosis from Referral:   R26.81 (ICD-10-CM) - Unsteady gait   R53.81 (ICD-10-CM) - Debility     Evaluation Date: 4/2/2019  Authorization Period Expiration: 03/14/2020  Plan of Care Expiration: 07/02/2019  Visit # / Visits authorized: 1/ 1    Time In: 9:00  Time Out: 10:00  Total Billable Time: 60 minutes    Precautions: Standard, DDD of LSP, DM type 2, Hx vertebral Fx, recent hernia surgery, Recent Pacemaker placement    Subjective   Date of onset: 1/29/19 and 1/30/19  History of current condition - Mrs. Burris reports:a hiatal hernia repair on 1/29/19, but also had an emergency cardiac surgery with pacemaker placement performed on 1/30/19. Pt denies Hx of heart conditions prior to surgery. She notes that her strength and endurance have not return to normal following her surgeries. She says that she is too tired to exercise and that makes her back hurt worse. Fatigue and weakness have also affected her balance when she walks and stands. Denies recent falls, foot drop, or radicular symptoms. Pt notes new Hx of heart burn that is very concerning for her.     Medical History:   Past Medical History:   Diagnosis Date    Allergy     Anemia     Anxiety     Breast cancer 2002    Left breast    Cancer 2002    L breast s/p lumpectomy    Decreased hearing     Depression     Diabetes mellitus     Diabetes with neurologic complications     Gastric ulcer 9/10/13    EGD    Hiatal hernia     Hyperlipidemia     Migraine headache     Migraine headache 3/20/2015    Multiple gastric ulcers     Parathyroid disorder     Pre-diabetes     Renal manifestation of secondary  diabetes mellitus     Sleep apnea     no CPAP    Type 2 diabetes mellitus, controlled, 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    Wrist fracture        Surgical History:   Paris Burris  has a past surgical history that includes lipoma removal (1999); Colonoscopy (N/A, 12/2/2016); Tonsillectomy; Adenoidectomy; Eye surgery (Bilateral); Breast lumpectomy (Left, 2002); Esophagogastroduodenoscopy (N/A, 9/12/2018); Esophageal manometry with measurement of impedance (N/A, 10/8/2018); Laparoscopic Toupet fundoplication (N/A, 1/29/2019); Esophagogastroduodenoscopy (N/A, 1/29/2019); and Implantation of biventricular heart pacemaker (N/A, 1/30/2019).    Medications:   Paris has a current medication list which includes the following prescription(s): alpha lipoic acid, ascorbic acid (vitamin c), b complex vitamins, blood sugar diagnostic, blood-glucose meter, bupropion, candesartan, cephalexin, cetirizine, citicoline sodium, esomeprazole, furosemide, lancets, lorazepam, metoprolol succinate, omega-3 fatty acids-fish oil, rosuvastatin, silver sulfadiazine 1%, tramadol, trazodone, tretinoin, turmeric, venlafaxine, vitamin d, and zaleplon.    Allergies:   Review of patient's allergies indicates:   Allergen Reactions    Topamax [topiramate] Other (See Comments)     hallucinations        Imaging, Xray of LSP on 8/11/2018: Impression - No acute displaced fracture or dislocation of the lumbar spine noting degenerative changes    Prior Therapy: yes - 6 month at [facility] in 2018 for LBP. Success at relieving sx.  Social History: lives with spouse, 2 story home  Occupation: not working   Prior Level of Function: independent with all functional activities  Current Level of Function: weakness and decreased endurance to perform ADLs, HHCs for extended periods of time    Pain:  Current 3/10, worst 5/10, best 3/10   Location: bilateral low back   Description: Aching and Dull  Aggravating Factors for weakness and fatigue: Standing,  "Walking, Getting out of bed/chair and stair climbing  Easing Factors: rest    Pts goals: be able to walk the neighborhood with my     Objective     Postural examination/scapula alignment: Rounded shoulder, Head forward and Decreased lordosis  Joint integrity: WFL  Skin integrity: good  Edema: none      Range of Motion Right Left    AROM/PROM AROM/PROM   Hip flexion WFL WFL   Extension WFL WFL   Glute max WFL WFL   Abduction WFL WFL   Adduction WFL WFL   ER WFL WFL   IR WFL WFL   Knee ext Min dean (HS tightness) Min dean (HS tightness)   Knee flexion WFL WFL   DF Min dean (calf tightness) Min dean (calf tightness)   PF WFL WFL   INV WFL WFL   EV WFL WFL     Lower Extremity Strength  Right LE  Left LE    Hip flexion: 4/5 Hip flexion: 4-/5   Hip extension:  4/5 Hip extension: 4/5   Hip abduction: 4/5 Hip abduction: 4/5   Hip adduction:  5/5 Hip adduction:  4+/5   Hip Internal rotation   3+/5 Hip Internal rotation 3+/5   Knee Flexion 4/5 Knee Flexion 4/5   Knee Extension 4-/5 Knee Extension 4-/5   Ankle dorsiflexion: 5/5 Ankle dorsiflexion: 5/5   Ankle plantarflexion: 5/5 Ankle plantarflexion: 5/5       GAIT:  Assistive Device used: none  Level of Assistance: independent  Patient displays the following gait deviations:  unsteady gait, decreased step length and increased base of support.     Bed Mobility:Independent  Transfers: Assistance - HHA x 2 with control of descent > ascent     Evaluation   Timed Up and Go  15"   Single Limb Stance R LE 6"   Single Limb Stance L LE <3"   Self Selected Walking Speed slow   Fast Walking Speed Min-mod change   30 second Chair Rise 12x (goal 15x)   Tandem stand  30"   2 Minute Walk Test NT     Coordination: NT  UE screen: NT    CUELLAR Assessment  1. Sitting to Standing   3 - able to stand independely using hands  2. Standing Unsupported   4 - able to stand safely 2 minutes without hold  3. Sitting Unsupported   4 - able to sit safely and securely 2 minutes  4. Standing to " "Sitting   3 - controls descent by using hands  5. Pivot Transfer   4 - able to trnasfer safely with minor use of hands  6. Standing with Eyes Closed   2 - able to stand 3 seconds  7. Standing with Feet Together   2 - able to place feet together independently but unable to hold for 30 seconds  8. Reaching Forward with Outstretched Arm   2 - can reach forward 5 cm/2 inches safely  9. Retrieving Object from Floor   3 - able to pick slipper but needs supervision  10. Turning to Look Behind   2 - turns sideways only but maintains balance  11. Turning 360 Degrees   2 - able to turn 360 safely but slowly  12. Placing Alternate Foot on Step   2 - able to complete 4 steps without aid with supervision  13. Standing with One Foot in Front   2 - able to take small step indenpendently and hold 30 seconds  14. Standing on One Foot   2 - able to lift leg indepentdently and hold = or < 3 seconds  Total: 37  Maximum: 56    DGI - NT today 2* to time constraints. Assess next visit    CMS Impairment/Limitation/Restriction for FOTO Complications Survey    Therapist reviewed FOTO scores for Paris Burris on 4/2/2019.   FOTO documents entered into [employer] - see Media section.    Limitation Score: 42%  Category: Mobility    Current : CK = at least 40% but < 60% impaired, limited or restricted  Goal: CJ = at least 20% but < 40% impaired, limited or restricted  Discharge: N/A         TREATMENT   Treatment Time In: 9:45  Treatment Time Out: 10:00  Total Treatment time separate from Evaluation: 15 minutes    Mrs. Burris received therapeutic exercises to develop strength, endurance, ROM, flexibility, posture and core stabilization for 15 minutes including:  Sit to stands 2 x 10  SLS (HHA x1) 3 x 30"    Home Exercises and Patient Education Provided    Education provided:   - Patient educated regarding pathogenesis, diagnosis, protocol, prognosis, POC, and HEP. Written Home Exercises Provided with written and verbal instructions for frequency and " duration of the following exercises: see EMR. Pt educated on HEP and activity modifications to reduce c/o pain and improve overall function.   -Encouraged pt to exercise with HEP frequently throughout the day to improve functional mobility    Written Home Exercises Provided: yes.  Exercises were reviewed and Mrs. Burris was able to demonstrate them prior to the end of the session.  Mrs. Burris demonstrated good  understanding of the education provided.     See EMR under Patient Instructions for exercises provided prior visit.    Assessment   Paris is a 73 y.o. female referred to outpatient Physical Therapy with a medical diagnosis of debility and gait disorder. Pt presents with limitations in strength, balance, and endurance, limiting tolerance with functional activities for prolonged periods of time.    Pt prognosis is Good.   Pt will benefit from skilled outpatient Physical Therapy to address the deficits stated above and in the chart below, provide pt/family education, and to maximize pt's level of independence.     Plan of care discussed with patient: Yes  Pt's spiritual, cultural and educational needs considered and patient is agreeable to the plan of care and goals as stated below:     Anticipated Barriers for therapy: chronicity of symptoms, recent surgeries    Medical Necessity is demonstrated by the following  History  Co-morbidities and personal factors that may impact the plan of care Co-morbidities:   advanced age, diabetes, excessive commute time/distance and financial considerations    Personal Factors:   age  coping style  social background  lifestyle     low   Examination  Body Structures and Functions, activity limitations and participation restrictions that may impact the plan of care Body Regions:   back  lower extremities  upper extremities  trunk    Body Systems:    gross symmetry  ROM  strength  gross coordinated movement  balance  gait  transfers  transitions  motor control  motor  learning    Participation Restrictions:   ADLs, HHCs, prolonged or repetitive activities    Activity limitations:   Learning and applying knowledge  no deficits    General Tasks and Commands  no deficits    Communication  no deficits    Mobility  lifting and carrying objects  walking  driving (bike, car, motorcycle)    Self care  caring for body parts (brushing teeth, shaving, grooming)  dressing  looking after one's health    Domestic Life  shopping  cooking  doing house work (cleaning house, washing dishes, laundry)  assisting others    Interactions/Relationships  no deficits    Life Areas  no deficits    Community and Social Life  community life  recreation and leisure         moderate   Clinical Presentation stable and uncomplicated low   Decision Making/ Complexity Score: low     Goals:  Short Term Goals (5 Weeks):  1. Pt able to improve static balance with BRG balance >=40/56 to allow for improved tolerance with ADLs.  2. Pt able to improve dynamic balance with DGI >=19/24 to allow for improved stability with walking.  3. Pt able to walk >=10 min with Mod difficulty.  4. Pt to improve strength by a half grade to allow for increased ease with getting in/out of chair.  5. Pt to demonstrate improved functional ability with FOTO limitation <=34% disability.    Long Term Goals (10 Weeks):  1. Pt able to improve static balance with BRG balance >=45/56 to allow for improved tolerance with ADLs.  2. Pt able to improve dynamic balance with DGI >=22/24 to allow for improved stability with walking.  3. Pt able to walk >=10 min with Min-No difficulty.  4. Pt to improve strength to 5/5 to allow for increased ease with getting in/out of chair.  5. Pt to demonstrate improved functional ability with FOTO limitation <=24% disability.  6. Pt will be independent with HEP and self management of symptoms.       Plan   Plan of care Certification: 4/2/2019 to 07/02/2019.    Outpatient Physical Therapy 2 times weekly for 10 weeks to  include the following interventions: Aquatic Therapy, Cervical/Lumbar Traction, Electrical Stimulation prn, Gait Training, Iontophoresis (with dexamethasone prn), Manual Therapy, Moist Heat/ Ice, Neuromuscular Re-ed, Patient Education, Self Care, Therapeutic Activites, Therapeutic Exercise and IASTYM, therapeutic taping, dry needling. Progress HEP towards D/C. Recommend F/U with MD if symptoms worsen or do not resolve. Patient may be seen by a PTA for treatment to carry out their plan of care.  Face-to-face conferences will be held.       Patsy Elaine, PT

## 2019-04-02 NOTE — PROGRESS NOTES
Health  Follow-Up Note   [x] Office  [] Phone  Notes from previous session reviewed.   [x] Previous Session Goals unchanged.   [] Patient/Caregiver Change in Goals.  Goals added or changed by Patient/Caregiver since program participation:  1. Get back on track        Additional/Changed support that patient/caregiver has experienced/sought?  (Indicate readiness, support from family, friends, others, community groups, etc)  1.      Additional/Changed obstacles that could prevent patient/caregiver from reaching their goals?  1.  Not exercising- Going to PT now for increased strength in legs and to be more stable  2. Reflux feeling some nausea    Feedback provided:  1.  Praised for continued effort and determination    Diagnostic values/Desriptors for follow-up as needed for chronic condition(s)   Weight: 72.4 kg 159.61 lb gain 3.74 lb   Blood Glucose:   7 d 109  14 d 111  30 d 110  90 d 111    Interventions:   1. Health  listened reflectively, validated thoughts and feelings, offered support and encouragement.   2. Allowed patient to express themselves in a non-biased atmosphere.  3. Health  assisted pt to problem-solve obstacles such as being in a challenging environment and dealing with these challenges.   4. Motivational Interviewed interventions utilized (OARS).   5. Patient responded favorably to interventions and remained actively engaged in the session.   6. Health  will remain available and connected for patient by phone and/or office visits.   7. Positive reinforcement, emotional support and encouragement provided.   8. Focused Education: MI    Plan:  [x] Pt will work on goals as stated above.   [x] Pt will contact Health  for any questions, concerns or needs.  [x] Pt will follow up with Health  in office on  4/23/19 at 0900  [] Pt will follow up with Health  on phone in:        [x] Health  will remain available.   [] Health  will contact patient by  phone in:        [] Health  will consult:        [] Health  will inform Provider via EPIC messaging.     Impression:  1. Behavior is consistent with Action Stage of Change.   2. Participation level:       [x] Receptive      [x] Interactive      [] Guarded and Resistant      [x] Self Motivated      [] Refused/Declined to participate   3. [x] Pt voiced understanding of all information presented.       [] Pt voiced needing further information/education. This will be arranged.       [x] Pt would benefit from further education/information as identified by this health . This will be arranged.     Jacqueline Perry RN HC

## 2019-04-08 NOTE — PROGRESS NOTES
"  Physical Therapy Daily Treatment Note     Name: Paris Burris  Clinic Number: [id number]    Therapy Diagnosis:   Encounter Diagnoses   Name Primary?    Impaired functional mobility, balance, gait, and endurance     General weakness      Physician: Mandi Huynh MD    Visit Date: 4/9/2019    Physician Orders: PT Eval and Treat   Medical Diagnosis from Referral:   R26.81 (ICD-10-CM) - Unsteady gait   R53.81 (ICD-10-CM) - Debility      Evaluation Date: 4/2/2019  Authorization Period Expiration: 03/14/2020  Plan of Care Expiration: 07/02/2019  Visit # / Visits authorized: 1/ 1      Time In: 9:00  Time Out: 10:00  Total Billable Time: 60 minutes     Precautions: Standard, DDD of LSP, DM type 2, Hx vertebral Fx, recent hernia surgery, Recent Pacemaker placement    Subjective     Pt reports: min soreness after the previous visit.   She was compliant with home exercise program.  Response to previous treatment: mild soreness  Functional change: none    Pain: 2/10  Location: bilateral LE      Objective     Mrs. Burris received therapeutic exercises to develop strength, endurance, ROM, flexibility, posture and core stabilization for 60 minutes including:    SLR: 2 x 10  SL hip abd 2 x 10 - UA to complete >15 reps 2* to cramping and fatigue in the lateral hip  Bridge w/ band 2 x 10 x OTB  BKFO 2 x 10 x OTB  HL hip abd 2 x 10 x OTB    LAQ 2 x 10 x 2#  HS curls 2 x 10 x OTB    Sit to stands 2 x 10  SLS 3 x 30"  SL cone taps 2 x 3"  Tandem on airex 2 x 30"    Recumbent bike: 10 minutes (collected history, assessed pt complaints, education on causes of debility; importance of exercise)      Home Exercises Provided and Patient Education Provided     Education provided:   - none today    Written Home Exercises Provided: Patient instructed to cont prior HEP.  Exercises were reviewed and Mrs. Burris was able to demonstrate them prior to the end of the session.  Mrs. Burris demonstrated good  understanding of the education provided. "     See EMR under Patient Instructions for exercises provided prior visit.    Assessment     Pt tolerated overall session well today. Good return technique with HEP, indicating good compliance at home.  Mrs. Burris is progressing well towards her goals.   Pt prognosis is Good.     Pt will continue to benefit from skilled outpatient physical therapy to address the deficits listed in the problem list box on initial evaluation, provide pt/family education and to maximize pt's level of independence in the home and community environment.     Pt's spiritual, cultural and educational needs considered and pt agreeable to plan of care and goals.     Anticipated barriers to physical therapy: chronicity of symptoms, recent surgeries      Goals:   Short Term Goals (5 Weeks):  1. Pt able to improve static balance with BRG balance >=40/56 to allow for improved tolerance with ADLs.  2. Pt able to improve dynamic balance with DGI >=19/24 to allow for improved stability with walking.  3. Pt able to walk >=10 min with Mod difficulty.  4. Pt to improve strength by a half grade to allow for increased ease with getting in/out of chair.  5. Pt to demonstrate improved functional ability with FOTO limitation <=34% disability.     Long Term Goals (10 Weeks):  1. Pt able to improve static balance with BRG balance >=45/56 to allow for improved tolerance with ADLs.  2. Pt able to improve dynamic balance with DGI >=22/24 to allow for improved stability with walking.  3. Pt able to walk >=10 min with Min-No difficulty.  4. Pt to improve strength to 5/5 to allow for increased ease with getting in/out of chair.  5. Pt to demonstrate improved functional ability with FOTO limitation <=24% disability.  6. Pt will be independent with HEP and self management of symptoms.       Plan     Continue with POC for strength and conditioning.    Patsy Elaine, PT

## 2019-04-09 ENCOUNTER — CLINICAL SUPPORT (OUTPATIENT)
Dept: REHABILITATION | Facility: OTHER | Age: 74
End: 2019-04-09
Payer: MEDICARE

## 2019-04-09 DIAGNOSIS — Z74.09 IMPAIRED FUNCTIONAL MOBILITY, BALANCE, GAIT, AND ENDURANCE: ICD-10-CM

## 2019-04-09 DIAGNOSIS — R53.1 GENERAL WEAKNESS: ICD-10-CM

## 2019-04-09 PROCEDURE — 97110 THERAPEUTIC EXERCISES: CPT | Mod: HCNC,PN

## 2019-04-10 NOTE — PROGRESS NOTES
"  Physical Therapy Daily Treatment Note     Name: Paris Burris  Clinic Number: [id number]    Therapy Diagnosis:   Encounter Diagnoses   Name Primary?    Impaired functional mobility, balance, gait, and endurance     General weakness      Physician: Mandi Huynh MD    Visit Date: 4/11/2019    Physician Orders: PT Eval and Treat   Medical Diagnosis from Referral:   R26.81 (ICD-10-CM) - Unsteady gait   R53.81 (ICD-10-CM) - Debility      Evaluation Date: 4/2/2019  Authorization Period Expiration: 03/14/2020  Plan of Care Expiration: 07/02/2019  Visit # / Visits authorized: 2/20 + 1/1     Time In: 9:00  Time Out: 10:00  Total Billable Time: 30 minutes     Precautions: Standard, DDD of LSP, DM type 2, Hx vertebral Fx, recent hernia surgery, Recent Pacemaker placement    Subjective     Pt reports: increased soreness in the low and mid back after the previous session.  She was compliant with home exercise program.  Response to previous treatment: moderate soreness  Functional change: none    Pain: 2/10  Location: bilateral LE      Objective     Mrs. Burris received therapeutic exercises to develop strength, endurance, ROM, flexibility, posture and core stabilization for 60 minutes including:    SLR: 2 x 10  SL hip abd 2 x 10 - UA to complete >15 reps 2* to cramping and fatigue in the lateral hip -- NP today  Bridge w/ band 2 x 10 x OTB  BKFO 2 x 10 x OTB  HL hip abd 2 x 10 x OTB    LAQ 2 x 10 x 2#  HS curls 2 x 10 x OTB    Sit to stands 2 x 10  SLS 3 x 30"  SL cone taps 2 x 3"  Tandem on airex 2 x 30"  +standing hip abd: 1 x 10    Progress balance exercises next visit    Recumbent bike: 5 minutes (collected history, assessed pt complaints, education on causes of debility; importance of exercise)      Home Exercises Provided and Patient Education Provided     Education provided:   - none today    Written Home Exercises Provided: Patient instructed to cont prior HEP.  Exercises were reviewed and Mrs. Burris was able to " demonstrate them prior to the end of the session.  Mrs. Burris demonstrated good  understanding of the education provided.     See EMR under Patient Instructions for exercises provided prior visit.    Assessment     Pt noted cramping in lateral hips with standing and supine glut strengthening that was relieved with stretching. Min progression of therex due to time constraints.    Mrs. Burris is progressing well towards her goals.   Pt prognosis is Good.     Pt will continue to benefit from skilled outpatient physical therapy to address the deficits listed in the problem list box on initial evaluation, provide pt/family education and to maximize pt's level of independence in the home and community environment.     Pt's spiritual, cultural and educational needs considered and pt agreeable to plan of care and goals.     Anticipated barriers to physical therapy: chronicity of symptoms, recent surgeries      Goals:   Short Term Goals (5 Weeks):  1. Pt able to improve static balance with BRG balance >=40/56 to allow for improved tolerance with ADLs.  2. Pt able to improve dynamic balance with DGI >=19/24 to allow for improved stability with walking.  3. Pt able to walk >=10 min with Mod difficulty.  4. Pt to improve strength by a half grade to allow for increased ease with getting in/out of chair.  5. Pt to demonstrate improved functional ability with FOTO limitation <=34% disability.     Long Term Goals (10 Weeks):  1. Pt able to improve static balance with BRG balance >=45/56 to allow for improved tolerance with ADLs.  2. Pt able to improve dynamic balance with DGI >=22/24 to allow for improved stability with walking.  3. Pt able to walk >=10 min with Min-No difficulty.  4. Pt to improve strength to 5/5 to allow for increased ease with getting in/out of chair.  5. Pt to demonstrate improved functional ability with FOTO limitation <=24% disability.  6. Pt will be independent with HEP and self management of symptoms.        Plan     Continue with POC for strength and conditioning.    Patsy Elaine, PT

## 2019-04-11 ENCOUNTER — OFFICE VISIT (OUTPATIENT)
Dept: OPTOMETRY | Facility: CLINIC | Age: 74
End: 2019-04-11
Payer: COMMERCIAL

## 2019-04-11 ENCOUNTER — CLINICAL SUPPORT (OUTPATIENT)
Dept: REHABILITATION | Facility: OTHER | Age: 74
End: 2019-04-11
Payer: MEDICARE

## 2019-04-11 DIAGNOSIS — H52.13 MYOPIA WITH ASTIGMATISM AND PRESBYOPIA, BILATERAL: ICD-10-CM

## 2019-04-11 DIAGNOSIS — R53.1 GENERAL WEAKNESS: ICD-10-CM

## 2019-04-11 DIAGNOSIS — Z01.00 ROUTINE EYE EXAM: Primary | ICD-10-CM

## 2019-04-11 DIAGNOSIS — H52.4 MYOPIA WITH ASTIGMATISM AND PRESBYOPIA, BILATERAL: ICD-10-CM

## 2019-04-11 DIAGNOSIS — H52.203 MYOPIA WITH ASTIGMATISM AND PRESBYOPIA, BILATERAL: ICD-10-CM

## 2019-04-11 DIAGNOSIS — Z96.1 PSEUDOPHAKIA OF BOTH EYES: ICD-10-CM

## 2019-04-11 DIAGNOSIS — H04.123 DRY EYE SYNDROME OF BOTH EYES: ICD-10-CM

## 2019-04-11 DIAGNOSIS — Z74.09 IMPAIRED FUNCTIONAL MOBILITY, BALANCE, GAIT, AND ENDURANCE: ICD-10-CM

## 2019-04-11 PROCEDURE — 99999 PR PBB SHADOW E&M-EST. PATIENT-LVL II: CPT | Mod: PBBFAC,HCNC,, | Performed by: OPTOMETRIST

## 2019-04-11 PROCEDURE — 99999 PR PBB SHADOW E&M-EST. PATIENT-LVL II: ICD-10-PCS | Mod: PBBFAC,HCNC,, | Performed by: OPTOMETRIST

## 2019-04-11 PROCEDURE — 92014 PR EYE EXAM, EST PATIENT,COMPREHESV: ICD-10-PCS | Mod: HCNC,S$GLB,, | Performed by: OPTOMETRIST

## 2019-04-11 PROCEDURE — 97110 THERAPEUTIC EXERCISES: CPT | Mod: HCNC,PN

## 2019-04-11 PROCEDURE — 92014 COMPRE OPH EXAM EST PT 1/>: CPT | Mod: HCNC,S$GLB,, | Performed by: OPTOMETRIST

## 2019-04-11 NOTE — PROGRESS NOTES
HPI     DLE:  1 year ago with Dr Dillan Aquino    S/p phaco w/IOL OU  No eyedrops  Hemoglobin A1C       Date                     Value               Ref Range             Status                11/14/2018               5.6                 4.0 - 5.6 %           Final              Comment:    ADA Screening Guidelines:  5.7-6.4%  Consistent with   prediabetes  >or=6.5%  Consistent with diabetes  High levels of fetal   hemoglobin interfere with the HbA1C  assay. Heterozygous hemoglobin   variants (HbS, HgC, etc)do  not significantly interfere with this assay.     However, presence of multiple variants may affect accuracy.    ----------      Pt here for check of ocular health.  Pt states when she is reading the   print looks clearer when she closes her left eye x 6 months.  Pt denies   flashes, floaters or eye pain.  Pt states she has headaches.     Last edited by Felicitas Manrique, OD on 4/12/2019 10:36 AM. (History)            Assessment /Plan     For exam results, see Encounter Report.    Routine eye exam    Myopia with astigmatism and presbyopia, bilateral    Pseudophakia of both eyes    Dry eye syndrome of both eyes          1-3.  Continue w/ current rx.  Eye health normal OU.  4.  Educated pt fluctuation in vision due to dry eye.  Recommend artificial tears at least 2x/day OU.

## 2019-04-12 ENCOUNTER — OFFICE VISIT (OUTPATIENT)
Dept: SURGERY | Facility: CLINIC | Age: 74
End: 2019-04-12
Payer: MEDICARE

## 2019-04-12 VITALS
WEIGHT: 158 LBS | BODY MASS INDEX: 29.83 KG/M2 | SYSTOLIC BLOOD PRESSURE: 120 MMHG | DIASTOLIC BLOOD PRESSURE: 55 MMHG | HEART RATE: 68 BPM | HEIGHT: 61 IN

## 2019-04-12 DIAGNOSIS — Z98.890 HISTORY OF FUNDOPLICATION: Primary | ICD-10-CM

## 2019-04-12 PROCEDURE — 99024 PR POST-OP FOLLOW-UP VISIT: ICD-10-PCS | Mod: HCNC,S$GLB,, | Performed by: SURGERY

## 2019-04-12 PROCEDURE — 99999 PR PBB SHADOW E&M-EST. PATIENT-LVL III: ICD-10-PCS | Mod: PBBFAC,HCNC,, | Performed by: SURGERY

## 2019-04-12 PROCEDURE — 99024 POSTOP FOLLOW-UP VISIT: CPT | Mod: HCNC,S$GLB,, | Performed by: SURGERY

## 2019-04-12 PROCEDURE — 99999 PR PBB SHADOW E&M-EST. PATIENT-LVL III: CPT | Mod: PBBFAC,HCNC,, | Performed by: SURGERY

## 2019-04-12 RX ORDER — PANTOPRAZOLE SODIUM 40 MG/1
40 TABLET, DELAYED RELEASE ORAL DAILY
Qty: 30 TABLET | Refills: 11 | Status: SHIPPED | OUTPATIENT
Start: 2019-04-12 | End: 2019-07-18

## 2019-04-12 NOTE — PROGRESS NOTES
"H: Patient is a 74 yo F s/p lap nissen fundoplication with mesh 01/29/19 who presents today with continued symptoms of reflux and left sided upper abdominal spasms. She reports that the reflux occurs 2-3 times a week and is not necessarily related to food. The reflux occurs when she is sitting on the couch. When it does occur it lasts the whole day. To alleviate the heartburn take peptobismal as needed on top of her daily nexium. She has associated sour taste in her mouth. Denies significant nausea/vomiting. Secondarily, she has been having intermittent left sided mid axillary muscle spasms 4-5 times a week. It only happens when she is laying down on her right side at night. The sensation is a "squeeze" that is uncomfortable but not painful. It is relieved when laying on her back. She has not tried tylenol or ibuprofen.    O:    Vitals:    04/12/19 0839   BP: (!) 120/55   Pulse: 68   Weight: 71.7 kg (158 lb)   Height: 5' 1" (1.549 m)     PE    Gen: WDWN, NAD  Cardio: RRR, intact distal pulses  Pulm: CTA bilaterally  Abd: soft, nondistended, normal bowel sounds, nontender, incisions well healed  Msk: 5/5 LUE strength, lat,delt, pec. Spasms not triggered with movement against resistance or palpation.  Neuro: AAO x 3    A: Post operatively having difficulties with reflux.    P: Order EGD, stop nexium, start protonix. Will give more time for spasms to resolve. Will contact with results after completion of EGD.    I have personally taken the history and examined this patient and agree with the resident's note as stated above.         Renato Perez MD      "

## 2019-04-15 ENCOUNTER — PATIENT MESSAGE (OUTPATIENT)
Dept: SURGERY | Facility: CLINIC | Age: 74
End: 2019-04-15

## 2019-04-15 ENCOUNTER — PATIENT MESSAGE (OUTPATIENT)
Dept: DERMATOLOGY | Facility: CLINIC | Age: 74
End: 2019-04-15

## 2019-04-16 NOTE — PROGRESS NOTES
"  Physical Therapy Daily Treatment Note     Name: Paris Burris  Clinic Number: [id number]    Therapy Diagnosis:   Encounter Diagnoses   Name Primary?    Impaired functional mobility, balance, gait, and endurance     General weakness      Physician: Mandi Huynh MD    Visit Date: 4/18/2019    Physician Orders: PT Eval and Treat   Medical Diagnosis from Referral:   R26.81 (ICD-10-CM) - Unsteady gait   R53.81 (ICD-10-CM) - Debility      Evaluation Date: 4/2/2019  Authorization Period Expiration: 03/14/2020  Plan of Care Expiration: 07/02/2019  Visit # / Visits authorized: 3/20 + 1/1     Time In: 11:00  Time Out: 12:00  Total Billable Time: 53 minutes     Precautions: Standard, DDD of LSP, DM type 2, Hx vertebral Fx, recent hernia surgery, Recent Pacemaker placement    Subjective     Pt reports: stiffness in the back and tops of the thighs. Notes difficulty with lifting legs; "they feel heavy."  She was compliant with home exercise program.  Response to previous treatment: moderate soreness  Functional change: none    Pain: 2/10  Location: bilateral LE      Objective     Mrs. Burris received therapeutic exercises to develop strength, endurance, ROM, flexibility, posture and core stabilization for 53 minutes including:    SLR: 2 x 10 x 1#  +supine marches 2 min x 1#  Bridge w/ band 2 x 10 x GTB  BKFO 2 x 10 x GTB  HL hip abd 2 x 10 x GTB    LAQ 2 x 10 x 3#  HS curls 2 x 10 x GTB    Sit to stands 2 x 10 - defer today  SLS 3 x 30"  SL cone taps 2 x 30" straight, 2 x 30" diagonals  Tandem on airex 2 x 30"  standing hip abd: 1 x 10 -- UA to complete >1 set 2* to fatigue    Progress balance exercises next visit    Recumbent bike: 5 minutes (collected history, assessed pt complaints, education on causes of debility; importance of exercise)      Home Exercises Provided and Patient Education Provided     Education provided:   - none today    Written Home Exercises Provided: Patient instructed to cont prior HEP.  Exercises were " reviewed and Mrs. Burris was able to demonstrate them prior to the end of the session.  Mrs. Burris demonstrated good  understanding of the education provided.     See EMR under Patient Instructions for exercises provided prior visit.    Assessment     Pt able to tolerate increased resistance with several exercises with good training effect, indicating improving strength. Easily fatigued with standing exercises today with increased rest breaks. Continue progressing standing therex to improve standing tolerance as well as dynamic balance.    Mrs. Burris is progressing well towards her goals.   Pt prognosis is Good.     Pt will continue to benefit from skilled outpatient physical therapy to address the deficits listed in the problem list box on initial evaluation, provide pt/family education and to maximize pt's level of independence in the home and community environment.     Pt's spiritual, cultural and educational needs considered and pt agreeable to plan of care and goals.     Anticipated barriers to physical therapy: chronicity of symptoms, recent surgeries      Goals:   Short Term Goals (5 Weeks):  1. Pt able to improve static balance with BRG balance >=40/56 to allow for improved tolerance with ADLs.  2. Pt able to improve dynamic balance with DGI >=19/24 to allow for improved stability with walking.  3. Pt able to walk >=10 min with Mod difficulty.  4. Pt to improve strength by a half grade to allow for increased ease with getting in/out of chair.  5. Pt to demonstrate improved functional ability with FOTO limitation <=34% disability.     Long Term Goals (10 Weeks):  1. Pt able to improve static balance with BRG balance >=45/56 to allow for improved tolerance with ADLs.  2. Pt able to improve dynamic balance with DGI >=22/24 to allow for improved stability with walking.  3. Pt able to walk >=10 min with Min-No difficulty.  4. Pt to improve strength to 5/5 to allow for increased ease with getting in/out of  chair.  5. Pt to demonstrate improved functional ability with FOTO limitation <=24% disability.  6. Pt will be independent with HEP and self management of symptoms.       Plan     Continue with POC for strength and conditioning.    Patsy Elaine, PT

## 2019-04-17 ENCOUNTER — TELEPHONE (OUTPATIENT)
Dept: INTERNAL MEDICINE | Facility: CLINIC | Age: 74
End: 2019-04-17

## 2019-04-17 NOTE — TELEPHONE ENCOUNTER
----- Message from Jia Slater sent at 4/17/2019  7:38 AM CDT -----  Contact: [employer]  Message from [facility], System Message sent at 4/12/2019  3:32 PM CDT -----    Appointment Request From: Paris Burris    With Provider: Mandi Huynh MD [[facility]]    Preferred Date Range: 4/12/2019 - 5/30/2019    Preferred Times: Any time    Reason for visit: Existing Patient    Comments:  6 month checkup

## 2019-04-17 NOTE — TELEPHONE ENCOUNTER
She was just seen 2/25/19 for hosp f/u and does not need f/u in that time period unless she's having issues? How's her dizziness? If persistent, would refer to neurology. She can do the 6 mo f/u w/ Verna or Maribel if she insists on it.

## 2019-04-18 ENCOUNTER — CLINICAL SUPPORT (OUTPATIENT)
Dept: REHABILITATION | Facility: OTHER | Age: 74
End: 2019-04-18
Payer: MEDICARE

## 2019-04-18 DIAGNOSIS — R53.1 GENERAL WEAKNESS: ICD-10-CM

## 2019-04-18 DIAGNOSIS — Z74.09 IMPAIRED FUNCTIONAL MOBILITY, BALANCE, GAIT, AND ENDURANCE: ICD-10-CM

## 2019-04-18 PROCEDURE — 97110 THERAPEUTIC EXERCISES: CPT | Mod: HCNC,PN

## 2019-04-22 NOTE — PROGRESS NOTES
"  Physical Therapy Daily Treatment Note     Name: Paris Burris  Clinic Number: [id number]    Therapy Diagnosis:   Encounter Diagnoses   Name Primary?    Impaired functional mobility, balance, gait, and endurance     General weakness      Physician: Mandi Huynh MD    Visit Date: 4/23/2019    Physician Orders: PT Eval and Treat   Medical Diagnosis from Referral:   R26.81 (ICD-10-CM) - Unsteady gait   R53.81 (ICD-10-CM) - Debility      Evaluation Date: 4/2/2019  Authorization Period Expiration: 03/14/2020  Plan of Care Expiration: 07/02/2019  Visit # / Visits authorized: 4/20 + 1/1      Time In: 9:00 AM  Time Out: 9:50 AM  Total Billable Time: 50 minutes     Precautions: Standard, DDD of LSP, DM type 2, Hx vertebral Fx, recent hernia surgery, Recent Pacemaker placement    Subjective     Pt reports: no new complaints today  She was compliant with home exercise program.  Response to previous treatment: moderate soreness  Functional change: none    Pain: 1- 2/10  Location: bilateral LE      Objective     Mrs. Burris received therapeutic exercises to develop strength, endurance, ROM, flexibility, posture and core stabilization for 50 minutes including:    SLR: 2 x 10 x 1#  supine marches 2 min x 1#  Bridge w/ band 2 x 15 x GTB  BKFO 2 x 10 x GTB  HL hip abd 2 x 10 x GTB    LAQ 2 x 10 x 3#  HS curls 2 x 10 x GTB    Sit to stands 2 x 10 - defer today  SLS on airex 3 x 30"  SL cone taps 2 x 30" straight, 2 x 30" diagonals  Tandem on airex 3 x 30"  +Airex feet together eyes closed 2 x 30"  standing hip abd: 2 x 10     Progress balance exercises next visit    Recumbent bike: 8 minutes (collected history, assessed pt complaints, education on causes of debility; importance of exercise)      Home Exercises Provided and Patient Education Provided     Education provided:   - none today    Written Home Exercises Provided: Patient instructed to cont prior HEP.  Exercises were reviewed and Mrs. Burris was able to demonstrate them prior " to the end of the session.  Mrs. Burris demonstrated good  understanding of the education provided.     See EMR under Patient Instructions for exercises provided prior visit.    Assessment     Good tolerance to therex today. Able to perform 2 sets of standing SLR abd today. Performed 2 sets of feet together with eyes closed with close guarding, and then pt requested to stop as it was making her feel queasy. Able to perform SLS on airex today with intermittent UE support.    Mrs. Burris is progressing well towards her goals.   Pt prognosis is Good.     Pt will continue to benefit from skilled outpatient physical therapy to address the deficits listed in the problem list box on initial evaluation, provide pt/family education and to maximize pt's level of independence in the home and community environment.     Pt's spiritual, cultural and educational needs considered and pt agreeable to plan of care and goals.     Anticipated barriers to physical therapy: chronicity of symptoms, recent surgeries      Goals:   Short Term Goals (5 Weeks):  1. Pt able to improve static balance with BRG balance >=40/56 to allow for improved tolerance with ADLs.  2. Pt able to improve dynamic balance with DGI >=19/24 to allow for improved stability with walking.  3. Pt able to walk >=10 min with Mod difficulty.  4. Pt to improve strength by a half grade to allow for increased ease with getting in/out of chair.  5. Pt to demonstrate improved functional ability with FOTO limitation <=34% disability.     Long Term Goals (10 Weeks):  1. Pt able to improve static balance with BRG balance >=45/56 to allow for improved tolerance with ADLs.  2. Pt able to improve dynamic balance with DGI >=22/24 to allow for improved stability with walking.  3. Pt able to walk >=10 min with Min-No difficulty.  4. Pt to improve strength to 5/5 to allow for increased ease with getting in/out of chair.  5. Pt to demonstrate improved functional ability with FOTO  limitation <=24% disability.  6. Pt will be independent with HEP and self management of symptoms.       Plan     Continue with POC for strength and conditioning.    Domonique Licona, PT

## 2019-04-23 ENCOUNTER — CLINICAL SUPPORT (OUTPATIENT)
Dept: REHABILITATION | Facility: OTHER | Age: 74
End: 2019-04-23
Payer: MEDICARE

## 2019-04-23 DIAGNOSIS — Z74.09 IMPAIRED FUNCTIONAL MOBILITY, BALANCE, GAIT, AND ENDURANCE: ICD-10-CM

## 2019-04-23 DIAGNOSIS — R53.1 GENERAL WEAKNESS: ICD-10-CM

## 2019-04-23 PROCEDURE — 97110 THERAPEUTIC EXERCISES: CPT | Mod: HCNC,PN | Performed by: PHYSICAL THERAPIST

## 2019-04-24 ENCOUNTER — CLINICAL SUPPORT (OUTPATIENT)
Dept: INTERNAL MEDICINE | Facility: CLINIC | Age: 74
End: 2019-04-24
Payer: MEDICARE

## 2019-04-24 VITALS — BODY MASS INDEX: 30.37 KG/M2 | WEIGHT: 160.69 LBS

## 2019-04-24 NOTE — PROGRESS NOTES
Health  Follow-Up Note   [x] Office  [] Phone  Notes from previous session reviewed.   [x] Previous Session Goals unchanged.   [] Patient/Caregiver Change in Goals.  Goals added or changed by Patient/Caregiver since program participation:  1. Get back on track       Additional/Changed support that patient/caregiver has experienced/sought?  (Indicate readiness, support from family, friends, others, community groups, etc)  1.      Additional/Changed obstacles that could prevent patient/caregiver from reaching their goals?  1.  lack of ability to deal with cravings    Feedback provided:  1.  Praised for continued effort and determination     Diagnostic values/Desriptors for follow-up as needed for chronic condition(s)   Weight: 72.9 kg 160.72 lb gain 1.11 lb total loss 26 lbs.   Blood Glucose: averages  7 d 123  14 d 123  30 d 121  90 d 121    Interventions:   1. Health  listened reflectively, validated thoughts and feelings, offered support and encouragement.   2. Allowed patient to express themselves in a non-biased atmosphere.  3. Health  assisted pt to problem-solve obstacles such as being in a challenging environment and dealing with these challenges.   4. Motivational Interviewed interventions utilized (OARS).   5. Patient responded favorably to interventions and remained actively engaged in the session.   6. Health  will remain available and connected for patient by phone and/or office visits.   7. Positive reinforcement, emotional support and encouragement provided.   8. Focused Education: MI    Plan:  [x] Pt will work on goals as stated above.   [x] Pt will contact Health  for any questions, concerns or needs.  [x] Pt will follow up with Health  in office on  5/15/19 at 0930.  [] Pt will follow up with Health  on phone in:        [x] Health  will remain available.   [] Health  will contact patient by phone in:        [] Health  will consult:        []  Health  will inform Provider via EPIC messaging.     Impression:  1. Behavior is consistent with Action Stage of Change.   2. Participation level:       [x] Receptive      [x] Interactive      [] Guarded and Resistant      [x] Self Motivated      [] Refused/Declined to participate   3. [x] Pt voiced understanding of all information presented.       [] Pt voiced needing further information/education. This will be arranged.       [x] Pt would benefit from further education/information as identified by this health . This will be arranged.     Jacqueline Perry RN HC

## 2019-04-25 ENCOUNTER — CLINICAL SUPPORT (OUTPATIENT)
Dept: REHABILITATION | Facility: OTHER | Age: 74
End: 2019-04-25
Payer: MEDICARE

## 2019-04-25 DIAGNOSIS — R53.1 GENERAL WEAKNESS: ICD-10-CM

## 2019-04-25 DIAGNOSIS — Z74.09 IMPAIRED FUNCTIONAL MOBILITY, BALANCE, GAIT, AND ENDURANCE: ICD-10-CM

## 2019-04-25 PROCEDURE — 97110 THERAPEUTIC EXERCISES: CPT | Mod: HCNC,PN

## 2019-04-25 NOTE — PROGRESS NOTES
"  Physical Therapy Daily Treatment Note     Name: Paris Burris  Clinic Number: [id number]    Therapy Diagnosis:   Encounter Diagnoses   Name Primary?    Impaired functional mobility, balance, gait, and endurance     General weakness      Physician: Mandi Huynh MD    Visit Date: 4/25/2019    Physician Orders: PT Eval and Treat   Medical Diagnosis from Referral:   R26.81 (ICD-10-CM) - Unsteady gait   R53.81 (ICD-10-CM) - Debility      Evaluation Date: 4/2/2019  Authorization Period Expiration: 03/14/2020  Plan of Care Expiration: 07/02/2019  Visit # / Visits authorized: 5/20 + 1/1      Time In: 9:00 AM  Time Out: 9:55 AM  Total Billable Time: 50 minutes     Precautions: Standard, DDD of LSP, DM type 2, Hx vertebral Fx, recent hernia surgery, Recent Pacemaker placement    Subjective     Pt reports: no new complaints today. "I still need to work on my balance."  She was compliant with home exercise program.  Response to previous treatment: moderate soreness  Functional change: none    Pain: 1- 2/10  Location: bilateral LE      Objective     Mrs. Burris received therapeutic exercises to develop strength, endurance, ROM, flexibility, posture and core stabilization for 50 minutes including:    SLR: 2 x 10 x 1#  supine marches 2 min x 1#  Bridge w/ band 2 x 15 x GTB  BKFO 2 x 10 x GTB  HL hip abd 2 x 10 x GTB    LAQ 2 x 10 x 3#  HS curls 2 x 10 x GTB    Sit to stands 2 x 10 - defer today  SLS on airex 3 x 30"  SL cone taps 2 x 30" straight, 2 x 30" diagonals  Tandem on airex 3 x 30"  +Airex feet together eyes closed 2 x 30"  standing hip abd: 2 x 10   +Step-ups 6" 2 x 10 B  +Lateral step-ups 6" 2 x 10 B  +Tandem walk 2 x 20 ft    Progress balance exercises next visit    Recumbent bike: 8 minutes (collected history, assessed pt complaints, education on causes of debility; importance of exercise)      Home Exercises Provided and Patient Education Provided     Education provided:   - none today    Written Home Exercises " Provided: Patient instructed to cont prior HEP.  Exercises were reviewed and Mrs. Burris was able to demonstrate them prior to the end of the session.  Mrs. Burris demonstrated good  understanding of the education provided.     See EMR under Patient Instructions for exercises provided prior visit.    Assessment     Pt tolerated treatment today without any adverse effects. Continues to require intermittent bilateral UE support in // bars for SLS airex. Pt was able to perform step-ups without any episodes of LOB today.    Mrs. Burris is progressing well towards her goals.   Pt prognosis is Good.     Pt will continue to benefit from skilled outpatient physical therapy to address the deficits listed in the problem list box on initial evaluation, provide pt/family education and to maximize pt's level of independence in the home and community environment.     Pt's spiritual, cultural and educational needs considered and pt agreeable to plan of care and goals.     Anticipated barriers to physical therapy: chronicity of symptoms, recent surgeries      Goals:   Short Term Goals (5 Weeks):  1. Pt able to improve static balance with BRG balance >=40/56 to allow for improved tolerance with ADLs.  2. Pt able to improve dynamic balance with DGI >=19/24 to allow for improved stability with walking.  3. Pt able to walk >=10 min with Mod difficulty.  4. Pt to improve strength by a half grade to allow for increased ease with getting in/out of chair.  5. Pt to demonstrate improved functional ability with FOTO limitation <=34% disability.     Long Term Goals (10 Weeks):  1. Pt able to improve static balance with BRG balance >=45/56 to allow for improved tolerance with ADLs.  2. Pt able to improve dynamic balance with DGI >=22/24 to allow for improved stability with walking.  3. Pt able to walk >=10 min with Min-No difficulty.  4. Pt to improve strength to 5/5 to allow for increased ease with getting in/out of chair.  5. Pt to demonstrate  improved functional ability with FOTO limitation <=24% disability.  6. Pt will be independent with HEP and self management of symptoms.       Plan     Continue with POC for strength and conditioning.    Naldo Sharpe, PTA

## 2019-04-26 ENCOUNTER — OFFICE VISIT (OUTPATIENT)
Dept: DERMATOLOGY | Facility: CLINIC | Age: 74
End: 2019-04-26
Payer: MEDICARE

## 2019-04-26 VITALS — BODY MASS INDEX: 30.23 KG/M2 | WEIGHT: 160 LBS

## 2019-04-26 DIAGNOSIS — D18.00 ANGIOMA: ICD-10-CM

## 2019-04-26 DIAGNOSIS — L81.4 LENTIGINES: ICD-10-CM

## 2019-04-26 DIAGNOSIS — L82.1 SEBORRHEIC KERATOSES: Primary | ICD-10-CM

## 2019-04-26 PROCEDURE — 99999 PR PBB SHADOW E&M-EST. PATIENT-LVL II: ICD-10-PCS | Mod: PBBFAC,HCNC,, | Performed by: DERMATOLOGY

## 2019-04-26 PROCEDURE — 1101F PT FALLS ASSESS-DOCD LE1/YR: CPT | Mod: HCNC,CPTII,S$GLB, | Performed by: DERMATOLOGY

## 2019-04-26 PROCEDURE — 99213 PR OFFICE/OUTPT VISIT, EST, LEVL III, 20-29 MIN: ICD-10-PCS | Mod: HCNC,S$GLB,, | Performed by: DERMATOLOGY

## 2019-04-26 PROCEDURE — 99213 OFFICE O/P EST LOW 20 MIN: CPT | Mod: HCNC,S$GLB,, | Performed by: DERMATOLOGY

## 2019-04-26 PROCEDURE — 99999 PR PBB SHADOW E&M-EST. PATIENT-LVL II: CPT | Mod: PBBFAC,HCNC,, | Performed by: DERMATOLOGY

## 2019-04-26 PROCEDURE — 1101F PR PT FALLS ASSESS DOC 0-1 FALLS W/OUT INJ PAST YR: ICD-10-PCS | Mod: HCNC,CPTII,S$GLB, | Performed by: DERMATOLOGY

## 2019-04-26 RX ORDER — TRETINOIN 0.5 MG/G
CREAM TOPICAL
Qty: 45 G | Refills: 6 | Status: SHIPPED | OUTPATIENT
Start: 2019-04-26 | End: 2020-04-02 | Stop reason: SDUPTHER

## 2019-04-26 NOTE — PROGRESS NOTES
Subjective:       Patient ID:  Paris Burris is a 73 y.o. female who presents for   Chief Complaint   Patient presents with    Skin Check     UBSE    Spot     abdomen for several months.     Spot  - Initial  Affected locations: abdomen  Signs / symptoms: asymptomatic  Aggravated by: nothing        Review of Systems   Constitutional: Negative for fever, chills, weight loss, weight gain, fatigue, night sweats and malaise.   Skin: Negative for daily sunscreen use, activity-related sunscreen use and wears hat.   Hematologic/Lymphatic: Does not bruise/bleed easily.        Objective:    Physical Exam   Constitutional: She appears well-developed and well-nourished. No distress.   Neurological: She is alert and oriented to person, place, and time. She is not disoriented.   Psychiatric: She has a normal mood and affect.   Skin:   Areas Examined (abnormalities noted in diagram):   Head / Face Inspection Performed  Neck Inspection Performed  Chest / Axilla Inspection Performed  Abdomen Inspection Performed  Back Inspection Performed  RUE Inspected  LUE Inspection Performed              Diagram Legend     Erythematous scaling macule/papule c/w actinic keratosis       Vascular papule c/w angioma      Pigmented verrucoid papule/plaque c/w seborrheic keratosis      Yellow umbilicated papule c/w sebaceous hyperplasia      Irregularly shaped tan macule c/w lentigo     1-2 mm smooth white papules consistent with Milia      Movable subcutaneous cyst with punctum c/w epidermal inclusion cyst      Subcutaneous movable cyst c/w pilar cyst      Firm pink to brown papule c/w dermatofibroma      Pedunculated fleshy papule(s) c/w skin tag(s)      Evenly pigmented macule c/w junctional nevus     Mildly variegated pigmented, slightly irregular-bordered macule c/w mildly atypical nevus      Flesh colored to evenly pigmented papule c/w intradermal nevus       Pink pearly papule/plaque c/w basal cell carcinoma      Erythematous  hyperkeratotic cursted plaque c/w SCC      Surgical scar with no sign of skin cancer recurrence      Open and closed comedones      Inflammatory papules and pustules      Verrucoid papule consistent consistent with wart     Erythematous eczematous patches and plaques     Dystrophic onycholytic nail with subungual debris c/w onychomycosis     Umbilicated papule    Erythematous-base heme-crusted tan verrucoid plaque consistent with inflamed seborrheic keratosis     Erythematous Silvery Scaling Plaque c/w Psoriasis     See annotation      Assessment / Plan:        Seborrheic keratoses  reassurance      Lentigines  reassurance      Angiomas  reassurance    Pt request  -     tretinoin (RETIN-A) 0.05 % cream; Use hs  Dispense: 45 g; Refill: 6             One year return

## 2019-04-30 ENCOUNTER — OFFICE VISIT (OUTPATIENT)
Dept: ELECTROPHYSIOLOGY | Facility: CLINIC | Age: 74
End: 2019-04-30
Attending: INTERNAL MEDICINE
Payer: MEDICARE

## 2019-04-30 ENCOUNTER — CLINICAL SUPPORT (OUTPATIENT)
Dept: CARDIOLOGY | Facility: HOSPITAL | Age: 74
End: 2019-04-30
Attending: INTERNAL MEDICINE
Payer: MEDICARE

## 2019-04-30 ENCOUNTER — HOSPITAL ENCOUNTER (OUTPATIENT)
Dept: CARDIOLOGY | Facility: CLINIC | Age: 74
Discharge: HOME | End: 2019-04-30
Payer: MEDICARE

## 2019-04-30 VITALS
SYSTOLIC BLOOD PRESSURE: 126 MMHG | HEART RATE: 72 BPM | BODY MASS INDEX: 30.6 KG/M2 | WEIGHT: 162.06 LBS | DIASTOLIC BLOOD PRESSURE: 82 MMHG | HEIGHT: 61 IN

## 2019-04-30 DIAGNOSIS — Z95.0 PRESENCE OF CARDIAC RESYNCHRONIZATION THERAPY PACEMAKER (CRT-P): ICD-10-CM

## 2019-04-30 DIAGNOSIS — I42.9 CARDIOMYOPATHY, UNSPECIFIED TYPE: ICD-10-CM

## 2019-04-30 DIAGNOSIS — I44.1 HEART BLOCK AV SECOND DEGREE: ICD-10-CM

## 2019-04-30 DIAGNOSIS — I49.9 CARDIAC ARRHYTHMIA, UNSPECIFIED CARDIAC ARRHYTHMIA TYPE: ICD-10-CM

## 2019-04-30 DIAGNOSIS — I44.1 AV BLOCK, MOBITZ II: Primary | ICD-10-CM

## 2019-04-30 DIAGNOSIS — Z95.0 CARDIAC PACEMAKER IN SITU: ICD-10-CM

## 2019-04-30 PROCEDURE — 93010 ELECTROCARDIOGRAM REPORT: CPT | Mod: HCNC,S$GLB,, | Performed by: INTERNAL MEDICINE

## 2019-04-30 PROCEDURE — 3079F DIAST BP 80-89 MM HG: CPT | Mod: HCNC,CPTII,S$GLB, | Performed by: INTERNAL MEDICINE

## 2019-04-30 PROCEDURE — 93010 RHYTHM STRIP: ICD-10-PCS | Mod: HCNC,S$GLB,, | Performed by: INTERNAL MEDICINE

## 2019-04-30 PROCEDURE — 3074F PR MOST RECENT SYSTOLIC BLOOD PRESSURE < 130 MM HG: ICD-10-PCS | Mod: HCNC,CPTII,S$GLB, | Performed by: INTERNAL MEDICINE

## 2019-04-30 PROCEDURE — 1101F PT FALLS ASSESS-DOCD LE1/YR: CPT | Mod: HCNC,CPTII,S$GLB, | Performed by: INTERNAL MEDICINE

## 2019-04-30 PROCEDURE — 93005 RHYTHM STRIP: ICD-10-PCS | Mod: HCNC,S$GLB,, | Performed by: INTERNAL MEDICINE

## 2019-04-30 PROCEDURE — 99999 PR PBB SHADOW E&M-EST. PATIENT-LVL III: CPT | Mod: PBBFAC,HCNC,, | Performed by: INTERNAL MEDICINE

## 2019-04-30 PROCEDURE — 1101F PR PT FALLS ASSESS DOC 0-1 FALLS W/OUT INJ PAST YR: ICD-10-PCS | Mod: HCNC,CPTII,S$GLB, | Performed by: INTERNAL MEDICINE

## 2019-04-30 PROCEDURE — 93005 ELECTROCARDIOGRAM TRACING: CPT | Mod: HCNC,S$GLB,, | Performed by: INTERNAL MEDICINE

## 2019-04-30 PROCEDURE — 3074F SYST BP LT 130 MM HG: CPT | Mod: HCNC,CPTII,S$GLB, | Performed by: INTERNAL MEDICINE

## 2019-04-30 PROCEDURE — 99499 UNLISTED E&M SERVICE: CPT | Mod: HCNC,S$GLB,, | Performed by: INTERNAL MEDICINE

## 2019-04-30 PROCEDURE — 3079F PR MOST RECENT DIASTOLIC BLOOD PRESSURE 80-89 MM HG: ICD-10-PCS | Mod: HCNC,CPTII,S$GLB, | Performed by: INTERNAL MEDICINE

## 2019-04-30 PROCEDURE — 99024 POSTOP FOLLOW-UP VISIT: CPT | Mod: HCNC,S$GLB,, | Performed by: INTERNAL MEDICINE

## 2019-04-30 PROCEDURE — 99999 PR PBB SHADOW E&M-EST. PATIENT-LVL III: ICD-10-PCS | Mod: PBBFAC,HCNC,, | Performed by: INTERNAL MEDICINE

## 2019-04-30 PROCEDURE — 99024 PR POST-OP FOLLOW-UP VISIT: ICD-10-PCS | Mod: HCNC,S$GLB,, | Performed by: INTERNAL MEDICINE

## 2019-04-30 PROCEDURE — 99499 RISK ADDL DX/OHS AUDIT: ICD-10-PCS | Mod: HCNC,S$GLB,, | Performed by: INTERNAL MEDICINE

## 2019-04-30 PROCEDURE — 93281 PM DEVICE PROGR EVAL MULTI: CPT | Mod: HCNC

## 2019-04-30 NOTE — PROGRESS NOTES
Subjective:    Patient ID:  Paris Burris is a 73 y.o. female who presents for follow-up of CRT-P      73 yoF high grade AVB, alternating BBB, cardiomyopathy s/p CRT-P here for post implant visit. She was admitted for a laproscopic hernia repair and was found to have SVT as well as mobitz II AV block, alternating BBB. EF was 35-40%. She underwent CRT-P 1/30/19. Normal CRT-P function with suboptimal CRT pacing. AVD shortened.     Echo 1/19:  · Moderately decreased left ventricular systolic function. The estimated ejection fraction is 38% (Quantitative with echo contrast)  · Moderate left atrial enlargement.  · Grade I (mild) left ventricular diastolic dysfunction consistent with impaired relaxation.  · Normal right ventricular systolic function.  · Mild mitral regurgitation.  · Normal central venous pressure (3 mm Hg).    Past Medical History:  No date: Allergy  No date: Anemia  No date: Anxiety  2002: Breast cancer      Comment:  Left breast  2002: Cancer      Comment:  L breast s/p lumpectomy  No date: Decreased hearing  No date: Depression  No date: Diabetes mellitus  No date: Diabetes with neurologic complications  9/10/13: Gastric ulcer      Comment:  EGD  No date: Hiatal hernia  No date: Hyperlipidemia  No date: Migraine headache  3/20/2015: Migraine headache  No date: Multiple gastric ulcers  No date: Parathyroid disorder  No date: Pre-diabetes  No date: Renal manifestation of secondary diabetes mellitus  No date: Sleep apnea      Comment:  no CPAP  6/14/2017: Type 2 diabetes mellitus, controlled, with renal   complications  No date: Wrist fracture    Past Surgical History:  No date: ADENOIDECTOMY  2002: BREAST LUMPECTOMY; Left  No date: CATARACT EXTRACTION  12/2/2016: COLONOSCOPY; N/A      Comment:  Performed by Dustin Patterson MD at [facility] ENDO (4TH FLR)  3/20/2018: DEVICE ASSISTED ENTEROSCOPY-ANTEROGRADE; N/A      Comment:  Performed by Dustin Patterson MD at [facility] ENDO (2ND FLR)  1/29/2019: EGD  (ESOPHAGOGASTRODUODENOSCOPY) intraop; N/A      Comment:  Performed by Renato Perez Jr., MD at [facility] OR 2ND                FLR  9/12/2018: ESOPHAGOGASTRODUODENOSCOPY (EGD); N/A      Comment:  Performed by Dustin Patterson MD at [facility] ENDO (4TH FLR)  12/2/2016: ESOPHAGOGASTRODUODENOSCOPY (EGD); N/A      Comment:  Performed by Dustin Patterson MD at [facility] ENDO (4TH FLR)  4/20/2015: ESOPHAGOGASTRODUODENOSCOPY (EGD); N/A      Comment:  Performed by Marcial Dewitt MD at [facility] ENDO (4TH FLR)  No date: EYE SURGERY; Bilateral      Comment:  cataracts  1/29/2019: FUNDOPLICATION, LAPAROSCOPIC, TOUPET; N/A      Comment:  Performed by Renato Perez Jr., MD at [facility] OR 2ND                FLR  6/15/2016: INJECTION-STEROID-EPIDURAL-LUMBAR; N/A      Comment:  Performed by Michelle Frias MD at [facility] PAIN MGT  1/30/2019: INSERTION, CARDIAC PACEMAKER, BIVENTRICULAR; N/A      Comment:  Performed by Stone Livingston MD at [facility] EP LAB  1999: lipoma removal  10/8/2018: MANOMETRY, ESOPHAGUS, WITH IMPEDANCE MEASUREMENT; N/A      Comment:  Performed by Renato Maria MD at [facility] ENDO (4TH FLR)  11/4/2015: PARATHYROIDECTOMY minimally invasive; N/A      Comment:  Performed by Amna Aponte MD at [facility] OR 2ND FLR  1/29/2019: REPAIR, HERNIA, HIATAL, LAPAROSCOPIC with Mesh; N/A      Comment:  Performed by Renato Perez Jr., MD at [facility] OR 2ND                FLR  No date: TONSILLECTOMY    Social History    Socioeconomic History      Marital status:       Spouse name: Not on file      Number of children: Not on file      Years of education: Not on file      Highest education level: Not on file    Occupational History      Occupation:  supervisor    Social Needs      Financial resource strain: Not on file      Food insecurity:        Worry: Not on file        Inability: Not on file      Transportation needs:        Medical: Not on file        Non-medical: Not on file    Tobacco Use      Smoking status: Never  Smoker      Smokeless tobacco: Never Used    Substance and Sexual Activity      Alcohol use: No        Comment: quit 2014 - alcohol abuse      Drug use: Yes      Sexual activity: Yes        Partners: Male    Lifestyle      Physical activity:        Days per week: Not on file        Minutes per session: Not on file      Stress: Not on file    Relationships      Social connections:        Talks on phone: Not on file        Gets together: Not on file        Attends Judaism service: Not on file        Active member of club or organization: Not on file        Attends meetings of clubs or organizations: Not on file        Relationship status: Not on file    Other Topics      Concerns:        Patient feels they ought to cut down on drinking/drug use: Not Asked        Patient annoyed by others criticizing their drinking/drug use: Not Asked        Patient has felt bad or guilty about drinking/drug use: Not Asked        Patient has had a drink/used drugs as an eye opener in the AM: Not Asked        Are you pregnant or think you may be?: Not Asked        Breast-feeding: Not Asked    Social History Narrative      Not on file      Review of patient's family history indicates:  Problem: Suicide      Relation: Mother          Age of Onset: (Not Specified)  Problem: Depression      Relation: Mother          Age of Onset: (Not Specified)  Problem: Alcohol abuse      Relation: Father          Age of Onset: (Not Specified)  Problem: Headaches      Relation: Father          Age of Onset: (Not Specified)  Problem: Diabetes      Relation: Sister          Age of Onset: (Not Specified)          Comment: prediabetes  Problem: Psoriasis      Relation: Sister          Age of Onset: (Not Specified)  Problem: Depression      Relation: Sister          Age of Onset: (Not Specified)  Problem: Depression      Relation: Daughter          Age of Onset: (Not Specified)  Problem: Colon cancer      Relation: Neg Hx          Age of Onset: (Not  Specified)  Problem: Esophageal cancer      Relation: Neg Hx          Age of Onset: (Not Specified)        Review of Systems   Constitution: Negative.   HENT: Negative.    Eyes: Negative.    Cardiovascular: Negative for chest pain, dyspnea on exertion, leg swelling, near-syncope, palpitations and syncope.   Respiratory: Negative.  Negative for shortness of breath.    Endocrine: Negative.    Hematologic/Lymphatic: Negative.    Skin: Negative.    Musculoskeletal: Negative.    Gastrointestinal: Negative.    Genitourinary: Negative.    Neurological: Negative.  Negative for dizziness and light-headedness.   Psychiatric/Behavioral: Negative.    Allergic/Immunologic: Negative.         Objective:    Physical Exam   Constitutional: She is oriented to person, place, and time. She appears well-developed and well-nourished. No distress.   HENT:   Head: Normocephalic and atraumatic.   Eyes: Pupils are equal, round, and reactive to light. EOM are normal.   Neck: Normal range of motion. No JVD present. No thyromegaly present.   Cardiovascular: Normal rate, regular rhythm, S1 normal, S2 normal and normal heart sounds. PMI is not displaced. Exam reveals no gallop and no friction rub.   No murmur heard.  Pulmonary/Chest: Effort normal and breath sounds normal. No respiratory distress. She has no wheezes. She has no rales.   L upper chest wound, well healed with underlying generator   Abdominal: Soft. Bowel sounds are normal. She exhibits no distension. There is no tenderness. There is no rebound and no guarding.   Musculoskeletal: Normal range of motion. She exhibits no edema or tenderness.   Neurological: She is alert and oriented to person, place, and time. No cranial nerve deficit.   Skin: Skin is warm and dry. No rash noted. No erythema.   Psychiatric: She has a normal mood and affect. Her behavior is normal. Judgment and thought content normal.   Vitals reviewed.    ECG: NSR nl MD, CRT paced QRS; PVCs        Assessment:       1.  AV block, Mobitz II    2. Cardiomyopathy, unspecified type    3. Presence of cardiac resynchronization therapy pacemaker (CRT-P)         Plan:       73 yoF high grade AVB, mild CM s/p CRT-P here for follow up. Normal CRT-P function with the exception of suboptimal pacing- AVD adjusted. I discussed routine device follow up including quarterly to bi-annual device checks for device function as well as yearly follow up in the EP clinic. The patient  was advised to call with any concerns regarding their device. Device clinic follow up as scheduled. RTC 1y

## 2019-05-01 ENCOUNTER — PATIENT MESSAGE (OUTPATIENT)
Dept: SURGERY | Facility: CLINIC | Age: 74
End: 2019-05-01

## 2019-05-02 ENCOUNTER — CLINICAL SUPPORT (OUTPATIENT)
Dept: CARDIOLOGY | Facility: CLINIC | Age: 74
End: 2019-05-02
Attending: STUDENT IN AN ORGANIZED HEALTH CARE EDUCATION/TRAINING PROGRAM
Payer: MEDICARE

## 2019-05-02 ENCOUNTER — HOSPITAL ENCOUNTER (OUTPATIENT)
Dept: CARDIOLOGY | Facility: CLINIC | Age: 74
Discharge: HOME | End: 2019-05-02
Attending: STUDENT IN AN ORGANIZED HEALTH CARE EDUCATION/TRAINING PROGRAM
Payer: MEDICARE

## 2019-05-02 VITALS
DIASTOLIC BLOOD PRESSURE: 82 MMHG | BODY MASS INDEX: 30.58 KG/M2 | HEART RATE: 70 BPM | SYSTOLIC BLOOD PRESSURE: 126 MMHG | HEIGHT: 61 IN | WEIGHT: 162 LBS

## 2019-05-02 DIAGNOSIS — I42.9 CARDIOMYOPATHY, UNSPECIFIED TYPE: ICD-10-CM

## 2019-05-02 DIAGNOSIS — I45.2: ICD-10-CM

## 2019-05-02 LAB
ASCENDING AORTA: 3.31 CM
AV INDEX (PROSTH): 0.65
AV MEAN GRADIENT: 4.25 MMHG
AV PEAK GRADIENT: 6.86 MMHG
AV VALVE AREA: 2.14 CM2
AV VELOCITY RATIO: 0.67
BSA FOR ECHO PROCEDURE: 1.78 M2
CV ECHO LV RWT: 0.37 CM
DOP CALC AO PEAK VEL: 1.31 M/S
DOP CALC AO VTI: 27 CM
DOP CALC LVOT AREA: 3.3 CM2
DOP CALC LVOT DIAMETER: 2.05 CM
DOP CALC LVOT PEAK VEL: 0.88 M/S
DOP CALC LVOT STROKE VOLUME: 57.83 CM3
DOP CALCLVOT PEAK VEL VTI: 17.53 CM
E WAVE DECELERATION TIME: 241.51 MSEC
E/A RATIO: 0.53
E/E' RATIO: 9.64
ECHO LV POSTERIOR WALL: 0.83 CM (ref 0.6–1.1)
FRACTIONAL SHORTENING: 27 % (ref 28–44)
INTERVENTRICULAR SEPTUM: 0.81 CM (ref 0.6–1.1)
IVRT: 0.13 MSEC
LA MAJOR: 5.22 CM
LA MINOR: 5.11 CM
LA WIDTH: 4.42 CM
LEFT ATRIUM SIZE: 4.23 CM
LEFT ATRIUM VOLUME INDEX: 47.5 ML/M2
LEFT ATRIUM VOLUME: 82.07 CM3
LEFT INTERNAL DIMENSION IN SYSTOLE: 3.28 CM (ref 2.1–4)
LEFT VENTRICLE DIASTOLIC VOLUME INDEX: 52.85 ML/M2
LEFT VENTRICLE DIASTOLIC VOLUME: 91.28 ML
LEFT VENTRICLE MASS INDEX: 67.4 G/M2
LEFT VENTRICLE SYSTOLIC VOLUME INDEX: 25.1 ML/M2
LEFT VENTRICLE SYSTOLIC VOLUME: 43.34 ML
LEFT VENTRICULAR INTERNAL DIMENSION IN DIASTOLE: 4.48 CM (ref 3.5–6)
LEFT VENTRICULAR MASS: 116.5 G
LV LATERAL E/E' RATIO: 8.83
LV SEPTAL E/E' RATIO: 10.6
MV PEAK A VEL: 1 M/S
MV PEAK E VEL: 0.53 M/S
PISA TR MAX VEL: 2.35 M/S
PULM VEIN S/D RATIO: 1.17
PV PEAK D VEL: 0.48 M/S
PV PEAK S VEL: 0.56 M/S
RA MAJOR: 4.23 CM
RA PRESSURE: 3 MMHG
RA WIDTH: 3.74 CM
RIGHT VENTRICULAR END-DIASTOLIC DIMENSION: 3.75 CM
RV TISSUE DOPPLER FREE WALL SYSTOLIC VELOCITY 1 (APICAL 4 CHAMBER VIEW): 10.3 M/S
SINUS: 3.15 CM
STJ: 2.71 CM
TDI LATERAL: 0.06
TDI SEPTAL: 0.05
TDI: 0.06
TR MAX PG: 22.09 MMHG
TRICUSPID ANNULAR PLANE SYSTOLIC EXCURSION: 1.99 CM
TV REST PULMONARY ARTERY PRESSURE: 25 MMHG

## 2019-05-02 PROCEDURE — 93306 TTE W/DOPPLER COMPLETE: CPT | Mod: HCNC,S$GLB,, | Performed by: INTERNAL MEDICINE

## 2019-05-02 PROCEDURE — 93306 TRANSTHORACIC ECHO (TTE) COMPLETE (CUPID ONLY): ICD-10-PCS | Mod: HCNC,S$GLB,, | Performed by: INTERNAL MEDICINE

## 2019-05-06 ENCOUNTER — OFFICE VISIT (OUTPATIENT)
Dept: CARDIOLOGY | Facility: CLINIC | Age: 74
End: 2019-05-06
Payer: MEDICARE

## 2019-05-06 VITALS
DIASTOLIC BLOOD PRESSURE: 57 MMHG | HEIGHT: 61 IN | WEIGHT: 163.13 LBS | SYSTOLIC BLOOD PRESSURE: 107 MMHG | BODY MASS INDEX: 30.8 KG/M2 | HEART RATE: 70 BPM

## 2019-05-06 DIAGNOSIS — I44.1 AV BLOCK, MOBITZ II: ICD-10-CM

## 2019-05-06 DIAGNOSIS — I42.9 CARDIOMYOPATHY, UNSPECIFIED TYPE: Primary | ICD-10-CM

## 2019-05-06 DIAGNOSIS — E78.2 MIXED HYPERLIPIDEMIA: ICD-10-CM

## 2019-05-06 DIAGNOSIS — I45.2: ICD-10-CM

## 2019-05-06 PROCEDURE — 3074F SYST BP LT 130 MM HG: CPT | Mod: HCNC,CPTII,GC,S$GLB | Performed by: STUDENT IN AN ORGANIZED HEALTH CARE EDUCATION/TRAINING PROGRAM

## 2019-05-06 PROCEDURE — 3078F DIAST BP <80 MM HG: CPT | Mod: HCNC,CPTII,GC,S$GLB | Performed by: STUDENT IN AN ORGANIZED HEALTH CARE EDUCATION/TRAINING PROGRAM

## 2019-05-06 PROCEDURE — 1101F PT FALLS ASSESS-DOCD LE1/YR: CPT | Mod: HCNC,CPTII,GC,S$GLB | Performed by: STUDENT IN AN ORGANIZED HEALTH CARE EDUCATION/TRAINING PROGRAM

## 2019-05-06 PROCEDURE — 99999 PR PBB SHADOW E&M-EST. PATIENT-LVL III: ICD-10-PCS | Mod: PBBFAC,HCNC,GC, | Performed by: STUDENT IN AN ORGANIZED HEALTH CARE EDUCATION/TRAINING PROGRAM

## 2019-05-06 PROCEDURE — 99213 OFFICE O/P EST LOW 20 MIN: CPT | Mod: HCNC,GC,S$GLB, | Performed by: STUDENT IN AN ORGANIZED HEALTH CARE EDUCATION/TRAINING PROGRAM

## 2019-05-06 PROCEDURE — 3074F PR MOST RECENT SYSTOLIC BLOOD PRESSURE < 130 MM HG: ICD-10-PCS | Mod: HCNC,CPTII,GC,S$GLB | Performed by: STUDENT IN AN ORGANIZED HEALTH CARE EDUCATION/TRAINING PROGRAM

## 2019-05-06 PROCEDURE — 99999 PR PBB SHADOW E&M-EST. PATIENT-LVL III: CPT | Mod: PBBFAC,HCNC,GC, | Performed by: STUDENT IN AN ORGANIZED HEALTH CARE EDUCATION/TRAINING PROGRAM

## 2019-05-06 PROCEDURE — 99213 PR OFFICE/OUTPT VISIT, EST, LEVL III, 20-29 MIN: ICD-10-PCS | Mod: HCNC,GC,S$GLB, | Performed by: STUDENT IN AN ORGANIZED HEALTH CARE EDUCATION/TRAINING PROGRAM

## 2019-05-06 PROCEDURE — 3078F PR MOST RECENT DIASTOLIC BLOOD PRESSURE < 80 MM HG: ICD-10-PCS | Mod: HCNC,CPTII,GC,S$GLB | Performed by: STUDENT IN AN ORGANIZED HEALTH CARE EDUCATION/TRAINING PROGRAM

## 2019-05-06 PROCEDURE — 1101F PR PT FALLS ASSESS DOC 0-1 FALLS W/OUT INJ PAST YR: ICD-10-PCS | Mod: HCNC,CPTII,GC,S$GLB | Performed by: STUDENT IN AN ORGANIZED HEALTH CARE EDUCATION/TRAINING PROGRAM

## 2019-05-06 NOTE — PROGRESS NOTES
"  Physical Therapy Daily Treatment Note     Name: Paris Burris  Clinic Number: [id number]    Therapy Diagnosis:   Encounter Diagnoses   Name Primary?    Impaired functional mobility, balance, gait, and endurance     General weakness      Physician: Mandi Huynh MD    Visit Date: 5/7/2019    Physician Orders: PT Eval and Treat   Medical Diagnosis from Referral:   R26.81 (ICD-10-CM) - Unsteady gait   R53.81 (ICD-10-CM) - Debility      Evaluation Date: 4/2/2019  Authorization Period Expiration: 03/14/2020  Plan of Care Expiration: 07/02/2019  Visit # / Visits authorized: 6/20 + 1/1      Time In: 9:00 AM  Time Out: 10:00 AM  Total Billable Time: 30 minutes     Precautions: Standard, DDD of LSP, DM type 2, Hx vertebral Fx, recent hernia surgery, Recent Pacemaker placement    Subjective     Pt reports: I have started walking the track at [facility] to help with my endurance.  She was compliant with home exercise program.  Response to previous treatment: moderate soreness  Functional change: none    Pain: 1- 2/10  Location: bilateral LE      Objective     Mrs. Burris received therapeutic exercises to develop strength, endurance, ROM, flexibility, posture and core stabilization for 60 minutes including:    +Shuttle DL 5 min x 2 black/1 red cord    SLR: 2 x 10 x 2#  supine marches 20 x 2#  Bridge w/ band 2 x 10 x BTB  BKFO 2 x 10 x BTB  HL hip abd 2 x 10 x BTB    LAQ 2 x 10 x 3#  HS curls 2 x 10 x BTB    Sit to stands 2 x 10 - defer today  SLS on airex 3 x 30"  SL cone taps 2 x 30" straight, 2 x 30" diagonals  Tandem on airex 3 x 30"  Airex feet together eyes closed 2 x 30"  standing hip abd: 2 x 10   Step-ups 6" 2 x 10 B  Lateral step-ups 6" 2 x 10 B  Tandem walk 2 x 20 ft    Progress balance exercises next visit    Recumbent bike: 8 minutes (collected history, assessed pt complaints, education on causes of debility; importance of exercise)      Home Exercises Provided and Patient Education Provided     Education " provided:   - none today    Written Home Exercises Provided: Patient instructed to cont prior HEP.  Exercises were reviewed and Mrs. Burris was able to demonstrate them prior to the end of the session.  Mrs. Burris demonstrated good  understanding of the education provided.     See EMR under Patient Instructions for exercises provided prior visit.    Assessment     Increased resistance today with several exercises, indicating improving strength globally. Leg press added to promote LE strength and endurance. Good training effect noted.  Mrs. Burris is progressing well towards her goals.   Pt prognosis is Good.     Pt will continue to benefit from skilled outpatient physical therapy to address the deficits listed in the problem list box on initial evaluation, provide pt/family education and to maximize pt's level of independence in the home and community environment.     Pt's spiritual, cultural and educational needs considered and pt agreeable to plan of care and goals.     Anticipated barriers to physical therapy: chronicity of symptoms, recent surgeries      Goals:   Short Term Goals (5 Weeks):  1. Pt able to improve static balance with BRG balance >=40/56 to allow for improved tolerance with ADLs.  2. Pt able to improve dynamic balance with DGI >=19/24 to allow for improved stability with walking.  3. Pt able to walk >=10 min with Mod difficulty.  4. Pt to improve strength by a half grade to allow for increased ease with getting in/out of chair.  5. Pt to demonstrate improved functional ability with FOTO limitation <=34% disability.     Long Term Goals (10 Weeks):  1. Pt able to improve static balance with BRG balance >=45/56 to allow for improved tolerance with ADLs.  2. Pt able to improve dynamic balance with DGI >=22/24 to allow for improved stability with walking.  3. Pt able to walk >=10 min with Min-No difficulty.  4. Pt to improve strength to 5/5 to allow for increased ease with getting in/out of chair.  5. Pt  to demonstrate improved functional ability with FOTO limitation <=24% disability.  6. Pt will be independent with HEP and self management of symptoms.       Plan     Continue with POC for strength and conditioning.    Patsy Elaine, PT

## 2019-05-06 NOTE — PROGRESS NOTES
I have personally taken the history and examined this patient and agree with the Fellow's note as stated above.    Doing a great job of taking care of herself.

## 2019-05-06 NOTE — PROGRESS NOTES
Subjective:    Patient ID:  Paris Burris is a 73 y.o. female who presents for follow-up of Cardiomyopathy      HPI   73 year old female with HLD, pre-DM, COOKIE, Left Breast CA s/p lumpectomy 2002 who presented to the hospital on 1/29/18 for hiatal hernia repair/Toupe fundoplication. Her operative course was complicated by SVT responsive to esmolol and metoprolol IV during the case. After the case a new LBBB was noted however patient had no symptoms of chest pain or dyspnea. Troponin was negative. Her first post-operative EKG appears to be a junctional rhythm with LBBB and heart rate of 58. Her second post-op EKG shows NSR with a 2nd degree Mobitz II block as well as LBBB. Review of her telemetry shows the Mobitz II with P waves fusing with T, as well as a sinus rhythm. The patient underwent BIV pacemaker on 1/30/19 by EP for Mobitz Ty II and alternating left and right bundle branch block indicating severe infrahisian disease. Her TTE in the hospital revealed an EF of 38% with global hypokinesis. She was started on candesartan 4mg daily and metoprolol succinate 25mg daily. We increased her dosing to candesartan 8 mg QD and metoprolol succinate 50 mg QD. She did not tolerate candesartan 8 mg QD and was dropped back to 4 mg QD due to lightheadedness. Today she feels wells and denies any complaints. She had a repeat TTE (shown below). She did no undergo a CPET stress yet as she had ingested caffeine and ate prior to the test. She is rescheduled for 5/16/19.    TTE (5/2/19):    · Low normal left ventricular systolic function. The estimated ejection fraction is 50% (improved since 1/30/2019).  · Normal right ventricular systolic function.  · Grade I (mild) left ventricular diastolic dysfunction consistent with impaired relaxation.  · Moderate left atrial enlargement.  · Mild mitral regurgitation.  · The estimated PA systolic pressure is 25 mm Hg  · Normal central venous pressure (3 mm Hg).          Lab Results    Component Value Date     03/21/2019    K 4.2 03/21/2019     03/21/2019    CO2 26 03/21/2019    BUN 19 03/21/2019    CREATININE 0.9 03/21/2019     03/21/2019    HGBA1C 5.6 11/14/2018    MG 1.9 01/31/2019    AST 18 03/21/2019    ALT 17 03/21/2019    ALBUMIN 4.4 03/21/2019    PROT 7.8 03/21/2019    BILITOT 0.3 03/21/2019    WBC 6.99 03/21/2019    HGB 14.5 03/21/2019    HCT 43.1 03/21/2019    MCV 94 03/21/2019     03/21/2019    INR 0.9 04/24/2015    TSH 0.797 02/20/2019         Lab Results   Component Value Date    CHOL 168 01/10/2019    HDL 34 (L) 01/10/2019    TRIG 229 (H) 01/10/2019       Lab Results   Component Value Date    LDLCALC 88.2 01/10/2019       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    Wrist fracture        Current Outpatient Medications:     alpha lipoic acid 300 mg Cap, Take 300 mg by mouth 2 (two) times daily. , Disp: , Rfl:     ascorbic acid (VITAMIN C) 500 MG tablet, Take 1,000 mg by mouth once daily. , Disp: , Rfl:     b complex vitamins capsule, Take 1 capsule by mouth once daily., Disp: , Rfl:     blood sugar diagnostic (ACCU-CHEK SMARTVIEW TEST STRIP) Strp, Test once daily., Disp: 100 strip, Rfl: 3    blood-glucose meter Misc, 1 Device by Misc.(Non-Drug; Combo Route) route 2 (two) times daily., Disp: 1 each, Rfl: 0    buPROPion (WELLBUTRIN SR) 100 MG TBSR 12 hr tablet, TAKE 1 TABLET BY  MOUTH TWICE A DAY, Disp: 60 tablet, Rfl: 2    candesartan (ATACAND) 4 MG tablet, Take 1 tablet (4 mg total) by mouth once daily., Disp: 90 tablet, Rfl: 3    cephALEXin (KEFLEX) 250 MG capsule, Take 2 capsules (500 mg total) by mouth every 8 (eight) hours., Disp: 12 capsule, Rfl: 0    cetirizine (ZYRTEC) 10 MG tablet, Take 1 tablet (10 mg total) by mouth once daily., Disp: 30 tablet, Rfl: 1    CITICOLINE SODIUM (CITICOLINE ORAL), Take 1 tablet by mouth once daily at 6am., Disp: , Rfl:     lancets Misc, 1 lancet by Misc.(Non-Drug; Combo Route) route 2 (two) times daily., Disp: 100 each, Rfl: 6    LORazepam (ATIVAN) 0.5 MG tablet, Take 1 tablet (0.5 mg total) by mouth every 12 (twelve) hours as needed for Anxiety., Disp: 60 tablet, Rfl: 1    metoprolol succinate (TOPROL-XL) 50 MG 24 hr tablet, Take 1 tablet (50 mg total) by mouth once daily., Disp: 30 tablet, Rfl: 11    omega-3 fatty acids-fish oil (FISH OIL) 340-1,000 mg Cap, Take 1 capsule by mouth once daily., Disp: , Rfl:     pantoprazole (PROTONIX) 40 MG tablet, Take 1 tablet (40 mg total) by mouth once daily., Disp: 30 tablet, Rfl: 11    rosuvastatin (CRESTOR) 20 MG tablet, Take 1 tablet (20 mg total) by mouth once daily., Disp: 90 tablet, Rfl: 3    silver sulfADIAZINE 1% (SILVADENE) 1 % cream, Apply topically once daily., Disp: 50 g, Rfl: 11    traMADol (ULTRAM) 50 mg tablet, Take 2 tablets (100 mg total) by mouth every 12 (twelve) hours as needed for Pain., Disp: 120 tablet, Rfl: 5    traZODone (DESYREL) 100 MG tablet, Take 1 tablet (100 mg total) by mouth every evening., Disp: 30 tablet, Rfl: 11    tretinoin (RETIN-A) 0.05 % cream, Use hs, Disp: 45 g, Rfl: 6    TURMERIC (CURCUMIN MISC), Take 500 mg by mouth 2 (two) times daily. , Disp: , Rfl:     venlafaxine (EFFEXOR) 37.5 MG Tab, Take 2 tablets (75 mg total) by mouth once daily., Disp: 30 tablet, Rfl: 5    vitamin D 1000 units Tab, Take 500 Units by mouth once daily. With K2 50 mch, Disp: ,  "Rfl:           ROS    Constitution: Negative for fever, chills, weight loss or gain.   HENT: Negative for sore throat, rhinorrhea, or headache.  Eyes: Negative for blurred or double vision.   Cardiovascular: See above  Pulmonary: Negative for SOB   Gastrointestinal: Negative for abdominal pain, nausea, vomiting, or diarrhea.   : Negative for dysuria.   Neurological: Negative for focal weakness or sensory changes.    Objective:BP (!) 107/57 (BP Location: Left arm, Patient Position: Sitting, BP Method: Medium (Automatic))   Pulse 70   Ht 5' 1" (1.549 m)   Wt 74 kg (163 lb 2.3 oz)   BMI 30.83 kg/m²             Physical Exam    Constitutional: NAD, conversant  HEENT: Sclera anicteric, PERRLA, EOMI  Neck: No JVD, no carotid bruits  CV: RRR, no murmur, normal S1/S2, No Pericardial rub  Pulm: CTAB with no wheezes, rales, or rhonchi  GI: Abdomen soft, NTND, +BS  Extremities: No LE edema, warm and well perfused, No cyanosis, No clubbing  Skin: No ecchymosis, erythema, or ulcers  Psych: AOx3, appropriate affect  Neuro: CNII-XII intact, no focal deficits    Assessment:       Cardiomyopathy  - c/w candesartan 4 mg QD and toprol XL to 50 mg QD  - did not tolerate candesartan to 8 mg (developed significant lightheadedness)  - CPET stress pending  - currently compensated  - lasix PRN    AV block, Mobitz II  - s/p CRT-P  - followed by EP Dr. Livingston    Right bundle branch block (RBBB) with left bundle branch block (LBBB)  - intermittent RBBB with LBBB with Mobitz Type II HB s/p CRT-P  - F/u in EP clinic  - wound evaluated and looks C/D/I w/o any tenderness    Hyperlipidemia  - c/w crestor 20 mg QD  - LDL 88, HDL 34 (1/10/19)    RTC in 6 months.    Jack Rosenbaum MD  Cardiology Fellow  Pager: [phone]  5/6/2019 11:14 AM             "

## 2019-05-06 NOTE — ASSESSMENT & PLAN NOTE
- c/w candesartan 4 mg QD and toprol XL to 50 mg QD  - did not tolerate candesartan to 8 mg (developed significant lightheadedness)  - CPET stress pending  - currently compensated  - lasix PRN

## 2019-05-07 ENCOUNTER — CLINICAL SUPPORT (OUTPATIENT)
Dept: REHABILITATION | Facility: OTHER | Age: 74
End: 2019-05-07
Payer: MEDICARE

## 2019-05-07 DIAGNOSIS — R53.1 GENERAL WEAKNESS: ICD-10-CM

## 2019-05-07 DIAGNOSIS — Z74.09 IMPAIRED FUNCTIONAL MOBILITY, BALANCE, GAIT, AND ENDURANCE: ICD-10-CM

## 2019-05-07 PROCEDURE — 97110 THERAPEUTIC EXERCISES: CPT | Mod: HCNC,PN

## 2019-05-09 ENCOUNTER — CLINICAL SUPPORT (OUTPATIENT)
Dept: REHABILITATION | Facility: OTHER | Age: 74
End: 2019-05-09
Payer: MEDICARE

## 2019-05-09 DIAGNOSIS — R53.1 GENERAL WEAKNESS: ICD-10-CM

## 2019-05-09 DIAGNOSIS — Z74.09 IMPAIRED FUNCTIONAL MOBILITY, BALANCE, GAIT, AND ENDURANCE: ICD-10-CM

## 2019-05-09 PROCEDURE — 97110 THERAPEUTIC EXERCISES: CPT | Mod: HCNC,PN

## 2019-05-09 NOTE — PROGRESS NOTES
"  Physical Therapy Daily Treatment Note     Name: Paris Burris  Clinic Number: [id number]    Therapy Diagnosis:   Encounter Diagnoses   Name Primary?    Impaired functional mobility, balance, gait, and endurance     General weakness      Physician: Mandi Huynh MD    Visit Date: 5/9/2019    Physician Orders: PT Eval and Treat   Medical Diagnosis from Referral:   R26.81 (ICD-10-CM) - Unsteady gait   R53.81 (ICD-10-CM) - Debility      Evaluation Date: 4/2/2019  Authorization Period Expiration: 03/14/2020  Plan of Care Expiration: 07/02/2019  Visit # / Visits authorized: 6/20 + 1/1      Time In: 9:00 AM  Time Out: 10:00 AM  Total Billable Time: 25 minutes     Precautions: Standard, DDD of LSP, DM type 2, Hx vertebral Fx, recent hernia surgery, Recent Pacemaker placement    Subjective     Pt reports: increased soreness in low back after leg press.   She was compliant with home exercise program.  Response to previous treatment: moderate soreness  Functional change: none    Pain: 1- 2/10  Location: bilateral LE      Objective     Mrs. Burris received therapeutic exercises to develop strength, endurance, ROM, flexibility, posture and core stabilization for 60 minutes including:    Shuttle DL 30 x 2 black/1 red cord    SLR: 1 x 15 x 3#  supine marches 20 x 3#  Bridge w/ band 2 x 10 x BTB  BKFO 2 x 10 x BTB  HL hip abd 2 x 10 x BTB    LAQ 2 x 10 x 3#  HS curls 2 x 10 x BTB    Sit to stands 2 x 10 - defer today  SLS on airex 3 x 30"  SL cone taps 2 x 30" straight, 2 x 30" diagonals  Tandem on airex 3 x 30"  Airex feet together eyes closed 2 x 30"  standing hip abd: 2 x 10   Step-ups 6" 2 x 10 B  Lateral step-ups 6" 2 x 10 B  Tandem walk 2 x 40 ft  +Side stepping: 2 x 40 ft  +caraoca: 2 x 40 ft - PT SBA    Progress balance exercises next visit    Recumbent bike: 8 minutes (collected history, assessed pt complaints, education on causes of debility; importance of exercise)      Home Exercises Provided and Patient Education " Provided     Education provided:   - none today    Written Home Exercises Provided: Patient instructed to cont prior HEP.  Exercises were reviewed and Mrs. Burris was able to demonstrate them prior to the end of the session.  Mrs. Burris demonstrated good  understanding of the education provided.     See EMR under Patient Instructions for exercises provided prior visit.    Assessment     Added new dynamic balance exercises today. Able to perform with PT SBA with min visual and verbal cuing. Progress balance next visit  Mrs. Burris is progressing well towards her goals.   Pt prognosis is Good.     Pt will continue to benefit from skilled outpatient physical therapy to address the deficits listed in the problem list box on initial evaluation, provide pt/family education and to maximize pt's level of independence in the home and community environment.     Pt's spiritual, cultural and educational needs considered and pt agreeable to plan of care and goals.     Anticipated barriers to physical therapy: chronicity of symptoms, recent surgeries      Goals:   Short Term Goals (5 Weeks):  1. Pt able to improve static balance with BRG balance >=40/56 to allow for improved tolerance with ADLs.  2. Pt able to improve dynamic balance with DGI >=19/24 to allow for improved stability with walking.  3. Pt able to walk >=10 min with Mod difficulty.  4. Pt to improve strength by a half grade to allow for increased ease with getting in/out of chair.  5. Pt to demonstrate improved functional ability with FOTO limitation <=34% disability.     Long Term Goals (10 Weeks):  1. Pt able to improve static balance with BRG balance >=45/56 to allow for improved tolerance with ADLs.  2. Pt able to improve dynamic balance with DGI >=22/24 to allow for improved stability with walking.  3. Pt able to walk >=10 min with Min-No difficulty.  4. Pt to improve strength to 5/5 to allow for increased ease with getting in/out of chair.  5. Pt to demonstrate  improved functional ability with FOTO limitation <=24% disability.  6. Pt will be independent with HEP and self management of symptoms.       Plan     Continue with POC for strength and conditioning.    Patsy Elaine, PT

## 2019-05-13 NOTE — PROGRESS NOTES
"  Physical Therapy Daily Treatment Note     Name: Paris Burris  Clinic Number: [id number]    Therapy Diagnosis:   Encounter Diagnoses   Name Primary?    Impaired functional mobility, balance, gait, and endurance     General weakness      Physician: Mandi Huynh MD    Visit Date: 5/14/2019    Physician Orders: PT Eval and Treat   Medical Diagnosis from Referral:   R26.81 (ICD-10-CM) - Unsteady gait   R53.81 (ICD-10-CM) - Debility      Evaluation Date: 4/2/2019  Authorization Period Expiration: 03/14/2020  Plan of Care Expiration: 07/02/2019  Visit # / Visits authorized:9/21      Time In: 9:00 AM  Time Out: 10:00 AM  Total Billable Time: 60 minutes     Precautions: Standard, DDD of LSP, DM type 2, Hx vertebral Fx, recent hernia surgery, Recent Pacemaker placement    Subjective     Pt reports: no new complaints since the previous visit   She was compliant with home exercise program.  Response to previous treatment: moderate soreness  Functional change: none    Pain: 1- 2/10  Location: bilateral LE      Objective     Assess CUELLAR balance and DGI today    Mrs. Burris received therapeutic exercises to develop strength, endurance, ROM, flexibility, posture and core stabilization for 60 minutes including:    Shuttle DL 30 x 2 black/1 red cord    SLR: 1 x 15 x 3# - D/C to HEP  supine marches 20 x 3# - D/C to HEP  Bridge w/ band 2 x 10 x BTB - D/C to HEP  BKFO 2 x 10 x BTB - D/C to HEP  HL hip abd 2 x 10 x BTB - D/C to HEP    LAQ 2 x 10 x 3# - D/C to HEP  HS curls 2 x 10 x BTB - D/C to HEP    SLS on airex 3 x 30" - green airex  SL cone taps 2 x 30" straight, 2 x 30" diagonals  Tandem on airex 3 x 30"  +tandem on airex w/ arm swings on airex (blue) 2 x 10  Airex feet together eyes closed 2 x 30"  standing hip abd: 2 x 10 x OTB at knees  Step-ups 6" 2 x 10 B  +heel raises 3 x 10    +rocker board M/L dynamic/static: 2 x 30" ea  +rocker board A/P dynamic/static: 2 x 30" ea    Lateral step-ups 6" 2 x 10 B  Tandem walk 2 x 40 " ft  Side stepping: 2 x 40 ft  caraoca: 2 x 40 ft - PT SBA    Progress balance exercises next visit    Recumbent bike: 8 minutes (collected history, assessed pt complaints, education on causes of debility; importance of exercise)      Home Exercises Provided and Patient Education Provided     Education provided:   -5/14/19 - updated HEP - bridge, BKFO, HL hip abd, SLR, SLS, tandem stance - see EMR  Written Home Exercises Provided: Patient instructed to cont prior HEP.  Exercises were reviewed and Mrs. Burris was able to demonstrate them prior to the end of the session.  Mrs. Burris demonstrated good  understanding of the education provided.     See EMR under Patient Instructions for exercises provided prior visit.    Assessment     Added new dynamic balance exercises today. Good tolerance with tandem stance on noncompliant surface with min sway; frequent LOB with touch down assist with SLS on noncompliant surface. Pt with increased difficulty maintaining static stance on rocker board.  Mrs. Burris is progressing well towards her goals.   Pt prognosis is Good.     Pt will continue to benefit from skilled outpatient physical therapy to address the deficits listed in the problem list box on initial evaluation, provide pt/family education and to maximize pt's level of independence in the home and community environment.     Pt's spiritual, cultural and educational needs considered and pt agreeable to plan of care and goals.     Anticipated barriers to physical therapy: chronicity of symptoms, recent surgeries      Goals:   Short Term Goals (5 Weeks):  1. Pt able to improve static balance with BRG balance >=40/56 to allow for improved tolerance with ADLs.  2. Pt able to improve dynamic balance with DGI >=19/24 to allow for improved stability with walking.  3. Pt able to walk >=10 min with Mod difficulty.  4. Pt to improve strength by a half grade to allow for increased ease with getting in/out of chair.  5. Pt to demonstrate  improved functional ability with FOTO limitation <=34% disability.     Long Term Goals (10 Weeks):  1. Pt able to improve static balance with BRG balance >=45/56 to allow for improved tolerance with ADLs.  2. Pt able to improve dynamic balance with DGI >=22/24 to allow for improved stability with walking.  3. Pt able to walk >=10 min with Min-No difficulty.  4. Pt to improve strength to 5/5 to allow for increased ease with getting in/out of chair.  5. Pt to demonstrate improved functional ability with FOTO limitation <=24% disability.  6. Pt will be independent with HEP and self management of symptoms.       Plan     Continue with POC for strength and conditioning.    Patsy Elaine, PT

## 2019-05-14 ENCOUNTER — CLINICAL SUPPORT (OUTPATIENT)
Dept: REHABILITATION | Facility: OTHER | Age: 74
End: 2019-05-14
Payer: MEDICARE

## 2019-05-14 DIAGNOSIS — Z74.09 IMPAIRED FUNCTIONAL MOBILITY, BALANCE, GAIT, AND ENDURANCE: ICD-10-CM

## 2019-05-14 DIAGNOSIS — R53.1 GENERAL WEAKNESS: ICD-10-CM

## 2019-05-14 PROCEDURE — 97110 THERAPEUTIC EXERCISES: CPT | Mod: HCNC,PN

## 2019-05-15 ENCOUNTER — TELEPHONE (OUTPATIENT)
Dept: CARDIOLOGY | Facility: CLINIC | Age: 74
End: 2019-05-15

## 2019-05-16 ENCOUNTER — CLINICAL SUPPORT (OUTPATIENT)
Dept: CARDIOLOGY | Facility: CLINIC | Age: 74
End: 2019-05-16
Attending: STUDENT IN AN ORGANIZED HEALTH CARE EDUCATION/TRAINING PROGRAM
Payer: MEDICARE

## 2019-05-16 ENCOUNTER — CLINICAL SUPPORT (OUTPATIENT)
Dept: REHABILITATION | Facility: OTHER | Age: 74
End: 2019-05-16
Payer: MEDICARE

## 2019-05-16 VITALS — HEIGHT: 61 IN | BODY MASS INDEX: 30.78 KG/M2 | WEIGHT: 163 LBS

## 2019-05-16 DIAGNOSIS — Z74.09 IMPAIRED FUNCTIONAL MOBILITY, BALANCE, GAIT, AND ENDURANCE: ICD-10-CM

## 2019-05-16 DIAGNOSIS — R53.1 GENERAL WEAKNESS: ICD-10-CM

## 2019-05-16 LAB
CV STRESS BASE HR: 65 BPM
DIASTOLIC BLOOD PRESSURE: 56 MMHG
END DIASTOLIC INDEX-HIGH: 170 ML/M2
END SYSTOLIC INDEX-HIGH: 70 ML/M2
NUC REST DIASTOLIC VOLUME INDEX: 76
NUC REST EJECTION FRACTION: 63
NUC REST SYSTOLIC VOLUME INDEX: 28
NUC STRESS DIASTOLIC VOLUME INDEX: 80
NUC STRESS EJECTION FRACTION: 63 %
NUC STRESS SYSTOLIC VOLUME INDEX: 29
OHS CV CPX 85 PERCENT MAX PREDICTED HEART RATE MALE: 120
OHS CV CPX MAX PREDICTED HEART RATE: 141
OHS CV CPX PATIENT IS FEMALE: 1
OHS CV CPX PATIENT IS MALE: 0
OHS CV CPX PEAK DIASTOLIC BLOOD PRESSURE: 53 MMHG
OHS CV CPX PEAK HEAR RATE: 71 BPM
OHS CV CPX PEAK RATE PRESSURE PRODUCT: 6745
OHS CV CPX PEAK SYSTOLIC BLOOD PRESSURE: 95 MMHG
OHS CV CPX PERCENT MAX PREDICTED HEART RATE ACHIEVED: 50
OHS CV CPX RATE PRESSURE PRODUCT PRESENTING: 7800
RETIRED EF AND QEF - SEE NOTES: 51 %
SYSTOLIC BLOOD PRESSURE: 120 MMHG

## 2019-05-16 PROCEDURE — 97110 THERAPEUTIC EXERCISES: CPT | Mod: HCNC,PN

## 2019-05-16 PROCEDURE — 99999 PR PBB SHADOW E&M-EST. PATIENT-LVL I: CPT | Mod: PBBFAC,HCNC,,

## 2019-05-16 PROCEDURE — 99999 PR PBB SHADOW E&M-EST. PATIENT-LVL I: ICD-10-PCS | Mod: PBBFAC,HCNC,,

## 2019-05-16 RX ORDER — DIPYRIDAMOLE 5 MG/ML
41.49 INJECTION INTRAVENOUS
Status: COMPLETED | OUTPATIENT
Start: 2019-05-16 | End: 2019-05-16

## 2019-05-16 RX ADMIN — DIPYRIDAMOLE 41.5 MG: 5 INJECTION INTRAVENOUS at 11:05

## 2019-05-16 NOTE — PROGRESS NOTES
"  Physical Therapy Daily Treatment Note     Name: Paris Burris  Clinic Number: [id number]    Therapy Diagnosis:   Encounter Diagnoses   Name Primary?    Impaired functional mobility, balance, gait, and endurance     General weakness      Physician: Mandi Huynh MD    Visit Date: 5/16/2019    Physician Orders: PT Eval and Treat   Medical Diagnosis from Referral:   R26.81 (ICD-10-CM) - Unsteady gait   R53.81 (ICD-10-CM) - Debility      Evaluation Date: 4/2/2019  Authorization Period Expiration: 03/14/2020  Plan of Care Expiration: 07/02/2019  Visit # / Visits authorized:10/21 -- PN next visit      Time In: 9:00 AM  Time Out: 9:55 AM  Total Billable Time: 55 minutes     Precautions: Standard, DDD of LSP, DM type 2, Hx vertebral Fx, recent hernia surgery, Recent Pacemaker placement    Subjective     Pt reports: mild low back pain today. "I don't know if it is related to what we did last time. I am feeling more confident walking on uneven sidewalks."  She was compliant with home exercise program.  Response to previous treatment: moderate soreness  Functional change: none    Pain: 1- 2/10  Location: bilateral LE      Objective     CUELLAR and DGI on 5/16/19:    CUELLAR Assessment ---- Total: 53  Maximum: 56 (previous 37/56)  1. Sitting to Standing   4 - able to stand without using hands and stabilize independently  2. Standing Unsupported   4 - able to stand safely 2 minutes without hold  3. Sitting Unsupported   4 - able to sit safely and securely 2 minutes  4. Standing to Sitting   4 - sits safely with minimal use of hands  5. Pivot Transfer   4 - able to trnasfer safely with minor use of hands  6. Standing with Eyes Closed   4 - albe to stand 10 seconds safely  7. Standing with Feet Together   4 - able to place feet together independently and stand 1 minute safely  8. Reaching Forward with Outstretched Arm   4 - can reach forward confidently 25 cm/10 inches  9. Retrieving Object from Floor   4 - able to  slipper " "safely and easily  10. Turning to Look Behind   4 - looks behind from both sides and weights shifts well  11. Turning 360 Degrees   2 - able to turn 360 safely but slowly --- 5"  12. Placing Alternate Foot on Step   4 - able to stand independently safely and complete 8 steps in 20 seconds --- 14"  13. Standing with One Foot in Front   4 - able to tandem stand independently and hold 30 seconds  14. Standing on One Foot   3- able to lift leg independently and hold 5-10 seconds    Dynamic Gait Index: --- TOTAL SCORE: 21/24  1. GAIT LEVEL SURFACE: 3 - normal: walks 20', no AD, good speed, no evidence for imbalance, normal gait pattern  2. CHANGE IN GAIT SPEED: 3 - normal: able to smoothly change walking speed without loss of balance or gait deviations. Shows a significant difference in walking speeds between normal, fast and slow speeds  3. GAIT WITH HORIZONTAL HEAD TURNS: 2 - mild impairment: performs head turns smoothly with slight change in gait velocity, or minor disruption to smooth gait path, or uses AD  4. GAIT WITH VERTICAL HEAD TURNS: 2 - mild impairment: performs head turns smoothly with slight change in gait velocity, or minor disruption to smooth gait path, or uses AD  5. GAIT AND PIVOT TURN: 2 - mild impairment: pivots turns safely in >3 seconds and stops with no loss of balance  6. STEP OVER OBSTACLE: 3 - normal: is able to step over the box without changing gait speed, no evidence of imbalance  7. STEP AROUND OBSTACLES: 3 - normal: is able to walk around cones safely without changing gait speed; no evidence of imbalance  8. STEPS: 3 - normal: alternating feet, no rail      Mrs. Burris received therapeutic exercises to develop strength, endurance, ROM, flexibility, posture and core stabilization for 55 minutes including:    Shuttle DL 30 x 2 black/1 red cord    SLS on airex 3 x 30" - green airex  SL cone taps 2 x 30" straight, 2 x 30" diagonals  Tandem on airex 3 x 30"  tandem on airex w/ arm swings on " "airex (blue) 2 x 10  Airex feet together eyes closed 2 x 30"  standing hip abd: 2 x 10 x OTB at knees  heel raises 3 x 10    rocker board M/L dynamic/static: 2 x 30" ea  rocker board A/P dynamic/static: 2 x 30" ea    Lateral step-ups 6" 2 x 10 B  Tandem walk 2 x 40 ft  Side stepping: 2 x 40 ft  caraoca: 2 x 40 ft - PT SBA  +walk with head turns up/down, L/R: 1 lap ea  +ITIS in tandem: 2 x 15 w/ foot in ea position   +SALPD in tandem: 2 x 15 w/ foot in ea position x OTB  +NR in tandem: 2 x 15 w/ foot in ea position x GTB -- add next visit    Progress balance exercises next visit    Recumbent bike: 8 minutes (collected history, assessed pt complaints, education on causes of debility; importance of exercise)      Home Exercises Provided and Patient Education Provided     Education provided:   -5/14/19 - updated HEP - bridge, BKFO, HL hip abd, SLR, SLS, tandem stance - see EMR  Written Home Exercises Provided: Patient instructed to cont prior HEP.  Exercises were reviewed and Mrs. Burris was able to demonstrate them prior to the end of the session.  Mrs. Burris demonstrated good  understanding of the education provided.     See EMR under Patient Instructions for exercises provided prior visit.    Assessment     Pt presents with excellent improvement in both static and dynamic balance, seen with large improvements in CUELLAR balance and DGI scores.    Mrs. Burris is progressing well towards her goals.   Pt prognosis is Good.     Pt will continue to benefit from skilled outpatient physical therapy to address the deficits listed in the problem list box on initial evaluation, provide pt/family education and to maximize pt's level of independence in the home and community environment.     Pt's spiritual, cultural and educational needs considered and pt agreeable to plan of care and goals.     Anticipated barriers to physical therapy: chronicity of symptoms, recent surgeries      Goals:   Short Term Goals (5 Weeks):  1. Pt able to " improve static balance with BRG balance >=40/56 to allow for improved tolerance with ADLs. - Met 5/16/19  2. Pt able to improve dynamic balance with DGI >=19/24 to allow for improved stability with walking. - Met 5/16/19  3. Pt able to walk >=10 min with Mod difficulty. - Met 5/16/19  4. Pt to improve strength by a half grade to allow for increased ease with getting in/out of chair.  5. Pt to demonstrate improved functional ability with FOTO limitation <=34% disability.     Long Term Goals (10 Weeks):  1. Pt able to improve static balance with BRG balance >=45/56 to allow for improved tolerance with ADLs. - Met 5/16/19  2. Pt able to improve dynamic balance with DGI >=22/24 to allow for improved stability with walking. - Not met, progressing  3. Pt able to walk >=10 min with Min-No difficulty.  4. Pt to improve strength to 5/5 to allow for increased ease with getting in/out of chair.  5. Pt to demonstrate improved functional ability with FOTO limitation <=24% disability.  6. Pt will be independent with HEP and self management of symptoms.       Plan     Continue with POC for strength and conditioning.    Patsy Elaine, PT

## 2019-05-17 ENCOUNTER — TELEPHONE (OUTPATIENT)
Dept: CARDIOLOGY | Facility: CLINIC | Age: 74
End: 2019-05-17

## 2019-05-17 NOTE — PROGRESS NOTES
"  Physical Therapy Daily Treatment Note     Name: Paris Burris  Clinic Number: [id number]    Therapy Diagnosis:   Encounter Diagnoses   Name Primary?    Impaired functional mobility, balance, gait, and endurance     General weakness      Physician: Mandi Huynh MD    Visit Date: 5/21/2019    Physician Orders: PT Eval and Treat   Medical Diagnosis from Referral:   R26.81 (ICD-10-CM) - Unsteady gait   R53.81 (ICD-10-CM) - Debility      Evaluation Date: 4/2/2019  Authorization Period Expiration: 03/14/2020  Plan of Care Expiration: 07/02/2019  Visit # / Visits authorized:10/21 -- PN next visit      Time In: 9:00 AM  Time Out: 9:55 AM  Total Billable Time: 55 minutes     Precautions: Standard, DDD of LSP, DM type 2, Hx vertebral Fx, recent hernia surgery, Recent Pacemaker placement    Subjective     Pt reports: feeling "off" and tired today.  She was compliant with home exercise program.  Response to previous treatment: moderate soreness  Functional change: none    Pain: 1- 2/10  Location: bilateral LE      Objective     CUELLAR and DGI on 5/16/19:    CUELLAR Assessment ---- Total: 53  Maximum: 56 (previous 37/56)  1. Sitting to Standing   4 - able to stand without using hands and stabilize independently  2. Standing Unsupported   4 - able to stand safely 2 minutes without hold  3. Sitting Unsupported   4 - able to sit safely and securely 2 minutes  4. Standing to Sitting   4 - sits safely with minimal use of hands  5. Pivot Transfer   4 - able to trnasfer safely with minor use of hands  6. Standing with Eyes Closed   4 - albe to stand 10 seconds safely  7. Standing with Feet Together   4 - able to place feet together independently and stand 1 minute safely  8. Reaching Forward with Outstretched Arm   4 - can reach forward confidently 25 cm/10 inches  9. Retrieving Object from Floor   4 - able to  slipper safely and easily  10. Turning to Look Behind   4 - looks behind from both sides and weights shifts well  11. " "Turning 360 Degrees   2 - able to turn 360 safely but slowly --- 5"  12. Placing Alternate Foot on Step   4 - able to stand independently safely and complete 8 steps in 20 seconds --- 14"  13. Standing with One Foot in Front   4 - able to tandem stand independently and hold 30 seconds  14. Standing on One Foot   3- able to lift leg independently and hold 5-10 seconds    Dynamic Gait Index: --- TOTAL SCORE: 21/24  1. GAIT LEVEL SURFACE: 3 - normal: walks 20', no AD, good speed, no evidence for imbalance, normal gait pattern  2. CHANGE IN GAIT SPEED: 3 - normal: able to smoothly change walking speed without loss of balance or gait deviations. Shows a significant difference in walking speeds between normal, fast and slow speeds  3. GAIT WITH HORIZONTAL HEAD TURNS: 2 - mild impairment: performs head turns smoothly with slight change in gait velocity, or minor disruption to smooth gait path, or uses AD  4. GAIT WITH VERTICAL HEAD TURNS: 2 - mild impairment: performs head turns smoothly with slight change in gait velocity, or minor disruption to smooth gait path, or uses AD  5. GAIT AND PIVOT TURN: 2 - mild impairment: pivots turns safely in >3 seconds and stops with no loss of balance  6. STEP OVER OBSTACLE: 3 - normal: is able to step over the box without changing gait speed, no evidence of imbalance  7. STEP AROUND OBSTACLES: 3 - normal: is able to walk around cones safely without changing gait speed; no evidence of imbalance  8. STEPS: 3 - normal: alternating feet, no rail      CMS Impairment/Limitation/Restriction for FOTO Complications Survey     Therapist reviewed FOTO scores for Paris Burris on 5/21/2019.   FOTO documents entered into [employer] - see Media section.     Limitation Score: 37%  Category: Mobility     Current : CJ = at least 20% but < 40% impaired, limited or restricted  Goal: CJ = at least 20% but < 40% impaired, limited or restricted  Discharge: N/A             Mrs. Burris received therapeutic " "exercises to develop strength, endurance, ROM, flexibility, posture and core stabilization for 55 minutes including:    Shuttle DL 30 x 2 black/1 red cord    SLS on airex 3 x 30" - green airex  SL cone taps 2 x 30" straight, 2 x 30" diagonals -- defer diagonals 2* to fatigue  Tandem on airex 3 x 30"  tandem on airex w/ arm swings on airex (blue) 2 x 10 -- only 1 set performed today 2* to fatigue  Airex feet together eyes closed 2 x 30"  standing hip abd: 2 x 10 x OTB at knees  heel raises 3 x 10    rocker board M/L dynamic/static: 2 x 30" ea  rocker board A/P dynamic/static: 2 x 30" ea    Lateral step-ups 6" 2 x 10 B  Tandem walk 2 x 40 ft  Side stepping: 2 x 40 ft  caraoca: 2 x 40 ft - PT SBA  walk with head turns up/down, L/R: 1 lap ea - defer 2* to fatigue   ITIS in tandem: 2 x 15 w/ foot in ea position   SALPD in tandem: 2 x 15 w/ foot in ea position x OTB  +NR in tandem: 2 x 15 w/ foot in ea position x GTB    Progress balance exercises next visit    Recumbent bike: 8 minutes (collected history, assessed pt complaints, education on causes of debility; importance of exercise)      Home Exercises Provided and Patient Education Provided     Education provided:   -5/14/19 - updated HEP - bridge, BKFO, HL hip abd, SLR, SLS, tandem stance - see EMR  Written Home Exercises Provided: Patient instructed to cont prior HEP.  Exercises were reviewed and Mrs. Burris was able to demonstrate them prior to the end of the session.  Mrs. Burris demonstrated good  understanding of the education provided.     See EMR under Patient Instructions for exercises provided prior visit.    Assessment     Modified prpgram today 2* to c/o increased fatigue. Despite improvements in balance pt presents with minimal change in self-reported functional mobility, see FOTO score    Mrs. Burris is progressing well towards her goals.   Pt prognosis is Good.     Pt will continue to benefit from skilled outpatient physical therapy to address the deficits " listed in the problem list box on initial evaluation, provide pt/family education and to maximize pt's level of independence in the home and community environment.     Pt's spiritual, cultural and educational needs considered and pt agreeable to plan of care and goals.     Anticipated barriers to physical therapy: chronicity of symptoms, recent surgeries      Goals:   Short Term Goals (5 Weeks):  1. Pt able to improve static balance with BRG balance >=40/56 to allow for improved tolerance with ADLs. - Met 5/16/19  2. Pt able to improve dynamic balance with DGI >=19/24 to allow for improved stability with walking. - Met 5/16/19  3. Pt able to walk >=10 min with Mod difficulty. - Met 5/16/19  4. Pt to improve strength by a half grade to allow for increased ease with getting in/out of chair.  5. Pt to demonstrate improved functional ability with FOTO limitation <=34% disability.     Long Term Goals (10 Weeks):  1. Pt able to improve static balance with BRG balance >=45/56 to allow for improved tolerance with ADLs. - Met 5/16/19  2. Pt able to improve dynamic balance with DGI >=22/24 to allow for improved stability with walking. - Not met, progressing  3. Pt able to walk >=10 min with Min-No difficulty.  4. Pt to improve strength to 5/5 to allow for increased ease with getting in/out of chair.  5. Pt to demonstrate improved functional ability with FOTO limitation <=24% disability.  6. Pt will be independent with HEP and self management of symptoms.       Plan     Continue with POC for strength and conditioning.    Patsy Elaine, PT

## 2019-05-17 NOTE — TELEPHONE ENCOUNTER
I called MsTrell Fatuma gave her the results of her Cardiac PET Stress that was normal and requires no change in management she gave viable understanding.    Kandy

## 2019-05-20 ENCOUNTER — CLINICAL SUPPORT (OUTPATIENT)
Dept: INTERNAL MEDICINE | Facility: CLINIC | Age: 74
End: 2019-05-20
Payer: MEDICARE

## 2019-05-20 VITALS — BODY MASS INDEX: 30.03 KG/M2 | WEIGHT: 158.94 LBS

## 2019-05-20 PROCEDURE — 99999 PR PBB SHADOW E&M-EST. PATIENT-LVL I: CPT | Mod: PBBFAC,HCNC,,

## 2019-05-20 PROCEDURE — 99999 PR PBB SHADOW E&M-EST. PATIENT-LVL I: ICD-10-PCS | Mod: PBBFAC,HCNC,,

## 2019-05-20 NOTE — PROGRESS NOTES
Health  Follow-Up Note   [x] Office  [] Phone  Notes from previous session reviewed.   [x] Previous Session Goals unchanged.   [] Patient/Caregiver Change in Goals.  Goals added or changed by Patient/Caregiver since program participation:  1. Continue same plan  2. Continue walking 5 x week        Additional/Changed support that patient/caregiver has experienced/sought?  (Indicate readiness, support from family, friends, others, community groups, etc)  1.       Additional/Changed obstacles that could prevent patient/caregiver from reaching their goals?  1.  Feeling like not going walking when Kade doesn't want to go with her    Feedback provided:  1.  Praised for great job and continued effort and determination     Diagnostic values/Desriptors for follow-up as needed for chronic condition(s)   Weight: 72.1 kg 158.95 lb down 1.77 lb total down 27  Lb   Blood Glucose:   7 d 108  14 d 107  30 d 113  90 d 114    Interventions:   1. Health  listened reflectively, validated thoughts and feelings, offered support and encouragement.   2. Allowed patient to express themselves in a non-biased atmosphere.  3. Health  assisted pt to problem-solve obstacles such as being in a challenging environment and dealing with these challenges.   4. Motivational Interviewed interventions utilized (OARS).   5. Patient responded favorably to interventions and remained actively engaged in the session.   6. Health  will remain available and connected for patient by phone and/or office visits.   7. Positive reinforcement, emotional support and encouragement provided.   8. Focused Education: MI    Plan:  [x] Pt will work on goals as stated above.   [x] Pt will contact Health  for any questions, concerns or needs.  [x] Pt will follow up with Health  in office on  6.10.19 at 0830.  [] Pt will follow up with Health  on phone in:        [x] Health  will remain available.   [] Health  will  contact patient by phone in:        [] Health  will consult:        [] Health  will inform Provider via EPIC messaging.     Impression:  1. Behavior is consistent with Action Stage of Change.   2. Participation level:       [x] Receptive      [x] Interactive      [] Guarded and Resistant      [x] Self Motivated      [] Refused/Declined to participate   3. [x] Pt voiced understanding of all information presented.       [] Pt voiced needing further information/education. This will be arranged.       [x] Pt would benefit from further education/information as identified by this health . This will be arranged.     Jacqueline Perry RN HC

## 2019-05-21 ENCOUNTER — TELEPHONE (OUTPATIENT)
Dept: INTERNAL MEDICINE | Facility: CLINIC | Age: 74
End: 2019-05-21

## 2019-05-21 ENCOUNTER — CLINICAL SUPPORT (OUTPATIENT)
Dept: REHABILITATION | Facility: OTHER | Age: 74
End: 2019-05-21
Payer: MEDICARE

## 2019-05-21 DIAGNOSIS — E78.5 HYPERLIPIDEMIA, UNSPECIFIED HYPERLIPIDEMIA TYPE: ICD-10-CM

## 2019-05-21 DIAGNOSIS — Z74.09 IMPAIRED FUNCTIONAL MOBILITY, BALANCE, GAIT, AND ENDURANCE: ICD-10-CM

## 2019-05-21 DIAGNOSIS — R53.1 GENERAL WEAKNESS: ICD-10-CM

## 2019-05-21 DIAGNOSIS — R73.03 PRE-DIABETES: Primary | ICD-10-CM

## 2019-05-21 PROCEDURE — 97110 THERAPEUTIC EXERCISES: CPT | Mod: HCNC,PN

## 2019-05-21 PROCEDURE — G8978 MOBILITY CURRENT STATUS: HCPCS | Mod: CJ,HCNC,PN

## 2019-05-21 PROCEDURE — G8979 MOBILITY GOAL STATUS: HCPCS | Mod: CJ,HCNC,PN

## 2019-05-22 ENCOUNTER — TELEPHONE (OUTPATIENT)
Dept: INTERNAL MEDICINE | Facility: CLINIC | Age: 74
End: 2019-05-22

## 2019-05-23 ENCOUNTER — CLINICAL SUPPORT (OUTPATIENT)
Dept: REHABILITATION | Facility: OTHER | Age: 74
End: 2019-05-23
Payer: MEDICARE

## 2019-05-23 DIAGNOSIS — R53.1 GENERAL WEAKNESS: ICD-10-CM

## 2019-05-23 DIAGNOSIS — Z74.09 IMPAIRED FUNCTIONAL MOBILITY, BALANCE, GAIT, AND ENDURANCE: ICD-10-CM

## 2019-05-23 PROCEDURE — 97110 THERAPEUTIC EXERCISES: CPT | Mod: HCNC,PN

## 2019-05-23 PROCEDURE — G8979 MOBILITY GOAL STATUS: HCPCS | Mod: CJ,HCNC,PN

## 2019-05-23 PROCEDURE — G8978 MOBILITY CURRENT STATUS: HCPCS | Mod: CJ,HCNC,PN

## 2019-05-23 NOTE — PROGRESS NOTES
"  Physical Therapy Daily Treatment Note     Name: Paris Burris  Clinic Number: [id number]    Therapy Diagnosis:   Encounter Diagnoses   Name Primary?    Impaired functional mobility, balance, gait, and endurance     General weakness      Physician: Mandi Huynh MD    Visit Date: 5/23/2019    Physician Orders: PT Eval and Treat   Medical Diagnosis from Referral:   R26.81 (ICD-10-CM) - Unsteady gait   R53.81 (ICD-10-CM) - Debility      Evaluation Date: 4/2/2019  Authorization Period Expiration: 03/14/2020  Plan of Care Expiration: 07/02/2019  Visit # / Visits authorized:10/21 -- PN today     Time In: 9:00 AM  Time Out: 10:00 AM  Total Billable Time: 30 minutes     Precautions: Standard, DDD of LSP, DM type 2, Hx vertebral Fx, recent hernia surgery, Recent Pacemaker placement    Subjective     Pt reports: today is a better day and she is more confident with stair climbing at home without fear of falling.  She was compliant with home exercise program.  Response to previous treatment: moderate soreness  Functional change: none    Pain: 1- 2/10  Location: bilateral LE      Objective     5/23/19    Special tests: 30" sit to stand = 13x    Lower Extremity Strength  Right LE   Left LE     Hip flexion: 4+/5 Hip flexion: 5/5   Hip extension:  5/5 Hip extension: 5/5   Hip abduction: 5/5 Hip abduction: 5/5   Hip adduction:  5/5 Hip adduction:  5/5   Hip Internal rotation    5/5 Hip Internal rotation 5/5   Knee Flexion 5/5 Knee Flexion 5/5   Knee Extension 4+/5 Knee Extension 5/5   Ankle dorsiflexion: 5/5 Ankle dorsiflexion: 5/5   Ankle plantarflexion: 5/5 Ankle plantarflexion: 5/5         CUELLAR and DGI on 5/16/19:    CUELLAR Assessment ---- Total: 53  Maximum: 56 (previous 37/56)  1. Sitting to Standing   4 - able to stand without using hands and stabilize independently  2. Standing Unsupported   4 - able to stand safely 2 minutes without hold  3. Sitting Unsupported   4 - able to sit safely and securely 2 minutes  4. Standing to " "Sitting   4 - sits safely with minimal use of hands  5. Pivot Transfer   4 - able to trnasfer safely with minor use of hands  6. Standing with Eyes Closed   4 - albe to stand 10 seconds safely  7. Standing with Feet Together   4 - able to place feet together independently and stand 1 minute safely  8. Reaching Forward with Outstretched Arm   4 - can reach forward confidently 25 cm/10 inches  9. Retrieving Object from Floor   4 - able to  slipper safely and easily  10. Turning to Look Behind   4 - looks behind from both sides and weights shifts well  11. Turning 360 Degrees   2 - able to turn 360 safely but slowly --- 5"  12. Placing Alternate Foot on Step   4 - able to stand independently safely and complete 8 steps in 20 seconds --- 14"  13. Standing with One Foot in Front   4 - able to tandem stand independently and hold 30 seconds  14. Standing on One Foot   3- able to lift leg independently and hold 5-10 seconds    Dynamic Gait Index: --- TOTAL SCORE: 21/24  1. GAIT LEVEL SURFACE: 3 - normal: walks 20', no AD, good speed, no evidence for imbalance, normal gait pattern  2. CHANGE IN GAIT SPEED: 3 - normal: able to smoothly change walking speed without loss of balance or gait deviations. Shows a significant difference in walking speeds between normal, fast and slow speeds  3. GAIT WITH HORIZONTAL HEAD TURNS: 2 - mild impairment: performs head turns smoothly with slight change in gait velocity, or minor disruption to smooth gait path, or uses AD  4. GAIT WITH VERTICAL HEAD TURNS: 2 - mild impairment: performs head turns smoothly with slight change in gait velocity, or minor disruption to smooth gait path, or uses AD  5. GAIT AND PIVOT TURN: 2 - mild impairment: pivots turns safely in >3 seconds and stops with no loss of balance  6. STEP OVER OBSTACLE: 3 - normal: is able to step over the box without changing gait speed, no evidence of imbalance  7. STEP AROUND OBSTACLES: 3 - normal: is able to walk around " "cones safely without changing gait speed; no evidence of imbalance  8. STEPS: 3 - normal: alternating feet, no rail      CMS Impairment/Limitation/Restriction for FOTO Complications Survey     Therapist reviewed FOTO scores for Paris Burris on 5/21/2019.   FOTO documents entered into EPIC - see Media section.     Limitation Score: 37%  Category: Mobility     Current : CJ = at least 20% but < 40% impaired, limited or restricted  Goal: CJ = at least 20% but < 40% impaired, limited or restricted  Discharge: N/A             Mrs. Burris received therapeutic exercises to develop strength, endurance, ROM, flexibility, posture and core stabilization for 55 minutes including:    Shuttle DL 30 x 2 black/1 red cord    SLS on airex 3 x 30" - green airex  SL cone taps 2 x 30" straight, 2 x 30" diagonals -- defer diagonals 2* to fatigue  Tandem on airex 3 x 30"  tandem on airex w/ arm swings on airex (blue) 2 x 10 -- only 1 set performed today 2* to fatigue  Airex feet together eyes closed 2 x 30"  standing hip abd: 2 x 10 x OTB at knees  heel raises 3 x 10    rocker board M/L dynamic/static: 2 x 30" ea  rocker board A/P dynamic/static: 2 x 30" ea    Lateral step-ups 6" 2 x 10 B  Tandem walk 2 x 40 ft  Side stepping: 2 x 40 ft  caraoca: 2 x 40 ft - PT SBA  walk with head turns up/down, L/R: 1 lap ea - defer 2* to fatigue   ITIS in tandem: 2 x 15 w/ foot in ea position   SALPD in tandem: 2 x 15 w/ foot in ea position x OTB  NR in tandem: 2 x 15 w/ foot in ea position x GTB    Progress balance exercises next visit    Recumbent bike: 8 minutes (collected history, assessed pt complaints, education on causes of debility; importance of exercise)      Home Exercises Provided and Patient Education Provided     Education provided:   -5/14/19 - updated HEP - bridge, BKFO, HL hip abd, SLR, SLS, tandem stance - see EMR  Written Home Exercises Provided: Patient instructed to cont prior HEP.  Exercises were reviewed and Mrs. Burris was able " to demonstrate them prior to the end of the session.  Mrs. Burris demonstrated good  understanding of the education provided.     See EMR under Patient Instructions for exercises provided prior visit.    Assessment     Excellent improvements in strength as well as endurance with transitional movements such as chair transfers and stair climbing. Pt also presents with marked improvements in static and dynamic balance, indicating improving safety with ADLs and HHCs. Despite improvements in impairments pt presents with minimal change in self-reported functional mobility, see FOTO score. Continue PT x 2 weeks with progression to home program.    Mrs. Burris is progressing well towards her goals.   Pt prognosis is Good.     Pt will continue to benefit from skilled outpatient physical therapy to address the deficits listed in the problem list box on initial evaluation, provide pt/family education and to maximize pt's level of independence in the home and community environment.     Pt's spiritual, cultural and educational needs considered and pt agreeable to plan of care and goals.     Anticipated barriers to physical therapy: chronicity of symptoms, recent surgeries      Goals:   Short Term Goals (5 Weeks):  1. Pt able to improve static balance with BRG balance >=40/56 to allow for improved tolerance with ADLs. - Met 5/16/19  2. Pt able to improve dynamic balance with DGI >=19/24 to allow for improved stability with walking. - Met 5/16/19  3. Pt able to walk >=10 min with Mod difficulty. - Met 5/16/19  4. Pt to improve strength by a half grade to allow for increased ease with getting in/out of chair.  5. Pt to demonstrate improved functional ability with FOTO limitation <=34% disability.     Long Term Goals (10 Weeks):  1. Pt able to improve static balance with BRG balance >=45/56 to allow for improved tolerance with ADLs. - Met 5/16/19  2. Pt able to improve dynamic balance with DGI >=22/24 to allow for improved stability  with walking. - Not met, progressing  3. Pt able to walk >=10 min with Min-No difficulty.  4. Pt to improve strength to 5/5 to allow for increased ease with getting in/out of chair.  5. Pt to demonstrate improved functional ability with FOTO limitation <=24% disability.  6. Pt will be independent with HEP and self management of symptoms.       Plan     Continue with POC for strength and conditioning.    Patsy Elaine, PT

## 2019-05-25 ENCOUNTER — NURSE TRIAGE (OUTPATIENT)
Dept: ADMINISTRATIVE | Facility: CLINIC | Age: 74
End: 2019-05-25

## 2019-05-25 ENCOUNTER — OFFICE VISIT (OUTPATIENT)
Dept: URGENT CARE | Facility: CLINIC | Age: 74
End: 2019-05-25
Payer: MEDICARE

## 2019-05-25 VITALS
SYSTOLIC BLOOD PRESSURE: 132 MMHG | DIASTOLIC BLOOD PRESSURE: 78 MMHG | BODY MASS INDEX: 29.27 KG/M2 | HEIGHT: 61 IN | OXYGEN SATURATION: 98 % | WEIGHT: 155 LBS | RESPIRATION RATE: 18 BRPM | TEMPERATURE: 98 F | HEART RATE: 74 BPM

## 2019-05-25 DIAGNOSIS — R11.2 NON-INTRACTABLE VOMITING WITH NAUSEA, UNSPECIFIED VOMITING TYPE: Primary | ICD-10-CM

## 2019-05-25 PROCEDURE — 3078F DIAST BP <80 MM HG: CPT | Mod: CPTII,S$GLB,, | Performed by: PHYSICIAN ASSISTANT

## 2019-05-25 PROCEDURE — 1101F PT FALLS ASSESS-DOCD LE1/YR: CPT | Mod: CPTII,S$GLB,, | Performed by: PHYSICIAN ASSISTANT

## 2019-05-25 PROCEDURE — 3075F SYST BP GE 130 - 139MM HG: CPT | Mod: CPTII,S$GLB,, | Performed by: PHYSICIAN ASSISTANT

## 2019-05-25 PROCEDURE — 99214 OFFICE O/P EST MOD 30 MIN: CPT | Mod: S$GLB,,, | Performed by: PHYSICIAN ASSISTANT

## 2019-05-25 PROCEDURE — 1101F PR PT FALLS ASSESS DOC 0-1 FALLS W/OUT INJ PAST YR: ICD-10-PCS | Mod: CPTII,S$GLB,, | Performed by: PHYSICIAN ASSISTANT

## 2019-05-25 PROCEDURE — 3078F PR MOST RECENT DIASTOLIC BLOOD PRESSURE < 80 MM HG: ICD-10-PCS | Mod: CPTII,S$GLB,, | Performed by: PHYSICIAN ASSISTANT

## 2019-05-25 PROCEDURE — 99214 PR OFFICE/OUTPT VISIT, EST, LEVL IV, 30-39 MIN: ICD-10-PCS | Mod: S$GLB,,, | Performed by: PHYSICIAN ASSISTANT

## 2019-05-25 PROCEDURE — 3075F PR MOST RECENT SYSTOLIC BLOOD PRESS GE 130-139MM HG: ICD-10-PCS | Mod: CPTII,S$GLB,, | Performed by: PHYSICIAN ASSISTANT

## 2019-05-25 NOTE — TELEPHONE ENCOUNTER
Reason for Disposition   [1] MODERATE dizziness (e.g., interferes with normal activities) AND [2] has NOT been evaluated by physician for this  (Exception: dizziness caused by heat exposure, sudden standing, or poor fluid intake)    Protocols used: DIZZINESS - [id number]

## 2019-05-26 ENCOUNTER — PATIENT MESSAGE (OUTPATIENT)
Dept: GASTROENTEROLOGY | Facility: CLINIC | Age: 74
End: 2019-05-26

## 2019-05-27 ENCOUNTER — PATIENT MESSAGE (OUTPATIENT)
Dept: INTERNAL MEDICINE | Facility: CLINIC | Age: 74
End: 2019-05-27

## 2019-05-27 ENCOUNTER — TELEPHONE (OUTPATIENT)
Dept: INTERNAL MEDICINE | Facility: CLINIC | Age: 74
End: 2019-05-27

## 2019-05-27 ENCOUNTER — HOSPITAL ENCOUNTER (EMERGENCY)
Facility: HOSPITAL | Age: 74
Discharge: HOME | End: 2019-05-27
Attending: EMERGENCY MEDICINE
Payer: MEDICARE

## 2019-05-27 VITALS
HEIGHT: 61 IN | WEIGHT: 156 LBS | OXYGEN SATURATION: 94 % | DIASTOLIC BLOOD PRESSURE: 56 MMHG | HEART RATE: 66 BPM | BODY MASS INDEX: 29.45 KG/M2 | RESPIRATION RATE: 20 BRPM | TEMPERATURE: 98 F | SYSTOLIC BLOOD PRESSURE: 130 MMHG

## 2019-05-27 DIAGNOSIS — R53.1 WEAKNESS: ICD-10-CM

## 2019-05-27 DIAGNOSIS — R42 VERTIGO: ICD-10-CM

## 2019-05-27 DIAGNOSIS — R42 DIZZINESS: ICD-10-CM

## 2019-05-27 DIAGNOSIS — R51.9 NONINTRACTABLE HEADACHE, UNSPECIFIED CHRONICITY PATTERN, UNSPECIFIED HEADACHE TYPE: Primary | ICD-10-CM

## 2019-05-27 LAB
ALBUMIN SERPL BCP-MCNC: 4.3 G/DL (ref 3.5–5.2)
ALP SERPL-CCNC: 50 U/L (ref 55–135)
ALT SERPL W/O P-5'-P-CCNC: 18 U/L (ref 10–44)
ANION GAP SERPL CALC-SCNC: 9 MMOL/L (ref 8–16)
AST SERPL-CCNC: 18 U/L (ref 10–40)
BASOPHILS # BLD AUTO: 0.03 K/UL (ref 0–0.2)
BASOPHILS NFR BLD: 0.4 % (ref 0–1.9)
BILIRUB SERPL-MCNC: 0.5 MG/DL (ref 0.1–1)
BNP SERPL-MCNC: 99 PG/ML (ref 0–99)
BUN SERPL-MCNC: 17 MG/DL (ref 8–23)
CALCIUM SERPL-MCNC: 9.7 MG/DL (ref 8.7–10.5)
CHLORIDE SERPL-SCNC: 104 MMOL/L (ref 95–110)
CO2 SERPL-SCNC: 24 MMOL/L (ref 23–29)
CREAT SERPL-MCNC: 0.9 MG/DL (ref 0.5–1.4)
DIFFERENTIAL METHOD: NORMAL
EOSINOPHIL # BLD AUTO: 0.2 K/UL (ref 0–0.5)
EOSINOPHIL NFR BLD: 3.4 % (ref 0–8)
ERYTHROCYTE [DISTWIDTH] IN BLOOD BY AUTOMATED COUNT: 13.2 % (ref 11.5–14.5)
EST. GFR  (AFRICAN AMERICAN): >60 ML/MIN/1.73 M^2
EST. GFR  (NON AFRICAN AMERICAN): >60 ML/MIN/1.73 M^2
GLUCOSE SERPL-MCNC: 98 MG/DL (ref 70–110)
HCT VFR BLD AUTO: 44.6 % (ref 37–48.5)
HGB BLD-MCNC: 14.9 G/DL (ref 12–16)
IMM GRANULOCYTES # BLD AUTO: 0.02 K/UL (ref 0–0.04)
IMM GRANULOCYTES NFR BLD AUTO: 0.3 % (ref 0–0.5)
LIPASE SERPL-CCNC: 65 U/L (ref 4–60)
LYMPHOCYTES # BLD AUTO: 2.6 K/UL (ref 1–4.8)
LYMPHOCYTES NFR BLD: 37 % (ref 18–48)
MCH RBC QN AUTO: 30.7 PG (ref 27–31)
MCHC RBC AUTO-ENTMCNC: 33.4 G/DL (ref 32–36)
MCV RBC AUTO: 92 FL (ref 82–98)
MONOCYTES # BLD AUTO: 0.7 K/UL (ref 0.3–1)
MONOCYTES NFR BLD: 9.1 % (ref 4–15)
NEUTROPHILS # BLD AUTO: 3.6 K/UL (ref 1.8–7.7)
NEUTROPHILS NFR BLD: 49.8 % (ref 38–73)
NRBC BLD-RTO: 0 /100 WBC
PLATELET # BLD AUTO: 165 K/UL (ref 150–350)
PMV BLD AUTO: 10.9 FL (ref 9.2–12.9)
POTASSIUM SERPL-SCNC: 4.1 MMOL/L (ref 3.5–5.1)
PROT SERPL-MCNC: 7.5 G/DL (ref 6–8.4)
RBC # BLD AUTO: 4.85 M/UL (ref 4–5.4)
SODIUM SERPL-SCNC: 137 MMOL/L (ref 136–145)
TROPONIN I SERPL DL<=0.01 NG/ML-MCNC: 0.01 NG/ML (ref 0–0.03)
WBC # BLD AUTO: 7.14 K/UL (ref 3.9–12.7)

## 2019-05-27 PROCEDURE — 93010 ELECTROCARDIOGRAM REPORT: CPT | Mod: HCNC,76,, | Performed by: INTERNAL MEDICINE

## 2019-05-27 PROCEDURE — 93005 ELECTROCARDIOGRAM TRACING: CPT | Mod: HCNC

## 2019-05-27 PROCEDURE — 84484 ASSAY OF TROPONIN QUANT: CPT | Mod: HCNC

## 2019-05-27 PROCEDURE — 93010 EKG 12-LEAD: ICD-10-PCS | Mod: HCNC,76,, | Performed by: INTERNAL MEDICINE

## 2019-05-27 PROCEDURE — 99285 PR EMERGENCY DEPT VISIT,LEVEL V: ICD-10-PCS | Mod: HCNC,,, | Performed by: EMERGENCY MEDICINE

## 2019-05-27 PROCEDURE — 63600175 PHARM REV CODE 636 W HCPCS: Mod: HCNC | Performed by: EMERGENCY MEDICINE

## 2019-05-27 PROCEDURE — 93010 ELECTROCARDIOGRAM REPORT: CPT | Mod: HCNC,,, | Performed by: INTERNAL MEDICINE

## 2019-05-27 PROCEDURE — 83690 ASSAY OF LIPASE: CPT | Mod: HCNC

## 2019-05-27 PROCEDURE — 80053 COMPREHEN METABOLIC PANEL: CPT | Mod: HCNC

## 2019-05-27 PROCEDURE — 25000003 PHARM REV CODE 250: Mod: HCNC | Performed by: EMERGENCY MEDICINE

## 2019-05-27 PROCEDURE — 99285 EMERGENCY DEPT VISIT HI MDM: CPT | Mod: HCNC,,, | Performed by: EMERGENCY MEDICINE

## 2019-05-27 PROCEDURE — 96374 THER/PROPH/DIAG INJ IV PUSH: CPT | Mod: HCNC

## 2019-05-27 PROCEDURE — 83880 ASSAY OF NATRIURETIC PEPTIDE: CPT | Mod: HCNC

## 2019-05-27 PROCEDURE — 99285 EMERGENCY DEPT VISIT HI MDM: CPT | Mod: 25,HCNC

## 2019-05-27 PROCEDURE — 85025 COMPLETE CBC W/AUTO DIFF WBC: CPT | Mod: HCNC

## 2019-05-27 RX ORDER — MECLIZINE HYDROCHLORIDE 25 MG/1
25 TABLET ORAL 3 TIMES DAILY PRN
Qty: 20 TABLET | Refills: 0 | Status: SHIPPED | OUTPATIENT
Start: 2019-05-27 | End: 2019-07-18

## 2019-05-27 RX ORDER — ONDANSETRON 4 MG/1
4 TABLET, FILM COATED ORAL EVERY 8 HOURS PRN
Qty: 12 TABLET | Refills: 0 | Status: SHIPPED | OUTPATIENT
Start: 2019-05-27 | End: 2019-06-17

## 2019-05-27 RX ORDER — MECLIZINE HCL 12.5 MG 12.5 MG/1
25 TABLET ORAL
Status: COMPLETED | OUTPATIENT
Start: 2019-05-27 | End: 2019-05-27

## 2019-05-27 RX ORDER — ONDANSETRON 2 MG/ML
4 INJECTION INTRAMUSCULAR; INTRAVENOUS
Status: COMPLETED | OUTPATIENT
Start: 2019-05-27 | End: 2019-05-27

## 2019-05-27 RX ADMIN — ONDANSETRON 4 MG: 2 INJECTION INTRAMUSCULAR; INTRAVENOUS at 01:05

## 2019-05-27 RX ADMIN — MECLIZINE 25 MG: 12.5 TABLET ORAL at 01:05

## 2019-05-27 NOTE — TELEPHONE ENCOUNTER
Message left for pt to call office  Recommend UC appt as per my previous message. Please assist in scheduling or she can go to a walk in [facility].

## 2019-05-27 NOTE — PROVIDER PROGRESS NOTES - EMERGENCY DEPT.
Encounter Date: 5/27/2019    ED Physician Progress Notes       SCRIBE NOTE: I, Flip Mata, am scribing for, and in the presence of,  Dr. Cruz.  Physician Statement: I, Dr. Cruz, personally performed the services described in this documentation as scribed by Flip Mata in my presence, and it is both accurate and complete.     Physician Note:   HR @ 69 , NO Stemi    EKG - STEMI Decision  Initial Reading: No STEMI present.  Response: No Action Needed.      I, Dr. Samir Cruz, personally performed the services described in this documentation. All medical record entries made by the scribe were at my direction and in my presence.  I have reviewed the chart and agree that the record reflects my personal performance and is accurate and complete.     Samir Cruz DO  Dept of Emergency Medicine   [facility]  Spectralink: [id number]

## 2019-05-27 NOTE — ED NOTES
"Patient identifiers for Paris Burris 73 y.o. female checked and correct.  Chief Complaint   Patient presents with    Headache     PT reports was seen at urgent care on saturday for nausea and dizziness.      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    Wrist fracture      Allergies reported:   Review of patient's allergies indicates:   Allergen Reactions    Topamax [topiramate] Other (See Comments)     hallucinations         LOC: Patient is awake, alert, and aware of environment with an appropriate affect. Patient is oriented x 3 and speaking appropriately.  APPEARANCE: Patient resting comfortably and in no acute distress. Patient is clean and well groomed, patient's clothing is properly fastened.  SKIN: The skin is warm and dry. Patient has normal skin turgor and moist mucus membranes. Skin is intact; no breakdown noted. Bruise to right forearm noted.  MUSKULOSKELETAL: Patient is moving all extremities well, no obvious deformities noted. Pulses intact. Patient reports abdominal pain, "stomach feels tight and real sore." States chronic mid-lower back pain.   RESPIRATORY: Airway is open and patent. Respirations are spontaneous and non-labored with normal effort and rate.  CARDIAC: Patient has a normal rate and rhythm. 68 bpm on cardiac monitor, No peripheral edema noted.   ABDOMEN: No distention noted. Soft and non-tender upon palpation. " Patient reports nausea and vomiting since Friday. Patient states last emesis yesterday morning.   NEUROLOGICAL: PERRL. Facial expression is symmetrical. Hand grasps are equal bilaterally. Normal sensation in all extremities when touched with finger. Patient reports experiencing vertigo. Patient states constant headache to the front that began Friday and has been unrelieved; 5/10.

## 2019-05-27 NOTE — TELEPHONE ENCOUNTER
Recommend UC appt as per my previous message. Please assist in scheduling or she can go to a walk in [facility].

## 2019-05-27 NOTE — TELEPHONE ENCOUNTER
Spoke with pt and pt stated that she went to uc on yesterday and was instructed by the NP to go to the ED if symptoms didn't approved no uc appts available in our clinic offered other areas pt denied   Notified pt I would let MD know

## 2019-05-27 NOTE — ED NOTES
Patient sitting up on stretcher able to voice all needs, no acute distress noted, updated patient on current status of awaiting test results, voiced understanding, cardiac monitoring in progress, will continue to monitor

## 2019-05-27 NOTE — ED TRIAGE NOTES
Patient presents to ED with complaints of headache; nausea and vomiting; and dizziness that began on 5/24/19. Patient stated going to the clinic who then referred her to the ED for concerns about gallbladder.

## 2019-05-27 NOTE — TELEPHONE ENCOUNTER
----- Message from Laura Constantino sent at 5/27/2019 10:07 AM CDT -----  Contact: Patient [phone]  Returned Call    Who left the message: Kaila Henry LPN    When did the practice call: 05/25/2019 9:13am    Please advise.    Thank You

## 2019-05-27 NOTE — TELEPHONE ENCOUNTER
Called pt to see how she was doing, no answer. Pt was seen in UC dept on 5/2519 after call to triage nurse

## 2019-05-27 NOTE — ED PROVIDER NOTES
"Encounter Date: 5/27/2019    SCRIBE #1 NOTE: I, Jolene Caamrena, am scribing for, and in the presence of,  Dr. Bae. I have scribed the entire note. the EKG reading.       History     Chief Complaint   Patient presents with    Headache     PT reports was seen at urgent care on saturday for nausea and dizziness.      Time patient was seen by the provider: 12:56 PM      The patient is a 73 y.o. female with medical history of anxiety, hyperlipidemia, migraine, type 2 diabetes, parathyroid disorder and anemia who presents to the ED with a complaint of headache. Patient reports 4 days ago she developed severe nausea and started vomiting, followed by a frontal headache and vertigo. She states she has not vomited since yesterday morning; however, her headache mildly persists at time. She notes the nausea and vertigo only occur when standing up, but improves with sitting or lying down. She states she intermittently sufferers from vertigo and is currently not on any medication for treatment. She was recently seen in Urgent Care 2 days ago for nausea and vomiting. She states the PA told her she did not want to put the patient on any medication for vertigo due to the nausea medication. She spoke with internal medicine today who referred her to the ED for worsening symptoms. Patient also complains of back pain, which she states is, "worse than usual," and abdominal pain, which she describes as, "tight and soar." She is able to tolerate PO intake. She denies any dizziness or vertigo at this time.     The history is provided by the patient and medical records.     Review of patient's allergies indicates:   Allergen Reactions    Topamax [topiramate] Other (See Comments)     hallucinations     Past Medical History:   Diagnosis Date    Allergy     Anemia     Anxiety     Breast cancer 2002    Left breast    Cancer 2002    L breast s/p lumpectomy    Decreased hearing     Depression     Diabetes mellitus     Diabetes with " neurologic complications     Gastric ulcer 9/10/13    EGD    Hiatal hernia     Hyperlipidemia     Migraine headache     Migraine headache 3/20/2015    Multiple gastric ulcers     Parathyroid disorder     Pre-diabetes     Renal manifestation of secondary diabetes mellitus     Sleep apnea     no CPAP    Type 2 diabetes mellitus, controlled, 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    Wrist fracture      Past Surgical History:   Procedure Laterality Date    ADENOIDECTOMY      BREAST LUMPECTOMY Left 2002    CATARACT EXTRACTION      COLONOSCOPY N/A 12/2/2016    Performed by Dustin Patterson MD at [facility] ENDO (4TH FLR)    DEVICE ASSISTED ENTEROSCOPY-ANTEROGRADE N/A 3/20/2018    Performed by Dustin Patterson MD at [facility] ENDO (2ND FLR)    EGD (ESOPHAGOGASTRODUODENOSCOPY) intraop N/A 1/29/2019    Performed by Renato Perez Jr., MD at [facility] OR 2ND FLR    ESOPHAGOGASTRODUODENOSCOPY (EGD) N/A 9/12/2018    Performed by Dustin Patterson MD at [facility] ENDO (4TH FLR)    ESOPHAGOGASTRODUODENOSCOPY (EGD) N/A 12/2/2016    Performed by Dustin Patterson MD at [facility] ENDO (4TH FLR)    ESOPHAGOGASTRODUODENOSCOPY (EGD) N/A 4/20/2015    Performed by Marcial Dewitt MD at [facility] ENDO (4TH FLR)    EYE SURGERY Bilateral     cataracts    FUNDOPLICATION, LAPAROSCOPIC, TOUPET N/A 1/29/2019    Performed by Renato Perez Jr., MD at [facility] OR 2ND FLR    INJECTION-STEROID-EPIDURAL-LUMBAR N/A 6/15/2016    Performed by [facility] at [facility] PAIN MGT    INSERTION, CARDIAC PACEMAKER, BIVENTRICULAR N/A 1/30/2019    Performed by Stone Livingston MD at [facility] EP LAB    lipoma removal  1999    MANOMETRY, ESOPHAGUS, WITH IMPEDANCE MEASUREMENT N/A 10/8/2018    Performed by Renato Maria MD at [facility] ENDO (4TH FLR)    PARATHYROIDECTOMY minimally invasive N/A 11/4/2015    Performed by Amna Aponte MD at [facility] OR 2ND FLR    REPAIR, HERNIA, HIATAL, LAPAROSCOPIC with Mesh N/A 1/29/2019    [facility]    TONSILLECTOMY       Family History   Problem Relation Age of Onset    Suicide Mother     Depression Mother     Alcohol abuse Father     Headaches Father     Diabetes Sister         prediabetes    Psoriasis Sister     Depression Sister     Depression Daughter     Colon cancer Neg Hx     Esophageal cancer Neg Hx      Social History     Tobacco Use    Smoking status: Never Smoker    Smokeless tobacco: Never Used   Substance Use Topics    Alcohol use: No     Comment: quit 2014 - alcohol abuse    Drug use: Yes     Review of Systems   Constitutional: Negative for fever.   HENT: Negative for sore throat.    Respiratory: Negative for shortness of breath.    Cardiovascular: Negative for chest pain.   Gastrointestinal: Positive for abdominal pain. Negative for diarrhea, nausea (resolved) and vomiting.   Genitourinary: Negative for dysuria.   Musculoskeletal: Positive for back pain.   Skin: Negative for rash.   Neurological: Positive for headaches (mild frontal). Negative for dizziness and weakness.   Hematological: Does not bruise/bleed easily.       Physical Exam     Initial Vitals [05/27/19 1226]   BP Pulse Resp Temp SpO2   (!) 119/56 66 18 97.5 °F (36.4 °C) 99 %      MAP       --         Physical Exam    Nursing note and vitals reviewed.  Constitutional: She appears well-developed and well-nourished. She is not diaphoretic. No distress.   HENT:   Head: Normocephalic and atraumatic.   Mouth/Throat: Oropharynx is clear and moist.   Eyes: Conjunctivae and EOM are normal. Pupils are equal, round, and reactive to light.   Neck: Normal range of motion. Neck supple. No JVD present.   Cardiovascular: Normal rate, regular rhythm and normal heart sounds.   No murmur heard.  Pulmonary/Chest: Breath sounds normal. No respiratory distress. She has no wheezes. She has no rhonchi. She has no rales.   Abdominal: Soft. Bowel sounds are normal. She exhibits no distension and no mass. There is no  tenderness. There is no rebound and no guarding.   Musculoskeletal: Normal range of motion. She exhibits no edema or tenderness.   Neurological: She is alert and oriented to person, place, and time. She has normal strength. No cranial nerve deficit or sensory deficit.   Skin: Skin is warm and dry. No rash noted.   Psychiatric: She has a normal mood and affect.         ED Course   Procedures  Labs Reviewed   COMPREHENSIVE METABOLIC PANEL - Abnormal; Notable for the following components:       Result Value    Alkaline Phosphatase 50 (*)     All other components within normal limits    Narrative:     LAV SHARED   LIPASE - Abnormal; Notable for the following components:    Lipase 65 (*)     All other components within normal limits    Narrative:     LAV SHARED   CBC W/ AUTO DIFFERENTIAL    Narrative:     LAV SHARED   TROPONIN I    Narrative:     LAV SHARED   B-TYPE NATRIURETIC PEPTIDE    Narrative:     LAV SHARED     EKG Readings: (Independently Interpreted)   Rhythm: Normal Sinus Rhythm. Heart Rate: 65.   No ischemic changes     ECG Results          EKG 12-lead (Final result)  Result time 05/27/19 15:06:43    Final result by Interface, Lab In [facility] (05/27/19 15:06:43)                 Narrative:    Test Reason : R53.1,    Vent. Rate : 066 BPM     Atrial Rate : 040 BPM     P-R Int : 152 ms          QRS Dur : 116 ms      QT Int : 472 ms       P-R-T Axes : 044 179 100 degrees     QTc Int : 494 ms    Ventricular-paced rhythm  Premature ventricular complexes  in a pattern of bigeminy  isorrhymic AV dissociation  Abnormal ECG  When compared with ECG of 16-MAY-2019 11:07,  ventricular pacing is new  Premature ventricular complexes  are new  Confirmed by CAMDEN BURNS MD ([id number]) on 5/27/2019 3:06:28 PM    Referred By: [email or web address]   SELF           Confirmed By:CAMDEN BURNS MD                             EKG 12-lead (Final result)  Result time 05/28/19 08:50:22    Final result by Interface, Lab In [facility] (05/28/19 08:50:22)                  Narrative:    Test Reason : R42,    Vent. Rate : 069 BPM     Atrial Rate : 060 BPM     P-R Int : 160 ms          QRS Dur : 116 ms      QT Int : 478 ms       P-R-T Axes : 025 134 094 degrees     QTc Int : 512 ms    AV dual-paced rhythm with frequent Premature ventricular complexes with one  couplet with fusion  Biventricular pacemaker detected  Abnormal ECG  When compared with ECG of 16-MAY-2019 11:07,  pvcs are new  Confirmed by GRANT ALTMAN, CAMDEN ([id number]) on 5/28/2019 8:50:10 AM    Referred By: RYLEY   SELF           Confirmed By:CAMDEN BURNS MD                            Imaging Results          CT Head Without Contrast (Final result)  Result time 05/27/19 14:52:22    Final result by Cam Holcomb MD (05/27/19 14:52:22)                 Impression:      No acute large vascular territory infarct or intracranial hemorrhage identified, noting only partial inclusion of the bilateral cerebellar hemispheres and craniocervical junction.  Further evaluation/follow-up as warranted.    Age-related mild senescent changes as above.      Electronically signed by: Cam Holcomb MD  Date:    05/27/2019  Time:    14:52             Narrative:    EXAMINATION:  CT HEAD WITHOUT CONTRAST    CLINICAL HISTORY:  Headache, acute, norm neuro exam;    TECHNIQUE:  Low dose axial CT images obtained throughout the head without intravenous contrast. Sagittal and coronal reconstructions were performed.    COMPARISON:  CTA head 07/05/2016 and MRI brain 02/20/2015    FINDINGS:  Inferior-most aspect of the right greater than left cerebellar hemispheres and also craniocervical junction are not included in field of view limiting evaluation of these regions.    Patient is slightly rotated and tilted within the scanner.    Intracranial compartment:    Age-appropriate mild generalized cerebral volume loss.  Ventricles are midline without distortion by mass effect or acute hydrocephalus.  No extra-axial blood or fluid collections.    Grossly stable  mild degree of supratentorial white matter chronic small vessel ischemic change.  No definite new focal areas of abnormal parenchymal attenuation.  No parenchymal mass, hemorrhage, edema or major vascular distribution infarct definitively seen of the imaged portions.  Skull base atherosclerotic vascular calcifications noted.    Skull/extracranial contents (limited evaluation): No fracture.  Imaged portions of the mastoid air cells and paranasal sinuses are essentially clear.  Prior surgery to the bilateral ocular lenses.                              X-Rays:   Independently Interpreted Readings:   Head CT: Negative     Medical Decision Making:   History:   Old Medical Records: I decided to obtain old medical records.  Initial Assessment:   1. Patient with headache that seems to be mild. Will check head CT, but I doubt ICH or stroke.   2. Vertigo with history of vertigo. This seems to be benign as well. She is relatively asymptomatic.   3. Abdominal discomfort. Benign abdominal exam. Will check labs including troponin and lipase.   Independently Interpreted Test(s):   I have ordered and independently interpreted X-rays - see prior notes.  I have ordered and independently interpreted EKG Reading(s) - see prior notes  Clinical Tests:   Lab Tests: Ordered and Reviewed  Radiological Study: Ordered and Reviewed  Medical Tests: Ordered and Reviewed  ED Management:  2:45 PM - Reviewed labs. They are unremarkable. Lipase is minimally elevated, but I doubt pancreatitis or bilary colic. Head CT negative. Will discharge home. She states she is asymptomatic now.             Scribe Attestation:   Scribe #1: I performed the above scribed service and the documentation accurately describes the services I performed. I attest to the accuracy of the note.               Clinical Impression:       ICD-10-CM ICD-9-CM   1. Nonintractable headache, unspecified chronicity pattern, unspecified headache type R51 784.0   2. Dizziness R42 780.4    3. Weakness R53.1 780.79   4. Vertigo R42 780.4         Disposition:   Disposition: Discharged  Condition: Stable                        Denis Bae MD  05/28/19 2859

## 2019-05-29 NOTE — PROGRESS NOTES
"  Physical Therapy Daily Treatment Note     Name: Paris Burris  Clinic Number: [id number]    Therapy Diagnosis:   Encounter Diagnoses   Name Primary?    Impaired functional mobility, balance, gait, and endurance     General weakness      Physician: Mandi Huynh MD    Visit Date: 5/30/2019    Physician Orders: PT Eval and Treat   Medical Diagnosis from Referral:   R26.81 (ICD-10-CM) - Unsteady gait   R53.81 (ICD-10-CM) - Debility      Evaluation Date: 4/2/2019  Authorization Period Expiration: 03/14/2020  Plan of Care Expiration: 07/02/2019  Visit # / Visits authorized:13/21      Time In: 9:00 AM  Time Out: 10:00 AM  Total Billable Time: 28 minutes     Precautions: Standard, DDD of LSP, DM type 2, Hx vertebral Fx, recent hernia surgery, Recent Pacemaker placement    Subjective     Pt reports: she is tired today as she has been suffering with vertigo and nausea for the past few days.     She was compliant with home exercise program.  Response to previous treatment: moderate soreness  Functional change: none    Pain: 1- 2/10  Location: bilateral LE      Objective     5/23/19    Special tests: 30" sit to stand = 13x    Lower Extremity Strength  Right LE   Left LE     Hip flexion: 4+/5 Hip flexion: 5/5   Hip extension:  5/5 Hip extension: 5/5   Hip abduction: 5/5 Hip abduction: 5/5   Hip adduction:  5/5 Hip adduction:  5/5   Hip Internal rotation    5/5 Hip Internal rotation 5/5   Knee Flexion 5/5 Knee Flexion 5/5   Knee Extension 4+/5 Knee Extension 5/5   Ankle dorsiflexion: 5/5 Ankle dorsiflexion: 5/5   Ankle plantarflexion: 5/5 Ankle plantarflexion: 5/5         CUELLAR and DGI on 5/16/19:    CUELLAR Assessment ---- Total: 53  Maximum: 56 (previous 37/56)  1. Sitting to Standing   4 - able to stand without using hands and stabilize independently  2. Standing Unsupported   4 - able to stand safely 2 minutes without hold  3. Sitting Unsupported   4 - able to sit safely and securely 2 minutes  4. Standing to Sitting   4 - sits " "safely with minimal use of hands  5. Pivot Transfer   4 - able to trnasfer safely with minor use of hands  6. Standing with Eyes Closed   4 - albe to stand 10 seconds safely  7. Standing with Feet Together   4 - able to place feet together independently and stand 1 minute safely  8. Reaching Forward with Outstretched Arm   4 - can reach forward confidently 25 cm/10 inches  9. Retrieving Object from Floor   4 - able to  slipper safely and easily  10. Turning to Look Behind   4 - looks behind from both sides and weights shifts well  11. Turning 360 Degrees   2 - able to turn 360 safely but slowly --- 5"  12. Placing Alternate Foot on Step   4 - able to stand independently safely and complete 8 steps in 20 seconds --- 14"  13. Standing with One Foot in Front   4 - able to tandem stand independently and hold 30 seconds  14. Standing on One Foot   3- able to lift leg independently and hold 5-10 seconds    Dynamic Gait Index: --- TOTAL SCORE: 21/24  1. GAIT LEVEL SURFACE: 3 - normal: walks 20', no AD, good speed, no evidence for imbalance, normal gait pattern  2. CHANGE IN GAIT SPEED: 3 - normal: able to smoothly change walking speed without loss of balance or gait deviations. Shows a significant difference in walking speeds between normal, fast and slow speeds  3. GAIT WITH HORIZONTAL HEAD TURNS: 2 - mild impairment: performs head turns smoothly with slight change in gait velocity, or minor disruption to smooth gait path, or uses AD  4. GAIT WITH VERTICAL HEAD TURNS: 2 - mild impairment: performs head turns smoothly with slight change in gait velocity, or minor disruption to smooth gait path, or uses AD  5. GAIT AND PIVOT TURN: 2 - mild impairment: pivots turns safely in >3 seconds and stops with no loss of balance  6. STEP OVER OBSTACLE: 3 - normal: is able to step over the box without changing gait speed, no evidence of imbalance  7. STEP AROUND OBSTACLES: 3 - normal: is able to walk around cones safely without " "changing gait speed; no evidence of imbalance  8. STEPS: 3 - normal: alternating feet, no rail      CMS Impairment/Limitation/Restriction for FOTO Complications Survey     Therapist reviewed FOTO scores for Paris Burris on 5/21/2019.   FOTO documents entered into EPIC - see Media section.     Limitation Score: 37%  Category: Mobility     Current : CJ = at least 20% but < 40% impaired, limited or restricted  Goal: CJ = at least 20% but < 40% impaired, limited or restricted  Discharge: N/A             Mrs. Burris received therapeutic exercises to develop strength, endurance, ROM, flexibility, posture and core stabilization for 55 minutes including:    Shuttle DL 30 x 2 black/1 red cord - NP today    SLS on airex 3 x 30" - green airex  SL cone taps 2 x 30" straight, 2 x 30" diagonals -- defer diagonals 2* to fatigue  Tandem on airex 3 x 30"  tandem on airex w/ arm swings on airex (blue) 2 x 10 -- only 1 set performed today 2* to fatigue  Airex feet together eyes closed 2 x 30"  standing hip abd: 2 x 10 x OTB at knees  heel raises 3 x 10    rocker board M/L dynamic/static: 2 x 30" ea  rocker board A/P dynamic/static: 2 x 30" ea    Lateral step-ups 6" 2 x 10 B  Tandem walk 2 x 40 ft  Side stepping: 2 x 40 ft  caraoca: 2 x 40 ft - PT SBA  walk with head turns up/down, L/R: 1 lap ea - defer 2* to fatigue   ITIS in tandem: 2 x 15 w/ foot in ea position   SALPD in tandem: 2 x 15 w/ foot in ea position x OTB  NR in tandem: 2 x 15 w/ foot in ea position x GTB      Home Exercises Provided and Patient Education Provided     Education provided:   -5/14/19 - updated HEP - bridge, BKFO, HL hip abd, SLR, SLS, tandem stance - see EMR  Written Home Exercises Provided: Patient instructed to cont prior HEP.  Exercises were reviewed and Mrs. Burris was able to demonstrate them prior to the end of the session.  Mrs. Burris demonstrated good  understanding of the education provided.     See EMR under Patient Instructions for exercises " provided prior visit.    Assessment     Limited progression of therex today due to recent c/o vertigo and nausea. Pt able to perform full therex program without icnreased c/o nausea or dizziness. Defer supine shuttle due to recent Hx of vertigo.   Continue PT x 2 weeks with progression to home program.    Mrs. Burris is progressing well towards her goals.   Pt prognosis is Good.     Pt will continue to benefit from skilled outpatient physical therapy to address the deficits listed in the problem list box on initial evaluation, provide pt/family education and to maximize pt's level of independence in the home and community environment.     Pt's spiritual, cultural and educational needs considered and pt agreeable to plan of care and goals.     Anticipated barriers to physical therapy: chronicity of symptoms, recent surgeries      Goals:   Short Term Goals (5 Weeks):  1. Pt able to improve static balance with BRG balance >=40/56 to allow for improved tolerance with ADLs. - Met 5/16/19  2. Pt able to improve dynamic balance with DGI >=19/24 to allow for improved stability with walking. - Met 5/16/19  3. Pt able to walk >=10 min with Mod difficulty. - Met 5/16/19  4. Pt to improve strength by a half grade to allow for increased ease with getting in/out of chair.  5. Pt to demonstrate improved functional ability with FOTO limitation <=34% disability.     Long Term Goals (10 Weeks):  1. Pt able to improve static balance with BRG balance >=45/56 to allow for improved tolerance with ADLs. - Met 5/16/19  2. Pt able to improve dynamic balance with DGI >=22/24 to allow for improved stability with walking. - Not met, progressing  3. Pt able to walk >=10 min with Min-No difficulty.  4. Pt to improve strength to 5/5 to allow for increased ease with getting in/out of chair.  5. Pt to demonstrate improved functional ability with FOTO limitation <=24% disability.  6. Pt will be independent with HEP and self management of symptoms.        Plan     Continue with POC for strength and conditioning.    Patsy Elaine, PT

## 2019-05-30 ENCOUNTER — CLINICAL SUPPORT (OUTPATIENT)
Dept: REHABILITATION | Facility: OTHER | Age: 74
End: 2019-05-30
Payer: MEDICARE

## 2019-05-30 DIAGNOSIS — R53.1 GENERAL WEAKNESS: ICD-10-CM

## 2019-05-30 DIAGNOSIS — Z74.09 IMPAIRED FUNCTIONAL MOBILITY, BALANCE, GAIT, AND ENDURANCE: ICD-10-CM

## 2019-05-30 PROCEDURE — 97110 THERAPEUTIC EXERCISES: CPT | Mod: HCNC,PN

## 2019-05-31 ENCOUNTER — HOSPITAL ENCOUNTER (OUTPATIENT)
Facility: HOSPITAL | Age: 74
Discharge: HOME | End: 2019-05-31
Attending: INTERNAL MEDICINE | Admitting: INTERNAL MEDICINE
Payer: MEDICARE

## 2019-05-31 ENCOUNTER — ANESTHESIA (OUTPATIENT)
Dept: ENDOSCOPY | Facility: HOSPITAL | Age: 74
End: 2019-05-31
Payer: MEDICARE

## 2019-05-31 ENCOUNTER — PATIENT MESSAGE (OUTPATIENT)
Dept: INTERNAL MEDICINE | Facility: CLINIC | Age: 74
End: 2019-05-31

## 2019-05-31 ENCOUNTER — ANESTHESIA EVENT (OUTPATIENT)
Dept: ENDOSCOPY | Facility: HOSPITAL | Age: 74
End: 2019-05-31
Payer: MEDICARE

## 2019-05-31 VITALS
WEIGHT: 156 LBS | SYSTOLIC BLOOD PRESSURE: 103 MMHG | BODY MASS INDEX: 29.45 KG/M2 | OXYGEN SATURATION: 95 % | HEART RATE: 69 BPM | TEMPERATURE: 98 F | DIASTOLIC BLOOD PRESSURE: 55 MMHG | RESPIRATION RATE: 18 BRPM | HEIGHT: 61 IN

## 2019-05-31 DIAGNOSIS — K21.9 GERD (GASTROESOPHAGEAL REFLUX DISEASE): ICD-10-CM

## 2019-05-31 DIAGNOSIS — K21.9 GASTROESOPHAGEAL REFLUX DISEASE, ESOPHAGITIS PRESENCE NOT SPECIFIED: Primary | ICD-10-CM

## 2019-05-31 DIAGNOSIS — Z12.31 ENCOUNTER FOR SCREENING MAMMOGRAM FOR BREAST CANCER: Primary | ICD-10-CM

## 2019-05-31 LAB — POCT GLUCOSE: 98 MG/DL (ref 70–110)

## 2019-05-31 PROCEDURE — 88342 IMHCHEM/IMCYTCHM 1ST ANTB: CPT | Mod: 26,HCNC,, | Performed by: PATHOLOGY

## 2019-05-31 PROCEDURE — 43239 EGD BIOPSY SINGLE/MULTIPLE: CPT | Mod: HCNC | Performed by: INTERNAL MEDICINE

## 2019-05-31 PROCEDURE — 25000003 PHARM REV CODE 250: Mod: HCNC | Performed by: INTERNAL MEDICINE

## 2019-05-31 PROCEDURE — 88342 IMHCHEM/IMCYTCHM 1ST ANTB: CPT | Mod: HCNC | Performed by: PATHOLOGY

## 2019-05-31 PROCEDURE — 88305 TISSUE EXAM BY PATHOLOGIST: CPT | Mod: HCNC | Performed by: PATHOLOGY

## 2019-05-31 PROCEDURE — 88305 TISSUE SPECIMEN TO PATHOLOGY - SURGERY: ICD-10-PCS | Mod: 26,HCNC,, | Performed by: PATHOLOGY

## 2019-05-31 PROCEDURE — 88342 TISSUE SPECIMEN TO PATHOLOGY - SURGERY: ICD-10-PCS | Mod: 26,HCNC,, | Performed by: PATHOLOGY

## 2019-05-31 PROCEDURE — 27201012 HC FORCEPS, HOT/COLD, DISP: Mod: HCNC | Performed by: INTERNAL MEDICINE

## 2019-05-31 PROCEDURE — 43239 PR EGD, FLEX, W/BIOPSY, SGL/MULTI: ICD-10-PCS | Mod: HCNC,,, | Performed by: INTERNAL MEDICINE

## 2019-05-31 PROCEDURE — 63600175 PHARM REV CODE 636 W HCPCS: Mod: HCNC | Performed by: NURSE ANESTHETIST, CERTIFIED REGISTERED

## 2019-05-31 PROCEDURE — E9220 PRA ENDO ANESTHESIA: ICD-10-PCS | Mod: HCNC,,, | Performed by: NURSE ANESTHETIST, CERTIFIED REGISTERED

## 2019-05-31 PROCEDURE — 37000009 HC ANESTHESIA EA ADD 15 MINS: Mod: HCNC | Performed by: INTERNAL MEDICINE

## 2019-05-31 PROCEDURE — E9220 PRA ENDO ANESTHESIA: HCPCS | Mod: HCNC,,, | Performed by: NURSE ANESTHETIST, CERTIFIED REGISTERED

## 2019-05-31 PROCEDURE — 43239 EGD BIOPSY SINGLE/MULTIPLE: CPT | Mod: HCNC,,, | Performed by: INTERNAL MEDICINE

## 2019-05-31 PROCEDURE — 88305 TISSUE EXAM BY PATHOLOGIST: CPT | Mod: 26,HCNC,, | Performed by: PATHOLOGY

## 2019-05-31 PROCEDURE — 37000008 HC ANESTHESIA 1ST 15 MINUTES: Mod: HCNC | Performed by: INTERNAL MEDICINE

## 2019-05-31 PROCEDURE — 25000003 PHARM REV CODE 250: Mod: HCNC | Performed by: NURSE ANESTHETIST, CERTIFIED REGISTERED

## 2019-05-31 RX ORDER — SODIUM CHLORIDE 0.9 % (FLUSH) 0.9 %
10 SYRINGE (ML) INJECTION
Status: DISCONTINUED | OUTPATIENT
Start: 2019-05-31 | End: 2023-10-17

## 2019-05-31 RX ORDER — PROPOFOL 10 MG/ML
VIAL (ML) INTRAVENOUS
Status: DISCONTINUED | OUTPATIENT
Start: 2019-05-31 | End: 2019-05-31

## 2019-05-31 RX ORDER — SODIUM CHLORIDE 9 MG/ML
INJECTION, SOLUTION INTRAVENOUS CONTINUOUS
Status: DISCONTINUED | OUTPATIENT
Start: 2019-05-31 | End: 2023-10-17

## 2019-05-31 RX ORDER — GLYCOPYRROLATE 0.2 MG/ML
INJECTION INTRAMUSCULAR; INTRAVENOUS
Status: DISCONTINUED | OUTPATIENT
Start: 2019-05-31 | End: 2019-05-31

## 2019-05-31 RX ORDER — LIDOCAINE HCL/PF 100 MG/5ML
SYRINGE (ML) INTRAVENOUS
Status: DISCONTINUED | OUTPATIENT
Start: 2019-05-31 | End: 2019-05-31

## 2019-05-31 RX ADMIN — LIDOCAINE HYDROCHLORIDE 50 MG: 20 INJECTION, SOLUTION INTRAVENOUS at 03:05

## 2019-05-31 RX ADMIN — PROPOFOL 50 MG: 10 INJECTION, EMULSION INTRAVENOUS at 03:05

## 2019-05-31 RX ADMIN — GLYCOPYRROLATE 0.2 MG: 0.2 INJECTION, SOLUTION INTRAMUSCULAR; INTRAVENOUS at 03:05

## 2019-05-31 RX ADMIN — SODIUM CHLORIDE: 0.9 INJECTION, SOLUTION INTRAVENOUS at 02:05

## 2019-05-31 NOTE — [employer] PATIENT INSTRUCTIONS
Discharge Summary/Instructions after an Endoscopic Procedure  Patient Name: Paris Burris  Patient MRN: [id number]  Patient YOB: 1945  Friday, May 31, 2019  Maria Elena Little MD  RESTRICTIONS:  During your procedure today, you received medications for sedation.  These   medications may affect your judgment, balance and coordination.  Therefore,   for 24 hours, you have the following restrictions:   - DO NOT drive a car, operate machinery, make legal/financial decisions,   sign important papers or drink alcohol.    ACTIVITY:  Today: no heavy lifting, straining or running due to procedural   sedation/anesthesia.  The following day: return to full activity including work.  DIET:  Eat and drink normally unless instructed otherwise.     TREATMENT FOR COMMON SIDE EFFECTS:  - Mild abdominal pain, nausea, belching, bloating or excessive gas:  rest,   eat lightly and use a heating pad.  - Sore Throat: treat with throat lozenges and/or gargle with warm salt   water.  - Because air was used during the procedure, expelling large amounts of air   from your rectum or belching is normal.  - If a bowel prep was taken, you may not have a bowel movement for 1-3 days.    This is normal.  SYMPTOMS TO WATCH FOR AND REPORT TO YOUR PHYSICIAN:  1. Abdominal pain or bloating, other than gas cramps.  2. Chest pain.  3. Back pain.  4. Signs of infection such as: chills or fever occurring within 24 hours   after the procedure.  5. Rectal bleeding, which would show as bright red, maroon, or black stools.   (A tablespoon of blood from the rectum is not serious, especially if   hemorrhoids are present.)  6. Vomiting.  7. Weakness or dizziness.  GO DIRECTLY TO THE NEAREST EMERGENCY ROOM IF YOU HAVE ANY OF THE FOLLOWING:      Difficulty breathing              Chills and/or fever over 101 F   Persistent vomiting and/or vomiting blood   Severe abdominal pain   Severe chest pain   Black, tarry stools   Bleeding- more than one tablespoon   Any  other symptom or condition that you feel may need urgent attention  Your doctor recommends these additional instructions:  If any biopsies were taken, your doctors clinic will contact you in 1 to 2   weeks with any results.  - Await pathology results.   - Discharge patient to home (with escort).   - Resume previous diet.   - Continue present medications.   - The findings and recommendations were discussed with the patient.   - Patient has a contact number available for emergencies.  The signs and   symptoms of potential delayed complications were discussed with the   patient.  Return to normal activities tomorrow.  Written discharge   instructions were provided to the patient.  For questions, problems or results please call your physician - Maria Elena Little MD at Work:  [phone].  [facility], EMERGENCY ROOM PHONE NUMBER: [phone]  IF A COMPLICATION OR EMERGENCY SITUATION ARISES AND YOU ARE UNABLE TO REACH   YOUR PHYSICIAN - GO DIRECTLY TO THE EMERGENCY ROOM.  Maria Elena Little MD  5/31/2019 3:40:31 PM  This report has been verified and signed electronically.  [employer]

## 2019-05-31 NOTE — ANESTHESIA POSTPROCEDURE EVALUATION
Anesthesia Post Evaluation    Patient: Paris Burris    Procedure(s) Performed: Procedure(s) (LRB):  EGD (ESOPHAGOGASTRODUODENOSCOPY) (N/A)    Final Anesthesia Type: general  Patient location during evaluation: GI PACU  Patient participation: Yes- Able to Participate  Level of consciousness: awake and alert  Post-procedure vital signs: reviewed and stable  Pain management: adequate  Airway patency: patent  PONV status at discharge: No PONV  Anesthetic complications: no      Cardiovascular status: blood pressure returned to baseline  Respiratory status: spontaneous ventilation  Hydration status: euvolemic  Follow-up not needed.          Vitals Value Taken Time   /55 5/31/2019  3:52 PM   Temp 36.5 °C (97.7 °F) 5/31/2019  3:42 PM   Pulse 71 5/31/2019  3:52 PM   Resp 18 5/31/2019  3:52 PM   SpO2 96 % 5/31/2019  3:52 PM         No case tracking events are documented in the log.      Pain/Nilsa Score: Nilsa Score: 10 (5/31/2019  3:52 PM)

## 2019-05-31 NOTE — ANESTHESIA PREPROCEDURE EVALUATION
05/31/2019  Paris Burris is a 73 y.o., female scheduled for EGD .  During laparascopic surgery for hiatal hernia she developed 2nd degree heart block post op.  Had pacemaker placed in January    Was seen in the ER 5/28 for nausea/vomiting and vertigo - was given meclizine and zofran and symptoms resolved.      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    Wrist fracture      Past Surgical History:   Procedure Laterality Date    ADENOIDECTOMY      BREAST LUMPECTOMY Left 2002    CATARACT EXTRACTION      COLONOSCOPY N/A 12/2/2016    Performed by Dustin Patterson MD at [facility] ENDO (4TH FLR)    DEVICE ASSISTED ENTEROSCOPY-ANTEROGRADE N/A 3/20/2018    Performed by Dustin Patterson MD at [facility] ENDO (2ND FLR)    EGD (ESOPHAGOGASTRODUODENOSCOPY) intraop N/A 1/29/2019    Performed by Renato Perez Jr., MD at [facility] OR 2ND FLR    ESOPHAGOGASTRODUODENOSCOPY (EGD) N/A 9/12/2018    Performed by Dustin Patterson MD at [facility] ENDO (4TH FLR)    ESOPHAGOGASTRODUODENOSCOPY (EGD) N/A 12/2/2016    Performed by Dustin Patterson MD at [facility] ENDO (4TH FLR)    ESOPHAGOGASTRODUODENOSCOPY (EGD) N/A 4/20/2015    Performed by Marical Dewitt MD at [facility] ENDO (4TH FLR)    EYE SURGERY Bilateral     cataracts    FUNDOPLICATION, LAPAROSCOPIC, TOUPET N/A  1/29/2019    Performed by Renato Perez Jr., MD at [facility] OR 2ND FLR    INJECTION-STEROID-EPIDURAL-LUMBAR N/A 6/15/2016    Performed by [facility] at [facility] PAIN MGT    INSERTION, CARDIAC PACEMAKER, BIVENTRICULAR N/A 1/30/2019    Performed by Stone Livingston MD at [facility] EP LAB    lipoma removal  1999    MANOMETRY, ESOPHAGUS, WITH IMPEDANCE MEASUREMENT N/A 10/8/2018    Performed by Renato Maria MD at [facility] ENDO (4TH FLR)    PARATHYROIDECTOMY minimally invasive N/A 11/4/2015    Performed by Amna Aponte MD at [facility] OR 2ND FLR    REPAIR, HERNIA, HIATAL, LAPAROSCOPIC with Mesh N/A 1/29/2019    Performed by Renato Perez Jr., MD at [facility] OR 2ND FLR    TONSILLECTOMY       Review of patient's allergies indicates:   Allergen Reactions    Topamax [topiramate] Other (See Comments)     hallucinations     Current Outpatient Medications on File Prior to Visit   Medication Sig Dispense Refill    alpha lipoic acid 300 mg Cap Take 300 mg by mouth 2 (two) times daily.       ascorbic acid (VITAMIN C) 500 MG tablet Take 1,000 mg by mouth once daily.       b complex vitamins capsule Take 1 capsule by mouth once daily.      blood sugar diagnostic (ACCU-CHEK SMARTVIEW TEST STRIP) Strp Test once daily. 100 strip 3    blood-glucose meter Misc 1 Device by Misc.(Non-Drug; Combo Route) route 2 (two) times daily. 1 each 0    buPROPion (WELLBUTRIN SR) 100 MG TBSR 12 hr tablet TAKE 1 TABLET BY MOUTH TWICE A DAY 60 tablet 2    candesartan (ATACAND) 4 MG tablet Take 1 tablet (4 mg total) by mouth once daily. 90 tablet 3    cephALEXin (KEFLEX) 250 MG capsule Take 2 capsules (500 mg total) by mouth every 8 (eight) hours. 12 capsule 0    cetirizine (ZYRTEC) 10 MG tablet Take 1 tablet (10 mg total) by mouth once daily. 30 tablet 1    CITICOLINE SODIUM (CITICOLINE ORAL) Take 1 tablet by mouth once daily at 6am.      lancets Misc 1 lancet by Misc.(Non-Drug; Combo Route) route 2 (two) times daily. 100 each 6     meclizine (ANTIVERT) 25 mg tablet Take 1 tablet (25 mg total) by mouth 3 (three) times daily as needed. 20 tablet 0    metoprolol succinate (TOPROL-XL) 50 MG 24 hr tablet Take 1 tablet (50 mg total) by mouth once daily. 30 tablet 11    omega-3 fatty acids-fish oil (FISH OIL) 340-1,000 mg Cap Take 1 capsule by mouth once daily.      ondansetron (ZOFRAN) 4 MG tablet Take 1 tablet (4 mg total) by mouth every 8 (eight) hours as needed. 12 tablet 0    pantoprazole (PROTONIX) 40 MG tablet Take 1 tablet (40 mg total) by mouth once daily. 30 tablet 11    silver sulfADIAZINE 1% (SILVADENE) 1 % cream Apply topically once daily. 50 g 11    traMADol (ULTRAM) 50 mg tablet Take 2 tablets (100 mg total) by mouth every 12 (twelve) hours as needed for Pain. 120 tablet 5    traZODone (DESYREL) 100 MG tablet Take 1 tablet (100 mg total) by mouth every evening. 30 tablet 11    tretinoin (RETIN-A) 0.05 % cream Use hs 45 g 6    venlafaxine (EFFEXOR) 37.5 MG Tab Take 2 tablets (75 mg total) by mouth once daily. 30 tablet 5    vitamin D 1000 units Tab Take 500 Units by mouth once daily. With K2 50 mch       No current facility-administered medications on file prior to visit.      Lab Results   Component Value Date    WBC 7.14 05/27/2019    HGB 14.9 05/27/2019    HCT 44.6 05/27/2019    MCV 92 05/27/2019     05/27/2019     BMP  Lab Results   Component Value Date     05/27/2019    K 4.1 05/27/2019     05/27/2019    CO2 24 05/27/2019    BUN 17 05/27/2019    CREATININE 0.9 05/27/2019    CALCIUM 9.7 05/27/2019    ANIONGAP 9 05/27/2019    ESTGFRAFRICA >60.0 05/27/2019    EGFRNONAA >60.0 05/27/2019         Pre-op Assessment    I have reviewed the Patient Summary Reports.      I have reviewed the Medications.     Review of Systems  Anesthesia Hx:  No problems with previous Anesthesia   Denies Personal Hx of Anesthesia complications.   Cardiovascular:   Exercise tolerance: good    Pulmonary:   Denies COPD.  Denies  Asthma. Sleep Apnea No CPAP use   Renal/:  Renal/ Normal     Hepatic/GI:   Hiatal Hernia, Denies GERD.    Neurological:   Denies CVA. Denies Seizures.        Physical Exam  General:  Well nourished    Airway/Jaw/Neck:  Airway Findings: Mouth Opening: Normal Tongue: Large  General Airway Assessment: Adult  Mallampati: III  TM Distance: Normal, at least 6 cm  Jaw/Neck Findings:  Neck ROM: Normal ROM      Dental:  Dental Findings: In tact        Mental Status:  Mental Status Findings:  Cooperative, Alert and Oriented         Anesthesia Plan  Type of Anesthesia, risks & benefits discussed:  Anesthesia Type:  general  Patient's Preference:   Intra-op Monitoring Plan: standard ASA monitors  Intra-op Monitoring Plan Comments:   Post Op Pain Control Plan: per primary service following discharge from PACU, IV/PO Opioids PRN and multimodal analgesia  Post Op Pain Control Plan Comments:   Induction:   IV  Beta Blocker:  Patient is not currently on a Beta-Blocker (No further documentation required).       Informed Consent: Patient understands risks and agrees with Anesthesia plan.  Questions answered. Anesthesia consent signed with patient.  ASA Score: 3     Day of Surgery Review of History & Physical:    H&P update referred to the surgeon.         Ready For Surgery From Anesthesia Perspective.

## 2019-05-31 NOTE — DISCHARGE INSTRUCTIONS

## 2019-05-31 NOTE — H&P
Short Stay Endoscopy History and Physical    PCP - Mandi Huynh MD     Procedure - EGD  ASA - per anesthesia  Mallampati - per anesthesia  History of Anesthesia problems - no  Family history Anesthesia problems -  no   Plan of anesthesia - General    HPI:  This is a 73 y.o. female here for evaluation of  GERD and L side pain  After paraesoph hernia repair.    ROS:  Constitutional: No fevers, chills, No weight loss  CV: No chest pain  Pulm: No cough, No shortness of breath  Ophtho: No vision changes  GI: see HPI  Derm: No rash    Medical History:  has a past medical history of Allergy, Anemia, Anxiety, Breast cancer (2002), Cancer (2002), Decreased hearing, Depression, Diabetes mellitus, Diabetes with neurologic complications, Gastric ulcer (9/10/13), Hiatal hernia, Hyperlipidemia, Migraine headache, Migraine headache (3/20/2015), Multiple gastric ulcers, Parathyroid disorder, Pre-diabetes, Renal manifestation of secondary diabetes mellitus, Sleep apnea, Type 2 diabetes mellitus, controlled, with renal complications (6/14/2017), and Wrist fracture.    Surgical History:  has a past surgical history that includes lipoma removal (1999); Colonoscopy (N/A, 12/2/2016); Tonsillectomy; Adenoidectomy; Eye surgery (Bilateral); Breast lumpectomy (Left, 2002); Esophagogastroduodenoscopy (N/A, 9/12/2018); Esophageal manometry with measurement of impedance (N/A, 10/8/2018); Laparoscopic Toupet fundoplication (N/A, 1/29/2019); Esophagogastroduodenoscopy (N/A, 1/29/2019); Implantation of biventricular heart pacemaker (N/A, 1/30/2019); and Cataract extraction.    Family History: family history includes Alcohol abuse in her father; Depression in her daughter, mother, and sister; Diabetes in her sister; Headaches in her father; Psoriasis in her sister; Suicide in her mother.. Otherwise no colon cancer, inflammatory bowel disease, or GI malignancies.    Social History:  reports that she has never smoked. She has never used smokeless  tobacco. She reports that she has current or past drug history. She reports that she does not drink alcohol.    Review of patient's allergies indicates:   Allergen Reactions    Topamax [topiramate] Other (See Comments)     hallucinations       Medications:   Medications Prior to Admission   Medication Sig Dispense Refill Last Dose    ascorbic acid (VITAMIN C) 500 MG tablet Take 1,000 mg by mouth once daily.    5/30/2019 at Unknown time    b complex vitamins capsule Take 1 capsule by mouth once daily.   5/30/2019 at Unknown time    buPROPion (WELLBUTRIN SR) 100 MG TBSR 12 hr tablet TAKE 1 TABLET BY MOUTH TWICE A DAY 60 tablet 2 5/31/2019 at Unknown time    candesartan (ATACAND) 4 MG tablet Take 1 tablet (4 mg total) by mouth once daily. 90 tablet 3 5/30/2019 at Unknown time    cetirizine (ZYRTEC) 10 MG tablet Take 1 tablet (10 mg total) by mouth once daily. 30 tablet 1 5/30/2019 at Unknown time    CITICOLINE SODIUM (CITICOLINE ORAL) Take 1 tablet by mouth once daily at 6am.   5/30/2019 at Unknown time    meclizine (ANTIVERT) 25 mg tablet Take 1 tablet (25 mg total) by mouth 3 (three) times daily as needed. 20 tablet 0 Past Month at Unknown time    metoprolol succinate (TOPROL-XL) 50 MG 24 hr tablet Take 1 tablet (50 mg total) by mouth once daily. 30 tablet 11 5/30/2019 at Unknown time    omega-3 fatty acids-fish oil (FISH OIL) 340-1,000 mg Cap Take 1 capsule by mouth once daily.   5/30/2019 at Unknown time    ondansetron (ZOFRAN) 4 MG tablet Take 1 tablet (4 mg total) by mouth every 8 (eight) hours as needed. 12 tablet 0 Past Month at Unknown time    pantoprazole (PROTONIX) 40 MG tablet Take 1 tablet (40 mg total) by mouth once daily. 30 tablet 11 5/31/2019 at Unknown time    traMADol (ULTRAM) 50 mg tablet Take 2 tablets (100 mg total) by mouth every 12 (twelve) hours as needed for Pain. 120 tablet 5 5/30/2019 at Unknown time    traZODone (DESYREL) 100 MG tablet Take 1 tablet (100 mg total) by mouth  every evening. 30 tablet 11 5/30/2019 at Unknown time    venlafaxine (EFFEXOR) 37.5 MG Tab Take 2 tablets (75 mg total) by mouth once daily. 30 tablet 5 5/31/2019 at Unknown time    vitamin D 1000 units Tab Take 500 Units by mouth once daily. With K2 50 mch   5/31/2019 at Unknown time    alpha lipoic acid 300 mg Cap Take 300 mg by mouth 2 (two) times daily.    Taking    blood sugar diagnostic (ACCU-CHEK SMARTVIEW TEST STRIP) Strp Test once daily. 100 strip 3 Taking    blood-glucose meter Misc 1 Device by Misc.(Non-Drug; Combo Route) route 2 (two) times daily. 1 each 0 Taking    cephALEXin (KEFLEX) 250 MG capsule Take 2 capsules (500 mg total) by mouth every 8 (eight) hours. 12 capsule 0 5/26/2019    lancets Misc 1 lancet by Misc.(Non-Drug; Combo Route) route 2 (two) times daily. 100 each 6 Taking    silver sulfADIAZINE 1% (SILVADENE) 1 % cream Apply topically once daily. 50 g 11 Taking    tretinoin (RETIN-A) 0.05 % cream Use hs 45 g 6 Taking       Physical Exam:    Vital Signs:   Vitals:    05/31/19 1417   BP: 105/62   Pulse: 62   Resp: 14   Temp: 97.7 °F (36.5 °C)       General Appearance: Well appearing in no acute distress  Eyes:    No scleral icterus  Lungs: CTA anteriorly  Heart:  Regular rate, S1, S2 normal, no murmurs heard.  Abdomen: Soft, non tender, non distended with normal bowel sounds. No hepatosplenomegaly, ascites, or mass.  Extremities: No edema  Skin: No rash    Labs:  Lab Results   Component Value Date    WBC 7.14 05/27/2019    HGB 14.9 05/27/2019    HCT 44.6 05/27/2019     05/27/2019    CHOL 168 01/10/2019    TRIG 229 (H) 01/10/2019    HDL 34 (L) 01/10/2019    ALT 18 05/27/2019    AST 18 05/27/2019     05/27/2019    K 4.1 05/27/2019     05/27/2019    CREATININE 0.9 05/27/2019    BUN 17 05/27/2019    CO2 24 05/27/2019    TSH 0.797 02/20/2019    INR 0.9 04/24/2015    HGBA1C 5.6 11/14/2018       I have explained the risks and benefits of endoscopy procedures to the patient  including but not limited to bleeding, perforation, infection, and death.      Maria Elena Little MD

## 2019-05-31 NOTE — TRANSFER OF CARE
"Anesthesia Transfer of Care Note    Patient: Paris Burris    Procedure(s) Performed: Procedure(s) (LRB):  EGD (ESOPHAGOGASTRODUODENOSCOPY) (N/A)    Patient location: GI    Anesthesia Type: general    Transport from OR: Transported from OR on room air with adequate spontaneous ventilation    Post pain: adequate analgesia    Post assessment: no apparent anesthetic complications    Post vital signs: stable    Level of consciousness: awake    Nausea/Vomiting: no nausea/vomiting    Complications: none    Transfer of care protocol was followed      Last vitals:   Visit Vitals  BP (!) 97/51 (BP Location: Left arm, Patient Position: Lying)   Pulse 74   Temp 36.5 °C (97.7 °F) (Temporal)   Resp 18   Ht 5' 1" (1.549 m)   Wt 70.8 kg (156 lb)   SpO2 95%   Breastfeeding? No   BMI 29.48 kg/m²     "

## 2019-06-01 DIAGNOSIS — E11.9 CONTROLLED TYPE 2 DIABETES MELLITUS WITHOUT COMPLICATION, WITHOUT LONG-TERM CURRENT USE OF INSULIN: ICD-10-CM

## 2019-06-01 DIAGNOSIS — E78.5 HYPERLIPIDEMIA, UNSPECIFIED HYPERLIPIDEMIA TYPE: ICD-10-CM

## 2019-06-03 RX ORDER — ROSUVASTATIN CALCIUM 20 MG/1
TABLET, COATED ORAL
Qty: 90 TABLET | Refills: 1 | Status: SHIPPED | OUTPATIENT
Start: 2019-06-03 | End: 2020-02-24

## 2019-06-03 RX ORDER — BUPROPION HYDROCHLORIDE 100 MG/1
TABLET, EXTENDED RELEASE ORAL
Qty: 60 TABLET | Refills: 2 | Status: SHIPPED | OUTPATIENT
Start: 2019-06-03 | End: 2019-07-30 | Stop reason: SDUPTHER

## 2019-06-04 ENCOUNTER — CLINICAL SUPPORT (OUTPATIENT)
Dept: REHABILITATION | Facility: OTHER | Age: 74
End: 2019-06-04
Payer: MEDICARE

## 2019-06-04 DIAGNOSIS — R53.1 GENERAL WEAKNESS: ICD-10-CM

## 2019-06-04 DIAGNOSIS — Z74.09 IMPAIRED FUNCTIONAL MOBILITY, BALANCE, GAIT, AND ENDURANCE: ICD-10-CM

## 2019-06-04 PROCEDURE — 97110 THERAPEUTIC EXERCISES: CPT | Mod: HCNC,PN

## 2019-06-04 NOTE — PROGRESS NOTES
"  Physical Therapy Daily Treatment Note     Name: Paris Burris  Clinic Number: [id number]    Therapy Diagnosis:   Encounter Diagnoses   Name Primary?    Impaired functional mobility, balance, gait, and endurance     General weakness      Physician: Mandi Huynh MD    Visit Date: 6/4/2019    Physician Orders: PT Eval and Treat   Medical Diagnosis from Referral:   R26.81 (ICD-10-CM) - Unsteady gait   R53.81 (ICD-10-CM) - Debility      Evaluation Date: 4/2/2019  Authorization Period Expiration: 03/14/2020  Plan of Care Expiration: 07/02/2019  Visit # / Visits authorized:14/21      Time In: 2:00 PM  Time Out: 2:40 PM  Total Billable Time: 30 minutes     Precautions: Standard, DDD of LSP, DM type 2, Hx vertebral Fx, recent hernia surgery, Recent Pacemaker placement    Subjective     Pt reports: she is feeling much better today without any vertigo.     She was compliant with home exercise program.  Response to previous treatment: moderate soreness  Functional change: none    Pain: 1- 2/10  Location: bilateral LE      Objective     5/23/19    Special tests: 30" sit to stand = 13x    Lower Extremity Strength  Right LE   Left LE     Hip flexion: 4+/5 Hip flexion: 5/5   Hip extension:  5/5 Hip extension: 5/5   Hip abduction: 5/5 Hip abduction: 5/5   Hip adduction:  5/5 Hip adduction:  5/5   Hip Internal rotation    5/5 Hip Internal rotation 5/5   Knee Flexion 5/5 Knee Flexion 5/5   Knee Extension 4+/5 Knee Extension 5/5   Ankle dorsiflexion: 5/5 Ankle dorsiflexion: 5/5   Ankle plantarflexion: 5/5 Ankle plantarflexion: 5/5         CUELLAR and DGI on 5/16/19:    CUELLAR Assessment ---- Total: 53  Maximum: 56 (previous 37/56)  1. Sitting to Standing   4 - able to stand without using hands and stabilize independently  2. Standing Unsupported   4 - able to stand safely 2 minutes without hold  3. Sitting Unsupported   4 - able to sit safely and securely 2 minutes  4. Standing to Sitting   4 - sits safely with minimal use of hands  5. " "Pivot Transfer   4 - able to trnasfer safely with minor use of hands  6. Standing with Eyes Closed   4 - albe to stand 10 seconds safely  7. Standing with Feet Together   4 - able to place feet together independently and stand 1 minute safely  8. Reaching Forward with Outstretched Arm   4 - can reach forward confidently 25 cm/10 inches  9. Retrieving Object from Floor   4 - able to  slipper safely and easily  10. Turning to Look Behind   4 - looks behind from both sides and weights shifts well  11. Turning 360 Degrees   2 - able to turn 360 safely but slowly --- 5"  12. Placing Alternate Foot on Step   4 - able to stand independently safely and complete 8 steps in 20 seconds --- 14"  13. Standing with One Foot in Front   4 - able to tandem stand independently and hold 30 seconds  14. Standing on One Foot   3- able to lift leg independently and hold 5-10 seconds    Dynamic Gait Index: --- TOTAL SCORE: 21/24  1. GAIT LEVEL SURFACE: 3 - normal: walks 20', no AD, good speed, no evidence for imbalance, normal gait pattern  2. CHANGE IN GAIT SPEED: 3 - normal: able to smoothly change walking speed without loss of balance or gait deviations. Shows a significant difference in walking speeds between normal, fast and slow speeds  3. GAIT WITH HORIZONTAL HEAD TURNS: 2 - mild impairment: performs head turns smoothly with slight change in gait velocity, or minor disruption to smooth gait path, or uses AD  4. GAIT WITH VERTICAL HEAD TURNS: 2 - mild impairment: performs head turns smoothly with slight change in gait velocity, or minor disruption to smooth gait path, or uses AD  5. GAIT AND PIVOT TURN: 2 - mild impairment: pivots turns safely in >3 seconds and stops with no loss of balance  6. STEP OVER OBSTACLE: 3 - normal: is able to step over the box without changing gait speed, no evidence of imbalance  7. STEP AROUND OBSTACLES: 3 - normal: is able to walk around cones safely without changing gait speed; no evidence of " "imbalance  8. STEPS: 3 - normal: alternating feet, no rail      CMS Impairment/Limitation/Restriction for FOTO Complications Survey     Therapist reviewed FOTO scores for Paris Burris on 5/21/2019.   FOTO documents entered into EPIC - see Media section.     Limitation Score: 37%  Category: Mobility     Current : CJ = at least 20% but < 40% impaired, limited or restricted  Goal: CJ = at least 20% but < 40% impaired, limited or restricted  Discharge: N/A             Mrs. Burris received therapeutic exercises to develop strength, endurance, ROM, flexibility, posture and core stabilization for 40 minutes including:    Shuttle DL 30 x 2 black/1 red cord - NP today    SLS on airex 3 x 30" - green airex  SL cone taps 2 x 30" straight, 2 x 30" diagonals  Tandem on airex 3 x 30"  tandem on airex w/ arm swings on airex (blue) 2 x 10   Airex feet together eyes closed 2 x 30"  standing hip abd: 2 x 10 x OTB at knees  heel raises 3 x 10    rocker board M/L dynamic/static: 2 x 30" ea  rocker board A/P dynamic/static: 2 x 30" ea    Lateral step-ups 6" 2 x 10 B  Tandem walk 2 x 40 ft  Side stepping: 2 x 40 ft  caraoca: 2 x 40 ft - PT SBA  walk with head turns up/down, L/R: 1 lap ea - defer 2* to fatigue   ITIS in tandem: 2 x 15 w/ foot in ea position   SALPD in tandem: 2 x 15 w/ foot in ea position x OTB  NR in tandem: 2 x 15 w/ foot in ea position x GTB      Home Exercises Provided and Patient Education Provided     Education provided:   -5/14/19 - updated HEP - bridge, BKFO, HL hip abd, SLR, SLS, tandem stance - see EMR  Written Home Exercises Provided: Patient instructed to cont prior HEP.  Exercises were reviewed and Mrs. Burris was able to demonstrate them prior to the end of the session.  Mrs. Burris demonstrated good  understanding of the education provided.     See EMR under Patient Instructions for exercises provided prior visit.    Assessment     Good progression through full program today without c/o vertigo or fatigue. " Pt requires VC to stay on task. Pt presents with improved energized mood today. Consider D/C in the next visit.    Mrs. Burris is progressing well towards her goals.   Pt prognosis is Good.     Pt will continue to benefit from skilled outpatient physical therapy to address the deficits listed in the problem list box on initial evaluation, provide pt/family education and to maximize pt's level of independence in the home and community environment.     Pt's spiritual, cultural and educational needs considered and pt agreeable to plan of care and goals.     Anticipated barriers to physical therapy: chronicity of symptoms, recent surgeries      Goals:   Short Term Goals (5 Weeks):  1. Pt able to improve static balance with BRG balance >=40/56 to allow for improved tolerance with ADLs. - Met 5/16/19  2. Pt able to improve dynamic balance with DGI >=19/24 to allow for improved stability with walking. - Met 5/16/19  3. Pt able to walk >=10 min with Mod difficulty. - Met 5/16/19  4. Pt to improve strength by a half grade to allow for increased ease with getting in/out of chair.  5. Pt to demonstrate improved functional ability with FOTO limitation <=34% disability.     Long Term Goals (10 Weeks):  1. Pt able to improve static balance with BRG balance >=45/56 to allow for improved tolerance with ADLs. - Met 5/16/19  2. Pt able to improve dynamic balance with DGI >=22/24 to allow for improved stability with walking. - Not met, progressing  3. Pt able to walk >=10 min with Min-No difficulty.  4. Pt to improve strength to 5/5 to allow for increased ease with getting in/out of chair.  5. Pt to demonstrate improved functional ability with FOTO limitation <=24% disability.  6. Pt will be independent with HEP and self management of symptoms.       Plan     Continue with POC for strength and conditioning.    Patsy Elaine, PT

## 2019-06-07 ENCOUNTER — HOSPITAL ENCOUNTER (OUTPATIENT)
Dept: RADIOLOGY | Facility: HOSPITAL | Age: 74
Discharge: HOME | End: 2019-06-07
Attending: INTERNAL MEDICINE
Payer: MEDICARE

## 2019-06-07 ENCOUNTER — PATIENT MESSAGE (OUTPATIENT)
Dept: SURGERY | Facility: CLINIC | Age: 74
End: 2019-06-07

## 2019-06-07 ENCOUNTER — TELEPHONE (OUTPATIENT)
Dept: SURGERY | Facility: CLINIC | Age: 74
End: 2019-06-07

## 2019-06-07 ENCOUNTER — TELEPHONE (OUTPATIENT)
Dept: ENDOSCOPY | Facility: HOSPITAL | Age: 74
End: 2019-06-07

## 2019-06-07 DIAGNOSIS — Z12.31 ENCOUNTER FOR SCREENING MAMMOGRAM FOR BREAST CANCER: ICD-10-CM

## 2019-06-07 PROCEDURE — 77063 MAMMO DIGITAL SCREENING BILAT WITH TOMOSYNTHESIS_CAD: ICD-10-PCS | Mod: 26,HCNC,, | Performed by: RADIOLOGY

## 2019-06-07 PROCEDURE — 77067 MAMMO DIGITAL SCREENING BILAT WITH TOMOSYNTHESIS_CAD: ICD-10-PCS | Mod: 26,HCNC,, | Performed by: RADIOLOGY

## 2019-06-07 PROCEDURE — 77063 BREAST TOMOSYNTHESIS BI: CPT | Mod: 26,HCNC,, | Performed by: RADIOLOGY

## 2019-06-07 PROCEDURE — 77067 SCR MAMMO BI INCL CAD: CPT | Mod: 26,HCNC,, | Performed by: RADIOLOGY

## 2019-06-07 PROCEDURE — 77067 SCR MAMMO BI INCL CAD: CPT | Mod: TC,HCNC

## 2019-06-07 NOTE — TELEPHONE ENCOUNTER
----- Message from Renato Perez Jr., MD sent at 6/4/2019  7:39 PM CDT -----  EGD complete.  Wrap intact.  Not overly tight.  Would trial continued protonix for now.  If symtpoms dont resolve she may need a Bravo study,

## 2019-06-07 NOTE — TELEPHONE ENCOUNTER
Attempted to reach out to pt x2, but unsuccessful d/t issues with ongoing phone calls.   [facility]

## 2019-06-07 NOTE — TELEPHONE ENCOUNTER
----- Message from Omer Ayers sent at 6/7/2019 12:29 PM CDT -----  Contact: pt  Patient Returning Call from [facility]    Who Left Message for Patient: Shweta     Communication Preference: [phone]     Additional Information:

## 2019-06-10 ENCOUNTER — LAB VISIT (OUTPATIENT)
Dept: LAB | Facility: HOSPITAL | Age: 74
End: 2019-06-10
Attending: INTERNAL MEDICINE
Payer: MEDICARE

## 2019-06-10 ENCOUNTER — CLINICAL SUPPORT (OUTPATIENT)
Dept: INTERNAL MEDICINE | Facility: CLINIC | Age: 74
End: 2019-06-10
Payer: MEDICARE

## 2019-06-10 VITALS — WEIGHT: 160.06 LBS | BODY MASS INDEX: 30.24 KG/M2

## 2019-06-10 DIAGNOSIS — E78.5 HYPERLIPIDEMIA, UNSPECIFIED HYPERLIPIDEMIA TYPE: ICD-10-CM

## 2019-06-10 DIAGNOSIS — R73.03 PRE-DIABETES: ICD-10-CM

## 2019-06-10 LAB
CHOLEST SERPL-MCNC: 157 MG/DL (ref 120–199)
CHOLEST/HDLC SERPL: 4.8 {RATIO} (ref 2–5)
ESTIMATED AVG GLUCOSE: 126 MG/DL (ref 68–131)
HBA1C MFR BLD HPLC: 6 % (ref 4–5.6)
HDLC SERPL-MCNC: 33 MG/DL (ref 40–75)
HDLC SERPL: 21 % (ref 20–50)
LDLC SERPL CALC-MCNC: 71.8 MG/DL (ref 63–159)
NONHDLC SERPL-MCNC: 124 MG/DL
TRIGL SERPL-MCNC: 261 MG/DL (ref 30–150)

## 2019-06-10 PROCEDURE — 80061 LIPID PANEL: CPT | Mod: HCNC

## 2019-06-10 PROCEDURE — 36415 COLL VENOUS BLD VENIPUNCTURE: CPT | Mod: HCNC

## 2019-06-10 PROCEDURE — 83036 HEMOGLOBIN GLYCOSYLATED A1C: CPT | Mod: HCNC

## 2019-06-10 NOTE — PROGRESS NOTES
Health  Follow-Up Note   [x] Office  [] Phone  Notes from previous session reviewed.   [x] Previous Session Goals unchanged.   [] Patient/Caregiver Change in Goals.  Goals added or changed by Patient/Caregiver since program participation:   1. Continue walk 4-5 x week for 30 min  2. Started taking PGX fiber again        Additional/Changed support that patient/caregiver has experienced/sought?  (Indicate readiness, support from family, friends, others, community groups, etc)  1.       Additional/Changed obstacles that could prevent patient/caregiver from reaching their goals?  1.   sick not wanting to walk, afraid to leave him    Feedback provided:  1.  Praised for continued effort and determination    Diagnostic values/Desriptors for follow-up as needed for chronic condition(s)   Weight: 72.6 kg 160.05 lb  Gain 1.1 lb  Blood Glucose: average   7 d 121  14 d 121  30 d 116  90 d 115    Interventions:   1. Health  listened reflectively, validated thoughts and feelings, offered support and encouragement.   2. Allowed patient to express themselves in a non-biased atmosphere.  3. Health  assisted pt to problem-solve obstacles such as being in a challenging environment and dealing with these challenges.   4. Motivational Interviewed interventions utilized (OARS).   5. Patient responded favorably to interventions and remained actively engaged in the session.   6. Health  will remain available and connected for patient by phone and/or office visits.   7. Positive reinforcement, emotional support and encouragement provided.   8. Focused Education: MI    Plan:  [x] Pt will work on goals as stated above.   [x] Pt will contact Health  for any questions, concerns or needs.  [x] Pt will follow up with Health  in office on  7.1.19  [] Pt will follow up with Health  on phone in:        [x] Health  will remain available.   [] Health  will contact patient by phone in:         [] Health  will consult:        [] Health  will inform Provider via EPIC messaging.     Impression:  1. Behavior is consistent with Action Stage of Change.   2. Participation level:       [x] Receptive      [x] Interactive      [] Guarded and Resistant      [x] Self Motivated      [] Refused/Declined to participate   3. [x] Pt voiced understanding of all information presented.       [] Pt voiced needing further information/education. This will be arranged.       [x] Pt would benefit from further education/information as identified by this health . This will be arranged.     Jacqueline Perry RN HC

## 2019-06-13 ENCOUNTER — PES CALL (OUTPATIENT)
Dept: ADMINISTRATIVE | Facility: CLINIC | Age: 74
End: 2019-06-13

## 2019-06-13 ENCOUNTER — CLINICAL SUPPORT (OUTPATIENT)
Dept: REHABILITATION | Facility: OTHER | Age: 74
End: 2019-06-13
Payer: MEDICARE

## 2019-06-13 DIAGNOSIS — R53.1 GENERAL WEAKNESS: ICD-10-CM

## 2019-06-13 DIAGNOSIS — Z74.09 IMPAIRED FUNCTIONAL MOBILITY, BALANCE, GAIT, AND ENDURANCE: ICD-10-CM

## 2019-06-13 PROCEDURE — G8980 MOBILITY D/C STATUS: HCPCS | Mod: CJ,HCNC,PN

## 2019-06-13 PROCEDURE — G8979 MOBILITY GOAL STATUS: HCPCS | Mod: CJ,HCNC,PN

## 2019-06-13 PROCEDURE — 97110 THERAPEUTIC EXERCISES: CPT | Mod: HCNC,PN

## 2019-06-13 RX ORDER — TRAMADOL HYDROCHLORIDE 50 MG/1
100 TABLET ORAL EVERY 12 HOURS PRN
Qty: 120 TABLET | Refills: 5 | OUTPATIENT
Start: 2019-06-13

## 2019-06-13 NOTE — PROGRESS NOTES
"  Physical Therapy Daily Treatment Note     Name: Paris Burris  Clinic Number: [id number]    Therapy Diagnosis:   Encounter Diagnoses   Name Primary?    Impaired functional mobility, balance, gait, and endurance     General weakness      Physician: Mandi Huynh MD    Visit Date: 6/13/2019    Physician Orders: PT Eval and Treat   Medical Diagnosis from Referral:   R26.81 (ICD-10-CM) - Unsteady gait   R53.81 (ICD-10-CM) - Debility      Evaluation Date: 4/2/2019  Authorization Period Expiration: 03/14/2020  Plan of Care Expiration: 07/02/2019  Visit # / Visits authorized:15/21      Time In: 11:00 AM  Time Out: 12:00 PM  Total Billable Time: 30 minutes -- D/C today     Precautions: Standard, DDD of LSP, DM type 2, Hx vertebral Fx, recent hernia surgery, Recent Pacemaker placement    Subjective     Pt reports: that she is walking 30 minutes, 4-5 times per week, and is feeling really good. She notes that she is able to walk uneven sidewalks without difficulty. She is able to do most chores at home without difficulty. She says that she is ready for today to be her last therapy visit.    She was compliant with home exercise program.  Response to previous treatment: moderate soreness  Functional change: none    Pain:  0/10  Location: bilateral LE      Objective     6/13/19    Special tests: 30" sit to stand = 13x    Lower Extremity Strength  Right LE   Left LE     Hip flexion: 5/5 Hip flexion: 5/5   Hip extension:  5/5 Hip extension: 5/5   Hip abduction: 5/5 Hip abduction: 5/5   Hip adduction:  5/5 Hip adduction:  5/5   Hip Internal rotation    5/5 Hip Internal rotation 5/5   Knee Flexion 5/5 Knee Flexion 5/5   Knee Extension 5/5 Knee Extension 5/5   Ankle dorsiflexion: 5/5 Ankle dorsiflexion: 5/5   Ankle plantarflexion: 5/5 Ankle plantarflexion: 5/5       CUELLAR Assessment ---- Total: 53  Maximum: 56 (previous 37/56)  1. Sitting to Standing   4 - able to stand without using hands and stabilize independently  2. Standing " "Unsupported   4 - able to stand safely 2 minutes without hold  3. Sitting Unsupported   4 - able to sit safely and securely 2 minutes  4. Standing to Sitting   4 - sits safely with minimal use of hands  5. Pivot Transfer   4 - able to trnasfer safely with minor use of hands  6. Standing with Eyes Closed   4 - albe to stand 10 seconds safely  7. Standing with Feet Together   4 - able to place feet together independently and stand 1 minute safely  8. Reaching Forward with Outstretched Arm   4 - can reach forward confidently 25 cm/10 inches  9. Retrieving Object from Floor   4 - able to  slipper safely and easily  10. Turning to Look Behind   4 - looks behind from both sides and weights shifts well  11. Turning 360 Degrees   2 - able to turn 360 safely but slowly --- 5"  12. Placing Alternate Foot on Step   4 - able to stand independently safely and complete 8 steps in 20 seconds --- 14"  13. Standing with One Foot in Front   4 - able to tandem stand independently and hold 30 seconds  14. Standing on One Foot   3- able to lift leg independently and hold 5-10 seconds    Dynamic Gait Index: --- TOTAL SCORE: 23/24  1. GAIT LEVEL SURFACE: 3 - normal: walks 20', no AD, good speed, no evidence for imbalance, normal gait pattern  2. CHANGE IN GAIT SPEED: 3 - normal: able to smoothly change walking speed without loss of balance or gait deviations. Shows a significant difference in walking speeds between normal, fast and slow speeds  3. GAIT WITH HORIZONTAL HEAD TURNS: 2 - mild impairment: performs head turns smoothly with slight change in gait velocity, or minor disruption to smooth gait path, or uses AD  4. GAIT WITH VERTICAL HEAD TURNS: 3 - normal: performs head turns smoothly with no change in gait  5. GAIT AND PIVOT TURN: 3 - normal: pivot turns safely within 3 seconds and stops quickly with no loss of balance  6. STEP OVER OBSTACLE: 3 - normal: is able to step over the box without changing gait speed, no evidence " "of imbalance  7. STEP AROUND OBSTACLES: 3 - normal: is able to walk around cones safely without changing gait speed; no evidence of imbalance  8. STEPS: 3 - normal: alternating feet, no rail      CMS Impairment/Limitation/Restriction for FOTO Complications Survey     Therapist reviewed FOTO scores for Paris Burris on 5/21/2019.   FOTO documents entered into EPIC - see Media section.     Limitation Score: 37%  Category: Mobility     Current : CJ = at least 20% but < 40% impaired, limited or restricted  Goal: CJ = at least 20% but < 40% impaired, limited or restricted  Discharge: CJ = at least 20% but < 40% impaired, limited or restricted             Mrs. Burris received therapeutic exercises to develop strength, endurance, ROM, flexibility, posture and core stabilization for 60 minutes including:    Shuttle DL 30 x 2 black/1 red cord - NP today    SLS on airex 3 x 30" - green airex  SL cone taps 2 x 30" straight, 2 x 30" diagonals  Tandem on airex 3 x 30"  tandem on airex w/ arm swings on airex (blue) 2 x 10   Airex feet together eyes closed 2 x 30"  standing hip abd: 2 x 10 x OTB at knees  heel raises 3 x 10    rocker board M/L dynamic/static: 2 x 30" ea  rocker board A/P dynamic/static: 2 x 30" ea    Lateral step-ups 6" 2 x 10 B  Tandem walk 2 x 40 ft  Side stepping: 2 x 40 ft  caraoca: 2 x 40 ft - PT SBA  walk with head turns up/down, L/R: 1 lap ea - defer 2* to fatigue   ITIS in tandem: 2 x 15 w/ foot in ea position   SALPD in tandem: 2 x 15 w/ foot in ea position x OTB  NR in tandem: 2 x 15 w/ foot in ea position x GTB      Home Exercises Provided and Patient Education Provided     Education provided:   -5/14/19 - updated HEP - bridge, BKFO, HL hip abd, SLR, SLS, tandem stance - see EMR  +6/13/19 - updated HEP to include static and dynamic balance    Written Home Exercises Provided: Patient instructed to cont prior HEP.  Exercises were reviewed and Mrs. Burris was able to demonstrate them prior to the end of " the session.  Mrs. Burris demonstrated good  understanding of the education provided.     See EMR under Patient Instructions for exercises provided prior visit.    Assessment     Pt presents with marked improvements in strength, endurance, static and dynamic balance. Pt is independent with HEP and demonstrates good return technique. Pt no longer has pain or difficulty with functional activities and has returned to PLOF. Pt has progressed well and has met 9 of 11 therapeutic goals. Pt no longer requires skilled PT. Recommend D/C from skilled care.    Goals:   Short Term Goals (5 Weeks):  1. Pt able to improve static balance with BRG balance >=40/56 to allow for improved tolerance with ADLs. - Met 5/16/19  2. Pt able to improve dynamic balance with DGI >=19/24 to allow for improved stability with walking. - Met 5/16/19  3. Pt able to walk >=10 min with Mod difficulty. - Met 5/16/19  4. Pt to improve strength by a half grade to allow for increased ease with getting in/out of chair. - Met 6/13/19  5. Pt to demonstrate improved functional ability with FOTO limitation <=34% disability. - Not met     Long Term Goals (10 Weeks):  1. Pt able to improve static balance with BRG balance >=45/56 to allow for improved tolerance with ADLs. - Met 5/16/19  2. Pt able to improve dynamic balance with DGI >=22/24 to allow for improved stability with walking. - Met 6/13/19  3. Pt able to walk >=10 min with Min-No difficulty. - Met 6/13/19  4. Pt to improve strength to 5/5 to allow for increased ease with getting in/out of chair. - Met 6/13/19  5. Pt to demonstrate improved functional ability with FOTO limitation <=24% disability. - Not met  6. Pt will be independent with HEP and self management of symptoms.  - Met 6/13/19      Plan     D/C with HEP for strength and conditioning.    Patsy Elaine, PT

## 2019-06-17 ENCOUNTER — OFFICE VISIT (OUTPATIENT)
Dept: GASTROENTEROLOGY | Facility: CLINIC | Age: 74
End: 2019-06-17
Payer: MEDICARE

## 2019-06-17 VITALS — WEIGHT: 161.81 LBS | BODY MASS INDEX: 30.55 KG/M2 | HEIGHT: 61 IN

## 2019-06-17 DIAGNOSIS — R11.2 NAUSEA AND VOMITING, INTRACTABILITY OF VOMITING NOT SPECIFIED, UNSPECIFIED VOMITING TYPE: ICD-10-CM

## 2019-06-17 DIAGNOSIS — D50.0 IRON DEFICIENCY ANEMIA DUE TO CHRONIC BLOOD LOSS: Primary | ICD-10-CM

## 2019-06-17 PROCEDURE — 1101F PR PT FALLS ASSESS DOC 0-1 FALLS W/OUT INJ PAST YR: ICD-10-PCS | Mod: HCNC,CPTII,S$GLB, | Performed by: INTERNAL MEDICINE

## 2019-06-17 PROCEDURE — 99213 OFFICE O/P EST LOW 20 MIN: CPT | Mod: HCNC,S$GLB,, | Performed by: INTERNAL MEDICINE

## 2019-06-17 PROCEDURE — 1101F PT FALLS ASSESS-DOCD LE1/YR: CPT | Mod: HCNC,CPTII,S$GLB, | Performed by: INTERNAL MEDICINE

## 2019-06-17 PROCEDURE — 99213 PR OFFICE/OUTPT VISIT, EST, LEVL III, 20-29 MIN: ICD-10-PCS | Mod: HCNC,S$GLB,, | Performed by: INTERNAL MEDICINE

## 2019-06-17 PROCEDURE — 99999 PR PBB SHADOW E&M-EST. PATIENT-LVL III: CPT | Mod: PBBFAC,HCNC,, | Performed by: INTERNAL MEDICINE

## 2019-06-17 PROCEDURE — 99999 PR PBB SHADOW E&M-EST. PATIENT-LVL III: ICD-10-PCS | Mod: PBBFAC,HCNC,, | Performed by: INTERNAL MEDICINE

## 2019-06-17 NOTE — PROGRESS NOTES
Subjective:       Patient ID: Paris Burris is a 73 y.o. female.    Chief Complaint: GI Problem    HPI  Review of Systems   Constitutional: Negative.    Respiratory: Negative.    Cardiovascular: Negative.        Objective:      Physical Exam   Abdominal: Soft. Normal appearance and bowel sounds are normal. She exhibits no shifting dullness, no distension, no fluid wave, no abdominal bruit and no mass. There is no hepatosplenomegaly. There is no tenderness.       Assessment:       1. Iron deficiency anemia due to chronic blood loss    2. Nausea and vomiting, intractability of vomiting not specified, unspecified vomiting type        Plan:         Mrs. Burris is here today for follow-up.  She was referred by urgent care.  She has she has been seen by Dr. suresh in the past for iron deficiency anemia, occult GI bleeding, and gastric ulcer.  She had laparoscopic repair of a hiatal hernia in January by Dr. martinez.    One month ago she had an episode of nausea vomiting and headache.  This episode lasted 3-1/2 days.  It was not preceded by abdominal pain. In fact she does not remember any abdominal pain being a part of this illness. She denies any achiness or fever.  She did see primary care.  On their physical exam there was some right upper quadrant tenderness. And for that reason she was referred to gastroenterology.    The nausea vomiting have since resolved.  The headache has resolved.  Her appetite is normal. She denies any abdominal pain.  She has normal bowel movements with formed stools 1 or 2 per day.  She denies any melena or bright red blood.  She is not having any heartburn or dysphagia.    Assessments is making  1.  iron deficiency anemia: I reviewed recent labs.  Her hemoglobin is 14.  It appears the anemia has resolved.  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  I do not think any intervention needs to be done.  I think as long as her blood count is followed at every 6 month intervals  that should be appropriate monitoring.     2.  Nausea vomiting: this has resolved.  I do not think it is related to biliary disease. Three years ago on ultrasound she did have 1 tiny gallstone.  But I can't see the gallbladder being responsible for this syndrome.  I did advise her that if she ever develops right upper quadrant pain in the future especially if that  is associated with nausea vomiting that she should seek care urgently.    20 min appointment greater than half the time face-to-face counseling

## 2019-06-17 NOTE — LETTER
June 17, 2019      Georgiana Mckeon PA-C  [address] [facility] [address]           [facility] Gastroenter[facility]  [address]  [place] [address]  Phone: [phone]  Fax: [phone]          Patient: Paris Burris   MR Number: [id number]   YOB: 1945   Date of Visit: 6/17/2019       Dear Georgiana Mckeon:    Thank you for referring Paris Burris to me for evaluation. Attached you will find relevant portions of my assessment and plan of care.    If you have questions, please do not hesitate to call me. I look forward to following Paris Burris along with you.    Sincerely,    Renato Maria MD    Enclosure  CC:  No Recipients    If you would like to receive this communication electronically, please contact [email or web address] or [phone] to request more information on [employer] Link access.    For providers and/or their staff who would like to refer a patient to [facility], please contact us through our one-stop-shop provider referral line, [facility], at [phone].    If you feel you have received this communication in error or would no longer like to receive these types of communications, please e-mail [email or web address]

## 2019-06-19 ENCOUNTER — PATIENT MESSAGE (OUTPATIENT)
Dept: PSYCHIATRY | Facility: CLINIC | Age: 74
End: 2019-06-19

## 2019-06-19 ENCOUNTER — PATIENT MESSAGE (OUTPATIENT)
Dept: SURGERY | Facility: CLINIC | Age: 74
End: 2019-06-19

## 2019-06-19 DIAGNOSIS — F33.41 MDD (MAJOR DEPRESSIVE DISORDER), RECURRENT, IN PARTIAL REMISSION: ICD-10-CM

## 2019-06-19 RX ORDER — VENLAFAXINE 37.5 MG/1
75 TABLET ORAL DAILY
Qty: 30 TABLET | Refills: 5 | Status: SHIPPED | OUTPATIENT
Start: 2019-06-19 | End: 2019-06-21 | Stop reason: SDUPTHER

## 2019-06-21 ENCOUNTER — PATIENT MESSAGE (OUTPATIENT)
Dept: INTERNAL MEDICINE | Facility: CLINIC | Age: 74
End: 2019-06-21

## 2019-06-21 DIAGNOSIS — F33.41 MDD (MAJOR DEPRESSIVE DISORDER), RECURRENT, IN PARTIAL REMISSION: ICD-10-CM

## 2019-06-21 RX ORDER — VENLAFAXINE 37.5 MG/1
75 TABLET ORAL DAILY
Qty: 180 TABLET | Refills: 1 | Status: SHIPPED | OUTPATIENT
Start: 2019-06-21 | End: 2019-07-18

## 2019-06-24 ENCOUNTER — PATIENT MESSAGE (OUTPATIENT)
Dept: SURGERY | Facility: CLINIC | Age: 74
End: 2019-06-24

## 2019-06-26 DIAGNOSIS — F33.41 MDD (MAJOR DEPRESSIVE DISORDER), RECURRENT, IN PARTIAL REMISSION: ICD-10-CM

## 2019-06-27 RX ORDER — LORAZEPAM 0.5 MG/1
TABLET ORAL
Qty: 60 TABLET | Refills: 1 | Status: SHIPPED | OUTPATIENT
Start: 2019-06-27 | End: 2019-07-18

## 2019-06-27 RX ORDER — VENLAFAXINE 37.5 MG/1
75 TABLET ORAL DAILY
Qty: 60 TABLET | Refills: 2 | Status: SHIPPED | OUTPATIENT
Start: 2019-06-27 | End: 2019-10-12 | Stop reason: SDUPTHER

## 2019-07-01 ENCOUNTER — CLINICAL SUPPORT (OUTPATIENT)
Dept: INTERNAL MEDICINE | Facility: CLINIC | Age: 74
End: 2019-07-01
Payer: MEDICARE

## 2019-07-01 VITALS — BODY MASS INDEX: 30.83 KG/M2 | WEIGHT: 163.13 LBS

## 2019-07-01 NOTE — PROGRESS NOTES
Health  Follow-Up Note   [x] Office  [] Phone  Notes from previous session reviewed.   [x] Previous Session Goals unchanged.   [] Patient/Caregiver Change in Goals.  Goals added or changed by Patient/Caregiver since program participation:   1. Going to [employer] walking 4 x week  2. Going to see psychiatrist on Monday    3. Try HIIT workout      Additional/Changed support that patient/caregiver has experienced/sought?  (Indicate readiness, support from family, friends, others, community groups, etc)  1.  Psychiatrist      Additional/Changed obstacles that could prevent patient/caregiver from reaching their goals?  1.  Depressed about Kade being ill and daughter hurt feelings not calling her Father on Fathers Day    Feedback provided:   1.  Praised for continued effort and determination    Diagnostic values/Desriptors for follow-up as needed for chronic condition(s)   Weight: 74 kg 163.14 lb gain 3.09 lbs   Blood Glucose: 113-129,  118 last night 2 hr after meal   Other: Depressed about Kade  and daughter    Interventions:   1. Health  listened reflectively, validated thoughts and feelings, offered support and encouragement.   2. Allowed patient to express themselves in a non-biased atmosphere.  3. Health  assisted pt to problem-solve obstacles such as being in a challenging environment and dealing with these challenges.   4. Motivational Interviewed interventions utilized (OARS).   5. Patient responded favorably to interventions and remained actively engaged in the session.   6. Health  will remain available and connected for patient by phone and/or office visits.   7. Positive reinforcement, emotional support and encouragement provided.   8. Focused Education: MI    Plan:  [x] Pt will work on goals as stated above.   [x] Pt will contact Health  for any questions, concerns or needs.  [x] Pt will follow up with Health  in office on  7.22.19 at 0930.  [] Pt will follow up with Health   on phone in:        [x] Health  will remain available.   [] Health  will contact patient by phone in:        [] Health  will consult:        [] Health  will inform Provider via EPIC messaging.     Impression:  1. Behavior is consistent with Action Stage of Change.   2. Participation level:       [x] Receptive      [x] Interactive      [] Guarded and Resistant      [x] Self Motivated      [] Refused/Declined to participate   3. [x] Pt voiced understanding of all information presented.       [] Pt voiced needing further information/education. This will be arranged.       [x] Pt would benefit from further education/information as identified by this health . This will be arranged.     Jacqueline Perry RN HC

## 2019-07-08 ENCOUNTER — OFFICE VISIT (OUTPATIENT)
Dept: PSYCHIATRY | Facility: CLINIC | Age: 74
End: 2019-07-08
Payer: MEDICARE

## 2019-07-08 DIAGNOSIS — F41.9 ANXIETY: ICD-10-CM

## 2019-07-08 DIAGNOSIS — F33.0 MAJOR DEPRESSIVE DISORDER, RECURRENT, MILD: Primary | ICD-10-CM

## 2019-07-08 PROCEDURE — 99999 PR PBB SHADOW E&M-EST. PATIENT-LVL I: ICD-10-PCS | Mod: PBBFAC,HCNC,, | Performed by: SOCIAL WORKER

## 2019-07-08 PROCEDURE — 99999 PR PBB SHADOW E&M-EST. PATIENT-LVL I: CPT | Mod: PBBFAC,HCNC,, | Performed by: SOCIAL WORKER

## 2019-07-08 PROCEDURE — 90834 PSYTX W PT 45 MINUTES: CPT | Mod: HCNC,S$GLB,, | Performed by: SOCIAL WORKER

## 2019-07-08 PROCEDURE — 90834 PR PSYCHOTHERAPY W/PATIENT, 45 MIN: ICD-10-PCS | Mod: HCNC,S$GLB,, | Performed by: SOCIAL WORKER
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
## 2019-07-11 ENCOUNTER — PATIENT MESSAGE (OUTPATIENT)
Dept: SURGERY | Facility: CLINIC | Age: 74
End: 2019-07-11

## 2019-07-18 ENCOUNTER — OFFICE VISIT (OUTPATIENT)
Dept: PSYCHIATRY | Facility: CLINIC | Age: 74
End: 2019-07-18
Payer: MEDICARE

## 2019-07-18 ENCOUNTER — OFFICE VISIT (OUTPATIENT)
Dept: INTERNAL MEDICINE | Facility: CLINIC | Age: 74
End: 2019-07-18
Payer: MEDICARE

## 2019-07-18 ENCOUNTER — LAB VISIT (OUTPATIENT)
Dept: LAB | Facility: HOSPITAL | Age: 74
End: 2019-07-18
Attending: INTERNAL MEDICINE
Payer: MEDICARE

## 2019-07-18 VITALS
HEIGHT: 61 IN | BODY MASS INDEX: 30.47 KG/M2 | TEMPERATURE: 98 F | DIASTOLIC BLOOD PRESSURE: 62 MMHG | WEIGHT: 161.38 LBS | SYSTOLIC BLOOD PRESSURE: 116 MMHG | HEART RATE: 62 BPM

## 2019-07-18 DIAGNOSIS — F33.0 MAJOR DEPRESSIVE DISORDER, RECURRENT, MILD: Primary | ICD-10-CM

## 2019-07-18 DIAGNOSIS — E78.2 MIXED HYPERLIPIDEMIA: ICD-10-CM

## 2019-07-18 DIAGNOSIS — K21.9 GASTROESOPHAGEAL REFLUX DISEASE, ESOPHAGITIS PRESENCE NOT SPECIFIED: ICD-10-CM

## 2019-07-18 DIAGNOSIS — Z79.899 ENCOUNTER FOR MONITORING LONG-TERM PROTON PUMP INHIBITOR THERAPY: ICD-10-CM

## 2019-07-18 DIAGNOSIS — F33.41 MDD (MAJOR DEPRESSIVE DISORDER), RECURRENT, IN PARTIAL REMISSION: ICD-10-CM

## 2019-07-18 DIAGNOSIS — Z81.8 FAMILY HISTORY OF DEMENTIA: ICD-10-CM

## 2019-07-18 DIAGNOSIS — F41.9 ANXIETY: ICD-10-CM

## 2019-07-18 DIAGNOSIS — Z51.81 ENCOUNTER FOR MONITORING LONG-TERM PROTON PUMP INHIBITOR THERAPY: ICD-10-CM

## 2019-07-18 DIAGNOSIS — K21.9 GASTROESOPHAGEAL REFLUX DISEASE, ESOPHAGITIS PRESENCE NOT SPECIFIED: Primary | ICD-10-CM

## 2019-07-18 DIAGNOSIS — D64.9 NORMOCYTIC ANEMIA: ICD-10-CM

## 2019-07-18 DIAGNOSIS — I10 BENIGN ESSENTIAL HYPERTENSION: ICD-10-CM

## 2019-07-18 PROBLEM — I45.2: Status: RESOLVED | Noted: 2019-02-04 | Resolved: 2019-07-18

## 2019-07-18 PROBLEM — R15.1 FECAL SOILING: Status: RESOLVED | Noted: 2018-07-25 | Resolved: 2019-07-18

## 2019-07-18 LAB
25(OH)D3+25(OH)D2 SERPL-MCNC: 24 NG/ML (ref 30–96)
ALBUMIN SERPL BCP-MCNC: 4.4 G/DL (ref 3.5–5.2)
ALP SERPL-CCNC: 60 U/L (ref 55–135)
ALT SERPL W/O P-5'-P-CCNC: 16 U/L (ref 10–44)
ANION GAP SERPL CALC-SCNC: 9 MMOL/L (ref 8–16)
AST SERPL-CCNC: 16 U/L (ref 10–40)
BASOPHILS # BLD AUTO: 0.02 K/UL (ref 0–0.2)
BASOPHILS NFR BLD: 0.3 % (ref 0–1.9)
BILIRUB SERPL-MCNC: 0.4 MG/DL (ref 0.1–1)
BUN SERPL-MCNC: 19 MG/DL (ref 8–23)
CALCIUM SERPL-MCNC: 9.5 MG/DL (ref 8.7–10.5)
CHLORIDE SERPL-SCNC: 104 MMOL/L (ref 95–110)
CHOLEST SERPL-MCNC: 168 MG/DL (ref 120–199)
CHOLEST/HDLC SERPL: 4.4 {RATIO} (ref 2–5)
CO2 SERPL-SCNC: 28 MMOL/L (ref 23–29)
CREAT SERPL-MCNC: 0.9 MG/DL (ref 0.5–1.4)
DIFFERENTIAL METHOD: NORMAL
EOSINOPHIL # BLD AUTO: 0.2 K/UL (ref 0–0.5)
EOSINOPHIL NFR BLD: 3.9 % (ref 0–8)
ERYTHROCYTE [DISTWIDTH] IN BLOOD BY AUTOMATED COUNT: 13 % (ref 11.5–14.5)
EST. GFR  (AFRICAN AMERICAN): >60 ML/MIN/1.73 M^2
EST. GFR  (NON AFRICAN AMERICAN): >60 ML/MIN/1.73 M^2
FERRITIN SERPL-MCNC: 206 NG/ML (ref 20–300)
GLUCOSE SERPL-MCNC: 105 MG/DL (ref 70–110)
HCT VFR BLD AUTO: 44.2 % (ref 37–48.5)
HDLC SERPL-MCNC: 38 MG/DL (ref 40–75)
HDLC SERPL: 22.6 % (ref 20–50)
HGB BLD-MCNC: 14.4 G/DL (ref 12–16)
IRON SERPL-MCNC: 90 UG/DL (ref 30–160)
LDLC SERPL CALC-MCNC: 81.4 MG/DL (ref 63–159)
LYMPHOCYTES # BLD AUTO: 2.3 K/UL (ref 1–4.8)
LYMPHOCYTES NFR BLD: 37.8 % (ref 18–48)
MAGNESIUM SERPL-MCNC: 2 MG/DL (ref 1.6–2.6)
MCH RBC QN AUTO: 30.7 PG (ref 27–31)
MCHC RBC AUTO-ENTMCNC: 32.6 G/DL (ref 32–36)
MCV RBC AUTO: 94 FL (ref 82–98)
MONOCYTES # BLD AUTO: 0.6 K/UL (ref 0.3–1)
MONOCYTES NFR BLD: 9.4 % (ref 4–15)
NEUTROPHILS # BLD AUTO: 3 K/UL (ref 1.8–7.7)
NEUTROPHILS NFR BLD: 48.6 % (ref 38–73)
NONHDLC SERPL-MCNC: 130 MG/DL
PLATELET # BLD AUTO: 176 K/UL (ref 150–350)
PMV BLD AUTO: 10.5 FL (ref 9.2–12.9)
POTASSIUM SERPL-SCNC: 4.7 MMOL/L (ref 3.5–5.1)
PROT SERPL-MCNC: 7.7 G/DL (ref 6–8.4)
RBC # BLD AUTO: 4.69 M/UL (ref 4–5.4)
SATURATED IRON: 27 % (ref 20–50)
SODIUM SERPL-SCNC: 141 MMOL/L (ref 136–145)
TOTAL IRON BINDING CAPACITY: 337 UG/DL (ref 250–450)
TRANSFERRIN SERPL-MCNC: 228 MG/DL (ref 200–375)
TRIGL SERPL-MCNC: 243 MG/DL (ref 30–150)
VIT B12 SERPL-MCNC: 431 PG/ML (ref 210–950)
WBC # BLD AUTO: 6.19 K/UL (ref 3.9–12.7)

## 2019-07-18 PROCEDURE — 82306 VITAMIN D 25 HYDROXY: CPT | Mod: HCNC

## 2019-07-18 PROCEDURE — 36415 COLL VENOUS BLD VENIPUNCTURE: CPT | Mod: HCNC

## 2019-07-18 PROCEDURE — 3074F SYST BP LT 130 MM HG: CPT | Mod: HCNC,CPTII,S$GLB, | Performed by: INTERNAL MEDICINE

## 2019-07-18 PROCEDURE — 99999 PR PBB SHADOW E&M-EST. PATIENT-LVL I: CPT | Mod: PBBFAC,HCNC,, | Performed by: SOCIAL WORKER

## 2019-07-18 PROCEDURE — 85025 COMPLETE CBC W/AUTO DIFF WBC: CPT | Mod: HCNC

## 2019-07-18 PROCEDURE — 3078F DIAST BP <80 MM HG: CPT | Mod: HCNC,CPTII,S$GLB, | Performed by: INTERNAL MEDICINE

## 2019-07-18 PROCEDURE — 80061 LIPID PANEL: CPT | Mod: HCNC

## 2019-07-18 PROCEDURE — 99999 PR PBB SHADOW E&M-EST. PATIENT-LVL III: CPT | Mod: PBBFAC,HCNC,, | Performed by: INTERNAL MEDICINE

## 2019-07-18 PROCEDURE — 99999 PR PBB SHADOW E&M-EST. PATIENT-LVL III: ICD-10-PCS | Mod: PBBFAC,HCNC,, | Performed by: INTERNAL MEDICINE

## 2019-07-18 PROCEDURE — 82607 VITAMIN B-12: CPT | Mod: HCNC

## 2019-07-18 PROCEDURE — 83540 ASSAY OF IRON: CPT | Mod: HCNC

## 2019-07-18 PROCEDURE — 99999 PR PBB SHADOW E&M-EST. PATIENT-LVL I: ICD-10-PCS | Mod: PBBFAC,HCNC,, | Performed by: SOCIAL WORKER

## 2019-07-18 PROCEDURE — 82728 ASSAY OF FERRITIN: CPT | Mod: HCNC

## 2019-07-18 PROCEDURE — 3074F PR MOST RECENT SYSTOLIC BLOOD PRESSURE < 130 MM HG: ICD-10-PCS | Mod: HCNC,CPTII,S$GLB, | Performed by: INTERNAL MEDICINE

## 2019-07-18 PROCEDURE — 1101F PT FALLS ASSESS-DOCD LE1/YR: CPT | Mod: HCNC,CPTII,S$GLB, | Performed by: INTERNAL MEDICINE

## 2019-07-18 PROCEDURE — 99214 OFFICE O/P EST MOD 30 MIN: CPT | Mod: HCNC,S$GLB,, | Performed by: INTERNAL MEDICINE

## 2019-07-18 PROCEDURE — 1101F PR PT FALLS ASSESS DOC 0-1 FALLS W/OUT INJ PAST YR: ICD-10-PCS | Mod: HCNC,CPTII,S$GLB, | Performed by: INTERNAL MEDICINE

## 2019-07-18 PROCEDURE — 99214 PR OFFICE/OUTPT VISIT, EST, LEVL IV, 30-39 MIN: ICD-10-PCS | Mod: HCNC,S$GLB,, | Performed by: INTERNAL MEDICINE

## 2019-07-18 PROCEDURE — 90834 PR PSYCHOTHERAPY W/PATIENT, 45 MIN: ICD-10-PCS | Mod: HCNC,S$GLB,, | Performed by: SOCIAL WORKER

## 2019-07-18 PROCEDURE — 3078F PR MOST RECENT DIASTOLIC BLOOD PRESSURE < 80 MM HG: ICD-10-PCS | Mod: HCNC,CPTII,S$GLB, | Performed by: INTERNAL MEDICINE

## 2019-07-18 PROCEDURE — 90834 PSYTX W PT 45 MINUTES: CPT | Mod: HCNC,S$GLB,, | Performed by: SOCIAL WORKER

## 2019-07-18 PROCEDURE — 80053 COMPREHEN METABOLIC PANEL: CPT | Mod: HCNC

## 2019-07-18 PROCEDURE — 83735 ASSAY OF MAGNESIUM: CPT | Mod: HCNC

## 2019-07-18 NOTE — PROGRESS NOTES
Subjective:       Patient ID: Paris Burris is a 73 y.o. female.    Chief Complaint: Follow-up    HPI   Hiatal hernia s/p repair.   Saw Dr. Perez 19 - erythematous mucosa. Fundoplication not tight. Normal esophagus.  On protonix 40mg qam.     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 S/p PM.   Saw Dr. Rosenbaum and Dr. Obrien 19. Cardiac PET stress neg 19. F/u in 6 mo.   Saw Dr. Livingston 19. F/u in 1 yr.    Saw Dr. Maria in GI 19 for ESTEFANIA. Previous work up w/ jejunal erosion. Does not require intervention per note and rec q 6 mo CBC.    HTN - candesartan 4mg qd and toprol xl 50mg daily.     HLD - crestor 20mg daily.     Chronic LBP and on tramadol 100mg daily.   Went back to walking 30 min at [facility] and back pian is doing better. Mood is better also.     Depression - effexor xr 37.5mg daily, wellbutrin 100mg BID.     Family h/o dementia. Both mom and dad  fairly young. Wants to have APOE genetic testing. Discussed that may not be covered by insurance. Pt is ok w/ paying out of pocket.    Review of Systems   Constitutional: Positive for activity change. Negative for unexpected weight change.   HENT: Positive for hearing loss. Negative for rhinorrhea and trouble swallowing.    Eyes: Negative for discharge and visual disturbance.   Respiratory: Negative for chest tightness and wheezing.    Cardiovascular: Negative for chest pain and palpitations.   Gastrointestinal: Negative for blood in stool, constipation, diarrhea and vomiting.   Endocrine: Negative for polydipsia and polyuria.   Genitourinary: Negative for difficulty urinating, dysuria, hematuria and menstrual problem.   Musculoskeletal: Negative for arthralgias, joint swelling and neck pain.   Neurological: Negative for weakness and headaches.   Psychiatric/Behavioral: Positive for dysphoric mood. Negative for confusion.         Objective:      Physical Exam    /62 (BP Location: Left arm, Patient Position: Sitting, BP  "Method: Medium (Manual))   Pulse 62   Temp 97.7 °F (36.5 °C)   Ht 5' 1" (1.549 m)   Wt 73.2 kg (161 lb 6 oz)   BMI 30.49 kg/m²     Gen - A+OX4, NAD  HEENT - PERRL, OP clear. MMM. TM normal.   Neck - no thyromegaly.   CV - RRR, no m/r. L chest PM w/o pain on palpation.   Chest - CTAB, no wheezing/rhonchi/crackles  Abd - S/NT/ND/+BS  Ext - 1+ B DP and radial pulses. No LE edema.   MSK - normal gait. No spinal tenderness to palpation. 2+ DTRs.   Skin - no rash.     Labs reviewed.     Assessment/Plan     Paris was seen today for follow-up.    Diagnoses and all orders for this visit:    Gastroesophageal reflux disease, esophagitis presence not specified - trial of stopping protonix and trying zantac BID  -     ranitidine (ZANTAC) 150 MG capsule; Take 1 capsule (150 mg total) by mouth 2 (two) times daily.  -     Comprehensive metabolic panel; Future  -     CBC auto differential; Future  -     Ferritin; Future  -     Iron and TIBC; Future    Normocytic anemia  -     CBC auto differential; Future  -     Ferritin; Future  -     Iron and TIBC; Future    Mixed hyperlipidemia - on crestor 20mg daily.   -     Lipid panel; Future  -     Comprehensive metabolic panel; Future    MDD (major depressive disorder), recurrent, in partial remission - well controlled on wellbutrin and effexor.     Benign essential hypertension - Stable and controlled. Continue current medications.  Candesartan 8mg caused dizziness. Tolerates the 4mg well.   -     Comprehensive metabolic panel; Future    Family history of dementia - even if she has to pay out of pocket for it, she's ok w/ getting the test.   -     ADmark ApoE Genotype Analysis and Interp; Future    Encounter for monitoring long-term proton pump inhibitor therapy  -     Ferritin; Future  -     Iron and TIBC; Future  -     Vitamin D; Future  -     Magnesium; Future  -     Vitamin B12; Future    Continue w/ exercise! Doing well in terms of back pain. Does not need refill for tramadol " right now.   Follow up in about 6 months (around 1/18/2020).      Mandi Huynh MD  Department of Internal Medicine - [facility]  7:21 AM
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
## 2019-07-22 ENCOUNTER — CLINICAL SUPPORT (OUTPATIENT)
Dept: INTERNAL MEDICINE | Facility: CLINIC | Age: 74
End: 2019-07-22
Payer: MEDICARE

## 2019-07-22 VITALS — BODY MASS INDEX: 30.33 KG/M2 | WEIGHT: 160.5 LBS

## 2019-07-22 NOTE — PROGRESS NOTES
Health  Follow-Up Note   [x] Office  [] Phone  Notes from previous session reviewed.   [x] Previous Session Goals unchanged.   [] Patient/Caregiver Change in Goals.  Goals added or changed by Patient/Caregiver since program participation:  1. Continue same plan  2. Continue increase exercise       Additional/Changed support that patient/caregiver has experienced/sought?  (Indicate readiness, support from family, friends, others, community groups, etc)  1.  Psychiatrist    Additional/Changed obstacles that could prevent patient/caregiver from reaching their goals?  1.  stress and worry about     Feedback provided:  1.  Praised for great job down 2.64 lb total loss 26 lb    Diagnostic values/Desriptors for follow-up as needed for chronic condition(s)   Weight: 72.8 kb 160.5 lb   Blood Glucose:   7 d 109  14 d 110  30 d 109   90 d 114    Interventions:   1. Health  listened reflectively, validated thoughts and feelings, offered support and encouragement.   2. Allowed patient to express themselves in a non-biased atmosphere.  3. Health  assisted pt to problem-solve obstacles such as being in a challenging environment and dealing with these challenges.   4. Motivational Interviewed interventions utilized (OARS).   5. Patient responded favorably to interventions and remained actively engaged in the session.   6. Health  will remain available and connected for patient by phone and/or office visits.   7. Positive reinforcement, emotional support and encouragement provided.   8. Focused Education: MI    Plan:  [x] Pt will work on goals as stated above.   [x] Pt will contact Health  for any questions, concerns or needs.  [x] Pt will follow up with Health  in office on  8.12.19 at 0830  [] Pt will follow up with Health  on phone in:        [x] Health  will remain available.   [] Health  will contact patient by phone in:        [] Health  will consult:        []  Health  will inform Provider via EPIC messaging.     Impression:  1. Behavior is consistent with Action Stage of Change.   2. Participation level:       [x] Receptive      [x] Interactive      [] Guarded and Resistant      [x] Self Motivated      [] Refused/Declined to participate   3. [x] Pt voiced understanding of all information presented.       [] Pt voiced needing further information/education. This will be arranged.       [x] Pt would benefit from further education/information as identified by this health . This will be arranged.     Jacqueline Perry RN HC

## 2019-07-30 ENCOUNTER — PATIENT MESSAGE (OUTPATIENT)
Dept: INTERNAL MEDICINE | Facility: CLINIC | Age: 74
End: 2019-07-30

## 2019-07-30 RX ORDER — FERROUS SULFATE 325(65) MG
325 TABLET ORAL
Qty: 90 TABLET | Refills: 3 | Status: SHIPPED | OUTPATIENT
Start: 2019-07-30 | End: 2020-06-09

## 2019-07-30 RX ORDER — BUPROPION HYDROCHLORIDE 100 MG/1
100 TABLET, EXTENDED RELEASE ORAL 2 TIMES DAILY
Qty: 60 TABLET | Refills: 5 | Status: SHIPPED | OUTPATIENT
Start: 2019-07-30 | End: 2020-07-24 | Stop reason: SDUPTHER

## 2019-08-04 ENCOUNTER — PATIENT MESSAGE (OUTPATIENT)
Dept: INTERNAL MEDICINE | Facility: CLINIC | Age: 74
End: 2019-08-04

## 2019-08-05 ENCOUNTER — OFFICE VISIT (OUTPATIENT)
Dept: PSYCHIATRY | Facility: CLINIC | Age: 74
End: 2019-08-05
Payer: MEDICARE

## 2019-08-05 DIAGNOSIS — F41.9 ANXIETY: ICD-10-CM

## 2019-08-05 DIAGNOSIS — F33.0 MAJOR DEPRESSIVE DISORDER, RECURRENT, MILD: Primary | ICD-10-CM

## 2019-08-05 PROCEDURE — 99999 PR PBB SHADOW E&M-EST. PATIENT-LVL I: ICD-10-PCS | Mod: PBBFAC,HCNC,, | Performed by: SOCIAL WORKER

## 2019-08-05 PROCEDURE — 99999 PR PBB SHADOW E&M-EST. PATIENT-LVL I: CPT | Mod: PBBFAC,HCNC,, | Performed by: SOCIAL WORKER

## 2019-08-05 PROCEDURE — 90834 PSYTX W PT 45 MINUTES: CPT | Mod: HCNC,S$GLB,, | Performed by: SOCIAL WORKER

## 2019-08-05 PROCEDURE — 90834 PR PSYCHOTHERAPY W/PATIENT, 45 MIN: ICD-10-PCS | Mod: HCNC,S$GLB,, | Performed by: SOCIAL WORKER

## 2019-08-05 RX ORDER — TRAZODONE HYDROCHLORIDE 50 MG/1
50 TABLET ORAL NIGHTLY
Qty: 90 TABLET | Refills: 3 | Status: SHIPPED | OUTPATIENT
Start: 2019-08-05 | End: 2020-07-21 | Stop reason: SDUPTHER
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
## 2019-08-06 ENCOUNTER — TELEPHONE (OUTPATIENT)
Dept: CARDIOLOGY | Facility: HOSPITAL | Age: 74
End: 2019-08-06

## 2019-08-06 NOTE — TELEPHONE ENCOUNTER
Left patient a voicemail in regards to the questions about her device. Provided device clinic phone number as well.    ----- Message from Charmaine Collier sent at 8/6/2019  8:28 AM CDT -----  Contact: Patient  The Pt is calling to speak with someone regarding her device. Please  call her back @ [phone]. Thanks, Charmaine

## 2019-08-08 ENCOUNTER — PATIENT MESSAGE (OUTPATIENT)
Dept: INTERNAL MEDICINE | Facility: CLINIC | Age: 74
End: 2019-08-08

## 2019-08-12 ENCOUNTER — CLINICAL SUPPORT (OUTPATIENT)
Dept: INTERNAL MEDICINE | Facility: CLINIC | Age: 74
End: 2019-08-12
Payer: MEDICARE

## 2019-08-12 VITALS — BODY MASS INDEX: 30.33 KG/M2 | WEIGHT: 160.5 LBS

## 2019-08-12 NOTE — PROGRESS NOTES
Health  Follow-Up Note   [x] Office  [] Phone  Notes from previous session reviewed.   [x] Previous Session Goals unchanged.   [] Patient/Caregiver Change in Goals.  Goals added or changed by Patient/Caregiver since program participation:  1. Increase walking to 5 x week if can and continue 4 x week       Additional/Changed support that patient/caregiver has experienced/sought?  (Indicate readiness, support from family, friends, others, community groups, etc)  1.       Additional/Changed obstacles that could prevent patient/caregiver from reaching their goals?  1.  stress and worry about      Feedback provided:  1.  Praised for continued effort and determination    Diagnostic values/Desriptors for follow-up as needed for chronic condition(s)   Weight: 72.8 kg 160.5 lb maintain no gain  Blood Glucose:   7 d 122  14 d 113  30 d 111  90 d 114    Interventions:   1. Health  listened reflectively, validated thoughts and feelings, offered support and encouragement.   2. Allowed patient to express themselves in a non-biased atmosphere.  3. Health  assisted pt to problem-solve obstacles such as being in a challenging environment and dealing with these challenges.   4. Motivational Interviewed interventions utilized (OARS).   5. Patient responded favorably to interventions and remained actively engaged in the session.   6. Health  will remain available and connected for patient by phone and/or office visits.   7. Positive reinforcement, emotional support and encouragement provided.   8. Focused Education: MI    Plan:  [x] Pt will work on goals as stated above.   [x] Pt will contact Health  for any questions, concerns or needs.  [x] Pt will follow up with Health  in office on  9.3.19.  [] Pt will follow up with Health  on phone in:        [x] Health  will remain available.   [] Health  will contact patient by phone in:        [] Health  will consult:         [] Health  will inform Provider via EPIC messaging.     Impression:  1. Behavior is consistent with Action Stage of Change.   2. Participation level:       [x] Receptive      [x] Interactive      [] Guarded and Resistant      [x] Self Motivated      [] Refused/Declined to participate   3. [x] Pt voiced understanding of all information presented.       [] Pt voiced needing further information/education. This will be arranged.       [x] Pt would benefit from further education/information as identified by this health . This will be arranged.     Jacqueline Perry RN HC

## 2019-08-21 ENCOUNTER — OFFICE VISIT (OUTPATIENT)
Dept: PSYCHIATRY | Facility: CLINIC | Age: 74
End: 2019-08-21
Payer: MEDICARE

## 2019-08-21 DIAGNOSIS — F33.0 MAJOR DEPRESSIVE DISORDER, RECURRENT, MILD: Primary | ICD-10-CM

## 2019-08-21 DIAGNOSIS — F41.9 ANXIETY: ICD-10-CM

## 2019-08-21 PROCEDURE — 90834 PSYTX W PT 45 MINUTES: CPT | Mod: HCNC,S$GLB,, | Performed by: SOCIAL WORKER

## 2019-08-21 PROCEDURE — 90834 PR PSYCHOTHERAPY W/PATIENT, 45 MIN: ICD-10-PCS | Mod: HCNC,S$GLB,, | Performed by: SOCIAL WORKER
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
## 2019-08-29 ENCOUNTER — OFFICE VISIT (OUTPATIENT)
Dept: PSYCHIATRY | Facility: CLINIC | Age: 74
End: 2019-08-29
Payer: MEDICARE

## 2019-08-29 DIAGNOSIS — F33.0 MAJOR DEPRESSIVE DISORDER, RECURRENT, MILD: Primary | ICD-10-CM

## 2019-08-29 DIAGNOSIS — F41.9 ANXIETY: ICD-10-CM

## 2019-08-29 PROCEDURE — 90834 PSYTX W PT 45 MINUTES: CPT | Mod: HCNC,S$GLB,, | Performed by: SOCIAL WORKER

## 2019-08-29 PROCEDURE — 90834 PR PSYCHOTHERAPY W/PATIENT, 45 MIN: ICD-10-PCS | Mod: HCNC,S$GLB,, | Performed by: SOCIAL WORKER

## 2019-08-29 NOTE — PROGRESS NOTES
Individual Psychotherapy (PhD/LCSW)    8/29/2019    Site:  [facility]         Therapeutic Intervention: Met with patient.  Outpatient - Insight oriented psychotherapy 45 min - CPT code 32876    Chief complaint/reason for encounter: depression and anxiety     Interval history and content of current session: Pt feels mood is brighter, feels she is communicating better with J. Continue to work on communication skills to convey her needs and to ask for feedback rather than trying to guess what he is thinking. Discuss ways to deal with his procrastination without anger. Pt states she has some things she wants to address but is very anxious about sharing, fears being judged. Help set stage for safety in sharing these difficult issues.    Treatment plan:  · Target symptoms: depression, anxiety   · Why chosen therapy is appropriate versus another modality: relevant to diagnosis  · Outcome monitoring methods: self-report  · Therapeutic intervention type: insight oriented psychotherapy    Risk parameters:  Patient reports no suicidal ideation  Patient reports no homicidal ideation  Patient reports no self-injurious behavior  Patient reports no violent behavior    Verbal deficits: None    Patient's response to intervention:  The patient's response to intervention is motivated    Progress toward goals and other mental status changes:  The patient's progress toward goals is good.    Diagnosis:   Major depression, recurrent, mild; anxiety r/o PTSD  Plan:  individual psychotherapy    Return to clinic: as needed    Length of Service (minutes): 45

## 2019-09-03 ENCOUNTER — CLINICAL SUPPORT (OUTPATIENT)
Dept: INTERNAL MEDICINE | Facility: CLINIC | Age: 74
End: 2019-09-03
Payer: MEDICARE

## 2019-09-03 VITALS — WEIGHT: 161.38 LBS | BODY MASS INDEX: 30.49 KG/M2

## 2019-09-03 NOTE — PROGRESS NOTES
Health  Follow-Up Note   [x] Office  [] Phone  Notes from previous session reviewed.   [x] Previous Session Goals unchanged.   [] Patient/Caregiver Change in Goals.  Goals added or changed by Patient/Caregiver since program participation:  1. Continue same plan     2. Stay away from snacking and sweets  3. Started logging foods     Additional/Changed support that patient/caregiver has experienced/sought?  (Indicate readiness, support from family, friends, others, community groups, etc)  1.       Additional/Changed obstacles that could prevent patient/caregiver from reaching their goals?  1.  Party for granddaughter's birthday yesterday     Feedback provided:  1.  Praised for continued effort and determination    Diagnostic values/Desriptors for follow-up as needed for chronic condition(s)   Weight: 73.2 kg 161.38 lb gain 0.88 lb   Blood Glucose: 113-135   7 d 114  14 d 114  30 d 118  90 d 114    Interventions:   1. Health  listened reflectively, validated thoughts and feelings, offered support and encouragement.   2. Allowed patient to express themselves in a non-biased atmosphere.  3. Health  assisted pt to problem-solve obstacles such as being in a challenging environment and dealing with these challenges.   4. Motivational Interviewed interventions utilized (OARS).   5. Patient responded favorably to interventions and remained actively engaged in the session.   6. Health  will remain available and connected for patient by phone and/or office visits.   7. Positive reinforcement, emotional support and encouragement provided.   8. Focused Education: MI    Plan:  [x] Pt will work on goals as stated above.   [x] Pt will contact Health  for any questions, concerns or needs.  [x] Pt will follow up with Health  in office on  9.25.19 at 1130.  [] Pt will follow up with Health  on phone in:        [x] Health  will remain available.   [] Health  will contact patient by  phone in:        [] Health  will consult:        [] Health  will inform Provider via EPIC messaging.     Impression:  1. Behavior is consistent with Action Stage of Change.   2. Participation level:       [x] Receptive      [x] Interactive      [] Guarded and Resistant      [x] Self Motivated      [] Refused/Declined to participate   3. [x] Pt voiced understanding of all information presented.       [] Pt voiced needing further information/education. This will be arranged.       [x] Pt would benefit from further education/information as identified by this health . This will be arranged.     Jacqueline Perry RN HC

## 2019-09-05 ENCOUNTER — OFFICE VISIT (OUTPATIENT)
Dept: PSYCHIATRY | Facility: CLINIC | Age: 74
End: 2019-09-05
Payer: MEDICARE

## 2019-09-05 VITALS
HEART RATE: 87 BPM | WEIGHT: 162.13 LBS | BODY MASS INDEX: 30.61 KG/M2 | DIASTOLIC BLOOD PRESSURE: 64 MMHG | HEIGHT: 61 IN | SYSTOLIC BLOOD PRESSURE: 111 MMHG

## 2019-09-05 DIAGNOSIS — F41.9 ANXIETY: ICD-10-CM

## 2019-09-05 DIAGNOSIS — F33.0 MAJOR DEPRESSIVE DISORDER, RECURRENT, MILD: Primary | ICD-10-CM

## 2019-09-05 PROCEDURE — 3074F PR MOST RECENT SYSTOLIC BLOOD PRESSURE < 130 MM HG: ICD-10-PCS | Mod: HCNC,CPTII,S$GLB, | Performed by: PSYCHIATRY & NEUROLOGY

## 2019-09-05 PROCEDURE — 99499 RISK ADDL DX/OHS AUDIT: ICD-10-PCS | Mod: HCNC,S$GLB,, | Performed by: PSYCHIATRY & NEUROLOGY

## 2019-09-05 PROCEDURE — 1101F PT FALLS ASSESS-DOCD LE1/YR: CPT | Mod: HCNC,CPTII,S$GLB, | Performed by: PSYCHIATRY & NEUROLOGY

## 2019-09-05 PROCEDURE — 90833 PR PSYCHOTHERAPY W/PATIENT W/E&M, 30 MIN (ADD ON): ICD-10-PCS | Mod: HCNC,S$GLB,, | Performed by: PSYCHIATRY & NEUROLOGY

## 2019-09-05 PROCEDURE — 3078F PR MOST RECENT DIASTOLIC BLOOD PRESSURE < 80 MM HG: ICD-10-PCS | Mod: HCNC,CPTII,S$GLB, | Performed by: PSYCHIATRY & NEUROLOGY

## 2019-09-05 PROCEDURE — 3074F SYST BP LT 130 MM HG: CPT | Mod: HCNC,CPTII,S$GLB, | Performed by: PSYCHIATRY & NEUROLOGY

## 2019-09-05 PROCEDURE — 90833 PSYTX W PT W E/M 30 MIN: CPT | Mod: HCNC,S$GLB,, | Performed by: PSYCHIATRY & NEUROLOGY

## 2019-09-05 PROCEDURE — 99999 PR PBB SHADOW E&M-EST. PATIENT-LVL II: ICD-10-PCS | Mod: PBBFAC,HCNC,, | Performed by: PSYCHIATRY & NEUROLOGY

## 2019-09-05 PROCEDURE — 99999 PR PBB SHADOW E&M-EST. PATIENT-LVL II: CPT | Mod: PBBFAC,HCNC,, | Performed by: PSYCHIATRY & NEUROLOGY

## 2019-09-05 PROCEDURE — 3078F DIAST BP <80 MM HG: CPT | Mod: HCNC,CPTII,S$GLB, | Performed by: PSYCHIATRY & NEUROLOGY

## 2019-09-05 PROCEDURE — 99213 OFFICE O/P EST LOW 20 MIN: CPT | Mod: HCNC,S$GLB,, | Performed by: PSYCHIATRY & NEUROLOGY

## 2019-09-05 PROCEDURE — 99213 PR OFFICE/OUTPT VISIT, EST, LEVL III, 20-29 MIN: ICD-10-PCS | Mod: HCNC,S$GLB,, | Performed by: PSYCHIATRY & NEUROLOGY

## 2019-09-05 PROCEDURE — 99499 UNLISTED E&M SERVICE: CPT | Mod: HCNC,S$GLB,, | Performed by: PSYCHIATRY & NEUROLOGY

## 2019-09-05 PROCEDURE — 1101F PR PT FALLS ASSESS DOC 0-1 FALLS W/OUT INJ PAST YR: ICD-10-PCS | Mod: HCNC,CPTII,S$GLB, | Performed by: PSYCHIATRY & NEUROLOGY
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
## 2019-09-09 ENCOUNTER — TELEPHONE (OUTPATIENT)
Dept: INTERNAL MEDICINE | Facility: CLINIC | Age: 74
End: 2019-09-09

## 2019-09-09 NOTE — TELEPHONE ENCOUNTER
"Return call to patient, requesting to see Dr Mandi Seth MD, for Lorazepam (ativan 0.5MG). Stated that the lorazepam 0.5MG is too strong for her. I reminded the patient the her pcp discontinued the medication on 7-,. Once I informed her of this, the patient replied "thank you for your help" and hung up the phone.   "

## 2019-09-09 NOTE — TELEPHONE ENCOUNTER
----- Message from Natty Qureshi sent at 9/9/2019 11:26 AM CDT -----  Contact: self/[phone]  Patient called in regards needing to talk with Dr Fishman about if is possible for her to see her. Patient was was told by Dr Sweeney to see Dr Fishman in regards medication update (patient is aware that Dr Huynh is her PCP). Please call and advise. Thank you!!!

## 2019-09-09 NOTE — TELEPHONE ENCOUNTER
----- Message from Laureen Clayton sent at 9/9/2019  3:08 PM CDT -----  Contact: [phone]  Patient is returning a phone call.  Who left a message for the patient: Nikki   Does patient know what this is regarding:    Comments: please advise, thanks

## 2019-09-12 ENCOUNTER — OFFICE VISIT (OUTPATIENT)
Dept: PSYCHIATRY | Facility: CLINIC | Age: 74
End: 2019-09-12
Payer: MEDICARE

## 2019-09-12 DIAGNOSIS — F33.0 MAJOR DEPRESSIVE DISORDER, RECURRENT, MILD: Primary | ICD-10-CM

## 2019-09-12 DIAGNOSIS — F41.9 ANXIETY: ICD-10-CM

## 2019-09-12 PROCEDURE — 90834 PSYTX W PT 45 MINUTES: CPT | Mod: HCNC,S$GLB,, | Performed by: SOCIAL WORKER

## 2019-09-12 PROCEDURE — 90834 PR PSYCHOTHERAPY W/PATIENT, 45 MIN: ICD-10-PCS | Mod: HCNC,S$GLB,, | Performed by: SOCIAL WORKER

## 2019-09-12 NOTE — PROGRESS NOTES
Individual Psychotherapy (PhD/LCSW)    9/12/2019    Site:  [facility]         Therapeutic Intervention: Met with patient.  Outpatient - Insight oriented psychotherapy 45 min - CPT code 07342    Chief complaint/reason for encounter: depression and anxiety     Interval history and content of current session: Pt anxious about today's session, plans to discuss difficult issues. Help pt address the anxiety, pt able to share some issues, use EMDR to help reduce sense of shame about her past behavior and establish empathy for her emotional neediness in younger years. Ask pt to talk with  about what he values in her and the relationship.Pt states session was not as painful as she expected.    Treatment plan:  · Target symptoms: depression, anxiety   · Why chosen therapy is appropriate versus another modality: relevant to diagnosis  · Outcome monitoring methods: self-report  · Therapeutic intervention type: insight oriented psychotherapy    Risk parameters:  Patient reports no suicidal ideation  Patient reports no homicidal ideation  Patient reports no self-injurious behavior  Patient reports no violent behavior    Verbal deficits: None    Patient's response to intervention:  The patient's response to intervention is motivated    Progress toward goals and other mental status changes:  The patient's progress toward goals is good.    Diagnosis:   Major depression, recurrent, mild; anxiety r/o PTSD  Plan:  individual psychotherapy    Return to clinic: as needed    Length of Service (minutes): 45

## 2019-09-19 RX ORDER — TRAMADOL HYDROCHLORIDE 50 MG/1
TABLET ORAL
Qty: 120 TABLET | Refills: 3 | Status: SHIPPED | OUTPATIENT
Start: 2019-09-19 | End: 2020-04-23 | Stop reason: SDUPTHER

## 2019-09-25 ENCOUNTER — CLINICAL SUPPORT (OUTPATIENT)
Dept: INTERNAL MEDICINE | Facility: CLINIC | Age: 74
End: 2019-09-25
Payer: MEDICARE

## 2019-09-25 VITALS — WEIGHT: 159.19 LBS | BODY MASS INDEX: 30.08 KG/M2

## 2019-09-25 NOTE — PROGRESS NOTES
Health  Follow-Up Note   [x] Office  [] Phone  Notes from previous session reviewed.   [x] Previous Session Goals unchanged.   [] Patient/Caregiver Change in Goals.  Goals added or changed by Patient/Caregiver since program participation:  1. Continue increase exercise    2. Cut sugar out of coffee in last month all except about 3 times.   3. Started vitamin D again had stopped for a while.      Additional/Changed support that patient/caregiver has experienced/sought?  (Indicate readiness, support from family, friends, others, community groups, etc)  1.      Additional/Changed obstacles that could prevent patient/caregiver from reaching their goals?  1.  None identified     Feedback provided:  1.  Praised for good job down 2.21 lbs total loss 28 lbs    Diagnostic values/Desriptors for follow-up as needed for chronic condition(s)   Weight: 72.2 kg 159.17 lb   Blood Glucose: 101-152  7 d 114  14 d 116  30 d 118  90 d 114    Interventions:   1. Health  listened reflectively, validated thoughts and feelings, offered support and encouragement.   2. Allowed patient to express themselves in a non-biased atmosphere.  3. Health  assisted pt to problem-solve obstacles such as being in a challenging environment and dealing with these challenges.   4. Motivational Interviewed interventions utilized (OARS).   5. Patient responded favorably to interventions and remained actively engaged in the session.   6. Health  will remain available and connected for patient by phone and/or office visits.   7. Positive reinforcement, emotional support and encouragement provided.   8. Focused Education: MI    Plan:  [x] Pt will work on goals as stated above.   [x] Pt will contact Health  for any questions, concerns or needs.  [x] Pt will follow up with Health  in office on  10.16.19 at 0830.  [] Pt will follow up with Health  on phone in:        [x] Health  will remain available.   [] Health   will contact patient by phone in:        [] Health  will consult:        [] Health  will inform Provider via EPIC messaging.     Impression:  1. Behavior is consistent with Action Stage of Change.   2. Participation level:       [x] Receptive      [x] Interactive      [] Guarded and Resistant      [x] Self Motivated      [] Refused/Declined to participate   3. [x] Pt voiced understanding of all information presented.       [] Pt voiced needing further information/education. This will be arranged.       [x] Pt would benefit from further education/information as identified by this health . This will be arranged.     Jacqueline Perry RN HC

## 2019-09-26 ENCOUNTER — PATIENT MESSAGE (OUTPATIENT)
Dept: INTERNAL MEDICINE | Facility: CLINIC | Age: 74
End: 2019-09-26

## 2019-09-27 ENCOUNTER — PATIENT MESSAGE (OUTPATIENT)
Dept: INTERNAL MEDICINE | Facility: CLINIC | Age: 74
End: 2019-09-27

## 2019-09-29 ENCOUNTER — PATIENT MESSAGE (OUTPATIENT)
Dept: INTERNAL MEDICINE | Facility: CLINIC | Age: 74
End: 2019-09-29

## 2019-09-29 DIAGNOSIS — R53.83 FATIGUE, UNSPECIFIED TYPE: Primary | ICD-10-CM

## 2019-10-01 ENCOUNTER — OFFICE VISIT (OUTPATIENT)
Dept: URGENT CARE | Facility: CLINIC | Age: 74
End: 2019-10-01
Payer: MEDICARE

## 2019-10-01 VITALS — HEART RATE: 74 BPM | OXYGEN SATURATION: 95 % | RESPIRATION RATE: 18 BRPM | TEMPERATURE: 98 F

## 2019-10-01 DIAGNOSIS — A28.1 CAT-SCRATCH DISEASE: Primary | ICD-10-CM

## 2019-10-01 PROCEDURE — 1101F PR PT FALLS ASSESS DOC 0-1 FALLS W/OUT INJ PAST YR: ICD-10-PCS | Mod: CPTII,S$GLB,, | Performed by: FAMILY MEDICINE

## 2019-10-01 PROCEDURE — 1101F PT FALLS ASSESS-DOCD LE1/YR: CPT | Mod: CPTII,S$GLB,, | Performed by: FAMILY MEDICINE

## 2019-10-01 PROCEDURE — 99214 OFFICE O/P EST MOD 30 MIN: CPT | Mod: S$GLB,,, | Performed by: FAMILY MEDICINE

## 2019-10-01 PROCEDURE — 99214 PR OFFICE/OUTPT VISIT, EST, LEVL IV, 30-39 MIN: ICD-10-PCS | Mod: S$GLB,,, | Performed by: FAMILY MEDICINE

## 2019-10-01 RX ORDER — AZITHROMYCIN 250 MG/1
TABLET, FILM COATED ORAL
Qty: 6 TABLET | Refills: 0 | Status: SHIPPED | OUTPATIENT
Start: 2019-10-01 | End: 2020-02-06

## 2019-10-01 NOTE — PROGRESS NOTES
Subjective:       Patient ID: Paris Burris is a 73 y.o. female.    Vitals:  tympanic temperature is 97.8 °F (36.6 °C). Her pulse is 74. Her respiration is 18 and oxygen saturation is 95%.     Chief Complaint: Rash    Patient presents with c/o rash that started over the weekend. Rash is located on the 3rd and 4th digit of rt hand. The area is itchy and painful. No meds taken otc. Negative for fever.  No recent change in soaps or skin products or dish wash detergent.  She does have a new kitten at home and and adjacent ecchymosis on at the base of her right 4th finger from the kitten    Rash   This is a new problem. Episode onset: Saturday. The problem is unchanged. Location: rt hand. The rash is characterized by pain, redness and itchiness. She was exposed to nothing. Pertinent negatives include no cough, fever or sore throat. Past treatments include nothing.       Constitution: Negative for chills and fever.   HENT: Negative for facial swelling and sore throat.    Neck: Negative for painful lymph nodes.   Eyes: Negative for eye itching and eyelid swelling.   Respiratory: Negative for cough.    Musculoskeletal: Negative for joint pain and joint swelling.   Skin: Positive for rash. Negative for color change, pale, wound, abrasion, laceration, lesion, skin thickening/induration, puncture wound, erythema, bruising, abscess, avulsion and hives.   Allergic/Immunologic: Positive for itching. Negative for environmental allergies, immunocompromised state and hives.   Hematologic/Lymphatic: Negative for swollen lymph nodes.       Objective:      Physical Exam   Constitutional: She is oriented to person, place, and time. She appears well-developed and well-nourished. She is cooperative.  Non-toxic appearance. She does not appear ill. No distress.   HENT:   Head: Normocephalic and atraumatic.   Right Ear: Hearing, tympanic membrane and ear canal normal.   Left Ear: Hearing, tympanic membrane and ear canal normal.   Nose:  Nose normal. No mucosal edema, rhinorrhea or nasal deformity. No epistaxis. Right sinus exhibits no maxillary sinus tenderness and no frontal sinus tenderness. Left sinus exhibits no maxillary sinus tenderness and no frontal sinus tenderness.   Mouth/Throat: Uvula is midline, oropharynx is clear and moist and mucous membranes are normal. No trismus in the jaw. Normal dentition. No uvula swelling. No posterior oropharyngeal erythema.   Eyes: Conjunctivae and lids are normal. No scleral icterus.   Sclera clear bilat   Neck: Trachea normal, normal range of motion, full passive range of motion without pain and phonation normal. Neck supple.   Cardiovascular: Normal rate, regular rhythm, normal heart sounds, intact distal pulses and normal pulses.   Pulmonary/Chest: Effort normal and breath sounds normal. No stridor. No respiratory distress. She has no wheezes. She has no rales.   Abdominal: Soft. Normal appearance and bowel sounds are normal. She exhibits no distension and no mass. There is no tenderness. There is no guarding.   No hepatosplenomegaly   Musculoskeletal: Normal range of motion. She exhibits no edema or deformity.   Lymphadenopathy:     She has no cervical adenopathy.   Neurological: She is alert and oriented to person, place, and time. She exhibits normal muscle tone. Coordination normal.   Skin: Skin is warm, dry and intact. She is not diaphoretic. No erythema. No pallor.   Slightly papular rashes noted.  No associated epitrochlear lymphadenopathy.  No axillary adenopathy.   Psychiatric: She has a normal mood and affect. Her speech is normal and behavior is normal. Judgment and thought content normal. Cognition and memory are normal.   Nursing note and vitals reviewed.          Assessment:       1. Cat-scratch disease        Plan:       Reviewed with her that rash can be a less common manifestation cat scratch disease.  Cat-scratch disease  -     azithromycin (Z-ELIZA) 250 MG tablet; Take 2 tablets by  mouth on day 1; Take 1 tablet by mouth on days 2-5  Dispense: 6 tablet; Refill: 0    RECHECK WITH YOUR PRIMARY CARE PROVIDER IF THIS IS NOT IMPROVING AS EXPECTED.    Make sure that you follow up with your primary care doctor in the next 2-5 days if needed .  Return to the Urgent Care if signs or symptoms change and certainly if you have worsening symptoms go to the nearest emergency department for further evaluation.

## 2019-10-01 NOTE — PATIENT INSTRUCTIONS
What Is Cat Scratch Disease?  When you are bitten or scratched by a cat, bacteria may get into your body through the broken skin and cause you to get sick. Cat scratch disease is usually not a serious illness. For most people it goes away on its own.    What causes cat scratch disease?  As many as half of all cats carry a bacteria that causes cat scratch disease. Cats carrying these bacteria are not sick, but they can spread the bacteria to people. Kittens are more likely to spread the disease than adult cats. Children are more likely to get cat scratch disease than adults. The germ passes from cats to people when:   · An infected cats saliva enters the body through a bite or scratch or the cat licks an open wound  · You pet a cat with these bacteria on its fur and rub your eyes  · You get a fleabite from a cats litter    What are the symptoms of cat scratch disease?  The symptoms of cat scratch disease are usually mild. They can include:  · A sore or blister at the site of the scratch or bite.  · A swollen lymph node or nodes (sometimes called a swollen gland) near the site of the scratch or bite. For example, if you are scratched on the arm, a lymph node in your armpit may swell up.  · Oozing from swollen lymph nodes  · Fever  · Headache  · Feeling tired or unwell  · Loss of appetite  How is cat scratch disease treated?  Most people with cat scratch disease will get better without medicine. Most treatments for cat scratch disease focus on relieving symptoms. Treatments may include:  · Warm compresses applied to the swollen lymph node. This can help the swelling go down.  · Over-the-counter pain medicines, such as acetaminophen or ibuprofen. These can help with discomfort. Do not give aspirin to anyone younger than 18 years of age who is ill with a viral infection or fever. It may cause severe liver or brain damage.  · Antibiotics. These may prevent or stop the spread of the infection if your body is not able  to fight disease well. Antibiotics may also help shrink swollen lymph nodes.    How can I prevent cat scratch disease?  Here are some ways that you and your family can avoid cat scratch disease:  · Avoid cats when possible.  · Do not play roughly with cats. Teach your children to play gently with all animals. This helps prevent getting bitten or scratched.  · Wash your hands after handling your cat. Have your children do the same.  · Talk with your  about how to keep fleas away from your cat and your family.  · If you have HIV, are being treated for cancer, or have a weak immune system for some other reason, kittens pose extra risk for you. Take extra care to avoid cat bites and scratches.  What are the complications of cat scratch disease?  The symptoms of cat scratch disease usually go away by themselves. But the infection may spread in people who have a weakened immune system. If this happens, cat scratch disease is more serious and can result in prolonged fever, vision changes, severe muscle pain, headache, or confusion. This is uncommon.     When should I call my healthcare provider?  Call your healthcare provider right away if you have any of these:  · Fever of 100.4°F (38°C) or higher, or as directed  · Pain, especially muscle pain or a headache, that gets worse  · Symptoms that dont improve in 1 or 2 weeks, or get worse  · Confusion or sleepiness  · Vision changes or nervous system symptoms   Date Last Reviewed: 3/28/2016  © 6879-5687 [employer]. [address] [place] [address], [place] [address]. All rights reserved. This information is not intended as a substitute for professional medical care. Always follow your healthcare professional's instructions.      RECHECK WITH YOUR PRIMARY CARE PROVIDER IF THIS IS NOT IMPROVING AS EXPECTED.    Make sure that you follow up with your primary care doctor in the next 2-5 days if needed .  Return to the Urgent Care if signs or symptoms change and  certainly if you have worsening symptoms go to the nearest emergency department for further evaluation.

## 2019-10-03 ENCOUNTER — CLINICAL SUPPORT (OUTPATIENT)
Dept: CARDIOLOGY | Facility: HOSPITAL | Age: 74
End: 2019-10-03
Payer: MEDICARE

## 2019-10-03 DIAGNOSIS — Z95.0 PRESENCE OF CARDIAC PACEMAKER: ICD-10-CM

## 2019-10-03 DIAGNOSIS — I44.2 ATRIOVENTRICULAR BLOCK, COMPLETE: ICD-10-CM

## 2019-10-03 DIAGNOSIS — I42.9 CARDIOMYOPATHY, UNSPECIFIED: ICD-10-CM

## 2019-10-03 PROCEDURE — 93296 REM INTERROG EVL PM/IDS: CPT | Performed by: INTERNAL MEDICINE

## 2019-10-03 PROCEDURE — 93294 REM INTERROG EVL PM/LDLS PM: CPT | Mod: ,,, | Performed by: INTERNAL MEDICINE

## 2019-10-03 PROCEDURE — 93294 CARDIAC DEVICE CHECK - REMOTE: ICD-10-PCS | Mod: ,,, | Performed by: INTERNAL MEDICINE

## 2019-10-07 ENCOUNTER — PATIENT MESSAGE (OUTPATIENT)
Dept: INTERNAL MEDICINE | Facility: CLINIC | Age: 74
End: 2019-10-07

## 2019-10-07 ENCOUNTER — OFFICE VISIT (OUTPATIENT)
Dept: ENDOCRINOLOGY | Facility: CLINIC | Age: 74
End: 2019-10-07
Payer: MEDICARE

## 2019-10-07 ENCOUNTER — PATIENT OUTREACH (OUTPATIENT)
Dept: ADMINISTRATIVE | Facility: OTHER | Age: 74
End: 2019-10-07

## 2019-10-07 VITALS
HEIGHT: 61 IN | RESPIRATION RATE: 18 BRPM | HEART RATE: 76 BPM | SYSTOLIC BLOOD PRESSURE: 112 MMHG | WEIGHT: 162.06 LBS | BODY MASS INDEX: 30.6 KG/M2 | DIASTOLIC BLOOD PRESSURE: 70 MMHG

## 2019-10-07 DIAGNOSIS — E66.9 OBESITY (BMI 30-39.9): ICD-10-CM

## 2019-10-07 DIAGNOSIS — Z98.890 STATUS POST PARATHYROIDECTOMY: ICD-10-CM

## 2019-10-07 DIAGNOSIS — R53.83 FATIGUE, UNSPECIFIED TYPE: Primary | ICD-10-CM

## 2019-10-07 DIAGNOSIS — E55.9 VITAMIN D DEFICIENCY: ICD-10-CM

## 2019-10-07 DIAGNOSIS — E11.21 CONTROLLED TYPE 2 DIABETES MELLITUS WITH DIABETIC NEPHROPATHY, WITHOUT LONG-TERM CURRENT USE OF INSULIN: ICD-10-CM

## 2019-10-07 DIAGNOSIS — N18.2 STAGE 2 CHRONIC KIDNEY DISEASE: ICD-10-CM

## 2019-10-07 DIAGNOSIS — E78.2 MIXED HYPERLIPIDEMIA: ICD-10-CM

## 2019-10-07 DIAGNOSIS — Z90.89 STATUS POST PARATHYROIDECTOMY: ICD-10-CM

## 2019-10-07 DIAGNOSIS — E11.42 DIABETIC POLYNEUROPATHY ASSOCIATED WITH TYPE 2 DIABETES MELLITUS: ICD-10-CM

## 2019-10-07 PROCEDURE — 99999 PR PBB SHADOW E&M-EST. PATIENT-LVL V: CPT | Mod: PBBFAC,HCNC,, | Performed by: NURSE PRACTITIONER

## 2019-10-07 PROCEDURE — 3078F DIAST BP <80 MM HG: CPT | Mod: HCNC,CPTII,S$GLB, | Performed by: NURSE PRACTITIONER

## 2019-10-07 PROCEDURE — 3044F PR MOST RECENT HEMOGLOBIN A1C LEVEL <7.0%: ICD-10-PCS | Mod: HCNC,CPTII,S$GLB, | Performed by: NURSE PRACTITIONER

## 2019-10-07 PROCEDURE — 99214 OFFICE O/P EST MOD 30 MIN: CPT | Mod: HCNC,S$GLB,, | Performed by: NURSE PRACTITIONER

## 2019-10-07 PROCEDURE — 1101F PT FALLS ASSESS-DOCD LE1/YR: CPT | Mod: HCNC,CPTII,S$GLB, | Performed by: NURSE PRACTITIONER

## 2019-10-07 PROCEDURE — 99999 PR PBB SHADOW E&M-EST. PATIENT-LVL V: ICD-10-PCS | Mod: PBBFAC,HCNC,, | Performed by: NURSE PRACTITIONER

## 2019-10-07 PROCEDURE — 1101F PR PT FALLS ASSESS DOC 0-1 FALLS W/OUT INJ PAST YR: ICD-10-PCS | Mod: HCNC,CPTII,S$GLB, | Performed by: NURSE PRACTITIONER

## 2019-10-07 PROCEDURE — 3074F PR MOST RECENT SYSTOLIC BLOOD PRESSURE < 130 MM HG: ICD-10-PCS | Mod: HCNC,CPTII,S$GLB, | Performed by: NURSE PRACTITIONER

## 2019-10-07 PROCEDURE — 99214 PR OFFICE/OUTPT VISIT, EST, LEVL IV, 30-39 MIN: ICD-10-PCS | Mod: HCNC,S$GLB,, | Performed by: NURSE PRACTITIONER

## 2019-10-07 PROCEDURE — 3074F SYST BP LT 130 MM HG: CPT | Mod: HCNC,CPTII,S$GLB, | Performed by: NURSE PRACTITIONER

## 2019-10-07 PROCEDURE — 3044F HG A1C LEVEL LT 7.0%: CPT | Mod: HCNC,CPTII,S$GLB, | Performed by: NURSE PRACTITIONER

## 2019-10-07 PROCEDURE — 3078F PR MOST RECENT DIASTOLIC BLOOD PRESSURE < 80 MM HG: ICD-10-PCS | Mod: HCNC,CPTII,S$GLB, | Performed by: NURSE PRACTITIONER

## 2019-10-07 RX ORDER — DEXAMETHASONE 1 MG/1
1 TABLET ORAL ONCE
Qty: 1 TABLET | Refills: 0 | Status: SHIPPED | OUTPATIENT
Start: 2019-10-07 | End: 2019-10-07

## 2019-10-07 NOTE — ASSESSMENT & PLAN NOTE
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

## 2019-10-07 NOTE — LETTER
October 7, 2019      Loreto Johnson, [place]  [address]  [place] [address]           Lg [facility]ran - Endocrinology [facility]  [address]  [facility] [address]  Phone: [phone]  Fax: [phone]          Patient: Paris Burris   MR Number: [id number]   YOB: 1945   Date of Visit: 10/7/2019       Dear Loreto Johnson:    Thank you for referring Paris Burris to me for evaluation. Attached you will find relevant portions of my assessment and plan of care.    If you have questions, please do not hesitate to call me. I look forward to following Paris Burris along with you.    Sincerely,    Guillermina Cochran NP    Enclosure  CC:  No Recipients    If you would like to receive this communication electronically, please contact [email or web address] or [phone] to request more information on [employer] access.    For providers and/or their staff who would like to refer a patient to [facility], please contact us through our one-stop-shop provider referral line, [facility] , at [phone].    If you feel you have received this communication in error or would no longer like to receive these types of communications, please e-mail [email or web address]

## 2019-10-07 NOTE — PATIENT INSTRUCTIONS
Here are the instructions for the dexamethasone suppression test.    Please take 1 mg dexamethasone between 11 PM-12 AM. Then have your blood drawn at 8-9 AM the next morning.    I have sent the prescription of dexamethasone to your pharmacy and entered the blood tests for you.

## 2019-10-07 NOTE — ASSESSMENT & PLAN NOTE
-- Patient is requesting to be tested for excess cortisol.  -- Low suspicion for this; however, will complete DMST.

## 2019-10-07 NOTE — PROGRESS NOTES
Subjective:      Patient ID: Paris Burris is a 73 y.o. female.    Chief Complaint:  Diabetes    History of Present Illness  Paris Burris is here for evaluation and management of fatigue.  Previously seen by MISBAH Loyola NP.  Last seen 10/30/17.  This is their first visit with me.    Referred by KEYLA Obrien.     With regards to fatigue:    Weight gain: -  Fatigue: -  Mood changes: + anxiety and depression  Facial edema/erythema: -  Hirsutism: -  Acne:-  Voice deepening: -  Truncal obesity: -  Striae: -  Bruising: +  Proximal muscle weakness: -    Kidney stones/hematuria: + kidney stone ~2016  History of osteoporosis: -  Fractures: -    With regards to diabetes:  Follows with Dr. Huynh.    Current regimen:  None     Glucose Monitor:   1 times a day testing  Log reviewed: yes oral recall  Fasting 100-145    Hypoglycemia:  None  Knows how to correct with 15 grams of carbs- juice, coke, or a peppermint.     Diabetes Management Status  Statin: Taking  ACE/ARB: Taking  Screening or Prevention Patient's value Goal Complete/Controlled?   HgA1C Testing and Control   Lab Results   Component Value Date    HGBA1C 6.0 (H) 06/10/2019      Annually/Less than 8% Yes   Lipid profile : 07/18/2019 Annually Yes   LDL control Lab Results   Component Value Date    LDLCALC 81.4 07/18/2019    Annually/Less than 100 mg/dl  Yes   Nephropathy screening Lab Results   Component Value Date    LABMICR 9.0 12/21/2018     Lab Results   Component Value Date    PROTEINUA Negative 03/06/2019    Annually Yes   Blood pressure BP Readings from Last 1 Encounters:   10/07/19 112/70    Less than 140/90 Yes   Dilated retinal exam : 04/11/2019 Annually Yes   Foot exam   : 08/08/2018 Annually No     Review of Systems   Constitutional: Negative for fatigue.   Eyes: Negative for visual disturbance.   Respiratory: Negative for shortness of breath.    Cardiovascular: Negative for chest pain.   Gastrointestinal: Negative for abdominal pain.  "  Musculoskeletal: Negative for arthralgias.   Skin: Negative for wound.   Neurological: Negative for headaches.   Hematological: Bruises/bleeds easily.   Psychiatric/Behavioral: Positive for decreased concentration. Negative for sleep disturbance. The patient is nervous/anxious.      Objective:   Physical Exam   Neck: No thyromegaly present.   Cardiovascular: Normal rate.   No edema present   Pulmonary/Chest: Effort normal.   Abdominal: Soft.   Vitals reviewed.    Visit Vitals  /70 (BP Location: Right arm, Patient Position: Sitting, BP Method: Small (Manual))   Pulse 76   Resp 18   Ht 5' 1" (1.549 m)   Wt 73.5 kg (162 lb 0.6 oz)   BMI 30.62 kg/m²     Body mass index is 30.62 kg/m².    Lab Review:   Lab Results   Component Value Date    HGBA1C 6.0 (H) 06/10/2019     Lab Results   Component Value Date    CHOL 168 07/18/2019    HDL 38 (L) 07/18/2019    LDLCALC 81.4 07/18/2019    TRIG 243 (H) 07/18/2019    CHOLHDL 22.6 07/18/2019     Lab Results   Component Value Date     07/18/2019    K 4.7 07/18/2019     07/18/2019    CO2 28 07/18/2019     07/18/2019    BUN 19 07/18/2019    CREATININE 0.9 07/18/2019    CALCIUM 9.5 07/18/2019    PROT 7.7 07/18/2019    ALBUMIN 4.4 07/18/2019    BILITOT 0.4 07/18/2019    ALKPHOS 60 07/18/2019    AST 16 07/18/2019    ALT 16 07/18/2019    ANIONGAP 9 07/18/2019    ESTGFRAFRICA >60 07/18/2019    EGFRNONAA >60 07/18/2019    TSH 0.797 02/20/2019     Vit D, 25-Hydroxy   Date Value Ref Range Status   07/18/2019 24 (L) 30 - 96 ng/mL Final     Comment:     Vitamin D deficiency.........<10 ng/mL                              Vitamin D insufficiency......10-29 ng/mL       Vitamin D sufficiency........> or equal to 30 ng/mL  Vitamin D toxicity............>100 ng/mL       Assessment and Plan     1. Fatigue, unspecified type  Ambulatory referral/consult to Endocrinology    Cortisol, 8AM    dexAMETHasone (DECADRON) 1 MG Tab    Basic metabolic panel    ACTH   2. 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 Status post parathyroidectomy     4. Vitamin D deficiency     5. Obesity (BMI 30-39.9)     6. Stage 2 chronic kidney disease     7. Mixed hyperlipidemia     8. Diabetic polyneuropathy associated with type 2 diabetes mellitus       Fatigue  -- Patient is requesting to be tested for excess cortisol.  -- Low suspicion for this; however, will complete DMST.     Type 2 diabetes mellitus, controlled, with renal complications  -- Continue following with PCP.  -- Confirmed patient is not having any hypoglycemic episodes.     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     Vitamin D deficiency  -- Continue OTC Vit D3.     Obesity (BMI 30-39.9)  -- Encouraged dietary and lifestyle modifications.  -- Emphasized weight loss goals.    Stage 2 chronic kidney disease  -- Stable.     Hyperlipidemia  -- Controlled.  -- On statin per ADA recommendations.    Diabetic polyneuropathy associated with type 2 diabetes mellitus  -- Optimize glucose control.     No follow-ups on file.

## 2019-10-08 ENCOUNTER — IMMUNIZATION (OUTPATIENT)
Dept: PHARMACY | Facility: CLINIC | Age: 74
End: 2019-10-08

## 2019-10-08 ENCOUNTER — IMMUNIZATION (OUTPATIENT)
Dept: PHARMACY | Facility: CLINIC | Age: 74
End: 2019-10-08
Payer: MEDICARE

## 2019-10-08 ENCOUNTER — LAB VISIT (OUTPATIENT)
Dept: LAB | Facility: HOSPITAL | Age: 74
End: 2019-10-08
Payer: MEDICARE

## 2019-10-08 DIAGNOSIS — R53.83 FATIGUE, UNSPECIFIED TYPE: ICD-10-CM

## 2019-10-08 LAB
ACTH PLAS-MCNC: 15 PG/ML (ref 0–46)
ANION GAP SERPL CALC-SCNC: 9 MMOL/L (ref 8–16)
BUN SERPL-MCNC: 16 MG/DL (ref 8–23)
CALCIUM SERPL-MCNC: 8.1 MG/DL (ref 8.7–10.5)
CHLORIDE SERPL-SCNC: 105 MMOL/L (ref 95–110)
CO2 SERPL-SCNC: 25 MMOL/L (ref 23–29)
CREAT SERPL-MCNC: 0.7 MG/DL (ref 0.5–1.4)
EST. GFR  (AFRICAN AMERICAN): >60 ML/MIN/1.73 M^2
EST. GFR  (NON AFRICAN AMERICAN): >60 ML/MIN/1.73 M^2
GLUCOSE SERPL-MCNC: 114 MG/DL (ref 70–110)
POTASSIUM SERPL-SCNC: 4.3 MMOL/L (ref 3.5–5.1)
SODIUM SERPL-SCNC: 139 MMOL/L (ref 136–145)

## 2019-10-08 PROCEDURE — 80048 BASIC METABOLIC PNL TOTAL CA: CPT | Mod: HCNC

## 2019-10-08 PROCEDURE — 36415 COLL VENOUS BLD VENIPUNCTURE: CPT | Mod: HCNC

## 2019-10-08 PROCEDURE — 82024 ASSAY OF ACTH: CPT | Mod: HCNC

## 2019-10-10 ENCOUNTER — PATIENT MESSAGE (OUTPATIENT)
Dept: INTERNAL MEDICINE | Facility: CLINIC | Age: 74
End: 2019-10-10

## 2019-10-11 ENCOUNTER — PATIENT MESSAGE (OUTPATIENT)
Dept: ENDOCRINOLOGY | Facility: CLINIC | Age: 74
End: 2019-10-11

## 2019-10-11 ENCOUNTER — OFFICE VISIT (OUTPATIENT)
Dept: INTERNAL MEDICINE | Facility: CLINIC | Age: 74
End: 2019-10-11
Payer: MEDICARE

## 2019-10-11 ENCOUNTER — LAB VISIT (OUTPATIENT)
Dept: LAB | Facility: HOSPITAL | Age: 74
End: 2019-10-11
Payer: MEDICARE

## 2019-10-11 VITALS
HEIGHT: 61 IN | BODY MASS INDEX: 30.93 KG/M2 | OXYGEN SATURATION: 97 % | HEART RATE: 82 BPM | SYSTOLIC BLOOD PRESSURE: 118 MMHG | DIASTOLIC BLOOD PRESSURE: 70 MMHG | WEIGHT: 163.81 LBS

## 2019-10-11 DIAGNOSIS — R53.83 FATIGUE, UNSPECIFIED TYPE: ICD-10-CM

## 2019-10-11 DIAGNOSIS — H91.90 HEARING LOSS, UNSPECIFIED HEARING LOSS TYPE, UNSPECIFIED LATERALITY: ICD-10-CM

## 2019-10-11 DIAGNOSIS — A28.1 CAT-SCRATCH DISEASE: Primary | ICD-10-CM

## 2019-10-11 LAB — CORTIS SERPL-MCNC: 1 UG/DL (ref 4.3–22.4)

## 2019-10-11 PROCEDURE — 3078F PR MOST RECENT DIASTOLIC BLOOD PRESSURE < 80 MM HG: ICD-10-PCS | Mod: HCNC,CPTII,S$GLB, | Performed by: INTERNAL MEDICINE

## 2019-10-11 PROCEDURE — 82533 TOTAL CORTISOL: CPT | Mod: HCNC

## 2019-10-11 PROCEDURE — 3074F SYST BP LT 130 MM HG: CPT | Mod: HCNC,CPTII,S$GLB, | Performed by: INTERNAL MEDICINE

## 2019-10-11 PROCEDURE — 99999 PR PBB SHADOW E&M-EST. PATIENT-LVL III: ICD-10-PCS | Mod: PBBFAC,HCNC,, | Performed by: INTERNAL MEDICINE

## 2019-10-11 PROCEDURE — 3078F DIAST BP <80 MM HG: CPT | Mod: HCNC,CPTII,S$GLB, | Performed by: INTERNAL MEDICINE

## 2019-10-11 PROCEDURE — 99999 PR PBB SHADOW E&M-EST. PATIENT-LVL III: CPT | Mod: PBBFAC,HCNC,, | Performed by: INTERNAL MEDICINE

## 2019-10-11 PROCEDURE — 1101F PT FALLS ASSESS-DOCD LE1/YR: CPT | Mod: HCNC,CPTII,S$GLB, | Performed by: INTERNAL MEDICINE

## 2019-10-11 PROCEDURE — 3074F PR MOST RECENT SYSTOLIC BLOOD PRESSURE < 130 MM HG: ICD-10-PCS | Mod: HCNC,CPTII,S$GLB, | Performed by: INTERNAL MEDICINE

## 2019-10-11 PROCEDURE — 1101F PR PT FALLS ASSESS DOC 0-1 FALLS W/OUT INJ PAST YR: ICD-10-PCS | Mod: HCNC,CPTII,S$GLB, | Performed by: INTERNAL MEDICINE

## 2019-10-11 PROCEDURE — 99213 OFFICE O/P EST LOW 20 MIN: CPT | Mod: HCNC,S$GLB,, | Performed by: INTERNAL MEDICINE

## 2019-10-11 PROCEDURE — 36415 COLL VENOUS BLD VENIPUNCTURE: CPT | Mod: HCNC

## 2019-10-11 PROCEDURE — 99213 PR OFFICE/OUTPT VISIT, EST, LEVL III, 20-29 MIN: ICD-10-PCS | Mod: HCNC,S$GLB,, | Performed by: INTERNAL MEDICINE

## 2019-10-11 RX ORDER — SULFAMETHOXAZOLE AND TRIMETHOPRIM 800; 160 MG/1; MG/1
1 TABLET ORAL 2 TIMES DAILY
Qty: 20 TABLET | Refills: 0 | Status: SHIPPED | OUTPATIENT
Start: 2019-10-11 | End: 2020-02-06

## 2019-10-11 NOTE — PROGRESS NOTES
Subjective:       Patient ID: Paris Burris is a 74 y.o. female.    Chief Complaint: No chief complaint on file.    74 year old lady dx'd with cat scratch disease and treated with a z pack present today with new lesions  She was warned this could happen..    Review of Systems   Constitutional: Negative for activity change, chills, fatigue and fever.   HENT: Negative for congestion, ear pain, nosebleeds, postnasal drip, sinus pressure and sore throat.    Eyes: Negative.  Negative for visual disturbance.   Respiratory: Negative for cough, chest tightness, shortness of breath and wheezing.    Cardiovascular: Negative for chest pain.   Gastrointestinal: Negative for abdominal pain, diarrhea, nausea and vomiting.   Genitourinary: Negative for difficulty urinating, dysuria, frequency and urgency.   Musculoskeletal: Negative for arthralgias and neck stiffness.   Skin: Negative for rash.   Neurological: Negative for dizziness, weakness and headaches.   Psychiatric/Behavioral: Negative for sleep disturbance. The patient is not nervous/anxious.        Objective:      Physical Exam   Constitutional: She is oriented to person, place, and time. She appears well-developed and well-nourished.  Non-toxic appearance. No distress.   HENT:   Head: Normocephalic and atraumatic.   Right Ear: Tympanic membrane, external ear and ear canal normal.   Left Ear: Tympanic membrane, external ear and ear canal normal.   Eyes: Pupils are equal, round, and reactive to light. EOM are normal. No scleral icterus.   Neck: Normal range of motion. Neck supple. No thyromegaly present.   Cardiovascular: Normal rate, regular rhythm and normal heart sounds.   Pulmonary/Chest: Effort normal and breath sounds normal.   Abdominal: Soft. Bowel sounds are normal. She exhibits no mass. There is no tenderness. There is no rebound.   Musculoskeletal: Normal range of motion.   Lymphadenopathy:     She has no cervical adenopathy.   Neurological: She is alert and  oriented to person, place, and time. She has normal reflexes. She displays normal reflexes. No cranial nerve deficit. She exhibits normal muscle tone. Coordination normal.   Skin: Skin is warm and dry.        Psychiatric: She has a normal mood and affect. Her behavior is normal.       Assessment:       1. Cat-scratch disease    2. Hearing loss, unspecified hearing loss type, unspecified laterality        Plan:   Diagnoses and all orders for this visit:    Cat-scratch disease    Hearing loss, unspecified hearing loss type, unspecified laterality  -     Ambulatory consult to Audiology    Other orders  -     sulfamethoxazole-trimethoprim 800-160mg (BACTRIM DS) 800-160 mg Tab; Take 1 tablet by mouth 2 (two) times daily.

## 2019-10-12 DIAGNOSIS — F33.41 MDD (MAJOR DEPRESSIVE DISORDER), RECURRENT, IN PARTIAL REMISSION: ICD-10-CM

## 2019-10-14 RX ORDER — VENLAFAXINE 37.5 MG/1
TABLET ORAL
Qty: 180 TABLET | Refills: 1 | Status: SHIPPED | OUTPATIENT
Start: 2019-10-14 | End: 2020-02-06

## 2019-10-16 ENCOUNTER — PATIENT MESSAGE (OUTPATIENT)
Dept: INTERNAL MEDICINE | Facility: CLINIC | Age: 74
End: 2019-10-16

## 2019-10-16 ENCOUNTER — CLINICAL SUPPORT (OUTPATIENT)
Dept: INTERNAL MEDICINE | Facility: CLINIC | Age: 74
End: 2019-10-16
Payer: MEDICARE

## 2019-10-16 VITALS — WEIGHT: 160.69 LBS | BODY MASS INDEX: 30.37 KG/M2

## 2019-10-16 PROCEDURE — 99999 PR PBB SHADOW E&M-EST. PATIENT-LVL I: ICD-10-PCS | Mod: PBBFAC,HCNC,,

## 2019-10-16 PROCEDURE — 99999 PR PBB SHADOW E&M-EST. PATIENT-LVL I: CPT | Mod: PBBFAC,HCNC,,

## 2019-10-16 NOTE — PROGRESS NOTES
Health  Follow-Up Note   [x] Office  [] Phone  Notes from previous session reviewed.   [x] Previous Session Goals unchanged.   [] Patient/Caregiver Change in Goals.  Goals added or changed by Patient/Caregiver since program participation:  1. Continue same plan        Additional/Changed support that patient/caregiver has experienced/sought?  (Indicate readiness, support from family, friends, others, community groups, etc)  1.   and sister    Additional/Changed obstacles that could prevent patient/caregiver from reaching their goals?  1.   aggravating her making depressed     Feedback provided:  1.  Praised for continued effort and determination    Diagnostic values/Desriptors for follow-up as needed for chronic condition(s)   Weight: 72.9 kg 160.72 lb gain   Blood Glucose: Forgot monitor staying around same 100-150    Interventions:   1. Health  listened reflectively, validated thoughts and feelings, offered support and encouragement.   2. Allowed patient to express themselves in a non-biased atmosphere.  3. Health  assisted pt to problem-solve obstacles such as being in a challenging environment and dealing with these challenges.   4. Motivational Interviewed interventions utilized (OARS).   5. Patient responded favorably to interventions and remained actively engaged in the session.   6. Health  will remain available and connected for patient by phone and/or office visits.   7. Positive reinforcement, emotional support and encouragement provided.   8. Focused Education: MI    Plan:  [x] Pt will work on goals as stated above.   [x] Pt will contact Health  for any questions, concerns or needs.  [x] Pt will follow up with Health  in office on  11.13.19 at 0900  [] Pt will follow up with Health  on phone in:        [x] Health  will remain available.   [] Health  will contact patient by phone in:        [] Health  will consult:        [] Health  will  inform Provider via EPIC messaging.     Impression:  1. Behavior is consistent with Action Stage of Change.   2. Participation level:       [x] Receptive      [x] Interactive      [] Guarded and Resistant      [x] Self Motivated      [] Refused/Declined to participate   3. [x] Pt voiced understanding of all information presented.       [] Pt voiced needing further information/education. This will be arranged.       [x] Pt would benefit from further education/information as identified by this health . This will be arranged.     Jacqueline Perry RN HC       negative...

## 2019-10-17 ENCOUNTER — PATIENT MESSAGE (OUTPATIENT)
Dept: INTERNAL MEDICINE | Facility: CLINIC | Age: 74
End: 2019-10-17

## 2019-10-17 ENCOUNTER — OFFICE VISIT (OUTPATIENT)
Dept: INTERNAL MEDICINE | Facility: CLINIC | Age: 74
End: 2019-10-17
Payer: MEDICARE

## 2019-10-17 VITALS
OXYGEN SATURATION: 96 % | WEIGHT: 164.25 LBS | SYSTOLIC BLOOD PRESSURE: 116 MMHG | DIASTOLIC BLOOD PRESSURE: 59 MMHG | HEIGHT: 61 IN | BODY MASS INDEX: 31.01 KG/M2 | HEART RATE: 74 BPM

## 2019-10-17 DIAGNOSIS — R21 RASH: Primary | ICD-10-CM

## 2019-10-17 PROCEDURE — 99213 PR OFFICE/OUTPT VISIT, EST, LEVL III, 20-29 MIN: ICD-10-PCS | Mod: HCNC,S$GLB,, | Performed by: INTERNAL MEDICINE

## 2019-10-17 PROCEDURE — 3074F PR MOST RECENT SYSTOLIC BLOOD PRESSURE < 130 MM HG: ICD-10-PCS | Mod: HCNC,CPTII,S$GLB, | Performed by: INTERNAL MEDICINE

## 2019-10-17 PROCEDURE — 99999 PR PBB SHADOW E&M-EST. PATIENT-LVL III: ICD-10-PCS | Mod: PBBFAC,HCNC,, | Performed by: INTERNAL MEDICINE

## 2019-10-17 PROCEDURE — 99999 PR PBB SHADOW E&M-EST. PATIENT-LVL III: CPT | Mod: PBBFAC,HCNC,, | Performed by: INTERNAL MEDICINE

## 2019-10-17 PROCEDURE — 1101F PR PT FALLS ASSESS DOC 0-1 FALLS W/OUT INJ PAST YR: ICD-10-PCS | Mod: HCNC,CPTII,S$GLB, | Performed by: INTERNAL MEDICINE

## 2019-10-17 PROCEDURE — 3078F PR MOST RECENT DIASTOLIC BLOOD PRESSURE < 80 MM HG: ICD-10-PCS | Mod: HCNC,CPTII,S$GLB, | Performed by: INTERNAL MEDICINE

## 2019-10-17 PROCEDURE — 3074F SYST BP LT 130 MM HG: CPT | Mod: HCNC,CPTII,S$GLB, | Performed by: INTERNAL MEDICINE

## 2019-10-17 PROCEDURE — 3078F DIAST BP <80 MM HG: CPT | Mod: HCNC,CPTII,S$GLB, | Performed by: INTERNAL MEDICINE

## 2019-10-17 PROCEDURE — 99213 OFFICE O/P EST LOW 20 MIN: CPT | Mod: HCNC,S$GLB,, | Performed by: INTERNAL MEDICINE

## 2019-10-17 PROCEDURE — 1101F PT FALLS ASSESS-DOCD LE1/YR: CPT | Mod: HCNC,CPTII,S$GLB, | Performed by: INTERNAL MEDICINE

## 2019-10-17 NOTE — TELEPHONE ENCOUNTER
Called and left pt vm needing a call back to confirm if she can make her appointment with Dr. Lizama

## 2019-10-17 NOTE — PROGRESS NOTES
Subjective:       Patient ID: Paris Burris is a 74 y.o. female.    Chief Complaint: Rash    74 year old lady was dx'd with cat scratch disease and given 5 days of bactrim.  Came to clinic then with worsening scratches, so I gave her more bactrim.  There were never any enlarged lymph nodes.  Now she is back with an itchy palmar rash on both hands, right worse than left.  Right palm is beginning to peal    Review of Systems   Constitutional: Negative for activity change, chills, fatigue and fever.   HENT: Negative for congestion, ear pain, nosebleeds, postnasal drip, sinus pressure and sore throat.    Eyes: Negative.  Negative for visual disturbance.   Respiratory: Negative for cough, chest tightness, shortness of breath and wheezing.    Cardiovascular: Negative for chest pain.   Gastrointestinal: Negative for abdominal pain, diarrhea, nausea and vomiting.   Genitourinary: Negative for difficulty urinating, dysuria, frequency and urgency.   Musculoskeletal: Negative for arthralgias and neck stiffness.   Skin: Negative for rash.   Neurological: Negative for dizziness, weakness and headaches.   Psychiatric/Behavioral: Negative for sleep disturbance. The patient is not nervous/anxious.        Objective:      Physical Exam   Skin:                 Assessment:       No diagnosis found.    Plan:   There are no diagnoses linked to this encounter.

## 2019-10-18 ENCOUNTER — PATIENT MESSAGE (OUTPATIENT)
Dept: DERMATOLOGY | Facility: CLINIC | Age: 74
End: 2019-10-18

## 2019-10-21 ENCOUNTER — OFFICE VISIT (OUTPATIENT)
Dept: DERMATOLOGY | Facility: CLINIC | Age: 74
End: 2019-10-21
Payer: MEDICARE

## 2019-10-21 VITALS — WEIGHT: 164 LBS | BODY MASS INDEX: 30.99 KG/M2

## 2019-10-21 DIAGNOSIS — L30.9 DERMATITIS: Primary | ICD-10-CM

## 2019-10-21 PROCEDURE — 99212 OFFICE O/P EST SF 10 MIN: CPT | Mod: HCNC,S$GLB,, | Performed by: DERMATOLOGY

## 2019-10-21 PROCEDURE — 99999 PR PBB SHADOW E&M-EST. PATIENT-LVL II: CPT | Mod: PBBFAC,HCNC,, | Performed by: DERMATOLOGY

## 2019-10-21 PROCEDURE — 99999 PR PBB SHADOW E&M-EST. PATIENT-LVL II: ICD-10-PCS | Mod: PBBFAC,HCNC,, | Performed by: DERMATOLOGY

## 2019-10-21 PROCEDURE — 1101F PR PT FALLS ASSESS DOC 0-1 FALLS W/OUT INJ PAST YR: ICD-10-PCS | Mod: HCNC,CPTII,S$GLB, | Performed by: DERMATOLOGY

## 2019-10-21 PROCEDURE — 1101F PT FALLS ASSESS-DOCD LE1/YR: CPT | Mod: HCNC,CPTII,S$GLB, | Performed by: DERMATOLOGY

## 2019-10-21 PROCEDURE — 99212 PR OFFICE/OUTPT VISIT, EST, LEVL II, 10-19 MIN: ICD-10-PCS | Mod: HCNC,S$GLB,, | Performed by: DERMATOLOGY

## 2019-10-23 ENCOUNTER — PATIENT MESSAGE (OUTPATIENT)
Dept: PSYCHIATRY | Facility: CLINIC | Age: 74
End: 2019-10-23

## 2019-10-23 ENCOUNTER — DOCUMENTATION ONLY (OUTPATIENT)
Dept: PSYCHIATRY | Facility: CLINIC | Age: 74
End: 2019-10-23

## 2019-11-12 ENCOUNTER — PATIENT MESSAGE (OUTPATIENT)
Dept: INTERNAL MEDICINE | Facility: CLINIC | Age: 74
End: 2019-11-12

## 2019-11-25 ENCOUNTER — OFFICE VISIT (OUTPATIENT)
Dept: PSYCHIATRY | Facility: CLINIC | Age: 74
End: 2019-11-25
Payer: MEDICARE

## 2019-11-25 DIAGNOSIS — F41.9 ANXIETY: ICD-10-CM

## 2019-11-25 DIAGNOSIS — F33.0 MAJOR DEPRESSIVE DISORDER, RECURRENT, MILD: Primary | ICD-10-CM

## 2019-11-25 PROCEDURE — 99499 UNLISTED E&M SERVICE: CPT | Mod: HCNC,S$GLB,, | Performed by: SOCIAL WORKER

## 2019-11-25 PROCEDURE — 90834 PSYTX W PT 45 MINUTES: CPT | Mod: HCNC,S$GLB,, | Performed by: SOCIAL WORKER

## 2019-11-25 PROCEDURE — 90834 PR PSYCHOTHERAPY W/PATIENT, 45 MIN: ICD-10-PCS | Mod: HCNC,S$GLB,, | Performed by: SOCIAL WORKER

## 2019-11-25 PROCEDURE — 99499 RISK ADDL DX/OHS AUDIT: ICD-10-PCS | Mod: HCNC,S$GLB,, | Performed by: SOCIAL WORKER

## 2019-11-25 NOTE — PROGRESS NOTES
Individual Psychotherapy (PhD/LCSW)    11/25/2019    Site:  [facility]         Therapeutic Intervention: Met with patient.  Outpatient - Insight oriented psychotherapy 45 min - CPT code 86940    Chief complaint/reason for encounter: depression and anxiety     Interval history and content of current session: Pt states she has not been in recently because 's back pain has been worse, he has been irritable, and she gets triggered and shuts down, goes to bed. Pt states this does not happen frequently. She reports he c/o she is never satisfied with him. She notes she is doing most of the housework, he does not do his PT exercises, resists walking with her, and spends most of his time playing video games, leaving her feeling lonely. Discuss setting times for them to be together, finding more things to do without him. Pt does get out of the house daily. They may choose to come in together to discuss the issues creating their conflict. She notes he does apologize when he snaps at her, and she understands this happens partly because of his pain.    Pt reports she is sometimes taking Ativan to help her cope when he is irritable. Suggest she discuss this with Dr. Sweeney.    Treatment plan:  · Target symptoms: depression, anxiety   · Why chosen therapy is appropriate versus another modality: relevant to diagnosis  · Outcome monitoring methods: self-report  · Therapeutic intervention type: insight oriented psychotherapy    Risk parameters:  Patient reports no suicidal ideation  Patient reports no homicidal ideation  Patient reports no self-injurious behavior  Patient reports no violent behavior    Verbal deficits: None    Patient's response to intervention:  The patient's response to intervention is motivated    Progress toward goals and other mental status changes:  The patient's progress toward goals is fair    Diagnosis:   Major depression, recurrent, mild; anxiety r/o PTSD  Plan:  individual  psychotherapy    Return to clinic: as needed    Length of Service (minutes): 45

## 2019-11-25 NOTE — Clinical Note
Paris said she is sometimes taking her Ativan when  is irritable due to his back pain, and wasn't sure this is ok, or whether the amount she takes is appropriate. She isn't seeing you til March, said she left a message at the  when she checked in. I thought I'd message you in case they don't!

## 2019-11-27 ENCOUNTER — PATIENT MESSAGE (OUTPATIENT)
Dept: PSYCHIATRY | Facility: CLINIC | Age: 74
End: 2019-11-27

## 2019-11-27 ENCOUNTER — CLINICAL SUPPORT (OUTPATIENT)
Dept: INTERNAL MEDICINE | Facility: CLINIC | Age: 74
End: 2019-11-27
Payer: MEDICARE

## 2019-11-27 VITALS — BODY MASS INDEX: 30.99 KG/M2 | WEIGHT: 164 LBS

## 2019-11-27 NOTE — PROGRESS NOTES
Health  Follow-Up Note   [x] Office  [] Phone  Notes from previous session reviewed.   [x] Previous Session Goals unchanged.   [] Patient/Caregiver Change in Goals.  Goals added or changed by Patient/Caregiver since program participation:  1. Continue walking at the gym  2. Continue same plan       Additional/Changed support that patient/caregiver has experienced/sought?  (Indicate readiness, support from family, friends, others, community groups, etc)  1.  Sister    Additional/Changed obstacles that could prevent patient/caregiver from reaching their goals?  1.  Feeling depressed due to Kade not wanting to get out of the house.  2. Holiday dinner    Feedback provided:  1.  Praised for continued effort and determination.    Diagnostic values/Desriptors for follow-up as needed for chronic condition(s)   Weight: 74.4 kg 164.02 lb gain 3.3 lb total loss 22 lbs  Blood Glucose: 90 day avg 115    Interventions:   1. Health  listened reflectively, validated thoughts and feelings, offered support and encouragement.   2. Allowed patient to express themselves in a non-biased atmosphere.  3. Health  assisted pt to problem-solve obstacles such as being in a challenging environment and dealing with these challenges.   4. Motivational Interviewed interventions utilized (OARS).   5. Patient responded favorably to interventions and remained actively engaged in the session.   6. Health  will remain available and connected for patient by phone and/or office visits.   7. Positive reinforcement, emotional support and encouragement provided.   8. Focused Education: MI    Plan:  [x] Pt will work on goals as stated above.   [x] Pt will contact Health  for any questions, concerns or needs.  [x] Pt will follow up with Health  in office on  12.17.19  [] Pt will follow up with Health  on phone in:        [x] Health  will remain available.   [] Health  will contact patient by phone in:        []  Health  will consult:        [] Health  will inform Provider via EPIC messaging.     Impression:  1. Behavior is consistent with Action Stage of Change.   2. Participation level:       [x] Receptive      [x] Interactive      [] Guarded and Resistant      [x] Self Motivated      [] Refused/Declined to participate   3. [x] Pt voiced understanding of all information presented.       [] Pt voiced needing further information/education. This will be arranged.       [x] Pt would benefit from further education/information as identified by this health . This will be arranged.     Jacqueline Perry RN HC

## 2019-12-04 ENCOUNTER — PATIENT MESSAGE (OUTPATIENT)
Dept: PSYCHIATRY | Facility: CLINIC | Age: 74
End: 2019-12-04

## 2019-12-09 ENCOUNTER — PATIENT MESSAGE (OUTPATIENT)
Dept: INTERNAL MEDICINE | Facility: CLINIC | Age: 74
End: 2019-12-09

## 2019-12-17 ENCOUNTER — CLINICAL SUPPORT (OUTPATIENT)
Dept: INTERNAL MEDICINE | Facility: CLINIC | Age: 74
End: 2019-12-17
Payer: MEDICARE

## 2019-12-17 ENCOUNTER — TELEPHONE (OUTPATIENT)
Dept: INTERNAL MEDICINE | Facility: CLINIC | Age: 74
End: 2019-12-17

## 2019-12-17 VITALS — BODY MASS INDEX: 31.16 KG/M2 | WEIGHT: 164.88 LBS

## 2019-12-17 DIAGNOSIS — E66.9 DIABETES MELLITUS TYPE 2 IN OBESE: Primary | ICD-10-CM

## 2019-12-17 DIAGNOSIS — E11.69 DIABETES MELLITUS TYPE 2 IN OBESE: Primary | ICD-10-CM

## 2019-12-17 NOTE — TELEPHONE ENCOUNTER
----- Message from Jacqueline Perry RN sent at 12/17/2019 10:11 AM CST -----  Regarding: lab work, A1c due need order  Hello,   Have a few questions about Mrs. Burris.   Her A1c is do this month do you want to add her annual labs to that?   She has a annual due with you scheduled for Feb. She saw you last in July.   She is due for a foot exam also, do you want her to see podiatry or you can do in Feb.   Thanks Jacqueline

## 2019-12-17 NOTE — PROGRESS NOTES
Health  Follow-Up Note   [x] Office  [] Phone  Notes from previous session reviewed.   [x] Previous Session Goals unchanged.   [] Patient/Caregiver Change in Goals.  Goals added or changed by Patient/Caregiver since program participation:  1. Continue same plan  2. Try meditation, exercise or other activity instead of the ativan.     3. Message sent to Dr. Huynh about labs and podiatry     Additional/Changed support that patient/caregiver has experienced/sought?  (Indicate readiness, support from family, friends, others, community groups, etc)  1.      Additional/Changed obstacles that could prevent patient/caregiver from reaching their goals?  1.  Kade being depressed prevents her from going out to do things waiting on him to try to get him out    Feedback provided:  1.  Praised for continued effort and determination.     Diagnostic values/Desriptors for follow-up as needed for chronic condition(s)   Weight: 74.8 kg 164.9 lb   Blood Glucose: avg   7 d 118  14 d 118  30 d 113  90 d 114    Interventions:   1. Health  listened reflectively, validated thoughts and feelings, offered support and encouragement.   2. Allowed patient to express themselves in a non-biased atmosphere.  3. Health  assisted pt to problem-solve obstacles such as being in a challenging environment and dealing with these challenges.   4. Motivational Interviewed interventions utilized (OARS).   5. Patient responded favorably to interventions and remained actively engaged in the session.   6. Health  will remain available and connected for patient by phone and/or office visits.   7. Positive reinforcement, emotional support and encouragement provided.   8. Focused Education: MI    Plan:  [x] Pt will work on goals as stated above.   [x] Pt will contact Health  for any questions, concerns or needs.  [x] Pt will follow up with Health  in office on  1.16.20.  [] Pt will follow up with Health  on phone in:         [x] Health  will remain available.   [] Health  will contact patient by phone in:        [] Health  will consult:        [] Health  will inform Provider via EPIC messaging.     Impression:  1. Behavior is consistent with Action Stage of Change.   2. Participation level:       [x] Receptive      [x] Interactive      [] Guarded and Resistant      [x] Self Motivated      [] Refused/Declined to participate   3. [x] Pt voiced understanding of all information presented.       [] Pt voiced needing further information/education. This will be arranged.       [x] Pt would benefit from further education/information as identified by this health . This will be arranged.     Jacqueline Perry RN HC

## 2019-12-18 ENCOUNTER — PATIENT OUTREACH (OUTPATIENT)
Dept: ADMINISTRATIVE | Facility: OTHER | Age: 74
End: 2019-12-18

## 2019-12-20 ENCOUNTER — LAB VISIT (OUTPATIENT)
Dept: LAB | Facility: HOSPITAL | Age: 74
End: 2019-12-20
Attending: INTERNAL MEDICINE
Payer: MEDICARE

## 2019-12-20 DIAGNOSIS — E11.69 DIABETES MELLITUS TYPE 2 IN OBESE: ICD-10-CM

## 2019-12-20 DIAGNOSIS — E66.9 DIABETES MELLITUS TYPE 2 IN OBESE: ICD-10-CM

## 2019-12-20 LAB
ESTIMATED AVG GLUCOSE: 126 MG/DL (ref 68–131)
HBA1C MFR BLD HPLC: 6 % (ref 4–5.6)

## 2019-12-20 PROCEDURE — 83036 HEMOGLOBIN GLYCOSYLATED A1C: CPT | Mod: HCNC

## 2019-12-20 PROCEDURE — 36415 COLL VENOUS BLD VENIPUNCTURE: CPT | Mod: HCNC

## 2019-12-27 ENCOUNTER — PATIENT MESSAGE (OUTPATIENT)
Dept: INTERNAL MEDICINE | Facility: CLINIC | Age: 74
End: 2019-12-27

## 2019-12-27 RX ORDER — CLONAZEPAM 0.5 MG/1
TABLET ORAL
Qty: 15 TABLET | Refills: 3 | Status: SHIPPED | OUTPATIENT
Start: 2019-12-27 | End: 2020-02-06 | Stop reason: SDUPTHER

## 2019-12-30 ENCOUNTER — DOCUMENTATION ONLY (OUTPATIENT)
Dept: PSYCHIATRY | Facility: CLINIC | Age: 74
End: 2019-12-30

## 2020-01-01 ENCOUNTER — CLINICAL SUPPORT (OUTPATIENT)
Dept: CARDIOLOGY | Facility: HOSPITAL | Age: 75
End: 2020-01-01
Attending: INTERNAL MEDICINE
Payer: MEDICARE

## 2020-01-01 DIAGNOSIS — Z95.0 CARDIAC PACEMAKER IN SITU: ICD-10-CM

## 2020-01-01 DIAGNOSIS — I44.1 HEART BLOCK AV SECOND DEGREE: ICD-10-CM

## 2020-01-01 PROCEDURE — 93294 CARDIAC DEVICE CHECK - REMOTE: ICD-10-PCS | Mod: ,,, | Performed by: INTERNAL MEDICINE

## 2020-01-01 PROCEDURE — 93294 REM INTERROG EVL PM/LDLS PM: CPT | Mod: ,,, | Performed by: INTERNAL MEDICINE

## 2020-01-01 PROCEDURE — 93296 REM INTERROG EVL PM/IDS: CPT | Mod: HCNC | Performed by: INTERNAL MEDICINE

## 2020-01-07 NOTE — TELEPHONE ENCOUNTER
Message left on voicemail to return call.  
Ordered. Thanks!  
Please call and let pt know (I wasn't able to reach her by phone): Labs show iron deficiency anemia. Likely cause of severe fatigue. Fairly significant drop in Hgb (2 points) from 4 weeks ago. I want her to come in and get a repeat CBC TODAY. If it further decreased, will recommend her to go to ED. However in the meantime, restart ferrous sulfate 325mg three times a day. Avoid ALL NSAIDs (ibuprofen, goody's, bc powder, aleve, motrin, advil). Make sure she follows up with gastroenterology as soon as possible.   
Pt aware of results from portal per pt, she is coming in today to repeat lab  
Pt coming today, please put in cbc order  
yes

## 2020-01-09 ENCOUNTER — TELEPHONE (OUTPATIENT)
Dept: ELECTROPHYSIOLOGY | Facility: CLINIC | Age: 75
End: 2020-01-09

## 2020-01-09 DIAGNOSIS — I44.30 AV BLOCK: Primary | ICD-10-CM

## 2020-01-16 ENCOUNTER — CLINICAL SUPPORT (OUTPATIENT)
Dept: INTERNAL MEDICINE | Facility: CLINIC | Age: 75
End: 2020-01-16
Payer: MEDICARE

## 2020-01-16 VITALS — BODY MASS INDEX: 30.87 KG/M2 | WEIGHT: 163.38 LBS

## 2020-01-16 NOTE — PROGRESS NOTES
Health  Follow-Up Note   [x] Office  [] Phone  Notes from previous session reviewed.   [x] Previous Session Goals unchanged.   [] Patient/Caregiver Change in Goals.  Goals added or changed by Patient/Caregiver since program participation:   1. Do a Food diary    2. Will think about the dexcom or the yudith freestyle     Additional/Changed support that patient/caregiver has experienced/sought?  (Indicate readiness, support from family, friends, others, community groups, etc)  1.      Feedback provided:  1.  Praised for good job down 1.5 lbs.    Diagnostic values/Desriptors for follow-up as needed for chronic condition(s)   Weight: 74.1 kg   Blood Glucose: 90 day average 114    Interventions:   1. Health  listened reflectively, validated thoughts and feelings, offered support and encouragement.   2. Allowed patient to express themselves in a non-biased atmosphere.  3. Health  assisted pt to problem-solve obstacles such as being in a challenging environment and dealing with these challenges.   4. Motivational Interviewed interventions utilized (OARS).   5. Patient responded favorably to interventions and remained actively engaged in the session.   6. Health  will remain available and connected for patient by phone and/or office visits.   7. Positive reinforcement, emotional support and encouragement provided.   8. Focused Education: MI    Plan:  [x] Pt will work on goals as stated above.   [x] Pt will contact Health  for any questions, concerns or needs.  [x] Pt will follow up with Health  in office on  2.13.20.  [] Pt will follow up with Health  on phone in:        [x] Health  will remain available.   [] Health  will contact patient by phone in:        [] Health  will consult:        [] Health  will inform Provider via EPIC messaging.     Impression:  1. Behavior is consistent with Action Stage of Change.   2. Participation level:       [x] Receptive       [x] Interactive      [] Guarded and Resistant      [x] Self Motivated      [] Refused/Declined to participate   3. [x] Pt voiced understanding of all information presented.       [] Pt voiced needing further information/education. This will be arranged.       [x] Pt would benefit from further education/information as identified by this health . This will be arranged.     Jacqueline Perry RN HC

## 2020-02-06 ENCOUNTER — OFFICE VISIT (OUTPATIENT)
Dept: INTERNAL MEDICINE | Facility: CLINIC | Age: 75
End: 2020-02-06
Payer: MEDICARE

## 2020-02-06 VITALS
WEIGHT: 166.44 LBS | DIASTOLIC BLOOD PRESSURE: 74 MMHG | SYSTOLIC BLOOD PRESSURE: 116 MMHG | HEART RATE: 71 BPM | HEIGHT: 61 IN | TEMPERATURE: 98 F | BODY MASS INDEX: 31.43 KG/M2

## 2020-02-06 DIAGNOSIS — F41.9 ANXIETY: ICD-10-CM

## 2020-02-06 DIAGNOSIS — E78.5 HYPERLIPIDEMIA ASSOCIATED WITH TYPE 2 DIABETES MELLITUS: ICD-10-CM

## 2020-02-06 DIAGNOSIS — D50.0 IRON DEFICIENCY ANEMIA DUE TO CHRONIC BLOOD LOSS: ICD-10-CM

## 2020-02-06 DIAGNOSIS — E66.9 DIABETES MELLITUS TYPE 2 IN OBESE: ICD-10-CM

## 2020-02-06 DIAGNOSIS — F33.41 MDD (MAJOR DEPRESSIVE DISORDER), RECURRENT, IN PARTIAL REMISSION: Primary | ICD-10-CM

## 2020-02-06 DIAGNOSIS — E11.69 DIABETES MELLITUS TYPE 2 IN OBESE: ICD-10-CM

## 2020-02-06 DIAGNOSIS — I15.2 HYPERTENSION ASSOCIATED WITH DIABETES: ICD-10-CM

## 2020-02-06 DIAGNOSIS — E11.69 HYPERLIPIDEMIA ASSOCIATED WITH TYPE 2 DIABETES MELLITUS: ICD-10-CM

## 2020-02-06 DIAGNOSIS — E11.59 HYPERTENSION ASSOCIATED WITH DIABETES: ICD-10-CM

## 2020-02-06 DIAGNOSIS — E55.9 VITAMIN D DEFICIENCY: ICD-10-CM

## 2020-02-06 PROCEDURE — 99999 PR PBB SHADOW E&M-EST. PATIENT-LVL III: CPT | Mod: PBBFAC,HCNC,, | Performed by: INTERNAL MEDICINE

## 2020-02-06 PROCEDURE — 1159F PR MEDICATION LIST DOCUMENTED IN MEDICAL RECORD: ICD-10-PCS | Mod: HCNC,S$GLB,, | Performed by: INTERNAL MEDICINE

## 2020-02-06 PROCEDURE — 1159F MED LIST DOCD IN RCRD: CPT | Mod: HCNC,S$GLB,, | Performed by: INTERNAL MEDICINE

## 2020-02-06 PROCEDURE — 1126F AMNT PAIN NOTED NONE PRSNT: CPT | Mod: HCNC,S$GLB,, | Performed by: INTERNAL MEDICINE

## 2020-02-06 PROCEDURE — 3078F DIAST BP <80 MM HG: CPT | Mod: HCNC,CPTII,S$GLB, | Performed by: INTERNAL MEDICINE

## 2020-02-06 PROCEDURE — 3044F PR MOST RECENT HEMOGLOBIN A1C LEVEL <7.0%: ICD-10-PCS | Mod: HCNC,CPTII,S$GLB, | Performed by: INTERNAL MEDICINE

## 2020-02-06 PROCEDURE — 99499 UNLISTED E&M SERVICE: CPT | Mod: HCNC,S$GLB,, | Performed by: INTERNAL MEDICINE

## 2020-02-06 PROCEDURE — 1126F PR PAIN SEVERITY QUANTIFIED, NO PAIN PRESENT: ICD-10-PCS | Mod: HCNC,S$GLB,, | Performed by: INTERNAL MEDICINE

## 2020-02-06 PROCEDURE — 1101F PT FALLS ASSESS-DOCD LE1/YR: CPT | Mod: HCNC,CPTII,S$GLB, | Performed by: INTERNAL MEDICINE

## 2020-02-06 PROCEDURE — 99999 PR PBB SHADOW E&M-EST. PATIENT-LVL III: ICD-10-PCS | Mod: PBBFAC,HCNC,, | Performed by: INTERNAL MEDICINE

## 2020-02-06 PROCEDURE — 3074F PR MOST RECENT SYSTOLIC BLOOD PRESSURE < 130 MM HG: ICD-10-PCS | Mod: HCNC,CPTII,S$GLB, | Performed by: INTERNAL MEDICINE

## 2020-02-06 PROCEDURE — 3078F PR MOST RECENT DIASTOLIC BLOOD PRESSURE < 80 MM HG: ICD-10-PCS | Mod: HCNC,CPTII,S$GLB, | Performed by: INTERNAL MEDICINE

## 2020-02-06 PROCEDURE — 99214 PR OFFICE/OUTPT VISIT, EST, LEVL IV, 30-39 MIN: ICD-10-PCS | Mod: HCNC,S$GLB,, | Performed by: INTERNAL MEDICINE

## 2020-02-06 PROCEDURE — 99499 RISK ADDL DX/OHS AUDIT: ICD-10-PCS | Mod: HCNC,S$GLB,, | Performed by: INTERNAL MEDICINE

## 2020-02-06 PROCEDURE — 3044F HG A1C LEVEL LT 7.0%: CPT | Mod: HCNC,CPTII,S$GLB, | Performed by: INTERNAL MEDICINE

## 2020-02-06 PROCEDURE — 99214 OFFICE O/P EST MOD 30 MIN: CPT | Mod: HCNC,S$GLB,, | Performed by: INTERNAL MEDICINE

## 2020-02-06 PROCEDURE — 3074F SYST BP LT 130 MM HG: CPT | Mod: HCNC,CPTII,S$GLB, | Performed by: INTERNAL MEDICINE

## 2020-02-06 PROCEDURE — 1101F PR PT FALLS ASSESS DOC 0-1 FALLS W/OUT INJ PAST YR: ICD-10-PCS | Mod: HCNC,CPTII,S$GLB, | Performed by: INTERNAL MEDICINE

## 2020-02-06 RX ORDER — VENLAFAXINE 75 MG/1
75 TABLET ORAL DAILY
Qty: 90 TABLET | Refills: 3 | Status: SHIPPED | OUTPATIENT
Start: 2020-02-06 | End: 2021-05-07 | Stop reason: SDUPTHER

## 2020-02-06 RX ORDER — CLONAZEPAM 0.5 MG/1
0.5 TABLET ORAL DAILY PRN
Qty: 30 TABLET | Refills: 3 | Status: SHIPPED | OUTPATIENT
Start: 2020-02-06 | End: 2021-06-08 | Stop reason: SDUPTHER

## 2020-02-06 RX ORDER — HYDROGEN PEROXIDE 3 %
20 SOLUTION, NON-ORAL MISCELLANEOUS
COMMUNITY
Start: 2019-12-31 | End: 2021-08-16

## 2020-02-06 NOTE — PROGRESS NOTES
"Subjective:       Patient ID: Paris Burris is a 74 y.o. female.    Chief Complaint: Follow-up    HPI   Pt was doing well w/ exercising for 5 mo but most recently started slacking off. Part of this is due to depression. Currently on effexor 75mg daily, wellbutrin sr 100mg BID, klonopin 1/2 of 0.5mg daily prn anxiety.  usually helps w/ chores but has been so tired lately that he's not as helpful. Pt is having issues w/ getting paperwork done and also her chores.   Would like to go up on klonopin to 0.5mg daily prn.     D def - inc from 500 to 1000 units daily in Dec.     Iron deficiency on ferrous sulfate daily.     DM2 - diet control  a1c - 6 12/2020.  On crestor 20mg daily.   HTN - on candesartan 4mg daily.     Review of Systems  Comprehensive review of systems otherwise negative. See history/subjective section for more details.    Objective:      Physical Exam    /74 (BP Location: Left arm, Patient Position: Sitting, BP Method: Medium (Manual))   Pulse 71   Temp 98.1 °F (36.7 °C)   Ht 5' 1" (1.549 m)   Wt 75.5 kg (166 lb 7.2 oz)   BMI 31.45 kg/m²     Gen - A+OX4, NAD  HEENT - PERRL, OP clear. MMM. TM normal.   Neck - no thyromegaly.   CV - RRR, no m/r. L chest PM w/o pain on palpation.   Chest - CTAB, no wheezing/rhonchi/crackles  Abd - S/NT/ND/+BS  Ext - 1+ B DP and radial pulses. No LE edema.   Foot - no open lesions. Decreased sensation to monofilament.    Labs reviewed.    Assessment/Plan     Paris was seen today for follow-up.    Diagnoses and all orders for this visit:    MDD (major depressive disorder), recurrent, in partial remission  -     venlafaxine (EFFEXOR) 75 MG tablet; Take 1 tablet (75 mg total) by mouth once daily.    Anxiety - inc klonopin to 1 pill daily prn anxiety.  -     clonazePAM (KLONOPIN) 0.5 MG tablet; Take 1 tablet (0.5 mg total) by mouth daily as needed for Anxiety.  -     venlafaxine (EFFEXOR) 75 MG tablet; Take 1 tablet (75 mg total) by mouth once " daily.    Diabetes mellitus type 2 in obese  -     CBC auto differential; Future  -     Hemoglobin A1c; Future  -     Microalbumin/creatinine urine ratio; Future    Hypertension associated with diabetes - Stable and controlled. Continue current medications.  -     Comprehensive metabolic panel; Future    Hyperlipidemia associated with type 2 diabetes mellitus - cont crestor.   -     Comprehensive metabolic panel; Future  -     Lipid panel; Future    Iron deficiency anemia due to chronic blood loss - cont iron supplement.   -     Ferritin; Future  -     Iron and TIBC; Future    Vitamin D deficiency - 1000 units.   -     Vitamin D; Future      Follow up in about 3 months (around 5/6/2020).      Mandi Huynh MD  Department of Internal Medicine - [facility]  8:45 AM

## 2020-02-13 ENCOUNTER — OFFICE VISIT (OUTPATIENT)
Dept: CARDIOLOGY | Facility: CLINIC | Age: 75
End: 2020-02-13
Payer: MEDICARE

## 2020-02-13 VITALS
BODY MASS INDEX: 31.63 KG/M2 | DIASTOLIC BLOOD PRESSURE: 60 MMHG | HEIGHT: 61 IN | HEART RATE: 71 BPM | WEIGHT: 167.56 LBS | SYSTOLIC BLOOD PRESSURE: 120 MMHG

## 2020-02-13 DIAGNOSIS — I42.9 CARDIOMYOPATHY, UNSPECIFIED TYPE: Primary | ICD-10-CM

## 2020-02-13 PROCEDURE — 99213 PR OFFICE/OUTPT VISIT, EST, LEVL III, 20-29 MIN: ICD-10-PCS | Mod: HCNC,GC,S$GLB, | Performed by: INTERNAL MEDICINE

## 2020-02-13 PROCEDURE — 99213 OFFICE O/P EST LOW 20 MIN: CPT | Mod: HCNC,GC,S$GLB, | Performed by: INTERNAL MEDICINE

## 2020-02-13 PROCEDURE — 99999 PR PBB SHADOW E&M-EST. PATIENT-LVL V: CPT | Mod: PBBFAC,HCNC,GC, | Performed by: INTERNAL MEDICINE

## 2020-02-13 PROCEDURE — 99999 PR PBB SHADOW E&M-EST. PATIENT-LVL V: ICD-10-PCS | Mod: PBBFAC,HCNC,GC, | Performed by: INTERNAL MEDICINE

## 2020-02-13 NOTE — PROGRESS NOTES
PCP - Mandi Huynh MD  Subjective:     Paris Burris is a 74 y.o. y.o. female who is here today for follow up visit. Last seen 5/2019.    PMHx:  - Systolic dysfunction (previously 38% and now 50% by TTE 5/2019) -- idiopathic vs. chemo-induced  - S/p CRT-P  - Severe infrahisian disease s/p PPM (follow by Dr. Livingston)  - H/o perioperative SVT on beta-blockers  - H/o breast cancer s/p lumpectomy and chemo (unknown regimen)  - Insulin resistance  - HLD (LDL 71) on Crestor 20 mg daily    Since her last visit, she has been doing well. Cardiac PET was negative for obstructive CAD. Patient has no complaints doing. She is tolerating her medications without any side effects. She reports exercising ~30 minutes per day by walking or stationary bike. Patient denies angina, GARCÍA, lower extremity edema, orthopnea, benopnea, PND, palpitations, presyncope or syncope.    Cardiac history:  TTE 5/2019: EF 50% with G1DD. Mod LAE.   Cardiac PET stress 5/2019: no evidence of obstructive CAD    Meds:     Review of patient's allergies indicates:   Allergen Reactions    Topamax [topiramate] Other (See Comments)     hallucinations       Current Outpatient Medications:     alpha lipoic acid 300 mg Cap, Take 300 mg by mouth 2 (two) times daily. , Disp: , Rfl:     ascorbic acid (VITAMIN C) 500 MG tablet, Take 1,000 mg by mouth once daily. , Disp: , Rfl:     b complex vitamins capsule, Take 1 capsule by mouth once daily., Disp: , Rfl:     buPROPion (WELLBUTRIN SR) 100 MG TBSR 12 hr tablet, Take 1 tablet (100 mg total) by mouth 2 (two) times daily., Disp: 60 tablet, Rfl: 5    candesartan (ATACAND) 4 MG tablet, Take 1 tablet (4 mg total) by mouth once daily., Disp: 90 tablet, Rfl: 3    CITICOLINE SODIUM (CITICOLINE ORAL), Take 1 tablet by mouth once daily at 6am., Disp: , Rfl:     esomeprazole (NEXIUM) 20 MG capsule, , Disp: , Rfl:     ferrous sulfate (FEOSOL) 325 mg (65 mg iron) Tab tablet, Take 1 tablet (325 mg total) by mouth daily  with breakfast., Disp: 90 tablet, Rfl: 3    omega-3 fatty acids-fish oil (FISH OIL) 340-1,000 mg Cap, Take 1 capsule by mouth once daily., Disp: , Rfl:     rosuvastatin (CRESTOR) 20 MG tablet, TAKE 1 TABLET BY MOUTH EVERY DAY, Disp: 90 tablet, Rfl: 1    traMADol (ULTRAM) 50 mg tablet, TAKE 2 TABLETS EVERY 12 HOURS AS NEEDED FOR PAIN, Disp: 120 tablet, Rfl: 3    traZODone (DESYREL) 50 MG tablet, Take 1 tablet (50 mg total) by mouth every evening., Disp: 90 tablet, Rfl: 3    venlafaxine (EFFEXOR) 75 MG tablet, Take 1 tablet (75 mg total) by mouth once daily., Disp: 90 tablet, Rfl: 3    vitamin D 1000 units Tab, Take 500 Units by mouth once daily. With K2 50 mch, Disp: , Rfl:     blood sugar diagnostic (ACCU-CHEK SMARTVIEW TEST STRIP) Strp, Test once daily., Disp: 100 strip, Rfl: 3    blood-glucose meter Misc, 1 Device by Misc.(Non-Drug; Combo Route) route 2 (two) times daily., Disp: 1 each, Rfl: 0    clonazePAM (KLONOPIN) 0.5 MG tablet, Take 1 tablet (0.5 mg total) by mouth daily as needed for Anxiety., Disp: 30 tablet, Rfl: 3    lancets Misc, 1 lancet by Misc.(Non-Drug; Combo Route) route 2 (two) times daily., Disp: 100 each, Rfl: 6    metoprolol succinate (TOPROL-XL) 50 MG 24 hr tablet, Take 1 tablet (50 mg total) by mouth once daily., Disp: 30 tablet, Rfl: 11    tretinoin (RETIN-A) 0.05 % cream, Use hs, Disp: 45 g, Rfl: 6  No current facility-administered medications for this visit.     Facility-Administered Medications Ordered in Other Visits:     0.9%  NaCl infusion, , Intravenous, Continuous, Maria Elena Little MD, Stopped at 05/31/19 1548    sodium chloride 0.9% flush 10 mL, 10 mL, Intravenous, PRN, Maria Elena Little MD    Constitution: Negative for fever or chills. Negative for weight loss or gain.   HENT: Negative for sore throat or headaches. Negative for rhinorrhea.  Eyes: Negative for blurred or double vision.   Cardiovascular: See above  Pulmonary: Negative for SOB. Negative for cough.  "  Gastrointestinal: Negative for abdominal pain. Negative for nausea/ vomiting. Negative for diarrhea.   : Negative for dysuria.   Neurological: Negative for focal weakness or sensory changes.    Objective:   /60   Pulse 71   Ht 5' 1" (1.549 m)   Wt 76 kg (167 lb 8.8 oz)   BMI 31.66 kg/m²     General: NAD. AAO.  HENT: No scleral icterus. Extraocular movements intact.  Neck: No JVD  Cardiovascular: Regular heart rate and rhythm. S1/S2 appreciated. No gallops, rubs, or murmurs. 2+ carotid/radial/femoral/DP/PT pulses bilaterally.  Abdomen: Nontender, Nondistended. No hepatosplenomegaly appreciated.  Respiratory: CTAB. No increased work of breathing.  Extremities: Warm. No edema.  Skin: no ulceration or wounds present    Labs:     Lab Results   Component Value Date     10/08/2019    K 4.3 10/08/2019     10/08/2019    CO2 25 10/08/2019    BUN 16 10/08/2019    CREATININE 0.7 10/08/2019    ANIONGAP 9 10/08/2019     Lab Results   Component Value Date    HGBA1C 6.0 (H) 12/20/2019     Lab Results   Component Value Date    BNP 99 05/27/2019       Lab Results   Component Value Date    WBC 6.19 07/18/2019    HGB 14.4 07/18/2019    HCT 44.2 07/18/2019     07/18/2019    GRAN 3.0 07/18/2019    GRAN 48.6 07/18/2019     Lab Results   Component Value Date    CHOL 168 07/18/2019    HDL 38 (L) 07/18/2019    LDLCALC 81.4 07/18/2019    TRIG 243 (H) 07/18/2019       Lab Results   Component Value Date     10/08/2019    K 4.3 10/08/2019     10/08/2019    CO2 25 10/08/2019    BUN 16 10/08/2019    CREATININE 0.7 10/08/2019    ANIONGAP 9 10/08/2019     Lab Results   Component Value Date    HGBA1C 6.0 (H) 12/20/2019     Lab Results   Component Value Date    BNP 99 05/27/2019    Lab Results   Component Value Date    WBC 6.19 07/18/2019    HGB 14.4 07/18/2019    HCT 44.2 07/18/2019     07/18/2019    GRAN 3.0 07/18/2019    GRAN 48.6 07/18/2019     Lab Results   Component Value Date    CHOL 168 " 07/18/2019    HDL 38 (L) 07/18/2019    LDLCALC 81.4 07/18/2019    TRIG 243 (H) 07/18/2019            Cardiovascular Imaging:     Stress test:   · The relative PET images are normal showing no clinically significant regional resting or stress induced perfusion defects.  · Whole heart absolute myocardial perfusion (cc/min/g) averaged 0.97 at rest (which is normal), 1.7 at stress (which is mildly reduced), and 1.77 CFR (which is mildly reduced).  · Gated perfusion images showed an ejection fraction of 63 % at rest and 63 % during stress. Normal is >51%.  · Wall motion was normal during stress and rest.  · LV cavity size is normal at rest and normal at stress.  · ECG is uninterpretable for ischemia due to paced rhythm.  · The patient reported no symptoms during the stress test.  · There were no arrhythmias during stress.  · There are no prior studies for comparison.    Assessment & Plan:     Paris Burris is a 74 y.o. y.o. female who is here today for follow up visit.    Cardiomyopathy: unclear etiology. EF most recently 50%. ?chemo, idiopathic. Cardiac PET stress negative. No history of CHF.  - C/w candesartan 4 mg QD and toprol XL to 50 mg QD  - did not tolerate candesartan to 8 mg (developed significant lightheadedness)     AV block, Mobitz II  - s/p CRT-P  - followed by EP Dr. Livingston     Right bundle branch block (RBBB) with left bundle branch block (LBBB)  - Intermittent RBBB with LBBB with Mobitz Type II HB s/p CRT-P  - F/u in EP clinic     Hyperlipidemia  - C/w crestor 20 mg QD  - LDL 71    Adin Strickland M.D.  Pager #: [phone]  [facility] Cardiovascular Fellow PGY-IV

## 2020-02-17 RX ORDER — METOPROLOL SUCCINATE 50 MG/1
50 TABLET, EXTENDED RELEASE ORAL DAILY
Qty: 90 TABLET | Refills: 3 | Status: SHIPPED | OUTPATIENT
Start: 2020-02-17 | End: 2021-05-25 | Stop reason: SDUPTHER

## 2020-02-17 NOTE — TELEPHONE ENCOUNTER
----- Message from Sarah Anguiano sent at 2/17/2020  8:59 AM CST -----  Contact: Pt called   Pt need a new script for medication metoprolol succinate (TOPROL-XL) 50 MG 24 hr tablet and send to [facility] - [facility] [address] [phone] (Phone)[phone] (Fax). Lv 2/13/20 Dr.Joshua DEMETRIS Strickland. Thank you.

## 2020-02-19 ENCOUNTER — CLINICAL SUPPORT (OUTPATIENT)
Dept: INTERNAL MEDICINE | Facility: CLINIC | Age: 75
End: 2020-02-19
Payer: MEDICARE

## 2020-02-19 NOTE — PROGRESS NOTES
Health  Follow-Up Note   [x] Office  [] Phone  Notes from previous session reviewed.   [x] Previous Session Goals unchanged.   [] Patient/Caregiver Change in Goals.   Goals added or changed by Patient/Caregiver since program participation:  1. Continue same plan        Additional/Changed support that patient/caregiver has experienced/sought?  (Indicate readiness, support from family, friends, others, community groups, etc)  1.       Additional/Changed obstacles that could prevent patient/caregiver from reaching their goals?  1.  Staying committed    Feedback provided:  1.  Praised for continued effort and determination    Diagnostic values/Desriptors for follow-up as needed for chronic condition(s)   Weight: 74.4 kg 164 lbs gain 0.6 lbs  Blood Glucose: 12.20.19 A1c 6.0 diet controlled    Interventions:   1. Health  listened reflectively, validated thoughts and feelings, offered support and encouragement.   2. Allowed patient to express themselves in a non-biased atmosphere.  3. Health  assisted pt to problem-solve obstacles such as being in a challenging environment and dealing with these challenges.   4. Motivational Interviewed interventions utilized (OARS).   5. Patient responded favorably to interventions and remained actively engaged in the session.   6. Health  will remain available and connected for patient by phone and/or office visits.   7. Positive reinforcement, emotional support and encouragement provided.   8. Focused Education: MI    Plan:  [x] Pt will work on goals as stated above.   [x] Pt will contact Health  for any questions, concerns or needs.  [x] Pt will follow up with Health  in office on  3.27.20.  [] Pt will follow up with Health  on phone in:        [x] Health  will remain available.   [] Health  will contact patient by phone in:        [] Health  will consult:        [] Health  will inform Provider via EPIC messaging.      Impression:  1. Behavior is consistent with action Stage of Change.   2. Participation level:       [x] Receptive      [x] Interactive      [] Guarded and Resistant      [x] Self Motivated      [] Refused/Declined to participate   3. [x] Pt voiced understanding of all information presented.       [] Pt voiced needing further information/education. This will be arranged.       [x] Pt would benefit from further education/information as identified by this health . This will be arranged.     Jacqueline Perry RN HC

## 2020-02-21 DIAGNOSIS — R73.03 PREDIABETES: ICD-10-CM

## 2020-02-23 DIAGNOSIS — E11.9 CONTROLLED TYPE 2 DIABETES MELLITUS WITHOUT COMPLICATION, WITHOUT LONG-TERM CURRENT USE OF INSULIN: ICD-10-CM

## 2020-02-23 DIAGNOSIS — E78.5 HYPERLIPIDEMIA, UNSPECIFIED HYPERLIPIDEMIA TYPE: ICD-10-CM

## 2020-02-24 VITALS — BODY MASS INDEX: 30.99 KG/M2 | WEIGHT: 164 LBS

## 2020-02-24 RX ORDER — ROSUVASTATIN CALCIUM 20 MG/1
TABLET, COATED ORAL
Qty: 90 TABLET | Refills: 1 | Status: SHIPPED | OUTPATIENT
Start: 2020-02-24 | End: 2020-10-06 | Stop reason: SDUPTHER

## 2020-02-26 ENCOUNTER — PATIENT MESSAGE (OUTPATIENT)
Dept: INTERNAL MEDICINE | Facility: CLINIC | Age: 75
End: 2020-02-26

## 2020-02-26 RX ORDER — CANDESARTAN 4 MG/1
4 TABLET ORAL DAILY
Qty: 90 TABLET | Refills: 3 | Status: SHIPPED | OUTPATIENT
Start: 2020-02-26 | End: 2020-03-24 | Stop reason: SDUPTHER

## 2020-03-05 ENCOUNTER — PATIENT MESSAGE (OUTPATIENT)
Dept: INTERNAL MEDICINE | Facility: CLINIC | Age: 75
End: 2020-03-05

## 2020-03-16 ENCOUNTER — IMMUNIZATION (OUTPATIENT)
Dept: PHARMACY | Facility: CLINIC | Age: 75
End: 2020-03-16
Payer: MEDICARE

## 2020-03-20 ENCOUNTER — PATIENT MESSAGE (OUTPATIENT)
Dept: INTERNAL MEDICINE | Facility: CLINIC | Age: 75
End: 2020-03-20

## 2020-03-20 ENCOUNTER — PATIENT MESSAGE (OUTPATIENT)
Dept: ADMINISTRATIVE | Facility: OTHER | Age: 75
End: 2020-03-20

## 2020-03-23 ENCOUNTER — PATIENT MESSAGE (OUTPATIENT)
Dept: INTERNAL MEDICINE | Facility: CLINIC | Age: 75
End: 2020-03-23

## 2020-03-23 DIAGNOSIS — E11.9 TYPE 2 DIABETES MELLITUS: ICD-10-CM

## 2020-03-24 ENCOUNTER — PATIENT MESSAGE (OUTPATIENT)
Dept: INTERNAL MEDICINE | Facility: CLINIC | Age: 75
End: 2020-03-24

## 2020-03-24 RX ORDER — CANDESARTAN 4 MG/1
4 TABLET ORAL DAILY
Qty: 90 TABLET | Refills: 3 | Status: SHIPPED | OUTPATIENT
Start: 2020-03-24 | End: 2021-05-03 | Stop reason: SDUPTHER

## 2020-03-25 ENCOUNTER — PATIENT MESSAGE (OUTPATIENT)
Dept: INTERNAL MEDICINE | Facility: CLINIC | Age: 75
End: 2020-03-25

## 2020-03-26 RX ORDER — DEXTROSE 4 G
1 TABLET,CHEWABLE ORAL 2 TIMES DAILY
Qty: 1 EACH | Refills: 0 | Status: SHIPPED | OUTPATIENT
Start: 2020-03-26 | End: 2020-03-30 | Stop reason: SDUPTHER

## 2020-03-30 ENCOUNTER — PATIENT MESSAGE (OUTPATIENT)
Dept: INTERNAL MEDICINE | Facility: CLINIC | Age: 75
End: 2020-03-30

## 2020-03-30 RX ORDER — DEXTROSE 4 G
1 TABLET,CHEWABLE ORAL 2 TIMES DAILY
Qty: 1 EACH | Refills: 0 | Status: SHIPPED | OUTPATIENT
Start: 2020-03-30 | End: 2020-09-08 | Stop reason: SDUPTHER

## 2020-03-31 ENCOUNTER — PATIENT MESSAGE (OUTPATIENT)
Dept: INTERNAL MEDICINE | Facility: CLINIC | Age: 75
End: 2020-03-31

## 2020-03-31 ENCOUNTER — CLINICAL SUPPORT (OUTPATIENT)
Dept: CARDIOLOGY | Facility: HOSPITAL | Age: 75
End: 2020-03-31
Attending: INTERNAL MEDICINE
Payer: MEDICARE

## 2020-03-31 DIAGNOSIS — Z95.0 CARDIAC PACEMAKER IN SITU: ICD-10-CM

## 2020-03-31 DIAGNOSIS — I44.1 HEART BLOCK AV SECOND DEGREE: ICD-10-CM

## 2020-03-31 PROCEDURE — 93294 CARDIAC DEVICE CHECK - REMOTE: ICD-10-PCS | Mod: ,,, | Performed by: INTERNAL MEDICINE

## 2020-03-31 PROCEDURE — 93296 REM INTERROG EVL PM/IDS: CPT | Mod: HCNC | Performed by: INTERNAL MEDICINE

## 2020-03-31 PROCEDURE — 93294 REM INTERROG EVL PM/LDLS PM: CPT | Mod: ,,, | Performed by: INTERNAL MEDICINE

## 2020-03-31 NOTE — TELEPHONE ENCOUNTER
Spoke with Pharmacy, it had to be ordered it should come in today and they will give her a call when it is ready

## 2020-04-02 ENCOUNTER — PATIENT MESSAGE (OUTPATIENT)
Dept: DERMATOLOGY | Facility: CLINIC | Age: 75
End: 2020-04-02

## 2020-04-02 RX ORDER — TRETINOIN 0.5 MG/G
CREAM TOPICAL
Qty: 45 G | Refills: 6 | Status: SHIPPED | OUTPATIENT
Start: 2020-04-02 | End: 2022-02-24

## 2020-04-03 ENCOUNTER — PATIENT MESSAGE (OUTPATIENT)
Dept: GASTROENTEROLOGY | Facility: CLINIC | Age: 75
End: 2020-04-03

## 2020-04-10 RX ORDER — MOMETASONE FUROATE 1 MG/G
CREAM TOPICAL
Qty: 60 G | Refills: 3 | Status: SHIPPED | OUTPATIENT
Start: 2020-04-10 | End: 2021-07-20 | Stop reason: ALTCHOICE

## 2020-04-11 ENCOUNTER — PATIENT MESSAGE (OUTPATIENT)
Dept: ADMINISTRATIVE | Facility: OTHER | Age: 75
End: 2020-04-11

## 2020-04-16 ENCOUNTER — PATIENT MESSAGE (OUTPATIENT)
Dept: ADMINISTRATIVE | Facility: OTHER | Age: 75
End: 2020-04-16

## 2020-04-17 RX ORDER — TRAMADOL HYDROCHLORIDE 50 MG/1
TABLET ORAL
Qty: 120 TABLET | Refills: 3 | Status: CANCELLED | OUTPATIENT
Start: 2020-04-17

## 2020-04-22 ENCOUNTER — PATIENT MESSAGE (OUTPATIENT)
Dept: INTERNAL MEDICINE | Facility: CLINIC | Age: 75
End: 2020-04-22

## 2020-04-22 DIAGNOSIS — M51.36 DDD (DEGENERATIVE DISC DISEASE), LUMBAR: Primary | ICD-10-CM

## 2020-04-23 RX ORDER — TRAMADOL HYDROCHLORIDE 50 MG/1
TABLET ORAL
Qty: 120 TABLET | Refills: 3 | Status: SHIPPED | OUTPATIENT
Start: 2020-04-23 | End: 2020-10-27 | Stop reason: SDUPTHER

## 2020-05-02 ENCOUNTER — PATIENT MESSAGE (OUTPATIENT)
Dept: ADMINISTRATIVE | Facility: OTHER | Age: 75
End: 2020-05-02

## 2020-05-11 ENCOUNTER — PATIENT MESSAGE (OUTPATIENT)
Dept: INTERNAL MEDICINE | Facility: CLINIC | Age: 75
End: 2020-05-11

## 2020-05-11 NOTE — TELEPHONE ENCOUNTER
"Please give contact number for pt to sign up as it looks like she's in the process of signing up but hasn't yet ("onboarding")  "

## 2020-05-13 ENCOUNTER — PATIENT MESSAGE (OUTPATIENT)
Dept: INTERNAL MEDICINE | Facility: CLINIC | Age: 75
End: 2020-05-13

## 2020-05-13 DIAGNOSIS — E11.9 TYPE 2 DIABETES MELLITUS WITHOUT COMPLICATION, UNSPECIFIED WHETHER LONG TERM INSULIN USE: ICD-10-CM

## 2020-05-25 ENCOUNTER — PATIENT MESSAGE (OUTPATIENT)
Dept: ADMINISTRATIVE | Facility: HOSPITAL | Age: 75
End: 2020-05-25

## 2020-05-25 ENCOUNTER — PATIENT OUTREACH (OUTPATIENT)
Dept: ADMINISTRATIVE | Facility: HOSPITAL | Age: 75
End: 2020-05-25

## 2020-05-25 DIAGNOSIS — M89.9 DISORDER OF BONE AND ARTICULAR CARTILAGE: ICD-10-CM

## 2020-05-25 DIAGNOSIS — Z78.0 ASYMPTOMATIC MENOPAUSAL STATE: ICD-10-CM

## 2020-05-25 DIAGNOSIS — M94.9 DISORDER OF BONE AND ARTICULAR CARTILAGE: ICD-10-CM

## 2020-05-25 DIAGNOSIS — Z12.31 ENCOUNTER FOR SCREENING MAMMOGRAM FOR BREAST CANCER: Primary | ICD-10-CM

## 2020-05-25 NOTE — PROGRESS NOTES
Spoke with pt, scheduled her Dexa Scan , R/S her Fasting Lab and scheduled her MMG.. She is calling to schedule her Eye Exam.

## 2020-05-28 ENCOUNTER — TELEPHONE (OUTPATIENT)
Dept: ELECTROPHYSIOLOGY | Facility: CLINIC | Age: 75
End: 2020-05-28

## 2020-05-28 ENCOUNTER — TELEPHONE (OUTPATIENT)
Dept: INTERNAL MEDICINE | Facility: CLINIC | Age: 75
End: 2020-05-28

## 2020-05-28 ENCOUNTER — PATIENT MESSAGE (OUTPATIENT)
Dept: INTERNAL MEDICINE | Facility: CLINIC | Age: 75
End: 2020-05-28

## 2020-05-28 NOTE — TELEPHONE ENCOUNTER
----- Message from Genie Acosta sent at 5/28/2020 11:37 AM CDT -----  Contact: Self [phone]  She missed a call from Saira and is requesting a call back.

## 2020-06-01 ENCOUNTER — HOSPITAL ENCOUNTER (OUTPATIENT)
Dept: RADIOLOGY | Facility: HOSPITAL | Age: 75
Discharge: HOME | End: 2020-06-01
Attending: INTERNAL MEDICINE
Payer: MEDICARE

## 2020-06-01 ENCOUNTER — TELEPHONE (OUTPATIENT)
Dept: INTERNAL MEDICINE | Facility: CLINIC | Age: 75
End: 2020-06-01

## 2020-06-01 ENCOUNTER — PATIENT MESSAGE (OUTPATIENT)
Dept: INTERNAL MEDICINE | Facility: CLINIC | Age: 75
End: 2020-06-01

## 2020-06-01 DIAGNOSIS — Z12.31 ENCOUNTER FOR SCREENING MAMMOGRAM FOR BREAST CANCER: ICD-10-CM

## 2020-06-01 PROCEDURE — 77063 MAMMO DIGITAL SCREENING BILAT WITH TOMOSYNTHESIS_CAD: ICD-10-PCS | Mod: 26,HCNC,, | Performed by: RADIOLOGY

## 2020-06-01 PROCEDURE — 77067 SCR MAMMO BI INCL CAD: CPT | Mod: TC,HCNC

## 2020-06-01 PROCEDURE — 77063 BREAST TOMOSYNTHESIS BI: CPT | Mod: 26,HCNC,, | Performed by: RADIOLOGY

## 2020-06-01 PROCEDURE — 77067 MAMMO DIGITAL SCREENING BILAT WITH TOMOSYNTHESIS_CAD: ICD-10-PCS | Mod: 26,HCNC,, | Performed by: RADIOLOGY

## 2020-06-01 PROCEDURE — 77067 SCR MAMMO BI INCL CAD: CPT | Mod: 26,HCNC,, | Performed by: RADIOLOGY

## 2020-06-01 NOTE — TELEPHONE ENCOUNTER
----- Message from Magdalene Montiel sent at 6/1/2020  9:27 AM CDT -----  Type:  Needs Medical Advice    Who Called: charlie     Would the patient rather a call back or a response via [facility]? Call back     Best Call Back Number: [phone]    Additional Information: charlie is requesting to speak with the nurse rescheduling her labs

## 2020-06-03 ENCOUNTER — TELEPHONE (OUTPATIENT)
Dept: INTERNAL MEDICINE | Facility: CLINIC | Age: 75
End: 2020-06-03

## 2020-06-03 ENCOUNTER — HOSPITAL ENCOUNTER (OUTPATIENT)
Dept: RADIOLOGY | Facility: CLINIC | Age: 75
Discharge: HOME | End: 2020-06-03
Attending: INTERNAL MEDICINE
Payer: MEDICARE

## 2020-06-03 DIAGNOSIS — M89.9 DISORDER OF BONE AND ARTICULAR CARTILAGE: ICD-10-CM

## 2020-06-03 DIAGNOSIS — Z78.0 ASYMPTOMATIC MENOPAUSAL STATE: ICD-10-CM

## 2020-06-03 DIAGNOSIS — M94.9 DISORDER OF BONE AND ARTICULAR CARTILAGE: ICD-10-CM

## 2020-06-03 PROCEDURE — 77080 DEXA BONE DENSITY SPINE HIP: ICD-10-PCS | Mod: 26,HCNC,, | Performed by: INTERNAL MEDICINE

## 2020-06-03 PROCEDURE — 77080 DXA BONE DENSITY AXIAL: CPT | Mod: 26,HCNC,, | Performed by: INTERNAL MEDICINE

## 2020-06-03 PROCEDURE — 77080 DXA BONE DENSITY AXIAL: CPT | Mod: TC,HCNC

## 2020-06-05 ENCOUNTER — CLINICAL SUPPORT (OUTPATIENT)
Dept: INTERNAL MEDICINE | Facility: CLINIC | Age: 75
End: 2020-06-05
Payer: MEDICARE

## 2020-06-05 VITALS — WEIGHT: 162.06 LBS | BODY MASS INDEX: 30.62 KG/M2

## 2020-06-05 NOTE — PROGRESS NOTES
Health  Follow-Up Note   [x] Office  [] Phone  Notes from previous session reviewed.   [x] Previous Session Goals unchanged.   [] Patient/Caregiver Change in Goals.   Goals added or changed by Patient/Caregiver since program participation:  1. Logging food   2. Looking at the GL    3. Take vitamin D get back on   4. Walking daily getting 5000 steps     Additional/Changed support that patient/caregiver has experienced/sought?  (Indicate readiness, support from family, friends, others, community groups, etc)  1.      Additional/Changed obstacles that could prevent patient/caregiver from reaching their goals?  1.  Depression gets down some days    Feedback provided:  1.  Praised for good job down 2 lbs  2. Praised for keeping A1c down without medication!    Diagnostic values/Desriptors for follow-up as needed for chronic condition(s)   Weight: 73.5 kg   Blood Glucose: 90 day avg 120 at home glucometer  New A1c 6.0 on 6.3.20    Interventions:   1. Health  listened reflectively, validated thoughts and feelings, offered support and encouragement.   2. Allowed patient to express themselves in a non-biased atmosphere.  3. Health  assisted pt to problem-solve obstacles such as being in a challenging environment and dealing with these challenges.   4. Motivational Interviewed interventions utilized (OARS).   5. Patient responded favorably to interventions and remained actively engaged in the session.   6. Health  will remain available and connected for patient by phone and/or office visits.   7. Positive reinforcement, emotional support and encouragement provided.   8. Focused Education: MI    Plan:  [x] Pt will work on goals as stated above.   [x] Pt will contact Health  for any questions, concerns or needs.  [x] Pt will follow up with Health  in office on  6.26.20  [] Pt will follow up with Health  on phone in:        [x] Health  will remain available.   [] Health  will  contact patient by phone in:        [] Health  will consult:        [] Health  will inform Provider via EPIC messaging.     Impression:  1. Behavior is consistent with Action Stage of Change.   2. Participation level:       [x] Receptive      [x] Interactive      [] Guarded and Resistant      [x] Self Motivated      [] Refused/Declined to participate   3. [x] Pt voiced understanding of all information presented.       [] Pt voiced needing further information/education. This will be arranged.       [x] Pt would benefit from further education/information as identified by this health . This will be arranged.     Jacqueline Perry RN HC

## 2020-06-08 ENCOUNTER — PATIENT OUTREACH (OUTPATIENT)
Dept: ADMINISTRATIVE | Facility: OTHER | Age: 75
End: 2020-06-08

## 2020-06-08 NOTE — PROGRESS NOTES
Ms. Burris is a patient of Dr. Livingston and was last seen in clinic 4/30/2019.      Subjective:   Patient ID:  Paris Burris is a 74 y.o. female who presents for follow-up of Cardiomyopathy and Pacemaker Check  .     HPI:    Ms. Burris is a 74 y.o. female with CHB, CM (was 35%, now 50%), CRT-P (1/2019) here for annual follow up.    Background:    She was admitted for a laproscopic hernia repair and was found to have SVT as well as mobitz II AV block, alternating BBB. EF was 35-40%. She underwent CRT-P 1/30/19.      Update (06/10/2020):    Today she says she has been a little down because of the pandemic but physically has been doing well. Staying active and losing weight. Blood sugars in control. No cardiac complaints. Ms. Burris denies chest pain with exertion or at rest, palpitations, SOB, GARCÍA, dizziness, or syncope. Echo 5/2019 showed recovery of LV function to 50%. PET stress negative for ischemia.    Device Interrogation (6/10/2020) reveals an intrinsic SR with CHB with stable lead and device function. No arrhythmias or treated episodes were noted.  She paces 6% in the RA and 100% in the BiV. Estimated battery longevity 8 years.     I have personally reviewed the patient's EKG today, which shows ASVP at 80bpm. TN interval is 172. QRS is 124. QTc is 498.    Recent Cardiac Tests:    2D Echo (5/2/2019):  · Low normal left ventricular systolic function. The estimated ejection fraction is 50% (improved since 1/30/2019).  · Normal right ventricular systolic function.  · Grade I (mild) left ventricular diastolic dysfunction consistent with impaired relaxation.  · Moderate left atrial enlargement.  · Mild mitral regurgitation.  · The estimated PA systolic pressure is 25 mm Hg  · Normal central venous pressure (3 mm Hg).    Current Outpatient Medications   Medication Sig    alpha lipoic acid 300 mg Cap Take 300 mg by mouth 2 (two) times daily.     ascorbic acid (VITAMIN C) 500 MG tablet Take 1,000 mg by mouth once  daily.     b complex vitamins capsule Take 1 capsule by mouth once daily.    blood sugar diagnostic Strp Use to test twice daily    blood-glucose meter Misc 1 Device by Misc.(Non-Drug; Combo Route) route 2 (two) times daily.    buPROPion (WELLBUTRIN SR) 100 MG TBSR 12 hr tablet Take 1 tablet (100 mg total) by mouth 2 (two) times daily.    candesartan (ATACAND) 4 MG tablet Take 1 tablet (4 mg total) by mouth once daily.    CITICOLINE SODIUM (CITICOLINE ORAL) Take 1 tablet by mouth once daily at 6am.    clonazePAM (KLONOPIN) 0.5 MG tablet Take 1 tablet (0.5 mg total) by mouth daily as needed for Anxiety.    clonazePAM (KLONOPIN) 0.5 MG tablet Take 1 tablet by mouth once daily for anxiety    crisaborole (EUCRISA) 2 % Oint Apply 1 application topically 2 (two) times daily.    esomeprazole (NEXIUM) 20 MG capsule     lancets Misc 1 lancet by Misc.(Non-Drug; Combo Route) route 2 (two) times daily.    lancets Misc use to test twice daily as directed    metoprolol succinate (TOPROL-XL) 50 MG 24 hr tablet Take 1 tablet (50 mg total) by mouth once daily.    mometasone 0.1% (ELOCON) 0.1 % cream Use daily    nystatin (MYCOSTATIN) powder Apply topically 4 (four) times daily.    omega-3 fatty acids-fish oil (FISH OIL) 340-1,000 mg Cap Take 1 capsule by mouth once daily.    rosuvastatin (CRESTOR) 20 MG tablet TAKE 1 TABLET BY MOUTH EVERY DAY    traMADoL (ULTRAM) 50 mg tablet TAKE 2 TABLETS EVERY 12 HOURS AS NEEDED FOR PAIN    traZODone (DESYREL) 50 MG tablet Take 1 tablet (50 mg total) by mouth every evening.    tretinoin (RETIN-A) 0.05 % cream Use hs    venlafaxine (EFFEXOR) 75 MG tablet Take 1 tablet (75 mg total) by mouth once daily.    vitamin D 1000 units Tab Take 500 Units by mouth once daily. With K2 50 [facility]     No current facility-administered medications for this visit.      Facility-Administered Medications Ordered in Other Visits   Medication    0.9%  NaCl infusion    sodium chloride 0.9% flush 10  mL       Review of Systems   Constitution: Negative for malaise/fatigue.   Cardiovascular: Negative for chest pain, dyspnea on exertion, irregular heartbeat, leg swelling and palpitations.   Respiratory: Negative for shortness of breath.    Hematologic/Lymphatic: Negative for bleeding problem.   Skin: Negative for rash.   Musculoskeletal: Negative for myalgias.   Gastrointestinal: Negative for hematemesis, hematochezia and nausea.   Genitourinary: Negative for hematuria.   Neurological: Negative for light-headedness.   Psychiatric/Behavioral: Negative for altered mental status.   Allergic/Immunologic: Negative for persistent infections.         Objective:          /68   Pulse 80     Physical Exam   Constitutional: She is oriented to person, place, and time. She appears well-developed and well-nourished.   HENT:   Head: Normocephalic.   Nose: Nose normal.   Eyes: Pupils are equal, round, and reactive to light.   Cardiovascular: Normal rate, regular rhythm, S1 normal and S2 normal.   No murmur heard.  Pulses:       Radial pulses are 2+ on the right side, and 2+ on the left side.   Pulmonary/Chest: Breath sounds normal. No respiratory distress.   Device to LUCW. Pocket and incision in good repair.   Abdominal: Normal appearance.   Musculoskeletal: Normal range of motion. She exhibits no edema.   Neurological: She is alert and oriented to person, place, and time.   Skin: Skin is warm and dry. No erythema.   Psychiatric: She has a normal mood and affect. Her speech is normal and behavior is normal.   Nursing note and vitals reviewed.    Lab Results   Component Value Date     06/03/2020    K 4.1 06/03/2020    MG 2.0 07/18/2019    BUN 16 06/03/2020    CREATININE 0.9 06/03/2020    ALT 24 06/03/2020    AST 21 06/03/2020    HGB 14.1 06/03/2020    HCT 44.8 06/03/2020    TSH 0.797 02/20/2019    LDLCALC 67.8 06/03/2020           Assessment:     1. Presence of cardiac resynchronization therapy pacemaker (CRT-P)    2.  Other cardiomyopathy    3. SVT (supraventricular tachycardia)    4. COOKIE (obstructive sleep apnea)    5. Obesity (BMI 30-39.9)      Plan:     In summary, Ms. Burris is a 74 y.o. female with CHB, CM (was 35%, now 50%), CRT-P (1/2019) here for annual follow up.  Ms. Burris is doing well from a device perspective with stable lead and device function. No arrhythmia noted. 100% biventricular pacing. Narrow QRS. EF recovery to 50%. No CHF symptoms.     Continue current medication regimen and device settings.   Follow up in device clinic as scheduled.   Follow up in EP clinic in 1 year, sooner as needed.     *A copy of this note has been sent to Dr. Livingston*    Follow up in about 1 year (around 6/10/2021).    ------------------------------------------------------------------    FREDERICK Jean Baptiste, NP-C  Cardiac Electrophysiology

## 2020-06-09 ENCOUNTER — PATIENT MESSAGE (OUTPATIENT)
Dept: INTERNAL MEDICINE | Facility: CLINIC | Age: 75
End: 2020-06-09

## 2020-06-09 ENCOUNTER — TELEPHONE (OUTPATIENT)
Dept: ELECTROPHYSIOLOGY | Facility: CLINIC | Age: 75
End: 2020-06-09

## 2020-06-09 ENCOUNTER — OFFICE VISIT (OUTPATIENT)
Dept: INTERNAL MEDICINE | Facility: CLINIC | Age: 75
End: 2020-06-09
Payer: MEDICARE

## 2020-06-09 VITALS
DIASTOLIC BLOOD PRESSURE: 74 MMHG | HEART RATE: 90 BPM | HEIGHT: 61 IN | WEIGHT: 163.13 LBS | SYSTOLIC BLOOD PRESSURE: 112 MMHG | BODY MASS INDEX: 30.8 KG/M2

## 2020-06-09 DIAGNOSIS — E66.9 DIABETES MELLITUS TYPE 2 IN OBESE: ICD-10-CM

## 2020-06-09 DIAGNOSIS — E55.9 VITAMIN D DEFICIENCY: ICD-10-CM

## 2020-06-09 DIAGNOSIS — D50.0 IRON DEFICIENCY ANEMIA DUE TO CHRONIC BLOOD LOSS: ICD-10-CM

## 2020-06-09 DIAGNOSIS — E11.69 DIABETES MELLITUS TYPE 2 IN OBESE: ICD-10-CM

## 2020-06-09 DIAGNOSIS — B37.2 INTERTRIGINOUS CANDIDIASIS: Primary | ICD-10-CM

## 2020-06-09 PROCEDURE — 99214 PR OFFICE/OUTPT VISIT, EST, LEVL IV, 30-39 MIN: ICD-10-PCS | Mod: HCNC,S$GLB,, | Performed by: INTERNAL MEDICINE

## 2020-06-09 PROCEDURE — 1159F MED LIST DOCD IN RCRD: CPT | Mod: HCNC,S$GLB,, | Performed by: INTERNAL MEDICINE

## 2020-06-09 PROCEDURE — 1125F AMNT PAIN NOTED PAIN PRSNT: CPT | Mod: HCNC,S$GLB,, | Performed by: INTERNAL MEDICINE

## 2020-06-09 PROCEDURE — 3078F DIAST BP <80 MM HG: CPT | Mod: HCNC,CPTII,S$GLB, | Performed by: INTERNAL MEDICINE

## 2020-06-09 PROCEDURE — 3074F SYST BP LT 130 MM HG: CPT | Mod: HCNC,CPTII,S$GLB, | Performed by: INTERNAL MEDICINE

## 2020-06-09 PROCEDURE — 3078F PR MOST RECENT DIASTOLIC BLOOD PRESSURE < 80 MM HG: ICD-10-PCS | Mod: HCNC,CPTII,S$GLB, | Performed by: INTERNAL MEDICINE

## 2020-06-09 PROCEDURE — 3044F PR MOST RECENT HEMOGLOBIN A1C LEVEL <7.0%: ICD-10-PCS | Mod: HCNC,CPTII,S$GLB, | Performed by: INTERNAL MEDICINE

## 2020-06-09 PROCEDURE — 3074F PR MOST RECENT SYSTOLIC BLOOD PRESSURE < 130 MM HG: ICD-10-PCS | Mod: HCNC,CPTII,S$GLB, | Performed by: INTERNAL MEDICINE

## 2020-06-09 PROCEDURE — 99499 RISK ADDL DX/OHS AUDIT: ICD-10-PCS | Mod: HCNC,S$GLB,, | Performed by: INTERNAL MEDICINE

## 2020-06-09 PROCEDURE — 1125F PR PAIN SEVERITY QUANTIFIED, PAIN PRESENT: ICD-10-PCS | Mod: HCNC,S$GLB,, | Performed by: INTERNAL MEDICINE

## 2020-06-09 PROCEDURE — 1101F PR PT FALLS ASSESS DOC 0-1 FALLS W/OUT INJ PAST YR: ICD-10-PCS | Mod: HCNC,CPTII,S$GLB, | Performed by: INTERNAL MEDICINE

## 2020-06-09 PROCEDURE — 99999 PR PBB SHADOW E&M-EST. PATIENT-LVL III: CPT | Mod: PBBFAC,HCNC,, | Performed by: INTERNAL MEDICINE

## 2020-06-09 PROCEDURE — 99499 UNLISTED E&M SERVICE: CPT | Mod: HCNC,S$GLB,, | Performed by: INTERNAL MEDICINE

## 2020-06-09 PROCEDURE — 1101F PT FALLS ASSESS-DOCD LE1/YR: CPT | Mod: HCNC,CPTII,S$GLB, | Performed by: INTERNAL MEDICINE

## 2020-06-09 PROCEDURE — 3044F HG A1C LEVEL LT 7.0%: CPT | Mod: HCNC,CPTII,S$GLB, | Performed by: INTERNAL MEDICINE

## 2020-06-09 PROCEDURE — 99999 PR PBB SHADOW E&M-EST. PATIENT-LVL III: ICD-10-PCS | Mod: PBBFAC,HCNC,, | Performed by: INTERNAL MEDICINE

## 2020-06-09 PROCEDURE — 1159F PR MEDICATION LIST DOCUMENTED IN MEDICAL RECORD: ICD-10-PCS | Mod: HCNC,S$GLB,, | Performed by: INTERNAL MEDICINE

## 2020-06-09 PROCEDURE — 99214 OFFICE O/P EST MOD 30 MIN: CPT | Mod: HCNC,S$GLB,, | Performed by: INTERNAL MEDICINE

## 2020-06-09 RX ORDER — NYSTATIN 100000 [USP'U]/G
POWDER TOPICAL 4 TIMES DAILY
Qty: 60 G | Refills: 3 | Status: SHIPPED | OUTPATIENT
Start: 2020-06-09 | End: 2021-04-23 | Stop reason: CLARIF

## 2020-06-09 NOTE — PROGRESS NOTES
"Subjective:       Patient ID: Paris Burris is a 74 y.o. female.    Chief Complaint: Follow-up    HPI   Rash under the R breast since she started walking outside regularly. Has been putting corn starch on it and improving.     Taking iron every other day. Recent ferritin 222.     Taking D 5000 daily.     Had some trouble w/ feeling down from quarantine. Walking more outside 4-5x/week, which helps. Making the 5k steps a week.     Last a1c 6.   Due for eye exam.   Has been following w/ Jacqueline, our health . Helpful.     Review of Systems  Comprehensive review of systems otherwise negative. See history/subjective section for more details.    Objective:      Physical Exam    /74 (BP Location: Left arm, Patient Position: Sitting, BP Method: Large (Manual))   Pulse 90   Ht 5' 1" (1.549 m)   Wt 74 kg (163 lb 2.3 oz)   BMI 30.83 kg/m²     GEN - A+OX4, NAD   HEENT - PERRL, EOMI, OP clear. MMM.   Neck - No thyromegaly or cervical LAD. No thyroid masses felt.  CV - RRR, no m/r   Chest - CTAB, no wheezing or rhonchi  Abd - S/NT/ND/+BS.   Ext - 2+BDP and radial pulses. No C/C/E.  Skin - mild erythematous flat rash under the R breast.    LABS REVIEWED.     Assessment/Plan     Paris was seen today for follow-up.    Diagnoses and all orders for this visit:    Intertriginous candidiasis  -     nystatin (MYCOSTATIN) powder; Apply topically 4 (four) times daily.    Diabetes mellitus type 2 in obese - a1c doing well.   -     Ambulatory referral/consult to Optometry; Future    Vitamin D deficiency - will go back to taking D.     Iron deficiency anemia due to chronic blood loss - can hold off on taking iron at this time since it's repleted. Recheck at next visit.       Follow up in about 4 months (around 10/9/2020).      Mandi Huynh MD  [facility]  8:20 AM    "

## 2020-06-09 NOTE — TELEPHONE ENCOUNTER
Lvm reminding  of upcoming appt schedule on 06/10 with our NP. Please ask  to arrive for am for the first appointment.

## 2020-06-10 ENCOUNTER — OFFICE VISIT (OUTPATIENT)
Dept: ELECTROPHYSIOLOGY | Facility: CLINIC | Age: 75
End: 2020-06-10
Attending: INTERNAL MEDICINE
Payer: MEDICARE

## 2020-06-10 ENCOUNTER — HOSPITAL ENCOUNTER (OUTPATIENT)
Dept: CARDIOLOGY | Facility: CLINIC | Age: 75
Discharge: HOME | End: 2020-06-10
Attending: INTERNAL MEDICINE
Payer: MEDICARE

## 2020-06-10 ENCOUNTER — CLINICAL SUPPORT (OUTPATIENT)
Dept: CARDIOLOGY | Facility: HOSPITAL | Age: 75
End: 2020-06-10
Attending: INTERNAL MEDICINE
Payer: MEDICARE

## 2020-06-10 VITALS — HEART RATE: 80 BPM | DIASTOLIC BLOOD PRESSURE: 68 MMHG | SYSTOLIC BLOOD PRESSURE: 114 MMHG

## 2020-06-10 DIAGNOSIS — I47.10 SVT (SUPRAVENTRICULAR TACHYCARDIA): ICD-10-CM

## 2020-06-10 DIAGNOSIS — I44.30 AV BLOCK: ICD-10-CM

## 2020-06-10 DIAGNOSIS — I42.8 OTHER CARDIOMYOPATHY: ICD-10-CM

## 2020-06-10 DIAGNOSIS — E66.9 OBESITY (BMI 30-39.9): ICD-10-CM

## 2020-06-10 DIAGNOSIS — Z95.0 CARDIAC PACEMAKER IN SITU: ICD-10-CM

## 2020-06-10 DIAGNOSIS — I44.1 HEART BLOCK AV SECOND DEGREE: ICD-10-CM

## 2020-06-10 DIAGNOSIS — Z95.0 PRESENCE OF CARDIAC RESYNCHRONIZATION THERAPY PACEMAKER (CRT-P): Primary | ICD-10-CM

## 2020-06-10 DIAGNOSIS — G47.33 OSA (OBSTRUCTIVE SLEEP APNEA): ICD-10-CM

## 2020-06-10 PROCEDURE — 93281 PM DEVICE PROGR EVAL MULTI: CPT | Mod: HCNC

## 2020-06-10 PROCEDURE — 1159F PR MEDICATION LIST DOCUMENTED IN MEDICAL RECORD: ICD-10-PCS | Mod: HCNC,S$GLB,, | Performed by: NURSE PRACTITIONER

## 2020-06-10 PROCEDURE — 93010 ELECTROCARDIOGRAM REPORT: CPT | Mod: HCNC,S$GLB,, | Performed by: INTERNAL MEDICINE

## 2020-06-10 PROCEDURE — 1101F PT FALLS ASSESS-DOCD LE1/YR: CPT | Mod: HCNC,CPTII,S$GLB, | Performed by: NURSE PRACTITIONER

## 2020-06-10 PROCEDURE — 3074F SYST BP LT 130 MM HG: CPT | Mod: HCNC,CPTII,S$GLB, | Performed by: NURSE PRACTITIONER

## 2020-06-10 PROCEDURE — 99999 PR PBB SHADOW E&M-EST. PATIENT-LVL III: ICD-10-PCS | Mod: PBBFAC,HCNC,, | Performed by: NURSE PRACTITIONER

## 2020-06-10 PROCEDURE — 93005 RHYTHM STRIP: ICD-10-PCS | Mod: HCNC,S$GLB,, | Performed by: INTERNAL MEDICINE

## 2020-06-10 PROCEDURE — 3074F PR MOST RECENT SYSTOLIC BLOOD PRESSURE < 130 MM HG: ICD-10-PCS | Mod: HCNC,CPTII,S$GLB, | Performed by: NURSE PRACTITIONER

## 2020-06-10 PROCEDURE — 93010 RHYTHM STRIP: ICD-10-PCS | Mod: HCNC,S$GLB,, | Performed by: INTERNAL MEDICINE

## 2020-06-10 PROCEDURE — 99999 PR PBB SHADOW E&M-EST. PATIENT-LVL III: CPT | Mod: PBBFAC,HCNC,, | Performed by: NURSE PRACTITIONER

## 2020-06-10 PROCEDURE — 3078F PR MOST RECENT DIASTOLIC BLOOD PRESSURE < 80 MM HG: ICD-10-PCS | Mod: HCNC,CPTII,S$GLB, | Performed by: NURSE PRACTITIONER

## 2020-06-10 PROCEDURE — 99214 PR OFFICE/OUTPT VISIT, EST, LEVL IV, 30-39 MIN: ICD-10-PCS | Mod: HCNC,S$GLB,, | Performed by: NURSE PRACTITIONER

## 2020-06-10 PROCEDURE — 1101F PR PT FALLS ASSESS DOC 0-1 FALLS W/OUT INJ PAST YR: ICD-10-PCS | Mod: HCNC,CPTII,S$GLB, | Performed by: NURSE PRACTITIONER

## 2020-06-10 PROCEDURE — 93005 ELECTROCARDIOGRAM TRACING: CPT | Mod: HCNC,S$GLB,, | Performed by: INTERNAL MEDICINE

## 2020-06-10 PROCEDURE — 3078F DIAST BP <80 MM HG: CPT | Mod: HCNC,CPTII,S$GLB, | Performed by: NURSE PRACTITIONER

## 2020-06-10 PROCEDURE — 93281 CARDIAC DEVICE CHECK - IN CLINIC & HOSPITAL: ICD-10-PCS | Mod: 26,HCNC,, | Performed by: INTERNAL MEDICINE

## 2020-06-10 PROCEDURE — 99499 UNLISTED E&M SERVICE: CPT | Mod: HCNC,S$GLB,, | Performed by: NURSE PRACTITIONER

## 2020-06-10 PROCEDURE — 99499 RISK ADDL DX/OHS AUDIT: ICD-10-PCS | Mod: HCNC,S$GLB,, | Performed by: NURSE PRACTITIONER

## 2020-06-10 PROCEDURE — 1126F AMNT PAIN NOTED NONE PRSNT: CPT | Mod: HCNC,S$GLB,, | Performed by: NURSE PRACTITIONER

## 2020-06-10 PROCEDURE — 93281 PM DEVICE PROGR EVAL MULTI: CPT | Mod: 26,HCNC,, | Performed by: INTERNAL MEDICINE

## 2020-06-10 PROCEDURE — 1159F MED LIST DOCD IN RCRD: CPT | Mod: HCNC,S$GLB,, | Performed by: NURSE PRACTITIONER

## 2020-06-10 PROCEDURE — 1126F PR PAIN SEVERITY QUANTIFIED, NO PAIN PRESENT: ICD-10-PCS | Mod: HCNC,S$GLB,, | Performed by: NURSE PRACTITIONER

## 2020-06-10 PROCEDURE — 99214 OFFICE O/P EST MOD 30 MIN: CPT | Mod: HCNC,S$GLB,, | Performed by: NURSE PRACTITIONER

## 2020-06-10 NOTE — LETTER
Ruthie 10, 2020      Stone Livingston MD  [address]  [facility] [address]           [facility]ia  [address]  [facility] [address]  Phone: [phone]  Fax: [phone]          Patient: Paris Burris   MR Number: [id number]   YOB: 1945   Date of Visit: 6/10/2020       Dear Dr. Stone Livingston:    Thank you for referring Paris Burris to me for evaluation. Attached you will find relevant portions of my assessment and plan of care.    If you have questions, please do not hesitate to call me. I look forward to following Paris Burris along with you.    Sincerely,    Domonique Nj, NILESH    Enclosure  CC:  No Recipients    If you would like to receive this communication electronically, please contact [email or web address] or [phone] to request more information on [employer] Link access.    For providers and/or their staff who would like to refer a patient to [facility], please contact us through our one-stop-shop provider referral line, [facility], at [phone].    If you feel you have received this communication in error or would no longer like to receive these types of communications, please e-mail [email or web address]

## 2020-06-11 ENCOUNTER — PATIENT MESSAGE (OUTPATIENT)
Dept: INTERNAL MEDICINE | Facility: CLINIC | Age: 75
End: 2020-06-11

## 2020-06-26 ENCOUNTER — CLINICAL SUPPORT (OUTPATIENT)
Dept: INTERNAL MEDICINE | Facility: CLINIC | Age: 75
End: 2020-06-26
Payer: MEDICARE

## 2020-06-26 VITALS — BODY MASS INDEX: 30.45 KG/M2 | WEIGHT: 161.19 LBS

## 2020-06-26 NOTE — PROGRESS NOTES
Health  Follow-Up Note   [x] Office  [] Phone  Notes from previous session reviewed.   [x] Previous Session Goals unchanged.   [] Patient/Caregiver Change in Goals.   Goals added or changed by Patient/Caregiver since program participation:  1. Continue same plan  2. Get back to checking sugar more  3. Continue to walk  4. Check on Senior day at the gym   5. Eat less meat       Additional/Changed support that patient/caregiver has experienced/sought?  (Indicate readiness, support from family, friends, others, community groups, etc)  1.      Additional/Changed obstacles that could prevent patient/caregiver from reaching their goals?  1.  quarantine not going to the gym    Feedback provided:  1.  Praised for good job down .88 lbs total loss 25 lbs    Diagnostic values/Desriptors for follow-up as needed for chronic condition(s)   Weight: 73.1 kg 161.16 lb  Blood Glucose:  7 d 114  14  d 112  30 d 112    Interventions:   1. Health  listened reflectively, validated thoughts and feelings, offered support and encouragement.   2. Allowed patient to express themselves in a non-biased atmosphere.  3. Health  assisted pt to problem-solve obstacles such as being in a challenging environment and dealing with these challenges.   4. Motivational Interviewed interventions utilized (OARS).   5. Patient responded favorably to interventions and remained actively engaged in the session.   6. Health  will remain available and connected for patient by phone and/or office visits.   7. Positive reinforcement, emotional support and encouragement provided.   8. Focused Education: MI    Plan:  [x] Pt will work on goals as stated above.   [x] Pt will contact Health  for any questions, concerns or needs.  [x] Pt will follow up with Health  in office on  7.16.20.  [] Pt will follow up with Health  on phone in:        [x] Health  will remain available.   [] Health  will contact patient by phone  in:        [] Health  will consult:        [] Health  will inform Provider via EPIC messaging.     Impression:  1. Behavior is consistent with Action Stage of Change.   2. Participation level:       [x] Receptive      [x] Interactive      [] Guarded and Resistant      [x] Self Motivated      [] Refused/Declined to participate   3. [x] Pt voiced understanding of all information presented.       [] Pt voiced needing further information/education. This will be arranged.       [x] Pt would benefit from further education/information as identified by this health . This will be arranged.     Jacqueline Perry RN HC

## 2020-06-29 ENCOUNTER — CLINICAL SUPPORT (OUTPATIENT)
Dept: CARDIOLOGY | Facility: HOSPITAL | Age: 75
End: 2020-06-29
Attending: INTERNAL MEDICINE
Payer: MEDICARE

## 2020-06-29 DIAGNOSIS — Z95.0 CARDIAC PACEMAKER IN SITU: ICD-10-CM

## 2020-06-29 DIAGNOSIS — I44.1 HEART BLOCK AV SECOND DEGREE: ICD-10-CM

## 2020-06-29 PROCEDURE — 93294 REM INTERROG EVL PM/LDLS PM: CPT | Mod: ,,, | Performed by: INTERNAL MEDICINE

## 2020-06-29 PROCEDURE — 93294 CARDIAC DEVICE CHECK - REMOTE: ICD-10-PCS | Mod: ,,, | Performed by: INTERNAL MEDICINE

## 2020-06-29 PROCEDURE — 93296 REM INTERROG EVL PM/IDS: CPT | Mod: HCNC | Performed by: INTERNAL MEDICINE

## 2020-07-01 ENCOUNTER — PATIENT OUTREACH (OUTPATIENT)
Dept: ADMINISTRATIVE | Facility: OTHER | Age: 75
End: 2020-07-01

## 2020-07-01 NOTE — PROGRESS NOTES
Chart reviewed.   Immunizations: updated  Orders placed: n/a  Upcoming appts to satisfy JIMENEZ topics: Optometry 7/01

## 2020-07-02 ENCOUNTER — OFFICE VISIT (OUTPATIENT)
Dept: OPTOMETRY | Facility: CLINIC | Age: 75
End: 2020-07-02
Payer: MEDICARE

## 2020-07-02 DIAGNOSIS — E11.69 DIABETES MELLITUS TYPE 2 IN OBESE: ICD-10-CM

## 2020-07-02 DIAGNOSIS — H52.203 MYOPIA WITH ASTIGMATISM AND PRESBYOPIA, BILATERAL: ICD-10-CM

## 2020-07-02 DIAGNOSIS — E66.9 DIABETES MELLITUS TYPE 2 IN OBESE: ICD-10-CM

## 2020-07-02 DIAGNOSIS — H52.13 MYOPIA WITH ASTIGMATISM AND PRESBYOPIA, BILATERAL: ICD-10-CM

## 2020-07-02 DIAGNOSIS — E11.9 TYPE 2 DIABETES MELLITUS WITHOUT RETINOPATHY: Primary | ICD-10-CM

## 2020-07-02 DIAGNOSIS — H52.4 MYOPIA WITH ASTIGMATISM AND PRESBYOPIA, BILATERAL: ICD-10-CM

## 2020-07-02 DIAGNOSIS — H02.834 DERMATOCHALASIS OF BOTH UPPER EYELIDS: ICD-10-CM

## 2020-07-02 DIAGNOSIS — H02.831 DERMATOCHALASIS OF BOTH UPPER EYELIDS: ICD-10-CM

## 2020-07-02 DIAGNOSIS — Z96.1 PSEUDOPHAKIA OF BOTH EYES: ICD-10-CM

## 2020-07-02 PROCEDURE — 92015 PR REFRACTION: ICD-10-PCS | Mod: HCNC,S$GLB,, | Performed by: OPTOMETRIST

## 2020-07-02 PROCEDURE — 99999 PR PBB SHADOW E&M-EST. PATIENT-LVL IV: ICD-10-PCS | Mod: PBBFAC,HCNC,, | Performed by: OPTOMETRIST

## 2020-07-02 PROCEDURE — 92014 COMPRE OPH EXAM EST PT 1/>: CPT | Mod: HCNC,S$GLB,, | Performed by: OPTOMETRIST

## 2020-07-02 PROCEDURE — 92015 DETERMINE REFRACTIVE STATE: CPT | Mod: HCNC,S$GLB,, | Performed by: OPTOMETRIST

## 2020-07-02 PROCEDURE — 99999 PR PBB SHADOW E&M-EST. PATIENT-LVL IV: CPT | Mod: PBBFAC,HCNC,, | Performed by: OPTOMETRIST

## 2020-07-02 PROCEDURE — 92014 PR EYE EXAM, EST PATIENT,COMPREHESV: ICD-10-PCS | Mod: HCNC,S$GLB,, | Performed by: OPTOMETRIST

## 2020-07-02 NOTE — LETTER
July 2, 2020      Mandi Huynh MD  [address]  [facility] [address]           [facility]  [address]  [facility] [address]  Phone: [phone]          Patient: Paris Burris   MR Number: [id number]   YOB: 1945   Date of Visit: 7/2/2020       Dear Dr. Mandi Huynh:    Thank you for referring Paris Burris to me for evaluation. Attached you will find relevant portions of my assessment and plan of care.    If you have questions, please do not hesitate to call me. I look forward to following Paris Burris along with you.    Sincerely,    Jolene Pichardo, OD    Enclosure  CC:  No Recipients    If you would like to receive this communication electronically, please contact [email or web address] or [phone] to request more information on [employer] Link access.    For providers and/or their staff who would like to refer a patient to [facility], please contact us through our one-stop-shop provider referral line, [facility], at [phone].    If you feel you have received this communication in error or would no longer like to receive these types of communications, please e-mail [email or web address]

## 2020-07-02 NOTE — PROGRESS NOTES
HPI     Ms. Paris Burris was referred by PCP for a diabetic eye exam.    Patient complains of no problems with vision. Wears glasses, but left them   at home. She would like a new glasses Rx.     Would patient like a refraction today? yes     Paitent denies diplopia, headaches, flashes/floaters, itching, tearing,   burning, redness, and pain.    (-)drops    (+)Diabetes  LBS   Hemoglobin A1C       Date                     Value               Ref Range             Status                06/03/2020               6.0 (H)             4.0 - 5.6 %           Final                  12/20/2019               6.0 (H)             4.0 - 5.6 %           Final                  06/10/2019               6.0 (H)             4.0 - 5.6 %           Final                 OCULAR HISTORY  Last Eye Exam: 4/11/19 with Dr Manrique  (+)eye surgery,  CE IOL OU   (-)diagnosed or treated for any eye conditions or diseases, n/a    FAMILY HISTORY  (-)Glaucoma        Last edited by Jolene Pichardo, OD on 7/2/2020  1:28 PM. (History)            Assessment /Plan     For exam results, see Encounter Report.    Type 2 diabetes mellitus without retinopathy    Diabetes mellitus type 2 in obese  -     Ambulatory referral/consult to Optometry    Pseudophakia of both eyes    Dermatochalasis of both upper eyelids    Myopia with astigmatism and presbyopia, bilateral      1-2. No retinopathy noted, OU. Continue proper BS control and annual diabetic eye exams. Monitor yearly.     3. PCIOL clear and centered OU. Monitor yearly.     4. Educated pt on findings. Asymptomatic at this time. Discussed referral to Dr. Sims in the future if becomes an issue. Monitor yearly.     5.  Updated SRx. Monitor yearly.       RTC in 1 year for annual DM eye exam or sooner if needed.

## 2020-07-09 ENCOUNTER — PATIENT MESSAGE (OUTPATIENT)
Dept: INTERNAL MEDICINE | Facility: CLINIC | Age: 75
End: 2020-07-09

## 2020-07-09 DIAGNOSIS — M54.50 CHRONIC LOW BACK PAIN, UNSPECIFIED BACK PAIN LATERALITY, UNSPECIFIED WHETHER SCIATICA PRESENT: Primary | ICD-10-CM

## 2020-07-09 DIAGNOSIS — G89.29 CHRONIC LOW BACK PAIN, UNSPECIFIED BACK PAIN LATERALITY, UNSPECIFIED WHETHER SCIATICA PRESENT: Primary | ICD-10-CM

## 2020-07-14 ENCOUNTER — PATIENT MESSAGE (OUTPATIENT)
Dept: INTERNAL MEDICINE | Facility: CLINIC | Age: 75
End: 2020-07-14

## 2020-07-16 ENCOUNTER — CLINICAL SUPPORT (OUTPATIENT)
Dept: INTERNAL MEDICINE | Facility: CLINIC | Age: 75
End: 2020-07-16
Payer: MEDICARE

## 2020-07-16 VITALS — WEIGHT: 159.19 LBS | BODY MASS INDEX: 30.08 KG/M2

## 2020-07-16 NOTE — PROGRESS NOTES
Health  Follow-Up Note   [x] Office  [] Phone  Notes from previous session reviewed.   [x] Previous Session Goals unchanged.   [] Patient/Caregiver Change in Goals.  Goals added or changed by Patient/Caregiver since program participation:  1. Eating more vegetarian    2. No meat or eggs     Additional/Changed support that patient/caregiver has experienced/sought?  (Indicate readiness, support from family, friends, others, community groups, etc)  1.      Additional/Changed obstacles that could prevent patient/caregiver from reaching their goals?  1.  Heat harder to walk     Feedback provided:  1.  Praised for good job down 2 lbs total 27 lbs    Diagnostic values/Desriptors for follow-up as needed for chronic condition(s)   Weight: 72.2 kg 159.17 lb down 2 lbs  Blood Glucose:  one at 219  7 d 120  14 d 128  30 d 126  90 d 125    Interventions:   1. Health  listened reflectively, validated thoughts and feelings, offered support and encouragement.   2. Allowed patient to express themselves in a non-biased atmosphere.  3. Health  assisted pt to problem-solve obstacles such as being in a challenging environment and dealing with these challenges.   4. Motivational Interviewed interventions utilized (OARS).   5. Patient responded favorably to interventions and remained actively engaged in the session.   6. Health  will remain available and connected for patient by phone and/or office visits.   7. Positive reinforcement, emotional support and encouragement provided.   8. Focused Education: MI    Plan:  [x] Pt will work on goals as stated above.   [x] Pt will contact Health  for any questions, concerns or needs.  [x] Pt will follow up with Health  in office on  8.5.20.  [] Pt will follow up with Health  on phone in:        [x] Health  will remain available.   [] Health  will contact patient by phone in:        [] Health  will consult:        [] Health  will  inform Provider via EPIC messaging.     Impression:  1. Behavior is consistent with Action Stage of Change.   2. Participation level:       [x] Receptive      [x] Interactive      [] Guarded and Resistant      [x] Self Motivated      [] Refused/Declined to participate   3. [x] Pt voiced understanding of all information presented.       [] Pt voiced needing further information/education. This will be arranged.       [x] Pt would benefit from further education/information as identified by this health . This will be arranged.     Jacqueline Perry RN HC

## 2020-07-22 RX ORDER — BUPROPION HYDROCHLORIDE 100 MG/1
100 TABLET, EXTENDED RELEASE ORAL 2 TIMES DAILY
Qty: 60 TABLET | Refills: 5 | OUTPATIENT
Start: 2020-07-22

## 2020-07-22 RX ORDER — TRAZODONE HYDROCHLORIDE 50 MG/1
50 TABLET ORAL NIGHTLY
Qty: 90 TABLET | Refills: 3 | Status: SHIPPED | OUTPATIENT
Start: 2020-07-22 | End: 2021-07-08

## 2020-07-24 ENCOUNTER — PATIENT MESSAGE (OUTPATIENT)
Dept: INTERNAL MEDICINE | Facility: CLINIC | Age: 75
End: 2020-07-24

## 2020-07-24 RX ORDER — BUPROPION HYDROCHLORIDE 100 MG/1
100 TABLET, EXTENDED RELEASE ORAL 2 TIMES DAILY
Qty: 60 TABLET | Refills: 11 | Status: SHIPPED | OUTPATIENT
Start: 2020-07-24 | End: 2020-10-09

## 2020-07-29 ENCOUNTER — PATIENT MESSAGE (OUTPATIENT)
Dept: INTERNAL MEDICINE | Facility: CLINIC | Age: 75
End: 2020-07-29

## 2020-07-29 NOTE — TELEPHONE ENCOUNTER
The referral was for PT as we generally do not refer for chiropractor. If she's ok w/ it, please give her PT's number.

## 2020-07-30 ENCOUNTER — OFFICE VISIT (OUTPATIENT)
Dept: INTERNAL MEDICINE | Facility: CLINIC | Age: 75
End: 2020-07-30
Payer: MEDICARE

## 2020-07-30 VITALS
HEIGHT: 61 IN | OXYGEN SATURATION: 98 % | BODY MASS INDEX: 30.26 KG/M2 | SYSTOLIC BLOOD PRESSURE: 110 MMHG | HEART RATE: 89 BPM | DIASTOLIC BLOOD PRESSURE: 64 MMHG | TEMPERATURE: 99 F | WEIGHT: 160.25 LBS

## 2020-07-30 DIAGNOSIS — S61.211A LACERATION OF LEFT INDEX FINGER WITHOUT FOREIGN BODY WITHOUT DAMAGE TO NAIL, INITIAL ENCOUNTER: Primary | ICD-10-CM

## 2020-07-30 PROCEDURE — 99999 PR PBB SHADOW E&M-EST. PATIENT-LVL III: CPT | Mod: PBBFAC,HCNC,, | Performed by: INTERNAL MEDICINE

## 2020-07-30 PROCEDURE — 99213 PR OFFICE/OUTPT VISIT, EST, LEVL III, 20-29 MIN: ICD-10-PCS | Mod: HCNC,S$GLB,, | Performed by: INTERNAL MEDICINE

## 2020-07-30 PROCEDURE — 3074F SYST BP LT 130 MM HG: CPT | Mod: HCNC,CPTII,S$GLB, | Performed by: INTERNAL MEDICINE

## 2020-07-30 PROCEDURE — 1159F MED LIST DOCD IN RCRD: CPT | Mod: HCNC,S$GLB,, | Performed by: INTERNAL MEDICINE

## 2020-07-30 PROCEDURE — 1101F PT FALLS ASSESS-DOCD LE1/YR: CPT | Mod: HCNC,CPTII,S$GLB, | Performed by: INTERNAL MEDICINE

## 2020-07-30 PROCEDURE — 3074F PR MOST RECENT SYSTOLIC BLOOD PRESSURE < 130 MM HG: ICD-10-PCS | Mod: HCNC,CPTII,S$GLB, | Performed by: INTERNAL MEDICINE

## 2020-07-30 PROCEDURE — 3078F DIAST BP <80 MM HG: CPT | Mod: HCNC,CPTII,S$GLB, | Performed by: INTERNAL MEDICINE

## 2020-07-30 PROCEDURE — 1159F PR MEDICATION LIST DOCUMENTED IN MEDICAL RECORD: ICD-10-PCS | Mod: HCNC,S$GLB,, | Performed by: INTERNAL MEDICINE

## 2020-07-30 PROCEDURE — 1101F PR PT FALLS ASSESS DOC 0-1 FALLS W/OUT INJ PAST YR: ICD-10-PCS | Mod: HCNC,CPTII,S$GLB, | Performed by: INTERNAL MEDICINE

## 2020-07-30 PROCEDURE — 99213 OFFICE O/P EST LOW 20 MIN: CPT | Mod: HCNC,S$GLB,, | Performed by: INTERNAL MEDICINE

## 2020-07-30 PROCEDURE — 99999 PR PBB SHADOW E&M-EST. PATIENT-LVL III: ICD-10-PCS | Mod: PBBFAC,HCNC,, | Performed by: INTERNAL MEDICINE

## 2020-07-30 PROCEDURE — 3078F PR MOST RECENT DIASTOLIC BLOOD PRESSURE < 80 MM HG: ICD-10-PCS | Mod: HCNC,CPTII,S$GLB, | Performed by: INTERNAL MEDICINE

## 2020-07-30 NOTE — PROGRESS NOTES
Subjective:       Patient ID: Paris Burris is a 74 y.o. female.    Chief Complaint: Finger Injury    74 year old lady sliced left index finger with a knife last night.  Is worried that it is infected.  Last tdap 2016    Review of Systems   Constitutional: Negative for activity change, chills, fatigue and fever.   HENT: Negative for congestion, ear pain, nosebleeds, postnasal drip, sinus pressure and sore throat.    Eyes: Negative.  Negative for visual disturbance.   Respiratory: Negative for cough, chest tightness, shortness of breath and wheezing.    Cardiovascular: Negative for chest pain.   Gastrointestinal: Negative for abdominal pain, diarrhea, nausea and vomiting.   Genitourinary: Negative for difficulty urinating, dysuria, frequency and urgency.   Musculoskeletal: Negative for arthralgias and neck stiffness.   Skin: Negative for rash.   Neurological: Negative for dizziness, weakness and headaches.   Psychiatric/Behavioral: Negative for sleep disturbance. The patient is not nervous/anxious.        Objective:      Physical Exam  Musculoskeletal:        Hands:          Assessment:       1. Laceration of left index finger without foreign body without damage to nail, initial encounter        Plan:   Paris was seen today for finger injury.    Diagnoses and all orders for this visit:    Laceration of left index finger without foreign body without damage to nail, initial encounter

## 2020-08-02 ENCOUNTER — OFFICE VISIT (OUTPATIENT)
Dept: URGENT CARE | Facility: CLINIC | Age: 75
End: 2020-08-02
Payer: MEDICARE

## 2020-08-02 VITALS
HEART RATE: 83 BPM | BODY MASS INDEX: 29.45 KG/M2 | HEIGHT: 61 IN | TEMPERATURE: 99 F | WEIGHT: 156 LBS | OXYGEN SATURATION: 96 %

## 2020-08-02 DIAGNOSIS — S61.212D LACERATION OF RIGHT MIDDLE FINGER WITHOUT FOREIGN BODY WITHOUT DAMAGE TO NAIL, SUBSEQUENT ENCOUNTER: Primary | ICD-10-CM

## 2020-08-02 DIAGNOSIS — T14.8XXA WOUND INFECTION: ICD-10-CM

## 2020-08-02 DIAGNOSIS — L08.9 WOUND INFECTION: ICD-10-CM

## 2020-08-02 PROCEDURE — 99214 OFFICE O/P EST MOD 30 MIN: CPT | Mod: S$GLB,,, | Performed by: PHYSICIAN ASSISTANT

## 2020-08-02 PROCEDURE — 99214 PR OFFICE/OUTPT VISIT, EST, LEVL IV, 30-39 MIN: ICD-10-PCS | Mod: S$GLB,,, | Performed by: PHYSICIAN ASSISTANT

## 2020-08-02 RX ORDER — MUPIROCIN 20 MG/G
OINTMENT TOPICAL
Qty: 22 G | Refills: 0 | Status: SHIPPED | OUTPATIENT
Start: 2020-08-02 | End: 2020-08-02

## 2020-08-02 RX ORDER — CEPHALEXIN 500 MG/1
500 CAPSULE ORAL EVERY 8 HOURS
Qty: 21 CAPSULE | Refills: 0 | Status: SHIPPED | OUTPATIENT
Start: 2020-08-02 | End: 2020-08-02

## 2020-08-02 RX ORDER — CEPHALEXIN 500 MG/1
500 CAPSULE ORAL EVERY 8 HOURS
Qty: 21 CAPSULE | Refills: 0 | Status: SHIPPED | OUTPATIENT
Start: 2020-08-02 | End: 2020-08-09

## 2020-08-02 RX ORDER — MUPIROCIN 20 MG/G
OINTMENT TOPICAL
Qty: 22 G | Refills: 0 | Status: SHIPPED | OUTPATIENT
Start: 2020-08-02 | End: 2021-07-20 | Stop reason: ALTCHOICE

## 2020-08-02 NOTE — PROGRESS NOTES
"Subjective:       Patient ID: Paris Burris is a 74 y.o. female.    Vitals:  height is 5' 1" (1.549 m) and weight is 70.8 kg (156 lb). Her temperature is 98.7 °F (37.1 °C). Her pulse is 83. Her oxygen saturation is 96%.     Chief Complaint: Laceration    Mrs. Burris is a 75yo female who presents to the urgent care for reevaluation of right middle finger laceration that occurred 4 days ago while cutting cabbage at home. States that after injury she was initially evaluated by PCP who cleaned the wound, applied a dressing and updated her TD. States that over the last couple of days the area has become slightly red, swollen, and TTP. She is concerned for infection. Denies drainage from the wound, streaking, fever, chills, abdominal pain, N/V/D.      Laceration   The incident occurred 3 to 5 days ago. The laceration is located on the right hand. The laceration is 5 cm in size. The laceration mechanism was a dirty knife. The pain is at a severity of 3/10. The pain is mild. She reports no foreign bodies present. Her tetanus status is UTD.       Constitution: Negative for chills, sweating, fatigue and fever.   HENT: Negative for ear pain, congestion, sore throat and trouble swallowing.    Neck: Negative for neck stiffness.   Cardiovascular: Negative for chest pain and palpitations.   Eyes: Negative for double vision and blurred vision.   Respiratory: Negative for chest tightness, cough, shortness of breath, stridor and wheezing.    Gastrointestinal: Negative for abdominal pain, nausea, vomiting, constipation and diarrhea.   Genitourinary: Negative for dysuria.   Musculoskeletal: Negative for muscle ache.   Skin: Positive for color change and laceration. Negative for erythema.   Allergic/Immunologic: Negative for itching.   Neurological: Negative for dizziness, light-headedness, headaches, numbness and tingling.       Objective:      Physical Exam   Constitutional: She is oriented to person, place, and time. She appears " well-developed.  Non-toxic appearance. She does not appear ill. No distress.      Comments:Patient is sitting pleasantly on exam table in no acute distress. Nontoxic appearing.    HENT:   Head: Normocephalic and atraumatic. Head is without abrasion, without contusion and without laceration.   Ears:   Right Ear: External ear normal.   Left Ear: External ear normal.   Nose: Nose normal.   Mouth/Throat: Oropharynx is clear and moist and mucous membranes are normal.   Eyes: Pupils are equal, round, and reactive to light. Conjunctivae, EOM and lids are normal.   Neck: Trachea normal, normal range of motion, full passive range of motion without pain and phonation normal. Neck supple.   Cardiovascular: Normal rate, regular rhythm and normal heart sounds.   Pulmonary/Chest: Effort normal and breath sounds normal. No stridor. No respiratory distress.   Abdominal: Normal appearance.   Musculoskeletal: Normal range of motion.      Right hand: She exhibits laceration and swelling. She exhibits normal range of motion, no tenderness and normal capillary refill. Normal sensation noted. Normal strength noted.        Hands:    Lymphadenopathy:     She has no cervical adenopathy.   Neurological: She is alert and oriented to person, place, and time.   Skin: Skin is warm, dry, intact, not diaphoretic and no rash. Capillary refill takes less than 2 seconds. Lacerations - upper ext.:  right handabrasion, burn, bruising, erythema and ecchymosisPsychiatric: Her speech is normal and behavior is normal. Mood, judgment and thought content normal.   Nursing note and vitals reviewed.              Clinically well appearing, VSS. Will start on keflex for early signs of wound infection. Advised on return/follow-up precautions. Advised on ER precautions. Answered all patient questions. Patient verbalized understanding and voiced agreement with current treatment plan.      Assessment:       1. Laceration of right middle finger without foreign body  without damage to nail, subsequent encounter    2. Wound infection        Plan:         Laceration of right middle finger without foreign body without damage to nail, subsequent encounter  -     mupirocin (BACTROBAN) 2 % ointment; Apply to affected area 3 times daily  Dispense: 22 g; Refill: 0  -     cephALEXin (KEFLEX) 500 MG capsule; Take 1 capsule (500 mg total) by mouth every 8 (eight) hours. for 7 days  Dispense: 21 capsule; Refill: 0    Wound infection  -     mupirocin (BACTROBAN) 2 % ointment; Apply to affected area 3 times daily  Dispense: 22 g; Refill: 0  -     cephALEXin (KEFLEX) 500 MG capsule; Take 1 capsule (500 mg total) by mouth every 8 (eight) hours. for 7 days  Dispense: 21 capsule; Refill: 0      Patient Instructions     If your condition worsens or fails to improve we recommend that you receive another evaluation at the ER immediately or contact your PCP to discuss your concerns or return here.   You must understand that you've received an urgent care treatment only and that you may be released before all your medical problems are known or treated. You the patient will arrange for follouwp care as instructed.     Avoid picking or manipulating the wound.   Keep covered when out of the house; you can leave open when at home.  Clean the wound twice a day and put the antibiotic ointment on it.  You were prescribed antibiotics, please take them to completion.  -  Take with meals.  -  If you are female and on BCP use additional methods to prevent pregnancy while on antibiotics and for one cycle after.     You should seek ER treatment if fever, increase pain, swelling or other signs of increasing infection.     Tylenol or ibuprofen can also be used as directed for pain unless you have an allergy to them or medical condition such as stomach ulcers, kidney or liver disease or blood thinners etc for which you should not be taking these type of medications.     Return in 48- 72 hours for recheck or as  needed.             Wound Check, Infection  You have a wound that has become infected. The wound will not heal properly unless the infection is cleared. Infection in a wound may also spread if it is not treated. In most cases, antibiotic medicines are prescribed to treat a wound infection.   Symptoms of a wound infection include:  · Redness or swelling around the wound  · Warmth coming from the wound  · New or worsening pain  · Red streaks around the wound  · Draining pus  · Fever  Home care  Follow all directions you are given to treat the infection.  Medicines  Take all medicines as prescribed.   · If you were given antibiotics, take them until they are gone or your healthcare provider tells you to stop. It is vital to finish the antibiotics even if you feel better. If you do not finish them, the infection may come back and be harder to treat.  · If your infection is not responding to the medicines you are taking, you may be prescribed new medicines.  · Take medicine for pain as directed by your healthcare provider.  Wound care  Care for your wound as directed by your healthcare provider.  · Apply a warm compress (clean cloth soaked in hot water) to the infected area for about 5 to 10 minutes at a time. Be very careful not to burn yourself. Test the cloth on a non-infected area to make sure it is not too hot.  · Continue to change the dressing daily. If it becomes wet, stained with wound fluid, or dirty, change it sooner. To change it:  ¨ Wash your hands with soap and water before changing the dressing.  ¨ Carefully remove the dressing and tape. If it sticks to the wound, you may need to wet it a little to remove it. (Do not do this if your healthcare provider has told you not to.)  ¨ Gently clean the wound with clean water (or saline) using gauze, a clean washcloth, or cotton swab.  ¨ Do not use soap, alcohol, peroxide or other cleansers.  ¨ If you were told to dry the wound before putting on a new dressing,  gently pat. Do not rub.  ¨ Throw out the old dressing.  ¨ Wash your hands again before opening the new, clean dressing.  ¨ Wash your hands again when you are done.  Follow-up care  Follow up with your healthcare provider as advised. If a culture was done, you will be notified if your treatment needs to change. Call as directed for the results.  When to seek medical advice  Call your health care provider right away if any of these occur:  · Symptoms of infection don't start to improve within 2 days of starting antibiotics  · Symptoms of infection get worse  · New symptoms, such as red streaks around the wound  · Fever of 100.4°F (38.0°C) or higher for more than 2 days after starting the antibiotics  Date Last Reviewed: 8/10/2015  © 3157-8841 [employer]. [address] [place] [address], [place] [address]. All rights reserved. This information is not intended as a substitute for professional medical care. Always follow your healthcare professional's instructions.

## 2020-08-02 NOTE — PATIENT INSTRUCTIONS
If your condition worsens or fails to improve we recommend that you receive another evaluation at the ER immediately or contact your PCP to discuss your concerns or return here.   You must understand that you've received an urgent care treatment only and that you may be released before all your medical problems are known or treated. You the patient will arrange for follouwp care as instructed.     Avoid picking or manipulating the wound.   Keep covered when out of the house; you can leave open when at home.  Clean the wound twice a day and put the antibiotic ointment on it.  You were prescribed antibiotics, please take them to completion.  -  Take with meals.  -  If you are female and on BCP use additional methods to prevent pregnancy while on antibiotics and for one cycle after.     You should seek ER treatment if fever, increase pain, swelling or other signs of increasing infection.     Tylenol or ibuprofen can also be used as directed for pain unless you have an allergy to them or medical condition such as stomach ulcers, kidney or liver disease or blood thinners etc for which you should not be taking these type of medications.     Return in 48- 72 hours for recheck or as needed.             Wound Check, Infection  You have a wound that has become infected. The wound will not heal properly unless the infection is cleared. Infection in a wound may also spread if it is not treated. In most cases, antibiotic medicines are prescribed to treat a wound infection.   Symptoms of a wound infection include:  · Redness or swelling around the wound  · Warmth coming from the wound  · New or worsening pain  · Red streaks around the wound  · Draining pus  · Fever  Home care  Follow all directions you are given to treat the infection.  Medicines  Take all medicines as prescribed.   · If you were given antibiotics, take them until they are gone or your healthcare provider tells you to stop. It is vital to finish the antibiotics  even if you feel better. If you do not finish them, the infection may come back and be harder to treat.  · If your infection is not responding to the medicines you are taking, you may be prescribed new medicines.  · Take medicine for pain as directed by your healthcare provider.  Wound care  Care for your wound as directed by your healthcare provider.  · Apply a warm compress (clean cloth soaked in hot water) to the infected area for about 5 to 10 minutes at a time. Be very careful not to burn yourself. Test the cloth on a non-infected area to make sure it is not too hot.  · Continue to change the dressing daily. If it becomes wet, stained with wound fluid, or dirty, change it sooner. To change it:  ¨ Wash your hands with soap and water before changing the dressing.  ¨ Carefully remove the dressing and tape. If it sticks to the wound, you may need to wet it a little to remove it. (Do not do this if your healthcare provider has told you not to.)  ¨ Gently clean the wound with clean water (or saline) using gauze, a clean washcloth, or cotton swab.  ¨ Do not use soap, alcohol, peroxide or other cleansers.  ¨ If you were told to dry the wound before putting on a new dressing, gently pat. Do not rub.  ¨ Throw out the old dressing.  ¨ Wash your hands again before opening the new, clean dressing.  ¨ Wash your hands again when you are done.  Follow-up care  Follow up with your healthcare provider as advised. If a culture was done, you will be notified if your treatment needs to change. Call as directed for the results.  When to seek medical advice  Call your health care provider right away if any of these occur:  · Symptoms of infection don't start to improve within 2 days of starting antibiotics  · Symptoms of infection get worse  · New symptoms, such as red streaks around the wound  · Fever of 100.4°F (38.0°C) or higher for more than 2 days after starting the antibiotics  Date Last Reviewed: 8/10/2015  © 0523-4523 The  [employer]. [address] [place] [address], [place] [address]. All rights reserved. This information is not intended as a substitute for professional medical care. Always follow your healthcare professional's instructions.

## 2020-08-10 ENCOUNTER — TELEPHONE (OUTPATIENT)
Dept: NEUROLOGY | Facility: CLINIC | Age: 75
End: 2020-08-10

## 2020-08-10 NOTE — TELEPHONE ENCOUNTER
----- Message from Shani Lancaster RN sent at 8/10/2020  7:38 AM CDT -----  Contact: Paris  @ [phone]  Rei', please advise.  ----- Message -----  From: Rita Gunter RN  Sent: 8/10/2020   7:34 AM CDT  To: Shnai Lancaster RN    How do we handle this?  ----- Message -----  From: Yamileth Infante  Sent: 8/7/2020   4:53 PM CDT  To: [facility] Clinical Support    Pt needs to schedule appt for Physical Medicine Outpatient Rehab. There is a referral in the system. Pt stated she has been waiting for someone to call her for several days now to schedule the appt.

## 2020-08-12 ENCOUNTER — CLINICAL SUPPORT (OUTPATIENT)
Dept: INTERNAL MEDICINE | Facility: CLINIC | Age: 75
End: 2020-08-12
Payer: MEDICARE

## 2020-08-12 VITALS — WEIGHT: 158.06 LBS | BODY MASS INDEX: 29.87 KG/M2

## 2020-08-12 NOTE — PROGRESS NOTES
Health  Follow-Up Note   [x] Office  [] Phone  Notes from previous session reviewed.   [x] Previous Session Goals unchanged.   [] Patient/Caregiver Change in Goals.  Goals added or changed by Patient/Caregiver since program participation:  1. Walking 30 mins 4 x week    2. Trying to get appt with chiropractor request sent to Dr. Huynh she prefers to try instead of PT states the PT results don't last msg sent to Dr. Huynh  3. Continue plant based diet to try to decrease cholesterol     Additional/Changed support that patient/caregiver has experienced/sought?  (Indicate readiness, support from family, friends, others, community groups, etc)  1.      Additional/Changed obstacles that could prevent patient/caregiver from reaching their goals?  1.  Corona virus not able to get out much to do anything  depressed he is 8 years older    Feedback provided:  1.  Praised for good job down 1.1 lbs total now 28 lbs    Diagnostic values/Desriptors for follow-up as needed for chronic condition(s)   Weight: 71.7 kg 158.07 lbs   Blood Glucose:   30 d 125  90 d 123    Interventions:   1. Health  listened reflectively, validated thoughts and feelings, offered support and encouragement.   2. Allowed patient to express themselves in a non-biased atmosphere.  3. Health  assisted pt to problem-solve obstacles such as being in a challenging environment and dealing with these challenges.   4. Motivational Interviewed interventions utilized (OARS).   5. Patient responded favorably to interventions and remained actively engaged in the session.   6. Health  will remain available and connected for patient by phone and/or office visits.   7. Positive reinforcement, emotional support and encouragement provided.   8. Focused Education: MI    Plan:  [x] Pt will work on goals as stated above.   [x] Pt will contact Health  for any questions, concerns or needs.  [x] Pt will follow up with Health  in office  on  9.3.20.  [] Pt will follow up with Health  on phone in:        [x] Health  will remain available.   [] Health  will contact patient by phone in:        [] Health  will consult:        [] Health  will inform Provider via EPIC messaging.     Impression:  1. Behavior is consistent with Action Stage of Change.   2. Participation level:       [x] Receptive      [x] Interactive      [] Guarded and Resistant      [x] Self Motivated      [] Refused/Declined to participate   3. [x] Pt voiced understanding of all information presented.       [] Pt voiced needing further information/education. This will be arranged.       [x] Pt would benefit from further education/information as identified by this health . This will be arranged.     Jacqueline Perry RN HC

## 2020-08-31 RX ORDER — CLONAZEPAM 0.5 MG/1
TABLET ORAL
Qty: 30 TABLET | Refills: 3 | Status: SHIPPED | OUTPATIENT
Start: 2020-08-31 | End: 2021-03-13 | Stop reason: SDUPTHER

## 2020-09-03 ENCOUNTER — CLINICAL SUPPORT (OUTPATIENT)
Dept: INTERNAL MEDICINE | Facility: CLINIC | Age: 75
End: 2020-09-03
Payer: MEDICARE

## 2020-09-03 VITALS — BODY MASS INDEX: 29.83 KG/M2 | WEIGHT: 157.88 LBS

## 2020-09-03 NOTE — PROGRESS NOTES
Health  Follow-Up Note   [x] Office  [] Phone  Notes from previous session reviewed.   [x] Previous Session Goals unchanged.   [] Patient/Caregiver Change in Goals.  Goals added or changed by Patient/Caregiver since program participation:  1. Alternate plant based and low carb    2. Start logging foods on My Fitness pal  3. Dr. Ho's apps     Additional/Changed support that patient/caregiver has experienced/sought?  (Indicate readiness, support from family, friends, others, community groups, etc)  1.      Additional/Changed obstacles that could prevent patient/caregiver from reaching their goals?  1.  Food cravings     Feedback provided:  1. Praised for good job down 0.22 lbs    Diagnostic values/Desriptors for follow-up as needed for chronic condition(s)   Weight: 71.6 kg 157.85 lb  Blood Glucose: 120-182  30 d 147  90 d 135   Left other meter at home which has more readings    Interventions:   1. Health  listened reflectively, validated thoughts and feelings, offered support and encouragement.   2. Allowed patient to express themselves in a non-biased atmosphere.  3. Health  assisted pt to problem-solve obstacles such as being in a challenging environment and dealing with these challenges.   4. Motivational Interviewed interventions utilized (OARS).   5. Patient responded favorably to interventions and remained actively engaged in the session.   6. Health  will remain available and connected for patient by phone and/or office visits.   7. Positive reinforcement, emotional support and encouragement provided.   8. Focused Education: MI    Plan:  [x] Pt will work on goals as stated above.   [x] Pt will contact Health  for any questions, concerns or needs.  [x] Pt will follow up with Health  in office on  9.24.20.  [] Pt will follow up with Health  on phone in:        [x] Health  will remain available.   [] Health  will contact patient by phone in:        []  Health  will consult:        [] Health  will inform Provider via EPIC messaging.     Impression:  1. Behavior is consistent with Action Stage of Change.   2. Participation level:       [x] Receptive      [x] Interactive      [] Guarded and Resistant      [x] Self Motivated      [] Refused/Declined to participate   3. [x] Pt voiced understanding of all information presented.       [] Pt voiced needing further information/education. This will be arranged.       [x] Pt would benefit from further education/information as identified by this health . This will be arranged.     Jacqueline Perry RN HC

## 2020-09-07 ENCOUNTER — PATIENT MESSAGE (OUTPATIENT)
Dept: INTERNAL MEDICINE | Facility: CLINIC | Age: 75
End: 2020-09-07

## 2020-09-07 DIAGNOSIS — E11.42 DIABETIC POLYNEUROPATHY ASSOCIATED WITH TYPE 2 DIABETES MELLITUS: ICD-10-CM

## 2020-09-07 DIAGNOSIS — E11.42 DIABETIC POLYNEUROPATHY ASSOCIATED WITH TYPE 2 DIABETES MELLITUS: Primary | ICD-10-CM

## 2020-09-07 DIAGNOSIS — E55.9 VITAMIN D DEFICIENCY: ICD-10-CM

## 2020-09-08 RX ORDER — DEXTROSE 4 G
1 TABLET,CHEWABLE ORAL 2 TIMES DAILY
Qty: 1 EACH | Refills: 0 | Status: SHIPPED | OUTPATIENT
Start: 2020-09-08 | End: 2022-01-21 | Stop reason: SDUPTHER

## 2020-09-08 RX ORDER — DEXTROSE 4 G
1 TABLET,CHEWABLE ORAL 2 TIMES DAILY
Qty: 1 EACH | Refills: 0 | Status: SHIPPED | OUTPATIENT
Start: 2020-09-08 | End: 2020-09-08 | Stop reason: SDUPTHER

## 2020-09-10 ENCOUNTER — PATIENT MESSAGE (OUTPATIENT)
Dept: INTERNAL MEDICINE | Facility: CLINIC | Age: 75
End: 2020-09-10

## 2020-09-16 ENCOUNTER — OFFICE VISIT (OUTPATIENT)
Dept: INTERNAL MEDICINE | Facility: CLINIC | Age: 75
End: 2020-09-16
Payer: MEDICARE

## 2020-09-16 ENCOUNTER — TELEPHONE (OUTPATIENT)
Dept: INTERNAL MEDICINE | Facility: CLINIC | Age: 75
End: 2020-09-16

## 2020-09-16 ENCOUNTER — PATIENT MESSAGE (OUTPATIENT)
Dept: INTERNAL MEDICINE | Facility: CLINIC | Age: 75
End: 2020-09-16

## 2020-09-16 VITALS
BODY MASS INDEX: 30.45 KG/M2 | SYSTOLIC BLOOD PRESSURE: 136 MMHG | WEIGHT: 161.19 LBS | HEART RATE: 77 BPM | DIASTOLIC BLOOD PRESSURE: 80 MMHG | OXYGEN SATURATION: 97 %

## 2020-09-16 DIAGNOSIS — H02.402 ACQUIRED INVOLUTIONAL PTOSIS OF LEFT EYELID: Primary | ICD-10-CM

## 2020-09-16 PROCEDURE — 3075F PR MOST RECENT SYSTOLIC BLOOD PRESS GE 130-139MM HG: ICD-10-PCS | Mod: HCNC,CPTII,S$GLB, | Performed by: INTERNAL MEDICINE

## 2020-09-16 PROCEDURE — 1101F PT FALLS ASSESS-DOCD LE1/YR: CPT | Mod: HCNC,CPTII,S$GLB, | Performed by: INTERNAL MEDICINE

## 2020-09-16 PROCEDURE — 99213 OFFICE O/P EST LOW 20 MIN: CPT | Mod: HCNC,S$GLB,, | Performed by: INTERNAL MEDICINE

## 2020-09-16 PROCEDURE — 1101F PR PT FALLS ASSESS DOC 0-1 FALLS W/OUT INJ PAST YR: ICD-10-PCS | Mod: HCNC,CPTII,S$GLB, | Performed by: INTERNAL MEDICINE

## 2020-09-16 PROCEDURE — 99213 PR OFFICE/OUTPT VISIT, EST, LEVL III, 20-29 MIN: ICD-10-PCS | Mod: HCNC,S$GLB,, | Performed by: INTERNAL MEDICINE

## 2020-09-16 PROCEDURE — 3075F SYST BP GE 130 - 139MM HG: CPT | Mod: HCNC,CPTII,S$GLB, | Performed by: INTERNAL MEDICINE

## 2020-09-16 PROCEDURE — 3288F PR FALLS RISK ASSESSMENT DOCUMENTED: ICD-10-PCS | Mod: HCNC,CPTII,S$GLB, | Performed by: INTERNAL MEDICINE

## 2020-09-16 PROCEDURE — 1159F PR MEDICATION LIST DOCUMENTED IN MEDICAL RECORD: ICD-10-PCS | Mod: HCNC,S$GLB,, | Performed by: INTERNAL MEDICINE

## 2020-09-16 PROCEDURE — 3079F DIAST BP 80-89 MM HG: CPT | Mod: HCNC,CPTII,S$GLB, | Performed by: INTERNAL MEDICINE

## 2020-09-16 PROCEDURE — 3079F PR MOST RECENT DIASTOLIC BLOOD PRESSURE 80-89 MM HG: ICD-10-PCS | Mod: HCNC,CPTII,S$GLB, | Performed by: INTERNAL MEDICINE

## 2020-09-16 PROCEDURE — 3288F FALL RISK ASSESSMENT DOCD: CPT | Mod: HCNC,CPTII,S$GLB, | Performed by: INTERNAL MEDICINE

## 2020-09-16 PROCEDURE — 1159F MED LIST DOCD IN RCRD: CPT | Mod: HCNC,S$GLB,, | Performed by: INTERNAL MEDICINE

## 2020-09-16 PROCEDURE — 99999 PR PBB SHADOW E&M-EST. PATIENT-LVL III: ICD-10-PCS | Mod: PBBFAC,HCNC,, | Performed by: INTERNAL MEDICINE

## 2020-09-16 PROCEDURE — 99999 PR PBB SHADOW E&M-EST. PATIENT-LVL III: CPT | Mod: PBBFAC,HCNC,, | Performed by: INTERNAL MEDICINE

## 2020-09-16 NOTE — TELEPHONE ENCOUNTER
Can you let Arlin or Vanna know to triage pt about the L eyelid drooping - making sure it's not a stroke?

## 2020-09-16 NOTE — TELEPHONE ENCOUNTER
"I spoke to her  first he stated: She has to close her eye in order to read, I asked him to wake her up so I could triage what was going on. She stated her lid on left eye is drooping, she can see fine from a distance, but when reading, she has to close that eye to read. The vision is fuzzy in the affected eye. The change in vision was gradual for maybe over a month. I asked her to examine both her eyes to see if her pupils are the same size and look alike. Her  states that both her eyes look the same. I asked him to check her face to see if her lines/wrinkles were the same. No difficulty swallowing, or biting her tongue or pronouncing words, she has had difficulty explaining things, her  states its always drooped a bit, he noticed it a couple of weeks ago, she has noted stumbling through the house 3 or 4 times, the last time last night just getting up from the table, has not dropped any items in the last month. No problems driving, no difficulty with every day items. She states this has never happened before. She a few sentences ago she denied dropping items she is now confessing, "she has been dropping things like her glasses and silverware maybe a week ago." I asked her to check her BP,  146/91 last /64 on 7/30/2020. No c/o headache, extremely tired yesterday, tired and achy all over, walked today and hips are aching now. No upper respiration issues. Normally she can walk without pain. No pain right now. Ate breakfast today, lower appetite, no problems moving bowels or problems urinating. She could not pronounce esomeprazole and states she never has difficulty mispronouncing it until today. [phone] is her husbands phone, please call on his phone. No other symptoms. Advisory please?    "

## 2020-09-16 NOTE — PROGRESS NOTES
Subjective:       Patient ID: Paris Burris is a 74 y.o. female.    Chief Complaint: No chief complaint on file.    74 year old lady reports that her left upper eye lid has been drooping for several months but in the last few days it has begun to obscure her vision.  Has no other neurologic issues.      Review of Systems   Constitutional: Negative for activity change, chills, fatigue and fever.   HENT: Negative for congestion, ear pain, nosebleeds, postnasal drip, sinus pressure and sore throat.    Eyes: Negative.  Negative for visual disturbance.   Respiratory: Negative for cough, chest tightness, shortness of breath and wheezing.    Cardiovascular: Negative for chest pain.   Gastrointestinal: Negative for abdominal pain, diarrhea, nausea and vomiting.   Genitourinary: Negative for difficulty urinating, dysuria, frequency and urgency.   Musculoskeletal: Negative for arthralgias and neck stiffness.   Skin: Negative for rash.   Neurological: Negative for dizziness, weakness and headaches.   Psychiatric/Behavioral: Negative for sleep disturbance. The patient is not nervous/anxious.        Objective:      Physical Exam  Eyes:      General: Vision grossly intact. Gaze aligned appropriately.     Neurological:      Mental Status: She is alert and oriented to person, place, and time.      GCS: GCS eye subscore is 4. GCS verbal subscore is 5. GCS motor subscore is 6.      Cranial Nerves: Cranial nerves are intact.      Sensory: Sensation is intact.      Motor: Motor function is intact.      Coordination: Coordination is intact.         Assessment:       1. Acquired involutional ptosis of left eyelid        Plan:   Diagnoses and all orders for this visit:    Acquired involutional ptosis of left eyelid  -     Ambulatory referral/consult to Ophthalmology; Future

## 2020-09-17 ENCOUNTER — TELEPHONE (OUTPATIENT)
Dept: OPHTHALMOLOGY | Facility: CLINIC | Age: 75
End: 2020-09-17

## 2020-09-17 NOTE — TELEPHONE ENCOUNTER
----- Message from Pallavi Leahy sent at 9/17/2020  8:23 AM CDT -----  Contact: Pt @ [phone]  Pt has referral in chart that needs to be scheduled.

## 2020-09-18 ENCOUNTER — TELEPHONE (OUTPATIENT)
Dept: INTERNAL MEDICINE | Facility: CLINIC | Age: 75
End: 2020-09-18

## 2020-09-21 ENCOUNTER — IMMUNIZATION (OUTPATIENT)
Dept: PHARMACY | Facility: CLINIC | Age: 75
End: 2020-09-21
Payer: MEDICARE

## 2020-09-24 ENCOUNTER — CLINICAL SUPPORT (OUTPATIENT)
Dept: INTERNAL MEDICINE | Facility: CLINIC | Age: 75
End: 2020-09-24
Payer: MEDICARE

## 2020-09-24 VITALS — WEIGHT: 155.88 LBS | BODY MASS INDEX: 29.45 KG/M2

## 2020-09-24 NOTE — PROGRESS NOTES
Health  Follow-Up Note   [x] Office  [] Phone  Notes from previous session reviewed.   [x] Previous Session Goals unchanged.   [] Patient/Caregiver Change in Goals.  Goals added or changed by Patient/Caregiver since program participation:  1. Continue plant based diet       Additional/Changed support that patient/caregiver has experienced/sought?  (Indicate readiness, support from family, friends, others, community groups, etc)  1.      Additional/Changed obstacles that could prevent patient/caregiver from reaching their goals?  1.  Kade not helping too much around house    Feedback provided:  1.  Praised for good job down 2 lbs total 31 lbs    Diagnostic values/Desriptors for follow-up as needed for chronic condition(s)   Weight: 70.7 kg 155.87 lb   Blood Glucose: 103-145  7 d 130  14 d 125    Interventions:   1. Health  listened reflectively, validated thoughts and feelings, offered support and encouragement.   2. Allowed patient to express themselves in a non-biased atmosphere.  3. Health  assisted pt to problem-solve obstacles such as being in a challenging environment and dealing with these challenges.   4. Motivational Interviewed interventions utilized (OARS).   5. Patient responded favorably to interventions and remained actively engaged in the session.   6. Health  will remain available and connected for patient by phone and/or office visits.   7. Positive reinforcement, emotional support and encouragement provided.   8. Focused Education: MI    Plan:  [x] Pt will work on goals as stated above.   [x] Pt will contact Health  for any questions, concerns or needs.  [x] Pt will follow up with Health  in office on  10.15.20  [] Pt will follow up with Health  on phone in:        [x] Health  will remain available.   [] Health  will contact patient by phone in:        [] Health  will consult:        [] Health  will inform Provider via EPIC messaging.      Impression:  1. Behavior is consistent with Action Stage of Change.   2. Participation level:       [x] Receptive      [x] Interactive      [] Guarded and Resistant      [x] Self Motivated      [] Refused/Declined to participate   3. [x] Pt voiced understanding of all information presented.       [] Pt voiced needing further information/education. This will be arranged.       [x] Pt would benefit from further education/information as identified by this health . This will be arranged.     Jacqueline Perry RN HC

## 2020-09-27 ENCOUNTER — CLINICAL SUPPORT (OUTPATIENT)
Dept: CARDIOLOGY | Facility: HOSPITAL | Age: 75
End: 2020-09-27
Payer: MEDICARE

## 2020-09-27 DIAGNOSIS — Z95.0 PRESENCE OF CARDIAC PACEMAKER: ICD-10-CM

## 2020-09-27 PROCEDURE — 93294 REM INTERROG EVL PM/LDLS PM: CPT | Mod: ,,, | Performed by: INTERNAL MEDICINE

## 2020-09-27 PROCEDURE — 93294 CARDIAC DEVICE CHECK - REMOTE: ICD-10-PCS | Mod: ,,, | Performed by: INTERNAL MEDICINE

## 2020-09-27 PROCEDURE — 93296 REM INTERROG EVL PM/IDS: CPT | Mod: HCNC | Performed by: INTERNAL MEDICINE

## 2020-09-28 ENCOUNTER — LAB VISIT (OUTPATIENT)
Dept: LAB | Facility: HOSPITAL | Age: 75
End: 2020-09-28
Attending: INTERNAL MEDICINE
Payer: MEDICARE

## 2020-09-28 DIAGNOSIS — E11.42 DIABETIC POLYNEUROPATHY ASSOCIATED WITH TYPE 2 DIABETES MELLITUS: ICD-10-CM

## 2020-09-28 DIAGNOSIS — E55.9 VITAMIN D DEFICIENCY: ICD-10-CM

## 2020-09-28 LAB
25(OH)D3+25(OH)D2 SERPL-MCNC: 44 NG/ML (ref 30–96)
ALBUMIN SERPL BCP-MCNC: 4.6 G/DL (ref 3.5–5.2)
ALP SERPL-CCNC: 66 U/L (ref 55–135)
ALT SERPL W/O P-5'-P-CCNC: 27 U/L (ref 10–44)
ANION GAP SERPL CALC-SCNC: 7 MMOL/L (ref 8–16)
AST SERPL-CCNC: 25 U/L (ref 10–40)
BILIRUB SERPL-MCNC: 0.5 MG/DL (ref 0.1–1)
BUN SERPL-MCNC: 15 MG/DL (ref 8–23)
CALCIUM SERPL-MCNC: 9.1 MG/DL (ref 8.7–10.5)
CHLORIDE SERPL-SCNC: 104 MMOL/L (ref 95–110)
CO2 SERPL-SCNC: 28 MMOL/L (ref 23–29)
CREAT SERPL-MCNC: 0.8 MG/DL (ref 0.5–1.4)
EST. GFR  (AFRICAN AMERICAN): >60 ML/MIN/1.73 M^2
EST. GFR  (NON AFRICAN AMERICAN): >60 ML/MIN/1.73 M^2
ESTIMATED AVG GLUCOSE: 123 MG/DL (ref 68–131)
GLUCOSE SERPL-MCNC: 127 MG/DL (ref 70–110)
HBA1C MFR BLD HPLC: 5.9 % (ref 4–5.6)
POTASSIUM SERPL-SCNC: 4.1 MMOL/L (ref 3.5–5.1)
PROT SERPL-MCNC: 8 G/DL (ref 6–8.4)
SODIUM SERPL-SCNC: 139 MMOL/L (ref 136–145)

## 2020-09-28 PROCEDURE — 83036 HEMOGLOBIN GLYCOSYLATED A1C: CPT | Mod: HCNC

## 2020-09-28 PROCEDURE — 82306 VITAMIN D 25 HYDROXY: CPT | Mod: HCNC

## 2020-09-28 PROCEDURE — 80053 COMPREHEN METABOLIC PANEL: CPT | Mod: HCNC

## 2020-09-28 PROCEDURE — 36415 COLL VENOUS BLD VENIPUNCTURE: CPT | Mod: HCNC

## 2020-09-29 ENCOUNTER — PATIENT MESSAGE (OUTPATIENT)
Dept: OTHER | Facility: OTHER | Age: 75
End: 2020-09-29

## 2020-10-02 ENCOUNTER — TELEPHONE (OUTPATIENT)
Dept: OPHTHALMOLOGY | Facility: CLINIC | Age: 75
End: 2020-10-02

## 2020-10-02 NOTE — TELEPHONE ENCOUNTER
----- Message from Jose Helm sent at 9/17/2020  8:55 AM CDT -----  Contact patient to schedule Ptosis eval per Dr Fifi Lizama

## 2020-10-06 ENCOUNTER — TELEPHONE (OUTPATIENT)
Dept: INTERNAL MEDICINE | Facility: CLINIC | Age: 75
End: 2020-10-06

## 2020-10-06 DIAGNOSIS — E78.5 HYPERLIPIDEMIA, UNSPECIFIED HYPERLIPIDEMIA TYPE: ICD-10-CM

## 2020-10-06 DIAGNOSIS — E11.9 CONTROLLED TYPE 2 DIABETES MELLITUS WITHOUT COMPLICATION, WITHOUT LONG-TERM CURRENT USE OF INSULIN: ICD-10-CM

## 2020-10-06 RX ORDER — ROSUVASTATIN CALCIUM 20 MG/1
TABLET, COATED ORAL
Qty: 90 TABLET | Refills: 1 | Status: CANCELLED | OUTPATIENT
Start: 2020-10-06

## 2020-10-06 RX ORDER — ROSUVASTATIN CALCIUM 20 MG/1
TABLET, COATED ORAL
Qty: 90 TABLET | Refills: 1 | Status: SHIPPED | OUTPATIENT
Start: 2020-10-06 | End: 2021-05-25 | Stop reason: SDUPTHER

## 2020-10-06 NOTE — TELEPHONE ENCOUNTER
"----- Message from Amauri Lilly sent at 10/6/2020 12:50 PM CDT -----  Regarding: Advice  Contact: Patient [phone]  Patient would like to get medical advice.    Comments: Patient stating saw the Dentist today and found two "Nodes" are slightly tender, would like to have "ENT" to evaluate, call to discuss further with patient.       Please call an advise  Thank you    "

## 2020-10-09 ENCOUNTER — PATIENT MESSAGE (OUTPATIENT)
Dept: INTERNAL MEDICINE | Facility: CLINIC | Age: 75
End: 2020-10-09

## 2020-10-09 ENCOUNTER — OFFICE VISIT (OUTPATIENT)
Dept: INTERNAL MEDICINE | Facility: CLINIC | Age: 75
End: 2020-10-09
Payer: MEDICARE

## 2020-10-09 VITALS
BODY MASS INDEX: 29.81 KG/M2 | HEART RATE: 71 BPM | SYSTOLIC BLOOD PRESSURE: 116 MMHG | OXYGEN SATURATION: 98 % | DIASTOLIC BLOOD PRESSURE: 68 MMHG | WEIGHT: 157.88 LBS | HEIGHT: 61 IN

## 2020-10-09 DIAGNOSIS — E11.9 CONTROLLED TYPE 2 DIABETES MELLITUS WITHOUT COMPLICATION, WITHOUT LONG-TERM CURRENT USE OF INSULIN: ICD-10-CM

## 2020-10-09 DIAGNOSIS — M54.50 CHRONIC BILATERAL LOW BACK PAIN WITHOUT SCIATICA: ICD-10-CM

## 2020-10-09 DIAGNOSIS — F33.41 MDD (MAJOR DEPRESSIVE DISORDER), RECURRENT, IN PARTIAL REMISSION: ICD-10-CM

## 2020-10-09 DIAGNOSIS — R20.1 HYPOESTHESIA OF SKIN: ICD-10-CM

## 2020-10-09 DIAGNOSIS — Z78.9 VEGETARIAN DIET: ICD-10-CM

## 2020-10-09 DIAGNOSIS — E78.5 HYPERLIPIDEMIA, UNSPECIFIED HYPERLIPIDEMIA TYPE: Primary | ICD-10-CM

## 2020-10-09 DIAGNOSIS — G89.29 CHRONIC BILATERAL LOW BACK PAIN WITHOUT SCIATICA: ICD-10-CM

## 2020-10-09 DIAGNOSIS — R09.89 TENDER LYMPH NODE: ICD-10-CM

## 2020-10-09 DIAGNOSIS — D50.0 IRON DEFICIENCY ANEMIA DUE TO CHRONIC BLOOD LOSS: ICD-10-CM

## 2020-10-09 PROCEDURE — 3074F SYST BP LT 130 MM HG: CPT | Mod: HCNC,CPTII,S$GLB, | Performed by: INTERNAL MEDICINE

## 2020-10-09 PROCEDURE — 99499 UNLISTED E&M SERVICE: CPT | Mod: S$GLB,,, | Performed by: INTERNAL MEDICINE

## 2020-10-09 PROCEDURE — 1125F AMNT PAIN NOTED PAIN PRSNT: CPT | Mod: HCNC,S$GLB,, | Performed by: INTERNAL MEDICINE

## 2020-10-09 PROCEDURE — 3074F PR MOST RECENT SYSTOLIC BLOOD PRESSURE < 130 MM HG: ICD-10-PCS | Mod: HCNC,CPTII,S$GLB, | Performed by: INTERNAL MEDICINE

## 2020-10-09 PROCEDURE — 1159F PR MEDICATION LIST DOCUMENTED IN MEDICAL RECORD: ICD-10-PCS | Mod: HCNC,S$GLB,, | Performed by: INTERNAL MEDICINE

## 2020-10-09 PROCEDURE — 99214 PR OFFICE/OUTPT VISIT, EST, LEVL IV, 30-39 MIN: ICD-10-PCS | Mod: HCNC,S$GLB,, | Performed by: INTERNAL MEDICINE

## 2020-10-09 PROCEDURE — 99499 RISK ADDL DX/OHS AUDIT: ICD-10-PCS | Mod: S$GLB,,, | Performed by: INTERNAL MEDICINE

## 2020-10-09 PROCEDURE — 3078F DIAST BP <80 MM HG: CPT | Mod: HCNC,CPTII,S$GLB, | Performed by: INTERNAL MEDICINE

## 2020-10-09 PROCEDURE — 99999 PR PBB SHADOW E&M-EST. PATIENT-LVL V: CPT | Mod: PBBFAC,HCNC,, | Performed by: INTERNAL MEDICINE

## 2020-10-09 PROCEDURE — 99999 PR PBB SHADOW E&M-EST. PATIENT-LVL V: ICD-10-PCS | Mod: PBBFAC,HCNC,, | Performed by: INTERNAL MEDICINE

## 2020-10-09 PROCEDURE — 99214 OFFICE O/P EST MOD 30 MIN: CPT | Mod: HCNC,S$GLB,, | Performed by: INTERNAL MEDICINE

## 2020-10-09 PROCEDURE — 1159F MED LIST DOCD IN RCRD: CPT | Mod: HCNC,S$GLB,, | Performed by: INTERNAL MEDICINE

## 2020-10-09 PROCEDURE — 3078F PR MOST RECENT DIASTOLIC BLOOD PRESSURE < 80 MM HG: ICD-10-PCS | Mod: HCNC,CPTII,S$GLB, | Performed by: INTERNAL MEDICINE

## 2020-10-09 PROCEDURE — 1125F PR PAIN SEVERITY QUANTIFIED, PAIN PRESENT: ICD-10-PCS | Mod: HCNC,S$GLB,, | Performed by: INTERNAL MEDICINE

## 2020-10-09 PROCEDURE — 1101F PR PT FALLS ASSESS DOC 0-1 FALLS W/OUT INJ PAST YR: ICD-10-PCS | Mod: HCNC,CPTII,S$GLB, | Performed by: INTERNAL MEDICINE

## 2020-10-09 PROCEDURE — 1101F PT FALLS ASSESS-DOCD LE1/YR: CPT | Mod: HCNC,CPTII,S$GLB, | Performed by: INTERNAL MEDICINE

## 2020-10-09 RX ORDER — METFORMIN HYDROCHLORIDE 500 MG/1
500 TABLET, EXTENDED RELEASE ORAL
Qty: 90 TABLET | Refills: 3 | Status: SHIPPED | OUTPATIENT
Start: 2020-10-09 | End: 2021-07-08 | Stop reason: SDUPTHER

## 2020-10-09 RX ORDER — ACETAMINOPHEN 500 MG
500 TABLET ORAL EVERY 6 HOURS PRN
Qty: 160 TABLET | Refills: 3 | Status: SHIPPED | OUTPATIENT
Start: 2020-10-09 | End: 2021-07-08 | Stop reason: SDUPTHER

## 2020-10-09 RX ORDER — BUPROPION HYDROCHLORIDE 150 MG/1
150 TABLET ORAL DAILY
Qty: 90 TABLET | Refills: 3 | Status: SHIPPED | OUTPATIENT
Start: 2020-10-09 | End: 2021-05-19 | Stop reason: SDUPTHER

## 2020-10-09 NOTE — PROGRESS NOTES
"Subjective:       Patient ID: Paris Burris is a 75 y.o. female.    Chief Complaint: Follow-up    HPI   DM2 - would like to try metformin for a few months to help w/ weight also.   Has been doing well w/ diet - plant based. Intentional weight loss of a few lbs. Hasn't been exercising as much. Chronically w/ area of numbness in localized area of foot.   a1c 9/28/20 - 5.9  Eye - Dr. Pichardo 7/2/20  MAC 48.9 6/3/20  Foot 2/6/20    Vit D - OTC vit D daily.     ESTEFANIA - at last visit, stopped on iron.    HLD - crestor 20mg daily.     Does feel a little down as  doesn't feel well overall - back pain and depression.  She is weaning off of her depression/insomnia medicine. Taking trazodone 50mg 1/2 pill to no pill nightly. Able to go to sleep for the insomnia.   On wellbutrin 100mg BID and effexor 75mg daily. Overall sometimes doesn't want to do things around the house. Thinks there's room for improvement.     Review of Systems  Comprehensive review of systems otherwise negative. See history/subjective section for more details.    Objective:      Physical Exam    /68 (BP Location: Right arm, Patient Position: Sitting, BP Method: Medium (Manual))   Pulse 71   Ht 5' 1" (1.549 m)   Wt 71.6 kg (157 lb 13.6 oz)   SpO2 98%   BMI 29.83 kg/m²     GEN - A+OX4, NAD   HEENT - PERRL, EOMI, OP clear. MMM.   Neck - No thyromegaly or cervical LAD. No thyroid masses felt.  CV - RRR, no m/r   Chest - CTAB, no wheezing or rhonchi  Abd - S/NT/ND/+BS.   Ext - 2+BDP and radial pulses. No C/C/E.  Skin - rash under breast is resolved.     Labs reviewed.     Assessment/Plan     Paris was seen today for follow-up.    Diagnoses and all orders for this visit:    Hyperlipidemia, unspecified hyperlipidemia type  -     Lipid Panel; Future    Controlled type 2 diabetes mellitus without complication, without long-term current use of insulin - ok w/ trial of starting metformin xr 500mg qd.     Iron deficiency anemia due to chronic blood " "loss  -     Iron and TIBC; Future  -     Ferritin; Future    Vegetarian diet  -     Vitamin B12; Future    Hypoesthesia of skin   -     Vitamin B12; Future    MDD (major depressive disorder), recurrent, in partial remission - increase wellbutrin to 150mg qd.   -     buPROPion (WELLBUTRIN XL) 150 MG TB24 tablet; Take 1 tablet (150 mg total) by mouth once daily.    Chronic bilateral low back pain without sciatica  -     acetaminophen (TYLENOL) 500 MG tablet; Take 1 tablet (500 mg total) by mouth every 6 (six) hours as needed for Pain.      Follow up in about 3 months (around 1/9/2021).      Mandi Huynh MD  Department of Internal Medicine - [facility]  10:37 AM    After visit, pt wrote following email:  "My dentist wrote a scrip stating that I had 2 nodes of about 0.5 cm2 - posterior triangle that are slightly tender. Would you please write me a referral to ENT? If you have any questions, my dentist's name is Clem Hess D.D.S. and his phone number is [phone]."    "

## 2020-10-12 ENCOUNTER — OFFICE VISIT (OUTPATIENT)
Dept: DERMATOLOGY | Facility: CLINIC | Age: 75
End: 2020-10-12
Payer: MEDICARE

## 2020-10-12 VITALS — BODY MASS INDEX: 29.66 KG/M2 | WEIGHT: 157 LBS

## 2020-10-12 DIAGNOSIS — L82.1 SEBORRHEIC KERATOSES: ICD-10-CM

## 2020-10-12 DIAGNOSIS — B08.1 BATEMAN'S DISEASE: Primary | ICD-10-CM

## 2020-10-12 DIAGNOSIS — L81.4 LENTIGINES: ICD-10-CM

## 2020-10-12 PROCEDURE — 99213 OFFICE O/P EST LOW 20 MIN: CPT | Mod: HCNC,S$GLB,, | Performed by: DERMATOLOGY

## 2020-10-12 PROCEDURE — 99213 PR OFFICE/OUTPT VISIT, EST, LEVL III, 20-29 MIN: ICD-10-PCS | Mod: HCNC,S$GLB,, | Performed by: DERMATOLOGY

## 2020-10-12 PROCEDURE — 1126F AMNT PAIN NOTED NONE PRSNT: CPT | Mod: HCNC,S$GLB,, | Performed by: DERMATOLOGY

## 2020-10-12 PROCEDURE — 99999 PR PBB SHADOW E&M-EST. PATIENT-LVL IV: CPT | Mod: PBBFAC,HCNC,, | Performed by: DERMATOLOGY

## 2020-10-12 PROCEDURE — 1159F MED LIST DOCD IN RCRD: CPT | Mod: HCNC,S$GLB,, | Performed by: DERMATOLOGY

## 2020-10-12 PROCEDURE — 1159F PR MEDICATION LIST DOCUMENTED IN MEDICAL RECORD: ICD-10-PCS | Mod: HCNC,S$GLB,, | Performed by: DERMATOLOGY

## 2020-10-12 PROCEDURE — 1126F PR PAIN SEVERITY QUANTIFIED, NO PAIN PRESENT: ICD-10-PCS | Mod: HCNC,S$GLB,, | Performed by: DERMATOLOGY

## 2020-10-12 PROCEDURE — 1101F PT FALLS ASSESS-DOCD LE1/YR: CPT | Mod: HCNC,CPTII,S$GLB, | Performed by: DERMATOLOGY

## 2020-10-12 PROCEDURE — 99999 PR PBB SHADOW E&M-EST. PATIENT-LVL IV: ICD-10-PCS | Mod: PBBFAC,HCNC,, | Performed by: DERMATOLOGY

## 2020-10-12 PROCEDURE — 1101F PR PT FALLS ASSESS DOC 0-1 FALLS W/OUT INJ PAST YR: ICD-10-PCS | Mod: HCNC,CPTII,S$GLB, | Performed by: DERMATOLOGY

## 2020-10-12 NOTE — PROGRESS NOTES
Subjective:       Patient ID:  Paris Burris is a 75 y.o. female who presents for   Chief Complaint   Patient presents with    Spot     bilateral arms, legs, several months      Spots on right arm and right leg which itch no tx.       Review of Systems   Constitutional: Negative for fever, chills, weight loss, weight gain, fatigue, night sweats and malaise.   Skin: Positive for wears hat. Negative for daily sunscreen use and activity-related sunscreen use.   Hematologic/Lymphatic: Does not bruise/bleed easily.        Objective:    Physical Exam   Constitutional: She appears well-developed and well-nourished.   Neurological: She is alert and oriented to person, place, and time.   Psychiatric: She has a normal mood and affect.   Skin:   Areas Examined (abnormalities noted in diagram):   Head / Face Inspection Performed  Neck Inspection Performed  RUE Inspected  LUE Inspection Performed  RLE Inspected  Nails and Digits Inspection Performed  Gland Inspection Performed              Diagram Legend     Erythematous scaling macule/papule c/w actinic keratosis       Vascular papule c/w angioma      Pigmented verrucoid papule/plaque c/w seborrheic keratosis      Yellow umbilicated papule c/w sebaceous hyperplasia      Irregularly shaped tan macule c/w lentigo     1-2 mm smooth white papules consistent with Milia      Movable subcutaneous cyst with punctum c/w epidermal inclusion cyst      Subcutaneous movable cyst c/w pilar cyst      Firm pink to brown papule c/w dermatofibroma      Pedunculated fleshy papule(s) c/w skin tag(s)      Evenly pigmented macule c/w junctional nevus     Mildly variegated pigmented, slightly irregular-bordered macule c/w mildly atypical nevus      Flesh colored to evenly pigmented papule c/w intradermal nevus       Pink pearly papule/plaque c/w basal cell carcinoma      Erythematous hyperkeratotic cursted plaque c/w SCC      Surgical scar with no sign of skin cancer recurrence      Open and  "closed comedones      Inflammatory papules and pustules      Verrucoid papule consistent consistent with wart     Erythematous eczematous patches and plaques     Dystrophic onycholytic nail with subungual debris c/w onychomycosis     Umbilicated papule    Erythematous-base heme-crusted tan verrucoid plaque consistent with inflamed seborrheic keratosis     Erythematous Silvery Scaling Plaque c/w Psoriasis     See annotation      Assessment / Plan:        Hayde's disease  sunscreen    Lentigines  The "ABCD" rules to observe pigmented lesions were reviewed.      Seborrheic keratoses  reassurance               Follow up in about 1 year (around 10/12/2021).  "

## 2020-10-13 ENCOUNTER — OFFICE VISIT (OUTPATIENT)
Dept: OTOLARYNGOLOGY | Facility: CLINIC | Age: 75
End: 2020-10-13
Payer: MEDICARE

## 2020-10-13 VITALS — BODY MASS INDEX: 30.58 KG/M2 | WEIGHT: 161.81 LBS

## 2020-10-13 DIAGNOSIS — R09.89 TENDER LYMPH NODE: ICD-10-CM

## 2020-10-13 PROCEDURE — 1126F PR PAIN SEVERITY QUANTIFIED, NO PAIN PRESENT: ICD-10-PCS | Mod: HCNC,S$GLB,, | Performed by: OTOLARYNGOLOGY

## 2020-10-13 PROCEDURE — 1159F MED LIST DOCD IN RCRD: CPT | Mod: HCNC,S$GLB,, | Performed by: OTOLARYNGOLOGY

## 2020-10-13 PROCEDURE — 99999 PR PBB SHADOW E&M-EST. PATIENT-LVL IV: CPT | Mod: PBBFAC,HCNC,, | Performed by: OTOLARYNGOLOGY

## 2020-10-13 PROCEDURE — 1101F PR PT FALLS ASSESS DOC 0-1 FALLS W/OUT INJ PAST YR: ICD-10-PCS | Mod: HCNC,CPTII,S$GLB, | Performed by: OTOLARYNGOLOGY

## 2020-10-13 PROCEDURE — 99999 PR PBB SHADOW E&M-EST. PATIENT-LVL IV: ICD-10-PCS | Mod: PBBFAC,HCNC,, | Performed by: OTOLARYNGOLOGY

## 2020-10-13 PROCEDURE — 99213 PR OFFICE/OUTPT VISIT, EST, LEVL III, 20-29 MIN: ICD-10-PCS | Mod: HCNC,S$GLB,, | Performed by: OTOLARYNGOLOGY

## 2020-10-13 PROCEDURE — 1126F AMNT PAIN NOTED NONE PRSNT: CPT | Mod: HCNC,S$GLB,, | Performed by: OTOLARYNGOLOGY

## 2020-10-13 PROCEDURE — 99213 OFFICE O/P EST LOW 20 MIN: CPT | Mod: HCNC,S$GLB,, | Performed by: OTOLARYNGOLOGY

## 2020-10-13 PROCEDURE — 1101F PT FALLS ASSESS-DOCD LE1/YR: CPT | Mod: HCNC,CPTII,S$GLB, | Performed by: OTOLARYNGOLOGY

## 2020-10-13 PROCEDURE — 1159F PR MEDICATION LIST DOCUMENTED IN MEDICAL RECORD: ICD-10-PCS | Mod: HCNC,S$GLB,, | Performed by: OTOLARYNGOLOGY

## 2020-10-13 NOTE — PROGRESS NOTES
Chief Complaint   Patient presents with    Tender lymph node       HPI   75 y.o. female presents for evaluation of an apparent enlarged left cervical node.  No complaints today.  She has not appreciated any masses of her neck.  She states that this finding was noted by her dentist on routine dental exam.    Review of Systems   Constitutional: Negative for fatigue and unexpected weight change.   HENT: Per HPI.  Eyes: Negative for visual disturbance.   Respiratory: Negative for shortness of breath, hemoptysis   Cardiovascular: Negative for chest pain and palpitations.   Musculoskeletal: Negative for decreased ROM, back pain.   Skin: Negative for rash, sunburn, itching.   Neurological: Negative for dizziness and seizures.   Hematological: Negative for adenopathy. Does not bruise/bleed easily.   Endocrine: Negative for rapid weight loss/weight gain, heat/cold intolerance.     Past Medical History   Patient Active Problem List   Diagnosis    Insomnia    MDD (major depressive disorder), recurrent, in partial remission    History of breast cancer in female    Vitamin D deficiency    Hyperlipidemia    Leg weakness, bilateral    Diabetes mellitus type 2 in obese    DDD (degenerative disc disease), lumbar    Midline low back pain without sciatica    Diabetic polyneuropathy associated with type 2 diabetes mellitus    ESTEFANIA (iron deficiency anemia)    Thoracic back pain    Fatty liver    Type 2 diabetes mellitus, controlled, with renal complications    Stage 2 chronic kidney disease    Type 2 diabetes mellitus with diabetic neuropathy    COOKIE (obstructive sleep apnea)    Aortic atherosclerosis    Alcohol dependence, in remission    History of gastric ulcer    Cervical spine arthritis    History of vertebral compression fracture    Obesity (BMI 30-39.9)    Voiding dysfunction    Urinary urgency    Urinary frequency    Urinary hesitancy    Vaginal atrophy    Mixed stress and urge urinary incontinence     Status post parathyroidectomy    Chronic bilateral low back pain without sciatica    Hiatal hernia    Pelvic floor dysfunction    AV block, Mobitz II    SVT (supraventricular tachycardia)    Cardiomyopathy    Presence of cardiac resynchronization therapy pacemaker (CRT-P)    GERD (gastroesophageal reflux disease)    Fatigue    Tender lymph node           Past Surgical History   Past Surgical History:   Procedure Laterality Date    ADENOIDECTOMY      BREAST LUMPECTOMY Left 2002    CATARACT EXTRACTION      COLONOSCOPY N/A 12/2/2016    Procedure: COLONOSCOPY;  Surgeon: Dustin Patterson MD;  Location: [facility] (4TH FLR);  Service: Endoscopy;  Laterality: N/A;  PM prep    ESOPHAGEAL MANOMETRY WITH MEASUREMENT OF IMPEDANCE N/A 10/8/2018    Procedure: MANOMETRY, ESOPHAGUS, WITH IMPEDANCE MEASUREMENT;  Surgeon: Renato Maria MD;  Location: [facility] ENDO (4TH FLR);  Service: Endoscopy;  Laterality: N/A;    ESOPHAGOGASTRODUODENOSCOPY N/A 9/12/2018    Procedure: ESOPHAGOGASTRODUODENOSCOPY (EGD);  Surgeon: Dustin Patterson MD;  Location: [facility] (4TH FLR);  Service: Endoscopy;  Laterality: N/A;  6-8 weeks from visit    ESOPHAGOGASTRODUODENOSCOPY N/A 1/29/2019    Procedure: EGD (ESOPHAGOGASTRODUODENOSCOPY) intraop;  Surgeon: Renato Perez Jr., MD;  [facility];  Service: General;  Laterality: N/A;    ESOPHAGOGASTRODUODENOSCOPY N/A 5/31/2019    Procedure: EGD (ESOPHAGOGASTRODUODENOSCOPY);  Surgeon: Maria Elena Little MD;  [facility]  Service: Endoscopy;  Laterality: N/A;  [facility] pacemaker - sm    EYE SURGERY Bilateral     cataracts    IMPLANTATION OF BIVENTRICULAR HEART PACEMAKER N/A 1/30/2019    Procedure: INSERTION, CARDIAC PACEMAKER, BIVENTRICULAR;  Surgeon: Stone Livingston MD;  Location: [facility] EP LAB;  Service: Cardiology;  Laterality: N/A;    LAPAROSCOPIC TOUPET FUNDOPLICATION N/A 1/29/2019    Procedure: FUNDOPLICATION, LAPAROSCOPIC, TOUPET;  Surgeon: Renato  SANDRA Perez Jr., MD;  Location: [address];  Service: General;  Laterality: N/A;    lipoma removal  1999    TONSILLECTOMY           Family History   Family History   Problem Relation Age of Onset    Suicide Mother     Depression Mother     Alcohol abuse Father     Headaches Father     Diabetes Sister         prediabetes    Psoriasis Sister     Depression Sister     Depression Daughter     Colon cancer Neg Hx     Esophageal cancer Neg Hx            Social History   .  Social History     Socioeconomic History    Marital status:      Spouse name: Not on file    Number of children: Not on file    Years of education: Not on file    Highest education level: Not on file   Occupational History    Occupation:  supervisor   Social Needs    Financial resource strain: Not hard at all    Food insecurity     Worry: Never true     Inability: Never true    Transportation needs     Medical: No     Non-medical: No   Tobacco Use    Smoking status: Never Smoker    Smokeless tobacco: Never Used   Substance and Sexual Activity    Alcohol use: No     Frequency: Never     Binge frequency: Never     Comment: quit 2014 - alcohol abuse    Drug use: Not Currently    Sexual activity: Yes     Partners: Male   Lifestyle    Physical activity     Days per week: 4 days     Minutes per session: 20 min    Stress: To some extent   Relationships    Social connections     Talks on phone: More than three times a week     Gets together: Twice a week     Attends [employer] service: Not on file     Active member of club or organization: Yes     Attends meetings of clubs or organizations: 1 to 4 times per year     Relationship status:    Other Topics Concern    Patient feels they ought to cut down on drinking/drug use Not Asked    Patient annoyed by others criticizing their drinking/drug use Not Asked    Patient has felt bad or guilty about drinking/drug use Not Asked    Patient has had a drink/used  drugs as an eye opener in the AM Not Asked    Are you pregnant or think you may be? Not Asked    Breast-feeding Not Asked   Social History Narrative    Not on file         Allergies   Review of patient's allergies indicates:   Allergen Reactions    Topamax [topiramate] Other (See Comments)     hallucinations           Physical Exam     There were no vitals filed for this visit.      Body mass index is 30.58 kg/m².      General: AOx3, NAD   Respiratory:  Symmetric chest rise, normal effort  Oral Cavity:  Oral Tongue mobile, no lesions noted. Hard Palate WNL. No buccal or FOM lesions.  Oropharynx:  No masses/lesions of the posterior pharyngeal wall. Tonsillar fossa without lesions. Soft palate without masses. Midline uvula.   Neck: No scars.  No cervical lymphadenopathy, thyromegaly or thyroid nodules.  Normal range of motion.    Face: House Brackmann I bilaterally.     Assessment/Plan  Problem List Items Addressed This Visit        Other    Tender lymph node     No worrisome findings or palpable masses.  Reassured.  Return to clinic p.r.n..

## 2020-10-13 NOTE — LETTER
October 13, 2020      Mandi Huynh MD  [address]  [facility] [address]           [id number]  [address]  [facility] [address]  Phone: [phone]  Fax: [phone]          Patient: Paris Burris   MR Number: [id number]   YOB: 1945   Date of Visit: 10/13/2020       Dear Dr. Mandi Huynh:    Thank you for referring Paris Burris to me for evaluation. Attached you will find relevant portions of my assessment and plan of care.    If you have questions, please do not hesitate to call me. I look forward to following Paris Burris along with you.    Sincerely,    Abraham Frost MD    Enclosure  CC:  No Recipients    If you would like to receive this communication electronically, please contact [email or web address] or [phone] to request more information on [email or web address] Link access.    For providers and/or their staff who would like to refer a patient to [facility], please contact us through our one-stop-shop provider referral line, [facility], at [phone].    If you feel you have received this communication in error or would no longer like to receive these types of communications, please e-mail [email or web address]

## 2020-10-14 ENCOUNTER — PATIENT MESSAGE (OUTPATIENT)
Dept: ADMINISTRATIVE | Facility: OTHER | Age: 75
End: 2020-10-14

## 2020-10-21 ENCOUNTER — CLINICAL SUPPORT (OUTPATIENT)
Dept: INTERNAL MEDICINE | Facility: CLINIC | Age: 75
End: 2020-10-21
Payer: MEDICARE

## 2020-10-21 VITALS — WEIGHT: 160.06 LBS | BODY MASS INDEX: 30.24 KG/M2

## 2020-10-21 NOTE — PROGRESS NOTES
Health  Follow-Up Note   [x] Office  [] Phone  Notes from previous session reviewed.   [x] Previous Session Goals unchanged.   [] Patient/Caregiver Change in Goals.  Goals added or changed by Patient/Caregiver since program participation:  1. Continue same plan        Additional/Changed support that patient/caregiver has experienced/sought?  (Indicate readiness, support from family, friends, others, community groups, etc)  1.      Additional/Changed obstacles that could prevent patient/caregiver from reaching their goals?  1.  Depressed  sleeps a lot worried about him no exercise    Feedback provided:  1.  Praised for continued effort and determination  2. Praised for continued good blood glucose readings    Diagnostic values/Desriptors for follow-up as needed for chronic condition(s)   Weight: 72.6 kg 160.05 lb  Blood Glucose: last A1c 5.9 down from 6.0  7 d 114  14 d 116  30 d 121    Interventions:   1. Health  listened reflectively, validated thoughts and feelings, offered support and encouragement.   2. Allowed patient to express themselves in a non-biased atmosphere.  3. Health  assisted pt to problem-solve obstacles such as being in a challenging environment and dealing with these challenges.   4. Motivational Interviewed interventions utilized (OARS).   5. Patient responded favorably to interventions and remained actively engaged in the session.   6. Health  will remain available and connected for patient by phone and/or office visits.   7. Positive reinforcement, emotional support and encouragement provided.   8. Focused Education: MI    Plan:  [x] Pt will work on goals as stated above.   [x] Pt will contact Health  for any questions, concerns or needs.  [x] Pt will follow up with Health  in office on  11.11.20.  [] Pt will follow up with Health  on phone in:        [x] Health  will remain available.   [] Health  will contact patient by phone in:         [] Health  will consult:        [] Health  will inform Provider via EPIC messaging.     Impression:  1. Behavior is consistent with Action Stage of Change.   2. Participation level:       [x] Receptive      [x] Interactive      [] Guarded and Resistant      [x] Self Motivated      [] Refused/Declined to participate   3. [x] Pt voiced understanding of all information presented.       [] Pt voiced needing further information/education. This will be arranged.       [x] Pt would benefit from further education/information as identified by this health . This will be arranged.     Jacqueline Perry RN HC

## 2020-10-27 DIAGNOSIS — M51.36 DDD (DEGENERATIVE DISC DISEASE), LUMBAR: ICD-10-CM

## 2020-10-27 RX ORDER — TRAMADOL HYDROCHLORIDE 50 MG/1
TABLET ORAL
Qty: 120 TABLET | Refills: 0 | Status: SHIPPED | OUTPATIENT
Start: 2020-10-27 | End: 2021-03-04 | Stop reason: SDUPTHER

## 2020-11-17 ENCOUNTER — CLINICAL SUPPORT (OUTPATIENT)
Dept: INTERNAL MEDICINE | Facility: CLINIC | Age: 75
End: 2020-11-17
Payer: MEDICARE

## 2020-11-17 ENCOUNTER — LAB VISIT (OUTPATIENT)
Dept: LAB | Facility: HOSPITAL | Age: 75
End: 2020-11-17
Attending: INTERNAL MEDICINE
Payer: MEDICARE

## 2020-11-17 VITALS — WEIGHT: 157.63 LBS | BODY MASS INDEX: 29.78 KG/M2

## 2020-11-17 DIAGNOSIS — Z78.9 VEGETARIAN DIET: ICD-10-CM

## 2020-11-17 DIAGNOSIS — R20.1 HYPOESTHESIA OF SKIN: ICD-10-CM

## 2020-11-17 DIAGNOSIS — D50.0 IRON DEFICIENCY ANEMIA DUE TO CHRONIC BLOOD LOSS: ICD-10-CM

## 2020-11-17 DIAGNOSIS — E78.5 HYPERLIPIDEMIA, UNSPECIFIED HYPERLIPIDEMIA TYPE: ICD-10-CM

## 2020-11-17 LAB
CHOLEST SERPL-MCNC: 149 MG/DL (ref 120–199)
CHOLEST/HDLC SERPL: 5.1 {RATIO} (ref 2–5)
FERRITIN SERPL-MCNC: 207 NG/ML (ref 20–300)
HDLC SERPL-MCNC: 29 MG/DL (ref 40–75)
HDLC SERPL: 19.5 % (ref 20–50)
IRON SERPL-MCNC: 80 UG/DL (ref 30–160)
LDLC SERPL CALC-MCNC: 55.6 MG/DL (ref 63–159)
NONHDLC SERPL-MCNC: 120 MG/DL
SATURATED IRON: 25 % (ref 20–50)
TOTAL IRON BINDING CAPACITY: 318 UG/DL (ref 250–450)
TRANSFERRIN SERPL-MCNC: 215 MG/DL (ref 200–375)
TRIGL SERPL-MCNC: 322 MG/DL (ref 30–150)
VIT B12 SERPL-MCNC: 409 PG/ML (ref 210–950)

## 2020-11-17 PROCEDURE — 80061 LIPID PANEL: CPT

## 2020-11-17 PROCEDURE — 36415 COLL VENOUS BLD VENIPUNCTURE: CPT

## 2020-11-17 PROCEDURE — 82728 ASSAY OF FERRITIN: CPT

## 2020-11-17 PROCEDURE — 82607 VITAMIN B-12: CPT

## 2020-11-17 PROCEDURE — 83540 ASSAY OF IRON: CPT

## 2020-11-17 NOTE — PROGRESS NOTES
Health  Follow-Up Note   [x] Office  [] Phone  Notes from previous session reviewed.   [x] Previous Session Goals unchanged.   [] Patient/Caregiver Change in Goals.  Goals added or changed by Patient/Caregiver since program participation:  1. Continue same plan eating 90 % plant based.   2. Labs today       Additional/Changed support that patient/caregiver has experienced/sought?  (Indicate readiness, support from family, friends, others, community groups, etc)  1.      Additional/Changed obstacles that could prevent patient/caregiver from reaching their goals?  1.  Kade being depressed    Feedback provided:  1.  Praised for good job down 2.42 lbs     Diagnostic values/Desriptors for follow-up as needed for chronic condition(s)   Weight: 71.5 kg 157.63 lb    Blood Glucose:   7 d 129  14 d 127  30 d 120  90 d 121    Interventions:   1. Health  listened reflectively, validated thoughts and feelings, offered support and encouragement.   2. Allowed patient to express themselves in a non-biased atmosphere.  3. Health  assisted pt to problem-solve obstacles such as being in a challenging environment and dealing with these challenges.   4. Motivational Interviewed interventions utilized (OARS).   5. Patient responded favorably to interventions and remained actively engaged in the session.   6. Health  will remain available and connected for patient by phone and/or office visits.   7. Positive reinforcement, emotional support and encouragement provided.   8. Focused Education: MI    Plan:  [x] Pt will work on goals as stated above.   [x] Pt will contact Health  for any questions, concerns or needs.  [x] Pt will follow up with Health  in office on  12.9.20.  [] Pt will follow up with Health  on phone in:        [x] Health  will remain available.   [] Health  will contact patient by phone in:        [] Health  will consult:        [] Health  will inform  Provider via EPIC messaging.     Impression:  1. Behavior is consistent with Action Stage of Change.   2. Participation level:       [x] Receptive      [x] Interactive      [] Guarded and Resistant      [x] Self Motivated      [] Refused/Declined to participate   3. [x] Pt voiced understanding of all information presented.       [] Pt voiced needing further information/education. This will be arranged.       [x] Pt would benefit from further education/information as identified by this health . This will be arranged.     Jacqueline Perry RN HC

## 2020-11-18 ENCOUNTER — TELEPHONE (OUTPATIENT)
Dept: INTERNAL MEDICINE | Facility: CLINIC | Age: 75
End: 2020-11-18

## 2020-11-18 NOTE — TELEPHONE ENCOUNTER
----- Message from Anali Mathis sent at 11/18/2020  4:12 PM CST -----  Contact: [phone]  Regarding: Return call  Contact: [phone] @ Patient  Patient is returning a phone call.  Who left a message for the patient: Griselda Jones,

## 2020-11-18 NOTE — TELEPHONE ENCOUNTER
Please call and notify pt:    B12 and iron levels are normal.   Total cholesterol and LDL, bad cholesterol, look good but the triglycerides are higher than previously. Please confirms she's taking crestor 20mg daily. If so, I'll repeat another cholesterol at the next visit. If still high like this, may consider adding a cholesterol medicine to lower the triglycerides.

## 2020-11-18 NOTE — TELEPHONE ENCOUNTER
----- Message from Ana Amaya sent at 11/18/2020  3:04 PM CST -----  Regarding: Return call  Contact: [phone] @ Patient  Patient is returning a phone call.  Who left a message for the patient: Griselda Jones, LPN   Does patient know what this is regarding:    Comments:

## 2020-11-18 NOTE — TELEPHONE ENCOUNTER
Spoke with pt . Results given. B12 and Iron levels are normal. Total cholesterol and LDL ( bad cholesterol ) look good but the triglycerides are higher . She says yes she is taking her Crestor 20mg qd. Informed pt you might want to repeat the cholesterol at the next vistit and if still high may want to add another med to bring down the triglycerides. Thx Griselda

## 2020-11-19 ENCOUNTER — TELEPHONE (OUTPATIENT)
Dept: INTERNAL MEDICINE | Facility: CLINIC | Age: 75
End: 2020-11-19

## 2020-11-19 NOTE — TELEPHONE ENCOUNTER
----- Message from Becky Golden sent at 11/19/2020 12:01 PM CST -----  Contact: self   Patient is returning a phone call.  Who left a message for the patient: ?  Does patient know what this is regarding:  ?  Comments: Pt was advised she spoke with the nurse yesterday.

## 2020-12-09 ENCOUNTER — CLINICAL SUPPORT (OUTPATIENT)
Dept: INTERNAL MEDICINE | Facility: CLINIC | Age: 75
End: 2020-12-09
Payer: MEDICARE

## 2020-12-09 VITALS — WEIGHT: 159.81 LBS | BODY MASS INDEX: 30.2 KG/M2

## 2020-12-09 NOTE — PROGRESS NOTES
Health  Follow-Up Note   [x] Office  [] Phone  Notes from previous session reviewed.   [x] Previous Session Goals unchanged.   [] Patient/Caregiver Change in Goals.   Goals added or changed by Patient/Caregiver since program participation:  1. Continue same plan       Additional/Changed support that patient/caregiver has experienced/sought?  (Indicate readiness, support from family, friends, others, community groups, etc)  1.      Additional/Changed obstacles that could prevent patient/caregiver from reaching their goals?  1.  Kade depressed     Feedback provided:  1.  Praised for continued effort and determination.    Diagnostic values/Desriptors for follow-up as needed for chronic condition(s)   Weight: 72.5 kg 159.83 lb gain 2.2 lb  Blood Glucose: 125 today  7 d 125  14 d 125  30 d 121  90 d 120    Interventions:   1. Health  listened reflectively, validated thoughts and feelings, offered support and encouragement.   2. Allowed patient to express themselves in a non-biased atmosphere.  3. Health  assisted pt to problem-solve obstacles such as being in a challenging environment and dealing with these challenges.   4. Motivational Interviewed interventions utilized (OARS).   5. Patient responded favorably to interventions and remained actively engaged in the session.   6. Health  will remain available and connected for patient by phone and/or office visits.   7. Positive reinforcement, emotional support and encouragement provided.   8. Focused Education: MI    Plan:  [x] Pt will work on goals as stated above.   [x] Pt will contact Health  for any questions, concerns or needs.  [x] Pt will follow up with Health  in office on  12.29.20.  [] Pt will follow up with Health  on phone in:        [x] Health  will remain available.   [] Health  will contact patient by phone in:        [] Health  will consult:        [] Health  will inform Provider via [employer]  messaging.     Impression:  1. Behavior is consistent with Action Stage of Change.   2. Participation level:       [x] Receptive      [x] Interactive      [] Guarded and Resistant      [x] Self Motivated      [] Refused/Declined to participate   3. [x] Pt voiced understanding of all information presented.       [] Pt voiced needing further information/education. This will be arranged.       [x] Pt would benefit from further education/information as identified by this health . This will be arranged.     Jacqueline Perry RN HC

## 2020-12-26 ENCOUNTER — CLINICAL SUPPORT (OUTPATIENT)
Dept: CARDIOLOGY | Facility: HOSPITAL | Age: 75
End: 2020-12-26
Payer: MEDICARE

## 2020-12-26 DIAGNOSIS — I42.9 CARDIOMYOPATHY, UNSPECIFIED: ICD-10-CM

## 2020-12-26 DIAGNOSIS — I44.2 ATRIOVENTRICULAR BLOCK, COMPLETE: ICD-10-CM

## 2020-12-26 DIAGNOSIS — Z95.0 PRESENCE OF CARDIAC PACEMAKER: ICD-10-CM

## 2020-12-26 PROCEDURE — 93296 REM INTERROG EVL PM/IDS: CPT | Mod: HCNC | Performed by: INTERNAL MEDICINE

## 2020-12-26 PROCEDURE — 93294 REM INTERROG EVL PM/LDLS PM: CPT | Mod: ,,, | Performed by: INTERNAL MEDICINE

## 2020-12-26 PROCEDURE — 93294 CARDIAC DEVICE CHECK - REMOTE: ICD-10-PCS | Mod: ,,, | Performed by: INTERNAL MEDICINE

## 2020-12-29 ENCOUNTER — CLINICAL SUPPORT (OUTPATIENT)
Dept: INTERNAL MEDICINE | Facility: CLINIC | Age: 75
End: 2020-12-29
Payer: MEDICARE

## 2020-12-29 VITALS — WEIGHT: 158.06 LBS | BODY MASS INDEX: 29.87 KG/M2

## 2021-01-08 ENCOUNTER — PATIENT MESSAGE (OUTPATIENT)
Dept: INTERNAL MEDICINE | Facility: CLINIC | Age: 76
End: 2021-01-08

## 2021-01-10 ENCOUNTER — IMMUNIZATION (OUTPATIENT)
Dept: INTERNAL MEDICINE | Facility: CLINIC | Age: 76
End: 2021-01-10
Payer: MEDICARE

## 2021-01-10 DIAGNOSIS — Z23 NEED FOR VACCINATION: ICD-10-CM

## 2021-01-10 PROCEDURE — 91300 COVID-19, MRNA, LNP-S, PF, 30 MCG/0.3 ML DOSE VACCINE: CPT | Mod: PBBFAC | Performed by: FAMILY MEDICINE

## 2021-01-12 ENCOUNTER — CLINICAL SUPPORT (OUTPATIENT)
Dept: INTERNAL MEDICINE | Facility: CLINIC | Age: 76
End: 2021-01-12
Payer: MEDICARE

## 2021-01-12 VITALS — BODY MASS INDEX: 30.16 KG/M2 | WEIGHT: 159.63 LBS

## 2021-01-18 NOTE — PROVATION PATIENT INSTRUCTIONS
Discharge Summary/Instructions for after Video Capsule Endoscopy  Patient Name: Paris Burris  Patient MRN: [id number]  Patient YOB: 1945  Thursday, January 25, 2018  Dustin Patterson MD  This is a 72 year old female.  1.  Do Not eat or drink anything for 1 hour.  Try sips of water first.  If   tolerated, resume your regular diet or one recommended by your physician.  2.  Do not drive, operate machinery, make critical decisions, or do   activities that require coordination or balance for 24 hours.  3.   You may experience a sore throat for 24 to 48 hours.  You may use   throat lozenges or gargle with warm salt water to relieve the discomfort.  4.  Because air was put into your stomach during the procedure, you may   experience some belching.  5.  Do not use any medication containing aspirin for 10 days.  6.  Go directly to the emergency room if you notice any of the following:   Chills and/or fever over 101 F   Persistent vomiting or vomiting with blood/nasal regurgitation   Severe abdominal pain, other than gas cramps   Severe chest pain   Black, tarry stools     Your doctor recommends these additional instructions:  Return to your GI clinic.  If you have any questions or problems, please call your physician.  EMERGENCY PHONE NUMBER: [phone]  LAB RESULTS: [phone]  Dustin Patterson MD  2/14/2018 4:38:09 PM  This report has been verified and signed electronically.   Post-Care Instructions: I reviewed with the patient in detail post-care instructions. Patient is to keep the biopsy site dry overnight, and then apply bacitracin twice daily until healed. Patient may apply hydrogen peroxide soaks to remove any crusting.

## 2021-01-29 ENCOUNTER — CLINICAL SUPPORT (OUTPATIENT)
Dept: INTERNAL MEDICINE | Facility: CLINIC | Age: 76
End: 2021-01-29
Payer: MEDICARE

## 2021-01-29 DIAGNOSIS — Z23 NEED FOR VACCINATION: Primary | ICD-10-CM

## 2021-01-31 ENCOUNTER — IMMUNIZATION (OUTPATIENT)
Dept: INTERNAL MEDICINE | Facility: CLINIC | Age: 76
End: 2021-01-31
Payer: MEDICARE

## 2021-01-31 DIAGNOSIS — Z23 NEED FOR VACCINATION: Primary | ICD-10-CM

## 2021-01-31 PROCEDURE — 91300 COVID-19, MRNA, LNP-S, PF, 30 MCG/0.3 ML DOSE VACCINE: CPT | Mod: PBBFAC | Performed by: FAMILY MEDICINE

## 2021-01-31 PROCEDURE — 0002A COVID-19, MRNA, LNP-S, PF, 30 MCG/0.3 ML DOSE VACCINE: CPT | Mod: PBBFAC | Performed by: FAMILY MEDICINE

## 2021-02-04 ENCOUNTER — OFFICE VISIT (OUTPATIENT)
Dept: INTERNAL MEDICINE | Facility: CLINIC | Age: 76
End: 2021-02-04
Payer: MEDICARE

## 2021-02-04 VITALS
HEIGHT: 61 IN | OXYGEN SATURATION: 98 % | BODY MASS INDEX: 30.34 KG/M2 | HEART RATE: 56 BPM | DIASTOLIC BLOOD PRESSURE: 76 MMHG | SYSTOLIC BLOOD PRESSURE: 122 MMHG | WEIGHT: 160.69 LBS

## 2021-02-04 DIAGNOSIS — I70.0 AORTIC ATHEROSCLEROSIS: ICD-10-CM

## 2021-02-04 DIAGNOSIS — M54.50 CHRONIC BILATERAL LOW BACK PAIN WITHOUT SCIATICA: ICD-10-CM

## 2021-02-04 DIAGNOSIS — E11.42 DIABETIC PERIPHERAL NEUROPATHY: ICD-10-CM

## 2021-02-04 DIAGNOSIS — E66.9 OBESITY (BMI 30-39.9): ICD-10-CM

## 2021-02-04 DIAGNOSIS — E66.9 DIABETES MELLITUS TYPE 2 IN OBESE: Primary | ICD-10-CM

## 2021-02-04 DIAGNOSIS — E78.5 HYPERLIPIDEMIA ASSOCIATED WITH TYPE 2 DIABETES MELLITUS: ICD-10-CM

## 2021-02-04 DIAGNOSIS — L84 TYPE 2 DIABETES MELLITUS WITH PRESSURE CALLUS: ICD-10-CM

## 2021-02-04 DIAGNOSIS — E11.628 TYPE 2 DIABETES MELLITUS WITH PRESSURE CALLUS: ICD-10-CM

## 2021-02-04 DIAGNOSIS — G47.33 OSA (OBSTRUCTIVE SLEEP APNEA): ICD-10-CM

## 2021-02-04 DIAGNOSIS — Z90.89 STATUS POST PARATHYROIDECTOMY: ICD-10-CM

## 2021-02-04 DIAGNOSIS — E11.69 HYPERLIPIDEMIA ASSOCIATED WITH TYPE 2 DIABETES MELLITUS: ICD-10-CM

## 2021-02-04 DIAGNOSIS — Z98.890 STATUS POST PARATHYROIDECTOMY: ICD-10-CM

## 2021-02-04 DIAGNOSIS — Z85.3 HISTORY OF BREAST CANCER IN FEMALE: ICD-10-CM

## 2021-02-04 DIAGNOSIS — I15.2 HYPERTENSION ASSOCIATED WITH DIABETES: ICD-10-CM

## 2021-02-04 DIAGNOSIS — G89.29 CHRONIC BILATERAL LOW BACK PAIN WITHOUT SCIATICA: ICD-10-CM

## 2021-02-04 DIAGNOSIS — F33.41 MDD (MAJOR DEPRESSIVE DISORDER), RECURRENT, IN PARTIAL REMISSION: ICD-10-CM

## 2021-02-04 DIAGNOSIS — E55.9 VITAMIN D DEFICIENCY: ICD-10-CM

## 2021-02-04 DIAGNOSIS — K21.9 GASTROESOPHAGEAL REFLUX DISEASE, UNSPECIFIED WHETHER ESOPHAGITIS PRESENT: ICD-10-CM

## 2021-02-04 DIAGNOSIS — E11.59 HYPERTENSION ASSOCIATED WITH DIABETES: ICD-10-CM

## 2021-02-04 DIAGNOSIS — I47.10 SVT (SUPRAVENTRICULAR TACHYCARDIA): ICD-10-CM

## 2021-02-04 DIAGNOSIS — E11.69 DIABETES MELLITUS TYPE 2 IN OBESE: Primary | ICD-10-CM

## 2021-02-04 PROCEDURE — 1157F ADVNC CARE PLAN IN RCRD: CPT | Mod: S$GLB,,, | Performed by: INTERNAL MEDICINE

## 2021-02-04 PROCEDURE — 1159F PR MEDICATION LIST DOCUMENTED IN MEDICAL RECORD: ICD-10-PCS | Mod: S$GLB,,, | Performed by: INTERNAL MEDICINE

## 2021-02-04 PROCEDURE — 99499 UNLISTED E&M SERVICE: CPT | Mod: S$GLB,,, | Performed by: INTERNAL MEDICINE

## 2021-02-04 PROCEDURE — 3072F PR LOW RISK FOR RETINOPATHY: ICD-10-PCS | Mod: S$GLB,,, | Performed by: INTERNAL MEDICINE

## 2021-02-04 PROCEDURE — 1125F AMNT PAIN NOTED PAIN PRSNT: CPT | Mod: S$GLB,,, | Performed by: INTERNAL MEDICINE

## 2021-02-04 PROCEDURE — 3288F PR FALLS RISK ASSESSMENT DOCUMENTED: ICD-10-PCS | Mod: CPTII,S$GLB,, | Performed by: INTERNAL MEDICINE

## 2021-02-04 PROCEDURE — 3078F DIAST BP <80 MM HG: CPT | Mod: CPTII,S$GLB,, | Performed by: INTERNAL MEDICINE

## 2021-02-04 PROCEDURE — 3288F FALL RISK ASSESSMENT DOCD: CPT | Mod: CPTII,S$GLB,, | Performed by: INTERNAL MEDICINE

## 2021-02-04 PROCEDURE — 99999 PR PBB SHADOW E&M-EST. PATIENT-LVL V: CPT | Mod: PBBFAC,,, | Performed by: INTERNAL MEDICINE

## 2021-02-04 PROCEDURE — 1157F PR ADVANCE CARE PLAN OR EQUIV PRESENT IN MEDICAL RECORD: ICD-10-PCS | Mod: S$GLB,,, | Performed by: INTERNAL MEDICINE

## 2021-02-04 PROCEDURE — 3074F PR MOST RECENT SYSTOLIC BLOOD PRESSURE < 130 MM HG: ICD-10-PCS | Mod: CPTII,S$GLB,, | Performed by: INTERNAL MEDICINE

## 2021-02-04 PROCEDURE — 99499 RISK ADDL DX/OHS AUDIT: ICD-10-PCS | Mod: S$GLB,,, | Performed by: INTERNAL MEDICINE

## 2021-02-04 PROCEDURE — 99214 PR OFFICE/OUTPT VISIT, EST, LEVL IV, 30-39 MIN: ICD-10-PCS | Mod: S$GLB,,, | Performed by: INTERNAL MEDICINE

## 2021-02-04 PROCEDURE — 1101F PT FALLS ASSESS-DOCD LE1/YR: CPT | Mod: CPTII,S$GLB,, | Performed by: INTERNAL MEDICINE

## 2021-02-04 PROCEDURE — 3078F PR MOST RECENT DIASTOLIC BLOOD PRESSURE < 80 MM HG: ICD-10-PCS | Mod: CPTII,S$GLB,, | Performed by: INTERNAL MEDICINE

## 2021-02-04 PROCEDURE — 99999 PR PBB SHADOW E&M-EST. PATIENT-LVL V: ICD-10-PCS | Mod: PBBFAC,,, | Performed by: INTERNAL MEDICINE

## 2021-02-04 PROCEDURE — 1101F PR PT FALLS ASSESS DOC 0-1 FALLS W/OUT INJ PAST YR: ICD-10-PCS | Mod: CPTII,S$GLB,, | Performed by: INTERNAL MEDICINE

## 2021-02-04 PROCEDURE — 3074F SYST BP LT 130 MM HG: CPT | Mod: CPTII,S$GLB,, | Performed by: INTERNAL MEDICINE

## 2021-02-04 PROCEDURE — 99214 OFFICE O/P EST MOD 30 MIN: CPT | Mod: S$GLB,,, | Performed by: INTERNAL MEDICINE

## 2021-02-04 PROCEDURE — 1125F PR PAIN SEVERITY QUANTIFIED, PAIN PRESENT: ICD-10-PCS | Mod: S$GLB,,, | Performed by: INTERNAL MEDICINE

## 2021-02-04 PROCEDURE — 1159F MED LIST DOCD IN RCRD: CPT | Mod: S$GLB,,, | Performed by: INTERNAL MEDICINE

## 2021-02-04 PROCEDURE — 3072F LOW RISK FOR RETINOPATHY: CPT | Mod: S$GLB,,, | Performed by: INTERNAL MEDICINE

## 2021-02-09 ENCOUNTER — PATIENT MESSAGE (OUTPATIENT)
Dept: INTERNAL MEDICINE | Facility: CLINIC | Age: 76
End: 2021-02-09

## 2021-02-10 ENCOUNTER — PATIENT MESSAGE (OUTPATIENT)
Dept: INTERNAL MEDICINE | Facility: CLINIC | Age: 76
End: 2021-02-10

## 2021-02-10 ENCOUNTER — LAB VISIT (OUTPATIENT)
Dept: LAB | Facility: HOSPITAL | Age: 76
End: 2021-02-10
Attending: INTERNAL MEDICINE
Payer: MEDICARE

## 2021-02-10 DIAGNOSIS — E66.9 OBESITY (BMI 30-39.9): ICD-10-CM

## 2021-02-10 DIAGNOSIS — E66.9 DIABETES MELLITUS TYPE 2 IN OBESE: ICD-10-CM

## 2021-02-10 DIAGNOSIS — E78.5 HYPERLIPIDEMIA ASSOCIATED WITH TYPE 2 DIABETES MELLITUS: ICD-10-CM

## 2021-02-10 DIAGNOSIS — I15.2 HYPERTENSION ASSOCIATED WITH DIABETES: ICD-10-CM

## 2021-02-10 DIAGNOSIS — E11.69 DIABETES MELLITUS TYPE 2 IN OBESE: ICD-10-CM

## 2021-02-10 DIAGNOSIS — E11.69 HYPERLIPIDEMIA ASSOCIATED WITH TYPE 2 DIABETES MELLITUS: ICD-10-CM

## 2021-02-10 DIAGNOSIS — Z90.89 STATUS POST PARATHYROIDECTOMY: ICD-10-CM

## 2021-02-10 DIAGNOSIS — E55.9 VITAMIN D DEFICIENCY: ICD-10-CM

## 2021-02-10 DIAGNOSIS — E11.59 HYPERTENSION ASSOCIATED WITH DIABETES: ICD-10-CM

## 2021-02-10 DIAGNOSIS — Z98.890 STATUS POST PARATHYROIDECTOMY: ICD-10-CM

## 2021-02-10 LAB
25(OH)D3+25(OH)D2 SERPL-MCNC: 38 NG/ML (ref 30–96)
ALBUMIN SERPL BCP-MCNC: 4.3 G/DL (ref 3.5–5.2)
ALP SERPL-CCNC: 66 U/L (ref 55–135)
ALT SERPL W/O P-5'-P-CCNC: 20 U/L (ref 10–44)
ANION GAP SERPL CALC-SCNC: 10 MMOL/L (ref 8–16)
AST SERPL-CCNC: 23 U/L (ref 10–40)
BASOPHILS # BLD AUTO: 0.04 K/UL (ref 0–0.2)
BASOPHILS NFR BLD: 0.7 % (ref 0–1.9)
BILIRUB SERPL-MCNC: 0.5 MG/DL (ref 0.1–1)
BUN SERPL-MCNC: 13 MG/DL (ref 8–23)
CALCIUM SERPL-MCNC: 9.2 MG/DL (ref 8.7–10.5)
CHLORIDE SERPL-SCNC: 104 MMOL/L (ref 95–110)
CHOLEST SERPL-MCNC: 155 MG/DL (ref 120–199)
CHOLEST/HDLC SERPL: 4.7 {RATIO} (ref 2–5)
CO2 SERPL-SCNC: 25 MMOL/L (ref 23–29)
CREAT SERPL-MCNC: 0.8 MG/DL (ref 0.5–1.4)
DIFFERENTIAL METHOD: NORMAL
EOSINOPHIL # BLD AUTO: 0.2 K/UL (ref 0–0.5)
EOSINOPHIL NFR BLD: 2.6 % (ref 0–8)
ERYTHROCYTE [DISTWIDTH] IN BLOOD BY AUTOMATED COUNT: 12.6 % (ref 11.5–14.5)
EST. GFR  (AFRICAN AMERICAN): >60 ML/MIN/1.73 M^2
EST. GFR  (NON AFRICAN AMERICAN): >60 ML/MIN/1.73 M^2
ESTIMATED AVG GLUCOSE: 114 MG/DL (ref 68–131)
GLUCOSE SERPL-MCNC: 119 MG/DL (ref 70–110)
HBA1C MFR BLD: 5.6 % (ref 4–5.6)
HCT VFR BLD AUTO: 42 % (ref 37–48.5)
HDLC SERPL-MCNC: 33 MG/DL (ref 40–75)
HDLC SERPL: 21.3 % (ref 20–50)
HGB BLD-MCNC: 13.6 G/DL (ref 12–16)
IMM GRANULOCYTES # BLD AUTO: 0.02 K/UL (ref 0–0.04)
IMM GRANULOCYTES NFR BLD AUTO: 0.3 % (ref 0–0.5)
LDLC SERPL CALC-MCNC: 65 MG/DL (ref 63–159)
LYMPHOCYTES # BLD AUTO: 2.2 K/UL (ref 1–4.8)
LYMPHOCYTES NFR BLD: 37.4 % (ref 18–48)
MCH RBC QN AUTO: 30.8 PG (ref 27–31)
MCHC RBC AUTO-ENTMCNC: 32.4 G/DL (ref 32–36)
MCV RBC AUTO: 95 FL (ref 82–98)
MONOCYTES # BLD AUTO: 0.5 K/UL (ref 0.3–1)
MONOCYTES NFR BLD: 8.9 % (ref 4–15)
NEUTROPHILS # BLD AUTO: 2.9 K/UL (ref 1.8–7.7)
NEUTROPHILS NFR BLD: 50.1 % (ref 38–73)
NONHDLC SERPL-MCNC: 122 MG/DL
NRBC BLD-RTO: 0 /100 WBC
PLATELET # BLD AUTO: 179 K/UL (ref 150–350)
PMV BLD AUTO: 11.1 FL (ref 9.2–12.9)
POTASSIUM SERPL-SCNC: 4.1 MMOL/L (ref 3.5–5.1)
PROT SERPL-MCNC: 7.7 G/DL (ref 6–8.4)
PTH-INTACT SERPL-MCNC: 52 PG/ML (ref 9–77)
RBC # BLD AUTO: 4.42 M/UL (ref 4–5.4)
SODIUM SERPL-SCNC: 139 MMOL/L (ref 136–145)
TRIGL SERPL-MCNC: 285 MG/DL (ref 30–150)
WBC # BLD AUTO: 5.75 K/UL (ref 3.9–12.7)

## 2021-02-10 PROCEDURE — 85025 COMPLETE CBC W/AUTO DIFF WBC: CPT

## 2021-02-10 PROCEDURE — 82306 VITAMIN D 25 HYDROXY: CPT

## 2021-02-10 PROCEDURE — 83970 ASSAY OF PARATHORMONE: CPT

## 2021-02-10 PROCEDURE — 80053 COMPREHEN METABOLIC PANEL: CPT

## 2021-02-10 PROCEDURE — 36415 COLL VENOUS BLD VENIPUNCTURE: CPT

## 2021-02-10 PROCEDURE — 83036 HEMOGLOBIN GLYCOSYLATED A1C: CPT

## 2021-02-10 PROCEDURE — 80061 LIPID PANEL: CPT

## 2021-02-18 ENCOUNTER — CLINICAL SUPPORT (OUTPATIENT)
Dept: INTERNAL MEDICINE | Facility: CLINIC | Age: 76
End: 2021-02-18
Payer: MEDICARE

## 2021-02-18 VITALS — WEIGHT: 162.5 LBS | BODY MASS INDEX: 30.7 KG/M2

## 2021-02-18 DIAGNOSIS — E11.69 DIABETES MELLITUS TYPE 2 IN OBESE: Primary | ICD-10-CM

## 2021-02-18 DIAGNOSIS — R73.03 PREDIABETES: ICD-10-CM

## 2021-02-18 DIAGNOSIS — E66.9 DIABETES MELLITUS TYPE 2 IN OBESE: Primary | ICD-10-CM

## 2021-02-18 RX ORDER — LANCETS
1 EACH MISCELLANEOUS DAILY
Qty: 200 EACH | Refills: 0 | Status: SHIPPED | OUTPATIENT
Start: 2021-02-18 | End: 2021-05-19 | Stop reason: SDUPTHER

## 2021-02-21 ENCOUNTER — NURSE TRIAGE (OUTPATIENT)
Dept: ADMINISTRATIVE | Facility: CLINIC | Age: 76
End: 2021-02-21

## 2021-02-22 ENCOUNTER — OFFICE VISIT (OUTPATIENT)
Dept: INTERNAL MEDICINE | Facility: CLINIC | Age: 76
End: 2021-02-22
Payer: MEDICARE

## 2021-02-22 VITALS
HEIGHT: 61 IN | BODY MASS INDEX: 30.51 KG/M2 | DIASTOLIC BLOOD PRESSURE: 68 MMHG | HEART RATE: 77 BPM | SYSTOLIC BLOOD PRESSURE: 126 MMHG | WEIGHT: 161.63 LBS | OXYGEN SATURATION: 99 %

## 2021-02-22 DIAGNOSIS — N30.00 ACUTE CYSTITIS WITHOUT HEMATURIA: Primary | ICD-10-CM

## 2021-02-22 DIAGNOSIS — R35.0 URINARY FREQUENCY: ICD-10-CM

## 2021-02-22 PROCEDURE — 99213 PR OFFICE/OUTPT VISIT, EST, LEVL III, 20-29 MIN: ICD-10-PCS | Mod: S$GLB,,, | Performed by: INTERNAL MEDICINE

## 2021-02-22 PROCEDURE — 87186 SC STD MICRODIL/AGAR DIL: CPT

## 2021-02-22 PROCEDURE — 3074F PR MOST RECENT SYSTOLIC BLOOD PRESSURE < 130 MM HG: ICD-10-PCS | Mod: CPTII,S$GLB,, | Performed by: INTERNAL MEDICINE

## 2021-02-22 PROCEDURE — 3078F DIAST BP <80 MM HG: CPT | Mod: CPTII,S$GLB,, | Performed by: INTERNAL MEDICINE

## 2021-02-22 PROCEDURE — 87077 CULTURE AEROBIC IDENTIFY: CPT

## 2021-02-22 PROCEDURE — 1101F PT FALLS ASSESS-DOCD LE1/YR: CPT | Mod: CPTII,S$GLB,, | Performed by: INTERNAL MEDICINE

## 2021-02-22 PROCEDURE — 3074F SYST BP LT 130 MM HG: CPT | Mod: CPTII,S$GLB,, | Performed by: INTERNAL MEDICINE

## 2021-02-22 PROCEDURE — 1159F PR MEDICATION LIST DOCUMENTED IN MEDICAL RECORD: ICD-10-PCS | Mod: S$GLB,,, | Performed by: INTERNAL MEDICINE

## 2021-02-22 PROCEDURE — 99999 PR PBB SHADOW E&M-EST. PATIENT-LVL IV: ICD-10-PCS | Mod: PBBFAC,,, | Performed by: INTERNAL MEDICINE

## 2021-02-22 PROCEDURE — 3288F PR FALLS RISK ASSESSMENT DOCUMENTED: ICD-10-PCS | Mod: CPTII,S$GLB,, | Performed by: INTERNAL MEDICINE

## 2021-02-22 PROCEDURE — 3072F PR LOW RISK FOR RETINOPATHY: ICD-10-PCS | Mod: S$GLB,,, | Performed by: INTERNAL MEDICINE

## 2021-02-22 PROCEDURE — 1101F PR PT FALLS ASSESS DOC 0-1 FALLS W/OUT INJ PAST YR: ICD-10-PCS | Mod: CPTII,S$GLB,, | Performed by: INTERNAL MEDICINE

## 2021-02-22 PROCEDURE — 3288F FALL RISK ASSESSMENT DOCD: CPT | Mod: CPTII,S$GLB,, | Performed by: INTERNAL MEDICINE

## 2021-02-22 PROCEDURE — 1126F PR PAIN SEVERITY QUANTIFIED, NO PAIN PRESENT: ICD-10-PCS | Mod: S$GLB,,, | Performed by: INTERNAL MEDICINE

## 2021-02-22 PROCEDURE — 1157F ADVNC CARE PLAN IN RCRD: CPT | Mod: S$GLB,,, | Performed by: INTERNAL MEDICINE

## 2021-02-22 PROCEDURE — 81001 URINALYSIS AUTO W/SCOPE: CPT

## 2021-02-22 PROCEDURE — 87086 URINE CULTURE/COLONY COUNT: CPT

## 2021-02-22 PROCEDURE — 1126F AMNT PAIN NOTED NONE PRSNT: CPT | Mod: S$GLB,,, | Performed by: INTERNAL MEDICINE

## 2021-02-22 PROCEDURE — 1159F MED LIST DOCD IN RCRD: CPT | Mod: S$GLB,,, | Performed by: INTERNAL MEDICINE

## 2021-02-22 PROCEDURE — 3078F PR MOST RECENT DIASTOLIC BLOOD PRESSURE < 80 MM HG: ICD-10-PCS | Mod: CPTII,S$GLB,, | Performed by: INTERNAL MEDICINE

## 2021-02-22 PROCEDURE — 99213 OFFICE O/P EST LOW 20 MIN: CPT | Mod: S$GLB,,, | Performed by: INTERNAL MEDICINE

## 2021-02-22 PROCEDURE — 87088 URINE BACTERIA CULTURE: CPT

## 2021-02-22 PROCEDURE — 1157F PR ADVANCE CARE PLAN OR EQUIV PRESENT IN MEDICAL RECORD: ICD-10-PCS | Mod: S$GLB,,, | Performed by: INTERNAL MEDICINE

## 2021-02-22 PROCEDURE — 99999 PR PBB SHADOW E&M-EST. PATIENT-LVL IV: CPT | Mod: PBBFAC,,, | Performed by: INTERNAL MEDICINE

## 2021-02-22 PROCEDURE — 3072F LOW RISK FOR RETINOPATHY: CPT | Mod: S$GLB,,, | Performed by: INTERNAL MEDICINE

## 2021-02-22 RX ORDER — NITROFURANTOIN 25; 75 MG/1; MG/1
100 CAPSULE ORAL 2 TIMES DAILY
Qty: 14 CAPSULE | Refills: 0 | Status: SHIPPED | OUTPATIENT
Start: 2021-02-22 | End: 2021-03-01

## 2021-02-23 ENCOUNTER — TELEPHONE (OUTPATIENT)
Dept: OPHTHALMOLOGY | Facility: CLINIC | Age: 76
End: 2021-02-23

## 2021-02-23 ENCOUNTER — CLINICAL SUPPORT (OUTPATIENT)
Dept: OPHTHALMOLOGY | Facility: CLINIC | Age: 76
End: 2021-02-23
Payer: MEDICARE

## 2021-02-23 ENCOUNTER — OFFICE VISIT (OUTPATIENT)
Dept: OPHTHALMOLOGY | Facility: CLINIC | Age: 76
End: 2021-02-23
Payer: MEDICARE

## 2021-02-23 DIAGNOSIS — Z13.9 SCREENING FOR UNSPECIFIED CONDITION: Primary | ICD-10-CM

## 2021-02-23 DIAGNOSIS — H02.834 DERMATOCHALASIS OF BOTH UPPER EYELIDS: ICD-10-CM

## 2021-02-23 DIAGNOSIS — H02.831 DERMATOCHALASIS OF BOTH UPPER EYELIDS: ICD-10-CM

## 2021-02-23 DIAGNOSIS — H57.813 BROW PTOSIS, BILATERAL: Primary | ICD-10-CM

## 2021-02-23 LAB
BACTERIA #/AREA URNS AUTO: ABNORMAL /HPF
BILIRUB UR QL STRIP: NEGATIVE
CLARITY UR REFRACT.AUTO: ABNORMAL
COLOR UR AUTO: YELLOW
GLUCOSE UR QL STRIP: NEGATIVE
HGB UR QL STRIP: NEGATIVE
KETONES UR QL STRIP: NEGATIVE
LEUKOCYTE ESTERASE UR QL STRIP: ABNORMAL
MICROSCOPIC COMMENT: ABNORMAL
NITRITE UR QL STRIP: POSITIVE
NON-SQ EPI CELLS #/AREA URNS AUTO: 1 /HPF
PH UR STRIP: 6 [PH] (ref 5–8)
PROT UR QL STRIP: NEGATIVE
RBC #/AREA URNS AUTO: 2 /HPF (ref 0–4)
SP GR UR STRIP: 1.01 (ref 1–1.03)
URN SPEC COLLECT METH UR: ABNORMAL
WBC #/AREA URNS AUTO: >100 /HPF (ref 0–5)
WBC CLUMPS UR QL AUTO: ABNORMAL

## 2021-02-23 PROCEDURE — 92285 EXTERNAL PHOTOGRAPHY - OU - BOTH EYES: ICD-10-PCS | Mod: S$GLB,,, | Performed by: OPHTHALMOLOGY

## 2021-02-23 PROCEDURE — 1157F ADVNC CARE PLAN IN RCRD: CPT | Mod: S$GLB,,, | Performed by: OPHTHALMOLOGY

## 2021-02-23 PROCEDURE — 2023F PR DILATED RETINAL EXAM W/O EVID OF RETINOPATHY: ICD-10-PCS | Mod: S$GLB,,, | Performed by: OPHTHALMOLOGY

## 2021-02-23 PROCEDURE — 1159F PR MEDICATION LIST DOCUMENTED IN MEDICAL RECORD: ICD-10-PCS | Mod: S$GLB,,, | Performed by: OPHTHALMOLOGY

## 2021-02-23 PROCEDURE — 99204 OFFICE O/P NEW MOD 45 MIN: CPT | Mod: S$GLB,,, | Performed by: OPHTHALMOLOGY

## 2021-02-23 PROCEDURE — 99999 PR PBB SHADOW E&M-EST. PATIENT-LVL II: ICD-10-PCS | Mod: PBBFAC,,, | Performed by: OPHTHALMOLOGY

## 2021-02-23 PROCEDURE — 99999 PR PBB SHADOW E&M-EST. PATIENT-LVL II: CPT | Mod: PBBFAC,,, | Performed by: OPHTHALMOLOGY

## 2021-02-23 PROCEDURE — 1101F PR PT FALLS ASSESS DOC 0-1 FALLS W/OUT INJ PAST YR: ICD-10-PCS | Mod: CPTII,S$GLB,, | Performed by: OPHTHALMOLOGY

## 2021-02-23 PROCEDURE — 92285 EXTERNAL OCULAR PHOTOGRAPHY: CPT | Mod: S$GLB,,, | Performed by: OPHTHALMOLOGY

## 2021-02-23 PROCEDURE — 1126F PR PAIN SEVERITY QUANTIFIED, NO PAIN PRESENT: ICD-10-PCS | Mod: S$GLB,,, | Performed by: OPHTHALMOLOGY

## 2021-02-23 PROCEDURE — 1101F PT FALLS ASSESS-DOCD LE1/YR: CPT | Mod: CPTII,S$GLB,, | Performed by: OPHTHALMOLOGY

## 2021-02-23 PROCEDURE — 3288F PR FALLS RISK ASSESSMENT DOCUMENTED: ICD-10-PCS | Mod: CPTII,S$GLB,, | Performed by: OPHTHALMOLOGY

## 2021-02-23 PROCEDURE — 92083 EXTENDED VISUAL FIELD XM: CPT | Mod: S$GLB,,, | Performed by: OPHTHALMOLOGY

## 2021-02-23 PROCEDURE — 99204 PR OFFICE/OUTPT VISIT, NEW, LEVL IV, 45-59 MIN: ICD-10-PCS | Mod: S$GLB,,, | Performed by: OPHTHALMOLOGY

## 2021-02-23 PROCEDURE — 1157F PR ADVANCE CARE PLAN OR EQUIV PRESENT IN MEDICAL RECORD: ICD-10-PCS | Mod: S$GLB,,, | Performed by: OPHTHALMOLOGY

## 2021-02-23 PROCEDURE — 3288F FALL RISK ASSESSMENT DOCD: CPT | Mod: CPTII,S$GLB,, | Performed by: OPHTHALMOLOGY

## 2021-02-23 PROCEDURE — 2023F DILAT RTA XM W/O RTNOPTHY: CPT | Mod: S$GLB,,, | Performed by: OPHTHALMOLOGY

## 2021-02-23 PROCEDURE — 1126F AMNT PAIN NOTED NONE PRSNT: CPT | Mod: S$GLB,,, | Performed by: OPHTHALMOLOGY

## 2021-02-23 PROCEDURE — 92083 GOLDMANN PERIMETRY - OU - EXTENDED - BOTH EYES: ICD-10-PCS | Mod: S$GLB,,, | Performed by: OPHTHALMOLOGY

## 2021-02-23 PROCEDURE — 1159F MED LIST DOCD IN RCRD: CPT | Mod: S$GLB,,, | Performed by: OPHTHALMOLOGY

## 2021-02-25 LAB — BACTERIA UR CULT: ABNORMAL

## 2021-03-04 DIAGNOSIS — M51.36 DDD (DEGENERATIVE DISC DISEASE), LUMBAR: ICD-10-CM

## 2021-03-06 ENCOUNTER — NURSE TRIAGE (OUTPATIENT)
Dept: ADMINISTRATIVE | Facility: CLINIC | Age: 76
End: 2021-03-06

## 2021-03-06 ENCOUNTER — HOSPITAL ENCOUNTER (EMERGENCY)
Facility: HOSPITAL | Age: 76
Discharge: HOME | End: 2021-03-06
Attending: EMERGENCY MEDICINE
Payer: MEDICARE

## 2021-03-06 VITALS
HEIGHT: 61 IN | HEART RATE: 69 BPM | BODY MASS INDEX: 29.64 KG/M2 | RESPIRATION RATE: 17 BRPM | OXYGEN SATURATION: 99 % | TEMPERATURE: 98 F | WEIGHT: 157 LBS | DIASTOLIC BLOOD PRESSURE: 59 MMHG | SYSTOLIC BLOOD PRESSURE: 120 MMHG

## 2021-03-06 DIAGNOSIS — R68.84 JAW PAIN: Primary | ICD-10-CM

## 2021-03-06 PROCEDURE — 99284 PR EMERGENCY DEPT VISIT,LEVEL IV: ICD-10-PCS | Mod: ,,, | Performed by: PHYSICIAN ASSISTANT

## 2021-03-06 PROCEDURE — 96374 THER/PROPH/DIAG INJ IV PUSH: CPT

## 2021-03-06 PROCEDURE — 93005 ELECTROCARDIOGRAM TRACING: CPT

## 2021-03-06 PROCEDURE — 99284 EMERGENCY DEPT VISIT MOD MDM: CPT | Mod: ,,, | Performed by: PHYSICIAN ASSISTANT

## 2021-03-06 PROCEDURE — 25000003 PHARM REV CODE 250: Performed by: PHYSICIAN ASSISTANT

## 2021-03-06 PROCEDURE — 99284 EMERGENCY DEPT VISIT MOD MDM: CPT | Mod: 25

## 2021-03-06 PROCEDURE — 93010 ELECTROCARDIOGRAM REPORT: CPT | Mod: ,,, | Performed by: INTERNAL MEDICINE

## 2021-03-06 PROCEDURE — 93010 EKG 12-LEAD: ICD-10-PCS | Mod: ,,, | Performed by: INTERNAL MEDICINE

## 2021-03-06 PROCEDURE — 63600175 PHARM REV CODE 636 W HCPCS: Performed by: PHYSICIAN ASSISTANT

## 2021-03-06 RX ORDER — KETOROLAC TROMETHAMINE 30 MG/ML
10 INJECTION, SOLUTION INTRAMUSCULAR; INTRAVENOUS
Status: COMPLETED | OUTPATIENT
Start: 2021-03-06 | End: 2021-03-06

## 2021-03-06 RX ORDER — NAPROXEN 500 MG/1
500 TABLET ORAL 2 TIMES DAILY WITH MEALS
Qty: 10 TABLET | Refills: 0 | Status: SHIPPED | OUTPATIENT
Start: 2021-03-06 | End: 2021-09-24

## 2021-03-06 RX ORDER — ACETAMINOPHEN 500 MG
1000 TABLET ORAL
Status: COMPLETED | OUTPATIENT
Start: 2021-03-06 | End: 2021-03-06

## 2021-03-06 RX ADMIN — ACETAMINOPHEN 1000 MG: 500 TABLET ORAL at 03:03

## 2021-03-06 RX ADMIN — KETOROLAC TROMETHAMINE 10 MG: 30 INJECTION, SOLUTION INTRAMUSCULAR; INTRAVENOUS at 03:03

## 2021-03-08 ENCOUNTER — PATIENT MESSAGE (OUTPATIENT)
Dept: INTERNAL MEDICINE | Facility: CLINIC | Age: 76
End: 2021-03-08

## 2021-03-08 RX ORDER — TRAMADOL HYDROCHLORIDE 50 MG/1
TABLET ORAL
Qty: 120 TABLET | Refills: 0 | Status: SHIPPED | OUTPATIENT
Start: 2021-03-08 | End: 2021-09-03 | Stop reason: SDUPTHER

## 2021-03-10 ENCOUNTER — OFFICE VISIT (OUTPATIENT)
Dept: INTERNAL MEDICINE | Facility: CLINIC | Age: 76
End: 2021-03-10
Payer: MEDICARE

## 2021-03-10 ENCOUNTER — LAB VISIT (OUTPATIENT)
Dept: LAB | Facility: HOSPITAL | Age: 76
End: 2021-03-10
Attending: NURSE PRACTITIONER
Payer: MEDICARE

## 2021-03-10 VITALS
WEIGHT: 169.06 LBS | OXYGEN SATURATION: 97 % | SYSTOLIC BLOOD PRESSURE: 106 MMHG | HEART RATE: 68 BPM | DIASTOLIC BLOOD PRESSURE: 60 MMHG | BODY MASS INDEX: 31.92 KG/M2 | HEIGHT: 61 IN

## 2021-03-10 DIAGNOSIS — R59.1 LAD (LYMPHADENOPATHY): ICD-10-CM

## 2021-03-10 DIAGNOSIS — R68.84 JAW PAIN: Primary | ICD-10-CM

## 2021-03-10 DIAGNOSIS — R68.84 JAW PAIN: ICD-10-CM

## 2021-03-10 LAB
ALBUMIN SERPL BCP-MCNC: 4.1 G/DL (ref 3.5–5.2)
ALP SERPL-CCNC: 58 U/L (ref 55–135)
ALT SERPL W/O P-5'-P-CCNC: 20 U/L (ref 10–44)
ANION GAP SERPL CALC-SCNC: 8 MMOL/L (ref 8–16)
AST SERPL-CCNC: 21 U/L (ref 10–40)
BASOPHILS # BLD AUTO: 0.04 K/UL (ref 0–0.2)
BASOPHILS NFR BLD: 0.6 % (ref 0–1.9)
BILIRUB SERPL-MCNC: 0.3 MG/DL (ref 0.1–1)
BUN SERPL-MCNC: 11 MG/DL (ref 8–23)
CALCIUM SERPL-MCNC: 8.9 MG/DL (ref 8.7–10.5)
CHLORIDE SERPL-SCNC: 106 MMOL/L (ref 95–110)
CO2 SERPL-SCNC: 28 MMOL/L (ref 23–29)
CREAT SERPL-MCNC: 0.8 MG/DL (ref 0.5–1.4)
DIFFERENTIAL METHOD: ABNORMAL
EOSINOPHIL # BLD AUTO: 0.2 K/UL (ref 0–0.5)
EOSINOPHIL NFR BLD: 3.7 % (ref 0–8)
ERYTHROCYTE [DISTWIDTH] IN BLOOD BY AUTOMATED COUNT: 12.5 % (ref 11.5–14.5)
EST. GFR  (AFRICAN AMERICAN): >60 ML/MIN/1.73 M^2
EST. GFR  (NON AFRICAN AMERICAN): >60 ML/MIN/1.73 M^2
GLUCOSE SERPL-MCNC: 88 MG/DL (ref 70–110)
HCT VFR BLD AUTO: 37.2 % (ref 37–48.5)
HGB BLD-MCNC: 12 G/DL (ref 12–16)
IMM GRANULOCYTES # BLD AUTO: 0.01 K/UL (ref 0–0.04)
IMM GRANULOCYTES NFR BLD AUTO: 0.2 % (ref 0–0.5)
LYMPHOCYTES # BLD AUTO: 2.4 K/UL (ref 1–4.8)
LYMPHOCYTES NFR BLD: 38.7 % (ref 18–48)
MCH RBC QN AUTO: 31.3 PG (ref 27–31)
MCHC RBC AUTO-ENTMCNC: 32.3 G/DL (ref 32–36)
MCV RBC AUTO: 97 FL (ref 82–98)
MONOCYTES # BLD AUTO: 0.5 K/UL (ref 0.3–1)
MONOCYTES NFR BLD: 8.3 % (ref 4–15)
NEUTROPHILS # BLD AUTO: 3 K/UL (ref 1.8–7.7)
NEUTROPHILS NFR BLD: 48.5 % (ref 38–73)
NRBC BLD-RTO: 0 /100 WBC
PLATELET # BLD AUTO: 181 K/UL (ref 150–350)
PMV BLD AUTO: 11.5 FL (ref 9.2–12.9)
POTASSIUM SERPL-SCNC: 4.3 MMOL/L (ref 3.5–5.1)
PROT SERPL-MCNC: 7.1 G/DL (ref 6–8.4)
RBC # BLD AUTO: 3.84 M/UL (ref 4–5.4)
SODIUM SERPL-SCNC: 142 MMOL/L (ref 136–145)
WBC # BLD AUTO: 6.18 K/UL (ref 3.9–12.7)

## 2021-03-10 PROCEDURE — 3072F PR LOW RISK FOR RETINOPATHY: ICD-10-PCS | Mod: S$GLB,,, | Performed by: NURSE PRACTITIONER

## 2021-03-10 PROCEDURE — 3288F PR FALLS RISK ASSESSMENT DOCUMENTED: ICD-10-PCS | Mod: CPTII,S$GLB,, | Performed by: NURSE PRACTITIONER

## 2021-03-10 PROCEDURE — 1126F PR PAIN SEVERITY QUANTIFIED, NO PAIN PRESENT: ICD-10-PCS | Mod: S$GLB,,, | Performed by: NURSE PRACTITIONER

## 2021-03-10 PROCEDURE — 1157F PR ADVANCE CARE PLAN OR EQUIV PRESENT IN MEDICAL RECORD: ICD-10-PCS | Mod: S$GLB,,, | Performed by: NURSE PRACTITIONER

## 2021-03-10 PROCEDURE — 1126F AMNT PAIN NOTED NONE PRSNT: CPT | Mod: S$GLB,,, | Performed by: NURSE PRACTITIONER

## 2021-03-10 PROCEDURE — 1101F PR PT FALLS ASSESS DOC 0-1 FALLS W/OUT INJ PAST YR: ICD-10-PCS | Mod: CPTII,S$GLB,, | Performed by: NURSE PRACTITIONER

## 2021-03-10 PROCEDURE — 99999 PR PBB SHADOW E&M-EST. PATIENT-LVL V: ICD-10-PCS | Mod: PBBFAC,,, | Performed by: NURSE PRACTITIONER

## 2021-03-10 PROCEDURE — 3078F PR MOST RECENT DIASTOLIC BLOOD PRESSURE < 80 MM HG: ICD-10-PCS | Mod: CPTII,S$GLB,, | Performed by: NURSE PRACTITIONER

## 2021-03-10 PROCEDURE — 1157F ADVNC CARE PLAN IN RCRD: CPT | Mod: S$GLB,,, | Performed by: NURSE PRACTITIONER

## 2021-03-10 PROCEDURE — 99214 PR OFFICE/OUTPT VISIT, EST, LEVL IV, 30-39 MIN: ICD-10-PCS | Mod: S$GLB,,, | Performed by: NURSE PRACTITIONER

## 2021-03-10 PROCEDURE — 99999 PR PBB SHADOW E&M-EST. PATIENT-LVL V: CPT | Mod: PBBFAC,,, | Performed by: NURSE PRACTITIONER

## 2021-03-10 PROCEDURE — 1101F PT FALLS ASSESS-DOCD LE1/YR: CPT | Mod: CPTII,S$GLB,, | Performed by: NURSE PRACTITIONER

## 2021-03-10 PROCEDURE — 85025 COMPLETE CBC W/AUTO DIFF WBC: CPT | Performed by: NURSE PRACTITIONER

## 2021-03-10 PROCEDURE — 99214 OFFICE O/P EST MOD 30 MIN: CPT | Mod: S$GLB,,, | Performed by: NURSE PRACTITIONER

## 2021-03-10 PROCEDURE — 3288F FALL RISK ASSESSMENT DOCD: CPT | Mod: CPTII,S$GLB,, | Performed by: NURSE PRACTITIONER

## 2021-03-10 PROCEDURE — 3074F PR MOST RECENT SYSTOLIC BLOOD PRESSURE < 130 MM HG: ICD-10-PCS | Mod: CPTII,S$GLB,, | Performed by: NURSE PRACTITIONER

## 2021-03-10 PROCEDURE — 1159F PR MEDICATION LIST DOCUMENTED IN MEDICAL RECORD: ICD-10-PCS | Mod: S$GLB,,, | Performed by: NURSE PRACTITIONER

## 2021-03-10 PROCEDURE — 36415 COLL VENOUS BLD VENIPUNCTURE: CPT | Performed by: NURSE PRACTITIONER

## 2021-03-10 PROCEDURE — 3074F SYST BP LT 130 MM HG: CPT | Mod: CPTII,S$GLB,, | Performed by: NURSE PRACTITIONER

## 2021-03-10 PROCEDURE — 80053 COMPREHEN METABOLIC PANEL: CPT | Performed by: NURSE PRACTITIONER

## 2021-03-10 PROCEDURE — 1159F MED LIST DOCD IN RCRD: CPT | Mod: S$GLB,,, | Performed by: NURSE PRACTITIONER

## 2021-03-10 PROCEDURE — 3078F DIAST BP <80 MM HG: CPT | Mod: CPTII,S$GLB,, | Performed by: NURSE PRACTITIONER

## 2021-03-10 PROCEDURE — 3072F LOW RISK FOR RETINOPATHY: CPT | Mod: S$GLB,,, | Performed by: NURSE PRACTITIONER

## 2021-03-11 ENCOUNTER — PATIENT MESSAGE (OUTPATIENT)
Dept: INTERNAL MEDICINE | Facility: CLINIC | Age: 76
End: 2021-03-11

## 2021-03-12 ENCOUNTER — TELEPHONE (OUTPATIENT)
Dept: INTERNAL MEDICINE | Facility: CLINIC | Age: 76
End: 2021-03-12

## 2021-03-15 ENCOUNTER — PATIENT MESSAGE (OUTPATIENT)
Dept: INTERNAL MEDICINE | Facility: CLINIC | Age: 76
End: 2021-03-15

## 2021-03-15 RX ORDER — CLONAZEPAM 0.5 MG/1
TABLET ORAL
Qty: 30 TABLET | Refills: 3 | Status: SHIPPED | OUTPATIENT
Start: 2021-03-15 | End: 2021-06-08

## 2021-03-16 ENCOUNTER — CLINICAL SUPPORT (OUTPATIENT)
Dept: INTERNAL MEDICINE | Facility: CLINIC | Age: 76
End: 2021-03-16
Payer: MEDICARE

## 2021-03-16 ENCOUNTER — PATIENT MESSAGE (OUTPATIENT)
Dept: INTERNAL MEDICINE | Facility: CLINIC | Age: 76
End: 2021-03-16

## 2021-03-16 ENCOUNTER — LAB VISIT (OUTPATIENT)
Dept: LAB | Facility: HOSPITAL | Age: 76
End: 2021-03-16
Attending: NURSE PRACTITIONER
Payer: MEDICARE

## 2021-03-16 VITALS — WEIGHT: 162.5 LBS | BODY MASS INDEX: 30.7 KG/M2

## 2021-03-16 DIAGNOSIS — R68.84 JAW PAIN: ICD-10-CM

## 2021-03-16 LAB
CRP SERPL-MCNC: 9.2 MG/L (ref 0–8.2)
ERYTHROCYTE [SEDIMENTATION RATE] IN BLOOD BY WESTERGREN METHOD: 7 MM/HR (ref 0–36)

## 2021-03-16 PROCEDURE — 86140 C-REACTIVE PROTEIN: CPT | Performed by: NURSE PRACTITIONER

## 2021-03-16 PROCEDURE — 85652 RBC SED RATE AUTOMATED: CPT | Performed by: NURSE PRACTITIONER

## 2021-03-16 PROCEDURE — 36415 COLL VENOUS BLD VENIPUNCTURE: CPT | Performed by: NURSE PRACTITIONER

## 2021-03-16 RX ORDER — CIPROFLOXACIN 250 MG/1
250 TABLET, FILM COATED ORAL EVERY 12 HOURS
Qty: 14 TABLET | Refills: 0 | Status: SHIPPED | OUTPATIENT
Start: 2021-03-16 | End: 2021-03-24

## 2021-03-17 ENCOUNTER — TELEPHONE (OUTPATIENT)
Dept: INTERNAL MEDICINE | Facility: CLINIC | Age: 76
End: 2021-03-17

## 2021-03-17 ENCOUNTER — PATIENT MESSAGE (OUTPATIENT)
Dept: INTERNAL MEDICINE | Facility: CLINIC | Age: 76
End: 2021-03-17

## 2021-03-18 ENCOUNTER — PATIENT MESSAGE (OUTPATIENT)
Dept: RESEARCH | Facility: HOSPITAL | Age: 76
End: 2021-03-18

## 2021-03-19 ENCOUNTER — CLINICAL SUPPORT (OUTPATIENT)
Dept: AUDIOLOGY | Facility: CLINIC | Age: 76
End: 2021-03-19
Payer: MEDICARE

## 2021-03-19 ENCOUNTER — OFFICE VISIT (OUTPATIENT)
Dept: OTOLARYNGOLOGY | Facility: CLINIC | Age: 76
End: 2021-03-19
Payer: MEDICARE

## 2021-03-19 DIAGNOSIS — Z97.4 WEARS HEARING AID IN BOTH EARS: ICD-10-CM

## 2021-03-19 DIAGNOSIS — H90.3 SENSORINEURAL HEARING LOSS OF BOTH EARS: Primary | ICD-10-CM

## 2021-03-19 DIAGNOSIS — H90.3 SENSORINEURAL HEARING LOSS (SNHL) OF BOTH EARS: Primary | ICD-10-CM

## 2021-03-19 PROCEDURE — 1157F ADVNC CARE PLAN IN RCRD: CPT | Mod: S$GLB,,, | Performed by: NURSE PRACTITIONER

## 2021-03-19 PROCEDURE — 99213 OFFICE O/P EST LOW 20 MIN: CPT | Mod: S$GLB,,, | Performed by: NURSE PRACTITIONER

## 2021-03-19 PROCEDURE — 92557 COMPREHENSIVE HEARING TEST: CPT | Mod: S$GLB,,, | Performed by: AUDIOLOGIST

## 2021-03-19 PROCEDURE — 3072F LOW RISK FOR RETINOPATHY: CPT | Mod: S$GLB,,, | Performed by: NURSE PRACTITIONER

## 2021-03-19 PROCEDURE — 99999 PR PBB SHADOW E&M-EST. PATIENT-LVL III: ICD-10-PCS | Mod: PBBFAC,,, | Performed by: NURSE PRACTITIONER

## 2021-03-19 PROCEDURE — 1157F PR ADVANCE CARE PLAN OR EQUIV PRESENT IN MEDICAL RECORD: ICD-10-PCS | Mod: S$GLB,,, | Performed by: NURSE PRACTITIONER

## 2021-03-19 PROCEDURE — 92557 PR COMPREHENSIVE HEARING TEST: ICD-10-PCS | Mod: S$GLB,,, | Performed by: AUDIOLOGIST

## 2021-03-19 PROCEDURE — 92567 PR TYMPA2METRY: ICD-10-PCS | Mod: S$GLB,,, | Performed by: AUDIOLOGIST

## 2021-03-19 PROCEDURE — 3072F PR LOW RISK FOR RETINOPATHY: ICD-10-PCS | Mod: S$GLB,,, | Performed by: NURSE PRACTITIONER

## 2021-03-19 PROCEDURE — 1159F MED LIST DOCD IN RCRD: CPT | Mod: S$GLB,,, | Performed by: NURSE PRACTITIONER

## 2021-03-19 PROCEDURE — 99999 PR PBB SHADOW E&M-EST. PATIENT-LVL III: CPT | Mod: PBBFAC,,, | Performed by: NURSE PRACTITIONER

## 2021-03-19 PROCEDURE — 99213 PR OFFICE/OUTPT VISIT, EST, LEVL III, 20-29 MIN: ICD-10-PCS | Mod: S$GLB,,, | Performed by: NURSE PRACTITIONER

## 2021-03-19 PROCEDURE — 92567 TYMPANOMETRY: CPT | Mod: S$GLB,,, | Performed by: AUDIOLOGIST

## 2021-03-19 PROCEDURE — 1159F PR MEDICATION LIST DOCUMENTED IN MEDICAL RECORD: ICD-10-PCS | Mod: S$GLB,,, | Performed by: NURSE PRACTITIONER

## 2021-03-23 ENCOUNTER — PATIENT MESSAGE (OUTPATIENT)
Dept: AUDIOLOGY | Facility: CLINIC | Age: 76
End: 2021-03-23

## 2021-03-25 NOTE — PROGRESS NOTES
"  Subjective:       Patient ID:  Paris Burris is a 74 y.o. female who presents for   Chief Complaint   Patient presents with    Skin Discoloration     rihgt hand      Hx:10/17  "74 year old lady was dx'd with cat scratch disease and given 5 days of bactrim.  Came to clinic then with worsening scratches, so I gave her more bactrim.  There were never any enlarged lymph nodes.  Now she is back with an itchy palmar rash on both hands, right worse than left.  Right palm is beginning to peal"    No hx of fever, oral lesions, rash on trunk, or enlarged nodes.  Does work in yard and does dishes without gloves. Not using any topicals on hands now.     Skin Discoloration  - Initial  Affected locations: right hand  Signs / symptoms: irritated  Aggravated by: nothing  Relieving factors/Treatments tried: nothing        Review of Systems   Constitutional: Negative for fever, chills, weight loss, weight gain, fatigue, night sweats and malaise.   Skin: Positive for rash and wears hat. Negative for sun sensitivity and daily sunscreen use.   Hematologic/Lymphatic: Does not bruise/bleed easily.        Objective:    Physical Exam   Constitutional: She appears well-developed and well-nourished.   Neurological: She is alert and oriented to person, place, and time.   Psychiatric: She has a normal mood and affect.   Skin:   Areas Examined (abnormalities noted in diagram):   Head / Face Inspection Performed  Neck Inspection Performed  Nails and Digits Inspection Performed  Gland Inspection Performed                  Diagram Legend     Erythematous scaling macule/papule c/w actinic keratosis       Vascular papule c/w angioma      Pigmented verrucoid papule/plaque c/w seborrheic keratosis      Yellow umbilicated papule c/w sebaceous hyperplasia      Irregularly shaped tan macule c/w lentigo     1-2 mm smooth white papules consistent with Milia      Movable subcutaneous cyst with punctum c/w epidermal inclusion cyst      Subcutaneous " Patient Education     Closed Toe Fracture  YourÂ toe is broken (fractured). This causes local pain, swelling, and sometimes bruising. This injury usually takes about 4 to 6 weeks to heal, but can sometimes take longer. Toe injuries are often treated by taping the injured toe to the next one (buddy taping). This protects the injured toe and holds it in position. Â   If the toenail has been severely injured, it may fall off in 1 to 2 weeks. It takes up to 12 months for a new toenail to grow back. Home care  Follow these guidelines when caring for yourself at home:  Â· You may be given a cast shoe to wear to keep your toe from moving. If not, you can use a sandal or any shoe that doesnât put pressure on the injured toe until the swelling and pain go away. If using a sandal, be careful not to strike your foot against anything. Another injury could make the fracture worse. If you were given crutches, donât put full weight on the injured foot until you can do so without pain, or as directed by your healthcare provider. Â· Keep your foot elevated to reduce pain and swelling. When sleeping, put a pillow under the injured leg. When sitting, support the injured leg so it is above your waist. This is very important during the first 2 days (48 hours). Â· Put an ice pack on the injured area. Do this for 20 minutes every 1 to 2 hours the first day for pain relief. You can make an ice pack by wrapping a plastic bag of ice cubes in a thin towel. As the ice melts, be careful that any cloth or paper tape doesnât get wet. Continue using the ice pack 3 to 4 times a day for the next 2 days. Then use the ice pack as needed to ease pain and swelling. Â· If buddy tape was used and it becomes wet or dirty, change it. You may replace it with paper, plastic, or cloth tape. Cloth tape and paper tapes must be kept dry. Â· You may use acetaminophen or ibuprofen to control pain, unless another pain medicine was prescribed.  If you have chronic liver or kidney disease, talk with your healthcare provider before using these medicines. Also talk with your provider if youâve had a stomach ulcer or gastrointestinal bleeding. Â· You may return to sports or physical education activities after 4 weeks when you can run without pain, or as directed by your healthcare provider. Follow-up care  Follow up with your healthcare provider in 1 week, or as advised. This is to make sure the bone is healing the way it should. X-rays may be taken. You will be told of any new findings that may affect your care. When to seek medical advice  Call your healthcare provider right away if any of these occur:  Â· Pain or swelling gets worse  Â· The cast/splint cracks  Â· The cast and padding get wet and stays wet more than 24 hours  Â· Bad odor from the cast/splint or wound fluid stains the cast  Â· Tightness or pressure under the cast/splint gets worse  Â· Toe becomes cold, blue, numb, or tingly  Â· You canât move the toe  Â· Signs of infection: fever, redness, warmth, swelling, or drainage from the woundÂ or cast  Â· Fever ofÂ 100.4ÂºF (38ÂºC)Â or higher, or as directed by your healthcare provider  Date Last Reviewed: 2/1/2017  Â© 5598-5647 [employer] [place]. 2016 [facility], [facility] [address] All rights reserved. This information is not intended as a substitute for professional medical care. Always follow your healthcare professional's instructions. movable cyst c/w pilar cyst      Firm pink to brown papule c/w dermatofibroma      Pedunculated fleshy papule(s) c/w skin tag(s)      Evenly pigmented macule c/w junctional nevus     Mildly variegated pigmented, slightly irregular-bordered macule c/w mildly atypical nevus      Flesh colored to evenly pigmented papule c/w intradermal nevus       Pink pearly papule/plaque c/w basal cell carcinoma      Erythematous hyperkeratotic cursted plaque c/w SCC      Surgical scar with no sign of skin cancer recurrence      Open and closed comedones      Inflammatory papules and pustules      Verrucoid papule consistent consistent with wart     Erythematous eczematous patches and plaques     Dystrophic onycholytic nail with subungual debris c/w onychomycosis     Umbilicated papule    Erythematous-base heme-crusted tan verrucoid plaque consistent with inflamed seborrheic keratosis     Erythematous Silvery Scaling Plaque c/w Psoriasis     See annotation      Assessment / Plan:        Dermatitis, probable irritant  Possible resolving cat scratch but doubt since no lymphadenopathy  No oral or plantar lesions for hand foot and mouth    Use lotion after hand washing  Gloves for dishwashing  Use eucrisa ug until clear  Call if recurrent             Follow up in about 1 year (around 10/21/2020).

## 2021-03-26 ENCOUNTER — CLINICAL SUPPORT (OUTPATIENT)
Dept: CARDIOLOGY | Facility: HOSPITAL | Age: 76
End: 2021-03-26
Payer: MEDICARE

## 2021-03-26 ENCOUNTER — PATIENT MESSAGE (OUTPATIENT)
Dept: RESEARCH | Facility: HOSPITAL | Age: 76
End: 2021-03-26

## 2021-03-26 DIAGNOSIS — Z95.0 PRESENCE OF CARDIAC PACEMAKER: ICD-10-CM

## 2021-03-26 PROCEDURE — 93294 CARDIAC DEVICE CHECK - REMOTE: ICD-10-PCS | Mod: ,,, | Performed by: INTERNAL MEDICINE

## 2021-03-26 PROCEDURE — 93294 REM INTERROG EVL PM/LDLS PM: CPT | Mod: ,,, | Performed by: INTERNAL MEDICINE

## 2021-03-26 PROCEDURE — 93296 REM INTERROG EVL PM/IDS: CPT | Performed by: INTERNAL MEDICINE

## 2021-03-30 ENCOUNTER — TELEPHONE (OUTPATIENT)
Dept: OPHTHALMOLOGY | Facility: CLINIC | Age: 76
End: 2021-03-30

## 2021-03-30 ENCOUNTER — NURSE TRIAGE (OUTPATIENT)
Dept: ADMINISTRATIVE | Facility: CLINIC | Age: 76
End: 2021-03-30

## 2021-04-09 ENCOUNTER — PES CALL (OUTPATIENT)
Dept: ADMINISTRATIVE | Facility: CLINIC | Age: 76
End: 2021-04-09

## 2021-04-09 ENCOUNTER — OFFICE VISIT (OUTPATIENT)
Dept: OTOLARYNGOLOGY | Facility: CLINIC | Age: 76
End: 2021-04-09
Payer: MEDICARE

## 2021-04-09 ENCOUNTER — CLINICAL SUPPORT (OUTPATIENT)
Dept: AUDIOLOGY | Facility: CLINIC | Age: 76
End: 2021-04-09
Payer: MEDICARE

## 2021-04-09 ENCOUNTER — PATIENT MESSAGE (OUTPATIENT)
Dept: OPHTHALMOLOGY | Facility: CLINIC | Age: 76
End: 2021-04-09

## 2021-04-09 DIAGNOSIS — H90.3 SENSORINEURAL HEARING LOSS (SNHL) OF BOTH EARS: ICD-10-CM

## 2021-04-09 DIAGNOSIS — Z01.818 PRE-OP TESTING: ICD-10-CM

## 2021-04-09 DIAGNOSIS — H90.3 SENSORINEURAL HEARING LOSS, BILATERAL: Primary | ICD-10-CM

## 2021-04-09 PROCEDURE — 99214 OFFICE O/P EST MOD 30 MIN: CPT | Mod: CS,S$GLB,, | Performed by: OTOLARYNGOLOGY

## 2021-04-09 PROCEDURE — 1157F PR ADVANCE CARE PLAN OR EQUIV PRESENT IN MEDICAL RECORD: ICD-10-PCS | Mod: S$GLB,,, | Performed by: OTOLARYNGOLOGY

## 2021-04-09 PROCEDURE — 99999 PR PBB SHADOW E&M-EST. PATIENT-LVL III: ICD-10-PCS | Mod: PBBFAC,,, | Performed by: OTOLARYNGOLOGY

## 2021-04-09 PROCEDURE — 1157F ADVNC CARE PLAN IN RCRD: CPT | Mod: S$GLB,,, | Performed by: OTOLARYNGOLOGY

## 2021-04-09 PROCEDURE — 3072F LOW RISK FOR RETINOPATHY: CPT | Mod: S$GLB,,, | Performed by: OTOLARYNGOLOGY

## 2021-04-09 PROCEDURE — 1159F PR MEDICATION LIST DOCUMENTED IN MEDICAL RECORD: ICD-10-PCS | Mod: S$GLB,,, | Performed by: OTOLARYNGOLOGY

## 2021-04-09 PROCEDURE — 99214 PR OFFICE/OUTPT VISIT, EST, LEVL IV, 30-39 MIN: ICD-10-PCS | Mod: CS,S$GLB,, | Performed by: OTOLARYNGOLOGY

## 2021-04-09 PROCEDURE — 99999 PR PBB SHADOW E&M-EST. PATIENT-LVL III: CPT | Mod: PBBFAC,,, | Performed by: OTOLARYNGOLOGY

## 2021-04-09 PROCEDURE — 92626 PR EVAL, AUDITORY FUNCTION, SURG IMPLANT DEVICE, 1ST HR: ICD-10-PCS | Mod: S$GLB,,, | Performed by: AUDIOLOGIST

## 2021-04-09 PROCEDURE — 1159F MED LIST DOCD IN RCRD: CPT | Mod: S$GLB,,, | Performed by: OTOLARYNGOLOGY

## 2021-04-09 PROCEDURE — 3072F PR LOW RISK FOR RETINOPATHY: ICD-10-PCS | Mod: S$GLB,,, | Performed by: OTOLARYNGOLOGY

## 2021-04-09 PROCEDURE — 92626 EVAL AUD FUNCJ 1ST HOUR: CPT | Mod: S$GLB,,, | Performed by: AUDIOLOGIST

## 2021-04-12 ENCOUNTER — PATIENT MESSAGE (OUTPATIENT)
Dept: AUDIOLOGY | Facility: CLINIC | Age: 76
End: 2021-04-12

## 2021-04-13 ENCOUNTER — PATIENT MESSAGE (OUTPATIENT)
Dept: INTERNAL MEDICINE | Facility: CLINIC | Age: 76
End: 2021-04-13

## 2021-04-13 ENCOUNTER — CLINICAL SUPPORT (OUTPATIENT)
Dept: INTERNAL MEDICINE | Facility: CLINIC | Age: 76
End: 2021-04-13
Payer: MEDICARE

## 2021-04-13 VITALS — WEIGHT: 161.81 LBS | BODY MASS INDEX: 30.58 KG/M2

## 2021-04-13 DIAGNOSIS — E66.9 DIABETES MELLITUS TYPE 2 IN OBESE: ICD-10-CM

## 2021-04-13 DIAGNOSIS — E11.69 DIABETES MELLITUS TYPE 2 IN OBESE: ICD-10-CM

## 2021-04-14 ENCOUNTER — TELEPHONE (OUTPATIENT)
Dept: OPHTHALMOLOGY | Facility: CLINIC | Age: 76
End: 2021-04-14

## 2021-04-14 NOTE — PROGRESS NOTES
[facility] Established Patient Visit    Reason for Visit:    Chief Complaint   Patient presents with    Follow-up     test results       PCP: Mandi Huynh    HPI:  Parsi Burris is a 72 y.o. female here for follow-up of iron deficiency anemia.  I last saw her in January for the same.  She is here to discuss her VCE results.  She denies blood in the stools.  Her VCE showed congested small bowel mucosa of unclear significance.  Her stomach is okay - she denies abdominal pain, nausea, or vomiting, and she is eating well.  Still taking iron TID.  She has some constipation but finds that a squatty potty helps.  She is also using a tea with senna 2-3 times per week.            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    PSHX:  has a past surgical history that includes lipoma removal (1999); Breast lumpectomy; Colonoscopy (N/A, 12/2/2016); Tonsillectomy; Adenoidectomy; and Eye surgery (Bilateral).    The patient's social and family histories were reviewed by me and updated in the appropriate section of the electronic medical record.    Review of patient's allergies indicates:   Allergen Reactions    Topamax [topiramate] Other (See Comments)     hallucinations       Prior to Admission medications    Medication Sig Start Date End Date Taking? Authorizing Provider   alpha lipoic acid 300 mg Cap Take 300 mg by mouth 2 (two) times daily.    Yes Historical Provider, MD    ascorbic acid (VITAMIN C) 500 MG tablet Take 1,000 mg by mouth once daily.    Yes Historical Provider, MD   b complex vitamins capsule Take 1 capsule by mouth once daily.   Yes Historical Provider, MD   blood sugar diagnostic (ACCU-CHEK SMARTVIEW TEST STRIP) Strp Test once daily. 10/19/17  Yes Mandi Huynh MD   blood-glucose meter Misc 1 Device by Misc.(Non-Drug; Combo Route) route 2 (two) times daily. 8/22/17 8/22/18 Yes Mandi Huynh MD   CITICOLINE SODIUM (CITICOLINE ORAL) Take 1 tablet by mouth once daily at 6am.   Yes Historical Provider, MD   co-enzyme Q-10 30 mg capsule Take 100 mg by mouth 3 (three) times daily.   Yes Historical Provider, MD   ferrous sulfate 325 mg (65 mg iron) Tab tablet Start 1 tablet daily x 3 days, then twice daily x 3 days and then three times daily onwards. 12/22/17  Yes Mandi Huynh MD   gabapentin (NEURONTIN) 300 MG capsule Take 300 mg by mouth 2 (two) times daily.   Yes Historical Provider, MD   lancets Misc 1 lancet by Misc.(Non-Drug; Combo Route) route 2 (two) times daily. 7/13/16  Yes Mandi Huynh MD   LORazepam (ATIVAN) 0.5 MG tablet Take 1 tablet (0.5 mg total) by mouth daily as needed. 2/5/18  Yes Dwaine Sweeney MD   omega-3 fatty acids-fish oil (FISH OIL) 340-1,000 mg Cap Take 1 capsule by mouth once daily.   Yes Historical Provider, MD   traMADol (ULTRAM) 50 mg tablet TAKE 2 TABLETS (100 MG TOTAL) BY MOUTH ONCE DAILY AT 6AM. 1/8/18  Yes Mandi Huynh MD   tretinoin (RETIN-A) 0.05 % cream 1 application every evening. At bedtime 7/22/16  Yes Historical Provider, MD   triamcinolone acetonide 0.1% (KENALOG) 0.1 % Lotn Apply topically to affected area twice a day. 11/17/17  Yes Mandi Huynh MD   TURMERIC (CURCUMIN MISC) Take 500 mg by mouth 2 (two) times daily.    Yes Historical Provider, MD   venlafaxine (EFFEXOR) 75 MG tablet Take 1 tablet (75 mg total) by mouth 2 (two) times daily.  Patient taking differently: Take 75 mg by mouth once daily.  11/14/17 11/14/18 Yes Mandi Huynh MD   vitamin D 1000  "units Tab Take 500 Units by mouth once daily. With K2 50 mch   Yes Historical Provider, MD   diclofenac sodium 1 % Gel APPLY TO AFFECTED AREA DAILY 1/26/18   Historical Provider, MD   fluocinonide 0.05% (LIDEX) 0.05 % cream  1/29/18   Historical Provider, MD   metFORMIN (GLUCOPHAGE-XR) 500 MG 24 hr tablet  11/7/17   Historical Provider, MD   rosuvastatin (CRESTOR) 20 MG tablet Take 1 tablet (20 mg total) by mouth once daily.  Patient taking differently: Take 10 mg by mouth once daily.  2/6/17 2/6/18  Mandi Huynh MD         Objective Findings:  Vital Signs:  BP (!) 144/72   Pulse 97   Ht 5' 1" (1.549 m)   Wt 75.4 kg (166 lb 3.6 oz)   BMI 31.41 kg/m²  Body mass index is 31.41 kg/m².    Physical Exam:  General Appearance:  Well appearing in no acute distress, appears stated age  Head:  Normocephalic, atraumatic  Eyes:  No scleral icterus or pallor, EOMI      Labs:  Lab Results   Component Value Date    WBC 7.06 12/22/2017    HGB 9.5 (L) 12/22/2017    HCT 30.6 (L) 12/22/2017    MCV 87 12/22/2017    RDW 14.6 (H) 12/22/2017     12/22/2017    GRAN 3.7 12/22/2017    GRAN 52.5 12/22/2017    LYMPH 2.1 12/22/2017    LYMPH 29.9 12/22/2017    MONO 0.6 12/22/2017    MONO 9.1 12/22/2017    EOS 0.6 (H) 12/22/2017    BASO 0.04 12/22/2017     Lab Results   Component Value Date     11/24/2017    K 4.7 11/24/2017     11/24/2017    CO2 30 (H) 11/24/2017     11/24/2017    BUN 18 11/24/2017    CREATININE 1.0 11/24/2017    CALCIUM 9.2 11/24/2017    PROT 7.1 11/24/2017    ALBUMIN 3.7 11/24/2017    BILITOT 0.2 11/24/2017    ALKPHOS 73 11/24/2017    AST 19 11/24/2017    ALT 16 11/24/2017                 Assessment:  Paris Burris is a 72 y.o. female here with iron deficiency anemia due to chronic GI blood loss.  No overt signs of GI bleeding.  Her last EGD and colonoscopy were in December 2016 and noted a large hiatal hernia and healed gastric ulcers.  The ulcers had been in her gastric body and may have been " Jordi's ulcers associated with the hiatal hernia.  VCE with only congested mucosa of unclear significance.  Tolerating oral iron with some constipation resolved with tea/senna and squatty potty.      Recommendations:  I will get a CBC and schedule her for an antegrade SBE to assess her small bowel in more detail.    Follow-up pending endoscopy.    Order summary:  Orders Placed This Encounter   Procedures    CBC auto differential         Dustin Patterson MD           This document is complete and the patient is ready for discharge.

## 2021-04-15 ENCOUNTER — PATIENT MESSAGE (OUTPATIENT)
Dept: OPHTHALMOLOGY | Facility: CLINIC | Age: 76
End: 2021-04-15

## 2021-04-15 ENCOUNTER — PATIENT MESSAGE (OUTPATIENT)
Dept: INTERNAL MEDICINE | Facility: CLINIC | Age: 76
End: 2021-04-15

## 2021-04-15 DIAGNOSIS — R79.82 ELEVATED C-REACTIVE PROTEIN (CRP): Primary | ICD-10-CM

## 2021-04-16 ENCOUNTER — HOSPITAL ENCOUNTER (OUTPATIENT)
Dept: RADIOLOGY | Facility: HOSPITAL | Age: 76
Discharge: HOME | End: 2021-04-16
Attending: OTOLARYNGOLOGY
Payer: MEDICARE

## 2021-04-16 ENCOUNTER — LAB VISIT (OUTPATIENT)
Dept: INTERNAL MEDICINE | Facility: CLINIC | Age: 76
End: 2021-04-16
Payer: MEDICARE

## 2021-04-16 ENCOUNTER — PATIENT MESSAGE (OUTPATIENT)
Dept: OPHTHALMOLOGY | Facility: CLINIC | Age: 76
End: 2021-04-16

## 2021-04-16 DIAGNOSIS — H90.3 SENSORINEURAL HEARING LOSS (SNHL) OF BOTH EARS: ICD-10-CM

## 2021-04-16 DIAGNOSIS — Z13.9 SCREENING FOR UNSPECIFIED CONDITION: ICD-10-CM

## 2021-04-16 PROCEDURE — 70480 CT ORBIT/EAR/FOSSA W/O DYE: CPT | Mod: 26,,, | Performed by: RADIOLOGY

## 2021-04-16 PROCEDURE — 70480 CT TEMPORAL BONE WITHOUT CONTRAST: ICD-10-PCS | Mod: 26,,, | Performed by: RADIOLOGY

## 2021-04-16 PROCEDURE — U0003 INFECTIOUS AGENT DETECTION BY NUCLEIC ACID (DNA OR RNA); SEVERE ACUTE RESPIRATORY SYNDROME CORONAVIRUS 2 (SARS-COV-2) (CORONAVIRUS DISEASE [COVID-19]), AMPLIFIED PROBE TECHNIQUE, MAKING USE OF HIGH THROUGHPUT TECHNOLOGIES AS DESCRIBED BY CMS-2020-01-R: HCPCS | Performed by: OPHTHALMOLOGY

## 2021-04-16 PROCEDURE — 70480 CT ORBIT/EAR/FOSSA W/O DYE: CPT | Mod: TC

## 2021-04-16 PROCEDURE — U0005 INFEC AGEN DETEC AMPLI PROBE: HCPCS | Performed by: OPHTHALMOLOGY

## 2021-04-17 LAB — SARS-COV-2 RNA RESP QL NAA+PROBE: NOT DETECTED

## 2021-04-19 ENCOUNTER — PROCEDURE VISIT (OUTPATIENT)
Dept: OPHTHALMOLOGY | Facility: CLINIC | Age: 76
End: 2021-04-19
Payer: MEDICARE

## 2021-04-19 VITALS — HEART RATE: 73 BPM | SYSTOLIC BLOOD PRESSURE: 124 MMHG | DIASTOLIC BLOOD PRESSURE: 66 MMHG

## 2021-04-19 DIAGNOSIS — H02.831 DERMATOCHALASIS OF BOTH UPPER EYELIDS: ICD-10-CM

## 2021-04-19 DIAGNOSIS — H02.834 DERMATOCHALASIS OF BOTH UPPER EYELIDS: ICD-10-CM

## 2021-04-19 DIAGNOSIS — H57.813 BROW PTOSIS, BILATERAL: Primary | ICD-10-CM

## 2021-04-19 PROCEDURE — 67900 PR REPAIR BROW PTOSIS: ICD-10-PCS | Mod: 50,S$GLB,, | Performed by: OPHTHALMOLOGY

## 2021-04-19 PROCEDURE — 99499 UNLISTED E&M SERVICE: CPT | Mod: S$GLB,,, | Performed by: OPHTHALMOLOGY

## 2021-04-19 PROCEDURE — 99499 NO LOS: ICD-10-PCS | Mod: S$GLB,,, | Performed by: OPHTHALMOLOGY

## 2021-04-19 PROCEDURE — 67900 REPAIR BROW DEFECT: CPT | Mod: 50,S$GLB,, | Performed by: OPHTHALMOLOGY

## 2021-04-19 RX ORDER — HYDROCODONE BITARTRATE AND ACETAMINOPHEN 5; 325 MG/1; MG/1
1 TABLET ORAL EVERY 6 HOURS PRN
Qty: 16 TABLET | Refills: 0 | Status: ON HOLD | OUTPATIENT
Start: 2021-04-19 | End: 2021-04-26 | Stop reason: SDUPTHER

## 2021-04-23 ENCOUNTER — TELEPHONE (OUTPATIENT)
Dept: OTOLARYNGOLOGY | Facility: CLINIC | Age: 76
End: 2021-04-23

## 2021-04-23 ENCOUNTER — PATIENT MESSAGE (OUTPATIENT)
Dept: SURGERY | Facility: HOSPITAL | Age: 76
End: 2021-04-23

## 2021-04-23 ENCOUNTER — DOCUMENTATION ONLY (OUTPATIENT)
Dept: CARDIOLOGY | Facility: HOSPITAL | Age: 76
End: 2021-04-23

## 2021-04-23 ENCOUNTER — LAB VISIT (OUTPATIENT)
Dept: INTERNAL MEDICINE | Facility: CLINIC | Age: 76
End: 2021-04-23
Payer: MEDICARE

## 2021-04-23 DIAGNOSIS — Z01.818 PRE-OP TESTING: ICD-10-CM

## 2021-04-23 LAB — SARS-COV-2 RNA RESP QL NAA+PROBE: NOT DETECTED

## 2021-04-23 PROCEDURE — U0005 INFEC AGEN DETEC AMPLI PROBE: HCPCS | Performed by: OTOLARYNGOLOGY

## 2021-04-23 PROCEDURE — U0003 INFECTIOUS AGENT DETECTION BY NUCLEIC ACID (DNA OR RNA); SEVERE ACUTE RESPIRATORY SYNDROME CORONAVIRUS 2 (SARS-COV-2) (CORONAVIRUS DISEASE [COVID-19]), AMPLIFIED PROBE TECHNIQUE, MAKING USE OF HIGH THROUGHPUT TECHNOLOGIES AS DESCRIBED BY CMS-2020-01-R: HCPCS | Performed by: OTOLARYNGOLOGY

## 2021-04-24 ENCOUNTER — PATIENT MESSAGE (OUTPATIENT)
Dept: SURGERY | Facility: HOSPITAL | Age: 76
End: 2021-04-24

## 2021-04-25 ENCOUNTER — ANESTHESIA EVENT (OUTPATIENT)
Dept: SURGERY | Facility: HOSPITAL | Age: 76
End: 2021-04-25
Payer: MEDICARE

## 2021-04-25 ENCOUNTER — PATIENT MESSAGE (OUTPATIENT)
Dept: SURGERY | Facility: HOSPITAL | Age: 76
End: 2021-04-25

## 2021-04-26 ENCOUNTER — TELEPHONE (OUTPATIENT)
Dept: OTOLARYNGOLOGY | Facility: CLINIC | Age: 76
End: 2021-04-26

## 2021-04-26 ENCOUNTER — HOSPITAL ENCOUNTER (OUTPATIENT)
Facility: HOSPITAL | Age: 76
Discharge: HOME | End: 2021-04-26
Attending: OTOLARYNGOLOGY | Admitting: OTOLARYNGOLOGY
Payer: MEDICARE

## 2021-04-26 ENCOUNTER — ANESTHESIA (OUTPATIENT)
Dept: SURGERY | Facility: HOSPITAL | Age: 76
End: 2021-04-26
Payer: MEDICARE

## 2021-04-26 VITALS
RESPIRATION RATE: 10 BRPM | SYSTOLIC BLOOD PRESSURE: 115 MMHG | BODY MASS INDEX: 29.27 KG/M2 | TEMPERATURE: 98 F | HEART RATE: 84 BPM | OXYGEN SATURATION: 94 % | DIASTOLIC BLOOD PRESSURE: 58 MMHG | HEIGHT: 61 IN | WEIGHT: 155 LBS

## 2021-04-26 DIAGNOSIS — H91.92 PROFOUND HEARING LOSS OF LEFT EAR: Primary | ICD-10-CM

## 2021-04-26 LAB
POCT GLUCOSE: 130 MG/DL (ref 70–110)
POCT GLUCOSE: 153 MG/DL (ref 70–110)

## 2021-04-26 PROCEDURE — 69930 IMPLANT COCHLEAR DEVICE: CPT | Mod: LT,,, | Performed by: OTOLARYNGOLOGY

## 2021-04-26 PROCEDURE — 25000003 PHARM REV CODE 250: Performed by: INTERNAL MEDICINE

## 2021-04-26 PROCEDURE — 69930 PR IMPLANT COCHLEAR DEVICE: ICD-10-PCS | Mod: LT,,, | Performed by: OTOLARYNGOLOGY

## 2021-04-26 PROCEDURE — D9220A PRA ANESTHESIA: Mod: CRNA,,, | Performed by: NURSE ANESTHETIST, CERTIFIED REGISTERED

## 2021-04-26 PROCEDURE — L8614 COCHLEAR DEVICE: HCPCS | Performed by: OTOLARYNGOLOGY

## 2021-04-26 PROCEDURE — 71000016 HC POSTOP RECOV ADDL HR: Performed by: OTOLARYNGOLOGY

## 2021-04-26 PROCEDURE — 27201423 OPTIME MED/SURG SUP & DEVICES STERILE SUPPLY: Performed by: OTOLARYNGOLOGY

## 2021-04-26 PROCEDURE — 36000711: Performed by: OTOLARYNGOLOGY

## 2021-04-26 PROCEDURE — 25000003 PHARM REV CODE 250: Performed by: OTOLARYNGOLOGY

## 2021-04-26 PROCEDURE — 25000003 PHARM REV CODE 250: Performed by: NURSE ANESTHETIST, CERTIFIED REGISTERED

## 2021-04-26 PROCEDURE — 36000710: Performed by: OTOLARYNGOLOGY

## 2021-04-26 PROCEDURE — 71000044 HC DOSC ROUTINE RECOVERY FIRST HOUR: Performed by: OTOLARYNGOLOGY

## 2021-04-26 PROCEDURE — 37000009 HC ANESTHESIA EA ADD 15 MINS: Performed by: OTOLARYNGOLOGY

## 2021-04-26 PROCEDURE — 82962 GLUCOSE BLOOD TEST: CPT | Performed by: OTOLARYNGOLOGY

## 2021-04-26 PROCEDURE — D9220A PRA ANESTHESIA: ICD-10-PCS | Mod: CRNA,,, | Performed by: NURSE ANESTHETIST, CERTIFIED REGISTERED

## 2021-04-26 PROCEDURE — 82962 GLUCOSE BLOOD TEST: CPT | Mod: 91 | Performed by: OTOLARYNGOLOGY

## 2021-04-26 PROCEDURE — 63600175 PHARM REV CODE 636 W HCPCS: Performed by: NURSE ANESTHETIST, CERTIFIED REGISTERED

## 2021-04-26 PROCEDURE — 71000015 HC POSTOP RECOV 1ST HR: Performed by: OTOLARYNGOLOGY

## 2021-04-26 PROCEDURE — 37000008 HC ANESTHESIA 1ST 15 MINUTES: Performed by: OTOLARYNGOLOGY

## 2021-04-26 PROCEDURE — D9220A PRA ANESTHESIA: ICD-10-PCS | Mod: ANES,,, | Performed by: ANESTHESIOLOGY

## 2021-04-26 PROCEDURE — D9220A PRA ANESTHESIA: Mod: ANES,,, | Performed by: ANESTHESIOLOGY

## 2021-04-26 DEVICE — IMPLANTABLE DEVICE: Type: IMPLANTABLE DEVICE | Site: EAR | Status: FUNCTIONAL

## 2021-04-26 RX ORDER — ONDANSETRON 2 MG/ML
INJECTION INTRAMUSCULAR; INTRAVENOUS
Status: DISCONTINUED | OUTPATIENT
Start: 2021-04-26 | End: 2021-04-26

## 2021-04-26 RX ORDER — LIDOCAINE HYDROCHLORIDE 20 MG/ML
INJECTION INTRAVENOUS
Status: DISCONTINUED | OUTPATIENT
Start: 2021-04-26 | End: 2021-04-26

## 2021-04-26 RX ORDER — CEPHALEXIN 500 MG/1
500 CAPSULE ORAL EVERY 12 HOURS
Qty: 20 CAPSULE | Refills: 0 | Status: SHIPPED | OUTPATIENT
Start: 2021-04-26 | End: 2021-05-06

## 2021-04-26 RX ORDER — HYDROCODONE BITARTRATE AND ACETAMINOPHEN 5; 325 MG/1; MG/1
1 TABLET ORAL EVERY 6 HOURS PRN
Qty: 20 TABLET | Refills: 0 | Status: SHIPPED | OUTPATIENT
Start: 2021-04-26 | End: 2021-11-08 | Stop reason: ALTCHOICE

## 2021-04-26 RX ORDER — SUCCINYLCHOLINE CHLORIDE 20 MG/ML
INJECTION INTRAMUSCULAR; INTRAVENOUS
Status: DISCONTINUED | OUTPATIENT
Start: 2021-04-26 | End: 2021-04-26

## 2021-04-26 RX ORDER — SODIUM CHLORIDE 9 MG/ML
INJECTION, SOLUTION INTRAVENOUS CONTINUOUS
Status: DISCONTINUED | OUTPATIENT
Start: 2021-04-26 | End: 2021-04-26 | Stop reason: HOSPADM

## 2021-04-26 RX ORDER — PHENYLEPHRINE HCL IN 0.9% NACL 1 MG/10 ML
SYRINGE (ML) INTRAVENOUS
Status: DISCONTINUED | OUTPATIENT
Start: 2021-04-26 | End: 2021-04-26

## 2021-04-26 RX ORDER — CEFAZOLIN SODIUM 1 G/3ML
INJECTION, POWDER, FOR SOLUTION INTRAMUSCULAR; INTRAVENOUS
Status: DISCONTINUED | OUTPATIENT
Start: 2021-04-26 | End: 2021-04-26

## 2021-04-26 RX ORDER — OFLOXACIN 3 MG/ML
SOLUTION/ DROPS OPHTHALMIC
Status: DISCONTINUED | OUTPATIENT
Start: 2021-04-26 | End: 2021-04-26 | Stop reason: HOSPADM

## 2021-04-26 RX ORDER — LIDOCAINE HYDROCHLORIDE 10 MG/ML
1 INJECTION, SOLUTION EPIDURAL; INFILTRATION; INTRACAUDAL; PERINEURAL ONCE
Status: DISCONTINUED | OUTPATIENT
Start: 2021-04-26 | End: 2021-04-26 | Stop reason: HOSPADM

## 2021-04-26 RX ORDER — LIDOCAINE HYDROCHLORIDE AND EPINEPHRINE 10; 10 MG/ML; UG/ML
INJECTION, SOLUTION INFILTRATION; PERINEURAL
Status: DISCONTINUED | OUTPATIENT
Start: 2021-04-26 | End: 2021-04-26 | Stop reason: HOSPADM

## 2021-04-26 RX ORDER — ROCURONIUM BROMIDE 10 MG/ML
INJECTION, SOLUTION INTRAVENOUS
Status: DISCONTINUED | OUTPATIENT
Start: 2021-04-26 | End: 2021-04-26

## 2021-04-26 RX ORDER — SCOLOPAMINE TRANSDERMAL SYSTEM 1 MG/1
PATCH, EXTENDED RELEASE TRANSDERMAL
Status: DISCONTINUED | OUTPATIENT
Start: 2021-04-26 | End: 2021-04-26

## 2021-04-26 RX ORDER — ONDANSETRON 4 MG/1
4 TABLET, ORALLY DISINTEGRATING ORAL ONCE
Qty: 1 TABLET | Refills: 0 | Status: SHIPPED | OUTPATIENT
Start: 2021-04-26 | End: 2021-04-27

## 2021-04-26 RX ORDER — FENTANYL CITRATE 50 UG/ML
INJECTION, SOLUTION INTRAMUSCULAR; INTRAVENOUS
Status: DISCONTINUED | OUTPATIENT
Start: 2021-04-26 | End: 2021-04-26

## 2021-04-26 RX ORDER — DEXMEDETOMIDINE HYDROCHLORIDE 100 UG/ML
INJECTION, SOLUTION INTRAVENOUS
Status: DISCONTINUED | OUTPATIENT
Start: 2021-04-26 | End: 2021-04-26

## 2021-04-26 RX ORDER — DEXAMETHASONE SODIUM PHOSPHATE 4 MG/ML
INJECTION, SOLUTION INTRA-ARTICULAR; INTRALESIONAL; INTRAMUSCULAR; INTRAVENOUS; SOFT TISSUE
Status: DISCONTINUED | OUTPATIENT
Start: 2021-04-26 | End: 2021-04-26

## 2021-04-26 RX ORDER — PHENYLEPHRINE HYDROCHLORIDE 10 MG/ML
INJECTION INTRAVENOUS
Status: DISCONTINUED | OUTPATIENT
Start: 2021-04-26 | End: 2021-04-26

## 2021-04-26 RX ORDER — PROCHLORPERAZINE EDISYLATE 5 MG/ML
5 INJECTION INTRAMUSCULAR; INTRAVENOUS EVERY 30 MIN PRN
Status: DISCONTINUED | OUTPATIENT
Start: 2021-04-26 | End: 2021-04-26 | Stop reason: HOSPADM

## 2021-04-26 RX ORDER — SODIUM CHLORIDE 0.9 % (FLUSH) 0.9 %
10 SYRINGE (ML) INJECTION
Status: DISCONTINUED | OUTPATIENT
Start: 2021-04-26 | End: 2021-04-26 | Stop reason: HOSPADM

## 2021-04-26 RX ORDER — KETAMINE HCL IN 0.9 % NACL 50 MG/5 ML
SYRINGE (ML) INTRAVENOUS
Status: DISCONTINUED | OUTPATIENT
Start: 2021-04-26 | End: 2021-04-26

## 2021-04-26 RX ORDER — PROPOFOL 10 MG/ML
VIAL (ML) INTRAVENOUS
Status: DISCONTINUED | OUTPATIENT
Start: 2021-04-26 | End: 2021-04-26

## 2021-04-26 RX ORDER — HYDROMORPHONE HYDROCHLORIDE 1 MG/ML
0.2 INJECTION, SOLUTION INTRAMUSCULAR; INTRAVENOUS; SUBCUTANEOUS EVERY 5 MIN PRN
Status: DISCONTINUED | OUTPATIENT
Start: 2021-04-26 | End: 2021-04-26 | Stop reason: HOSPADM

## 2021-04-26 RX ADMIN — FENTANYL CITRATE 50 MCG: 50 INJECTION, SOLUTION INTRAMUSCULAR; INTRAVENOUS at 09:04

## 2021-04-26 RX ADMIN — DEXMEDETOMIDINE HYDROCHLORIDE 4 MCG: 100 INJECTION, SOLUTION, CONCENTRATE INTRAVENOUS at 08:04

## 2021-04-26 RX ADMIN — Medication 20 MG: at 08:04

## 2021-04-26 RX ADMIN — CEFAZOLIN 2 G: 330 INJECTION, POWDER, FOR SOLUTION INTRAMUSCULAR; INTRAVENOUS at 08:04

## 2021-04-26 RX ADMIN — FENTANYL CITRATE 100 MCG: 50 INJECTION, SOLUTION INTRAMUSCULAR; INTRAVENOUS at 08:04

## 2021-04-26 RX ADMIN — DEXAMETHASONE SODIUM PHOSPHATE 8 MG: 4 INJECTION INTRA-ARTICULAR; INTRALESIONAL; INTRAMUSCULAR; INTRAVENOUS; SOFT TISSUE at 08:04

## 2021-04-26 RX ADMIN — SODIUM CHLORIDE: 0.9 INJECTION, SOLUTION INTRAVENOUS at 09:04

## 2021-04-26 RX ADMIN — DEXMEDETOMIDINE HYDROCHLORIDE 8 MCG: 100 INJECTION, SOLUTION, CONCENTRATE INTRAVENOUS at 09:04

## 2021-04-26 RX ADMIN — PROPOFOL 50 MG: 10 INJECTION, EMULSION INTRAVENOUS at 09:04

## 2021-04-26 RX ADMIN — SODIUM CHLORIDE: 0.9 INJECTION, SOLUTION INTRAVENOUS at 07:04

## 2021-04-26 RX ADMIN — SCOPALAMINE 1 PATCH: 1 PATCH, EXTENDED RELEASE TRANSDERMAL at 08:04

## 2021-04-26 RX ADMIN — LIDOCAINE HYDROCHLORIDE 100 MG: 20 INJECTION, SOLUTION INTRAVENOUS at 08:04

## 2021-04-26 RX ADMIN — Medication 20 MG: at 09:04

## 2021-04-26 RX ADMIN — Medication 10 MG: at 09:04

## 2021-04-26 RX ADMIN — ROCURONIUM BROMIDE 10 MG: 10 INJECTION, SOLUTION INTRAVENOUS at 08:04

## 2021-04-26 RX ADMIN — PHENYLEPHRINE HYDROCHLORIDE 100 MCG: 10 INJECTION INTRAVENOUS at 08:04

## 2021-04-26 RX ADMIN — FENTANYL CITRATE 50 MCG: 50 INJECTION, SOLUTION INTRAMUSCULAR; INTRAVENOUS at 11:04

## 2021-04-26 RX ADMIN — Medication 200 MCG: at 10:04

## 2021-04-26 RX ADMIN — PROPOFOL 50 MG: 10 INJECTION, EMULSION INTRAVENOUS at 11:04

## 2021-04-26 RX ADMIN — PROPOFOL 180 MG: 10 INJECTION, EMULSION INTRAVENOUS at 08:04

## 2021-04-26 RX ADMIN — PHENYLEPHRINE HYDROCHLORIDE 200 MCG: 10 INJECTION INTRAVENOUS at 08:04

## 2021-04-26 RX ADMIN — SUCCINYLCHOLINE CHLORIDE 120 MG: 20 INJECTION, SOLUTION INTRAMUSCULAR; INTRAVENOUS at 08:04

## 2021-04-26 RX ADMIN — ONDANSETRON 4 MG: 2 INJECTION, SOLUTION INTRAMUSCULAR; INTRAVENOUS at 10:04

## 2021-04-27 ENCOUNTER — TELEPHONE (OUTPATIENT)
Dept: OTOLARYNGOLOGY | Facility: CLINIC | Age: 76
End: 2021-04-27

## 2021-04-27 ENCOUNTER — TELEPHONE (OUTPATIENT)
Dept: OPHTHALMOLOGY | Facility: CLINIC | Age: 76
End: 2021-04-27

## 2021-04-27 ENCOUNTER — PATIENT MESSAGE (OUTPATIENT)
Dept: OTOLARYNGOLOGY | Facility: CLINIC | Age: 76
End: 2021-04-27

## 2021-04-27 ENCOUNTER — PATIENT MESSAGE (OUTPATIENT)
Dept: AUDIOLOGY | Facility: CLINIC | Age: 76
End: 2021-04-27

## 2021-05-04 ENCOUNTER — OFFICE VISIT (OUTPATIENT)
Dept: OTOLARYNGOLOGY | Facility: CLINIC | Age: 76
End: 2021-05-04
Payer: MEDICARE

## 2021-05-04 ENCOUNTER — OFFICE VISIT (OUTPATIENT)
Dept: OPHTHALMOLOGY | Facility: CLINIC | Age: 76
End: 2021-05-04
Payer: MEDICARE

## 2021-05-04 VITALS — WEIGHT: 161.81 LBS | BODY MASS INDEX: 30.58 KG/M2

## 2021-05-04 DIAGNOSIS — H57.813 BROW PTOSIS, BILATERAL: ICD-10-CM

## 2021-05-04 DIAGNOSIS — Z98.890 POST-OPERATIVE STATE: Primary | ICD-10-CM

## 2021-05-04 DIAGNOSIS — H02.831 DERMATOCHALASIS OF BOTH UPPER EYELIDS: ICD-10-CM

## 2021-05-04 DIAGNOSIS — H02.834 DERMATOCHALASIS OF BOTH UPPER EYELIDS: ICD-10-CM

## 2021-05-04 DIAGNOSIS — Z09 POSTOPERATIVE EXAMINATION: Primary | ICD-10-CM

## 2021-05-04 PROCEDURE — 3072F PR LOW RISK FOR RETINOPATHY: ICD-10-PCS | Mod: S$GLB,,, | Performed by: OTOLARYNGOLOGY

## 2021-05-04 PROCEDURE — 1101F PT FALLS ASSESS-DOCD LE1/YR: CPT | Mod: CPTII,S$GLB,, | Performed by: OPHTHALMOLOGY

## 2021-05-04 PROCEDURE — 99024 POSTOP FOLLOW-UP VISIT: CPT | Mod: S$GLB,,, | Performed by: OPHTHALMOLOGY

## 2021-05-04 PROCEDURE — 99024 PR POST-OP FOLLOW-UP VISIT: ICD-10-PCS | Mod: S$GLB,,, | Performed by: OPHTHALMOLOGY

## 2021-05-04 PROCEDURE — 99999 PR PBB SHADOW E&M-EST. PATIENT-LVL IV: ICD-10-PCS | Mod: PBBFAC,,, | Performed by: OTOLARYNGOLOGY

## 2021-05-04 PROCEDURE — 1101F PR PT FALLS ASSESS DOC 0-1 FALLS W/OUT INJ PAST YR: ICD-10-PCS | Mod: CPTII,S$GLB,, | Performed by: OPHTHALMOLOGY

## 2021-05-04 PROCEDURE — 1157F PR ADVANCE CARE PLAN OR EQUIV PRESENT IN MEDICAL RECORD: ICD-10-PCS | Mod: S$GLB,,, | Performed by: OPHTHALMOLOGY

## 2021-05-04 PROCEDURE — 1157F ADVNC CARE PLAN IN RCRD: CPT | Mod: S$GLB,,, | Performed by: OPHTHALMOLOGY

## 2021-05-04 PROCEDURE — 1126F PR PAIN SEVERITY QUANTIFIED, NO PAIN PRESENT: ICD-10-PCS | Mod: S$GLB,,, | Performed by: OTOLARYNGOLOGY

## 2021-05-04 PROCEDURE — 3288F FALL RISK ASSESSMENT DOCD: CPT | Mod: CPTII,S$GLB,, | Performed by: OPHTHALMOLOGY

## 2021-05-04 PROCEDURE — 1126F AMNT PAIN NOTED NONE PRSNT: CPT | Mod: S$GLB,,, | Performed by: OPHTHALMOLOGY

## 2021-05-04 PROCEDURE — 99999 PR PBB SHADOW E&M-EST. PATIENT-LVL IV: CPT | Mod: PBBFAC,,, | Performed by: OTOLARYNGOLOGY

## 2021-05-04 PROCEDURE — 1126F AMNT PAIN NOTED NONE PRSNT: CPT | Mod: S$GLB,,, | Performed by: OTOLARYNGOLOGY

## 2021-05-04 PROCEDURE — 99999 PR PBB SHADOW E&M-EST. PATIENT-LVL III: CPT | Mod: PBBFAC,,, | Performed by: OPHTHALMOLOGY

## 2021-05-04 PROCEDURE — 99024 PR POST-OP FOLLOW-UP VISIT: ICD-10-PCS | Mod: S$GLB,,, | Performed by: OTOLARYNGOLOGY

## 2021-05-04 PROCEDURE — 3072F LOW RISK FOR RETINOPATHY: CPT | Mod: S$GLB,,, | Performed by: OTOLARYNGOLOGY

## 2021-05-04 PROCEDURE — 1126F PR PAIN SEVERITY QUANTIFIED, NO PAIN PRESENT: ICD-10-PCS | Mod: S$GLB,,, | Performed by: OPHTHALMOLOGY

## 2021-05-04 PROCEDURE — 1157F ADVNC CARE PLAN IN RCRD: CPT | Mod: S$GLB,,, | Performed by: OTOLARYNGOLOGY

## 2021-05-04 PROCEDURE — 1157F PR ADVANCE CARE PLAN OR EQUIV PRESENT IN MEDICAL RECORD: ICD-10-PCS | Mod: S$GLB,,, | Performed by: OTOLARYNGOLOGY

## 2021-05-04 PROCEDURE — 99024 POSTOP FOLLOW-UP VISIT: CPT | Mod: S$GLB,,, | Performed by: OTOLARYNGOLOGY

## 2021-05-04 PROCEDURE — 99999 PR PBB SHADOW E&M-EST. PATIENT-LVL III: ICD-10-PCS | Mod: PBBFAC,,, | Performed by: OPHTHALMOLOGY

## 2021-05-04 PROCEDURE — 3288F PR FALLS RISK ASSESSMENT DOCUMENTED: ICD-10-PCS | Mod: CPTII,S$GLB,, | Performed by: OPHTHALMOLOGY

## 2021-05-04 RX ORDER — CANDESARTAN 4 MG/1
4 TABLET ORAL DAILY
Qty: 90 TABLET | Refills: 3 | Status: SHIPPED | OUTPATIENT
Start: 2021-05-04 | End: 2021-05-07

## 2021-05-06 ENCOUNTER — PATIENT MESSAGE (OUTPATIENT)
Dept: INTERNAL MEDICINE | Facility: CLINIC | Age: 76
End: 2021-05-06

## 2021-05-07 DIAGNOSIS — F41.9 ANXIETY: ICD-10-CM

## 2021-05-07 DIAGNOSIS — F33.41 MDD (MAJOR DEPRESSIVE DISORDER), RECURRENT, IN PARTIAL REMISSION: ICD-10-CM

## 2021-05-07 RX ORDER — VENLAFAXINE 75 MG/1
75 TABLET ORAL DAILY
Qty: 90 TABLET | Refills: 3 | Status: SHIPPED | OUTPATIENT
Start: 2021-05-07 | End: 2021-09-03 | Stop reason: SDUPTHER

## 2021-05-07 RX ORDER — LOSARTAN POTASSIUM 100 MG/1
100 TABLET ORAL DAILY
Qty: 90 TABLET | Refills: 3 | Status: SHIPPED | OUTPATIENT
Start: 2021-05-07 | End: 2021-09-03 | Stop reason: SDUPTHER

## 2021-05-09 NOTE — PROGRESS NOTES
"Paris Burris I. Chief Complaints during this visit:  f/u Patient visit for  Weakness in legs    Mandi Huynh MD  [address] [place] [address]    Primary Care Physician  Mandi Huynh MD  [address]  [facility] [id number]      History of present illness:   71 y.o. W seen in f/u for intermittent leg weakness.  Symptoms unchanged, still gets the episodes of leg weakness every other week.  Feels PT helps.  No falls.    Happy with the increase in gabapentin from Dr. Huynh- her paresthesias in left foot are better.      Interval history 11/15/16:  She says the aquatic therapy has been very helpful.  Has also improved walking up stairs.   reports that she walks off balance.  She is back walking to restaurant a few days per week, but notably, she walked the 2 mile lap around park yesterday.  Left foot has intermittent "neuropathy" that she describes as "cold" and tingling of 5th digit.  Wraps it with blanket.  Has anemia, but cause is unknown.  Has colonoscopy pending.    Interval history 7/11/16:  CTA was ok.  This is most likely to occur in the morning.  Takes about 30 minutes to recover.  No falls.  Occurs about every 2-3 weeks.  All the rest of the times, she is "fine."  No pain, but nervous.  Arms are not effected.  She has taken her BP after events, but measurements were not too low and did not drop.    From my note 5/31/16:  ...consultation at the request of  Dr. Huynh for evaluation of weakness in legs.  For past 6 months, she has noticed "weakness in legs".  She describes this as suddenly feeling she has to sit down or else fall down.  No real dizzines, but feels like legs will give out.  If she rests for 5-10 minutes, she can go again.  She happens most often in mornings when standing up, making coffee, etc.  Now limiting her ability to go to breakfast (usually walks the 3-4 blocks to restaurant.)  She says she was afraid of falling, not necessarily weak.  No falls.  No pain.  Does have dizziness " as separate issue.  This occurs when waking up some mornings.  When walking, she vears/ wavers and feels a slight spin.  No diplopia.  Hearing started getting poor 12 years ago.        II.  Review of systems  As in HPI,  otherwise, balance 3 systems reviewed and are negative.    III.  Past Medical History   Diagnosis Date    Allergy     Anemia     Anxiety     Cancer 2002     L breast s/p mastectomy    Decreased hearing     Depression     Diabetes mellitus     Gastric ulcer 9/10/13     EGD    Hiatal hernia     Hyperlipidemia     Migraine headache     Migraine headache 3/20/2015    Multiple gastric ulcers     Parathyroid disorder     Pre-diabetes     Substance abuse     Wrist fracture      Family History   Problem Relation Age of Onset    Suicide Mother     Depression Mother     Alcohol abuse Father     Headaches Father     Diabetes Sister      Social History     Social History    Marital status:      Spouse name: N/A    Number of children: N/A    Years of education: N/A     Occupational History     supervisor      Social History Main Topics    Smoking status: Never Smoker    Smokeless tobacco: Never Used    Alcohol use No      Comment: quit 2014    Drug use: Yes    Sexual activity: Yes     Partners: Male     Other Topics Concern    None     Social History Narrative         Current Outpatient Prescriptions on File Prior to Visit   Medication Sig Dispense Refill    ascorbic acid (VITAMIN C) 500 MG tablet Take 1,000 mg by mouth once daily.       blood sugar diagnostic (ACCU-CHEK MARIAN PLUS TEST STRP) Strp 2 strips by Misc.(Non-Drug; Combo Route) route 2 (two) times daily. 100 strip 6    blood sugar diagnostic Strp 1 strip by Misc.(Non-Drug; Combo Route) route 2 (two) times daily. 300 each 10    DUREZOL 0.05 % Drop ophthalmic solution       ferrous sulfate 325 mg (65 mg iron) Tab tablet Take 1 tablet (325 mg total) by mouth 2 (two) times daily. 60 tablet 11    fluticasone  "(FLONASE) 50 mcg/actuation nasal spray SPRAY ONCE IN EACH NOSTRIL EVERY DAY 16 g 0    gabapentin (NEURONTIN) 300 MG capsule Take 1 cap nightly x 1 week; then take 1 cap twice daily x 1 week; then take 1 cap 3x/daily onwards. 90 capsule 1    ketorolac 0.5% (ACULAR) 0.5 % Drop       lancets Misc 1 lancet by Misc.(Non-Drug; Combo Route) route 2 (two) times daily. 100 each 6    lorazepam (ATIVAN) 0.5 MG tablet TAKE 1 TABLET BY MOUTH EVERY DAY AS NEEDED 30 tablet 1    metformin (GLUCOPHAGE-XR) 500 MG 24 hr tablet Take 1 tablet (500 mg total) by mouth 2 (two) times daily with meals. 180 tablet 3    omega-3 fatty acids-fish oil (FISH OIL) 340-1,000 mg Cap Take 1 capsule by mouth once daily.      omeprazole (PRILOSEC) 20 MG capsule Take 1 capsule (20 mg total) by mouth once daily. 90 capsule 3    rosuvastatin (CRESTOR) 20 MG tablet Take 1 tablet (20 mg total) by mouth once daily. 90 tablet 3    sucralfate (CARAFATE) 1 gram tablet Take 1 tablet (1 g total) by mouth 2 (two) times daily. 60 tablet 1    tramadol (ULTRAM) 50 mg tablet TAKE 1 TABLET BY MOUTH EVERY 8 HOURS AS NEEDED FOR PAIN 60 tablet 0    tretinoin (RETIN-A) 0.05 % cream 1 application every evening. At bedtime  6    tretinoin (RETIN-A) 0.1 % cream       venlafaxine (EFFEXOR) 75 MG tablet Take 1 tablet (75 mg total) by mouth 2 (two) times daily. 60 tablet 11    aspirin (ECOTRIN) 81 MG EC tablet Take 1 tablet (81 mg total) by mouth once daily. 90 tablet 3     No current facility-administered medications on file prior to visit.        PRIOR problem-specific medications tried:  none    Review of patient's allergies indicates:   Allergen Reactions    Topamax [topiramate] Other (See Comments)     hallucinations       IV. Physical Exam    Vitals:    02/15/17 1044   BP: 114/66   Pulse: (!) 54   Weight: 76.9 kg (169 lb 8.5 oz)   Height: 5' 1" (1.549 m)     General appearance: Well nourished, well developed, no acute distress.         "   -------------------------------------------------------------  Facial Expression: normal       Affect: full       Orientation to time & place:  Oriented to time, place, person and situation       Attention & concentration:  Normal attention span and concentration       Speech:  normal (not dysarthric)     --------------------------------------------------------------  Cerebellar and Coordination  Gait:  normal, able to tandem without difficulty        Finger-nose: no dysmetria       Rapid Alternating Movements (pronation/supination):  R normal; L normal  --------------------------------------------------------------  MOVEMENT DISORDERS FOCUSED EXAM  Abnormality of movement (bradykinesia, hyperkinesia) present? No    Tremor present?   No   Posture:  normal  Postural stability:  no Rhomberg    V.  Laboratory/ Radiological Data:    EMG left leg 12/16/16: normal    From my note 7/11/16:  CTA head 7/5/16:   Unremarkable CT of the head, specifically without evidence for acute hemorrhage or abnormal parenchymal enhancement.  Unremarkable CTA of the head and neck, specifically without evidence for focal stenosis or intracranial aneurysm.    4/13/16: mri lumbar  Multilevel lumbar spondylosis most pronounced at T12-L1 noting moderate degenerative disc disease with mild associated vertebral endplate edema/inflammation. No significant spinal canal stenosis.  Severe bilateral facet arthropathy at L4-L5 with mild associated bone marrow edema/inflammation.      VI. Medical Decision Making  Diagnosis: intermittent weakness in legs                 Assessment and Plan:  Diabetic polyneuropathy associated with type 2 diabetes mellitus  I am not so sure diagnosis is accurate (EMG neg), but her symptoms are stable and currently satisfied with gabapentin therapy.    Leg weakness, bilateral  Continued, but static intermittent weakness of legs.  Work-up has been unrevealing for vascular, spine or neuropathic causes, but I suspect she  could have transient neurogenic claudication.   - No further work-up   - RTC if symptoms worsen                   Tests ordered during this visit:   No orders of the defined types were placed in this encounter.          No Follow-up on file.   Transportation service

## 2021-05-13 ENCOUNTER — PATIENT MESSAGE (OUTPATIENT)
Dept: AUDIOLOGY | Facility: CLINIC | Age: 76
End: 2021-05-13

## 2021-05-19 ENCOUNTER — PATIENT MESSAGE (OUTPATIENT)
Dept: INTERNAL MEDICINE | Facility: CLINIC | Age: 76
End: 2021-05-19

## 2021-05-19 ENCOUNTER — CLINICAL SUPPORT (OUTPATIENT)
Dept: INTERNAL MEDICINE | Facility: CLINIC | Age: 76
End: 2021-05-19
Payer: MEDICARE

## 2021-05-19 VITALS — WEIGHT: 163.56 LBS | BODY MASS INDEX: 30.91 KG/M2

## 2021-05-19 DIAGNOSIS — F33.41 MDD (MAJOR DEPRESSIVE DISORDER), RECURRENT, IN PARTIAL REMISSION: ICD-10-CM

## 2021-05-19 DIAGNOSIS — E11.69 DIABETES MELLITUS TYPE 2 IN OBESE: ICD-10-CM

## 2021-05-19 DIAGNOSIS — E66.9 DIABETES MELLITUS TYPE 2 IN OBESE: ICD-10-CM

## 2021-05-19 RX ORDER — LANCETS
1 EACH MISCELLANEOUS DAILY
Qty: 204 EACH | Refills: 3 | Status: SHIPPED | OUTPATIENT
Start: 2021-05-19 | End: 2022-01-19 | Stop reason: SDUPTHER

## 2021-05-19 RX ORDER — BUPROPION HYDROCHLORIDE 150 MG/1
150 TABLET ORAL DAILY
Qty: 90 TABLET | Refills: 3 | Status: SHIPPED | OUTPATIENT
Start: 2021-05-19 | End: 2021-09-03 | Stop reason: SDUPTHER

## 2021-05-21 ENCOUNTER — PATIENT MESSAGE (OUTPATIENT)
Dept: INTERNAL MEDICINE | Facility: CLINIC | Age: 76
End: 2021-05-21

## 2021-05-25 ENCOUNTER — PATIENT MESSAGE (OUTPATIENT)
Dept: INTERNAL MEDICINE | Facility: CLINIC | Age: 76
End: 2021-05-25

## 2021-05-25 DIAGNOSIS — E11.9 CONTROLLED TYPE 2 DIABETES MELLITUS WITHOUT COMPLICATION, WITHOUT LONG-TERM CURRENT USE OF INSULIN: ICD-10-CM

## 2021-05-25 DIAGNOSIS — E78.5 HYPERLIPIDEMIA, UNSPECIFIED HYPERLIPIDEMIA TYPE: ICD-10-CM

## 2021-05-25 RX ORDER — METOPROLOL SUCCINATE 50 MG/1
50 TABLET, EXTENDED RELEASE ORAL DAILY
Qty: 90 TABLET | Refills: 3 | Status: SHIPPED | OUTPATIENT
Start: 2021-05-25 | End: 2021-09-03 | Stop reason: SDUPTHER

## 2021-05-25 RX ORDER — ROSUVASTATIN CALCIUM 20 MG/1
TABLET, COATED ORAL
Qty: 90 TABLET | Refills: 1 | Status: SHIPPED | OUTPATIENT
Start: 2021-05-25 | End: 2021-09-03 | Stop reason: SDUPTHER

## 2021-05-31 ENCOUNTER — PATIENT MESSAGE (OUTPATIENT)
Dept: OPHTHALMOLOGY | Facility: CLINIC | Age: 76
End: 2021-05-31

## 2021-06-01 ENCOUNTER — CLINICAL SUPPORT (OUTPATIENT)
Dept: AUDIOLOGY | Facility: CLINIC | Age: 76
End: 2021-06-01
Payer: MEDICARE

## 2021-06-01 ENCOUNTER — OFFICE VISIT (OUTPATIENT)
Dept: OTOLARYNGOLOGY | Facility: CLINIC | Age: 76
End: 2021-06-01
Payer: MEDICARE

## 2021-06-01 VITALS — WEIGHT: 163.13 LBS | BODY MASS INDEX: 30.83 KG/M2

## 2021-06-01 DIAGNOSIS — H90.3 SENSORINEURAL HEARING LOSS, BILATERAL: Primary | ICD-10-CM

## 2021-06-01 DIAGNOSIS — H90.3 SENSORINEURAL HEARING LOSS (SNHL) OF BOTH EARS: Primary | ICD-10-CM

## 2021-06-01 PROCEDURE — 99999 PR PBB SHADOW E&M-EST. PATIENT-LVL IV: CPT | Mod: PBBFAC,,, | Performed by: OTOLARYNGOLOGY

## 2021-06-01 PROCEDURE — 99024 PR POST-OP FOLLOW-UP VISIT: ICD-10-PCS | Mod: S$GLB,,, | Performed by: OTOLARYNGOLOGY

## 2021-06-01 PROCEDURE — 99024 POSTOP FOLLOW-UP VISIT: CPT | Mod: S$GLB,,, | Performed by: OTOLARYNGOLOGY

## 2021-06-01 PROCEDURE — 3072F LOW RISK FOR RETINOPATHY: CPT | Mod: S$GLB,,, | Performed by: OTOLARYNGOLOGY

## 2021-06-01 PROCEDURE — 99999 PR PBB SHADOW E&M-EST. PATIENT-LVL IV: ICD-10-PCS | Mod: PBBFAC,,, | Performed by: OTOLARYNGOLOGY

## 2021-06-01 PROCEDURE — 92604 PR DX ANAL COCHLEAR IMP,PT >7 YRS,REPROG: ICD-10-PCS | Mod: S$GLB,,, | Performed by: AUDIOLOGIST

## 2021-06-01 PROCEDURE — 3072F PR LOW RISK FOR RETINOPATHY: ICD-10-PCS | Mod: S$GLB,,, | Performed by: OTOLARYNGOLOGY

## 2021-06-01 PROCEDURE — 1126F AMNT PAIN NOTED NONE PRSNT: CPT | Mod: S$GLB,,, | Performed by: OTOLARYNGOLOGY

## 2021-06-01 PROCEDURE — 1157F PR ADVANCE CARE PLAN OR EQUIV PRESENT IN MEDICAL RECORD: ICD-10-PCS | Mod: S$GLB,,, | Performed by: OTOLARYNGOLOGY

## 2021-06-01 PROCEDURE — 1157F ADVNC CARE PLAN IN RCRD: CPT | Mod: S$GLB,,, | Performed by: OTOLARYNGOLOGY

## 2021-06-01 PROCEDURE — 1126F PR PAIN SEVERITY QUANTIFIED, NO PAIN PRESENT: ICD-10-PCS | Mod: S$GLB,,, | Performed by: OTOLARYNGOLOGY

## 2021-06-01 PROCEDURE — 92604 REPROGRAM COCHLEAR IMPLT 7/>: CPT | Mod: S$GLB,,, | Performed by: AUDIOLOGIST

## 2021-06-01 RX ORDER — KETOROLAC TROMETHAMINE 30 MG/ML
INJECTION, SOLUTION INTRAMUSCULAR; INTRAVENOUS
COMMUNITY
Start: 2021-03-06 | End: 2022-01-19

## 2021-06-07 ENCOUNTER — CLINICAL SUPPORT (OUTPATIENT)
Dept: INTERNAL MEDICINE | Facility: CLINIC | Age: 76
End: 2021-06-07
Payer: MEDICARE

## 2021-06-07 VITALS — WEIGHT: 162.5 LBS | BODY MASS INDEX: 30.7 KG/M2

## 2021-06-08 ENCOUNTER — PATIENT MESSAGE (OUTPATIENT)
Dept: INTERNAL MEDICINE | Facility: CLINIC | Age: 76
End: 2021-06-08

## 2021-06-08 DIAGNOSIS — F41.9 ANXIETY: ICD-10-CM

## 2021-06-08 DIAGNOSIS — E11.69 DIABETES MELLITUS TYPE 2 IN OBESE: ICD-10-CM

## 2021-06-08 DIAGNOSIS — E66.9 DIABETES MELLITUS TYPE 2 IN OBESE: ICD-10-CM

## 2021-06-08 RX ORDER — CLONAZEPAM 0.5 MG/1
0.5 TABLET ORAL DAILY PRN
Qty: 30 TABLET | Refills: 3 | Status: SHIPPED | OUTPATIENT
Start: 2021-06-08 | End: 2021-07-08 | Stop reason: SDUPTHER

## 2021-06-10 ENCOUNTER — OFFICE VISIT (OUTPATIENT)
Dept: OPHTHALMOLOGY | Facility: CLINIC | Age: 76
End: 2021-06-10
Payer: MEDICARE

## 2021-06-10 DIAGNOSIS — G47.33 OSA (OBSTRUCTIVE SLEEP APNEA): ICD-10-CM

## 2021-06-10 DIAGNOSIS — Z98.890 POST-OPERATIVE STATE: Primary | ICD-10-CM

## 2021-06-10 DIAGNOSIS — H02.831 DERMATOCHALASIS OF BOTH UPPER EYELIDS: ICD-10-CM

## 2021-06-10 DIAGNOSIS — H57.813 BROW PTOSIS, BILATERAL: ICD-10-CM

## 2021-06-10 DIAGNOSIS — H02.834 DERMATOCHALASIS OF BOTH UPPER EYELIDS: ICD-10-CM

## 2021-06-10 PROCEDURE — 99024 PR POST-OP FOLLOW-UP VISIT: ICD-10-PCS | Mod: S$GLB,,, | Performed by: OPHTHALMOLOGY

## 2021-06-10 PROCEDURE — 1126F AMNT PAIN NOTED NONE PRSNT: CPT | Mod: S$GLB,,, | Performed by: OPHTHALMOLOGY

## 2021-06-10 PROCEDURE — 1157F PR ADVANCE CARE PLAN OR EQUIV PRESENT IN MEDICAL RECORD: ICD-10-PCS | Mod: S$GLB,,, | Performed by: OPHTHALMOLOGY

## 2021-06-10 PROCEDURE — 92285 EXTERNAL OCULAR PHOTOGRAPHY: CPT | Mod: S$GLB,,, | Performed by: OPHTHALMOLOGY

## 2021-06-10 PROCEDURE — 1126F PR PAIN SEVERITY QUANTIFIED, NO PAIN PRESENT: ICD-10-PCS | Mod: S$GLB,,, | Performed by: OPHTHALMOLOGY

## 2021-06-10 PROCEDURE — 1157F ADVNC CARE PLAN IN RCRD: CPT | Mod: S$GLB,,, | Performed by: OPHTHALMOLOGY

## 2021-06-10 PROCEDURE — 3288F PR FALLS RISK ASSESSMENT DOCUMENTED: ICD-10-PCS | Mod: CPTII,S$GLB,, | Performed by: OPHTHALMOLOGY

## 2021-06-10 PROCEDURE — 1101F PT FALLS ASSESS-DOCD LE1/YR: CPT | Mod: CPTII,S$GLB,, | Performed by: OPHTHALMOLOGY

## 2021-06-10 PROCEDURE — 3288F FALL RISK ASSESSMENT DOCD: CPT | Mod: CPTII,S$GLB,, | Performed by: OPHTHALMOLOGY

## 2021-06-10 PROCEDURE — 99024 POSTOP FOLLOW-UP VISIT: CPT | Mod: S$GLB,,, | Performed by: OPHTHALMOLOGY

## 2021-06-10 PROCEDURE — 1101F PR PT FALLS ASSESS DOC 0-1 FALLS W/OUT INJ PAST YR: ICD-10-PCS | Mod: CPTII,S$GLB,, | Performed by: OPHTHALMOLOGY

## 2021-06-10 PROCEDURE — 92285 EXTERNAL PHOTOGRAPHY - OU - BOTH EYES: ICD-10-PCS | Mod: S$GLB,,, | Performed by: OPHTHALMOLOGY

## 2021-06-10 PROCEDURE — 99999 PR PBB SHADOW E&M-EST. PATIENT-LVL III: CPT | Mod: PBBFAC,,, | Performed by: OPHTHALMOLOGY

## 2021-06-10 PROCEDURE — 99999 PR PBB SHADOW E&M-EST. PATIENT-LVL III: ICD-10-PCS | Mod: PBBFAC,,, | Performed by: OPHTHALMOLOGY

## 2021-06-16 ENCOUNTER — PATIENT MESSAGE (OUTPATIENT)
Dept: INTERNAL MEDICINE | Facility: CLINIC | Age: 76
End: 2021-06-16

## 2021-06-24 ENCOUNTER — CLINICAL SUPPORT (OUTPATIENT)
Dept: CARDIOLOGY | Facility: HOSPITAL | Age: 76
End: 2021-06-24
Payer: MEDICARE

## 2021-06-24 DIAGNOSIS — I42.9 CARDIOMYOPATHY, UNSPECIFIED: ICD-10-CM

## 2021-06-24 DIAGNOSIS — Z95.0 PRESENCE OF CARDIAC PACEMAKER: ICD-10-CM

## 2021-06-24 DIAGNOSIS — I44.2 ATRIOVENTRICULAR BLOCK, COMPLETE: ICD-10-CM

## 2021-06-24 PROCEDURE — 93294 CARDIAC DEVICE CHECK - REMOTE: ICD-10-PCS | Mod: ,,, | Performed by: INTERNAL MEDICINE

## 2021-06-24 PROCEDURE — 93294 REM INTERROG EVL PM/LDLS PM: CPT | Mod: ,,, | Performed by: INTERNAL MEDICINE

## 2021-06-24 PROCEDURE — 93296 REM INTERROG EVL PM/IDS: CPT | Performed by: INTERNAL MEDICINE

## 2021-06-25 ENCOUNTER — PATIENT MESSAGE (OUTPATIENT)
Dept: AUDIOLOGY | Facility: CLINIC | Age: 76
End: 2021-06-25

## 2021-06-28 ENCOUNTER — TELEPHONE (OUTPATIENT)
Dept: AUDIOLOGY | Facility: CLINIC | Age: 76
End: 2021-06-28

## 2021-06-28 ENCOUNTER — CLINICAL SUPPORT (OUTPATIENT)
Dept: INTERNAL MEDICINE | Facility: CLINIC | Age: 76
End: 2021-06-28
Payer: MEDICARE

## 2021-06-28 VITALS — BODY MASS INDEX: 30.83 KG/M2 | WEIGHT: 163.13 LBS

## 2021-07-01 ENCOUNTER — TELEPHONE (OUTPATIENT)
Dept: INTERNAL MEDICINE | Facility: CLINIC | Age: 76
End: 2021-07-01

## 2021-07-01 ENCOUNTER — PATIENT MESSAGE (OUTPATIENT)
Dept: INTERNAL MEDICINE | Facility: CLINIC | Age: 76
End: 2021-07-01

## 2021-07-01 DIAGNOSIS — E55.9 VITAMIN D DEFICIENCY, UNSPECIFIED: ICD-10-CM

## 2021-07-01 DIAGNOSIS — Z00.00 ANNUAL PHYSICAL EXAM: Primary | ICD-10-CM

## 2021-07-01 DIAGNOSIS — Z79.84 LONG TERM (CURRENT) USE OF ORAL HYPOGLYCEMIC DRUGS: ICD-10-CM

## 2021-07-01 DIAGNOSIS — I10 ESSENTIAL (PRIMARY) HYPERTENSION: ICD-10-CM

## 2021-07-01 DIAGNOSIS — D53.9 NUTRITIONAL ANEMIA, UNSPECIFIED: ICD-10-CM

## 2021-07-06 ENCOUNTER — LAB VISIT (OUTPATIENT)
Dept: LAB | Facility: HOSPITAL | Age: 76
End: 2021-07-06
Payer: MEDICARE

## 2021-07-06 DIAGNOSIS — E55.9 VITAMIN D DEFICIENCY, UNSPECIFIED: ICD-10-CM

## 2021-07-06 DIAGNOSIS — Z79.84 LONG TERM (CURRENT) USE OF ORAL HYPOGLYCEMIC DRUGS: ICD-10-CM

## 2021-07-06 DIAGNOSIS — I10 ESSENTIAL (PRIMARY) HYPERTENSION: ICD-10-CM

## 2021-07-06 DIAGNOSIS — D53.9 NUTRITIONAL ANEMIA, UNSPECIFIED: ICD-10-CM

## 2021-07-06 DIAGNOSIS — Z00.00 ANNUAL PHYSICAL EXAM: ICD-10-CM

## 2021-07-06 LAB
25(OH)D3+25(OH)D2 SERPL-MCNC: 25 NG/ML (ref 30–96)
ALBUMIN SERPL BCP-MCNC: 4.3 G/DL (ref 3.5–5.2)
ALP SERPL-CCNC: 68 U/L (ref 55–135)
ALT SERPL W/O P-5'-P-CCNC: 21 U/L (ref 10–44)
ANION GAP SERPL CALC-SCNC: 9 MMOL/L (ref 8–16)
AST SERPL-CCNC: 23 U/L (ref 10–40)
BASOPHILS # BLD AUTO: 0.03 K/UL (ref 0–0.2)
BASOPHILS NFR BLD: 0.5 % (ref 0–1.9)
BILIRUB SERPL-MCNC: 0.5 MG/DL (ref 0.1–1)
BUN SERPL-MCNC: 16 MG/DL (ref 8–23)
CALCIUM SERPL-MCNC: 9.8 MG/DL (ref 8.7–10.5)
CHLORIDE SERPL-SCNC: 103 MMOL/L (ref 95–110)
CHOLEST SERPL-MCNC: 166 MG/DL (ref 120–199)
CHOLEST/HDLC SERPL: 5 {RATIO} (ref 2–5)
CO2 SERPL-SCNC: 27 MMOL/L (ref 23–29)
CREAT SERPL-MCNC: 0.8 MG/DL (ref 0.5–1.4)
DIFFERENTIAL METHOD: ABNORMAL
EOSINOPHIL # BLD AUTO: 0.2 K/UL (ref 0–0.5)
EOSINOPHIL NFR BLD: 4 % (ref 0–8)
ERYTHROCYTE [DISTWIDTH] IN BLOOD BY AUTOMATED COUNT: 13 % (ref 11.5–14.5)
EST. GFR  (AFRICAN AMERICAN): >60 ML/MIN/1.73 M^2
EST. GFR  (NON AFRICAN AMERICAN): >60 ML/MIN/1.73 M^2
ESTIMATED AVG GLUCOSE: 134 MG/DL (ref 68–131)
GLUCOSE SERPL-MCNC: 127 MG/DL (ref 70–110)
HBA1C MFR BLD: 6.3 % (ref 4–5.6)
HCT VFR BLD AUTO: 42.2 % (ref 37–48.5)
HDLC SERPL-MCNC: 33 MG/DL (ref 40–75)
HDLC SERPL: 19.9 % (ref 20–50)
HGB BLD-MCNC: 14.2 G/DL (ref 12–16)
IMM GRANULOCYTES # BLD AUTO: 0 K/UL (ref 0–0.04)
IMM GRANULOCYTES NFR BLD AUTO: 0 % (ref 0–0.5)
LDLC SERPL CALC-MCNC: 71.8 MG/DL (ref 63–159)
LYMPHOCYTES # BLD AUTO: 1.9 K/UL (ref 1–4.8)
LYMPHOCYTES NFR BLD: 34.8 % (ref 18–48)
MCH RBC QN AUTO: 31.3 PG (ref 27–31)
MCHC RBC AUTO-ENTMCNC: 33.6 G/DL (ref 32–36)
MCV RBC AUTO: 93 FL (ref 82–98)
MONOCYTES # BLD AUTO: 0.5 K/UL (ref 0.3–1)
MONOCYTES NFR BLD: 8.9 % (ref 4–15)
NEUTROPHILS # BLD AUTO: 2.8 K/UL (ref 1.8–7.7)
NEUTROPHILS NFR BLD: 51.8 % (ref 38–73)
NONHDLC SERPL-MCNC: 133 MG/DL
NRBC BLD-RTO: 0 /100 WBC
PLATELET # BLD AUTO: 184 K/UL (ref 150–450)
PMV BLD AUTO: 11 FL (ref 9.2–12.9)
POTASSIUM SERPL-SCNC: 4.6 MMOL/L (ref 3.5–5.1)
PROT SERPL-MCNC: 7.8 G/DL (ref 6–8.4)
RBC # BLD AUTO: 4.53 M/UL (ref 4–5.4)
SODIUM SERPL-SCNC: 139 MMOL/L (ref 136–145)
TRIGL SERPL-MCNC: 306 MG/DL (ref 30–150)
TSH SERPL DL<=0.005 MIU/L-ACNC: 1.83 UIU/ML (ref 0.4–4)
VIT B12 SERPL-MCNC: 393 PG/ML (ref 210–950)
WBC # BLD AUTO: 5.49 K/UL (ref 3.9–12.7)

## 2021-07-06 PROCEDURE — 83036 HEMOGLOBIN GLYCOSYLATED A1C: CPT | Performed by: NURSE PRACTITIONER

## 2021-07-06 PROCEDURE — 80053 COMPREHEN METABOLIC PANEL: CPT | Performed by: NURSE PRACTITIONER

## 2021-07-06 PROCEDURE — 80061 LIPID PANEL: CPT | Performed by: NURSE PRACTITIONER

## 2021-07-06 PROCEDURE — 85025 COMPLETE CBC W/AUTO DIFF WBC: CPT | Performed by: NURSE PRACTITIONER

## 2021-07-06 PROCEDURE — 82607 VITAMIN B-12: CPT | Performed by: NURSE PRACTITIONER

## 2021-07-06 PROCEDURE — 36415 COLL VENOUS BLD VENIPUNCTURE: CPT | Performed by: NURSE PRACTITIONER

## 2021-07-06 PROCEDURE — 84443 ASSAY THYROID STIM HORMONE: CPT | Performed by: NURSE PRACTITIONER

## 2021-07-06 PROCEDURE — 82306 VITAMIN D 25 HYDROXY: CPT | Performed by: NURSE PRACTITIONER

## 2021-07-08 ENCOUNTER — OFFICE VISIT (OUTPATIENT)
Dept: INTERNAL MEDICINE | Facility: CLINIC | Age: 76
End: 2021-07-08
Payer: MEDICARE

## 2021-07-08 VITALS
WEIGHT: 166.88 LBS | HEIGHT: 61 IN | BODY MASS INDEX: 31.51 KG/M2 | SYSTOLIC BLOOD PRESSURE: 114 MMHG | DIASTOLIC BLOOD PRESSURE: 76 MMHG | HEART RATE: 82 BPM | OXYGEN SATURATION: 96 % | TEMPERATURE: 98 F

## 2021-07-08 DIAGNOSIS — G89.29 CHRONIC BILATERAL LOW BACK PAIN WITHOUT SCIATICA: ICD-10-CM

## 2021-07-08 DIAGNOSIS — E11.69 HYPERLIPIDEMIA ASSOCIATED WITH TYPE 2 DIABETES MELLITUS: ICD-10-CM

## 2021-07-08 DIAGNOSIS — F33.41 RECURRENT MAJOR DEPRESSIVE DISORDER, IN PARTIAL REMISSION: ICD-10-CM

## 2021-07-08 DIAGNOSIS — E11.69 DIABETES MELLITUS TYPE 2 IN OBESE: Primary | ICD-10-CM

## 2021-07-08 DIAGNOSIS — E55.9 VITAMIN D DEFICIENCY: ICD-10-CM

## 2021-07-08 DIAGNOSIS — Z12.31 ENCOUNTER FOR SCREENING MAMMOGRAM FOR MALIGNANT NEOPLASM OF BREAST: ICD-10-CM

## 2021-07-08 DIAGNOSIS — E66.9 DIABETES MELLITUS TYPE 2 IN OBESE: Primary | ICD-10-CM

## 2021-07-08 DIAGNOSIS — M54.50 CHRONIC BILATERAL LOW BACK PAIN WITHOUT SCIATICA: ICD-10-CM

## 2021-07-08 DIAGNOSIS — E78.5 HYPERLIPIDEMIA ASSOCIATED WITH TYPE 2 DIABETES MELLITUS: ICD-10-CM

## 2021-07-08 DIAGNOSIS — I15.2 HYPERTENSION ASSOCIATED WITH DIABETES: ICD-10-CM

## 2021-07-08 DIAGNOSIS — F41.1 GAD (GENERALIZED ANXIETY DISORDER): ICD-10-CM

## 2021-07-08 DIAGNOSIS — F41.9 ANXIETY: ICD-10-CM

## 2021-07-08 DIAGNOSIS — E11.59 HYPERTENSION ASSOCIATED WITH DIABETES: ICD-10-CM

## 2021-07-08 DIAGNOSIS — G47.33 OSA (OBSTRUCTIVE SLEEP APNEA): ICD-10-CM

## 2021-07-08 DIAGNOSIS — K44.9 HIATAL HERNIA: ICD-10-CM

## 2021-07-08 PROCEDURE — 3072F LOW RISK FOR RETINOPATHY: CPT | Mod: S$GLB,,, | Performed by: INTERNAL MEDICINE

## 2021-07-08 PROCEDURE — 1157F ADVNC CARE PLAN IN RCRD: CPT | Mod: S$GLB,,, | Performed by: INTERNAL MEDICINE

## 2021-07-08 PROCEDURE — 99999 PR PBB SHADOW E&M-EST. PATIENT-LVL V: CPT | Mod: PBBFAC,,, | Performed by: INTERNAL MEDICINE

## 2021-07-08 PROCEDURE — 1159F MED LIST DOCD IN RCRD: CPT | Mod: S$GLB,,, | Performed by: INTERNAL MEDICINE

## 2021-07-08 PROCEDURE — 1159F PR MEDICATION LIST DOCUMENTED IN MEDICAL RECORD: ICD-10-PCS | Mod: S$GLB,,, | Performed by: INTERNAL MEDICINE

## 2021-07-08 PROCEDURE — 3288F FALL RISK ASSESSMENT DOCD: CPT | Mod: CPTII,S$GLB,, | Performed by: INTERNAL MEDICINE

## 2021-07-08 PROCEDURE — 3288F PR FALLS RISK ASSESSMENT DOCUMENTED: ICD-10-PCS | Mod: CPTII,S$GLB,, | Performed by: INTERNAL MEDICINE

## 2021-07-08 PROCEDURE — 99499 UNLISTED E&M SERVICE: CPT | Mod: HCNC,S$GLB,, | Performed by: INTERNAL MEDICINE

## 2021-07-08 PROCEDURE — 1101F PR PT FALLS ASSESS DOC 0-1 FALLS W/OUT INJ PAST YR: ICD-10-PCS | Mod: CPTII,S$GLB,, | Performed by: INTERNAL MEDICINE

## 2021-07-08 PROCEDURE — 99214 OFFICE O/P EST MOD 30 MIN: CPT | Mod: S$GLB,,, | Performed by: INTERNAL MEDICINE

## 2021-07-08 PROCEDURE — 99999 PR PBB SHADOW E&M-EST. PATIENT-LVL V: ICD-10-PCS | Mod: PBBFAC,,, | Performed by: INTERNAL MEDICINE

## 2021-07-08 PROCEDURE — 1126F AMNT PAIN NOTED NONE PRSNT: CPT | Mod: S$GLB,,, | Performed by: INTERNAL MEDICINE

## 2021-07-08 PROCEDURE — 1126F PR PAIN SEVERITY QUANTIFIED, NO PAIN PRESENT: ICD-10-PCS | Mod: S$GLB,,, | Performed by: INTERNAL MEDICINE

## 2021-07-08 PROCEDURE — 1157F PR ADVANCE CARE PLAN OR EQUIV PRESENT IN MEDICAL RECORD: ICD-10-PCS | Mod: S$GLB,,, | Performed by: INTERNAL MEDICINE

## 2021-07-08 PROCEDURE — 1101F PT FALLS ASSESS-DOCD LE1/YR: CPT | Mod: CPTII,S$GLB,, | Performed by: INTERNAL MEDICINE

## 2021-07-08 PROCEDURE — 3072F PR LOW RISK FOR RETINOPATHY: ICD-10-PCS | Mod: S$GLB,,, | Performed by: INTERNAL MEDICINE

## 2021-07-08 PROCEDURE — 99499 RISK ADDL DX/OHS AUDIT: ICD-10-PCS | Mod: HCNC,S$GLB,, | Performed by: INTERNAL MEDICINE

## 2021-07-08 PROCEDURE — 99214 PR OFFICE/OUTPT VISIT, EST, LEVL IV, 30-39 MIN: ICD-10-PCS | Mod: S$GLB,,, | Performed by: INTERNAL MEDICINE

## 2021-07-08 RX ORDER — ACETAMINOPHEN 500 MG
500 TABLET ORAL EVERY 6 HOURS PRN
Qty: 160 TABLET | Refills: 3 | Status: SHIPPED | OUTPATIENT
Start: 2021-07-08 | End: 2022-01-19

## 2021-07-08 RX ORDER — TRAZODONE HYDROCHLORIDE 50 MG/1
25 TABLET ORAL NIGHTLY
Qty: 45 TABLET | Refills: 3 | Status: SHIPPED | OUTPATIENT
Start: 2021-07-08 | End: 2021-09-03 | Stop reason: SDUPTHER

## 2021-07-08 RX ORDER — CLONAZEPAM 0.5 MG/1
0.5 TABLET ORAL DAILY PRN
Qty: 30 TABLET | Refills: 3 | Status: SHIPPED | OUTPATIENT
Start: 2021-07-08 | End: 2021-09-03 | Stop reason: SDUPTHER

## 2021-07-08 RX ORDER — CLONAZEPAM 0.5 MG/1
0.5 TABLET ORAL DAILY PRN
Qty: 30 TABLET | Refills: 3 | Status: SHIPPED | OUTPATIENT
Start: 2021-07-08 | End: 2021-07-08 | Stop reason: SDUPTHER

## 2021-07-08 RX ORDER — METFORMIN HYDROCHLORIDE 500 MG/1
1000 TABLET, EXTENDED RELEASE ORAL
Qty: 180 TABLET | Refills: 3 | Status: SHIPPED | OUTPATIENT
Start: 2021-07-08 | End: 2021-09-03 | Stop reason: SDUPTHER

## 2021-07-09 ENCOUNTER — HOSPITAL ENCOUNTER (OUTPATIENT)
Dept: RADIOLOGY | Facility: HOSPITAL | Age: 76
Discharge: HOME | End: 2021-07-09
Attending: INTERNAL MEDICINE
Payer: MEDICARE

## 2021-07-09 ENCOUNTER — PATIENT MESSAGE (OUTPATIENT)
Dept: INTERNAL MEDICINE | Facility: CLINIC | Age: 76
End: 2021-07-09

## 2021-07-09 DIAGNOSIS — Z12.31 ENCOUNTER FOR SCREENING MAMMOGRAM FOR MALIGNANT NEOPLASM OF BREAST: ICD-10-CM

## 2021-07-09 PROCEDURE — 77063 MAMMO DIGITAL SCREENING BILAT WITH TOMO: ICD-10-PCS | Mod: 26,,, | Performed by: RADIOLOGY

## 2021-07-09 PROCEDURE — 77063 BREAST TOMOSYNTHESIS BI: CPT | Mod: 26,,, | Performed by: RADIOLOGY

## 2021-07-09 PROCEDURE — 77067 MAMMO DIGITAL SCREENING BILAT WITH TOMO: ICD-10-PCS | Mod: 26,,, | Performed by: RADIOLOGY

## 2021-07-09 PROCEDURE — 77067 SCR MAMMO BI INCL CAD: CPT | Mod: 26,,, | Performed by: RADIOLOGY

## 2021-07-09 PROCEDURE — 77067 SCR MAMMO BI INCL CAD: CPT | Mod: TC

## 2021-07-13 ENCOUNTER — PATIENT MESSAGE (OUTPATIENT)
Dept: INTERNAL MEDICINE | Facility: CLINIC | Age: 76
End: 2021-07-13

## 2021-07-16 ENCOUNTER — CLINICAL SUPPORT (OUTPATIENT)
Dept: AUDIOLOGY | Facility: CLINIC | Age: 76
End: 2021-07-16
Payer: MEDICARE

## 2021-07-16 DIAGNOSIS — H90.3 SENSORINEURAL HEARING LOSS, BILATERAL: Primary | ICD-10-CM

## 2021-07-16 PROCEDURE — 99499 UNLISTED E&M SERVICE: CPT | Mod: S$GLB,,, | Performed by: AUDIOLOGIST

## 2021-07-16 PROCEDURE — 99499 NO LOS: ICD-10-PCS | Mod: S$GLB,,, | Performed by: AUDIOLOGIST

## 2021-07-19 ENCOUNTER — CLINICAL SUPPORT (OUTPATIENT)
Dept: INTERNAL MEDICINE | Facility: CLINIC | Age: 76
End: 2021-07-19
Payer: MEDICARE

## 2021-07-19 ENCOUNTER — PATIENT OUTREACH (OUTPATIENT)
Dept: ADMINISTRATIVE | Facility: OTHER | Age: 76
End: 2021-07-19

## 2021-07-20 ENCOUNTER — OFFICE VISIT (OUTPATIENT)
Dept: OPTOMETRY | Facility: CLINIC | Age: 76
End: 2021-07-20
Payer: COMMERCIAL

## 2021-07-20 DIAGNOSIS — H52.4 MYOPIA WITH ASTIGMATISM AND PRESBYOPIA, BILATERAL: ICD-10-CM

## 2021-07-20 DIAGNOSIS — H52.13 MYOPIA WITH ASTIGMATISM AND PRESBYOPIA, BILATERAL: ICD-10-CM

## 2021-07-20 DIAGNOSIS — E11.9 TYPE 2 DIABETES MELLITUS WITHOUT RETINOPATHY: ICD-10-CM

## 2021-07-20 DIAGNOSIS — H52.203 MYOPIA WITH ASTIGMATISM AND PRESBYOPIA, BILATERAL: ICD-10-CM

## 2021-07-20 DIAGNOSIS — Z01.00 ROUTINE EYE EXAM: Primary | ICD-10-CM

## 2021-07-20 PROCEDURE — 99999 PR PBB SHADOW E&M-EST. PATIENT-LVL III: CPT | Mod: PBBFAC,,, | Performed by: OPTOMETRIST

## 2021-07-20 PROCEDURE — 92015 DETERMINE REFRACTIVE STATE: CPT | Mod: S$GLB,,, | Performed by: OPTOMETRIST

## 2021-07-20 PROCEDURE — 92014 COMPRE OPH EXAM EST PT 1/>: CPT | Mod: S$GLB,,, | Performed by: OPTOMETRIST

## 2021-07-20 PROCEDURE — 92015 PR REFRACTION: ICD-10-PCS | Mod: S$GLB,,, | Performed by: OPTOMETRIST

## 2021-07-20 PROCEDURE — 92014 PR EYE EXAM, EST PATIENT,COMPREHESV: ICD-10-PCS | Mod: S$GLB,,, | Performed by: OPTOMETRIST

## 2021-07-20 PROCEDURE — 99999 PR PBB SHADOW E&M-EST. PATIENT-LVL III: ICD-10-PCS | Mod: PBBFAC,,, | Performed by: OPTOMETRIST

## 2021-07-20 PROCEDURE — 99499 RISK ADDL DX/OHS AUDIT: ICD-10-PCS | Mod: S$GLB,,, | Performed by: OPTOMETRIST

## 2021-07-20 PROCEDURE — 99499 UNLISTED E&M SERVICE: CPT | Mod: S$GLB,,, | Performed by: OPTOMETRIST

## 2021-07-20 RX ORDER — VITAMIN E
OIL (ML) TOPICAL DAILY
COMMUNITY
End: 2021-09-24

## 2021-07-21 ENCOUNTER — CLINICAL SUPPORT (OUTPATIENT)
Dept: INTERNAL MEDICINE | Facility: CLINIC | Age: 76
End: 2021-07-21
Payer: MEDICARE

## 2021-07-22 ENCOUNTER — PATIENT MESSAGE (OUTPATIENT)
Dept: AUDIOLOGY | Facility: CLINIC | Age: 76
End: 2021-07-22

## 2021-07-22 ENCOUNTER — PATIENT MESSAGE (OUTPATIENT)
Dept: INTERNAL MEDICINE | Facility: CLINIC | Age: 76
End: 2021-07-22

## 2021-08-02 ENCOUNTER — PATIENT MESSAGE (OUTPATIENT)
Dept: AUDIOLOGY | Facility: CLINIC | Age: 76
End: 2021-08-02

## 2021-08-09 PROBLEM — Z91.81 AT RISK FOR FALLS: Status: ACTIVE | Noted: 2021-08-09

## 2021-08-12 ENCOUNTER — CLINICAL SUPPORT (OUTPATIENT)
Dept: INTERNAL MEDICINE | Facility: CLINIC | Age: 76
End: 2021-08-12
Payer: MEDICARE

## 2021-08-12 ENCOUNTER — TELEPHONE (OUTPATIENT)
Dept: OTOLARYNGOLOGY | Facility: CLINIC | Age: 76
End: 2021-08-12

## 2021-08-12 ENCOUNTER — TELEPHONE (OUTPATIENT)
Dept: INTERNAL MEDICINE | Facility: CLINIC | Age: 76
End: 2021-08-12

## 2021-08-12 VITALS — WEIGHT: 163.13 LBS | BODY MASS INDEX: 30.83 KG/M2

## 2021-08-13 ENCOUNTER — PATIENT MESSAGE (OUTPATIENT)
Dept: AUDIOLOGY | Facility: CLINIC | Age: 76
End: 2021-08-13

## 2021-08-13 ENCOUNTER — CLINICAL SUPPORT (OUTPATIENT)
Dept: AUDIOLOGY | Facility: CLINIC | Age: 76
End: 2021-08-13
Payer: MEDICARE

## 2021-08-13 DIAGNOSIS — H90.3 SENSORINEURAL HEARING LOSS, BILATERAL: Primary | ICD-10-CM

## 2021-08-13 PROCEDURE — 92604 PR DX ANAL COCHLEAR IMP,PT >7 YRS,REPROG: ICD-10-PCS | Mod: S$GLB,,, | Performed by: AUDIOLOGIST

## 2021-08-13 PROCEDURE — 92604 REPROGRAM COCHLEAR IMPLT 7/>: CPT | Mod: S$GLB,,, | Performed by: AUDIOLOGIST

## 2021-08-16 ENCOUNTER — PATIENT MESSAGE (OUTPATIENT)
Dept: INTERNAL MEDICINE | Facility: CLINIC | Age: 76
End: 2021-08-16

## 2021-08-16 ENCOUNTER — TELEPHONE (OUTPATIENT)
Dept: INTERNAL MEDICINE | Facility: CLINIC | Age: 76
End: 2021-08-16

## 2021-08-16 DIAGNOSIS — E11.42 DIABETIC POLYNEUROPATHY ASSOCIATED WITH TYPE 2 DIABETES MELLITUS: Primary | ICD-10-CM

## 2021-08-16 DIAGNOSIS — K21.9 GASTROESOPHAGEAL REFLUX DISEASE, UNSPECIFIED WHETHER ESOPHAGITIS PRESENT: Primary | ICD-10-CM

## 2021-08-16 RX ORDER — OMEPRAZOLE 20 MG/1
20 CAPSULE, DELAYED RELEASE ORAL DAILY
Qty: 90 CAPSULE | Refills: 3 | Status: SHIPPED | OUTPATIENT
Start: 2021-08-16 | End: 2022-01-19

## 2021-08-17 ENCOUNTER — TELEPHONE (OUTPATIENT)
Dept: OTOLARYNGOLOGY | Facility: CLINIC | Age: 76
End: 2021-08-17

## 2021-08-18 ENCOUNTER — TELEPHONE (OUTPATIENT)
Dept: INTERNAL MEDICINE | Facility: CLINIC | Age: 76
End: 2021-08-18

## 2021-08-18 RX ORDER — FLASH GLUCOSE SENSOR
KIT MISCELLANEOUS
Qty: 1 KIT | Refills: 0 | Status: SHIPPED | OUTPATIENT
Start: 2021-08-18 | End: 2021-10-01 | Stop reason: SDUPTHER

## 2021-08-18 RX ORDER — FLASH GLUCOSE SCANNING READER
EACH MISCELLANEOUS
Qty: 1 EACH | Refills: 0 | Status: SHIPPED | OUTPATIENT
Start: 2021-08-18 | End: 2021-10-01 | Stop reason: SDUPTHER

## 2021-08-23 ENCOUNTER — TELEPHONE (OUTPATIENT)
Dept: ELECTROPHYSIOLOGY | Facility: CLINIC | Age: 76
End: 2021-08-23

## 2021-08-23 DIAGNOSIS — I42.9 CARDIOMYOPATHY, UNSPECIFIED TYPE: Primary | ICD-10-CM

## 2021-08-27 NOTE — TELEPHONE ENCOUNTER
LVM for patient to return a call to the office, about previous message   [Prepares cereal] : prepares cereal [Brushes teeth, no help] : brushes teeth, no help [Good articulation and language skills] : good articulation and language skills [Counts to 10] : counts to 10 [Names 4+ colors] : names 4+ colors [Follows simple directions] : follows simple directions [Plays board/card games] : plays board/card games [Able to tie knot] : able to tie knot [Mature pencil grasp] : mature pencil grasp [Draws person with 6 parts] : draws person with 6 parts [Copies square and triangle] : copies square and triangle [Prints some letters and numbers] : prints some letters and numbers [Balances on one foot 5-6 seconds] : balances on one foot 5-6 seconds [Heel-to-toe walk] : heel to toe walk [Listens and attends] : listens and attends [Defines 5-7 words] : defines 5-7 words [Knows 2 opposites] : knows 2 opposites [Knows 3 adjectives] : knows 3 adjectives

## 2021-09-03 ENCOUNTER — TELEPHONE (OUTPATIENT)
Dept: INTERNAL MEDICINE | Facility: CLINIC | Age: 76
End: 2021-09-03

## 2021-09-03 DIAGNOSIS — E78.5 HYPERLIPIDEMIA, UNSPECIFIED HYPERLIPIDEMIA TYPE: ICD-10-CM

## 2021-09-03 DIAGNOSIS — E11.69 DIABETES MELLITUS TYPE 2 IN OBESE: ICD-10-CM

## 2021-09-03 DIAGNOSIS — F41.9 ANXIETY: ICD-10-CM

## 2021-09-03 DIAGNOSIS — E66.9 DIABETES MELLITUS TYPE 2 IN OBESE: ICD-10-CM

## 2021-09-03 DIAGNOSIS — M51.36 DDD (DEGENERATIVE DISC DISEASE), LUMBAR: ICD-10-CM

## 2021-09-03 DIAGNOSIS — E11.9 CONTROLLED TYPE 2 DIABETES MELLITUS WITHOUT COMPLICATION, WITHOUT LONG-TERM CURRENT USE OF INSULIN: ICD-10-CM

## 2021-09-03 DIAGNOSIS — F33.41 MDD (MAJOR DEPRESSIVE DISORDER), RECURRENT, IN PARTIAL REMISSION: ICD-10-CM

## 2021-09-03 RX ORDER — METFORMIN HYDROCHLORIDE 500 MG/1
1000 TABLET, EXTENDED RELEASE ORAL
Qty: 180 TABLET | Refills: 0 | Status: SHIPPED | OUTPATIENT
Start: 2021-09-03 | End: 2021-11-23

## 2021-09-03 RX ORDER — BUPROPION HYDROCHLORIDE 150 MG/1
150 TABLET ORAL DAILY
Qty: 90 TABLET | Refills: 0 | Status: SHIPPED | OUTPATIENT
Start: 2021-09-03 | End: 2021-11-24

## 2021-09-03 RX ORDER — VENLAFAXINE 75 MG/1
75 TABLET ORAL DAILY
Qty: 90 TABLET | Refills: 0 | Status: SHIPPED | OUTPATIENT
Start: 2021-09-03 | End: 2021-11-12 | Stop reason: SDUPTHER

## 2021-09-03 RX ORDER — TRAZODONE HYDROCHLORIDE 50 MG/1
25 TABLET ORAL NIGHTLY
Qty: 45 TABLET | Refills: 0 | Status: SHIPPED | OUTPATIENT
Start: 2021-09-03 | End: 2021-11-12

## 2021-09-03 RX ORDER — LOSARTAN POTASSIUM 100 MG/1
100 TABLET ORAL DAILY
Qty: 90 TABLET | Refills: 0 | Status: SHIPPED | OUTPATIENT
Start: 2021-09-03 | End: 2021-11-24

## 2021-09-03 RX ORDER — ROSUVASTATIN CALCIUM 20 MG/1
TABLET, COATED ORAL
Qty: 90 TABLET | Refills: 0 | Status: SHIPPED | OUTPATIENT
Start: 2021-09-03 | End: 2021-11-24

## 2021-09-03 RX ORDER — METOPROLOL SUCCINATE 50 MG/1
50 TABLET, EXTENDED RELEASE ORAL DAILY
Qty: 90 TABLET | Refills: 0 | Status: SHIPPED | OUTPATIENT
Start: 2021-09-03 | End: 2021-11-24

## 2021-09-03 RX ORDER — CLONAZEPAM 0.5 MG/1
0.5 TABLET ORAL DAILY PRN
Qty: 30 TABLET | Refills: 0 | Status: SHIPPED | OUTPATIENT
Start: 2021-09-03 | End: 2022-01-19

## 2021-09-03 RX ORDER — TRAMADOL HYDROCHLORIDE 50 MG/1
TABLET ORAL
Qty: 120 TABLET | Refills: 0 | Status: SHIPPED | OUTPATIENT
Start: 2021-09-03 | End: 2022-05-16 | Stop reason: SDUPTHER

## 2021-09-15 ENCOUNTER — TELEPHONE (OUTPATIENT)
Dept: INTERNAL MEDICINE | Facility: CLINIC | Age: 76
End: 2021-09-15

## 2021-09-22 ENCOUNTER — CLINICAL SUPPORT (OUTPATIENT)
Dept: CARDIOLOGY | Facility: HOSPITAL | Age: 76
End: 2021-09-22
Payer: MEDICARE

## 2021-09-22 ENCOUNTER — TELEPHONE (OUTPATIENT)
Dept: ELECTROPHYSIOLOGY | Facility: CLINIC | Age: 76
End: 2021-09-22

## 2021-09-22 DIAGNOSIS — I42.9 CARDIOMYOPATHY, UNSPECIFIED: ICD-10-CM

## 2021-09-22 DIAGNOSIS — Z95.0 PRESENCE OF CARDIAC PACEMAKER: ICD-10-CM

## 2021-09-22 DIAGNOSIS — I44.2 ATRIOVENTRICULAR BLOCK, COMPLETE: ICD-10-CM

## 2021-09-22 PROCEDURE — 93294 CARDIAC DEVICE CHECK - REMOTE: ICD-10-PCS | Mod: ,,, | Performed by: INTERNAL MEDICINE

## 2021-09-22 PROCEDURE — 93294 REM INTERROG EVL PM/LDLS PM: CPT | Mod: ,,, | Performed by: INTERNAL MEDICINE

## 2021-09-22 PROCEDURE — 93296 REM INTERROG EVL PM/IDS: CPT | Performed by: INTERNAL MEDICINE

## 2021-09-24 ENCOUNTER — HOSPITAL ENCOUNTER (OUTPATIENT)
Dept: CARDIOLOGY | Facility: CLINIC | Age: 76
Discharge: HOME | End: 2021-09-24
Payer: MEDICARE

## 2021-09-24 ENCOUNTER — CLINICAL SUPPORT (OUTPATIENT)
Dept: CARDIOLOGY | Facility: HOSPITAL | Age: 76
End: 2021-09-24
Attending: INTERNAL MEDICINE
Payer: MEDICARE

## 2021-09-24 ENCOUNTER — OFFICE VISIT (OUTPATIENT)
Dept: ELECTROPHYSIOLOGY | Facility: CLINIC | Age: 76
End: 2021-09-24
Payer: MEDICARE

## 2021-09-24 ENCOUNTER — PATIENT MESSAGE (OUTPATIENT)
Dept: ELECTROPHYSIOLOGY | Facility: CLINIC | Age: 76
End: 2021-09-24

## 2021-09-24 VITALS
SYSTOLIC BLOOD PRESSURE: 120 MMHG | WEIGHT: 163.13 LBS | HEIGHT: 61 IN | BODY MASS INDEX: 30.8 KG/M2 | HEART RATE: 76 BPM | DIASTOLIC BLOOD PRESSURE: 78 MMHG

## 2021-09-24 DIAGNOSIS — I47.10 SVT (SUPRAVENTRICULAR TACHYCARDIA): ICD-10-CM

## 2021-09-24 DIAGNOSIS — I44.1 AV BLOCK, MOBITZ II: ICD-10-CM

## 2021-09-24 DIAGNOSIS — I42.9 CARDIOMYOPATHY, UNSPECIFIED TYPE: ICD-10-CM

## 2021-09-24 DIAGNOSIS — F41.9 ANXIETY: ICD-10-CM

## 2021-09-24 DIAGNOSIS — G47.33 OSA (OBSTRUCTIVE SLEEP APNEA): ICD-10-CM

## 2021-09-24 DIAGNOSIS — Z95.0 PRESENCE OF CARDIAC RESYNCHRONIZATION THERAPY PACEMAKER (CRT-P): Primary | ICD-10-CM

## 2021-09-24 DIAGNOSIS — E66.9 OBESITY (BMI 30-39.9): ICD-10-CM

## 2021-09-24 PROCEDURE — 3074F PR MOST RECENT SYSTOLIC BLOOD PRESSURE < 130 MM HG: ICD-10-PCS | Mod: HCNC,CPTII,S$GLB, | Performed by: NURSE PRACTITIONER

## 2021-09-24 PROCEDURE — 3074F SYST BP LT 130 MM HG: CPT | Mod: HCNC,CPTII,S$GLB, | Performed by: NURSE PRACTITIONER

## 2021-09-24 PROCEDURE — 3288F PR FALLS RISK ASSESSMENT DOCUMENTED: ICD-10-PCS | Mod: HCNC,CPTII,S$GLB, | Performed by: NURSE PRACTITIONER

## 2021-09-24 PROCEDURE — 93281 CARDIAC DEVICE CHECK - IN CLINIC & HOSPITAL: ICD-10-PCS | Mod: 26,HCNC,, | Performed by: INTERNAL MEDICINE

## 2021-09-24 PROCEDURE — 3072F LOW RISK FOR RETINOPATHY: CPT | Mod: HCNC,CPTII,S$GLB, | Performed by: NURSE PRACTITIONER

## 2021-09-24 PROCEDURE — 99999 PR PBB SHADOW E&M-EST. PATIENT-LVL IV: ICD-10-PCS | Mod: PBBFAC,HCNC,, | Performed by: NURSE PRACTITIONER

## 2021-09-24 PROCEDURE — 3288F FALL RISK ASSESSMENT DOCD: CPT | Mod: HCNC,CPTII,S$GLB, | Performed by: NURSE PRACTITIONER

## 2021-09-24 PROCEDURE — 99999 PR PBB SHADOW E&M-EST. PATIENT-LVL IV: CPT | Mod: PBBFAC,HCNC,, | Performed by: NURSE PRACTITIONER

## 2021-09-24 PROCEDURE — 1159F PR MEDICATION LIST DOCUMENTED IN MEDICAL RECORD: ICD-10-PCS | Mod: HCNC,CPTII,S$GLB, | Performed by: NURSE PRACTITIONER

## 2021-09-24 PROCEDURE — 3078F PR MOST RECENT DIASTOLIC BLOOD PRESSURE < 80 MM HG: ICD-10-PCS | Mod: HCNC,CPTII,S$GLB, | Performed by: NURSE PRACTITIONER

## 2021-09-24 PROCEDURE — 99214 PR OFFICE/OUTPT VISIT, EST, LEVL IV, 30-39 MIN: ICD-10-PCS | Mod: HCNC,S$GLB,, | Performed by: NURSE PRACTITIONER

## 2021-09-24 PROCEDURE — 93281 PM DEVICE PROGR EVAL MULTI: CPT | Mod: HCNC

## 2021-09-24 PROCEDURE — 1101F PT FALLS ASSESS-DOCD LE1/YR: CPT | Mod: HCNC,CPTII,S$GLB, | Performed by: NURSE PRACTITIONER

## 2021-09-24 PROCEDURE — 1160F PR REVIEW ALL MEDS BY PRESCRIBER/CLIN PHARMACIST DOCUMENTED: ICD-10-PCS | Mod: HCNC,CPTII,S$GLB, | Performed by: NURSE PRACTITIONER

## 2021-09-24 PROCEDURE — 93281 PM DEVICE PROGR EVAL MULTI: CPT | Mod: 26,HCNC,, | Performed by: INTERNAL MEDICINE

## 2021-09-24 PROCEDURE — 3072F PR LOW RISK FOR RETINOPATHY: ICD-10-PCS | Mod: HCNC,CPTII,S$GLB, | Performed by: NURSE PRACTITIONER

## 2021-09-24 PROCEDURE — 1160F RVW MEDS BY RX/DR IN RCRD: CPT | Mod: HCNC,CPTII,S$GLB, | Performed by: NURSE PRACTITIONER

## 2021-09-24 PROCEDURE — 3078F DIAST BP <80 MM HG: CPT | Mod: HCNC,CPTII,S$GLB, | Performed by: NURSE PRACTITIONER

## 2021-09-24 PROCEDURE — 1159F MED LIST DOCD IN RCRD: CPT | Mod: HCNC,CPTII,S$GLB, | Performed by: NURSE PRACTITIONER

## 2021-09-24 PROCEDURE — 1157F ADVNC CARE PLAN IN RCRD: CPT | Mod: HCNC,CPTII,S$GLB, | Performed by: NURSE PRACTITIONER

## 2021-09-24 PROCEDURE — 1157F PR ADVANCE CARE PLAN OR EQUIV PRESENT IN MEDICAL RECORD: ICD-10-PCS | Mod: HCNC,CPTII,S$GLB, | Performed by: NURSE PRACTITIONER

## 2021-09-24 PROCEDURE — 1126F PR PAIN SEVERITY QUANTIFIED, NO PAIN PRESENT: ICD-10-PCS | Mod: HCNC,CPTII,S$GLB, | Performed by: NURSE PRACTITIONER

## 2021-09-24 PROCEDURE — 93005 RHYTHM STRIP: ICD-10-PCS | Mod: HCNC,S$GLB,, | Performed by: INTERNAL MEDICINE

## 2021-09-24 PROCEDURE — 99214 OFFICE O/P EST MOD 30 MIN: CPT | Mod: HCNC,S$GLB,, | Performed by: NURSE PRACTITIONER

## 2021-09-24 PROCEDURE — 1126F AMNT PAIN NOTED NONE PRSNT: CPT | Mod: HCNC,CPTII,S$GLB, | Performed by: NURSE PRACTITIONER

## 2021-09-24 PROCEDURE — 1101F PR PT FALLS ASSESS DOC 0-1 FALLS W/OUT INJ PAST YR: ICD-10-PCS | Mod: HCNC,CPTII,S$GLB, | Performed by: NURSE PRACTITIONER

## 2021-09-24 PROCEDURE — 93010 ELECTROCARDIOGRAM REPORT: CPT | Mod: HCNC,S$GLB,, | Performed by: INTERNAL MEDICINE

## 2021-09-24 PROCEDURE — 93010 RHYTHM STRIP: ICD-10-PCS | Mod: HCNC,S$GLB,, | Performed by: INTERNAL MEDICINE

## 2021-09-24 PROCEDURE — 93005 ELECTROCARDIOGRAM TRACING: CPT | Mod: HCNC,S$GLB,, | Performed by: INTERNAL MEDICINE

## 2021-09-25 RX ORDER — CLONAZEPAM 0.5 MG/1
0.5 TABLET ORAL DAILY PRN
Qty: 30 TABLET | Refills: 3 | Status: SHIPPED | OUTPATIENT
Start: 2021-09-25 | End: 2022-01-19

## 2021-09-25 RX ORDER — TRAZODONE HYDROCHLORIDE 50 MG/1
50 TABLET ORAL NIGHTLY
Qty: 90 TABLET | Refills: 3 | Status: SHIPPED | OUTPATIENT
Start: 2021-09-25 | End: 2022-01-19

## 2021-09-29 ENCOUNTER — IMMUNIZATION (OUTPATIENT)
Dept: INTERNAL MEDICINE | Facility: CLINIC | Age: 76
End: 2021-09-29
Payer: MEDICARE

## 2021-09-29 DIAGNOSIS — Z23 NEED FOR VACCINATION: Primary | ICD-10-CM

## 2021-09-29 PROCEDURE — 0003A COVID-19, MRNA, LNP-S, PF, 30 MCG/0.3 ML DOSE VACCINE: CPT | Mod: HCNC,CV19,PBBFAC | Performed by: INTERNAL MEDICINE

## 2021-09-29 PROCEDURE — 91300 COVID-19, MRNA, LNP-S, PF, 30 MCG/0.3 ML DOSE VACCINE: CPT | Mod: HCNC,PBBFAC | Performed by: INTERNAL MEDICINE

## 2021-10-01 ENCOUNTER — CLINICAL SUPPORT (OUTPATIENT)
Dept: INTERNAL MEDICINE | Facility: CLINIC | Age: 76
End: 2021-10-01
Payer: MEDICARE

## 2021-10-01 VITALS — WEIGHT: 162.5 LBS | BODY MASS INDEX: 30.7 KG/M2

## 2021-10-01 DIAGNOSIS — E11.42 DIABETIC POLYNEUROPATHY ASSOCIATED WITH TYPE 2 DIABETES MELLITUS: ICD-10-CM

## 2021-10-01 RX ORDER — FLASH GLUCOSE SENSOR
KIT MISCELLANEOUS
Qty: 1 KIT | Refills: 0 | Status: SHIPPED | OUTPATIENT
Start: 2021-10-01 | End: 2021-10-14 | Stop reason: SDUPTHER

## 2021-10-01 RX ORDER — FLASH GLUCOSE SCANNING READER
EACH MISCELLANEOUS
Qty: 1 EACH | Refills: 0 | Status: SHIPPED | OUTPATIENT
Start: 2021-10-01 | End: 2021-10-14 | Stop reason: SDUPTHER

## 2021-10-06 ENCOUNTER — TELEPHONE (OUTPATIENT)
Dept: OTOLARYNGOLOGY | Facility: CLINIC | Age: 76
End: 2021-10-06

## 2021-10-11 ENCOUNTER — PATIENT MESSAGE (OUTPATIENT)
Dept: AUDIOLOGY | Facility: CLINIC | Age: 76
End: 2021-10-11

## 2021-10-14 ENCOUNTER — PATIENT MESSAGE (OUTPATIENT)
Dept: INTERNAL MEDICINE | Facility: CLINIC | Age: 76
End: 2021-10-14

## 2021-10-14 DIAGNOSIS — E11.42 DIABETIC POLYNEUROPATHY ASSOCIATED WITH TYPE 2 DIABETES MELLITUS: ICD-10-CM

## 2021-10-14 RX ORDER — FLASH GLUCOSE SCANNING READER
EACH MISCELLANEOUS
Qty: 2 EACH | Refills: 11 | Status: SHIPPED | OUTPATIENT
Start: 2021-10-14 | End: 2022-01-19

## 2021-10-14 RX ORDER — FLASH GLUCOSE SENSOR
KIT MISCELLANEOUS
Qty: 2 KIT | Refills: 11 | Status: SHIPPED | OUTPATIENT
Start: 2021-10-14 | End: 2022-01-19

## 2021-10-14 RX ORDER — FLASH GLUCOSE SENSOR
KIT MISCELLANEOUS
Qty: 1 KIT | Refills: 0 | Status: SHIPPED | OUTPATIENT
Start: 2021-10-14 | End: 2021-10-14 | Stop reason: SDUPTHER

## 2021-10-15 ENCOUNTER — CLINICAL SUPPORT (OUTPATIENT)
Dept: AUDIOLOGY | Facility: CLINIC | Age: 76
End: 2021-10-15
Payer: MEDICARE

## 2021-10-15 DIAGNOSIS — H90.3 SENSORINEURAL HEARING LOSS, BILATERAL: Primary | ICD-10-CM

## 2021-10-15 PROCEDURE — 92604 PR DX ANAL COCHLEAR IMP,PT >7 YRS,REPROG: ICD-10-PCS | Mod: HCNC,S$GLB,, | Performed by: AUDIOLOGIST

## 2021-10-15 PROCEDURE — 92650 AEP SCR AUDITORY POTENTIAL: CPT | Mod: HCNC,59,S$GLB, | Performed by: AUDIOLOGIST

## 2021-10-15 PROCEDURE — 92650 PR AUDITORY EVOKED POTENTIAL, SCREEN W/AUTO ANALYSIS: ICD-10-PCS | Mod: HCNC,59,S$GLB, | Performed by: AUDIOLOGIST

## 2021-10-15 PROCEDURE — 92604 REPROGRAM COCHLEAR IMPLT 7/>: CPT | Mod: HCNC,S$GLB,, | Performed by: AUDIOLOGIST

## 2021-10-20 ENCOUNTER — TELEPHONE (OUTPATIENT)
Dept: OTOLARYNGOLOGY | Facility: CLINIC | Age: 76
End: 2021-10-20

## 2021-10-20 ENCOUNTER — PATIENT MESSAGE (OUTPATIENT)
Dept: OTOLARYNGOLOGY | Facility: CLINIC | Age: 76
End: 2021-10-20
Payer: MEDICARE

## 2021-10-21 ENCOUNTER — PATIENT MESSAGE (OUTPATIENT)
Dept: AUDIOLOGY | Facility: CLINIC | Age: 76
End: 2021-10-21
Payer: MEDICARE

## 2021-10-25 ENCOUNTER — CLINICAL SUPPORT (OUTPATIENT)
Dept: INTERNAL MEDICINE | Facility: CLINIC | Age: 76
End: 2021-10-25
Payer: MEDICARE

## 2021-10-25 ENCOUNTER — TELEPHONE (OUTPATIENT)
Dept: INTERNAL MEDICINE | Facility: CLINIC | Age: 76
End: 2021-10-25

## 2021-10-25 ENCOUNTER — PATIENT MESSAGE (OUTPATIENT)
Dept: ADMINISTRATIVE | Facility: OTHER | Age: 76
End: 2021-10-25
Payer: MEDICARE

## 2021-10-25 VITALS — WEIGHT: 160.5 LBS | BODY MASS INDEX: 30.33 KG/M2

## 2021-10-28 ENCOUNTER — OFFICE VISIT (OUTPATIENT)
Dept: ORTHOPEDICS | Facility: CLINIC | Age: 76
End: 2021-10-28
Payer: MEDICARE

## 2021-10-28 ENCOUNTER — HOSPITAL ENCOUNTER (OUTPATIENT)
Dept: RADIOLOGY | Facility: HOSPITAL | Age: 76
Discharge: HOME | End: 2021-10-28
Attending: INTERNAL MEDICINE
Payer: MEDICARE

## 2021-10-28 ENCOUNTER — OFFICE VISIT (OUTPATIENT)
Dept: INTERNAL MEDICINE | Facility: CLINIC | Age: 76
End: 2021-10-28
Payer: MEDICARE

## 2021-10-28 VITALS
OXYGEN SATURATION: 97 % | WEIGHT: 160.06 LBS | HEART RATE: 73 BPM | DIASTOLIC BLOOD PRESSURE: 72 MMHG | BODY MASS INDEX: 30.22 KG/M2 | HEIGHT: 61 IN | SYSTOLIC BLOOD PRESSURE: 124 MMHG | TEMPERATURE: 98 F

## 2021-10-28 VITALS — WEIGHT: 162.06 LBS | HEIGHT: 61 IN | BODY MASS INDEX: 30.6 KG/M2

## 2021-10-28 DIAGNOSIS — M25.461 PAIN AND SWELLING OF RIGHT KNEE: ICD-10-CM

## 2021-10-28 DIAGNOSIS — S82.024A CLOSED NONDISPLACED LONGITUDINAL FRACTURE OF RIGHT PATELLA, INITIAL ENCOUNTER: ICD-10-CM

## 2021-10-28 DIAGNOSIS — M25.561 PAIN AND SWELLING OF RIGHT KNEE: ICD-10-CM

## 2021-10-28 DIAGNOSIS — S82.024A CLOSED NONDISPLACED LONGITUDINAL FRACTURE OF RIGHT PATELLA, INITIAL ENCOUNTER: Primary | ICD-10-CM

## 2021-10-28 DIAGNOSIS — M25.461 PAIN AND SWELLING OF RIGHT KNEE: Primary | ICD-10-CM

## 2021-10-28 DIAGNOSIS — M25.561 PAIN AND SWELLING OF RIGHT KNEE: Primary | ICD-10-CM

## 2021-10-28 PROCEDURE — 1125F PR PAIN SEVERITY QUANTIFIED, PAIN PRESENT: ICD-10-PCS | Mod: HCNC,CPTII,S$GLB, | Performed by: INTERNAL MEDICINE

## 2021-10-28 PROCEDURE — 73560 X-RAY EXAM OF KNEE 1 OR 2: CPT | Mod: TC,HCNC,LT

## 2021-10-28 PROCEDURE — 99214 OFFICE O/P EST MOD 30 MIN: CPT | Mod: HCNC,25,S$GLB, | Performed by: INTERNAL MEDICINE

## 2021-10-28 PROCEDURE — 1160F PR REVIEW ALL MEDS BY PRESCRIBER/CLIN PHARMACIST DOCUMENTED: ICD-10-PCS | Mod: HCNC,CPTII,S$GLB, | Performed by: INTERNAL MEDICINE

## 2021-10-28 PROCEDURE — 1125F PR PAIN SEVERITY QUANTIFIED, PAIN PRESENT: ICD-10-PCS | Mod: HCNC,CPTII,S$GLB, | Performed by: ORTHOPAEDIC SURGERY

## 2021-10-28 PROCEDURE — 3078F DIAST BP <80 MM HG: CPT | Mod: HCNC,CPTII,S$GLB, | Performed by: INTERNAL MEDICINE

## 2021-10-28 PROCEDURE — 1159F MED LIST DOCD IN RCRD: CPT | Mod: HCNC,CPTII,S$GLB, | Performed by: INTERNAL MEDICINE

## 2021-10-28 PROCEDURE — 99214 PR OFFICE/OUTPT VISIT, EST, LEVL IV, 30-39 MIN: ICD-10-PCS | Mod: HCNC,25,S$GLB, | Performed by: INTERNAL MEDICINE

## 2021-10-28 PROCEDURE — 1125F AMNT PAIN NOTED PAIN PRSNT: CPT | Mod: HCNC,CPTII,S$GLB, | Performed by: ORTHOPAEDIC SURGERY

## 2021-10-28 PROCEDURE — 1159F PR MEDICATION LIST DOCUMENTED IN MEDICAL RECORD: ICD-10-PCS | Mod: HCNC,CPTII,S$GLB, | Performed by: ORTHOPAEDIC SURGERY

## 2021-10-28 PROCEDURE — 99999 PR PBB SHADOW E&M-EST. PATIENT-LVL IV: ICD-10-PCS | Mod: PBBFAC,HCNC,, | Performed by: INTERNAL MEDICINE

## 2021-10-28 PROCEDURE — 1157F ADVNC CARE PLAN IN RCRD: CPT | Mod: HCNC,CPTII,S$GLB, | Performed by: ORTHOPAEDIC SURGERY

## 2021-10-28 PROCEDURE — 99203 OFFICE O/P NEW LOW 30 MIN: CPT | Mod: HCNC,S$GLB,, | Performed by: ORTHOPAEDIC SURGERY

## 2021-10-28 PROCEDURE — 1159F MED LIST DOCD IN RCRD: CPT | Mod: HCNC,CPTII,S$GLB, | Performed by: ORTHOPAEDIC SURGERY

## 2021-10-28 PROCEDURE — 73562 XR KNEE ORTHO RIGHT: ICD-10-PCS | Mod: 26,HCNC,RT, | Performed by: RADIOLOGY

## 2021-10-28 PROCEDURE — 1160F PR REVIEW ALL MEDS BY PRESCRIBER/CLIN PHARMACIST DOCUMENTED: ICD-10-PCS | Mod: HCNC,CPTII,S$GLB, | Performed by: ORTHOPAEDIC SURGERY

## 2021-10-28 PROCEDURE — 3074F PR MOST RECENT SYSTOLIC BLOOD PRESSURE < 130 MM HG: ICD-10-PCS | Mod: HCNC,CPTII,S$GLB, | Performed by: INTERNAL MEDICINE

## 2021-10-28 PROCEDURE — 3078F PR MOST RECENT DIASTOLIC BLOOD PRESSURE < 80 MM HG: ICD-10-PCS | Mod: HCNC,CPTII,S$GLB, | Performed by: INTERNAL MEDICINE

## 2021-10-28 PROCEDURE — 96372 PR INJECTION,THERAP/PROPH/DIAG2ST, IM OR SUBCUT: ICD-10-PCS | Mod: HCNC,S$GLB,, | Performed by: INTERNAL MEDICINE

## 2021-10-28 PROCEDURE — 1157F PR ADVANCE CARE PLAN OR EQUIV PRESENT IN MEDICAL RECORD: ICD-10-PCS | Mod: HCNC,CPTII,S$GLB, | Performed by: INTERNAL MEDICINE

## 2021-10-28 PROCEDURE — 1125F AMNT PAIN NOTED PAIN PRSNT: CPT | Mod: HCNC,CPTII,S$GLB, | Performed by: INTERNAL MEDICINE

## 2021-10-28 PROCEDURE — 3072F LOW RISK FOR RETINOPATHY: CPT | Mod: HCNC,CPTII,S$GLB, | Performed by: INTERNAL MEDICINE

## 2021-10-28 PROCEDURE — 96372 THER/PROPH/DIAG INJ SC/IM: CPT | Mod: HCNC,S$GLB,, | Performed by: INTERNAL MEDICINE

## 2021-10-28 PROCEDURE — 3072F LOW RISK FOR RETINOPATHY: CPT | Mod: HCNC,CPTII,S$GLB, | Performed by: ORTHOPAEDIC SURGERY

## 2021-10-28 PROCEDURE — 1160F RVW MEDS BY RX/DR IN RCRD: CPT | Mod: HCNC,CPTII,S$GLB, | Performed by: INTERNAL MEDICINE

## 2021-10-28 PROCEDURE — 99999 PR PBB SHADOW E&M-EST. PATIENT-LVL III: CPT | Mod: PBBFAC,HCNC,, | Performed by: ORTHOPAEDIC SURGERY

## 2021-10-28 PROCEDURE — 3072F PR LOW RISK FOR RETINOPATHY: ICD-10-PCS | Mod: HCNC,CPTII,S$GLB, | Performed by: ORTHOPAEDIC SURGERY

## 2021-10-28 PROCEDURE — 3074F SYST BP LT 130 MM HG: CPT | Mod: HCNC,CPTII,S$GLB, | Performed by: INTERNAL MEDICINE

## 2021-10-28 PROCEDURE — 1159F PR MEDICATION LIST DOCUMENTED IN MEDICAL RECORD: ICD-10-PCS | Mod: HCNC,CPTII,S$GLB, | Performed by: INTERNAL MEDICINE

## 2021-10-28 PROCEDURE — 1157F PR ADVANCE CARE PLAN OR EQUIV PRESENT IN MEDICAL RECORD: ICD-10-PCS | Mod: HCNC,CPTII,S$GLB, | Performed by: ORTHOPAEDIC SURGERY

## 2021-10-28 PROCEDURE — 1100F PTFALLS ASSESS-DOCD GE2>/YR: CPT | Mod: HCNC,CPTII,S$GLB, | Performed by: INTERNAL MEDICINE

## 2021-10-28 PROCEDURE — 3288F PR FALLS RISK ASSESSMENT DOCUMENTED: ICD-10-PCS | Mod: HCNC,CPTII,S$GLB, | Performed by: INTERNAL MEDICINE

## 2021-10-28 PROCEDURE — 1160F RVW MEDS BY RX/DR IN RCRD: CPT | Mod: HCNC,CPTII,S$GLB, | Performed by: ORTHOPAEDIC SURGERY

## 2021-10-28 PROCEDURE — 3072F PR LOW RISK FOR RETINOPATHY: ICD-10-PCS | Mod: HCNC,CPTII,S$GLB, | Performed by: INTERNAL MEDICINE

## 2021-10-28 PROCEDURE — 99203 PR OFFICE/OUTPT VISIT, NEW, LEVL III, 30-44 MIN: ICD-10-PCS | Mod: HCNC,S$GLB,, | Performed by: ORTHOPAEDIC SURGERY

## 2021-10-28 PROCEDURE — 99999 PR PBB SHADOW E&M-EST. PATIENT-LVL III: ICD-10-PCS | Mod: PBBFAC,HCNC,, | Performed by: ORTHOPAEDIC SURGERY

## 2021-10-28 PROCEDURE — 99999 PR PBB SHADOW E&M-EST. PATIENT-LVL IV: CPT | Mod: PBBFAC,HCNC,, | Performed by: INTERNAL MEDICINE

## 2021-10-28 PROCEDURE — 1100F PR PT FALLS ASSESS DOC 2+ FALLS/FALL W/INJURY/YR: ICD-10-PCS | Mod: HCNC,CPTII,S$GLB, | Performed by: INTERNAL MEDICINE

## 2021-10-28 PROCEDURE — 73562 X-RAY EXAM OF KNEE 3: CPT | Mod: 26,HCNC,RT, | Performed by: RADIOLOGY

## 2021-10-28 PROCEDURE — 1157F ADVNC CARE PLAN IN RCRD: CPT | Mod: HCNC,CPTII,S$GLB, | Performed by: INTERNAL MEDICINE

## 2021-10-28 PROCEDURE — 3288F FALL RISK ASSESSMENT DOCD: CPT | Mod: HCNC,CPTII,S$GLB, | Performed by: INTERNAL MEDICINE

## 2021-10-28 RX ORDER — KETOROLAC TROMETHAMINE 30 MG/ML
30 INJECTION, SOLUTION INTRAMUSCULAR; INTRAVENOUS
Status: COMPLETED | OUTPATIENT
Start: 2021-10-28 | End: 2021-10-28

## 2021-10-28 RX ORDER — HYDROCODONE BITARTRATE AND ACETAMINOPHEN 5; 325 MG/1; MG/1
1 TABLET ORAL EVERY 6 HOURS PRN
Qty: 30 TABLET | Refills: 0 | Status: SHIPPED | OUTPATIENT
Start: 2021-10-28 | End: 2021-12-06

## 2021-10-28 RX ADMIN — KETOROLAC TROMETHAMINE 30 MG: 30 INJECTION, SOLUTION INTRAMUSCULAR; INTRAVENOUS at 12:10

## 2021-10-29 PROBLEM — S82.024A CLOSED NONDISPLACED LONGITUDINAL FRACTURE OF RIGHT PATELLA: Status: ACTIVE | Noted: 2021-10-29

## 2021-11-03 ENCOUNTER — PATIENT MESSAGE (OUTPATIENT)
Dept: INTERNAL MEDICINE | Facility: CLINIC | Age: 76
End: 2021-11-03
Payer: MEDICARE

## 2021-11-03 ENCOUNTER — PATIENT MESSAGE (OUTPATIENT)
Dept: AUDIOLOGY | Facility: CLINIC | Age: 76
End: 2021-11-03
Payer: MEDICARE

## 2021-11-03 DIAGNOSIS — G47.33 OSA (OBSTRUCTIVE SLEEP APNEA): Primary | ICD-10-CM

## 2021-11-04 ENCOUNTER — HOSPITAL ENCOUNTER (OUTPATIENT)
Dept: RADIOLOGY | Facility: HOSPITAL | Age: 76
Discharge: HOME | End: 2021-11-04
Attending: ORTHOPAEDIC SURGERY
Payer: MEDICARE

## 2021-11-04 ENCOUNTER — OFFICE VISIT (OUTPATIENT)
Dept: ORTHOPEDICS | Facility: CLINIC | Age: 76
End: 2021-11-04
Payer: MEDICARE

## 2021-11-04 DIAGNOSIS — S82.024D CLOSED NONDISPLACED LONGITUDINAL FRACTURE OF RIGHT PATELLA WITH ROUTINE HEALING, SUBSEQUENT ENCOUNTER: Primary | ICD-10-CM

## 2021-11-04 DIAGNOSIS — S82.024A CLOSED NONDISPLACED LONGITUDINAL FRACTURE OF RIGHT PATELLA, INITIAL ENCOUNTER: Primary | ICD-10-CM

## 2021-11-04 DIAGNOSIS — S82.024A CLOSED NONDISPLACED LONGITUDINAL FRACTURE OF RIGHT PATELLA, INITIAL ENCOUNTER: ICD-10-CM

## 2021-11-04 PROCEDURE — 73562 XR KNEE ORTHO RIGHT: ICD-10-PCS | Mod: 26,HCNC,RT, | Performed by: RADIOLOGY

## 2021-11-04 PROCEDURE — 73562 X-RAY EXAM OF KNEE 3: CPT | Mod: 26,HCNC,RT, | Performed by: RADIOLOGY

## 2021-11-04 PROCEDURE — 73560 XR KNEE ORTHO RIGHT: ICD-10-PCS | Mod: 26,HCNC,LT, | Performed by: RADIOLOGY

## 2021-11-04 PROCEDURE — 99213 PR OFFICE/OUTPT VISIT, EST, LEVL III, 20-29 MIN: ICD-10-PCS | Mod: HCNC,S$GLB,, | Performed by: ORTHOPAEDIC SURGERY

## 2021-11-04 PROCEDURE — 99999 PR PBB SHADOW E&M-EST. PATIENT-LVL I: CPT | Mod: PBBFAC,HCNC,, | Performed by: ORTHOPAEDIC SURGERY

## 2021-11-04 PROCEDURE — 1157F ADVNC CARE PLAN IN RCRD: CPT | Mod: HCNC,CPTII,S$GLB, | Performed by: ORTHOPAEDIC SURGERY

## 2021-11-04 PROCEDURE — 1159F MED LIST DOCD IN RCRD: CPT | Mod: HCNC,CPTII,S$GLB, | Performed by: ORTHOPAEDIC SURGERY

## 2021-11-04 PROCEDURE — 1157F PR ADVANCE CARE PLAN OR EQUIV PRESENT IN MEDICAL RECORD: ICD-10-PCS | Mod: HCNC,CPTII,S$GLB, | Performed by: ORTHOPAEDIC SURGERY

## 2021-11-04 PROCEDURE — 3072F PR LOW RISK FOR RETINOPATHY: ICD-10-PCS | Mod: HCNC,CPTII,S$GLB, | Performed by: ORTHOPAEDIC SURGERY

## 2021-11-04 PROCEDURE — 99999 PR PBB SHADOW E&M-EST. PATIENT-LVL I: ICD-10-PCS | Mod: PBBFAC,HCNC,, | Performed by: ORTHOPAEDIC SURGERY

## 2021-11-04 PROCEDURE — 1160F RVW MEDS BY RX/DR IN RCRD: CPT | Mod: HCNC,CPTII,S$GLB, | Performed by: ORTHOPAEDIC SURGERY

## 2021-11-04 PROCEDURE — 73560 X-RAY EXAM OF KNEE 1 OR 2: CPT | Mod: 26,HCNC,LT, | Performed by: RADIOLOGY

## 2021-11-04 PROCEDURE — 73560 X-RAY EXAM OF KNEE 1 OR 2: CPT | Mod: TC,HCNC,LT

## 2021-11-04 PROCEDURE — 1160F PR REVIEW ALL MEDS BY PRESCRIBER/CLIN PHARMACIST DOCUMENTED: ICD-10-PCS | Mod: HCNC,CPTII,S$GLB, | Performed by: ORTHOPAEDIC SURGERY

## 2021-11-04 PROCEDURE — 3072F LOW RISK FOR RETINOPATHY: CPT | Mod: HCNC,CPTII,S$GLB, | Performed by: ORTHOPAEDIC SURGERY

## 2021-11-04 PROCEDURE — 1159F PR MEDICATION LIST DOCUMENTED IN MEDICAL RECORD: ICD-10-PCS | Mod: HCNC,CPTII,S$GLB, | Performed by: ORTHOPAEDIC SURGERY

## 2021-11-04 PROCEDURE — 99213 OFFICE O/P EST LOW 20 MIN: CPT | Mod: HCNC,S$GLB,, | Performed by: ORTHOPAEDIC SURGERY

## 2021-11-08 ENCOUNTER — PATIENT MESSAGE (OUTPATIENT)
Dept: INTERNAL MEDICINE | Facility: CLINIC | Age: 76
End: 2021-11-08
Payer: MEDICARE

## 2021-11-08 ENCOUNTER — PATIENT MESSAGE (OUTPATIENT)
Dept: ORTHOPEDICS | Facility: CLINIC | Age: 76
End: 2021-11-08
Payer: MEDICARE

## 2021-11-10 ENCOUNTER — PATIENT MESSAGE (OUTPATIENT)
Dept: INTERNAL MEDICINE | Facility: CLINIC | Age: 76
End: 2021-11-10
Payer: MEDICARE

## 2021-11-10 DIAGNOSIS — E55.9 VITAMIN D DEFICIENCY: ICD-10-CM

## 2021-11-10 DIAGNOSIS — E78.5 HYPERLIPIDEMIA ASSOCIATED WITH TYPE 2 DIABETES MELLITUS: Primary | ICD-10-CM

## 2021-11-10 DIAGNOSIS — E11.69 HYPERLIPIDEMIA ASSOCIATED WITH TYPE 2 DIABETES MELLITUS: Primary | ICD-10-CM

## 2021-11-11 ENCOUNTER — LAB VISIT (OUTPATIENT)
Dept: LAB | Facility: HOSPITAL | Age: 76
End: 2021-11-11
Attending: INTERNAL MEDICINE
Payer: MEDICARE

## 2021-11-11 DIAGNOSIS — E11.69 HYPERLIPIDEMIA ASSOCIATED WITH TYPE 2 DIABETES MELLITUS: ICD-10-CM

## 2021-11-11 DIAGNOSIS — E55.9 VITAMIN D DEFICIENCY: ICD-10-CM

## 2021-11-11 DIAGNOSIS — E78.5 HYPERLIPIDEMIA ASSOCIATED WITH TYPE 2 DIABETES MELLITUS: ICD-10-CM

## 2021-11-11 LAB
25(OH)D3+25(OH)D2 SERPL-MCNC: 46 NG/ML (ref 30–96)
ALBUMIN SERPL BCP-MCNC: 4.2 G/DL (ref 3.5–5.2)
ALP SERPL-CCNC: 63 U/L (ref 55–135)
ALT SERPL W/O P-5'-P-CCNC: 34 U/L (ref 10–44)
ANION GAP SERPL CALC-SCNC: 9 MMOL/L (ref 8–16)
AST SERPL-CCNC: 25 U/L (ref 10–40)
BILIRUB SERPL-MCNC: 0.6 MG/DL (ref 0.1–1)
BUN SERPL-MCNC: 12 MG/DL (ref 8–23)
CALCIUM SERPL-MCNC: 9.7 MG/DL (ref 8.7–10.5)
CHLORIDE SERPL-SCNC: 101 MMOL/L (ref 95–110)
CHOLEST SERPL-MCNC: 233 MG/DL (ref 120–199)
CHOLEST/HDLC SERPL: 6.7 {RATIO} (ref 2–5)
CO2 SERPL-SCNC: 30 MMOL/L (ref 23–29)
CREAT SERPL-MCNC: 0.9 MG/DL (ref 0.5–1.4)
EST. GFR  (AFRICAN AMERICAN): >60 ML/MIN/1.73 M^2
EST. GFR  (NON AFRICAN AMERICAN): >60 ML/MIN/1.73 M^2
ESTIMATED AVG GLUCOSE: 137 MG/DL (ref 68–131)
GLUCOSE SERPL-MCNC: 136 MG/DL (ref 70–110)
HBA1C MFR BLD: 6.4 % (ref 4–5.6)
HDLC SERPL-MCNC: 35 MG/DL (ref 40–75)
HDLC SERPL: 15 % (ref 20–50)
LDLC SERPL CALC-MCNC: ABNORMAL MG/DL (ref 63–159)
NONHDLC SERPL-MCNC: 198 MG/DL
POTASSIUM SERPL-SCNC: 4.3 MMOL/L (ref 3.5–5.1)
PROT SERPL-MCNC: 7.3 G/DL (ref 6–8.4)
SODIUM SERPL-SCNC: 140 MMOL/L (ref 136–145)
TRIGL SERPL-MCNC: 553 MG/DL (ref 30–150)

## 2021-11-11 PROCEDURE — 82306 VITAMIN D 25 HYDROXY: CPT | Mod: HCNC | Performed by: INTERNAL MEDICINE

## 2021-11-11 PROCEDURE — 83036 HEMOGLOBIN GLYCOSYLATED A1C: CPT | Mod: HCNC | Performed by: INTERNAL MEDICINE

## 2021-11-11 PROCEDURE — 80053 COMPREHEN METABOLIC PANEL: CPT | Mod: HCNC | Performed by: INTERNAL MEDICINE

## 2021-11-11 PROCEDURE — 36415 COLL VENOUS BLD VENIPUNCTURE: CPT | Mod: HCNC | Performed by: INTERNAL MEDICINE

## 2021-11-11 PROCEDURE — 80061 LIPID PANEL: CPT | Mod: HCNC | Performed by: INTERNAL MEDICINE

## 2021-11-12 ENCOUNTER — OFFICE VISIT (OUTPATIENT)
Dept: INTERNAL MEDICINE | Facility: CLINIC | Age: 76
End: 2021-11-12
Payer: MEDICARE

## 2021-11-12 VITALS
WEIGHT: 154.75 LBS | OXYGEN SATURATION: 96 % | BODY MASS INDEX: 29.22 KG/M2 | TEMPERATURE: 99 F | DIASTOLIC BLOOD PRESSURE: 86 MMHG | HEIGHT: 61 IN | HEART RATE: 69 BPM | SYSTOLIC BLOOD PRESSURE: 130 MMHG

## 2021-11-12 DIAGNOSIS — L98.9 SKIN LESION: ICD-10-CM

## 2021-11-12 DIAGNOSIS — S82.024D CLOSED NONDISPLACED LONGITUDINAL FRACTURE OF RIGHT PATELLA WITH ROUTINE HEALING, SUBSEQUENT ENCOUNTER: Primary | ICD-10-CM

## 2021-11-12 DIAGNOSIS — F33.41 MDD (MAJOR DEPRESSIVE DISORDER), RECURRENT, IN PARTIAL REMISSION: ICD-10-CM

## 2021-11-12 DIAGNOSIS — F41.9 ANXIETY: ICD-10-CM

## 2021-11-12 DIAGNOSIS — Z12.11 COLON CANCER SCREENING: ICD-10-CM

## 2021-11-12 DIAGNOSIS — E78.1 HYPERTRIGLYCERIDEMIA: ICD-10-CM

## 2021-11-12 DIAGNOSIS — K21.9 GASTROESOPHAGEAL REFLUX DISEASE, UNSPECIFIED WHETHER ESOPHAGITIS PRESENT: ICD-10-CM

## 2021-11-12 PROCEDURE — 99214 OFFICE O/P EST MOD 30 MIN: CPT | Mod: HCNC,S$GLB,, | Performed by: INTERNAL MEDICINE

## 2021-11-12 PROCEDURE — 99499 RISK ADDL DX/OHS AUDIT: ICD-10-PCS | Mod: S$GLB,,, | Performed by: INTERNAL MEDICINE

## 2021-11-12 PROCEDURE — 99214 PR OFFICE/OUTPT VISIT, EST, LEVL IV, 30-39 MIN: ICD-10-PCS | Mod: HCNC,S$GLB,, | Performed by: INTERNAL MEDICINE

## 2021-11-12 PROCEDURE — 1159F PR MEDICATION LIST DOCUMENTED IN MEDICAL RECORD: ICD-10-PCS | Mod: HCNC,CPTII,S$GLB, | Performed by: INTERNAL MEDICINE

## 2021-11-12 PROCEDURE — 1101F PR PT FALLS ASSESS DOC 0-1 FALLS W/OUT INJ PAST YR: ICD-10-PCS | Mod: HCNC,CPTII,S$GLB, | Performed by: INTERNAL MEDICINE

## 2021-11-12 PROCEDURE — 3072F LOW RISK FOR RETINOPATHY: CPT | Mod: HCNC,CPTII,S$GLB, | Performed by: INTERNAL MEDICINE

## 2021-11-12 PROCEDURE — 3079F DIAST BP 80-89 MM HG: CPT | Mod: HCNC,CPTII,S$GLB, | Performed by: INTERNAL MEDICINE

## 2021-11-12 PROCEDURE — 1159F MED LIST DOCD IN RCRD: CPT | Mod: HCNC,CPTII,S$GLB, | Performed by: INTERNAL MEDICINE

## 2021-11-12 PROCEDURE — 1160F RVW MEDS BY RX/DR IN RCRD: CPT | Mod: HCNC,CPTII,S$GLB, | Performed by: INTERNAL MEDICINE

## 2021-11-12 PROCEDURE — 1157F PR ADVANCE CARE PLAN OR EQUIV PRESENT IN MEDICAL RECORD: ICD-10-PCS | Mod: HCNC,CPTII,S$GLB, | Performed by: INTERNAL MEDICINE

## 2021-11-12 PROCEDURE — 1101F PT FALLS ASSESS-DOCD LE1/YR: CPT | Mod: HCNC,CPTII,S$GLB, | Performed by: INTERNAL MEDICINE

## 2021-11-12 PROCEDURE — 99499 UNLISTED E&M SERVICE: CPT | Mod: S$GLB,,, | Performed by: INTERNAL MEDICINE

## 2021-11-12 PROCEDURE — 3075F PR MOST RECENT SYSTOLIC BLOOD PRESS GE 130-139MM HG: ICD-10-PCS | Mod: HCNC,CPTII,S$GLB, | Performed by: INTERNAL MEDICINE

## 2021-11-12 PROCEDURE — 1157F ADVNC CARE PLAN IN RCRD: CPT | Mod: HCNC,CPTII,S$GLB, | Performed by: INTERNAL MEDICINE

## 2021-11-12 PROCEDURE — 1126F AMNT PAIN NOTED NONE PRSNT: CPT | Mod: HCNC,CPTII,S$GLB, | Performed by: INTERNAL MEDICINE

## 2021-11-12 PROCEDURE — 3288F FALL RISK ASSESSMENT DOCD: CPT | Mod: HCNC,CPTII,S$GLB, | Performed by: INTERNAL MEDICINE

## 2021-11-12 PROCEDURE — 3075F SYST BP GE 130 - 139MM HG: CPT | Mod: HCNC,CPTII,S$GLB, | Performed by: INTERNAL MEDICINE

## 2021-11-12 PROCEDURE — 1126F PR PAIN SEVERITY QUANTIFIED, NO PAIN PRESENT: ICD-10-PCS | Mod: HCNC,CPTII,S$GLB, | Performed by: INTERNAL MEDICINE

## 2021-11-12 PROCEDURE — 1160F PR REVIEW ALL MEDS BY PRESCRIBER/CLIN PHARMACIST DOCUMENTED: ICD-10-PCS | Mod: HCNC,CPTII,S$GLB, | Performed by: INTERNAL MEDICINE

## 2021-11-12 PROCEDURE — 99999 PR PBB SHADOW E&M-EST. PATIENT-LVL V: CPT | Mod: PBBFAC,HCNC,, | Performed by: INTERNAL MEDICINE

## 2021-11-12 PROCEDURE — 3079F PR MOST RECENT DIASTOLIC BLOOD PRESSURE 80-89 MM HG: ICD-10-PCS | Mod: HCNC,CPTII,S$GLB, | Performed by: INTERNAL MEDICINE

## 2021-11-12 PROCEDURE — 3072F PR LOW RISK FOR RETINOPATHY: ICD-10-PCS | Mod: HCNC,CPTII,S$GLB, | Performed by: INTERNAL MEDICINE

## 2021-11-12 PROCEDURE — 99999 PR PBB SHADOW E&M-EST. PATIENT-LVL V: ICD-10-PCS | Mod: PBBFAC,HCNC,, | Performed by: INTERNAL MEDICINE

## 2021-11-12 PROCEDURE — 3288F PR FALLS RISK ASSESSMENT DOCUMENTED: ICD-10-PCS | Mod: HCNC,CPTII,S$GLB, | Performed by: INTERNAL MEDICINE

## 2021-11-12 RX ORDER — ESOMEPRAZOLE MAGNESIUM 40 MG/1
40 CAPSULE, DELAYED RELEASE ORAL
Qty: 90 CAPSULE | Refills: 3 | Status: SHIPPED | OUTPATIENT
Start: 2021-11-12 | End: 2022-05-09 | Stop reason: SDUPTHER

## 2021-11-12 RX ORDER — VENLAFAXINE 75 MG/1
75 TABLET ORAL DAILY
Qty: 90 TABLET | Refills: 3 | Status: SHIPPED | OUTPATIENT
Start: 2021-11-12 | End: 2022-11-08 | Stop reason: SDUPTHER

## 2021-11-12 RX ORDER — EZETIMIBE 10 MG/1
10 TABLET ORAL DAILY
Qty: 90 TABLET | Refills: 3 | Status: SHIPPED | OUTPATIENT
Start: 2021-11-12 | End: 2023-01-06 | Stop reason: SDUPTHER

## 2021-11-16 ENCOUNTER — PATIENT MESSAGE (OUTPATIENT)
Dept: INTERNAL MEDICINE | Facility: CLINIC | Age: 76
End: 2021-11-16
Payer: MEDICARE

## 2021-11-16 ENCOUNTER — TELEPHONE (OUTPATIENT)
Dept: INTERNAL MEDICINE | Facility: CLINIC | Age: 76
End: 2021-11-16
Payer: MEDICARE

## 2021-11-17 ENCOUNTER — OFFICE VISIT (OUTPATIENT)
Dept: OTOLARYNGOLOGY | Facility: CLINIC | Age: 76
End: 2021-11-17
Payer: MEDICARE

## 2021-11-17 VITALS — DIASTOLIC BLOOD PRESSURE: 70 MMHG | SYSTOLIC BLOOD PRESSURE: 116 MMHG | HEART RATE: 69 BPM

## 2021-11-17 DIAGNOSIS — G47.33 OSA (OBSTRUCTIVE SLEEP APNEA): ICD-10-CM

## 2021-11-17 PROCEDURE — 3078F PR MOST RECENT DIASTOLIC BLOOD PRESSURE < 80 MM HG: ICD-10-PCS | Mod: HCNC,CPTII,S$GLB, | Performed by: OTOLARYNGOLOGY

## 2021-11-17 PROCEDURE — 3078F DIAST BP <80 MM HG: CPT | Mod: HCNC,CPTII,S$GLB, | Performed by: OTOLARYNGOLOGY

## 2021-11-17 PROCEDURE — 3074F SYST BP LT 130 MM HG: CPT | Mod: HCNC,CPTII,S$GLB, | Performed by: OTOLARYNGOLOGY

## 2021-11-17 PROCEDURE — 3288F PR FALLS RISK ASSESSMENT DOCUMENTED: ICD-10-PCS | Mod: HCNC,CPTII,S$GLB, | Performed by: OTOLARYNGOLOGY

## 2021-11-17 PROCEDURE — 3074F PR MOST RECENT SYSTOLIC BLOOD PRESSURE < 130 MM HG: ICD-10-PCS | Mod: HCNC,CPTII,S$GLB, | Performed by: OTOLARYNGOLOGY

## 2021-11-17 PROCEDURE — 1126F PR PAIN SEVERITY QUANTIFIED, NO PAIN PRESENT: ICD-10-PCS | Mod: HCNC,CPTII,S$GLB, | Performed by: OTOLARYNGOLOGY

## 2021-11-17 PROCEDURE — 1159F MED LIST DOCD IN RCRD: CPT | Mod: HCNC,CPTII,S$GLB, | Performed by: OTOLARYNGOLOGY

## 2021-11-17 PROCEDURE — 1159F PR MEDICATION LIST DOCUMENTED IN MEDICAL RECORD: ICD-10-PCS | Mod: HCNC,CPTII,S$GLB, | Performed by: OTOLARYNGOLOGY

## 2021-11-17 PROCEDURE — 1157F ADVNC CARE PLAN IN RCRD: CPT | Mod: HCNC,CPTII,S$GLB, | Performed by: OTOLARYNGOLOGY

## 2021-11-17 PROCEDURE — 1126F AMNT PAIN NOTED NONE PRSNT: CPT | Mod: HCNC,CPTII,S$GLB, | Performed by: OTOLARYNGOLOGY

## 2021-11-17 PROCEDURE — 3288F FALL RISK ASSESSMENT DOCD: CPT | Mod: HCNC,CPTII,S$GLB, | Performed by: OTOLARYNGOLOGY

## 2021-11-17 PROCEDURE — 3072F LOW RISK FOR RETINOPATHY: CPT | Mod: HCNC,CPTII,S$GLB, | Performed by: OTOLARYNGOLOGY

## 2021-11-17 PROCEDURE — 99499 UNLISTED E&M SERVICE: CPT | Mod: HCNC,S$GLB,, | Performed by: OTOLARYNGOLOGY

## 2021-11-17 PROCEDURE — 3072F PR LOW RISK FOR RETINOPATHY: ICD-10-PCS | Mod: HCNC,CPTII,S$GLB, | Performed by: OTOLARYNGOLOGY

## 2021-11-17 PROCEDURE — 1101F PT FALLS ASSESS-DOCD LE1/YR: CPT | Mod: HCNC,CPTII,S$GLB, | Performed by: OTOLARYNGOLOGY

## 2021-11-17 PROCEDURE — 1101F PR PT FALLS ASSESS DOC 0-1 FALLS W/OUT INJ PAST YR: ICD-10-PCS | Mod: HCNC,CPTII,S$GLB, | Performed by: OTOLARYNGOLOGY

## 2021-11-17 PROCEDURE — 1160F PR REVIEW ALL MEDS BY PRESCRIBER/CLIN PHARMACIST DOCUMENTED: ICD-10-PCS | Mod: HCNC,CPTII,S$GLB, | Performed by: OTOLARYNGOLOGY

## 2021-11-17 PROCEDURE — 1160F RVW MEDS BY RX/DR IN RCRD: CPT | Mod: HCNC,CPTII,S$GLB, | Performed by: OTOLARYNGOLOGY

## 2021-11-17 PROCEDURE — 1157F PR ADVANCE CARE PLAN OR EQUIV PRESENT IN MEDICAL RECORD: ICD-10-PCS | Mod: HCNC,CPTII,S$GLB, | Performed by: OTOLARYNGOLOGY

## 2021-11-17 PROCEDURE — 99499 NO LOS: ICD-10-PCS | Mod: HCNC,S$GLB,, | Performed by: OTOLARYNGOLOGY

## 2021-11-22 ENCOUNTER — CLINICAL SUPPORT (OUTPATIENT)
Dept: INTERNAL MEDICINE | Facility: CLINIC | Age: 76
End: 2021-11-22
Payer: MEDICARE

## 2021-11-22 VITALS — WEIGHT: 162.06 LBS | BODY MASS INDEX: 30.62 KG/M2

## 2021-11-23 ENCOUNTER — PATIENT MESSAGE (OUTPATIENT)
Dept: INTERNAL MEDICINE | Facility: CLINIC | Age: 76
End: 2021-11-23
Payer: MEDICARE

## 2021-11-23 DIAGNOSIS — E66.9 DIABETES MELLITUS TYPE 2 IN OBESE: Primary | ICD-10-CM

## 2021-11-23 DIAGNOSIS — E11.69 DIABETES MELLITUS TYPE 2 IN OBESE: Primary | ICD-10-CM

## 2021-11-23 RX ORDER — DULAGLUTIDE 0.75 MG/.5ML
0.75 INJECTION, SOLUTION SUBCUTANEOUS
Qty: 4 PEN | Refills: 0 | Status: SHIPPED | OUTPATIENT
Start: 2021-11-23 | End: 2021-12-23

## 2021-11-23 RX ORDER — DULAGLUTIDE 1.5 MG/.5ML
1.5 INJECTION, SOLUTION SUBCUTANEOUS
Qty: 4 PEN | Refills: 11 | Status: SHIPPED | OUTPATIENT
Start: 2021-12-23 | End: 2022-01-19 | Stop reason: SDUPTHER

## 2021-11-24 DIAGNOSIS — E11.9 CONTROLLED TYPE 2 DIABETES MELLITUS WITHOUT COMPLICATION, WITHOUT LONG-TERM CURRENT USE OF INSULIN: ICD-10-CM

## 2021-11-24 DIAGNOSIS — F33.41 MDD (MAJOR DEPRESSIVE DISORDER), RECURRENT, IN PARTIAL REMISSION: ICD-10-CM

## 2021-11-24 DIAGNOSIS — E78.5 HYPERLIPIDEMIA, UNSPECIFIED HYPERLIPIDEMIA TYPE: ICD-10-CM

## 2021-11-24 RX ORDER — ROSUVASTATIN CALCIUM 20 MG/1
TABLET, COATED ORAL
Qty: 90 TABLET | Refills: 0 | Status: SHIPPED | OUTPATIENT
Start: 2021-11-24 | End: 2021-12-14 | Stop reason: SDUPTHER

## 2021-11-24 RX ORDER — LOSARTAN POTASSIUM 100 MG/1
TABLET ORAL
Qty: 90 TABLET | Refills: 0 | Status: SHIPPED | OUTPATIENT
Start: 2021-11-24 | End: 2021-12-14 | Stop reason: SDUPTHER

## 2021-11-24 RX ORDER — METOPROLOL SUCCINATE 50 MG/1
TABLET, EXTENDED RELEASE ORAL
Qty: 90 TABLET | Refills: 0 | Status: SHIPPED | OUTPATIENT
Start: 2021-11-24 | End: 2022-01-19 | Stop reason: SDUPTHER

## 2021-11-24 RX ORDER — BUPROPION HYDROCHLORIDE 150 MG/1
TABLET ORAL
Qty: 90 TABLET | Refills: 0 | Status: SHIPPED | OUTPATIENT
Start: 2021-11-24 | End: 2021-12-14 | Stop reason: SDUPTHER

## 2021-11-29 ENCOUNTER — CLINICAL SUPPORT (OUTPATIENT)
Dept: INTERNAL MEDICINE | Facility: CLINIC | Age: 76
End: 2021-11-29
Payer: MEDICARE

## 2021-11-29 VITALS — WEIGHT: 161.63 LBS | BODY MASS INDEX: 30.53 KG/M2

## 2021-11-29 PROCEDURE — 99999 PR PBB SHADOW E&M-EST. PATIENT-LVL I: CPT | Mod: PBBFAC,HCNC,,

## 2021-11-29 PROCEDURE — 99999 PR PBB SHADOW E&M-EST. PATIENT-LVL I: ICD-10-PCS | Mod: PBBFAC,HCNC,,

## 2021-12-01 NOTE — PROGRESS NOTES
"Date of Service: 12/1/2021    Procedure:  Diagnostic and therapeutic bronchoscopy with BAL    Indication: Respiratory failure, ? pneumonia    Physician:  Dr. Liam Monaco MD    Post Procedure Diagnosis:  1.  Mildly inflamed airways  2.  Creamy light yellow secretions  3.  Therapeutic airway lavage RLL and LLL airway segments  4.  BAL from LLL sent for cultures    Narrative:  Appropriate consent was obtained and \"time out\" was performed.  A flexible fiberoptic bronchoscope was then inserted through the ETT without difficulty.  All airways were evaluated to the sub-segmental level.  The airway mucosa was inflamed.  There is a moderate mount of creamy light yellow secretions in the airways.  The RLL and LLL airway segments were therapeutically lavaged with small aliquots of saline.  No endobronchial lesions were seen.  The bronchoscope was then wedged in a segment of the LLL bronchus.  40 cc of saline was instilled with moderate return of cloudy, mildly purulent light yellow-colored creamy BAL fluid.  The BAL specimen will be sent for appropriate culture.  No immediate complications.  EBL = 0.      Liam Monaco M.D.    \    " Health  Wellness Visit Note    Name: Paris Burris  Clinic Number: [id number]  Physician: Cee Woodall, *  Diagnosis: No diagnosis found.  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   Time In: 9:15 AM  Time Out: 10:00 AM  Total Treatment Time: 45 minutes    Subjective:   Patient reports that her neck and lower back feel good today she wants to increase her weight on the core lumbar. She has been consistent with her stretching and icing daily; Mrs. Toledo has been sleeping better but has not been able to get her CPAP machine because the clinic is really backed up.  Her appointment to see the doctor is not until March. She plans to go up on her weights for most equipment after her next visit.     Objective:   Paris completed therapeutic stretches (EIS, EIL, RICK, Neck Retractions, scapular retractions) and the following MedX exercise machines: core cervical, core lumbar, torso rotation l/r, leg extension, leg curl, upright row, chest press, biceps curl, triceps extension, leg press    Please see exercise log in patient folder for rate of exertion and repetitions completed.     Assessment:   Patient tolerated all exercises on the MedX equipment without complaint or increase in symptoms.  Skipped ice today.  The weight on the core lumbar  will increase on her next visit.  She needs to make an appointment with a HC on the day that we go to [facility].      Plan:    Continue with established plan of care towards wellness goals. Continue with Plan B.  Will postpone 6 month cervical testing due to neck pain.

## 2021-12-02 ENCOUNTER — OFFICE VISIT (OUTPATIENT)
Dept: ORTHOPEDICS | Facility: CLINIC | Age: 76
End: 2021-12-02
Payer: MEDICARE

## 2021-12-02 DIAGNOSIS — S82.024D CLOSED NONDISPLACED LONGITUDINAL FRACTURE OF RIGHT PATELLA WITH ROUTINE HEALING, SUBSEQUENT ENCOUNTER: Primary | ICD-10-CM

## 2021-12-02 PROCEDURE — 99213 OFFICE O/P EST LOW 20 MIN: CPT | Mod: HCNC,S$GLB,, | Performed by: ORTHOPAEDIC SURGERY

## 2021-12-02 PROCEDURE — 3072F PR LOW RISK FOR RETINOPATHY: ICD-10-PCS | Mod: HCNC,CPTII,S$GLB, | Performed by: ORTHOPAEDIC SURGERY

## 2021-12-02 PROCEDURE — 1157F ADVNC CARE PLAN IN RCRD: CPT | Mod: HCNC,CPTII,S$GLB, | Performed by: ORTHOPAEDIC SURGERY

## 2021-12-02 PROCEDURE — 99999 PR PBB SHADOW E&M-EST. PATIENT-LVL II: CPT | Mod: PBBFAC,HCNC,, | Performed by: ORTHOPAEDIC SURGERY

## 2021-12-02 PROCEDURE — 99213 PR OFFICE/OUTPT VISIT, EST, LEVL III, 20-29 MIN: ICD-10-PCS | Mod: HCNC,S$GLB,, | Performed by: ORTHOPAEDIC SURGERY

## 2021-12-02 PROCEDURE — 1157F PR ADVANCE CARE PLAN OR EQUIV PRESENT IN MEDICAL RECORD: ICD-10-PCS | Mod: HCNC,CPTII,S$GLB, | Performed by: ORTHOPAEDIC SURGERY

## 2021-12-02 PROCEDURE — 99999 PR PBB SHADOW E&M-EST. PATIENT-LVL II: ICD-10-PCS | Mod: PBBFAC,HCNC,, | Performed by: ORTHOPAEDIC SURGERY

## 2021-12-02 PROCEDURE — 3072F LOW RISK FOR RETINOPATHY: CPT | Mod: HCNC,CPTII,S$GLB, | Performed by: ORTHOPAEDIC SURGERY

## 2021-12-03 RX ORDER — BUPROPION HYDROCHLORIDE 100 MG/1
100 TABLET, EXTENDED RELEASE ORAL 2 TIMES DAILY
Qty: 60 TABLET | Refills: 11 | OUTPATIENT
Start: 2021-12-03

## 2021-12-03 RX ORDER — METOPROLOL SUCCINATE 50 MG/1
50 TABLET, EXTENDED RELEASE ORAL DAILY
Qty: 90 TABLET | Refills: 3 | OUTPATIENT
Start: 2021-12-03 | End: 2022-12-03

## 2021-12-06 DIAGNOSIS — G89.29 CHRONIC BILATERAL LOW BACK PAIN WITHOUT SCIATICA: ICD-10-CM

## 2021-12-06 DIAGNOSIS — M54.50 CHRONIC BILATERAL LOW BACK PAIN WITHOUT SCIATICA: ICD-10-CM

## 2021-12-06 DIAGNOSIS — M51.36 DDD (DEGENERATIVE DISC DISEASE), LUMBAR: ICD-10-CM

## 2021-12-06 RX ORDER — TRAMADOL HYDROCHLORIDE 50 MG/1
TABLET ORAL
Qty: 120 TABLET | Refills: 0 | Status: SHIPPED | OUTPATIENT
Start: 2021-12-06 | End: 2022-01-19

## 2021-12-06 RX ORDER — ACETAMINOPHEN 500 MG
500 TABLET ORAL EVERY 6 HOURS PRN
Qty: 160 TABLET | Refills: 3 | Status: SHIPPED | OUTPATIENT
Start: 2021-12-06 | End: 2022-06-03 | Stop reason: SDUPTHER

## 2021-12-08 ENCOUNTER — PATIENT MESSAGE (OUTPATIENT)
Dept: AUDIOLOGY | Facility: CLINIC | Age: 76
End: 2021-12-08
Payer: MEDICARE

## 2021-12-08 ENCOUNTER — OFFICE VISIT (OUTPATIENT)
Dept: OTOLARYNGOLOGY | Facility: CLINIC | Age: 76
End: 2021-12-08
Payer: MEDICARE

## 2021-12-08 VITALS — DIASTOLIC BLOOD PRESSURE: 81 MMHG | SYSTOLIC BLOOD PRESSURE: 123 MMHG | HEART RATE: 106 BPM

## 2021-12-08 DIAGNOSIS — E66.9 OBESITY (BMI 30-39.9): ICD-10-CM

## 2021-12-08 DIAGNOSIS — G47.33 OSA (OBSTRUCTIVE SLEEP APNEA): Primary | ICD-10-CM

## 2021-12-08 DIAGNOSIS — H61.21 IMPACTED CERUMEN OF RIGHT EAR: ICD-10-CM

## 2021-12-08 DIAGNOSIS — H90.3 SENSORINEURAL HEARING LOSS (SNHL) OF BOTH EARS: ICD-10-CM

## 2021-12-08 PROCEDURE — 3072F PR LOW RISK FOR RETINOPATHY: ICD-10-PCS | Mod: HCNC,CPTII,S$GLB, | Performed by: OTOLARYNGOLOGY

## 2021-12-08 PROCEDURE — 99999 PR PBB SHADOW E&M-EST. PATIENT-LVL II: CPT | Mod: PBBFAC,HCNC,, | Performed by: OTOLARYNGOLOGY

## 2021-12-08 PROCEDURE — 99214 OFFICE O/P EST MOD 30 MIN: CPT | Mod: HCNC,S$GLB,, | Performed by: OTOLARYNGOLOGY

## 2021-12-08 PROCEDURE — 1157F ADVNC CARE PLAN IN RCRD: CPT | Mod: HCNC,CPTII,S$GLB, | Performed by: OTOLARYNGOLOGY

## 2021-12-08 PROCEDURE — 99999 PR PBB SHADOW E&M-EST. PATIENT-LVL II: ICD-10-PCS | Mod: PBBFAC,HCNC,, | Performed by: OTOLARYNGOLOGY

## 2021-12-08 PROCEDURE — 99214 PR OFFICE/OUTPT VISIT, EST, LEVL IV, 30-39 MIN: ICD-10-PCS | Mod: HCNC,S$GLB,, | Performed by: OTOLARYNGOLOGY

## 2021-12-08 PROCEDURE — 3072F LOW RISK FOR RETINOPATHY: CPT | Mod: HCNC,CPTII,S$GLB, | Performed by: OTOLARYNGOLOGY

## 2021-12-08 PROCEDURE — 1157F PR ADVANCE CARE PLAN OR EQUIV PRESENT IN MEDICAL RECORD: ICD-10-PCS | Mod: HCNC,CPTII,S$GLB, | Performed by: OTOLARYNGOLOGY

## 2021-12-10 ENCOUNTER — TELEPHONE (OUTPATIENT)
Dept: AUDIOLOGY | Facility: CLINIC | Age: 76
End: 2021-12-10
Payer: MEDICARE

## 2021-12-10 DIAGNOSIS — H90.3 SENSORINEURAL HEARING LOSS, BILATERAL: Primary | ICD-10-CM

## 2021-12-13 ENCOUNTER — PATIENT MESSAGE (OUTPATIENT)
Dept: PRIMARY CARE CLINIC | Facility: CLINIC | Age: 76
End: 2021-12-13
Payer: MEDICARE

## 2021-12-13 DIAGNOSIS — E11.9 CONTROLLED TYPE 2 DIABETES MELLITUS WITHOUT COMPLICATION, WITHOUT LONG-TERM CURRENT USE OF INSULIN: ICD-10-CM

## 2021-12-13 DIAGNOSIS — F33.41 MDD (MAJOR DEPRESSIVE DISORDER), RECURRENT, IN PARTIAL REMISSION: ICD-10-CM

## 2021-12-13 DIAGNOSIS — E78.5 HYPERLIPIDEMIA, UNSPECIFIED HYPERLIPIDEMIA TYPE: ICD-10-CM

## 2021-12-14 RX ORDER — ROSUVASTATIN CALCIUM 20 MG/1
20 TABLET, COATED ORAL DAILY
Qty: 90 TABLET | Refills: 3 | Status: SHIPPED | OUTPATIENT
Start: 2021-12-14 | End: 2022-11-28 | Stop reason: SDUPTHER

## 2021-12-14 RX ORDER — BUPROPION HYDROCHLORIDE 150 MG/1
150 TABLET ORAL DAILY
Qty: 90 TABLET | Refills: 3 | Status: SHIPPED | OUTPATIENT
Start: 2021-12-14 | End: 2022-04-01 | Stop reason: SDUPTHER

## 2021-12-14 RX ORDER — LOSARTAN POTASSIUM 100 MG/1
100 TABLET ORAL DAILY
Qty: 90 TABLET | Refills: 3 | Status: SHIPPED | OUTPATIENT
Start: 2021-12-14 | End: 2021-12-28 | Stop reason: SDUPTHER

## 2021-12-21 ENCOUNTER — CLINICAL SUPPORT (OUTPATIENT)
Dept: CARDIOLOGY | Facility: HOSPITAL | Age: 76
End: 2021-12-21
Payer: MEDICARE

## 2021-12-21 ENCOUNTER — CLINICAL SUPPORT (OUTPATIENT)
Dept: INTERNAL MEDICINE | Facility: CLINIC | Age: 76
End: 2021-12-21
Payer: MEDICARE

## 2021-12-21 VITALS — WEIGHT: 157.19 LBS | BODY MASS INDEX: 29.7 KG/M2

## 2021-12-21 DIAGNOSIS — I42.9 CARDIOMYOPATHY, UNSPECIFIED: ICD-10-CM

## 2021-12-21 DIAGNOSIS — Z95.0 PRESENCE OF CARDIAC PACEMAKER: ICD-10-CM

## 2021-12-21 DIAGNOSIS — I44.2 ATRIOVENTRICULAR BLOCK, COMPLETE: ICD-10-CM

## 2021-12-21 PROCEDURE — 93294 CARDIAC DEVICE CHECK - REMOTE: ICD-10-PCS | Mod: ,,, | Performed by: INTERNAL MEDICINE

## 2021-12-21 PROCEDURE — 93294 REM INTERROG EVL PM/LDLS PM: CPT | Mod: ,,, | Performed by: INTERNAL MEDICINE

## 2021-12-21 PROCEDURE — 93296 REM INTERROG EVL PM/IDS: CPT | Performed by: INTERNAL MEDICINE

## 2021-12-28 ENCOUNTER — PATIENT MESSAGE (OUTPATIENT)
Dept: PRIMARY CARE CLINIC | Facility: CLINIC | Age: 76
End: 2021-12-28
Payer: MEDICARE

## 2021-12-28 RX ORDER — LOSARTAN POTASSIUM 100 MG/1
100 TABLET ORAL DAILY
Qty: 90 TABLET | Refills: 0 | Status: SHIPPED | OUTPATIENT
Start: 2021-12-28 | End: 2022-05-16 | Stop reason: SDUPTHER

## 2022-01-04 ENCOUNTER — PATIENT MESSAGE (OUTPATIENT)
Dept: PRIMARY CARE CLINIC | Facility: CLINIC | Age: 77
End: 2022-01-04
Payer: MEDICARE

## 2022-01-05 ENCOUNTER — LAB VISIT (OUTPATIENT)
Dept: LAB | Facility: HOSPITAL | Age: 77
End: 2022-01-05
Attending: INTERNAL MEDICINE
Payer: MEDICARE

## 2022-01-05 ENCOUNTER — PATIENT MESSAGE (OUTPATIENT)
Dept: ADMINISTRATIVE | Facility: OTHER | Age: 77
End: 2022-01-05
Payer: MEDICARE

## 2022-01-05 DIAGNOSIS — E78.49 OTHER HYPERLIPIDEMIA: ICD-10-CM

## 2022-01-05 LAB — TSH SERPL DL<=0.005 MIU/L-ACNC: 1.59 UIU/ML (ref 0.4–4)

## 2022-01-05 PROCEDURE — 84443 ASSAY THYROID STIM HORMONE: CPT | Mod: HCNC | Performed by: INTERNAL MEDICINE

## 2022-01-05 PROCEDURE — 36415 COLL VENOUS BLD VENIPUNCTURE: CPT | Mod: HCNC | Performed by: INTERNAL MEDICINE

## 2022-01-06 ENCOUNTER — TELEPHONE (OUTPATIENT)
Dept: INTERNAL MEDICINE | Facility: CLINIC | Age: 77
End: 2022-01-06
Payer: MEDICARE

## 2022-01-06 DIAGNOSIS — E78.5 HYPERLIPIDEMIA, UNSPECIFIED HYPERLIPIDEMIA TYPE: Primary | ICD-10-CM

## 2022-01-06 NOTE — TELEPHONE ENCOUNTER
----- Message from Shari Fowler sent at 1/6/2022  8:23 AM CST -----  Contact: [phone]  Doctor appointment and lab have been scheduled.  Please link lab orders to the lab appointment.  Date of doctor appointment:  1/19  Date of lab appointment:  1/12  Physical or F/U: phys  Comments:

## 2022-01-11 ENCOUNTER — PATIENT MESSAGE (OUTPATIENT)
Dept: OTHER | Facility: OTHER | Age: 77
End: 2022-01-11
Payer: MEDICARE

## 2022-01-11 ENCOUNTER — TELEPHONE (OUTPATIENT)
Dept: AUDIOLOGY | Facility: CLINIC | Age: 77
End: 2022-01-11
Payer: MEDICARE

## 2022-01-12 ENCOUNTER — TELEPHONE (OUTPATIENT)
Dept: INTERNAL MEDICINE | Facility: CLINIC | Age: 77
End: 2022-01-12
Payer: MEDICARE

## 2022-01-12 ENCOUNTER — PATIENT MESSAGE (OUTPATIENT)
Dept: INTERNAL MEDICINE | Facility: CLINIC | Age: 77
End: 2022-01-12
Payer: MEDICARE

## 2022-01-12 DIAGNOSIS — E78.5 DYSLIPIDEMIA: Primary | ICD-10-CM

## 2022-01-13 ENCOUNTER — PATIENT MESSAGE (OUTPATIENT)
Dept: PRIMARY CARE CLINIC | Facility: CLINIC | Age: 77
End: 2022-01-13
Payer: MEDICARE

## 2022-01-13 ENCOUNTER — OFFICE VISIT (OUTPATIENT)
Dept: DERMATOLOGY | Facility: CLINIC | Age: 77
End: 2022-01-13
Payer: MEDICARE

## 2022-01-13 VITALS — WEIGHT: 157 LBS | BODY MASS INDEX: 29.66 KG/M2

## 2022-01-13 DIAGNOSIS — L98.9 SKIN LESION: ICD-10-CM

## 2022-01-13 DIAGNOSIS — L81.4 LENTIGINES: ICD-10-CM

## 2022-01-13 DIAGNOSIS — Z12.83 SKIN EXAM, SCREENING FOR CANCER: ICD-10-CM

## 2022-01-13 DIAGNOSIS — D18.01 CHERRY ANGIOMA: Primary | ICD-10-CM

## 2022-01-13 DIAGNOSIS — L82.1 SEBORRHEIC KERATOSES: ICD-10-CM

## 2022-01-13 PROCEDURE — 3288F FALL RISK ASSESSMENT DOCD: CPT | Mod: HCNC,CPTII,S$GLB, | Performed by: DERMATOLOGY

## 2022-01-13 PROCEDURE — 1126F PR PAIN SEVERITY QUANTIFIED, NO PAIN PRESENT: ICD-10-PCS | Mod: HCNC,CPTII,S$GLB, | Performed by: DERMATOLOGY

## 2022-01-13 PROCEDURE — 1159F MED LIST DOCD IN RCRD: CPT | Mod: HCNC,CPTII,S$GLB, | Performed by: DERMATOLOGY

## 2022-01-13 PROCEDURE — 1126F AMNT PAIN NOTED NONE PRSNT: CPT | Mod: HCNC,CPTII,S$GLB, | Performed by: DERMATOLOGY

## 2022-01-13 PROCEDURE — 3072F PR LOW RISK FOR RETINOPATHY: ICD-10-PCS | Mod: HCNC,CPTII,S$GLB, | Performed by: DERMATOLOGY

## 2022-01-13 PROCEDURE — 99999 PR PBB SHADOW E&M-EST. PATIENT-LVL II: CPT | Mod: PBBFAC,HCNC,, | Performed by: DERMATOLOGY

## 2022-01-13 PROCEDURE — 99213 OFFICE O/P EST LOW 20 MIN: CPT | Mod: HCNC,S$GLB,, | Performed by: DERMATOLOGY

## 2022-01-13 PROCEDURE — 1159F PR MEDICATION LIST DOCUMENTED IN MEDICAL RECORD: ICD-10-PCS | Mod: HCNC,CPTII,S$GLB, | Performed by: DERMATOLOGY

## 2022-01-13 PROCEDURE — 1101F PT FALLS ASSESS-DOCD LE1/YR: CPT | Mod: HCNC,CPTII,S$GLB, | Performed by: DERMATOLOGY

## 2022-01-13 PROCEDURE — 99213 PR OFFICE/OUTPT VISIT, EST, LEVL III, 20-29 MIN: ICD-10-PCS | Mod: HCNC,S$GLB,, | Performed by: DERMATOLOGY

## 2022-01-13 PROCEDURE — 3288F PR FALLS RISK ASSESSMENT DOCUMENTED: ICD-10-PCS | Mod: HCNC,CPTII,S$GLB, | Performed by: DERMATOLOGY

## 2022-01-13 PROCEDURE — 1160F RVW MEDS BY RX/DR IN RCRD: CPT | Mod: HCNC,CPTII,S$GLB, | Performed by: DERMATOLOGY

## 2022-01-13 PROCEDURE — 3072F LOW RISK FOR RETINOPATHY: CPT | Mod: HCNC,CPTII,S$GLB, | Performed by: DERMATOLOGY

## 2022-01-13 PROCEDURE — 1160F PR REVIEW ALL MEDS BY PRESCRIBER/CLIN PHARMACIST DOCUMENTED: ICD-10-PCS | Mod: HCNC,CPTII,S$GLB, | Performed by: DERMATOLOGY

## 2022-01-13 PROCEDURE — 1101F PR PT FALLS ASSESS DOC 0-1 FALLS W/OUT INJ PAST YR: ICD-10-PCS | Mod: HCNC,CPTII,S$GLB, | Performed by: DERMATOLOGY

## 2022-01-13 PROCEDURE — 1157F ADVNC CARE PLAN IN RCRD: CPT | Mod: HCNC,CPTII,S$GLB, | Performed by: DERMATOLOGY

## 2022-01-13 PROCEDURE — 1157F PR ADVANCE CARE PLAN OR EQUIV PRESENT IN MEDICAL RECORD: ICD-10-PCS | Mod: HCNC,CPTII,S$GLB, | Performed by: DERMATOLOGY

## 2022-01-13 PROCEDURE — 99999 PR PBB SHADOW E&M-EST. PATIENT-LVL II: ICD-10-PCS | Mod: PBBFAC,HCNC,, | Performed by: DERMATOLOGY

## 2022-01-13 NOTE — TELEPHONE ENCOUNTER
Can you reschedule her fasting labs to a few days prior to appt. She didn't get her labs done yesterday that was ordered by NILESH Turner.

## 2022-01-13 NOTE — PROGRESS NOTES
Subjective:       Patient ID:  Paris Burris is a 76 y.o. female who presents for   Chief Complaint   Patient presents with    Skin Check    Spot     Abdomen,scalp, legs,      Would like skin check new spot on left thigh and spots on abdomen for several years.       Review of Systems   Constitutional: Negative for fever, chills, weight loss, weight gain, fatigue, night sweats and malaise.   Skin: Negative for daily sunscreen use, activity-related sunscreen use and wears hat.   Hematologic/Lymphatic: Does not bruise/bleed easily.        Objective:    Physical Exam   Constitutional: She appears well-developed and well-nourished. No distress.   Neurological: She is alert and oriented to person, place, and time. She is not disoriented.   Psychiatric: She has a normal mood and affect.   Skin:   Areas Examined (abnormalities noted in diagram):   Head / Face Inspection Performed  Neck Inspection Performed  Chest / Axilla Inspection Performed  Back Inspection Performed  RUE Inspected  LUE Inspection Performed  LLE Inspection Performed              Diagram Legend     Erythematous scaling macule/papule c/w actinic keratosis       Vascular papule c/w angioma      Pigmented verrucoid papule/plaque c/w seborrheic keratosis      Yellow umbilicated papule c/w sebaceous hyperplasia      Irregularly shaped tan macule c/w lentigo     1-2 mm smooth white papules consistent with Milia      Movable subcutaneous cyst with punctum c/w epidermal inclusion cyst      Subcutaneous movable cyst c/w pilar cyst      Firm pink to brown papule c/w dermatofibroma      Pedunculated fleshy papule(s) c/w skin tag(s)      Evenly pigmented macule c/w junctional nevus     Mildly variegated pigmented, slightly irregular-bordered macule c/w mildly atypical nevus      Flesh colored to evenly pigmented papule c/w intradermal nevus       Pink pearly papule/plaque c/w basal cell carcinoma      Erythematous hyperkeratotic cursted plaque c/w SCC       "Surgical scar with no sign of skin cancer recurrence      Open and closed comedones      Inflammatory papules and pustules      Verrucoid papule consistent consistent with wart     Erythematous eczematous patches and plaques     Dystrophic onycholytic nail with subungual debris c/w onychomycosis     Umbilicated papule    Erythematous-base heme-crusted tan verrucoid plaque consistent with inflamed seborrheic keratosis     Erythematous Silvery Scaling Plaque c/w Psoriasis     See annotation      Assessment / Plan:        Cherry angiomas  reassurance      Skin lesion  -     Ambulatory referral/consult to Dermatology    Lentigines  The "ABCD" rules to observe pigmented lesions were reviewed.      Seborrheic keratoses  reassurance  Brochure provided      Skin exam, screening for cancer  . No lesions suspicious for malignancy noted.    Recommend daily sun protection/avoidance and use of at least SPF 30, broad spectrum sunscreen (OTC drug).                Follow up in about 1 year (around 1/13/2023).  "

## 2022-01-13 NOTE — TELEPHONE ENCOUNTER
Magdalene Huynh Mandi Staff  Caller: Unspecified (Today,  1:28 PM)  Fatuma is returning missed call. Please send [facility] message

## 2022-01-14 ENCOUNTER — TELEPHONE (OUTPATIENT)
Dept: AUDIOLOGY | Facility: CLINIC | Age: 77
End: 2022-01-14
Payer: MEDICARE

## 2022-01-14 DIAGNOSIS — H90.3 SENSORINEURAL HEARING LOSS, BILATERAL: Primary | ICD-10-CM

## 2022-01-18 ENCOUNTER — TELEPHONE (OUTPATIENT)
Dept: PRIMARY CARE CLINIC | Facility: CLINIC | Age: 77
End: 2022-01-18
Payer: MEDICARE

## 2022-01-18 ENCOUNTER — LAB VISIT (OUTPATIENT)
Dept: LAB | Facility: HOSPITAL | Age: 77
End: 2022-01-18
Attending: INTERNAL MEDICINE
Payer: MEDICARE

## 2022-01-18 ENCOUNTER — PATIENT MESSAGE (OUTPATIENT)
Dept: PRIMARY CARE CLINIC | Facility: CLINIC | Age: 77
End: 2022-01-18
Payer: MEDICARE

## 2022-01-18 DIAGNOSIS — E78.5 HYPERLIPIDEMIA, UNSPECIFIED HYPERLIPIDEMIA TYPE: ICD-10-CM

## 2022-01-18 LAB
ALBUMIN SERPL BCP-MCNC: 4.1 G/DL (ref 3.5–5.2)
ALP SERPL-CCNC: 65 U/L (ref 55–135)
ALT SERPL W/O P-5'-P-CCNC: 55 U/L (ref 10–44)
ANION GAP SERPL CALC-SCNC: 8 MMOL/L (ref 8–16)
AST SERPL-CCNC: 35 U/L (ref 10–40)
BILIRUB SERPL-MCNC: 0.6 MG/DL (ref 0.1–1)
BUN SERPL-MCNC: 11 MG/DL (ref 8–23)
CALCIUM SERPL-MCNC: 9.2 MG/DL (ref 8.7–10.5)
CHLORIDE SERPL-SCNC: 104 MMOL/L (ref 95–110)
CHOLEST SERPL-MCNC: 124 MG/DL (ref 120–199)
CHOLEST/HDLC SERPL: 3.9 {RATIO} (ref 2–5)
CO2 SERPL-SCNC: 29 MMOL/L (ref 23–29)
CREAT SERPL-MCNC: 0.8 MG/DL (ref 0.5–1.4)
EST. GFR  (AFRICAN AMERICAN): >60 ML/MIN/1.73 M^2
EST. GFR  (NON AFRICAN AMERICAN): >60 ML/MIN/1.73 M^2
GLUCOSE SERPL-MCNC: 103 MG/DL (ref 70–110)
HDLC SERPL-MCNC: 32 MG/DL (ref 40–75)
HDLC SERPL: 25.8 % (ref 20–50)
LDLC SERPL CALC-MCNC: 41.4 MG/DL (ref 63–159)
NONHDLC SERPL-MCNC: 92 MG/DL
POTASSIUM SERPL-SCNC: 5 MMOL/L (ref 3.5–5.1)
PROT SERPL-MCNC: 7.1 G/DL (ref 6–8.4)
SODIUM SERPL-SCNC: 141 MMOL/L (ref 136–145)
TRIGL SERPL-MCNC: 253 MG/DL (ref 30–150)

## 2022-01-18 PROCEDURE — 36415 COLL VENOUS BLD VENIPUNCTURE: CPT | Mod: HCNC | Performed by: INTERNAL MEDICINE

## 2022-01-18 PROCEDURE — 80061 LIPID PANEL: CPT | Mod: HCNC | Performed by: INTERNAL MEDICINE

## 2022-01-18 PROCEDURE — 80053 COMPREHEN METABOLIC PANEL: CPT | Mod: HCNC | Performed by: INTERNAL MEDICINE

## 2022-01-18 NOTE — TELEPHONE ENCOUNTER
Tried to contact pt / Left a message for patient to call the office back. Re appointment confirmation for tomorrow afternoon. Will send portal message.

## 2022-01-19 ENCOUNTER — PATIENT MESSAGE (OUTPATIENT)
Dept: DERMATOLOGY | Facility: CLINIC | Age: 77
End: 2022-01-19
Payer: MEDICARE

## 2022-01-19 ENCOUNTER — TELEPHONE (OUTPATIENT)
Dept: PRIMARY CARE CLINIC | Facility: CLINIC | Age: 77
End: 2022-01-19
Payer: MEDICARE

## 2022-01-19 ENCOUNTER — OFFICE VISIT (OUTPATIENT)
Dept: PRIMARY CARE CLINIC | Facility: CLINIC | Age: 77
End: 2022-01-19
Payer: MEDICARE

## 2022-01-19 VITALS
BODY MASS INDEX: 30.51 KG/M2 | WEIGHT: 161.63 LBS | DIASTOLIC BLOOD PRESSURE: 84 MMHG | HEIGHT: 61 IN | OXYGEN SATURATION: 96 % | SYSTOLIC BLOOD PRESSURE: 126 MMHG | HEART RATE: 80 BPM

## 2022-01-19 DIAGNOSIS — I15.2 HYPERTENSION ASSOCIATED WITH DIABETES: ICD-10-CM

## 2022-01-19 DIAGNOSIS — G47.00 INSOMNIA, UNSPECIFIED TYPE: ICD-10-CM

## 2022-01-19 DIAGNOSIS — Z98.890 S/P SUBTOTAL PARATHYROIDECTOMY: ICD-10-CM

## 2022-01-19 DIAGNOSIS — Z95.0 S/P PLACEMENT OF CARDIAC PACEMAKER: ICD-10-CM

## 2022-01-19 DIAGNOSIS — Z90.89 S/P SUBTOTAL PARATHYROIDECTOMY: ICD-10-CM

## 2022-01-19 DIAGNOSIS — E11.69 DIABETES MELLITUS TYPE 2 IN OBESE: Primary | ICD-10-CM

## 2022-01-19 DIAGNOSIS — M25.572 ACUTE LEFT ANKLE PAIN: ICD-10-CM

## 2022-01-19 DIAGNOSIS — I70.0 AORTIC ATHEROSCLEROSIS: ICD-10-CM

## 2022-01-19 DIAGNOSIS — E78.5 HYPERLIPIDEMIA ASSOCIATED WITH TYPE 2 DIABETES MELLITUS: ICD-10-CM

## 2022-01-19 DIAGNOSIS — I42.9 CARDIOMYOPATHY, UNSPECIFIED TYPE: ICD-10-CM

## 2022-01-19 DIAGNOSIS — E11.69 HYPERLIPIDEMIA ASSOCIATED WITH TYPE 2 DIABETES MELLITUS: ICD-10-CM

## 2022-01-19 DIAGNOSIS — I47.10 SVT (SUPRAVENTRICULAR TACHYCARDIA): ICD-10-CM

## 2022-01-19 DIAGNOSIS — E11.59 HYPERTENSION ASSOCIATED WITH DIABETES: ICD-10-CM

## 2022-01-19 DIAGNOSIS — Z12.11 COLON CANCER SCREENING: ICD-10-CM

## 2022-01-19 DIAGNOSIS — E66.9 DIABETES MELLITUS TYPE 2 IN OBESE: Primary | ICD-10-CM

## 2022-01-19 DIAGNOSIS — G47.33 OSA (OBSTRUCTIVE SLEEP APNEA): ICD-10-CM

## 2022-01-19 DIAGNOSIS — K21.9 GASTROESOPHAGEAL REFLUX DISEASE, UNSPECIFIED WHETHER ESOPHAGITIS PRESENT: ICD-10-CM

## 2022-01-19 DIAGNOSIS — I44.2 COMPLETE HEART BLOCK: ICD-10-CM

## 2022-01-19 DIAGNOSIS — F33.42 RECURRENT MAJOR DEPRESSIVE DISORDER, IN FULL REMISSION: ICD-10-CM

## 2022-01-19 DIAGNOSIS — F10.21 ALCOHOL DEPENDENCE IN REMISSION: ICD-10-CM

## 2022-01-19 PROCEDURE — 1159F MED LIST DOCD IN RCRD: CPT | Mod: HCNC,CPTII,S$GLB, | Performed by: INTERNAL MEDICINE

## 2022-01-19 PROCEDURE — 1160F RVW MEDS BY RX/DR IN RCRD: CPT | Mod: HCNC,CPTII,S$GLB, | Performed by: INTERNAL MEDICINE

## 2022-01-19 PROCEDURE — 3074F SYST BP LT 130 MM HG: CPT | Mod: HCNC,CPTII,S$GLB, | Performed by: INTERNAL MEDICINE

## 2022-01-19 PROCEDURE — 99999 PR PBB SHADOW E&M-EST. PATIENT-LVL IV: ICD-10-PCS | Mod: PBBFAC,HCNC,, | Performed by: INTERNAL MEDICINE

## 2022-01-19 PROCEDURE — 3288F FALL RISK ASSESSMENT DOCD: CPT | Mod: HCNC,CPTII,S$GLB, | Performed by: INTERNAL MEDICINE

## 2022-01-19 PROCEDURE — 3288F PR FALLS RISK ASSESSMENT DOCUMENTED: ICD-10-PCS | Mod: HCNC,CPTII,S$GLB, | Performed by: INTERNAL MEDICINE

## 2022-01-19 PROCEDURE — 1157F PR ADVANCE CARE PLAN OR EQUIV PRESENT IN MEDICAL RECORD: ICD-10-PCS | Mod: HCNC,CPTII,S$GLB, | Performed by: INTERNAL MEDICINE

## 2022-01-19 PROCEDURE — 99215 PR OFFICE/OUTPT VISIT, EST, LEVL V, 40-54 MIN: ICD-10-PCS | Mod: HCNC,S$GLB,, | Performed by: INTERNAL MEDICINE

## 2022-01-19 PROCEDURE — 3074F PR MOST RECENT SYSTOLIC BLOOD PRESSURE < 130 MM HG: ICD-10-PCS | Mod: HCNC,CPTII,S$GLB, | Performed by: INTERNAL MEDICINE

## 2022-01-19 PROCEDURE — 3079F DIAST BP 80-89 MM HG: CPT | Mod: HCNC,CPTII,S$GLB, | Performed by: INTERNAL MEDICINE

## 2022-01-19 PROCEDURE — 1159F PR MEDICATION LIST DOCUMENTED IN MEDICAL RECORD: ICD-10-PCS | Mod: HCNC,CPTII,S$GLB, | Performed by: INTERNAL MEDICINE

## 2022-01-19 PROCEDURE — 3072F PR LOW RISK FOR RETINOPATHY: ICD-10-PCS | Mod: HCNC,CPTII,S$GLB, | Performed by: INTERNAL MEDICINE

## 2022-01-19 PROCEDURE — 1100F PTFALLS ASSESS-DOCD GE2>/YR: CPT | Mod: HCNC,CPTII,S$GLB, | Performed by: INTERNAL MEDICINE

## 2022-01-19 PROCEDURE — 1126F PR PAIN SEVERITY QUANTIFIED, NO PAIN PRESENT: ICD-10-PCS | Mod: HCNC,CPTII,S$GLB, | Performed by: INTERNAL MEDICINE

## 2022-01-19 PROCEDURE — 3079F PR MOST RECENT DIASTOLIC BLOOD PRESSURE 80-89 MM HG: ICD-10-PCS | Mod: HCNC,CPTII,S$GLB, | Performed by: INTERNAL MEDICINE

## 2022-01-19 PROCEDURE — 3072F LOW RISK FOR RETINOPATHY: CPT | Mod: HCNC,CPTII,S$GLB, | Performed by: INTERNAL MEDICINE

## 2022-01-19 PROCEDURE — 99215 OFFICE O/P EST HI 40 MIN: CPT | Mod: HCNC,S$GLB,, | Performed by: INTERNAL MEDICINE

## 2022-01-19 PROCEDURE — 1126F AMNT PAIN NOTED NONE PRSNT: CPT | Mod: HCNC,CPTII,S$GLB, | Performed by: INTERNAL MEDICINE

## 2022-01-19 PROCEDURE — 1157F ADVNC CARE PLAN IN RCRD: CPT | Mod: HCNC,CPTII,S$GLB, | Performed by: INTERNAL MEDICINE

## 2022-01-19 PROCEDURE — 99999 PR PBB SHADOW E&M-EST. PATIENT-LVL IV: CPT | Mod: PBBFAC,HCNC,, | Performed by: INTERNAL MEDICINE

## 2022-01-19 PROCEDURE — 1160F PR REVIEW ALL MEDS BY PRESCRIBER/CLIN PHARMACIST DOCUMENTED: ICD-10-PCS | Mod: HCNC,CPTII,S$GLB, | Performed by: INTERNAL MEDICINE

## 2022-01-19 PROCEDURE — 99499 UNLISTED E&M SERVICE: CPT | Mod: S$GLB,,, | Performed by: INTERNAL MEDICINE

## 2022-01-19 PROCEDURE — 1100F PR PT FALLS ASSESS DOC 2+ FALLS/FALL W/INJURY/YR: ICD-10-PCS | Mod: HCNC,CPTII,S$GLB, | Performed by: INTERNAL MEDICINE

## 2022-01-19 PROCEDURE — 99499 RISK ADDL DX/OHS AUDIT: ICD-10-PCS | Mod: S$GLB,,, | Performed by: INTERNAL MEDICINE

## 2022-01-19 RX ORDER — METOPROLOL SUCCINATE 50 MG/1
50 TABLET, EXTENDED RELEASE ORAL DAILY
Qty: 90 TABLET | Refills: 3 | Status: SHIPPED | OUTPATIENT
Start: 2022-01-19 | End: 2022-01-26 | Stop reason: SDUPTHER

## 2022-01-19 RX ORDER — DULAGLUTIDE 1.5 MG/.5ML
1.5 INJECTION, SOLUTION SUBCUTANEOUS
Qty: 4 PEN | Refills: 5 | Status: SHIPPED | OUTPATIENT
Start: 2022-01-19 | End: 2022-02-24

## 2022-01-19 RX ORDER — DICLOFENAC SODIUM 10 MG/G
2 GEL TOPICAL 2 TIMES DAILY
Qty: 300 G | Refills: 1 | Status: SHIPPED | OUTPATIENT
Start: 2022-01-19 | End: 2022-02-24

## 2022-01-19 RX ORDER — LANCETS
1 EACH MISCELLANEOUS DAILY
Qty: 100 EACH | Refills: 3 | Status: SHIPPED | OUTPATIENT
Start: 2022-01-19 | End: 2022-02-24

## 2022-01-19 NOTE — PROGRESS NOTES
"INTERNAL MEDICINE ANNUAL VISIT NOTE      CHIEF COMPLAINT     ANNUAL    HPI     Paris Burris is a 76 y.o. C female who presents for annual.  MMG - 7/9/21 neg.  DEXA - 6/3/20 osteopenia. Part of digital fall program - BOLD program.   Cscope - DUE. Ordered 11/2021  R hearing loss. Hearing aid in place.  Has been walking inside the house and exercising. Some L ant ankle soreness when she walks <1/4 mi - but she'll cont walking through her pain for a full mile. This started about 3 weeks ago. Unable to palpate to reproduce soreness. Full range of motion. No swelling.  Not comfortable walking outside at this point. Hasn't taken or needed to take anything or put anything on it. No trauma.     GERD h/o fundoplication. EGD 5/31/19 w/ Dr. Little showed erythematous mucosa (neg for H pylori on path) in the gastric body. Fundoplication was not tight.  On nexium 40mg qam.     R patella fx s/p fall. Followed w/ Dr. Jauregui till Dec. Follow up prn.  Sometimes w/ R knee pain when walking up stairs.     MDD/GABBY - effexor 75mg daily, wellbutrin xr 150mg daily, klonopin 0.5mg qd prn anxiety (hasn't been taking this - doesn't feel like she needs it)  Insomnia - trazodone 50mg nightly (hasn't had to take it but using melatonin 15mg nightly, which works.     HTN - losartan 100mg daily, toprol xl 50mg qd.    COOKIE. Saw Dr. Peralta 12/8/21 in ENT. Per note:  "Reviewed last polysomnogram report. Encouraged continued efforts to attain a healthy weight.  Discussed airway sleep dynamics. COOKIE treatment is needed especially given other concurrent medical problems.  Consider avoiding supine sleep with use a tennis ball sewn into back of sleeping shirt. Consider Mandibular advancement splint to support base of tongue from posterior movement. Patient was given business card of Dr. Scott."    Follow up as needed or in a few months to assess progress    DM2 - trulicity 1.5mg qweekly (previously on metformin but wanted to lose weight w/ trulicity - did " lose 4lbs). Painful injections for the last 2 times. However wants to continue trulicity for another month to see.   a1c 11/11/21 6.4 but her fasting sugar was 136.  Freestyle yudith  Last saw Dr. Manrique 7/20/21.  Due for foot exam.   MAC - due    HLD w/ hyperTG - crestor 20mg qd, zetia 10mg qd.  TC went from 233 11/11/21 to 124, TG from 556-->253, incalculable LDL to 41.4.  Has decrease ice cream intake.   H/o alcohol dependence but in remission. Slipped up a few times during COVID    ALT mildly elevated but may be due to med changes  Previous CT A/P 9/28/18:  1. Moderate sized hiatal hernia with adjacent bibasilar atelectatic change, similar to prior CT examination.  2. Hepatomegaly.  Stable right hepatic lobe cyst.  3. Aortoiliac atherosclerosis.  4. Additional incidental findings as detailed above    Complete heart block (during lap hernia repair) w/ EF 35-40% s/p PM 1/2019  PET stress 5/16/19 - no stress induced perfusion defect. EF 63% at rest.  Last saw Domonique Nj NP 9/24/21. F/U in 1 yr.    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 Hawkins    Past Medical History:  Past Medical History:   Diagnosis Date    Allergy     Anemia     Anxiety     Breast cancer 2002    Left breast    Cancer 2002    L breast s/p lumpectomy    Cataract     Decreased hearing     Depression     Dermatochalasis of both upper eyelids     Diabetes mellitus     Diabetes with neurologic complications     Gastric ulcer 9/10/13    EGD    Hiatal hernia     Hyperlipidemia     Migraine headache     Migraine headache 3/20/2015    Multiple gastric ulcers     Parathyroid disorder     Pre-diabetes     Renal manifestation of secondary diabetes mellitus     Sleep apnea     no CPAP    Type 2 diabetes mellitus, controlled, 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    Wrist fracture        Past Surgical History:  Past Surgical History:   Procedure Laterality Date    ADENOIDECTOMY      BREAST  LUMPECTOMY Left 2002    CATARACT EXTRACTION W/  INTRAOCULAR LENS IMPLANT Bilateral     COLONOSCOPY N/A 12/2/2016    Procedure: COLONOSCOPY;  Surgeon: Dustin Patterson MD;  Location: [facility] ENDO (4TH FLR);  Service: Endoscopy;  Laterality: N/A;  PM prep    ESOPHAGEAL MANOMETRY WITH MEASUREMENT OF IMPEDANCE N/A 10/8/2018    Procedure: MANOMETRY, ESOPHAGUS, WITH IMPEDANCE MEASUREMENT;  Surgeon: Renato Maria MD;  Location: [facility] ENDO (4TH FLR);  Service: Endoscopy;  Laterality: N/A;    ESOPHAGOGASTRODUODENOSCOPY N/A 9/12/2018    Procedure: ESOPHAGOGASTRODUODENOSCOPY (EGD);  Surgeon: Dustin Patterson MD;  Location: [facility] ENDO (4TH FLR);  Service: Endoscopy;  Laterality: N/A;  6-8 weeks from visit    ESOPHAGOGASTRODUODENOSCOPY N/A 1/29/2019    Procedure: EGD (ESOPHAGOGASTRODUODENOSCOPY) intraop;  Surgeon: Renato Perez Jr., MD;  Location: [facility] OR [address] [place] [address];  Service: General;  Laterality: N/A;    ESOPHAGOGASTRODUODENOSCOPY N/A 5/31/2019    Procedure: EGD (ESOPHAGOGASTRODUODENOSCOPY);  Surgeon: Maria Elena Little MD;  [facility]  Service: Endoscopy;  Laterality: N/A;  [employer] pacemaker - sm    EYE SURGERY Bilateral     cataracts    IMPLANTATION OF BIVENTRICULAR HEART PACEMAKER N/A 1/30/2019    Procedure: INSERTION, CARDIAC PACEMAKER, BIVENTRICULAR;  Surgeon: Stone Livingston MD;  Location: [facility] EP LAB;  Service: Cardiology;  Laterality: N/A;    IMPLANTATION OF COCHLEAR PROSTHESIS Left 4/26/2021    Procedure: INSERTION, PROSTHESIS, COCHLEAR;  Surgeon: Michael Julian MD;  Location: [facility] OR [address] [place] [address];  Service: ENT;  Laterality: Left;    LAPAROSCOPIC TOUPET FUNDOPLICATION N/A 1/29/2019    Procedure: FUNDOPLICATION, LAPAROSCOPIC, TOUPET;  Surgeon: Renato Perez Jr., MD;  Location: [facility] OR [address] [place] [address];  Service: General;  Laterality: N/A;    lipoma removal  1999    TONSILLECTOMY         Allergies:  Review of patient's allergies indicates:   Allergen Reactions    Topamax [topiramate]  "Hallucinations     hallucinations       Home Medications:  heds reviewed.     Family History:  Family History   Problem Relation Age of Onset    Suicide Mother     Depression Mother     Alcohol abuse Father     Headaches Father     Diabetes Sister         prediabetes    Psoriasis Sister     Depression Sister     Depression Daughter     Colon cancer Neg Hx     Esophageal cancer Neg Hx        Social History:  Social History     Tobacco Use    Smoking status: Never Smoker    Smokeless tobacco: Never Used   Substance Use Topics    Alcohol use: No     Comment: quit 2014 - alcohol abuse    Drug use: Not Currently       Review of Systems:  Review of Systems Comprehensive review of systems otherwise negative. See history/subjective section for more details.    Health Maintainence:    reviewed.     PHYSICAL EXAM     /84 (BP Location: Left arm, Patient Position: Sitting, BP Method: Large (Manual))   Pulse 80   Ht 5' 1" (1.549 m)   Wt 73.3 kg (161 lb 9.6 oz)   SpO2 96%   BMI 30.53 kg/m²     GEN - A+OX4, NAD   HEENT - PERRL, EOMI, OP clear. MMM. R hearing aid.  Neck - No thyromegaly or cervical LAD. No thyroid masses felt.  CV - RRR, no m/r   Chest - CTAB, no wheezing or rhonchi  Abd - S/NT/ND/+BS.   Ext - 2+BDP and radial pulses. No LE edema.   Feet calluses at the heels and inner big toes. Decreased sensation to monofilament o fthe L foot. No open lesions.  Neuro - PERRL, EOMI, no nystagmus, eyebrow raise, facial sensation, hearing, m of mastication, smile, palatal raise, shoulder shrug, tongue protrusion symmetric and intact. 5/5 BUE and BLE strength. Sensation to light touch intact throughout. Decreased DTRs. Normal gait.   MSK - No spinal tenderness to palpation. Normal gait.   Skin - No rash.    LABS     Previous labs reviewed.    ASSESSMENT/PLAN     Paris Burris is a 76 y.o. female with  Paris was seen today for annual exam.    Diagnoses and all orders for this visit:    Diabetes mellitus " type 2 in obese - will try injecting in stomach instead of thighs. If cannot tolerate, will go back to metformin.  -     dulaglutide (TRULICITY) 1.5 mg/0.5 mL pen injector; Inject 1.5 mg into the skin every 7 days.  -     Cancel: Microalbumin/Creatinine Ratio, Urine; Future; Expected date: 01/19/2022  -     Microalbumin/Creatinine Ratio, Urine; Future; Expected date: 01/19/2022  -     blood sugar diagnostic Strp; 1 strip by Misc.(Non-Drug; Combo Route) route once daily. True metrix.  -     lancets Misc; Use 1 daily to check blood sugar.    SVT (supraventricular tachycardia)  -     metoprolol succinate (TOPROL-XL) 50 MG 24 hr tablet; Take 1 tablet (50 mg total) by mouth once daily.    Acute left ankle pain - does not stop her from ADLs. Trial of voltaren gel.  -     diclofenac sodium (VOLTAREN) 1 % Gel; Apply 2 g topically 2 (two) times daily.    Colon cancer screening  -     Case Request Endoscopy: COLONOSCOPY    Gastroesophageal reflux disease, unspecified whether esophagitis present - cont PPI. H/o fundoplication.     Recurrent major depressive disorder, in full remission - doing very well. Cont effexor.     Insomnia, unspecified type - cont melatonin.    Hypertension associated with diabetes - Stable and controlled. Continue current medications.    Hyperlipidemia associated with type 2 diabetes mellitus - cont crestor 20.    COOKIE (obstructive sleep apnea) - f/u w/ sleep medicine.     Alcohol dependence in remission - did have a few slip ups but back on track. Good relationship w/ sponsor.     Aortic atherosclerosis - cont statin.     Complete heart block s/P placement of cardiac pacemaker - doing well. Follows w/ cardiology. Recently interrogated.    Cardiomyopathy, unspecified type - as above.    S/P subtotal parathyroidectomy - Ca ok.       RTC in 4 months, sooner if needed and depending on labs.    Mandi Huynh MD  Department of Internal Medicine - [facility]  11:51 AM

## 2022-01-20 ENCOUNTER — PATIENT MESSAGE (OUTPATIENT)
Dept: PRIMARY CARE CLINIC | Facility: CLINIC | Age: 77
End: 2022-01-20
Payer: MEDICARE

## 2022-01-20 ENCOUNTER — CLINICAL SUPPORT (OUTPATIENT)
Dept: AUDIOLOGY | Facility: CLINIC | Age: 77
End: 2022-01-20
Payer: MEDICARE

## 2022-01-20 DIAGNOSIS — H90.3 SENSORINEURAL HEARING LOSS, BILATERAL: ICD-10-CM

## 2022-01-20 DIAGNOSIS — E11.42 DIABETIC POLYNEUROPATHY ASSOCIATED WITH TYPE 2 DIABETES MELLITUS: ICD-10-CM

## 2022-01-20 PROCEDURE — 99499 NO LOS: ICD-10-PCS | Mod: HCNC,S$GLB,, | Performed by: AUDIOLOGIST

## 2022-01-20 PROCEDURE — 99499 UNLISTED E&M SERVICE: CPT | Mod: HCNC,S$GLB,, | Performed by: AUDIOLOGIST

## 2022-01-20 NOTE — PROGRESS NOTES
Cochlear Implant -      Left Ear-  :  [id number]  Internal Device/Serial Number:  [id number]  Processor:  [id number]   SN of Processors: [id number]  DOS:  34/26/21  DIS:  6/1/21  Processor Color: Sand  Magnet Strength: 3i   Coil Length:  6cm  Battery Type:  Standard rechargeable  Warranty:  5 year    Right ear: NILES Burris was seen for a fourth follow-up for her 1 month post stimulation of her cochlear implant.      Ms. Burris stated she has not been wearing her processor because it has been too loud. Today, her remote showed her at P3, volume 1. When asked about changing to P1, Ms. Burris stated she was unable to change programs back to P1 so she stopped wearing the processor altogether. Ms. Burris was counseled heavily on the importance of continued use of wearing her processor daily since she has not been progressing through maps the last 8 months. Today she was set back to Map 4, volume 5 and given 3 additional progressive maps to work through each week. She will RTC in 1 month with the hopes of being on Map 7.        Recommendations  1. Work through progressive maps  2. RTC in February for programming and audiogram - with processor and without to check for residual hearing per request of Dr. Julian

## 2022-01-21 ENCOUNTER — PATIENT MESSAGE (OUTPATIENT)
Dept: PRIMARY CARE CLINIC | Facility: CLINIC | Age: 77
End: 2022-01-21
Payer: MEDICARE

## 2022-01-21 RX ORDER — DEXTROSE 4 G
1 TABLET,CHEWABLE ORAL 2 TIMES DAILY
Qty: 1 EACH | Refills: 0 | Status: SHIPPED | OUTPATIENT
Start: 2022-01-21 | End: 2022-04-11

## 2022-01-24 ENCOUNTER — LAB VISIT (OUTPATIENT)
Dept: LAB | Facility: HOSPITAL | Age: 77
End: 2022-01-24
Attending: INTERNAL MEDICINE
Payer: MEDICARE

## 2022-01-24 DIAGNOSIS — E66.9 DIABETES MELLITUS TYPE 2 IN OBESE: ICD-10-CM

## 2022-01-24 DIAGNOSIS — E11.69 DIABETES MELLITUS TYPE 2 IN OBESE: ICD-10-CM

## 2022-01-24 LAB
ALBUMIN/CREAT UR: 8.9 UG/MG (ref 0–30)
CREAT UR-MCNC: 79 MG/DL (ref 15–325)
MICROALBUMIN UR DL<=1MG/L-MCNC: 7 UG/ML

## 2022-01-24 PROCEDURE — 82043 UR ALBUMIN QUANTITATIVE: CPT | Mod: HCNC | Performed by: INTERNAL MEDICINE

## 2022-01-26 ENCOUNTER — PATIENT MESSAGE (OUTPATIENT)
Dept: PRIMARY CARE CLINIC | Facility: CLINIC | Age: 77
End: 2022-01-26
Payer: MEDICARE

## 2022-01-26 DIAGNOSIS — I47.10 SVT (SUPRAVENTRICULAR TACHYCARDIA): ICD-10-CM

## 2022-01-26 RX ORDER — METOPROLOL SUCCINATE 50 MG/1
50 TABLET, EXTENDED RELEASE ORAL DAILY
Qty: 90 TABLET | Refills: 3 | Status: SHIPPED | OUTPATIENT
Start: 2022-01-26 | End: 2022-02-24

## 2022-02-17 ENCOUNTER — PATIENT MESSAGE (OUTPATIENT)
Dept: OTHER | Facility: OTHER | Age: 77
End: 2022-02-17
Payer: MEDICARE

## 2022-02-20 ENCOUNTER — PATIENT MESSAGE (OUTPATIENT)
Dept: OTHER | Facility: OTHER | Age: 77
End: 2022-02-20
Payer: MEDICARE

## 2022-02-21 ENCOUNTER — PATIENT MESSAGE (OUTPATIENT)
Dept: OTHER | Facility: OTHER | Age: 77
End: 2022-02-21
Payer: MEDICARE

## 2022-02-21 ENCOUNTER — PATIENT MESSAGE (OUTPATIENT)
Dept: AUDIOLOGY | Facility: CLINIC | Age: 77
End: 2022-02-21
Payer: MEDICARE

## 2022-02-23 ENCOUNTER — PATIENT MESSAGE (OUTPATIENT)
Dept: PRIMARY CARE CLINIC | Facility: CLINIC | Age: 77
End: 2022-02-23
Payer: MEDICARE

## 2022-02-24 ENCOUNTER — PATIENT MESSAGE (OUTPATIENT)
Dept: PRIMARY CARE CLINIC | Facility: CLINIC | Age: 77
End: 2022-02-24

## 2022-02-24 ENCOUNTER — OFFICE VISIT (OUTPATIENT)
Dept: PRIMARY CARE CLINIC | Facility: CLINIC | Age: 77
End: 2022-02-24
Payer: MEDICARE

## 2022-02-24 ENCOUNTER — PATIENT MESSAGE (OUTPATIENT)
Dept: PRIMARY CARE CLINIC | Facility: CLINIC | Age: 77
End: 2022-02-24
Payer: MEDICARE

## 2022-02-24 ENCOUNTER — TELEPHONE (OUTPATIENT)
Dept: PRIMARY CARE CLINIC | Facility: CLINIC | Age: 77
End: 2022-02-24

## 2022-02-24 DIAGNOSIS — I10 BENIGN ESSENTIAL HYPERTENSION: ICD-10-CM

## 2022-02-24 DIAGNOSIS — F33.1 MODERATE EPISODE OF RECURRENT MAJOR DEPRESSIVE DISORDER: Primary | ICD-10-CM

## 2022-02-24 PROCEDURE — 99214 PR OFFICE/OUTPT VISIT, EST, LEVL IV, 30-39 MIN: ICD-10-PCS | Mod: HCNC,95,, | Performed by: INTERNAL MEDICINE

## 2022-02-24 PROCEDURE — 99214 OFFICE O/P EST MOD 30 MIN: CPT | Mod: HCNC,95,, | Performed by: INTERNAL MEDICINE

## 2022-02-24 PROCEDURE — 1159F MED LIST DOCD IN RCRD: CPT | Mod: HCNC,CPTII,95, | Performed by: INTERNAL MEDICINE

## 2022-02-24 PROCEDURE — 3072F PR LOW RISK FOR RETINOPATHY: ICD-10-PCS | Mod: HCNC,CPTII,95, | Performed by: INTERNAL MEDICINE

## 2022-02-24 PROCEDURE — 1159F PR MEDICATION LIST DOCUMENTED IN MEDICAL RECORD: ICD-10-PCS | Mod: HCNC,CPTII,95, | Performed by: INTERNAL MEDICINE

## 2022-02-24 PROCEDURE — 1157F ADVNC CARE PLAN IN RCRD: CPT | Mod: HCNC,CPTII,95, | Performed by: INTERNAL MEDICINE

## 2022-02-24 PROCEDURE — 3072F LOW RISK FOR RETINOPATHY: CPT | Mod: HCNC,CPTII,95, | Performed by: INTERNAL MEDICINE

## 2022-02-24 PROCEDURE — 1160F RVW MEDS BY RX/DR IN RCRD: CPT | Mod: HCNC,CPTII,95, | Performed by: INTERNAL MEDICINE

## 2022-02-24 PROCEDURE — 1160F PR REVIEW ALL MEDS BY PRESCRIBER/CLIN PHARMACIST DOCUMENTED: ICD-10-PCS | Mod: HCNC,CPTII,95, | Performed by: INTERNAL MEDICINE

## 2022-02-24 PROCEDURE — 1157F PR ADVANCE CARE PLAN OR EQUIV PRESENT IN MEDICAL RECORD: ICD-10-PCS | Mod: HCNC,CPTII,95, | Performed by: INTERNAL MEDICINE

## 2022-02-24 RX ORDER — METOPROLOL SUCCINATE 25 MG/1
25 TABLET, EXTENDED RELEASE ORAL DAILY
Qty: 90 TABLET | Refills: 0
Start: 2022-02-24 | End: 2022-06-03 | Stop reason: SDUPTHER

## 2022-02-24 NOTE — TELEPHONE ENCOUNTER
MD NEL Musa Staff  Can you schedule pt to see me in a month instead of the April appt? Can you get her and Mr. Kade Fatuma both to see Nayan for depression? Thanks!

## 2022-02-24 NOTE — Clinical Note
Can you schedule pt to see me in a month instead of the April appt? Can you get her and Mr. Kade Burris both to see Nayan for depression? Thanks!

## 2022-02-24 NOTE — PROGRESS NOTES
The patient location is: [place], [place]  The chief complaint leading to consultation is: fatigue    Visit type: audiovisual    Face to Face time with patient: 20 min  20 minutes of total time spent on the encounter, which includes face to face time and non-face to face time preparing to see the patient (eg, review of tests), Obtaining and/or reviewing separately obtained history, Documenting clinical information in the electronic or other health record, Independently interpreting results (not separately reported) and communicating results to the patient/family/caregiver, or Care coordination (not separately reported).         Each patient to whom he or she provides medical services by telemedicine is:  (1) informed of the relationship between the physician and patient and the respective role of any other health care provider with respect to management of the patient; and (2) notified that he or she may decline to receive medical services by telemedicine and may withdraw from such care at any time.    Notes:     Subjective:       Patient ID: Paris Burris is a 76 y.o. female.    HPI   About 3 weeks ago, has started noticing not wanting to do things - anything strenuous. This has gradually worsened.   Goes to sleep at 6:30pm and then may wake up a few hours later and then may read or do other things for a few hours. Then go back to sleep. Wake up for the day at 6:30am though today she woke up at 5:30am.   Has been occ napping during the day for an hour.   She's mostly been just reading these few weeks, which she enjoys. Does not fall asleep while reading.   Have had more anxiety/depression lately.   PHQ-9 today was 10 (moderate)    Wanted to review her meds to make sure she's not on anything that could make her feel this way.    Review of Systems   Constitutional: Positive for activity change. Negative for unexpected weight change.   HENT: Negative for hearing loss, rhinorrhea and trouble swallowing.    Eyes: Negative  "for discharge and visual disturbance.   Respiratory: Negative for chest tightness and wheezing.    Cardiovascular: Negative for chest pain and palpitations.   Gastrointestinal: Negative for blood in stool, constipation, diarrhea and vomiting.   Endocrine: Negative for polydipsia and polyuria.   Genitourinary: Negative for difficulty urinating, dysuria, hematuria and menstrual problem.   Musculoskeletal: Negative for arthralgias, joint swelling and neck pain.   Neurological: Negative for weakness and headaches.   Psychiatric/Behavioral: Positive for dysphoric mood. Negative for confusion.         Objective:      Physical Exam      Gen - A+OX4, NAD  CHEST - completing full sentences. Normal work of breathing.       Assessment/Plan     Diagnoses and all orders for this visit:    Moderate episode of recurrent major depressive disorder  Currently on venlafaxine 75mg daily and wellbutrin xl 150mg daily. Does not want to go up on meds as she's actively trying to lose weight and does not want risk of weight gain. Also generally does not desire to be on more meds anyway. Refer to Nayan Mora, our LCSW for CBT. No SI/HI. If symptoms worsen and pt wants to up med, will let me know.    Benign essential hypertension - decrease toprol xl from 50 to 25mg daily to see if it helps w/ this "tiredness" though I think a big part of it stems from MDD.   -     metoprolol succinate (TOPROL-XL) 25 MG 24 hr tablet; Take 1 tablet (25 mg total) by mouth once daily.    F/u in a mo instead of April.       Mandi Huynh MD  Department of Internal Medicine - [facility]  10:37 AM    "

## 2022-02-25 ENCOUNTER — PATIENT MESSAGE (OUTPATIENT)
Dept: OTHER | Facility: OTHER | Age: 77
End: 2022-02-25
Payer: MEDICARE

## 2022-02-26 DIAGNOSIS — E66.9 DIABETES MELLITUS TYPE 2 IN OBESE: ICD-10-CM

## 2022-02-26 DIAGNOSIS — E11.69 DIABETES MELLITUS TYPE 2 IN OBESE: ICD-10-CM

## 2022-02-27 RX ORDER — DULAGLUTIDE 1.5 MG/.5ML
1.5 INJECTION, SOLUTION SUBCUTANEOUS
Qty: 4 PEN | Refills: 5 | OUTPATIENT
Start: 2022-02-27 | End: 2023-02-27

## 2022-03-02 ENCOUNTER — PATIENT MESSAGE (OUTPATIENT)
Dept: AUDIOLOGY | Facility: CLINIC | Age: 77
End: 2022-03-02
Payer: MEDICARE

## 2022-03-04 ENCOUNTER — PATIENT MESSAGE (OUTPATIENT)
Dept: PRIMARY CARE CLINIC | Facility: CLINIC | Age: 77
End: 2022-03-04
Payer: MEDICARE

## 2022-03-07 ENCOUNTER — PATIENT MESSAGE (OUTPATIENT)
Dept: ORTHOPEDICS | Facility: CLINIC | Age: 77
End: 2022-03-07
Payer: MEDICARE

## 2022-03-07 ENCOUNTER — PATIENT MESSAGE (OUTPATIENT)
Dept: PRIMARY CARE CLINIC | Facility: CLINIC | Age: 77
End: 2022-03-07
Payer: MEDICARE

## 2022-03-07 DIAGNOSIS — E11.69 DIABETES MELLITUS TYPE 2 IN OBESE: ICD-10-CM

## 2022-03-07 DIAGNOSIS — E66.9 DIABETES MELLITUS TYPE 2 IN OBESE: ICD-10-CM

## 2022-03-07 RX ORDER — DULAGLUTIDE 0.75 MG/.5ML
0.75 INJECTION, SOLUTION SUBCUTANEOUS
Qty: 4 PEN | Refills: 0 | Status: CANCELLED | OUTPATIENT
Start: 2022-03-07 | End: 2022-04-06

## 2022-03-09 ENCOUNTER — CLINICAL SUPPORT (OUTPATIENT)
Dept: AUDIOLOGY | Facility: CLINIC | Age: 77
End: 2022-03-09
Payer: MEDICARE

## 2022-03-09 ENCOUNTER — PATIENT MESSAGE (OUTPATIENT)
Dept: PRIMARY CARE CLINIC | Facility: CLINIC | Age: 77
End: 2022-03-09
Payer: MEDICARE

## 2022-03-09 DIAGNOSIS — H90.3 SENSORINEURAL HEARING LOSS, BILATERAL: Primary | ICD-10-CM

## 2022-03-09 PROCEDURE — 99499 UNLISTED E&M SERVICE: CPT | Mod: HCNC,S$GLB,, | Performed by: AUDIOLOGIST

## 2022-03-09 PROCEDURE — 99499 NO LOS: ICD-10-PCS | Mod: HCNC,S$GLB,, | Performed by: AUDIOLOGIST

## 2022-03-09 NOTE — PROGRESS NOTES
"  Cochlear Implant -      Left Ear-  :  [employer]  Internal Device/Serial Number:  [id number]  Processor:  [id number]   SN of Processors: [id number]  DOS:  34/26/21  DIS:  6/1/21  Processor Color: Sand  Magnet Strength: 3i   Coil Length:  6cm  Battery Type:  Standard rechargeable  Warranty:  5 year    Right ear: NILES HA - Renato Burris was seen for another follow-up. Ms. Burris stated she has not been wearing the processor as she has not been feeling well and is having a hard time adapting to the processor and listening to the new sound and the "drumming." she is aware that the more she wears the processor the better she will adjust to the new sound and the better the chances are that the drumming will subside.     She was counseled to try to wear the processor for 1 hour each + 1 hour longer each day. In the meantime, we will contact [employer] to see what they recommend.        Recommendations  1. Work through progressive maps  2. Follow-up with [employer] recommendations   3. Follow-up with Clinic as needed        "

## 2022-03-10 ENCOUNTER — PATIENT MESSAGE (OUTPATIENT)
Dept: PRIMARY CARE CLINIC | Facility: CLINIC | Age: 77
End: 2022-03-10
Payer: MEDICARE

## 2022-03-10 ENCOUNTER — OFFICE VISIT (OUTPATIENT)
Dept: INTERNAL MEDICINE | Facility: CLINIC | Age: 77
End: 2022-03-10
Payer: MEDICARE

## 2022-03-10 VITALS
HEIGHT: 61 IN | HEART RATE: 101 BPM | WEIGHT: 165.25 LBS | OXYGEN SATURATION: 97 % | DIASTOLIC BLOOD PRESSURE: 60 MMHG | BODY MASS INDEX: 31.2 KG/M2 | SYSTOLIC BLOOD PRESSURE: 104 MMHG

## 2022-03-10 DIAGNOSIS — M54.9 BACK PAIN, UNSPECIFIED BACK LOCATION, UNSPECIFIED BACK PAIN LATERALITY, UNSPECIFIED CHRONICITY: Primary | ICD-10-CM

## 2022-03-10 DIAGNOSIS — M25.561 RIGHT KNEE PAIN, UNSPECIFIED CHRONICITY: ICD-10-CM

## 2022-03-10 PROCEDURE — 99213 PR OFFICE/OUTPT VISIT, EST, LEVL III, 20-29 MIN: ICD-10-PCS | Mod: HCNC,S$GLB,, | Performed by: PHYSICIAN ASSISTANT

## 2022-03-10 PROCEDURE — 3078F PR MOST RECENT DIASTOLIC BLOOD PRESSURE < 80 MM HG: ICD-10-PCS | Mod: HCNC,CPTII,S$GLB, | Performed by: PHYSICIAN ASSISTANT

## 2022-03-10 PROCEDURE — 1159F PR MEDICATION LIST DOCUMENTED IN MEDICAL RECORD: ICD-10-PCS | Mod: HCNC,CPTII,S$GLB, | Performed by: PHYSICIAN ASSISTANT

## 2022-03-10 PROCEDURE — 1125F PR PAIN SEVERITY QUANTIFIED, PAIN PRESENT: ICD-10-PCS | Mod: HCNC,CPTII,S$GLB, | Performed by: PHYSICIAN ASSISTANT

## 2022-03-10 PROCEDURE — 1101F PT FALLS ASSESS-DOCD LE1/YR: CPT | Mod: HCNC,CPTII,S$GLB, | Performed by: PHYSICIAN ASSISTANT

## 2022-03-10 PROCEDURE — 3074F SYST BP LT 130 MM HG: CPT | Mod: HCNC,CPTII,S$GLB, | Performed by: PHYSICIAN ASSISTANT

## 2022-03-10 PROCEDURE — 1160F PR REVIEW ALL MEDS BY PRESCRIBER/CLIN PHARMACIST DOCUMENTED: ICD-10-PCS | Mod: HCNC,CPTII,S$GLB, | Performed by: PHYSICIAN ASSISTANT

## 2022-03-10 PROCEDURE — 3288F FALL RISK ASSESSMENT DOCD: CPT | Mod: HCNC,CPTII,S$GLB, | Performed by: PHYSICIAN ASSISTANT

## 2022-03-10 PROCEDURE — 99213 OFFICE O/P EST LOW 20 MIN: CPT | Mod: HCNC,S$GLB,, | Performed by: PHYSICIAN ASSISTANT

## 2022-03-10 PROCEDURE — 1160F RVW MEDS BY RX/DR IN RCRD: CPT | Mod: HCNC,CPTII,S$GLB, | Performed by: PHYSICIAN ASSISTANT

## 2022-03-10 PROCEDURE — 3078F DIAST BP <80 MM HG: CPT | Mod: HCNC,CPTII,S$GLB, | Performed by: PHYSICIAN ASSISTANT

## 2022-03-10 PROCEDURE — 1125F AMNT PAIN NOTED PAIN PRSNT: CPT | Mod: HCNC,CPTII,S$GLB, | Performed by: PHYSICIAN ASSISTANT

## 2022-03-10 PROCEDURE — 99999 PR PBB SHADOW E&M-EST. PATIENT-LVL V: ICD-10-PCS | Mod: PBBFAC,HCNC,, | Performed by: PHYSICIAN ASSISTANT

## 2022-03-10 PROCEDURE — 1157F ADVNC CARE PLAN IN RCRD: CPT | Mod: HCNC,CPTII,S$GLB, | Performed by: PHYSICIAN ASSISTANT

## 2022-03-10 PROCEDURE — 3072F PR LOW RISK FOR RETINOPATHY: ICD-10-PCS | Mod: HCNC,CPTII,S$GLB, | Performed by: PHYSICIAN ASSISTANT

## 2022-03-10 PROCEDURE — 3072F LOW RISK FOR RETINOPATHY: CPT | Mod: HCNC,CPTII,S$GLB, | Performed by: PHYSICIAN ASSISTANT

## 2022-03-10 PROCEDURE — 1159F MED LIST DOCD IN RCRD: CPT | Mod: HCNC,CPTII,S$GLB, | Performed by: PHYSICIAN ASSISTANT

## 2022-03-10 PROCEDURE — 3288F PR FALLS RISK ASSESSMENT DOCUMENTED: ICD-10-PCS | Mod: HCNC,CPTII,S$GLB, | Performed by: PHYSICIAN ASSISTANT

## 2022-03-10 PROCEDURE — 99999 PR PBB SHADOW E&M-EST. PATIENT-LVL V: CPT | Mod: PBBFAC,HCNC,, | Performed by: PHYSICIAN ASSISTANT

## 2022-03-10 PROCEDURE — 3074F PR MOST RECENT SYSTOLIC BLOOD PRESSURE < 130 MM HG: ICD-10-PCS | Mod: HCNC,CPTII,S$GLB, | Performed by: PHYSICIAN ASSISTANT

## 2022-03-10 PROCEDURE — 1157F PR ADVANCE CARE PLAN OR EQUIV PRESENT IN MEDICAL RECORD: ICD-10-PCS | Mod: HCNC,CPTII,S$GLB, | Performed by: PHYSICIAN ASSISTANT

## 2022-03-10 PROCEDURE — 1101F PR PT FALLS ASSESS DOC 0-1 FALLS W/OUT INJ PAST YR: ICD-10-PCS | Mod: HCNC,CPTII,S$GLB, | Performed by: PHYSICIAN ASSISTANT

## 2022-03-10 NOTE — PROGRESS NOTES
Subjective:       Patient ID: Paris Burris is a 76 y.o. female.        Chief Complaint: Back Pain (For several pain pt has had lower back pain. )    Paris Burris is an established patient of Madni Huynh MD here today for urgent care visit.      Pain is mid to low back, has had pain in this area in the past but typically resolves in 1-2 days  This has been x 4 days  She had a fall a few months ago onto her knees on the cement, continues with some pain in right knee, and feels her gait is a bit different and going up and down stairs is tough    No numbness, tingling, or weakness in the legs    Pain does not keep her up at night    No abdominal pain         Review of Systems   Constitutional: Negative for chills, diaphoresis, fatigue and fever.   HENT: Negative for congestion and sore throat.    Eyes: Negative for visual disturbance.   Respiratory: Negative for cough, chest tightness and shortness of breath.    Cardiovascular: Negative for chest pain, palpitations and leg swelling.   Gastrointestinal: Negative for abdominal pain, blood in stool, constipation, diarrhea, nausea and vomiting.   Genitourinary: Negative for dysuria, frequency, hematuria and urgency.   Musculoskeletal: Positive for back pain. Negative for arthralgias.   Skin: Negative for rash.   Neurological: Negative for dizziness, syncope, weakness and headaches.   Psychiatric/Behavioral: Negative for dysphoric mood and sleep disturbance. The patient is not nervous/anxious.        Objective:      Physical Exam  Vitals and nursing note reviewed.   Constitutional:       Appearance: Normal appearance. She is well-developed.   HENT:      Head: Normocephalic.      Right Ear: External ear normal.      Left Ear: External ear normal.   Eyes:      Pupils: Pupils are equal, round, and reactive to light.   Cardiovascular:      Rate and Rhythm: Normal rate and regular rhythm.      Heart sounds: Normal heart sounds. No murmur heard.    No friction rub. No gallop.  "  Pulmonary:      Effort: Pulmonary effort is normal. No respiratory distress.      Breath sounds: Normal breath sounds.   Abdominal:      Palpations: Abdomen is soft.      Tenderness: There is no abdominal tenderness.   Musculoskeletal:      Lumbar back: Normal. No spasms or tenderness. Normal range of motion.        Back:    Skin:     General: Skin is warm and dry.   Neurological:      Mental Status: She is alert.         Assessment:       1. Back pain, unspecified back location, unspecified back pain laterality, unspecified chronicity    2. Right knee pain, unspecified chronicity        Plan:       Paris was seen today for back pain.    Diagnoses and all orders for this visit:    Back pain, unspecified back location, unspecified back pain laterality, unspecified chronicity  -     Ambulatory referral/consult to Physical/Occupational Therapy; Future    Right knee pain, unspecified chronicity  -     Ambulatory referral/consult to Physical/Occupational Therapy; Future    Will refer for PT  If not improving, consider x-ray vs referral to back and spine or healthy back    Pt has been given instructions populated from [employer] database and has verbalized understanding of the after visit summary and information contained wherein.    Follow up with a primary care provider. May go to ER for acute shortness of breath, lightheadedness, fever, or any other emergent complaints or changes in condition.    "This note will be shared with the patient"    Future Appointments   Date Time Provider Department Center   3/21/2022 10:00 AM HOME MONITOR DEVICE CHECK, LETI Denney   3/25/2022 11:00 AM Mandi Huynh MD [facility] SIRISHA Briones                 "

## 2022-03-11 ENCOUNTER — PATIENT MESSAGE (OUTPATIENT)
Dept: PRIMARY CARE CLINIC | Facility: CLINIC | Age: 77
End: 2022-03-11
Payer: MEDICARE

## 2022-03-14 ENCOUNTER — PATIENT MESSAGE (OUTPATIENT)
Dept: PRIMARY CARE CLINIC | Facility: CLINIC | Age: 77
End: 2022-03-14
Payer: MEDICARE

## 2022-03-14 ENCOUNTER — PATIENT MESSAGE (OUTPATIENT)
Dept: OTOLARYNGOLOGY | Facility: CLINIC | Age: 77
End: 2022-03-14
Payer: MEDICARE

## 2022-03-16 ENCOUNTER — CLINICAL SUPPORT (OUTPATIENT)
Dept: REHABILITATION | Facility: OTHER | Age: 77
End: 2022-03-16
Payer: MEDICARE

## 2022-03-16 DIAGNOSIS — R26.9 GAIT ABNORMALITY: ICD-10-CM

## 2022-03-16 DIAGNOSIS — M54.9 BACK PAIN, UNSPECIFIED BACK LOCATION, UNSPECIFIED BACK PAIN LATERALITY, UNSPECIFIED CHRONICITY: ICD-10-CM

## 2022-03-16 DIAGNOSIS — G89.29 CHRONIC PAIN OF RIGHT KNEE: ICD-10-CM

## 2022-03-16 DIAGNOSIS — M25.561 CHRONIC PAIN OF RIGHT KNEE: ICD-10-CM

## 2022-03-16 DIAGNOSIS — M25.561 RIGHT KNEE PAIN, UNSPECIFIED CHRONICITY: ICD-10-CM

## 2022-03-16 PROCEDURE — 97162 PT EVAL MOD COMPLEX 30 MIN: CPT | Mod: HCNC,PN | Performed by: PHYSICAL THERAPIST

## 2022-03-16 NOTE — PLAN OF CARE
[facility]  Physical Therapy Initial Evaluation    Name: Paris Burris  Clinic Number: [id number]    Therapy Diagnosis:   Encounter Diagnoses   Name Primary?    Back pain, unspecified back location, unspecified back pain laterality, unspecified chronicity     Right knee pain, unspecified chronicity     Chronic pain of right knee     Gait abnormality      Physician: Maribel Falcon PA-C    Physician Orders: PT Eval and Treat   Medical Diagnosis from Referral: M54.9 (ICD-10-CM) - Back pain, unspecified back location, unspecified back pain laterality, unspecified chronicity M25.561 (ICD-10-CM) - Right knee pain, unspecified chronicity   Evaluation Date: 3/16/2022  Authorization Period Expiration: 3/10/2023 (auth pending)  Plan of Care Expiration: 6/10/2022  Progress Note Due: 4/16/2022  Visit # / Visits authorized: 1/ 1 (eval only)   FOTO: 1/ 3 3/16/2022     Precautions: Standard    Time In: 0845 (late arrival)  Time Out: 0915  Total Appointment Time (timed & untimed codes): 30 minutes    Subjective   Date of onset: 4-5 months  History of current condition - Paris reports: chronic intermittent pain to low back, but pt says pain has resolved and is not bothering her at this time. She says knee pain stemmed from a fall a few months ago. Imaging revealed patella fracture, but stable with good alignment (X-ray from November). She says pain is not excruciating, but is present. She says she notices it the most with stair navigation and has been having to do step-to pattern. No significant pain with walking on level surface.        Past Medical History:   Diagnosis Date    Allergy     Anemia     Anxiety     Breast cancer 2002    Left breast    Cancer 2002    L breast s/p lumpectomy    Cataract     Decreased hearing     Depression     Dermatochalasis of both upper eyelids     Diabetes mellitus     Diabetes with neurologic complications     Gastric ulcer 9/10/13    EGD    Hiatal  hernia     Hyperlipidemia     Migraine headache     Migraine headache 3/20/2015    Multiple gastric ulcers     Parathyroid disorder     Pre-diabetes     Renal manifestation of secondary diabetes mellitus     Sleep apnea     no CPAP    Type 2 diabetes mellitus, controlled, 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    Wrist fracture      Paris Burris  has a past surgical history that includes lipoma removal (1999); Colonoscopy (N/A, 12/2/2016); Tonsillectomy; Adenoidectomy; Eye surgery (Bilateral); Breast lumpectomy (Left, 2002); Esophagogastroduodenoscopy (N/A, 9/12/2018); Esophageal manometry with measurement of impedance (N/A, 10/8/2018); Laparoscopic Toupet fundoplication (N/A, 1/29/2019); Esophagogastroduodenoscopy (N/A, 1/29/2019); Implantation of biventricular heart pacemaker (N/A, 1/30/2019); Esophagogastroduodenoscopy (N/A, 5/31/2019); Implantation of cochlear prosthesis (Left, 4/26/2021); and Cataract extraction w/  intraocular lens implant (Bilateral).    Paris has a current medication list which includes the following prescription(s): acetaminophen, ascorbic acid (vitamin c), blood sugar diagnostic, blood-glucose meter, bupropion, dulaglutide, esomeprazole, ezetimibe, losartan, metoprolol succinate, rosuvastatin, tramadol, venlafaxine, and vitamin d, and the following Facility-Administered Medications: sodium chloride 0.9% and sodium chloride 0.9%.    Review of patient's allergies indicates:   Allergen Reactions    Topamax [topiramate] Hallucinations     hallucinations        Imaging, bone scan films: FINDINGS:  Joint spaces are maintained.  Minimal degenerative changes can be seen.  Fracture of the right patella is seen in good position and alignment no joint effusion is noted.     Impression:     See above        Electronically signed by: Stone Cannon MD  Date:                                            11/04/2021      Prior Therapy: at this facility for balance  Social History: Pt lives  "with their spouse in [facility]  Occupation: retired  Prior Level of Function: I with ADL's and driving  Current Level of Function: I with ADL's, limited with squatting positions. Modified I with stairs. No difficulty driving    Pain:  Current 1/10, worst 3/10, best 0/10   Location: right knee   Description: Sharp  Aggravating Factors: stairs, squatting  Easing Factors: avoiding aggravating factors    Pts goals: be able to navigate stairs with reciprocal pattern    Objective     Observation: Pt is alert and oriented, good historian.       Gait: no significant deficits noted      Range of Motion:   Knee Left active Left Passive Right Active R passive   Flexion 135  115 130   Extension +3 +5 +3 +5           Lower Extremity Strength  Right LE  Left LE    Ankle dorsiflexion: 5/5 Ankle dorsiflexion: 5/5   Ankle plantarflexion: 4/5 (11 reps) Ankle plantarflexion: 4+/5 (17 reps)   Knee extension: 5/5 Knee extension: 5/5   Knee flexion: 4+/5 Knee flexion: 5/5   Hip flexion: 4/5 Hip flexion: 4+/5   Hip external rotation 4/5 Hip external rotation 4+/5   Hip internal rotation 4+/5 Hip internal rotation 4+/5   Hip abduction:  4/5 Hip abduction: 4+/5   Hip adduction: 4+/5 Hip adduction: 4+/5   Hip extension: 4+/5 Hip extension 4+/5       Function:    - SLS R: good, >10"  - SLS L: fair, increased hip and trunk strategy  - Sit <--> Stand: I without UE use   - Bed Mobility: I         Joint Mobility: mild stiffness noted with m/l patellar glides R, no pain reported    Palpation: no significant tenderness to palpation    Sensation: grossly intact to light touch    Flexibility:    Hamstring: R = mild; L = mild   Quad: R = mild; L = slight   Piriformis: R: mild; L mild            CMS Impairment/Limitation/Restriction for FOTO Lumbar Spine Survey    Therapist reviewed FOTO scores for Paris Burris on 3/16/2022.   FOTO documents entered into [employer] - see Media section.    Limitation Score: 47%    Goal: 37%    (knee FOTO to be provided at next " "visit)         TREATMENT   Treatment Time In: 0910  Treatment Time Out: 0915  Total Treatment time separate from Evaluation: 5 minutes    Paris received therapeutic exercises to develop strength and flexibility for 5 minutes including:  Reviewed and demonstrated for HEP:  Piriformis stretch 3 x 30"  SLR flex 3 x 10  SLR abd 2 x 10      Home Exercises and Patient Education Provided    Education provided:   - Therapy rationale and plan of care    Written Home Exercises Provided: yes.  Exercises were reviewed and Paris was able to demonstrate them prior to the end of the session.  Paris demonstrated good  understanding of the education provided.     See EMR under Patient Instructions for exercises provided 3/16/2022.    Assessment   Paris is a 76 y.o. female referred to outpatient Physical Therapy with a medical diagnosis of M54.9 (ICD-10-CM) - Back pain, unspecified back location, unspecified back pain laterality, unspecified chronicity M25.561 (ICD-10-CM) - Right knee pain, unspecified chronicity. Pt presents with report of chronic intermittent lumbar pain that has completely resolved at time of evaluation. R knee pain since fall approximately 5 months ago resulting in non-displaced patellar fracture. Pt reports pain with squatting and stair navigation. Slightly decreased active knee flexion R compared to L, but within functional range and passive motion is within 5 degrees of contralateral limb. Mild weakness to hips R>L. Mild flexibility deficits as noted.     Pt prognosis is Good.   Pt will benefit from skilled outpatient Physical Therapy to address the deficits stated above and in the chart below, provide pt/family education, and to maximize pt's level of independence.     Plan of care discussed with patient: Yes  Pt's spiritual, cultural and educational needs considered and patient is agreeable to the plan of care and goals as stated below:     Anticipated Barriers for therapy: none    Medical Necessity is " demonstrated by the following  History  Co-morbidities and personal factors that may impact the plan of care Co-morbidities:   anxiety, depression, diabetes, financial considerations, history of cancer and level of undertstanding of current condition    Personal Factors:   lifestyle     moderate   Examination  Body Structures and Functions, activity limitations and participation restrictions that may impact the plan of care Body Regions:   lower extremities    Body Systems:    gross symmetry  ROM  strength  balance  gait  transfers  transitions  motor control  motor learning    Participation Restrictions:   Stair navigation, squatting    Activity limitations:   Learning and applying knowledge  no deficits    General Tasks and Commands  no deficits    Communication  no deficits    Mobility  walking    Self care  no deficits    Domestic Life  doing house work (cleaning house, washing dishes, laundry)    Interactions/Relationships  no deficits    Life Areas  no deficits    Community and Social Life  community life  recreation and leisure         moderate   Clinical Presentation evolving clinical presentation with changing clinical characteristics moderate   Decision Making/ Complexity Score: moderate     Goals:  Short Term Goals (4 weeks)  1.  Pt will report <2/10 pain at worst within the R knee for ease with ADL's  2. Pt will demonstrate reciprocal pattern on stairs with minimal symptom exacerbation to improve home and community mobility.     Long Term Goals (12 weeks)  1. Pt will demonstrate improvements in R knee ROM to 0-125-135 for ease with ambulation  2. Pt will demonstrate 4+/5 strength within the R knee for ease with house hold chores and climbing stairs  3. Pt will report being independent with his/her HEP for maintenance of improvements gained during therapy sessions  4. Pt will report <1/10 pain at worst within the R knee for ease with community mobility.  5. Pt will report ability to perform reciprocal  pattern on her stairs at home consistently without pain exacerbation to improve mobility in home.       Plan   Plan of care Certification: 3/16/2022 to 6/10/2022.    Outpatient Physical Therapy 2 times weekly for 12 weeks to include the following interventions: Gait Training, Manual Therapy, Moist Heat/ Ice, Neuromuscular Re-ed, Patient Education, Self Care, Therapeutic Activities and Therapeutic Exercise.  Further assessment of lumbar spine to be performed if symptoms are exacerbated during this episode of therapy.      Domonique Licona, PT

## 2022-03-21 ENCOUNTER — CLINICAL SUPPORT (OUTPATIENT)
Dept: INTERNAL MEDICINE | Facility: CLINIC | Age: 77
End: 2022-03-21
Payer: MEDICARE

## 2022-03-21 ENCOUNTER — CLINICAL SUPPORT (OUTPATIENT)
Dept: CARDIOLOGY | Facility: HOSPITAL | Age: 77
End: 2022-03-21
Payer: MEDICARE

## 2022-03-21 VITALS — WEIGHT: 162.94 LBS | BODY MASS INDEX: 30.78 KG/M2

## 2022-03-21 DIAGNOSIS — Z95.0 PRESENCE OF CARDIAC PACEMAKER: ICD-10-CM

## 2022-03-21 PROCEDURE — 93296 REM INTERROG EVL PM/IDS: CPT | Mod: HCNC | Performed by: INTERNAL MEDICINE

## 2022-03-21 NOTE — PROGRESS NOTES
Health  Follow-Up Note   [x] Office  [] Phone  Notes from previous session reviewed.   [x] Previous Session Goals unchanged.   [] Patient/Caregiver Change in Goals.  Goals added or changed by Patient/Caregiver since program participation:  1. Lose 2 lbs  2. Increase exercise walking 2000 steps per day       Additional/Changed support that patient/caregiver has experienced/sought?  (Indicate readiness, support from family, friends, others, community groups, etc)  1.      Additional/Changed obstacles that could prevent patient/caregiver from reaching their goals?  1.  Depressed due to [facility] Declining    Feedback provided:  1.  Praised for continued effort and determination    Diagnostic values/Desriptors for follow-up as needed for chronic condition(s)   Weight: 73.9 kg 162.92 lb gain 5.73 lbs since Dec 2021.  Blood Glucose: 106 this am before breakfast  Pain: back pain better now pain level is one going to PT and right knee is 0 at present sitting here.     Interventions:   1. Health  listened reflectively, validated thoughts and feelings, offered support and encouragement.   2. Allowed patient to express themselves in a non-biased atmosphere.  3. Health  assisted pt to problem-solve obstacles such as being in a challenging environment and dealing with these challenges.   4. Motivational Interviewed interventions utilized (OARS).   5. Patient responded favorably to interventions and remained actively engaged in the session.   6. Health  will remain available and connected for patient by phone and/or office visits.   7. Positive reinforcement, emotional support and encouragement provided.   8. Focused Education: MI    Plan:  [x] Pt will work on goals as stated above.   [x] Pt will contact Health  for any questions, concerns or needs.  [x] Pt will follow up with Health  in office on  4.11.22.  [] Pt will follow up with Health  on phone in:        [x] Health  will  remain available.   [] Health  will contact patient by phone in:        [] Health  will consult:        [] Health  will inform Provider via EPIC messaging.     Impression:  1. Behavior is consistent with Action Stage of Change.   2. Participation level:       [x] Receptive      [x] Interactive      [] Guarded and Resistant      [x] Self Motivated      [] Refused/Declined to participate   3. [x] Pt voiced understanding of all information presented.       [] Pt voiced needing further information/education. This will be arranged.       [x] Pt would benefit from further education/information as identified by this health . This will be arranged.     Jacqueline Perry RN HC

## 2022-03-23 ENCOUNTER — CLINICAL SUPPORT (OUTPATIENT)
Dept: REHABILITATION | Facility: OTHER | Age: 77
End: 2022-03-23
Payer: MEDICARE

## 2022-03-23 ENCOUNTER — PATIENT MESSAGE (OUTPATIENT)
Dept: OTOLARYNGOLOGY | Facility: CLINIC | Age: 77
End: 2022-03-23
Payer: MEDICARE

## 2022-03-23 DIAGNOSIS — G89.29 CHRONIC PAIN OF RIGHT KNEE: Primary | ICD-10-CM

## 2022-03-23 DIAGNOSIS — M25.561 CHRONIC PAIN OF RIGHT KNEE: Primary | ICD-10-CM

## 2022-03-23 DIAGNOSIS — R26.9 GAIT ABNORMALITY: ICD-10-CM

## 2022-03-23 PROCEDURE — 97112 NEUROMUSCULAR REEDUCATION: CPT | Mod: PN,CQ

## 2022-03-23 PROCEDURE — 97110 THERAPEUTIC EXERCISES: CPT | Mod: PN,CQ

## 2022-03-23 NOTE — PROGRESS NOTES
"[facility]   Physical Therapy Treatment Note     Name: Paris Burris  Clinic Number: [id number]    Therapy Diagnosis:   Encounter Diagnoses   Name Primary?    Chronic pain of right knee Yes    Gait abnormality      Physician: Maribel Falcon PA-C    Visit Date: 3/23/2022    Physician Orders: PT Eval and Treat   Medical Diagnosis from Referral: M54.9 (ICD-10-CM) - Back pain, unspecified back location, unspecified back pain laterality, unspecified chronicity M25.561 (ICD-10-CM) - Right knee pain, unspecified chronicity   Evaluation Date: 3/16/2022  Authorization Period Expiration: 3/10/2023   Plan of Care Expiration: 6/10/2022  Progress Note Due: 4/16/2022  Visit # / Visits authorized: 2/20  FOTO: 1/ 3 3/16/2022      Precautions: Standard      PTA Visit #: 1/5     Time In: 1400  Time Out: 1500  Total Billable Time: 53 minutes     SUBJECTIVE     Pt reports: she has been feeling some soreness in her R knee cap. States her pain is not debilitating however it does bother her each day.        She was compliant with home exercise program.  Response to previous treatment: tolerated well  Functional change: n/a    Prior Level of Function: I with ADL's and driving  Current Level of Function: I with ADL's, limited with squatting positions. Modified I with stairs. No difficulty driving     Pain:  Current 1/10, worst 3/10, best 0/10   Location: right knee   Description: Sharp  Aggravating Factors: stairs, squatting  Easing Factors: avoiding aggravating factors     Pts goals: be able to navigate stairs with reciprocal pattern      OBJECTIVE     Objective Measures updated at progress report unless specified.     Treatment     Paris received the treatments listed below:      Paris received therapeutic exercises to develop strength and flexibility for 38 minutes including:    Piriformis stretch 3 x 30"  SLR flex 2 x 10 R/L  SLR abd 2 x 10 R/L  +Clams 3x10 R/L  +Bridging 3x10    +LAQ 30x R  +Ball " "squeezes 30x         neuromuscular re-education activities to improve: Balance, Coordination, Kinesthetic and Proprioception for 10 minutes. The following activities were included:    +SLS R 3x20"  +NBOS on airex 3x30"  +Marching on airex 20x    therapeutic activities to improve functional performance for 5 minutes, including:  +Sit to stand with piliates ring 3x10      Patient Education and Home Exercises     Home Exercises Provided and Patient Education Provided     Education provided:   - Exercise form and rationale     Written Home Exercises Provided: Patient instructed to cont prior HEP. Exercises were reviewed and Paris was able to demonstrate them prior to the end of the session.  Paris demonstrated good  understanding of the education provided. See EMR under Patient Instructions for exercises provided during therapy sessions    ASSESSMENT     Pt tolerated exercise well. Progressed CKC exercise for improved function/ADL performance. Weakness in glutes and quads was noted however pt was able to improve eccentric control with cuing.            Paris Is progressing well towards her goals.     Pt prognosis is Good.       Pt will continue to benefit from skilled outpatient physical therapy to address the deficits listed in the problem list box on initial evaluation, provide pt/family education and to maximize pt's level of independence in the home and community environment.     Pt's spiritual, cultural and educational needs considered and pt agreeable to plan of care and goals.       Anticipated Barriers for therapy: none        Goals:  Short Term Goals (4 weeks)  1.  Pt will report <2/10 pain at worst within the R knee for ease with ADL's ( progressing, not met )  2. Pt will demonstrate reciprocal pattern on stairs with minimal symptom exacerbation to improve home and community mobility. ( progressing, not met )     Long Term Goals (12 weeks)  1. Pt will demonstrate improvements in R knee ROM to 0-125-135 for " ease with ambulation ( progressing, not met )  2. Pt will demonstrate 4+/5 strength within the R knee for ease with house hold chores and climbing stairs ( progressing, not met )  3. Pt will report being independent with his/her HEP for maintenance of improvements gained during therapy sessions  ( progressing, not met )  4. Pt will report <1/10 pain at worst within the R knee for ease with community mobility. ( progressing, not met )  5. Pt will report ability to perform reciprocal pattern on her stairs at home consistently without pain exacerbation to improve mobility in home. ( progressing, not met )      PLAN     Plan of care Certification: 3/16/2022 to 6/10/2022.    Focus on LE strength, knee ROM and ADL      Outpatient Physical Therapy 2 times weekly for 12 weeks to include the following interventions: Gait Training, Manual Therapy, Moist Heat/ Ice, Neuromuscular Re-ed, Patient Education, Self Care, Therapeutic Activities and Therapeutic Exercise.  Further assessment of lumbar spine to be performed if symptoms are exacerbated during this episode of therapy.           Nayan Aparicio, PTA

## 2022-03-24 ENCOUNTER — PATIENT MESSAGE (OUTPATIENT)
Dept: PRIMARY CARE CLINIC | Facility: CLINIC | Age: 77
End: 2022-03-24
Payer: MEDICARE

## 2022-03-24 ENCOUNTER — TELEPHONE (OUTPATIENT)
Dept: AUDIOLOGY | Facility: CLINIC | Age: 77
End: 2022-03-24
Payer: MEDICARE

## 2022-03-24 NOTE — TELEPHONE ENCOUNTER
Patient called to ask who will be taking over her care as her audiologist (Da Thakur) in no longer at [facility]. Left a voicemail at 4:44PM notifying the patient that we have other audiologists who program cochlear implants who will be glad to takeover her care. Left the clinic number should the patient need to schedule an appointment.

## 2022-03-28 ENCOUNTER — CLINICAL SUPPORT (OUTPATIENT)
Dept: REHABILITATION | Facility: OTHER | Age: 77
End: 2022-03-28
Payer: MEDICARE

## 2022-03-28 ENCOUNTER — DOCUMENTATION ONLY (OUTPATIENT)
Dept: REHABILITATION | Facility: OTHER | Age: 77
End: 2022-03-28
Payer: MEDICARE

## 2022-03-28 DIAGNOSIS — R26.9 GAIT ABNORMALITY: ICD-10-CM

## 2022-03-28 DIAGNOSIS — G89.29 CHRONIC PAIN OF RIGHT KNEE: Primary | ICD-10-CM

## 2022-03-28 DIAGNOSIS — M25.561 CHRONIC PAIN OF RIGHT KNEE: Primary | ICD-10-CM

## 2022-03-28 PROCEDURE — 97110 THERAPEUTIC EXERCISES: CPT | Mod: PN | Performed by: PHYSICAL THERAPIST

## 2022-03-28 NOTE — PROGRESS NOTES
PT/PTA met face to face to discuss pt's treatment plan and progress towards established goals. Pt will be seen by a physical therapist minimally every 6th visit or every 30 days.        Domonique Licona, PT

## 2022-03-28 NOTE — PROGRESS NOTES
"[facility]   Physical Therapy Treatment Note     Name: Paris Burris  Clinic Number: [id number]    Therapy Diagnosis:   Encounter Diagnoses   Name Primary?    Chronic pain of right knee Yes    Gait abnormality      Physician: Maribel Falcon PA-C    Visit Date: 3/28/2022    Physician Orders: PT Eval and Treat   Medical Diagnosis from Referral: M54.9 (ICD-10-CM) - Back pain, unspecified back location, unspecified back pain laterality, unspecified chronicity M25.561 (ICD-10-CM) - Right knee pain, unspecified chronicity   Evaluation Date: 3/16/2022  Authorization Period Expiration: 3/10/2023   Plan of Care Expiration: 6/10/2022  Progress Note Due: 4/16/2022  Visit # / Visits authorized: 3/20  FOTO: 1/ 3 3/16/2022      Precautions: Standard      PTA Visit #: 0/5     Time In: 1050  Time Out: 1125  Total Billable Time: 35 minutes     SUBJECTIVE     Pt reports: she's seeing some improvement even with just one session. She says on stairs she can still feel that's she's "not 100%" but she has been able to do half of her stairs reciprocally and still doing the other half step-to. She was able to take a "pretty long" walk this morning without pain exacerbation.         She was compliant with home exercise program.  Response to previous treatment: tolerated well  Functional change: n/a      Pain: 0/10  Location: right knee      Pts goals: be able to navigate stairs with reciprocal pattern      OBJECTIVE     Objective Measures updated at progress report unless specified.     Treatment     Paris received the treatments listed below:      Paris received therapeutic exercises to develop strength and flexibility for 35 minutes including:    Piriformis stretch 3 x 30"  SLR flex 2 x 10 R/L  SLR abd 2 x 10 R/L  Clams 3x10 R/L  Bridging 3x10    LAQ 30x (add weight as tolerated nv)  Ball squeezes 30x  Sit to stand with pilates ring 3x10  +Step ups on 6" step 10x       neuromuscular re-education " "activities to improve: Balance, Coordination, Kinesthetic and Proprioception for 00 minutes. The following activities were included:    SLS R 3x20"  NBOS on airex 3x30"  Marching on airex 20x         Patient Education and Home Exercises     Home Exercises Provided and Patient Education Provided     Education provided:   - Exercise form and rationale   -3/28 added bridges, clams, and step ups to HEP    Written Home Exercises Provided: Patient instructed to cont prior HEP. Exercises were reviewed and Paris was able to demonstrate them prior to the end of the session.  Paris demonstrated good  understanding of the education provided. See EMR under Patient Instructions for exercises provided during therapy sessions    ASSESSMENT     Overall good tolerance to treatment today. Min c/o anterior knee pain with LAQ but able to complete. She reports pain is minimal and does not increase with repetition. Pt requests to finish treatment a little early today, reports onset of HA due to noise in clinic. Instructed in new addition to HEP.     Paris Is progressing well towards her goals.     Pt prognosis is Good.       Pt will continue to benefit from skilled outpatient physical therapy to address the deficits listed in the problem list box on initial evaluation, provide pt/family education and to maximize pt's level of independence in the home and community environment.     Pt's spiritual, cultural and educational needs considered and pt agreeable to plan of care and goals.       Anticipated Barriers for therapy: none        Goals:  Short Term Goals (4 weeks)  1.  Pt will report <2/10 pain at worst within the R knee for ease with ADL's (progressing, not met)  2. Pt will demonstrate reciprocal pattern on stairs with minimal symptom exacerbation to improve home and community mobility. (progressing, not met)     Long Term Goals (12 weeks)  1. Pt will demonstrate improvements in R knee ROM to 0-125-135 for ease with ambulation " (progressing, not met)  2. Pt will demonstrate 4+/5 strength within the R knee for ease with house hold chores and climbing stairs (progressing, not met)  3. Pt will report being independent with his/her HEP for maintenance of improvements gained during therapy sessions  (progressing, not met)  4. Pt will report <1/10 pain at worst within the R knee for ease with community mobility. (progressing, not met)  5. Pt will report ability to perform reciprocal pattern on her stairs at home consistently without pain exacerbation to improve mobility in home. (progressing, not met)      PLAN     Plan of care Certification: 3/16/2022 to 6/10/2022.    Focus on LE strength, knee ROM and ADL      Outpatient Physical Therapy 2 times weekly for 12 weeks to include the following interventions: Gait Training, Manual Therapy, Moist Heat/ Ice, Neuromuscular Re-ed, Patient Education, Self Care, Therapeutic Activities and Therapeutic Exercise.  Further assessment of lumbar spine to be performed if symptoms are exacerbated during this episode of therapy.           Domonique Licona, PT

## 2022-03-29 ENCOUNTER — TELEPHONE (OUTPATIENT)
Dept: PRIMARY CARE CLINIC | Facility: CLINIC | Age: 77
End: 2022-03-29
Payer: MEDICARE

## 2022-03-29 ENCOUNTER — PATIENT MESSAGE (OUTPATIENT)
Dept: PRIMARY CARE CLINIC | Facility: CLINIC | Age: 77
End: 2022-03-29
Payer: MEDICARE

## 2022-03-29 NOTE — TELEPHONE ENCOUNTER
Pt would like to do her virtual with you soon, please let me know how soon you would like to see her.

## 2022-03-30 ENCOUNTER — PATIENT MESSAGE (OUTPATIENT)
Dept: PRIMARY CARE CLINIC | Facility: CLINIC | Age: 77
End: 2022-03-30
Payer: MEDICARE

## 2022-03-31 ENCOUNTER — SOCIAL WORK (OUTPATIENT)
Dept: PRIMARY CARE CLINIC | Facility: CLINIC | Age: 77
End: 2022-03-31
Payer: MEDICARE

## 2022-03-31 ENCOUNTER — PATIENT MESSAGE (OUTPATIENT)
Dept: PRIMARY CARE CLINIC | Facility: CLINIC | Age: 77
End: 2022-03-31
Payer: MEDICARE

## 2022-03-31 NOTE — PROGRESS NOTES
[facility] INTAKE    DATE:  3/31/2022  REFERRAL SOURCE:  Mandi Huynh MD  TYPE OF VISIT:  In person  LENGTH OF SESSION: 60  .  HISTORY OF PRESENTING ILLNESS:  Paris Burris, a 76 y.o. female with history of Anxiety disorders; generalized anxiety disorder [F41.1] and Major Depressive Disorder, Recurrent, Moderate (F33.1).    CHIEF COMPLAINT/REASON FOR ENCOUNTER: Pt presented for initial assessment. Met with patient. Pt's chief complaint includes the following: depression and anxiety    Patient does not currently have a psychiatrist.    Patient does not currently have a therapist.     Pt is taking clonazepam (Klonopin) 0.5mg once daily for Anxiety. Pt is taking bupropion SR (Wellbutrin) 150mg once daily and venlafaxine XR (Effexor ER) 75mg once daily for Depression. They are not interested in medication changes.    Current symptoms:  · Depression: dysphoric mood, worthlessness/guilt, fatigue and difficulty concentrating.  · Anxiety: excessive worrying and muscle tension.  · Insomnia: frequent night time awakening and non-restful sleep.  · Jazmyne:  denies.  · Psychosis: denies .      Current social stressors:   Poor communication with  and difficult personal relationship with daughter. Recovering alcoholic (last relapse last fall)    Risk assessment:  Patient reports no suicidal ideation  Patient reports no homicidal ideation  Patient reports no self-injurious behavior  Patient reports no violent behavior    PSYCHIATRIC HISTORY:  History of Jazmyne or diagnosis of Bipolar Disorder in the past:  No  History of Psychosis or diagnosis of Schizophrenia in the past:  No  Previous Psychiatric Hospitalizations:  Yes - 8 years ago-post SA  Previous SI/HI:   Yes -   Previous Suicide Attempts:  Yes - 8 years ago  Previous Medication Trials: No Pt has taken clonazepam (Klonopin) o.5mg once daily for Anxiety. Pt has taken bupropion SR (Wellbutrin) 150mg once daily and venlafaxine XR (Effexor ER) 75mg once daily for  Depression.  Previous Psychiatric Outpatient Treatment:  Yes -   History of Trauma:  Yes  History of Violence:  No  Access to a Gun:  No    SUBSTANCE ABUSE HISTORY:  Tobacco:  No   Alcohol: recovery for alcohol abuse, last relapse last fall  Illicit Substances: No  Misuse of Prescription Medications:  No    MEDICAL HISTORY:  Past Medical History:   Diagnosis Date    Allergy     Anemia     Anxiety     Breast cancer 2002    Left breast    Cancer     L breast s/p lumpectomy    Cataract     Decreased hearing     Depression     Dermatochalasis of both upper eyelids     Diabetes mellitus     Diabetes with neurologic complications     Gastric ulcer 9/10/13    EGD    Hiatal hernia     Hyperlipidemia     Migraine headache     Migraine headache 3/20/2015    Multiple gastric ulcers     Parathyroid disorder     Pre-diabetes     Renal manifestation of secondary diabetes mellitus     Sleep apnea     no CPAP    Type 2 diabetes mellitus, controlled, with renal complications 2017    Wrist fracture        NEUROLOGIC HISTORY:  Seizures:  No  Head trauma:  No  Memory loss:  Yes, recent forgetfulness    SOCIAL HISTORY (MARRIAGE, EMPLOYMENT, etc.):  Living Situation: with   Family Life Cycle:   Family: 2 children (1 )  Nuclear/Marriage:   Extended Family:   Supports:, friends  Education/Vocation: teacher,   Baptist/Spirituality: N/A  Hobbies and Interests:reading,walking    PSYCHIATRIC FAMILY HISTORY: Mother (suicide), father (depression, anxiety)      MENTAL HEALTH STATUS EXAM  General Appearance:  unremarkable, age appropriate   Speech: normal tone, normal rate, normal pitch, normal volume      Level of Cooperation: cooperative      Thought Processes: normal and logical   Mood: steady      Thought Content: normal, no suicidality, no homicidality, delusions, or paranoia   Affect: congruent and appropriate   Orientation: Oriented x3   Memory: recent >  impaired, remote >  intact    Attention Span & Concentration: intact   Fund of General Knowledge: intact and appropriate to age and level of education   Abstract Reasoning: interpretation of similarities was abstract   Judgment & Insight: good     Language  intact       IMPRESSION:   My diagnostic impression is Anxiety disorders; generalized anxiety disorder [F41.1] and Major Depressive Disorder, Recurrent, Moderate (F33.1), as evidenced by Hx and current symptomology.     PROVISIONAL DIAGNOSES:  No diagnosis found.     STRENGTHS AND LIABILITIES: Strength: Patient accepts guidance/feedback, Strength: Patient is intelligent., Strength: Patient is motivated for change., Liability: Patient is dependent., Liability: Patient lacks social skills., Liability: Patient lacks coping skills.    TREATMENT GOALS: Anxiety: acquiring relapse prevention skills, modifying schemata of threat/vulnerability/need for control (or other schemata -- specify N/A) and reducing negative automatic thoughts  Depression: acquiring relapse prevention skills, decreasing worthlessness (or other schemata-specify N/A), increasing motivation, reducing fatigue and reducing negative automatic thoughts    PLAN: In this session a psych evaluation was conducted to get history and process pt's life. CBT will be utilized in future individual  therapy sessions to increase insight, support and behavior modification.     RETURN TO CLINIC: No follow-ups on file.

## 2022-04-01 ENCOUNTER — OFFICE VISIT (OUTPATIENT)
Dept: PRIMARY CARE CLINIC | Facility: CLINIC | Age: 77
End: 2022-04-01
Payer: MEDICARE

## 2022-04-01 DIAGNOSIS — G47.33 OSA (OBSTRUCTIVE SLEEP APNEA): ICD-10-CM

## 2022-04-01 DIAGNOSIS — F33.1 MODERATE EPISODE OF RECURRENT MAJOR DEPRESSIVE DISORDER: Primary | ICD-10-CM

## 2022-04-01 PROCEDURE — 99443 PR PHYSICIAN TELEPHONE EVALUATION 21-30 MIN: CPT | Mod: 95,,, | Performed by: INTERNAL MEDICINE

## 2022-04-01 PROCEDURE — 1157F PR ADVANCE CARE PLAN OR EQUIV PRESENT IN MEDICAL RECORD: ICD-10-PCS | Mod: CPTII,95,, | Performed by: INTERNAL MEDICINE

## 2022-04-01 PROCEDURE — 1159F MED LIST DOCD IN RCRD: CPT | Mod: CPTII,95,, | Performed by: INTERNAL MEDICINE

## 2022-04-01 PROCEDURE — 1159F PR MEDICATION LIST DOCUMENTED IN MEDICAL RECORD: ICD-10-PCS | Mod: CPTII,95,, | Performed by: INTERNAL MEDICINE

## 2022-04-01 PROCEDURE — 1157F ADVNC CARE PLAN IN RCRD: CPT | Mod: CPTII,95,, | Performed by: INTERNAL MEDICINE

## 2022-04-01 PROCEDURE — 3072F PR LOW RISK FOR RETINOPATHY: ICD-10-PCS | Mod: CPTII,95,, | Performed by: INTERNAL MEDICINE

## 2022-04-01 PROCEDURE — 99443 PR PHYSICIAN TELEPHONE EVALUATION 21-30 MIN: ICD-10-PCS | Mod: 95,,, | Performed by: INTERNAL MEDICINE

## 2022-04-01 PROCEDURE — 1160F RVW MEDS BY RX/DR IN RCRD: CPT | Mod: CPTII,95,, | Performed by: INTERNAL MEDICINE

## 2022-04-01 PROCEDURE — 1160F PR REVIEW ALL MEDS BY PRESCRIBER/CLIN PHARMACIST DOCUMENTED: ICD-10-PCS | Mod: CPTII,95,, | Performed by: INTERNAL MEDICINE

## 2022-04-01 PROCEDURE — 3072F LOW RISK FOR RETINOPATHY: CPT | Mod: CPTII,95,, | Performed by: INTERNAL MEDICINE

## 2022-04-01 RX ORDER — BUPROPION HYDROCHLORIDE 150 MG/1
300 TABLET ORAL DAILY
Qty: 90 TABLET | Refills: 3
Start: 2022-04-01 | End: 2022-05-09 | Stop reason: SDUPTHER

## 2022-04-01 NOTE — PROGRESS NOTES
Established Patient - Audio Only Telehealth Visit     The patient location is: [place], [place]  The chief complaint leading to consultation is: MDD  Visit type: Virtual visit with audio only (telephone)  Total time spent with patient: 25 min       The reason for the audio only service rather than synchronous audio and video virtual visit was related to technical difficulties or patient preference/necessity.     Each patient to whom I provide medical services by telemedicine is:  (1) informed of the relationship between the physician and patient and the respective role of any other health care provider with respect to management of the patient; and (2) notified that they may decline to receive medical services by telemedicine and may withdraw from such care at any time. Patient verbally consented to receive this service via voice-only telephone call.       HPI:   MDD - venlafaxine 75mg daily, wellbutrin xl 150mg daily  Had one session w/ Nayan Mora LMSW. Felt that it was a great session.   Pt does have some good days and some days that are not so good.   Recently started walking again >1 mile daily (after he patellar fx previously) and feels this helps.   Due to her tiredness, we also backed down from toprol 50 to 25mg daily.     COOKIE - couldn't tolerate CPAP. Will get dentist (Dr. Demond Rios) to do oral appliance. Wakes up w/ HA most mornings. Takes tylenol when she wakes up.      Assessment and plan:    Diagnoses and all orders for this visit:    Moderate episode of recurrent major depressive disorder - inc wellbutrin to 300. Cont effexor. F/u w/ Nayan Mora LMSW.   -     buPROPion (WELLBUTRIN XL) 150 MG TB24 tablet; Take 2 tablets (300 mg total) by mouth once daily.    COOKIE (obstructive sleep apnea) - will be getting oral appliance from dentist to try for COOKIE. If fails, consider referral to ENT on [facility] for hypoglossal n stimulator.     F/u in 6 weeks, sooner if needed.        Mandi Huynh,  MD  Department of Internal Medicine - [facility]  11:44 AM       This service was not originating from a related E/M service provided within the previous 7 days nor will  to an E/M service or procedure within the next 24 hours or my soonest available appointment.  Prevailing standard of care was able to be met in this audio-only visit.

## 2022-04-06 ENCOUNTER — CLINICAL SUPPORT (OUTPATIENT)
Dept: REHABILITATION | Facility: OTHER | Age: 77
End: 2022-04-06
Payer: MEDICARE

## 2022-04-06 DIAGNOSIS — G89.29 CHRONIC PAIN OF RIGHT KNEE: Primary | ICD-10-CM

## 2022-04-06 DIAGNOSIS — R26.9 GAIT ABNORMALITY: ICD-10-CM

## 2022-04-06 DIAGNOSIS — M25.561 CHRONIC PAIN OF RIGHT KNEE: Primary | ICD-10-CM

## 2022-04-06 PROCEDURE — 97110 THERAPEUTIC EXERCISES: CPT | Mod: PN

## 2022-04-06 NOTE — PROGRESS NOTES
"[facility]   Physical Therapy Treatment Note     Name: Paris Burris  Clinic Number: [id number]    Therapy Diagnosis:   Encounter Diagnoses   Name Primary?    Chronic pain of right knee Yes    Gait abnormality      Physician: Maribel Falcon PA-C    Visit Date: 4/6/2022    Physician Orders: PT Eval and Treat   Medical Diagnosis from Referral: M54.9 (ICD-10-CM) - Back pain, unspecified back location, unspecified back pain laterality, unspecified chronicity M25.561 (ICD-10-CM) - Right knee pain, unspecified chronicity   Evaluation Date: 3/16/2022  Authorization Period Expiration: 3/10/2023   Plan of Care Expiration: 6/10/2022  Progress Note Due: 4/16/2022  Visit # / Visits authorized: 4/20  FOTO: 1/ 3 3/16/2022      Precautions: Standard      PTA Visit #: 0/5     Time In: 10:07  Time Out: 10:45  Total Billable Time: 38 minutes     SUBJECTIVE     Pt reports: continued pain in anterior knee.     She was compliant with home exercise program.  Response to previous treatment: tolerated well  Functional change: n/a      Pain: 0/10  Location: right knee      Pts goals: be able to navigate stairs with reciprocal pattern      OBJECTIVE     Objective Measures updated at progress report unless specified.     Treatment     Paris received the treatments listed below:      Paris received therapeutic exercises to develop strength and flexibility for 38 minutes including:    Piriformis stretch 3 x 30"  SLR flex 3 x 10 R/L  SLR abd 3 x 10 R/L  Clams 3x10 R/L  Bridging 3x10    Ball squeezes 30x  LAQ with ball squeeze (VMO activation) 15 x 5" holds x 3# cuffs on ankles    Sit to stand with pilates ring 3x10  +Step ups on 6" step 10x  +lateral step ups on 6" step 10x  +standing heel raises     neuromuscular re-education activities to improve: Balance, Coordination, Kinesthetic and Proprioception for 00 minutes. The following activities were included:    SLS R 3x20"  NBOS on airex 3x30"  Marching on " airex 20x         Patient Education and Home Exercises     Home Exercises Provided and Patient Education Provided     Education provided:   - Exercise form and rationale   -3/28 added bridges, clams, and step ups to HEP    Written Home Exercises Provided: Patient instructed to cont prior HEP. Exercises were reviewed and Paris was able to demonstrate them prior to the end of the session.  Paris demonstrated good  understanding of the education provided. See EMR under Patient Instructions for exercises provided during therapy sessions    ASSESSMENT     Good tolerance with overall program today with appropriate training effect noted. Progressed unilateral stair step ups today both forwards and lateral to promote multidirectional hip and LE strength today.    Paris Is progressing well towards her goals.     Pt prognosis is Good.       Pt will continue to benefit from skilled outpatient physical therapy to address the deficits listed in the problem list box on initial evaluation, provide pt/family education and to maximize pt's level of independence in the home and community environment.     Pt's spiritual, cultural and educational needs considered and pt agreeable to plan of care and goals.       Anticipated Barriers for therapy: none        Goals:  Short Term Goals (4 weeks)  1.  Pt will report <2/10 pain at worst within the R knee for ease with ADL's (progressing, not met)  2. Pt will demonstrate reciprocal pattern on stairs with minimal symptom exacerbation to improve home and community mobility. (progressing, not met)     Long Term Goals (12 weeks)  1. Pt will demonstrate improvements in R knee ROM to 0-125-135 for ease with ambulation (progressing, not met)  2. Pt will demonstrate 4+/5 strength within the R knee for ease with house hold chores and climbing stairs (progressing, not met)  3. Pt will report being independent with his/her HEP for maintenance of improvements gained during therapy sessions  (progressing,  not met)  4. Pt will report <1/10 pain at worst within the R knee for ease with community mobility. (progressing, not met)  5. Pt will report ability to perform reciprocal pattern on her stairs at home consistently without pain exacerbation to improve mobility in home. (progressing, not met)      PLAN     Plan of care Certification: 3/16/2022 to 6/10/2022.    Focus on LE strength, knee ROM and ADL      Outpatient Physical Therapy 2 times weekly for 12 weeks to include the following interventions: Gait Training, Manual Therapy, Moist Heat/ Ice, Neuromuscular Re-ed, Patient Education, Self Care, Therapeutic Activities and Therapeutic Exercise.  Further assessment of lumbar spine to be performed if symptoms are exacerbated during this episode of therapy.           Patsy Elaine, PT

## 2022-04-11 ENCOUNTER — CLINICAL SUPPORT (OUTPATIENT)
Dept: REHABILITATION | Facility: OTHER | Age: 77
End: 2022-04-11
Payer: MEDICARE

## 2022-04-11 ENCOUNTER — PATIENT MESSAGE (OUTPATIENT)
Dept: PRIMARY CARE CLINIC | Facility: CLINIC | Age: 77
End: 2022-04-11
Payer: MEDICARE

## 2022-04-11 DIAGNOSIS — E66.9 DIABETES MELLITUS TYPE 2 IN OBESE: Primary | ICD-10-CM

## 2022-04-11 DIAGNOSIS — R26.9 GAIT ABNORMALITY: ICD-10-CM

## 2022-04-11 DIAGNOSIS — G89.29 CHRONIC PAIN OF RIGHT KNEE: Primary | ICD-10-CM

## 2022-04-11 DIAGNOSIS — E11.69 DIABETES MELLITUS TYPE 2 IN OBESE: Primary | ICD-10-CM

## 2022-04-11 DIAGNOSIS — M25.561 CHRONIC PAIN OF RIGHT KNEE: Primary | ICD-10-CM

## 2022-04-11 PROCEDURE — 97110 THERAPEUTIC EXERCISES: CPT | Mod: PN

## 2022-04-11 RX ORDER — DEXTROSE 4 G
TABLET,CHEWABLE ORAL
Qty: 1 EACH | Refills: 0 | Status: SHIPPED | OUTPATIENT
Start: 2022-04-11 | End: 2022-09-08 | Stop reason: SDUPTHER

## 2022-04-11 RX ORDER — LANCETS 33 GAUGE
1 EACH MISCELLANEOUS 2 TIMES DAILY WITH MEALS
Qty: 100 EACH | Refills: 11 | Status: SHIPPED | OUTPATIENT
Start: 2022-04-11 | End: 2022-09-08 | Stop reason: SDUPTHER

## 2022-04-11 RX ORDER — LANCING DEVICE
1 EACH MISCELLANEOUS 2 TIMES DAILY WITH MEALS
Qty: 1 EACH | Refills: 0 | Status: SHIPPED | OUTPATIENT
Start: 2022-04-11 | End: 2022-09-08 | Stop reason: SDUPTHER

## 2022-04-11 RX ORDER — METFORMIN HYDROCHLORIDE 500 MG/1
500 TABLET, EXTENDED RELEASE ORAL
Qty: 90 TABLET | Refills: 3 | Status: SHIPPED | OUTPATIENT
Start: 2022-04-11 | End: 2022-06-21

## 2022-04-11 NOTE — TELEPHONE ENCOUNTER
Order sent for glucometer. Sent in metformin xr 500mg qam w/ breakfast or first meal. She has appt 5/16 w/ me. Can she do labs a few day sprior? Thanks!

## 2022-04-11 NOTE — PROGRESS NOTES
"[facility]   Physical Therapy Treatment Note     Name: Paris Burris  Clinic Number: [id number]    Therapy Diagnosis:   Encounter Diagnoses   Name Primary?    Chronic pain of right knee Yes    Gait abnormality      Physician: Maribel Falcon PA-C    Visit Date: 4/11/2022    Physician Orders: PT Eval and Treat   Medical Diagnosis from Referral: M54.9 (ICD-10-CM) - Back pain, unspecified back location, unspecified back pain laterality, unspecified chronicity M25.561 (ICD-10-CM) - Right knee pain, unspecified chronicity   Evaluation Date: 3/16/2022  Authorization Period Expiration: 3/10/2023   Plan of Care Expiration: 6/10/2022  Progress Note Due: 4/16/2022  Visit # / Visits authorized: 4/20  FOTO: 1/ 3 3/16/2022      Precautions: Standard      PTA Visit #: 0/5     Time In: 10:00  Time Out: 10:36  Total Billable Time: 36 minutes     SUBJECTIVE     Pt reports: min pain in back and knees over the weekend.    She was compliant with home exercise program.  Response to previous treatment: tolerated well  Functional change: n/a      Pain: 0/10  Location: right knee      Pts goals: be able to navigate stairs with reciprocal pattern      OBJECTIVE     Objective Measures updated at progress report unless specified.     Treatment     Paris received the treatments listed below:      Paris received therapeutic exercises to develop strength and flexibility for 36 minutes including:    Piriformis stretch 3 x 30"  SLR flex 3 x 10 R/L x 2# cuff wt  SLR abd 3 x 10 R/L x 2# cuff wt  Clams 3x10 R/L x OTB   Bridging 3x10  x OTB   Ball squeezes 30x  LAQ with ball squeeze (VMO activation) 15 x 5" holds x 3# cuffs on ankles    Sit to stand with pilates ring 3x10  Step ups on 6" step 10x  lateral step ups on 6" step 10x  standing heel raises     neuromuscular re-education activities to improve: Balance, Coordination, Kinesthetic and Proprioception for 00 minutes. The following activities were " "included:    SLS R 3x20"  NBOS on airex 3x30"  Marching on airex 20x         Patient Education and Home Exercises     Home Exercises Provided and Patient Education Provided     Education provided:   - Exercise form and rationale   -3/28 added bridges, clams, and step ups to HEP    Written Home Exercises Provided: Patient instructed to cont prior HEP. Exercises were reviewed and Paris was able to demonstrate them prior to the end of the session.  Paris demonstrated good  understanding of the education provided. See EMR under Patient Instructions for exercises provided during therapy sessions    ASSESSMENT     Pt requested to leave visit early and was UA to complete full therex session due to c/o R>L knee pain.    Paris Is progressing well towards her goals.     Pt prognosis is Good.       Pt will continue to benefit from skilled outpatient physical therapy to address the deficits listed in the problem list box on initial evaluation, provide pt/family education and to maximize pt's level of independence in the home and community environment.     Pt's spiritual, cultural and educational needs considered and pt agreeable to plan of care and goals.       Anticipated Barriers for therapy: none        Goals:  Short Term Goals (4 weeks)  1.  Pt will report <2/10 pain at worst within the R knee for ease with ADL's (progressing, not met)  2. Pt will demonstrate reciprocal pattern on stairs with minimal symptom exacerbation to improve home and community mobility. (progressing, not met)     Long Term Goals (12 weeks)  1. Pt will demonstrate improvements in R knee ROM to 0-125-135 for ease with ambulation (progressing, not met)  2. Pt will demonstrate 4+/5 strength within the R knee for ease with house hold chores and climbing stairs (progressing, not met)  3. Pt will report being independent with his/her HEP for maintenance of improvements gained during therapy sessions  (progressing, not met)  4. Pt will report <1/10 pain at " worst within the R knee for ease with community mobility. (progressing, not met)  5. Pt will report ability to perform reciprocal pattern on her stairs at home consistently without pain exacerbation to improve mobility in home. (progressing, not met)      PLAN     Plan of care Certification: 3/16/2022 to 6/10/2022.    Focus on LE strength, knee ROM and ADL      Outpatient Physical Therapy 2 times weekly for 12 weeks to include the following interventions: Gait Training, Manual Therapy, Moist Heat/ Ice, Neuromuscular Re-ed, Patient Education, Self Care, Therapeutic Activities and Therapeutic Exercise.  Further assessment of lumbar spine to be performed if symptoms are exacerbated during this episode of therapy.           Patsy Elaine, PT

## 2022-04-12 ENCOUNTER — PATIENT MESSAGE (OUTPATIENT)
Dept: PRIMARY CARE CLINIC | Facility: CLINIC | Age: 77
End: 2022-04-12
Payer: MEDICARE

## 2022-04-12 ENCOUNTER — CLINICAL SUPPORT (OUTPATIENT)
Dept: INTERNAL MEDICINE | Facility: CLINIC | Age: 77
End: 2022-04-12
Payer: MEDICARE

## 2022-04-12 VITALS — BODY MASS INDEX: 31.16 KG/M2 | WEIGHT: 164.88 LBS

## 2022-04-12 NOTE — PROGRESS NOTES
Health  Follow-Up Note   [x] Office  [] Phone  Notes from previous session reviewed.   [x] Previous Session Goals unchanged.   [] Patient/Caregiver Change in Goals.   Goals added or changed by Patient/Caregiver since program participation:  1. Make small goals everyday  2. Track carbs and sugar  3. No bad for me snacks   4. 2000 steps a day       Additional/Changed support that patient/caregiver has experienced/sought?  (Indicate readiness, support from family, friends, others, community groups, etc)  1.      Additional/Changed obstacles that could prevent patient/caregiver from reaching their goals?  1.  Kade depressed she depressed     Feedback provided:  1.  Praised for continued effort and determination    Diagnostic values/Desriptors for follow-up as needed for chronic condition(s)   Weight: 74.8 kg 164.9 lb gain 2 lb  Blood Glucose: go to [facility] to have them teach how to use meter    Interventions:   1. Health  listened reflectively, validated thoughts and feelings, offered support and encouragement.   2. Allowed patient to express themselves in a non-biased atmosphere.  3. Health  assisted pt to problem-solve obstacles such as being in a challenging environment and dealing with these challenges.   4. Motivational Interviewed interventions utilized ([facility]).   5. Patient responded favorably to interventions and remained actively engaged in the session.   6. Health  will remain available and connected for patient by phone and/or office visits.   7. Positive reinforcement, emotional support and encouragement provided.   8. Focused Education: MI    Plan:  [x] Pt will work on goals as stated above.   [x] Pt will contact Health  for any questions, concerns or needs.  [x] Pt will follow up with Health  in office on  5.4.22.  [] Pt will follow up with Health  on phone in:        [x] Health  will remain available.   [] Health  will contact patient by phone in:        []  Health  will consult:        [] Health  will inform Provider via EPIC messaging.     Impression:  1. Behavior is consistent with Action Stage of Change.   2. Participation level:       [x] Receptive      [x] Interactive      [] Guarded and Resistant      [x] Self Motivated      [] Refused/Declined to participate   3. [x] Pt voiced understanding of all information presented.       [] Pt voiced needing further information/education. This will be arranged.       [x] Pt would benefit from further education/information as identified by this health . This will be arranged.     Jacqueline Perry RN HC

## 2022-04-14 ENCOUNTER — PATIENT MESSAGE (OUTPATIENT)
Dept: PRIMARY CARE CLINIC | Facility: CLINIC | Age: 77
End: 2022-04-14
Payer: MEDICARE

## 2022-04-14 ENCOUNTER — SOCIAL WORK (OUTPATIENT)
Dept: PRIMARY CARE CLINIC | Facility: CLINIC | Age: 77
End: 2022-04-14
Payer: MEDICARE

## 2022-04-14 DIAGNOSIS — E55.9 VITAMIN D DEFICIENCY: Primary | ICD-10-CM

## 2022-04-14 NOTE — PROGRESS NOTES
Individual Psychotherapy (LCSW/PhD)  Paris Burris,  4/14/2022    Site:  [facility]         Therapeutic Intervention: Met with patient for individual psychotherapy.    Chief complaint/reason for encounter: depression     Interval history and content of current session: Pt and SW discussed pt's goals for treatment further. Pt identified better communication with their  as a goal. Pt stated that they have been increasingly frustrated by their inability to communicate feelings and needs to . Pt reports that their conversations with  are often difficulty. Pt and SW discussed pt's family Hx and impact on pt's ability to express needs.    SW provided pt with CBT triangle and thought record to record interactions with  for further analysis with SW. Pt and SW discussed impact of depression on pt's health goals and role of health  in improving pt's physical health. Pt and SW discussed link between pt's physical and metal health and potential barriers.     Treatment plan:  · Target symptoms: depression  · Why chosen therapy is appropriate versus another modality: relevant to diagnosis, evidence based practice  · Outcome monitoring methods: self-report, checklist/rating scale  · Therapeutic intervention type: insight oriented psychotherapy, behavior modifying psychotherapy, supportive psychotherapy    Risk parameters:  Patient reports no suicidal ideation  Patient reports no homicidal ideation  Patient reports no self-injurious behavior  Patient reports no violent behavior    Verbal deficits: None    Patient's response to intervention:  The patient's response to intervention is motivated.    Progress toward goals and other mental status changes:  The patient's progress toward goals is fair .    Diagnosis:   No diagnosis found.    Plan: Pt plans to continue individual psychotherapy    Return to clinic: 2 weeks    Length of Service (minutes): 30

## 2022-04-15 ENCOUNTER — PATIENT MESSAGE (OUTPATIENT)
Dept: ORTHOPEDICS | Facility: CLINIC | Age: 77
End: 2022-04-15
Payer: MEDICARE

## 2022-04-15 ENCOUNTER — DOCUMENTATION ONLY (OUTPATIENT)
Dept: REHABILITATION | Facility: OTHER | Age: 77
End: 2022-04-15
Payer: MEDICARE

## 2022-04-15 NOTE — PROGRESS NOTES
Patient was scheduled for a follow up physical therapy appointment at [facility] location on 4/15/2022. Patient failed to appear for the appointment without prior notification today.           Nayan Aparicio, PTA  4/15/2022

## 2022-04-18 ENCOUNTER — CLINICAL SUPPORT (OUTPATIENT)
Dept: REHABILITATION | Facility: OTHER | Age: 77
End: 2022-04-18
Payer: MEDICARE

## 2022-04-18 DIAGNOSIS — M25.561 CHRONIC PAIN OF RIGHT KNEE: Primary | ICD-10-CM

## 2022-04-18 DIAGNOSIS — G89.29 CHRONIC PAIN OF RIGHT KNEE: Primary | ICD-10-CM

## 2022-04-18 DIAGNOSIS — R26.9 GAIT ABNORMALITY: ICD-10-CM

## 2022-04-18 PROCEDURE — 97110 THERAPEUTIC EXERCISES: CPT | Mod: PN

## 2022-04-18 NOTE — PROGRESS NOTES
"[facility]   Physical Therapy Treatment Note     Name: Paris Burris  Clinic Number: [id number]    Therapy Diagnosis:   Encounter Diagnoses   Name Primary?    Chronic pain of right knee Yes    Gait abnormality      Physician: Maribel Falcon PA-C    Visit Date: 4/18/2022    Physician Orders: PT Eval and Treat   Medical Diagnosis from Referral: M54.9 (ICD-10-CM) - Back pain, unspecified back location, unspecified back pain laterality, unspecified chronicity M25.561 (ICD-10-CM) - Right knee pain, unspecified chronicity   Evaluation Date: 3/16/2022  Authorization Period Expiration: 3/10/2023   Plan of Care Expiration: 6/10/2022  Progress Note Due: 5/16/2022  Visit # / Visits authorized: 6/20  FOTO: 1/ 3 3/16/2022      Precautions: Standard      PTA Visit #: 0/5     Time In: 10:50 am  Time Out: 11:30 am  Total Billable Time: 40 minutes     SUBJECTIVE     Pt reports: no current back or knee pain. Feels her knee pain mostly with walking on uneven surfaces or descending stair.     She was compliant with home exercise program.  Response to previous treatment: poor tolerance to last visit, left early due to knee pain  Functional change: back pain decreased       Pain: 0/10  Location: right knee      Pts goals: be able to navigate stairs with reciprocal pattern      OBJECTIVE        Range of Motion:   Knee Left active Right Active   Flexion 135 120   Extension +3 +3      R knee ext MMT: 5/5 non painful  Ascended and descended stairs in clinic 2x without reports of knee pain       Treatment     Paris received the treatments listed below:      Paris received therapeutic exercises to develop strength and flexibility for 40 minutes including:    Piriformis stretch 3 x 30"  SLR flex 3 x 10 R x 2# cuff wt  SLR abd 2 x 10 R/L x 2# cuff wt  Clams 3x10 R/L x OTB   Bridging 3x10  x OTB   Ball squeezes 30x- not performed   LAQ with ball squeeze (VMO activation) 15 x 5" holds x 3# cuffs on ankles    Sit " "to stand with pilates ring 3x10  Step ups on 6" step 2x10  lateral step ups on 6" step 2x10  standing heel raises 3x10  Tandem balance on foam 2x30 sec ea in // bars      neuromuscular re-education activities to improve: Balance, Coordination, Kinesthetic and Proprioception for 00 minutes. The following activities were included:    SLS R 3x20"  NBOS on airex 3x30"  Marching on airex 20x         Patient Education and Home Exercises     Home Exercises Provided and Patient Education Provided     Education provided:   - Exercise form and rationale   -3/28 added bridges, clams, and step ups to HEP    Written Home Exercises Provided: Patient instructed to cont prior HEP. Exercises were reviewed and Paris was able to demonstrate them prior to the end of the session.  Paris demonstrated good  understanding of the education provided. See EMR under Patient Instructions for exercises provided during therapy sessions    ASSESSMENT     Pt presents for 6th visit and re-assessment performed today. She met 1/2 STG and 1/5 LTG since start of care. Able to ascend/descend stairs in clinic without reports of pain. Tolerated today's visit without exacerbation of pain. Subjectively, pt reports pain on stairs and home and unlevel surfaces. Able to perform tandem balance on foam without increase of symptoms also. Pt requested new copy of HEP due to losing hers. HEP was printed and all questions answered. Continues to benefit from skilled PT to address knee pain with stairs at home.     Paris Is progressing well towards her goals.     Pt prognosis is Good.       Pt will continue to benefit from skilled outpatient physical therapy to address the deficits listed in the problem list box on initial evaluation, provide pt/family education and to maximize pt's level of independence in the home and community environment.     Pt's spiritual, cultural and educational needs considered and pt agreeable to plan of care and goals.       Anticipated " Barriers for therapy: none        Goals:  Short Term Goals (4 weeks) updated 4/18/22  1.  Pt will report <2/10 pain at worst within the R knee for ease with ADL's (progressing, not met)  2. Pt will demonstrate reciprocal pattern on stairs with minimal symptom exacerbation to improve home and community mobility.  MET on 4/18/22     Long Term Goals (12 weeks)  1. Pt will demonstrate improvements in R knee ROM to 0-125-135 for ease with ambulation (progressing, not met)  2. Pt will demonstrate 4+/5 strength within the R knee for ease with house hold chores and climbing stairs MET  3. Pt will report being independent with his/her HEP for maintenance of improvements gained during therapy sessions  (progressing, not met)  4. Pt will report <1/10 pain at worst within the R knee for ease with community mobility. (progressing, not met)  5. Pt will report ability to perform reciprocal pattern on her stairs at home consistently without pain exacerbation to improve mobility in home. (progressing, not met)      PLAN     Plan of care Certification: 3/16/2022 to 6/10/2022.    Focus on LE strength, knee ROM and ADL      Outpatient Physical Therapy 2 times weekly for 12 weeks to include the following interventions: Gait Training, Manual Therapy, Moist Heat/ Ice, Neuromuscular Re-ed, Patient Education, Self Care, Therapeutic Activities and Therapeutic Exercise.  Further assessment of lumbar spine to be performed if symptoms are exacerbated during this episode of therapy.           BEVERLY MCKAY, PT              BEVERLY MCKAY, PT  4/15/2022

## 2022-04-20 ENCOUNTER — PATIENT MESSAGE (OUTPATIENT)
Dept: REHABILITATION | Facility: OTHER | Age: 77
End: 2022-04-20
Payer: MEDICARE

## 2022-04-22 ENCOUNTER — OFFICE VISIT (OUTPATIENT)
Dept: ORTHOPEDICS | Facility: CLINIC | Age: 77
End: 2022-04-22
Payer: MEDICARE

## 2022-04-22 ENCOUNTER — PATIENT MESSAGE (OUTPATIENT)
Dept: ADMINISTRATIVE | Facility: OTHER | Age: 77
End: 2022-04-22
Payer: MEDICARE

## 2022-04-22 ENCOUNTER — PATIENT MESSAGE (OUTPATIENT)
Dept: PRIMARY CARE CLINIC | Facility: CLINIC | Age: 77
End: 2022-04-22
Payer: MEDICARE

## 2022-04-22 ENCOUNTER — HOSPITAL ENCOUNTER (OUTPATIENT)
Dept: RADIOLOGY | Facility: HOSPITAL | Age: 77
Discharge: HOME | End: 2022-04-22
Attending: ORTHOPAEDIC SURGERY
Payer: MEDICARE

## 2022-04-22 VITALS — HEIGHT: 61 IN | WEIGHT: 162.81 LBS | BODY MASS INDEX: 30.74 KG/M2

## 2022-04-22 DIAGNOSIS — M25.561 RIGHT KNEE PAIN, UNSPECIFIED CHRONICITY: ICD-10-CM

## 2022-04-22 DIAGNOSIS — S82.024K: Primary | ICD-10-CM

## 2022-04-22 DIAGNOSIS — M25.561 RIGHT KNEE PAIN, UNSPECIFIED CHRONICITY: Primary | ICD-10-CM

## 2022-04-22 PROCEDURE — 3072F LOW RISK FOR RETINOPATHY: CPT | Mod: CPTII,S$GLB,, | Performed by: ORTHOPAEDIC SURGERY

## 2022-04-22 PROCEDURE — 1125F AMNT PAIN NOTED PAIN PRSNT: CPT | Mod: CPTII,S$GLB,, | Performed by: ORTHOPAEDIC SURGERY

## 2022-04-22 PROCEDURE — 3288F FALL RISK ASSESSMENT DOCD: CPT | Mod: CPTII,S$GLB,, | Performed by: ORTHOPAEDIC SURGERY

## 2022-04-22 PROCEDURE — 1157F ADVNC CARE PLAN IN RCRD: CPT | Mod: CPTII,S$GLB,, | Performed by: ORTHOPAEDIC SURGERY

## 2022-04-22 PROCEDURE — 3072F PR LOW RISK FOR RETINOPATHY: ICD-10-PCS | Mod: CPTII,S$GLB,, | Performed by: ORTHOPAEDIC SURGERY

## 2022-04-22 PROCEDURE — 3288F PR FALLS RISK ASSESSMENT DOCUMENTED: ICD-10-PCS | Mod: CPTII,S$GLB,, | Performed by: ORTHOPAEDIC SURGERY

## 2022-04-22 PROCEDURE — 73562 X-RAY EXAM OF KNEE 3: CPT | Mod: 26,RT,, | Performed by: RADIOLOGY

## 2022-04-22 PROCEDURE — 99999 PR PBB SHADOW E&M-EST. PATIENT-LVL III: ICD-10-PCS | Mod: PBBFAC,,, | Performed by: ORTHOPAEDIC SURGERY

## 2022-04-22 PROCEDURE — 1101F PT FALLS ASSESS-DOCD LE1/YR: CPT | Mod: CPTII,S$GLB,, | Performed by: ORTHOPAEDIC SURGERY

## 2022-04-22 PROCEDURE — 1101F PR PT FALLS ASSESS DOC 0-1 FALLS W/OUT INJ PAST YR: ICD-10-PCS | Mod: CPTII,S$GLB,, | Performed by: ORTHOPAEDIC SURGERY

## 2022-04-22 PROCEDURE — 1159F PR MEDICATION LIST DOCUMENTED IN MEDICAL RECORD: ICD-10-PCS | Mod: CPTII,S$GLB,, | Performed by: ORTHOPAEDIC SURGERY

## 2022-04-22 PROCEDURE — 1159F MED LIST DOCD IN RCRD: CPT | Mod: CPTII,S$GLB,, | Performed by: ORTHOPAEDIC SURGERY

## 2022-04-22 PROCEDURE — 99213 OFFICE O/P EST LOW 20 MIN: CPT | Mod: S$GLB,,, | Performed by: ORTHOPAEDIC SURGERY

## 2022-04-22 PROCEDURE — 73562 X-RAY EXAM OF KNEE 3: CPT | Mod: TC,RT

## 2022-04-22 PROCEDURE — 1157F PR ADVANCE CARE PLAN OR EQUIV PRESENT IN MEDICAL RECORD: ICD-10-PCS | Mod: CPTII,S$GLB,, | Performed by: ORTHOPAEDIC SURGERY

## 2022-04-22 PROCEDURE — 73560 X-RAY EXAM OF KNEE 1 OR 2: CPT | Mod: TC,LT

## 2022-04-22 PROCEDURE — 99999 PR PBB SHADOW E&M-EST. PATIENT-LVL III: CPT | Mod: PBBFAC,,, | Performed by: ORTHOPAEDIC SURGERY

## 2022-04-22 PROCEDURE — 1160F PR REVIEW ALL MEDS BY PRESCRIBER/CLIN PHARMACIST DOCUMENTED: ICD-10-PCS | Mod: CPTII,S$GLB,, | Performed by: ORTHOPAEDIC SURGERY

## 2022-04-22 PROCEDURE — 73562 XR KNEE ORTHO RIGHT: ICD-10-PCS | Mod: 26,RT,, | Performed by: RADIOLOGY

## 2022-04-22 PROCEDURE — 73560 X-RAY EXAM OF KNEE 1 OR 2: CPT | Mod: 26,LT,, | Performed by: RADIOLOGY

## 2022-04-22 PROCEDURE — 73560 XR KNEE ORTHO RIGHT: ICD-10-PCS | Mod: 26,LT,, | Performed by: RADIOLOGY

## 2022-04-22 PROCEDURE — 1125F PR PAIN SEVERITY QUANTIFIED, PAIN PRESENT: ICD-10-PCS | Mod: CPTII,S$GLB,, | Performed by: ORTHOPAEDIC SURGERY

## 2022-04-22 PROCEDURE — 99213 PR OFFICE/OUTPT VISIT, EST, LEVL III, 20-29 MIN: ICD-10-PCS | Mod: S$GLB,,, | Performed by: ORTHOPAEDIC SURGERY

## 2022-04-22 PROCEDURE — 1160F RVW MEDS BY RX/DR IN RCRD: CPT | Mod: CPTII,S$GLB,, | Performed by: ORTHOPAEDIC SURGERY

## 2022-04-25 ENCOUNTER — PATIENT MESSAGE (OUTPATIENT)
Dept: PRIMARY CARE CLINIC | Facility: CLINIC | Age: 77
End: 2022-04-25
Payer: MEDICARE

## 2022-04-27 ENCOUNTER — PATIENT MESSAGE (OUTPATIENT)
Dept: PRIMARY CARE CLINIC | Facility: CLINIC | Age: 77
End: 2022-04-27
Payer: MEDICARE

## 2022-04-27 NOTE — PROGRESS NOTES
Subjective:      Patient ID: Paris Burris is a 76 y.o. female.    Chief Complaint:   Pain of the Right Knee    HPI  She comes in with continued 3/10 pain in the right knee with weight-bearing activities.  She has some pain with stairs and with arising from a low seat.  No fall, accident, injury since we saw her last.  The pain does not limit her activities significantly, other than slowing her down on stairs.      Objective:        Ortho/SPM Exam    On exam she has no tenderness to palpation or percussion of the patella.  She has full active extension without crepitus.  Flexion is to about 125° without pain.  No edema, synovitis, effusion.  Calves soft nontender.  Distal neurovascular intact.  Skin intact.    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  Assessment:     Nonunion, right patella fracture    Plan:   While the fracture has not united, it undoubtedly has a solid fibrous union as there is no tenderness there.  She has excellent range of motion and function.  She feels she can manage her mild occasional symptoms with ice, rest, Tylenol.  She will see me as needed.    The patient's pathophysiology was explained in detail with reference to x-rays, models, other visual aids as appropriate.  Treatment options were discussed in detail.  Questions were invited and answered to the patient's satisfaction.    Greater than 50% of this 15 minute visit was devoted to counseling and coordination of care      Stone Jauregui MD  Orthopedic Surgery

## 2022-05-05 ENCOUNTER — PATIENT MESSAGE (OUTPATIENT)
Dept: PRIMARY CARE CLINIC | Facility: CLINIC | Age: 77
End: 2022-05-05
Payer: MEDICARE

## 2022-05-05 ENCOUNTER — IMMUNIZATION (OUTPATIENT)
Dept: INTERNAL MEDICINE | Facility: CLINIC | Age: 77
End: 2022-05-05
Payer: MEDICARE

## 2022-05-05 DIAGNOSIS — Z23 NEED FOR VACCINATION: Primary | ICD-10-CM

## 2022-05-05 PROCEDURE — 91300 COVID-19, MRNA, LNP-S, PF, 30 MCG/0.3 ML DOSE VACCINE: CPT | Mod: PBBFAC | Performed by: INTERNAL MEDICINE

## 2022-05-09 ENCOUNTER — PATIENT MESSAGE (OUTPATIENT)
Dept: PRIMARY CARE CLINIC | Facility: CLINIC | Age: 77
End: 2022-05-09
Payer: MEDICARE

## 2022-05-09 ENCOUNTER — PATIENT MESSAGE (OUTPATIENT)
Dept: ADMINISTRATIVE | Facility: OTHER | Age: 77
End: 2022-05-09
Payer: MEDICARE

## 2022-05-09 DIAGNOSIS — K21.9 GASTROESOPHAGEAL REFLUX DISEASE, UNSPECIFIED WHETHER ESOPHAGITIS PRESENT: ICD-10-CM

## 2022-05-09 DIAGNOSIS — F33.1 MODERATE EPISODE OF RECURRENT MAJOR DEPRESSIVE DISORDER: ICD-10-CM

## 2022-05-09 RX ORDER — BUPROPION HYDROCHLORIDE 300 MG/1
300 TABLET ORAL DAILY
Qty: 45 TABLET | Refills: 3
Start: 2022-05-09 | End: 2022-11-08 | Stop reason: SDUPTHER

## 2022-05-09 RX ORDER — ESOMEPRAZOLE MAGNESIUM 40 MG/1
40 CAPSULE, DELAYED RELEASE ORAL
Qty: 90 CAPSULE | Refills: 3 | Status: SHIPPED | OUTPATIENT
Start: 2022-05-09 | End: 2022-11-08

## 2022-05-09 NOTE — TELEPHONE ENCOUNTER
Refill Routing Note   Medication(s) are not appropriate for processing by [facility] for the following reason(s):      - Non-participating provider    [facility] action(s):  Route          Medication reconciliation completed: No     Appointments  past 12m or future 3m with PCP    Date Provider   Last Visit   4/1/2022 Mandi Huynh MD   Next Visit   5/16/2022 Mandi Huynh MD   ED visits in past 90 days: 0        Note composed:9:34 AM 05/09/2022

## 2022-05-09 NOTE — TELEPHONE ENCOUNTER
No, I already sent message to Wanda Parker, our . There's an open ticket about her digital DM program. No word yet. Kesha, can you let pt know?

## 2022-05-09 NOTE — TELEPHONE ENCOUNTER
A nurse sent us this asking us how does patient get enrolled in Diabetes Digital Medicine program, would you know about this

## 2022-05-11 ENCOUNTER — PATIENT MESSAGE (OUTPATIENT)
Dept: PRIMARY CARE CLINIC | Facility: CLINIC | Age: 77
End: 2022-05-11
Payer: MEDICARE

## 2022-05-12 ENCOUNTER — LAB VISIT (OUTPATIENT)
Dept: LAB | Facility: HOSPITAL | Age: 77
End: 2022-05-12
Attending: INTERNAL MEDICINE
Payer: MEDICARE

## 2022-05-12 DIAGNOSIS — E55.9 VITAMIN D DEFICIENCY: ICD-10-CM

## 2022-05-12 DIAGNOSIS — E11.69 DIABETES MELLITUS TYPE 2 IN OBESE: ICD-10-CM

## 2022-05-12 DIAGNOSIS — E66.9 DIABETES MELLITUS TYPE 2 IN OBESE: ICD-10-CM

## 2022-05-12 DIAGNOSIS — R79.82 ELEVATED C-REACTIVE PROTEIN (CRP): ICD-10-CM

## 2022-05-12 LAB
25(OH)D3+25(OH)D2 SERPL-MCNC: 52 NG/ML (ref 30–96)
ALBUMIN SERPL BCP-MCNC: 4.3 G/DL (ref 3.5–5.2)
ALP SERPL-CCNC: 82 U/L (ref 55–135)
ALT SERPL W/O P-5'-P-CCNC: 32 U/L (ref 10–44)
ANION GAP SERPL CALC-SCNC: 9 MMOL/L (ref 8–16)
AST SERPL-CCNC: 27 U/L (ref 10–40)
BASOPHILS # BLD AUTO: 0.03 K/UL (ref 0–0.2)
BASOPHILS NFR BLD: 0.6 % (ref 0–1.9)
BILIRUB SERPL-MCNC: 0.4 MG/DL (ref 0.1–1)
BUN SERPL-MCNC: 12 MG/DL (ref 8–23)
CALCIUM SERPL-MCNC: 9.5 MG/DL (ref 8.7–10.5)
CHLORIDE SERPL-SCNC: 103 MMOL/L (ref 95–110)
CO2 SERPL-SCNC: 27 MMOL/L (ref 23–29)
CREAT SERPL-MCNC: 0.8 MG/DL (ref 0.5–1.4)
CRP SERPL-MCNC: 0.9 MG/L (ref 0–8.2)
DIFFERENTIAL METHOD: NORMAL
EOSINOPHIL # BLD AUTO: 0.2 K/UL (ref 0–0.5)
EOSINOPHIL NFR BLD: 3.1 % (ref 0–8)
ERYTHROCYTE [DISTWIDTH] IN BLOOD BY AUTOMATED COUNT: 12.1 % (ref 11.5–14.5)
ERYTHROCYTE [SEDIMENTATION RATE] IN BLOOD BY WESTERGREN METHOD: 4 MM/HR (ref 0–36)
EST. GFR  (AFRICAN AMERICAN): >60 ML/MIN/1.73 M^2
EST. GFR  (NON AFRICAN AMERICAN): >60 ML/MIN/1.73 M^2
ESTIMATED AVG GLUCOSE: 148 MG/DL (ref 68–131)
GLUCOSE SERPL-MCNC: 163 MG/DL (ref 70–110)
HBA1C MFR BLD: 6.8 % (ref 4–5.6)
HCT VFR BLD AUTO: 43.7 % (ref 37–48.5)
HGB BLD-MCNC: 14.1 G/DL (ref 12–16)
IMM GRANULOCYTES # BLD AUTO: 0.01 K/UL (ref 0–0.04)
IMM GRANULOCYTES NFR BLD AUTO: 0.2 % (ref 0–0.5)
LYMPHOCYTES # BLD AUTO: 1.9 K/UL (ref 1–4.8)
LYMPHOCYTES NFR BLD: 35 % (ref 18–48)
MCH RBC QN AUTO: 30.3 PG (ref 27–31)
MCHC RBC AUTO-ENTMCNC: 32.3 G/DL (ref 32–36)
MCV RBC AUTO: 94 FL (ref 82–98)
MONOCYTES # BLD AUTO: 0.5 K/UL (ref 0.3–1)
MONOCYTES NFR BLD: 8.3 % (ref 4–15)
NEUTROPHILS # BLD AUTO: 2.9 K/UL (ref 1.8–7.7)
NEUTROPHILS NFR BLD: 52.8 % (ref 38–73)
NRBC BLD-RTO: 0 /100 WBC
PLATELET # BLD AUTO: 197 K/UL (ref 150–450)
PMV BLD AUTO: 10.4 FL (ref 9.2–12.9)
POTASSIUM SERPL-SCNC: 4.3 MMOL/L (ref 3.5–5.1)
PROT SERPL-MCNC: 7.3 G/DL (ref 6–8.4)
RBC # BLD AUTO: 4.65 M/UL (ref 4–5.4)
SODIUM SERPL-SCNC: 139 MMOL/L (ref 136–145)
WBC # BLD AUTO: 5.4 K/UL (ref 3.9–12.7)

## 2022-05-12 PROCEDURE — 85025 COMPLETE CBC W/AUTO DIFF WBC: CPT | Performed by: INTERNAL MEDICINE

## 2022-05-12 PROCEDURE — 85652 RBC SED RATE AUTOMATED: CPT | Performed by: INTERNAL MEDICINE

## 2022-05-12 PROCEDURE — 36415 COLL VENOUS BLD VENIPUNCTURE: CPT | Performed by: INTERNAL MEDICINE

## 2022-05-12 PROCEDURE — 82306 VITAMIN D 25 HYDROXY: CPT | Performed by: INTERNAL MEDICINE

## 2022-05-12 PROCEDURE — 83036 HEMOGLOBIN GLYCOSYLATED A1C: CPT | Performed by: INTERNAL MEDICINE

## 2022-05-12 PROCEDURE — 86140 C-REACTIVE PROTEIN: CPT | Performed by: INTERNAL MEDICINE

## 2022-05-12 PROCEDURE — 80053 COMPREHEN METABOLIC PANEL: CPT | Performed by: INTERNAL MEDICINE

## 2022-05-13 ENCOUNTER — PATIENT MESSAGE (OUTPATIENT)
Dept: PRIMARY CARE CLINIC | Facility: CLINIC | Age: 77
End: 2022-05-13
Payer: MEDICARE

## 2022-05-16 ENCOUNTER — OFFICE VISIT (OUTPATIENT)
Dept: PRIMARY CARE CLINIC | Facility: CLINIC | Age: 77
End: 2022-05-16
Payer: MEDICARE

## 2022-05-16 VITALS
DIASTOLIC BLOOD PRESSURE: 84 MMHG | SYSTOLIC BLOOD PRESSURE: 124 MMHG | BODY MASS INDEX: 30.63 KG/M2 | TEMPERATURE: 99 F | WEIGHT: 162.25 LBS | HEART RATE: 98 BPM | HEIGHT: 61 IN | OXYGEN SATURATION: 100 %

## 2022-05-16 DIAGNOSIS — M51.36 DDD (DEGENERATIVE DISC DISEASE), LUMBAR: ICD-10-CM

## 2022-05-16 DIAGNOSIS — Z12.31 ENCOUNTER FOR SCREENING MAMMOGRAM FOR BREAST CANCER: ICD-10-CM

## 2022-05-16 DIAGNOSIS — F33.1 MODERATE EPISODE OF RECURRENT MAJOR DEPRESSIVE DISORDER: Primary | ICD-10-CM

## 2022-05-16 DIAGNOSIS — E11.69 DIABETES MELLITUS TYPE 2 IN OBESE: ICD-10-CM

## 2022-05-16 DIAGNOSIS — Z78.0 POSTMENOPAUSAL ESTROGEN DEFICIENCY: ICD-10-CM

## 2022-05-16 DIAGNOSIS — I10 BENIGN ESSENTIAL HYPERTENSION: ICD-10-CM

## 2022-05-16 DIAGNOSIS — E66.9 DIABETES MELLITUS TYPE 2 IN OBESE: ICD-10-CM

## 2022-05-16 PROCEDURE — 3074F SYST BP LT 130 MM HG: CPT | Mod: CPTII,S$GLB,, | Performed by: INTERNAL MEDICINE

## 2022-05-16 PROCEDURE — 99499 UNLISTED E&M SERVICE: CPT | Mod: S$GLB,,, | Performed by: INTERNAL MEDICINE

## 2022-05-16 PROCEDURE — 1101F PT FALLS ASSESS-DOCD LE1/YR: CPT | Mod: CPTII,S$GLB,, | Performed by: INTERNAL MEDICINE

## 2022-05-16 PROCEDURE — 3079F PR MOST RECENT DIASTOLIC BLOOD PRESSURE 80-89 MM HG: ICD-10-PCS | Mod: CPTII,S$GLB,, | Performed by: INTERNAL MEDICINE

## 2022-05-16 PROCEDURE — 1159F PR MEDICATION LIST DOCUMENTED IN MEDICAL RECORD: ICD-10-PCS | Mod: CPTII,S$GLB,, | Performed by: INTERNAL MEDICINE

## 2022-05-16 PROCEDURE — 1159F MED LIST DOCD IN RCRD: CPT | Mod: CPTII,S$GLB,, | Performed by: INTERNAL MEDICINE

## 2022-05-16 PROCEDURE — 3072F LOW RISK FOR RETINOPATHY: CPT | Mod: CPTII,S$GLB,, | Performed by: INTERNAL MEDICINE

## 2022-05-16 PROCEDURE — 99999 PR PBB SHADOW E&M-EST. PATIENT-LVL V: ICD-10-PCS | Mod: PBBFAC,,, | Performed by: INTERNAL MEDICINE

## 2022-05-16 PROCEDURE — 1157F ADVNC CARE PLAN IN RCRD: CPT | Mod: CPTII,S$GLB,, | Performed by: INTERNAL MEDICINE

## 2022-05-16 PROCEDURE — 99214 PR OFFICE/OUTPT VISIT, EST, LEVL IV, 30-39 MIN: ICD-10-PCS | Mod: S$GLB,,, | Performed by: INTERNAL MEDICINE

## 2022-05-16 PROCEDURE — 1160F PR REVIEW ALL MEDS BY PRESCRIBER/CLIN PHARMACIST DOCUMENTED: ICD-10-PCS | Mod: CPTII,S$GLB,, | Performed by: INTERNAL MEDICINE

## 2022-05-16 PROCEDURE — 1126F PR PAIN SEVERITY QUANTIFIED, NO PAIN PRESENT: ICD-10-PCS | Mod: CPTII,S$GLB,, | Performed by: INTERNAL MEDICINE

## 2022-05-16 PROCEDURE — 1126F AMNT PAIN NOTED NONE PRSNT: CPT | Mod: CPTII,S$GLB,, | Performed by: INTERNAL MEDICINE

## 2022-05-16 PROCEDURE — 3288F FALL RISK ASSESSMENT DOCD: CPT | Mod: CPTII,S$GLB,, | Performed by: INTERNAL MEDICINE

## 2022-05-16 PROCEDURE — 99999 PR PBB SHADOW E&M-EST. PATIENT-LVL V: CPT | Mod: PBBFAC,,, | Performed by: INTERNAL MEDICINE

## 2022-05-16 PROCEDURE — 1101F PR PT FALLS ASSESS DOC 0-1 FALLS W/OUT INJ PAST YR: ICD-10-PCS | Mod: CPTII,S$GLB,, | Performed by: INTERNAL MEDICINE

## 2022-05-16 PROCEDURE — 3079F DIAST BP 80-89 MM HG: CPT | Mod: CPTII,S$GLB,, | Performed by: INTERNAL MEDICINE

## 2022-05-16 PROCEDURE — 3072F PR LOW RISK FOR RETINOPATHY: ICD-10-PCS | Mod: CPTII,S$GLB,, | Performed by: INTERNAL MEDICINE

## 2022-05-16 PROCEDURE — 3074F PR MOST RECENT SYSTOLIC BLOOD PRESSURE < 130 MM HG: ICD-10-PCS | Mod: CPTII,S$GLB,, | Performed by: INTERNAL MEDICINE

## 2022-05-16 PROCEDURE — 1157F PR ADVANCE CARE PLAN OR EQUIV PRESENT IN MEDICAL RECORD: ICD-10-PCS | Mod: CPTII,S$GLB,, | Performed by: INTERNAL MEDICINE

## 2022-05-16 PROCEDURE — 1160F RVW MEDS BY RX/DR IN RCRD: CPT | Mod: CPTII,S$GLB,, | Performed by: INTERNAL MEDICINE

## 2022-05-16 PROCEDURE — 3288F PR FALLS RISK ASSESSMENT DOCUMENTED: ICD-10-PCS | Mod: CPTII,S$GLB,, | Performed by: INTERNAL MEDICINE

## 2022-05-16 PROCEDURE — 99214 OFFICE O/P EST MOD 30 MIN: CPT | Mod: S$GLB,,, | Performed by: INTERNAL MEDICINE

## 2022-05-16 PROCEDURE — 99499 RISK ADDL DX/OHS AUDIT: ICD-10-PCS | Mod: S$GLB,,, | Performed by: INTERNAL MEDICINE

## 2022-05-16 RX ORDER — TRAMADOL HYDROCHLORIDE 50 MG/1
TABLET ORAL
Qty: 60 TABLET | Refills: 0 | Status: SHIPPED | OUTPATIENT
Start: 2022-05-16 | End: 2022-07-26 | Stop reason: SDUPTHER

## 2022-05-16 RX ORDER — LOSARTAN POTASSIUM 100 MG/1
100 TABLET ORAL DAILY
Qty: 90 TABLET | Refills: 3 | Status: SHIPPED | OUTPATIENT
Start: 2022-05-16 | End: 2022-11-28 | Stop reason: SDUPTHER

## 2022-05-16 NOTE — PROGRESS NOTES
Subjective:       Patient ID: Paris Burris is a 76 y.o. female.    Chief Complaint: depression f/u    HPI   At our last visit in April, we increased wellbutrin from 150 to 300mg daily and continued on effexor. She continues to work w/ Nayan Mora LMSW. She doesn't think that the wellbutrin has been making much of a difference.   Depression Patient Health Questionnaire 5/16/2022 2/24/2022 1/19/2022 8/12/2019 8/22/2018 3/12/2018 3/2/2018   Over the last two weeks how often have you been bothered by little interest or pleasure in doing things 1 1 2 1 1 1 0   Over the last two weeks how often have you been bothered by feeling down, depressed or hopeless 2 2 1 1 1 1 0   PHQ-2 Total Score 3 3 3 2 2 2 0   Over the last two weeks how often have you been bothered by trouble falling or staying asleep, or sleeping too much 1 2 - - - - -   Over the last two weeks how often have you been bothered by feeling tired or having little energy 1 2 - - - - -   Over the last two weeks how often have you been bothered by a poor appetite or overeating 1 1 - - - - -   Over the last two weeks how often have you been bothered by feeling bad about yourself - or that you are a failure or have let yourself or your family down 2 1 - - - - -   Over the last two weeks how often have you been bothered by trouble concentrating on things, such as reading the newspaper or watching television 0 1 - - - - -   Over the last two weeks how often have you been bothered by moving or speaking so slowly that other people could have noticed. Or the opposite - being so fidgety or restless that you have been moving around a lot more than usual. 1 0 - - - - -   Over the last two weeks how often have you been bothered by thoughts that you would be better off dead, or of hurting yourself 0 0 - - - - -   If you checked off any problems, how difficult have these problems made it for you to do your work, take care of things at home or get along with other  "people? Somewhat difficult - - - - - -   Total Score 9 10 - - - - -   Interpretation Mild Moderate - - - - -       LBP. Reports a lot better. Taking less tramadol than previously. Only takes tramadol about 2 pills a week. Last fill 12/6/21 for 120 tabs. Using heating belt that helps. Not exercising but is walking 2000 steps a day and working up from that.     HTN - needs refill for losartan.    DM2 - A1C 6.8 5/12/22. Takes metformin xr 500mg qam w/ breakfast. Sometimes misses dose - may miss 2-3 doses a week.   Was trying to sign up for digital DM program but due to a glitch. I had sent our  an email. A ticket was created but no resolution yet.     HLD - zetia 10mg daily and crestor 20mg daily.   Part of digital HLD program.     Review of Systems  Comprehensive review of systems otherwise negative. See history/subjective section for more details.    Objective:      Physical Exam    /84 (BP Location: Left arm, Patient Position: Sitting, BP Method: Large (Manual))   Pulse 98   Temp 98.8 °F (37.1 °C) (Oral)   Ht 5' 1" (1.549 m)   Wt 73.6 kg (162 lb 4.1 oz)   SpO2 100%   BMI 30.66 kg/m²     GEN - A+OX4, NAD. Upset when talking about stressors.  HEENT - PERRL, EOMI, OP clear. MMM. TM normal. R hearing aid in place.   CV - RRR, no m/r   Chest - CTAB, no wheezing or rhonchi  Abd - S/NT/ND/+BS.   Ext - 1+BDP and radial pulses. No LE edema.   Neuro - 5/5 BUE and BLE strength. 1+ B DTRs.   MSK - no spinal tenderness to palpation. Normal gait.   Skin - No rash.    Previous labs reviewed.     Assessment/Plan     Paris was seen today for follow-up.    Diagnoses and all orders for this visit:    Moderate episode of recurrent major depressive disorder - cont current meds. Cont working w/ Nayan.     Diabetes mellitus type 2 in obese - Make sure to take metformin every day - maybe put it in a place where you can see it daily. Increase exercise as tolerated.     Benign essential hypertension - Stable and " controlled. Continue current medications.  The following orders have not been finalized:  -     losartan (COZAAR) 100 MG tablet    DDD (degenerative disc disease), lumbar  The following orders have not been finalized:  -     traMADoL (ULTRAM) 50 mg tablet    Encounter for screening mammogram for breast cancer -     Postmenopausal estrogen deficiency    Call [phone] to schedule your colonoscopy.   Schedule bone density scan in mid June and mammogram after 7/9/22.    Make sure to take metformin every day - maybe put it in a place where you can see it daily. Increase exercise as tolerated.     Call [phone] to schedule your colonoscopy.     Schedule bone density scan in mid June and mammogram after 7/9/22.    Follow up in about 3 months (around 8/16/2022).    Mandi Huynh MD  Department of Internal Medicine - [facility]  7:46 AM

## 2022-05-16 NOTE — PATIENT INSTRUCTIONS
Make sure to take metformin every day - maybe put it in a place where you can see it daily. Increase exercise as tolerated.     Call [phone] to schedule your colonoscopy.     Schedule bone density scan in mid June and mammogram after 7/9/22.

## 2022-05-20 ENCOUNTER — CLINICAL SUPPORT (OUTPATIENT)
Dept: INTERNAL MEDICINE | Facility: CLINIC | Age: 77
End: 2022-05-20
Payer: MEDICARE

## 2022-05-20 VITALS — BODY MASS INDEX: 30.49 KG/M2 | WEIGHT: 161.38 LBS

## 2022-05-20 NOTE — PROGRESS NOTES
Health  Follow-Up Note   [x] Office  [] Phone  Notes from previous session reviewed.   [x] Previous Session Goals unchanged.   [] Patient/Caregiver Change in Goals.  Goals added or changed by Patient/Caregiver since program participation:  1. Continue same plan       Additional/Changed support that patient/caregiver has experienced/sought?  (Indicate readiness, support from family, friends, others, community groups, etc)  1.      Additional/Changed obstacles that could prevent patient/caregiver from reaching their goals?  1.  Sad at times      Feedback provided:  1.  Praised for good job down 3.5 lbs total loss 25 lbs    Diagnostic values/Desriptors for follow-up as needed for chronic condition(s)   Weight: 73.2 kg 161.38 lb  Blood Glucose: will check when gets home meter hasnt been working    Interventions:   1. Health  listened reflectively, validated thoughts and feelings, offered support and encouragement.   2. Allowed patient to express themselves in a non-biased atmosphere.  3. Health  assisted pt to problem-solve obstacles such as being in a challenging environment and dealing with these challenges.   4. Motivational Interviewed interventions utilized (OARS).   5. Patient responded favorably to interventions and remained actively engaged in the session.   6. Health  will remain available and connected for patient by phone and/or office visits.   7. Positive reinforcement, emotional support and encouragement provided.   8. Focused Education: MI    Plan:  [x] Pt will work on goals as stated above.   [x] Pt will contact Health  for any questions, concerns or needs.  [x] Pt will follow up with Health  in office on  6.10.22.  [] Pt will follow up with Health  on phone in:        [x] Health  will remain available.   [] Health  will contact patient by phone in:        [] Health  will consult:        [] Health  will inform Provider via EPIC messaging.      Impression:  1. Behavior is consistent with Action Stage of Change.   2. Participation level:       [x] Receptive      [x] Interactive      [] Guarded and Resistant      [x] Self Motivated      [] Refused/Declined to participate   3. [x] Pt voiced understanding of all information presented.       [] Pt voiced needing further information/education. This will be arranged.       [x] Pt would benefit from further education/information as identified by this health . This will be arranged.     Jacqueline Perry RN HC

## 2022-05-25 ENCOUNTER — PATIENT MESSAGE (OUTPATIENT)
Dept: PRIMARY CARE CLINIC | Facility: CLINIC | Age: 77
End: 2022-05-25
Payer: MEDICARE
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
## 2022-06-02 ENCOUNTER — PATIENT MESSAGE (OUTPATIENT)
Dept: PRIMARY CARE CLINIC | Facility: CLINIC | Age: 77
End: 2022-06-02
Payer: MEDICARE

## 2022-06-03 ENCOUNTER — OFFICE VISIT (OUTPATIENT)
Dept: PRIMARY CARE CLINIC | Facility: CLINIC | Age: 77
End: 2022-06-03
Payer: MEDICARE

## 2022-06-03 VITALS
DIASTOLIC BLOOD PRESSURE: 84 MMHG | WEIGHT: 161.81 LBS | OXYGEN SATURATION: 96 % | HEART RATE: 108 BPM | BODY MASS INDEX: 30.55 KG/M2 | HEIGHT: 61 IN | SYSTOLIC BLOOD PRESSURE: 130 MMHG

## 2022-06-03 DIAGNOSIS — M25.512 BILATERAL SHOULDER PAIN, UNSPECIFIED CHRONICITY: Primary | ICD-10-CM

## 2022-06-03 DIAGNOSIS — M25.511 BILATERAL SHOULDER PAIN, UNSPECIFIED CHRONICITY: Primary | ICD-10-CM

## 2022-06-03 DIAGNOSIS — B00.1 COLD SORE: ICD-10-CM

## 2022-06-03 DIAGNOSIS — I10 BENIGN ESSENTIAL HYPERTENSION: ICD-10-CM

## 2022-06-03 PROCEDURE — 1157F PR ADVANCE CARE PLAN OR EQUIV PRESENT IN MEDICAL RECORD: ICD-10-PCS | Mod: CPTII,S$GLB,, | Performed by: INTERNAL MEDICINE

## 2022-06-03 PROCEDURE — 3079F DIAST BP 80-89 MM HG: CPT | Mod: CPTII,S$GLB,, | Performed by: INTERNAL MEDICINE

## 2022-06-03 PROCEDURE — 1159F MED LIST DOCD IN RCRD: CPT | Mod: CPTII,S$GLB,, | Performed by: INTERNAL MEDICINE

## 2022-06-03 PROCEDURE — 1160F RVW MEDS BY RX/DR IN RCRD: CPT | Mod: CPTII,S$GLB,, | Performed by: INTERNAL MEDICINE

## 2022-06-03 PROCEDURE — 1159F PR MEDICATION LIST DOCUMENTED IN MEDICAL RECORD: ICD-10-PCS | Mod: CPTII,S$GLB,, | Performed by: INTERNAL MEDICINE

## 2022-06-03 PROCEDURE — 3072F LOW RISK FOR RETINOPATHY: CPT | Mod: CPTII,S$GLB,, | Performed by: INTERNAL MEDICINE

## 2022-06-03 PROCEDURE — 1101F PT FALLS ASSESS-DOCD LE1/YR: CPT | Mod: CPTII,S$GLB,, | Performed by: INTERNAL MEDICINE

## 2022-06-03 PROCEDURE — 1160F PR REVIEW ALL MEDS BY PRESCRIBER/CLIN PHARMACIST DOCUMENTED: ICD-10-PCS | Mod: CPTII,S$GLB,, | Performed by: INTERNAL MEDICINE

## 2022-06-03 PROCEDURE — 99999 PR PBB SHADOW E&M-EST. PATIENT-LVL IV: ICD-10-PCS | Mod: PBBFAC,,, | Performed by: INTERNAL MEDICINE

## 2022-06-03 PROCEDURE — 99999 PR PBB SHADOW E&M-EST. PATIENT-LVL IV: CPT | Mod: PBBFAC,,, | Performed by: INTERNAL MEDICINE

## 2022-06-03 PROCEDURE — 3288F FALL RISK ASSESSMENT DOCD: CPT | Mod: CPTII,S$GLB,, | Performed by: INTERNAL MEDICINE

## 2022-06-03 PROCEDURE — 1101F PR PT FALLS ASSESS DOC 0-1 FALLS W/OUT INJ PAST YR: ICD-10-PCS | Mod: CPTII,S$GLB,, | Performed by: INTERNAL MEDICINE

## 2022-06-03 PROCEDURE — 1126F PR PAIN SEVERITY QUANTIFIED, NO PAIN PRESENT: ICD-10-PCS | Mod: CPTII,S$GLB,, | Performed by: INTERNAL MEDICINE

## 2022-06-03 PROCEDURE — 3079F PR MOST RECENT DIASTOLIC BLOOD PRESSURE 80-89 MM HG: ICD-10-PCS | Mod: CPTII,S$GLB,, | Performed by: INTERNAL MEDICINE

## 2022-06-03 PROCEDURE — 99214 OFFICE O/P EST MOD 30 MIN: CPT | Mod: S$GLB,,, | Performed by: INTERNAL MEDICINE

## 2022-06-03 PROCEDURE — 99214 PR OFFICE/OUTPT VISIT, EST, LEVL IV, 30-39 MIN: ICD-10-PCS | Mod: S$GLB,,, | Performed by: INTERNAL MEDICINE

## 2022-06-03 PROCEDURE — 1157F ADVNC CARE PLAN IN RCRD: CPT | Mod: CPTII,S$GLB,, | Performed by: INTERNAL MEDICINE

## 2022-06-03 PROCEDURE — 3072F PR LOW RISK FOR RETINOPATHY: ICD-10-PCS | Mod: CPTII,S$GLB,, | Performed by: INTERNAL MEDICINE

## 2022-06-03 PROCEDURE — 3288F PR FALLS RISK ASSESSMENT DOCUMENTED: ICD-10-PCS | Mod: CPTII,S$GLB,, | Performed by: INTERNAL MEDICINE

## 2022-06-03 PROCEDURE — 3075F SYST BP GE 130 - 139MM HG: CPT | Mod: CPTII,S$GLB,, | Performed by: INTERNAL MEDICINE

## 2022-06-03 PROCEDURE — 3075F PR MOST RECENT SYSTOLIC BLOOD PRESS GE 130-139MM HG: ICD-10-PCS | Mod: CPTII,S$GLB,, | Performed by: INTERNAL MEDICINE

## 2022-06-03 PROCEDURE — 1126F AMNT PAIN NOTED NONE PRSNT: CPT | Mod: CPTII,S$GLB,, | Performed by: INTERNAL MEDICINE

## 2022-06-03 RX ORDER — ACETAMINOPHEN 500 MG
500 TABLET ORAL EVERY 6 HOURS PRN
Qty: 160 TABLET | Refills: 3 | Status: SHIPPED | OUTPATIENT
Start: 2022-06-03 | End: 2023-08-03 | Stop reason: SDUPTHER

## 2022-06-03 RX ORDER — VALACYCLOVIR HYDROCHLORIDE 1 G/1
2000 TABLET, FILM COATED ORAL EVERY 12 HOURS
Qty: 4 TABLET | Refills: 1 | Status: SHIPPED | OUTPATIENT
Start: 2022-06-03 | End: 2023-10-17

## 2022-06-03 RX ORDER — METOPROLOL SUCCINATE 25 MG/1
25 TABLET, EXTENDED RELEASE ORAL DAILY
Qty: 90 TABLET | Refills: 0
Start: 2022-06-03 | End: 2022-09-16 | Stop reason: SDUPTHER

## 2022-06-03 NOTE — PROGRESS NOTES
"Subjective:       Patient ID: Paris Burris is a 76 y.o. female.    Chief Complaint: Shoulder pain    HPI  visit.   B shoulder pain when reaching up that started about a week ago - unable to lift arm all the way up to take shirt off.   No trauma/falls.   No numbness/tingling/weakness.   Has diclofenac gel but never used it. Wasn't sure what it was for.   Hasn't taken anything for it. Seems to be improving.     In Feb, scaled back on toprol due to fatigue from 50 to 25mg daily.   HR a little higher today. Does have PM in place. Recently interrogated remotely 2 weeks ago. No palpitations/SOB/zamora/lightheadedness.    Just noticed a cold sore this morning on the corner of R lip.     Review of Systems  as above in HPI.     Objective:      Physical Exam    /84 (BP Location: Left arm, Patient Position: Sitting, BP Method: Large (Manual))   Pulse (!) 113   Ht 5' 1" (1.549 m)   Wt 73.4 kg (161 lb 13.1 oz)   SpO2 96%   BMI 30.58 kg/m²     Gen - A+OX4, NAD  HEENT - PERRL, OP clear. MMM. Difficulty hearing.   CV - tachycardic but regular  Chest - CTAB, no crackles  Abd - s/nt/nd/+bs  Ext - 2+ B radial pulses. No LE edema.   MSK - FROM but pain B shoulders radha on the L when extending back and full abduction. 5/5 BUE m strength. Good hand . No swelling. No pain on palpation o fthe shoulders/AC joint.   Skin - cold sore at the R corner/lower lip.    Assessment/Plan     Paris was seen today for follow-up and shoulder pain.    Diagnoses and all orders for this visit:    Bilateral shoulder pain, unspecified chronicity - can use voltaren gel BID prn. Improving. Alternate ice and heat. If persistent/worsens, can refer to PT.   -     acetaminophen (TYLENOL) 500 MG tablet; Take 1 tablet (500 mg total) by mouth every 6 (six) hours as needed for Pain.    Benign essential hypertension - HR a little high today. BP is controlled. Pt to keep track of daily HR for the next week and send me a message in a week to let me know " her HR. If tachy, may up toprol back to 50mg qd.   -     metoprolol succinate (TOPROL-XL) 25 MG 24 hr tablet; Take 1 tablet (25 mg total) by mouth once daily.    Cold sore  -     valACYclovir (VALTREX) 1000 MG tablet; Take 2 tablets (2,000 mg total) by mouth every 12 (twelve) hours. for 1 day      Follow up if symptoms worsen or fail to improve.      Mandi Huynh MD  Department of Internal Medicine - [facility]  11:50 AM

## 2022-06-08 ENCOUNTER — PATIENT MESSAGE (OUTPATIENT)
Dept: PRIMARY CARE CLINIC | Facility: CLINIC | Age: 77
End: 2022-06-08
Payer: MEDICARE

## 2022-06-09 ENCOUNTER — DOCUMENTATION ONLY (OUTPATIENT)
Dept: PRIMARY CARE CLINIC | Facility: CLINIC | Age: 77
End: 2022-06-09
Payer: MEDICARE

## 2022-06-09 NOTE — PROGRESS NOTES
"Individual Psychotherapy (LCSW/PhD)  Paris Burris,  6/9/2022    Site:  [place]         Therapeutic Intervention: Met with patient for individual psychotherapy.    Chief complaint/reason for encounter: depression     Interval history and content of current session: Pt provided completed "Socratic Questioning" assignment assigned last session. Pt and SW reviewed pt's answers and analyzed situation using socratic questioning. Pt documented recent experience where she felt that her daughter was being "cold" and "ignoring" her during a family dinner. Pt and SW reviewed the details of he encounter for clarity and discussed pt's feelings and perceptions of daughter's behavior.    SW supported pt in processing feelings of hurt and sadness related to difficult relationship with daughter. Pt stated they would like to be able  To speak to daughter and have a better relationship. Pt feels a lot of anxiety around daughter because of their past difficult relationship  And daughter's feeling about her childhood. SW and pt discussed pt's goals. Pt would like to be able to communicate better with daughter and create a more intimate relationship. SW and pt discussed importance of pt continuing to examine their feelings and beliefs via CBT before approaching daughter.  SW and pt discussed what reasonable expectations may be for their future relationship. SW provided emotional support puma acknowledging pt's desire to be better understood by their daughter. Pt to utilize CBT triangle to further analyze scenario and bring for next session.    Treatment plan:  · Target symptoms: depression  · Why chosen therapy is appropriate versus another modality: relevant to diagnosis, patient responds to this modality, evidence based practice  · Outcome monitoring methods: self-report, checklist/rating scale  · Therapeutic intervention type: insight oriented psychotherapy, supportive psychotherapy    Risk parameters:  Patient reports no suicidal " ideation  Patient reports no homicidal ideation  Patient reports no self-injurious behavior  Patient reports no violent behavior    Verbal deficits: None    Patient's response to intervention:  The patient's response to intervention is accepting.    Progress toward goals and other mental status changes:  The patient's progress toward goals is good.    Diagnosis:   No diagnosis found.    Plan: Pt plans to continue individual psychotherapy    Return to clinic: 2 weeks    Length of Service (minutes): 30

## 2022-06-12 ENCOUNTER — PATIENT MESSAGE (OUTPATIENT)
Dept: PRIMARY CARE CLINIC | Facility: CLINIC | Age: 77
End: 2022-06-12
Payer: MEDICARE

## 2022-06-16 ENCOUNTER — HOSPITAL ENCOUNTER (OUTPATIENT)
Dept: RADIOLOGY | Facility: CLINIC | Age: 77
Discharge: HOME | End: 2022-06-16
Attending: INTERNAL MEDICINE
Payer: MEDICARE

## 2022-06-16 DIAGNOSIS — Z78.0 POSTMENOPAUSAL ESTROGEN DEFICIENCY: ICD-10-CM

## 2022-06-16 PROCEDURE — 77080 DXA BONE DENSITY AXIAL: CPT | Mod: TC

## 2022-06-16 PROCEDURE — 77080 DEXA BONE DENSITY SPINE HIP: ICD-10-PCS | Mod: 26,,, | Performed by: INTERNAL MEDICINE

## 2022-06-16 PROCEDURE — 77080 DXA BONE DENSITY AXIAL: CPT | Mod: 26,,, | Performed by: INTERNAL MEDICINE

## 2022-06-19 ENCOUNTER — PATIENT MESSAGE (OUTPATIENT)
Dept: PRIMARY CARE CLINIC | Facility: CLINIC | Age: 77
End: 2022-06-19
Payer: MEDICARE

## 2022-06-19 ENCOUNTER — CLINICAL SUPPORT (OUTPATIENT)
Dept: CARDIOLOGY | Facility: HOSPITAL | Age: 77
End: 2022-06-19
Payer: MEDICARE

## 2022-06-19 DIAGNOSIS — I44.2 ATRIOVENTRICULAR BLOCK, COMPLETE: ICD-10-CM

## 2022-06-19 DIAGNOSIS — I48.91 UNSPECIFIED ATRIAL FIBRILLATION: ICD-10-CM

## 2022-06-19 DIAGNOSIS — I42.9 CARDIOMYOPATHY, UNSPECIFIED: ICD-10-CM

## 2022-06-19 DIAGNOSIS — Z95.0 PRESENCE OF CARDIAC PACEMAKER: ICD-10-CM

## 2022-06-19 PROCEDURE — 93296 REM INTERROG EVL PM/IDS: CPT | Performed by: INTERNAL MEDICINE

## 2022-06-19 PROCEDURE — 93294 REM INTERROG EVL PM/LDLS PM: CPT | Mod: ,,, | Performed by: INTERNAL MEDICINE

## 2022-06-19 PROCEDURE — 93294 CARDIAC DEVICE CHECK - REMOTE: ICD-10-PCS | Mod: ,,, | Performed by: INTERNAL MEDICINE

## 2022-06-20 ENCOUNTER — PATIENT MESSAGE (OUTPATIENT)
Dept: PRIMARY CARE CLINIC | Facility: CLINIC | Age: 77
End: 2022-06-20
Payer: MEDICARE

## 2022-06-20 NOTE — TELEPHONE ENCOUNTER
The doctor who read my bone scan test said that my fracture risk was high if I fell. Would you please let me know what steps I should take to reduce my fracture risk? -Paris Burris

## 2022-06-21 ENCOUNTER — OFFICE VISIT (OUTPATIENT)
Dept: PRIMARY CARE CLINIC | Facility: CLINIC | Age: 77
End: 2022-06-21
Payer: MEDICARE

## 2022-06-21 DIAGNOSIS — M85.80 OSTEOPENIA, UNSPECIFIED LOCATION: ICD-10-CM

## 2022-06-21 DIAGNOSIS — E66.9 DIABETES MELLITUS TYPE 2 IN OBESE: Primary | ICD-10-CM

## 2022-06-21 DIAGNOSIS — E11.69 DIABETES MELLITUS TYPE 2 IN OBESE: Primary | ICD-10-CM

## 2022-06-21 PROCEDURE — 1157F ADVNC CARE PLAN IN RCRD: CPT | Mod: CPTII,95,, | Performed by: INTERNAL MEDICINE

## 2022-06-21 PROCEDURE — 1159F PR MEDICATION LIST DOCUMENTED IN MEDICAL RECORD: ICD-10-PCS | Mod: CPTII,95,, | Performed by: INTERNAL MEDICINE

## 2022-06-21 PROCEDURE — 3072F PR LOW RISK FOR RETINOPATHY: ICD-10-PCS | Mod: CPTII,95,, | Performed by: INTERNAL MEDICINE

## 2022-06-21 PROCEDURE — 1159F MED LIST DOCD IN RCRD: CPT | Mod: CPTII,95,, | Performed by: INTERNAL MEDICINE

## 2022-06-21 PROCEDURE — 3072F LOW RISK FOR RETINOPATHY: CPT | Mod: CPTII,95,, | Performed by: INTERNAL MEDICINE

## 2022-06-21 PROCEDURE — 99442 PR PHYSICIAN TELEPHONE EVALUATION 11-20 MIN: CPT | Mod: 95,,, | Performed by: INTERNAL MEDICINE

## 2022-06-21 PROCEDURE — 1160F RVW MEDS BY RX/DR IN RCRD: CPT | Mod: CPTII,95,, | Performed by: INTERNAL MEDICINE

## 2022-06-21 PROCEDURE — 1157F PR ADVANCE CARE PLAN OR EQUIV PRESENT IN MEDICAL RECORD: ICD-10-PCS | Mod: CPTII,95,, | Performed by: INTERNAL MEDICINE

## 2022-06-21 PROCEDURE — 99442 PR PHYSICIAN TELEPHONE EVALUATION 11-20 MIN: ICD-10-PCS | Mod: 95,,, | Performed by: INTERNAL MEDICINE

## 2022-06-21 PROCEDURE — 1160F PR REVIEW ALL MEDS BY PRESCRIBER/CLIN PHARMACIST DOCUMENTED: ICD-10-PCS | Mod: CPTII,95,, | Performed by: INTERNAL MEDICINE

## 2022-06-21 NOTE — PROGRESS NOTES
Established Patient - Audio Only Telehealth Visit     The patient location is: [place], [place]  The chief complaint leading to consultation is: discuss DM meds and bone density  Visit type: Virtual visit with audio only (telephone)  Total time spent with patient: 15 min       The reason for the audio only service rather than synchronous audio and video virtual visit was related to technical difficulties or patient preference/necessity.     Each patient to whom I provide medical services by telemedicine is:  (1) informed of the relationship between the physician and patient and the respective role of any other health care provider with respect to management of the patient; and (2) notified that they may decline to receive medical services by telemedicine and may withdraw from such care at any time. Patient verbally consented to receive this service via voice-only telephone call.       HPI:   DEXA in the osteopenia range. FRAX for major osteoporotic fracture was 13% but 18% when taken into account previously wrist fx. Discussed bisphosphonates including risks vs benefits. Discussed Ca 1200mg daily and Vit d 1000 IU daily. Add weight bearing exercise (pt mostly does fast walks currently).     Pt has been working on weight for a while and frustratingly come to a stagnant point. On metformin xr 500mg qam for DM. Her last a1c was 6.8. had question about new drug [place]. discussed similar in class of drug as trulicity; previously on trulicity and pt thinks tolerated it just fine. Discussed that trulicity sometimes can help decrease appetite and promote weight loss also. Pt would like to try this again. Discussed potential side effects w/ trulicity.      Assessment and plan:       Diagnoses and all orders for this visit:    Diabetes mellitus type 2 in obese  -     dulaglutide (TRULICITY) 0.75 mg/0.5 mL pen injector; Inject 0.75 mg into the skin every 7 days. X 4 weeks.  -     dulaglutide (TRULICITY) 1.5 mg/0.5 mL pen  injector; Inject 1.5 mg into the skin every 7 days. Start after finishing trulicity 0.75mg weekly x 4 weeks.    Osteopenia, unspecified location  Add Ca 1200mg daily. Cont vit d 1000 IU daily. Add weight bearing exercise. Repeat DEXA in a yr. If significant change, then would consider fosamax.     Mandi Huynh MD  Department of Internal Medicine - [facility] 65+  11:39 AM               This service was not originating from a related E/M service provided within the previous 7 days nor will  to an E/M service or procedure within the next 24 hours or my soonest available appointment.  Prevailing standard of care was able to be met in this audio-only visit.

## 2022-06-22 ENCOUNTER — PATIENT MESSAGE (OUTPATIENT)
Dept: PRIMARY CARE CLINIC | Facility: CLINIC | Age: 77
End: 2022-06-22
Payer: MEDICARE

## 2022-06-23 ENCOUNTER — PATIENT MESSAGE (OUTPATIENT)
Dept: PRIMARY CARE CLINIC | Facility: CLINIC | Age: 77
End: 2022-06-23
Payer: MEDICARE

## 2022-06-23 DIAGNOSIS — M25.519 SHOULDER PAIN, UNSPECIFIED CHRONICITY, UNSPECIFIED LATERALITY: Primary | ICD-10-CM

## 2022-06-30 ENCOUNTER — PATIENT MESSAGE (OUTPATIENT)
Dept: DERMATOLOGY | Facility: CLINIC | Age: 77
End: 2022-06-30
Payer: MEDICARE

## 2022-07-01 ENCOUNTER — PATIENT MESSAGE (OUTPATIENT)
Dept: REHABILITATION | Facility: OTHER | Age: 77
End: 2022-07-01

## 2022-07-01 ENCOUNTER — CLINICAL SUPPORT (OUTPATIENT)
Dept: REHABILITATION | Facility: OTHER | Age: 77
End: 2022-07-01
Payer: MEDICARE

## 2022-07-01 DIAGNOSIS — M25.519 SHOULDER PAIN, UNSPECIFIED CHRONICITY, UNSPECIFIED LATERALITY: ICD-10-CM

## 2022-07-01 DIAGNOSIS — M25.512 ACUTE PAIN OF LEFT SHOULDER: ICD-10-CM

## 2022-07-01 PROCEDURE — 97110 THERAPEUTIC EXERCISES: CPT | Mod: PN | Performed by: PHYSICAL THERAPIST

## 2022-07-01 PROCEDURE — 97161 PT EVAL LOW COMPLEX 20 MIN: CPT | Mod: PN | Performed by: PHYSICAL THERAPIST

## 2022-07-01 NOTE — PLAN OF CARE
[facility]   Physical Therapy Initial Evaluation     Date: 7/1/2022   Name: Paris Burris  Clinic Number: [id number]    Therapy Diagnosis:   1. Shoulder pain, unspecified chronicity, unspecified laterality  Ambulatory referral/consult to Physical/Occupational Therapy   2. Acute pain of left shoulder         Physician: Mandi Huynh MD    Physician Orders: PT Eval and Treat   Medical Diagnosis from Referral: M25.519 (ICD-10-CM) - Shoulder pain, unspecified chronicity, unspecified laterality  Evaluation Date: 7/1/2022  Authorization Period Expiration: 6/23/2022  Plan of Care Expiration: 10/1/2022  Progress Note Due: 8/1/2022  Visit # / Visits authorized: 1/ 1   FOTO: 1/5    Precautions: Standard     Time In: 0930 am  Time Out: 10:00 am  Total Appointment Time (timed & untimed codes): 30 minutes      SUBJECTIVE     Date of onset: 6 wks-2 months.     History of current condition - Paris reports: onset B neck/ shoulder pain L>R. Insidious onset with no known MATTHEW. Points to suprascapular area L as location of pain and described as a soreness. Pain worse with reaching across her body to take off a shirt. Pain improving over time. Denies and N/T, radicular symptoms. HX of HA and fitted for sleep apnea device.     Falls: none reported    Imaging, none    Prior Therapy: yes  Social History:   Occupation: retired  Prior Level of Function: independent with ADL's  Current Level of Function: needs help from  to undress shirts, difficulty lifting overhead    Pain:  Current 2/10, worst 3/10, best 0/10   Location: L upper trap region  Description: aching  Aggravating Factors: cross arm add, undressing   Easing Factors: pain medication    Patients goals: To be able to perform iADL's such as dressing without pain     Medical History:   Past Medical History:   Diagnosis Date    Allergy     Anemia     Anxiety     Breast cancer 2002    Left breast    Cancer 2002    L breast s/p lumpectomy     Cataract     Decreased hearing     Depression     Dermatochalasis of both upper eyelids     Diabetes mellitus     Diabetes with neurologic complications     Gastric ulcer 9/10/13    EGD    Hiatal hernia     Hyperlipidemia     Migraine headache     Migraine headache 3/20/2015    Multiple gastric ulcers     Parathyroid disorder     Pre-diabetes     Renal manifestation of secondary diabetes mellitus     Sleep apnea     no CPAP    Type 2 diabetes mellitus, controlled, 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    Wrist fracture        Surgical History:   Paris Burris  has a past surgical history that includes lipoma removal (1999); Colonoscopy (N/A, 12/2/2016); Tonsillectomy; Adenoidectomy; Eye surgery (Bilateral); Breast lumpectomy (Left, 2002); Esophagogastroduodenoscopy (N/A, 9/12/2018); Esophageal manometry with measurement of impedance (N/A, 10/8/2018); Laparoscopic Toupet fundoplication (N/A, 1/29/2019); Esophagogastroduodenoscopy (N/A, 1/29/2019); Implantation of biventricular heart pacemaker (N/A, 1/30/2019); Esophagogastroduodenoscopy (N/A, 5/31/2019); Implantation of cochlear prosthesis (Left, 4/26/2021); and Cataract extraction w/  intraocular lens implant (Bilateral).    Medications:   Paris has a current medication list which includes the following prescription(s): acetaminophen, ascorbic acid (vitamin c), blood sugar diagnostic, blood-glucose meter, bupropion, dulaglutide, [START ON 7/21/2022] dulaglutide, esomeprazole, ezetimibe, lancets, lancing device, losartan, metoprolol succinate, rosuvastatin, tramadol, valacyclovir, venlafaxine, and vitamin d, and the following Facility-Administered Medications: sodium chloride 0.9% and sodium chloride 0.9%.    Allergies:   Review of patient's allergies indicates:   Allergen Reactions    Topamax [topiramate] Hallucinations     hallucinations          OBJECTIVE       Posture:rounded shoulders, forward hea  Palpation: TTP L supraspinatus  "    Shoulder AROM (PROM)  Left  Right    Flexion:    140(170)* 160     Abduction:    100 (140)* 150  ER at side    50  50    Functional reaches:  Internal rotation   T12  T12 with difficulty *  External rotation   T3  T1*    * indicates pain with movement, Measured in degrees    Cervical Spine Active ROM, measured in degrees with inclinometer, * Indicates pain with movement    Flexion: 40  Extension: 50  Left Side Bend: 15*  Right Side Bend: 20  Left rotation: 35  Right rotation: 40      STRENGTH LEFT RIGHT   Flexion 4/5 5/5   Abduction 4-/5 5/5   Extension 5/5 5/5   IR (neutral) 5/5 5/5   ER (neutral) 4-/5 4+/5       Joint Mobility: 3/6 inferior and posterior GH joint mobility; hypomobility CT junction noted and lateral glides mid cervical L  Flexibility: decreased flexibility upper trapezius, levator scapulae, scalenes, pectoralis minor    Special Tests:    Left Right   Monk Preston + -   Crossover Impingment + -     Spurling's: negative    Limitation/Restriction for FOTO shoulder Survey    Therapist reviewed FOTO scores for Paris Burris on 7/1/2022.   FOTO documents entered into [employer] - see Media section.    Limitation Score: 41%         TREATMENT     Total Treatment time (time-based codes) separate from Evaluation: 10 minutes      Paris received the treatments listed below:      therapeutic exercises to develop ROM and flexibility for 10 minutes including:  pec stretch supine over towel roll x 3 min  Scapulae retractions and instruction x 10 (painful with excessive shoulder extension, modified Range of Motion and cuing for scapula positioning)  UT str 30" x 3    PATIENT EDUCATION AND HOME EXERCISES     Education provided:   - HEP, role of PT, POC    Written Home Exercises Provided: yes. Exercises were reviewed and Paris was able to demonstrate them prior to the end of the session.  Paris demonstrated good  understanding of the education provided. See EMR under Patient Instructions for exercises provided " during therapy sessions.    ASSESSMENT     Paris is a 76 y.o. female referred to outpatient Physical Therapy with a medical diagnosis of Shoulder pain. Patient presents pain B shoulders L>R, decreased shoulder Range of Motion, L shoulder weakness, hypomobility and motion restrictions cervical spine, and decreased flexibility. Positive impingement signs L shoulder and possible referral from Cervicothoracic junction.     Patient prognosis is Good.   Patient will benefit from skilled outpatient Physical Therapy to address the deficits stated above and in the chart below, provide patient /family education, and to maximize patientt's level of independence.     Plan of care discussed with patient: Yes  Patient's spiritual, cultural and educational needs considered and patient is agreeable to the plan of care and goals as stated below:     Anticipated Barriers for therapy: none    Medical Necessity is demonstrated by the following  History  Co-morbidities and personal factors that may impact the plan of care Co-morbidities:   none    Personal Factors:   no deficits     low   Examination  Body Structures and Functions, activity limitations and participation restrictions that may impact the plan of care Body Regions:   upper extremities    Body Systems:    ROM  strength    Participation Restrictions:   Lifiting, reaching, undressing    Activity limitations:   Learning and applying knowledge  no deficits    General Tasks and Commands  no deficits    Communication  no deficits    Mobility  lifting and carrying objects    Self care  no deficits    Domestic Life  no deficits    Interactions/Relationships  no deficits    Life Areas  no deficits    Community and Social Life  community life  recreation and leisure         high   Clinical Presentation stable and uncomplicated low   Decision Making/ Complexity Score: low     Goals:  Short Term Goals (4 Weeks):   1. Patient will increase L Shoulder flexion AROM to 160 degrees to  improve functional reach  2. Patient to increase L cervical side bending and rotation 25% or greater for ease with ADL's  3. Patient to report decreased pain in L shoulder with ADL's by 40% or greater  Long Term Goals (8 Weeks):   1. Patient to have decreased subjective report of disability as noted by a score of <35% on the FOTO Shoulder questionnaire   2. Patient to be independent with home exercise program for improved self management of condition  3. Patient will increase strength in L upper extremity to 5/5 to improve tolerance to all functional activities     PLAN   Plan of care Certification: 7/1/2022 to 10/1/2022    Outpatient Physical Therapy 1 times weekly for 8 weeks to include the following interventions: Manual Therapy, Moist Heat/ Ice, Neuromuscular Re-ed, Patient Education, Therapeutic Activities and Therapeutic Exercise.     Neris Mejias, PT

## 2022-07-05 ENCOUNTER — PATIENT MESSAGE (OUTPATIENT)
Dept: PRIMARY CARE CLINIC | Facility: CLINIC | Age: 77
End: 2022-07-05
Payer: MEDICARE

## 2022-07-05 ENCOUNTER — TELEPHONE (OUTPATIENT)
Dept: PHARMACY | Facility: CLINIC | Age: 77
End: 2022-07-05
Payer: MEDICARE

## 2022-07-05 DIAGNOSIS — M79.671 PAIN IN BOTH FEET: Primary | ICD-10-CM

## 2022-07-05 DIAGNOSIS — M79.672 PAIN IN BOTH FEET: Primary | ICD-10-CM

## 2022-07-05 PROBLEM — M25.512 ACUTE PAIN OF LEFT SHOULDER: Status: ACTIVE | Noted: 2022-07-05

## 2022-07-05 RX ORDER — TRETINOIN 1 MG/G
CREAM TOPICAL
Qty: 20 G | Refills: 6 | Status: SHIPPED | OUTPATIENT
Start: 2022-07-05 | End: 2023-03-07

## 2022-07-06 ENCOUNTER — TELEPHONE (OUTPATIENT)
Dept: PHARMACY | Facility: CLINIC | Age: 77
End: 2022-07-06
Payer: MEDICARE

## 2022-07-06 ENCOUNTER — PATIENT MESSAGE (OUTPATIENT)
Dept: PRIMARY CARE CLINIC | Facility: CLINIC | Age: 77
End: 2022-07-06
Payer: MEDICARE

## 2022-07-07 ENCOUNTER — DOCUMENTATION ONLY (OUTPATIENT)
Dept: PRIMARY CARE CLINIC | Facility: CLINIC | Age: 77
End: 2022-07-07
Payer: MEDICARE

## 2022-07-07 NOTE — PROGRESS NOTES
"Individual Psychotherapy (LCSW/PhD)  Paris Burris,  7/7/2022    Site:  [place]         Therapeutic Intervention: Met with patient for individual psychotherapy.    Chief complaint/reason for encounter: depression     Interval history and content of current session: Pt reports ongoing feelings of depression and stress due to relationship with daughter. Pt states they have been experiencing negative self-talk. Pt states they feel like they "push people away" and "can't make friends".    SW and pt used CBT "evidence for evidence against" worksheet to examine this thought.  Pt was able to identify evidence against this thought and alternative thoughts. SW and pt discussed communication skills as possible barrier to improved relationships. SW and pt role played conversation utilizing empathic listening and "I" statements.    Treatment plan:  Target symptoms: anxiety   Why chosen therapy is appropriate versus another modality: relevant to diagnosis, evidence based practice  Outcome monitoring methods: self-report, checklist/rating scale  Therapeutic intervention type: supportive psychotherapy    Risk parameters:  Patient reports no suicidal ideation  Patient reports no homicidal ideation  Patient reports no self-injurious behavior  Patient reports no violent behavior    Verbal deficits: None    Patient's response to intervention:  The patient's response to intervention is accepting.    Progress toward goals and other mental status changes:  The patient's progress toward goals is fair .    Diagnosis:   No diagnosis found.    Plan: Pt plans to continue individual psychotherapy    Return to clinic: 2 weeks    Length of Service (minutes): 30        "

## 2022-07-12 ENCOUNTER — TELEPHONE (OUTPATIENT)
Dept: DERMATOLOGY | Facility: CLINIC | Age: 77
End: 2022-07-12

## 2022-07-12 NOTE — TELEPHONE ENCOUNTER
----- Message from Starla Rodríguez MD sent at 7/12/2022 10:58 AM CDT -----  Regarding: retin a  She can try over the counter differin gel or go to a pharmacy other than [facility] which takes good rx.

## 2022-07-13 ENCOUNTER — PATIENT MESSAGE (OUTPATIENT)
Dept: PRIMARY CARE CLINIC | Facility: CLINIC | Age: 77
End: 2022-07-13
Payer: MEDICARE

## 2022-07-13 DIAGNOSIS — G47.00 INSOMNIA, UNSPECIFIED TYPE: Primary | ICD-10-CM

## 2022-07-13 DIAGNOSIS — F41.9 ANXIETY: ICD-10-CM

## 2022-07-14 ENCOUNTER — APPOINTMENT (OUTPATIENT)
Dept: RADIOLOGY | Facility: OTHER | Age: 77
End: 2022-07-14
Attending: PODIATRIST
Payer: MEDICARE

## 2022-07-14 ENCOUNTER — OFFICE VISIT (OUTPATIENT)
Dept: PODIATRY | Facility: CLINIC | Age: 77
End: 2022-07-14
Payer: MEDICARE

## 2022-07-14 VITALS
BODY MASS INDEX: 30.55 KG/M2 | SYSTOLIC BLOOD PRESSURE: 107 MMHG | WEIGHT: 161.81 LBS | HEART RATE: 86 BPM | HEIGHT: 61 IN | DIASTOLIC BLOOD PRESSURE: 64 MMHG

## 2022-07-14 DIAGNOSIS — L84 CORN OR CALLUS: ICD-10-CM

## 2022-07-14 DIAGNOSIS — S90.852A FOREIGN BODY IN LEFT FOOT, INITIAL ENCOUNTER: ICD-10-CM

## 2022-07-14 DIAGNOSIS — M79.672 FOOT PAIN, LEFT: ICD-10-CM

## 2022-07-14 DIAGNOSIS — L84 CORN OR CALLUS: Primary | ICD-10-CM

## 2022-07-14 DIAGNOSIS — M77.9 CAPSULITIS: ICD-10-CM

## 2022-07-14 PROCEDURE — 99204 PR OFFICE/OUTPT VISIT, NEW, LEVL IV, 45-59 MIN: ICD-10-PCS | Mod: S$GLB,,, | Performed by: PODIATRIST

## 2022-07-14 PROCEDURE — 3078F PR MOST RECENT DIASTOLIC BLOOD PRESSURE < 80 MM HG: ICD-10-PCS | Mod: CPTII,S$GLB,, | Performed by: PODIATRIST

## 2022-07-14 PROCEDURE — 3288F PR FALLS RISK ASSESSMENT DOCUMENTED: ICD-10-PCS | Mod: CPTII,S$GLB,, | Performed by: PODIATRIST

## 2022-07-14 PROCEDURE — 3288F FALL RISK ASSESSMENT DOCD: CPT | Mod: CPTII,S$GLB,, | Performed by: PODIATRIST

## 2022-07-14 PROCEDURE — 73630 XR FOOT COMPLETE 3 VIEW LEFT: ICD-10-PCS | Mod: 26,LT,, | Performed by: INTERNAL MEDICINE

## 2022-07-14 PROCEDURE — 1157F ADVNC CARE PLAN IN RCRD: CPT | Mod: CPTII,S$GLB,, | Performed by: PODIATRIST

## 2022-07-14 PROCEDURE — 73630 X-RAY EXAM OF FOOT: CPT | Mod: 26,LT,, | Performed by: INTERNAL MEDICINE

## 2022-07-14 PROCEDURE — 1159F PR MEDICATION LIST DOCUMENTED IN MEDICAL RECORD: ICD-10-PCS | Mod: CPTII,S$GLB,, | Performed by: PODIATRIST

## 2022-07-14 PROCEDURE — 3074F SYST BP LT 130 MM HG: CPT | Mod: CPTII,S$GLB,, | Performed by: PODIATRIST

## 2022-07-14 PROCEDURE — 1157F PR ADVANCE CARE PLAN OR EQUIV PRESENT IN MEDICAL RECORD: ICD-10-PCS | Mod: CPTII,S$GLB,, | Performed by: PODIATRIST

## 2022-07-14 PROCEDURE — 1159F MED LIST DOCD IN RCRD: CPT | Mod: CPTII,S$GLB,, | Performed by: PODIATRIST

## 2022-07-14 PROCEDURE — 73630 X-RAY EXAM OF FOOT: CPT | Mod: TC,LT

## 2022-07-14 PROCEDURE — 3072F LOW RISK FOR RETINOPATHY: CPT | Mod: CPTII,S$GLB,, | Performed by: PODIATRIST

## 2022-07-14 PROCEDURE — 99999 PR PBB SHADOW E&M-EST. PATIENT-LVL IV: ICD-10-PCS | Mod: PBBFAC,,, | Performed by: PODIATRIST

## 2022-07-14 PROCEDURE — 3078F DIAST BP <80 MM HG: CPT | Mod: CPTII,S$GLB,, | Performed by: PODIATRIST

## 2022-07-14 PROCEDURE — 1101F PR PT FALLS ASSESS DOC 0-1 FALLS W/OUT INJ PAST YR: ICD-10-PCS | Mod: CPTII,S$GLB,, | Performed by: PODIATRIST

## 2022-07-14 PROCEDURE — 1126F PR PAIN SEVERITY QUANTIFIED, NO PAIN PRESENT: ICD-10-PCS | Mod: CPTII,S$GLB,, | Performed by: PODIATRIST

## 2022-07-14 PROCEDURE — 3074F PR MOST RECENT SYSTOLIC BLOOD PRESSURE < 130 MM HG: ICD-10-PCS | Mod: CPTII,S$GLB,, | Performed by: PODIATRIST

## 2022-07-14 PROCEDURE — 1126F AMNT PAIN NOTED NONE PRSNT: CPT | Mod: CPTII,S$GLB,, | Performed by: PODIATRIST

## 2022-07-14 PROCEDURE — 3072F PR LOW RISK FOR RETINOPATHY: ICD-10-PCS | Mod: CPTII,S$GLB,, | Performed by: PODIATRIST

## 2022-07-14 PROCEDURE — 99999 PR PBB SHADOW E&M-EST. PATIENT-LVL IV: CPT | Mod: PBBFAC,,, | Performed by: PODIATRIST

## 2022-07-14 PROCEDURE — 99204 OFFICE O/P NEW MOD 45 MIN: CPT | Mod: S$GLB,,, | Performed by: PODIATRIST

## 2022-07-14 PROCEDURE — 1101F PT FALLS ASSESS-DOCD LE1/YR: CPT | Mod: CPTII,S$GLB,, | Performed by: PODIATRIST

## 2022-07-14 RX ORDER — LORAZEPAM 0.5 MG/1
0.5 TABLET ORAL NIGHTLY PRN
Qty: 15 TABLET | Refills: 0 | Status: SHIPPED | OUTPATIENT
Start: 2022-07-14 | End: 2023-01-06 | Stop reason: SDUPTHER

## 2022-07-14 RX ORDER — AMMONIUM LACTATE 12 G/100G
CREAM TOPICAL
Qty: 140 G | Refills: 11 | Status: SHIPPED | OUTPATIENT
Start: 2022-07-14 | End: 2023-07-17

## 2022-07-14 NOTE — PROGRESS NOTES
Subjective:      Patient ID: Paris Bruris is a 76 y.o. female.    Chief Complaint: Foot Problem (Glass in the bottom L foot, and maybe a callous on bottom of L foot)    Sharp and throbbing pain bottom the forefoot left more than right.  Suspicion for foreign body left.  Gradual onset, worsening over the past week or 2. Aggravated by increased weight-bearing prolonged standing some shoes.  No prior medical treatment.  No self-treatment.  Patient denies known puncture wound to just feels like something does not belong there or is not in the right place.  No prior imaging left foot.    Review of Systems   Constitutional: Negative for chills, diaphoresis, fever, malaise/fatigue and night sweats.   Cardiovascular: Negative for claudication, cyanosis, leg swelling and syncope.   Skin: Positive for suspicious lesions. Negative for color change, dry skin, nail changes, rash and unusual hair distribution.   Musculoskeletal: Positive for joint pain. Negative for falls, joint swelling, muscle cramps, muscle weakness and stiffness.   Gastrointestinal: Negative for constipation, diarrhea, nausea and vomiting.   Neurological: Negative for brief paralysis, disturbances in coordination, focal weakness, numbness, paresthesias, sensory change and tremors.           Objective:      Physical Exam  Constitutional:       General: She is not in acute distress.     Appearance: She is well-developed. She is not diaphoretic.   Cardiovascular:      Pulses:           Popliteal pulses are 2+ on the right side and 2+ on the left side.        Dorsalis pedis pulses are 2+ on the right side and 2+ on the left side.        Posterior tibial pulses are 2+ on the right side and 2+ on the left side.      Comments: Capillary refill 3 seconds all toes/distal feet, all toes/both feet warm to touch.      Negative lymphadenopathy bilateral popliteal fossa and tarsal tunnel.      Negavie lower extremity edema bilateral.    Musculoskeletal:      Right  ankle: No swelling, deformity, ecchymosis or lacerations. Normal range of motion. Normal pulse.      Right Achilles Tendon: Normal. No defects. Martin's test negative.      Comments: Pain to palpation inferior mtpj 5 left without evidence of trauma or infection.    Ankle dorsiflexion decreased at <10 degrees bilateral with moderate increase with knee flexion bilateral.    Otherwise, Normal angle, base, station of gait. All ten toes without clubbing, cyanosis, or signs of ischemia.  No pain to palpation bilateral lower extremities.  Range of motion, stability, muscle strength, and muscle tone normal bilateral feet and legs.    Lymphadenopathy:      Lower Body: No right inguinal adenopathy. No left inguinal adenopathy.      Comments: Negative lymphadenopathy bilateral popliteal fossa and tarsal tunnel.    Negative lymphangitic streaking bilateral feet/ankles/legs.   Skin:     General: Skin is warm and dry.      Capillary Refill: Capillary refill takes 2 to 3 seconds.      Coloration: Skin is not pale.      Findings: No abrasion, bruising, burn, ecchymosis, erythema, laceration, lesion or rash.      Nails: There is no clubbing.      Comments: Focal hyperkeratotic lesion consisting entirely of hyperkeratotic tissue without open skin, drainage, pus, fluctuance, malodor, or signs of infection plantar 5th mtpj left and right.    No evidence of puncture wound foreign body or skin lesion plantar aspect of the foot besides hyperkeratosis described above.    Otherwise, Skin is normal age and health appropriate color, turgor, texture, and temperature bilateral lower extremities without ulceration, hyperpigmentation, discoloration, masses nodules or cords palpated.  No ecchymosis, erythema, edema, or cardinal signs of infection bilateral lower extremities.       Neurological:      Mental Status: She is alert and oriented to person, place, and time.      Sensory: No sensory deficit.      Motor: No tremor, atrophy or abnormal  muscle tone.      Gait: Gait normal.      Deep Tendon Reflexes:      Reflex Scores:       Patellar reflexes are 2+ on the right side and 2+ on the left side.       Achilles reflexes are 2+ on the right side and 2+ on the left side.     Comments: Negative tinel sign to percussion sural, superficial peroneal, deep peroneal, saphenous, and posterior tibial nerves right and left ankles and feet.    Negative allodynia both feet   Psychiatric:         Behavior: Behavior is cooperative.               Assessment:       Encounter Diagnoses   Name Primary?    Corn or callus Yes    Capsulitis     Foot pain, left     Foreign body in left foot, initial encounter          Plan:       Paris was seen today for foot problem.    Diagnoses and all orders for this visit:    Corn or callus  -     X-Ray Foot Complete Left; Future    Capsulitis  -     X-Ray Foot Complete Left; Future    Foot pain, left  -     X-Ray Foot Complete Left; Future    Foreign body in left foot, initial encounter  -     X-Ray Foot Complete Left; Future    Other orders  -     ammonium lactate 12 % Crea; Apply twice daily to affected parts both feet as needed.      I counseled the patient on her conditions, their implications and medical management.    We discussed the foreign body sometimes is not seen on x-ray.  Also that the most common complication of foreign body an attempted removal foreign bodies residual foreign body.  In these situations, the body either tolerates the object and becomes less and less painful or does not tolerate the object and it does become worse over time with swelling redness and pain all which she should present here for for re-evaluation.  I do not appreciate the presence of foreign body today.  However we will x-ray to document such and talar repeat treatment plan based on the patient's symptoms on ongoing basis.    X-rays left foot.    Lac-Hydrin twice daily.    Patient will stretch the tendo achilles complex three times daily  as demonstrated in the office.  Literature was dispensed illustrating proper stretching technique.    Patient will obtain over the counter arch supports and wear them in shoes whenever possible.  Athletic shoes intended for walking or running are usually best.    The patient was advised that NSAID-type medications have two very important potential side effects: gastrointestinal irritation including hemorrhage and renal injuries. She was asked to take the medication with food and to stop if she experiences any GI upset. I asked her to call for vomiting, abdominal pain or black/bloody stools. The patient expresses understanding of these issues and questions were answered.    Discussed conservative treatment with shoes of adequate dimensions, material, and style to alleviate symptoms and delay or prevent surgical intervention.    prn          No follow-ups on file.

## 2022-07-14 NOTE — TELEPHONE ENCOUNTER
TCA+tramadol - risk of seizures.   Tried trazodone in the past.   lunesta not covered by insurance.   Sent in 15 pills of ativan.

## 2022-07-15 ENCOUNTER — PATIENT MESSAGE (OUTPATIENT)
Dept: PRIMARY CARE CLINIC | Facility: CLINIC | Age: 77
End: 2022-07-15
Payer: MEDICARE

## 2022-07-19 ENCOUNTER — CLINICAL SUPPORT (OUTPATIENT)
Dept: INTERNAL MEDICINE | Facility: CLINIC | Age: 77
End: 2022-07-19
Payer: MEDICARE

## 2022-07-19 VITALS — WEIGHT: 159.81 LBS | BODY MASS INDEX: 30.2 KG/M2

## 2022-07-19 NOTE — PROGRESS NOTES
Health  Follow-Up Note   [x] Office  [] Phone  Notes from previous session reviewed.   [x] Previous Session Goals unchanged.   [] Patient/Caregiver Change in Goals.  Goals added or changed by Patient/Caregiver since program participation:  1. Check out walking at the gym  2. Try to make dermatology appt with new Dr. to Check skin spots       Additional/Changed support that patient/caregiver has experienced/sought?  (Indicate readiness, support from family, friends, others, community groups, etc)  1.  , therapist    Additional/Changed obstacles that could prevent patient/caregiver from reaching their goals?  1.  Heat not walking outside too hot will try to go to the gym   2. Kade depressed finally called to get appt with therapist    Feedback provided:  1.  Praised for good job down 1.5 lbs    Diagnostic values/Desriptors for follow-up as needed for chronic condition(s)   Weight: 72.5 kg 159.83 lb  Blood Glucose: not checking as much 141-189    Interventions:   1. Health  listened reflectively, validated thoughts and feelings, offered support and encouragement.   2. Allowed patient to express themselves in a non-biased atmosphere.  3. Health  assisted pt to problem-solve obstacles such as being in a challenging environment and dealing with these challenges.   4. Motivational Interviewed interventions utilized (OARS).   5. Patient responded favorably to interventions and remained actively engaged in the session.   6. Health  will remain available and connected for patient by phone and/or office visits.   7. Positive reinforcement, emotional support and encouragement provided.   8. Focused Education: MI    Plan:  [x] Pt will work on goals as stated above.   [x] Pt will contact Health  for any questions, concerns or needs.  [x] Pt will follow up with Health  in office on  8.2.22.  [] Pt will follow up with Health  on phone in:        [x] Health  will remain available.   []  Health  will contact patient by phone in:        [] Health  will consult:        [] Health  will inform Provider via EPIC messaging.     Impression:  1. Behavior is consistent with Action Stage of Change.   2. Participation level:       [x] Receptive      [x] Interactive      [] Guarded and Resistant      [x] Self Motivated      [] Refused/Declined to participate   3. [x] Pt voiced understanding of all information presented.       [] Pt voiced needing further information/education. This will be arranged.       [x] Pt would benefit from further education/information as identified by this health . This will be arranged.     Jacqueline Perry RN HC

## 2022-07-20 ENCOUNTER — PATIENT MESSAGE (OUTPATIENT)
Dept: PRIMARY CARE CLINIC | Facility: CLINIC | Age: 77
End: 2022-07-20
Payer: MEDICARE

## 2022-07-21 ENCOUNTER — DOCUMENTATION ONLY (OUTPATIENT)
Dept: PRIMARY CARE CLINIC | Facility: CLINIC | Age: 77
End: 2022-07-21
Payer: MEDICARE

## 2022-07-21 NOTE — PROGRESS NOTES
Individual Psychotherapy (LCSW/PhD)  Paris Burris,  7/21/2022    Site:  Hurt         Therapeutic Intervention: Met with patient for individual psychotherapy.    Chief complaint/reason for encounter: interpersonal     Interval history and content of current session: Pt reports discussing couples work to improve communication and support. Pt discussed recent argument where pt expressed frustration at lack of communication and verbal insults. SW and pt discussed expectations for couples therapy. SW and pt discussed ongoing conversations with daughter and improved communication. SW and pt discussed pt's level of physical activity to improve mood. Pt has new fitness watch to track steps. Pt continues to work with health  for fitness and nutrition support. Pt feels like health coaching has helped and is trying to continue recommendations. SW and pt discussed of finding consistent fitness routine for care.        Treatment plan:  · Target symptoms: depression, anxiety   · Why chosen therapy is appropriate versus another modality: relevant to diagnosis, evidence based practice  · Outcome monitoring methods: self-report, checklist/rating scale  · Therapeutic intervention type: supportive psychotherapy    Risk parameters:  Patient reports no suicidal ideation  Patient reports no homicidal ideation  Patient reports no self-injurious behavior  Patient reports no violent behavior    Verbal deficits: None    Patient's response to intervention:  The patient's response to intervention is motivated.    Progress toward goals and other mental status changes:  The patient's progress toward goals is fair .    Diagnosis:   No diagnosis found.    Plan: Pt plans to continue individual psychotherapy    Return to clinic: 2 weeks    Length of Service (minutes): 30

## 2022-07-22 ENCOUNTER — PATIENT MESSAGE (OUTPATIENT)
Dept: PRIMARY CARE CLINIC | Facility: CLINIC | Age: 77
End: 2022-07-22
Payer: MEDICARE

## 2022-07-22 ENCOUNTER — TELEPHONE (OUTPATIENT)
Dept: ELECTROPHYSIOLOGY | Facility: CLINIC | Age: 77
End: 2022-07-22
Payer: MEDICARE

## 2022-07-22 NOTE — TELEPHONE ENCOUNTER
PCP is out of office this week, can you all assist this patient? She is c/o pain around her pacemaker please advise.

## 2022-07-22 NOTE — TELEPHONE ENCOUNTER
Contacted the pt on this afternoon.  Pt with c/o pain on the left side of her PPM only when she raises her arms above her head for about a week.  Confirmed with the pt that there was no redness, swelling, open skin and/or drainage at the site.  Scheduled an appt in the Device Clinic on Monday 7/25/22 @ 8:20 am.  Pt was agreeable with this date and time.  Pt was instructed should anything change or worsen over the weekend she is to go the ED.  Understanding was verbalized.  Pt appreciated the call.

## 2022-07-25 ENCOUNTER — DOCUMENTATION ONLY (OUTPATIENT)
Dept: ELECTROPHYSIOLOGY | Facility: CLINIC | Age: 77
End: 2022-07-25
Payer: MEDICARE

## 2022-07-25 ENCOUNTER — CLINICAL SUPPORT (OUTPATIENT)
Dept: CARDIOLOGY | Facility: HOSPITAL | Age: 77
End: 2022-07-25
Attending: INTERNAL MEDICINE
Payer: MEDICARE

## 2022-07-25 DIAGNOSIS — I42.9 CARDIOMYOPATHY, UNSPECIFIED TYPE: ICD-10-CM

## 2022-07-25 NOTE — PROGRESS NOTES
Patient seen in device clinic today due to complaints of pain at pacemaker site only when she lifts her left arm over her head. Incision well healed with edges approximated. No signs of infection noted to site. Patient denies pain. Tender to palpation at corner or axillary area. Can feel something under skin with palpation that is not present on right arm. Advised patient follow up with PCP. Patient and  verbalized understanding. Patient due for annual follow up in September, sent to AVS desk prior to leaving for scheduling.

## 2022-07-26 ENCOUNTER — PATIENT MESSAGE (OUTPATIENT)
Dept: PRIMARY CARE CLINIC | Facility: CLINIC | Age: 77
End: 2022-07-26
Payer: MEDICARE

## 2022-07-26 ENCOUNTER — CLINICAL SUPPORT (OUTPATIENT)
Dept: REHABILITATION | Facility: OTHER | Age: 77
End: 2022-07-26
Payer: MEDICARE

## 2022-07-26 ENCOUNTER — CLINICAL SUPPORT (OUTPATIENT)
Dept: AUDIOLOGY | Facility: CLINIC | Age: 77
End: 2022-07-26
Payer: MEDICARE

## 2022-07-26 DIAGNOSIS — H93.293 IMPAIRMENT OF AUDITORY DISCRIMINATION OF BOTH EARS: ICD-10-CM

## 2022-07-26 DIAGNOSIS — M25.512 ACUTE PAIN OF LEFT SHOULDER: Primary | ICD-10-CM

## 2022-07-26 DIAGNOSIS — M51.36 DDD (DEGENERATIVE DISC DISEASE), LUMBAR: ICD-10-CM

## 2022-07-26 DIAGNOSIS — H90.3 SENSORINEURAL HEARING LOSS (SNHL), BILATERAL: ICD-10-CM

## 2022-07-26 PROCEDURE — 92604 REPROGRAM COCHLEAR IMPLT 7/>: CPT | Mod: S$GLB,,, | Performed by: AUDIOLOGIST

## 2022-07-26 PROCEDURE — 92604 PR DX ANAL COCHLEAR IMP,PT >7 YRS,REPROG: ICD-10-PCS | Mod: S$GLB,,, | Performed by: AUDIOLOGIST

## 2022-07-26 PROCEDURE — 97110 THERAPEUTIC EXERCISES: CPT | Mod: PN | Performed by: PHYSICAL THERAPIST

## 2022-07-26 RX ORDER — TRAMADOL HYDROCHLORIDE 50 MG/1
TABLET ORAL
Qty: 14 TABLET | Refills: 0 | Status: SHIPPED | OUTPATIENT
Start: 2022-07-26 | End: 2022-08-10 | Stop reason: SDUPTHER

## 2022-07-26 NOTE — PROGRESS NOTES
[facility]   Physical Therapy Treatment Note     Name: Paris Burris  Clinic Number: [id number]    Therapy Diagnosis:   Encounter Diagnosis   Name Primary?    Acute pain of left shoulder Yes     Physician: Mandi Huynh MD    Visit Date: 7/26/2022       Physician Orders: PT Eval and Treat   Medical Diagnosis from Referral: M25.519 (ICD-10-CM) - Shoulder pain, unspecified chronicity, unspecified laterality  Evaluation Date: 7/1/2022  Authorization Period Expiration: 6/23/2022  Plan of Care Expiration: 10/1/2022  Progress Note Due: 8/1/2022  Visit # / Visits authorized: 1/ 1   FOTO: 1/5     Precautions: Standard , hearing impairment     PTA Visit #: 0/5     Time In: 10:45 am  Time Out: 11:20  Total Billable Time: 25 minutes    SUBJECTIVE     Pt reports: having a hearing impairment and difficulty understanding people while talking. She has some concerns about her HEP and use of a dish towel for the pec stretch.   She was compliant with home exercise program.  Response to previous treatment: no adverse effects  Functional change: none    Pain: 3/10  Location: left shoulder      OBJECTIVE     Objective Measures updated at progress report unless specified.     Treatment     Paris received the treatments listed below:      therapeutic exercises to develop strength, endurance, ROM, flexibility and posture for 25 minutes including:  pec stretch over towel roll with education on performance for HEP x 5 min  Supine shoulder flexion with dowel x 10  S/L shoulder ER 2 x 10  Scapula retractions 2 x 10  Rows OTB 3 x 10      Patient Education and Home Exercises     Home Exercises Provided and Patient Education Provided     Education provided:   - HEP, issued update print out    Written Home Exercises Provided: yes. Exercises were reviewed and Paris was able to demonstrate them prior to the end of the session.  Paris demonstrated good  understanding of the education provided. See EMR under Patient  Instructions for exercises provided during therapy sessions    ASSESSMENT     Paris presents to clinic for continued physical therapy L shoulder pain. Private room and clear face shield used for improved communication. Home program updated this visit and emphasis on education in correct technique of exercises for independent management of condition.     Paris Is progressing well towards her goals.   Pt prognosis is Good.     Pt will continue to benefit from skilled outpatient physical therapy to address the deficits listed in the problem list box on initial evaluation, provide pt/family education and to maximize pt's level of independence in the home and community environment.     Pt's spiritual, cultural and educational needs considered and pt agreeable to plan of care and goals.     Anticipated barriers to physical therapy: none    Goals:     Short Term Goals (4 Weeks):   1. Patient will increase L Shoulder flexion AROM to 160 degrees to improve functional reach (in progress, not met)  2. Patient to increase L cervical side bending and rotation 25% or greater for ease with ADL's  (in progress, not met)  3. Patient to report decreased pain in L shoulder with ADL's by 40% or greater  (in progress, not met)  Long Term Goals (8 Weeks):   1. Patient to have decreased subjective report of disability as noted by a score of <35% on the FOTO Shoulder questionnaire  (in progress, not met)  2. Patient to be independent with home exercise program for improved self management of condition  (in progress, not met)  3. Patient will increase strength in L upper extremity to 5/5 to improve tolerance to all functional activities  (in progress, not met)    PLAN     Continue to progress L shoulder Range of Motion, posture re-education, and strengthening per tolerance     Neris Mejias, PT

## 2022-07-27 NOTE — PROGRESS NOTES
7/26/2022    Cochlear Implant Programming Session:     Left Ear: Dr. Julian  :  [employer]  Internal Device/Serial Number:  [id number]  Processor:  [id number]   SN of Processors: [id number]  DOS:  34/26/21  DIS:  6/1/21  Processor Color: Sand  Magnet Strength: 3i   Coil Length:  6cm  Battery Type:  Standard rechargeable  Warranty:  5 year     Right ear: RESOUND SEGOVIA    Paris Burris was seen for a follow up programming session with her cochlear implant sound processor.  Mr. Burris was once again not wearing her sound processor.  She stated the masks were bothering her ear and the sound of the processor was uncomfortable.  Mrs. Burris was also having difficulty attaching the magnet to her head.  Mrs. Burris had obtained a Resound hearing aid for bimodal streaming.  The hearing aid was connected to her phone at this time.    The magnet strength was appropriate and did not require any increased strength.  Impedance testing was completed.  The processor was set with a new map based on T and C levels and reduced significantly for comfort.  Mrs. Burris was willing to wear the processor with reduced stimulation levels.  Four progressive maps were created.  Mrs. Burris was instructed to increase stimulation as tolerated and move slowly through the maps.  She was also instructed to return for programming once she had reached P4-Volume 10.  Mrs. Burris and her  confirmed understanding.    The processor was not linked to the hearing aid in the software at this time.  She will need time to adjust to the sound of the cochlear implant.  Mrs. Burris did not bring her cell phone or the back up processor to the programming appointment today.  The wireless accessories were not issued at this time.    Recommend:  1.  Consistent daily use of the sound processor.  2.  Follow up programming as needed.  3.  Cochlear implant supplies as needed.  4.  Annual evaluation.

## 2022-07-29 ENCOUNTER — PATIENT MESSAGE (OUTPATIENT)
Dept: PRIMARY CARE CLINIC | Facility: CLINIC | Age: 77
End: 2022-07-29
Payer: MEDICARE

## 2022-08-01 ENCOUNTER — PATIENT MESSAGE (OUTPATIENT)
Dept: PRIMARY CARE CLINIC | Facility: CLINIC | Age: 77
End: 2022-08-01

## 2022-08-01 ENCOUNTER — OFFICE VISIT (OUTPATIENT)
Dept: PRIMARY CARE CLINIC | Facility: CLINIC | Age: 77
End: 2022-08-01
Payer: MEDICARE

## 2022-08-01 VITALS
WEIGHT: 160.69 LBS | BODY MASS INDEX: 30.34 KG/M2 | SYSTOLIC BLOOD PRESSURE: 122 MMHG | HEIGHT: 61 IN | DIASTOLIC BLOOD PRESSURE: 84 MMHG | HEART RATE: 80 BPM | OXYGEN SATURATION: 98 %

## 2022-08-01 DIAGNOSIS — M25.512 ACUTE PAIN OF LEFT SHOULDER: Primary | ICD-10-CM

## 2022-08-01 DIAGNOSIS — I10 BENIGN ESSENTIAL HYPERTENSION: ICD-10-CM

## 2022-08-01 PROCEDURE — 99999 PR PBB SHADOW E&M-EST. PATIENT-LVL IV: ICD-10-PCS | Mod: PBBFAC,,, | Performed by: INTERNAL MEDICINE

## 2022-08-01 PROCEDURE — 1159F PR MEDICATION LIST DOCUMENTED IN MEDICAL RECORD: ICD-10-PCS | Mod: CPTII,S$GLB,, | Performed by: INTERNAL MEDICINE

## 2022-08-01 PROCEDURE — 1157F PR ADVANCE CARE PLAN OR EQUIV PRESENT IN MEDICAL RECORD: ICD-10-PCS | Mod: CPTII,S$GLB,, | Performed by: INTERNAL MEDICINE

## 2022-08-01 PROCEDURE — 1159F MED LIST DOCD IN RCRD: CPT | Mod: CPTII,S$GLB,, | Performed by: INTERNAL MEDICINE

## 2022-08-01 PROCEDURE — 1101F PR PT FALLS ASSESS DOC 0-1 FALLS W/OUT INJ PAST YR: ICD-10-PCS | Mod: CPTII,S$GLB,, | Performed by: INTERNAL MEDICINE

## 2022-08-01 PROCEDURE — 3072F PR LOW RISK FOR RETINOPATHY: ICD-10-PCS | Mod: CPTII,S$GLB,, | Performed by: INTERNAL MEDICINE

## 2022-08-01 PROCEDURE — 99214 OFFICE O/P EST MOD 30 MIN: CPT | Mod: S$GLB,,, | Performed by: INTERNAL MEDICINE

## 2022-08-01 PROCEDURE — 1160F RVW MEDS BY RX/DR IN RCRD: CPT | Mod: CPTII,S$GLB,, | Performed by: INTERNAL MEDICINE

## 2022-08-01 PROCEDURE — 1101F PT FALLS ASSESS-DOCD LE1/YR: CPT | Mod: CPTII,S$GLB,, | Performed by: INTERNAL MEDICINE

## 2022-08-01 PROCEDURE — 1157F ADVNC CARE PLAN IN RCRD: CPT | Mod: CPTII,S$GLB,, | Performed by: INTERNAL MEDICINE

## 2022-08-01 PROCEDURE — 3074F PR MOST RECENT SYSTOLIC BLOOD PRESSURE < 130 MM HG: ICD-10-PCS | Mod: CPTII,S$GLB,, | Performed by: INTERNAL MEDICINE

## 2022-08-01 PROCEDURE — 3288F PR FALLS RISK ASSESSMENT DOCUMENTED: ICD-10-PCS | Mod: CPTII,S$GLB,, | Performed by: INTERNAL MEDICINE

## 2022-08-01 PROCEDURE — 3072F LOW RISK FOR RETINOPATHY: CPT | Mod: CPTII,S$GLB,, | Performed by: INTERNAL MEDICINE

## 2022-08-01 PROCEDURE — 3074F SYST BP LT 130 MM HG: CPT | Mod: CPTII,S$GLB,, | Performed by: INTERNAL MEDICINE

## 2022-08-01 PROCEDURE — 1126F AMNT PAIN NOTED NONE PRSNT: CPT | Mod: CPTII,S$GLB,, | Performed by: INTERNAL MEDICINE

## 2022-08-01 PROCEDURE — 1126F PR PAIN SEVERITY QUANTIFIED, NO PAIN PRESENT: ICD-10-PCS | Mod: CPTII,S$GLB,, | Performed by: INTERNAL MEDICINE

## 2022-08-01 PROCEDURE — 3288F FALL RISK ASSESSMENT DOCD: CPT | Mod: CPTII,S$GLB,, | Performed by: INTERNAL MEDICINE

## 2022-08-01 PROCEDURE — 1160F PR REVIEW ALL MEDS BY PRESCRIBER/CLIN PHARMACIST DOCUMENTED: ICD-10-PCS | Mod: CPTII,S$GLB,, | Performed by: INTERNAL MEDICINE

## 2022-08-01 PROCEDURE — 3079F PR MOST RECENT DIASTOLIC BLOOD PRESSURE 80-89 MM HG: ICD-10-PCS | Mod: CPTII,S$GLB,, | Performed by: INTERNAL MEDICINE

## 2022-08-01 PROCEDURE — 99999 PR PBB SHADOW E&M-EST. PATIENT-LVL IV: CPT | Mod: PBBFAC,,, | Performed by: INTERNAL MEDICINE

## 2022-08-01 PROCEDURE — 3079F DIAST BP 80-89 MM HG: CPT | Mod: CPTII,S$GLB,, | Performed by: INTERNAL MEDICINE

## 2022-08-01 PROCEDURE — 99214 PR OFFICE/OUTPT VISIT, EST, LEVL IV, 30-39 MIN: ICD-10-PCS | Mod: S$GLB,,, | Performed by: INTERNAL MEDICINE

## 2022-08-01 RX ORDER — NAPROXEN 500 MG/1
500 TABLET ORAL 2 TIMES DAILY WITH MEALS
Qty: 14 TABLET | Refills: 0 | Status: SHIPPED | OUTPATIENT
Start: 2022-08-01 | End: 2022-08-09

## 2022-08-01 NOTE — PROGRESS NOTES
[facility]   Physical Therapy Treatment Note     Name: Paris Burris  Clinic Number: [id number]    Therapy Diagnosis:   Encounter Diagnosis   Name Primary?    Acute pain of left shoulder Yes     Physician: Mandi Huynh MD    Visit Date: 8/2/2022    Physician Orders: PT Eval and Treat   Medical Diagnosis from Referral: M25.519 (ICD-10-CM) - Shoulder pain, unspecified chronicity, unspecified laterality  Evaluation Date: 7/1/2022  Authorization Period Expiration: 12/31/2022  Plan of Care Expiration: 10/1/2022  Progress Note Due: 8/1/2022  Visit # / Visits authorized: 3 (2/20)   FOTO: 1/5. 8/2/2022     PTA Visit #: 0/5     Time In: 9:20 am  Time Out: 10:02 am  Total Billable Time: 42 minutes    Precautions: Standard , hearing impairment     SUBJECTIVE     Pt reports:her shoulder is feeling better. States she has a new pain in her upper arm which started about a week ago.    She was compliant with home exercise program.  Response to previous treatment: no adverse effects  Functional change: no new change reported    Pain: 5/10  Location: left shoulder      OBJECTIVE     Objective Measures updated at progress report unless specified.     CMS Impairment/Limitation/Restriction for FOTO Shoulder Survey    Therapist reviewed FOTO scores for Paris Burris on 8/2/2022.   FOTO documents entered into [employer] - see Media section.    Limitation Score: 42%  Category: Carrying    Current : CK = at least 40% but < 60% impaired, limited or restricted  Goal: CJ = at least 20% but < 40% impaired, limited or restricted       Treatment     Paris received the treatments listed below:      therapeutic exercises to develop strength, endurance, ROM, flexibility and posture for 42 minutes including:  pec stretch over towel roll  x 3 min  Supine shoulder flexion with dowel 20 reps  Supine scap protraction with dowel 20 reps  Supine ER with dowel x 20 reps at side, 20 reps at 90 deg abd    S/L shoulder ER 20 reps  S/L  "shoulder flex 20 reps  S/L shoulder abd 20 reps  S/L hor abd 20 reps    Scapula retractions 20 x 5"  Rows OTB 3 x 10      Patient Education and Home Exercises     Home Exercises Provided and Patient Education Provided     Education provided:   - HEP, issued update print out    Written Home Exercises Provided: yes. Exercises were reviewed and Paris was able to demonstrate them prior to the end of the session.  Paris demonstrated good  understanding of the education provided. See EMR under Patient Instructions for exercises provided during therapy sessions    ASSESSMENT     Continued with scapular stabilization and L shoulder AAROM exercises today. Discussed the benefits of ice and the benefits of heat with patient for home use for pain control.    Paris Is progressing well towards her goals.   Pt prognosis is Good.     Pt will continue to benefit from skilled outpatient physical therapy to address the deficits listed in the problem list box on initial evaluation, provide pt/family education and to maximize pt's level of independence in the home and community environment.     Pt's spiritual, cultural and educational needs considered and pt agreeable to plan of care and goals.     Anticipated barriers to physical therapy: none    Goals:     Short Term Goals (4 Weeks):   1. Patient will increase L Shoulder flexion AROM to 160 degrees to improve functional reach (in progress, not met)  2. Patient to increase L cervical side bending and rotation 25% or greater for ease with ADL's  (in progress, not met)  3. Patient to report decreased pain in L shoulder with ADL's by 40% or greater  (in progress, not met)  Long Term Goals (8 Weeks):   1. Patient to have decreased subjective report of disability as noted by a score of <35% on the FOTO Shoulder questionnaire  (in progress, not met)  2. Patient to be independent with home exercise program for improved self management of condition  (in progress, not met)  3. Patient will " increase strength in L upper extremity to 5/5 to improve tolerance to all functional activities  (in progress, not met)    PLAN     Continue to progress L shoulder Range of Motion, posture re-education, and strengthening per tolerance     Suman Malave, PT

## 2022-08-01 NOTE — PATIENT INSTRUCTIONS
Metoprolol, lopressor, toprol xl are all the same thing.   Per our records, you should be on metoprolol succinate (generic name for Toprol XL) 25mg daily - can be taken during daytime or night time as it's an extended release pill. It helps with blood pressure and also heart rate.

## 2022-08-01 NOTE — PROGRESS NOTES
"Subjective:       Patient ID: Paris Burris is a 76 y.o. female.    Chief Complaint: L shoulder pain    HPI   Gave blood (through wrist) about 3 weeks ago. Shortly after that, started w/ L shoulder pain. Wasn't sure if it was the pacemaker. Did have it interrogated and it was normal. Not sure if constant or if she just don't notice it at time. Feels like an ache. No radiation. Worse when she's not doing anything. FROM. No fevers/chills/falls/rash. No swelling. No other joints that are hurting.   Sometimes would take tramadol but doesn't think it does. Hasn't taken tylenol for it. Used ice on the area; hasn't tried voltaren.     When she reads, reads w/ legs up. If she reads for a long time, will have some back soreness but resolves quickly after walking.     HTN and some elevated HR. On toprol. Pt hasn't checked BP often at home. She says she's on metoprolol and lopressor and wasn't sure what the meds were for.     Hearing aid in place.    Review of Systems  Comprehensive review of systems otherwise negative. See history/subjective section for more details.    Objective:      Physical Exam    /84 (BP Location: Left arm, Patient Position: Sitting, BP Method: Large (Manual))   Pulse 80   Ht 5' 1" (1.549 m)   Wt 72.9 kg (160 lb 11.5 oz)   SpO2 98%   BMI 30.37 kg/m²     Gen - A+OX4, NAD  HEENT - PERRL, OP clear. MMM.   Neck - no LAD  CV - RRR, no m/r. L chest PM in place and no pain on palpation.   Chest - CTAB, no wheezing/rhonchi  Abd - S/NT/ND/+BS  Ext - 2+ B radial pulses. No LE edema.   MSK - L shoulder - no AC pain on palpation; FROM; pain when reaching to touch R shoulder; no deltoid pain on palpation; pain on palpation of the shoulder joint itself.  Skin - no rash.     Assessment/Plan     Diagnoses and all orders for this visit:    Acute pain of left shoulder  -     naproxen (NAPROSYN) 500 MG tablet; Take 1 tablet (500 mg total) by mouth 2 (two) times daily with meals. for 7 days    Benign " essential hypertension  Metoprolol, lopressor, toprol xl are all the same thing.   Per our records, you should be on metoprolol succinate (generic name for Toprol XL) 25mg daily - can be taken during daytime or night time as it's an extended release pill. It helps with blood pressure and also heart rate.     Undergoing PT for neck pain right now which has resolved. Can discuss w/ PT to work on L shoulder if needed. If pain worsens or persists, can refer to ortho.    Follow up if symptoms worsen or fail to improve.      Mandi Huynh MD  Department of Internal Medicine - [facility]  1:19 PM

## 2022-08-02 ENCOUNTER — CLINICAL SUPPORT (OUTPATIENT)
Dept: REHABILITATION | Facility: OTHER | Age: 77
End: 2022-08-02
Payer: MEDICARE

## 2022-08-02 DIAGNOSIS — M25.512 ACUTE PAIN OF LEFT SHOULDER: Primary | ICD-10-CM

## 2022-08-02 PROCEDURE — 97110 THERAPEUTIC EXERCISES: CPT | Mod: PN

## 2022-08-03 ENCOUNTER — PATIENT MESSAGE (OUTPATIENT)
Dept: OPTOMETRY | Facility: CLINIC | Age: 77
End: 2022-08-03
Payer: MEDICARE

## 2022-08-03 ENCOUNTER — PATIENT MESSAGE (OUTPATIENT)
Dept: PRIMARY CARE CLINIC | Facility: CLINIC | Age: 77
End: 2022-08-03
Payer: MEDICARE

## 2022-08-04 ENCOUNTER — DOCUMENTATION ONLY (OUTPATIENT)
Dept: PRIMARY CARE CLINIC | Facility: CLINIC | Age: 77
End: 2022-08-04
Payer: MEDICARE

## 2022-08-04 NOTE — PROGRESS NOTES
"Individual Psychotherapy (LCSW/PhD)  Paris Burris,  8/4/2022    Site:  [place]         Therapeutic Intervention: Met with patient for individual psychotherapy.    Chief complaint/reason for encounter: depression; interpersonal     Interval history and content of current session: Pt and SW discussed preparing for upcoming couples session. SW and pt practiced deep breathing and recognizing emotions to cope with triggers. SW and pt reviewed triggers for pt when communicating with  and. SW and pt practiced empathic listening and communication and using "I feel" statements. SW and pt reviewed expectations for SW and couple for upcoming session. SW supported pt in processing feelings of anxiety and sadness related to relationship issues.     Treatment plan:  · Target symptoms: depression  · Why chosen therapy is appropriate versus another modality: relevant to diagnosis, evidence based practice  · Outcome monitoring methods: self-report, checklist/rating scale  · Therapeutic intervention type: supportive psychotherapy    Risk parameters:  Patient reports no suicidal ideation  Patient reports no homicidal ideation  Patient reports no self-injurious behavior  Patient reports no violent behavior    Verbal deficits: None    Patient's response to intervention:  The patient's response to intervention is motivated.    Progress toward goals and other mental status changes:  The patient's progress toward goals is good.    Diagnosis:   No diagnosis found.    Plan: Pt plans to continue family psychotherapy    Return to clinic: 2 weeks    Length of Service (minutes): 30        "

## 2022-08-05 ENCOUNTER — CLINICAL SUPPORT (OUTPATIENT)
Dept: INTERNAL MEDICINE | Facility: CLINIC | Age: 77
End: 2022-08-05
Payer: MEDICARE

## 2022-08-05 VITALS — BODY MASS INDEX: 30.33 KG/M2 | WEIGHT: 160.5 LBS

## 2022-08-05 NOTE — PROGRESS NOTES
Health  Follow-Up Note   [x] Office  [] Phone  Notes from previous session reviewed.   [x] Previous Session Goals unchanged.   [] Patient/Caregiver Change in Goals.  Goals added or changed by Patient/Caregiver since program participation:  1. States she is taking Metformin again because the Trulicity was $200.00.   2. Going to PT  3. Continue vegetarian diet       Additional/Changed support that patient/caregiver has experienced/sought?  (Indicate readiness, support from family, friends, others, community groups, etc)  1.  Kade     Additional/Changed obstacles that could prevent patient/caregiver from reaching their goals?  1.  Need to walk more increasing in increments  2. Difficulty using her meter will have Kade help when she gets home forgot her strips to check today.     Feedback provided:  1.  Praised for continued effort and determination.    Diagnostic values/Desriptors for follow-up as needed for chronic condition(s)   Weight: 72.8 kg 160.5 lb  Gain 0.7 lb total loss 25.5 lbs    Interventions:   1. Health  listened reflectively, validated thoughts and feelings, offered support and encouragement.   2. Allowed patient to express themselves in a non-biased atmosphere.  3. Health  assisted pt to problem-solve obstacles such as being in a challenging environment and dealing with these challenges.   4. Motivational Interviewed interventions utilized (OARS).   5. Patient responded favorably to interventions and remained actively engaged in the session.   6. Health  will remain available and connected for patient by phone and/or office visits.   7. Positive reinforcement, emotional support and encouragement provided.   8. Focused Education: MI    Plan:  [x] Pt will work on goals as stated above.   [x] Pt will contact Health  for any questions, concerns or needs.  [x] Pt will follow up with Health  in office on  8.26.22.  [] Pt will follow up with Health  on phone in:         [x] Health  will remain available.   [] Health  will contact patient by phone in:        [] Health  will consult:        [] Health  will inform Provider via EPIC messaging.     Impression:  1. Behavior is consistent with Action Stage of Change.   2. Participation level:       [x] Receptive      [x] Interactive      [] Guarded and Resistant      [x] Self Motivated      [] Refused/Declined to participate   3. [x] Pt voiced understanding of all information presented.       [] Pt voiced needing further information/education. This will be arranged.       [x] Pt would benefit from further education/information as identified by this health . This will be arranged.     Jacqueline Perry RN HC

## 2022-08-10 DIAGNOSIS — M51.36 DDD (DEGENERATIVE DISC DISEASE), LUMBAR: ICD-10-CM

## 2022-08-12 RX ORDER — TRAMADOL HYDROCHLORIDE 50 MG/1
TABLET ORAL
Qty: 60 TABLET | Refills: 2 | Status: SHIPPED | OUTPATIENT
Start: 2022-08-12 | End: 2023-06-28 | Stop reason: SDUPTHER

## 2022-08-14 DIAGNOSIS — I47.10 SVT (SUPRAVENTRICULAR TACHYCARDIA): ICD-10-CM

## 2022-08-15 ENCOUNTER — PATIENT MESSAGE (OUTPATIENT)
Dept: PRIMARY CARE CLINIC | Facility: CLINIC | Age: 77
End: 2022-08-15
Payer: MEDICARE

## 2022-08-15 DIAGNOSIS — G89.29 CHRONIC LEFT SHOULDER PAIN: Primary | ICD-10-CM

## 2022-08-15 DIAGNOSIS — M25.512 CHRONIC LEFT SHOULDER PAIN: Primary | ICD-10-CM

## 2022-08-15 RX ORDER — METOPROLOL SUCCINATE 50 MG/1
50 TABLET, EXTENDED RELEASE ORAL DAILY
Qty: 90 TABLET | Refills: 3 | OUTPATIENT
Start: 2022-08-15

## 2022-08-22 ENCOUNTER — PATIENT MESSAGE (OUTPATIENT)
Dept: PRIMARY CARE CLINIC | Facility: CLINIC | Age: 77
End: 2022-08-22
Payer: MEDICARE

## 2022-08-22 RX ORDER — METFORMIN HYDROCHLORIDE 500 MG/1
500 TABLET, EXTENDED RELEASE ORAL
Qty: 90 TABLET | Refills: 3 | Status: SHIPPED | OUTPATIENT
Start: 2022-08-22 | End: 2023-01-06 | Stop reason: SDUPTHER

## 2022-08-24 ENCOUNTER — PATIENT MESSAGE (OUTPATIENT)
Dept: PRIMARY CARE CLINIC | Facility: CLINIC | Age: 77
End: 2022-08-24
Payer: MEDICARE

## 2022-08-24 ENCOUNTER — TELEPHONE (OUTPATIENT)
Dept: GASTROENTEROLOGY | Facility: CLINIC | Age: 77
End: 2022-08-24
Payer: MEDICARE

## 2022-08-24 ENCOUNTER — TELEPHONE (OUTPATIENT)
Dept: PRIMARY CARE CLINIC | Facility: CLINIC | Age: 77
End: 2022-08-24
Payer: MEDICARE

## 2022-08-24 DIAGNOSIS — Z86.010 PERSONAL HISTORY OF COLONIC POLYPS: Primary | ICD-10-CM

## 2022-08-24 NOTE — TELEPHONE ENCOUNTER
We no longer order Cscopes or EGD but put in referral for endoscopy  to call ehr to schedule. Referral in. Please give number.

## 2022-08-24 NOTE — TELEPHONE ENCOUNTER
----- Message from Clemencia Adames sent at 8/24/2022 11:07 AM CDT -----  Contact: Pt [phone]  Pt called and stated that she needs an order for an endoscopy.     Please call

## 2022-08-24 NOTE — TELEPHONE ENCOUNTER
Left several messages for patient to return my call. The endoscopy scheduling department cannot schedule an EGD or colonoscopy until her doctor places an order.  Requested patient reach out to that physician to place orders and contact our department back once done.  Natalia

## 2022-08-25 ENCOUNTER — PATIENT MESSAGE (OUTPATIENT)
Dept: ENDOSCOPY | Facility: HOSPITAL | Age: 77
End: 2022-08-25
Payer: MEDICARE

## 2022-08-25 ENCOUNTER — PATIENT MESSAGE (OUTPATIENT)
Dept: PRIMARY CARE CLINIC | Facility: CLINIC | Age: 77
End: 2022-08-25
Payer: MEDICARE

## 2022-08-25 ENCOUNTER — TELEPHONE (OUTPATIENT)
Dept: ENDOSCOPY | Facility: HOSPITAL | Age: 77
End: 2022-08-25
Payer: MEDICARE

## 2022-08-25 ENCOUNTER — TELEPHONE (OUTPATIENT)
Dept: GASTROENTEROLOGY | Facility: CLINIC | Age: 77
End: 2022-08-25
Payer: MEDICARE

## 2022-08-25 DIAGNOSIS — Z12.11 SPECIAL SCREENING FOR MALIGNANT NEOPLASMS, COLON: Primary | ICD-10-CM

## 2022-08-25 RX ORDER — POLYETHYLENE GLYCOL 3350, SODIUM SULFATE ANHYDROUS, SODIUM BICARBONATE, SODIUM CHLORIDE, POTASSIUM CHLORIDE 236; 22.74; 6.74; 5.86; 2.97 G/4L; G/4L; G/4L; G/4L; G/4L
4 POWDER, FOR SOLUTION ORAL ONCE
Qty: 4000 ML | Refills: 0 | Status: SHIPPED | OUTPATIENT
Start: 2022-08-25 | End: 2022-08-29

## 2022-08-25 NOTE — PROGRESS NOTES
[facility]   Physical Therapy Treatment Note     Name: Paris Burris  Clinic Number: [id number]    Therapy Diagnosis:   Encounter Diagnoses   Name Primary?    Chronic left shoulder pain     Acute pain of left shoulder Yes       Physician: Mandi Huynh MD    Visit Date: 8/26/2022    Physician Orders: PT Eval and Treat   Medical Diagnosis from Referral: M25.519 (ICD-10-CM) - Shoulder pain, unspecified chronicity, unspecified laterality  Evaluation Date: 7/1/2022  Authorization Period Expiration: 12/31/2022  Plan of Care Expiration: 10/1/2022  Progress Note Due: 8/1/2022  Visit # / Visits authorized: 3 (2/20)   FOTO: 1/5. 8/2/2022     PTA Visit #: 0/5     Time In: 8:55 am  Time Out: 9:35 am  Total Billable Time: 37 minutes    Precautions: Standard , hearing impairment     SUBJECTIVE     Pt reports: she is having anterior L shoulder pain when she puts on a gerardo shirt and takes off a gerardo shirt.    She was compliant with home exercise program.  Response to previous treatment: no adverse effects  Functional change: no new change reported    Pain: 6/10  Location: left shoulder      OBJECTIVE     Objective Measures updated at progress report unless specified.     CMS Impairment/Limitation/Restriction for FOTO Shoulder Survey    Therapist reviewed FOTO scores for Paris Burris on 8/26/2022.   FOTO documents entered into [employer] - see Media section.    Limitation Score: 42%  Category: Carrying    Current : CK = at least 40% but < 60% impaired, limited or restricted  Goal: CJ = at least 20% but < 40% impaired, limited or restricted     UE MMT R L   C3 Cervical side bend 5/5 5/5   C4 Shoulder Shrug 5/5  5/5   Shoulder flexion 5/5 3+/5   Shoulder abduction 5/5 3+/5   Shoulder IR 5/5 5/5   Shoulder ER 5/5 5/5   C5 Elbow flexion 5/5 5/5   C7 Elbow extension 5/5 5/5   C6 Wrist extension 5/5 5/5   Wrist flexion 5/5 5/5   Scapular protraction 5/5 5/5   Mid Traps 4/5 4/5   Lower traps 3-/5 3-/5       Joint  "Mobility  L shoulder flex                        130 deg/170 deg  L shoulder abd                        120 deg/145 deg  L shoulder ER at side                   65 deg  L shoulder ER at 90 deg              65 deg  L shoulder IR                                 52 deg      Treatment     Paris received the treatments listed below:      therapeutic exercises to develop strength, endurance, ROM, flexibility and posture for 40 minutes including:  UBE 3'  pec stretch over towel roll  x 3 min  Supine shoulder flexion with dowel 20 reps  Supine scap protraction with dowel 20 reps  Supine ER with dowel x 20 reps at side, 20 reps at 90 deg abd    S/L shoulder ER 20 reps  S/L shoulder flex 20 reps  S/L shoulder abd 20 reps  S/L hor abd 20 reps    Scapula retractions 20 x 5"  Rows OTB 3 x 10      Patient Education and Home Exercises     Home Exercises Provided and Patient Education Provided     Education provided:   - HEP, issued update print out    Written Home Exercises Provided: yes. Exercises were reviewed and Paris was able to demonstrate them prior to the end of the session.  Paris demonstrated good  understanding of the education provided. See EMR under Patient Instructions for exercises provided during therapy sessions    ASSESSMENT     Resumed therapy today. Presenting with decreased L shoulder strength and ROM today.    Paris Is progressing well towards her goals.   Pt prognosis is Good.     Pt will continue to benefit from skilled outpatient physical therapy to address the deficits listed in the problem list box on initial evaluation, provide pt/family education and to maximize pt's level of independence in the home and community environment.     Pt's spiritual, cultural and educational needs considered and pt agreeable to plan of care and goals.     Anticipated barriers to physical therapy: none    Goals:     Short Term Goals (4 Weeks):   1. Patient will increase L Shoulder flexion AROM to 160 degrees to improve " functional reach (in progress, not met)  2. Patient to increase L cervical side bending and rotation 25% or greater for ease with ADL's  (in progress, not met)  3. Patient to report decreased pain in L shoulder with ADL's by 40% or greater  (in progress, not met)  Long Term Goals (8 Weeks):   1. Patient to have decreased subjective report of disability as noted by a score of <35% on the FOTO Shoulder questionnaire  (in progress, not met)  2. Patient to be independent with home exercise program for improved self management of condition  (in progress, not met)  3. Patient will increase strength in L upper extremity to 5/5 to improve tolerance to all functional activities  (in progress, not met)    PLAN     Continue to progress L shoulder Range of Motion, posture re-education, and strengthening per tolerance     Suman Malave, PT

## 2022-08-25 NOTE — TELEPHONE ENCOUNTER
Called pt to schedule colonoscopy, no answer. Left voicemail for pt to call the Endoscopy Scheduling Dept. [phone]

## 2022-08-26 ENCOUNTER — CLINICAL SUPPORT (OUTPATIENT)
Dept: INTERNAL MEDICINE | Facility: CLINIC | Age: 77
End: 2022-08-26
Payer: MEDICARE

## 2022-08-26 ENCOUNTER — CLINICAL SUPPORT (OUTPATIENT)
Dept: REHABILITATION | Facility: OTHER | Age: 77
End: 2022-08-26
Attending: INTERNAL MEDICINE
Payer: MEDICARE

## 2022-08-26 VITALS — WEIGHT: 160.06 LBS | BODY MASS INDEX: 30.24 KG/M2

## 2022-08-26 DIAGNOSIS — M25.512 ACUTE PAIN OF LEFT SHOULDER: Primary | ICD-10-CM

## 2022-08-26 DIAGNOSIS — M25.512 CHRONIC LEFT SHOULDER PAIN: ICD-10-CM

## 2022-08-26 DIAGNOSIS — G89.29 CHRONIC LEFT SHOULDER PAIN: ICD-10-CM

## 2022-08-26 PROCEDURE — 97110 THERAPEUTIC EXERCISES: CPT | Mod: PN

## 2022-08-26 NOTE — PROGRESS NOTES
Health  Follow-Up Note   [x] Office  [] Phone  Notes from previous session reviewed.   [x] Previous Session Goals unchanged.   [] Patient/Caregiver Change in Goals.   Goals added or changed by Patient/Caregiver since program participation:  1. Continue same plan   2. Try some almond flour recipes for bread       Additional/Changed support that patient/caregiver has experienced/sought?  (Indicate readiness, support from family, friends, others, community groups, etc)  1.   Kade    Additional/Changed obstacles that could prevent patient/caregiver from reaching their goals?  1.  Kade brings her sweets     Feedback provided:  1.  Praised for good job down .5 lbs    Diagnostic values/Desriptors for follow-up as needed for chronic condition(s)   Weight: 72.6 kg 160.05 lb  Blood Glucose: going to enter into the portal found a new way    Interventions:   1. Health  listened reflectively, validated thoughts and feelings, offered support and encouragement.   2. Allowed patient to express themselves in a non-biased atmosphere.  3. Health  assisted pt to problem-solve obstacles such as being in a challenging environment and dealing with these challenges.   4. Motivational Interviewed interventions utilized (OARS).   5. Patient responded favorably to interventions and remained actively engaged in the session.   6. Health  will remain available and connected for patient by phone and/or office visits.   7. Positive reinforcement, emotional support and encouragement provided.   8. Focused Education: MI    Plan:  [x] Pt will work on goals as stated above.   [x] Pt will contact Health  for any questions, concerns or needs.  [x] Pt will follow up with Health  in office on  9.22.22  [] Pt will follow up with Health  on phone in:        [x] Health  will remain available.   [] Health  will contact patient by phone in:        [] Health  will consult:        [] Health  will inform  Provider via EPIC messaging.     Impression:  1. Behavior is consistent with Action Stage of Change.   2. Participation level:       [x] Receptive      [x] Interactive      [] Guarded and Resistant      [x] Self Motivated      [] Refused/Declined to participate   3. [x] Pt voiced understanding of all information presented.       [] Pt voiced needing further information/education. This will be arranged.       [x] Pt would benefit from further education/information as identified by this health . This will be arranged.     Jacqueline Perry RN HC

## 2022-08-29 ENCOUNTER — TELEPHONE (OUTPATIENT)
Dept: GASTROENTEROLOGY | Facility: CLINIC | Age: 77
End: 2022-08-29
Payer: MEDICARE

## 2022-08-29 NOTE — TELEPHONE ENCOUNTER
MA returned pt call regarding her prep   No answer message left on pt vm    Portal message sent aslo

## 2022-08-30 ENCOUNTER — PATIENT MESSAGE (OUTPATIENT)
Dept: PRIMARY CARE CLINIC | Facility: CLINIC | Age: 77
End: 2022-08-30
Payer: MEDICARE

## 2022-08-30 ENCOUNTER — PATIENT MESSAGE (OUTPATIENT)
Dept: REHABILITATION | Facility: OTHER | Age: 77
End: 2022-08-30

## 2022-08-30 NOTE — TELEPHONE ENCOUNTER
Laura Huynh [facility] Staff  Caller: pt [phone] (Today,  3:05 PM)  Patient has a colonoscopy scheduled for tomorrow and does not feel it is necessary at her age and would like to speak with you.     Please call and advise.     Thank You

## 2022-08-31 ENCOUNTER — ANESTHESIA EVENT (OUTPATIENT)
Dept: ENDOSCOPY | Facility: HOSPITAL | Age: 77
End: 2022-08-31
Payer: MEDICARE

## 2022-08-31 ENCOUNTER — ANESTHESIA (OUTPATIENT)
Dept: ENDOSCOPY | Facility: HOSPITAL | Age: 77
End: 2022-08-31
Payer: MEDICARE

## 2022-08-31 ENCOUNTER — HOSPITAL ENCOUNTER (OUTPATIENT)
Facility: HOSPITAL | Age: 77
Discharge: HOME | End: 2022-08-31
Attending: STUDENT IN AN ORGANIZED HEALTH CARE EDUCATION/TRAINING PROGRAM | Admitting: STUDENT IN AN ORGANIZED HEALTH CARE EDUCATION/TRAINING PROGRAM
Payer: MEDICARE

## 2022-08-31 VITALS
HEART RATE: 86 BPM | HEIGHT: 61 IN | OXYGEN SATURATION: 98 % | SYSTOLIC BLOOD PRESSURE: 114 MMHG | BODY MASS INDEX: 29.83 KG/M2 | RESPIRATION RATE: 16 BRPM | DIASTOLIC BLOOD PRESSURE: 60 MMHG | WEIGHT: 158 LBS | TEMPERATURE: 98 F

## 2022-08-31 DIAGNOSIS — Z12.11 SCREENING FOR MALIGNANT NEOPLASM OF COLON: Primary | ICD-10-CM

## 2022-08-31 DIAGNOSIS — Z12.11 ENCOUNTER FOR SCREENING COLONOSCOPY: ICD-10-CM

## 2022-08-31 LAB — POCT GLUCOSE: 168 MG/DL (ref 70–110)

## 2022-08-31 PROCEDURE — 45380 PR COLONOSCOPY,BIOPSY: ICD-10-PCS | Mod: PT,,, | Performed by: STUDENT IN AN ORGANIZED HEALTH CARE EDUCATION/TRAINING PROGRAM

## 2022-08-31 PROCEDURE — 82962 GLUCOSE BLOOD TEST: CPT | Performed by: STUDENT IN AN ORGANIZED HEALTH CARE EDUCATION/TRAINING PROGRAM

## 2022-08-31 PROCEDURE — 45380 COLONOSCOPY AND BIOPSY: CPT | Mod: PT | Performed by: STUDENT IN AN ORGANIZED HEALTH CARE EDUCATION/TRAINING PROGRAM

## 2022-08-31 PROCEDURE — 88305 TISSUE EXAM BY PATHOLOGIST: ICD-10-PCS | Mod: 26,,, | Performed by: PATHOLOGY

## 2022-08-31 PROCEDURE — 88305 TISSUE EXAM BY PATHOLOGIST: CPT | Mod: 26,,, | Performed by: PATHOLOGY

## 2022-08-31 PROCEDURE — 45380 COLONOSCOPY AND BIOPSY: CPT | Mod: PT,,, | Performed by: STUDENT IN AN ORGANIZED HEALTH CARE EDUCATION/TRAINING PROGRAM

## 2022-08-31 PROCEDURE — 37000009 HC ANESTHESIA EA ADD 15 MINS: Performed by: STUDENT IN AN ORGANIZED HEALTH CARE EDUCATION/TRAINING PROGRAM

## 2022-08-31 PROCEDURE — 25000003 PHARM REV CODE 250: Performed by: STUDENT IN AN ORGANIZED HEALTH CARE EDUCATION/TRAINING PROGRAM

## 2022-08-31 PROCEDURE — 88305 TISSUE EXAM BY PATHOLOGIST: CPT | Performed by: PATHOLOGY

## 2022-08-31 PROCEDURE — 63600175 PHARM REV CODE 636 W HCPCS: Performed by: NURSE ANESTHETIST, CERTIFIED REGISTERED

## 2022-08-31 PROCEDURE — 37000008 HC ANESTHESIA 1ST 15 MINUTES: Performed by: STUDENT IN AN ORGANIZED HEALTH CARE EDUCATION/TRAINING PROGRAM

## 2022-08-31 PROCEDURE — 25000003 PHARM REV CODE 250: Performed by: NURSE ANESTHETIST, CERTIFIED REGISTERED

## 2022-08-31 PROCEDURE — 27201012 HC FORCEPS, HOT/COLD, DISP: Performed by: STUDENT IN AN ORGANIZED HEALTH CARE EDUCATION/TRAINING PROGRAM

## 2022-08-31 RX ORDER — PROPOFOL 10 MG/ML
VIAL (ML) INTRAVENOUS
Status: DISCONTINUED | OUTPATIENT
Start: 2022-08-31 | End: 2022-08-31

## 2022-08-31 RX ORDER — PROPOFOL 10 MG/ML
VIAL (ML) INTRAVENOUS CONTINUOUS PRN
Status: DISCONTINUED | OUTPATIENT
Start: 2022-08-31 | End: 2022-08-31

## 2022-08-31 RX ORDER — SODIUM CHLORIDE 9 MG/ML
INJECTION, SOLUTION INTRAVENOUS CONTINUOUS
Status: DISCONTINUED | OUTPATIENT
Start: 2022-08-31 | End: 2022-08-31 | Stop reason: HOSPADM

## 2022-08-31 RX ORDER — LIDOCAINE HYDROCHLORIDE 20 MG/ML
INJECTION, SOLUTION EPIDURAL; INFILTRATION; INTRACAUDAL; PERINEURAL
Status: DISCONTINUED | OUTPATIENT
Start: 2022-08-31 | End: 2022-08-31

## 2022-08-31 RX ADMIN — SODIUM CHLORIDE: 0.9 INJECTION, SOLUTION INTRAVENOUS at 11:08

## 2022-08-31 RX ADMIN — Medication 125 MCG/KG/MIN: at 11:08

## 2022-08-31 RX ADMIN — LIDOCAINE HYDROCHLORIDE 50 MG: 20 INJECTION, SOLUTION EPIDURAL; INFILTRATION; INTRACAUDAL; PERINEURAL at 11:08

## 2022-08-31 RX ADMIN — PROPOFOL 70 MG: 10 INJECTION, EMULSION INTRAVENOUS at 11:08

## 2022-08-31 NOTE — PLAN OF CARE
Procedure completed. Pt to be discharged with spouse. In no acute distress. Discharge instructions given. Verbalizes understanding of post procedure restrictions and instructions.

## 2022-08-31 NOTE — H&P
[facility]  Colon and Rectal Surgery    [address]  [place], [place]  Tel: [phone]  Fax: [phone]  [email or web address]   MD Tomasz Pulido MD Brian Kann, MD W. Forrest Johnston, MD Matthew Giglia, MD Jennifer Paruch, MD William Kethman, MD Danielle Kay, MD     Patient name: Paris Burris   YOB: 1945   MRN: [id number]  Date of procedure: 08/31/2022    Procedure: Colonoscopy  Indications: Previous adenomatous polyp, no family history of CRC    Last colonoscopy: 2016 (Complete)    The patient was informed of the availability of a certified  without charge. A certified  was not necessary for this visit.    Sedation plan: MAC  ASA: ASA 2 - Patient with mild systemic disease with no functional limitations    Review of Systems  See above    Past Medical History:   Diagnosis Date    Allergy     Anemia     Anxiety     Breast cancer 2002    Left breast    Cancer 2002    L breast s/p lumpectomy    Cataract     Decreased hearing     Depression     Dermatochalasis of both upper eyelids     Diabetes mellitus     Diabetes with neurologic complications     Gastric ulcer 9/10/13    EGD    Hiatal hernia     Hyperlipidemia     Migraine headache     Migraine headache 3/20/2015    Multiple gastric ulcers     Parathyroid disorder     Pre-diabetes     Renal manifestation of secondary diabetes mellitus     Sleep apnea     no CPAP    Type 2 diabetes mellitus, controlled, 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    Wrist fracture      Past Surgical History:   Procedure Laterality Date    ADENOIDECTOMY      BREAST LUMPECTOMY Left 2002    CATARACT EXTRACTION W/  INTRAOCULAR LENS IMPLANT Bilateral     COLONOSCOPY N/A 12/2/2016    Procedure: COLONOSCOPY;  Surgeon: Dustin Patterson MD;  Location: [address];  Service: Endoscopy;  Laterality: N/A;  PM prep    ESOPHAGEAL MANOMETRY WITH MEASUREMENT OF IMPEDANCE N/A 10/8/2018     Procedure: MANOMETRY, ESOPHAGUS, WITH IMPEDANCE MEASUREMENT;  Surgeon: Renato Maria MD;  Location: [facility] ENDO (4TH FLR);  Service: Endoscopy;  Laterality: N/A;    ESOPHAGOGASTRODUODENOSCOPY N/A 9/12/2018    Procedure: ESOPHAGOGASTRODUODENOSCOPY (EGD);  Surgeon: Dustin Patterson MD;  Location: [facility] ENDO (4TH FLR);  Service: Endoscopy;  Laterality: N/A;  6-8 weeks from visit    ESOPHAGOGASTRODUODENOSCOPY N/A 1/29/2019    Procedure: EGD (ESOPHAGOGASTRODUODENOSCOPY) intraop;  Surgeon: Renato Perez Jr., MD;  Location: [facility] OR [address] [place] [address];  Service: General;  Laterality: N/A;    ESOPHAGOGASTRODUODENOSCOPY N/A 5/31/2019    Procedure: EGD (ESOPHAGOGASTRODUODENOSCOPY);  Surgeon: Maria Elena Little MD;  Location: [facility] ENDO (4TH FLR);  Service: Endoscopy;  Laterality: N/A;  [employer] pacemaker - sm    EYE SURGERY Bilateral     cataracts    IMPLANTATION OF BIVENTRICULAR HEART PACEMAKER N/A 1/30/2019    Procedure: INSERTION, CARDIAC PACEMAKER, BIVENTRICULAR;  Surgeon: Stone Livingston MD;  Location: [facility] EP LAB;  Service: Cardiology;  Laterality: N/A;    IMPLANTATION OF COCHLEAR PROSTHESIS Left 4/26/2021    Procedure: INSERTION, PROSTHESIS, COCHLEAR;  Surgeon: Michael Julian MD;  Location: [facility] OR [address] [place] [address];  Service: ENT;  Laterality: Left;    LAPAROSCOPIC TOUPET FUNDOPLICATION N/A 1/29/2019    Procedure: FUNDOPLICATION, LAPAROSCOPIC, TOUPET;  Surgeon: Renato Perez Jr., MD;  Location: [facility] OR [address] [place] [address];  Service: General;  Laterality: N/A;    lipoma removal  1999    TONSILLECTOMY       Family History   Problem Relation Age of Onset    Suicide Mother     Depression Mother     Alcohol abuse Father     Headaches Father     Diabetes Sister         prediabetes    Psoriasis Sister     Depression Sister     Depression Daughter     Colon cancer Neg Hx     Esophageal cancer Neg Hx      Social History     Tobacco Use    Smoking status: Never    Smokeless tobacco: Never   Substance Use Topics    Alcohol use: No     Comment: quit 2014  - alcohol abuse    Drug use: Not Currently     Review of patient's allergies indicates:   Allergen Reactions    Topamax [topiramate] Hallucinations     hallucinations       Prior to Admission medications    Medication Sig Start Date End Date Taking? Authorizing Provider   ascorbic acid, vitamin C, (VITAMIN C) 500 MG tablet Take 1,000 mg by mouth once daily.    Yes Historical Provider   buPROPion (WELLBUTRIN XL) 300 MG 24 hr tablet Take 1 tablet (300 mg total) by mouth once daily. 5/9/22  Yes Mandi Huynh MD   esomeprazole (NEXIUM) 40 MG capsule Take 1 capsule (40 mg total) by mouth daily before breakfast. 5/9/22 5/9/23 Yes Mandi Huynh MD   ezetimibe (ZETIA) 10 mg tablet Take 1 tablet (10 mg total) by mouth once daily. 11/12/21 11/12/22 Yes Mandi Huynh MD   losartan (COZAAR) 100 MG tablet Take 1 tablet (100 mg total) by mouth once daily. 5/16/22  Yes Mandi Huynh MD   metFORMIN (GLUCOPHAGE-XR) 500 MG ER 24hr tablet Take 1 tablet (500 mg total) by mouth daily with breakfast. 8/22/22 8/22/23 Yes Mandi Huynh MD   metoprolol succinate (TOPROL-XL) 25 MG 24 hr tablet Take 1 tablet (25 mg total) by mouth once daily. 6/3/22 6/3/23 Yes Mandi Huynh MD   rosuvastatin (CRESTOR) 20 MG tablet Take 1 tablet (20 mg total) by mouth once daily. 12/14/21  Yes Mandi Huynh MD   venlafaxine (EFFEXOR) 75 MG tablet Take 1 tablet (75 mg total) by mouth once daily. 11/12/21 11/12/22 Yes Mandi Huynh MD   vitamin D 1000 units Tab Take 0.5 Units by mouth once daily. With K2 50 mch   Yes Historical Provider   acetaminophen (TYLENOL) 500 MG tablet Take 1 tablet (500 mg total) by mouth every 6 (six) hours as needed for Pain. 6/3/22   Mandi Huynh MD   ammonium lactate 12 % Crea Apply twice daily to affected parts both feet as needed. 7/14/22   [place] DPM   blood sugar diagnostic Strp Use to test blood glucose 2 (two) times daily with meals. 4/11/22   Mandi Huynh MD   blood-glucose meter Misc Use to check blood glucose twice daily 4/11/22   Mandi Huynh MD   lancets 33 gauge  "Misc Use to test blood glcuose 2 (two) times daily with meals. 4/11/22   Mandi Huynh MD   lancing device Misc 1 Device by Misc.(Non-Drug; Combo Route) route 2 (two) times daily with meals. 4/11/22 4/11/23  Mandi Huynh MD   LORazepam (ATIVAN) 0.5 MG tablet Take 1 tablet (0.5 mg total) by mouth nightly as needed for Anxiety. 7/14/22 8/13/22  Mandi Huynh MD   traMADoL (ULTRAM) 50 mg tablet TAKE 2 TABLETS EVERY 12 HOURS AS NEEDED FOR PAIN 8/12/22   Mandi Huynh MD   tretinoin (RETIN-A) 0.1 % cream Use hs 7/5/22   Starla Rodríguez MD   valACYclovir (VALTREX) 1000 MG tablet Take 2 tablets (2,000 mg total) by mouth every 12 (twelve) hours. for 1 day 6/3/22 6/5/22  Mandi Huynh MD       Physical Examination  BP (!) 148/77 (Patient Position: Lying)   Pulse 105   Temp 98 °F (36.7 °C) (Temporal)   Resp 16   Ht 5' 1" (1.549 m)   Wt 71.7 kg (158 lb)   SpO2 100%   Breastfeeding No   BMI 29.85 kg/m²      Constitutional: well developed, no cough, no dyspnea, alert, and no acute distress    Head: Normocephalic, no lesions, without obvious abnormality  Eye: Normal external eye, conjunctiva, and lids, PERRL  Cardiovascular: regular rate and regular rhythm  Respiratory: normal air entry  Gastrointestinal: soft, non-tender, without masses or organomegaly  Neurologic: alert, oriented, normal speech, no focal findings or movement disorder noted  Psychiatric: appropriate, normal mood    Plan of Care    It was a pleasure meeting Ms. Burris today - we will plan to perform a colonoscopy with monitored anesthesia care. The details of the procedure, the possible need for biopsy or polypectomy and the potential risks including bleeding, perforation, missed polyps were discussed in detail and they consented to undergo the procedure.      Delfino Sanchez MD - Staff Surgeon  Department of Colon & Rectal Surgery  [facility]    "

## 2022-08-31 NOTE — TRANSFER OF CARE
"Anesthesia Transfer of Care Note    Patient: Paris Burris    Procedure(s) Performed: Procedure(s) (LRB):  COLONOSCOPY (N/A)    Patient location: PACU    Anesthesia Type: general    Transport from OR: Transported from OR on room air with adequate spontaneous ventilation    Post pain: adequate analgesia    Post assessment: no apparent anesthetic complications    Post vital signs: stable    Level of consciousness: awake    Nausea/Vomiting: no nausea/vomiting    Complications: none    Transfer of care protocol was followed      Last vitals:   Visit Vitals  BP (!) 97/52 (BP Location: Left arm, Patient Position: Lying)   Pulse 98   Temp 36.6 °C (97.9 °F) (Tympanic)   Resp 18   Ht 5' 1" (1.549 m)   Wt 71.7 kg (158 lb)   SpO2 (!) 93%   Breastfeeding No   BMI 29.85 kg/m²     "

## 2022-08-31 NOTE — PROVATION PATIENT INSTRUCTIONS
Discharge Summary/Instructions after an Endoscopic Procedure  Patient Name: Paris Burris  Patient MRN: [id number]  Patient YOB: 1945  Wednesday, August 31, 2022  Delfino Sanchez MD  Dear patient,  As a result of recent federal legislation (The Federal Cures Act), you may   receive lab or pathology results from your procedure in your [facility]   account before your physician is able to contact you. Your physician or   their representative will relay the results to you with their   recommendations at their soonest availability.  Thank you,  RESTRICTIONS:  During your procedure today, you received medications for sedation.  These   medications may affect your judgment, balance and coordination.  Therefore,   for 24 hours, you have the following restrictions:   - DO NOT drive a car, operate machinery, make legal/financial decisions,   sign important papers or drink alcohol.    ACTIVITY:  Today: no heavy lifting, straining or running due to procedural   sedation/anesthesia.  The following day: return to full activity including work.  DIET:  Eat and drink normally unless instructed otherwise.     TREATMENT FOR COMMON SIDE EFFECTS:  - Mild abdominal pain, nausea, belching, bloating or excessive gas:  rest,   eat lightly and use a heating pad.  - Sore Throat: treat with throat lozenges and/or gargle with warm salt   water.  - Because air was used during the procedure, expelling large amounts of air   from your rectum or belching is normal.  - If a bowel prep was taken, you may not have a bowel movement for 1-3 days.    This is normal.  SYMPTOMS TO WATCH FOR AND REPORT TO YOUR PHYSICIAN:  1. Abdominal pain or bloating, other than gas cramps.  2. Chest pain.  3. Back pain.  4. Signs of infection such as: chills or fever occurring within 24 hours   after the procedure.  5. Rectal bleeding, which would show as bright red, maroon, or black stools.   (A tablespoon of blood from the rectum is not serious, especially  if   hemorrhoids are present.)  6. Vomiting.  7. Weakness or dizziness.  GO DIRECTLY TO THE NEAREST EMERGENCY ROOM IF YOU HAVE ANY OF THE FOLLOWING:      Difficulty breathing              Chills and/or fever over 101 F   Persistent vomiting and/or vomiting blood   Severe abdominal pain   Severe chest pain   Black, tarry stools   Bleeding- more than one tablespoon   Any other symptom or condition that you feel may need urgent attention  Your doctor recommends these additional instructions:  If any biopsies were taken, your doctors clinic will contact you in 1 to 2   weeks with any results.  - Discharge patient to home.   - Resume previous diet.   - Continue present medications.   - Await pathology results.   - Repeat colonoscopy for surveillance based on pathology results.   - Return to referring physician as previously scheduled.  For questions, problems or results please call your physician - Delfino Sanchez MD at Work:  [phone].  [facility] EMERGENCY ROOM PHONE NUMBER: [phone]  IF A COMPLICATION OR EMERGENCY SITUATION ARISES AND YOU ARE UNABLE TO REACH   YOUR PHYSICIAN - GO DIRECTLY TO THE EMERGENCY ROOM.  MD Delfino Fournier MD  8/31/2022 11:56:46 AM  This report has been verified and signed electronically.  Dear patient,  As a result of recent federal legislation (The Federal Cures Act), you may   receive lab or pathology results from your procedure in your [facility]   account before your physician is able to contact you. Your physician or   their representative will relay the results to you with their   recommendations at their soonest availability.  Thank you,  PROVATION

## 2022-08-31 NOTE — ANESTHESIA PREPROCEDURE EVALUATION
08/31/2022  Paris Burris is a 76 y.o., female.    Past Medical History:   Diagnosis Date    Allergy     Anemia     Anxiety     Breast cancer 2002    Left breast    Cancer 2002    L breast s/p lumpectomy    Cataract     Decreased hearing     Depression     Dermatochalasis of both upper eyelids     Diabetes mellitus     Diabetes with neurologic complications     Gastric ulcer 9/10/13    EGD    Hiatal hernia     Hyperlipidemia     Migraine headache     Migraine headache 3/20/2015    Multiple gastric ulcers     Parathyroid disorder     Pre-diabetes     Renal manifestation of secondary diabetes mellitus     Sleep apnea     no CPAP    Type 2 diabetes mellitus, controlled, 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    Wrist fracture        Past Surgical History:   Procedure Laterality Date    ADENOIDECTOMY      BREAST LUMPECTOMY Left 2002    CATARACT EXTRACTION W/  INTRAOCULAR LENS IMPLANT Bilateral     COLONOSCOPY N/A 12/2/2016    Procedure: COLONOSCOPY;  Surgeon: Dustin Patterson MD;  Location: [facility] ([address] [place] [address]);  Service: Endoscopy;  Laterality: N/A;  PM prep    ESOPHAGEAL MANOMETRY WITH MEASUREMENT OF IMPEDANCE N/A 10/8/2018    Procedure: MANOMETRY, ESOPHAGUS, WITH IMPEDANCE MEASUREMENT;  Surgeon: Renato Maria MD;  Location: [facility] ([address] [place] [address]);  Service: Endoscopy;  Laterality: N/A;    ESOPHAGOGASTRODUODENOSCOPY N/A 9/12/2018    Procedure: ESOPHAGOGASTRODUODENOSCOPY (EGD);  Surgeon: Dustin Patterson MD;  Location: [facility] ([address] [place] [address]);  Service: Endoscopy;  Laterality: N/A;  6-8 weeks from visit    ESOPHAGOGASTRODUODENOSCOPY N/A 1/29/2019    Procedure: EGD (ESOPHAGOGASTRODUODENOSCOPY) intraop;  Surgeon: Renato Perez Jr., MD;  Location: [address];  Service: General;  Laterality: N/A;    ESOPHAGOGASTRODUODENOSCOPY N/A 5/31/2019    Procedure: EGD  (ESOPHAGOGASTRODUODENOSCOPY);  Surgeon: Maria Elena Little MD;  Location: [facility] ENDO (4TH FLR);  Service: Endoscopy;  Laterality: N/A;  [employer] pacemaker - sm    EYE SURGERY Bilateral     cataracts    IMPLANTATION OF BIVENTRICULAR HEART PACEMAKER N/A 1/30/2019    Procedure: INSERTION, CARDIAC PACEMAKER, BIVENTRICULAR;  Surgeon: Stone Livingston MD;  Location: [facility] EP LAB;  Service: Cardiology;  Laterality: N/A;    IMPLANTATION OF COCHLEAR PROSTHESIS Left 4/26/2021    Procedure: INSERTION, PROSTHESIS, COCHLEAR;  Surgeon: Michael Julian MD;  Location: [facility] OR 2ND FLR;  Service: ENT;  Laterality: Left;    LAPAROSCOPIC TOUPET FUNDOPLICATION N/A 1/29/2019    Procedure: FUNDOPLICATION, LAPAROSCOPIC, TOUPET;  Surgeon: Renato Perez Jr., MD;  Location: [facility] OR 2ND FLR;  Service: General;  Laterality: N/A;    lipoma removal  1999    TONSILLECTOMY             Pre-op Assessment    I have reviewed the Patient Summary Reports.    I have reviewed the NPO Status.   I have reviewed the Medications.     Review of Systems  Cardiovascular:   Dysrhythmias    Pulmonary:   Sleep Apnea    Renal/:   Chronic Renal Disease, CRI    Hepatic/GI:   GERD Liver Disease,    Endocrine:   Diabetes, type 2        Physical Exam  General: Well nourished    Airway:  Mallampati: II   Mouth Opening: Normal  TM Distance: Normal  Tongue: Normal  Neck ROM: Normal ROM    Dental:  Intact    Chest/Lungs:  Clear to auscultation    Heart:  Rate: Normal    Abdomen:  Normal, Soft        Anesthesia Plan  Type of Anesthesia, risks & benefits discussed:    Anesthesia Type: Gen Natural Airway  Intra-op Monitoring Plan: Standard ASA Monitors  Induction:  IV  Informed Consent: Informed consent signed with the Patient and all parties understand the risks and agree with anesthesia plan.  All questions answered.   ASA Score: 3  Day of Surgery Review of History & Physical: H&P Update referred to the surgeon/provider.    Ready For Surgery From Anesthesia  Perspective.     .

## 2022-09-01 ENCOUNTER — OFFICE VISIT (OUTPATIENT)
Dept: OPTOMETRY | Facility: CLINIC | Age: 77
End: 2022-09-01
Payer: COMMERCIAL

## 2022-09-01 DIAGNOSIS — H52.4 MYOPIA WITH ASTIGMATISM AND PRESBYOPIA, BILATERAL: ICD-10-CM

## 2022-09-01 DIAGNOSIS — Z01.00 ROUTINE EYE EXAM: Primary | ICD-10-CM

## 2022-09-01 DIAGNOSIS — H52.13 MYOPIA WITH ASTIGMATISM AND PRESBYOPIA, BILATERAL: ICD-10-CM

## 2022-09-01 DIAGNOSIS — H52.203 MYOPIA WITH ASTIGMATISM AND PRESBYOPIA, BILATERAL: ICD-10-CM

## 2022-09-01 DIAGNOSIS — E11.9 TYPE 2 DIABETES MELLITUS WITHOUT RETINOPATHY: ICD-10-CM

## 2022-09-01 PROCEDURE — 92014 COMPRE OPH EXAM EST PT 1/>: CPT | Mod: S$GLB,,, | Performed by: OPTOMETRIST

## 2022-09-01 PROCEDURE — 92014 PR EYE EXAM, EST PATIENT,COMPREHESV: ICD-10-PCS | Mod: S$GLB,,, | Performed by: OPTOMETRIST

## 2022-09-01 PROCEDURE — 92015 PR REFRACTION: ICD-10-PCS | Mod: S$GLB,,, | Performed by: OPTOMETRIST

## 2022-09-01 PROCEDURE — 92015 DETERMINE REFRACTIVE STATE: CPT | Mod: S$GLB,,, | Performed by: OPTOMETRIST

## 2022-09-01 PROCEDURE — 99999 PR PBB SHADOW E&M-EST. PATIENT-LVL III: CPT | Mod: PBBFAC,,, | Performed by: OPTOMETRIST

## 2022-09-01 PROCEDURE — 99999 PR PBB SHADOW E&M-EST. PATIENT-LVL III: ICD-10-PCS | Mod: PBBFAC,,, | Performed by: OPTOMETRIST

## 2022-09-01 NOTE — ANESTHESIA POSTPROCEDURE EVALUATION
Anesthesia Post Evaluation    Patient: Paris Burris    Procedure(s) Performed: Procedure(s) (LRB):  COLONOSCOPY (N/A)    Final Anesthesia Type: general      Patient location during evaluation: PACU  Patient participation: Yes- Able to Participate  Level of consciousness: awake and alert  Post-procedure vital signs: reviewed and stable  Pain management: adequate  Airway patency: patent    PONV status at discharge: No PONV  Anesthetic complications: no      Cardiovascular status: blood pressure returned to baseline  Respiratory status: spontaneous ventilation and room air  Hydration status: euvolemic  Follow-up not needed.          Vitals Value Taken Time   /60 08/31/22 1230   Temp 36.6 °C (97.9 °F) 08/31/22 1200   Pulse 86 08/31/22 1230   Resp 16 08/31/22 1230   SpO2 98 % 08/31/22 1230         Event Time   Out of Recovery 12:37:08         Pain/Nilsa Score: Nilsa Score: 10 (8/31/2022 12:30 PM)

## 2022-09-01 NOTE — PROGRESS NOTES
HPI    Last eye exam was 7/20/21 with Dr. Manrique.  Patient states decrease in overall vision since last exam. Didn't update   glasses rx after last exam.  Patient denies diplopia, headaches, flashes/floaters, and pain.    Hemoglobin A1C       Date                     Value               Ref Range             Status                05/12/2022               6.8 (H)             4.0 - 5.6 %           Final               Last edited by Luana Irizarry MA on 9/1/2022  1:59 PM.            Assessment /Plan     For exam results, see Encounter Report.    Routine eye exam    Myopia with astigmatism and presbyopia, bilateral    Type 2 diabetes mellitus without retinopathy          1-2.  Bifocal rx given.  Ok to continue with current rx.  Retina flat and intact OU--no holes, tears, breaks, or RDs.    3.  No retinopathy--monitor yearly.  BS control.  Eye health normal OU.

## 2022-09-02 NOTE — TELEPHONE ENCOUNTER
Ms. Paris had her scope on 8/31. Do you still want to chat with her about this? Just trying to reschedule her.

## 2022-09-06 ENCOUNTER — CLINICAL SUPPORT (OUTPATIENT)
Dept: REHABILITATION | Facility: OTHER | Age: 77
End: 2022-09-06
Payer: MEDICARE

## 2022-09-06 DIAGNOSIS — M25.512 ACUTE PAIN OF LEFT SHOULDER: Primary | ICD-10-CM

## 2022-09-06 PROCEDURE — 97110 THERAPEUTIC EXERCISES: CPT | Mod: PN

## 2022-09-06 NOTE — PROGRESS NOTES
[facility]   Physical Therapy Treatment Note     Name: Prais Burris  Clinic Number: [id number]    Therapy Diagnosis:   Encounter Diagnosis   Name Primary?    Acute pain of left shoulder Yes       Physician: Mandi Huynh MD    Visit Date: 9/6/2022    Physician Orders: PT Eval and Treat   Medical Diagnosis from Referral: M25.519 (ICD-10-CM) - Shoulder pain, unspecified chronicity, unspecified laterality  Evaluation Date: 7/1/2022  Authorization Period Expiration: 12/31/2022  Plan of Care Expiration: 10/1/2022  Progress Note Due: 9/26/2022  Visit # / Visits authorized: 5 (4/20)   FOTO: 3/5. 8/26/2022     PTA Visit #: 0/5     Time In: 9:17 am  Time Out: 10:00 am  Total Billable Time: 39 minutes    Precautions: Standard , hearing impairment     SUBJECTIVE     Pt reports: her shoulder is not feeling great this morning. States she watches health videos and one video discussed the effects of coffee on shoulder pain. States she has reduced her coffee consumption. States some days her L shoulder hurts so much she needs assistance dressing    She was compliant with home exercise program.  Response to previous treatment: no adverse effects  Functional change: none reported    Pain: 3/10  Location: left shoulder      OBJECTIVE     Objective Measures updated at progress report unless specified.     CMS Impairment/Limitation/Restriction for FOTO Shoulder Survey    Therapist reviewed FOTO scores for Paris Burris on 8/26/2022.   FOTO documents entered into [employer] - see Media section.    Limitation Score: 42%  Category: Carrying    Current : CK = at least 40% but < 60% impaired, limited or restricted  Goal: CJ = at least 20% but < 40% impaired, limited or restricted     UE MMT R L   C3 Cervical side bend 5/5 5/5   C4 Shoulder Shrug 5/5  5/5   Shoulder flexion 5/5 3+/5   Shoulder abduction 5/5 3+/5   Shoulder IR 5/5 5/5   Shoulder ER 5/5 5/5   C5 Elbow flexion 5/5 5/5   C7 Elbow extension 5/5 5/5   C6  "Wrist extension 5/5 5/5   Wrist flexion 5/5 5/5   Scapular protraction 5/5 5/5   Mid Traps 4/5 4/5   Lower traps 3-/5 3-/5       Joint Mobility  L shoulder flex                        130 deg/170 deg  L shoulder abd                        120 deg/145 deg  L shoulder ER at side                   65 deg  L shoulder ER at 90 deg              65 deg  L shoulder IR                                 52 deg      Treatment     Paris received the treatments listed below:      therapeutic exercises to develop strength, endurance, ROM, flexibility and posture for 42 minutes including:  UBE 3'  pec stretch over towel roll  x 3 min  Supine shoulder flexion with dowel 20 reps  Supine scap protraction with dowel 20 reps  Supine ER with dowel x 20 reps at side, 20 reps at 90 deg abd    S/L shoulder ER 20 reps  S/L shoulder flex 20 reps  S/L shoulder abd 20 reps  S/L hor abd 20 reps    Scapula retractions 20 x 5"    Lat pull downs 20 reps with orange band  Shoulder ext 20 reps with orange band  Rows 20 reps with orange band      Patient Education and Home Exercises     Home Exercises Provided and Patient Education Provided     Education provided:   - no new HEP provided today    Written Home Exercises Provided: Patient instructed to cont prior HEP. Exercises were reviewed and Paris was able to demonstrate them prior to the end of the session.  Paris demonstrated good  understanding of the education provided. See EMR under Patient Instructions for exercises provided during therapy sessions    ASSESSMENT   Continued with scapular strengthening and stabilization today. Added band exercises without increased pain.    Paris Is progressing well towards her goals.   Pt prognosis is Good.     Pt will continue to benefit from skilled outpatient physical therapy to address the deficits listed in the problem list box on initial evaluation, provide pt/family education and to maximize pt's level of independence in the home and community " environment.     Pt's spiritual, cultural and educational needs considered and pt agreeable to plan of care and goals.     Anticipated barriers to physical therapy: none    Goals:     Short Term Goals (4 Weeks):   1. Patient will increase L Shoulder flexion AROM to 160 degrees to improve functional reach (in progress, not met)  2. Patient to increase L cervical side bending and rotation 25% or greater for ease with ADL's  (in progress, not met)  3. Patient to report decreased pain in L shoulder with ADL's by 40% or greater  (in progress, not met)    Long Term Goals (8 Weeks):   1. Patient to have decreased subjective report of disability as noted by a score of <35% on the FOTO Shoulder questionnaire  (in progress, not met)  2. Patient to be independent with home exercise program for improved self management of condition  (in progress, not met)  3. Patient will increase strength in L upper extremity to 5/5 to improve tolerance to all functional activities  (in progress, not met)    PLAN     Continue to progress L shoulder Range of Motion, posture re-education, and strengthening per tolerance     Suman Malave, PT

## 2022-09-07 ENCOUNTER — PATIENT MESSAGE (OUTPATIENT)
Dept: DERMATOLOGY | Facility: CLINIC | Age: 77
End: 2022-09-07
Payer: MEDICARE

## 2022-09-07 ENCOUNTER — PATIENT MESSAGE (OUTPATIENT)
Dept: PRIMARY CARE CLINIC | Facility: CLINIC | Age: 77
End: 2022-09-07
Payer: MEDICARE

## 2022-09-07 DIAGNOSIS — E66.9 DIABETES MELLITUS TYPE 2 IN OBESE: ICD-10-CM

## 2022-09-07 DIAGNOSIS — E11.69 DIABETES MELLITUS TYPE 2 IN OBESE: ICD-10-CM

## 2022-09-07 DIAGNOSIS — E11.9 TYPE 2 DIABETES MELLITUS WITHOUT COMPLICATION, WITHOUT LONG-TERM CURRENT USE OF INSULIN: Primary | ICD-10-CM

## 2022-09-07 LAB
FINAL PATHOLOGIC DIAGNOSIS: NORMAL
GROSS: NORMAL
Lab: NORMAL

## 2022-09-08 RX ORDER — LANCETS 33 GAUGE
1 EACH MISCELLANEOUS 2 TIMES DAILY WITH MEALS
Qty: 100 EACH | Refills: 11 | Status: SHIPPED | OUTPATIENT
Start: 2022-09-08

## 2022-09-08 RX ORDER — DEXTROSE 4 G
TABLET,CHEWABLE ORAL
Qty: 1 EACH | Refills: 0 | Status: SHIPPED | OUTPATIENT
Start: 2022-09-08 | End: 2023-10-17

## 2022-09-08 RX ORDER — LANCING DEVICE
1 EACH MISCELLANEOUS 2 TIMES DAILY WITH MEALS
Qty: 1 EACH | Refills: 0 | Status: SHIPPED | OUTPATIENT
Start: 2022-09-08 | End: 2023-10-17

## 2022-09-08 NOTE — TELEPHONE ENCOUNTER
"Individual Psychotherapy (LCSW/PhD)  Paris Burris,  9/18/2021    Site:  [place]         Therapeutic Intervention: Met with patient for individual psychotherapy.    Chief complaint/reason for encounter: depression and interpersonal     Interval history and content of current session: Pt reports ongoing tension with  related to communication difficulties I.e. "snapping at each other".  Pt states their  recently made a large purchase without consulting them which they feel angry about. SW supported pt in practicing "I" statements to use with  to discuss emotions and needs. SW and pt role played conversation to practice use of "I" statements. SW proposed pt create a "task-list" to share with  and discuss shared duties and need for cooperation.     Pt expressed concerns about  sleeping excessively. Pt states they are worried about 's health and that this excessive sleeping has prevented them from spending time together. SW and pt discussed ways to express concerns to  without "feeling responsible" for 's actions. Pt reports overall "good mood" and states they have been staying active. Pt states that their  would like to schedule an individual follow-up session with SW to discussed needs for support.    Treatment plan:  Target symptoms: depression  Why chosen therapy is appropriate versus another modality: relevant to diagnosis, evidence based practice  Outcome monitoring methods: self-report, checklist/rating scale  Therapeutic intervention type: supportive psychotherapy    Risk parameters:  Patient reports no suicidal ideation  Patient reports no homicidal ideation  Patient reports no self-injurious behavior  Patient reports no violent behavior    Verbal deficits: None    Patient's response to intervention:  The patient's response to intervention is accepting.    Progress toward goals and other mental status changes:  The patient's progress toward goals " is fair .    Diagnosis:   No diagnosis found.    Plan: Pt plans to continue individual psychotherapy    Return to clinic: 2 weeks    Length of Service (minutes): 30

## 2022-09-09 ENCOUNTER — PATIENT MESSAGE (OUTPATIENT)
Dept: PRIMARY CARE CLINIC | Facility: CLINIC | Age: 77
End: 2022-09-09
Payer: MEDICARE

## 2022-09-12 ENCOUNTER — CLINICAL SUPPORT (OUTPATIENT)
Dept: REHABILITATION | Facility: OTHER | Age: 77
End: 2022-09-12
Payer: MEDICARE

## 2022-09-12 DIAGNOSIS — M25.512 ACUTE PAIN OF LEFT SHOULDER: Primary | ICD-10-CM

## 2022-09-12 PROCEDURE — 97110 THERAPEUTIC EXERCISES: CPT | Mod: PN | Performed by: PHYSICAL THERAPIST

## 2022-09-12 NOTE — PROGRESS NOTES
[facility]   Physical Therapy Treatment Note     Name: Paris Burris  Clinic Number: [id number]    Therapy Diagnosis:   Encounter Diagnosis   Name Primary?    Acute pain of left shoulder Yes       Physician: Mandi Huynh MD    Visit Date: 9/12/2022    Physician Orders: PT Eval and Treat   Medical Diagnosis from Referral: M25.519 (ICD-10-CM) - Shoulder pain, unspecified chronicity, unspecified laterality  Evaluation Date: 7/1/2022  Authorization Period Expiration: 12/31/2022  Plan of Care Expiration: 10/1/2022  Progress Note Due: 10/12/2022  Visit # / Visits authorized: 5 (4/20)   FOTO: 3/5. 8/26/2022     PTA Visit #: 0/5     Time In: 9:38 am  Time Out: 10:10 am  Total Billable Time: 32 minutes    Precautions: Standard , hearing impairment     SUBJECTIVE     Pt reports: Overall improvements since beginning PT. Would like to treat her pain conservatively with the exercise as first line of treatment. Pain at night 3/10 at worst and has tried heat/ ice. The heating pad stays better and seems to help.     She was compliant with home exercise program.  Response to previous treatment: no adverse effects  Functional change: none reported    Pain: 3/10  Location: left shoulder      OBJECTIVE     Objective Measures updated at progress report unless specified.     CMS Impairment/Limitation/Restriction for FOTO Shoulder Survey    Therapist reviewed FOTO scores for Paris Burris on 8/26/2022.   FOTO documents entered into [employer] - see Media section.    Limitation Score: 42%  Category: Carrying    Current : CK = at least 40% but < 60% impaired, limited or restricted  Goal: CJ = at least 20% but < 40% impaired, limited or restricted     9/12/2022    UE MMT R L   C3 Cervical side bend 5/5 5/5   C4 Shoulder Shrug 5/5  5/5   Shoulder flexion 5/5 4/5   Shoulder abduction 5/5 4-/5   Shoulder IR 5/5 5/5   Shoulder ER 5/5 5/5   C5 Elbow flexion 5/5 5/5   C7 Elbow extension 5/5 5/5   C6 Wrist extension 5/5 5/5  "  Wrist flexion 5/5 5/5   Scapular protraction 5/5 5/5   Mid Traps 4/5 4/5   Lower traps 3-/5 3-/5       Joint Mobility  L shoulder flex                        160 deg/170 deg  L shoulder abd                        130 deg/145 deg  L shoulder ER at side                   55 deg  L shoulder ER at 90 deg              90 deg  L shoulder IR                                 50 deg      Treatment     Paris received the treatments listed below:      therapeutic exercises to develop strength, endurance, ROM, flexibility and posture for 32 minutes including:  UBE 5'  pec stretch over towel roll  x 3 min  Supine shoulder flexion with dowel 20 reps  Supine scap protraction with dowel 20 reps  Supine ER with dowel x 20 reps at side, 20 reps at 90 deg abd    S/L shoulder ER 3 x 10 reps  S/L shoulder flex 20 reps  S/L shoulder abd 20 reps  S/L hor abd 20 reps    Scapula retractions 20 x 5"    Lat pull downs 20 reps with orange band  Shoulder ext 20 reps with orange band  Rows 20 reps with orange band    -D2 flexion- nv  -foam roller or wall lift off for lats- nv      Patient Education and Home Exercises     Home Exercises Provided and Patient Education Provided     Education provided:   - no new HEP provided today    Written Home Exercises Provided: Patient instructed to cont prior HEP. Exercises were reviewed and Paris was able to demonstrate them prior to the end of the session.  Paris demonstrated good  understanding of the education provided. See EMR under Patient Instructions for exercises provided during therapy sessions    ASSESSMENT   Patient progressing with improvements in active overhead Range of Motion and deltoid strength since beginning PT. Negative cross arm and HK impingement tests this visit. Private room offered for hearing impairment. Tactile and visual cuing for correct technique for the exercises in the gym.     Paris Is progressing well towards her goals.   Pt prognosis is Good.     Pt will continue to " benefit from skilled outpatient physical therapy to address the deficits listed in the problem list box on initial evaluation, provide pt/family education and to maximize pt's level of independence in the home and community environment.     Pt's spiritual, cultural and educational needs considered and pt agreeable to plan of care and goals.     Anticipated barriers to physical therapy: none    Goals:     Short Term Goals (4 Weeks):   1. Patient will increase L Shoulder flexion AROM to 160 degrees to improve functional reach (9/12/2022 met)  2. Patient to increase L cervical side bending and rotation 25% or greater for ease with ADL's  (in progress, not met)  3. Patient to report decreased pain in L shoulder with ADL's by 40% or greater  (in progress, not met)    Long Term Goals (8 Weeks):   1. Patient to have decreased subjective report of disability as noted by a score of <35% on the FOTO Shoulder questionnaire  (in progress, not met)  2. Patient to be independent with home exercise program for improved self management of condition  (in progress, not met)  3. Patient will increase strength in L upper extremity to 5/5 to improve tolerance to all functional activities  (in progress, not met)    PLAN     Continue to progress L shoulder Range of Motion, posture re-education, and strengthening per tolerance     Neris Mejias, PT

## 2022-09-13 ENCOUNTER — OFFICE VISIT (OUTPATIENT)
Dept: DERMATOLOGY | Facility: CLINIC | Age: 77
End: 2022-09-13
Payer: MEDICARE

## 2022-09-13 DIAGNOSIS — T14.8XXA BITE: Primary | ICD-10-CM

## 2022-09-13 DIAGNOSIS — T14.8XXA EXCORIATION: ICD-10-CM

## 2022-09-13 PROCEDURE — 1160F PR REVIEW ALL MEDS BY PRESCRIBER/CLIN PHARMACIST DOCUMENTED: ICD-10-PCS | Mod: CPTII,S$GLB,, | Performed by: DERMATOLOGY

## 2022-09-13 PROCEDURE — 3072F PR LOW RISK FOR RETINOPATHY: ICD-10-PCS | Mod: CPTII,S$GLB,, | Performed by: DERMATOLOGY

## 2022-09-13 PROCEDURE — 99213 PR OFFICE/OUTPT VISIT, EST, LEVL III, 20-29 MIN: ICD-10-PCS | Mod: S$GLB,,, | Performed by: DERMATOLOGY

## 2022-09-13 PROCEDURE — 1157F PR ADVANCE CARE PLAN OR EQUIV PRESENT IN MEDICAL RECORD: ICD-10-PCS | Mod: CPTII,S$GLB,, | Performed by: DERMATOLOGY

## 2022-09-13 PROCEDURE — 1160F RVW MEDS BY RX/DR IN RCRD: CPT | Mod: CPTII,S$GLB,, | Performed by: DERMATOLOGY

## 2022-09-13 PROCEDURE — 99999 PR PBB SHADOW E&M-EST. PATIENT-LVL III: ICD-10-PCS | Mod: PBBFAC,,, | Performed by: DERMATOLOGY

## 2022-09-13 PROCEDURE — 3072F LOW RISK FOR RETINOPATHY: CPT | Mod: CPTII,S$GLB,, | Performed by: DERMATOLOGY

## 2022-09-13 PROCEDURE — 1159F MED LIST DOCD IN RCRD: CPT | Mod: CPTII,S$GLB,, | Performed by: DERMATOLOGY

## 2022-09-13 PROCEDURE — 1159F PR MEDICATION LIST DOCUMENTED IN MEDICAL RECORD: ICD-10-PCS | Mod: CPTII,S$GLB,, | Performed by: DERMATOLOGY

## 2022-09-13 PROCEDURE — 99999 PR PBB SHADOW E&M-EST. PATIENT-LVL III: CPT | Mod: PBBFAC,,, | Performed by: DERMATOLOGY

## 2022-09-13 PROCEDURE — 1157F ADVNC CARE PLAN IN RCRD: CPT | Mod: CPTII,S$GLB,, | Performed by: DERMATOLOGY

## 2022-09-13 PROCEDURE — 99213 OFFICE O/P EST LOW 20 MIN: CPT | Mod: S$GLB,,, | Performed by: DERMATOLOGY

## 2022-09-13 NOTE — PROGRESS NOTES
"  Subjective:       Patient ID:  Paris Burris is a 76 y.o. female who presents for spots.    Pt c/o itchy red spots on arms, abdomen, back and legs x 7-8 months. No prev tx  Previously saw Dr Rodríguez for this issue.  Notes that there is no true rash, but that "overnight" a bump will pop up and she will scratch at it to form a scab. Feels like a 'clown' with all the scabs. Right now they are better on her arms and legs, but has several on the face.  She does note that they have bugs in their bed that they sweep away.   is not getting bit.    Review of Systems     Objective:    Physical Exam   Constitutional: She appears well-developed and well-nourished. No distress.   Neurological: She is alert and oriented to person, place, and time. She is not disoriented.   Psychiatric: She has a normal mood and affect.   Skin:               Diagram Legend     Erythematous scaling macule/papule c/w actinic keratosis       Vascular papule c/w angioma      Pigmented verrucoid papule/plaque c/w seborrheic keratosis      Yellow umbilicated papule c/w sebaceous hyperplasia      Irregularly shaped tan macule c/w lentigo     1-2 mm smooth white papules consistent with Milia      Movable subcutaneous cyst with punctum c/w epidermal inclusion cyst      Subcutaneous movable cyst c/w pilar cyst      Firm pink to brown papule c/w dermatofibroma      Pedunculated fleshy papule(s) c/w skin tag(s)      Evenly pigmented macule c/w junctional nevus     Mildly variegated pigmented, slightly irregular-bordered macule c/w mildly atypical nevus      Flesh colored to evenly pigmented papule c/w intradermal nevus       Pink pearly papule/plaque c/w basal cell carcinoma      Erythematous hyperkeratotic cursted plaque c/w SCC      Surgical scar with no sign of skin cancer recurrence      Open and closed comedones      Inflammatory papules and pustules      Verrucoid papule consistent consistent with wart     Erythematous eczematous patches and " plaques     Dystrophic onycholytic nail with subungual debris c/w onychomycosis     Umbilicated papule    Erythematous-base heme-crusted tan verrucoid plaque consistent with inflamed seborrheic keratosis     Erythematous Silvery Scaling Plaque c/w Psoriasis     See annotation      Assessment / Plan:        Bite  Excoriation     No primary skin process to explain - given her history of bugs in the bed that she sweeps away, I am concerned for an underlying bedbug issue.  Informed pt that she needs to investigate this further and have it treated if this proves to be the case.  For now, can treat the areas with OTC hydrocortisone cream and neosporin and cover with a bandaid.      No follow-ups on file.

## 2022-09-13 NOTE — PROGRESS NOTES
[facility]   Physical Therapy Treatment Note     Name: Paris Burris  Clinic Number: [id number]    Therapy Diagnosis:   Encounter Diagnosis   Name Primary?    Acute pain of left shoulder Yes       Physician: Mandi Huynh MD    Visit Date: 9/14/2022    Physician Orders: PT Eval and Treat   Medical Diagnosis from Referral: M25.519 (ICD-10-CM) - Shoulder pain, unspecified chronicity, unspecified laterality  Evaluation Date: 7/1/2022  Authorization Period Expiration: 12/31/2022  Plan of Care Expiration: 10/1/2022  Progress Note Due: 10/12/2022  Visit # / Visits authorized: 7 (6/20)   FOTO: 1/5. 9/14/2022     PTA Visit #: 0/5     Time In: 9:45 am  Time Out: 10:30 am  Total Billable Time: 40 minutes    Precautions: Standard , hearing impairment     SUBJECTIVE     Pt reports: her L shoulder is better. States she has been using heat on her L shoulder rather than ice.     She was compliant with home exercise program.  Response to previous treatment: no adverse effects  Functional change: taking her shirt off is getting easier    Pain: 2/10  Location: left shoulder      OBJECTIVE     Objective Measures updated at progress report unless specified.     CMS Impairment/Limitation/Restriction for FOTO Shoulder Survey    Therapist reviewed FOTO scores for Paris Burris on 9/14/2022.   FOTO documents entered into [employer] - see Media section.    Limitation Score: 45%  Category: Carrying    Current : CK = at least 40% but < 60% impaired, limited or restricted  Goal: CJ = at least 20% but < 40% impaired, limited or restricted     9/12/2022    UE MMT R L   C3 Cervical side bend 5/5 5/5   C4 Shoulder Shrug 5/5  5/5   Shoulder flexion 5/5 4/5   Shoulder abduction 5/5 4-/5   Shoulder IR 5/5 5/5   Shoulder ER 5/5 5/5   C5 Elbow flexion 5/5 5/5   C7 Elbow extension 5/5 5/5   C6 Wrist extension 5/5 5/5   Wrist flexion 5/5 5/5   Scapular protraction 5/5 5/5   Mid Traps 4/5 4/5   Lower traps 3-/5 3-/5       Joint  "Mobility  L shoulder flex                        160 deg/170 deg  L shoulder abd                        130 deg/145 deg  L shoulder ER at side                   55 deg  L shoulder ER at 90 deg              90 deg  L shoulder IR                                 50 deg      Treatment     Paris received the treatments listed below:      therapeutic exercises to develop strength, endurance, ROM, flexibility and posture for 45 minutes including:  UBE 5'  pec stretch over towel roll  x 3 min  Supine shoulder flexion with dowel 20 reps  Supine scap protraction with dowel 20 reps  Supine ER with dowel x 20 reps at side, 20 reps at 90 deg abd    S/L shoulder ER 3 x 10 reps  S/L shoulder flex 20 reps  S/L shoulder abd 20 reps  S/L hor abd 20 reps    Scapula retractions 20 x 5"    Lat pull downs x 20 reps with orange band  Shoulder ext x 20 reps with orange band  Rows x 20 reps with orange band  +L shoulder ER with yellow band 2 x 10 reps  +L shoulder IR with yellow band 2 x 10 reps    +D2 flexion x 15 reps  +foam roller lift offs x 3'    +standing scap punches 2 x 10 reps  +Robbery x 20 reps  + pulls x 20 reps      Patient Education and Home Exercises     Home Exercises Provided and Patient Education Provided     Education provided:   - no new HEP provided today    Written Home Exercises Provided: Patient instructed to cont prior HEP. Exercises were reviewed and Paris was able to demonstrate them prior to the end of the session.  Paris demonstrated good  understanding of the education provided. See EMR under Patient Instructions for exercises provided during therapy sessions    ASSESSMENT   Weakness notes with standing scapular strengthening exercises. PT gave tactile, verbal, and visual cues for correct technique.    Paris Is progressing well towards her goals.   Pt prognosis is Good.     Pt will continue to benefit from skilled outpatient physical therapy to address the deficits listed in the problem list box on " initial evaluation, provide pt/family education and to maximize pt's level of independence in the home and community environment.     Pt's spiritual, cultural and educational needs considered and pt agreeable to plan of care and goals.     Anticipated barriers to physical therapy: none    Goals:     Short Term Goals (4 Weeks):   1. Patient will increase L Shoulder flexion AROM to 160 degrees to improve functional reach (9/12/2022 met)  2. Patient to increase L cervical side bending and rotation 25% or greater for ease with ADL's  (in progress, not met)  3. Patient to report decreased pain in L shoulder with ADL's by 40% or greater  (in progress, not met)    Long Term Goals (8 Weeks):   1. Patient to have decreased subjective report of disability as noted by a score of <35% on the FOTO Shoulder questionnaire  (in progress, not met)  2. Patient to be independent with home exercise program for improved self management of condition  (in progress, not met)  3. Patient will increase strength in L upper extremity to 5/5 to improve tolerance to all functional activities  (in progress, not met)    PLAN     Continue to progress L shoulder Range of Motion, posture re-education, and strengthening per tolerance     Suman Malave, PT

## 2022-09-14 ENCOUNTER — CLINICAL SUPPORT (OUTPATIENT)
Dept: REHABILITATION | Facility: OTHER | Age: 77
End: 2022-09-14
Payer: MEDICARE

## 2022-09-14 DIAGNOSIS — M25.512 ACUTE PAIN OF LEFT SHOULDER: Primary | ICD-10-CM

## 2022-09-14 PROCEDURE — 97110 THERAPEUTIC EXERCISES: CPT | Mod: PN

## 2022-09-15 ENCOUNTER — PATIENT MESSAGE (OUTPATIENT)
Dept: PRIMARY CARE CLINIC | Facility: CLINIC | Age: 77
End: 2022-09-15
Payer: MEDICARE

## 2022-09-15 ENCOUNTER — TELEPHONE (OUTPATIENT)
Dept: PRIMARY CARE CLINIC | Facility: CLINIC | Age: 77
End: 2022-09-15
Payer: MEDICARE

## 2022-09-15 NOTE — TELEPHONE ENCOUNTER
Appointment Request   Paris Burris   Sent: Thu September 15, 2022 12:04 PM   To: [facility] Clinical Support Staff      Paris Burris   MRN: [id number] : 1945   Pt Home: [phone]     Entered: [phone]        Message    Appointment Request From: Paris Burris      With Provider: Mandi Huynh MD [[facility]]      Preferred Date Range: 2022 - 2022      Preferred Times: Friday Afternoon      Reason for visit: Kade and I both need our flu shots. Does the [facility] location have a lab?      Comments:   getting a flu shot

## 2022-09-17 ENCOUNTER — CLINICAL SUPPORT (OUTPATIENT)
Dept: CARDIOLOGY | Facility: HOSPITAL | Age: 77
End: 2022-09-17
Payer: MEDICARE

## 2022-09-17 DIAGNOSIS — Z95.0 PRESENCE OF CARDIAC PACEMAKER: ICD-10-CM

## 2022-09-17 PROCEDURE — 93296 REM INTERROG EVL PM/IDS: CPT | Performed by: INTERNAL MEDICINE

## 2022-09-19 ENCOUNTER — CLINICAL SUPPORT (OUTPATIENT)
Dept: REHABILITATION | Facility: OTHER | Age: 77
End: 2022-09-19
Payer: MEDICARE

## 2022-09-19 DIAGNOSIS — M25.512 ACUTE PAIN OF LEFT SHOULDER: Primary | ICD-10-CM

## 2022-09-19 PROCEDURE — 97110 THERAPEUTIC EXERCISES: CPT | Mod: PN

## 2022-09-19 NOTE — PROGRESS NOTES
[facility]   Physical Therapy Treatment Note     Name: Paris Burris  Clinic Number: [id number]    Therapy Diagnosis:   Encounter Diagnosis   Name Primary?    Acute pain of left shoulder Yes     Physician: Mandi Huynh MD    Visit Date: 9/19/2022    Physician Orders: PT Eval and Treat   Medical Diagnosis from Referral: M25.519 (ICD-10-CM) - Shoulder pain, unspecified chronicity, unspecified laterality  Evaluation Date: 7/1/2022  Authorization Period Expiration: 12/31/2022  Plan of Care Expiration: 10/1/2022  Progress Note Due: 10/12/2022  Visit # / Visits authorized: 8 (7/20)   FOTO: 2/5. 9/14/2022     PTA Visit #: 0/5     Time In: 10:25 am  Time Out: 11:00 am  Total Billable Time:  30 minutes    Precautions: Standard , hearing impairment     SUBJECTIVE     Pt reports: her L shoulder is not feeling well this morning. States when she left last session, the shoulder was feeling better. States she has had to get more help from her . Is not aware of anything that my have aggravated her shoulder    She was compliant with home exercise program.  Response to previous treatment: L shoulder was feeling   Functional change: no new changes    Pain: 3/10  Location: left shoulder      OBJECTIVE     Objective Measures updated at progress report unless specified.     CMS Impairment/Limitation/Restriction for FOTO Shoulder Survey    Therapist reviewed FOTO scores for Paris Burris on 9/14/2022.   FOTO documents entered into [employer] - see Media section.    Limitation Score: 45%  Category: Carrying    Current : CK = at least 40% but < 60% impaired, limited or restricted  Goal: CJ = at least 20% but < 40% impaired, limited or restricted     9/12/2022    UE MMT R L   C3 Cervical side bend 5/5 5/5   C4 Shoulder Shrug 5/5  5/5   Shoulder flexion 5/5 4/5   Shoulder abduction 5/5 4-/5   Shoulder IR 5/5 5/5   Shoulder ER 5/5 5/5   C5 Elbow flexion 5/5 5/5   C7 Elbow extension 5/5 5/5   C6 Wrist extension 5/5  "5/5   Wrist flexion 5/5 5/5   Scapular protraction 5/5 5/5   Mid Traps 4/5 4/5   Lower traps 3-/5 3-/5       Joint Mobility  L shoulder flex                        160 deg/170 deg  L shoulder abd                        130 deg/145 deg  L shoulder ER at side                   55 deg  L shoulder ER at 90 deg              90 deg  L shoulder IR                                 50 deg      Treatment     Paris received the treatments listed below:      therapeutic exercises to develop strength, endurance, ROM, flexibility and posture for 35 minutes including:  UBE 5'  pec stretch over towel roll  x 3 min  Supine shoulder flexion with dowel 20 reps  Supine scap protraction with dowel 20 reps  Supine ER with dowel x 20 reps at side, 20 reps at 90 deg abd    S/L shoulder ER 3 x 10 reps  S/L shoulder flex 20 reps  S/L shoulder abd 20 reps  S/L hor abd 20 reps    Scapula retractions 20 x 5"    Lat pull downs x 20 reps with orange band  Shoulder ext x 20 reps with orange band  Rows x 20 reps with orange band  L shoulder ER with yellow band 2 x 10 reps  L shoulder IR with yellow band 2 x 10 reps    D2 flexion x 15 reps  foam roller lift offs x 3'    standing scap punches 2 x 10 reps  Robbery x 20 reps   pulls x 20 reps      Patient Education and Home Exercises     Home Exercises Provided and Patient Education Provided     Education provided:   - no new HEP provided today    Written Home Exercises Provided: Patient instructed to cont prior HEP. Exercises were reviewed and Paris was able to demonstrate them prior to the end of the session.  Paris demonstrated good  understanding of the education provided. See EMR under Patient Instructions for exercises provided during therapy sessions    ASSESSMENT   Experiencing increased L shoulder pain this morning requiring A from her  with ADLs. Continued with standing scapular stabilization. PT provided verbal, visual , and manual cues for exercises. Pt did experience some " dizziness with postural change, but quickly passed..    Paris Is progressing well towards her goals.   Pt prognosis is Good.     Pt will continue to benefit from skilled outpatient physical therapy to address the deficits listed in the problem list box on initial evaluation, provide pt/family education and to maximize pt's level of independence in the home and community environment.     Pt's spiritual, cultural and educational needs considered and pt agreeable to plan of care and goals.     Anticipated barriers to physical therapy: none    Goals:     Short Term Goals (4 Weeks):   1. Patient will increase L Shoulder flexion AROM to 160 degrees to improve functional reach (9/12/2022 met)  2. Patient to increase L cervical side bending and rotation 25% or greater for ease with ADL's  (in progress, not met)  3. Patient to report decreased pain in L shoulder with ADL's by 40% or greater  (in progress, not met)    Long Term Goals (8 Weeks):   1. Patient to have decreased subjective report of disability as noted by a score of <35% on the FOTO Shoulder questionnaire  (in progress, not met)  2. Patient to be independent with home exercise program for improved self management of condition  (in progress, not met)  3. Patient will increase strength in L upper extremity to 5/5 to improve tolerance to all functional activities  (in progress, not met)    PLAN     Continue to progress L shoulder Range of Motion, posture re-education, and strengthening per tolerance     Suman Malave, PT

## 2022-09-20 ENCOUNTER — CLINICAL SUPPORT (OUTPATIENT)
Dept: PRIMARY CARE CLINIC | Facility: CLINIC | Age: 77
End: 2022-09-20
Payer: MEDICARE

## 2022-09-20 PROCEDURE — 90694 VACC AIIV4 NO PRSRV 0.5ML IM: CPT | Mod: S$GLB,,, | Performed by: INTERNAL MEDICINE

## 2022-09-20 PROCEDURE — G0008 ADMIN INFLUENZA VIRUS VAC: HCPCS | Mod: S$GLB,,, | Performed by: INTERNAL MEDICINE

## 2022-09-20 PROCEDURE — 90694 FLU VACCINE - QUADRIVALENT - ADJUVANTED: ICD-10-PCS | Mod: S$GLB,,, | Performed by: INTERNAL MEDICINE

## 2022-09-20 PROCEDURE — G0008 FLU VACCINE - QUADRIVALENT - ADJUVANTED: ICD-10-PCS | Mod: S$GLB,,, | Performed by: INTERNAL MEDICINE

## 2022-09-20 NOTE — PROGRESS NOTES
Pt here with  for his MD visit.  Requested that MD order her the flu shot, as her  was getting one today.   Fluad shot ordered  Pt ID by name and . Allergies reviewed.   VIS statement given to patient and reviewed.   Pt is agreeable to getting flu shot today.   Shot administered to left deltoid.   Pt advised to stay in clinic for 15 minutes.   No complaints   Pt understands to call clinic with any questions or concerns.

## 2022-09-21 ENCOUNTER — CLINICAL SUPPORT (OUTPATIENT)
Dept: REHABILITATION | Facility: OTHER | Age: 77
End: 2022-09-21
Payer: MEDICARE

## 2022-09-21 DIAGNOSIS — M25.512 ACUTE PAIN OF LEFT SHOULDER: Primary | ICD-10-CM

## 2022-09-21 PROCEDURE — 97110 THERAPEUTIC EXERCISES: CPT | Mod: PN | Performed by: PHYSICAL THERAPIST

## 2022-09-21 NOTE — PROGRESS NOTES
[facility]   Physical Therapy Treatment Note     Name: Paris Burris  Clinic Number: [id number]    Therapy Diagnosis:   Encounter Diagnosis   Name Primary?    Acute pain of left shoulder Yes     Physician: Mandi Huynh MD    Visit Date: 9/21/2022    Physician Orders: PT Eval and Treat   Medical Diagnosis from Referral: M25.519 (ICD-10-CM) - Shoulder pain, unspecified chronicity, unspecified laterality  Evaluation Date: 7/1/2022  Authorization Period Expiration: 12/31/2022  Plan of Care Expiration: 10/1/2022  Progress Note Due: 10/12/2022  Visit # / Visits authorized: 8 (7/20)   FOTO: 2/5. 9/14/2022     PTA Visit #: 0/5     Time In: 0945 am  Time Out: 10:25 am  Total Billable Time:  40 minutes    Precautions: Standard , hearing impairment     SUBJECTIVE     Pt reports: She had a little more pain with the exercises last visit, but felt better afterwards.     She was compliant with home exercise program.  Response to previous treatment: L shoulder was feeling   Functional change: no new changes    Pain: 3/10  Location: left shoulder      OBJECTIVE     Objective Measures updated at progress report unless specified.     CMS Impairment/Limitation/Restriction for FOTO Shoulder Survey    Therapist reviewed FOTO scores for Paris Burris on 9/14/2022.   FOTO documents entered into [employer] - see Media section.    Limitation Score: 45%  Category: Carrying    Current : CK = at least 40% but < 60% impaired, limited or restricted  Goal: CJ = at least 20% but < 40% impaired, limited or restricted     9/12/2022    UE MMT R L   C3 Cervical side bend 5/5 5/5   C4 Shoulder Shrug 5/5  5/5   Shoulder flexion 5/5 4/5   Shoulder abduction 5/5 4-/5   Shoulder IR 5/5 5/5   Shoulder ER 5/5 5/5   C5 Elbow flexion 5/5 5/5   C7 Elbow extension 5/5 5/5   C6 Wrist extension 5/5 5/5   Wrist flexion 5/5 5/5   Scapular protraction 5/5 5/5   Mid Traps 4/5 4/5   Lower traps 3-/5 3-/5       Joint Mobility  L shoulder flex        "                 160 deg/170 deg  L shoulder abd                        130 deg/145 deg  L shoulder ER at side                   55 deg  L shoulder ER at 90 deg              90 deg  L shoulder IR                                 50 deg      Treatment     Paris received the treatments listed below:      therapeutic exercises to develop strength, endurance, ROM, flexibility and posture for 40 minutes including:  UBE 5'  pec stretch over towel roll  x 3 min  Supine shoulder flexion with dowel 20 reps  Supine scap protraction with dowel 20 reps  Supine ER with dowel x 20 reps at side, 20 reps at 90 deg abd    S/L shoulder ER 3 x 10 reps  S/L shoulder flex 20 reps  S/L shoulder abd 20 reps  S/L hor abd 20 reps    Scapula retractions 20 x 5"    Lat pull downs x 20 reps with orange band  Shoulder ext x 20 reps with orange band  Rows x 20 reps with orange band  L shoulder ER with yellow band 2 x 10 reps  L shoulder IR with yellow band 2 x 10 reps    D2 flexion x 15 reps  foam roller lift offs x 3'    standing scap punches 2 x 10 reps  Robbery x 20 reps   pulls x 20 reps      Patient Education and Home Exercises     Home Exercises Provided and Patient Education Provided     Education provided:   - added HEP and issued OTB    Written Home Exercises Provided: Patient instructed to cont prior HEP. Exercises were reviewed and Paris was able to demonstrate them prior to the end of the session.  Paris demonstrated good  understanding of the education provided. See EMR under Patient Instructions for exercises provided during therapy sessions    ASSESSMENT   Patient tolerated treatment well without exacerbation of shoulder pain. Pt with hx of hernia repair and pulsating mass palpated in side lying with exercises. No abdominal pulse in supine, abdominal pain, or worsening of chronic low back pain symptoms. Pt has been seen by vascular surgeon and reports symptoms started 4 years ago. Symptoms improved with standing " therapeutic exercise and updated HEP to include resisted theraband.     Paris Is progressing well towards her goals.   Pt prognosis is Good.     Pt will continue to benefit from skilled outpatient physical therapy to address the deficits listed in the problem list box on initial evaluation, provide pt/family education and to maximize pt's level of independence in the home and community environment.     Pt's spiritual, cultural and educational needs considered and pt agreeable to plan of care and goals.     Anticipated barriers to physical therapy: none    Goals:     Short Term Goals (4 Weeks):   1. Patient will increase L Shoulder flexion AROM to 160 degrees to improve functional reach (9/12/2022 met)  2. Patient to increase L cervical side bending and rotation 25% or greater for ease with ADL's  (in progress, not met)  3. Patient to report decreased pain in L shoulder with ADL's by 40% or greater  (in progress, not met)    Long Term Goals (8 Weeks):   1. Patient to have decreased subjective report of disability as noted by a score of <35% on the FOTO Shoulder questionnaire  (in progress, not met)  2. Patient to be independent with home exercise program for improved self management of condition  (in progress, not met)  3. Patient will increase strength in L upper extremity to 5/5 to improve tolerance to all functional activities  (in progress, not met)    PLAN     Continue to progress L shoulder Range of Motion, posture re-education, and strengthening per tolerance     Neris Mejias, PT

## 2022-09-22 ENCOUNTER — PATIENT MESSAGE (OUTPATIENT)
Dept: PRIMARY CARE CLINIC | Facility: CLINIC | Age: 77
End: 2022-09-22
Payer: MEDICARE

## 2022-09-23 ENCOUNTER — PATIENT MESSAGE (OUTPATIENT)
Dept: PRIMARY CARE CLINIC | Facility: CLINIC | Age: 77
End: 2022-09-23
Payer: MEDICARE

## 2022-09-23 ENCOUNTER — TELEPHONE (OUTPATIENT)
Dept: PRIMARY CARE CLINIC | Facility: CLINIC | Age: 77
End: 2022-09-23
Payer: MEDICARE

## 2022-09-23 DIAGNOSIS — G89.29 CHRONIC LEFT SHOULDER PAIN: Primary | ICD-10-CM

## 2022-09-23 DIAGNOSIS — M25.512 CHRONIC LEFT SHOULDER PAIN: Primary | ICD-10-CM

## 2022-09-24 ENCOUNTER — IMMUNIZATION (OUTPATIENT)
Dept: INTERNAL MEDICINE | Facility: CLINIC | Age: 77
End: 2022-09-24
Payer: MEDICARE

## 2022-09-24 DIAGNOSIS — Z23 NEED FOR VACCINATION: Primary | ICD-10-CM

## 2022-09-24 PROCEDURE — 0124A COVID-19, MRNA, LNP-S, BIVALENT BOOSTER, PF, 30 MCG/0.3 ML DOSE: CPT | Mod: CV19,PBBFAC | Performed by: INTERNAL MEDICINE

## 2022-09-24 PROCEDURE — 91312 COVID-19, MRNA, LNP-S, BIVALENT BOOSTER, PF, 30 MCG/0.3 ML DOSE: ICD-10-PCS | Mod: S$GLB,,, | Performed by: INTERNAL MEDICINE

## 2022-09-24 PROCEDURE — 0124A PR IMMUNIZ ADMIN, SARS-COV- 2 COVID-19 VACCINE, 30MCG/0.3ML, BIVALENT, BOOSTER DOSE: CPT | Mod: S$GLB,,, | Performed by: INTERNAL MEDICINE

## 2022-09-24 PROCEDURE — 91312 COVID-19, MRNA, LNP-S, BIVALENT BOOSTER, PF, 30 MCG/0.3 ML DOSE: CPT | Mod: S$GLB,,, | Performed by: INTERNAL MEDICINE

## 2022-09-24 PROCEDURE — 0124A PR IMMUNIZ ADMIN, SARS-COV- 2 COVID-19 VACCINE, 30MCG/0.3ML, BIVALENT, BOOSTER DOSE: ICD-10-PCS | Mod: S$GLB,,, | Performed by: INTERNAL MEDICINE

## 2022-09-26 ENCOUNTER — CLINICAL SUPPORT (OUTPATIENT)
Dept: REHABILITATION | Facility: OTHER | Age: 77
End: 2022-09-26
Payer: MEDICARE

## 2022-09-26 DIAGNOSIS — M25.512 ACUTE PAIN OF LEFT SHOULDER: Primary | ICD-10-CM

## 2022-09-26 PROCEDURE — 97140 MANUAL THERAPY 1/> REGIONS: CPT | Mod: PN

## 2022-09-26 PROCEDURE — 97110 THERAPEUTIC EXERCISES: CPT | Mod: PN

## 2022-09-26 NOTE — PROGRESS NOTES
"[facility]   Physical Therapy Treatment Note     Name: Paris Burris  Clinic Number: [id number]    Therapy Diagnosis:   Encounter Diagnosis   Name Primary?    Acute pain of left shoulder Yes       Physician: Mandi Huynh MD    Visit Date: 9/26/2022    Physician Orders: PT Eval and Treat   Medical Diagnosis from Referral: M25.519 (ICD-10-CM) - Shoulder pain, unspecified chronicity, unspecified laterality  Evaluation Date: 7/1/2022  Authorization Period Expiration: 12/31/2022  Plan of Care Expiration: 10/1/2022  Progress Note Due: 10/12/2022  Visit # / Visits authorized: 9 (8/20)   FOTO: 3/5. 9/14/2022     PTA Visit #: 0/5     Time In: 10:32 am  Time Out: 11:04 am  Total Billable Time: 34 minutes    Precautions: Standard , hearing impairment     SUBJECTIVE     Pt reports: her shoulder feels "ok," but she is still frustrated. States the pain is "not bad, but relentless."    She was compliant with home exercise program.  Response to previous treatment: no adverse effects  Functional change: can sleep on her L side    Pain: 2- 3/10  Location: left shoulder      OBJECTIVE     Objective Measures updated at progress report unless specified.     CMS Impairment/Limitation/Restriction for FOTO Shoulder Survey    Therapist reviewed FOTO scores for Paris Burris on 9/14/2022.   FOTO documents entered into [employer] - see Media section.    Limitation Score: 45%  Category: Carrying    Current : CK = at least 40% but < 60% impaired, limited or restricted  Goal: CJ = at least 20% but < 40% impaired, limited or restricted     9/12/2022    UE MMT R L   C3 Cervical side bend 5/5 5/5   C4 Shoulder Shrug 5/5  5/5   Shoulder flexion 5/5 4/5   Shoulder abduction 5/5 4-/5   Shoulder IR 5/5 5/5   Shoulder ER 5/5 5/5   C5 Elbow flexion 5/5 5/5   C7 Elbow extension 5/5 5/5   C6 Wrist extension 5/5 5/5   Wrist flexion 5/5 5/5   Scapular protraction 5/5 5/5   Mid Traps 4/5 4/5   Lower traps 3-/5 3-/5       Joint " "Mobility  L shoulder flex                        160 deg/170 deg  L shoulder abd                        130 deg/145 deg  L shoulder ER at side                   55 deg  L shoulder ER at 90 deg              90 deg  L shoulder IR                                 50 deg      Treatment     Paris received the treatments listed below:      therapeutic exercises to develop strength, endurance, ROM, flexibility and posture for 14 minutes including:  UBE 5'  pec stretch over towel roll  x 3 min  Supine shoulder flexion with dowel 20 reps  Supine scap protraction with dowel 20 reps  Supine ER with dowel x 20 reps at side, 20 reps at 90 deg abd    S/L shoulder ER 3 x 10 reps  S/L shoulder flex 20 reps  S/L shoulder abd 20 reps  S/L hor abd 20 reps    Scapula retractions 20 x 5"    Lat pull downs x 20 reps with orange band  Shoulder ext x 20 reps with orange band  Rows x 20 reps with orange band  L shoulder ER with yellow band 2 x 10 reps  L shoulder IR with yellow band 2 x 10 reps    D2 flexion x 15 reps  foam roller lift offs x 3'    standing scap punches 2 x 10 reps  Robbery x 20 reps   pulls x 20 reps    Paris received the following manual therapy techniques: Joint mobilizations, Myofacial release, Soft tissue Mobilization, and Friction Massage were applied to the: L shoulder for 20 minutes, including:      Patient Education and Home Exercises     Home Exercises Provided and Patient Education Provided     Education provided:   - added HEP and issued OTB    Written Home Exercises Provided: Patient instructed to cont prior HEP. Exercises were reviewed and Paris was able to demonstrate them prior to the end of the session.  Paris demonstrated good  understanding of the education provided. See EMR under Patient Instructions for exercises provided during therapy sessions    ASSESSMENT   Good response to manual therapy. L pec guarding evident at start of session. Increased twitching with pec release. Will continue to " benefit from manual therapy to promote reduction of muscle guarding and pain and t promote improved L shoulder function. Has follow up with Jerry Cote PA-C on 9/29.    Paris Is progressing well towards her goals.   Pt prognosis is Good.     Pt will continue to benefit from skilled outpatient physical therapy to address the deficits listed in the problem list box on initial evaluation, provide pt/family education and to maximize pt's level of independence in the home and community environment.     Pt's spiritual, cultural and educational needs considered and pt agreeable to plan of care and goals.     Anticipated barriers to physical therapy: none    Goals:     Short Term Goals (4 Weeks):   1. Patient will increase L Shoulder flexion AROM to 160 degrees to improve functional reach (9/12/2022 met)  2. Patient to increase L cervical side bending and rotation 25% or greater for ease with ADL's  (in progress, not met)  3. Patient to report decreased pain in L shoulder with ADL's by 40% or greater  (in progress, not met)    Long Term Goals (8 Weeks):   1. Patient to have decreased subjective report of disability as noted by a score of <35% on the FOTO Shoulder questionnaire  (in progress, not met)  2. Patient to be independent with home exercise program for improved self management of condition  (in progress, not met)  3. Patient will increase strength in L upper extremity to 5/5 to improve tolerance to all functional activities  (in progress, not met)    PLAN     Continue to progress L shoulder Range of Motion, posture re-education, and strengthening per tolerance     Suman Malave, PT

## 2022-09-27 ENCOUNTER — PATIENT MESSAGE (OUTPATIENT)
Dept: PRIMARY CARE CLINIC | Facility: CLINIC | Age: 77
End: 2022-09-27
Payer: MEDICARE

## 2022-10-07 ENCOUNTER — TELEPHONE (OUTPATIENT)
Dept: ORTHOPEDICS | Facility: CLINIC | Age: 77
End: 2022-10-07
Payer: MEDICARE

## 2022-10-14 ENCOUNTER — PATIENT MESSAGE (OUTPATIENT)
Dept: PRIMARY CARE CLINIC | Facility: CLINIC | Age: 77
End: 2022-10-14
Payer: MEDICARE

## 2022-10-19 ENCOUNTER — HOSPITAL ENCOUNTER (OUTPATIENT)
Dept: RADIOLOGY | Facility: HOSPITAL | Age: 77
Discharge: HOME | End: 2022-10-19
Attending: INTERNAL MEDICINE
Payer: MEDICARE

## 2022-10-19 ENCOUNTER — PATIENT MESSAGE (OUTPATIENT)
Dept: ADMINISTRATIVE | Facility: OTHER | Age: 77
End: 2022-10-19
Payer: MEDICARE

## 2022-10-19 DIAGNOSIS — G89.29 CHRONIC LEFT SHOULDER PAIN: ICD-10-CM

## 2022-10-19 DIAGNOSIS — M25.512 CHRONIC LEFT SHOULDER PAIN: ICD-10-CM

## 2022-10-19 PROCEDURE — 73200 CT SHOULDER WITHOUT CONTRAST LEFT: ICD-10-PCS | Mod: 26,LT,, | Performed by: RADIOLOGY

## 2022-10-19 PROCEDURE — 73200 CT UPPER EXTREMITY W/O DYE: CPT | Mod: 26,LT,, | Performed by: RADIOLOGY

## 2022-10-19 PROCEDURE — 73200 CT UPPER EXTREMITY W/O DYE: CPT | Mod: TC,LT

## 2022-10-20 ENCOUNTER — PATIENT MESSAGE (OUTPATIENT)
Dept: PRIMARY CARE CLINIC | Facility: CLINIC | Age: 77
End: 2022-10-20
Payer: MEDICARE

## 2022-10-20 ENCOUNTER — TELEPHONE (OUTPATIENT)
Dept: PRIMARY CARE CLINIC | Facility: CLINIC | Age: 77
End: 2022-10-20
Payer: MEDICARE

## 2022-10-20 ENCOUNTER — DOCUMENTATION ONLY (OUTPATIENT)
Dept: REHABILITATION | Facility: OTHER | Age: 77
End: 2022-10-20
Payer: MEDICARE

## 2022-10-20 ENCOUNTER — DOCUMENTATION ONLY (OUTPATIENT)
Dept: PRIMARY CARE CLINIC | Facility: CLINIC | Age: 77
End: 2022-10-20
Payer: MEDICARE

## 2022-10-20 DIAGNOSIS — M25.512 ACUTE PAIN OF LEFT SHOULDER: Primary | ICD-10-CM

## 2022-10-20 NOTE — PROGRESS NOTES
[facility]  PT Discharge Note    Name: Paris Burris  [facility] Number: [id number]    Therapy Diagnosis:   Encounter Diagnosis   Name Primary?    Acute pain of left shoulder Yes     Physician: No ref. provider found      Physician Orders: PT Eval and Treat   Medical Diagnosis from Referral: M25.519 (ICD-10-CM) - Shoulder pain, unspecified chronicity, unspecified laterality  Evaluation Date: 7/1/2022      Date of Last visit: 9/26/2022  Total Visits Received: 10    ASSESSMENT          Discharge reason: Patient has not attended therapy since 9/26/2022    Discharge FOTO Score: 45%    Goals: partially met    PLAN   This patient is discharged from Physical Therapy      Neris Mejias, PT

## 2022-10-20 NOTE — PROGRESS NOTES
"Individual Psychotherapy (LCSW/PhD)  Paris Burris,  10/20/2022    Site:  [place]         Therapeutic Intervention: Met with patient for individual psychotherapy.    Chief complaint/reason for encounter: interpersonal     Interval history and content of current session: Pt states they have been coping with the illness of their sister. Pt has been working with family members to secure care for sister and support sister during her recovery. Pt feels sad as sister is her last living relative from her family of origin. Pt states they continue to have difficulties communicating with their . Pt states they and  have been arguing often and that their communicating has not been good. SW and pt discussed goals for couple including better communication and mutual support. SW and pt dicussed strategies for improving communication with  in the meantime particularly using "I" statements.    Pt reports improvement with relationship with daughter. Daughter recently watched pt's animal while they were out of town. SW supported pt in processing feelings of anxiety related to engaging in closer relationship. SW and pt role played conversation with daughter to practice communication skills.    Treatment plan:  Target symptoms: depression  Why chosen therapy is appropriate versus another modality: relevant to diagnosis, evidence based practice  Outcome monitoring methods: self-report, checklist/rating scale  Therapeutic intervention type: supportive psychotherapy    Risk parameters:  Patient reports no suicidal ideation  Patient reports no homicidal ideation  Patient reports no self-injurious behavior  Patient reports no violent behavior    Verbal deficits: None    Patient's response to intervention:  The patient's response to intervention is accepting.    Progress toward goals and other mental status changes:  The patient's progress toward goals is good.    Diagnosis:   No diagnosis found.    Plan: Pt plans to " continue individual psychotherapy    Return to clinic: 2 weeks    Length of Service (minutes): 30

## 2022-10-20 NOTE — TELEPHONE ENCOUNTER
Appointment Request   Paris Burris   Sent:  10:00 AM   To: [facility] Clinical Support Staff      Paris Burris   MRN: [id number] : 1945   Pt Home: [phone]     Entered: [phone]        Message    Appointment Request From: Paris Burris      With Provider: Mandi Huynh MD [[facility]]      Preferred Date Range: 10/26/2022 - 2022      Preferred Times: Tuesday Morning      Reason for visit: Annual wellness checkup      Comments:   Time management, coordinating my health conditions, and suggestions about how to decrease my cravings for unhealthy food.

## 2022-10-20 NOTE — TELEPHONE ENCOUNTER
Left message on her vm to return call to clinic. Need to clarify if she is asking for eAWV or just annual checkup

## 2022-10-25 ENCOUNTER — PATIENT MESSAGE (OUTPATIENT)
Dept: OTHER | Facility: OTHER | Age: 77
End: 2022-10-25
Payer: MEDICARE

## 2022-10-26 ENCOUNTER — OFFICE VISIT (OUTPATIENT)
Dept: ORTHOPEDICS | Facility: CLINIC | Age: 77
End: 2022-10-26
Payer: MEDICARE

## 2022-10-26 ENCOUNTER — HOSPITAL ENCOUNTER (OUTPATIENT)
Dept: RADIOLOGY | Facility: HOSPITAL | Age: 77
Discharge: HOME | End: 2022-10-26
Attending: PHYSICIAN ASSISTANT
Payer: MEDICARE

## 2022-10-26 VITALS — HEIGHT: 61 IN | WEIGHT: 158.38 LBS | BODY MASS INDEX: 29.9 KG/M2

## 2022-10-26 DIAGNOSIS — M19.012 LOCALIZED OSTEOARTHRITIS OF SHOULDER, LEFT: Primary | ICD-10-CM

## 2022-10-26 DIAGNOSIS — G89.29 CHRONIC LEFT SHOULDER PAIN: ICD-10-CM

## 2022-10-26 DIAGNOSIS — M25.512 CHRONIC LEFT SHOULDER PAIN: ICD-10-CM

## 2022-10-26 PROCEDURE — 1101F PR PT FALLS ASSESS DOC 0-1 FALLS W/OUT INJ PAST YR: ICD-10-PCS | Mod: CPTII,S$GLB,, | Performed by: PHYSICIAN ASSISTANT

## 2022-10-26 PROCEDURE — 73030 X-RAY EXAM OF SHOULDER: CPT | Mod: 26,LT,, | Performed by: RADIOLOGY

## 2022-10-26 PROCEDURE — 1159F PR MEDICATION LIST DOCUMENTED IN MEDICAL RECORD: ICD-10-PCS | Mod: CPTII,S$GLB,, | Performed by: PHYSICIAN ASSISTANT

## 2022-10-26 PROCEDURE — 1160F RVW MEDS BY RX/DR IN RCRD: CPT | Mod: CPTII,S$GLB,, | Performed by: PHYSICIAN ASSISTANT

## 2022-10-26 PROCEDURE — 3072F PR LOW RISK FOR RETINOPATHY: ICD-10-PCS | Mod: CPTII,S$GLB,, | Performed by: PHYSICIAN ASSISTANT

## 2022-10-26 PROCEDURE — 99999 PR PBB SHADOW E&M-EST. PATIENT-LVL IV: ICD-10-PCS | Mod: PBBFAC,,, | Performed by: PHYSICIAN ASSISTANT

## 2022-10-26 PROCEDURE — 3288F FALL RISK ASSESSMENT DOCD: CPT | Mod: CPTII,S$GLB,, | Performed by: PHYSICIAN ASSISTANT

## 2022-10-26 PROCEDURE — 1125F PR PAIN SEVERITY QUANTIFIED, PAIN PRESENT: ICD-10-PCS | Mod: CPTII,S$GLB,, | Performed by: PHYSICIAN ASSISTANT

## 2022-10-26 PROCEDURE — 3288F PR FALLS RISK ASSESSMENT DOCUMENTED: ICD-10-PCS | Mod: CPTII,S$GLB,, | Performed by: PHYSICIAN ASSISTANT

## 2022-10-26 PROCEDURE — 99999 PR PBB SHADOW E&M-EST. PATIENT-LVL IV: CPT | Mod: PBBFAC,,, | Performed by: PHYSICIAN ASSISTANT

## 2022-10-26 PROCEDURE — 3072F LOW RISK FOR RETINOPATHY: CPT | Mod: CPTII,S$GLB,, | Performed by: PHYSICIAN ASSISTANT

## 2022-10-26 PROCEDURE — 1157F ADVNC CARE PLAN IN RCRD: CPT | Mod: CPTII,S$GLB,, | Performed by: PHYSICIAN ASSISTANT

## 2022-10-26 PROCEDURE — 1101F PT FALLS ASSESS-DOCD LE1/YR: CPT | Mod: CPTII,S$GLB,, | Performed by: PHYSICIAN ASSISTANT

## 2022-10-26 PROCEDURE — 73030 X-RAY EXAM OF SHOULDER: CPT | Mod: TC,LT

## 2022-10-26 PROCEDURE — 1159F MED LIST DOCD IN RCRD: CPT | Mod: CPTII,S$GLB,, | Performed by: PHYSICIAN ASSISTANT

## 2022-10-26 PROCEDURE — 73030 XR SHOULDER TRAUMA 3 VIEW LEFT: ICD-10-PCS | Mod: 26,LT,, | Performed by: RADIOLOGY

## 2022-10-26 PROCEDURE — 1160F PR REVIEW ALL MEDS BY PRESCRIBER/CLIN PHARMACIST DOCUMENTED: ICD-10-PCS | Mod: CPTII,S$GLB,, | Performed by: PHYSICIAN ASSISTANT

## 2022-10-26 PROCEDURE — 99214 PR OFFICE/OUTPT VISIT, EST, LEVL IV, 30-39 MIN: ICD-10-PCS | Mod: S$GLB,,, | Performed by: PHYSICIAN ASSISTANT

## 2022-10-26 PROCEDURE — 99214 OFFICE O/P EST MOD 30 MIN: CPT | Mod: S$GLB,,, | Performed by: PHYSICIAN ASSISTANT

## 2022-10-26 PROCEDURE — 1157F PR ADVANCE CARE PLAN OR EQUIV PRESENT IN MEDICAL RECORD: ICD-10-PCS | Mod: CPTII,S$GLB,, | Performed by: PHYSICIAN ASSISTANT

## 2022-10-26 PROCEDURE — 1125F AMNT PAIN NOTED PAIN PRSNT: CPT | Mod: CPTII,S$GLB,, | Performed by: PHYSICIAN ASSISTANT

## 2022-10-26 RX ORDER — MELOXICAM 7.5 MG/1
7.5 TABLET ORAL DAILY
Qty: 30 TABLET | Refills: 0 | Status: SHIPPED | OUTPATIENT
Start: 2022-10-26 | End: 2022-11-28 | Stop reason: SDUPTHER

## 2022-10-26 NOTE — PROGRESS NOTES
SUBJECTIVE:     Chief Complaint & History of Present Illness:  Paris Burris is a 77 y.o. year old female who presents today with constant left shoulder pain that started about 6 weeks ago.  She is Right hand dominant.  There is not a history of injury.  The pain is located in the global aspect of the shoulder.  The pain is described as achy.  It is aggravated by lifting, reaching, overhead activity.  She did see her primary care physician who got a CT- she was sent to PT but stopped going because it was too painful.  There is not a history of previous injury or surgery to the shoulder.      Review of patient's allergies indicates:   Allergen Reactions    Topamax [topiramate] Hallucinations     hallucinations         Current Outpatient Medications   Medication Sig Dispense Refill    acetaminophen (TYLENOL) 500 MG tablet Take 1 tablet (500 mg total) by mouth every 6 (six) hours as needed for Pain. 160 tablet 3    ammonium lactate 12 % Crea Apply twice daily to affected parts both feet as needed. 140 g 11    ascorbic acid, vitamin C, (VITAMIN C) 500 MG tablet Take 1,000 mg by mouth once daily.       blood sugar diagnostic Strp Use to test blood glucose 2 (two) times daily with meals. 100 strip 11    blood-glucose meter Misc Use to check blood glucose twice daily 1 each 0    buPROPion (WELLBUTRIN XL) 300 MG 24 hr tablet Take 1 tablet (300 mg total) by mouth once daily. 45 tablet 3    esomeprazole (NEXIUM) 40 MG capsule Take 1 capsule (40 mg total) by mouth daily before breakfast. 90 capsule 3    ezetimibe (ZETIA) 10 mg tablet Take 1 tablet (10 mg total) by mouth once daily. 90 tablet 3    lancets 33 gauge Misc Use to test blood glcuose 2 (two) times daily with meals. 100 each 11    lancing device Misc 1 Device by Misc.(Non-Drug; Combo Route) route 2 (two) times daily with meals. 1 each 0    losartan (COZAAR) 100 MG tablet Take 1 tablet (100 mg total) by mouth once daily. 90 tablet 3    metFORMIN (GLUCOPHAGE-XR)  500 MG ER 24hr tablet Take 1 tablet (500 mg total) by mouth daily with breakfast. 90 tablet 3    metoprolol succinate (TOPROL-XL) 25 MG 24 hr tablet Take 1 tablet (25 mg total) by mouth once daily. 90 tablet 0    rosuvastatin (CRESTOR) 20 MG tablet Take 1 tablet (20 mg total) by mouth once daily. 90 tablet 3    traMADoL (ULTRAM) 50 mg tablet TAKE 2 TABLETS EVERY 12 HOURS AS NEEDED FOR PAIN 60 tablet 2    tretinoin (RETIN-A) 0.1 % cream Use hs 20 g 6    venlafaxine (EFFEXOR) 75 MG tablet Take 1 tablet (75 mg total) by mouth once daily. 90 tablet 3    vitamin D 1000 units Tab Take 0.5 Units by mouth once daily. With K2 50 mch      LORazepam (ATIVAN) 0.5 MG tablet Take 1 tablet (0.5 mg total) by mouth nightly as needed for Anxiety. 15 tablet 0    meloxicam (MOBIC) 7.5 MG tablet Take 1 tablet (7.5 mg total) by mouth once daily. 30 tablet 0    valACYclovir (VALTREX) 1000 MG tablet Take 2 tablets (2,000 mg total) by mouth every 12 (twelve) hours. for 1 day 4 tablet 1     No current facility-administered medications for this visit.     Facility-Administered Medications Ordered in Other Visits   Medication Dose Route Frequency Provider Last Rate Last Admin    0.9%  NaCl infusion   Intravenous Continuous Maria Elena Little MD   Stopped at 05/31/19 1548    sodium chloride 0.9% flush 10 mL  10 mL Intravenous PRN Maria Elena Little MD           Past Medical History:   Diagnosis Date    Allergy     Anemia     Anxiety     Breast cancer 2002    Left breast    Cancer 2002    L breast s/p lumpectomy    Cataract     Decreased hearing     Depression     Dermatochalasis of both upper eyelids     Diabetes mellitus     Diabetes with neurologic complications     Gastric ulcer 9/10/13    EGD    Hiatal hernia     Hyperlipidemia     Migraine headache     Migraine headache 3/20/2015    Multiple gastric ulcers     Parathyroid disorder     Pre-diabetes     Renal manifestation of secondary diabetes mellitus     Sleep apnea     no CPAP    Type 2  diabetes mellitus, controlled, 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    Wrist fracture        Past Surgical History:   Procedure Laterality Date    ADENOIDECTOMY      BREAST LUMPECTOMY Left 2002    CATARACT EXTRACTION W/  INTRAOCULAR LENS IMPLANT Bilateral     COLONOSCOPY N/A 12/2/2016    Procedure: COLONOSCOPY;  Surgeon: Dustin Patterson MD;  [facility]  Service: Endoscopy;  Laterality: N/A;  PM prep    COLONOSCOPY N/A 8/31/2022    Procedure: COLONOSCOPY;  Surgeon: Delfino Sanchez MD;  Location: [facility] (4TH FLR);  Service: Endoscopy;  Laterality: N/A;  vacc-inst portal-am prep-clears 4 hrs prior-any md per pt-tb-pacer [facility]    ESOPHAGEAL MANOMETRY WITH MEASUREMENT OF IMPEDANCE N/A 10/8/2018    Procedure: MANOMETRY, ESOPHAGUS, WITH IMPEDANCE MEASUREMENT;  Surgeon: Renato Maria MD;  Location: [facility] (4TH FLR);  Service: Endoscopy;  Laterality: N/A;    ESOPHAGOGASTRODUODENOSCOPY N/A 9/12/2018    Procedure: ESOPHAGOGASTRODUODENOSCOPY (EGD);  Surgeon: Dustin Patterson MD;  Location: [facility] ([address] [place] [address]);  Service: Endoscopy;  Laterality: N/A;  6-8 weeks from visit    ESOPHAGOGASTRODUODENOSCOPY N/A 1/29/2019    Procedure: EGD (ESOPHAGOGASTRODUODENOSCOPY) intraop;  Surgeon: Renato Perez Jr., MD;  Location: [facility] OR [address] [place] [address];  Service: General;  Laterality: N/A;    ESOPHAGOGASTRODUODENOSCOPY N/A 5/31/2019    Procedure: EGD (ESOPHAGOGASTRODUODENOSCOPY);  Surgeon: Maria Elena Little MD;  [facility]  Service: Endoscopy;  Laterality: N/A;  [facility] pacemaker - sm    EYE SURGERY Bilateral     cataracts    IMPLANTATION OF BIVENTRICULAR HEART PACEMAKER N/A 1/30/2019    Procedure: INSERTION, CARDIAC PACEMAKER, BIVENTRICULAR;  Surgeon: Stone Livingston MD;  Location: [facility] EP LAB;  Service: Cardiology;  Laterality: N/A;    IMPLANTATION OF COCHLEAR PROSTHESIS Left 4/26/2021    Procedure: INSERTION, PROSTHESIS, COCHLEAR;  Surgeon: Michael Julian MD;  Location: [facility] OR [address] [place] [address];   Service: ENT;  Laterality: Left;    LAPAROSCOPIC TOUPET FUNDOPLICATION N/A 1/29/2019    Procedure: FUNDOPLICATION, LAPAROSCOPIC, TOUPET;  Surgeon: Renato Perez Jr., MD;  Location: [facility] OR [address] [place] [address];  Service: General;  Laterality: N/A;    lipoma removal  1999    TONSILLECTOMY         Review of Systems:  ROS:  Constitutional: no fever or chills  Eyes: no visual changes  ENT: no nasal congestion or sore throat  Respiratory: no cough or shortness of breath  Musculoskeletal: no arthralgias or myalgias      OBJECTIVE:     PHYSICAL EXAM:    General: Weight: 71.9 kg (158 lb 6.4 oz) Body mass index is 29.93 kg/m².  Patient is alert, awake and oriented to time, place and person. Mood and affect are appropriate.  Patient does not appear to be in any distress, denies any constitutional symptoms and appears stated age.   HEENT: Pupils are equal and round, sclera are not injected. External examination of ears and nose reveals no abnormalities. Cranial nerves II-X are grossly intact  Neck: examination demonstrates painless active range of motion. Spurling's sign is negative  Skin: no rashes, abrasions or open wounds on the affected extremity   Resp: No respiratory distress or audible wheezing   Psych:  normal mood and behavior  CV: 2+  pulses, all extremities warm and well perfused   Left Shoulder   Skin intact  No warmth or effusion  Tenderness: none  Range of motion is painful   ROM: forward flexion 160, external rotation 40, internal rotation L1  Shoulder Strength: biceps 5/5, triceps 5/5, abduction 5/5, adduction 5/5  positive for impingement sign, sensory exam normal, and motor exam normal  Special Tests:    Crossbody test: negative    Neer's positive  Hawkin's positive    Geronimo's positive  Drop arm negative  Belly press negative    IMAGING:  X-rays of the left shoulder, personally reviewed by me, demonstrate moderate glenohumeral osteoarthritis with joint space narrowing, osteophyte formation and subchondral sclerosis.   No fracture or dislocation.     ASSESSMENT/PLAN:   77 y.o. year old female with left shoulder osteoarthritis    Plan: Discussed with the patient at length all the different treatment options available for her left shoulder including anti-inflammatories, acetaminophen, rest, ice, Physical therapy, occasional cortisone injections for temporary relief, and shoulder arthroscopy    - Ok to stop PT- was causing increased pain  - Activity as tolerated and home exercises  - Will try short course of meloxicam for 2 weeks  - Discussed CSI- declined today because concerned about blood glucose.  Last A1c 6.8. We can consider this at some point if pain worsens  - Follow up if symptoms worsen or fail to improve

## 2022-10-27 ENCOUNTER — PATIENT MESSAGE (OUTPATIENT)
Dept: INTERNAL MEDICINE | Facility: CLINIC | Age: 77
End: 2022-10-27
Payer: MEDICARE

## 2022-10-28 ENCOUNTER — TELEPHONE (OUTPATIENT)
Dept: PRIMARY CARE CLINIC | Facility: CLINIC | Age: 77
End: 2022-10-28
Payer: MEDICARE

## 2022-10-28 DIAGNOSIS — N39.0 URINARY TRACT INFECTION WITHOUT HEMATURIA, SITE UNSPECIFIED: Primary | ICD-10-CM

## 2022-10-28 RX ORDER — NITROFURANTOIN 25; 75 MG/1; MG/1
100 CAPSULE ORAL 2 TIMES DAILY
Qty: 10 CAPSULE | Refills: 0 | Status: SHIPPED | OUTPATIENT
Start: 2022-10-28 | End: 2022-11-05

## 2022-10-28 NOTE — TELEPHONE ENCOUNTER
Ordered urine. Pt notified to get urine studies. Since it's Friday, empirically sent in macrobid. If symptoms worsen, go to UC. May change depending on urine studies.

## 2022-10-28 NOTE — TELEPHONE ENCOUNTER
----- Message from [place] sent at 10/28/2022 12:14 PM CDT -----  Contact: pt [phone]  [email or web address]     Patient is calling for Medical Advice regarding: possible UTI and shaky hands    How long has patient had these symptoms: 1 day    Pharmacy name and phone#:  [facility]  [address] [facility] [address]  Phone: [phone] Fax: [phone]        Would like response via [employer]:  call     Comments:

## 2022-10-30 ENCOUNTER — PATIENT MESSAGE (OUTPATIENT)
Dept: ADMINISTRATIVE | Facility: OTHER | Age: 77
End: 2022-10-30
Payer: MEDICARE

## 2022-10-31 ENCOUNTER — LAB VISIT (OUTPATIENT)
Dept: LAB | Facility: HOSPITAL | Age: 77
End: 2022-10-31
Attending: INTERNAL MEDICINE
Payer: MEDICARE

## 2022-10-31 DIAGNOSIS — E11.21 CONTROLLED TYPE 2 DIABETES MELLITUS WITH DIABETIC NEPHROPATHY, WITHOUT LONG-TERM CURRENT USE OF INSULIN: ICD-10-CM

## 2022-10-31 LAB
ALBUMIN/CREAT UR: 17.5 UG/MG (ref 0–30)
CREAT UR-MCNC: 80 MG/DL (ref 15–325)
MICROALBUMIN UR DL<=1MG/L-MCNC: 14 UG/ML

## 2022-10-31 PROCEDURE — 82570 ASSAY OF URINE CREATININE: CPT | Performed by: INTERNAL MEDICINE

## 2022-10-31 PROCEDURE — 82043 UR ALBUMIN QUANTITATIVE: CPT | Performed by: INTERNAL MEDICINE

## 2022-11-01 ENCOUNTER — CLINICAL SUPPORT (OUTPATIENT)
Dept: INTERNAL MEDICINE | Facility: CLINIC | Age: 77
End: 2022-11-01
Payer: MEDICARE

## 2022-11-01 VITALS — BODY MASS INDEX: 29.95 KG/M2 | WEIGHT: 158.5 LBS

## 2022-11-01 NOTE — PROGRESS NOTES
Health  Follow-Up Note   [x] Office  [] Phone  Notes from previous session reviewed.   [x] Previous Session Goals unchanged.   [] Patient/Caregiver Change in Goals.  Goals added or changed by Patient/Caregiver since program participation:  Continue same plan  Increase exercise by 1 min daily      Additional/Changed support that patient/caregiver has experienced/sought?  (Indicate readiness, support from family, friends, others, community groups, etc)       Additional/Changed obstacles that could prevent patient/caregiver from reaching their goals?   Lost of her sister    Feedback provided:   Praised for good job down 1.54 lbs total loss 28 lbs    Diagnostic values/Desriptors for follow-up as needed for chronic condition(s)   Weight: 71.9 kg 158.51 lb total loss 28 lbs     Interventions:   Health  listened reflectively, validated thoughts and feelings, offered support and encouragement.   Allowed patient to express themselves in a non-biased atmosphere.  Health  assisted pt to problem-solve obstacles such as being in a challenging environment and dealing with these challenges.   Motivational Interviewed interventions utilized (OARS).   Patient responded favorably to interventions and remained actively engaged in the session.   Health  will remain available and connected for patient by phone and/or office visits.   Positive reinforcement, emotional support and encouragement provided.   Focused Education: MI    Plan:  [x] Pt will work on goals as stated above.   [x] Pt will contact Health  for any questions, concerns or needs.  [x] Pt will follow up with Health  in office on  11.30.22.  [] Pt will follow up with Health  on phone in:        [x] Health  will remain available.   [] Health  will contact patient by phone in:        [] Health  will consult:        [] Health  will inform Provider via EPIC messaging.     Impression:  1. Behavior is consistent with  Action Stage of Change.   2. Participation level:       [x] Receptive      [x] Interactive      [] Guarded and Resistant      [x] Self Motivated      [] Refused/Declined to participate   3. [x] Pt voiced understanding of all information presented.       [] Pt voiced needing further information/education. This will be arranged.       [x] Pt would benefit from further education/information as identified by this health . This will be arranged.     Jacqueline Perry RN HC

## 2022-11-07 ENCOUNTER — PATIENT MESSAGE (OUTPATIENT)
Dept: PRIMARY CARE CLINIC | Facility: CLINIC | Age: 77
End: 2022-11-07
Payer: MEDICARE

## 2022-11-07 NOTE — PROGRESS NOTES
Ms. Burris is a patient of Dr. Livingston and was last seen in clinic 9/24/2021.      Subjective:   Patient ID:  Paris Burris is a 77 y.o. female who presents for follow-up of CRT-P  .     HPI:    Ms. Burris is a 77 y.o. female with CHB, CM (was 35%, now 50%), CRT-P (1/2019) here for annual follow up.    Background:    She was admitted for a laproscopic hernia repair and was found to have SVT as well as mobitz II AV block, alternating BBB. EF was 35-40%. She underwent CRT-P 1/30/19.      6/2020: Ms. Burris is doing well from a device perspective with stable lead and device function. No arrhythmia noted. 100% biventricular pacing. Narrow QRS. EF recovery to 50%. No CHF symptoms.     9/24/2021: Ms. Burris is doing well from a device perspective with stable lead and device function. No arrhythmia noted. 100% biventricular pacing with narrow QRS.  No CHF symptoms. EF recovered to 50% per echo 5/2019. She feels well.    Update (11/08/2022):    Today she says she continues to feel well with no cardiac complaints. Ms. Burris reports no chest pain with exertion or at rest, palpitations, SOB, GARCÍA, dizziness, or syncope.    On losartan and metoprolol.     Device Interrogation (11/8/2022) reveals an intrinsic SR with CHB no escape with stable lead and device function. No arrhythmias or treated episodes were noted.  She paces 0% in the RA and 100% in the BiV. Estimated battery longevity 4.5 years.     I have personally reviewed the patient's EKG today, which shows ASVP at 88bpm. QRS is 134. QTc is 436.    Relevant Cardiac Test Results:    2D Echo (5/2/2019):  Low normal left ventricular systolic function. The estimated ejection fraction is 50% (improved since 1/30/2019).  Normal right ventricular systolic function.  Grade I (mild) left ventricular diastolic dysfunction consistent with impaired relaxation.  Moderate left atrial enlargement.  Mild mitral regurgitation.  The estimated PA systolic pressure is 25 mm Hg  Normal central  venous pressure (3 mm Hg).    Current Outpatient Medications   Medication Sig    acetaminophen (TYLENOL) 500 MG tablet Take 1 tablet (500 mg total) by mouth every 6 (six) hours as needed for Pain.    ammonium lactate 12 % Crea Apply twice daily to affected parts both feet as needed.    ascorbic acid, vitamin C, (VITAMIN C) 500 MG tablet Take 1,000 mg by mouth once daily.     blood sugar diagnostic Strp Use to test blood glucose 2 (two) times daily with meals.    blood-glucose meter Misc Use to check blood glucose twice daily    buPROPion (WELLBUTRIN XL) 300 MG 24 hr tablet Take 1 tablet (300 mg total) by mouth once daily.    ezetimibe (ZETIA) 10 mg tablet Take 1 tablet (10 mg total) by mouth once daily.    lancets 33 gauge Misc Use to test blood glcuose 2 (two) times daily with meals.    lancing device Misc 1 Device by Misc.(Non-Drug; Combo Route) route 2 (two) times daily with meals.    LORazepam (ATIVAN) 0.5 MG tablet Take 1 tablet (0.5 mg total) by mouth nightly as needed for Anxiety.    losartan (COZAAR) 100 MG tablet Take 1 tablet (100 mg total) by mouth once daily.    meloxicam (MOBIC) 7.5 MG tablet Take 1 tablet (7.5 mg total) by mouth once daily.    metFORMIN (GLUCOPHAGE-XR) 500 MG ER 24hr tablet Take 1 tablet (500 mg total) by mouth daily with breakfast.    metoprolol succinate (TOPROL-XL) 25 MG 24 hr tablet Take 1 tablet (25 mg total) by mouth once daily.    rosuvastatin (CRESTOR) 20 MG tablet Take 1 tablet (20 mg total) by mouth once daily.    traMADoL (ULTRAM) 50 mg tablet TAKE 2 TABLETS EVERY 12 HOURS AS NEEDED FOR PAIN    valACYclovir (VALTREX) 1000 MG tablet Take 2 tablets (2,000 mg total) by mouth every 12 (twelve) hours. for 1 day    venlafaxine (EFFEXOR) 75 MG tablet Take 1 tablet (75 mg total) by mouth once daily.    vitamin D 1000 units Tab Take 0.5 Units by mouth once daily. With K2 50 mch    esomeprazole (NEXIUM) 40 MG capsule Take 1 capsule (40 mg total) by mouth daily before breakfast.     "tretinoin (RETIN-A) 0.1 % cream Use hs     No current facility-administered medications for this visit.     Facility-Administered Medications Ordered in Other Visits   Medication    0.9%  NaCl infusion    sodium chloride 0.9% flush 10 mL       Review of Systems   Constitutional: Negative for malaise/fatigue.   Cardiovascular:  Negative for chest pain, dyspnea on exertion, irregular heartbeat, leg swelling and palpitations.   Respiratory:  Negative for shortness of breath.    Hematologic/Lymphatic: Negative for bleeding problem.   Skin:  Negative for rash.   Musculoskeletal:  Positive for joint pain (shoulder). Negative for myalgias.   Gastrointestinal:  Negative for hematemesis, hematochezia and nausea.   Genitourinary:  Negative for hematuria.   Neurological:  Negative for light-headedness.   Psychiatric/Behavioral:  Negative for altered mental status.    Allergic/Immunologic: Negative for persistent infections.     Objective:          /78 (BP Location: Left arm, Patient Position: Sitting, BP Method: Medium (Manual))   Pulse 88   Ht 5' 1" (1.549 m)   Wt 71.9 kg (158 lb 8.2 oz)   BMI 29.95 kg/m²     Physical Exam  Vitals and nursing note reviewed.   Constitutional:       Appearance: Normal appearance. She is well-developed.   HENT:      Head: Normocephalic.      Nose: Nose normal.   Eyes:      Pupils: Pupils are equal, round, and reactive to light.   Cardiovascular:      Rate and Rhythm: Normal rate and regular rhythm.   Pulmonary:      Effort: No respiratory distress.      Breath sounds: Normal breath sounds.   Chest:      Comments: Device to LUCW. Incision and pocket in good repair.    Musculoskeletal:         General: Normal range of motion.   Skin:     General: Skin is warm and dry.      Findings: No erythema.   Neurological:      Mental Status: She is alert and oriented to person, place, and time.   Psychiatric:         Speech: Speech normal.         Behavior: Behavior normal.         Lab Results "   Component Value Date     05/12/2022    K 4.3 05/12/2022    MG 2.0 07/18/2019    BUN 12 05/12/2022    CREATININE 0.8 05/12/2022    ALT 32 05/12/2022    AST 27 05/12/2022    HGB 14.1 05/12/2022    HCT 43.7 05/12/2022    TSH 1.589 01/05/2022    LDLCALC 41.4 (L) 01/18/2022           Assessment:     1. Presence of cardiac resynchronization therapy pacemaker (CRT-P)    2. COOKIE (obstructive sleep apnea)    3. Obesity (BMI 30-39.9)    4. SVT (supraventricular tachycardia)    5. AV block, Mobitz II    6. Cardiomyopathy, unspecified type      Plan:     In summary, Ms. Burris is a 77 y.o. female with CHB, CM (was 35%, now 50%), CRT-P (1/2019) here for annual follow up.  Ms. Burris is doing well from a device perspective with stable lead and device function. No arrhythmia noted. 100% biventricular pacing with narrow QRS. No CHF symptoms. She is doing well.    Continue current medication regimen and device settings.   Follow up in device clinic as scheduled.   Follow up in EP clinic in 1 year, sooner as needed.     *A copy of this note has been sent to Dr. Livingston*    Follow up in about 1 year (around 11/8/2023).    ------------------------------------------------------------------    FREDERICK Jean Baptiste, NP-C  Cardiac Electrophysiology

## 2022-11-08 ENCOUNTER — CLINICAL SUPPORT (OUTPATIENT)
Dept: CARDIOLOGY | Facility: HOSPITAL | Age: 77
End: 2022-11-08
Attending: INTERNAL MEDICINE
Payer: MEDICARE

## 2022-11-08 ENCOUNTER — OFFICE VISIT (OUTPATIENT)
Dept: ELECTROPHYSIOLOGY | Facility: CLINIC | Age: 77
End: 2022-11-08
Payer: MEDICARE

## 2022-11-08 VITALS
BODY MASS INDEX: 29.92 KG/M2 | SYSTOLIC BLOOD PRESSURE: 128 MMHG | WEIGHT: 158.5 LBS | DIASTOLIC BLOOD PRESSURE: 78 MMHG | HEART RATE: 88 BPM | HEIGHT: 61 IN

## 2022-11-08 DIAGNOSIS — E66.9 OBESITY (BMI 30-39.9): ICD-10-CM

## 2022-11-08 DIAGNOSIS — I42.9 CARDIOMYOPATHY, UNSPECIFIED TYPE: ICD-10-CM

## 2022-11-08 DIAGNOSIS — Z95.0 PRESENCE OF CARDIAC RESYNCHRONIZATION THERAPY PACEMAKER (CRT-P): Primary | ICD-10-CM

## 2022-11-08 DIAGNOSIS — F41.9 ANXIETY: ICD-10-CM

## 2022-11-08 DIAGNOSIS — F33.41 MDD (MAJOR DEPRESSIVE DISORDER), RECURRENT, IN PARTIAL REMISSION: ICD-10-CM

## 2022-11-08 DIAGNOSIS — Z95.0 PRESENCE OF CARDIAC RESYNCHRONIZATION THERAPY PACEMAKER (CRT-P): ICD-10-CM

## 2022-11-08 DIAGNOSIS — G47.33 OSA (OBSTRUCTIVE SLEEP APNEA): ICD-10-CM

## 2022-11-08 DIAGNOSIS — I47.10 SVT (SUPRAVENTRICULAR TACHYCARDIA): ICD-10-CM

## 2022-11-08 DIAGNOSIS — I42.9 CARDIOMYOPATHY, UNSPECIFIED TYPE: Primary | ICD-10-CM

## 2022-11-08 DIAGNOSIS — F33.1 MODERATE EPISODE OF RECURRENT MAJOR DEPRESSIVE DISORDER: ICD-10-CM

## 2022-11-08 DIAGNOSIS — I44.1 AV BLOCK, MOBITZ II: ICD-10-CM

## 2022-11-08 PROCEDURE — 1160F RVW MEDS BY RX/DR IN RCRD: CPT | Mod: CPTII,S$GLB,, | Performed by: NURSE PRACTITIONER

## 2022-11-08 PROCEDURE — 3078F PR MOST RECENT DIASTOLIC BLOOD PRESSURE < 80 MM HG: ICD-10-PCS | Mod: CPTII,S$GLB,, | Performed by: NURSE PRACTITIONER

## 2022-11-08 PROCEDURE — 1157F PR ADVANCE CARE PLAN OR EQUIV PRESENT IN MEDICAL RECORD: ICD-10-PCS | Mod: CPTII,S$GLB,, | Performed by: NURSE PRACTITIONER

## 2022-11-08 PROCEDURE — 1126F PR PAIN SEVERITY QUANTIFIED, NO PAIN PRESENT: ICD-10-PCS | Mod: CPTII,S$GLB,, | Performed by: NURSE PRACTITIONER

## 2022-11-08 PROCEDURE — 99214 PR OFFICE/OUTPT VISIT, EST, LEVL IV, 30-39 MIN: ICD-10-PCS | Mod: S$GLB,,, | Performed by: NURSE PRACTITIONER

## 2022-11-08 PROCEDURE — 3072F LOW RISK FOR RETINOPATHY: CPT | Mod: CPTII,S$GLB,, | Performed by: NURSE PRACTITIONER

## 2022-11-08 PROCEDURE — 3078F DIAST BP <80 MM HG: CPT | Mod: CPTII,S$GLB,, | Performed by: NURSE PRACTITIONER

## 2022-11-08 PROCEDURE — 93281 PM DEVICE PROGR EVAL MULTI: CPT

## 2022-11-08 PROCEDURE — 3074F SYST BP LT 130 MM HG: CPT | Mod: CPTII,S$GLB,, | Performed by: NURSE PRACTITIONER

## 2022-11-08 PROCEDURE — 3072F PR LOW RISK FOR RETINOPATHY: ICD-10-PCS | Mod: CPTII,S$GLB,, | Performed by: NURSE PRACTITIONER

## 2022-11-08 PROCEDURE — 99999 PR PBB SHADOW E&M-EST. PATIENT-LVL IV: CPT | Mod: PBBFAC,,, | Performed by: NURSE PRACTITIONER

## 2022-11-08 PROCEDURE — 3074F PR MOST RECENT SYSTOLIC BLOOD PRESSURE < 130 MM HG: ICD-10-PCS | Mod: CPTII,S$GLB,, | Performed by: NURSE PRACTITIONER

## 2022-11-08 PROCEDURE — 1160F PR REVIEW ALL MEDS BY PRESCRIBER/CLIN PHARMACIST DOCUMENTED: ICD-10-PCS | Mod: CPTII,S$GLB,, | Performed by: NURSE PRACTITIONER

## 2022-11-08 PROCEDURE — 93010 RHYTHM STRIP: ICD-10-PCS | Mod: S$GLB,,, | Performed by: INTERNAL MEDICINE

## 2022-11-08 PROCEDURE — 99999 PR PBB SHADOW E&M-EST. PATIENT-LVL IV: ICD-10-PCS | Mod: PBBFAC,,, | Performed by: NURSE PRACTITIONER

## 2022-11-08 PROCEDURE — 93281 PM DEVICE PROGR EVAL MULTI: CPT | Mod: 26,,, | Performed by: INTERNAL MEDICINE

## 2022-11-08 PROCEDURE — 99214 OFFICE O/P EST MOD 30 MIN: CPT | Mod: S$GLB,,, | Performed by: NURSE PRACTITIONER

## 2022-11-08 PROCEDURE — 1126F AMNT PAIN NOTED NONE PRSNT: CPT | Mod: CPTII,S$GLB,, | Performed by: NURSE PRACTITIONER

## 2022-11-08 PROCEDURE — 93005 RHYTHM STRIP: ICD-10-PCS | Mod: S$GLB,,, | Performed by: INTERNAL MEDICINE

## 2022-11-08 PROCEDURE — 93005 ELECTROCARDIOGRAM TRACING: CPT | Mod: S$GLB,,, | Performed by: INTERNAL MEDICINE

## 2022-11-08 PROCEDURE — 93281 CARDIAC DEVICE CHECK - IN CLINIC & HOSPITAL: ICD-10-PCS | Mod: 26,,, | Performed by: INTERNAL MEDICINE

## 2022-11-08 PROCEDURE — 3288F PR FALLS RISK ASSESSMENT DOCUMENTED: ICD-10-PCS | Mod: CPTII,S$GLB,, | Performed by: NURSE PRACTITIONER

## 2022-11-08 PROCEDURE — 1159F PR MEDICATION LIST DOCUMENTED IN MEDICAL RECORD: ICD-10-PCS | Mod: CPTII,S$GLB,, | Performed by: NURSE PRACTITIONER

## 2022-11-08 PROCEDURE — 1101F PR PT FALLS ASSESS DOC 0-1 FALLS W/OUT INJ PAST YR: ICD-10-PCS | Mod: CPTII,S$GLB,, | Performed by: NURSE PRACTITIONER

## 2022-11-08 PROCEDURE — 93010 ELECTROCARDIOGRAM REPORT: CPT | Mod: S$GLB,,, | Performed by: INTERNAL MEDICINE

## 2022-11-08 PROCEDURE — 1101F PT FALLS ASSESS-DOCD LE1/YR: CPT | Mod: CPTII,S$GLB,, | Performed by: NURSE PRACTITIONER

## 2022-11-08 PROCEDURE — 3288F FALL RISK ASSESSMENT DOCD: CPT | Mod: CPTII,S$GLB,, | Performed by: NURSE PRACTITIONER

## 2022-11-08 PROCEDURE — 1159F MED LIST DOCD IN RCRD: CPT | Mod: CPTII,S$GLB,, | Performed by: NURSE PRACTITIONER

## 2022-11-08 PROCEDURE — 1157F ADVNC CARE PLAN IN RCRD: CPT | Mod: CPTII,S$GLB,, | Performed by: NURSE PRACTITIONER

## 2022-11-08 RX ORDER — BUPROPION HYDROCHLORIDE 300 MG/1
300 TABLET ORAL DAILY
Qty: 45 TABLET | Refills: 3 | Status: SHIPPED | OUTPATIENT
Start: 2022-11-08 | End: 2023-05-03 | Stop reason: SDUPTHER

## 2022-11-08 RX ORDER — VENLAFAXINE 75 MG/1
75 TABLET ORAL DAILY
Qty: 90 TABLET | Refills: 3 | Status: SHIPPED | OUTPATIENT
Start: 2022-11-08 | End: 2023-09-17 | Stop reason: SDUPTHER

## 2022-11-09 ENCOUNTER — PATIENT MESSAGE (OUTPATIENT)
Dept: PRIMARY CARE CLINIC | Facility: CLINIC | Age: 77
End: 2022-11-09
Payer: MEDICARE

## 2022-11-09 ENCOUNTER — LAB VISIT (OUTPATIENT)
Dept: LAB | Facility: HOSPITAL | Age: 77
End: 2022-11-09
Attending: INTERNAL MEDICINE
Payer: MEDICARE

## 2022-11-09 ENCOUNTER — TELEPHONE (OUTPATIENT)
Dept: PRIMARY CARE CLINIC | Facility: CLINIC | Age: 77
End: 2022-11-09
Payer: MEDICARE

## 2022-11-09 ENCOUNTER — OFFICE VISIT (OUTPATIENT)
Dept: INTERNAL MEDICINE | Facility: CLINIC | Age: 77
End: 2022-11-09
Payer: MEDICARE

## 2022-11-09 VITALS
WEIGHT: 158.5 LBS | HEIGHT: 61 IN | HEART RATE: 110 BPM | SYSTOLIC BLOOD PRESSURE: 112 MMHG | DIASTOLIC BLOOD PRESSURE: 78 MMHG | BODY MASS INDEX: 29.92 KG/M2 | OXYGEN SATURATION: 97 %

## 2022-11-09 DIAGNOSIS — E11.21 CONTROLLED TYPE 2 DIABETES MELLITUS WITH DIABETIC NEPHROPATHY, WITHOUT LONG-TERM CURRENT USE OF INSULIN: ICD-10-CM

## 2022-11-09 DIAGNOSIS — N39.46 MIXED STRESS AND URGE URINARY INCONTINENCE: ICD-10-CM

## 2022-11-09 DIAGNOSIS — I47.10 SVT (SUPRAVENTRICULAR TACHYCARDIA): ICD-10-CM

## 2022-11-09 DIAGNOSIS — E66.9 DIABETES MELLITUS TYPE 2 IN OBESE: ICD-10-CM

## 2022-11-09 DIAGNOSIS — E11.69 DIABETES MELLITUS TYPE 2 IN OBESE: ICD-10-CM

## 2022-11-09 DIAGNOSIS — Z90.89 STATUS POST PARATHYROIDECTOMY: ICD-10-CM

## 2022-11-09 DIAGNOSIS — I42.9 CARDIOMYOPATHY, UNSPECIFIED TYPE: ICD-10-CM

## 2022-11-09 DIAGNOSIS — G47.33 OSA (OBSTRUCTIVE SLEEP APNEA): ICD-10-CM

## 2022-11-09 DIAGNOSIS — F33.41 MDD (MAJOR DEPRESSIVE DISORDER), RECURRENT, IN PARTIAL REMISSION: ICD-10-CM

## 2022-11-09 DIAGNOSIS — Z98.890 STATUS POST PARATHYROIDECTOMY: ICD-10-CM

## 2022-11-09 DIAGNOSIS — H90.3 SENSORINEURAL HEARING LOSS (SNHL) OF BOTH EARS: ICD-10-CM

## 2022-11-09 DIAGNOSIS — Z95.0 PRESENCE OF CARDIAC RESYNCHRONIZATION THERAPY PACEMAKER (CRT-P): ICD-10-CM

## 2022-11-09 DIAGNOSIS — J84.10 CALCIFIED GRANULOMA OF LUNG: ICD-10-CM

## 2022-11-09 DIAGNOSIS — K21.9 GASTROESOPHAGEAL REFLUX DISEASE, UNSPECIFIED WHETHER ESOPHAGITIS PRESENT: ICD-10-CM

## 2022-11-09 DIAGNOSIS — I44.1 AV BLOCK, MOBITZ II: ICD-10-CM

## 2022-11-09 DIAGNOSIS — Z00.00 ENCOUNTER FOR PREVENTIVE HEALTH EXAMINATION: Primary | ICD-10-CM

## 2022-11-09 DIAGNOSIS — N18.2 STAGE 2 CHRONIC KIDNEY DISEASE: ICD-10-CM

## 2022-11-09 DIAGNOSIS — E78.2 MIXED HYPERLIPIDEMIA: ICD-10-CM

## 2022-11-09 DIAGNOSIS — I70.0 AORTIC ATHEROSCLEROSIS: ICD-10-CM

## 2022-11-09 PROBLEM — E11.29 TYPE 2 DIABETES MELLITUS, CONTROLLED, WITH RENAL COMPLICATIONS: Status: RESOLVED | Noted: 2017-06-14 | Resolved: 2022-11-09

## 2022-11-09 PROBLEM — J98.4 CALCIFIED GRANULOMA OF LUNG: Status: ACTIVE | Noted: 2022-11-09

## 2022-11-09 PROBLEM — Z96.21 COCHLEAR IMPLANT IN PLACE: Status: ACTIVE | Noted: 2022-11-09

## 2022-11-09 LAB
ESTIMATED AVG GLUCOSE: 154 MG/DL (ref 68–131)
HBA1C MFR BLD: 7 % (ref 4–5.6)

## 2022-11-09 PROCEDURE — 3078F PR MOST RECENT DIASTOLIC BLOOD PRESSURE < 80 MM HG: ICD-10-PCS | Mod: CPTII,S$GLB,, | Performed by: PHYSICIAN ASSISTANT

## 2022-11-09 PROCEDURE — 1160F RVW MEDS BY RX/DR IN RCRD: CPT | Mod: CPTII,S$GLB,, | Performed by: PHYSICIAN ASSISTANT

## 2022-11-09 PROCEDURE — 1157F PR ADVANCE CARE PLAN OR EQUIV PRESENT IN MEDICAL RECORD: ICD-10-PCS | Mod: CPTII,S$GLB,, | Performed by: PHYSICIAN ASSISTANT

## 2022-11-09 PROCEDURE — 1159F MED LIST DOCD IN RCRD: CPT | Mod: CPTII,S$GLB,, | Performed by: PHYSICIAN ASSISTANT

## 2022-11-09 PROCEDURE — 1157F ADVNC CARE PLAN IN RCRD: CPT | Mod: CPTII,S$GLB,, | Performed by: PHYSICIAN ASSISTANT

## 2022-11-09 PROCEDURE — 99999 PR PBB SHADOW E&M-EST. PATIENT-LVL V: ICD-10-PCS | Mod: PBBFAC,,, | Performed by: PHYSICIAN ASSISTANT

## 2022-11-09 PROCEDURE — 3072F PR LOW RISK FOR RETINOPATHY: ICD-10-PCS | Mod: CPTII,S$GLB,, | Performed by: PHYSICIAN ASSISTANT

## 2022-11-09 PROCEDURE — 36415 COLL VENOUS BLD VENIPUNCTURE: CPT | Performed by: INTERNAL MEDICINE

## 2022-11-09 PROCEDURE — G0439 PR MEDICARE ANNUAL WELLNESS SUBSEQUENT VISIT: ICD-10-PCS | Mod: S$GLB,,, | Performed by: PHYSICIAN ASSISTANT

## 2022-11-09 PROCEDURE — 1170F PR FUNCTIONAL STATUS ASSESSED: ICD-10-PCS | Mod: CPTII,S$GLB,, | Performed by: PHYSICIAN ASSISTANT

## 2022-11-09 PROCEDURE — 1126F PR PAIN SEVERITY QUANTIFIED, NO PAIN PRESENT: ICD-10-PCS | Mod: CPTII,S$GLB,, | Performed by: PHYSICIAN ASSISTANT

## 2022-11-09 PROCEDURE — 3078F DIAST BP <80 MM HG: CPT | Mod: CPTII,S$GLB,, | Performed by: PHYSICIAN ASSISTANT

## 2022-11-09 PROCEDURE — 3074F PR MOST RECENT SYSTOLIC BLOOD PRESSURE < 130 MM HG: ICD-10-PCS | Mod: CPTII,S$GLB,, | Performed by: PHYSICIAN ASSISTANT

## 2022-11-09 PROCEDURE — 1126F AMNT PAIN NOTED NONE PRSNT: CPT | Mod: CPTII,S$GLB,, | Performed by: PHYSICIAN ASSISTANT

## 2022-11-09 PROCEDURE — 83036 HEMOGLOBIN GLYCOSYLATED A1C: CPT | Performed by: INTERNAL MEDICINE

## 2022-11-09 PROCEDURE — 1160F PR REVIEW ALL MEDS BY PRESCRIBER/CLIN PHARMACIST DOCUMENTED: ICD-10-PCS | Mod: CPTII,S$GLB,, | Performed by: PHYSICIAN ASSISTANT

## 2022-11-09 PROCEDURE — G0439 PPPS, SUBSEQ VISIT: HCPCS | Mod: S$GLB,,, | Performed by: PHYSICIAN ASSISTANT

## 2022-11-09 PROCEDURE — 99999 PR PBB SHADOW E&M-EST. PATIENT-LVL V: CPT | Mod: PBBFAC,,, | Performed by: PHYSICIAN ASSISTANT

## 2022-11-09 PROCEDURE — 3072F LOW RISK FOR RETINOPATHY: CPT | Mod: CPTII,S$GLB,, | Performed by: PHYSICIAN ASSISTANT

## 2022-11-09 PROCEDURE — 3074F SYST BP LT 130 MM HG: CPT | Mod: CPTII,S$GLB,, | Performed by: PHYSICIAN ASSISTANT

## 2022-11-09 PROCEDURE — 1159F PR MEDICATION LIST DOCUMENTED IN MEDICAL RECORD: ICD-10-PCS | Mod: CPTII,S$GLB,, | Performed by: PHYSICIAN ASSISTANT

## 2022-11-09 PROCEDURE — 1170F FXNL STATUS ASSESSED: CPT | Mod: CPTII,S$GLB,, | Performed by: PHYSICIAN ASSISTANT

## 2022-11-09 PROCEDURE — 99499 UNLISTED E&M SERVICE: CPT | Mod: S$GLB,,, | Performed by: PHYSICIAN ASSISTANT

## 2022-11-09 NOTE — PATIENT INSTRUCTIONS
Counseling and Referral of Other Preventative  (Italic type indicates deductible and co-insurance are waived)    Patient Name: Paris Burris  Today's Date: 11/9/2022    Health Maintenance         Date Due Completion Date    Mammogram 07/09/2022 (scheduled) 7/9/2021    Hemoglobin A1c 11/12/2022 (today) 5/12/2022    Lipid Panel 01/18/2023 1/18/2022    Eye Exam 09/01/2023 9/1/2022    Override on 3/22/2018: Done    Diabetes Urine Screening 10/31/2023 10/31/2022    DEXA Scan 06/16/2024 6/16/2022    Override on 3/31/2015: Done    TETANUS VACCINE 06/16/2026 6/16/2016    Colonoscopy 08/31/2027 8/31/2022    Override on 9/10/2013: Done          No orders of the defined types were placed in this encounter.    The following information is provided to all patients.  This information is to help you find resources for any of the problems found today that may be affecting your health:                Living healthy guide: www.[facility].[place].[facility]      Understanding Diabetes: www.diabetes.org      Eating healthy: www.cdc.gov/healthyweight      CDC home safety checklist: www.cdc.gov/steadi/patient.html      Agency on Aging: www.goea.[place].gov      Alcoholics anonymous (AA): www.aa.org      Physical Activity: www.jenny.nih.gov/dy8fgun      Tobacco use: www.quitwithusla.org     Counseling and Referral of Other Preventative  (Italic type indicates deductible and co-insurance are waived)    Patient Name: Paris Burris  Today's Date: 11/9/2022    Health Maintenance       Date Due Completion Date    Mammogram 07/09/2022 7/9/2021    Hemoglobin A1c 11/12/2022 5/12/2022    Lipid Panel 01/18/2023 1/18/2022    Eye Exam 09/01/2023 9/1/2022    Override on 3/22/2018: Done    Diabetes Urine Screening 10/31/2023 10/31/2022    DEXA Scan 06/16/2024 6/16/2022    Override on 3/31/2015: Done    TETANUS VACCINE 06/16/2026 6/16/2016    Colonoscopy 08/31/2027 8/31/2022    Override on 9/10/2013: Done        No orders of the defined types were placed in this  encounter.    The following information is provided to all patients.  This information is to help you find resources for any of the problems found today that may be affecting your health:                Living healthy guide: www.[facility].[place].[facility]      Understanding Diabetes: www.diabetes.org      Eating healthy: www.cdc.gov/healthyweight      CDC home safety checklist: www.cdc.gov/steadi/patient.html      Agency on Aging: www.goea.[place].[facility]      Alcoholics anonymous (AA): www.aa.org      Physical Activity: www.jenny.nih.gov/hy1olcp      Tobacco use: www.quitwithusla.org

## 2022-11-09 NOTE — Clinical Note
[place]. Hope all is well. Saw Mrs. Burris for HRA today. She inquired about routine follow up appt with Dr. Huynh, none scheduled at this time. Have an awesome week.

## 2022-11-09 NOTE — TELEPHONE ENCOUNTER
MIKE Velasquez [facility] Staff  Hi Dejah. Hope all is well. Saw Mrs. Burris for HRA today. She inquired about routine follow up appt with Dr. Huynh, none scheduled at this time. Have an awesome week.

## 2022-11-09 NOTE — PROGRESS NOTES
"  Paris Burris presented for a  Medicare AWV and comprehensive Health Risk Assessment today. The following components were reviewed and updated:    Medical history  Family History  Social history  Allergies and Current Medications  Health Risk Assessment  Health Maintenance  Care Team         ** See Completed Assessments for Annual Wellness Visit within the encounter summary.**         The following assessments were completed:  Living Situation  CAGE  Depression Screening  Timed Get Up and Go  Whisper Test  Cognitive Function Screening    Nutrition Screening  ADL Screening  PAQ Screening        Vitals:    11/09/22 0911   BP: 112/78   BP Location: Left arm   Patient Position: Sitting   BP Method: Large (Manual)   Pulse: 110   SpO2: 97%   Weight: 71.9 kg (158 lb 8.2 oz)   Height: 5' 1" (1.549 m)     Body mass index is 29.95 kg/m².  Physical Exam  Vitals reviewed.   Constitutional:       General: She is not in acute distress.     Appearance: She is well-developed. She is not ill-appearing.   HENT:      Head: Normocephalic and atraumatic.      Ears:      Comments: Wearing hearing aid R  Cardiovascular:      Rate and Rhythm: Normal rate and regular rhythm.      Heart sounds: No murmur heard.  Pulmonary:      Effort: Pulmonary effort is normal.      Breath sounds: Normal breath sounds. No wheezing or rales.   Abdominal:      General: Bowel sounds are normal.      Palpations: Abdomen is soft.      Tenderness: There is no abdominal tenderness.   Musculoskeletal:      Right lower leg: No edema.      Left lower leg: No edema.   Lymphadenopathy:      Cervical: No cervical adenopathy.   Skin:     General: Skin is warm and dry.      Findings: No rash.   Neurological:      Mental Status: She is alert.   Psychiatric:         Mood and Affect: Mood normal.             Diagnoses and health risks identified today and associated recommendations/orders:    1. Encounter for preventive health examination  Exam and Assessments " performed  Chart Review Complete  Health Maintenance Reviewed and updated      2. Cardiomyopathy, unspecified type  Stable  EF improved from 30-->50%  Followed by Cardiology    3. Diabetes mellitus type 2 in obese  Stable on current meds  Lab Results   Component Value Date    HGBA1C 6.8 (H) 05/12/2022   Followed by PCP      4. MDD (major depressive disorder), recurrent, in partial remission  Stable on current meds  Followed by PCP    5. Aortic atherosclerosis  Noted on prior imaging  On Statin and Zetia  Followed by PCP    6. Presence of cardiac resynchronization therapy pacemaker (CRT-P)  Stable  Routine device checks with Cardiology    7. Status post parathyroidectomy  Prev followed by Endo  Lab Results   Component Value Date    PTH 52.0 02/10/2021    CALCIUM 9.5 05/12/2022    CAION 1.34 11/05/2015    PHOS 2.4 (L) 01/31/2019       8. AV block, Mobitz II  S/p pacemaker  Stable   Followed by EP/Cardiology    9. SVT (supraventricular tachycardia)  S/p pacemaker  Stable   Followed by EP/Cardiology    10. Calcified granuloma of lung   (noted on CT chest 10/2022 and 10/2015, stable)  Followed by PCP    11. COOKIE (obstructive sleep apnea)  Stable on CPAP  Followed by Sleep Clinic    12. Stage 2 chronic kidney disease  GFR 77  Stable  Followed by PCP    13. Mixed hyperlipidemia  Noted on prior imaging  On Statin and Zetia  Followed by PCP    14. Gastroesophageal reflux disease, unspecified whether esophagitis present  Stable with diet  Followed by PCP    15. Mixed stress and urge urinary incontinence  Stable  Followed by Urogyn    16. Sensorineural hearing loss (SNHL) of both ears  Stable  Wearing hearing aid  S/p cochlear implant  Followed by Audiology      Provided Paris with a 5-10 year written screening schedule and personal prevention plan. Recommendations were developed using the USPSTF age appropriate recommendations. Education, counseling, and referrals were provided as needed. After Visit Summary printed and given  to patient which includes a list of additional screenings\tests needed.    Follow up for 1 year for next Medicare AWV.    Chelsea Gill PA-C    I offered to discuss advanced care planning, including how to pick a person who would make decisions for you if you were unable to make them for yourself, called a health care power of , and what kind of decisions you might make such as use of life sustaining treatments such as ventilators and tube feeding when faced with a life limiting illness recorded on a living will that they will need to know. (How you want to be cared for as you near the end of your natural life)     X  Patient has advanced directives on file, which we reviewed, and they do not wish to make changes.      Addendum:  11/16/2022    Review for Opioid Screening: Pt has current Rx, Tramadol, prescribed by her PCP. Pt is aware of non-opioid modalities of pain relief as dicussed with her PCP   Review for Substance Use Disorders:  reviewed, pt uses sparingly, no current concerns for substance use disorder       Chelsea Gill PA-C

## 2022-11-17 ENCOUNTER — PATIENT MESSAGE (OUTPATIENT)
Dept: PRIMARY CARE CLINIC | Facility: CLINIC | Age: 77
End: 2022-11-17
Payer: MEDICARE

## 2022-11-17 ENCOUNTER — DOCUMENTATION ONLY (OUTPATIENT)
Dept: PRIMARY CARE CLINIC | Facility: CLINIC | Age: 77
End: 2022-11-17
Payer: MEDICARE

## 2022-11-17 NOTE — PROGRESS NOTES
"Individual Psychotherapy (LCSW/PhD)  Paris Burris,  11/17/2022    Site:  [place]         Therapeutic Intervention: Met with patient for individual psychotherapy.    Chief complaint/reason for encounter: interpersonal and grief     Interval history and content of current session: Pt recently loss sister who became suddenly ill. Pt feels overwhelmed and states they have been isolating. SW supported pt in normalizing grief feelings and feelings of being overwhelmed. Pt states they continue to be frustrated by husbands resistance to therapy. Pt feels like they and  would benefit greatly from couples therapy. Pt and  have been arguing over "minor things" and pt feels they are "walking on eggshells".     SW and pt discussed approached to engage with  I.e writing letter, e-mail with pt permission. SW explained necessary boundaries I.e. confidentiality, boundaries. SW and pt discussed opportunities for volunteer work per pt interest.      Treatment plan:  Target symptoms: grief  Why chosen therapy is appropriate versus another modality: relevant to diagnosis, evidence based practice  Outcome monitoring methods: self-report, checklist/rating scale  Therapeutic intervention type: supportive psychotherapy    Risk parameters:  Patient reports no suicidal ideation  Patient reports no homicidal ideation  Patient reports no self-injurious behavior  Patient reports no violent behavior    Verbal deficits: None    Patient's response to intervention:  The patient's response to intervention is accepting.    Progress toward goals and other mental status changes:  The patient's progress toward goals is excellent.    Diagnosis:   No diagnosis found.    Plan: Pt plans to continue individual psychotherapy    Return to clinic: 2 weeks    Length of Service (minutes): 45        "

## 2022-11-28 ENCOUNTER — OFFICE VISIT (OUTPATIENT)
Dept: PRIMARY CARE CLINIC | Facility: CLINIC | Age: 77
End: 2022-11-28
Payer: MEDICARE

## 2022-11-28 VITALS
DIASTOLIC BLOOD PRESSURE: 82 MMHG | OXYGEN SATURATION: 98 % | HEIGHT: 61 IN | WEIGHT: 156.31 LBS | SYSTOLIC BLOOD PRESSURE: 134 MMHG | HEART RATE: 101 BPM | BODY MASS INDEX: 29.51 KG/M2

## 2022-11-28 DIAGNOSIS — E11.9 CONTROLLED TYPE 2 DIABETES MELLITUS WITHOUT COMPLICATION, WITHOUT LONG-TERM CURRENT USE OF INSULIN: ICD-10-CM

## 2022-11-28 DIAGNOSIS — E78.5 HYPERLIPIDEMIA, UNSPECIFIED HYPERLIPIDEMIA TYPE: ICD-10-CM

## 2022-11-28 DIAGNOSIS — F33.0 MILD EPISODE OF RECURRENT MAJOR DEPRESSIVE DISORDER: Primary | ICD-10-CM

## 2022-11-28 DIAGNOSIS — F41.1 GAD (GENERALIZED ANXIETY DISORDER): ICD-10-CM

## 2022-11-28 DIAGNOSIS — I10 BENIGN ESSENTIAL HYPERTENSION: ICD-10-CM

## 2022-11-28 DIAGNOSIS — R09.82 POSTNASAL DRIP: ICD-10-CM

## 2022-11-28 PROCEDURE — 3288F FALL RISK ASSESSMENT DOCD: CPT | Mod: CPTII,S$GLB,, | Performed by: INTERNAL MEDICINE

## 2022-11-28 PROCEDURE — 99214 OFFICE O/P EST MOD 30 MIN: CPT | Mod: S$GLB,,, | Performed by: INTERNAL MEDICINE

## 2022-11-28 PROCEDURE — 3075F PR MOST RECENT SYSTOLIC BLOOD PRESS GE 130-139MM HG: ICD-10-PCS | Mod: CPTII,S$GLB,, | Performed by: INTERNAL MEDICINE

## 2022-11-28 PROCEDURE — 3072F LOW RISK FOR RETINOPATHY: CPT | Mod: CPTII,S$GLB,, | Performed by: INTERNAL MEDICINE

## 2022-11-28 PROCEDURE — 1101F PT FALLS ASSESS-DOCD LE1/YR: CPT | Mod: CPTII,S$GLB,, | Performed by: INTERNAL MEDICINE

## 2022-11-28 PROCEDURE — 3079F PR MOST RECENT DIASTOLIC BLOOD PRESSURE 80-89 MM HG: ICD-10-PCS | Mod: CPTII,S$GLB,, | Performed by: INTERNAL MEDICINE

## 2022-11-28 PROCEDURE — 99999 PR PBB SHADOW E&M-EST. PATIENT-LVL IV: CPT | Mod: PBBFAC,,, | Performed by: INTERNAL MEDICINE

## 2022-11-28 PROCEDURE — 3072F PR LOW RISK FOR RETINOPATHY: ICD-10-PCS | Mod: CPTII,S$GLB,, | Performed by: INTERNAL MEDICINE

## 2022-11-28 PROCEDURE — 1126F PR PAIN SEVERITY QUANTIFIED, NO PAIN PRESENT: ICD-10-PCS | Mod: CPTII,S$GLB,, | Performed by: INTERNAL MEDICINE

## 2022-11-28 PROCEDURE — 99999 PR PBB SHADOW E&M-EST. PATIENT-LVL IV: ICD-10-PCS | Mod: PBBFAC,,, | Performed by: INTERNAL MEDICINE

## 2022-11-28 PROCEDURE — 1160F PR REVIEW ALL MEDS BY PRESCRIBER/CLIN PHARMACIST DOCUMENTED: ICD-10-PCS | Mod: CPTII,S$GLB,, | Performed by: INTERNAL MEDICINE

## 2022-11-28 PROCEDURE — 1159F PR MEDICATION LIST DOCUMENTED IN MEDICAL RECORD: ICD-10-PCS | Mod: CPTII,S$GLB,, | Performed by: INTERNAL MEDICINE

## 2022-11-28 PROCEDURE — 1159F MED LIST DOCD IN RCRD: CPT | Mod: CPTII,S$GLB,, | Performed by: INTERNAL MEDICINE

## 2022-11-28 PROCEDURE — 1101F PR PT FALLS ASSESS DOC 0-1 FALLS W/OUT INJ PAST YR: ICD-10-PCS | Mod: CPTII,S$GLB,, | Performed by: INTERNAL MEDICINE

## 2022-11-28 PROCEDURE — 99214 PR OFFICE/OUTPT VISIT, EST, LEVL IV, 30-39 MIN: ICD-10-PCS | Mod: S$GLB,,, | Performed by: INTERNAL MEDICINE

## 2022-11-28 PROCEDURE — 3075F SYST BP GE 130 - 139MM HG: CPT | Mod: CPTII,S$GLB,, | Performed by: INTERNAL MEDICINE

## 2022-11-28 PROCEDURE — 3288F PR FALLS RISK ASSESSMENT DOCUMENTED: ICD-10-PCS | Mod: CPTII,S$GLB,, | Performed by: INTERNAL MEDICINE

## 2022-11-28 PROCEDURE — 1126F AMNT PAIN NOTED NONE PRSNT: CPT | Mod: CPTII,S$GLB,, | Performed by: INTERNAL MEDICINE

## 2022-11-28 PROCEDURE — 1157F PR ADVANCE CARE PLAN OR EQUIV PRESENT IN MEDICAL RECORD: ICD-10-PCS | Mod: CPTII,S$GLB,, | Performed by: INTERNAL MEDICINE

## 2022-11-28 PROCEDURE — 1157F ADVNC CARE PLAN IN RCRD: CPT | Mod: CPTII,S$GLB,, | Performed by: INTERNAL MEDICINE

## 2022-11-28 PROCEDURE — 1160F RVW MEDS BY RX/DR IN RCRD: CPT | Mod: CPTII,S$GLB,, | Performed by: INTERNAL MEDICINE

## 2022-11-28 PROCEDURE — 3079F DIAST BP 80-89 MM HG: CPT | Mod: CPTII,S$GLB,, | Performed by: INTERNAL MEDICINE

## 2022-11-28 RX ORDER — MELOXICAM 7.5 MG/1
7.5 TABLET ORAL DAILY
Qty: 30 TABLET | Refills: 0 | Status: SHIPPED | OUTPATIENT
Start: 2022-11-28 | End: 2023-01-06 | Stop reason: SDUPTHER

## 2022-11-28 RX ORDER — METOPROLOL SUCCINATE 25 MG/1
25 TABLET, EXTENDED RELEASE ORAL DAILY
Qty: 90 TABLET | Refills: 3 | Status: SHIPPED | OUTPATIENT
Start: 2022-11-28 | End: 2023-10-17 | Stop reason: SDUPTHER

## 2022-11-28 RX ORDER — ROSUVASTATIN CALCIUM 20 MG/1
20 TABLET, COATED ORAL DAILY
Qty: 90 TABLET | Refills: 3 | Status: SHIPPED | OUTPATIENT
Start: 2022-11-28 | End: 2023-10-17 | Stop reason: SDUPTHER

## 2022-11-28 RX ORDER — LOSARTAN POTASSIUM 100 MG/1
100 TABLET ORAL DAILY
Qty: 90 TABLET | Refills: 3 | Status: SHIPPED | OUTPATIENT
Start: 2022-11-28 | End: 2023-10-17 | Stop reason: SDUPTHER

## 2022-11-28 RX ORDER — FLUTICASONE PROPIONATE 50 MCG
1 SPRAY, SUSPENSION (ML) NASAL 2 TIMES DAILY
Qty: 16 G | Refills: 2 | Status: SHIPPED | OUTPATIENT
Start: 2022-11-28

## 2022-11-28 NOTE — PROGRESS NOTES
Subjective:       Patient ID: Paris Burris is a 77 y.o. female.    Chief Complaint: Depression    HPI  MDD - previously on wellbutrin xl 300mg daily and venlafaxine 150mg. Weaned down on venlafaxine to 75mg daily and cont on wellbutrin xl 300mg daily. Here for f/u today.  Sister passed away in October.   Follows w/ Nayan Mora LMSW.   GAD7 11/28/2022   1. Feeling nervous, anxious, or on edge? 1   2. Not being able to stop or control worrying? 1   3. Worrying too much about different things? 2   4. Trouble relaxing? 1   5. Being so restless that it is hard to sit still? 0   6. Becoming easily annoyed or irritable? 2   7. Feeling afraid as if something awful might happen? 1   8. If you checked off any problems, how difficult have these problems made it for you to do your work, take care of things at home, or get along with other people? 2   GABBY-7 Score 8     Depression Patient Health Questionnaire 11/28/2022 11/9/2022 9/8/2022 8/4/2022 7/14/2022 5/16/2022 2/24/2022   Over the last two weeks how often have you been bothered by little interest or pleasure in doing things Not at all Several days Several days Several days Not at all Several days Several days   Over the last two weeks how often have you been bothered by feeling down, depressed or hopeless More than half the days Several days Several days More than half the days Not at all More than half the days More than half the days   PHQ-2 Total Score 2 2 2 3 0 3 3   Over the last two weeks how often have you been bothered by trouble falling or staying asleep, or sleeping too much Not at all - Not at all Not at all - Several days More than half the days   Over the last two weeks how often have you been bothered by feeling tired or having little energy Several days - Several days More than half the days - Several days More than half the days   Over the last two weeks how often have you been bothered by a poor appetite or overeating Not at all - Several days Not  This office note has been dictated.     at all - Several days Several days   Over the last two weeks how often have you been bothered by feeling bad about yourself - or that you are a failure or have let yourself or your family down Several days - More than half the days Nearly every day - More than half the days Several days   Over the last two weeks how often have you been bothered by trouble concentrating on things, such as reading the newspaper or watching television Not at all - More than half the days Not at all - Not at all Several days   Over the last two weeks how often have you been bothered by moving or speaking so slowly that other people could have noticed. Or the opposite - being so fidgety or restless that you have been moving around a lot more than usual. Several days - Not at all Not at all - Several days Not at all   Over the last two weeks how often have you been bothered by thoughts that you would be better off dead, or of hurting yourself Not at all - Not at all Not at all - Not at all Not at all   If you checked off any problems, how difficult have these problems made it for you to do your work, take care of things at home or get along with other people? Somewhat difficult - Somewhat difficult Very difficult - Somewhat difficult -   Total Score 5 - 8 8 - 9 10   Interpretation Mild - Mild Mild - Mild Moderate       HTN - losartan 100mg daily and toprol xl 25mg daily Needs refill.     DM2 - metformin xr 500mg qam w/ breakfast.   Has been on plant based diet for the last 2 yrs.   A1c - 11/9/22 7  MAC neg 10/31/22.    HLD - crestor 20mg daily (needs refill), zetia 10mg daily (due to high TG).  Ldl 1/18/22 41.4    Chronic LBP. L shoulder arthritis pain also but much improved. On mobic daily previously but now only prn. Almost out and would like a refill. When she lies down in the L side, she would often get a twitch on the R side - happened after surgery previously. Doesn't happen when lying on her back or on her R side. Not painful.  "Feels funny though. May occur once and then may happen again later.     Starting last Thursday, started w/ sore throat. Sore throat is better now. But when she swallows the saliva, it was nauseous. Lots of throat clearing still and coughing radha at night. Snoring more. No fevers/chills. Thinks she had some swollen glands on the L side previously but is gone now. Did have some L ear pain but that's resolved now.   Didn't take anything for it.   No abdominal pain/vomiting/diarrhea.     Some red spots on the face. Was supposed to see Dr. Partida today but appt was canceled and rescheduled for March so that was frustrating for pt.     Review of Systems  Comprehensive review of systems otherwise negative. See history/subjective section for more details.    Objective:      Physical Exam    /82 (BP Location: Left arm, Patient Position: Sitting, BP Method: Large (Manual))   Pulse 101   Ht 5' 1" (1.549 m)   Wt 70.9 kg (156 lb 4.9 oz)   SpO2 98%   BMI 29.53 kg/m²     GEN - A+OX4, NAD   HEENT - PERRL, EOMI, OP erythematous but no exudates. L TM w/ slight bulge but B TM w/ no dullness/erythema. No sinus tenderness to palpation.   Neck - No thyromegaly or cervical LAD.  CV - RRR, no m/r. L chest PM in place.   Chest - CTAB, no wheezing or rhonchi  Abd - S/NT/ND/+BS.   Ext - 2+BDP and radial pulses. No LE edema.   Neuro - 5/5 BUE and BLE strength.  LN - No axillary LAD appreciated.  Skin - No rash.    Previous labs reviewed.    Assessment/Plan     Paris was seen today for depression.    Diagnoses and all orders for this visit:    Mild episode of recurrent major depressive disorder/GABBY (generalized anxiety disorder) - doing well overall but is grieving over sister who passed away in Oct from likely ICH from fall unexpectedly. Follows w/ Nayan Mora LMSW. Cont current meds.    Benign essential hypertension  -     losartan (COZAAR) 100 MG tablet; Take 1 tablet (100 mg total) by mouth once daily.  -     metoprolol " succinate (TOPROL-XL) 25 MG 24 hr tablet; Take 1 tablet (25 mg total) by mouth once daily.    Controlled type 2 diabetes mellitus without complication, without long-term current use of insulin  -     rosuvastatin (CRESTOR) 20 MG tablet; Take 1 tablet (20 mg total) by mouth once daily.    Hyperlipidemia, unspecified hyperlipidemia type  -     rosuvastatin (CRESTOR) 20 MG tablet; Take 1 tablet (20 mg total) by mouth once daily.    Postnasal drip  -     fluticasone propionate (FLONASE) 50 mcg/actuation nasal spray; 1 spray (50 mcg total) by Each Nostril route 2 (two) times a day.    Other orders  -     meloxicam (MOBIC) 7.5 MG tablet; Take 1 tablet (7.5 mg total) by mouth once daily. Takes prn.       Follow up in about 4 months (around 3/28/2023).      Mandi Huynh MD  Department of Internal Medicine - [facility]  9:03 AM

## 2022-11-30 ENCOUNTER — CLINICAL SUPPORT (OUTPATIENT)
Dept: INTERNAL MEDICINE | Facility: CLINIC | Age: 77
End: 2022-11-30
Payer: MEDICARE

## 2022-11-30 ENCOUNTER — PATIENT MESSAGE (OUTPATIENT)
Dept: PRIMARY CARE CLINIC | Facility: CLINIC | Age: 77
End: 2022-11-30
Payer: MEDICARE

## 2022-11-30 VITALS — WEIGHT: 158.75 LBS | BODY MASS INDEX: 29.99 KG/M2

## 2022-11-30 NOTE — PROGRESS NOTES
Health  Follow-Up Note   [x] Office  [] Phone  Notes from previous session reviewed.   [x] Previous Session Goals unchanged.   [] Patient/Caregiver Change in Goals.  Goals added or changed by Patient/Caregiver since program participation:  Continue same plan  Increase walking        Additional/Changed support that patient/caregiver has experienced/sought?  (Indicate readiness, support from family, friends, others, community groups, etc)    some what    Additional/Changed obstacles that could prevent patient/caregiver from reaching their goals?   Sister , mourning her, daughter is rude to her on Thanksgiving and really hurt her feelings.     Feedback provided:   Praised for continued effort and determination.    Diagnostic values/Desriptors for follow-up as needed for chronic condition(s)   Weight: 72 kg 158.73 lb gain 0.22 total loss 28 lbs  Blood Glucose: glucometer lost will get new one    Interventions:   Health  listened reflectively, validated thoughts and feelings, offered support and encouragement.   Allowed patient to express themselves in a non-biased atmosphere.  Health  assisted pt to problem-solve obstacles such as being in a challenging environment and dealing with these challenges.   Motivational Interviewed interventions utilized (OARS).   Patient responded favorably to interventions and remained actively engaged in the session.   Health  will remain available and connected for patient by phone and/or office visits.   Positive reinforcement, emotional support and encouragement provided.   Focused Education: MI    Plan:  [x] Pt will work on goals as stated above.   [x] Pt will contact Health  for any questions, concerns or needs.  [x] Pt will follow up with Health  in office on  22.  [] Pt will follow up with Health  on phone in:        [x] Health  will remain available.   [] Health  will contact patient by phone in:        [] Health   will consult:        [] Health  will inform Provider via EPIC messaging.     Impression:  1. Behavior is consistent with Action  Stage of Change.   2. Participation level:       [x] Receptive      [x] Interactive      [] Guarded and Resistant      [x] Self Motivated      [] Refused/Declined to participate   3. [x] Pt voiced understanding of all information presented.       [] Pt voiced needing further information/education. This will be arranged.       [x] Pt would benefit from further education/information as identified by this health . This will be arranged.     Jacqueline Perry RN HC

## 2022-12-01 ENCOUNTER — DOCUMENTATION ONLY (OUTPATIENT)
Dept: PRIMARY CARE CLINIC | Facility: CLINIC | Age: 77
End: 2022-12-01
Payer: MEDICARE

## 2022-12-01 NOTE — PROGRESS NOTES
Individual Psychotherapy (LCSW/PhD)  Paris Burris,  12/1/2022    Site:  [place]         Therapeutic Intervention: Met with patient for individual psychotherapy.    Chief complaint/reason for encounter: depression and interpersonal     Interval history and content of current session: Pt expressed concerns about .  has been sleeping more lately and pt is concerned  is depressed. Pt states they often feel lonely as  is often by himself and sleeping often. SW supported pt in processing feelings of frustration and anxiety related to caring for . SW and pt discussed importance of pt maintaining healthy boundaries.SW and pt discussed ways to engage with  including planned conversations. SW and pt discussed possibility of pt speaking with  to engage in services. SW inquired about potential suicidal ideation for , no concerns or signs of suicidal ideation.     Kade text: [phone]    Treatment plan:  Target symptoms: depression  Why chosen therapy is appropriate versus another modality: relevant to diagnosis, evidence based practice  Outcome monitoring methods: self-report, checklist/rating scale  Therapeutic intervention type: supportive psychotherapy    Risk parameters:  Patient reports no suicidal ideation  Patient reports no homicidal ideation  Patient reports no self-injurious behavior  Patient reports no violent behavior    Verbal deficits: None    Patient's response to intervention:  The patient's response to intervention is accepting.    Progress toward goals and other mental status changes:  The patient's progress toward goals is good.    Diagnosis:   No diagnosis found.    Plan: Pt plans to continue individual psychotherapy    Return to clinic: 2 weeks    Length of Service (minutes): 45         today

## 2022-12-07 NOTE — TELEPHONE ENCOUNTER
Clemencia NEUMANN [facility] Staff  Caller: Pt [phone] (Today, 12:32 PM)  Pt called and stated that she put in a request for diabetes supplies and she stated that the number she has just rings and no one answers.     Thank you

## 2022-12-14 ENCOUNTER — CLINICAL SUPPORT (OUTPATIENT)
Dept: INTERNAL MEDICINE | Facility: CLINIC | Age: 77
End: 2022-12-14
Payer: MEDICARE

## 2022-12-14 VITALS — WEIGHT: 155.63 LBS | BODY MASS INDEX: 29.41 KG/M2

## 2022-12-14 NOTE — PROGRESS NOTES
Health  Follow-Up Note   [x] Office  [] Phone  Notes from previous session reviewed.   [x] Previous Session Goals unchanged.   [] Patient/Caregiver Change in Goals.  Goals added or changed by Patient/Caregiver since program participation:  Eating more protein     Assist to set up new I health meter today      Additional/Changed support that patient/caregiver has experienced/sought?  (Indicate readiness, support from family, friends, others, community groups, etc)    Kade    Additional/Changed obstacles that could prevent patient/caregiver from reaching their goals?   Not exercising     Feedback provided:   Praised for good job down 3.1 lbs total loss 31 lbs    Diagnostic values/Desriptors for follow-up as needed for chronic condition(s)   Weight: 70.6 kg 155.6 lb  Blood Glucose: 121 today in office    Interventions:   Health  listened reflectively, validated thoughts and feelings, offered support and encouragement.   Allowed patient to express themselves in a non-biased atmosphere.  Health  assisted pt to problem-solve obstacles such as being in a challenging environment and dealing with these challenges.   Motivational Interviewed interventions utilized (OARS).   Patient responded favorably to interventions and remained actively engaged in the session.   Health  will remain available and connected for patient by phone and/or office visits.   Positive reinforcement, emotional support and encouragement provided.   Focused Education: MI    Plan:  [x] Pt will work on goals as stated above.   [x] Pt will contact Health  for any questions, concerns or needs.  [x] Pt will follow up with Health  in office on  1.4.23.  [] Pt will follow up with Health  on phone in:        [x] Health  will remain available.   [] Health  will contact patient by phone in:        [] Health  will consult:        [] Health  will inform Provider via EPIC messaging.     Impression:  1.  Behavior is consistent with Action Stage of Change.   2. Participation level:       [x] Receptive      [x] Interactive      [] Guarded and Resistant      [x] Self Motivated      [] Refused/Declined to participate   3. [x] Pt voiced understanding of all information presented.       [] Pt voiced needing further information/education. This will be arranged.       [x] Pt would benefit from further education/information as identified by this health . This will be arranged.     Jacqueline Perry RN HC

## 2022-12-16 ENCOUNTER — CLINICAL SUPPORT (OUTPATIENT)
Dept: CARDIOLOGY | Facility: HOSPITAL | Age: 77
End: 2022-12-16
Payer: MEDICARE

## 2022-12-16 DIAGNOSIS — Z95.0 PRESENCE OF CARDIAC PACEMAKER: ICD-10-CM

## 2022-12-16 DIAGNOSIS — I44.2 ATRIOVENTRICULAR BLOCK, COMPLETE: ICD-10-CM

## 2022-12-16 DIAGNOSIS — I42.0 DILATED CARDIOMYOPATHY: ICD-10-CM

## 2022-12-16 DIAGNOSIS — I48.91 UNSPECIFIED ATRIAL FIBRILLATION: ICD-10-CM

## 2022-12-16 PROCEDURE — 93294 REM INTERROG EVL PM/LDLS PM: CPT | Mod: ,,, | Performed by: INTERNAL MEDICINE

## 2022-12-16 PROCEDURE — 93296 REM INTERROG EVL PM/IDS: CPT | Performed by: INTERNAL MEDICINE

## 2022-12-16 PROCEDURE — 93294 CARDIAC DEVICE CHECK - REMOTE: ICD-10-PCS | Mod: ,,, | Performed by: INTERNAL MEDICINE

## 2023-01-04 ENCOUNTER — CLINICAL SUPPORT (OUTPATIENT)
Dept: INTERNAL MEDICINE | Facility: CLINIC | Age: 78
End: 2023-01-04
Payer: MEDICARE

## 2023-01-04 ENCOUNTER — PATIENT MESSAGE (OUTPATIENT)
Dept: OTOLARYNGOLOGY | Facility: CLINIC | Age: 78
End: 2023-01-04
Payer: MEDICARE

## 2023-01-04 VITALS — WEIGHT: 157.63 LBS | BODY MASS INDEX: 29.78 KG/M2

## 2023-01-04 NOTE — PROGRESS NOTES
Health  Follow-Up Note   [x] Office  [] Phone  Notes from previous session reviewed.   [x] Previous Session Goals unchanged.   [] Patient/Caregiver Change in Goals.  Goals added or changed by Patient/Caregiver since program participation:  Intermittent fasting eating in 8 hour window and trying not to eat after 5 pm       Additional/Changed support that patient/caregiver has experienced/sought?  (Indicate readiness, support from family, friends, others, community groups, etc)       Additional/Changed obstacles that could prevent patient/caregiver from reaching their goals?  Husbands depression  Holidays    Feedback provided:   Praised for continued effort and determination.    Diagnostic values/Desriptors for follow-up as needed for chronic condition(s)   Weight: 71.5 kg 157.6 gain 2 lb total loss 29 lbs    Interventions:   Health  listened reflectively, validated thoughts and feelings, offered support and encouragement.   Allowed patient to express themselves in a non-biased atmosphere.  Health  assisted pt to problem-solve obstacles such as being in a challenging environment and dealing with these challenges.   Motivational Interviewed interventions utilized (OARS).   Patient responded favorably to interventions and remained actively engaged in the session.   Health  will remain available and connected for patient by phone and/or office visits.   Positive reinforcement, emotional support and encouragement provided.   Focused Education: MI    Plan:  [x] Pt will work on goals as stated above.   [x] Pt will contact Health  for any questions, concerns or needs.  [x] Pt will follow up with Health  in office on  1.24.23.  [] Pt will follow up with Health  on phone in:        [x] Health  will remain available.   [] Health  will contact patient by phone in:        [] Health  will consult:        [] Health  will inform Provider via EPIC messaging.      Impression:  1. Behavior is consistent with Action Stage of Change.   2. Participation level:       [x] Receptive      [x] Interactive      [] Guarded and Resistant      [x] Self Motivated      [] Refused/Declined to participate   3. [x] Pt voiced understanding of all information presented.       [] Pt voiced needing further information/education. This will be arranged.       [x] Pt would benefit from further education/information as identified by this health . This will be arranged.     Jacqueline Perry RN HC       Partial Purse String (Intermediate) Text: Given the location of the defect and the characteristics of the surrounding skin an intermediate purse string closure was deemed most appropriate.  Undermining was performed circumfirentially around the surgical defect.  A purse string suture was then placed and tightened. Wound tension only allowed a partial closure of the circular defect.

## 2023-01-06 DIAGNOSIS — F41.9 ANXIETY: ICD-10-CM

## 2023-01-06 DIAGNOSIS — E78.1 HYPERTRIGLYCERIDEMIA: ICD-10-CM

## 2023-01-06 DIAGNOSIS — G47.00 INSOMNIA, UNSPECIFIED TYPE: ICD-10-CM

## 2023-01-06 RX ORDER — MELOXICAM 7.5 MG/1
7.5 TABLET ORAL DAILY
Qty: 30 TABLET | Refills: 0 | Status: SHIPPED | OUTPATIENT
Start: 2023-01-06 | End: 2023-02-02 | Stop reason: SDUPTHER

## 2023-01-06 RX ORDER — METFORMIN HYDROCHLORIDE 500 MG/1
500 TABLET, EXTENDED RELEASE ORAL
Qty: 90 TABLET | Refills: 3 | Status: SHIPPED | OUTPATIENT
Start: 2023-01-06 | End: 2023-11-14 | Stop reason: SDUPTHER

## 2023-01-06 RX ORDER — LORAZEPAM 0.5 MG/1
0.5 TABLET ORAL NIGHTLY PRN
Qty: 15 TABLET | Refills: 0 | Status: SHIPPED | OUTPATIENT
Start: 2023-01-06 | End: 2023-02-02 | Stop reason: SDUPTHER

## 2023-01-06 RX ORDER — EZETIMIBE 10 MG/1
10 TABLET ORAL DAILY
Qty: 90 TABLET | Refills: 3 | Status: SHIPPED | OUTPATIENT
Start: 2023-01-06 | End: 2023-10-17 | Stop reason: SDUPTHER

## 2023-01-06 NOTE — TELEPHONE ENCOUNTER
----- Message from Villa Christianson sent at 1/6/2023  3:48 PM CST -----  Contact: pt [phone]  Requesting an RX refill or new RX.  Is this a refill or new RX: new  RX name and strength (copy/paste from chart):  meloxicam (MOBIC) 7.5 MG tablet  Is this a 30 day or 90 day RX: 90  Pharmacy name and phone # (copy/paste from chart):    [facility] [address] [place] [address]  Phone: [phone] Fax: [phone]    Requesting an RX refill or new RX.  Is this a refill or new RX: refill  RX name and strength (copy/paste from chart):  metFORMIN (GLUCOPHAGE-XR) 500 MG ER 24hr tablet  Is this a 30 day or 90 day RX: 90  Pharmacy name and phone # (copy/paste from chart):    [facility] [address] [place] [address]  Phone: [phone] Fax: [phone]  The doctors have asked that we provide their patients with the following 2 reminders -- prescription refills can take up to 72 hours, and a friendly reminder that in the future you can use your [facility] account to request refills: yes

## 2023-01-20 ENCOUNTER — TELEPHONE (OUTPATIENT)
Dept: PRIMARY CARE CLINIC | Facility: CLINIC | Age: 78
End: 2023-01-20
Payer: MEDICARE

## 2023-01-20 NOTE — TELEPHONE ENCOUNTER
Calling reg request for pill packs. Will message pharmacy to assist patient with completing this.   would like as well

## 2023-01-24 ENCOUNTER — CLINICAL SUPPORT (OUTPATIENT)
Dept: INTERNAL MEDICINE | Facility: CLINIC | Age: 78
End: 2023-01-24
Payer: MEDICARE

## 2023-01-24 VITALS — WEIGHT: 157.88 LBS | BODY MASS INDEX: 29.83 KG/M2

## 2023-01-24 NOTE — PROGRESS NOTES
Health  Follow-Up Note   [x] Office  [] Phone  Notes from previous session reviewed.   [x] Previous Session Goals unchanged.   [] Patient/Caregiver Change in Goals.  Goals added or changed by Patient/Caregiver since program participation:  Continue same plan    Try Blue zone diet      Additional/Changed support that patient/caregiver has experienced/sought?  (Indicate readiness, support from family, friends, others, community groups, etc)       Additional/Changed obstacles that could prevent patient/caregiver from reaching their goals?   Kade temper stress on her eating more    Feedback provided:   Praised for continued effort and determination.    Diagnostic values/Desriptors for follow-up as needed for chronic condition(s)   Weight: 71.6 kg 157.85 lb gain 0.22 lb    Interventions:   Health  listened reflectively, validated thoughts and feelings, offered support and encouragement.   Allowed patient to express themselves in a non-biased atmosphere.  Health  assisted pt to problem-solve obstacles such as being in a challenging environment and dealing with these challenges.   Motivational Interviewed interventions utilized (OARS).   Patient responded favorably to interventions and remained actively engaged in the session.   Health  will remain available and connected for patient by phone and/or office visits.   Positive reinforcement, emotional support and encouragement provided.   Focused Education: MI    Plan:  [x] Pt will work on goals as stated above.   [x] Pt will contact Health  for any questions, concerns or needs.  [x] Pt will follow up with Health  in office on  2.14.23.  [] Pt will follow up with Health  on phone in:        [x] Health  will remain available.   [] Health  will contact patient by phone in:        [] Health  will consult:        [] Health  will inform Provider via EPIC messaging.     Impression:  1. Behavior is consistent with Action  Stage of Change.   2. Participation level:       [x] Receptive      [x] Interactive      [] Guarded and Resistant      [x] Self Motivated      [] Refused/Declined to participate   3. [x] Pt voiced understanding of all information presented.       [] Pt voiced needing further information/education. This will be arranged.       [x] Pt would benefit from further education/information as identified by this health . This will be arranged.     Jacqueline Perry RN HC

## 2023-01-25 ENCOUNTER — PATIENT MESSAGE (OUTPATIENT)
Dept: ADMINISTRATIVE | Facility: OTHER | Age: 78
End: 2023-01-25
Payer: MEDICARE

## 2023-01-26 ENCOUNTER — TELEPHONE (OUTPATIENT)
Dept: PRIMARY CARE CLINIC | Facility: CLINIC | Age: 78
End: 2023-01-26
Payer: MEDICARE

## 2023-01-26 NOTE — TELEPHONE ENCOUNTER
----- Message from Clemencia Adames sent at 1/26/2023  8:06 AM CST -----  Contact: Pt [phone]  Pt called and stated that the meloxicam (MOBIC) 7.5 MG tablet is not really working and wanted to know if you can call something else in.       [facility]/pharmacy #[id number] - [place] [address] [facility]  [address] [facility] [address]  Phone: [phone] Fax: [phone]

## 2023-01-27 ENCOUNTER — PATIENT MESSAGE (OUTPATIENT)
Dept: PRIMARY CARE CLINIC | Facility: CLINIC | Age: 78
End: 2023-01-27
Payer: MEDICARE

## 2023-01-30 ENCOUNTER — PATIENT MESSAGE (OUTPATIENT)
Dept: PRIMARY CARE CLINIC | Facility: CLINIC | Age: 78
End: 2023-01-30
Payer: MEDICARE

## 2023-01-31 ENCOUNTER — PATIENT MESSAGE (OUTPATIENT)
Dept: PRIMARY CARE CLINIC | Facility: CLINIC | Age: 78
End: 2023-01-31
Payer: MEDICARE

## 2023-02-01 ENCOUNTER — PATIENT MESSAGE (OUTPATIENT)
Dept: PRIMARY CARE CLINIC | Facility: CLINIC | Age: 78
End: 2023-02-01
Payer: MEDICARE

## 2023-02-01 DIAGNOSIS — G47.00 INSOMNIA, UNSPECIFIED TYPE: ICD-10-CM

## 2023-02-01 DIAGNOSIS — F41.9 ANXIETY: ICD-10-CM

## 2023-02-01 NOTE — TELEPHONE ENCOUNTER
How often is she taking the lorazepam and mobic? They're both prn and not meant for daily or every other day use. And Sea, If you move the 2:20 to 12:40 for audio, I'll see Ms. Toledo tomorrow at the 2:20pm slot. We may have to talk about her meds if she's needing the lorazepam every other day (filled <1 mo ago).

## 2023-02-02 ENCOUNTER — DOCUMENTATION ONLY (OUTPATIENT)
Dept: PRIMARY CARE CLINIC | Facility: CLINIC | Age: 78
End: 2023-02-02
Payer: MEDICARE

## 2023-02-02 ENCOUNTER — TELEPHONE (OUTPATIENT)
Dept: PRIMARY CARE CLINIC | Facility: CLINIC | Age: 78
End: 2023-02-02

## 2023-02-02 ENCOUNTER — OFFICE VISIT (OUTPATIENT)
Dept: PRIMARY CARE CLINIC | Facility: CLINIC | Age: 78
End: 2023-02-02
Payer: MEDICARE

## 2023-02-02 VITALS
HEIGHT: 61 IN | DIASTOLIC BLOOD PRESSURE: 84 MMHG | BODY MASS INDEX: 30.09 KG/M2 | SYSTOLIC BLOOD PRESSURE: 136 MMHG | WEIGHT: 159.38 LBS

## 2023-02-02 DIAGNOSIS — E11.69 HYPERLIPIDEMIA ASSOCIATED WITH TYPE 2 DIABETES MELLITUS: ICD-10-CM

## 2023-02-02 DIAGNOSIS — J84.10 CALCIFIED GRANULOMA OF LUNG: ICD-10-CM

## 2023-02-02 DIAGNOSIS — D69.2 SENILE PURPURA: ICD-10-CM

## 2023-02-02 DIAGNOSIS — Z90.89 STATUS POST PARATHYROIDECTOMY: ICD-10-CM

## 2023-02-02 DIAGNOSIS — E78.5 HYPERLIPIDEMIA ASSOCIATED WITH TYPE 2 DIABETES MELLITUS: ICD-10-CM

## 2023-02-02 DIAGNOSIS — F41.9 ANXIETY: ICD-10-CM

## 2023-02-02 DIAGNOSIS — F10.21 ALCOHOL DEPENDENCE IN REMISSION: ICD-10-CM

## 2023-02-02 DIAGNOSIS — G47.00 INSOMNIA, UNSPECIFIED TYPE: ICD-10-CM

## 2023-02-02 DIAGNOSIS — F33.0 MILD EPISODE OF RECURRENT MAJOR DEPRESSIVE DISORDER: Primary | ICD-10-CM

## 2023-02-02 DIAGNOSIS — I49.5 SICK SINUS SYNDROME: ICD-10-CM

## 2023-02-02 DIAGNOSIS — Z98.890 STATUS POST PARATHYROIDECTOMY: ICD-10-CM

## 2023-02-02 DIAGNOSIS — Z95.0 PACEMAKER: ICD-10-CM

## 2023-02-02 DIAGNOSIS — F41.1 GAD (GENERALIZED ANXIETY DISORDER): ICD-10-CM

## 2023-02-02 DIAGNOSIS — I70.0 AORTIC ATHEROSCLEROSIS: ICD-10-CM

## 2023-02-02 DIAGNOSIS — I42.9 CARDIOMYOPATHY, UNSPECIFIED TYPE: ICD-10-CM

## 2023-02-02 DIAGNOSIS — K44.9 HIATAL HERNIA: ICD-10-CM

## 2023-02-02 DIAGNOSIS — I73.9 PAD (PERIPHERAL ARTERY DISEASE): ICD-10-CM

## 2023-02-02 PROCEDURE — 1123F ACP DISCUSS/DSCN MKR DOCD: CPT | Mod: HCNC,CPTII,S$GLB, | Performed by: INTERNAL MEDICINE

## 2023-02-02 PROCEDURE — 1126F AMNT PAIN NOTED NONE PRSNT: CPT | Mod: HCNC,CPTII,S$GLB, | Performed by: INTERNAL MEDICINE

## 2023-02-02 PROCEDURE — 1158F ADVNC CARE PLAN TLK DOCD: CPT | Mod: HCNC,CPTII,S$GLB, | Performed by: INTERNAL MEDICINE

## 2023-02-02 PROCEDURE — 1101F PT FALLS ASSESS-DOCD LE1/YR: CPT | Mod: HCNC,CPTII,S$GLB, | Performed by: INTERNAL MEDICINE

## 2023-02-02 PROCEDURE — 99999 PR PBB SHADOW E&M-EST. PATIENT-LVL IV: CPT | Mod: PBBFAC,HCNC,, | Performed by: INTERNAL MEDICINE

## 2023-02-02 PROCEDURE — 3075F PR MOST RECENT SYSTOLIC BLOOD PRESS GE 130-139MM HG: ICD-10-PCS | Mod: HCNC,CPTII,S$GLB, | Performed by: INTERNAL MEDICINE

## 2023-02-02 PROCEDURE — 1101F PR PT FALLS ASSESS DOC 0-1 FALLS W/OUT INJ PAST YR: ICD-10-PCS | Mod: HCNC,CPTII,S$GLB, | Performed by: INTERNAL MEDICINE

## 2023-02-02 PROCEDURE — 1159F PR MEDICATION LIST DOCUMENTED IN MEDICAL RECORD: ICD-10-PCS | Mod: HCNC,CPTII,S$GLB, | Performed by: INTERNAL MEDICINE

## 2023-02-02 PROCEDURE — 3288F FALL RISK ASSESSMENT DOCD: CPT | Mod: HCNC,CPTII,S$GLB, | Performed by: INTERNAL MEDICINE

## 2023-02-02 PROCEDURE — 3079F DIAST BP 80-89 MM HG: CPT | Mod: HCNC,CPTII,S$GLB, | Performed by: INTERNAL MEDICINE

## 2023-02-02 PROCEDURE — 3288F PR FALLS RISK ASSESSMENT DOCUMENTED: ICD-10-PCS | Mod: HCNC,CPTII,S$GLB, | Performed by: INTERNAL MEDICINE

## 2023-02-02 PROCEDURE — 1123F PR ADV CARE PLAN DISCUSSED, PLAN OR SURROGATE DOCUMENTED: ICD-10-PCS | Mod: HCNC,CPTII,S$GLB, | Performed by: INTERNAL MEDICINE

## 2023-02-02 PROCEDURE — 3072F PR LOW RISK FOR RETINOPATHY: ICD-10-PCS | Mod: HCNC,CPTII,S$GLB, | Performed by: INTERNAL MEDICINE

## 2023-02-02 PROCEDURE — 1160F RVW MEDS BY RX/DR IN RCRD: CPT | Mod: HCNC,CPTII,S$GLB, | Performed by: INTERNAL MEDICINE

## 2023-02-02 PROCEDURE — 3072F LOW RISK FOR RETINOPATHY: CPT | Mod: HCNC,CPTII,S$GLB, | Performed by: INTERNAL MEDICINE

## 2023-02-02 PROCEDURE — 3079F PR MOST RECENT DIASTOLIC BLOOD PRESSURE 80-89 MM HG: ICD-10-PCS | Mod: HCNC,CPTII,S$GLB, | Performed by: INTERNAL MEDICINE

## 2023-02-02 PROCEDURE — 1126F PR PAIN SEVERITY QUANTIFIED, NO PAIN PRESENT: ICD-10-PCS | Mod: HCNC,CPTII,S$GLB, | Performed by: INTERNAL MEDICINE

## 2023-02-02 PROCEDURE — 1158F PR ADVANCE CARE PLANNING DISCUSS DOCUMENTED IN MEDICAL RECORD: ICD-10-PCS | Mod: HCNC,CPTII,S$GLB, | Performed by: INTERNAL MEDICINE

## 2023-02-02 PROCEDURE — 1159F MED LIST DOCD IN RCRD: CPT | Mod: HCNC,CPTII,S$GLB, | Performed by: INTERNAL MEDICINE

## 2023-02-02 PROCEDURE — 99214 OFFICE O/P EST MOD 30 MIN: CPT | Mod: HCNC,S$GLB,, | Performed by: INTERNAL MEDICINE

## 2023-02-02 PROCEDURE — 1160F PR REVIEW ALL MEDS BY PRESCRIBER/CLIN PHARMACIST DOCUMENTED: ICD-10-PCS | Mod: HCNC,CPTII,S$GLB, | Performed by: INTERNAL MEDICINE

## 2023-02-02 PROCEDURE — 3075F SYST BP GE 130 - 139MM HG: CPT | Mod: HCNC,CPTII,S$GLB, | Performed by: INTERNAL MEDICINE

## 2023-02-02 PROCEDURE — 99999 PR PBB SHADOW E&M-EST. PATIENT-LVL IV: ICD-10-PCS | Mod: PBBFAC,HCNC,, | Performed by: INTERNAL MEDICINE

## 2023-02-02 PROCEDURE — 99214 PR OFFICE/OUTPT VISIT, EST, LEVL IV, 30-39 MIN: ICD-10-PCS | Mod: HCNC,S$GLB,, | Performed by: INTERNAL MEDICINE

## 2023-02-02 RX ORDER — LORAZEPAM 0.5 MG/1
0.5 TABLET ORAL NIGHTLY PRN
Qty: 15 TABLET | Refills: 0 | Status: SHIPPED | OUTPATIENT
Start: 2023-02-02 | End: 2023-07-17

## 2023-02-02 RX ORDER — LORAZEPAM 0.5 MG/1
0.5 TABLET ORAL NIGHTLY PRN
Qty: 15 TABLET | Refills: 0 | OUTPATIENT
Start: 2023-02-02 | End: 2023-03-04

## 2023-02-02 RX ORDER — MELOXICAM 7.5 MG/1
7.5 TABLET ORAL DAILY
Qty: 30 TABLET | Refills: 0 | OUTPATIENT
Start: 2023-02-02 | End: 2023-03-04

## 2023-02-02 RX ORDER — MELOXICAM 7.5 MG/1
7.5 TABLET ORAL DAILY PRN
Qty: 30 TABLET | Refills: 3 | Status: SHIPPED | OUTPATIENT
Start: 2023-02-02 | End: 2023-05-23 | Stop reason: SDUPTHER

## 2023-02-02 NOTE — TELEPHONE ENCOUNTER
----- Message from Mandi Huynh MD sent at 2/2/2023  3:14 PM CST -----  B, I noticed that Ms. Toledo actually didn't check which box she wants on the living will from 2019. Will you make a note to discuss w/ her at the next visit so we can make sure her ACP documents are in order? Thanks!

## 2023-02-02 NOTE — Clinical Note
B, I noticed that Ms. Toledo actually didn't check which box she wants on the living will from 2019. Will you make a note to discuss w/ her at the next visit so we can make sure her ACP documents are in order? Thanks!

## 2023-02-02 NOTE — PROGRESS NOTES
Subjective:       Patient ID: Paris Burris is a 77 y.o. female.    Chief Complaint: Depression    HPI  eAWV 11/9/22    MDD/GABBY - effexor 75mg daily, wellbutrin xl 300mg daily.  Takes ativan 0.5mg about 2-3x/week for anxiety. Some days she gets more anxious than others.   Follows w/ LMSW. Sessions are going well.   Depression Patient Health Questionnaire 2/2/2023 11/28/2022 11/9/2022 9/8/2022 8/4/2022 7/14/2022 5/16/2022   Over the last two weeks how often have you been bothered by little interest or pleasure in doing things Several days Not at all Several days Several days Several days Not at all Several days   Over the last two weeks how often have you been bothered by feeling down, depressed or hopeless Several days More than half the days Several days Several days More than half the days Not at all More than half the days   PHQ-2 Total Score 2 2 2 2 3 0 3   Over the last two weeks how often have you been bothered by trouble falling or staying asleep, or sleeping too much Not at all Not at all - Not at all Not at all - Several days   Over the last two weeks how often have you been bothered by feeling tired or having little energy Several days Several days - Several days More than half the days - Several days   Over the last two weeks how often have you been bothered by a poor appetite or overeating Several days Not at all - Several days Not at all - Several days   Over the last two weeks how often have you been bothered by feeling bad about yourself - or that you are a failure or have let yourself or your family down Several days Several days - More than half the days Nearly every day - More than half the days   Over the last two weeks how often have you been bothered by trouble concentrating on things, such as reading the newspaper or watching television Several days Not at all - More than half the days Not at all - Not at all   Over the last two weeks how often have you been bothered by moving or speaking so  slowly that other people could have noticed. Or the opposite - being so fidgety or restless that you have been moving around a lot more than usual. Several days Several days - Not at all Not at all - Several days   Over the last two weeks how often have you been bothered by thoughts that you would be better off dead, or of hurting yourself Several days Not at all - Not at all Not at all - Not at all   If you checked off any problems, how difficult have these problems made it for you to do your work, take care of things at home or get along with other people? Not difficult at all Somewhat difficult - Somewhat difficult Very difficult - Somewhat difficult   Total Score 8 5 - 8 8 - 9   Interpretation Mild Mild - Mild Mild - Mild       GAD7 2/2/2023 11/28/2022   1. Feeling nervous, anxious, or on edge? 2 1   2. Not being able to stop or control worrying? 1 1   3. Worrying too much about different things? 2 2   4. Trouble relaxing? 1 1   5. Being so restless that it is hard to sit still? 1 0   6. Becoming easily annoyed or irritable? 1 2   7. Feeling afraid as if something awful might happen? 1 1   8. If you checked off any problems, how difficult have these problems made it for you to do your work, take care of things at home, or get along with other people? 2 2   GABBY-7 Score 9 8     DM2 - metformin xr 500mg qam w breakfast.   Trulicity injections were painful for her.  A1c - 11/9/22 7  Eye - Dr. Manrique 9/1/22 - no retinopathy.  MAC - neg 10/31/22.    HLD - crestor 20mg daily and zetia 10mg daily.   LDL - 1/18/22 41.4 w/ .  Previous CT A/P 9/28/18:  1. Moderate sized hiatal hernia with adjacent bibasilar atelectatic change, similar to prior CT examination.  2. Hepatomegaly.  Stable right hepatic lobe cyst.  3. Aortoiliac atherosclerosis.  4. Additional incidental findings as detailed above    CT L shoulder 9/23/22 - Left upper lobe calcified granuloma. Prominent atherosclerotic calcification of the visualized  "thoracic aorta and coronary arteries.     SVT during hernia repair in 2019. SSS s/p PM.  Last seen Domonique Clem, NILESH 11/8/22. F/u in 1 yr.    COOKIE - couldn't tolerate CPAP.    GERD h/o fundoplication. EGD 5/31/19 w/ Dr. Little showed erythematous mucosa (neg for H pylori on path) in the gastric body. Fundoplication was not tight.  On nexium 40mg qam.     Alcohol dependence in remission.     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 Hawkins    Easy bruising.     Interested in pill packing.  AM:  Venlafaxine 75mg  Rosuvastatin 20  Toprol xl 25mg  Metformin 500mg   Buproprion xl 300mg  Losartan 100mg   Zetia 10mg      Review of Systems   Constitutional:  Negative for activity change (walks an hour every day) and chills.   HENT:  Negative for congestion and postnasal drip.    Respiratory:  Negative for cough, shortness of breath and wheezing.    Cardiovascular:  Negative for chest pain, palpitations and leg swelling.   Gastrointestinal:  Negative for abdominal pain, constipation, diarrhea, nausea and vomiting.   Genitourinary:  Negative for dysuria and frequency.   Musculoskeletal:  Negative for arthralgias.   Skin:  Negative for rash.   Neurological:  Negative for dizziness, light-headedness and headaches.   Hematological:  Bruises/bleeds easily.   Psychiatric/Behavioral:  Positive for dysphoric mood. The patient is nervous/anxious.        Objective:      Physical Exam    /84 (BP Location: Left arm, Patient Position: Sitting, BP Method: Large (Manual))   Ht 5' 1" (1.549 m)   Wt 72.3 kg (159 lb 6.3 oz)   BMI 30.12 kg/m²     GEN - A+OX4, NAD   HEENT - PERRL, EOMI, OP clear. MMM. R hearing aid in place.   Neck - No thyromegaly or cervical LAD. No thyroid masses felt.  CV - RRR, no m/r   Chest - CTAB, no wheezing or rhonchi. L chest pacemaker in place. No pain.   Abd - S/NT/ND/+BS.   Ext - 2+BDP and radial pulses. No C/C/E.  Neuro - 5/5 BUE and BLE strength. Normal gait. "   MSK - no spinal tenderness to palpation.   Skin - No rash. BUE w/ small bruising.     Previous labs reviewed.       Assessment/Plan     Paris was seen today for depression.    Diagnoses and all orders for this visit:    Mild episode of recurrent major depressive disorder/GABBY (generalized anxiety disorder) - doing well. Cont current meds. Cont to f/u w/ LMSW.     Hyperlipidemia associated with type 2 diabetes mellitus - cont current meds  -     Comprehensive Metabolic Panel; Future  -     Hemoglobin A1C; Future  -     Lipid Panel; Future    Aortic atherosclerosis - cont crestor 20.   -     Lipid Panel; Future    PAD (peripheral artery disease) - no claudication. Cont crestor 20.   -     Lipid Panel; Future     Calcified granuloma of lung - incidental finding on CT. Not symptomatic.     Sick sinus syndrome s/p Pacemaker - no issues.     Status post parathyroidectomy - check PTH and CMP.     Alcohol dependence in remission - part of AA. Continued cessation.     Cardiomyopathy, unspecified type - f/u w/ Domonique Nj NP in Dec.     Senile purpura  -     CBC Auto Differential; Future    Insomnia, unspecified type - recommend sparing use. Do not combine w/ alcohol. #15 refilled.  -     LORazepam (ATIVAN) 0.5 MG tablet; Take 1 tablet (0.5 mg total) by mouth nightly as needed for Anxiety.    Anxiety  -     LORazepam (ATIVAN) 0.5 MG tablet; Take 1 tablet (0.5 mg total) by mouth nightly as needed for Anxiety.    Hiatal hernia - avoid trigger foods.    Other orders  -     meloxicam (MOBIC) 7.5 MG tablet; Take 1 tablet (7.5 mg total) by mouth daily as needed for Pain.    Advance Care Planning     Date: 02/02/2023    Power of   I initiated the process of advance care planning today and explained the importance of this process to the patient.  I introduced the concept of advance directives to the patient, as well. Then the patient received detailed information about the importance of designating a Health Care Power  of  (HCPOA). She was also instructed to communicate with this person about their wishes for future healthcare, should she become sick and lose decision-making capacity. The patient has previously appointed a HCPOA. After our discussion, the patient has decided to complete a HCPOA and has appointed her significant other, health care agent:  Jean Burris  & health care agent number:  [phone]  I spent a total time of 5 minutes discussing this issue with the patient.           Follow up in about 3 months (around 5/2/2023). Labs a few days prior.      Mandi Huynh MD  Department of Internal Medicine - [facility]  11:28 AM

## 2023-02-07 DIAGNOSIS — Z00.00 ENCOUNTER FOR MEDICARE ANNUAL WELLNESS EXAM: ICD-10-CM

## 2023-02-07 NOTE — PROGRESS NOTES
Adherence packed for 28 days using Dispill:      Morning card:  Bupropion  mg  Ezetimibe 10 mg   Losartan 100 mg  Metformin 500 mg   Metoprolol succinate ER 25 mg  Rosuvastatin 20 mg  Venlafaxine 75 mg

## 2023-02-09 ENCOUNTER — DOCUMENTATION ONLY (OUTPATIENT)
Dept: PRIMARY CARE CLINIC | Facility: CLINIC | Age: 78
End: 2023-02-09
Payer: MEDICARE

## 2023-02-09 DIAGNOSIS — Z00.00 ENCOUNTER FOR MEDICARE ANNUAL WELLNESS EXAM: ICD-10-CM

## 2023-02-09 NOTE — PROGRESS NOTES
Individual Psychotherapy (LCSW/PhD)  Paris Burris,  2/9/2023    Site:  [facility]         Therapeutic Intervention: Met with patient for individual psychotherapy.    Chief complaint/reason for encounter: anxiety and interpersonal     Interval history and content of current session: Pt feels upset because their  is pending a lot of time in bed. Pt feels overwhelmed because they are having to take care of housework and feel alone. Pt and  spoke about SWs session  with  last week.     SW reports having difficult recalling all of conversation but states the felt attacked. SW and supported pt in exploring feelings and processing frustrations. SW encouraged pt to engage with  for conversation about house duties and sharing chores. SW suggested  pt and  join  for couples therapy.    Treatment plan:  Target symptoms:  interpersonal  Why chosen therapy is appropriate versus another modality: relevant to diagnosis, evidence based practice  Outcome monitoring methods: self-report, checklist/rating scale  Therapeutic intervention type: supportive psychotherapy    Risk parameters:  Patient reports no suicidal ideation  Patient reports no homicidal ideation  Patient reports no self-injurious behavior  Patient reports no violent behavior    Verbal deficits: None    Patient's response to intervention:  The patient's response to intervention is accepting.    Progress toward goals and other mental status changes:  The patient's progress toward goals is good.    Diagnosis:   No diagnosis found.    Plan: Pt plans to continue individual psychotherapy    Return to clinic: 2 weeks    Length of Service (minutes): 60       Requested Prescriptions     Pending Prescriptions Disp Refills    potassium chloride (KLOR-CON) 10 mEq tablet 30 Tab 1     Sig: Take 1 Tab by mouth daily.

## 2023-02-13 ENCOUNTER — TELEPHONE (OUTPATIENT)
Dept: PRIMARY CARE CLINIC | Facility: CLINIC | Age: 78
End: 2023-02-13
Payer: MEDICARE

## 2023-02-13 DIAGNOSIS — F33.41 MDD (MAJOR DEPRESSIVE DISORDER), RECURRENT, IN PARTIAL REMISSION: Primary | ICD-10-CM

## 2023-02-14 ENCOUNTER — CLINICAL SUPPORT (OUTPATIENT)
Dept: INTERNAL MEDICINE | Facility: CLINIC | Age: 78
End: 2023-02-14
Payer: MEDICARE

## 2023-02-14 VITALS — BODY MASS INDEX: 30.03 KG/M2 | WEIGHT: 158.94 LBS

## 2023-02-14 NOTE — PROGRESS NOTES
Health  Follow-Up Note   [x] Office  [] Phone  Notes from previous session reviewed.   [] Previous Session Goals unchanged.   [x] Patient/Caregiver Change in Goals.  Goals added or changed by Patient/Caregiver since program participation:  Measure fruit and limit to 2 per day  Increase exercise get back to walking        Additional/Changed support that patient/caregiver has experienced/sought?  (Indicate readiness, support from family, friends, others, community groups, etc)      started seeing someone with Kade    Additional/Changed obstacles that could prevent patient/caregiver from reaching their goals?   Eating too much fruit    Feedback provided:   Praised for continued effort and determination.    Diagnostic values/Desriptors for follow-up as needed for chronic condition(s)   Weight: 72.1 kg 158.95 lb -gain 1.32 lb total loss 27 lbs  Blood Glucose: 159 in office today after breakfast    Interventions:   Health  listened reflectively, validated thoughts and feelings, offered support and encouragement.   Allowed patient to express themselves in a non-biased atmosphere.  Health  assisted pt to problem-solve obstacles such as being in a challenging environment and dealing with these challenges.   Motivational Interviewed interventions utilized (OARS).   Patient responded favorably to interventions and remained actively engaged in the session.   Health  will remain available and connected for patient by phone and/or office visits.   Positive reinforcement, emotional support and encouragement provided.   Focused Education: MI    Plan:  [x] Pt will work on goals as stated above.   [x] Pt will contact Health  for any questions, concerns or needs.  [x] Pt will follow up with Health  in office on  3.8.23.  [] Pt will follow up with Health  on phone in:        [x] Health  will remain available.   [] Health  will contact patient by phone in:        [] Health   will consult:        [] Health  will inform Provider via EPIC messaging.     Impression:  1. Behavior is consistent with Action Stage of Change.   2. Participation level:       [x] Receptive      [x] Interactive      [] Guarded and Resistant      [x] Self Motivated      [] Refused/Declined to participate   3. [x] Pt voiced understanding of all information presented.       [] Pt voiced needing further information/education. This will be arranged.       [x] Pt would benefit from further education/information as identified by this health . This will be arranged.     Jacqueline Perry RN HC

## 2023-02-17 NOTE — PROGRESS NOTES
[facility]        Neck program started 17 and back program started 17, VISIT 15 for back, IM retest and visit 7 neck, visit 16 for program    Name: Paris Burris  Clinic Number: [id number]  Date of Treatment: 2017   Diagnosis:   Encounter Diagnosis   Name Primary?    DDD (degenerative disc disease), lumbar Yes     Physician: Cee Woodall, *    Pain pattern determined: 1, PEP extensions and flexion tolerated for back and 1 REP for neck with retractions    Plan of care signed: 17 for low back and 17 for neck program  POC signed 17 and 17  POC due: 17      Time in:  10:30 am  Time Out: 11:30 am  Total Treatment time: 60 min    Precautions: SEE PMH,  anxiety, cancer, depression, DM, wrist fx, neuropathy in L foot.          Visit # authorized: 12  Authorization period: 2018  Plan of care Expiration: 2017    Visit #: 15 lumbar and 7 cervical, visit 16 program    Evaluation:  17  Reasessment due:  17 done 17  Assessment due: 17 done 17  Assess neck and back 17      Subjective     Paris reports some minimal pain in her back today.  She states that she is sore after sitting for an extended period of time.  She states that she has no neck pain.  She feels stronger in her legs.  Pleased with the progress she has made so far.     Patient reports their pain to be 1/10 on a 0-10 scale with 0 being no pain and 10 being the worst pain imaginable.  Pain Location: low back and mid-upper back     Prior Treatment: PT at [facility]  Prior functional status: Independent  DME owned/used: no  Occupation:  Retired (taught reading)   Leisure: garden, walking, read, knit   Pts goals: Be able to sit longer, be able to descend stairs easier.          Objective     Baseline IM Testing Results: back 17  Date of testin2017  ROM 0-30 deg   Max Peak Torque 111 ft lbs    Min Peak Torque   28 ft lbs   Flex/Ext Ratio   [facility] [place] [facility]  Discharge Note  Short Stay    Procedure(s) (LRB):  Left L5 Transforaminal Epidural Steroid Injection (Left)      OUTCOME: Patient tolerated treatment/procedure well without complication and is now ready for discharge.    DISPOSITION: Home or Self Care    FINAL DIAGNOSIS:  <principal problem not specified>    FOLLOWUP: In clinic    DISCHARGE INSTRUCTIONS:  No discharge procedures on file.       Discharge Diagnosis:DDD (degenerative disc disease), lumbar [M51.36]  Lumbar radiculopathy [M54.16]  Condition on Discharge: Stable.  Diet on Discharge: Same as before.  Activity: as per instruction sheet.  Discharge to: Home with a responsible adult.  Follow up: as per Discharge instructions     4/1   % below normative data 12% below         [facility]  IM Testing Results: back 17  Date of testing:   ROM 0-42 degrees   Max Peak Torque 140 ft lbs    Min Peak Torque   61 ft lbs   Flex/Ext Ratio  2.5/1   % below normative data 1% below     44% gain in average strength      MOVEMENT LOSS back-reassessed 17   ROM Loss   Flexion moderate loss improved to min loss   Extension minimal loss     Side bending Right moderate loss -improved to min loss   Side bending Left moderate loss - improved to min loss   Rotation Right moderate loss - improved to min loss   Rotation Left moderate loss - improved to min loss       Baseline IM Testing Results:   neck  Date of testin2017  ROM 45 - 99 deg   Max Peak Torque 226    Min Peak Torque 112    Flex/Ext Ratio 1.76:1   % below normative data -6.67              Treatment    Pt was instructed in and performed the following:     Paris received therapeutic exercises to develop/improved posture, cardiovascular endurance, muscular endurance, lumbar  ROM, strength and muscular endurance for 50 minutes including the following exercises:     [facility] 2017   Visit Number 16   VAS Pain Rating 1   Treadmill Time (in min.) 10   Speed 3   Retraction in Sitting 10   Rotation -   Scapular Retraction 10   Extension in Lying -   Extension in Standing 10   Flexion in Lying 10   Cervical Extension Seat Pad -   Seat Adjustment -   Top Dead Center -   Counterweight -   Cervical Flexion -   Cervical Extension -   Cervical Peak Torque -   Cervical Weight 174   Repetitions 20   Rating of Perceived Exertion 3   Lumbar Extension Seat Pad -   Femur Restraint -   Top Dead Center -   Counterweight -   Lumbar Flexion -   Lumbar Extension -   Lumbar Peak Torque -   Min Torque -   Percent From Norm -   Percent Change from Initial -   Lumbar Weight 66   Repetitions 18   Rating of Perceived Exertion 3   Ice - Z Lie (in min.) 10       Flexion in lie  Modified piriformis stretch 10  s/h 3x B  LTR 10x B  Ext in standing 10x  Handouts given for all there ex 6/2/17    Neck:  Retractions 10x min cuing  scap retractions 15x    Peripheral muscle strengthening which included 1 set of 15-20 repetitions at a slow, controlled 7 second per rep pace focused on strengthening supporting musculature for improved body mechanics and functional mobility.  Pt and therapist focused on proper form during treatment to ensure optimal strengthening of each targeted muscle group.  Machines were utilized including torso rotation, leg extension, leg curl, chest press,  upright row, tricep extension, biceps, leg press and hip abduction.    Written Home Exercises Provided: BACK  -walking at [facility] 10-15 min 2-3/week recommended  -use of lumbar roll -yes  -ext in standing 3/day   -flexion in lie 3/day   -ext on elbows prone 2/day ( not doing as much 5/12/17)   Modified piriformis, LTR.   Scapular retractions   Handouts were given to the patient. Pt demo fair understanding of the education provided. Paris demonstrated fair return demonstration of activities.   Lumbar roll use compliance: yes and has ball.   HEP NECK  Retractions in sitting 10x 3x/day  Scapular retractions 10x 3x/day     Additional exercises taught this treatment session:  Reviewed handouts and printed for patient again 6/2/17      Assessment     Pt presents with minimal low back and no neck pain pre, that did decrease during and ,post session. She tolerated cervical med ex well, with minimal cuing for speed, no pain. She tolerated Med X lumbar machine today with increased resistance and no c/o LBP.  She demo her HEP well with mild vc for tech. She tolerated all exercises without reports of pain or discomfort today.  Overall, pt is doing better since starting the program and states she is complaint with her HEP.  Pt will continue to benefit from skilled outpatient physical therapy to address the deficits stated in the impairment chart, provide  pt/family education and to maximize pt's level of independence in the home and community environment.   She reports her neck pain bothers her more than her back pain and she is happy to have started  program for her neck.      Pt's spiritual, cultural and educational needs considered and pt agreeable to plan of care and goals as stated below:   Medical necessity is demonstrated by the following problem list.   Pt presents with the following impairments:   History  Co-morbidities and personal factors that may impact the plan of care Examination  Body Structures and Functions, activity limitations and participation restrictions that may impact the plan of care Clinical Presentation Decision Making/ Complexity Score   Co-morbidities:   advanced age           Personal Factors:   age Body Regions:   back  lower extremities     Body Systems:   ROM  strength     Activity limitations:   Learning and applying knowledge  no deficits     General Tasks and Commands  no deficits     Communication  communicating with/receiving spoken language  no deficits     Mobility  no deficits     Self care  no deficits     Domestic Life  no deficits     Interactions/Relationships  no deficits     Life Areas  no deficits     Community and Social Life  no deficits     Participation Restrictions:   Cleaning, reading in sitting    stable and uncomplicated    low            GOALS: Pt is in agreement with the following goals.     Short term goals: 6 weeks or 10 visits BACK  1. Pt will demonstrate increased lumbar ROM by at least 3 degrees from the initial ROM value with improvements noted in functional ROM and ability to perform ADLs  MET  2. Pt will demonstrate increased by > 10 % MET  3. Patient report a reduction in worst pain score by 1-2 points for improved tolerance during work and recreational activities  MET  4. Pt able to perform HEP correctly with minimal cueing or supervision for therapist  MET        Long term goals: 13 weeks or 20  visits BACK  1. Pt will demonstrate increased lumbar ROM by at least 3 degrees from initial ROM value, resulting in improved ability to perform functional fwd bending while standing and sitting. MET  2. Pt will demonstrate increased maximum isometric torque value by 5% when compared to the initial value resulting in improved ability to perform bending, lifting, and carrying activities safely, confidently.  MET  3. Pt to demonstrate ability to independently control and reduce their pain through posture positioning and mechanical movements throughout a typical day.  4. Patient will demonstrate improved overall function per FOTO Survey to CJ = at least 1% but < 20% impaired, limited or restricted score or less.    Short term goals: 6 weeks or 10 visits NECK  1.  Pt will demonstrate increased isometric torque by 5% from initial test indicating positive muscular response to program of progressive resistance training  2. Pt will demonstrate reduced pain and improved functional outcomes as reported on the patient centered questionnaires. Report 2-4 points reduction NDI indicating improvement in function and pain  3.. Pt will demonstrate independence with reducing or controlling symptoms with ther ex, movement, or position independently, able to reduce pain 1-2 points on pain scale using strategies taught in therapy        Long term goals: 13 weeks or 20 visits  NECK  1. Pt will demonstratte increased cervical ROM as measured by med ex by 6 degrees from initial test which results in full functional ROM of neck for ease with ADLs and driving  2. Pt will demonstrate increased isometric torque by 10% from initial test to improve ability to lift and carry.   3.Patient will demonstrate improved overall function per FOTO Survey to CI = at least 1% but < 20% impaired, limited or restricted score or less.   4. Pt will demonstrate independence with reducing or controlling symptoms with ther ex, movement, or position independently,  able to reduce pain 2-4 points on pain scale using strategies taught in therapy  5. Pt will demonstrate independence with the HEP at discharge.         FOTO:    1st visit: 33% limited  5th visit: 41% limited back  FOTO BACK VISIT 10 33 %  VISIT FOR NECK 6/16/17 % Impaired: 33      Plan   Continue with established Plan of Care towards established PT goals.

## 2023-02-20 ENCOUNTER — PATIENT MESSAGE (OUTPATIENT)
Dept: PRIMARY CARE CLINIC | Facility: CLINIC | Age: 78
End: 2023-02-20
Payer: MEDICARE

## 2023-02-27 ENCOUNTER — TELEPHONE (OUTPATIENT)
Dept: PRIMARY CARE CLINIC | Facility: CLINIC | Age: 78
End: 2023-02-27

## 2023-02-27 NOTE — TELEPHONE ENCOUNTER
Tried to contact pt / Left a message for patient to call the office back. Re bookout for 5/2 -- booked her appointment for the very next friday.

## 2023-02-28 ENCOUNTER — PATIENT MESSAGE (OUTPATIENT)
Dept: PRIMARY CARE CLINIC | Facility: CLINIC | Age: 78
End: 2023-02-28
Payer: MEDICARE

## 2023-03-02 NOTE — TELEPHONE ENCOUNTER
Pt. Needs refill on tramadol she said she knows its a little early but she will be gone on vacation until August 9th and will be out of the meds by then.  
approved  
Home

## 2023-03-06 NOTE — TELEPHONE ENCOUNTER
[facility] Staff  Caller: pt [phone] (Today,  9:42 AM)  Patient is available for appt on  March 7 & 8 afternoon March 21 morning. Please call and advise.     Thank you and have a great day.

## 2023-03-06 NOTE — PROGRESS NOTES
Adherence packed for 28 days using Dispill:        Morning card:  Bupropion  mg  Ezetimibe 10 mg   Losartan 100 mg  Metformin  mg   Metoprolol succinate ER 25 mg  Rosuvastatin 20 mg  Venlafaxine 75 mg        waited at the pharmacy for packs.

## 2023-03-07 ENCOUNTER — OFFICE VISIT (OUTPATIENT)
Dept: DERMATOLOGY | Facility: CLINIC | Age: 78
End: 2023-03-07
Payer: MEDICARE

## 2023-03-07 DIAGNOSIS — L57.8 ACTINIC ELASTOSIS: Primary | ICD-10-CM

## 2023-03-07 DIAGNOSIS — L73.0 ACNE SCARRING: ICD-10-CM

## 2023-03-07 PROCEDURE — 3072F PR LOW RISK FOR RETINOPATHY: ICD-10-PCS | Mod: CPTII,S$GLB,, | Performed by: DERMATOLOGY

## 2023-03-07 PROCEDURE — 1160F RVW MEDS BY RX/DR IN RCRD: CPT | Mod: CPTII,S$GLB,, | Performed by: DERMATOLOGY

## 2023-03-07 PROCEDURE — 1101F PT FALLS ASSESS-DOCD LE1/YR: CPT | Mod: CPTII,S$GLB,, | Performed by: DERMATOLOGY

## 2023-03-07 PROCEDURE — 1157F PR ADVANCE CARE PLAN OR EQUIV PRESENT IN MEDICAL RECORD: ICD-10-PCS | Mod: CPTII,S$GLB,, | Performed by: DERMATOLOGY

## 2023-03-07 PROCEDURE — 1101F PR PT FALLS ASSESS DOC 0-1 FALLS W/OUT INJ PAST YR: ICD-10-PCS | Mod: CPTII,S$GLB,, | Performed by: DERMATOLOGY

## 2023-03-07 PROCEDURE — 99214 OFFICE O/P EST MOD 30 MIN: CPT | Mod: S$GLB,,, | Performed by: DERMATOLOGY

## 2023-03-07 PROCEDURE — 1159F MED LIST DOCD IN RCRD: CPT | Mod: CPTII,S$GLB,, | Performed by: DERMATOLOGY

## 2023-03-07 PROCEDURE — 3072F LOW RISK FOR RETINOPATHY: CPT | Mod: CPTII,S$GLB,, | Performed by: DERMATOLOGY

## 2023-03-07 PROCEDURE — 1157F ADVNC CARE PLAN IN RCRD: CPT | Mod: CPTII,S$GLB,, | Performed by: DERMATOLOGY

## 2023-03-07 PROCEDURE — 1160F PR REVIEW ALL MEDS BY PRESCRIBER/CLIN PHARMACIST DOCUMENTED: ICD-10-PCS | Mod: CPTII,S$GLB,, | Performed by: DERMATOLOGY

## 2023-03-07 PROCEDURE — 1159F PR MEDICATION LIST DOCUMENTED IN MEDICAL RECORD: ICD-10-PCS | Mod: CPTII,S$GLB,, | Performed by: DERMATOLOGY

## 2023-03-07 PROCEDURE — 3288F FALL RISK ASSESSMENT DOCD: CPT | Mod: CPTII,S$GLB,, | Performed by: DERMATOLOGY

## 2023-03-07 PROCEDURE — 1126F AMNT PAIN NOTED NONE PRSNT: CPT | Mod: CPTII,S$GLB,, | Performed by: DERMATOLOGY

## 2023-03-07 PROCEDURE — 99214 PR OFFICE/OUTPT VISIT, EST, LEVL IV, 30-39 MIN: ICD-10-PCS | Mod: S$GLB,,, | Performed by: DERMATOLOGY

## 2023-03-07 PROCEDURE — 1126F PR PAIN SEVERITY QUANTIFIED, NO PAIN PRESENT: ICD-10-PCS | Mod: CPTII,S$GLB,, | Performed by: DERMATOLOGY

## 2023-03-07 PROCEDURE — 3288F PR FALLS RISK ASSESSMENT DOCUMENTED: ICD-10-PCS | Mod: CPTII,S$GLB,, | Performed by: DERMATOLOGY

## 2023-03-07 RX ORDER — TRETINOIN 0.5 MG/G
CREAM TOPICAL
Qty: 30 G | Refills: 5 | Status: SHIPPED | OUTPATIENT
Start: 2023-03-07 | End: 2023-07-17

## 2023-03-07 NOTE — PATIENT INSTRUCTIONS
Your prescription has been sent to the compounding pharmacy [facility].  They are located in [place] at [address] just before the [address].  If you have any questions, please call the pharmacy at [phone].  Website: [email or web address]     RETINOIDS   Your Doctor has prescribed a topical retinoid for your skin.  A retinoid is a vitamin A-derived product used to treat a variety of skin conditions including acne, actinic keratoses (pre-skin cancers), uneven pigmentation from sun damage, fine lines and wrinkles, and enlarged pores.      How do they work?   Retinoids increase skin cell turn over from the normal 30 days to five or six days, minimizing clogged pores--the major factor in acne.  Retinoids can also repair the DNA in cells damaged by the sun, helping to even out skin pigmentation and clear pre-skin cancers.  They stimulate collagen remodeling and repair, reversing signs of maturing skin.   They can shrink oil glands and minimize the appearance of large pores. These effects can not be appreciated unless the medication is used on a consistent basis!    How do I use a retinoid?   After washing with a mild cleanser (CeraVe, Cetaphil, Neutrogena, Purpose), the skin should be moisturized with a non-retinol containing moisturizer such as Cerave cream or PM lotion. Then, a thin layer of medication is applied to the forehead, nose, cheeks, and chin (and around eyes if treating fine wrinkles) at night.  The amount of medication needed to cover the entire face should be no more than the size of a green pea. Irritation around the eyes can be treated with Vaseline at night.     What if my skin appears dry, red, and is peeling?   Retinoids do not cause dry skin but rather they cause the top layer of the skin to shed, giving an appearance of dry skin.  In fact, new healthy skin cells are replacing the older, damaged cells on the surface. This usually occurs the first 2-4 weeks as the skin is  adjusting to the medication.  It is reasonable to use the medication every other night or even every three nights until your skin adjusts.  You can use a MILD exfoliant to remove the peeling skin (Aveeno daily clarifying pads, Aqua glycolic face cream) and can apply a moisturizer throughout the day as needed. Retinoids come in a variety of strengths and vehicles, and your doctor can find one best for you.  If you cannot tolerate prescription strength retinoids, over the counter products with retinol may be beneficial (Olay ProX wrinkle cream, OMARI deep wrinkle cream, Green Cream at [employer])    Will my skin be more sensitive in the sun?   You will need to use a sunscreen with SPF 30 daily.  Retinoids will cause the outermost layer of the skin to be thinner and thus more sensitive to ultraviolet rays.  However, remember that over time, retinoids actually make the skin thicker by enhancing collagen deposition which protects the skin from sun damage.     When will I see results?   If you are using a retinoid for acne, you should see some improvement in 6-8 weeks.  Do not be alarmed if you find that your acne gets WORSE before it gets better- KEEP USING THE MEDICATION- this is a normal response and your acne will improve if you can stick with it.     If you are using the medication for anti-aging and skin dyspigmentation, you may see results in 3 months, but most effects are not visible until 6 months.  Retinoids are clinically proven to reverse signs of aging, but only if used on a CONSISTENT BASIS!   *Remember that retinoids should not be used if you are pregnant.  *Discontinue use 1 week prior to waxing, as skin is more likely to tear.

## 2023-03-07 NOTE — PROGRESS NOTES
Patient Information  Name: Paris Burris  : 1945  MRN: [id number]     Referring Physician:  No ref. provider found   Primary Care Physician:  Mandi Huynh MD   Date of Visit: 2023      Subjective:     History of Present lllness:    Paris Burris is a 77 y.o. female who presents with a chief complaint of acne scarring and sun damage.    Location: face and neck  Duration: years  Signs/Symptoms: sun damage and acne scarring  Relieving factors/Prior treatments: none    Clinical documentation obtained by nursing staff reviewed.    Review of Systems   Skin:  Positive for activity-related sunscreen use.     Objective:   Physical Exam   Constitutional: She appears well-developed and well-nourished. No distress.   Neurological: She is alert and oriented to person, place, and time. She is not disoriented.   Psychiatric: She has a normal mood and affect.   Skin:   Areas Examined (abnormalities noted in diagram):   Head / Face Inspection Performed          Diagram Legend     Erythematous scaling macule/papule c/w actinic keratosis       Vascular papule c/w angioma      Pigmented verrucoid papule/plaque c/w seborrheic keratosis      Yellow umbilicated papule c/w sebaceous hyperplasia      Irregularly shaped tan macule c/w lentigo     1-2 mm smooth white papules consistent with Milia      Movable subcutaneous cyst with punctum c/w epidermal inclusion cyst      Subcutaneous movable cyst c/w pilar cyst      Firm pink to brown papule c/w dermatofibroma      Pedunculated fleshy papule(s) c/w skin tag(s)      Evenly pigmented macule c/w junctional nevus     Mildly variegated pigmented, slightly irregular-bordered macule c/w mildly atypical nevus      Flesh colored to evenly pigmented papule c/w intradermal nevus       Pink pearly papule/plaque c/w basal cell carcinoma      Erythematous hyperkeratotic cursted plaque c/w SCC      Surgical scar with no sign of skin cancer recurrence      Open and closed comedones       Inflammatory papules and pustules      Verrucoid papule consistent consistent with wart     Erythematous eczematous patches and plaques     Dystrophic onycholytic nail with subungual debris c/w onychomycosis     Umbilicated papule    Erythematous-base heme-crusted tan verrucoid plaque consistent with inflamed seborrheic keratosis     Erythematous Silvery Scaling Plaque c/w Psoriasis     See annotation    No images are attached to the encounter or orders placed in the encounter.      [] Data reviewed  [] Prior external notes reviewed  [] Independent review of test  [] Management discussed with another provider  [] Independent historian    Assessment / Plan:        Actinic elastosis  - chronic problem, not at treatment goal  Actinic elastosis affects people who have had long-term sun exposure and sun damage.  Retinoids (adapalene, tretinoin, tazarotene) increase skin cell turn over from the normal 30 days to five or six days, which encourages the shedding of these sun-damaged surface skin cells  Retinoids can also repair the DNA in cells damaged by the sun, helping to even out skin pigmentation and clear pre-cancerous cells.  They also stimulate collagen remodeling and repair, reversing signs of maturing skin.   Retinoids may make the skin dry, red, and irritated. Moisturize daily with a non-comedogenic moisturizer. If still dry, then use the retinoid every other night or every third night as tolerated. Avoid using around corners of eyes, nose, and mouth.  Everyday use a broad-spectrum, water-resistant sunscreen with SPF of 30 or higher--reapply every 2 hours. Seek shade and wear sun-protective clothing/hat.  You may see results in 3 months, but most effects are not visible until 6 months, and it must be used on a consistent basis.   *Remember that retinoids should not be used if you are pregnant.  *Discontinue use 1 week prior to waxing, as skin is more likely to tear.     -     tretinoin (RETIN-A) 0.05 % cream;  Compound tretinoin 0.05% / niacinamide 2% cream. Apply a pea-sized amount to entire face qhs.  Dispense: 30 g; Refill: 5    Acne scarring  - chronic problem, not at treatment goal  -     tretinoin (RETIN-A) 0.05 % cream; Compound tretinoin 0.05% / niacinamide 2% cream. Apply a pea-sized amount to entire face qhs.  Dispense: 30 g; Refill: 5    Follow up in about 6 months (around 9/7/2023) for follow up, or sooner if any new problems or changing lesions.      Linnea Partida MD, FAAD  [facility]

## 2023-03-07 NOTE — TELEPHONE ENCOUNTER
Clemencia NEUMANN [facility] Staff  Caller: Pt [phone] (Today, 12:33 PM)  Patient is returning a phone call.   Who left a message for the patient: Nereida   Does patient know what this is regarding:  Appt   Would you like a call back, or a response through your [facility] portal?:   call back   Comments:

## 2023-03-07 NOTE — TELEPHONE ENCOUNTER
[facility] Staff  Caller: pt [phone] (Today,  9:42 AM)  Patient is available for appt on  March 7 & 8 afternoon March 21 morning. Please call and advise.     Thank you and have a great day.

## 2023-03-09 ENCOUNTER — CLINICAL SUPPORT (OUTPATIENT)
Dept: INTERNAL MEDICINE | Facility: CLINIC | Age: 78
End: 2023-03-09
Payer: MEDICARE

## 2023-03-09 VITALS — WEIGHT: 159.19 LBS | BODY MASS INDEX: 30.08 KG/M2

## 2023-03-09 NOTE — PROGRESS NOTES
Health  Follow-Up Note   [x] Office  [] Phone  Notes from previous session reviewed.   [x] Previous Session Goals unchanged.   [] Patient/Caregiver Change in Goals.  Goals added or changed by Patient/Caregiver since program participation:  Bring the I-glucose monitor with her next time so we can check glucose  Get back to checking blood sugar at least once per week use her old one if she prefers    Labs 4.28 to recheck a1c  Increase exercise walking      Additional/Changed support that patient/caregiver has experienced/sought?  (Indicate readiness, support from family, friends, others, community groups, etc)       Additional/Changed obstacles that could prevent patient/caregiver from reaching their goals?   Doesn't like the digital medicine glucometer likes her old one    Feedback provided:   Praised for continued effort and determination.    Diagnostic values/Desriptors for follow-up as needed for chronic condition(s)   Weight: 72.2 kg 159.17 lb gain 0.22 lb total loss 27 lbs  Blood Glucose:has not checked since last visit will check when she gets home    Interventions:   Health  listened reflectively, validated thoughts and feelings, offered support and encouragement.   Allowed patient to express themselves in a non-biased atmosphere.  Health  assisted pt to problem-solve obstacles such as being in a challenging environment and dealing with these challenges.   Motivational Interviewed interventions utilized (OARS).   Patient responded favorably to interventions and remained actively engaged in the session.   Health  will remain available and connected for patient by phone and/or office visits.   Positive reinforcement, emotional support and encouragement provided.   Focused Education: MI    Plan:  [x] Pt will work on goals as stated above.   [x] Pt will contact Health  for any questions, concerns or needs.  [x] Pt will follow up with Health  in office on  4.3.23.  [] Pt will  follow up with Health  on phone in:        [x] Health  will remain available.   [] Health  will contact patient by phone in:        [] Health  will consult:        [] Health  will inform Provider via EPIC messaging.     Impression:  1. Behavior is consistent with ACtion Stage of Change.   2. Participation level:       [x] Receptive      [x] Interactive      [] Guarded and Resistant      [x] Self Motivated      [] Refused/Declined to participate   3. [x] Pt voiced understanding of all information presented.       [] Pt voiced needing further information/education. This will be arranged.       [x] Pt would benefit from further education/information as identified by this health . This will be arranged.     Jacqueline Perry RN HC

## 2023-03-13 ENCOUNTER — PATIENT MESSAGE (OUTPATIENT)
Dept: PRIMARY CARE CLINIC | Facility: CLINIC | Age: 78
End: 2023-03-13
Payer: MEDICARE

## 2023-03-13 ENCOUNTER — TELEPHONE (OUTPATIENT)
Dept: PRIMARY CARE CLINIC | Facility: CLINIC | Age: 78
End: 2023-03-13
Payer: MEDICARE

## 2023-03-16 ENCOUNTER — CLINICAL SUPPORT (OUTPATIENT)
Dept: CARDIOLOGY | Facility: HOSPITAL | Age: 78
End: 2023-03-16
Payer: MEDICARE

## 2023-03-16 ENCOUNTER — CLINICAL SUPPORT (OUTPATIENT)
Dept: PRIMARY CARE CLINIC | Facility: CLINIC | Age: 78
End: 2023-03-16
Payer: MEDICARE

## 2023-03-16 DIAGNOSIS — I44.2 ATRIOVENTRICULAR BLOCK, COMPLETE: ICD-10-CM

## 2023-03-16 DIAGNOSIS — Z95.0 PRESENCE OF CARDIAC PACEMAKER: ICD-10-CM

## 2023-03-16 DIAGNOSIS — F33.41 MDD (MAJOR DEPRESSIVE DISORDER), RECURRENT, IN PARTIAL REMISSION: Primary | ICD-10-CM

## 2023-03-16 DIAGNOSIS — I42.9 CARDIOMYOPATHY, UNSPECIFIED: ICD-10-CM

## 2023-03-16 PROCEDURE — 93296 REM INTERROG EVL PM/IDS: CPT | Mod: HCNC | Performed by: INTERNAL MEDICINE

## 2023-03-16 NOTE — PROGRESS NOTES
"Individual Psychotherapy (LCSW/PhD)  Paris Burris,  3/16/2023    Site:  [place]           Therapeutic Intervention: Met with patient for individual psychotherapy.    Chief complaint/reason for encounter: depression, interpersonal     Interval history and content of current session: Pt and  here for therapy. Pt and   report increase in communication.  states they having been "more aware" of being attentive and responding to wife's needs. LCSW and couple discussed ways  can communicate barriers to wife including when they aren't well enough to help wife with chores etc.    LCSW and couple discussed lifestyle changes with  no longer working and being home with wife.     LCSW inquired about couple's desire to move to assisted living or a smaller home. Couple states they have discussed future options but would like to explore options further. Pt and  shared their preferences and concerns about living in assisted living. Pt shared their concern for food options and environment including socializing etc. and living arrangements.    Pt and  agreed to explore future living options and continue to practice helpful communication Pt and  discussed ways to approach de cluttering house.      Treatment plan:  Target symptoms:  depression  Why chosen therapy is appropriate versus another modality: relevant to diagnosis, evidence based practice  Outcome monitoring methods: self-report, feedback from family, checklist/rating scale  Therapeutic intervention type: insight oriented psychotherapy, supportive psychotherapy    Risk parameters:  Patient reports no suicidal ideation  Patient reports no homicidal ideation  Patient reports no self-injurious behavior  Patient reports no violent behavior    Verbal deficits: None    Patient's response to intervention:  The patient's response to intervention is accepting.    Progress toward goals and other mental status changes:  The " patient's progress toward goals is excellent.    Diagnosis:   No diagnosis found.    Plan: Pt plans to continue individual psychotherapy    Return to clinic: as needed    Length of Service (minutes): 60

## 2023-03-30 ENCOUNTER — CLINICAL SUPPORT (OUTPATIENT)
Dept: PRIMARY CARE CLINIC | Facility: CLINIC | Age: 78
End: 2023-03-30
Payer: MEDICARE

## 2023-03-30 DIAGNOSIS — F41.9 ANXIETY: Primary | ICD-10-CM

## 2023-03-30 PROCEDURE — 99499 UNLISTED E&M SERVICE: CPT | Mod: HCNC,S$GLB,, | Performed by: SOCIAL WORKER

## 2023-03-30 PROCEDURE — 99499 NO LOS: ICD-10-PCS | Mod: HCNC,S$GLB,, | Performed by: SOCIAL WORKER

## 2023-03-30 NOTE — PROGRESS NOTES
"Individual Psychotherapy (LCSW/PhD)  Paris Burris,  3/30/2023    Site:  [place]         Therapeutic Intervention: Met with patient for family psychotherapy.    Chief complaint/reason for encounter: anxiety and interpersonal     Interval history and content of current session: Pt reports increased communication with .  has increased their support I.e. chores, more communication. Pt still feels like there is room for more progress in communication I.e  responsive. Pt and LCSW discussed pt's emotional reaction to their non-response.LCSW,pt and  discussed productive vs. Un-productive ways of handling these situations.     states they can tell when pt is frustrated by their face which bothers them.  also expressed concerns that pt is sometimes "distracted" when driving. LCSW supported pt and  in discussion of solutions for current problems. LCSW and pt discussed using non-verbal cues to signal to each other.    Pt would like to increase social activities with  and would like to  to initiate more plans for them. Pt and  discussed social events they may be able to plan. LCSW and couple discussed ways to share responsibility for plans I.e. every other week.     Couple discussed several trips and planning.     Treatment plan:  Target symptoms: anxiety , interpersonal  Why chosen therapy is appropriate versus another modality: relevant to diagnosis, evidence based practice  Outcome monitoring methods: self-report, checklist/rating scale  Therapeutic intervention type: supportive psychotherapy    Risk parameters:  Patient reports no suicidal ideation  Patient reports no homicidal ideation  Patient reports no self-injurious behavior  Patient reports no violent behavior    Verbal deficits: None    Patient's response to intervention:  The patient's response to intervention is accepting.    Progress toward goals and other mental status changes:  The " patient's progress toward goals is excellent.    Diagnosis:   No diagnosis found.    Plan: Pt plans to continue individual psychotherapy    Return to clinic: 2 weeks    Length of Service (minutes): 60

## 2023-04-03 ENCOUNTER — CLINICAL SUPPORT (OUTPATIENT)
Dept: INTERNAL MEDICINE | Facility: CLINIC | Age: 78
End: 2023-04-03
Payer: MEDICARE

## 2023-04-03 VITALS — WEIGHT: 158.75 LBS | BODY MASS INDEX: 29.99 KG/M2

## 2023-04-03 NOTE — PROGRESS NOTES
Health  Follow-Up Note   [x] Office  [] Phone  Notes from previous session reviewed.   [x] Previous Session Goals unchanged.   [] Patient/Caregiver Change in Goals.  Goals added or changed by Patient/Caregiver since program participation:  Increase exercise   Continue same plan       Additional/Changed support that patient/caregiver has experienced/sought?  (Indicate readiness, support from family, friends, others, community groups, etc)       Additional/Changed obstacles that could prevent patient/caregiver from reaching their goals?   Depressed so not exercising (daughter)    Feedback provided:   Praised for good job down 0.44 lbs    Diagnostic values/Desriptors for follow-up as needed for chronic condition(s)   Weight: 72 kg 158.73 lb down 0.44 lbs total loss 28 lbs  Blood Glucose: 118  today in office    Interventions:   Health  listened reflectively, validated thoughts and feelings, offered support and encouragement.   Allowed patient to express themselves in a non-biased atmosphere.  Health  assisted pt to problem-solve obstacles such as being in a challenging environment and dealing with these challenges.   Motivational Interviewed interventions utilized (OARS).   Patient responded favorably to interventions and remained actively engaged in the session.   Health  will remain available and connected for patient by phone and/or office visits.   Positive reinforcement, emotional support and encouragement provided.   Focused Education: MI    Plan:  [x] Pt will work on goals as stated above.   [x] Pt will contact Health  for any questions, concerns or needs.  [x] Pt will follow up with Health  in office on  5.8.23.  [] Pt will follow up with Health  on phone in:        [x] Health  will remain available.   [] Health  will contact patient by phone in:        [] Health  will consult:        [] Health  will inform Provider via EPIC messaging.      Impression:  1. Behavior is consistent with Action Stage of Change.   2. Participation level:       [x] Receptive      [x] Interactive      [] Guarded and Resistant      [x] Self Motivated      [] Refused/Declined to participate   3. [x] Pt voiced understanding of all information presented.       [] Pt voiced needing further information/education. This will be arranged.       [x] Pt would benefit from further education/information as identified by this health . This will be arranged.     Jacqueline Perry RN HC

## 2023-04-03 NOTE — PROGRESS NOTES
Adherence packed for 28 days using Dispill:        Morning card:  Bupropion  mg  Ezetimibe 10 mg   Losartan 100 mg  Metformin  mg   Metoprolol succinate ER 25 mg  Rosuvastatin 20 mg  Venlafaxine 75 mg

## 2023-04-13 ENCOUNTER — DOCUMENTATION ONLY (OUTPATIENT)
Dept: PRIMARY CARE CLINIC | Facility: CLINIC | Age: 78
End: 2023-04-13
Payer: MEDICARE

## 2023-04-13 NOTE — PROGRESS NOTES
"Individual Psychotherapy (LCSW/PhD)  Paris Burris,  4/13/2023    Site:  [place]         Therapeutic Intervention: Met with patient for individual psychotherapy.    Chief complaint/reason for encounter: interpersonal     Interval history and content of current session: Pt and  feel "better" and feel like their communication has been "much better". Pt and  have been using verbal and non-verbal communication to better support and communicate  with each other.  Pt and  have been making plans to socialize more and have been discussing spending more time with each other. Pt' and  are supporting each other in setting regular and healthy sleeping habits I.e. going to bed at a set time.     LCSW and pt discussed efforts of pt and  to support each other with house chores and cleaning the home. Pt expressed concern over 's "excessive sleepiness" during the day. LCSW and pt discussed ways to support each other and prioritize time for chores and types of chores.     Pt and  will work on plan to organize chores.      Treatment plan:  Target symptoms:  interpersonal  Why chosen therapy is appropriate versus another modality: relevant to diagnosis, evidence based practice  Outcome monitoring methods: self-report, checklist/rating scale  Therapeutic intervention type: supportive psychotherapy    Risk parameters:  Patient reports no suicidal ideation  Patient reports no homicidal ideation  Patient reports no self-injurious behavior  Patient reports no violent behavior    Verbal deficits: None    Patient's response to intervention:  The patient's response to intervention is accepting.    Progress toward goals and other mental status changes:  The patient's progress toward goals is good.    Diagnosis:   No diagnosis found.    Plan: Pt plans to continue individual psychotherapy    Return to clinic: as needed    Length of Service (minutes): 60      "

## 2023-04-21 ENCOUNTER — TELEPHONE (OUTPATIENT)
Dept: PRIMARY CARE CLINIC | Facility: CLINIC | Age: 78
End: 2023-04-21

## 2023-04-21 NOTE — TELEPHONE ENCOUNTER
----- Message from [place] sent at 4/21/2023 10:41 AM CDT -----  Contact: pt [phone]  Patient would like a call back Re:  Nayan Mora.  Message was sent yesterday to Nayan, she has not received a call.  Please call and advise.    Thank you and have a great day.

## 2023-05-03 ENCOUNTER — LAB VISIT (OUTPATIENT)
Dept: LAB | Facility: HOSPITAL | Age: 78
End: 2023-05-03
Payer: MEDICARE

## 2023-05-03 DIAGNOSIS — E11.69 HYPERLIPIDEMIA ASSOCIATED WITH TYPE 2 DIABETES MELLITUS: ICD-10-CM

## 2023-05-03 DIAGNOSIS — Z98.890 STATUS POST PARATHYROIDECTOMY: ICD-10-CM

## 2023-05-03 DIAGNOSIS — Z90.89 STATUS POST PARATHYROIDECTOMY: ICD-10-CM

## 2023-05-03 DIAGNOSIS — I73.9 PAD (PERIPHERAL ARTERY DISEASE): ICD-10-CM

## 2023-05-03 DIAGNOSIS — E78.5 HYPERLIPIDEMIA ASSOCIATED WITH TYPE 2 DIABETES MELLITUS: ICD-10-CM

## 2023-05-03 DIAGNOSIS — D69.2 SENILE PURPURA: ICD-10-CM

## 2023-05-03 DIAGNOSIS — I70.0 AORTIC ATHEROSCLEROSIS: ICD-10-CM

## 2023-05-03 DIAGNOSIS — F33.1 MODERATE EPISODE OF RECURRENT MAJOR DEPRESSIVE DISORDER: ICD-10-CM

## 2023-05-03 LAB
ALBUMIN SERPL BCP-MCNC: 4.2 G/DL (ref 3.5–5.2)
ALP SERPL-CCNC: 65 U/L (ref 55–135)
ALT SERPL W/O P-5'-P-CCNC: 40 U/L (ref 10–44)
ANION GAP SERPL CALC-SCNC: 10 MMOL/L (ref 8–16)
AST SERPL-CCNC: 31 U/L (ref 10–40)
BASOPHILS # BLD AUTO: 0.04 K/UL (ref 0–0.2)
BASOPHILS NFR BLD: 0.7 % (ref 0–1.9)
BILIRUB SERPL-MCNC: 0.6 MG/DL (ref 0.1–1)
BUN SERPL-MCNC: 15 MG/DL (ref 8–23)
CALCIUM SERPL-MCNC: 9.2 MG/DL (ref 8.7–10.5)
CHLORIDE SERPL-SCNC: 104 MMOL/L (ref 95–110)
CHOLEST SERPL-MCNC: 106 MG/DL (ref 120–199)
CHOLEST/HDLC SERPL: 3 {RATIO} (ref 2–5)
CO2 SERPL-SCNC: 26 MMOL/L (ref 23–29)
CREAT SERPL-MCNC: 0.8 MG/DL (ref 0.5–1.4)
DIFFERENTIAL METHOD: NORMAL
EOSINOPHIL # BLD AUTO: 0.1 K/UL (ref 0–0.5)
EOSINOPHIL NFR BLD: 2.3 % (ref 0–8)
ERYTHROCYTE [DISTWIDTH] IN BLOOD BY AUTOMATED COUNT: 12.2 % (ref 11.5–14.5)
EST. GFR  (NO RACE VARIABLE): >60 ML/MIN/1.73 M^2
ESTIMATED AVG GLUCOSE: 146 MG/DL (ref 68–131)
GLUCOSE SERPL-MCNC: 127 MG/DL (ref 70–110)
HBA1C MFR BLD: 6.7 % (ref 4–5.6)
HCT VFR BLD AUTO: 42.9 % (ref 37–48.5)
HDLC SERPL-MCNC: 35 MG/DL (ref 40–75)
HDLC SERPL: 33 % (ref 20–50)
HGB BLD-MCNC: 13.8 G/DL (ref 12–16)
IMM GRANULOCYTES # BLD AUTO: 0.02 K/UL (ref 0–0.04)
IMM GRANULOCYTES NFR BLD AUTO: 0.3 % (ref 0–0.5)
LDLC SERPL CALC-MCNC: 34.6 MG/DL (ref 63–159)
LYMPHOCYTES # BLD AUTO: 2.4 K/UL (ref 1–4.8)
LYMPHOCYTES NFR BLD: 42 % (ref 18–48)
MCH RBC QN AUTO: 30.8 PG (ref 27–31)
MCHC RBC AUTO-ENTMCNC: 32.2 G/DL (ref 32–36)
MCV RBC AUTO: 96 FL (ref 82–98)
MONOCYTES # BLD AUTO: 0.5 K/UL (ref 0.3–1)
MONOCYTES NFR BLD: 8 % (ref 4–15)
NEUTROPHILS # BLD AUTO: 2.7 K/UL (ref 1.8–7.7)
NEUTROPHILS NFR BLD: 46.7 % (ref 38–73)
NONHDLC SERPL-MCNC: 71 MG/DL
NRBC BLD-RTO: 0 /100 WBC
PLATELET # BLD AUTO: 185 K/UL (ref 150–450)
PMV BLD AUTO: 10.9 FL (ref 9.2–12.9)
POTASSIUM SERPL-SCNC: 4.4 MMOL/L (ref 3.5–5.1)
PROT SERPL-MCNC: 7.2 G/DL (ref 6–8.4)
PTH-INTACT SERPL-MCNC: 35.3 PG/ML (ref 9–77)
RBC # BLD AUTO: 4.48 M/UL (ref 4–5.4)
SODIUM SERPL-SCNC: 140 MMOL/L (ref 136–145)
TRIGL SERPL-MCNC: 182 MG/DL (ref 30–150)
WBC # BLD AUTO: 5.74 K/UL (ref 3.9–12.7)

## 2023-05-03 PROCEDURE — 80053 COMPREHEN METABOLIC PANEL: CPT | Mod: HCNC | Performed by: INTERNAL MEDICINE

## 2023-05-03 PROCEDURE — 83036 HEMOGLOBIN GLYCOSYLATED A1C: CPT | Mod: HCNC | Performed by: INTERNAL MEDICINE

## 2023-05-03 PROCEDURE — 83970 ASSAY OF PARATHORMONE: CPT | Mod: HCNC | Performed by: INTERNAL MEDICINE

## 2023-05-03 PROCEDURE — 85025 COMPLETE CBC W/AUTO DIFF WBC: CPT | Mod: HCNC | Performed by: INTERNAL MEDICINE

## 2023-05-03 PROCEDURE — 80061 LIPID PANEL: CPT | Mod: HCNC | Performed by: INTERNAL MEDICINE

## 2023-05-03 PROCEDURE — 36415 COLL VENOUS BLD VENIPUNCTURE: CPT | Mod: HCNC | Performed by: INTERNAL MEDICINE

## 2023-05-03 RX ORDER — BUPROPION HYDROCHLORIDE 300 MG/1
300 TABLET ORAL DAILY
Qty: 90 TABLET | Refills: 3 | Status: SHIPPED | OUTPATIENT
Start: 2023-05-03

## 2023-05-15 ENCOUNTER — OFFICE VISIT (OUTPATIENT)
Dept: PRIMARY CARE CLINIC | Facility: CLINIC | Age: 78
End: 2023-05-15
Payer: MEDICARE

## 2023-05-15 VITALS
SYSTOLIC BLOOD PRESSURE: 117 MMHG | HEART RATE: 81 BPM | WEIGHT: 157.44 LBS | HEIGHT: 61 IN | DIASTOLIC BLOOD PRESSURE: 61 MMHG | OXYGEN SATURATION: 98 % | RESPIRATION RATE: 18 BRPM | BODY MASS INDEX: 29.72 KG/M2

## 2023-05-15 DIAGNOSIS — F41.1 GAD (GENERALIZED ANXIETY DISORDER): ICD-10-CM

## 2023-05-15 DIAGNOSIS — E78.5 HYPERLIPIDEMIA ASSOCIATED WITH TYPE 2 DIABETES MELLITUS: ICD-10-CM

## 2023-05-15 DIAGNOSIS — M51.36 DDD (DEGENERATIVE DISC DISEASE), LUMBAR: ICD-10-CM

## 2023-05-15 DIAGNOSIS — E11.69 HYPERLIPIDEMIA ASSOCIATED WITH TYPE 2 DIABETES MELLITUS: ICD-10-CM

## 2023-05-15 DIAGNOSIS — F33.0 MDD (MAJOR DEPRESSIVE DISORDER), RECURRENT EPISODE, MILD: Primary | ICD-10-CM

## 2023-05-15 DIAGNOSIS — I10 BENIGN ESSENTIAL HYPERTENSION: ICD-10-CM

## 2023-05-15 PROCEDURE — 1101F PR PT FALLS ASSESS DOC 0-1 FALLS W/OUT INJ PAST YR: ICD-10-PCS | Mod: CPTII,S$GLB,, | Performed by: INTERNAL MEDICINE

## 2023-05-15 PROCEDURE — 99214 PR OFFICE/OUTPT VISIT, EST, LEVL IV, 30-39 MIN: ICD-10-PCS | Mod: S$GLB,,, | Performed by: INTERNAL MEDICINE

## 2023-05-15 PROCEDURE — 1159F MED LIST DOCD IN RCRD: CPT | Mod: CPTII,S$GLB,, | Performed by: INTERNAL MEDICINE

## 2023-05-15 PROCEDURE — 1126F AMNT PAIN NOTED NONE PRSNT: CPT | Mod: CPTII,S$GLB,, | Performed by: INTERNAL MEDICINE

## 2023-05-15 PROCEDURE — 3078F DIAST BP <80 MM HG: CPT | Mod: CPTII,S$GLB,, | Performed by: INTERNAL MEDICINE

## 2023-05-15 PROCEDURE — 1101F PT FALLS ASSESS-DOCD LE1/YR: CPT | Mod: CPTII,S$GLB,, | Performed by: INTERNAL MEDICINE

## 2023-05-15 PROCEDURE — 3074F SYST BP LT 130 MM HG: CPT | Mod: CPTII,S$GLB,, | Performed by: INTERNAL MEDICINE

## 2023-05-15 PROCEDURE — 3078F PR MOST RECENT DIASTOLIC BLOOD PRESSURE < 80 MM HG: ICD-10-PCS | Mod: CPTII,S$GLB,, | Performed by: INTERNAL MEDICINE

## 2023-05-15 PROCEDURE — 1126F PR PAIN SEVERITY QUANTIFIED, NO PAIN PRESENT: ICD-10-PCS | Mod: CPTII,S$GLB,, | Performed by: INTERNAL MEDICINE

## 2023-05-15 PROCEDURE — 3072F LOW RISK FOR RETINOPATHY: CPT | Mod: CPTII,S$GLB,, | Performed by: INTERNAL MEDICINE

## 2023-05-15 PROCEDURE — 1160F PR REVIEW ALL MEDS BY PRESCRIBER/CLIN PHARMACIST DOCUMENTED: ICD-10-PCS | Mod: CPTII,S$GLB,, | Performed by: INTERNAL MEDICINE

## 2023-05-15 PROCEDURE — 3074F PR MOST RECENT SYSTOLIC BLOOD PRESSURE < 130 MM HG: ICD-10-PCS | Mod: CPTII,S$GLB,, | Performed by: INTERNAL MEDICINE

## 2023-05-15 PROCEDURE — 1160F RVW MEDS BY RX/DR IN RCRD: CPT | Mod: CPTII,S$GLB,, | Performed by: INTERNAL MEDICINE

## 2023-05-15 PROCEDURE — 1157F PR ADVANCE CARE PLAN OR EQUIV PRESENT IN MEDICAL RECORD: ICD-10-PCS | Mod: CPTII,S$GLB,, | Performed by: INTERNAL MEDICINE

## 2023-05-15 PROCEDURE — 99999 PR PBB SHADOW E&M-EST. PATIENT-LVL IV: CPT | Mod: PBBFAC,,, | Performed by: INTERNAL MEDICINE

## 2023-05-15 PROCEDURE — 99999 PR PBB SHADOW E&M-EST. PATIENT-LVL IV: ICD-10-PCS | Mod: PBBFAC,,, | Performed by: INTERNAL MEDICINE

## 2023-05-15 PROCEDURE — 3288F PR FALLS RISK ASSESSMENT DOCUMENTED: ICD-10-PCS | Mod: CPTII,S$GLB,, | Performed by: INTERNAL MEDICINE

## 2023-05-15 PROCEDURE — 1157F ADVNC CARE PLAN IN RCRD: CPT | Mod: CPTII,S$GLB,, | Performed by: INTERNAL MEDICINE

## 2023-05-15 PROCEDURE — 3288F FALL RISK ASSESSMENT DOCD: CPT | Mod: CPTII,S$GLB,, | Performed by: INTERNAL MEDICINE

## 2023-05-15 PROCEDURE — 99214 OFFICE O/P EST MOD 30 MIN: CPT | Mod: S$GLB,,, | Performed by: INTERNAL MEDICINE

## 2023-05-15 PROCEDURE — 1159F PR MEDICATION LIST DOCUMENTED IN MEDICAL RECORD: ICD-10-PCS | Mod: CPTII,S$GLB,, | Performed by: INTERNAL MEDICINE

## 2023-05-15 PROCEDURE — 3072F PR LOW RISK FOR RETINOPATHY: ICD-10-PCS | Mod: CPTII,S$GLB,, | Performed by: INTERNAL MEDICINE

## 2023-05-15 NOTE — Clinical Note
Nayan, in case Ms. Burris forgets to mention it, she's had a strained relationship w/ her daughter who's an  and that's really affecting her anxiety/depression. She says her daughter is an  and difficult to speak w/. Would benefit from some tips.  Mandi

## 2023-05-15 NOTE — PROGRESS NOTES
Subjective:       Patient ID: Paris Burris is a 77 y.o. female.    Chief Complaint: depression    HPI  Pillpacking via PCW.     MDD/GABBY - effexor 75mg qd, wellbutrin xl 300mg daily  Anxiety causing insomnia at times. Rare use of ativan 0.5mg prn.  w/ last fill #15 2/2/23.  Follows w/ LMSW.  Feels her anxiety/depression are little worse. Strained relationship w/ daughter, Janiya.  Thinking about going up on meds but will try to walk and exercise first.   Depression Patient Health Questionnaire 5/15/2023 2/2/2023 11/28/2022 11/9/2022 9/8/2022 8/4/2022 7/14/2022   Over the last two weeks how often have you been bothered by little interest or pleasure in doing things Several days Several days Not at all Several days Several days Several days Not at all   Over the last two weeks how often have you been bothered by feeling down, depressed or hopeless More than half the days Several days More than half the days Several days Several days More than half the days Not at all   PHQ-2 Total Score 3 2 2 2 2 3 0   Over the last two weeks how often have you been bothered by trouble falling or staying asleep, or sleeping too much Not at all Not at all Not at all - Not at all Not at all -   Over the last two weeks how often have you been bothered by feeling tired or having little energy Several days Several days Several days - Several days More than half the days -   Over the last two weeks how often have you been bothered by a poor appetite or overeating Not at all Several days Not at all - Several days Not at all -   Over the last two weeks how often have you been bothered by feeling bad about yourself - or that you are a failure or have let yourself or your family down Several days Several days Several days - More than half the days Nearly every day -   Over the last two weeks how often have you been bothered by trouble concentrating on things, such as reading the newspaper or watching television Not at all Several days  "Not at all - More than half the days Not at all -   Over the last two weeks how often have you been bothered by moving or speaking so slowly that other people could have noticed. Or the opposite - being so fidgety or restless that you have been moving around a lot more than usual. Not at all Several days Several days - Not at all Not at all -   Over the last two weeks how often have you been bothered by thoughts that you would be better off dead, or of hurting yourself Not at all Several days Not at all - Not at all Not at all -   If you checked off any problems, how difficult have these problems made it for you to do your work, take care of things at home or get along with other people? Somewhat difficult Not difficult at all Somewhat difficult - Somewhat difficult Very difficult -   Total Score 5 8 5 - 8 8 -   Interpretation Mild Mild Mild - Mild Mild -       GAD7 3/30/2023 2/2/2023 11/28/2022   1. Feeling nervous, anxious, or on edge? 1 2 1   2. Not being able to stop or control worrying? 1 1 1   3. Worrying too much about different things? 1 2 2   4. Trouble relaxing? 0 1 1   5. Being so restless that it is hard to sit still? 0 1 0   6. Becoming easily annoyed or irritable? 1 1 2   7. Feeling afraid as if something awful might happen? 1 1 1   8. If you checked off any problems, how difficult have these problems made it for you to do your work, take care of things at home, or get along with other people? 1 2 2   GABBY-7 Score 5 9 8     DM2 - metformin xr 500mg qam w/ breakfast.   A1c 11/9/22 7 -->5/3/23 6.7    HTN - losartan 100mg daily, toprol xl 25mg daily.    HLD and hyperTG - crestor 20mg daily and zetia 10mg daily.   LDL 5/3/23 34.6 and  (previously 253)    Some LBP.   Uses "hot pockets" in the lower back like a heating pad and it helps. Sits quite a bit as she reads a lot. If she gets up then may have some pain. And if she lays on the hot pockets for a little bit the pain goes away. Starting to walk " "more.     Review of Systems  Comprehensive review of systems otherwise negative. See history/subjective section for more details.    Objective:      Physical Exam    /61 (BP Location: Left arm, Patient Position: Sitting, BP Method: Medium (Manual))   Pulse 81   Resp 18   Ht 5' 1" (1.549 m)   Wt 71.4 kg (157 lb 6.5 oz)   SpO2 98%   BMI 29.74 kg/m²     GEN - A+OX4, NAD   HEENT - PERRL, EOMI, OP clear. MMM. R hearing aid in place.   Neck - No thyromegaly or cervical LAD. No thyroid masses felt.  CV - RRR, no m/r   Chest - CTAB, no wheezing or rhonchi. L chest pacemaker in place. No pain.   Abd - S/NT/ND/+BS.   Ext - 2+BDP and radial pulses. No C/C/E.  Neuro - 5/5 BUE and BLE strength. Normal gait.   MSK - no spinal tenderness to palpation.   Skin - No rash. BUE w/ small bruising.     Labs reviewed w/ pt.    Assessment/Plan     Diagnoses and all orders for this visit:    MDD (major depressive disorder), recurrent episode, mild/GABBY (generalized anxiety disorder) - does not want to inc meds. Has appt w/ LMSW today. Will send message to discuss relationship w/ daughter. Consider therapy sessions w/ daughter. Is going to inc in walking to help manage mood. 2 mo follow up, sooner if needed. Overall GABBY/PHQ is actually improved despite perceived worsening of symptoms possibly b/c yesterday was Mother's Day.     Hyperlipidemia associated with type 2 diabetes mellitus - cont current meds. DM2 at goal.    Benign essential hypertension - Stable and controlled. Continue current medications    DDD (degenerative disc disease), lumbar - increasing exercise. Hot pocket (heating pad) helpful.       Follow up in about 2 months (around 7/15/2023).      Mandi Huynh MD  Department of Internal Medicine - [facility]  7:41 AM    "

## 2023-05-17 ENCOUNTER — CLINICAL SUPPORT (OUTPATIENT)
Dept: INTERNAL MEDICINE | Facility: CLINIC | Age: 78
End: 2023-05-17
Payer: MEDICARE

## 2023-05-17 VITALS — BODY MASS INDEX: 29.91 KG/M2 | WEIGHT: 158.31 LBS

## 2023-05-17 NOTE — PROGRESS NOTES
Health  Follow-Up Note   [x] Office  [] Phone  Notes from previous session reviewed.   [x] Previous Session Goals unchanged.   [] Patient/Caregiver Change in Goals.  Goals added or changed by Patient/Caregiver since program participation:  Eat more fish   Continue same plan      Additional/Changed support that patient/caregiver has experienced/sought?  (Indicate readiness, support from family, friends, others, community groups, etc)       Additional/Changed obstacles that could prevent patient/caregiver from reaching their goals?   When get depressed doesn't walk as much    Feedback provided:   Praised for good job down 0.44 lbs total loss 28 lbs    Diagnostic values/Desriptors for follow-up as needed for chronic condition(s)   Weight: 71.8 kg 158.29 lb  Blood Glucose: A1c down to 6.7 from 7.0    Interventions:   Health  listened reflectively, validated thoughts and feelings, offered support and encouragement.   Allowed patient to express themselves in a non-biased atmosphere.  Health  assisted pt to problem-solve obstacles such as being in a challenging environment and dealing with these challenges.   Motivational Interviewed interventions utilized (OARS).   Patient responded favorably to interventions and remained actively engaged in the session.   Health  will remain available and connected for patient by phone and/or office visits.   Positive reinforcement, emotional support and encouragement provided.   Focused Education: MI    Plan:  [x] Pt will work on goals as stated above.   [x] Pt will contact Health  for any questions, concerns or needs.  [x] Pt will follow up with Health  in office on  6.19.23.  [] Pt will follow up with Health  on phone in:        [x] Health  will remain available.   [] Health  will contact patient by phone in:        [] Health  will consult:        [] Health  will inform Provider via EPIC messaging.     Impression:  1.  Behavior is consistent with Action Stage of Change.   2. Participation level:       [x] Receptive      [x] Interactive      [] Guarded and Resistant      [x] Self Motivated      [] Refused/Declined to participate   3. [x] Pt voiced understanding of all information presented.       [] Pt voiced needing further information/education. This will be arranged.       [x] Pt would benefit from further education/information as identified by this health . This will be arranged.     Jacqueline Perry RN HC

## 2023-05-23 ENCOUNTER — PATIENT MESSAGE (OUTPATIENT)
Dept: PRIMARY CARE CLINIC | Facility: CLINIC | Age: 78
End: 2023-05-23
Payer: MEDICARE

## 2023-05-23 RX ORDER — MELOXICAM 7.5 MG/1
7.5 TABLET ORAL DAILY PRN
Qty: 30 TABLET | Refills: 11 | Status: SHIPPED | OUTPATIENT
Start: 2023-05-23 | End: 2023-09-06 | Stop reason: SDUPTHER

## 2023-05-24 ENCOUNTER — TELEPHONE (OUTPATIENT)
Dept: INTERNAL MEDICINE | Facility: CLINIC | Age: 78
End: 2023-05-24
Payer: MEDICARE

## 2023-05-24 ENCOUNTER — TELEPHONE (OUTPATIENT)
Dept: PRIMARY CARE CLINIC | Facility: CLINIC | Age: 78
End: 2023-05-24

## 2023-05-24 ENCOUNTER — TELEPHONE (OUTPATIENT)
Dept: PRIMARY CARE CLINIC | Facility: CLINIC | Age: 78
End: 2023-05-24
Payer: MEDICARE

## 2023-05-24 DIAGNOSIS — G47.33 OSA (OBSTRUCTIVE SLEEP APNEA): Primary | ICD-10-CM

## 2023-05-24 NOTE — TELEPHONE ENCOUNTER
Trung Spicer, is this patient active with you? I see she is scheduled for a appointment tomorrow for depression I just wanted to see if you could follow up with her soon. Let me know if new referral is needed.

## 2023-05-24 NOTE — TELEPHONE ENCOUNTER
Called pt; pt did not answer    Pt has appt with KEYLA Gill today for depression  Pt was recently seen by PCP for the same item    Intent of call to ask if today's appt was necessary given that pt was recently seen by PCP     Left message asking for pt to call back

## 2023-05-31 ENCOUNTER — CLINICAL SUPPORT (OUTPATIENT)
Dept: PRIMARY CARE CLINIC | Facility: CLINIC | Age: 78
End: 2023-05-31
Payer: MEDICARE

## 2023-05-31 DIAGNOSIS — F33.0 MDD (MAJOR DEPRESSIVE DISORDER), RECURRENT EPISODE, MILD: Primary | ICD-10-CM

## 2023-05-31 PROCEDURE — 99499 NO LOS: ICD-10-PCS | Mod: S$GLB,,, | Performed by: SOCIAL WORKER

## 2023-05-31 PROCEDURE — 99499 UNLISTED E&M SERVICE: CPT | Mod: S$GLB,,, | Performed by: SOCIAL WORKER

## 2023-05-31 NOTE — PROGRESS NOTES
Individual Psychotherapy (LCSW/PhD)  Paris Conner Fatuma,  5/31/2023    Site:  [facility]         Therapeutic Intervention: Met with patient for individual psychotherapy.    Chief complaint/reason for encounter: depression     Interval history and content of current session: Pt states they feel increasingly depressed. Pt states that they and  have been arguing more and still have arguments over caring for their home I.e de-cluttering, keeping home clean. Pt states they also become more frustrated as they feel they lack the energy and focus to stay on top of chores.    Pt states they feel that the majority of their depressive feelings come from difficult relationship with daughter.Pt and LCSW reviewed relationship Hx with pt and areas of conflict. LCSW and pt discussed pt's triggers and difficulty coping with anger from daughter.     Goals:  1) set joint goals for home care with  to avoid confusion and conflict  2) use CBT to identify unhelpful thinking and thoughts I.e. especially regarding relationship with daughter    Treatment plan:  Target symptoms: depression  Why chosen therapy is appropriate versus another modality: relevant to diagnosis, evidence based practice  Outcome monitoring methods: self-report, feedback from family  Therapeutic intervention type: supportive psychotherapy    Risk parameters:  Patient reports no suicidal ideation  Patient reports no homicidal ideation  Patient reports no self-injurious behavior  Patient reports no violent behavior    Verbal deficits: None    Patient's response to intervention:  The patient's response to intervention is accepting.    Progress toward goals and other mental status changes:  The patient's progress toward goals is fair .    Diagnosis:   Depression     Plan: Pt plans to continue individual psychotherapy    Return to clinic: 2 weeks    Length of Service (minutes): 60

## 2023-06-02 ENCOUNTER — OFFICE VISIT (OUTPATIENT)
Dept: INTERNAL MEDICINE | Facility: CLINIC | Age: 78
End: 2023-06-02
Payer: MEDICARE

## 2023-06-02 VITALS
HEART RATE: 102 BPM | BODY MASS INDEX: 30.3 KG/M2 | OXYGEN SATURATION: 96 % | HEIGHT: 61 IN | WEIGHT: 160.5 LBS | DIASTOLIC BLOOD PRESSURE: 64 MMHG | SYSTOLIC BLOOD PRESSURE: 102 MMHG

## 2023-06-02 DIAGNOSIS — F33.41 MDD (MAJOR DEPRESSIVE DISORDER), RECURRENT, IN PARTIAL REMISSION: Primary | ICD-10-CM

## 2023-06-02 DIAGNOSIS — F41.9 ANXIETY: ICD-10-CM

## 2023-06-02 DIAGNOSIS — R41.3 MEMORY CHANGES: ICD-10-CM

## 2023-06-02 PROCEDURE — 1126F PR PAIN SEVERITY QUANTIFIED, NO PAIN PRESENT: ICD-10-PCS | Mod: CPTII,S$GLB,, | Performed by: PHYSICIAN ASSISTANT

## 2023-06-02 PROCEDURE — 3074F PR MOST RECENT SYSTOLIC BLOOD PRESSURE < 130 MM HG: ICD-10-PCS | Mod: CPTII,S$GLB,, | Performed by: PHYSICIAN ASSISTANT

## 2023-06-02 PROCEDURE — 99999 PR PBB SHADOW E&M-EST. PATIENT-LVL V: ICD-10-PCS | Mod: PBBFAC,,, | Performed by: PHYSICIAN ASSISTANT

## 2023-06-02 PROCEDURE — 1101F PT FALLS ASSESS-DOCD LE1/YR: CPT | Mod: CPTII,S$GLB,, | Performed by: PHYSICIAN ASSISTANT

## 2023-06-02 PROCEDURE — 99214 PR OFFICE/OUTPT VISIT, EST, LEVL IV, 30-39 MIN: ICD-10-PCS | Mod: S$GLB,,, | Performed by: PHYSICIAN ASSISTANT

## 2023-06-02 PROCEDURE — 3288F FALL RISK ASSESSMENT DOCD: CPT | Mod: CPTII,S$GLB,, | Performed by: PHYSICIAN ASSISTANT

## 2023-06-02 PROCEDURE — 3072F PR LOW RISK FOR RETINOPATHY: ICD-10-PCS | Mod: CPTII,S$GLB,, | Performed by: PHYSICIAN ASSISTANT

## 2023-06-02 PROCEDURE — 1159F PR MEDICATION LIST DOCUMENTED IN MEDICAL RECORD: ICD-10-PCS | Mod: CPTII,S$GLB,, | Performed by: PHYSICIAN ASSISTANT

## 2023-06-02 PROCEDURE — 99999 PR PBB SHADOW E&M-EST. PATIENT-LVL V: CPT | Mod: PBBFAC,,, | Performed by: PHYSICIAN ASSISTANT

## 2023-06-02 PROCEDURE — 1157F PR ADVANCE CARE PLAN OR EQUIV PRESENT IN MEDICAL RECORD: ICD-10-PCS | Mod: CPTII,S$GLB,, | Performed by: PHYSICIAN ASSISTANT

## 2023-06-02 PROCEDURE — 1160F RVW MEDS BY RX/DR IN RCRD: CPT | Mod: CPTII,S$GLB,, | Performed by: PHYSICIAN ASSISTANT

## 2023-06-02 PROCEDURE — 99214 OFFICE O/P EST MOD 30 MIN: CPT | Mod: S$GLB,,, | Performed by: PHYSICIAN ASSISTANT

## 2023-06-02 PROCEDURE — 3078F DIAST BP <80 MM HG: CPT | Mod: CPTII,S$GLB,, | Performed by: PHYSICIAN ASSISTANT

## 2023-06-02 PROCEDURE — 3078F PR MOST RECENT DIASTOLIC BLOOD PRESSURE < 80 MM HG: ICD-10-PCS | Mod: CPTII,S$GLB,, | Performed by: PHYSICIAN ASSISTANT

## 2023-06-02 PROCEDURE — 1160F PR REVIEW ALL MEDS BY PRESCRIBER/CLIN PHARMACIST DOCUMENTED: ICD-10-PCS | Mod: CPTII,S$GLB,, | Performed by: PHYSICIAN ASSISTANT

## 2023-06-02 PROCEDURE — 1157F ADVNC CARE PLAN IN RCRD: CPT | Mod: CPTII,S$GLB,, | Performed by: PHYSICIAN ASSISTANT

## 2023-06-02 PROCEDURE — 1126F AMNT PAIN NOTED NONE PRSNT: CPT | Mod: CPTII,S$GLB,, | Performed by: PHYSICIAN ASSISTANT

## 2023-06-02 PROCEDURE — 3288F PR FALLS RISK ASSESSMENT DOCUMENTED: ICD-10-PCS | Mod: CPTII,S$GLB,, | Performed by: PHYSICIAN ASSISTANT

## 2023-06-02 PROCEDURE — 3074F SYST BP LT 130 MM HG: CPT | Mod: CPTII,S$GLB,, | Performed by: PHYSICIAN ASSISTANT

## 2023-06-02 PROCEDURE — 1101F PR PT FALLS ASSESS DOC 0-1 FALLS W/OUT INJ PAST YR: ICD-10-PCS | Mod: CPTII,S$GLB,, | Performed by: PHYSICIAN ASSISTANT

## 2023-06-02 PROCEDURE — 3072F LOW RISK FOR RETINOPATHY: CPT | Mod: CPTII,S$GLB,, | Performed by: PHYSICIAN ASSISTANT

## 2023-06-02 PROCEDURE — 1159F MED LIST DOCD IN RCRD: CPT | Mod: CPTII,S$GLB,, | Performed by: PHYSICIAN ASSISTANT

## 2023-06-02 NOTE — PROGRESS NOTES
"Subjective     Patient ID: Paris Burris is a 77 y.o. female.    Chief Complaint: Follow-up    HPI    Established pt of Mandi Huynh MD     Pt attended by .     States she is here for "follow up"    Last seen by PCP 5/15  Last seen by therapist 5/31    Depression: Currently on Effexor 75mg, Wellbutrin 300mg, ativan 0.5mg. Inquires about whether she increase meds, she is working on behavorial mods. 1/mi walk today and felt well. Recent therapist appt went well. Sleeping well.     Inquires metoprolol and losartan indications    alot of question of dementia/memory medications and foods to combat memory issues.       Past Medical History:   Diagnosis Date    Allergy     Anemia     Anxiety     Breast cancer 2002    Left breast    Cancer 2002    L breast s/p lumpectomy    Cataract     Decreased hearing     Depression     Dermatochalasis of both upper eyelids     Diabetes mellitus     Diabetes with neurologic complications     Gastric ulcer 09/10/2013    EGD    Hiatal hernia     Hyperlipidemia     Migraine headache     Migraine headache 03/20/2015    Multiple gastric ulcers     Parathyroid disorder     Renal manifestation of secondary diabetes mellitus     Sleep apnea     no CPAP    Type 2 diabetes mellitus, controlled, 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      Social History     Tobacco Use    Smoking status: Never    Smokeless tobacco: Never   Substance Use Topics    Alcohol use: No     Comment: quit 2014 - alcohol abuse    Drug use: Not Currently     Review of patient's allergies indicates:   Allergen Reactions    Topamax [topiramate] Hallucinations     hallucinations         Review of Systems   Constitutional:  Negative for chills, fever and unexpected weight change.   Respiratory:  Negative for cough and shortness of breath.    Cardiovascular:  Negative for chest pain and leg swelling.   Gastrointestinal:  Negative for abdominal pain, nausea and vomiting.   Integumentary:  Negative for rash. " "  Neurological:  Negative for weakness and headaches.   Psychiatric/Behavioral:  Positive for dysphoric mood. The patient is nervous/anxious.         Objective  /64 (BP Location: Right arm, Patient Position: Sitting, BP Method: Medium (Manual))   Pulse 102   Ht 5' 1" (1.549 m)   Wt 72.8 kg (160 lb 7.9 oz)   SpO2 96%   BMI 30.33 kg/m²       Physical Exam  Vitals reviewed.   Constitutional:       General: She is not in acute distress.     Appearance: She is well-developed.   HENT:      Head: Normocephalic and atraumatic.   Cardiovascular:      Rate and Rhythm: Normal rate and regular rhythm.      Heart sounds: No murmur heard.  Pulmonary:      Effort: Pulmonary effort is normal.      Breath sounds: Normal breath sounds. No wheezing or rales.   Musculoskeletal:      Right lower leg: No edema.      Left lower leg: No edema.   Skin:     General: Skin is warm and dry.      Findings: No rash.   Neurological:      Mental Status: She is alert.   Psychiatric:         Mood and Affect: Mood normal.          Assessment and Plan     1. MDD (major depressive disorder), recurrent, in partial remission  -     Ambulatory referral/consult to Adult Neuropsychology; Future; Expected date: 06/09/2023    2. Anxiety  -     Ambulatory referral/consult to Adult Neuropsychology; Future; Expected date: 06/09/2023    3. Memory changes  -     Ambulatory referral/consult to Adult Neuropsychology; Future; Expected date: 06/09/2023      Long discussion about her meds and indications  Plans to continue current meds at this time and focus of behavioral mods and therapy for mood  Referral placed to neuropsychology as pt expressed concerns of memory and dementia prevention  Advised to keep PCP f/u    Future Appointments   Date Time Provider Department Center   6/14/2023 10:00 AM HOME MONITOR DEVICE CHECK, [facility] NOM [facility] [address]   6/14/2023 10:30 AM Nayan Mora LMSW NOM MED CLN [address] PCW   6/19/2023  7:00 AM PEDRO GERMAIN " [facility] IM [address] PCW   6/28/2023  1:00 PM Nayan Mora [facility] MED CLN [address] PCW   7/12/2023  1:00 PM Nayan Mora [facility] MED CLN [address] PCW   7/17/2023 10:20 AM Mandi Huynh MD [facility] [address] [place]   7/26/2023  8:00 AM Nayan Mora [facility] MED CLN [address] PCW   8/9/2023  1:00 PM Nayan Mora [facility] MED CLN [address] PCW       I spent a total of 40 minutes on the day of the visit.  This includes face to face time and non-face to face time preparing to see the patient (eg, review of tests), obtaining and/or reviewing separately obtained history, documenting clinical information in the electronic or other health record, medication reconciliation, and/or care coordination.      Chelsea Gill PA-C

## 2023-06-14 ENCOUNTER — CLINICAL SUPPORT (OUTPATIENT)
Dept: CARDIOLOGY | Facility: HOSPITAL | Age: 78
End: 2023-06-14
Payer: MEDICARE

## 2023-06-14 ENCOUNTER — CLINICAL SUPPORT (OUTPATIENT)
Dept: PRIMARY CARE CLINIC | Facility: CLINIC | Age: 78
End: 2023-06-14
Payer: MEDICARE

## 2023-06-14 DIAGNOSIS — I44.2 ATRIOVENTRICULAR BLOCK, COMPLETE: ICD-10-CM

## 2023-06-14 DIAGNOSIS — I42.0 DILATED CARDIOMYOPATHY: ICD-10-CM

## 2023-06-14 DIAGNOSIS — Z95.0 PRESENCE OF CARDIAC PACEMAKER: ICD-10-CM

## 2023-06-14 DIAGNOSIS — F32.A MODERATELY SEVERE DEPRESSION: Primary | ICD-10-CM

## 2023-06-14 PROCEDURE — 99499 NO LOS: ICD-10-PCS | Mod: S$GLB,,, | Performed by: SOCIAL WORKER

## 2023-06-14 PROCEDURE — 93296 REM INTERROG EVL PM/IDS: CPT | Performed by: INTERNAL MEDICINE

## 2023-06-14 PROCEDURE — 93294 REM INTERROG EVL PM/LDLS PM: CPT | Mod: ,,, | Performed by: INTERNAL MEDICINE

## 2023-06-14 PROCEDURE — 93294 CARDIAC DEVICE CHECK - REMOTE: ICD-10-PCS | Mod: ,,, | Performed by: INTERNAL MEDICINE

## 2023-06-14 PROCEDURE — 99499 UNLISTED E&M SERVICE: CPT | Mod: S$GLB,,, | Performed by: SOCIAL WORKER

## 2023-06-14 NOTE — PROGRESS NOTES
"Individual Psychotherapy (LCSW/PhD)  Paris Burris,  6/14/2023    Site:  [facility]         Therapeutic Intervention: Met with patient for individual psychotherapy.    Chief complaint/reason for encounter: depression     Interval history and content of current session: Pt reports "feeling down" lately. Pt reports they are having ongoing difficulty communicating with daughter and grandson. Pt feels depressed because they feel daughter is interfering with relationship with grandson. Pt reports that grandson was supposed to help her and  clean their home but has not been in contact.    LCSW and pt discussed importance of communication and not assuming other persons thoughts and emotions. LCSW supported pt in processing feelings of fear and anxiety related to their current relationship with daughter. LCSW and pt discussed Hx with daughter and communication strategies for engaging with daughter in more helpful ways. LCSW reviewed "I" statements and importance of using empathic listening to better connect with daughter.     LCSW discussed setting behavioral goals for pt. Pt stated they would like to wait until next session (with ) to set goals.         Treatment plan:  Target symptoms: depression  Why chosen therapy is appropriate versus another modality: relevant to diagnosis, evidence based practice  Outcome monitoring methods: self-report, checklist/rating scale  Therapeutic intervention type: insight oriented psychotherapy, supportive psychotherapy    Risk parameters:  Patient reports no suicidal ideation  Patient reports no homicidal ideation  Patient reports no self-injurious behavior  Patient reports no violent behavior    Verbal deficits: None    Patient's response to intervention:  The patient's response to intervention is accepting.    Progress toward goals and other mental status changes:  The patient's progress toward goals is limited.    Diagnosis:   No diagnosis found.    Plan: Pt " plans to continue individual psychotherapy    Return to clinic: 2 weeks    Length of Service (minutes): 60

## 2023-06-19 ENCOUNTER — CLINICAL SUPPORT (OUTPATIENT)
Dept: INTERNAL MEDICINE | Facility: CLINIC | Age: 78
End: 2023-06-19
Payer: MEDICARE

## 2023-06-19 VITALS — WEIGHT: 159.19 LBS | BODY MASS INDEX: 30.08 KG/M2

## 2023-06-19 NOTE — PROGRESS NOTES
Health  Follow-Up Note   [x] Office  [] Phone  Notes from previous session reviewed.   [x] Previous Session Goals unchanged.   [] Patient/Caregiver Change in Goals.  Goals added or changed by Patient/Caregiver since program participation:  Continue same plan   Increase walking   Get new strips for glucometer accu-chek Guide today       Additional/Changed support that patient/caregiver has experienced/sought?  (Indicate readiness, support from family, friends, others, community groups, etc)       Additional/Changed obstacles that could prevent patient/caregiver from reaching their goals?   Kade fell out of bed (not hurt) he is depressed makes her depressed     Feedback provided:   Praised for continued effort and determination  Gave new meter Accu-Chek Guide and instructed with use    Diagnostic values/Desriptors for follow-up as needed for chronic condition(s)   Weight: 72.2 kg 159.17 lb gain 0.88 lb total loss 27 lbs  Blood Glucose: 164 ate banana one hour ago last A1c 6.7    Interventions:   Health  listened reflectively, validated thoughts and feelings, offered support and encouragement.   Allowed patient to express themselves in a non-biased atmosphere.  Health  assisted pt to problem-solve obstacles such as being in a challenging environment and dealing with these challenges.   Motivational Interviewed interventions utilized (OARS).   Patient responded favorably to interventions and remained actively engaged in the session.   Health  will remain available and connected for patient by phone and/or office visits.   Positive reinforcement, emotional support and encouragement provided.   Focused Education: MI    Plan:  [x] Pt will work on goals as stated above.   [x] Pt will contact Health  for any questions, concerns or needs.  [x] Pt will follow up with Health  in office on  7.19.23.  [] Pt will follow up with Health  on phone in:        [x] Health  will remain  available.   [] Health  will contact patient by phone in:        [] Health  will consult:        [] Health  will inform Provider via EPIC messaging.     Impression:  1. Behavior is consistent with Action Stage of Change.   2. Participation level:       [x] Receptive      [x] Interactive      [] Guarded and Resistant      [x] Self Motivated      [] Refused/Declined to participate   3. [x] Pt voiced understanding of all information presented.       [] Pt voiced needing further information/education. This will be arranged.       [x] Pt would benefit from further education/information as identified by this health . This will be arranged.     Jacqueline Perry RN HC

## 2023-06-27 NOTE — PROGRESS NOTES
Adherence packed for 28 days using Dispill:        Morning card:  Bupropion  mg  Ezetimibe 10 mg   Losartan 100 mg  Metformin  mg   Metoprolol succinate ER 25 mg  Rosuvastatin 20 mg  Venlafaxine 75 mg    LD 6/6/23, need by 7/4/23. TTN

## 2023-06-28 DIAGNOSIS — M51.36 DDD (DEGENERATIVE DISC DISEASE), LUMBAR: ICD-10-CM

## 2023-06-28 RX ORDER — TRAMADOL HYDROCHLORIDE 50 MG/1
TABLET ORAL
Qty: 60 TABLET | Refills: 2 | Status: SHIPPED | OUTPATIENT
Start: 2023-06-28 | End: 2024-01-31 | Stop reason: SDUPTHER

## 2023-07-12 ENCOUNTER — TELEPHONE (OUTPATIENT)
Dept: PRIMARY CARE CLINIC | Facility: CLINIC | Age: 78
End: 2023-07-12
Payer: MEDICARE

## 2023-07-17 ENCOUNTER — TELEPHONE (OUTPATIENT)
Dept: PRIMARY CARE CLINIC | Facility: CLINIC | Age: 78
End: 2023-07-17
Payer: MEDICARE

## 2023-07-17 ENCOUNTER — OFFICE VISIT (OUTPATIENT)
Dept: PRIMARY CARE CLINIC | Facility: CLINIC | Age: 78
End: 2023-07-17
Payer: MEDICARE

## 2023-07-17 VITALS
HEIGHT: 61 IN | TEMPERATURE: 98 F | DIASTOLIC BLOOD PRESSURE: 68 MMHG | SYSTOLIC BLOOD PRESSURE: 124 MMHG | BODY MASS INDEX: 29.47 KG/M2 | WEIGHT: 156.06 LBS | OXYGEN SATURATION: 96 % | HEART RATE: 76 BPM

## 2023-07-17 DIAGNOSIS — F41.1 GAD (GENERALIZED ANXIETY DISORDER): ICD-10-CM

## 2023-07-17 DIAGNOSIS — G47.33 OSA (OBSTRUCTIVE SLEEP APNEA): ICD-10-CM

## 2023-07-17 DIAGNOSIS — I10 BENIGN ESSENTIAL HYPERTENSION: ICD-10-CM

## 2023-07-17 DIAGNOSIS — F33.41 MDD (MAJOR DEPRESSIVE DISORDER), RECURRENT, IN PARTIAL REMISSION: Primary | ICD-10-CM

## 2023-07-17 PROCEDURE — 99214 PR OFFICE/OUTPT VISIT, EST, LEVL IV, 30-39 MIN: ICD-10-PCS | Mod: HCNC,S$GLB,, | Performed by: INTERNAL MEDICINE

## 2023-07-17 PROCEDURE — 1157F PR ADVANCE CARE PLAN OR EQUIV PRESENT IN MEDICAL RECORD: ICD-10-PCS | Mod: HCNC,CPTII,S$GLB, | Performed by: INTERNAL MEDICINE

## 2023-07-17 PROCEDURE — 1159F PR MEDICATION LIST DOCUMENTED IN MEDICAL RECORD: ICD-10-PCS | Mod: HCNC,CPTII,S$GLB, | Performed by: INTERNAL MEDICINE

## 2023-07-17 PROCEDURE — 3078F PR MOST RECENT DIASTOLIC BLOOD PRESSURE < 80 MM HG: ICD-10-PCS | Mod: HCNC,CPTII,S$GLB, | Performed by: INTERNAL MEDICINE

## 2023-07-17 PROCEDURE — 3288F FALL RISK ASSESSMENT DOCD: CPT | Mod: HCNC,CPTII,S$GLB, | Performed by: INTERNAL MEDICINE

## 2023-07-17 PROCEDURE — 1157F ADVNC CARE PLAN IN RCRD: CPT | Mod: HCNC,CPTII,S$GLB, | Performed by: INTERNAL MEDICINE

## 2023-07-17 PROCEDURE — 1160F PR REVIEW ALL MEDS BY PRESCRIBER/CLIN PHARMACIST DOCUMENTED: ICD-10-PCS | Mod: HCNC,CPTII,S$GLB, | Performed by: INTERNAL MEDICINE

## 2023-07-17 PROCEDURE — 3078F DIAST BP <80 MM HG: CPT | Mod: HCNC,CPTII,S$GLB, | Performed by: INTERNAL MEDICINE

## 2023-07-17 PROCEDURE — 3072F PR LOW RISK FOR RETINOPATHY: ICD-10-PCS | Mod: HCNC,CPTII,S$GLB, | Performed by: INTERNAL MEDICINE

## 2023-07-17 PROCEDURE — 3074F PR MOST RECENT SYSTOLIC BLOOD PRESSURE < 130 MM HG: ICD-10-PCS | Mod: HCNC,CPTII,S$GLB, | Performed by: INTERNAL MEDICINE

## 2023-07-17 PROCEDURE — 3288F PR FALLS RISK ASSESSMENT DOCUMENTED: ICD-10-PCS | Mod: HCNC,CPTII,S$GLB, | Performed by: INTERNAL MEDICINE

## 2023-07-17 PROCEDURE — 1101F PR PT FALLS ASSESS DOC 0-1 FALLS W/OUT INJ PAST YR: ICD-10-PCS | Mod: HCNC,CPTII,S$GLB, | Performed by: INTERNAL MEDICINE

## 2023-07-17 PROCEDURE — 3074F SYST BP LT 130 MM HG: CPT | Mod: HCNC,CPTII,S$GLB, | Performed by: INTERNAL MEDICINE

## 2023-07-17 PROCEDURE — 3072F LOW RISK FOR RETINOPATHY: CPT | Mod: HCNC,CPTII,S$GLB, | Performed by: INTERNAL MEDICINE

## 2023-07-17 PROCEDURE — 1126F AMNT PAIN NOTED NONE PRSNT: CPT | Mod: HCNC,CPTII,S$GLB, | Performed by: INTERNAL MEDICINE

## 2023-07-17 PROCEDURE — 99214 OFFICE O/P EST MOD 30 MIN: CPT | Mod: HCNC,S$GLB,, | Performed by: INTERNAL MEDICINE

## 2023-07-17 PROCEDURE — 1159F MED LIST DOCD IN RCRD: CPT | Mod: HCNC,CPTII,S$GLB, | Performed by: INTERNAL MEDICINE

## 2023-07-17 PROCEDURE — 1101F PT FALLS ASSESS-DOCD LE1/YR: CPT | Mod: HCNC,CPTII,S$GLB, | Performed by: INTERNAL MEDICINE

## 2023-07-17 PROCEDURE — 99999 PR PBB SHADOW E&M-EST. PATIENT-LVL V: ICD-10-PCS | Mod: PBBFAC,HCNC,, | Performed by: INTERNAL MEDICINE

## 2023-07-17 PROCEDURE — 1160F RVW MEDS BY RX/DR IN RCRD: CPT | Mod: HCNC,CPTII,S$GLB, | Performed by: INTERNAL MEDICINE

## 2023-07-17 PROCEDURE — 99999 PR PBB SHADOW E&M-EST. PATIENT-LVL V: CPT | Mod: PBBFAC,HCNC,, | Performed by: INTERNAL MEDICINE

## 2023-07-17 PROCEDURE — 1126F PR PAIN SEVERITY QUANTIFIED, NO PAIN PRESENT: ICD-10-PCS | Mod: HCNC,CPTII,S$GLB, | Performed by: INTERNAL MEDICINE

## 2023-07-17 NOTE — PROGRESS NOTES
"Subjective:       Patient ID: Paris Burris is a 77 y.o. female.    Chief Complaint: Depression    HPI  MDD/GABBY - wellbutrin xl 300mg daily and effexor 75mg daily.follows w/ Nayan Mora LMSW - reports sessions are doing well. Next appt 7/26/23. Overall doing better.   Recently saw KEYLA iDaz 6/2/23 w/ some complaints about memory issues - reads and studies a lot; wanted to at least get a baseline. Referred to neuropsych.   Untreated COOKIE due to not being able to tolerate CPAP. Has appt w/ Dr. Emmanuel in sleep medicine.   She said she didn't realize that losartan was for BP.   Was exercising more until it was really hot.   Hearing aid in place.   Intentionally losing a few lbs - watching her diet better (4lbs since early June).     Review of Systems   Constitutional:  Negative for chills and fever.   HENT:  Negative for congestion, postnasal drip and rhinorrhea.    Respiratory:  Negative for cough, shortness of breath and wheezing.    Cardiovascular:  Negative for chest pain, palpitations and leg swelling.   Gastrointestinal:  Negative for abdominal pain, constipation (taking Senna tea a few times a week.), diarrhea, nausea and vomiting.   Genitourinary:  Negative for dysuria and frequency.   Musculoskeletal:  Positive for back pain.   Skin:  Negative for rash.   Neurological:  Negative for dizziness, light-headedness and headaches.   Psychiatric/Behavioral:  Positive for dysphoric mood. The patient is nervous/anxious.        Objective:      Physical Exam    /68 (BP Location: Left arm, Patient Position: Sitting, BP Method: Large (Manual))   Pulse 76   Temp 98.4 °F (36.9 °C) (Oral)   Ht 5' 1" (1.549 m)   Wt 70.8 kg (156 lb 1.4 oz)   SpO2 96%   BMI 29.49 kg/m²     GEN - A+OX4, NAD   HEENT - PERRL, EOMI, OP clear. MMM. R hearing aid in place.   Neck - No thyromegaly or cervical LAD. No thyroid masses felt.  CV - RRR, no m/r   Chest - CTAB, no wheezing or rhonchi. L chest pacemaker in place. No " pain.   Abd - S/NT/ND/+BS.   Ext - 2+BDP and radial pulses. No C/C/E.  Neuro - 5/5 BUE and BLE strength. Normal gait.   MSK - no spinal tenderness to palpation.   Skin - No rash. excoriation.     Previous labs reviewed.     Assessment/Plan     Paris was seen today for depression.    Diagnoses and all orders for this visit:    MDD (major depressive disorder), recurrent, in partial remission/GABBY (generalized anxiety disorder) - cont current medicines. Cont sessions w/ LMSW. Doing a little better.     COOKIE (obstructive sleep apnea) - has appt w/ Dr. Emmanuel.     Benign essential hypertension - Stable and controlled. Continue current medications.    Given number for neuropsych.     Follow up in about 6 months (around 1/17/2024).      Mandi Huynh MD  [facility]  10:22 AM

## 2023-07-17 NOTE — TELEPHONE ENCOUNTER
----- Message from Clemencia Adames sent at 7/17/2023  1:03 PM CDT -----  Contact: Pt [phone]  Pt called and stated that the she contact Psychiatrist and  they stated that they did not have a Dr Tolbert Post. Please call

## 2023-07-28 ENCOUNTER — PATIENT MESSAGE (OUTPATIENT)
Dept: PRIMARY CARE CLINIC | Facility: CLINIC | Age: 78
End: 2023-07-28
Payer: MEDICARE

## 2023-07-28 ENCOUNTER — OFFICE VISIT (OUTPATIENT)
Dept: INTERNAL MEDICINE | Facility: CLINIC | Age: 78
End: 2023-07-28
Payer: MEDICARE

## 2023-07-28 VITALS
HEART RATE: 90 BPM | OXYGEN SATURATION: 98 % | DIASTOLIC BLOOD PRESSURE: 84 MMHG | WEIGHT: 158.31 LBS | HEIGHT: 61 IN | SYSTOLIC BLOOD PRESSURE: 132 MMHG | BODY MASS INDEX: 29.89 KG/M2

## 2023-07-28 DIAGNOSIS — E78.2 MIXED HYPERLIPIDEMIA: ICD-10-CM

## 2023-07-28 DIAGNOSIS — I42.9 CARDIOMYOPATHY, UNSPECIFIED TYPE: ICD-10-CM

## 2023-07-28 DIAGNOSIS — G47.33 OSA (OBSTRUCTIVE SLEEP APNEA): ICD-10-CM

## 2023-07-28 DIAGNOSIS — I44.1 AV BLOCK, MOBITZ II: ICD-10-CM

## 2023-07-28 DIAGNOSIS — E55.9 VITAMIN D DEFICIENCY: ICD-10-CM

## 2023-07-28 DIAGNOSIS — M54.50 CHRONIC BILATERAL LOW BACK PAIN WITHOUT SCIATICA: ICD-10-CM

## 2023-07-28 DIAGNOSIS — Z95.0 PRESENCE OF CARDIAC RESYNCHRONIZATION THERAPY PACEMAKER (CRT-P): ICD-10-CM

## 2023-07-28 DIAGNOSIS — N39.46 MIXED STRESS AND URGE URINARY INCONTINENCE: ICD-10-CM

## 2023-07-28 DIAGNOSIS — K21.9 GASTROESOPHAGEAL REFLUX DISEASE, UNSPECIFIED WHETHER ESOPHAGITIS PRESENT: ICD-10-CM

## 2023-07-28 DIAGNOSIS — K76.0 FATTY LIVER: ICD-10-CM

## 2023-07-28 DIAGNOSIS — Z96.21 COCHLEAR IMPLANT IN PLACE: ICD-10-CM

## 2023-07-28 DIAGNOSIS — J84.10 CALCIFIED GRANULOMA OF LUNG: ICD-10-CM

## 2023-07-28 DIAGNOSIS — M47.812 CERVICAL SPINE ARTHRITIS: ICD-10-CM

## 2023-07-28 DIAGNOSIS — F33.41 MDD (MAJOR DEPRESSIVE DISORDER), RECURRENT, IN PARTIAL REMISSION: ICD-10-CM

## 2023-07-28 DIAGNOSIS — E11.40 TYPE 2 DIABETES MELLITUS WITH DIABETIC NEUROPATHY, WITHOUT LONG-TERM CURRENT USE OF INSULIN: ICD-10-CM

## 2023-07-28 DIAGNOSIS — M51.36 DDD (DEGENERATIVE DISC DISEASE), LUMBAR: ICD-10-CM

## 2023-07-28 DIAGNOSIS — I70.0 AORTIC ATHEROSCLEROSIS: ICD-10-CM

## 2023-07-28 DIAGNOSIS — F10.21 ALCOHOL DEPENDENCE, IN REMISSION: ICD-10-CM

## 2023-07-28 DIAGNOSIS — Z90.89 STATUS POST PARATHYROIDECTOMY: ICD-10-CM

## 2023-07-28 DIAGNOSIS — H90.3 SENSORINEURAL HEARING LOSS (SNHL) OF BOTH EARS: ICD-10-CM

## 2023-07-28 DIAGNOSIS — Z98.890 STATUS POST PARATHYROIDECTOMY: ICD-10-CM

## 2023-07-28 DIAGNOSIS — E11.42 DIABETIC POLYNEUROPATHY ASSOCIATED WITH TYPE 2 DIABETES MELLITUS: ICD-10-CM

## 2023-07-28 DIAGNOSIS — G89.29 CHRONIC BILATERAL LOW BACK PAIN WITHOUT SCIATICA: ICD-10-CM

## 2023-07-28 DIAGNOSIS — Z00.00 ENCOUNTER FOR PREVENTIVE HEALTH EXAMINATION: Primary | ICD-10-CM

## 2023-07-28 DIAGNOSIS — I47.10 SVT (SUPRAVENTRICULAR TACHYCARDIA): ICD-10-CM

## 2023-07-28 PROCEDURE — 1159F MED LIST DOCD IN RCRD: CPT | Mod: CPTII,S$GLB,, | Performed by: NURSE PRACTITIONER

## 2023-07-28 PROCEDURE — 1170F FXNL STATUS ASSESSED: CPT | Mod: CPTII,S$GLB,, | Performed by: NURSE PRACTITIONER

## 2023-07-28 PROCEDURE — G0439 PPPS, SUBSEQ VISIT: HCPCS | Mod: S$GLB,,, | Performed by: NURSE PRACTITIONER

## 2023-07-28 PROCEDURE — 3072F LOW RISK FOR RETINOPATHY: CPT | Mod: CPTII,S$GLB,, | Performed by: NURSE PRACTITIONER

## 2023-07-28 PROCEDURE — 3288F FALL RISK ASSESSMENT DOCD: CPT | Mod: CPTII,S$GLB,, | Performed by: NURSE PRACTITIONER

## 2023-07-28 PROCEDURE — 3075F PR MOST RECENT SYSTOLIC BLOOD PRESS GE 130-139MM HG: ICD-10-PCS | Mod: CPTII,S$GLB,, | Performed by: NURSE PRACTITIONER

## 2023-07-28 PROCEDURE — 1157F PR ADVANCE CARE PLAN OR EQUIV PRESENT IN MEDICAL RECORD: ICD-10-PCS | Mod: CPTII,S$GLB,, | Performed by: NURSE PRACTITIONER

## 2023-07-28 PROCEDURE — 1159F PR MEDICATION LIST DOCUMENTED IN MEDICAL RECORD: ICD-10-PCS | Mod: CPTII,S$GLB,, | Performed by: NURSE PRACTITIONER

## 2023-07-28 PROCEDURE — 1170F PR FUNCTIONAL STATUS ASSESSED: ICD-10-PCS | Mod: CPTII,S$GLB,, | Performed by: NURSE PRACTITIONER

## 2023-07-28 PROCEDURE — 3079F DIAST BP 80-89 MM HG: CPT | Mod: CPTII,S$GLB,, | Performed by: NURSE PRACTITIONER

## 2023-07-28 PROCEDURE — 99999 PR PBB SHADOW E&M-EST. PATIENT-LVL IV: ICD-10-PCS | Mod: PBBFAC,HCNC,, | Performed by: NURSE PRACTITIONER

## 2023-07-28 PROCEDURE — G0439 PR MEDICARE ANNUAL WELLNESS SUBSEQUENT VISIT: ICD-10-PCS | Mod: S$GLB,,, | Performed by: NURSE PRACTITIONER

## 2023-07-28 PROCEDURE — 1160F RVW MEDS BY RX/DR IN RCRD: CPT | Mod: CPTII,S$GLB,, | Performed by: NURSE PRACTITIONER

## 2023-07-28 PROCEDURE — 1101F PR PT FALLS ASSESS DOC 0-1 FALLS W/OUT INJ PAST YR: ICD-10-PCS | Mod: CPTII,S$GLB,, | Performed by: NURSE PRACTITIONER

## 2023-07-28 PROCEDURE — 1157F ADVNC CARE PLAN IN RCRD: CPT | Mod: CPTII,S$GLB,, | Performed by: NURSE PRACTITIONER

## 2023-07-28 PROCEDURE — 3075F SYST BP GE 130 - 139MM HG: CPT | Mod: CPTII,S$GLB,, | Performed by: NURSE PRACTITIONER

## 2023-07-28 PROCEDURE — 1101F PT FALLS ASSESS-DOCD LE1/YR: CPT | Mod: CPTII,S$GLB,, | Performed by: NURSE PRACTITIONER

## 2023-07-28 PROCEDURE — 3288F PR FALLS RISK ASSESSMENT DOCUMENTED: ICD-10-PCS | Mod: CPTII,S$GLB,, | Performed by: NURSE PRACTITIONER

## 2023-07-28 PROCEDURE — 3072F PR LOW RISK FOR RETINOPATHY: ICD-10-PCS | Mod: CPTII,S$GLB,, | Performed by: NURSE PRACTITIONER

## 2023-07-28 PROCEDURE — 99999 PR PBB SHADOW E&M-EST. PATIENT-LVL IV: CPT | Mod: PBBFAC,HCNC,, | Performed by: NURSE PRACTITIONER

## 2023-07-28 PROCEDURE — 3079F PR MOST RECENT DIASTOLIC BLOOD PRESSURE 80-89 MM HG: ICD-10-PCS | Mod: CPTII,S$GLB,, | Performed by: NURSE PRACTITIONER

## 2023-07-28 PROCEDURE — 1160F PR REVIEW ALL MEDS BY PRESCRIBER/CLIN PHARMACIST DOCUMENTED: ICD-10-PCS | Mod: CPTII,S$GLB,, | Performed by: NURSE PRACTITIONER

## 2023-07-28 NOTE — PATIENT INSTRUCTIONS
1. Next Follow up with Dr. Mandi Huynh MD around 1/17/2024.    2. Eye exam as scheduled.    3. Eligible for additional covid bivalent booster if interested.    4. Dr. Carolina Frost dermatology      Counseling and Referral of Other Preventative  (Italic type indicates deductible and co-insurance are waived)    Patient Name: Paris Burris  Today's Date: 7/28/2023    Health Maintenance         Date Due Completion Date    Mammogram 07/09/2022 7/9/2021    COVID-19 Vaccine (7 - Pfizer series) 01/24/2023 9/24/2022    Eye Exam 09/01/2023 9/1/2022    Override on 3/22/2018: Done    Influenza Vaccine (1) 09/01/2023 9/20/2022    Override on 11/1/2015: Done    Diabetes Urine Screening 10/31/2023 10/31/2022    Hemoglobin A1c 11/03/2023 5/3/2023    Lipid Panel 05/03/2024 5/3/2023    DEXA Scan 06/16/2024 6/16/2022    Override on 3/31/2015: Done    TETANUS VACCINE 06/16/2026 6/16/2016    Colonoscopy 08/31/2027 8/31/2022    Override on 9/10/2013: Done          No orders of the defined types were placed in this encounter.    The following information is provided to all patients.  This information is to help you find resources for any of the problems found today that may be affecting your health:                Living healthy guide: www.[facility].[place].gov      Understanding Diabetes: www.diabetes.org      Eating healthy: www.cdc.gov/healthyweight      CDC home safety checklist: www.cdc.gov/steadi/patient.html      Agency on Aging: www.goea.[place].[facility]      Alcoholics anonymous (AA): www.aa.org      Physical Activity: www.jenny.nih.gov/dn3ohfm      Tobacco use: www.quitwithusla.org

## 2023-07-28 NOTE — PROGRESS NOTES
"Paris Burris presented for a  Medicare AWV and comprehensive Health Risk Assessment today. The following components were reviewed and updated:    Medical history  Family History  Social history  Allergies and Current Medications  Health Risk Assessment  Health Maintenance  Care Team         ** See Completed Assessments for Annual Wellness Visit within the encounter summary.**         The following assessments were completed:  Living Situation  CAGE  Depression Screening  Timed Get Up and Go  Whisper Test - N/A hearing impairment, has cochlear implant and hearing aid  Cognitive Function Screening - Deferred, referred to neuropsychology for eval per PCP  Nutrition Screening  ADL Screening  PAQ Screening  Review for Opioid Screening: Pt has rx for tramadol. Pain level assessed and reviewed. Patient provided with non-opioid treatment options for relief of back pain; tramadol used only when absolutely necessary. Reviewed potential opioid use disorder risk factors. Patient instructed to follow up with PCP and/or Pain Medicine.   Review for Substance Use Disorders: Patient does not use substances.        Vitals:    07/28/23 1035   BP: 132/84   BP Location: Right arm   Pulse: 90   SpO2: 98%   Weight: 71.8 kg (158 lb 4.6 oz)   Height: 5' 1" (1.549 m)     Body mass index is 29.91 kg/m².    Physical Exam  Vitals reviewed.   Constitutional:       Appearance: Normal appearance.   HENT:      Head: Normocephalic.      Ears:      Comments: Inupiat.  Cardiovascular:      Rate and Rhythm: Normal rate.   Pulmonary:      Effort: Pulmonary effort is normal.   Abdominal:      General: Bowel sounds are normal.   Musculoskeletal:      Right lower leg: No edema.      Left lower leg: No edema.      Comments: Antalgic gait.   Skin:     General: Skin is warm and dry.      Capillary Refill: Capillary refill takes less than 2 seconds.   Neurological:      Mental Status: She is alert and oriented to person, place, and time.   Psychiatric:         " Behavior: Behavior normal.         Thought Content: Thought content normal.         Judgment: Judgment normal.             Diagnoses and health risks identified today and associated recommendations/orders:    1. Encounter for preventive health examination  Assessments completed.  HM recommendations reviewed. Discussed additional covid bivalent booster. States she has eye exam scheduled.  F/u with PCP as instructed.    2. Calcified granuloma of lung (noted on CT chest 10/2022 and 10/2015, stable)  Chronic, stable on current regimen. Followed by PCP.    3. Aortic atherosclerosis  Chronic, stable on current regimen. Followed by cardiology. On statin and zetia.    4. SVT (supraventricular tachycardia)  Chronic, stable on current regimen. Followed by cardiology.    5. Cardiomyopathy, unspecified type  Chronic, stable on current regimen. Followed by cardiology.    6. MDD (major depressive disorder), recurrent, in partial remission  Chronic, stable on current regimen. Followed by PCP / behavioral health.    7. Alcohol dependence, in remission  Chronic, stable on current regimen. Followed by PCP / behavioral health.    8. Status post parathyroidectomy  Chronic, stable on current regimen. Followed by PCP.    9. Type 2 diabetes mellitus with diabetic neuropathy, without long-term current use of insulin  Chronic, stable on current regimen. Followed by PCP    10. Diabetic polyneuropathy associated with type 2 diabetes mellitus  Chronic, stable on current regimen. Followed by PCP.    11. AV block, Mobitz II  Chronic, stable on current regimen. Followed by cardiology.    12. Mixed hyperlipidemia  Chronic, stable on current regimen. Followed by cardiology.    13. Presence of cardiac resynchronization therapy pacemaker (CRT-P)  Chronic, stable on current regimen. Followed by cardiology.    14. Sensorineural hearing loss (SNHL) of both ears  Chronic, stable on current regimen. Followed by audiology.    15. Cochlear implant in  place  Chronic, stable on current regimen. Followed by audiology.    16. DDD (degenerative disc disease), lumbar  Chronic, stable on current regimen. Followed by PCP / ortho.    17. Cervical spine arthritis  Chronic, stable on current regimen. Followed by PCP / ortho.    18. Chronic bilateral low back pain without sciatica  Chronic, stable on current regimen. Followed by PCP / ortho.    19. Vitamin D deficiency  Chronic, stable on current regimen. Followed by PCP.    20. Fatty liver  Chronic, stable on current regimen. Followed by PCP.    21. Gastroesophageal reflux disease, unspecified whether esophagitis present  Chronic, stable on current regimen. Followed by PCP.    22. COOKIE (obstructive sleep apnea)  Chronic, stable on current regimen. Followed by sleep medicine.    23. Mixed stress and urge urinary incontinence  Chronic, stable on current regimen. Followed by PCP.       Provided Paris with a 5-10 year written screening schedule and personal prevention plan. Recommendations were developed using the USPSTF age appropriate recommendations. Education, counseling, and referrals were provided as needed. After Visit Summary printed and given to patient which includes a list of additional screenings\tests needed.    Follow up in about 1 year (around 7/28/2024) for Medicare AWV and with PCP as instructed.       Yue Hadley, NILESH    I offered to discuss advanced care planning, including how to pick a person who would make decisions for you if you were unable to make them for yourself, called a health care power of , and what kind of decisions you might make such as use of life sustaining treatments such as ventilators and tube feeding when faced with a life limiting illness recorded on a living will that they will need to know. (How you want to be cared for as you near the end of your natural life)     X  Patient has advanced directives on file, which we reviewed, and they do not wish to make changes.

## 2023-07-31 ENCOUNTER — PATIENT MESSAGE (OUTPATIENT)
Dept: PRIMARY CARE CLINIC | Facility: CLINIC | Age: 78
End: 2023-07-31
Payer: MEDICARE

## 2023-07-31 ENCOUNTER — TELEPHONE (OUTPATIENT)
Dept: INTERNAL MEDICINE | Facility: CLINIC | Age: 78
End: 2023-07-31
Payer: MEDICARE

## 2023-07-31 ENCOUNTER — TELEPHONE (OUTPATIENT)
Dept: PRIMARY CARE CLINIC | Facility: CLINIC | Age: 78
End: 2023-07-31
Payer: MEDICARE

## 2023-07-31 NOTE — TELEPHONE ENCOUNTER
Returned patient call for dermatologist name and number in order to make appointment. Rocío Frost [phone].

## 2023-07-31 NOTE — TELEPHONE ENCOUNTER
----- Message from Zahraa Doe MA sent at 7/31/2023  9:06 AM CDT -----  Type:  Patient Returning Call    Who Called: pt   Does the patient know what this is regarding?: yes  Would the patient rather a call back or a response via [employer]? Call back  Best Call Back Number: [phone]  Additional Information:  pt requesting a call back from provider regarding dermatology information she was given by provider

## 2023-07-31 NOTE — TELEPHONE ENCOUNTER
----- Message from Villa Christianson sent at 7/31/2023  8:25 AM CDT -----  Contact: pt [phone]  Patient would like to know if she should have a COVID vaccination. Patient states she is not currently due for the shot. Please call and advise.    Thank you and have a great day.

## 2023-08-03 DIAGNOSIS — M25.512 BILATERAL SHOULDER PAIN, UNSPECIFIED CHRONICITY: ICD-10-CM

## 2023-08-03 DIAGNOSIS — M25.511 BILATERAL SHOULDER PAIN, UNSPECIFIED CHRONICITY: ICD-10-CM

## 2023-08-03 RX ORDER — ACETAMINOPHEN 500 MG
500 TABLET ORAL EVERY 6 HOURS PRN
Qty: 160 TABLET | Refills: 0 | Status: SHIPPED | OUTPATIENT
Start: 2023-08-03

## 2023-08-09 ENCOUNTER — IMMUNIZATION (OUTPATIENT)
Dept: INTERNAL MEDICINE | Facility: CLINIC | Age: 78
End: 2023-08-09
Payer: MEDICARE

## 2023-08-09 DIAGNOSIS — Z23 NEED FOR VACCINATION: Primary | ICD-10-CM

## 2023-08-09 PROCEDURE — 91312 COVID-19, MRNA, LNP-S, BIVALENT BOOSTER, PF, 30 MCG/0.3 ML DOSE: ICD-10-PCS | Mod: HCNC,S$GLB,, | Performed by: INTERNAL MEDICINE

## 2023-08-09 PROCEDURE — 91312 COVID-19, MRNA, LNP-S, BIVALENT BOOSTER, PF, 30 MCG/0.3 ML DOSE: CPT | Mod: HCNC,S$GLB,, | Performed by: INTERNAL MEDICINE

## 2023-08-09 PROCEDURE — 0124A COVID-19, MRNA, LNP-S, BIVALENT BOOSTER, PF, 30 MCG/0.3 ML DOSE: CPT | Mod: HCNC,S$GLB,, | Performed by: INTERNAL MEDICINE

## 2023-08-09 PROCEDURE — 0124A COVID-19, MRNA, LNP-S, BIVALENT BOOSTER, PF, 30 MCG/0.3 ML DOSE: ICD-10-PCS | Mod: HCNC,S$GLB,, | Performed by: INTERNAL MEDICINE

## 2023-08-11 ENCOUNTER — TELEPHONE (OUTPATIENT)
Dept: DERMATOLOGY | Facility: CLINIC | Age: 78
End: 2023-08-11
Payer: MEDICARE

## 2023-08-14 ENCOUNTER — TELEPHONE (OUTPATIENT)
Dept: NEUROLOGY | Facility: CLINIC | Age: 78
End: 2023-08-14
Payer: MEDICARE

## 2023-08-16 ENCOUNTER — OFFICE VISIT (OUTPATIENT)
Dept: PODIATRY | Facility: CLINIC | Age: 78
End: 2023-08-16
Payer: MEDICARE

## 2023-08-16 VITALS
DIASTOLIC BLOOD PRESSURE: 50 MMHG | HEIGHT: 61 IN | HEART RATE: 97 BPM | SYSTOLIC BLOOD PRESSURE: 92 MMHG | BODY MASS INDEX: 29.83 KG/M2 | WEIGHT: 158 LBS

## 2023-08-16 DIAGNOSIS — E11.42 TYPE 2 DIABETES MELLITUS WITH DIABETIC POLYNEUROPATHY, UNSPECIFIED WHETHER LONG TERM INSULIN USE: ICD-10-CM

## 2023-08-16 DIAGNOSIS — M79.672 FOOT PAIN, LEFT: ICD-10-CM

## 2023-08-16 DIAGNOSIS — L84 CORN OR CALLUS: Primary | ICD-10-CM

## 2023-08-16 PROCEDURE — 11055 PARING/CUTG B9 HYPRKER LES 1: CPT | Mod: Q9,HCNC,S$GLB, | Performed by: PODIATRIST

## 2023-08-16 PROCEDURE — 1157F ADVNC CARE PLAN IN RCRD: CPT | Mod: HCNC,CPTII,S$GLB, | Performed by: PODIATRIST

## 2023-08-16 PROCEDURE — 99999 PR PBB SHADOW E&M-EST. PATIENT-LVL III: CPT | Mod: PBBFAC,HCNC,, | Performed by: PODIATRIST

## 2023-08-16 PROCEDURE — 3078F PR MOST RECENT DIASTOLIC BLOOD PRESSURE < 80 MM HG: ICD-10-PCS | Mod: HCNC,CPTII,S$GLB, | Performed by: PODIATRIST

## 2023-08-16 PROCEDURE — 3072F PR LOW RISK FOR RETINOPATHY: ICD-10-PCS | Mod: HCNC,CPTII,S$GLB, | Performed by: PODIATRIST

## 2023-08-16 PROCEDURE — 1157F PR ADVANCE CARE PLAN OR EQUIV PRESENT IN MEDICAL RECORD: ICD-10-PCS | Mod: HCNC,CPTII,S$GLB, | Performed by: PODIATRIST

## 2023-08-16 PROCEDURE — 99214 PR OFFICE/OUTPT VISIT, EST, LEVL IV, 30-39 MIN: ICD-10-PCS | Mod: 25,HCNC,S$GLB, | Performed by: PODIATRIST

## 2023-08-16 PROCEDURE — 11055 PR TRIM HYPERKERATOTIC SKIN LESION, ONE: ICD-10-PCS | Mod: Q9,HCNC,S$GLB, | Performed by: PODIATRIST

## 2023-08-16 PROCEDURE — 99214 OFFICE O/P EST MOD 30 MIN: CPT | Mod: 25,HCNC,S$GLB, | Performed by: PODIATRIST

## 2023-08-16 PROCEDURE — 1159F MED LIST DOCD IN RCRD: CPT | Mod: HCNC,CPTII,S$GLB, | Performed by: PODIATRIST

## 2023-08-16 PROCEDURE — 3072F LOW RISK FOR RETINOPATHY: CPT | Mod: HCNC,CPTII,S$GLB, | Performed by: PODIATRIST

## 2023-08-16 PROCEDURE — 3074F SYST BP LT 130 MM HG: CPT | Mod: HCNC,CPTII,S$GLB, | Performed by: PODIATRIST

## 2023-08-16 PROCEDURE — 1125F PR PAIN SEVERITY QUANTIFIED, PAIN PRESENT: ICD-10-PCS | Mod: HCNC,CPTII,S$GLB, | Performed by: PODIATRIST

## 2023-08-16 PROCEDURE — 1159F PR MEDICATION LIST DOCUMENTED IN MEDICAL RECORD: ICD-10-PCS | Mod: HCNC,CPTII,S$GLB, | Performed by: PODIATRIST

## 2023-08-16 PROCEDURE — 3074F PR MOST RECENT SYSTOLIC BLOOD PRESSURE < 130 MM HG: ICD-10-PCS | Mod: HCNC,CPTII,S$GLB, | Performed by: PODIATRIST

## 2023-08-16 PROCEDURE — 3078F DIAST BP <80 MM HG: CPT | Mod: HCNC,CPTII,S$GLB, | Performed by: PODIATRIST

## 2023-08-16 PROCEDURE — 1125F AMNT PAIN NOTED PAIN PRSNT: CPT | Mod: HCNC,CPTII,S$GLB, | Performed by: PODIATRIST

## 2023-08-16 PROCEDURE — 99999 PR PBB SHADOW E&M-EST. PATIENT-LVL III: ICD-10-PCS | Mod: PBBFAC,HCNC,, | Performed by: PODIATRIST

## 2023-08-16 RX ORDER — UREA 40 %
CREAM (GRAM) TOPICAL DAILY
Qty: 198.4 G | Refills: 11 | Status: SHIPPED | OUTPATIENT
Start: 2023-08-16 | End: 2024-01-18

## 2023-08-16 NOTE — PROGRESS NOTES
Subjective:      Patient ID: Paris Burris is a 77 y.o. female.    Chief Complaint: Callouses (Painful callous)     Pain and hard skin  3rd toe left.  Gradual onset, worsening over the past month or so.  Aggravated by increased weight-bearing prolonged standing some shoes.  No prior medical treatment.  No self-treatment.  Patient denies trauma and surgery left foot.    Cc2 Diabetes, increased risk amputation needing evaluation/management/optomization of foot care.    Chief Complaint   Patient presents with    Callouses     Painful callous       Casual shoes      Review of Systems   Constitutional: Negative for chills, diaphoresis, fever, malaise/fatigue and night sweats.   Cardiovascular:  Negative for claudication, cyanosis, leg swelling and syncope.   Skin:  Positive for suspicious lesions. Negative for color change, dry skin, nail changes, rash and unusual hair distribution.   Musculoskeletal:  Negative for falls, joint pain, joint swelling, muscle cramps, muscle weakness and stiffness.   Gastrointestinal:  Negative for constipation, diarrhea, nausea and vomiting.   Neurological:  Positive for paresthesias and sensory change. Negative for brief paralysis, disturbances in coordination, focal weakness, numbness and tremors.           Objective:      Physical Exam  Constitutional:       General: She is not in acute distress.     Appearance: She is well-developed. She is not diaphoretic.   Cardiovascular:      Pulses:           Popliteal pulses are 2+ on the right side and 2+ on the left side.        Dorsalis pedis pulses are 2+ on the right side and 2+ on the left side.        Posterior tibial pulses are 2+ on the right side and 2+ on the left side.      Comments: Capillary refill 3 seconds all toes/distal feet, all toes/both feet warm to touch.      Negative lymphadenopathy bilateral popliteal fossa and tarsal tunnel.      Negavie lower extremity edema bilateral.    Musculoskeletal:      Right ankle: No  swelling, deformity, ecchymosis or lacerations. Normal range of motion. Normal pulse.      Right Achilles Tendon: Normal. No defects. Martin's test negative.      Comments: Patient has hammertoes of digits   2-5 bilateral                partially reducible without symptom today.    Otherwise, Skin is normal age and health appropriate color, turgor, texture, and temperature bilateral lower extremities without ulceration, hyperpigmentation, discoloration, masses nodules or cords palpated.  No ecchymosis, erythema, edema, or cardinal signs of infection bilateral lower extremities.    Lymphadenopathy:      Lower Body: No right inguinal adenopathy. No left inguinal adenopathy.      Comments: Negative lymphadenopathy bilateral popliteal fossa and tarsal tunnel.    Negative lymphangitic streaking bilateral feet/ankles/legs.   Skin:     General: Skin is warm and dry.      Capillary Refill: Capillary refill takes 2 to 3 seconds.      Coloration: Skin is not pale.      Findings: No abrasion, bruising, burn, ecchymosis, erythema, laceration, lesion or rash.      Nails: There is no clubbing.      Comments:   Focal hyperkeratotic lesion consisting entirely of hyperkeratotic tissue without open skin, drainage, pus, fluctuance, malodor, or signs of infection plantar lateral distal left 3rd toe.    Otherwise, Skin is normal age and health appropriate color, turgor, texture, and temperature bilateral lower extremities without ulceration, hyperpigmentation, discoloration, masses nodules or cords palpated.  No ecchymosis, erythema, edema, or cardinal signs of infection bilateral lower extremities.      Neurological:      Mental Status: She is alert and oriented to person, place, and time.      Sensory: No sensory deficit.      Motor: No tremor, atrophy or abnormal muscle tone.      Gait: Gait normal.      Comments: Paresthesias, and burning bilateral feet with no clearly identified trigger or source.    Negative tinel sign to  percussion sural, superficial peroneal, deep peroneal, saphenous, and posterior tibial nerves right and left ankles and feet.      Subjective numbness without loss protective sensation all 10 toes to 10 g monofilament   Psychiatric:         Behavior: Behavior is cooperative.               Assessment:       Encounter Diagnoses   Name Primary?    Corn or callus Yes    Foot pain, left     Type 2 diabetes mellitus with diabetic polyneuropathy, unspecified whether long term insulin use          Plan:       Paris was seen today for callouses.    Diagnoses and all orders for this visit:    Corn or callus  -     DIABETIC SHOES FOR HOME USE    Foot pain, left  -     DIABETIC SHOES FOR HOME USE    Type 2 diabetes mellitus with diabetic polyneuropathy, unspecified whether long term insulin use  -     DIABETIC SHOES FOR HOME USE    Other orders  -     urea (CARMOL) 40 % Crea; Apply topically once daily.      I counseled the patient on her conditions, their implications and medical management.        The patient has received literature on basic diabetic foot care.  Patient will inspect feet daily, wear protective shoe gear when ambulatory, and apply moisturizer to skin as needed to maintain elasticity and help prevent ulceration.    Discussed conservative treatment with shoes of adequate dimensions, material, and style to alleviate symptoms and delay or prevent surgical intervention.    Rx DM shoes, inserts, urea cream bid.    Inspect feet multiple times daily for signs of occurrence/recurrence ulceration.      With the patient's permission, I debrided hyperkeratotic lesion(s) as above totaling      1          to, not  Including dermis with sterile #15 blade.  Patient tolerated the procedure well and related significant relief.           No follow-ups on file.

## 2023-08-23 ENCOUNTER — OFFICE VISIT (OUTPATIENT)
Dept: NEUROLOGY | Facility: CLINIC | Age: 78
End: 2023-08-23
Payer: MEDICARE

## 2023-08-23 ENCOUNTER — TELEPHONE (OUTPATIENT)
Dept: PRIMARY CARE CLINIC | Facility: CLINIC | Age: 78
End: 2023-08-23
Payer: MEDICARE

## 2023-08-23 DIAGNOSIS — G31.84 MILD COGNITIVE IMPAIRMENT WITH MEMORY LOSS: Primary | ICD-10-CM

## 2023-08-23 DIAGNOSIS — F41.9 ANXIETY: ICD-10-CM

## 2023-08-23 DIAGNOSIS — G47.33 OSA (OBSTRUCTIVE SLEEP APNEA): ICD-10-CM

## 2023-08-23 DIAGNOSIS — Z79.899 POLYPHARMACY: ICD-10-CM

## 2023-08-23 DIAGNOSIS — F33.41 MDD (MAJOR DEPRESSIVE DISORDER), RECURRENT, IN PARTIAL REMISSION: ICD-10-CM

## 2023-08-23 PROCEDURE — 96132 NRPSYC TST EVAL PHYS/QHP 1ST: CPT | Mod: HCNC,S$GLB,, | Performed by: PSYCHIATRY & NEUROLOGY

## 2023-08-23 PROCEDURE — 99999 PR PBB SHADOW E&M-EST. PATIENT-LVL I: ICD-10-PCS | Mod: PBBFAC,HCNC,, | Performed by: PSYCHIATRY & NEUROLOGY

## 2023-08-23 PROCEDURE — 99499 NO LOS: ICD-10-PCS | Mod: HCNC,S$GLB,, | Performed by: PSYCHIATRY & NEUROLOGY

## 2023-08-23 PROCEDURE — 96139 PSYCL/NRPSYC TST TECH EA: CPT | Mod: HCNC,S$GLB,, | Performed by: PSYCHIATRY & NEUROLOGY

## 2023-08-23 PROCEDURE — 96138 PR PSYCH/NEUROPSYCH TEST ADMIN/SCORING, BY TECH, 2+ TESTS, 1ST 30 MIN: ICD-10-PCS | Mod: HCNC,S$GLB,, | Performed by: PSYCHIATRY & NEUROLOGY

## 2023-08-23 PROCEDURE — 96139 PR PSYCH/NEUROPSYCH TEST ADMIN/SCORING, BY TECH, 2+ TESTS, EA ADDTL 30 MIN: ICD-10-PCS | Mod: HCNC,S$GLB,, | Performed by: PSYCHIATRY & NEUROLOGY

## 2023-08-23 PROCEDURE — 99499 UNLISTED E&M SERVICE: CPT | Mod: HCNC,S$GLB,, | Performed by: PSYCHIATRY & NEUROLOGY

## 2023-08-23 PROCEDURE — 96138 PSYCL/NRPSYC TECH 1ST: CPT | Mod: HCNC,S$GLB,, | Performed by: PSYCHIATRY & NEUROLOGY

## 2023-08-23 PROCEDURE — 96133 PR NEUROPSYCHOLOGIC TEST EVAL SVCS, EA ADDTL HR: ICD-10-PCS | Mod: HCNC,S$GLB,, | Performed by: PSYCHIATRY & NEUROLOGY

## 2023-08-23 PROCEDURE — 96133 NRPSYC TST EVAL PHYS/QHP EA: CPT | Mod: HCNC,S$GLB,, | Performed by: PSYCHIATRY & NEUROLOGY

## 2023-08-23 PROCEDURE — 99999 PR PBB SHADOW E&M-EST. PATIENT-LVL I: CPT | Mod: PBBFAC,HCNC,, | Performed by: PSYCHIATRY & NEUROLOGY

## 2023-08-23 PROCEDURE — 96132 PR NEUROPSYCHOLOGIC TEST EVAL SVCS, 1ST HR: ICD-10-PCS | Mod: HCNC,S$GLB,, | Performed by: PSYCHIATRY & NEUROLOGY

## 2023-08-23 NOTE — PROGRESS NOTES
NEUROPSYCHOLOGY CONSULT  [facility]      Referral Information  Name: Paris Burris    : 1945  Age: 77 y.o.    Race: White    Gender: female     MRN: [id number]  Handedness: Right  Education: 16 years      Referring Provider:   Chelsea Gill, Bree  [address] [place] [address]  Referral Reason/Medical Necessity: Ms. Burris has a history of anxiety, depression, vascular risk factors, hearing loss, and memory changes. Neuropsychological evaluation was requested to assess current cognitive functioning, aid in differential diagnosis, and provide treatment recommendations.    Consent/Emergency Plan: The patient expressed an understanding of the purpose of the evaluation and consented to all procedures. I informed the patient of limits to confidentiality and discussed an emergency plan. Pt accompanied today by her .  Procedures/Billing: Please see billing table at the end of this report.  Referral Diagnosis:   F33.41 (ICD-10-CM) - MDD (major depressive disorder), recurrent, in partial remission   R41.3 (ICD-10-CM) - Memory changes   F41.9 (ICD-10-CM) - Anxiety     SUMMARY    Ms. Burris is a 77 y.o. White female with a history of anxiety, depression, HLD, DM2, CKD2, COOKIE, cardiomyopathy, hearing loss with cochlear implant presenting for evaluation of cognitive complaints. Pt reports worsening cognitive symptoms since about 6 to 8 months ago. No neuroimaging on file. No recent reversible dementia labs. Anticholinergic burden is elevated (ACB = 5; bupropion, metformin, tramadol, venlafaxine). Sleep is ok with melatonin, but she does have COOKIE, unable to tolerate CPAP, has pending appt with sleep clinic. Pt reports ongoing anxiety and depression, currently follows with a therapist through [facility]. Pt is Fully oriented.  manages finances, pt admits to occasionally forgetting medications, but otherwise is functionally independent. No immediate safety concerns.      Neuropsychological testing found declines in memory, cortical language features (confrontation naming, semantic fluency), and some aspects of executive functioning. Verbal memory improves with structure. Visuospatial functioning, simple attention, and processing speed are preserved. She did  not make any errors when asked to fill a complex pillbox with pretend medications. She endorsed mild depression and anxiety on self-report screeners.     Cortical deficits on testing are concerning for emerging Alzheimer's disease. She meets criteria for mild cognitive impairment today, but will want to continue to monitor her for progressive decline. She is on a number of anticholinergic medications, which may be exacerbating her memory loss. There may also be some interference from untreated COOKIE and ongoing mood symptoms. Poor hearing and cerebrovascular risk factors may also be contributing to some extent, though her weaker scores were not limited to auditory tasks, and testing does not suggest significant subcortical impairment.       IMPRESSIONS      1. Mild cognitive impairment with memory loss  Ambulatory referral/consult to Adult Neuropsychology      2. MDD (major depressive disorder), recurrent, in partial remission  Ambulatory referral/consult to Adult Neuropsychology      3. Anxiety  Ambulatory referral/consult to Adult Neuropsychology      4. COOKIE (obstructive sleep apnea)        5. Polypharmacy            PLAN     Ms. Burris is scheduled for feedback. Will discuss results, recommendations, provide handouts on brain health compensatory strategies, sleep hygiene, COOKIE, mood management  RTC 1 year for repeat testing  Consider optimization of medication regimen to reduce anticholinergic burden   Encouraged to keep upcoming appt with sleep clinic   Continue to follow with behavioral health providers for mood management       Thank you for allowing me to participate in Ms. Burris's care.  If you have any questions, please  contact me.    Luana Carlson PsyD  Clinical Neuropsychologist  Department of Neurology  [facility]        SUBJECTIVE:     HISTORY OF PRESENT ILLNESS   Ms. Burris is a 77 y.o. White female with a history of anxiety, depression, HLD, DM2, CKD2, COOKIE, alcohol dependence (in remission), cardiomyopathy, hearing loss with cochlear implant referred for neuropsychological evaluation.     Working on behavioral strategies to manage mood, on wellbutrin, effexor   Worried about dementia prevention     Here with her  today   Doesn't hear well but sometimes isn't quite answering the question I asked   She is a little repetitive     Cognitive Symptoms:  Attention: A little harder to pay attention.  notes that she seems more distracted when she is driving.   Mental Speed: Thinking does feel slower. She reads a lot, which she suspects helps.   Memory:Forgetting small things. Repeating herself to . Misplacing things.   Language: Denied word finding   Visuospatial/Perceptual: Not getting lost in familiar areas. Still tries to memorize the places she is going instead of relying on GPS.   Executive Functioning: Planning, organizing, multitasking are more difficult   Orientation: Fully oriented    Onset/Course: Ms. Soto symptoms were gradual in onset around 6 to 8 months ago and are stable. The pt denies any exacerbating or ameliorating factors. No waxing/waning of cognitive status.  Medication for Cognition: None    Sleep: normal. Takes melatonin   Total Hours/Night: 8   Pattern: falls asleep easily and sleeps through the night  COOKIE: Yes, CPAP noncompliant. Had trouble tolerating CPAP. Has upcoming appointment with sleep medicine.   Acting out dreams: Endorsed  reports jerking of arms and legs likely during apneic episodes and some talking as well. This has been going on for a few months.   Daytime Naps: Denied  Appetite: normal   Energy: decreased     Mood:  Treatment History:  Has had  "depression since childhood. Currently seeing a therapist through [facility].   Self-Described Mood: "Im depressed a lot."   Depressed Mood: Endorsed   Anxiety: Endorsed    Panic Attacks: Denied    Current Stressors: Sister  last year. Right now her relationship with her daughter is very difficult.   SI/HI: endorses history of passive SI without intent or plan. Had one gesture in her 20's. Denies current ideation, intent, or plan.     Neuropsychiatric:  Personality Change: Endorsed a little, per     Delusional/Paranoid Thinking: Endorsed feeling more anxious recently, not quite to the point of paranoia   Hallucinations: Denied  Impulsive/Compulsive Behaviors: Denied  Disinhibition: Endorsed  Apathy: Denied  Irritability/Agitation: Endorsed    Physical Sx:  Motor:  Denied abnormalities. No tremor, no trouble swallowing   Gait:  She is in [facility]'s fall prevention program, walks frequently. Has a cane but doesn't use it often. Has fallen a few times, will use cane after  Sensory: No change to taste, smell. Hearing is poor. Vision is ok. She got new glasses recently.   Pain: Ms. Burris described typical daily pain at a 0 on a scale of 1-10, with 10 being worst. Gets headaches occasionally     Current Functioning (I/ADLs):  ADLs: Independent   IADLs:  Finances: Requires assistance/oversight.  manages now since much of it is online. She was not making mistakes.   Medication Mgmt:Forgets meds sometimes, does better now that they have pill packets.   Driving: Independent denies issues   Household Mgmt: Independent   Vocational:  Retired  Safety Concerns: None      RELEVANT HISTORY:  Prior Testing:  None    Neurologic History  Seizures: Denied  Stroke: Denied  TBI: Denied  Movement Disorder: Denied  Falls: Endorsed, has had a few falls but none for about 2.5 years  Urinary Incontinence: Endorsed some stress incontinence   Chronic UTI's:  Denied      Substance Use History:  Social History     Tobacco Use    " Smoking status: Never    Smokeless tobacco: Never   Substance and Sexual Activity    Alcohol use: No     Comment: quit 2014 - alcohol abuse    Drug use: Not Currently    Sexual activity: Yes     Partners: Male   Was never a daily drinker   Caffeine       1 to 2 cups of coffee per day     Family History:  Family History   Problem Relation Age of Onset    Suicide Mother     Depression Mother     Alcohol abuse Father     Headaches Father     Diabetes Sister         prediabetes    Psoriasis Sister     Depression Sister     Depression Daughter     Colon cancer Neg Hx     Esophageal cancer Neg Hx      Family Neurologic History: both parents  young. Paternal uncle and maternal aunt had dementia.   Family Psychiatric History: Negative for heritable risk factors     Developmental/Academic Hx:  Developmental: Born and raised in [place], [place].  Product of a normal pregnancy and delivery. No developmental delays. English is their only language. [place] Togolese and Greek as an adult  Academic:  Learning Difficulties: Good student. No history of formal learning disorder diagnosis. Never repeated a grade.  Attention Difficulties: Denied. Never diagnosed with or treated for ADHD.  Behavioral Difficulties: Denied  Educational Attainment: 16, degree in Greek    Social/Occupational Hx:  Occupational:  Occupational Status: Retired 10 yrs ago   Primary Occupation(s): teacher   Social:  Resides: at home with    Family Status:  60 years, daughter who is living and son who passed away   Social Support:  Pt endorses adequate social support bur notes it is not as many as she would like. Sister passed away last fall, she was an important support, they were very close. She has always had a little trouble reaching out and making friends.   Daily Activities: Reads a lot. Enjoys gardening. Enjoys hanging laundry outside. Enjoys knitting.       Objective:     Neurodiagnostics:  No results found. However, due to the size of the  "patient record, not all encounters were searched. Please check Results Review for a complete set of results.    Pertinent Labs:  Lab Results   Component Value Date    MYAAXEBE10 393 07/06/2021     No results found for: "VITAMINB1"  Lab Results   Component Value Date    RPR Non-reactive 01/06/2016     Lab Results   Component Value Date    FOLATE 15.3 12/21/2017     Lab Results   Component Value Date    TSH 1.589 01/05/2022     Lab Results   Component Value Date    PTH 35.3 05/03/2023    CALCIUM 9.1 09/06/2023    CAION 1.34 11/05/2015    PHOS 2.4 (L) 01/31/2019      Lab Results   Component Value Date    HGBA1C 6.8 (H) 09/06/2023     No results found for: "HIV1X2", "[id number]"        Current Outpatient Medications:     acetaminophen (TYLENOL) 500 MG tablet, Take 1 tablet (500 mg total) by mouth every 6 (six) hours as needed for Pain., Disp: 160 tablet, Rfl: 0    ascorbic acid, vitamin C, (VITAMIN C) 500 MG tablet, Take 1,000 mg by mouth once daily. , Disp: , Rfl:     blood sugar diagnostic Strp, Use to test blood glucose 2 (two) times daily with meals., Disp: 100 strip, Rfl: 11    blood-glucose meter Misc, Use to check blood glucose twice daily, Disp: 1 each, Rfl: 0    buPROPion (WELLBUTRIN XL) 300 MG 24 hr tablet, Take 1 tablet (300 mg total) by mouth once daily., Disp: 90 tablet, Rfl: 3    ezetimibe (ZETIA) 10 mg tablet, Take 1 tablet (10 mg total) by mouth once daily., Disp: 90 tablet, Rfl: 3    flash glucose scanning reader (FREESTYLE RAMON 2 READER) [facility], Use continuously., Disp: 2 each, Rfl: 11    flash glucose sensor (FREESTYLE RAMON 2 SENSOR) Kit, Use continuously., Disp: 2 kit, Rfl: 11    fluticasone propionate (FLONASE) 50 mcg/actuation nasal spray, 1 spray (50 mcg total) by Each Nostril route 2 (two) times a day., Disp: 16 g, Rfl: 2    lancets 33 gauge Misc, Use to test blood glcuose 2 (two) times daily with meals., Disp: 100 each, Rfl: 11    lancing device Misc, 1 Device by Misc.(Non-Drug; Combo Route) " route 2 (two) times daily with meals., Disp: 1 each, Rfl: 0    losartan (COZAAR) 100 MG tablet, Take 1 tablet (100 mg total) by mouth once daily., Disp: 90 tablet, Rfl: 3    meloxicam (MOBIC) 7.5 MG tablet, Take 1 tablet (7.5 mg total) by mouth daily as needed for Pain., Disp: 30 tablet, Rfl: 11    metFORMIN (GLUCOPHAGE-XR) 500 MG ER 24hr tablet, Take 1 tablet (500 mg total) by mouth daily with breakfast., Disp: 90 tablet, Rfl: 3    metoprolol succinate (TOPROL-XL) 25 MG 24 hr tablet, Take 1 tablet (25 mg total) by mouth once daily., Disp: 90 tablet, Rfl: 3    rosuvastatin (CRESTOR) 20 MG tablet, Take 1 tablet (20 mg total) by mouth once daily., Disp: 90 tablet, Rfl: 3    semaglutide (OZEMPIC) 0.25 mg or 0.5 mg (2 mg/3 mL) pen injector, Inject 0.25mg subcutaneously weekly x 4 weeks and then increase to 0.5mg subcutaneously weekly onwards., Disp: 3 mL, Rfl: 2    traMADoL (ULTRAM) 50 mg tablet, TAKE 2 TABLETS EVERY 12 HOURS AS NEEDED FOR PAIN, Disp: 60 tablet, Rfl: 2    urea (CARMOL) 40 % Crea, Apply topically once daily., Disp: 198.4 g, Rfl: 11    valACYclovir (VALTREX) 1000 MG tablet, Take 2 tablets (2,000 mg total) by mouth every 12 (twelve) hours. for 1 day, Disp: 4 tablet, Rfl: 1    venlafaxine (EFFEXOR) 75 MG tablet, Take 1 tablet (75 mg total) by mouth once daily., Disp: 90 tablet, Rfl: 3    vitamin D 1000 units Tab, Take 0.5 Units by mouth once daily. With K2 50 [facility], Disp: , Rfl:   No current facility-administered medications for this visit.    Facility-Administered Medications Ordered in Other Visits:     0.9%  NaCl infusion, , Intravenous, Continuous, Maria Elena Flores MD, Stopped at 05/31/19 1548    sodium chloride 0.9% flush 10 mL, 10 mL, Intravenous, PRN, Maria Elena Flores MD    Medical history  Patient Active Problem List   Diagnosis    Insomnia    MDD (major depressive disorder), recurrent, in partial remission    History of breast cancer in female    Vitamin D deficiency    Hyperlipidemia    Leg  weakness, bilateral    Diabetes mellitus type 2 in obese    DDD (degenerative disc disease), lumbar    Midline low back pain without sciatica    Diabetic polyneuropathy associated with type 2 diabetes mellitus    ESTEFANIA (iron deficiency anemia)    Thoracic back pain    Fatty liver    Stage 2 chronic kidney disease    Type 2 diabetes mellitus with diabetic neuropathy    COOKIE (obstructive sleep apnea)    Aortic atherosclerosis    Alcohol dependence, in remission    History of gastric ulcer    Cervical spine arthritis    History of vertebral compression fracture    Obesity (BMI 30-39.9)    Voiding dysfunction    Urinary urgency    Urinary frequency    Urinary hesitancy    Vaginal atrophy    Mixed stress and urge urinary incontinence    Status post parathyroidectomy    Chronic bilateral low back pain without sciatica    Hiatal hernia    Pelvic floor dysfunction    AV block, Mobitz II    SVT (supraventricular tachycardia)    Cardiomyopathy    Presence of cardiac resynchronization therapy pacemaker (CRT-P)    GERD (gastroesophageal reflux disease)    Fatigue    Tender lymph node    Profound hearing loss of left ear    At risk for falls    Closed nondisplaced longitudinal fracture of right patella    Chronic pain of right knee    Gait abnormality    Sensorineural hearing loss (SNHL) of both ears    Cochlear implant in place    Calcified granuloma of lung (noted on CT chest 10/2022 and 10/2015, stable)    Mild cognitive impairment with memory loss     Past Medical History:   Diagnosis Date    Allergy     Anemia     Anxiety     Breast cancer 2002    Left breast    Cancer 2002    L breast s/p lumpectomy    Cataract     Decreased hearing     Depression     Dermatochalasis of both upper eyelids     Diabetes mellitus     Diabetes with neurologic complications     Gastric ulcer 09/10/2013    EGD    Hiatal hernia     Hyperlipidemia     Migraine headache     Migraine headache 03/20/2015    Multiple gastric ulcers     Parathyroid  disorder     Renal manifestation of secondary diabetes mellitus     Sleep apnea     no CPAP    Type 2 diabetes mellitus, controlled, 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      Past Surgical History:   Procedure Laterality Date    ADENOIDECTOMY      BREAST LUMPECTOMY Left 2002    CATARACT EXTRACTION W/  INTRAOCULAR LENS IMPLANT Bilateral     COLONOSCOPY N/A 12/2/2016    Procedure: COLONOSCOPY;  Surgeon: Dustin Patterson MD;  [facility]  Service: Endoscopy;  Laterality: N/A;  PM prep    COLONOSCOPY N/A 8/31/2022    Procedure: COLONOSCOPY;  Surgeon: Delfino Sanchez MD;  [facility]  Service: Endoscopy;  Laterality: N/A;  vacc-inst portal-am prep-clears 4 hrs prior-any md per pt-tb-pacer [facility]    ESOPHAGEAL MANOMETRY WITH MEASUREMENT OF IMPEDANCE N/A 10/8/2018    Procedure: MANOMETRY, ESOPHAGUS, WITH IMPEDANCE MEASUREMENT;  Surgeon: Renato Marai MD;  [facility]  Service: Endoscopy;  Laterality: N/A;    ESOPHAGOGASTRODUODENOSCOPY N/A 9/12/2018    Procedure: ESOPHAGOGASTRODUODENOSCOPY (EGD);  Surgeon: Dustin Patterson MD;  [facility]  Service: Endoscopy;  Laterality: N/A;  6-8 weeks from visit    ESOPHAGOGASTRODUODENOSCOPY N/A 1/29/2019    Procedure: EGD (ESOPHAGOGASTRODUODENOSCOPY) intraop;  Surgeon: Renato Perez Jr., MD;  [facility];  Service: General;  Laterality: N/A;    ESOPHAGOGASTRODUODENOSCOPY N/A 5/31/2019    Procedure: EGD (ESOPHAGOGASTRODUODENOSCOPY);  Surgeon: Maria Elena Little MD;  [facility]  Service: Endoscopy;  Laterality: N/A;  [facility] pacemaker - sm    EYE SURGERY Bilateral     cataracts    IMPLANTATION OF BIVENTRICULAR HEART PACEMAKER N/A 1/30/2019    Procedure: INSERTION, CARDIAC PACEMAKER, BIVENTRICULAR;  Surgeon: Stone Livingston MD;  Location: [facility] EP LAB;  Service: Cardiology;  Laterality: N/A;    IMPLANTATION OF COCHLEAR PROSTHESIS Left 4/26/2021     Procedure: INSERTION, PROSTHESIS, COCHLEAR;  Surgeon: Michael Julian MD;  Location: [facility] OR [address] [place] [address];  Service: ENT;  Laterality: Left;    LAPAROSCOPIC TOUPET FUNDOPLICATION N/A 1/29/2019    Procedure: FUNDOPLICATION, LAPAROSCOPIC, TOUPET;  Surgeon: Renato Perez Jr., MD;  Location: [facility] OR [address] [place] [address];  Service: General;  Laterality: N/A;    lipoma removal  1999    TONSILLECTOMY             OBJECTIVE:       MENTAL STATUS AND BEHAVIORAL OBSERVATIONS:  Appearance:  Casually dressed and Well groomed  Behavior:   alert, calm, cooperative, rapport easily established, and Appropriate interpersonal skills. Good interpretation of nonverbal cues.   Orientation:   Fully oriented  Sensory:   Hearing and vision appeared adequate for testing purposes.  Gait:   Unremarkable and Pt ambulates independently.   Psychomotor:  Within Normal Limits  Speech:  Fluent and spontaneous. Normal volume, pitch, tone, and prosody. A little dysarthric at times and slow  Language:  Receptive and expressive language appear intact. Comprehends conversational speech., No evidence of word-finding difficulties in conversational speech. Has a little trouble with complex questions. Could be partially hearing, but needs frequent repetition and rephrasing. Does better with simple questions.   Mood:   anxious  Affect:   mood-congruent  Thought Process: Generally goal directed   Thought Content: Denied current SI/HI. and No evidence of psychotic symptoms.  Judgment/Insight: Limited  Validity:  Performance on standalone and embedded performance validity measures all suggested adequate engagement. As a result, scores are likely a valid reflection of the pt's functioning.  Test Taking Bx:  Per psychometrist observations, Mrs. Burris arrived to testing with her . She wore reading glasses. She had a hearing aid in her left ear and use an amplifier provided by the [facility], but still had problems with hearing. The psychometrist had to yell and repeat  everything during testing. She was slow to respond and occasionally mumbled when speaking. She needed reassurance as she appeared anxious towards testing. She repeatedly recalled 'banana' and 'orange' from MOCA Similarities on CVLT Short. She did not improve with encouragement as she gave up easily and refused to take guesses. Instructions for Stroop Color Word needed to be clarified several times before she understood it.    PROCEDURES/TESTS ADMINISTERED:  In addition to performing a review of pertinent medical records, reviewing limits to confidentiality, conducting a clinical interview, and explaining procedures, the following measures were administered:   Toby Cognitive Assessment (MOCA), Test of Premorbid Functioning (TOPF), Wechsler Adult Intelligence Scale - Fourth Edition (WAIS-IV), selected subtests, Neuropsychological Assessment Battery (NAB) Naming subtest, Controlled Oral Word Association Test (CFL/MOANS/MOAANS + ed, 2005), Animal Naming (Alessandra et al., 2004), Trail Making Test (MOANS/MOAANS + Ed, 2005), Stroop Color Word Test (MOANS/MOAANS + Ed, 2005), Yandel Complex Figure Test  (Copy Trial) , [place] Verbal Learning Test - Second Edition (CVLT-II), Standard Form, Wechsler Memory Scale - Fourth Edition (WMS-IV), selected subtests, Clock Drawing (Scharabellalen et al. 2006 norms), Independent Living Scales (ILS), selected subtests, Generalized Anxiety Disorder-7 Item Scale (GABBY-7), Geriatric Depression Scale (GDS), Brief Visuospatial Memory Test - Revised (BVMT-R), Digit Vigilance Test (DVT), and Pillbox Test . Manual norms were used unless otherwise indicated.     NEUROPSYCHOLOGICAL ASSESSMENT RESULTS:  The following should not be interpreted in isolation from the neuropsychological evaluation report.  Scores on stand-alone and embedded performance validity measures were WNL.      Raw Score Score Type Standardized Score Percentile/CP Descriptor   ACS LM II Rec 18 - - - -   ACS RDS 8 - - - -   CVLT-II  Short         PREMORBID FUNCTIONING Raw Score Score Type Standardized Score Percentile/CP Descriptor   TOPF simple dem. eFSIQ -  73 Average   TOPF pred. eFSIQ -  90 High Average   TOPF simple + pred. eFSIQ -  86 High Average   COGNITIVE SCREENING Raw Score Score Type Standardized Score Percentile/CP Descriptor   MoCA 18 - - - Impaired   Orientation - Place 2/2 - - - -   Orientation - Date 4/4 - - - -   LANGUAGE FUNCTIONING Raw Score Score Type Standardized Score Percentile/CP Descriptor   WAIS-IV Similarities 22 ss 10 50 Average   TOPF Word Reading 62  92 Above Average   NAB Naming 27 Tscore 35 7 Below Average   NAB Naming Percent Correct After Semantic Cuing 75 - - 78 High Average   NAB Naming Percent Correct After Phonemic Cuing 0 - - 20 Low Average   FAS 38 ss 10 50 Average   Animal Naming 12 Tscore 34 5 Below Average   VISUOSPATIAL FUNCTIONING Raw Score Score Type Standardized Score Percentile/CP Descriptor   WAIS-IV Matrix Reasoning 6 ss 7 16 Low Average   RCFT Copy 35 - - >16 WNL   RCFT Time to Copy 317 - - >16 WNL   Clock Request 4 - - 48.9 Average   Clock Copy 5 - - 100 WNL   Clock Total 9 - - 53.5 Average   BVMT-R Copy 12 - - - -   LEARNING & MEMORY Raw Score Score Type Standardized Score Percentile/CP Descriptor   CVLT-II Short         Trials 1-4  12 Tscore 18.0 <0.1 Exceptionally Low    Trial 1 3 zscore -2.5 1 Exceptionally Low    Trial 4 3 zscore -2.5 1 Exceptionally Low    SDFR 3 zscore -2.0 2 Below Average   LDFR 2 zscore -1.5 7 Below Average   LDCR 2 zscore -2.5 1 Exceptionally Low    Semantic Clustering -0.3 zscore 0.0 50 Average   Learning Glades 0 zscore -1.0 16 Low Average   Repetitions 1 zscore 0.5 69 Average   Intrusions 9 zscore 5.0 100 Exceptionally High   Recognition Hits 7 zscore -1.5 7 Below Average   False Positives 4 zscore 1.5 93 Above Average   Discriminability 1.5 zscore -1.5 7 Below Average   Forced Choice 100 - - - -   WMS-IV Subtests         LM I 26 ss 9  37 Average   LM II 14 ss 9 37 Average   LM Recognition 18 - - 26-50 Average   BVMT-R         IR 10 Tscore 32 4 Below Average   DR 3 Tscore 31 3 Below Average   Discrimination Index 2 - - 3-5 Below Average   ATTENTION/WORKING MEMORY Raw Score Score Type Standardized Score Percentile/CP Descriptor   WAIS-IV Digit Span 21 ss 9 37 Average         DS Forward 7 ss 7 16 Low Average         DS Backward 8 ss 10 50 Average         DS Sequence 6 ss 9 37 Average         Longest Digit Forward 5 - - - -         Longest Digit Backward 4 - - - -         Longest Digit Sequence 5 - - - -   DVT Time 469 Tscore 47 38 Average   DVT Errors 31 Tscore 28 1 Exceptionally Low    MENTAL PROCESSING SPEED Raw Score Score Type Standardized Score Percentile/CP Descriptor   WAIS-IV Coding 35 ss 8 25 Average   TMT A  63 ss 7 16 Low Average   TMT A errors 0 - - - -   Stroop: Word Reading 80 ss 9 37 Average   Stroop: Color Naming 20 ss 2 0.4 Exceptionally Low    EXECUTIVE FUNCTIONING Raw Score Score Type Standardized Score Percentile/CP Descriptor   TMT B 232 ss 7 16 Low Average   TMT B errors 0 - - - -   Stroop: C/W 9 ss 4 2 Below Average   Stroop: Interference -6 Tscore N/A N/A N/A   Clock Request 4 - - 48.9 Average   WAIS-IV Similarities 22 ss 10 50 Average   WAIS-IV Matrix Reasoning 6 ss 7 16 Low Average   FUNCTIONAL  Raw Score Score Type Standardized Score Percentile/CP Descriptor   Pillbox Test Errors 1 - - - WNL   MOOD & PERSONALITY Raw Score Score Type Standardized Score Percentile/CP Descriptor   GDS-30 15 - - - Mild   GABBY-7 7 - - - Mild   ss = scaled score (mean = 10, SD = 3); SS = standard score (mean = 100, SD = 15); Tscore mean = 50, SD = 10; zscore (mean = 0.00, SD = 1)           Billing/Services Summary          Neurobehavioral Status Exam Base Code (13195)  Total Units: 0    Face-to-face Total Time: 0 min.         Professional Neuropsychological Testing Evaluation Services Base Code (41035)   Total Units: 1 Add-on (23452)  Total  Units: 2   Referral review/initial test selection 15 min.    Intra-session Clinical Decision-Making          Tech consult/test review/modification 0 min.         Patient behavior management 0 min.    Face-to-face Interpretive Feedback 60 min.    Record Review/Integration/Report Generation 120 min.     Total Time: 195 min.         Test Administration by Psychologist Base Code (36935)   Total Units: 0 Add-on (12058)  Total Units: 0   Testing 0 min.    Scoring 0 min.     Total Time: 0 min.         Test Administration by Technician  Technician Name: SUMANTH Grossman Base Code (75912)   Total Units: 1 Add-on (79380)\  Total Units: 6   Face-to-Face Testin min.    Scoring 75 min.     Total Time: 206 min.    DOS is the date of the evaluation unless specified

## 2023-08-24 ENCOUNTER — CLINICAL SUPPORT (OUTPATIENT)
Dept: PRIMARY CARE CLINIC | Facility: CLINIC | Age: 78
End: 2023-08-24
Payer: MEDICARE

## 2023-08-24 DIAGNOSIS — F32.A MODERATELY SEVERE DEPRESSION: Primary | ICD-10-CM

## 2023-08-24 PROCEDURE — 90837 PR PSYCHOTHERAPY W/PATIENT, 60 MIN: ICD-10-PCS | Mod: HCNC,S$GLB,, | Performed by: SOCIAL WORKER

## 2023-08-24 PROCEDURE — 90837 PSYTX W PT 60 MINUTES: CPT | Mod: HCNC,S$GLB,, | Performed by: SOCIAL WORKER

## 2023-08-24 NOTE — PROGRESS NOTES
"Individual Psychotherapy (LCSW/PhD)  Paris Burris,  8/24/2023    Site:  [place]         Therapeutic Intervention: Met with patient for individual psychotherapy.    Chief complaint/reason for encounter: depression     Interval history and content of current session:     Pt recently completed a cognitive assessment.    Pt has been feeling sadness and guilt related to relationship with daughter. Granddaughter recently invited  only to dinner and did not include mother. Pt reports feelings "unlovable" and feeling that their daughter "hates them".     Pt reports better relationship with grandson and states they feel more connected to grandson. LCSW inquired about relationship with grandson and grandson's perspective on mother's relationship with pt. Pt and LCSW discussed pt's relationship with  and their relationship with .     Pt and LCSW discussed importance of expressing emotions and using empathic listening to engage with granddaughter. Pt and LCSW discussed how well pt and  have been getting along day to day. Pt does report some conflict with  including outbursts. Pt does report that  has taken more initiative at home with home care.     Goals:  1) speak with granddaughter about feelings  2) identify tasks that need to be handled daily     Treatment plan:  Target symptoms: depression  Why chosen therapy is appropriate versus another modality: relevant to diagnosis, evidence based practice  Outcome monitoring methods: self-report, checklist/rating scale  Therapeutic intervention type: insight oriented psychotherapy, supportive psychotherapy    Risk parameters:  Patient reports no suicidal ideation  Patient reports no homicidal ideation  Patient reports no self-injurious behavior  Patient reports no violent behavior    Verbal deficits: None    Patient's response to intervention:  The patient's response to intervention is accepting.    Progress toward goals and other " mental status changes:  The patient's progress toward goals is fair .    Diagnosis:   No diagnosis found.    Plan: Pt plans to continue individual psychotherapy    Return to clinic: 2 weeks    Length of Service (minutes): 60

## 2023-09-05 ENCOUNTER — TELEPHONE (OUTPATIENT)
Dept: PRIMARY CARE CLINIC | Facility: CLINIC | Age: 78
End: 2023-09-05
Payer: MEDICARE

## 2023-09-05 NOTE — TELEPHONE ENCOUNTER
----- Message from Margi Solitario sent at 9/5/2023 11:26 AM CDT -----  Contact: Self/[phone]  1MEDICALADVICE     Patient is calling for Medical Advice regarding: pt has a question about starting Wegovy for weight loss       Would like response via [employer]:  No, patient would like a return call     Comments:

## 2023-09-05 NOTE — TELEPHONE ENCOUNTER
----- Message from Margi Solitario sent at 9/5/2023 11:26 AM CDT -----  Contact: Self/[phone]  1MEDICALADVICE     Patient is calling for Medical Advice regarding: pt has a question about starting Wegovy for weight loss       Would like response via [employer]:  No, patient would like a return call     Comments:

## 2023-09-06 ENCOUNTER — LAB VISIT (OUTPATIENT)
Dept: LAB | Facility: HOSPITAL | Age: 78
End: 2023-09-06
Attending: INTERNAL MEDICINE
Payer: MEDICARE

## 2023-09-06 ENCOUNTER — OFFICE VISIT (OUTPATIENT)
Dept: PRIMARY CARE CLINIC | Facility: CLINIC | Age: 78
End: 2023-09-06
Payer: MEDICARE

## 2023-09-06 VITALS
SYSTOLIC BLOOD PRESSURE: 124 MMHG | DIASTOLIC BLOOD PRESSURE: 82 MMHG | HEART RATE: 87 BPM | HEIGHT: 61 IN | BODY MASS INDEX: 29.89 KG/M2 | OXYGEN SATURATION: 97 % | TEMPERATURE: 99 F | WEIGHT: 158.31 LBS

## 2023-09-06 DIAGNOSIS — E11.65 TYPE 2 DIABETES MELLITUS WITH HYPERGLYCEMIA, WITHOUT LONG-TERM CURRENT USE OF INSULIN: Primary | ICD-10-CM

## 2023-09-06 DIAGNOSIS — M25.512 CHRONIC LEFT SHOULDER PAIN: ICD-10-CM

## 2023-09-06 DIAGNOSIS — G89.29 CHRONIC LEFT SHOULDER PAIN: ICD-10-CM

## 2023-09-06 DIAGNOSIS — E11.65 TYPE 2 DIABETES MELLITUS WITH HYPERGLYCEMIA, WITHOUT LONG-TERM CURRENT USE OF INSULIN: ICD-10-CM

## 2023-09-06 LAB
ALBUMIN SERPL BCP-MCNC: 4.4 G/DL (ref 3.5–5.2)
ALP SERPL-CCNC: 79 U/L (ref 55–135)
ALT SERPL W/O P-5'-P-CCNC: 36 U/L (ref 10–44)
ANION GAP SERPL CALC-SCNC: 11 MMOL/L (ref 8–16)
AST SERPL-CCNC: 29 U/L (ref 10–40)
BILIRUB SERPL-MCNC: 0.4 MG/DL (ref 0.1–1)
BUN SERPL-MCNC: 13 MG/DL (ref 8–23)
CALCIUM SERPL-MCNC: 9.1 MG/DL (ref 8.7–10.5)
CHLORIDE SERPL-SCNC: 106 MMOL/L (ref 95–110)
CO2 SERPL-SCNC: 24 MMOL/L (ref 23–29)
CREAT SERPL-MCNC: 0.8 MG/DL (ref 0.5–1.4)
EST. GFR  (NO RACE VARIABLE): >60 ML/MIN/1.73 M^2
ESTIMATED AVG GLUCOSE: 148 MG/DL (ref 68–131)
GLUCOSE SERPL-MCNC: 133 MG/DL (ref 70–110)
HBA1C MFR BLD: 6.8 % (ref 4–5.6)
POTASSIUM SERPL-SCNC: 4.6 MMOL/L (ref 3.5–5.1)
PROT SERPL-MCNC: 7.6 G/DL (ref 6–8.4)
SODIUM SERPL-SCNC: 141 MMOL/L (ref 136–145)

## 2023-09-06 PROCEDURE — 1101F PR PT FALLS ASSESS DOC 0-1 FALLS W/OUT INJ PAST YR: ICD-10-PCS | Mod: HCNC,CPTII,S$GLB, | Performed by: INTERNAL MEDICINE

## 2023-09-06 PROCEDURE — 3072F PR LOW RISK FOR RETINOPATHY: ICD-10-PCS | Mod: HCNC,CPTII,S$GLB, | Performed by: INTERNAL MEDICINE

## 2023-09-06 PROCEDURE — 1160F PR REVIEW ALL MEDS BY PRESCRIBER/CLIN PHARMACIST DOCUMENTED: ICD-10-PCS | Mod: HCNC,CPTII,S$GLB, | Performed by: INTERNAL MEDICINE

## 2023-09-06 PROCEDURE — 1159F MED LIST DOCD IN RCRD: CPT | Mod: HCNC,CPTII,S$GLB, | Performed by: INTERNAL MEDICINE

## 2023-09-06 PROCEDURE — 1126F AMNT PAIN NOTED NONE PRSNT: CPT | Mod: HCNC,CPTII,S$GLB, | Performed by: INTERNAL MEDICINE

## 2023-09-06 PROCEDURE — 3288F PR FALLS RISK ASSESSMENT DOCUMENTED: ICD-10-PCS | Mod: HCNC,CPTII,S$GLB, | Performed by: INTERNAL MEDICINE

## 2023-09-06 PROCEDURE — 3074F SYST BP LT 130 MM HG: CPT | Mod: HCNC,CPTII,S$GLB, | Performed by: INTERNAL MEDICINE

## 2023-09-06 PROCEDURE — 1126F PR PAIN SEVERITY QUANTIFIED, NO PAIN PRESENT: ICD-10-PCS | Mod: HCNC,CPTII,S$GLB, | Performed by: INTERNAL MEDICINE

## 2023-09-06 PROCEDURE — 99214 PR OFFICE/OUTPT VISIT, EST, LEVL IV, 30-39 MIN: ICD-10-PCS | Mod: HCNC,S$GLB,, | Performed by: INTERNAL MEDICINE

## 2023-09-06 PROCEDURE — 1157F PR ADVANCE CARE PLAN OR EQUIV PRESENT IN MEDICAL RECORD: ICD-10-PCS | Mod: HCNC,CPTII,S$GLB, | Performed by: INTERNAL MEDICINE

## 2023-09-06 PROCEDURE — 1160F RVW MEDS BY RX/DR IN RCRD: CPT | Mod: HCNC,CPTII,S$GLB, | Performed by: INTERNAL MEDICINE

## 2023-09-06 PROCEDURE — 3079F DIAST BP 80-89 MM HG: CPT | Mod: HCNC,CPTII,S$GLB, | Performed by: INTERNAL MEDICINE

## 2023-09-06 PROCEDURE — 99999 PR PBB SHADOW E&M-EST. PATIENT-LVL III: CPT | Mod: PBBFAC,HCNC,, | Performed by: INTERNAL MEDICINE

## 2023-09-06 PROCEDURE — 99214 OFFICE O/P EST MOD 30 MIN: CPT | Mod: HCNC,S$GLB,, | Performed by: INTERNAL MEDICINE

## 2023-09-06 PROCEDURE — 3074F PR MOST RECENT SYSTOLIC BLOOD PRESSURE < 130 MM HG: ICD-10-PCS | Mod: HCNC,CPTII,S$GLB, | Performed by: INTERNAL MEDICINE

## 2023-09-06 PROCEDURE — 99999 PR PBB SHADOW E&M-EST. PATIENT-LVL III: ICD-10-PCS | Mod: PBBFAC,HCNC,, | Performed by: INTERNAL MEDICINE

## 2023-09-06 PROCEDURE — 83036 HEMOGLOBIN GLYCOSYLATED A1C: CPT | Mod: HCNC | Performed by: INTERNAL MEDICINE

## 2023-09-06 PROCEDURE — 3079F PR MOST RECENT DIASTOLIC BLOOD PRESSURE 80-89 MM HG: ICD-10-PCS | Mod: HCNC,CPTII,S$GLB, | Performed by: INTERNAL MEDICINE

## 2023-09-06 PROCEDURE — 3072F LOW RISK FOR RETINOPATHY: CPT | Mod: HCNC,CPTII,S$GLB, | Performed by: INTERNAL MEDICINE

## 2023-09-06 PROCEDURE — 36415 COLL VENOUS BLD VENIPUNCTURE: CPT | Mod: HCNC,PN | Performed by: INTERNAL MEDICINE

## 2023-09-06 PROCEDURE — 1159F PR MEDICATION LIST DOCUMENTED IN MEDICAL RECORD: ICD-10-PCS | Mod: HCNC,CPTII,S$GLB, | Performed by: INTERNAL MEDICINE

## 2023-09-06 PROCEDURE — 1101F PT FALLS ASSESS-DOCD LE1/YR: CPT | Mod: HCNC,CPTII,S$GLB, | Performed by: INTERNAL MEDICINE

## 2023-09-06 PROCEDURE — 3288F FALL RISK ASSESSMENT DOCD: CPT | Mod: HCNC,CPTII,S$GLB, | Performed by: INTERNAL MEDICINE

## 2023-09-06 PROCEDURE — 1157F ADVNC CARE PLAN IN RCRD: CPT | Mod: HCNC,CPTII,S$GLB, | Performed by: INTERNAL MEDICINE

## 2023-09-06 PROCEDURE — 80053 COMPREHEN METABOLIC PANEL: CPT | Mod: HCNC | Performed by: INTERNAL MEDICINE

## 2023-09-06 RX ORDER — FLASH GLUCOSE SCANNING READER
EACH MISCELLANEOUS
Qty: 2 EACH | Refills: 11 | Status: SHIPPED | OUTPATIENT
Start: 2023-09-06

## 2023-09-06 RX ORDER — FLASH GLUCOSE SENSOR
KIT MISCELLANEOUS
Qty: 2 KIT | Refills: 11 | Status: SHIPPED | OUTPATIENT
Start: 2023-09-06

## 2023-09-06 RX ORDER — MELOXICAM 7.5 MG/1
7.5 TABLET ORAL DAILY PRN
Qty: 30 TABLET | Refills: 11 | Status: SHIPPED | OUTPATIENT
Start: 2023-09-06

## 2023-09-06 NOTE — PROGRESS NOTES
"Subjective:       Patient ID: Paris Burris is a 77 y.o. female.    Chief Complaint: weight and diabetes.     HPI  MMG scheduled for 1/2024.     Previously was on trulicity. Did well. Off for a while. No side effects.   Frustrated about plateau in weight loss.   DM2 - on metformin xr 500mg daily w/ breakfast.   Would like to get a continuous glucose monitoring.   A1c 5/3/23 6.7.    Some OA of the shoulder. Taking mobic intermittently, which helps. No radiation. FROM. No numbness/tingling/weakness. Would like to go to PT on [place].     MDD/GABBY is not great. Hasn't been able to walk outside due to the heat. Part of [employer]. Will start going there to exercise and thinks that would help w/ the mood. No SI/HI.    Review of Systems  Comprehensive review of systems otherwise negative. See history/subjective section for more details.    Objective:      Physical Exam    /82 (BP Location: Right arm, Patient Position: Sitting, BP Method: Large (Manual))   Pulse 87   Temp 98.7 °F (37.1 °C) (Oral)   Ht 5' 1" (1.549 m)   Wt 71.8 kg (158 lb 4.6 oz)   SpO2 97%   BMI 29.91 kg/m²     Gen - A+OX4, NAD  HEENT - PERRL, OP clear. MMM. R hearing aid in place but still had to speak up.   Neck - no LAD  CV - RRR, no m/r  Chest - CTAB, no wheezing/rhonchi  Abd - S/NT/ND/+BS  Ext - 2+ B radial pulses. No LE edema.   Msk - No spinal tenderness to palpation. L shoulder pain on palpation. FROM of L shoulder. 5/5 BUE m strength.    Previous labs reviewed.    Assessment/Plan     Diagnoses and all orders for this visit:    Type 2 diabetes mellitus with hyperglycemia, without long-term current use of insulin - risks vs benefits for ozempic discussed. Start on lowest dosage. If abdominal pain, stop meds. May have some mild GI symptoms initially but should improve in a week or two. If persistent, let us know. F/u w/ Dr. Gay in about 6-7 weeks to determine if need to up dosage (if tolerated and no hypoglycemic episodes) or " stay on the 0.5mg weekly. Can cont metformin for now.   -     flash glucose sensor (FREESTYLE RAMON 2 SENSOR) Kit; Use continuously.  -     flash glucose scanning reader (FREESTYLE RAMON 2 READER) [facility]; Use continuously.  -     semaglutide (OZEMPIC) 0.25 mg or 0.5 mg (2 mg/3 mL) pen injector; Inject 0.25mg subcutaneously weekly x 4 weeks and then increase to 0.5mg subcutaneously weekly onwards.  -     Comprehensive Metabolic Panel; Future  -     Hemoglobin A1C; Future    Chronic left shoulder pain  -     Ambulatory referral/consult to Physical/Occupational Therapy; Future    Other orders  -     meloxicam (MOBIC) 7.5 MG tablet; Take 1 tablet (7.5 mg total) by mouth daily as needed for Pain.    Reminded to schedule eye exam.   Flu vaccine when available.    Follow up in about 7 weeks (around 10/25/2023). W/ Dr. Gay.      Mandi Huynh MD  Department of Internal Medicine - [facility]  11:09 AM

## 2023-09-12 ENCOUNTER — CLINICAL SUPPORT (OUTPATIENT)
Dept: CARDIOLOGY | Facility: HOSPITAL | Age: 78
End: 2023-09-12
Payer: MEDICARE

## 2023-09-12 DIAGNOSIS — I42.9 CARDIOMYOPATHY, UNSPECIFIED: ICD-10-CM

## 2023-09-12 DIAGNOSIS — I44.2 ATRIOVENTRICULAR BLOCK, COMPLETE: ICD-10-CM

## 2023-09-12 DIAGNOSIS — Z95.0 PRESENCE OF CARDIAC PACEMAKER: ICD-10-CM

## 2023-09-12 PROCEDURE — 93296 REM INTERROG EVL PM/IDS: CPT | Mod: HCNC | Performed by: INTERNAL MEDICINE

## 2023-09-14 ENCOUNTER — CLINICAL SUPPORT (OUTPATIENT)
Dept: INTERNAL MEDICINE | Facility: CLINIC | Age: 78
End: 2023-09-14
Payer: MEDICARE

## 2023-09-14 ENCOUNTER — CLINICAL SUPPORT (OUTPATIENT)
Dept: INTERNAL MEDICINE | Facility: CLINIC | Age: 78
End: 2023-09-14
Attending: INTERNAL MEDICINE
Payer: MEDICARE

## 2023-09-14 VITALS — WEIGHT: 158.31 LBS | HEIGHT: 61 IN | BODY MASS INDEX: 29.89 KG/M2

## 2023-09-14 DIAGNOSIS — E11.40 TYPE 2 DIABETES MELLITUS WITH DIABETIC NEUROPATHY, WITHOUT LONG-TERM CURRENT USE OF INSULIN: ICD-10-CM

## 2023-09-14 DIAGNOSIS — E11.40 TYPE 2 DIABETES MELLITUS WITH DIABETIC NEUROPATHY, WITHOUT LONG-TERM CURRENT USE OF INSULIN: Primary | ICD-10-CM

## 2023-09-14 PROCEDURE — 92228 IMG RTA DETC/MNTR DS PHY/QHP: CPT | Mod: 26,HCNC,S$GLB, | Performed by: OPTOMETRIST

## 2023-09-14 PROCEDURE — 92228 DIABETIC EYE SCREENING PHOTO: ICD-10-PCS | Mod: HCNC,TC,S$GLB, | Performed by: INTERNAL MEDICINE

## 2023-09-14 PROCEDURE — 2025F DIABETIC EYE SCREENING PHOTO: ICD-10-PCS | Mod: HCNC,CPTII,S$GLB, | Performed by: OPTOMETRIST

## 2023-09-14 PROCEDURE — 2025F 7 FLD RTA PHOTO W/O RTNOPTHY: CPT | Mod: HCNC,CPTII,S$GLB, | Performed by: OPTOMETRIST

## 2023-09-14 PROCEDURE — 92228 DIABETIC EYE SCREENING PHOTO: ICD-10-PCS | Mod: 26,HCNC,S$GLB, | Performed by: OPTOMETRIST

## 2023-09-14 PROCEDURE — 92228 IMG RTA DETC/MNTR DS PHY/QHP: CPT | Mod: HCNC,TC,S$GLB, | Performed by: INTERNAL MEDICINE

## 2023-09-14 NOTE — PROGRESS NOTES
Health  Follow-Up Note   [x] Office  [] Phone  Notes from previous session reviewed.   [x] Previous Session Goals unchanged.   [] Patient/Caregiver Change in Goals.  Goals added or changed by Patient/Caregiver since program participation:  Start Ozempic (showed video and sent to email for reference at home did not bring medication with her previous has taken Trulicity)  Continue same plan   Increase exercise (now that is cooler) walking 4-5 x wk for 30 min  Get results for screening test for memory       Additional/Changed support that patient/caregiver has experienced/sought?  (Indicate readiness, support from family, friends, others, community groups, etc)       Additional/Changed obstacles that could prevent patient/caregiver from reaching their goals?   Too hot to walk    Feedback provided:   Praised for good job down 1 lbs    Diagnostic values/Desriptors for follow-up as needed for chronic condition(s)   Weight: 71.8 kg 158.29 lb  Blood Glucose: has not been checking at home last A1c 6.8 on 9.6.23.    Interventions:   Health  listened reflectively, validated thoughts and feelings, offered support and encouragement.   Allowed patient to express themselves in a non-biased atmosphere.  Health  assisted pt to problem-solve obstacles such as being in a challenging environment and dealing with these challenges.   Motivational Interviewed interventions utilized (OARS).   Patient responded favorably to interventions and remained actively engaged in the session.   Health  will remain available and connected for patient by phone and/or office visits.   Positive reinforcement, emotional support and encouragement provided.   Focused Education: MI    Plan:  [x] Pt will work on goals as stated above.   [x] Pt will contact Health  for any questions, concerns or needs.  [x] Pt will follow up with Health  in office on  will call for f/u  [] Pt will follow up with Health  on phone in:         [x] Health  will remain available.   [] Health  will contact patient by phone in:        [] Health  will consult:        [] Health  will inform Provider via EPIC messaging.     Impression:  1. Behavior is consistent with Action Stage of Change.   2. Participation level:       [x] Receptive      [x] Interactive      [] Guarded and Resistant      [x] Self Motivated      [] Refused/Declined to participate   3. [x] Pt voiced understanding of all information presented.       [] Pt voiced needing further information/education. This will be arranged.       [x] Pt would benefit from further education/information as identified by this health . This will be arranged.     Jacqueline Perry RN HC

## 2023-09-14 NOTE — PROGRESS NOTES
Paris Burris is a 77 y.o. female here for a diabetic eye screening with non-dilated fundus photos per Dr. Huynh.    Patient cooperative?: Yes  Small pupils?: Yes  Last eye exam: 9.1.2022    For exam results, see Encounter Report.  Jacqueline Perry RN HC

## 2023-09-17 DIAGNOSIS — F41.9 ANXIETY: ICD-10-CM

## 2023-09-17 DIAGNOSIS — F33.41 MDD (MAJOR DEPRESSIVE DISORDER), RECURRENT, IN PARTIAL REMISSION: ICD-10-CM

## 2023-09-17 RX ORDER — VENLAFAXINE 75 MG/1
75 TABLET ORAL DAILY
Qty: 90 TABLET | Refills: 3 | Status: SHIPPED | OUTPATIENT
Start: 2023-09-17 | End: 2024-09-16

## 2023-09-18 ENCOUNTER — PATIENT OUTREACH (OUTPATIENT)
Dept: PRIMARY CARE CLINIC | Facility: CLINIC | Age: 78
End: 2023-09-18
Payer: MEDICARE

## 2023-09-22 PROBLEM — G31.84 MILD COGNITIVE IMPAIRMENT WITH MEMORY LOSS: Status: ACTIVE | Noted: 2023-09-22

## 2023-09-27 ENCOUNTER — OFFICE VISIT (OUTPATIENT)
Dept: NEUROLOGY | Facility: CLINIC | Age: 78
End: 2023-09-27
Payer: MEDICARE

## 2023-09-27 DIAGNOSIS — G31.84 MILD COGNITIVE IMPAIRMENT WITH MEMORY LOSS: Primary | ICD-10-CM

## 2023-09-27 PROCEDURE — 99499 UNLISTED E&M SERVICE: CPT | Mod: HCNC,S$GLB,, | Performed by: PSYCHIATRY & NEUROLOGY

## 2023-09-27 PROCEDURE — 99999 PR PBB SHADOW E&M-EST. PATIENT-LVL I: CPT | Mod: PBBFAC,HCNC,, | Performed by: PSYCHIATRY & NEUROLOGY

## 2023-09-27 PROCEDURE — 99499 NO LOS: ICD-10-PCS | Mod: HCNC,S$GLB,, | Performed by: PSYCHIATRY & NEUROLOGY

## 2023-09-27 PROCEDURE — 99999 PR PBB SHADOW E&M-EST. PATIENT-LVL I: ICD-10-PCS | Mod: PBBFAC,HCNC,, | Performed by: PSYCHIATRY & NEUROLOGY

## 2023-09-27 NOTE — PATIENT INSTRUCTIONS
Summary of the evidence on modifiable risk factors for cognitive decline and dementia             From: Anel, Mirza Edwards, Grant, Marisol & Jonnathan (2015). Summary of the evidence on modifiable risk factors for cognitive decline and dementia: A population-based perspective. Alzheimer's & Dementia, 11, 138-559.    *Studies suggest small or moderate alcohol consumption (no more than 1 drink per day) by older individuals may decrease the risk of cognitive decline and dementia. The evidence is not strong enough, however, to suggest those who do not drink should start drinking, especially when weighed against the potential negative effects of excessive alcohol consumption, such as an increased risk of falls among older adults. Research also generally suggests that red wine may be the best form of alcohol for cognitive functioning, in part, due to its antioxidant effects. All alcohol use is cautioned, though, as alcohol could cause harmful effects and interfere with medications. A physician and/or pharmacist should be consulted before alcohol is consumed.          Behaviors to Promote Brain Health       Exercise regularly - Exercise has many known benefits, and it appears that regular physical activity benefits the brain. Multiple research studies show that people who are physically active are less likely to experience a decline in their mental function and have a lower risk of developing Alzheimer's disease. We believe these benefits are a result of increased blood flow to your brain during exercise. It also tends to counter some of the natural reduction in brain connections that occur during aging, in effect reversing some of the problems. Aim to exercise several times per week for 30-60 minutes. You can walk, swim, play tennis or any other moderate aerobic activity that increases your heart rate.    Eat a Mediterranean diet - Your diet plays a large role in your brain health. Consider following a Mediterranean  diet, which emphasizes plant-based foods, whole grains, fish and healthy fats, such as olive oil. It incorporates much less red meat and salt than a typical American diet. Studies show people who closely follow a Mediterranean diet are less likely to have Alzheimer's disease than people who don't follow the diet. Omega fatty acids found in extra-virgin olive oil and other healthy fats are vital for your cells to function correctly, appear to decrease your risk of coronary artery disease, and increase mental focus and slow cognitive decline in older adults.       Stay mentally active - Your brain is similar to a muscle -- you need to use it or you lose it. There are many things that you can do to keep your brain in shape, such as doing crossword puzzles or Sudoku, reading, playing cards or putting together a jigsaw puzzle. Consider it cross-training your brain. So incorporate different activities to increase the effectiveness.     Stay socially involved - Social interaction helps chan off depression and stress, both of which can contribute to memory loss. Look for opportunities to connect with loved ones, friends and others, especially if you live alone. There is research that links solitary confinement to brain atrophy, so remaining socially active may have the opposite effect and strengthen the health of your brain.    Get plenty of sleep - Sleep plays an important role in your brain health. There are some theories that sleep helps clear abnormal proteins in your brain and consolidates memories, which boosts your overall memory and brain health. It is important that you try to get seven to eight consecutive hours of sleep per night, not fragmented sleep of two- or three-hour increments. Consecutive sleep gives your brain the time to consolidate and store your memories effectively.       The MIND Diet: A Detailed Guide for Beginners    The MIND diet is designed to prevent dementia and loss of brain function as you  age. It combines the Mediterranean diet and the DASH diet to create a dietary pattern that focuses specifically on brain health. This article is a detailed guide for beginners, with everything you need to know about the MIND diet and how to follow it.    What Is the MIND Diet?  MIND stands for the Mediterranean-DASH Intervention for Neurodegenerative Delay.    The MIND diet aims to reduce dementia and the decline in brain health that often occurs as people get older. It combines aspects of two very popular diets, the Mediterranean diet and the Dietary Approaches to Stop Hypertension (DASH) diet.    Many experts regard the Mediterranean and DASH diets as some of the healthiest. Research has shown they can lower blood pressure and reduce the risk of heart disease, diabetes and several other diseases. But researchers wanted to create a diet specifically to help improve brain function and prevent dementia. To do this, they combined foods from the Mediterranean and DASH diets that had been shown to benefit brain health.    For example, both the Mediterranean and DASH diets recommend eating a lot of fruit. Fruit intake has not been correlated with improved brain function, but eating berries has been. Thus, the MIND diet encourages its followers to eat berries, but does not emphasize consuming fruit in general.     Currently, there are no set guidelines for how to follow the MIND diet. Simply eat more of the 10 foods the diet encourages you to eat, and eat less of the five foods the diet recommends you limit.    10 Foods to Eat on the MIND Diet    Green, leafy vegetables: Aim for six or more servings per week. This includes kale, spinach, cooked greens and salads.  All other vegetables: Try to eat another vegetable in addition to the green leafy vegetables at least once a day. It is best to choose non-starchy vegetables because they have a lot of nutrients with a low number of calories.  Berries: Eat berries at least twice  a week. Although the published research only includes strawberries, you should also consume other berries like blueberries, raspberries and blackberries for their antioxidant benefits.  Nuts: Try to get five servings of nuts or more each week. The creators of the MIND diet dont specify what kind of nuts to consume, but it is probably best to vary the type of nuts you eat to obtain a variety of nutrients.  Olive oil: Use olive oil as your main cooking oil. Check out this article for information about the safety of cooking with olive oil.  Whole grains: Aim for at least three servings daily. Choose whole grains like oatmeal, quinoa, brown rice, whole-wheat pasta and 100% whole-wheat bread.  Fish: Eat fish at least once a week. It is best to choose fatty fish like salmon, sardines, trout, tuna and mackerel for their high amounts of omega-3 fatty acids.  Beans: Include beans in at least four meals every week. This includes all beans, lentils and soybeans.  Poultry: Try to eat chicken or turkey at least twice a week. Note that fried chicken is not encouraged on the MIND diet.  Wine: Aim for no more than one glass daily. Both red and white wine may benefit the brain. However, much research has focused on the red wine compound resveratrol, which may help protect against Alzheimers disease.    If you are unable to consume the targeted amount of servings, dont quit the MIND diet altogether. Research has shown that following the MIND diet even a moderate amount is associated with a reduced risk of Alzheimers disease. When youre following the diet, you can eat more than just these 10 foods. However, the more you stick to the diet, the better your results may be.    5 Foods to Avoid on the MIND Diet    Butter and margarine: Try to eat less than 1 tablespoon (about 14 grams) daily. Instead, try using olive oil as your primary cooking fat, and dipping your bread in olive oil with herbs.  Cheese: The MIND diet recommends  limiting your cheese consumption to less than once per week.  Red meat: Aim for no more than three servings each week. This includes all beef, pork, lamb and products made from these meats.  Fried food: The MIND diet highly discourages fried food, especially the kind from fast-food restaurants. Limit your consumption to less than once per week.  Pastries and sweets: This includes most of the processed junk food and desserts you can think of. Ice cream, cookies, brownies, snack cakes, donuts, candy and more. Try to limit these to no more than four times a week.    Researchers encourage limiting your consumption of these foods because they contain saturated fats and trans fats. Studies have found that trans fats are clearly associated with all sorts of diseases, including heart disease and even Alzheimers disease. However, the health effects of saturated fat are widely debated in the nutrition world. Although the research on saturated fats and heart disease may be inconclusive and highly contested, animal research and observational studies in humans do suggest that consuming saturated fats in excess is associated with poor brain health.      The MIND Diet May Decrease Oxidative Stress and Inflammation  The current research on the MIND diet has not been able to show exactly how it works. However, the scientists who created the diet think it may work by reducing oxidative stress and inflammation. Oxidative stress occurs when unstable molecules called free radicals accumulate in the body in large quantities. This often causes damage to cells. The brain is especially vulnerable to this type of damage. Inflammation is your bodys natural response to injury and infection. But if its not properly regulated, inflammation can also be harmful and contribute to many chronic diseases.    Together, oxidative stress and inflammation can be quite detrimental to the brain. In recent years, theyve been the focus of some  interventions to prevent and treat Alzheimers disease. Following the Mediterranean and DASH diets has been associated with lower levels of oxidative stress and inflammation. Because the MIND diet is a hybrid of these two diets, the foods that make up the MIND diet probably also have antioxidant and anti-inflammatory effects. The antioxidants in berries and the vitamin E in olive oil, green leafy vegetables and nuts are thought to benefit brain function by protecting the brain from oxidative stress. Additionally, the omega-3 fatty acids found in fatty fish are well-known for their ability to lower inflammation in the brain, and have been associated with slower loss of brain function.    The MIND Diet May Reduce Harmful Beta-Amyloid Proteins  Beta-amyloid proteins are protein fragments found naturally in the body. However, they can accumulate and form plaques that build up in the brain, disrupting communication between brain cells and eventually leading to brain cell death. In fact, many scientists believe these plaques are one of the primary causes of Alzheimers disease. Animal and test-tube studies suggest that the antioxidants and vitamins that many MIND diet foods contain may help prevent the formation of beta-amyloid plaques in the brain.    Additionally, the MIND diet limits foods that contain saturated fats and trans fats, which studies have shown can increase beta-amyloid protein levels in mices brains. Human observational studies have found that consuming these fats was associated with a doubled risk of Alzheimers disease. However, it is important to note that this type of research is not able to determine cause and effect. Higher-quality, controlled studies are needed to discover exactly how the MIND diet may benefit brain health.    A Sample Meal Plan for One Week  Making meals for the MIND diet doesnt have to be complicated. Center your meals around the 10 foods and food groups that are encouraged on  the diet, and try to stay away from the five foods that need to be limited.    Monday  Breakfast: Greek yogurt with raspberries, topped with sliced almonds.  Lunch: Mediterranean salad with olive-oil-based dressing, grilled chicken, whole-wheat karon.  Dinner: Burrito bowl with brown rice, black beans, fajita vegetables, grilled chicken, salsa and guacamole.    Tuesday  Breakfast: Wheat toast with almond butter, scrambled eggs.  Lunch: Grilled chicken sandwich, blackberries, carrots.  Dinner: Grilled salmon, side salad with olive-oil-based dressing, brown rice.    Wednesday  Breakfast: Steel-cut oatmeal with strawberries, hard-boiled eggs.  Lunch: Mexican-style salad with mixed greens, black beans, red onion, corn, grilled chicken and olive-oil-based dressing.  Dinner: Chicken and vegetable stir-bridges, brown rice.    Thursday  Breakfast: Greek yogurt with peanut butter and banana.  Lunch: Baked trout, sheila greens, black-eyed peas.  Dinner: Whole-wheat spaghetti with turkey meatballs and marinara sauce, side salad with olive-oil-based dressing.    Friday  Breakfast: Wheat toast with avocado, omelet with peppers and onions.  Lunch: Chili made with ground turkey.  Dinner: Greek-seasoned baked chicken, oven-roasted potatoes, side salad, wheat dinner roll.    Saturday  Breakfast: Overnight oats with strawberries.  Lunch: Fish tacos on whole wheat tortillas, brown rice, reese beans.  Dinner: Chicken gyro on whole-wheat karon, cucumber and tomato salad.    Sunday  Breakfast: Spinach frittata, sliced apple and peanut butter.  Lunch: Tuna salad sandwich on wheat bread, plus carrots and celery with hummus.  Dinner: [place] chicken, brown rice, lentils.  You can drink a glass of wine with each dinner to satisfy the MIND diet recommendations. Nuts can also make a great snack.        Sleep Hygiene:     Avoid watching TV, eating, and discussing emotional issues in bed. The bed should be used for sleep and sex only. If not, we can  "associate the bed with other activities and it often becomes difficult to fall asleep.  Minimize noise, light, and temperature extremes during sleep with ear plugs, window blinds, or an electric blanket or air conditioner. Even the slightest nighttime noises or luminescent lights can disrupt the quality of your sleep. Try to keep your bedroom at a comfortable temperature -- not too hot (above 75 degrees) or too cold (below 54 degrees).  Try to go to bed and wake up at the same time every day. A strict routine can help encourage stability in your circadian rhythm. Get out of bed even if you're still tired - sleeping in much later will make it harder to fall asleep the next night, and the cycle will continue. You may need to run a "sleep deficit" for a day to help you fall asleep more easily.    Do not watch TV in bed, and do not fall asleep to TV. Many people say leaving the TV on helps them fall asleep. However, the flickering "blue light" released by screens  is absorbed even through closed eyelids, and suppresses melatonin release in your brain. This can cause us to linger in the lightest stages of sleep, and miss out on more restorative, deeper sleep. A better option might be a white noise machine or erin without any light.   Try not to drink fluids after 8 p.m. This may reduce awakenings due to urination.  Avoid naps, but if you do nap, make it no more than about 25 minutes about eight hours after you awake. But if you have problems falling asleep, then no naps for you.  Do not expose your self to bright light if you need to get up at night. Use a small night-light instead. Blue light can be particularly detrimental, including the light from your cellphone, TV, or computer screen.  Nicotine is a stimulant and should be avoided particularly near bedtime and upon night awakenings. Having a smoke before bed, although it may feel relaxing, is actually putting a stimulant into your bloodstream.  Caffeine is also a " "stimulant and is present in coffee (100-200 mg), soda (50-75 mg), tea (50-75 mg), and various over-the-counter medications. Caffeine should be discontinued at least four to six hours before bedtime. If you consume large amounts of caffeine and you cut your self off too quickly, beware; you may get headaches that could keep you awake. Sometimes people feel they are "immune" to the effects of caffeine, and that a warm cup of coffee before bed actually helps them relax. But similar to nicotine, despite that it may feel relaxing in the moment, you are still consuming a potent stimulant, and it will act on your nervous system throughout the night causing restless and disrupted sleep even if you don't notice feeling jittery at bedtime.   Avoid alcohol in the evenings. Although alcohol is a depressant and may help you fall asleep, the subsequent metabolism that clears it from your body when you are sleeping causes a withdrawal syndrome. This withdrawal causes awakenings and is often associated with nightmares and sweats.  A light snack may be sleep-inducing, but a heavy meal too close to bedtime interferes with sleep. Stay away from protein and stick to carbohydrates or dairy products. Milk contains the amino acid L-tryptophan, which has been shown in research to help people go to sleep. So milk and cookies or crackers (without chocolate) may be useful and taste good as well.  Be active during the day. Get regular exercise, help burn off excess energy, and promote more sound sleep at night.   Try to get outside into the sunlight at least once per day (with sunscreen, of course!). Your circadian rhythm is primarily regulated by the light coming through your eyes, so making sure it's fully dark at night and making sure you get some sunlight during the day can reinforce your circadian rhythm.   Use mindfulness or meditation strategies to quiet a busy mind. Many people report having difficulty sleeping due to being unable " ""turn off" their minds. Practicing mindfulness exercises before bed - like Progressive Muscle Relaxation or a body scan - can help promote relaxation and aid in anxiety reduction. Apps such as HeadSpace and Calm can be useful, and there are many freely available mindfulness videos on YouTube. If anxiety continues to interfere with your sleep, seeing a behavioral health professional to learn anxiety reduction strategies can be helpful.   If you are still struggling with sleep, or struggling to implement any of these tips, behavioral health professionals who are specially trained in sleep medicine can be helpful. Modalities such as Cognitive Behavioral Therapy for Insomnia (CBT-I) can be particularly useful.       What is sleep apnea?  Sleep apnea is a condition that makes you stop breathing for short periods while you are asleep. There are 2 types of sleep apnea. One is called "obstructive sleep apnea," and the other is called "central sleep apnea."    In obstructive sleep apnea, you stop breathing because your throat narrows or closes. In central sleep apnea, you stop breathing because your brain does not send the right signals to your muscles to make you breathe. When people talk about sleep apnea, they are usually referring to obstructive sleep apnea, which is what this article is about.    People with sleep apnea do not know that they stop breathing when they are asleep. But they do sometimes wake up startled or gasping for breath. They also often hear from loved ones that they snore.        What are the symptoms of sleep apnea?  The main symptoms of sleep apnea are loud snoring, tiredness, and daytime sleepiness. Other symptoms can include:    ? Restless sleep  ? Waking up choking or gasping  ? Morning headaches, dry mouth, or sore throat  ? Waking up often to urinate  ? Waking up feeling unrested or groggy  ? Trouble thinking clearly or remembering things    Some people with sleep apnea don't have symptoms, or " "they don't know they have them. They might figure that it's normal to be tired or to snore a lot.    Should I see a doctor?  Yes. If you think you might have sleep apnea, see your doctor.    Is there a test for sleep apnea?  Yes. If your doctor or nurse suspects you have sleep apnea, he or she might send you for a "sleep study." Sleep studies can sometimes be done at home, but they are usually done in a sleep lab. For the study, you spend the night in the lab, and you are hooked up to different machines that monitor your heart rate, breathing, and other body functions. The results of the test will tell your doctor or nurse if you have the disorder.    Is there anything I can do on my own to help my sleep apnea?  Yes. Here are some things that might help:    ?Stay off your back when sleeping. Some people try sleeping with a backpack on.  ?Lose weight, if you are overweight  ?Avoid alcohol, because it can make sleep apnea worse    How is sleep apnea treated?  As mentioned above, weight loss can help if you are overweight or obese. But losing weight can be challenging, and it takes time to lose enough weight to help with your sleep apnea. Most people need other treatment while they work on losing weight.    The most effective treatment for sleep apnea is a device that keeps your airway open while you sleep. Treatment with this device is called "continuous positive airway pressure," or CPAP. People getting CPAP wear a face mask at night that keeps them breathing.        If your doctor or nurse recommends a CPAP machine, try to be patient about using it. The mask might seem uncomfortable to wear at first, and the machine might seem noisy, but using the machine can really pay off. It usually takes a few weeks to get used to. People with sleep apnea who use a CPAP machine feel more rested and have clearer thinking. If you're having trouble tolerating the CPAP, talk to your doctor. They can help work with you to desensitize " "yourself to the mask and make it easier to sleep at night.    There is also another device that you wear in your mouth called an "oral appliance" or "mandibular advancement device." It also helps keep your airway open while you sleep. But devices do not work as well as CPAP for treating sleep apnea.    In rare cases, when nothing else helps, doctors recommend surgery to keep the airway open. Surgery to do this is not always effective, and even when it is, the problem can come back.    Is sleep apnea dangerous?  It can be. People with sleep apnea do not get good-quality sleep, so they are often tired and not alert. This puts them at risk for car accidents and other types of accidents. Plus, studies show that people with sleep apnea are more likely than others to have high blood pressure, heart attacks, and other serious heart problems. In people with severe sleep apnea, getting treated (for example, with a CPAP machine) can help prevent some of these problems.      Your Healthy Brain - Strategies to help with learning and memory      Type of Memory Issue      Cognitive Strategy   Attention-based trouble Remember that inattention and lack of focus are major culprits to forgetting information so be sure and practice paying attention for adequate learning of information. Patients will rely on passive attention to remember something (e.g., yetoy, osmel-huh approach) and find they cannot recall it later. We recommend the following to improve attention, which may aid in later recall:   Look at the person as they are speaking to you, Paraphrase as they are speaking  Write down important pieces of information   Ask them to repeat if you zone out.   Simplify and reduce information that you need to focus on during conversation.   Have visual cues to remind you if you need to do something later.   Speed-based trouble Using multiple modalities (e.g., listening, writing notes, asking questions, recording, follow-up meetings with " faculty/bosses, discussion boards online) to learn new information also can be helpful and is likely to allow additional time for processing, thus improving memory for the material.    Learning new information Simplify - Use easier words and shorter sentences. Break down into steps.  Restate - Put information into your own words.  Does it make sense? This allows you and others to test for understanding.  Link - If possible, associate new information with something you already know.  Organize - Group items into meaningful categories.  You can organize by time, location, color, shape, size, function, and even age.  Be creative.   Break it up - Don't try to take in too much at one time. Concentrate for a few minutes, then move on to something else. You may learn more in short sessions than one long one.  Mnemonics (pronounced noo-mon-ics) are strategies whereby you form acronyms ( = Cereal, Oranges, Pizza) that stand for something. This may be useful at times when you need a cue or prompt to help you remember something. Word associations can also be helpful (Elena works in the [employer]).     Storing information for later recall Rehearse - Immediately after seeing/hearing something, try to recall it.  Wait a few minutes, then check again.  Gradually lengthen the time between rehearsals. Initially, you might rehearse the same thing every minute, then 15-minutes, then every few hours.  Repetition of learned material is critical to ensure storage of information to be learned.   Self-test at home to ensure learning. Just because something was remembered once doesn't mean it will be remembered after it was first learned.   Have friends or family periodically re-quiz you multiple times in a study session.     Recalling Information Jog your memory - Lose something?  Think back to when you last had it.  What did you do next?  And after that?  Mentally walk yourself through each activity that followed.   Prodding your memory this way may enable you to recall the location of the missing item.  Pair something you always remember (keys, purse, phone) with something you may forget (grocery list, bill you need to pay).  Get organized - Have fixed locations for all important papers, key phone numbers, medications, keys, wallet, glasses, tools, etc.  Develop routines - Routines can anchor memories so they do not drift away.      External Strategies Have memos, timers, calendar notes, etc.  in visible, appropriate places so you can use them for recalling information.  Invest in a smart phone and learn to use its reminder functions. This can be a way to interact with your children or grandchildren in a fun way.  Meet with a counselor to analyze and revise personal organization strategies and develop more effective memory cues and planning strategies. This recommendation is designed to help you develop a new skill set for personal organization based.   Maintaining a regular daily schedule may be beneficial. Keeping a calendar and notepad or alarms via a smart phone can alert you to the day's activities.   School-based learning Read the information and underline, then go back over and talk through what you just read. Break it up in paragraph bits so you keep what you have to learn to a minimum. Don't just rely on passively reading something.  Take notes in the form of an outline; Use note cards over and over  Create mnemonics (example: Please Excuse My Dear Aunsalvatore Klein to remember mathematics order or operations)  Create acronyms (example: PEMDAS to remember math order of operations)  Utilize self-talk as you read through the material  Create broad headings of material you need to know and then talk or write down what you have learned from memory  Teach the material to a friend, parent, or sibling without notes.  Summarize facts in a table or flow charts for visual encoding   Memory Hygiene Engage in regular exercise, which  increases alertness and arousal, which can improve attention and focus.   Get a good night's sleep, as sleep is necessary for memory consolidation. Caffeine intake in the afternoon/evening, stuffing oneself at supper, and using technology close to bedtime can decrease the quality of restful sleep throughout the night. Additionally, bedtime and wake-up times should be consistent every night and morning so the body becomes used to a single sleep/awake routine.  Eat healthy foods and balanced meals. It is notable that research indicates certain nutrients may aid in brain function, such as B vitamins (especially B6, B12, and folic acid), antioxidants (such as vitamins C and E, and beta carotene), and Omega-3 fatty acids. Talk with your physician or nutritionist about what's best.   Prospective Memory (remembering to remember to do something) We recommend having visual cues (e.g., pill boxes) and alarms (e.g., phone alarm) set to remind you to do something in advance. It's important that loved ones and caregivers understand this difficulty for you. Daily lists of what you need to do can also be helpful.         Your Healthy Brain - Strategies to help with executive functions      Type of Executive Dysfunction      Cognitive Strategy   Initiation and Drive   (getting started on things) This can be exhibited behaviorally, such that they cannot get started on physical activities such as getting up or working out, socially such that they have difficulty calling friends or going out to be with friends, academically/occupationally, such that they have trouble getting started on assignments, or cognitively, such that they have difficulty coming up with ideas or generating plans.    Basic tenets of intervention include providing additional external structure, prompting and cueing, and helping organize and plan.    Increased structure in the environment or in an activity can help with initiation difficulties.  Building in  routines for everyday activities is often important, since routine tasks become more automatic, reducing the need for independently starting something.  For example, routines can be broken down into a sequence of steps, and these steps can be written down on index cards or a simple list.  You can then follow the list of steps each day as needed until the routine becomes automatic.    External prompting may be necessary to get started.  Someone might stop by the desk at the outset of each task and prompt them to start work, or perhaps demonstrate the first problem of a worksheet.  At home, parents might need to gently prompt to get started on homework, chores, or to go out with friends.   Some people benefit from having time limits set for completing a task or setting specific times of day when they will work on a task.  Use of a timer may facilitate increased initiation and speed of task completion.  Problems with initiating may be exacerbated by feeling overwhelmed with a given task.  Breaking tasks into smaller, more structured steps may reduce a sense of overwhelm and increase initiation.  Find tasks that are inherently motivating to build self-initiation   Impulsivity  (doing something without thinking it through) Have explicit, extensive and/or clear set of rules and expectations, and reviewed frequently or have posted. Collaborate when possible to have buy-in with rules.  Response delay techniques can be helpful such that you count to 5 or 10 before responding verbally or physically.    Several stop and think methods are available that teach individuals to inhibit their initial response, to consider the potential consequences of their behaviors, and to further develop a plan of approach to a situation.  Consultation with a psychologist would be helpful.   If you have an impulsive approach to tasks, then verbalize a plan of approach before starting work.  This places a short time period between impulse and  action and can allow for better planning and a more strategic approach.  You could explain how you will approach a task, including goals for accuracy and time. Also, we encourage spending some time thinking about different kinds of plans to help focus attention on possible consequences or alternate strategies.   Having unstructured (impulse time) activities are also important given the significant cognitive demands placed on individuals to inhibit impulsive behavior. This can include athletic pursuits, art, edwin, or whatever is enjoyable.     Task Persistence/Vigilance:  (sticking to it) Having a written checklist of steps required to complete a task can serve as an external guide to stick to something until all steps are completed.  Pair enjoyable tasks with something that is not well liked. For instance, play enjoyable music when you are cleaning the house. Or, provide strong incentives once an entire tasks is completed.   Work with someone who has good task persistence (and, who you like) as they can be a good motivator.    Planning/Organization Frequently, individuals with executive dysfunction have organizational difficulties and, consequently, their disorganization only serves to worsen their executive dysfunction. They will need to work with someone to not only initiate organization, but also to maintain organization over time. Once they become organized, they'll likely need some sort of visual reminder at home or in the office to continue staying organized along with a set time to organize.   Information should be presented in an organized manner (e.g., outline, power point). If this is not possible, then you will need to organize that information before you get started on a task. It will be tempting to just jump and start, but take a moment and get organized first. Also, if someone is presenting you with information (e.g., do this, then that, then this), then you'll need to write that down in an  organized fashion.   Have an organization system at home and stick to a routine of maintaining it. Online tools have amazing ideas for doing this. You can have a planner, electronic tool like an iPhone, files, color coordinated pen, wall calendar, files on your computer for certain courses or projects. Brainstorm what works best for you.  Given the particular difficulty managing complex, long-term assignments, students/employees with organizational difficulties often benefit from working on only one task, or one step of a larger task, at a time.    Tasks (big or small) may need to be broken down into smaller steps in order to facilitate organization and planning.    Seek out professional help if the above strategies are not proving effective.   Divided Attention/Multi-tasking Most people have limited divided attention and research has shown that we are less effective when we are doing several things at once (e.g., checking email, reading online, & watching television). Try to methodically engage in one task at a time to limit problems with divided attention.   Set up some organizers so you can move from one separate task to another.   Have visual cues to keep you on track when you have a lot to do. This will re-orient you and remind you. For instance you may be doing something, get distracted, but then see your notepad that has your next task.      Why does anxiety impact my thinking?    Your brain's number one job is to keep you alive, and our threat-response system is called the fight, flight, or freeze response.      When this gets turned on, our body automatically reacts within milliseconds -   Pupils dilate so we can have better vision of our surroundings  Skin becomes pale or flushed as the body redirects blood flow to the muscles, preparing us to run or fight   Heart rate and breathing become more rapid as our bodies try to take in as much oxygen as possible  Muscles tense and become primed for action, and  "may actually tremble or shake   Extra oxygen gets sent to the brain, increasing alertness and vigilance   Sight, hearing, and other senses get sharper     When we say "anxiety," what we are really describing is the subjective experience when this fight/flight/freeze system is activated. So, anxiety is a normal (and necessary!) human emotion. Without it, we probably wouldn't survive!     This system is designed to be FAST, but not necessarily ACCURATE. And that's ok! When there is a threat in your environment, it is usually more important that you respond quickly. For example, you may dodge out of the way of an oncoming car before you have time to really think about what's happening. In fact, the fight/flight/freeze response will OVERRIDE the more logical, thinking parts of your brain. It doesn't want you to waste time or energy wondering  why the bear is chasing you, it just wants you to run faster! That's why it feels impossible to think clearly when we are acutely stressed.     However, when this system gets dysregulated, we start to have problems.     If we are under chronic stress, the system is activated repeatedly. It becomes sensitized, turns on more easily, and becomes difficult to turn off. We have trouble sleeping because our body is on high alert all the time. Chronic stress is like a computer program running in the background, quietly draining all of our cognitive resources.    If we experience a trauma, this system gets turned up even more, and can get stuck in the "on" position. Often, people with a trauma history learn to live with this level of arousal after a while, and might not even notice it most of the time. However, when they get exposed to a new stressor - even a relatively minor one - they have less resources available to cope with it, because their  fight/flight/freeze sytem is ALREADY activated. So they are more prone to their body becoming overwhelmed, and may experience dissociative " episodes, panic attacks, insomnia, stomach issues, and trouble thinking.      Effects of Anxiety on the Body          Central nervous system  Long-term anxiety and panic attacks can cause your brain to release stress hormones on a regular basis. This can increase the frequency of symptoms such as headaches, dizziness, and depression.    When you feel anxious and stressed, your brain floods your nervous system with hormones and chemicals designed to help you respond to a threat. Adrenaline and cortisol are two examples.    While helpful for the occasional high-stress event, long-term exposure to stress hormones can be more harmful to your physical health in the long run. For example, long-term exposure to cortisol can contribute to weight gain, insomnia, and difficulty concentrating.    Cardiovascular system  Anxiety disorders can cause rapid heart rate, palpitations, and chest pain. You may also be at an increased risk of high blood pressure and heart disease. If you already have heart disease, anxiety disorders may raise the risk of coronary events.    Excretory and digestive systems  Anxiety also affects your excretory and digestive systems. You may have stomachaches, nausea, diarrhea, and other digestive issues. Loss of appetite can also occur.    There may be a connection between anxiety disorders and the development of irritable bowel syndrome (IBS) after a bowel infection. IBS can cause vomiting, diarrhea, or constipation.    Immune system  Anxiety can trigger your flight-or-fight stress response and release a flood of chemicals and hormones, like adrenaline, into your system.    In the short term, this increases your pulse and breathing rate, so your brain can get more oxygen. This prepares you to respond appropriately to an intense situation. Your immune system may even get a brief boost. With occasional stress, your body returns to normal functioning when the stress passes.    But if you repeatedly feel  anxious and stressed or it lasts a long time, your body never gets the signal to return to normal functioning. This can weaken your immune system, leaving you more vulnerable to viral infections and frequent illnesses. Also, your regular vaccines may not work as well if you have anxiety.    Respiratory system  Anxiety causes rapid, shallow breathing. If you have chronic obstructive pulmonary disease (COPD), you may be at an increased risk of hospitalization from anxiety-related complications. Anxiety can also make asthma symptoms worse.    Other effects  Anxiety disorder can cause other symptoms, including:  Headaches  muscle tension  Insomnia  Depression  social isolation    If you have a trauma-related disorder, you may experience flashbacks, reliving a traumatic experience over and over. You might get angry or startle easily, and perhaps become emotionally withdrawn. Other symptoms include nightmares, insomnia, and sadness.    [email or web address]          Anxiety Coping Strategies: Try these when you're feeling anxious or stressed:  Stress management: Limit potential triggers by managing stress levels. Keep an eye on pressures and deadlines, organize daunting tasks in to-do lists, and take enough time off from professional or educational obligations.  Take a time-out. Practice yoga, listen to music, meditate, get a massage, or learn relaxation techniques. Stepping back from the problem helps clear your head.  Eat well-balanced meals. Do not skip any meals. Do keep healthful, energy-boosting snacks on hand.  Limit alcohol and caffeine, which can aggravate anxiety and trigger panic attacks.  Get enough sleep. When stressed, your body needs additional sleep and rest.  Exercise daily: Physical exertion and an active lifestyle can improve self-image and trigger the release of chemicals in the brain that stimulate positive emotions.  Welcome humor. A good laugh goes a long  way.  Maintain a positive attitude. Make an effort to replace negative thoughts with positive ones.  Get involved. Volunteer or find another way to be active in your community, which creates a support network and gives you a break from everyday stress.  Learn what triggers your anxiety. Is it work, family, school, or something else you can identify? Write in a journal when youre feeling stressed or anxious, and look for a pattern.  Support network: Talk to a person who is supportive, such as a family member or friend. Avoid storing up and suppressing anxious feelings as this can worsen anxiety disorders.  Relaxation techniques: Certain measures can help reduce signs of anxiety, including deep-breathing exercises, long baths, meditation, yoga, and resting in the dark.  Exercises to replace negative thoughts with positive ones: Write down a list of any negative thoughts, and make another list of positive thoughts to replace them. Picturing yourself successfully facing and conquering a specific fear can also provide benefits if the anxiety symptoms link to a specific stressor.  Slow breathing. When youre anxious, your breathing becomes faster and shallower. Try deliberately slowing down your breathing. Count to three as you breathe in slowly - then count to three as you breathe out slowly.  Progressive muscle relaxation. Find a quiet location. Close your eyes and slowly tense and then relax each of your muscle groups from your toes to your head. Hold the tension for three seconds and then release quickly. This can help reduce the feelings of muscle tension that often comes with anxiety.  Stay in the present moment. Anxiety can make your thoughts live in a terrible future that hasnt happened yet. Try to bring yourself back to where you are.   Practice a grounding technique: Name 5 things you can see, 4 things you can hear, 3 things you can feel, 2 things you can smell, and 1 thing you can taste.  Healthy lifestyle.  Keeping active, eating well, going out into nature, spending time with family and friends, reducing stress and doing the activities you enjoy are all effective in reducing anxiety and improving your wellbeing.     Take small acts of bravery. Avoiding what makes you anxious provides some relief in the short term, but can make you more anxious in the long term. Try approaching something that makes you anxious - even in a small way. The way through anxiety is by learning that what you fear isnt likely to happen - and if it does, youll be able to cope with it.  Challenge your self-talk. How you think affects how you feel. Anxiety can make you overestimate the danger in a situation and underestimate your ability to handle it. Try to think of different interpretations to a situation thats making you anxious, rather than jumping to the worst-case scenario. Look at the facts for and against your thought being true.  Plan worry time. Its hard to stop worrying entirely so set aside some time to indulge your worries. Even 10 minutes each evening to write them down or go over them in your head can help stop your worries from taking over at other times.  Get to know your anxiety. Keep a diary of when its at its best - and worst. Find the patterns and plan your week - or day - to proactively manage your anxiety.  Learn from others. Talking with others who also experience anxiety - or are going through something similar - can help you feel less alone.   Be kind to yourself. Remember that you are not your anxiety. You are not weak. You are not inferior. Your body is trying to keep you safe. Be gentle with it.

## 2023-09-27 NOTE — PROGRESS NOTES
NEUROPSYCHOLOGICAL EVALUATION FEEDBACK    Paris Burris attended a feedback session today accompanied by her .  We discussed the results of the neuropsychological evaluation (60 minutes).  Handouts provided in AVS. All of her questions were answered.    1. Mild cognitive impairment with memory loss            PLAN:   RTC 1 year   Consider optimization of medication regimen to reduce anticholinergic burden   Continue to follow with sleep medicine and behavioral health providers     Luana Carlson PsyD  Licensed Clinical Neuropsychologist  [facility] - Department of Neurology

## 2023-10-04 ENCOUNTER — CLINICAL SUPPORT (OUTPATIENT)
Dept: REHABILITATION | Facility: OTHER | Age: 78
End: 2023-10-04
Payer: MEDICARE

## 2023-10-04 DIAGNOSIS — G89.29 CHRONIC LEFT SHOULDER PAIN: ICD-10-CM

## 2023-10-04 DIAGNOSIS — M25.512 CHRONIC LEFT SHOULDER PAIN: ICD-10-CM

## 2023-10-04 DIAGNOSIS — M62.81 MUSCLE WEAKNESS: ICD-10-CM

## 2023-10-04 PROCEDURE — 97162 PT EVAL MOD COMPLEX 30 MIN: CPT | Mod: HCNC,PN

## 2023-10-04 NOTE — PROGRESS NOTES
[facility]  Physical Therapy Initial Evaluation    Name: Paris Burris  Clinic Number: [id number]    Therapy Diagnosis:   Encounter Diagnoses   Name Primary?    Chronic left shoulder pain     Muscle weakness      Physician: Mandi Huynh MD    Physician Orders: PT Eval and Treat   Medical Diagnosis:   M25.512,G89.29 (ICD-10-CM) - Chronic left shoulder pain  Evaluation Date: 10/4/2023  Authorization Period Expiration: 9/5/2024  Plan of Care Certification Period: 11/29/2023  Visit # / Visits authorized: 1/ 1    Time In: 1130  Time Out: 1215  Total Billable Time separate from evaluation: 00 minutes    Precautions: Standard and pacemaker. Wears hearing aids      Subjective   Date of onset: chronic  History of current condition - Paris reports: her primary care physician thinks her shoulder pain is arthritis. States her left shouder pain doesn't not prevent her from doing things, but when painful she may ask her  for help. Her pain can extend into the left side of her neck       Past Medical History:   Diagnosis Date    Allergy     Anemia     Anxiety     Breast cancer 2002    Left breast    Cancer 2002    L breast s/p lumpectomy    Cataract     Decreased hearing     Depression     Dermatochalasis of both upper eyelids     Diabetes mellitus     Diabetes with neurologic complications     Gastric ulcer 09/10/2013    EGD    Hiatal hernia     Hyperlipidemia     Migraine headache     Migraine headache 03/20/2015    Multiple gastric ulcers     Parathyroid disorder     Renal manifestation of secondary diabetes mellitus     Sleep apnea     no CPAP    Type 2 diabetes mellitus, controlled, 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      Paris Burris  has a past surgical history that includes lipoma removal (1999); Colonoscopy (N/A, 12/2/2016); Tonsillectomy; Adenoidectomy; Eye surgery (Bilateral); Breast lumpectomy (Left, 2002); Esophagogastroduodenoscopy (N/A, 9/12/2018); Esophageal  manometry with measurement of impedance (N/A, 10/8/2018); Laparoscopic Toupet fundoplication (N/A, 1/29/2019); Esophagogastroduodenoscopy (N/A, 1/29/2019); Implantation of biventricular heart pacemaker (N/A, 1/30/2019); Esophagogastroduodenoscopy (N/A, 5/31/2019); Implantation of cochlear prosthesis (Left, 4/26/2021); Cataract extraction w/  intraocular lens implant (Bilateral); and Colonoscopy (N/A, 8/31/2022).    Paris has a current medication list which includes the following prescription(s): acetaminophen, ascorbic acid (vitamin c), blood sugar diagnostic, blood-glucose meter, bupropion, ezetimibe, freestyle yudith 2 reader, freestyle yudith 2 sensor, fluticasone propionate, lancets, lancing device, losartan, meloxicam, metformin, metoprolol succinate, rosuvastatin, semaglutide, tramadol, urea, valacyclovir, venlafaxine, and vitamin d, and the following Facility-Administered Medications: sodium chloride 0.9% and sodium chloride 0.9%.    Review of patient's allergies indicates:   Allergen Reactions    Topamax [topiramate] Hallucinations     hallucinations        Falls: none    Imaging:  X-ray  10/26/2022  FINDINGS:  There is a pacemaker.  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  There is aortic plaque.  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  This is unchanged from remote studies as far back is 10/08/2015 and likely benign.    Prior Therapy: yes  Social History:   Occupation: retired  Prior Level of Function: independent with ADL's  Current Level of Function: needs help from  to undress shirts, difficulty lifting overhead    Pain:  Current 2/10, worst 7/10, best 1/10   Location: left shoulder   Description: Aching  Aggravating Factors: dressing, carrying her grocery bags  Easing Factors: heating pad    Radicular symptoms: none    Sleep is not disturbed. Sleeping position:     Patients goals: "I'd for this pain to go away or  "decrease."    Objective     Postural examination in sitting:   - decreased  lumbar lordosis  - forward head  - forward shoulders  - increased thoracic kyphosis        Cervical AROM    flexion 35 degrees   extension 40 degrees    Right rotation 65 degrees   Left  rotation 40 degrees   Right  side bending 30 degrees   Left  side bending 30 degrees       Cervical Special Tests    Compression negative   Distraction negative   Alar Ligament Stress Test: negative   Sharp-Kishore Test negative   Spurling's:    negative     UE MMT R L   C3 Cervical side bend 5/5 5/5   C4 Shoulder Shrug 5/5  5/5   Shoulder flexion 4/5 4/5   Shoulder abduction 5/5 5/5   Shoulder internal rotation  5/5 5/5   Shoulder external rotation  4-/5 4-/5   C5 Elbow flexion 5/5 5/5   C7 Elbow extension 5/5 5/5   C6 Wrist extension 5/5 5/5   Wrist flexion 5/5 5/5   Scapular protraction 4/5 4/5   Mid trapezius  3+/5 3+/5   Lower trapezius  3-/5 3-/5       UE Special Tests    Lacho-Preston Positive left    Neer Impingement negative   Yergason's Positive left    Empty Can negative       Endurance is good.    Intake Outcome Measure for FOTO Shoulder Survey    Therapist reviewed FOTO scores for Paris Burris on 10/4/2023.   FOTO report - see Media section or FOTO account episode details.    Intake Score: 66%     Joint Mobility: right upper extremity within normal limits, right shoulder functional external rotation to T1, functional internal rotation to opposite inferior angle of scapula      Left shoulder flexion                                   140 degrees/170 degrees  Left shoulder abduction                              150 degrees/160 degrees   Left shoulder external rotation at side                    55 degrees   Left shoulder functional internal rotation   opposite scapula inferior angle  Left shoulder functional external rotation                  T1        TREATMENT     Evaluation only today      Home Exercises Provided and Patient Education " Provided     Education provided:   - Discussed the role of the PTA on the Rehab Team. Discussed patient will be seen by a physical therapist minimally every 6th visit or every 30 days prior to being seen by PTA. Also discussed the use of the [facility] Portal for communication.      Assessment   Paris is a 77 y.o. female referred to outpatient Physical Therapy with a medical diagnosis of M25.512,G89.29 (ICD-10-CM) - Chronic left shoulder pain. Patient presents with positive signs for possible impingement in left upper extremity. Will benefit from outpatient physical therapy for postural reeducation, periscapular strengthening, and     Patient prognosis is Good.   Patient will benefit from skilled outpatient Physical Therapy to address the deficits stated above and in the chart below, provide pt/family education, and to maximize pt's level of independence.     Plan of care discussed with patient: Yes  Patient's spiritual, cultural and educational needs considered and patient is agreeable to the plan of care and goals as stated below:     Anticipated Barriers for therapy: none    Medical Necessity is demonstrated by the following:      Medical Necessity is demonstrated by the following  History  Co-morbidities and personal factors that may impact the plan of care [] LOW: no personal factors / co-morbidities  [] MODERATE: 1-2 personal factors / co-morbidities  [x] HIGH: 3+ personal factors / co-morbidities    Moderate / High Support Documentation:   Co-morbidities affecting plan of care: anemia, anxiety, depression, diabetes mellitus, hyperlipidemia    Personal Factors:   no deficits     Examination  Body Structures and Functions, activity limitations and participation restrictions that may impact the plan of care [] LOW: addressing 1-2 elements  [x] MODERATE: 3+ elements  [] HIGH: 4+ elements (please support below)    Moderate / High Support Documentation: posture, weakness, dressing     Clinical Presentation [] LOW:  stable  [x] MODERATE: Evolving  [] HIGH: Unstable     Decision Making/ Complexity Score: moderate         Goals:  Short Term Goals: 4 weeks   Independent with home exercise program .  Report decreased left shoulder pain< or =  5/10 with adls such as dressing, carrying her grocery bags  Increased manual muscle test for bilateral  upper extremity by 1/2 muscle grade to promote proper scapular stability to decrease left shoulder pain< or =  5/10 with adls such as dressing, carrying her grocery bags.    Long Term Goals: 8 weeks   Increase left cervical rotation to 65 degrees.  Report decreased left shoulder pain  < or =  3/10 with adls such as dressing, carrying her grocery bags  Increased manual muscle test for bilateral upper extremity by 1 muscle grade to promote proper scapular stability to decrease  left shoulder pain  < or =  3/10 with adls such as dressing, carrying her grocery bags     Plan   Certification Period/Plan of care expiration: 10/4/2023 to 11/29/2023.    Outpatient Physical Therapy 2 times weekly for 8 weeks to include the following interventions: Manual Therapy, Moist Heat/ Ice, Neuromuscular Re-ed, Patient Education, Therapeutic Activities, and Therapeutic Exercise.     Suman Malave, PT

## 2023-10-05 PROBLEM — M62.81 MUSCLE WEAKNESS: Status: ACTIVE | Noted: 2023-10-05

## 2023-10-09 ENCOUNTER — PATIENT MESSAGE (OUTPATIENT)
Dept: PRIMARY CARE CLINIC | Facility: CLINIC | Age: 78
End: 2023-10-09
Payer: MEDICARE

## 2023-10-10 ENCOUNTER — DOCUMENTATION ONLY (OUTPATIENT)
Dept: REHABILITATION | Facility: OTHER | Age: 78
End: 2023-10-10

## 2023-10-10 NOTE — PROGRESS NOTES
Patient failed to appear for scheduled PT appointment today at 1:45 pm without prior notification or cancellation.    Stacie Nj, PT, DPT  10/10/2023

## 2023-10-11 NOTE — PROGRESS NOTES
[facility]   Physical Therapy Treatment Note     Name: Paris Burris  Clinic Number: [id number]    Therapy Diagnosis:   Encounter Diagnosis   Name Primary?    Muscle weakness Yes     Physician: Mandi Huynh MD    Visit Date: 10/12/2023    Physician Orders: PT Eval and Treat   Medical Diagnosis:   M25.512,G89.29 (ICD-10-CM) - Chronic left shoulder pain  Evaluation Date: 10/4/2023  Authorization Period Expiration: 12/31/2023  Plan of Care Certification Period: 10/4/2023 - 11/29/2023  Visit # / Visits authorized: 1/ 20 (+ evaluation)     Progress Note Due: 11/4/2023   FOCUS ON THERAPEUTIC OUTCOMES (FOTO): 10/4/2023 (2/5; 1)  PTA Visit #: 0/5     Precautions: Standard and pacemaker. Wears hearing aids    Time In: 12:16 pm  Time Out: 1:00 pm  Total Billable Time: 44 minutes      SUBJECTIVE     Patient reports: she has developed arthritis in the shoulder.  Pain is non-existent during the day but is more prevalent during the night.  She would like to get rid of the pain and keep it from getting worse.   She was not compliant with home exercise program as one was not provided.  Response to previous treatment: no adverse effects after IE  Functional change: none    Pain: 1/10  Location: left shoulder      OBJECTIVE     Objective Measures updated at progress report unless specified.      TREATMENT     Paris received the treatments listed below:      received therapeutic exercises to develop strength and RANGE OF MOTION for 20  minutes including:  [x] Pectoral stretch over towel roll x3 minutes  [x] Diagonals over towel roll x10 each with red band   [x] Supine shoulder flexion with dowel x20  [x] Upper trapezius stretch 2x 30 seconds each   [x] Levator scapular stretch 2x 30 seconds each     received the following manual therapy techniques: Soft tissue Mobilization were applied to the: left shoulder for 00 minutes, including:  []      received the following dynamic functional therapeutic activities to  improve functional performance for 00  minutes, including:  []      received the following neuromuscular re-education activities to improve: Coordination and Posture for 24 minutes. The following activities were included:  [x] Scapular retraction over towel roll 20x 5 second holds  [x] No money over towel roll with red band 20x  [x] Horizontal abduction over towel roll with red band x20   [x] Serratus punch over towwel roll with 2# dowel x20  [x] Rows with red band x20  [x] Shoulder extension with red band x20   [x] Isometric external rotation/internal rotation with double yellow band x20 each     Consider sidelying external rotation, abduction, flexion, and horizontal abduction       PATIENT EDUCATION AND HOME EXERCISES     Home Exercises Provided and Patient Education Provided     Education provided:   - exercise form and purpose    Written Home Exercises Provided: yes. Exercises were reviewed and Paris was able to demonstrate them prior to the end of the session.  Paris demonstrated good  understanding of the education provided. See EMR under Patient Instructions for exercises provided during therapy sessions    ASSESSMENT     Patient is very hard of hearing requiring frequent verbal cueing, better response to tactile cueing.  Home exercise program provided today with red band and exercises reviewed. Continue with shoulder girdle strengthening and consider sidelying rotator cuff exercises in the future.      Paris Is progressing well towards her goals.   Patient prognosis is Good.     Pt will continue to benefit from skilled outpatient physical therapy to address the deficits listed in the problem list box on initial evaluation, provide patient/family education and to maximize patient's level of independence in the home and community environment.     Patient's spiritual, cultural and educational needs considered and Patient agreeable to plan of care and goals.     Anticipated barriers to physical therapy:  none    Goals:   Short Term Goals: 4 weeks   Independent with home exercise program.  In progress  Report decreased left shoulder pain< or =  5/10 with adls such as dressing, carrying her grocery bags In progress  Increased manual muscle test for bilateral  upper extremity by 1/2 muscle grade to promote proper scapular stability to decrease left shoulder pain< or =  5/10 with adls such as dressing, carrying her grocery bags. In progress     Long Term Goals: 8 weeks   Increase left cervical rotation to 65 degrees. In progress  Report decreased left shoulder pain  < or =  3/10 with adls such as dressing, carrying her grocery bags In progress  Increased manual muscle test for bilateral upper extremity by 1 muscle grade to promote proper scapular stability to decrease  left shoulder pain  < or =  3/10 with adls such as dressing, carrying her grocery bags  In progress    PLAN     Continue with established PT PLAN OF CARE and progress per patient tolerance.      Certification Period/Plan of care expiration: 10/4/2023 to 11/29/2023.     Outpatient Physical Therapy 2 times weekly for 8 weeks to include the following interventions: Manual Therapy, Moist Heat/ Ice, Neuromuscular Re-ed, Patient Education, Therapeutic Activities, and Therapeutic Exercise.     Stacie Nj, PT

## 2023-10-11 NOTE — TELEPHONE ENCOUNTER
[facility] Staff  Caller: [phone] (Today, 11:52 AM)  Patient is returning a phone call.   Who left a message for the patient: Nereida   Does patient know what this is regarding:  patient states she was asked to call   Would you like a call back, or a response through your [facility] portal?:   call   Comments:

## 2023-10-12 ENCOUNTER — CLINICAL SUPPORT (OUTPATIENT)
Dept: REHABILITATION | Facility: OTHER | Age: 78
End: 2023-10-12
Payer: MEDICARE

## 2023-10-12 DIAGNOSIS — M62.81 MUSCLE WEAKNESS: Primary | ICD-10-CM

## 2023-10-12 PROCEDURE — 97112 NEUROMUSCULAR REEDUCATION: CPT | Mod: HCNC,PN

## 2023-10-12 PROCEDURE — 97110 THERAPEUTIC EXERCISES: CPT | Mod: HCNC,PN

## 2023-10-12 NOTE — PATIENT INSTRUCTIONS
Access Code: [email or web address]  URL: [email or web address]  Date: 10/12/2023  Prepared by: Stacie Nj    Exercises  - Seated Scapular Retraction  - 1 x daily - 1 sets - 20 reps - 5 second hold  - No Money  - 1 x daily - 2 sets - 20 reps - 3 second hold  - Supine Chest Stretch on Foam Roll  - 1 x daily - 1 sets - 1 reps - 3 minutes hold  - Supine Shoulder Horizontal Abduction with Resistance  - 1 x daily - 2 sets - 10 reps - 3-5 hold  - Supine Scapular Protraction in Flexion with Dumbbells  - 1 x daily - 2 sets - 10 reps  - Seated Upper Extremity Diagonals with Resistance on Swiss Ball  - 1 x daily - 3-5 x weekly - 2 sets - 10 reps  - Standing Shoulder Row with Anchored Resistance  - 1 x daily - 3 sets - 10 reps  - Shoulder extension with resistance - Neutral  - 1 x daily - 1 sets - 30 reps

## 2023-10-17 ENCOUNTER — LAB VISIT (OUTPATIENT)
Dept: LAB | Facility: HOSPITAL | Age: 78
End: 2023-10-17
Attending: INTERNAL MEDICINE
Payer: MEDICARE

## 2023-10-17 ENCOUNTER — TELEPHONE (OUTPATIENT)
Dept: PRIMARY CARE CLINIC | Facility: CLINIC | Age: 78
End: 2023-10-17
Payer: MEDICARE

## 2023-10-17 ENCOUNTER — OFFICE VISIT (OUTPATIENT)
Dept: PRIMARY CARE CLINIC | Facility: CLINIC | Age: 78
End: 2023-10-17
Payer: MEDICARE

## 2023-10-17 ENCOUNTER — TELEPHONE (OUTPATIENT)
Dept: AUDIOLOGY | Facility: CLINIC | Age: 78
End: 2023-10-17
Payer: MEDICARE

## 2023-10-17 VITALS
DIASTOLIC BLOOD PRESSURE: 84 MMHG | HEART RATE: 78 BPM | HEIGHT: 61 IN | BODY MASS INDEX: 29.43 KG/M2 | OXYGEN SATURATION: 96 % | WEIGHT: 155.88 LBS | SYSTOLIC BLOOD PRESSURE: 124 MMHG | TEMPERATURE: 98 F

## 2023-10-17 DIAGNOSIS — E11.21 CONTROLLED TYPE 2 DIABETES MELLITUS WITH DIABETIC NEPHROPATHY, WITHOUT LONG-TERM CURRENT USE OF INSULIN: ICD-10-CM

## 2023-10-17 DIAGNOSIS — F33.41 MDD (MAJOR DEPRESSIVE DISORDER), RECURRENT, IN PARTIAL REMISSION: ICD-10-CM

## 2023-10-17 DIAGNOSIS — G31.84 MILD COGNITIVE IMPAIRMENT WITH MEMORY LOSS: ICD-10-CM

## 2023-10-17 DIAGNOSIS — E11.65 TYPE 2 DIABETES MELLITUS WITH HYPERGLYCEMIA, WITHOUT LONG-TERM CURRENT USE OF INSULIN: Primary | ICD-10-CM

## 2023-10-17 DIAGNOSIS — E78.5 HYPERLIPIDEMIA, UNSPECIFIED HYPERLIPIDEMIA TYPE: ICD-10-CM

## 2023-10-17 DIAGNOSIS — E11.9 CONTROLLED TYPE 2 DIABETES MELLITUS WITHOUT COMPLICATION, WITHOUT LONG-TERM CURRENT USE OF INSULIN: ICD-10-CM

## 2023-10-17 DIAGNOSIS — H90.3 SENSORINEURAL HEARING LOSS (SNHL) OF BOTH EARS: ICD-10-CM

## 2023-10-17 DIAGNOSIS — I10 BENIGN ESSENTIAL HYPERTENSION: ICD-10-CM

## 2023-10-17 DIAGNOSIS — E78.1 HYPERTRIGLYCERIDEMIA: ICD-10-CM

## 2023-10-17 LAB
ALBUMIN/CREAT UR: NORMAL UG/MG (ref 0–30)
CREAT UR-MCNC: 51 MG/DL (ref 15–325)
MICROALBUMIN UR DL<=1MG/L-MCNC: <5 UG/ML

## 2023-10-17 PROCEDURE — 1159F PR MEDICATION LIST DOCUMENTED IN MEDICAL RECORD: ICD-10-PCS | Mod: HCNC,CPTII,S$GLB, | Performed by: HOSPITALIST

## 2023-10-17 PROCEDURE — 99212 OFFICE O/P EST SF 10 MIN: CPT | Mod: HCNC,S$GLB,, | Performed by: HOSPITALIST

## 2023-10-17 PROCEDURE — 99999 PR PBB SHADOW E&M-EST. PATIENT-LVL V: CPT | Mod: PBBFAC,HCNC,, | Performed by: HOSPITALIST

## 2023-10-17 PROCEDURE — 3079F PR MOST RECENT DIASTOLIC BLOOD PRESSURE 80-89 MM HG: ICD-10-PCS | Mod: HCNC,CPTII,S$GLB, | Performed by: HOSPITALIST

## 2023-10-17 PROCEDURE — 99212 PR OFFICE/OUTPT VISIT, EST, LEVL II, 10-19 MIN: ICD-10-PCS | Mod: HCNC,S$GLB,, | Performed by: HOSPITALIST

## 2023-10-17 PROCEDURE — 99999 PR PBB SHADOW E&M-EST. PATIENT-LVL V: ICD-10-PCS | Mod: PBBFAC,HCNC,, | Performed by: HOSPITALIST

## 2023-10-17 PROCEDURE — 3074F SYST BP LT 130 MM HG: CPT | Mod: HCNC,CPTII,S$GLB, | Performed by: HOSPITALIST

## 2023-10-17 PROCEDURE — 1159F MED LIST DOCD IN RCRD: CPT | Mod: HCNC,CPTII,S$GLB, | Performed by: HOSPITALIST

## 2023-10-17 PROCEDURE — 1126F AMNT PAIN NOTED NONE PRSNT: CPT | Mod: HCNC,CPTII,S$GLB, | Performed by: HOSPITALIST

## 2023-10-17 PROCEDURE — 1160F PR REVIEW ALL MEDS BY PRESCRIBER/CLIN PHARMACIST DOCUMENTED: ICD-10-PCS | Mod: HCNC,CPTII,S$GLB, | Performed by: HOSPITALIST

## 2023-10-17 PROCEDURE — 1126F PR PAIN SEVERITY QUANTIFIED, NO PAIN PRESENT: ICD-10-PCS | Mod: HCNC,CPTII,S$GLB, | Performed by: HOSPITALIST

## 2023-10-17 PROCEDURE — 1157F ADVNC CARE PLAN IN RCRD: CPT | Mod: HCNC,CPTII,S$GLB, | Performed by: HOSPITALIST

## 2023-10-17 PROCEDURE — 3079F DIAST BP 80-89 MM HG: CPT | Mod: HCNC,CPTII,S$GLB, | Performed by: HOSPITALIST

## 2023-10-17 PROCEDURE — 1160F RVW MEDS BY RX/DR IN RCRD: CPT | Mod: HCNC,CPTII,S$GLB, | Performed by: HOSPITALIST

## 2023-10-17 PROCEDURE — 3074F PR MOST RECENT SYSTOLIC BLOOD PRESSURE < 130 MM HG: ICD-10-PCS | Mod: HCNC,CPTII,S$GLB, | Performed by: HOSPITALIST

## 2023-10-17 PROCEDURE — 1157F PR ADVANCE CARE PLAN OR EQUIV PRESENT IN MEDICAL RECORD: ICD-10-PCS | Mod: HCNC,CPTII,S$GLB, | Performed by: HOSPITALIST

## 2023-10-17 PROCEDURE — 82043 UR ALBUMIN QUANTITATIVE: CPT | Mod: HCNC | Performed by: INTERNAL MEDICINE

## 2023-10-17 NOTE — ASSESSMENT & PLAN NOTE
Pt has cochlear implant in L ear and hearing aid in R ear, and discordance b/t the 2 is affecting her listening comprehension.  She last saw Audiology at the beginning of the year and adjustments were made and training exercises discussed and was supposed to return with her smartphone and spare  to complete device optimizations, but didn't.  Will help make f/u with them to pick this issue back up so that pt can hear better.

## 2023-10-17 NOTE — ASSESSMENT & PLAN NOTE
Pt reports that she may have been inaccurately diagnosed during memory testing on account of not being able to hear the psychometrist administering the test properly as she was too far away, wasn't speaking loud enough, and also seemed nervous herself.  She says that she brought this issue up to Dr. Carlson, the neuropsychologist, who said that she can retest in a year.  In meantime will address hearing difficulties.

## 2023-10-17 NOTE — ASSESSMENT & PLAN NOTE
PHQ-9 score of 10 today; unsure of score at prior visits.  She reports improvement with present management.  Will touch base with Nayan to re-establish contact and resume therapy sessions.

## 2023-10-17 NOTE — PATIENT INSTRUCTIONS
Be sure to remember to take your cell phone and backup processor to the Audiology appointment so that they can provide you with maximal assistance.

## 2023-10-17 NOTE — PROGRESS NOTES
Primary Care Provider Appointment - [facility]  Dustin Gay MD      Subjective:      Patient ID: Paris Burris is a 78 y.o. female with   Past Medical History:   Diagnosis Date    Allergy     Anemia     Anxiety     Breast cancer     Left breast    Cancer     L breast s/p lumpectomy    Cataract     Decreased hearing     Depression     Dermatochalasis of both upper eyelids     Diabetes mellitus     Diabetes with neurologic complications     Gastric ulcer 09/10/2013    EGD    Hiatal hernia     Hyperlipidemia     Migraine headache     Migraine headache 2015    Multiple gastric ulcers     Parathyroid disorder     Renal manifestation of secondary diabetes mellitus     Sleep apnea     no CPAP    Type 2 diabetes mellitus, controlled, with renal complications 2017    Wrist fracture            Chief Complaint: f/u    Prior to this visit, patient's last encounter with PCP was 2023.    Pt reports ongoing depression/anxiety sx, but improved since starting pharmacotherapy and sessions with Nayan; but they haven't had one for about a month due to difficulty getting ahold of one another.      4Ms for Medical Decision-Making in Older Adults    Last Completed EAWV: 2023    MOBILITY:  Get Up and Go:      2023    10:42 AM 2017     9:38 AM   Get Up and Go   Trial 1 10 seconds 10 seconds     Activities of Daily Livin/28/2023    10:44 AM   Activities of Daily Living   Ambulation Independent   Dressing Independent   Transfers Independent   Toileting Continent of bladder;Incontinent of bladder   Feeding Independent   Cleaning home/Chores Independent   Telephone use Independent   Shopping Independent   Paying bills Independent   Taking meds Independent     Whisper Test:      2023    10:42 AM   Whisper Test   Whisper Test N/A- Hearing Impairment     Disability Status:      2023    10:46 AM   Disability Status   Are you deaf or do you have serious difficulty hearing? Y   Are  you blind or do you have serious difficulty seeing, even when wearing glasses? N   Because of a physical, mental, or emotional condition, do you have serious difficulty concentrating, remembering, or making decisions? N   Do you have serious difficulty walking or climbing stairs? N   Do you have difficulty dressing or bathing? N   Because of a physical, mental, or emotional condition, do you have difficulty doing errands alone such as visiting a doctor's office or shopping? N     Nutrition Screenin/28/2023    10:43 AM   Nutrition Screening   Has food intake declined over the past three months due to loss of appetite, digestive problems, chewing or swallowing difficulties? No decrease in food intake   Involuntary weight loss during the last 3 months? No weight loss   Mobility? Goes out   Has the patient suffered psychological stress or acute disease in the past three months? Yes   Neuropsychological problems? No psychological problems   Body Mass Index (BMI)?  BMI 23 or greater   Screening Score 12   Interpretation Normal nutritional status    Screening Score: 0-7 Malnourished, 8-11 At Risk, 12-14 Normal    MENTATION:   Depression Patient Health Questionnaire:      10/17/2023     3:41 PM   Depression Patient Health Questionnaire   Over the last two weeks how often have you been bothered by little interest or pleasure in doing things Several days   Over the last two weeks how often have you been bothered by feeling down, depressed or hopeless Several days   PHQ-2 Total Score 2   PHQ-9 Total Score 5   PHQ-9 Interpretation Mild     Has Dementia Dx: No    Cognitive Function Screenin/9/2022     9:29 AM   Cognitive Function Screening   Clock Drawing Test 2   Mini-Cog 3 Minute Recall 2   Cognitive Function Screening 4     Cognitive Function Screening Total - Less than 4 = Abnormal,  Greater than or equal to 4 = Normal    MEDICATIONS:  High Risk Medications:  Total Active Medications: 2  traMADoL - 50  mg  venlafaxine - 75 MG    WHAT MATTERS MOST:  Advance Care Planning   ACP Status:   Patient has had an ACP conversation  Living Will: Yes  Power of : Yes  LaPOST: No      Social History     Socioeconomic History    Marital status:    Occupational History    Occupation:  supervisor   Tobacco Use    Smoking status: Never    Smokeless tobacco: Never   Substance and Sexual Activity    Alcohol use: No     Comment: quit 2014 - alcohol abuse    Drug use: Not Currently    Sexual activity: Yes     Partners: Male     Social Determinants of Health     Financial Resource Strain: Low Risk  (7/28/2023)    Overall Financial Resource Strain (CARDIA)     Difficulty of Paying Living Expenses: Not hard at all   Food Insecurity: No Food Insecurity (7/28/2023)    Hunger Vital Sign     Worried About Running Out of Food in the Last Year: Never true     Ran Out of Food in the Last Year: Never true   Transportation Needs: No Transportation Needs (7/28/2023)    PRAPARE - Transportation     Lack of Transportation (Medical): No     Lack of Transportation (Non-Medical): No   Physical Activity: Insufficiently Active (7/28/2023)    Exercise Vital Sign     Days of Exercise per Week: 4 days     Minutes of Exercise per Session: 30 min   Stress: Stress Concern Present (7/28/2023)    [place] [employer] - Occupational Stress Questionnaire     Feeling of Stress : Rather much   Social Connections: Socially Integrated (7/28/2023)    Social Connection and Isolation Panel [NHANES]     Frequency of Communication with Friends and Family: Three times a week     Frequency of Social Gatherings with Friends and Family: Once a week     Attends Anabaptism Services: More than 4 times per year     Active Member of Clubs or Organizations: No     Attends Club or Organization Meetings: 1 to 4 times per year     Marital Status:    Housing Stability: Low Risk  (7/28/2023)    Housing Stability Vital Sign     Unable to Pay  "for Housing in the Last Year: No     Number of Places Lived in the Last Year: 1     Unstable Housing in the Last Year: No       Review of Systems   Constitutional:  Negative for activity change and appetite change.   HENT:  Positive for hearing loss (chronic).    Neurological:  Positive for memory loss (mild).   Psychiatric/Behavioral:  Positive for dysphoric mood. The patient is nervous/anxious.         Objective:   /84 (BP Location: Right arm, Patient Position: Sitting, BP Method: Large (Manual))   Pulse 78   Temp 98.4 °F (36.9 °C) (Oral)   Ht 5' 1" (1.549 m)   Wt 70.7 kg (155 lb 13.8 oz)   SpO2 96%   BMI 29.45 kg/m²     Physical Exam  Vitals reviewed.   Constitutional:       General: She is not in acute distress.     Appearance: Normal appearance.   HENT:      Head: Normocephalic and atraumatic.      Ears:      Comments: Cochlear implant  superior and inferior to L ear; wearing hearing aid on R...still moderately hard of hearing.  Eyes:      General: No scleral icterus.     Pupils: Pupils are equal, round, and reactive to light.   Cardiovascular:      Rate and Rhythm: Normal rate and regular rhythm.   Pulmonary:      Effort: Pulmonary effort is normal. No respiratory distress.   Skin:     General: Skin is warm and dry.   Neurological:      Mental Status: She is alert.   Psychiatric:         Mood and Affect: Mood normal.         Behavior: Behavior normal.            Lab Results   Component Value Date    WBC 5.74 05/03/2023    HGB 13.8 05/03/2023    HCT 42.9 05/03/2023     05/03/2023    CHOL 106 (L) 05/03/2023    TRIG 182 (H) 05/03/2023    HDL 35 (L) 05/03/2023    ALT 36 09/06/2023    AST 29 09/06/2023     09/06/2023    K 4.6 09/06/2023     09/06/2023    CREATININE 0.8 09/06/2023    BUN 13 09/06/2023    CO2 24 09/06/2023    TSH 1.589 01/05/2022    INR 0.9 04/24/2015    HGBA1C 6.8 (H) 09/06/2023       Current Outpatient Medications on File Prior to Visit   Medication Sig " Dispense Refill    acetaminophen (TYLENOL) 500 MG tablet Take 1 tablet (500 mg total) by mouth every 6 (six) hours as needed for Pain. 160 tablet 0    buPROPion (WELLBUTRIN XL) 300 MG 24 hr tablet Take 1 tablet (300 mg total) by mouth once daily. 90 tablet 3    ezetimibe (ZETIA) 10 mg tablet Take 1 tablet (10 mg total) by mouth once daily. 90 tablet 3    flash glucose scanning reader (FREESTYLE RAMON 2 READER) Misc Use continuously. 2 each 11    flash glucose sensor (FREESTYLE RAMON 2 SENSOR) Kit Use continuously. 2 kit 11    fluticasone propionate (FLONASE) 50 mcg/actuation nasal spray 1 spray (50 mcg total) by Each Nostril route 2 (two) times a day. 16 g 2    lancets 33 gauge Misc Use to test blood glcuose 2 (two) times daily with meals. 100 each 11    lancing device Misc 1 Device by Misc.(Non-Drug; Combo Route) route 2 (two) times daily with meals. 1 each 0    losartan (COZAAR) 100 MG tablet Take 1 tablet (100 mg total) by mouth once daily. 90 tablet 3    meloxicam (MOBIC) 7.5 MG tablet Take 1 tablet (7.5 mg total) by mouth daily as needed for Pain. 30 tablet 11    metFORMIN (GLUCOPHAGE-XR) 500 MG ER 24hr tablet Take 1 tablet (500 mg total) by mouth daily with breakfast. 90 tablet 3    metoprolol succinate (TOPROL-XL) 25 MG 24 hr tablet Take 1 tablet (25 mg total) by mouth once daily. 90 tablet 3    rosuvastatin (CRESTOR) 20 MG tablet Take 1 tablet (20 mg total) by mouth once daily. 90 tablet 3    semaglutide (OZEMPIC) 0.25 mg or 0.5 mg (2 mg/3 mL) pen injector Inject 0.25mg subcutaneously weekly x 4 weeks and then increase to 0.5mg subcutaneously weekly onwards. 3 mL 2    traMADoL (ULTRAM) 50 mg tablet TAKE 2 TABLETS EVERY 12 HOURS AS NEEDED FOR PAIN 60 tablet 2    urea (CARMOL) 40 % Crea Apply topically once daily. 198.4 g 11    valACYclovir (VALTREX) 1000 MG tablet Take 2 tablets (2,000 mg total) by mouth every 12 (twelve) hours. for 1 day 4 tablet 1    venlafaxine (EFFEXOR) 75 MG tablet Take 1 tablet (75 mg  total) by mouth once daily. 90 tablet 3    vitamin D 1000 units Tab Take 0.5 Units by mouth once daily. With K2 50 mch      ascorbic acid, vitamin C, (VITAMIN C) 500 MG tablet Take 1,000 mg by mouth once daily.       blood sugar diagnostic Strp Use to test blood glucose 2 (two) times daily with meals. (Patient not taking: Reported on 10/17/2023) 100 strip 11    blood-glucose meter Misc Use to check blood glucose twice daily (Patient not taking: Reported on 10/17/2023) 1 each 0     Current Facility-Administered Medications on File Prior to Visit   Medication Dose Route Frequency Provider Last Rate Last Admin    0.9%  NaCl infusion   Intravenous Continuous Maria Elena Flores MD   Stopped at 05/31/19 1548    sodium chloride 0.9% flush 10 mL  10 mL Intravenous PRN Maria Elena Flores MD             Assessment:   78 y.o. female with multiple co-morbid illnesses here to follow-up with PCP and continue work-up of chronic issues    Plan:     Problem List Items Addressed This Visit       MDD (major depressive disorder), recurrent, in partial remission     PHQ-9 score of 10 today; unsure of score at prior visits.  She reports improvement with present management.  Will touch base with Nayan to re-establish contact and resume therapy sessions.         Sensorineural hearing loss (SNHL) of both ears     Pt has cochlear implant in L ear and hearing aid in R ear, and discordance b/t the 2 is affecting her listening comprehension.  She last saw Audiology at the beginning of the year and adjustments were made and training exercises discussed and was supposed to return with her smartphone and spare  to complete device optimizations, but didn't.  Will help make f/u with them to pick this issue back up so that pt can hear better.         Mild cognitive impairment with memory loss     Pt reports that she may have been inaccurately diagnosed during memory testing on account of not being able to hear the psychometrist administering  the test properly as she was too far away, wasn't speaking loud enough, and also seemed nervous herself.  She says that she brought this issue up to Dr. Carlson, the neuropsychologist, who said that she can retest in a year.  In meantime will address hearing difficulties.          Other Visit Diagnoses       Type 2 diabetes mellitus with hyperglycemia, without long-term current use of insulin    -  Primary            Health Maintenance         Date Due Completion Date    Mammogram 07/09/2022 7/9/2021    Diabetes Urine Screening 10/31/2023 10/31/2022    Hemoglobin A1c 03/06/2024 9/6/2023    Lipid Panel 05/03/2024 5/3/2023    DEXA Scan 06/16/2024 6/16/2022    Override on 3/31/2015: Done    Eye Exam 09/14/2024 9/14/2023    Override on 3/22/2018: Done    TETANUS VACCINE 06/16/2026 6/16/2016    Colonoscopy 08/31/2027 8/31/2022    Override on 9/10/2013: Done        Specimen cup for urine microalbumin:creatinine provided.    Future Appointments   Date Time Provider Department Center   10/19/2023  9:00 AM Nayan Mora [facility] [place]   10/26/2023  3:15 PM Jannie Martin III, PTA [facility] [place]   10/30/2023 10:00 AM Stone Emmanuel MD [facility]   10/31/2023  9:15 AM Suman Malave, PT [facility] [place]   11/2/2023 10:45 AM Jannie Martin III, PTA [facility] [place]   11/7/2023  9:15 AM Jannie Martin III, PTA [facility] [place]   11/9/2023  9:15 AM Jannie Martin III, PTA [facility] [place]   11/14/2023  9:15 AM Suman Malave, PT [facility] [place]   11/16/2023  9:15 AM Suman Malave, PT [facility] [place]   11/16/2023 10:20 AM Dustin Gay MD [facility] [place]   12/11/2023 10:00 AM HOME MONITOR DEVICE CHECK, [facility] LETI [facility]   1/4/2024 10:00 AM DEEPAK MAMMO8 SCREEN INT MED DEE[facility] DONYA [place] PCW         Follow up in about 1 month (around 11/17/2023). Total clinical care time was 40 min.    Dustin Gay MD  [facility]  [facility]

## 2023-10-18 RX ORDER — ROSUVASTATIN CALCIUM 20 MG/1
20 TABLET, COATED ORAL DAILY
Qty: 90 TABLET | Refills: 3 | Status: SHIPPED | OUTPATIENT
Start: 2023-10-18

## 2023-10-18 RX ORDER — METOPROLOL SUCCINATE 25 MG/1
25 TABLET, EXTENDED RELEASE ORAL DAILY
Qty: 90 TABLET | Refills: 3 | Status: SHIPPED | OUTPATIENT
Start: 2023-10-18 | End: 2024-10-17

## 2023-10-18 RX ORDER — EZETIMIBE 10 MG/1
10 TABLET ORAL DAILY
Qty: 90 TABLET | Refills: 3 | Status: SHIPPED | OUTPATIENT
Start: 2023-10-18 | End: 2024-10-17

## 2023-10-18 RX ORDER — LOSARTAN POTASSIUM 100 MG/1
100 TABLET ORAL DAILY
Qty: 90 TABLET | Refills: 3 | Status: SHIPPED | OUTPATIENT
Start: 2023-10-18

## 2023-10-19 ENCOUNTER — CLINICAL SUPPORT (OUTPATIENT)
Dept: PRIMARY CARE CLINIC | Facility: CLINIC | Age: 78
End: 2023-10-19
Payer: MEDICARE

## 2023-10-19 DIAGNOSIS — F33.41 MDD (MAJOR DEPRESSIVE DISORDER), RECURRENT, IN PARTIAL REMISSION: Primary | ICD-10-CM

## 2023-10-19 PROCEDURE — 90832 PSYTX W PT 30 MINUTES: CPT | Mod: HCNC,S$GLB,, | Performed by: SOCIAL WORKER

## 2023-10-19 PROCEDURE — 90832 PR PSYCHOTHERAPY W/PATIENT, 30 MIN: ICD-10-PCS | Mod: HCNC,S$GLB,, | Performed by: SOCIAL WORKER

## 2023-10-19 NOTE — PROGRESS NOTES
"Individual Psychotherapy (LCSW/PhD)  Paris Burris,  10/19/2023    Site:  [facility]         Therapeutic Intervention: Met with patient for individual psychotherapy.    Chief complaint/reason for encounter: depression     Interval history and content of current session: Pt reports low mood over last few weeks. Pt states they feel like their low mood is related to relationship with daughter I.e. ongoing tension and 's mood. Pt expressed frustration at 's "lack of motivation".     Pt states that  "gets frustrated" with pt at times which bothers them. LCSW and pt discussed healthy ways to engage with  and ways to set boundaries. Pt states they have been going out to with friends for coffee. Pt and LCSW discussed boundaries and areas of growth for pt including vocalizing needs and boundaries.     Treatment plan:  Target symptoms: anxiety   Why chosen therapy is appropriate versus another modality: relevant to diagnosis, evidence based practice  Outcome monitoring methods: self-report, checklist/rating scale  Therapeutic intervention type: supportive psychotherapy    Risk parameters:  Patient reports no suicidal ideation  Patient reports no homicidal ideation  Patient reports no self-injurious behavior  Patient reports no violent behavior    Verbal deficits: None    Patient's response to intervention:  The patient's response to intervention is accepting.    Progress toward goals and other mental status changes:  The patient's progress toward goals is limited.    Diagnosis:   No diagnosis found.    Plan: Pt plans to continue individual psychotherapy    Return to clinic: 3 weeks    Length of Service (minutes): 60      "

## 2023-10-23 ENCOUNTER — TELEPHONE (OUTPATIENT)
Dept: AUDIOLOGY | Facility: CLINIC | Age: 78
End: 2023-10-23
Payer: MEDICARE

## 2023-10-23 DIAGNOSIS — H90.3 SENSORINEURAL HEARING LOSS, BILATERAL: Primary | ICD-10-CM

## 2023-10-27 NOTE — TELEPHONE ENCOUNTER
VENESSASANCHO BRENNER MRN: [id number]    Date: 11/2/2023 Status: Isabell   Appt Time: 1:00 PM Length: 30   Visit Type: AUDIOGRAM [2012] Copay: $0.00   Provider: Elina Santo AU.D

## 2023-10-29 ENCOUNTER — PATIENT MESSAGE (OUTPATIENT)
Dept: ADMINISTRATIVE | Facility: OTHER | Age: 78
End: 2023-10-29
Payer: MEDICARE

## 2023-10-30 ENCOUNTER — OFFICE VISIT (OUTPATIENT)
Dept: SLEEP MEDICINE | Facility: CLINIC | Age: 78
End: 2023-10-30
Payer: MEDICARE

## 2023-10-30 VITALS
SYSTOLIC BLOOD PRESSURE: 96 MMHG | BODY MASS INDEX: 29.13 KG/M2 | HEART RATE: 91 BPM | HEIGHT: 61 IN | WEIGHT: 154.31 LBS | DIASTOLIC BLOOD PRESSURE: 68 MMHG

## 2023-10-30 DIAGNOSIS — G47.33 OSA (OBSTRUCTIVE SLEEP APNEA): ICD-10-CM

## 2023-10-30 DIAGNOSIS — Z78.9 INTOLERANCE OF CONTINUOUS POSITIVE AIRWAY PRESSURE (CPAP) VENTILATION: Primary | ICD-10-CM

## 2023-10-30 PROCEDURE — 3078F DIAST BP <80 MM HG: CPT | Mod: HCNC,CPTII,S$GLB, | Performed by: INTERNAL MEDICINE

## 2023-10-30 PROCEDURE — 1101F PR PT FALLS ASSESS DOC 0-1 FALLS W/OUT INJ PAST YR: ICD-10-PCS | Mod: HCNC,CPTII,S$GLB, | Performed by: INTERNAL MEDICINE

## 2023-10-30 PROCEDURE — 99999 PR PBB SHADOW E&M-EST. PATIENT-LVL IV: CPT | Mod: PBBFAC,HCNC,, | Performed by: INTERNAL MEDICINE

## 2023-10-30 PROCEDURE — 3288F PR FALLS RISK ASSESSMENT DOCUMENTED: ICD-10-PCS | Mod: HCNC,CPTII,S$GLB, | Performed by: INTERNAL MEDICINE

## 2023-10-30 PROCEDURE — 99999 PR PBB SHADOW E&M-EST. PATIENT-LVL IV: ICD-10-PCS | Mod: PBBFAC,HCNC,, | Performed by: INTERNAL MEDICINE

## 2023-10-30 PROCEDURE — 1157F ADVNC CARE PLAN IN RCRD: CPT | Mod: HCNC,CPTII,S$GLB, | Performed by: INTERNAL MEDICINE

## 2023-10-30 PROCEDURE — 1126F PR PAIN SEVERITY QUANTIFIED, NO PAIN PRESENT: ICD-10-PCS | Mod: HCNC,CPTII,S$GLB, | Performed by: INTERNAL MEDICINE

## 2023-10-30 PROCEDURE — 99204 OFFICE O/P NEW MOD 45 MIN: CPT | Mod: HCNC,S$GLB,, | Performed by: INTERNAL MEDICINE

## 2023-10-30 PROCEDURE — 1157F PR ADVANCE CARE PLAN OR EQUIV PRESENT IN MEDICAL RECORD: ICD-10-PCS | Mod: HCNC,CPTII,S$GLB, | Performed by: INTERNAL MEDICINE

## 2023-10-30 PROCEDURE — 3078F PR MOST RECENT DIASTOLIC BLOOD PRESSURE < 80 MM HG: ICD-10-PCS | Mod: HCNC,CPTII,S$GLB, | Performed by: INTERNAL MEDICINE

## 2023-10-30 PROCEDURE — 3074F SYST BP LT 130 MM HG: CPT | Mod: HCNC,CPTII,S$GLB, | Performed by: INTERNAL MEDICINE

## 2023-10-30 PROCEDURE — 99204 PR OFFICE/OUTPT VISIT, NEW, LEVL IV, 45-59 MIN: ICD-10-PCS | Mod: HCNC,S$GLB,, | Performed by: INTERNAL MEDICINE

## 2023-10-30 PROCEDURE — 3074F PR MOST RECENT SYSTOLIC BLOOD PRESSURE < 130 MM HG: ICD-10-PCS | Mod: HCNC,CPTII,S$GLB, | Performed by: INTERNAL MEDICINE

## 2023-10-30 PROCEDURE — 1159F PR MEDICATION LIST DOCUMENTED IN MEDICAL RECORD: ICD-10-PCS | Mod: HCNC,CPTII,S$GLB, | Performed by: INTERNAL MEDICINE

## 2023-10-30 PROCEDURE — 1101F PT FALLS ASSESS-DOCD LE1/YR: CPT | Mod: HCNC,CPTII,S$GLB, | Performed by: INTERNAL MEDICINE

## 2023-10-30 PROCEDURE — 3288F FALL RISK ASSESSMENT DOCD: CPT | Mod: HCNC,CPTII,S$GLB, | Performed by: INTERNAL MEDICINE

## 2023-10-30 PROCEDURE — 1159F MED LIST DOCD IN RCRD: CPT | Mod: HCNC,CPTII,S$GLB, | Performed by: INTERNAL MEDICINE

## 2023-10-30 PROCEDURE — 1126F AMNT PAIN NOTED NONE PRSNT: CPT | Mod: HCNC,CPTII,S$GLB, | Performed by: INTERNAL MEDICINE

## 2023-10-30 NOTE — PROGRESS NOTES
[facility]   Physical Therapy Treatment Note     Name: Paris Burris  Clinic Number: [id number]    Therapy Diagnosis:   Encounter Diagnosis   Name Primary?    Muscle weakness Yes       Physician: Mandi Huynh MD    Visit Date: 10/31/2023    Physician Orders: PT Eval and Treat   Medical Diagnosis:   M25.512,G89.29 (ICD-10-CM) - Chronic left shoulder pain  Evaluation Date: 10/4/2023  Authorization Period Expiration: 12/31/2023  Plan of Care Certification Period: 10/4/2023 - 11/29/2023  Visit # / Visits authorized: 2/ 20 (+ evaluation)     Progress Note Due: 11/4/2023   FOCUS ON THERAPEUTIC OUTCOMES (FOTO): 10/4/2023 (3/5; 1)  PTA Visit #: 0/5     Precautions: Standard and pacemaker. Wears hearing aids    Time In: 0915  Time Out: 1000  Total Billable Time: 40 minutes      SUBJECTIVE     Patient reports: her shoulder is much better. States her shoulder is not in excruciating pain at night and can fall asleep better.    She was not compliant with home exercise program as one was not provided.  Response to previous treatment: no adverse effects   Functional change: can fall asleep better    Pain: 1/10  Location: left shoulder      OBJECTIVE     Objective Measures updated at progress report unless specified.        Left shoulder flexion                                   150 degrees/170 degrees  Left shoulder abduction                              155 degrees/160 degrees   Left shoulder external rotation at side                    62 degrees   Left shoulder functional internal rotation   opposite scapula inferior angle  Left shoulder functional external rotation                  T1    TREATMENT     Paris received the treatments listed below:      received therapeutic exercises to develop strength and range of motion for 10 minutes including:  [x] Pectoral stretch over towel roll x 3 minutes  [x] Diagonals over towel roll x10 each with red band   [x] Supine shoulder flexion with dowel x 20  [] Upper  trapezius stretch 2x 30 seconds each   [] Levator scapular stretch 2x 30 seconds each     received the following manual therapy techniques: Soft tissue Mobilization were applied to the: left shoulder for 00 minutes, including:  []      received the following dynamic functional therapeutic activities to improve functional performance for 00  minutes, including:  []      received the following neuromuscular re-education activities to improve: Coordination and Posture for 30 minutes. The following activities were included:  [x] Scapular retraction over towel roll 20 x 5 second holds  [x] No money over towel roll with red band 20x  [x] Horizontal abduction over towel roll with red band x 20   [x] Serratus punch over towel roll with 2# dowel x 20  [x] Rows with red band x 20  [x] Shoulder extension with red band x20   [x] Isometric external rotation/internal rotation with double yellow band x 20 each   [x] +Side lying external rotation x 20 repetitions   [x] +Side lying abduction x 20 repetitions  [x] +Side lying flexion x 20 repetitions   [x] +Side lying horizontal abduction x 20 repetitions   [x] +wall serratus presses x 20 repetitions       PATIENT EDUCATION AND HOME EXERCISES     Home Exercises Provided and Patient Education Provided     Education provided:   - verbal and tactile cues for proper technique    Written Home Exercises Provided: yes. Exercises were reviewed and Paris was able to demonstrate them prior to the end of the session.  Paris demonstrated good  understanding of the education provided. See Electronic Medical Record under Patient Instructions for exercises provided during therapy sessions    ASSESSMENT     Patient progressing in outpatient physical therapy with increased left shoulder active range of motion and decreased left shoulder pain with functional activities.     Paris Is progressing well towards her goals.   Patient prognosis is Good.     Pt will continue to benefit from skilled outpatient  physical therapy to address the deficits listed in the problem list box on initial evaluation, provide patient/family education and to maximize patient's level of independence in the home and community environment.     Patient's spiritual, cultural and educational needs considered and Patient agreeable to plan of care and goals.     Anticipated barriers to physical therapy: none    Goals:   Short Term Goals: 4 weeks   Independent with home exercise program.  ongoing  Report decreased left shoulder pain< or =  5/10 with adls such as dressing, carrying her grocery bags In progress  Increased manual muscle test for bilateral  upper extremity by 1/2 muscle grade to promote proper scapular stability to decrease left shoulder pain< or =  5/10 with adls such as dressing, carrying her grocery bags. In progress     Long Term Goals: 8 weeks   Increase left cervical rotation to 65 degrees. In progress  Report decreased left shoulder pain  < or =  3/10 with adls such as dressing, carrying her grocery bags In progress  Increased manual muscle test for bilateral upper extremity by 1 muscle grade to promote proper scapular stability to decrease  left shoulder pain  < or =  3/10 with adls such as dressing, carrying her grocery bags  In progress    PLAN     Continue with established PT plan of care and progress per patient tolerance.      Certification Period/Plan of care expiration: 10/4/2023 to 11/29/2023.     Outpatient Physical Therapy 2 times weekly for 8 weeks to include the following interventions: Manual Therapy, Moist Heat/ Ice, Neuromuscular Re-ed, Patient Education, Therapeutic Activities, and Therapeutic Exercise.     Suman Malave, PT

## 2023-10-30 NOTE — PROGRESS NOTES
Referred by Mandi Huynh MD     NEW PATIENT VISIT Dr. Feng 3/13/2018    Paris Burris  is a pleasant 78 y.o. female  with PMH significant for  DM, mobitz II, s/p PM, GERD, COOKIE dx 2009 in [place], CPAP intolerance who presents for sleep evaluation to consider another trial of CPAP at the recommnedation of her       PSG 1/2/2018: AHI 12.7, RAHI 71  CPAP titration 6.27.2015: 4-17(rx 16cwp)    PAP history   Problems    Mask    Pressure    DME Does not recall   Machine age No longer has    Download          Past Medical History:   Diagnosis Date    Allergy     Anemia     Anxiety     Breast cancer 2002    Left breast    Cancer 2002    L breast s/p lumpectomy    Cataract     Decreased hearing     Depression     Dermatochalasis of both upper eyelids     Diabetes mellitus     Diabetes with neurologic complications     Gastric ulcer 09/10/2013    EGD    Hiatal hernia     Hyperlipidemia     Migraine headache     Migraine headache 03/20/2015    Multiple gastric ulcers     Parathyroid disorder     Renal manifestation of secondary diabetes mellitus     Sleep apnea     no CPAP    Type 2 diabetes mellitus, controlled, 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      Patient Active Problem List   Diagnosis    Insomnia    MDD (major depressive disorder), recurrent, in partial remission    History of breast cancer in female    Vitamin D deficiency    Hyperlipidemia    Leg weakness, bilateral    Diabetes mellitus type 2 in obese    DDD (degenerative disc disease), lumbar    Midline low back pain without sciatica    Diabetic polyneuropathy associated with type 2 diabetes mellitus    ESTEFANIA (iron deficiency anemia)    Thoracic back pain    Fatty liver    Stage 2 chronic kidney disease    Type 2 diabetes mellitus with diabetic neuropathy    COOKIE (obstructive sleep apnea)    Aortic atherosclerosis    Alcohol dependence, in remission    History of gastric ulcer    Cervical spine arthritis    History of vertebral compression fracture     Obesity (BMI 30-39.9)    Voiding dysfunction    Urinary urgency    Urinary frequency    Urinary hesitancy    Vaginal atrophy    Mixed stress and urge urinary incontinence    Status post parathyroidectomy    Chronic bilateral low back pain without sciatica    Hiatal hernia    Pelvic floor dysfunction    AV block, Mobitz II    SVT (supraventricular tachycardia)    Cardiomyopathy    Presence of cardiac resynchronization therapy pacemaker (CRT-P)    GERD (gastroesophageal reflux disease)    Fatigue    Tender lymph node    Profound hearing loss of left ear    At risk for falls    Closed nondisplaced longitudinal fracture of right patella    Chronic pain of right knee    Gait abnormality    Sensorineural hearing loss (SNHL) of both ears    Cochlear implant in place    Calcified granuloma of lung (noted on CT chest 10/2022 and 10/2015, stable)    Mild cognitive impairment with memory loss    Muscle weakness       Current Outpatient Medications:     acetaminophen (TYLENOL) 500 MG tablet, Take 1 tablet (500 mg total) by mouth every 6 (six) hours as needed for Pain., Disp: 160 tablet, Rfl: 0    buPROPion (WELLBUTRIN XL) 300 MG 24 hr tablet, Take 1 tablet (300 mg total) by mouth once daily., Disp: 90 tablet, Rfl: 3    ezetimibe (ZETIA) 10 mg tablet, Take 1 tablet (10 mg total) by mouth once daily., Disp: 90 tablet, Rfl: 3    flash glucose scanning reader (FREESTYLE RAMON 2 READER) Misc, Use continuously., Disp: 2 each, Rfl: 11    flash glucose sensor (FREESTYLE RAMON 2 SENSOR) Kit, Use continuously., Disp: 2 kit, Rfl: 11    fluticasone propionate (FLONASE) 50 mcg/actuation nasal spray, 1 spray (50 mcg total) by Each Nostril route 2 (two) times a day., Disp: 16 g, Rfl: 2    lancets 33 gauge Misc, Use to test blood glcuose 2 (two) times daily with meals., Disp: 100 each, Rfl: 11    lancing device Misc, 1 Device by Misc.(Non-Drug; Combo Route) route 2 (two) times daily with meals., Disp: 1 each, Rfl: 0    losartan (COZAAR) 100 MG  "tablet, Take 1 tablet (100 mg total) by mouth once daily., Disp: 90 tablet, Rfl: 3    meloxicam (MOBIC) 7.5 MG tablet, Take 1 tablet (7.5 mg total) by mouth daily as needed for Pain., Disp: 30 tablet, Rfl: 11    metFORMIN (GLUCOPHAGE-XR) 500 MG ER 24hr tablet, Take 1 tablet (500 mg total) by mouth daily with breakfast., Disp: 90 tablet, Rfl: 3    metoprolol succinate (TOPROL-XL) 25 MG 24 hr tablet, Take 1 tablet (25 mg total) by mouth once daily., Disp: 90 tablet, Rfl: 3    rosuvastatin (CRESTOR) 20 MG tablet, Take 1 tablet (20 mg total) by mouth once daily., Disp: 90 tablet, Rfl: 3    semaglutide (OZEMPIC) 0.25 mg or 0.5 mg (2 mg/3 mL) pen injector, Inject 0.25mg subcutaneously weekly x 4 weeks and then increase to 0.5mg subcutaneously weekly onwards., Disp: 3 mL, Rfl: 2    traMADoL (ULTRAM) 50 mg tablet, TAKE 2 TABLETS EVERY 12 HOURS AS NEEDED FOR PAIN, Disp: 60 tablet, Rfl: 2    urea (CARMOL) 40 % Crea, Apply topically once daily., Disp: 198.4 g, Rfl: 11    valACYclovir (VALTREX) 1000 MG tablet, Take 2 tablets (2,000 mg total) by mouth every 12 (twelve) hours. for 1 day, Disp: 4 tablet, Rfl: 1    venlafaxine (EFFEXOR) 75 MG tablet, Take 1 tablet (75 mg total) by mouth once daily., Disp: 90 tablet, Rfl: 3    vitamin D 1000 units Tab, Take 0.5 Units by mouth once daily. With K2 50 mch, Disp: , Rfl:        Vitals:    10/30/23 1010   BP: 96/68   BP Location: Left arm   Patient Position: Sitting   BP Method: Small (Automatic)   Pulse: 91   Weight: 70 kg (154 lb 5.2 oz)   Height: 5' 1" (1.549 m)     Physical Exam:    GEN:   Well-appearing  Psych:  Appropriate affect, demonstrates insight  SKIN:  No rash on the face or bridge of the nose      LABS:   Lab Results   Component Value Date    HGB 13.8 05/03/2023    CO2 24 09/06/2023       RECORDS REVIEWED PREVIOUSLY:        ASSESSMENT         No data to display              PROBLEM DESCRIPTION/ Sx on Presentation  STATUS   Hx COOKIE   Hx COOKIE, mild in 2018  Has witnessed " apneas   CPAP intolerance (cannot recall why)    New   AM HA About 3-4 times per week       Daytime Sx   denies sleepiness when inactive   ESS 1/24 on intake  New   Other issues:     PLAN   -we discussed desensitizaztion  -recommend sleep testing   -discussed trial therapy if COOKIE present and the patient is  open to a trial of CPAP therapy    RTC          The patient was given open opportunity to ask questions and/or express concerns about treatment plan.   All questions/concerns were discussed.     Two patient identifiers used prior to evaluation.

## 2023-10-31 ENCOUNTER — CLINICAL SUPPORT (OUTPATIENT)
Dept: REHABILITATION | Facility: OTHER | Age: 78
End: 2023-10-31
Payer: MEDICARE

## 2023-10-31 DIAGNOSIS — M62.81 MUSCLE WEAKNESS: Primary | ICD-10-CM

## 2023-10-31 PROCEDURE — 97110 THERAPEUTIC EXERCISES: CPT | Mod: HCNC,PN

## 2023-10-31 PROCEDURE — 97112 NEUROMUSCULAR REEDUCATION: CPT | Mod: HCNC,PN

## 2023-11-01 ENCOUNTER — TELEPHONE (OUTPATIENT)
Dept: SLEEP MEDICINE | Facility: OTHER | Age: 78
End: 2023-11-01
Payer: MEDICARE

## 2023-11-02 ENCOUNTER — DOCUMENTATION ONLY (OUTPATIENT)
Dept: REHABILITATION | Facility: OTHER | Age: 78
End: 2023-11-02
Payer: MEDICARE

## 2023-11-02 NOTE — PROGRESS NOTES
Patient was scheduled for a physical therapy treatment appointment at [facility] on 11/2/2023. Patient failed to appear for the appointment without prior notification today.     Jannie Martin III, PTA

## 2023-11-03 ENCOUNTER — TELEPHONE (OUTPATIENT)
Dept: AUDIOLOGY | Facility: CLINIC | Age: 78
End: 2023-11-03
Payer: MEDICARE

## 2023-11-03 ENCOUNTER — NURSE TRIAGE (OUTPATIENT)
Dept: ADMINISTRATIVE | Facility: CLINIC | Age: 78
End: 2023-11-03
Payer: MEDICARE

## 2023-11-03 NOTE — TELEPHONE ENCOUNTER
Pt  calling with c/o wife has swollen lymph nodes bilateral in neck and she has a cough and sore throat. Pt said that she wasn't coughin too much but coughed about 4-6 times while on the phone. Pt triaged and care advice to be see in office and unable to schedule appt today and told to do DV and if he cant do that he will bring her to UC/ or ED. Pt to call back if any other questions or concerns. Go in if any SOB or unable to swallow                 Reason for Disposition   Tender node in the neck and also has a sore throat with minimal/no runny nose or cough    Additional Information   Negative: Sounds like a life-threatening emergency to the triager   Negative: Node is in the neck and causes difficulty breathing   Negative: Patient sounds very sick or weak to the triager   Negative: Node is in the neck and can't swallow fluids   Negative: Fever > 103 F (39.4 C)   Negative: Lump or swelling in groin and pulsating (like heartbeat)   Negative: Single large node and size > 1 inch (2.5 cm)   Negative: Overlying skin is red   Negative: Rapid increase in size of node over several hours   Negative: Tender node in the groin and has a sore, scratch, cut, or painful red area on that leg   Negative: Tender node in the armpit and has a sore, scratch, cut, or painful red area on that arm    Protocols used: Lymph Nodes - [id number]

## 2023-11-03 NOTE — TELEPHONE ENCOUNTER
Attempt to call pt to advise to [facility] to pt home. No answer on pt or  phone  Will send my chart message

## 2023-11-07 ENCOUNTER — DOCUMENTATION ONLY (OUTPATIENT)
Dept: REHABILITATION | Facility: OTHER | Age: 78
End: 2023-11-07
Payer: MEDICARE

## 2023-11-07 NOTE — PROGRESS NOTES
Patient was scheduled for a physical therapy treatment appointment at [facility] on 11/7/2023. Patient failed to appear for the appointment without prior notification today.     Jannie Martin III, PTA

## 2023-11-08 ENCOUNTER — TELEPHONE (OUTPATIENT)
Dept: SLEEP MEDICINE | Facility: OTHER | Age: 78
End: 2023-11-08
Payer: MEDICARE

## 2023-11-08 ENCOUNTER — PATIENT MESSAGE (OUTPATIENT)
Dept: RADIOLOGY | Facility: HOSPITAL | Age: 78
End: 2023-11-08
Payer: MEDICARE

## 2023-11-08 NOTE — PROGRESS NOTES
"[facility]   Physical Therapy Treatment Note     Name: Paris Burris  Clinic Number: [id number]    Therapy Diagnosis:   Encounter Diagnosis   Name Primary?    Muscle weakness Yes       Physician: Mandi Huynh MD    Visit Date: 11/9/2023    Physician Orders: PT Eval and Treat   Medical Diagnosis:   M25.512,G89.29 (ICD-10-CM) - Chronic left shoulder pain  Evaluation Date: 10/4/2023  Authorization Period Expiration: 12/31/2023  Plan of Care Certification Period: 10/4/2023 - 11/29/2023  Visit # / Visits authorized: 4 (3/ 20)       Progress Note Due: 11/4/2023   FOCUS ON THERAPEUTIC OUTCOMES (FOTO): 10/4/2023 (4/5; 1)    PTA Visit #: 1/5     Precautions: Standard and pacemaker. Wears hearing aids    Time In: 9:18 am  Time Out: 10:00 am  Total Billable Time: 39 minutes    SUBJECTIVE   Patient reports: "The worse time seems to be when I'm waking up", it's only some mornings though.    She was compliant with home exercise program as one was not provided.  Response to previous treatment: no adverse effects   Functional change: can fall asleep better    Pain: 1/10  Location: left shoulder      OBJECTIVE     Objective Measures updated at progress report unless specified.        Left shoulder flexion                                   150 degrees/170 degrees  Left shoulder abduction                              155 degrees/160 degrees   Left shoulder external rotation at side                    62 degrees   Left shoulder functional internal rotation   opposite scapula inferior angle  Left shoulder functional external rotation                  T1    TREATMENT     Paris received the treatments listed below:      received therapeutic exercises to develop strength and range of motion for 10 minutes including:  [x] Pectoral stretch on towel roll x 3 minutes   [x] Diagonals with Red Theraband on towel roll x 10 each   [x] Supine shoulder flexion with 3# dowel x 20   [] Upper trapezius stretch 2x 30 seconds " "each   [] Levator scapular stretch 2x 30 seconds each     received the following manual therapy techniques: Soft tissue Mobilization were applied to the: left shoulder for 00 minutes, including:  []      received the following dynamic functional therapeutic activities to improve functional performance for 00 minutes, including:  []      received the following neuromuscular re-education activities to improve: Coordination and Posture for 29 minutes. The following activities were included:  [x] Scapular retraction on towel roll 20 x 5 second holds  [x] No money on towel roll with Red Theraband 20x  [x] Horizontal abduction on towel roll with Red Theraband x 20   [x] Serratus punch on towel roll with 2# dowel x 20  [x] Rows with Red Theraband x 20  [x] Shoulder extension with Red Theraband x20   [] Isometric external rotation/internal rotation with double yellow band x 20 each   [x] Side lying external rotation with 1# x 20 repetitions   [x] Side lying abduction with 1# x 20 repetitions  [x] Side lying flexion with 1# x 20 repetitions   [x] Side lying horizontal abduction x 20 repetitions   [x] Wall serratus presses x 20 repetitions       PATIENT EDUCATION AND HOME EXERCISES     Home Exercises Provided and Patient Education Provided     Education provided:   - Continuance of HEP    Written Home Exercises Provided: Patient instructed to cont prior HOME EXERCISE PROGRAM. Exercises were reviewed and Paris was able to demonstrate them prior to the end of the session.  Paris demonstrated good  understanding of the education provided. See Electronic Medical Record under Patient Instructions for exercises provided during therapy sessions    ASSESSMENT   Continued with periscapular strengthening today with no reports of pain, but noted a "twinge" in left shoulder with sidelying abduction. Pt required cuing for form and scapular activation with several exercises. Will continue to progress as tolerated.    Paris Is progressing " well towards her goals.   Patient prognosis is Good.     Pt will continue to benefit from skilled outpatient physical therapy to address the deficits listed in the problem list box on initial evaluation, provide patient/family education and to maximize patient's level of independence in the home and community environment.     Patient's spiritual, cultural and educational needs considered and Patient agreeable to plan of care and goals.     Anticipated barriers to physical therapy: none    Goals:   Short Term Goals: 4 weeks   Independent with home exercise program.  ongoing  Report decreased left shoulder pain< or =  5/10 with adls such as dressing, carrying her grocery bags In progress  Increased manual muscle test for bilateral  upper extremity by 1/2 muscle grade to promote proper scapular stability to decrease left shoulder pain< or =  5/10 with adls such as dressing, carrying her grocery bags. In progress     Long Term Goals: 8 weeks   Increase left cervical rotation to 65 degrees. In progress  Report decreased left shoulder pain  < or =  3/10 with adls such as dressing, carrying her grocery bags In progress  Increased manual muscle test for bilateral upper extremity by 1 muscle grade to promote proper scapular stability to decrease  left shoulder pain  < or =  3/10 with adls such as dressing, carrying her grocery bags  In progress    PLAN     Continue with established PT plan of care and progress per patient tolerance.      Certification Period/Plan of care expiration: 10/4/2023 to 11/29/2023.     Outpatient Physical Therapy 2 times weekly for 8 weeks to include the following interventions: Manual Therapy, Moist Heat/ Ice, Neuromuscular Re-ed, Patient Education, Therapeutic Activities, and Therapeutic Exercise.     Jannie Martin III, PTA

## 2023-11-09 ENCOUNTER — CLINICAL SUPPORT (OUTPATIENT)
Dept: PRIMARY CARE CLINIC | Facility: CLINIC | Age: 78
End: 2023-11-09
Payer: MEDICARE

## 2023-11-09 ENCOUNTER — CLINICAL SUPPORT (OUTPATIENT)
Dept: REHABILITATION | Facility: OTHER | Age: 78
End: 2023-11-09
Payer: MEDICARE

## 2023-11-09 DIAGNOSIS — F32.A MODERATELY SEVERE DEPRESSION: Primary | ICD-10-CM

## 2023-11-09 DIAGNOSIS — M62.81 MUSCLE WEAKNESS: Primary | ICD-10-CM

## 2023-11-09 DIAGNOSIS — Z65.8 INTERPERSONAL PROBLEM: ICD-10-CM

## 2023-11-09 PROCEDURE — 90837 PR PSYCHOTHERAPY W/PATIENT, 60 MIN: ICD-10-PCS | Mod: HCNC,S$GLB,, | Performed by: SOCIAL WORKER

## 2023-11-09 PROCEDURE — 90837 PSYTX W PT 60 MINUTES: CPT | Mod: HCNC,S$GLB,, | Performed by: SOCIAL WORKER

## 2023-11-09 PROCEDURE — 97112 NEUROMUSCULAR REEDUCATION: CPT | Mod: HCNC,PN,CQ

## 2023-11-09 PROCEDURE — 97110 THERAPEUTIC EXERCISES: CPT | Mod: HCNC,PN,CQ

## 2023-11-09 NOTE — PROGRESS NOTES
Individual Psychotherapy (LCSW/PhD)  Paris Burris,  11/9/2023    Site:  [place]         Therapeutic Intervention: Met with patient for individual psychotherapy.    Chief complaint/reason for encounter: interpersonal     Interval history and content of current session: Pt has been supporting  who has not been feeling well. Pt expressed frustration at 's lack of motivation to care for their self. Pt states they worry about 's self-care at times but realizes there is much they can not control.     LCSW and pt discussed relationship with daughter. Pt states they have been speaking with daughter more frequently and states they feel that their relationship with daughter has improved. LCSW and pt discussed barriers to improved relationship with daughter and ways to continue improving connection with daughter.          Treatment plan:  Target symptoms:  interpersonal  Why chosen therapy is appropriate versus another modality: relevant to diagnosis, evidence based practice  Outcome monitoring methods: self-report, checklist/rating scale  Therapeutic intervention type: supportive psychotherapy    Risk parameters:  Patient reports no suicidal ideation  Patient reports no homicidal ideation  Patient reports no self-injurious behavior  Patient reports no violent behavior    Verbal deficits: None    Patient's response to intervention:  The patient's response to intervention is accepting.    Progress toward goals and other mental status changes:  The patient's progress toward goals is good,   .    Diagnosis:   No diagnosis found.    Plan: Pt plans to continue individual psychotherapy    Return to clinic: 2 weeks    Length of Service (minutes): 60

## 2023-11-13 ENCOUNTER — CLINICAL SUPPORT (OUTPATIENT)
Dept: AUDIOLOGY | Facility: CLINIC | Age: 78
End: 2023-11-13
Payer: MEDICARE

## 2023-11-13 ENCOUNTER — TELEPHONE (OUTPATIENT)
Dept: SLEEP MEDICINE | Facility: OTHER | Age: 78
End: 2023-11-13
Payer: MEDICARE

## 2023-11-13 DIAGNOSIS — H90.3 SENSORINEURAL HEARING LOSS, BILATERAL: Primary | ICD-10-CM

## 2023-11-13 DIAGNOSIS — H90.3 SENSORINEURAL HEARING LOSS, BILATERAL: ICD-10-CM

## 2023-11-13 PROCEDURE — 92557 PR COMPREHENSIVE HEARING TEST: ICD-10-PCS | Mod: 52,HCNC,S$GLB,

## 2023-11-13 PROCEDURE — 99999 PR PBB SHADOW E&M-EST. PATIENT-LVL I: CPT | Mod: PBBFAC,HCNC,,

## 2023-11-13 PROCEDURE — 99999 PR PBB SHADOW E&M-EST. PATIENT-LVL I: ICD-10-PCS | Mod: PBBFAC,HCNC,,

## 2023-11-13 PROCEDURE — 99499 NO LOS: ICD-10-PCS | Mod: HCNC,S$GLB,,

## 2023-11-13 PROCEDURE — 92557 COMPREHENSIVE HEARING TEST: CPT | Mod: 52,HCNC,S$GLB,

## 2023-11-13 PROCEDURE — 99499 UNLISTED E&M SERVICE: CPT | Mod: HCNC,S$GLB,,

## 2023-11-13 NOTE — PROGRESS NOTES
[facility]   Physical Therapy Treatment Note     Name: Paris Burris  Clinic Number: [id number]    Therapy Diagnosis:   No diagnosis found.      Physician: Mandi Huynh MD    Visit Date: 11/14/2023    Physician Orders: PT Eval and Treat   Medical Diagnosis:   M25.512,G89.29 (ICD-10-CM) - Chronic left shoulder pain  Evaluation Date: 10/4/2023  Authorization Period Expiration: 12/31/2023  Plan of Care Certification Period: 10/4/2023 - 11/29/2023  Visit # / Visits authorized: 5 (4/ 20)       Progress Note Due: 12/4/2023   FOCUS ON THERAPEUTIC OUTCOMES (FOTO): 10/4/2023 (5/5; 1)  PTA Visit #: 0/5     Precautions: Standard and pacemaker. Wears hearing aids    Time In: 1000  Time Out: 1045  Total Billable Time: minutes    SUBJECTIVE   Patient reports:     She was compliant with home exercise program as one was not provided.  Response to previous treatment: no adverse effects   Functional change: can fall asleep better    Pain: 1/10  Location: left shoulder      OBJECTIVE     Objective Measures updated at progress report unless specified.        Left shoulder flexion                                   150 degrees/170 degrees  Left shoulder abduction                              155 degrees/160 degrees   Left shoulder external rotation at side                    62 degrees   Left shoulder functional internal rotation   opposite scapula inferior angle  Left shoulder functional external rotation                  T1    TREATMENT     Paris received the treatments listed below:      received therapeutic exercises to develop strength and range of motion for 10 minutes including:  [x] Pectoral stretch on towel roll x 3 minutes   [x] Diagonals with Red Theraband on towel roll x 10 each   [x] Supine shoulder flexion with 3# dowel x 20   [] Upper trapezius stretch 2x 30 seconds each   [] Levator scapular stretch 2x 30 seconds each     received the following manual therapy techniques: Soft tissue Mobilization  "were applied to the: left shoulder for 00 minutes, including:  []      received the following dynamic functional therapeutic activities to improve functional performance for 00 minutes, including:  []      received the following neuromuscular re-education activities to improve: Coordination and Posture for 29 minutes. The following activities were included:  [x] Scapular retraction on towel roll 20 x 5 second holds  [x] No money on towel roll with Red Theraband 20x  [x] Horizontal abduction on towel roll with Red Theraband x 20   [x] Serratus punch on towel roll with 2# dowel x 20  [x] Rows with Red Theraband x 20  [x] Shoulder extension with Red Theraband x20   [] Isometric external rotation/internal rotation with double yellow band x 20 each   [x] Side lying external rotation with 1# x 20 repetitions   [x] Side lying abduction with 1# x 20 repetitions  [x] Side lying flexion with 1# x 20 repetitions   [x] Side lying horizontal abduction x 20 repetitions   [x] Wall serratus presses x 20 repetitions       PATIENT EDUCATION AND HOME EXERCISES     Home Exercises Provided and Patient Education Provided     Education provided:   - Continuance of HEP    Written Home Exercises Provided: Patient instructed to cont prior HOME EXERCISE PROGRAM. Exercises were reviewed and Paris was able to demonstrate them prior to the end of the session.  Paris demonstrated good  understanding of the education provided. See Electronic Medical Record under Patient Instructions for exercises provided during therapy sessions    ASSESSMENT   Continued with periscapular strengthening today with no reports of pain, but noted a "twinge" in left shoulder with sidelying abduction. Pt required cuing for form and scapular activation with several exercises. Will continue to progress as tolerated.    Paris Is progressing well towards her goals.   Patient prognosis is Good.     Pt will continue to benefit from skilled outpatient physical therapy to " address the deficits listed in the problem list box on initial evaluation, provide patient/family education and to maximize patient's level of independence in the home and community environment.     Patient's spiritual, cultural and educational needs considered and Patient agreeable to plan of care and goals.     Anticipated barriers to physical therapy: none    Goals:   Short Term Goals: 4 weeks   Independent with home exercise program.  ongoing  Report decreased left shoulder pain< or =  5/10 with adls such as dressing, carrying her grocery bags In progress  Increased manual muscle test for bilateral  upper extremity by 1/2 muscle grade to promote proper scapular stability to decrease left shoulder pain< or =  5/10 with adls such as dressing, carrying her grocery bags. In progress     Long Term Goals: 8 weeks   Increase left cervical rotation to 65 degrees. In progress  Report decreased left shoulder pain  < or =  3/10 with adls such as dressing, carrying her grocery bags In progress  Increased manual muscle test for bilateral upper extremity by 1 muscle grade to promote proper scapular stability to decrease  left shoulder pain  < or =  3/10 with adls such as dressing, carrying her grocery bags  In progress    PLAN     Continue with established PT plan of care and progress per patient tolerance.      Certification Period/Plan of care expiration: 10/4/2023 to 11/29/2023.     Outpatient Physical Therapy 2 times weekly for 8 weeks to include the following interventions: Manual Therapy, Moist Heat/ Ice, Neuromuscular Re-ed, Patient Education, Therapeutic Activities, and Therapeutic Exercise.     Suman Malave, PT

## 2023-11-13 NOTE — PROGRESS NOTES
Hearing Aid Follow Up    Mrs. Burris was seen today for a hearing aid follow up. She wears a right ReSound hearing aid. Aid was cleaned and checked and found to be in good working condition. No programming adjustments were made today.    Mrs. Burris will be seen next week to pair the aid to her cochlear implant processor as she did not bring it to today's appointment. She was given my contact information should she need anything before then.    Recommend:  Continued consistent use of hearing aid and cochlear implant   Follow up next week as scheduled

## 2023-11-13 NOTE — PROGRESS NOTES
Paris Burris was seen today in the clinic for an annual audiologic evaluation. Mrs. Burris reported that she does not feel her right ear has declined since her last evaluation. She denied otalgia, aural fullness, tinnitus, and dizziness today. She wears a hearing aid in her right ear and a cochlear implant in her left ear. She did not bring her cochlear implant processor today.    Audiogram results revealed moderately severe to profound sensorineural hearing loss in the right ear.      Speech reception thresholds were noted at 75 dB in the right ear with 20% speech discrimination score.      Recommendations:  Otologic evaluation  Aided audiogram at next scheduled visit  Hearing protection when in noise

## 2023-11-14 ENCOUNTER — CLINICAL SUPPORT (OUTPATIENT)
Dept: REHABILITATION | Facility: OTHER | Age: 78
End: 2023-11-14
Payer: MEDICARE

## 2023-11-14 DIAGNOSIS — M62.81 MUSCLE WEAKNESS: Primary | ICD-10-CM

## 2023-11-14 RX ORDER — METFORMIN HYDROCHLORIDE 500 MG/1
500 TABLET, EXTENDED RELEASE ORAL
Qty: 90 TABLET | Refills: 3 | Status: SHIPPED | OUTPATIENT
Start: 2023-11-14 | End: 2024-11-13

## 2023-11-14 NOTE — PROGRESS NOTES
"[facility]   Physical Therapy Treatment Note     Name: Paris Burris  Clinic Number: [id number]    Therapy Diagnosis:   Encounter Diagnosis   Name Primary?    Muscle weakness Yes       Physician: Mandi Huynh MD    Visit Date: 11/14/2023    Physician Orders: PT Eval and Treat   Medical Diagnosis:   M25.512,G89.29 (ICD-10-CM) - Chronic left shoulder pain  Evaluation Date: 10/4/2023  Authorization Period Expiration: 12/31/2023  Plan of Care Certification Period: 10/4/2023 - 11/29/2023  Visit # / Visits authorized: 5 (4/ 20)       Progress Note Due: 11/4/2023   FOCUS ON THERAPEUTIC OUTCOMES (FOTO): 10/4/2023 (5/5; 1)    PTA Visit #: 2/5     Precautions: Standard and pacemaker. Wears hearing aids    Time In: 9:24 am  Time Out: 10:00 am  Total Billable Time: 39 minutes    SUBJECTIVE   Patient reports: "The worse time seems to be when I'm waking up", it's only some mornings though.    She was compliant with home exercise program as one was not provided.  Response to previous treatment: no adverse effects   Functional change: can fall asleep better    Pain: 1/10  Location: left shoulder      OBJECTIVE     Objective Measures updated at progress report unless specified.        Left shoulder flexion                                   150 degrees/170 degrees  Left shoulder abduction                              155 degrees/160 degrees   Left shoulder external rotation at side                    62 degrees   Left shoulder functional internal rotation   opposite scapula inferior angle  Left shoulder functional external rotation                  T1    TREATMENT     Paris received the treatments listed below:      received therapeutic exercises to develop strength and range of motion for 10 minutes including:  [x] Pectoral stretch on towel roll x 3 minutes   [x] Diagonals with Red Theraband on towel roll x 10 each   [x] Supine shoulder flexion with 3# dowel x 20   [] Upper trapezius stretch 2x 30 seconds " "each   [] Levator scapular stretch 2x 30 seconds each     received the following manual therapy techniques: Soft tissue Mobilization were applied to the: left shoulder for 00 minutes, including:  []      received the following dynamic functional therapeutic activities to improve functional performance for 00 minutes, including:  []      received the following neuromuscular re-education activities to improve: Coordination and Posture for 29 minutes. The following activities were included:  [x] Scapular retraction on towel roll 20 x 5 second holds  [x] No money on towel roll with Red Theraband 20x  [x] Horizontal abduction on towel roll with Red Theraband x 20   [x] Serratus punch on towel roll with 2# dowel x 20  [x] Rows with Red Theraband x 20  [x] Shoulder extension with Red Theraband x20   [] Isometric external rotation/internal rotation with double yellow band x 20 each   [x] Side lying external rotation with 1# x 20 repetitions   [x] Side lying abduction with 1# x 20 repetitions  [x] Side lying flexion with 1# x 20 repetitions   [x] Side lying horizontal abduction x 20 repetitions   [x] Wall serratus presses x 20 repetitions       PATIENT EDUCATION AND HOME EXERCISES     Home Exercises Provided and Patient Education Provided     Education provided:   - Continuance of HEP    Written Home Exercises Provided: Patient instructed to cont prior HOME EXERCISE PROGRAM. Exercises were reviewed and Paris was able to demonstrate them prior to the end of the session.  Paris demonstrated good  understanding of the education provided. See Electronic Medical Record under Patient Instructions for exercises provided during therapy sessions    ASSESSMENT   Continued with periscapular strengthening today with no reports of pain, but noted a "twinge" in left shoulder with sidelying abduction. Pt required cuing for form and scapular activation with several exercises. Will continue to progress as tolerated.    Paris Is progressing " well towards her goals.   Patient prognosis is Good.     Pt will continue to benefit from skilled outpatient physical therapy to address the deficits listed in the problem list box on initial evaluation, provide patient/family education and to maximize patient's level of independence in the home and community environment.     Patient's spiritual, cultural and educational needs considered and Patient agreeable to plan of care and goals.     Anticipated barriers to physical therapy: none    Goals:   Short Term Goals: 4 weeks   Independent with home exercise program.  ongoing  Report decreased left shoulder pain< or =  5/10 with adls such as dressing, carrying her grocery bags In progress  Increased manual muscle test for bilateral  upper extremity by 1/2 muscle grade to promote proper scapular stability to decrease left shoulder pain< or =  5/10 with adls such as dressing, carrying her grocery bags. In progress     Long Term Goals: 8 weeks   Increase left cervical rotation to 65 degrees. In progress  Report decreased left shoulder pain  < or =  3/10 with adls such as dressing, carrying her grocery bags In progress  Increased manual muscle test for bilateral upper extremity by 1 muscle grade to promote proper scapular stability to decrease  left shoulder pain  < or =  3/10 with adls such as dressing, carrying her grocery bags  In progress    PLAN     Continue with established PT plan of care and progress per patient tolerance.      Certification Period/Plan of care expiration: 10/4/2023 to 11/29/2023.     Outpatient Physical Therapy 2 times weekly for 8 weeks to include the following interventions: Manual Therapy, Moist Heat/ Ice, Neuromuscular Re-ed, Patient Education, Therapeutic Activities, and Therapeutic Exercise.     Jannie Martin III, PTA    Time-based billing (NON-critical care)

## 2023-11-15 NOTE — PROGRESS NOTES
"[facility]   Physical Therapy Treatment Note     Name: Paris Burris  Clinic Number: [id number]    Therapy Diagnosis:   Encounter Diagnosis   Name Primary?    Muscle weakness Yes       Physician: Mandi Huynh MD    Visit Date: 11/16/2023    Physician Orders: PT Eval and Treat   Medical Diagnosis:   M25.512,G89.29 (ICD-10-CM) - Chronic left shoulder pain  Evaluation Date: 10/4/2023  Authorization Period Expiration: 12/31/2023  Plan of Care Certification Period: 10/4/2023 - 11/29/2023  Visit # / Visits authorized: 5 (4/ 20)       Progress Note Due: 11/4/2023   FOCUS ON THERAPEUTIC OUTCOMES (FOTO): 11/16/2023 (1/5; 2)    PTA Visit #: 2/5     Precautions: Standard and pacemaker. Wears hearing aids    Time In: 9:24 am  Time Out: 10:00 am  Total Billable Time: 41 minutes    SUBJECTIVE   Patient reports: The left shoulder is hurting a little more this morning    She was compliant with home exercise program as one was not provided.  Response to previous treatment: "I felt fine"  Functional change: None today    Pain: 3-4/10  Location: left shoulder      OBJECTIVE     Objective Measures updated at progress report unless specified.        Left shoulder flexion                                   150 degrees/170 degrees  Left shoulder abduction                              155 degrees/160 degrees   Left shoulder external rotation at side                    62 degrees   Left shoulder functional internal rotation   opposite scapula inferior angle  Left shoulder functional external rotation                  T1    TREATMENT     Paris received the treatments listed below:      received therapeutic exercises to develop strength and range of motion for 10 minutes including:  [x] Pectoral stretch on towel roll x 3 minutes   [x] Diagonals with Red Theraband on towel roll x 10 each   [x] Supine shoulder flexion with 3# dowel x 20   [] Upper trapezius stretch 2x 30 seconds each   [] Levator scapular stretch 2x 30 " seconds each     received the following manual therapy techniques: Soft tissue Mobilization were applied to the: left shoulder for 00 minutes, including:  []      received the following dynamic functional therapeutic activities to improve functional performance for 8 minutes, including:  [x] FOTO     received the following neuromuscular re-education activities to improve: Coordination and Posture for 23 minutes. The following activities were included:  [x] Scapular retraction on towel roll 20 x 5 second holds  [x] No money on towel roll with Red Theraband x 20  [x] Horizontal abduction on towel roll with Green Theratube x 20   [x] Serratus punch on towel roll with 2# dowel x 20  [x] Rows with Green Theratube x 20  [x] Shoulder extension with Red Theraband x 20   [x] External rotation/Internal rotation walkouts with double yellow band x 20 each   [] Side lying external rotation with 1# x 20 repetitions   [] Side lying abduction with 1# x 20 repetitions  [] Side lying flexion with 1# x 20 repetitions   [] Side lying horizontal abduction x 20 repetitions   [] Wall serratus presses x 20 repetitions   [x] +Ball rolls on wall for shoulder flexion x 20      PATIENT EDUCATION AND HOME EXERCISES     Home Exercises Provided and Patient Education Provided     Education provided:   - Continuance of HEP    Written Home Exercises Provided: Patient instructed to cont prior HOME EXERCISE PROGRAM. Exercises were reviewed and Paris was able to demonstrate them prior to the end of the session.  Paris demonstrated good  understanding of the education provided. See Electronic Medical Record under Patient Instructions for exercises provided during therapy sessions    ASSESSMENT   Pt tolerated treatment well with slight pain in left shoulder during supine overhead flexion. Significant cuing for exercise form with most activities due to hearing impairment. Will continue to progress shoulder and scapular strengthening per  tolerance.    Paris Is progressing well towards her goals.   Patient prognosis is Good.     Pt will continue to benefit from skilled outpatient physical therapy to address the deficits listed in the problem list box on initial evaluation, provide patient/family education and to maximize patient's level of independence in the home and community environment.     Patient's spiritual, cultural and educational needs considered and Patient agreeable to plan of care and goals.     Anticipated barriers to physical therapy: none    Goals:   Short Term Goals: 4 weeks   Independent with home exercise program.  ongoing  Report decreased left shoulder pain< or =  5/10 with adls such as dressing, carrying her grocery bags In progress  Increased manual muscle test for bilateral  upper extremity by 1/2 muscle grade to promote proper scapular stability to decrease left shoulder pain< or =  5/10 with adls such as dressing, carrying her grocery bags. In progress     Long Term Goals: 8 weeks   Increase left cervical rotation to 65 degrees. In progress  Report decreased left shoulder pain  < or =  3/10 with adls such as dressing, carrying her grocery bags In progress  Increased manual muscle test for bilateral upper extremity by 1 muscle grade to promote proper scapular stability to decrease  left shoulder pain  < or =  3/10 with adls such as dressing, carrying her grocery bags  In progress    PLAN     Continue with established PT plan of care and progress per patient tolerance.      Certification Period/Plan of care expiration: 10/4/2023 to 11/29/2023.     Outpatient Physical Therapy 2 times weekly for 8 weeks to include the following interventions: Manual Therapy, Moist Heat/ Ice, Neuromuscular Re-ed, Patient Education, Therapeutic Activities, and Therapeutic Exercise.     Jannie Martin III, PTA

## 2023-11-16 ENCOUNTER — CLINICAL SUPPORT (OUTPATIENT)
Dept: REHABILITATION | Facility: OTHER | Age: 78
End: 2023-11-16
Payer: MEDICARE

## 2023-11-16 DIAGNOSIS — M62.81 MUSCLE WEAKNESS: Primary | ICD-10-CM

## 2023-11-16 PROCEDURE — 97110 THERAPEUTIC EXERCISES: CPT | Mod: HCNC,PN,CQ

## 2023-11-16 PROCEDURE — 97530 THERAPEUTIC ACTIVITIES: CPT | Mod: HCNC,PN,CQ

## 2023-11-16 PROCEDURE — 97112 NEUROMUSCULAR REEDUCATION: CPT | Mod: HCNC,PN,CQ

## 2023-11-20 ENCOUNTER — CLINICAL SUPPORT (OUTPATIENT)
Dept: AUDIOLOGY | Facility: CLINIC | Age: 78
End: 2023-11-20
Payer: MEDICARE

## 2023-11-20 ENCOUNTER — HOSPITAL ENCOUNTER (OUTPATIENT)
Dept: SLEEP MEDICINE | Facility: OTHER | Age: 78
Discharge: HOME | End: 2023-11-20
Attending: INTERNAL MEDICINE
Payer: MEDICARE

## 2023-11-20 DIAGNOSIS — H90.3 SENSORINEURAL HEARING LOSS, BILATERAL: Primary | ICD-10-CM

## 2023-11-20 DIAGNOSIS — Z78.9 INTOLERANCE OF CONTINUOUS POSITIVE AIRWAY PRESSURE (CPAP) VENTILATION: ICD-10-CM

## 2023-11-20 DIAGNOSIS — G47.33 OSA (OBSTRUCTIVE SLEEP APNEA): ICD-10-CM

## 2023-11-20 DIAGNOSIS — H93.293 IMPAIRMENT OF AUDITORY DISCRIMINATION OF BOTH EARS: ICD-10-CM

## 2023-11-20 PROCEDURE — 95811 POLYSOM 6/>YRS CPAP 4/> PARM: CPT | Mod: HCNC

## 2023-11-20 PROCEDURE — 92604 PR DX ANAL COCHLEAR IMP,PT >7 YRS,REPROG: ICD-10-PCS | Mod: HCNC,S$GLB,,

## 2023-11-20 PROCEDURE — 92604 REPROGRAM COCHLEAR IMPLT 7/>: CPT | Mod: HCNC,S$GLB,,

## 2023-11-20 NOTE — PROGRESS NOTES
Cochlear Implant Programming Session:     Left Ear: Dr. Julian  :  [employer]  Internal Device/Serial Number:  [id number]  Processor:  [id number]   SN of Processors: [id number]  DOS:  34/26/21  DIS:  6/1/21  Processor Color: Sand  Magnet Strength: 3i   Coil Length:  6cm  Battery Type:  Standard rechargeable  Warranty:  5 year     Right ear: RESOUND SEGOVIA     Paris Burris was seen for a follow up programming session with her cochlear implant sound processor.  Mrs. Burris was once again not wearing her sound processor.  She stated the processor falls off when planing and removing her glasses.     The magnet strength was appropriate and did not require any increased strength. A Snugfit was attached and Mrs. Burris reported comfortable fit - hopefully this will aid in retention. Impedance testing was completed. The processor was set with a new map based on T and C levels and reduced for comfort.  Mrs. Burris was willing to wear the processor with reduced stimulation levels. Progressive maps were created. Mrs. Burris was instructed to increase stimulation as tolerated and move slowly through the maps. Mrs. Burris and her  confirmed understanding.     The processor was linked to the hearing aid in the software.  She was counseled that she will need time to adjust to the sound of the cochlear implant.  Mrs. Burris's devices were connected to her cell phone today and erin use was reviewed.     She will return in three months.      Recommend:  1.  Consistent daily use of the sound processor.  2.  Follow up programming as scheduled.  3.  Cochlear implant supplies as needed.  4.  Annual evaluation.

## 2023-11-21 NOTE — PROGRESS NOTES
Split night sudy was done on Paris Burris 79 y/o female. Procedure explained to pt. All questions addressed prior to study. Set up performed by Diamante Kat, RRT, RT-SDS.   End of night survey given to pt. Pt stated CPAP was okay.

## 2023-11-23 NOTE — PROCEDURES
"[facility]    Split-Night Study Interpretation Report    Patient Name:  Paris Burris  MRN#:  [id number]  :  1945  Study Date:  2023  Referring Provider:  Stone Emmanuel MD    Indications for Polysomnography:  The patient is a 78 year old Female who is 5' 1" and weighs 154.0 lbs.  Her BMI equals 29.5.  [place] was - and Neck Circumference was -.  A diagnostic polysomnogram was performed to evaluate for -.  After 153.0 minutes of sleep time the patient exhibited sufficient respiratory events qualifying her for a PAP trial which was then initiated.     Polysomnogram Data:  A full night polysomnogram was performed recording the standard physiologic parameters including EEG, EOG, EMG, EKG, nasal and oral airflow.  Respiratory parameters of chest and abdominal movements are recorded with Peizo-Crystal motion transducers.  Oxygen saturation was recorded by pulse oximetry.    Sleep Architecture:  The total recording time of the diagnostic portion of the study was 196.4 minutes.  The total sleep time was 153.0 minutes.  The patient spent 31.4% of total sleep time in Stage N1, 68.6% in Stage N2, 0.0% in Stages N3, and 0.0% in REM.  Sleep latency was 10.9 minutes.  REM latency was - minutes.  Sleep Efficiency was 77.9%.  Total wake time was 43.5 minutes for a total wake percentage of 11.8%.  Wake after Sleep Onset was 32.5 minutes.     At 12:16:06 AM the patient was placed on PAP treatment and was titrated at pressures ranging from 5* cm/H20 up to 12* cm/H20.  The total recording time of the treatment portion of the study was 327.3 minutes.  The total sleep time was 287.0 minutes.  During the treatment portion of the study, the patient spent 15.2% of total sleep time in Stage N1, 79.8% in Stage N2, 0.3% in Stages N3, and 4.7% in REM.  Sleep latency was 4.0 minutes.  REM latency was 267.5 minutes.  Sleep Efficiency was 87.7%.  Total wake time was 31.0 minutes for a total wake percentage of 9.5%. "  Wake after Sleep Onset was 27.0 minutes.     Respiratory Summary:  During the diagnostic portion of the study, the polysomnogram revealed a presence of 19 obstructive, - central, and - mixed apneas resulting in an Apnea index of 7.5 events per hour.  There were 66 hypopneas resulting in a Hypopnea index of 25.9 events per hour.  The combined Apnea/Hypopnea index was 33.3 events per hour.  There were a total of 17 RERA events resulting in a Respiratory Disturbance Index (RDI) of 40.0 events per hour.  Lowest oxygen saturation was 75.0% and time spent ?88% oxygen saturation was 13.7 minutes (7.4%).    During diagnostic portion [place] CO2 during sleep ranged from - to - mmHg, was greater than 50 mmHg for - minutes and greater than 55 mmHg for - minutes.  Transcutaneous CO2 during sleep ranged from - to - mmHg, was greater than 50 mmHg for - minutes and greater than 55 mmHg for - minutes.    During the treatment portion of the study, the polysomnogram revealed a presence of 4 obstructive, 4 central, and - mixed apneas resulting in an Apnea index of 1.7 events per hour.  There were 14 hypopneas resulting in a Hypopnea index of 2.9 events per hour.  The combined Apnea/Hypopnea index was 4.6 events per hour.  There were a total of 2 RERA events resulting in a Respiratory Disturbance Index (RDI) of 5.0 events per hour.  Lowest oxygen saturation was 81.0% and time spent ?88% oxygen saturation was 1.6 minutes (0.5%).      Limb Movement Activity:  During the diagnostic portion of the study, there were 14 limb movements recorded.  Of this total, 14 were classified as PLMs.  Of the PLMs, 2 were associated with arousals.  The Limb Movement index was 5.5 per hour while the PLM index was 5.5 per hour and PLM with arousals index was 0.8 per hour.    Cardiac: single lead EKG revealed normal sinus rhythm     PAP titration:    Mask used in the study: Medium N20 airfit nasal mask with chin strap  PAP = 10 cwp was partially effective  "in supine N2 sleep.  PAP = 12 cwp was largely effective in supine N2 and partially effective in supine REM sleep.    Oxygenation:  During the diagnostic portion of the study, hypoxemia was only observed in relation to obstructive events.    At therapeutic levels of PAP therapy, there was no baseline hypoxemia.      Impression:  -obstructive sleep apnea     Recommendations:    -initial auto-CPAP settings of CPAP min = 10 cwp  and CPAP max =  12 cwp are recommended  -if the patient has difficulty initiating sleep or has significant sleep disruption at the above pressures, CPAP min should be adjusted to 12cwp or higher depending on pressure tolerance  -the patient has follow up with Sleep Medicine          Stone Emmanuel MD    (This Sleep Study was interpreted by a Board Certified Sleep Specialist who conducted an epoch-by-epoch review of the entire raw data recording.)  (The indication for this sleep study was reviewed and deemed appropriate by AASM Practice Parameters or other reasons by a Board Certified Sleep Specialist.)  [facility]    Split-Night Report    Patient Name: Paris Burris Study Date: 11/20/2023   YOB: 1945 MRN #: [id number]   Age: 78 year CECILIA #: [id number]   Sex: Female Referring Provider: Stone Emmanuel MD   Height: 5' 1" Recording Tech: Romel Saxena RRT RPSGT   Weight: 154.0 lbs Scoring Tech: William Pemberton RRT RPSGT   BMI: 29.5 Interpreting Physician: -   ESS: - Neck Circumference: -   Study Overview    DIAGNOSTIC TREATMENT   Lights Off: 08:59:19 PM Lights Off: 12:15:41 AM   Lights On: 12:15:41 AM Lights On: 05:42:57 AM   Time in Bed: 196.4 min. Time in Bed: 327.3 min.   Total Sleep Time: 153.0 min. Total Sleep Time: 287.0 min.   Sleep Efficiency: 77.9% Sleep Efficiency: 87.7%   Sleep Latency: 10.9 min. Sleep Latency: 4.0 min.   REM Latency from Sleep Onset: - min. REM Latency from Sleep Onset: 267.5 min.   Wake After Sleep Onset: 32.5 min. Wake After Sleep Onset: " 27.0 min.     DIAGNOSTIC TREATMENT    Count Index  Count Index   Awakenings: 23 9.0 Awakenings: 21 4.4   Arousals: 170 66.7 Arousals: 121 25.3   Apneas & Hypopneas: 85 33.3 Apneas & Hypopneas: 22 4.6    Limb Movements: 14 5.5 Limb Movements: 45 9.4   Snores: - - Snores: - -   Desaturations: 74 29.0 Desaturations: 26 5.4   Minimum SpO2 TST: 75.0% Minimum SpO2 TST: 81.0%        Sleep Architecture     DIAGNOSTIC TREATMENT ENTIRE NIGHT   Stages Time (min) % TST Time (min) % TST Time (min) % TST   WAKE 43.5  31.0  74.5    Stage N1 48.0 31.4% 43.5 15.2% 91.5 20.8%   Stage N2 105.0 68.6% 229.0 79.8% 334.0 75.9%   Stage N3 0.0 0.0% 1.0 0.3% 1.0 0.2%   REM 0.0 0.0% 13.5 4.7% 13.5 3.1%       Arousal Summary     DIAGNOSTIC TREATMENT    NREM REM TST Index NREM REM TST Index   Respiratory Arousals 48 - 48 18.8 5 - 5 1.0   PLM Arousals 2 - 2 0.8 14 - 14 2.9   Isolated LM Arousals - - - - - - - -   Spontaneous Arousals 120 - 120 47.1 100 2 102 21.3   Total 170 - 170 66.7 119 2 121 25.3   Respiratory Summary    DIAGNOSTIC By Sleep Stage By Body Position Total    NREM REM Supine Non-Supine    Time (min) 153.0 0.0 153.0 - 153.0           Obstructive Apnea 19 - 19 - 19   Mixed Apnea - - - - -   Central Apnea - - - - -   Central Apnea Index - - - - -   Total Apneas 19 - 19 - 19   Total Apnea Index 7.5 - 7.5 - 7.5           Total Hypopnea 66 - 66 - 66   Total Hypopnea Index 25.9 - 25.9 - 25.9           Apnea & Hypopnea 85 - 85 - 85   Apnea & Hypopnea Index 33.3 - 33.3 - 33.3           RERAs 17 - 17 - 17   RERA Index 6.7 - 6.7 - 6.7           RDI 40.0 - 40.0 - 40.0     TREATMENT By Sleep Stage By Body Position Total    NREM REM Supine Non-Supine    Time (min) 273.5 13.5 287.0 - 287.0           Obstructive Apnea 4 - 4 - 4   Mixed Apnea - - - - -   Central Apnea 4 - 4 - 4   Central Apnea Index 0.9 - 0.9 - 0.9   Total Apneas 8 - 8 - 8   Total Apnea Index 1.8 - 1.7 - 1.7           Total Hypopnea 14 - 14 - 14   Total Hypopnea Index 3.1 - 2.9  - 2.9           Apnea & Hypopnea 22 - 22 - 22   Apnea & Hypopnea Index 4.8 - 4.6 - 4.6           RERAs 2 - 2 - 2   RERA Index 0.4 - 0.4 - 0.4           RDI 5.3 - 5.0 - 5.0     Scoring Criteria: Hypopneas scored at 4% desaturation criteria.    Respiratory Event Durations     DIAGNOSTIC TREATMENT   Apnea NREM REM NREM REM   Average (seconds) 18.1 - 15.0 -   Maximum (seconds) 31.3 - 21.6 -   Hypopnea       Average (seconds) 20.9 - 19.5 -   Maximum (seconds) 39.9 - 22.5 -   Oxygen Saturation Summary     DIAGNOSTIC TREATMENT    Wake NREM REM TST Wake NREM REM TST   Average SpO2 93.0% 92.8% - 92.8% 95.6% 94.3% 95.7% 94.3%   Minimum SpO2 74.0% 75.0% - 75.0%  83.0% 81.0% 94.0% 81.0%    Maximum SpO2 99.0% 98.0% - 98.0%  99.0% 100.0% 97.0% 100.0%     DIAGNOSTIC   Oxygen Saturation Distribution    Range (%) Time in range (min) Time in range (%)   90.0 - 100.0 133.6 87.7%   80.0 - 90.0 17.4 11.4%   70.0 - 80.0 1.3 0.9%   60.0 - 70.0 - -   50.0 - 60.0 - -   0.0 - 50.0 - -   Time Spent ?88% SpO2    Range (%) Time in range (min) Time in range (%)   0.0 - 88.0 11.4 7.5%          Count Index   Desaturations 74 29.0      TREATMENT   Oxygen Saturation Distribution    Range (%) Time in range (min) Time in range (%)   90.0 - 100.0 311.1 98.8%   80.0 - 90.0 3.6 1.2%   70.0 - 80.0 - -   60.0 - 70.0 - -   50.0 - 60.0 - -   0.0 - 50.0 - -   Time Spent ?88% SpO2    Range (%) Time in range (min) Time in range (%)   0.0 - 88.0 1.6 0.5%      Count Index   Desaturations 26 5.4      Limb Movement Summary     DIAGNOSTIC TREATMENT    Count Index Count Index   Isolated Limb Movements - - - -   Periodic Limb Movements (PLMs) 14 5.5 45 9.4   Total Limb Movements 14 5.5 45 9.4     Cardiac Summary     DIAGNOSTIC TREATMENT    Wake NREM REM Total Wake NREM REM Total   Average HI (BPM) 77.9 77.1 - 77.3 73.5 73.2 76.1 73.3   Minimum HI (BPM) 58.0 64.0 - 58.0 58.0 60.0 72.0 58.0   Maximum HI (BPM) 250.0 83.0 - 250.0 129.0 81.0 81.0 129.0         Diagnostic  EtCO2    Stage Min (mmHg) Average (mmHg) Max (mmHg)   Wake - - -   NREM(1+2+3) - - -   REM - - -       Range (mmHg) Time in range (min) Time in range (%)   20.0 - 40.0 - -   40.0 - 50.0 - -   50.0 - 55.0 - -   55.0 - 100.0 - -   Excluded data <20.0 & >100.0 196.5 100.0%       TcCO2 Summary    DIAGNOSTIC    Stage Min (mmHg) Average (mmHg) Max (mmHg)   Wake - - -   NREM(1+2+3) - - -   REM - - -       Range (mmHg) Time in range (min) Time in range (%)   20.0 - 40.0 - -   40.0 - 50.0 - -   50.0 - 55.0 - -   55.0 - 100.0 - -   Excluded data <20.0 & >100.0 196.5 100.0%       TREATMENT    Stage Min (mmHg) Average (mmHg) Max (mmHg)   Wake - - -   NREM(1+2+3) - - -   REM - - -       Range (mmHg) Time in range (min) Time in range (%)   20.0 - 40.0 - -   40.0 - 50.0 - -   50.0 - 55.0 - -   55.0 - 100.0 - -   Excluded data <20.0 & >100.0 327.5 100.0%       Comments    -      Titration Summary    PAP Device PAP Level O2 Level Time (min) TST (min) NREM (min) REM (min) Wake (min) Sleep Eff% OA# CA# MA# Hyp# AHI RERA RDI Min SpO2 SpO2 ?88% (min) Ar. Index   - Off - 196.5 153.0 153.0 0.0 43.5 77.9% 19 - - 66 33.3 17 40.0 75.0  11.4 66.7   CPAP 5 - 34.0 24.5 24.5 0.0 9.5 72.1% 1 - - 5 14.7 - 14.7 88.0  0.0 36.7   CPAP 7 - 19.0 18.0 18.0 0.0 1.0 94.7% 2 - - 5 23.3 - 23.3 81.0  0.9 53.3   CPAP 9 - 26.0 24.0 24.0 0.0 2.0 92.3% - - - - - - - 84.0  0.1 52.5   CPAP 10 - 92.5 86.0 86.0 0.0 6.5 93.0% 1 1 - 2 2.8 1 3.5 87.0  0.2 25.8   CPAP 11 - 72.0 68.0 68.0 0.0 4.0 94.4% - 2 - 2 3.5 1 4.4 83.0  0.3 16.8   CPAP 12 - 84.0 66.5 53.0 13.5 8.0 79.2% - 1 - - 0.9 - 0.9 89.0  0.0 11.7

## 2023-11-27 ENCOUNTER — TELEPHONE (OUTPATIENT)
Dept: INTERNAL MEDICINE | Facility: CLINIC | Age: 78
End: 2023-11-27
Payer: MEDICARE

## 2023-11-28 DIAGNOSIS — G47.33 OSA (OBSTRUCTIVE SLEEP APNEA): Primary | ICD-10-CM

## 2023-12-05 ENCOUNTER — CLINICAL SUPPORT (OUTPATIENT)
Dept: REHABILITATION | Facility: OTHER | Age: 78
End: 2023-12-05
Payer: MEDICARE

## 2023-12-05 DIAGNOSIS — M62.81 MUSCLE WEAKNESS: Primary | ICD-10-CM

## 2023-12-05 PROCEDURE — 97164 PT RE-EVAL EST PLAN CARE: CPT | Mod: HCNC,PN

## 2023-12-05 PROCEDURE — 97112 NEUROMUSCULAR REEDUCATION: CPT | Mod: HCNC,PN,CQ

## 2023-12-05 NOTE — PROGRESS NOTES
"[facility]   Physical Therapy Updated Plan of Care Note     Name: Paris Burris  Clinic Number: [id number]    Therapy Diagnosis:   Encounter Diagnosis   Name Primary?    Muscle weakness Yes       Physician: Mandi Huynh MD    Visit Date: 12/5/2023    Physician Orders: PT Eval and Treat   Medical Diagnosis:   M25.512,G89.29 (ICD-10-CM) - Chronic left shoulder pain  Evaluation Date: 10/4/2023  Authorization Period Expiration: 12/31/2023  Updated Plan of Care Certification Period: 12/5/2023 to 3/5/2024  Visit # / Visits authorized: 6 (5/ 20)       Progress Note Due: 11/4/2023   FOCUS ON THERAPEUTIC OUTCOMES (FOTO): 11/16/2023 (1/5; 2)    PTA Visit #: 2/5     Precautions: Standard and pacemaker. Wears hearing aids    Time In: 9:25 am  Time Out: 10:03 am  Total Billable Time: 38 minutes  (23 minutes with PTA)    SUBJECTIVE   Patient reports: The left shoulder is feeling much better. She reports difficulty with pushing her disposal button which she has to reach out [into horizontal abduction] awkwardly for.    She was compliant with home exercise program as one was not provided.  Response to previous treatment: "I felt fine"  Functional change: None today    Pain: 3/10  Location: left shoulder      OBJECTIVE     12/5/23:    Objective Measures updated at progress report unless specified.        Left shoulder flexion                                   150 degrees/170 degrees  Left shoulder abduction                              155 degrees/160 degrees   Left shoulder external rotation at side                    65 degrees   Left shoulder functional internal rotation                65 degrees  Left shoulder functional external rotation                  T1    Upper Extremity Strength:    Left UE     Shoulder ABD: C5 4/5   Shoulder ER: C5 4/5   Shoulder IR: C6 5/5   Wrist Ext: C6 4/5   Elbow Ext: C7 4+/5   Wrist Flex: C7 4/5   Thumb Ext: C8 NT   Opposition: C8-T1 NT   Finger ABD:  T1 NT        UE " Special Tests     Monk-Preston Positive left    Neer Impingement negative   Yergason's negative    Empty Can negative         TREATMENT     Paris received the treatments listed below:      *Exercises provided by PTA today    received therapeutic exercises to develop strength and range of motion for 00 minutes including:  [] Pectoral stretch on towel roll x 3 minutes   [] Diagonals with Red Theraband on towel roll x 10 each   [] Supine shoulder flexion with 3# dowel x 20   [] Upper trapezius stretch 2x 30 seconds each   [] Levator scapular stretch 2x 30 seconds each     received the following manual therapy techniques: Soft tissue Mobilization were applied to the: left shoulder for 00 minutes, including:  []      received the following dynamic functional therapeutic activities to improve functional performance for 00 minutes, including:  [] FOTO     received the following neuromuscular re-education activities to improve: Coordination and Posture for 23 minutes. The following activities were included:  [] Scapular retraction 20 x 5 second holds  [x] No money with Red Theraband x 20  [x] Horizontal abduction with Green Theratube x 20   [] Serratus punch on towel roll with 2# dowel x 20  [x] Rows with Green Theratube x 20  [x] Shoulder extension with Red Theratube x 20   [x] External rotation/Internal rotation walkouts with double Yellow Theratube x 20 each   [] Side lying external rotation with 1# x 20 repetitions   [] Side lying abduction with 1# x 20 repetitions  [] Side lying flexion with 1# x 20 repetitions   [] Side lying horizontal abduction x 20 repetitions   [] Wall serratus presses x 20 repetitions   [] Ball rolls on wall for shoulder flexion x 20      PATIENT EDUCATION AND HOME EXERCISES     Home Exercises Provided and Patient Education Provided     Education provided:   - Continuance of HEP    Written Home Exercises Provided: Patient instructed to cont prior HOME EXERCISE PROGRAM. Exercises were reviewed  and Paris was able to demonstrate them prior to the end of the session.  Paris demonstrated good  understanding of the education provided. See Electronic Medical Record under Patient Instructions for exercises provided during therapy sessions    ASSESSMENT     Patient has made significant improvement since starting physical therapy treatment. Her range of motion is minimally improved, and she continues to have left upper extremity weakness with MMT. She reports subjective improvement with her pain per VAS. We will continue to progress as tolerated.    Transitioned periscapular strengthening exercises from supine on towel roll to sitting today. Pt tolerated transition well, but continues to require moderate cuing for exercise form.     Paris Is progressing well towards her goals.   Patient prognosis is Good.     Pt will continue to benefit from skilled outpatient physical therapy to address the deficits listed in the problem list box on initial evaluation, provide patient/family education and to maximize patient's level of independence in the home and community environment.     Patient's spiritual, cultural and educational needs considered and Patient agreeable to plan of care and goals.     Anticipated barriers to physical therapy: none    Goals:   Short Term Goals: 4 weeks   Independent with home exercise program.  ongoing  Report decreased left shoulder pain< or =  5/10 with adls such as dressing, carrying her grocery bags In progress  Increased manual muscle test for bilateral  upper extremity by 1/2 muscle grade to promote proper scapular stability to decrease left shoulder pain< or =  5/10 with adls such as dressing, carrying her grocery bags. In progress     Long Term Goals: 8 weeks   Increase left cervical rotation to 65 degrees. In progress  Report decreased left shoulder pain  < or =  3/10 with adls such as dressing, carrying her grocery bags In progress  Increased manual muscle test for bilateral upper  extremity by 1 muscle grade to promote proper scapular stability to decrease  left shoulder pain  < or =  3/10 with adls such as dressing, carrying her grocery bags  In progress    PLAN     Continue with established PT plan of care and progress per patient tolerance.      Certification Period/Plan of care expiration: 12/5/2023 to 3/5/24.     Outpatient Physical Therapy 2 times weekly for 8 weeks to include the following interventions: Manual Therapy, Moist Heat/ Ice, Neuromuscular Re-ed, Patient Education, Therapeutic Activities, and Therapeutic Exercise.     Marcin Oconnor, PT     Co treat with Jannie Martin III, PTA

## 2023-12-06 NOTE — PROGRESS NOTES
[facility]   Physical Therapy Treatment Note     Name: Paris Burris  Clinic Number: [id number]    Therapy Diagnosis:   Encounter Diagnosis   Name Primary?    Muscle weakness Yes       Physician: Mandi Huynh MD    Visit Date: 12/7/2023    Physician Orders: PT Eval and Treat   Medical Diagnosis:   M25.512,G89.29 (ICD-10-CM) - Chronic left shoulder pain  Evaluation Date: 10/4/2023  Authorization Period Expiration: 12/31/2023  Updated Plan of Care Certification Period: 12/5/2023 to 3/5/2024  Visit # / Visits authorized: 7 (6/ 20)       Progress Note Due: 1/5/2024   FOCUS ON THERAPEUTIC OUTCOMES (FOTO): 1/5; 3 (Last issued on 12/5/2023)    PTA Visit #: 1/5     Precautions: Standard and pacemaker. Wears hearing aids    Time In: 9:14 am  Time Out: 9:58 am  Total Billable Time: 42 minutes     SUBJECTIVE   Patient reports: Hasn't had to take Meloxicam for the past 3 or 4 days    She was compliant with home exercise program.  Response to previous treatment: Minor pain in the shoulder  Functional change: None today    Pain: 1/10  Location: left shoulder      OBJECTIVE     12/5/23:    Objective Measures updated at progress report unless specified.        Left shoulder flexion                                   150 degrees/170 degrees  Left shoulder abduction                              155 degrees/160 degrees   Left shoulder external rotation at side                    65 degrees   Left shoulder functional internal rotation                65 degrees  Left shoulder functional external rotation                  T1    Upper Extremity Strength:    Left UE     Shoulder ABD: C5 4/5   Shoulder ER: C5 4/5   Shoulder IR: C6 5/5   Wrist Ext: C6 4/5   Elbow Ext: C7 4+/5   Wrist Flex: C7 4/5   Thumb Ext: C8 NT   Opposition: C8-T1 NT   Finger ABD:  T1 NT        UE Special Tests     Monk-Preston Positive left    Neer Impingement negative   Yergason's negative    Empty Can negative     TREATMENT     Paris  "received the treatments listed below:        received therapeutic exercises to develop strength and range of motion for 8 minutes including:  [] Pectoral stretch on towel roll x 3 minutes   [] Diagonals with Red Theraband on towel roll x 10 each   [] Supine shoulder flexion with 3# dowel x 20   [x] Upper trapezius stretch 3 x 30 seconds each   [] Levator scapular stretch 2x 30 seconds each   [x] +Flexion wall slides 15 x 5"  [x] +Abduction wall slides 15 x 5"        received the following manual therapy techniques: Soft tissue Mobilization were applied to the: left shoulder for 00 minutes, including:  []      received the following dynamic functional therapeutic activities to improve functional performance for 00 minutes, including:  [] FOTO     received the following neuromuscular re-education activities to improve: Coordination and Posture for 34 minutes. The following activities were included:  [] Scapular retraction 20 x 5 second holds  [x] No money with Red Theraband x 20  [x] Horizontal abduction with Green Theratube x 20   [] Serratus punch on towel roll with 2# dowel x 20  [x] Rows with Green Theratube x 20  [x] Shoulder extension with Green Theratube x 20   [] External rotation/Internal rotation walkouts with double Yellow Theratube x 20 each   [x] Shoulder external rotation with Yellow Theratube x 20  [x] Shoulder internal rotation with Yellow Theratube x 20  [] Side lying external rotation with 1# x 20 repetitions   [] Side lying abduction with 1# x 20 repetitions  [] Side lying flexion with 1# x 20 repetitions   [] Side lying horizontal abduction x 20 repetitions   [] Wall serratus presses x 20 repetitions   [] Ball rolls on wall for shoulder flexion x 20  [x] +Standing shoulder flexion with Red Theraband x 20  [x] +Standing shoulder abduction with Red Theraband x 20      PATIENT EDUCATION AND HOME EXERCISES     Home Exercises Provided and Patient Education Provided     Education provided:   - " Continuance of HEP    Written Home Exercises Provided: Patient instructed to cont prior HOME EXERCISE PROGRAM. Exercises were reviewed and Paris was able to demonstrate them prior to the end of the session.  Paris demonstrated good  understanding of the education provided. See Electronic Medical Record under Patient Instructions for exercises provided during therapy sessions    ASSESSMENT   Pt tolerated today's treatment with reports of shoulder discomfort during exercise. Progressed external and internal rotation isometrics to concentric rotations today with pt expressing increased fatigue. Significant cuing today for left upper trap compensation, pt unable to fully correct. Will continue to progress and work to correct compensations within pt tolerance.    Paris Is progressing well towards her goals.   Patient prognosis is Good.     Pt will continue to benefit from skilled outpatient physical therapy to address the deficits listed in the problem list box on initial evaluation, provide patient/family education and to maximize patient's level of independence in the home and community environment.     Patient's spiritual, cultural and educational needs considered and Patient agreeable to plan of care and goals.     Anticipated barriers to physical therapy: none    Goals:   Short Term Goals: 4 weeks   Independent with home exercise program.  ongoing  Report decreased left shoulder pain< or =  5/10 with adls such as dressing, carrying her grocery bags In progress  Increased manual muscle test for bilateral  upper extremity by 1/2 muscle grade to promote proper scapular stability to decrease left shoulder pain< or =  5/10 with adls such as dressing, carrying her grocery bags. In progress     Long Term Goals: 8 weeks   Increase left cervical rotation to 65 degrees. In progress  Report decreased left shoulder pain  < or =  3/10 with adls such as dressing, carrying her grocery bags In progress  Increased manual muscle test  for bilateral upper extremity by 1 muscle grade to promote proper scapular stability to decrease  left shoulder pain  < or =  3/10 with adls such as dressing, carrying her grocery bags  In progress    PLAN   Certification Period/Plan of care expiration: 12/5/2023 to 3/5/24.    Continue with established PT plan of care and progress per patient tolerance.       Outpatient Physical Therapy 2 times weekly for 8 weeks to include the following interventions: Manual Therapy, Moist Heat/ Ice, Neuromuscular Re-ed, Patient Education, Therapeutic Activities, and Therapeutic Exercise.     Jannie Martin III, PTA

## 2023-12-07 ENCOUNTER — CLINICAL SUPPORT (OUTPATIENT)
Dept: REHABILITATION | Facility: OTHER | Age: 78
End: 2023-12-07
Payer: MEDICARE

## 2023-12-07 DIAGNOSIS — M62.81 MUSCLE WEAKNESS: Primary | ICD-10-CM

## 2023-12-07 PROCEDURE — 97110 THERAPEUTIC EXERCISES: CPT | Mod: HCNC,PN,CQ

## 2023-12-07 PROCEDURE — 97112 NEUROMUSCULAR REEDUCATION: CPT | Mod: HCNC,PN,CQ

## 2023-12-11 NOTE — PROGRESS NOTES
[facility]   Physical Therapy Treatment Note     Name: Paris Burris  Clinic Number: [id number]    Therapy Diagnosis:   Encounter Diagnosis   Name Primary?    Muscle weakness Yes       Physician: Mandi Huynh MD    Visit Date: 12/12/2023    Physician Orders: PT Eval and Treat   Medical Diagnosis:   M25.512,G89.29 (ICD-10-CM) - Chronic left shoulder pain  Evaluation Date: 10/4/2023  Authorization Period Expiration: 12/31/2023  Updated Plan of Care Certification Period: 12/5/2023 to 3/5/2024  Visit # / Visits authorized: 8 (7/ 20)       Progress Note Due: 1/5/2024   FOCUS ON THERAPEUTIC OUTCOMES (FOTO): 2/5; 3 (Last issued on 12/5/2023)    PTA Visit #: 0/5     Precautions: Standard and pacemaker. Wears hearing aids    Time In: 0922  Time Out: 1000  Total Billable Time: 38 minutes     SUBJECTIVE   Patient reports:her left shoulder is really feeling better. She is not thinking about the left shoulder as much as when she first started outpatient physical therapy.    She was compliant with home exercise program.  Response to previous treatment: Minor pain in the shoulder  Functional change: moving left arm without thinking about it. Sleeping better    Pain: 0-1/10  Location: left shoulder      OBJECTIVE     12/5/23:    Objective Measures updated at progress report unless specified.        Left shoulder flexion                                   150 degrees/170 degrees  Left shoulder abduction                              155 degrees/160 degrees   Left shoulder external rotation at side                    65 degrees   Left shoulder functional internal rotation                65 degrees  Left shoulder functional external rotation                  T1    Upper Extremity Strength:    Left UE     Shoulder ABD: C5 4/5   Shoulder ER: C5 4/5   Shoulder IR: C6 5/5   Wrist Ext: C6 4/5   Elbow Ext: C7 4+/5   Wrist Flex: C7 4/5   Thumb Ext: C8 NT   Opposition: C8-T1 NT   Finger ABD:  T1 NT        UE Special  "Tests     Monk-Preston Positive left    Neer Impingement negative   Yergason's negative    Empty Can negative     TREATMENT     Paris received the treatments listed below:        received therapeutic exercises to develop strength and range of motion for 8 minutes including:  [] Pectoral stretch on towel roll x 3 minutes   [x] Diagonals with Red Theraband 2 x 10 repetitions    [x] Supine shoulder flexion with 3# dowel x 20   [] Upper trapezius stretch 3 x 30 seconds each   [] Levator scapular stretch 2x 30 seconds each   [x] Flexion wall slides 15 x 5"  [x]Abduction wall slides 15 x 5"        received the following manual therapy techniques: Soft tissue Mobilization were applied to the: left shoulder for 00 minutes, including:  []      received the following dynamic functional therapeutic activities to improve functional performance for 00 minutes, including:  [] FOTO     received the following neuromuscular re-education activities to improve: Coordination and Posture for 30 minutes. The following activities were included:  [x] Scapular protraction 20 x 5 second with 3#  [x] No money with Red Theraband x 20  [x] Horizontal abduction with Green Theratube x 20   [x] Rows with Green Theratube x 20  [x] Shoulder extension with Green Theratube x 20   [x] Shoulder external rotation with green Theratube x 20  [x] Shoulder internal rotation with green Theratube x 20  [] Side lying external rotation with 1# x 20 repetitions   [] Side lying abduction with 1# x 20 repetitions  [] Side lying flexion with 1# x 20 repetitions   [] Side lying horizontal abduction x 20 repetitions   [x] Wall serratus presses x 20 repetitions   [] Ball rolls on wall for shoulder flexion x 20  [x] Standing shoulder flexion with Red Theraband x 20  [x] Standing shoulder abduction with Red Theraband x 20      PATIENT EDUCATION AND HOME EXERCISES     Home Exercises Provided and Patient Education Provided     Education provided:   - Continuance of " HEP    Written Home Exercises Provided: Patient instructed to cont prior HOME EXERCISE PROGRAM. Exercises were reviewed and Paris was able to demonstrate them prior to the end of the session.  Paris demonstrated good  understanding of the education provided. See Electronic Medical Record under Patient Instructions for exercises provided during therapy sessions    ASSESSMENT   Patient progressing well in outpatient physical therapy to goals. Increasing shoulder active range of motion and decreasing pain with functional movements.    Paris Is progressing well towards her goals.   Patient prognosis is Good.     Pt will continue to benefit from skilled outpatient physical therapy to address the deficits listed in the problem list box on initial evaluation, provide patient/family education and to maximize patient's level of independence in the home and community environment.     Patient's spiritual, cultural and educational needs considered and Patient agreeable to plan of care and goals.     Anticipated barriers to physical therapy: none    Goals:   Short Term Goals: 4 weeks   Independent with home exercise program.  ongoing  Report decreased left shoulder pain< or =  5/10 with adls such as dressing, carrying her grocery bags In progress  Increased manual muscle test for bilateral  upper extremity by 1/2 muscle grade to promote proper scapular stability to decrease left shoulder pain< or =  5/10 with adls such as dressing, carrying her grocery bags. In progress     Long Term Goals: 8 weeks   Increase left cervical rotation to 65 degrees. In progress  Report decreased left shoulder pain  < or =  3/10 with adls such as dressing, carrying her grocery bags In progress  Increased manual muscle test for bilateral upper extremity by 1 muscle grade to promote proper scapular stability to decrease  left shoulder pain  < or =  3/10 with adls such as dressing, carrying her grocery bags  In progress    PLAN   Certification  Period/Plan of care expiration: 12/5/2023 to 3/5/24.    Continue with established PT plan of care and progress per patient tolerance.       Outpatient Physical Therapy 2 times weekly for 8 weeks to include the following interventions: Manual Therapy, Moist Heat/ Ice, Neuromuscular Re-ed, Patient Education, Therapeutic Activities, and Therapeutic Exercise.     Suman Malave, PT

## 2023-12-12 ENCOUNTER — CLINICAL SUPPORT (OUTPATIENT)
Dept: CARDIOLOGY | Facility: HOSPITAL | Age: 78
End: 2023-12-12
Attending: INTERNAL MEDICINE
Payer: MEDICARE

## 2023-12-12 ENCOUNTER — CLINICAL SUPPORT (OUTPATIENT)
Dept: REHABILITATION | Facility: OTHER | Age: 78
End: 2023-12-12
Payer: MEDICARE

## 2023-12-12 DIAGNOSIS — I42.9 CARDIOMYOPATHY, UNSPECIFIED: ICD-10-CM

## 2023-12-12 DIAGNOSIS — M62.81 MUSCLE WEAKNESS: Primary | ICD-10-CM

## 2023-12-12 DIAGNOSIS — Z95.0 PRESENCE OF CARDIAC PACEMAKER: ICD-10-CM

## 2023-12-12 PROCEDURE — 97110 THERAPEUTIC EXERCISES: CPT | Mod: HCNC,PN

## 2023-12-12 PROCEDURE — 97112 NEUROMUSCULAR REEDUCATION: CPT | Mod: HCNC,PN

## 2023-12-12 PROCEDURE — 93294 REM INTERROG EVL PM/LDLS PM: CPT | Mod: HCNC,,, | Performed by: INTERNAL MEDICINE

## 2023-12-12 PROCEDURE — 93296 REM INTERROG EVL PM/IDS: CPT | Mod: HCNC | Performed by: INTERNAL MEDICINE

## 2023-12-12 PROCEDURE — 93294 CARDIAC DEVICE CHECK - REMOTE: ICD-10-PCS | Mod: HCNC,,, | Performed by: INTERNAL MEDICINE

## 2023-12-15 LAB
OHS CV AF BURDEN PERCENT: < 1
OHS CV BIV PACING PERCENT: 99 %
OHS CV DC REMOTE DEVICE TYPE: NORMAL

## 2023-12-18 NOTE — PROGRESS NOTES
[facility]   Physical Therapy Treatment Note     Name: Paris Burris  Clinic Number: [id number]    Therapy Diagnosis:   Encounter Diagnosis   Name Primary?    Muscle weakness Yes       Physician: Mandi Huynh MD    Visit Date: 12/19/2023    Physician Orders: PT Eval and Treat   Medical Diagnosis:   M25.512,G89.29 (ICD-10-CM) - Chronic left shoulder pain  Evaluation Date: 10/4/2023  Authorization Period Expiration: 12/31/2023  Updated Plan of Care Certification Period: 12/5/2023 to 3/5/2024  Visit # / Visits authorized: 10 (9/ 20)       Progress Note Due: 1/5/2024   FOCUS ON THERAPEUTIC OUTCOMES (FOTO): 4/5; 3 (Last issued on 12/5/2023)    PTA Visit #: 1/5     Precautions: Standard and pacemaker. Wears hearing aids    Time In: 9:30 am  (Late arrival)  Time Out: 10:00 am  Total Billable Time: 30 minutes     SUBJECTIVE   Patient reports: She hasn't had to take pain meds for the pain she felt in the shoulder originally    She was compliant with home exercise program.  Response to previous treatment: Unable to recall  Functional change: Significant reduction in pain medication    Pain: 0-1/10  Location: left shoulder      OBJECTIVE     12/5/23:    Objective Measures updated at progress report unless specified.        Left shoulder flexion                                   150 degrees/170 degrees  Left shoulder abduction                              155 degrees/160 degrees   Left shoulder external rotation at side                    65 degrees   Left shoulder functional internal rotation                65 degrees  Left shoulder functional external rotation                  T1    Upper Extremity Strength:    Left UE     Shoulder ABD: C5 4/5   Shoulder ER: C5 4/5   Shoulder IR: C6 5/5   Wrist Ext: C6 4/5   Elbow Ext: C7 4+/5   Wrist Flex: C7 4/5   Thumb Ext: C8 NT   Opposition: C8-T1 NT   Finger ABD:  T1 NT        UE Special Tests     Monk-Preston Positive left    Neer Impingement negative  "  Soha's negative    Empty Can negative     TREATMENT     Paris received the treatments listed below:        received therapeutic exercises to develop strength and range of motion for 8 minutes including:  [] Pectoral stretch on towel roll x 3 minutes   [x] Diagonals with Red Theraband 2 x 10 repetitions    [x] Supine shoulder flexion with 3# dowel x 20   [] Upper trapezius stretch 3 x 30 seconds each   [] Levator scapular stretch 2x 30 seconds each   [x] Flexion wall slides 15 x 5"  [x] Abduction wall slides 15 x 5"        received the following manual therapy techniques: Soft tissue Mobilization were applied to the: left shoulder for 00 minutes, including:  []      received the following dynamic functional therapeutic activities to improve functional performance for 00 minutes, including:  [] FOTO     received the following neuromuscular re-education activities to improve: Coordination and Posture for 22 minutes. The following activities were included:  [x] Scapular protraction 20 x 5 second with 3#  [x] No money with Red Theraband x 20  [x] Horizontal abduction with Green Theratube x 20   [] Rows with Green Theratube x 20  [] Shoulder extension with Green Theratube x 20   [] Shoulder external rotation with green Theratube x 20  [] Shoulder internal rotation with green Theratube x 20  [] Side lying external rotation with 1# x 20 repetitions   [] Side lying abduction with 1# x 20 repetitions  [] Side lying flexion with 1# x 20 repetitions   [] Side lying horizontal abduction x 20 repetitions   [x] Wall serratus presses x 20 repetitions   [] Ball rolls on wall for shoulder flexion x 20  [x] Standing shoulder flexion with Red Theraband x 20  [x] Standing shoulder abduction with Red Theraband x 20      PATIENT EDUCATION AND HOME EXERCISES     Home Exercises Provided and Patient Education Provided     Education provided:   - Continuance of HEP    Written Home Exercises Provided: Patient instructed to cont prior " HOME EXERCISE PROGRAM. Exercises were reviewed and Paris was able to demonstrate them prior to the end of the session.  Paris demonstrated good  understanding of the education provided. See Electronic Medical Record under Patient Instructions for exercises provided during therapy sessions    ASSESSMENT   Abbreviated session today due to late arrival. Continued with shoulder exercises with no reports of pain. Will continue to progress to address deficits.    Paris Is progressing well towards her goals.   Patient prognosis is Good.     Pt will continue to benefit from skilled outpatient physical therapy to address the deficits listed in the problem list box on initial evaluation, provide patient/family education and to maximize patient's level of independence in the home and community environment.     Patient's spiritual, cultural and educational needs considered and Patient agreeable to plan of care and goals.     Anticipated barriers to physical therapy: none    Goals:   Short Term Goals: 4 weeks   Independent with home exercise program.  ongoing  Report decreased left shoulder pain< or =  5/10 with adls such as dressing, carrying her grocery bags In progress  Increased manual muscle test for bilateral  upper extremity by 1/2 muscle grade to promote proper scapular stability to decrease left shoulder pain< or =  5/10 with adls such as dressing, carrying her grocery bags. In progress     Long Term Goals: 8 weeks   Increase left cervical rotation to 65 degrees. In progress  Report decreased left shoulder pain  < or =  3/10 with adls such as dressing, carrying her grocery bags In progress  Increased manual muscle test for bilateral upper extremity by 1 muscle grade to promote proper scapular stability to decrease  left shoulder pain  < or =  3/10 with adls such as dressing, carrying her grocery bags  In progress    PLAN   Certification Period/Plan of care expiration: 12/5/2023 to 3/5/24.    Continue with established PT  plan of care and progress per patient tolerance.       Outpatient Physical Therapy 2 times weekly for 8 weeks to include the following interventions: Manual Therapy, Moist Heat/ Ice, Neuromuscular Re-ed, Patient Education, Therapeutic Activities, and Therapeutic Exercise.     Jannie Martin III, PTA

## 2023-12-19 ENCOUNTER — CLINICAL SUPPORT (OUTPATIENT)
Dept: REHABILITATION | Facility: OTHER | Age: 78
End: 2023-12-19
Payer: MEDICARE

## 2023-12-19 DIAGNOSIS — M62.81 MUSCLE WEAKNESS: Primary | ICD-10-CM

## 2023-12-19 PROCEDURE — 97110 THERAPEUTIC EXERCISES: CPT | Mod: HCNC,PN,CQ

## 2023-12-19 PROCEDURE — 97112 NEUROMUSCULAR REEDUCATION: CPT | Mod: HCNC,PN,CQ

## 2023-12-26 ENCOUNTER — CLINICAL SUPPORT (OUTPATIENT)
Dept: REHABILITATION | Facility: OTHER | Age: 78
End: 2023-12-26
Payer: MEDICARE

## 2023-12-26 DIAGNOSIS — M62.81 MUSCLE WEAKNESS: Primary | ICD-10-CM

## 2023-12-26 PROCEDURE — 97112 NEUROMUSCULAR REEDUCATION: CPT | Mod: HCNC,PN

## 2023-12-26 PROCEDURE — 97110 THERAPEUTIC EXERCISES: CPT | Mod: HCNC,PN

## 2023-12-26 NOTE — PROGRESS NOTES
[facility]   Physical Therapy Treatment Note     Name: Paris Burris  Clinic Number: [id number]    Therapy Diagnosis:   Encounter Diagnosis   Name Primary?    Muscle weakness Yes       Physician: Mandi Huynh MD    Visit Date: 12/26/2023    Physician Orders: PT Eval and Treat   Medical Diagnosis:   M25.512,G89.29 (ICD-10-CM) - Chronic left shoulder pain  Evaluation Date: 10/4/2023  Authorization Period Expiration: 12/31/2023  Updated Plan of Care Certification Period: 12/5/2023 to 3/5/2024  Visit # / Visits authorized: 11 (10/ 20)       Progress Note Due: 1/5/2024   FOCUS ON THERAPEUTIC OUTCOMES (FOTO): 455; 3 (Last issued on 12/5/2023)    PTA Visit #: 0/5     Precautions: Standard and pacemaker. Wears hearing aids    Time In: 0920  Time Out: 1000  Total Billable Time: 40 minutes     SUBJECTIVE   Patient reports: she is feeling good. States she has almost no pain. Is taking les Meloxicam    She was compliant with home exercise program.  Response to previous treatment: no adverse effects  Functional change: moving her arm is better    Pain: 0-1/10  Location: left shoulder      OBJECTIVE     12/5/23:    Objective Measures updated at progress report unless specified.        Left shoulder flexion                                   150 degrees/170 degrees  Left shoulder abduction                              155 degrees/160 degrees   Left shoulder external rotation at side                    65 degrees   Left shoulder functional internal rotation                65 degrees  Left shoulder functional external rotation                  T1    Upper Extremity Strength:    Left UE     Shoulder ABD: C5 4/5   Shoulder ER: C5 4/5   Shoulder IR: C6 5/5   Wrist Ext: C6 4/5   Elbow Ext: C7 4+/5   Wrist Flex: C7 4/5   Thumb Ext: C8 NT   Opposition: C8-T1 NT   Finger ABD:  T1 NT        UE Special Tests     Monk-Preston Positive left    Neer Impingement negative   Yergason's negative    Empty Can negative  "    TREATMENT     Paris received the treatments listed below:        received therapeutic exercises to develop strength and range of motion for 8 minutes including:  [] Pectoral stretch on towel roll x 3 minutes   [x] Diagonals with Red Theraband 2 x 10 repetitions    [x] Supine shoulder flexion with 3# dowel x 20   [] Upper trapezius stretch 3 x 30 seconds each   [] Levator scapular stretch 2x 30 seconds each   [x] Flexion wall slides 15 x 5"  [x] Abduction wall slides 15 x 5"        received the following manual therapy techniques: Soft tissue Mobilization were applied to the: left shoulder for 00 minutes, including:  []      received the following dynamic functional therapeutic activities to improve functional performance for 00 minutes, including:  [] FOTO     received the following neuromuscular re-education activities to improve: Coordination and Posture for 32 minutes. The following activities were included:  [x] Scapular protraction 20 x 5 second with 3#  [x] No money with Red Theraband x 20  [x] Horizontal abduction with Green Theratube x 20   [] Rows with Green Theratube x 20  [] Shoulder extension with Green Theratube x 20   [] Shoulder external rotation with green Theratube x 20  [] Shoulder internal rotation with green Theratube x 20  [x] Side lying external rotation with 1# x 20 repetitions   [x] Side lying abduction with 1# x 20 repetitions  [x] Side lying flexion with 1# x 20 repetitions   [x] Side lying horizontal abduction x 20 repetitions   [x] Wall serratus presses x 20 repetitions   [] Ball rolls on wall for shoulder flexion x 20  [x] Standing shoulder flexion with Red Theraband x 20  [x] Standing shoulder abduction with Red Theraband x 20      PATIENT EDUCATION AND HOME EXERCISES     Home Exercises Provided and Patient Education Provided     Education provided:   - Continuance of home exercise program     Written Home Exercises Provided: Patient instructed to cont prior HOME EXERCISE PROGRAM. " Exercises were reviewed and Paris was able to demonstrate them prior to the end of the session.  Paris demonstrated good  understanding of the education provided. See Electronic Medical Record under Patient Instructions for exercises provided during therapy sessions    ASSESSMENT   Patient continuing to progress to goals with decreased left shoulder pain, increased left shoulder function, and using less medication..    Paris Is progressing well towards her goals.   Patient prognosis is Good.     Pt will continue to benefit from skilled outpatient physical therapy to address the deficits listed in the problem list box on initial evaluation, provide patient/family education and to maximize patient's level of independence in the home and community environment.     Patient's spiritual, cultural and educational needs considered and Patient agreeable to plan of care and goals.     Anticipated barriers to physical therapy: none    Goals:   Short Term Goals: 4 weeks   Independent with home exercise program.  ongoing  Report decreased left shoulder pain< or =  5/10 with adls such as dressing, carrying her grocery bags In progress  Increased manual muscle test for bilateral  upper extremity by 1/2 muscle grade to promote proper scapular stability to decrease left shoulder pain< or =  5/10 with adls such as dressing, carrying her grocery bags. In progress     Long Term Goals: 8 weeks   Increase left cervical rotation to 65 degrees. In progress  Report decreased left shoulder pain  < or =  3/10 with adls such as dressing, carrying her grocery bags In progress  Increased manual muscle test for bilateral upper extremity by 1 muscle grade to promote proper scapular stability to decrease  left shoulder pain  < or =  3/10 with adls such as dressing, carrying her grocery bags  In progress    PLAN   Certification Period/Plan of care expiration: 12/5/2023 to 3/5/24.    Continue with established PT plan of care and progress per patient  tolerance.       Outpatient Physical Therapy 2 times weekly for 8 weeks to include the following interventions: Manual Therapy, Moist Heat/ Ice, Neuromuscular Re-ed, Patient Education, Therapeutic Activities, and Therapeutic Exercise.     Suman Malave, PT

## 2023-12-27 ENCOUNTER — CLINICAL SUPPORT (OUTPATIENT)
Dept: INTERNAL MEDICINE | Facility: CLINIC | Age: 78
End: 2023-12-27
Payer: MEDICARE

## 2023-12-27 VITALS — HEIGHT: 61 IN | WEIGHT: 155.44 LBS | BODY MASS INDEX: 29.35 KG/M2

## 2023-12-27 DIAGNOSIS — E11.40 TYPE 2 DIABETES MELLITUS WITH DIABETIC NEUROPATHY, WITHOUT LONG-TERM CURRENT USE OF INSULIN: Primary | ICD-10-CM

## 2023-12-27 NOTE — PROGRESS NOTES
Health  Follow-Up Note   [x] Office  [] Phone  Notes from previous session reviewed.   [x] Previous Session Goals unchanged.   [] Patient/Caregiver Change in Goals.  Goals added or changed by Patient/Caregiver since program participation:  Start walking more       Additional/Changed support that patient/caregiver has experienced/sought?  (Indicate readiness, support from family, friends, others, community groups, etc)       Additional/Changed obstacles that could prevent patient/caregiver from reaching their goals?   Holidays, depressed    Feedback provided:   Praised for good job down 2.87 lbs total loss 31 lbs    Diagnostic values/Desriptors for follow-up as needed for chronic condition(s)   Weight: 70.5 kg 155.42 lbs  Blood Glucose: A1c 6.8    Interventions:   Health  listened reflectively, validated thoughts and feelings, offered support and encouragement.   Allowed patient to express themselves in a non-biased atmosphere.  Health  assisted pt to problem-solve obstacles such as being in a challenging environment and dealing with these challenges.   Motivational Interviewed interventions utilized (OARS).   Patient responded favorably to interventions and remained actively engaged in the session.   Health  will remain available and connected for patient by phone and/or office visits.   Positive reinforcement, emotional support and encouragement provided.   Focused Education: MI    Plan:  [x] Pt will work on goals as stated above.   [x] Pt will contact Health  for any questions, concerns or needs.  [x] Pt will follow up with Health  in office on  1.17.24.  [] Pt will follow up with Health  on phone in:        [x] Health  will remain available.   [] Health  will contact patient by phone in:        [] Health  will consult:        [] Health  will inform Provider via EPIC messaging.     Impression:  1. Behavior is consistent with Action Stage of Change.   2.  Participation level:       [x] Receptive      [x] Interactive      [] Guarded and Resistant      [x] Self Motivated      [] Refused/Declined to participate   3. [x] Pt voiced understanding of all information presented.       [] Pt voiced needing further information/education. This will be arranged.       [x] Pt would benefit from further education/information as identified by this health . This will be arranged.     Jacqueline Perry RN HC

## 2023-12-28 ENCOUNTER — CLINICAL SUPPORT (OUTPATIENT)
Dept: REHABILITATION | Facility: OTHER | Age: 78
End: 2023-12-28
Payer: MEDICARE

## 2023-12-28 DIAGNOSIS — M62.81 MUSCLE WEAKNESS: Primary | ICD-10-CM

## 2023-12-28 PROCEDURE — 97110 THERAPEUTIC EXERCISES: CPT | Mod: HCNC,PN

## 2023-12-28 PROCEDURE — 97112 NEUROMUSCULAR REEDUCATION: CPT | Mod: HCNC,PN

## 2023-12-28 NOTE — PROGRESS NOTES
"[facility] OUTPATIENT THERAPY AND WELLNESS   Physical Therapy Treatment Note     Name: Paris Burris  Clinic Number: [id number]    Therapy Diagnosis:   Encounter Diagnosis   Name Primary?    Muscle weakness Yes     Physician: Mandi Huynh MD    Visit Date: 12/28/2023    Physician Orders: PT Eval and Treat   Medical Diagnosis:   M25.512,G89.29 (ICD-10-CM) - Chronic left shoulder pain  Evaluation Date: 10/4/2023  Authorization Period Expiration: 12/31/2023  Updated Plan of Care Certification Period: 12/5/2023 to 3/5/2024  Visit # / Visits authorized: 12 (11/ 20)       Progress Note Due: 1/5/2024   FOCUS ON THERAPEUTIC OUTCOMES (FOTO): 5/5; 3 (Last issued on 12/5/2023)    PTA Visit #: 0/5     Precautions: Standard and pacemaker. Wears hearing aids    Time In: 0918  Time Out: 0958  Total Billable Time: 40 minutes     SUBJECTIVE   Patient reports: she forgot to put in her hearing aid. States her left shoulder "hardly has any pain.    She was compliant with home exercise program.  Response to previous treatment: no adverse effects  Functional change: moving her arm is better    Pain: 0-1/10  Location: left shoulder      OBJECTIVE     12/5/23:    Objective Measures updated at progress report unless specified.        Left shoulder flexion                                   150 degrees/170 degrees  Left shoulder abduction                              155 degrees/160 degrees   Left shoulder external rotation at side                    65 degrees   Left shoulder functional internal rotation                65 degrees  Left shoulder functional external rotation                  T1    Upper Extremity Strength:    Left UE     Shoulder ABD: C5 4/5   Shoulder ER: C5 4/5   Shoulder IR: C6 5/5   Wrist Ext: C6 4/5   Elbow Ext: C7 4+/5   Wrist Flex: C7 4/5   Thumb Ext: C8 NT   Opposition: C8-T1 NT   Finger ABD:  T1 NT        UE Special Tests     Monk-Preston Positive left    Neer Impingement negative   Yergason's negative    Empty Can " "negative     TREATMENT     Paris received the treatments listed below:        received therapeutic exercises to develop strength and range of motion for 8 minutes including:  [] Pectoral stretch on towel roll x 3 minutes   [x] Diagonals with Red Theraband 2 x 10 repetitions    [x] Supine shoulder flexion with 3# dowel x 20   [] Upper trapezius stretch 3 x 30 seconds each   [] Levator scapular stretch 2x 30 seconds each   [x] Flexion wall slides 15 x 5"  [x] Abduction wall slides 15 x 5"      received the following manual therapy techniques: Soft tissue Mobilization were applied to the: left shoulder for 00 minutes, including:  []      received the following dynamic functional therapeutic activities to improve functional performance for 00 minutes, including:  [] FOTO     received the following neuromuscular re-education activities to improve: Coordination and Posture for 30 minutes. The following activities were included:  [x] Scapular protraction 20 x 5 second with 3#  [x] No money with Red Theraband x 20  [x] Horizontal abduction with Green Theratube x 20   [x] Rows with Green Theratube x 20  [x] Shoulder extension with Green Theratube x 20   [x] Shoulder external rotation with green Theratube x 20  [x] Shoulder internal rotation with green Theratube x 20  [x] Side lying external rotation with 1# x 20 repetitions   [x] Side lying abduction with 1# x 20 repetitions  [x] Side lying flexion with 1# x 20 repetitions   [x] Side lying horizontal abduction x 20 repetitions   [x] Wall serratus presses x 20 repetitions   [] Ball rolls on wall for shoulder flexion x 20  [x] Standing shoulder flexion with Red Theraband x 20  [x] Standing shoulder abduction with Red Theraband x 20      PATIENT EDUCATION AND HOME EXERCISES     Home Exercises Provided and Patient Education Provided     Education provided:   - Continuance of home exercise program     Written Home Exercises Provided: Patient instructed to cont prior HOME EXERCISE " PROGRAM. Exercises were reviewed and Paris was able to demonstrate them prior to the end of the session.  Paris demonstrated good  understanding of the education provided. See Electronic Medical Record under Patient Instructions for exercises provided during therapy sessions    ASSESSMENT   Patient progressing to discharge with improvement in her left shoulder.     Paris Is progressing well towards her goals.   Patient prognosis is Good.     Pt will continue to benefit from skilled outpatient physical therapy to address the deficits listed in the problem list box on initial evaluation, provide patient/family education and to maximize patient's level of independence in the home and community environment.     Patient's spiritual, cultural and educational needs considered and Patient agreeable to plan of care and goals.     Anticipated barriers to physical therapy: none    Goals:   Short Term Goals: 4 weeks   Independent with home exercise program.  ongoing  Report decreased left shoulder pain< or =  5/10 with adls such as dressing, carrying her grocery bags In progress  Increased manual muscle test for bilateral  upper extremity by 1/2 muscle grade to promote proper scapular stability to decrease left shoulder pain< or =  5/10 with adls such as dressing, carrying her grocery bags. In progress     Long Term Goals: 8 weeks   Increase left cervical rotation to 65 degrees. In progress  Report decreased left shoulder pain  < or =  3/10 with adls such as dressing, carrying her grocery bags In progress  Increased manual muscle test for bilateral upper extremity by 1 muscle grade to promote proper scapular stability to decrease  left shoulder pain  < or =  3/10 with adls such as dressing, carrying her grocery bags  In progress    PLAN   Certification Period/Plan of care expiration: 12/5/2023 to 3/5/24.    Continue with established PT plan of care and progress per patient tolerance.       Outpatient Physical Therapy 2 times  weekly for 8 weeks to include the following interventions: Manual Therapy, Moist Heat/ Ice, Neuromuscular Re-ed, Patient Education, Therapeutic Activities, and Therapeutic Exercise.     Suman Malave, PT

## 2024-01-09 ENCOUNTER — TELEPHONE (OUTPATIENT)
Dept: PRIMARY CARE CLINIC | Facility: CLINIC | Age: 79
End: 2024-01-09
Payer: COMMERCIAL

## 2024-01-16 ENCOUNTER — TELEPHONE (OUTPATIENT)
Dept: PRIMARY CARE CLINIC | Facility: CLINIC | Age: 79
End: 2024-01-16
Payer: MEDICARE

## 2024-01-16 NOTE — TELEPHONE ENCOUNTER
----- Message from [place] sent at 1/16/2024 12:54 PM CST -----  Contact: [phone]  Patient is returning a phone call.  Who left a message for the patient: karlos  Does patient know what this is regarding:  pt not sure  Would you like a call back, or a response through your [facility] portal?:   yes  Comments:

## 2024-01-17 ENCOUNTER — OFFICE VISIT (OUTPATIENT)
Dept: PRIMARY CARE CLINIC | Facility: CLINIC | Age: 79
End: 2024-01-17
Payer: MEDICARE

## 2024-01-17 DIAGNOSIS — H90.3 SENSORINEURAL HEARING LOSS (SNHL) OF BOTH EARS: Primary | ICD-10-CM

## 2024-01-17 PROCEDURE — 99499 UNLISTED E&M SERVICE: CPT | Mod: 95,,, | Performed by: HOSPITALIST

## 2024-01-17 NOTE — TELEPHONE ENCOUNTER
Pt calling about her virtual I explained that I have tried to reach her multiple times to have her complete precheck and sign on. She will try to sign on now to her 11am virtual

## 2024-01-17 NOTE — PROGRESS NOTES
"Established Patient - Audio Only Telehealth Visit     The patient location is: home  The chief complaint leading to consultation is: f/u  Visit type: Virtual visit with audio only (telephone)  Total time spent with patient: 5 min       The reason for the audio only service rather than synchronous audio and video virtual visit was related to technical difficulties or patient preference/necessity.     Each patient to whom I provide medical services by telemedicine is:  (1) informed of the relationship between the physician and patient and the respective role of any other health care provider with respect to management of the patient; and (2) notified that they may decline to receive medical services by telemedicine and may withdraw from such care at any time. Patient verbally consented to receive this service via voice-only telephone call.       HPI: 1st attempt 1214 went straight to ; VML.  Reached her  at his mobile number right afterwards since he had visit scheduled as well, and after speaking to him he handed phone off to pt.  She responds with "not great" when asked how she is doing, which she attributes to emotional distress from conflict w/ her daughter.  She is talking to Nayan Mora, our LCSW, about it.  She has since seen Audiology who was able to complete device synchronization for her cochlear implant and hearing aid with her smartphone.  She denies any other needs or concerns at this time.     Assessment and plan:  6mo f/u                        This service was not originating from a related E/M service provided within the previous 7 days nor will  to an E/M service or procedure within the next 24 hours or my soonest available appointment.  Prevailing standard of care was able to be met in this audio-only visit.          "

## 2024-01-18 ENCOUNTER — OFFICE VISIT (OUTPATIENT)
Dept: PODIATRY | Facility: CLINIC | Age: 79
End: 2024-01-18
Payer: MEDICARE

## 2024-01-18 VITALS
DIASTOLIC BLOOD PRESSURE: 70 MMHG | SYSTOLIC BLOOD PRESSURE: 117 MMHG | HEIGHT: 61 IN | BODY MASS INDEX: 29.27 KG/M2 | WEIGHT: 155 LBS | HEART RATE: 84 BPM

## 2024-01-18 DIAGNOSIS — M20.41 HAMMER TOES OF BOTH FEET: ICD-10-CM

## 2024-01-18 DIAGNOSIS — L84 CORN OR CALLUS: Primary | ICD-10-CM

## 2024-01-18 DIAGNOSIS — M20.42 HAMMER TOES OF BOTH FEET: ICD-10-CM

## 2024-01-18 DIAGNOSIS — M79.672 FOOT PAIN, LEFT: ICD-10-CM

## 2024-01-18 DIAGNOSIS — E11.42 TYPE 2 DIABETES MELLITUS WITH DIABETIC POLYNEUROPATHY, UNSPECIFIED WHETHER LONG TERM INSULIN USE: ICD-10-CM

## 2024-01-18 PROCEDURE — 3078F DIAST BP <80 MM HG: CPT | Mod: CPTII,S$GLB,, | Performed by: PODIATRIST

## 2024-01-18 PROCEDURE — 1101F PT FALLS ASSESS-DOCD LE1/YR: CPT | Mod: CPTII,S$GLB,, | Performed by: PODIATRIST

## 2024-01-18 PROCEDURE — 1157F ADVNC CARE PLAN IN RCRD: CPT | Mod: CPTII,S$GLB,, | Performed by: PODIATRIST

## 2024-01-18 PROCEDURE — 99214 OFFICE O/P EST MOD 30 MIN: CPT | Mod: 25,S$GLB,, | Performed by: PODIATRIST

## 2024-01-18 PROCEDURE — 3288F FALL RISK ASSESSMENT DOCD: CPT | Mod: CPTII,S$GLB,, | Performed by: PODIATRIST

## 2024-01-18 PROCEDURE — 11055 PARING/CUTG B9 HYPRKER LES 1: CPT | Mod: Q9,S$GLB,, | Performed by: PODIATRIST

## 2024-01-18 PROCEDURE — 99999 PR PBB SHADOW E&M-EST. PATIENT-LVL III: CPT | Mod: PBBFAC,,, | Performed by: PODIATRIST

## 2024-01-18 PROCEDURE — 1159F MED LIST DOCD IN RCRD: CPT | Mod: CPTII,S$GLB,, | Performed by: PODIATRIST

## 2024-01-18 PROCEDURE — 1125F AMNT PAIN NOTED PAIN PRSNT: CPT | Mod: CPTII,S$GLB,, | Performed by: PODIATRIST

## 2024-01-18 PROCEDURE — 1160F RVW MEDS BY RX/DR IN RCRD: CPT | Mod: CPTII,S$GLB,, | Performed by: PODIATRIST

## 2024-01-18 PROCEDURE — 3074F SYST BP LT 130 MM HG: CPT | Mod: CPTII,S$GLB,, | Performed by: PODIATRIST

## 2024-01-18 RX ORDER — UREA 40 %
CREAM (GRAM) TOPICAL DAILY
Qty: 85 G | Refills: 11 | Status: SHIPPED | OUTPATIENT
Start: 2024-01-18 | End: 2024-04-09

## 2024-01-18 NOTE — PROGRESS NOTES
Subjective:      Patient ID: Paris Burris is a 78 y.o. female.    Chief Complaint: Toe Pain (A painful callous on one of my toes)     Pain and hard skin  3rd toe left.  Gradual onset, worsening over the past month or so.  Aggravated by increased weight-bearing prolonged standing some shoes.  Periodic debridement helps symptoms.  Patient denies trauma and surgery left foot.    Cc2 Diabetes, increased risk amputation needing evaluation/management/optomization of foot care.    Chief Complaint   Patient presents with    Toe Pain     A painful callous on one of my toes      Chief Complaint   Patient presents with    Toe Pain     A painful callous on one of my toes        Casual shoes      Review of Systems   Constitutional: Negative for chills, diaphoresis, fever, malaise/fatigue and night sweats.   Cardiovascular:  Negative for claudication, cyanosis, leg swelling and syncope.   Skin:  Positive for suspicious lesions. Negative for color change, dry skin, nail changes, rash and unusual hair distribution.   Musculoskeletal:  Negative for falls, joint pain, joint swelling, muscle cramps, muscle weakness and stiffness.   Gastrointestinal:  Negative for constipation, diarrhea, nausea and vomiting.   Neurological:  Positive for paresthesias and sensory change. Negative for brief paralysis, disturbances in coordination, focal weakness, numbness and tremors.           Objective:      Physical Exam  Constitutional:       General: She is not in acute distress.     Appearance: She is well-developed. She is not diaphoretic.   Cardiovascular:      Pulses:           Popliteal pulses are 2+ on the right side and 2+ on the left side.        Dorsalis pedis pulses are 2+ on the right side and 2+ on the left side.        Posterior tibial pulses are 2+ on the right side and 2+ on the left side.      Comments: Capillary refill 3 seconds all toes/distal feet, all toes/both feet warm to touch.      Negative lymphadenopathy bilateral  popliteal fossa and tarsal tunnel.      Negavie lower extremity edema bilateral.    Musculoskeletal:      Right ankle: No swelling, deformity, ecchymosis or lacerations. Normal range of motion. Normal pulse.      Right Achilles Tendon: Normal. No defects. Martin's test negative.      Comments: Patient has hammertoes of digits   2-5 bilateral                partially reducible without symptom today.    Otherwise, Skin is normal age and health appropriate color, turgor, texture, and temperature bilateral lower extremities without ulceration, hyperpigmentation, discoloration, masses nodules or cords palpated.  No ecchymosis, erythema, edema, or cardinal signs of infection bilateral lower extremities.    Lymphadenopathy:      Lower Body: No right inguinal adenopathy. No left inguinal adenopathy.      Comments: Negative lymphadenopathy bilateral popliteal fossa and tarsal tunnel.    Negative lymphangitic streaking bilateral feet/ankles/legs.   Skin:     General: Skin is warm and dry.      Capillary Refill: Capillary refill takes 2 to 3 seconds.      Coloration: Skin is not pale.      Findings: No abrasion, bruising, burn, ecchymosis, erythema, laceration, lesion or rash.      Nails: There is no clubbing.      Comments:   Focal hyperkeratotic lesion consisting entirely of hyperkeratotic tissue without open skin, drainage, pus, fluctuance, malodor, or signs of infection plantar lateral distal left 3rd toe.    Otherwise, Skin is normal age and health appropriate color, turgor, texture, and temperature bilateral lower extremities without ulceration, hyperpigmentation, discoloration, masses nodules or cords palpated.  No ecchymosis, erythema, edema, or cardinal signs of infection bilateral lower extremities.      Neurological:      Mental Status: She is alert and oriented to person, place, and time.      Sensory: No sensory deficit.      Motor: No tremor, atrophy or abnormal muscle tone.      Gait: Gait normal.       Comments: Paresthesias, and burning bilateral feet with no clearly identified trigger or source.    Negative tinel sign to percussion sural, superficial peroneal, deep peroneal, saphenous, and posterior tibial nerves right and left ankles and feet.      Subjective numbness without loss protective sensation all 10 toes to 10 g monofilament   Psychiatric:         Behavior: Behavior is cooperative.               Assessment:       Encounter Diagnoses   Name Primary?    Corn or callus Yes    Foot pain, left     Type 2 diabetes mellitus with diabetic polyneuropathy, unspecified whether long term insulin use     Hammer toes of both feet          Plan:       Paris was seen today for toe pain.    Diagnoses and all orders for this visit:    Corn or callus  -     DIABETIC SHOES FOR HOME USE    Foot pain, left  -     DIABETIC SHOES FOR HOME USE    Type 2 diabetes mellitus with diabetic polyneuropathy, unspecified whether long term insulin use  -     DIABETIC SHOES FOR HOME USE    Hammer toes of both feet  -     DIABETIC SHOES FOR HOME USE    Other orders  -     urea (CARMOL) 40 % Crea; Apply topically once daily.      I counseled the patient on her conditions, their implications and medical management.        The patient has received literature on basic diabetic foot care.  Patient will inspect feet daily, wear protective shoe gear when ambulatory, and apply moisturizer to skin as needed to maintain elasticity and help prevent ulceration.    Discussed conservative treatment with shoes of adequate dimensions, material, and style to alleviate symptoms and delay or prevent surgical intervention.    Rx DM shoes, inserts, urea cream bid.    Inspect feet multiple times daily for signs of occurrence/recurrence ulceration.      With the patient's permission, I debrided hyperkeratotic lesion(s) as above totaling      1          to, not  Including dermis with sterile #15 blade.  Patient tolerated the procedure well and related  significant relief.           Follow up in about 1 year (around 1/18/2025).

## 2024-01-31 DIAGNOSIS — M51.36 DDD (DEGENERATIVE DISC DISEASE), LUMBAR: ICD-10-CM

## 2024-01-31 RX ORDER — TRAMADOL HYDROCHLORIDE 50 MG/1
TABLET ORAL
Qty: 60 TABLET | Refills: 0 | Status: SHIPPED | OUTPATIENT
Start: 2024-01-31 | End: 2024-05-16 | Stop reason: SDUPTHER

## 2024-02-05 ENCOUNTER — CLINICAL SUPPORT (OUTPATIENT)
Dept: AUDIOLOGY | Facility: CLINIC | Age: 79
End: 2024-02-05
Payer: MEDICARE

## 2024-02-05 DIAGNOSIS — H90.3 SENSORINEURAL HEARING LOSS, BILATERAL: Primary | ICD-10-CM

## 2024-02-05 DIAGNOSIS — H93.293 IMPAIRMENT OF AUDITORY DISCRIMINATION OF BOTH EARS: ICD-10-CM

## 2024-02-05 PROCEDURE — 99499 UNLISTED E&M SERVICE: CPT | Mod: S$GLB,,,

## 2024-02-05 NOTE — PROGRESS NOTES
"Cochlear Implant Programming Session:     Left Ear: Dr. Julian  :  [place]i  Internal Device/Serial Number:  [id number]  Processor:  [id number]   SN of Processors: [id number] and [id number]  DOS:  34/26/21  DIS:  6/1/21  Processor Color: Sand  Magnet Strength: 3i   Coil Length:  6cm  Battery Type:  Standard rechargeable  Warranty:  5 year     Right ear: RESOUND SEGOVIA     Paris Burris was seen for a follow up programming session with her cochlear implant sound processor.  Mrs. Burris has not been wearing her sound processor.  She stated that the processors are "stiff" and she cannot pull the battery off, and that she cannot place it on her head. She has not worn it since our last appointment.     We reviewed placing and removing the battery from the processor as well as placing and removing it from her head extensively. She was encouraged to practice this motion every day until she is comfortable with the motions. I also encouraged her to wear the processor at least 10 hours per day, but to start off with at least 5 hours per day, even if she is home alone.     No mapping changes were made today as she has not been wearing the processor. She was counseled that she will need time to adjust to the sound of the cochlear implant.      Mrs. Burris's devices are connected to her cell phone today and erin use was reviewed.      She will return in two months.      Recommend:  1.  Consistent daily use of the sound processor.  2.  Follow up programming as scheduled.  3.  Cochlear implant supplies as needed.  4.  Annual evaluation.    "

## 2024-02-12 ENCOUNTER — HOSPITAL ENCOUNTER (OUTPATIENT)
Dept: RADIOLOGY | Facility: HOSPITAL | Age: 79
Discharge: HOME | End: 2024-02-12
Attending: INTERNAL MEDICINE
Payer: MEDICARE

## 2024-02-12 DIAGNOSIS — Z12.31 ENCOUNTER FOR SCREENING MAMMOGRAM FOR BREAST CANCER: ICD-10-CM

## 2024-02-12 PROCEDURE — 77067 SCR MAMMO BI INCL CAD: CPT | Mod: 26,,, | Performed by: RADIOLOGY

## 2024-02-12 PROCEDURE — 77067 SCR MAMMO BI INCL CAD: CPT | Mod: TC

## 2024-02-12 PROCEDURE — 77063 BREAST TOMOSYNTHESIS BI: CPT | Mod: 26,,, | Performed by: RADIOLOGY

## 2024-02-29 ENCOUNTER — PATIENT MESSAGE (OUTPATIENT)
Dept: PRIMARY CARE CLINIC | Facility: CLINIC | Age: 79
End: 2024-02-29
Payer: MEDICARE

## 2024-03-01 ENCOUNTER — OFFICE VISIT (OUTPATIENT)
Dept: PRIMARY CARE CLINIC | Facility: CLINIC | Age: 79
End: 2024-03-01
Payer: MEDICARE

## 2024-03-01 ENCOUNTER — DOCUMENTATION ONLY (OUTPATIENT)
Dept: PRIMARY CARE CLINIC | Facility: CLINIC | Age: 79
End: 2024-03-01
Payer: MEDICARE

## 2024-03-01 DIAGNOSIS — M54.50 CHRONIC MIDLINE LOW BACK PAIN WITHOUT SCIATICA: Primary | ICD-10-CM

## 2024-03-01 DIAGNOSIS — G89.29 CHRONIC MIDLINE LOW BACK PAIN WITHOUT SCIATICA: Primary | ICD-10-CM

## 2024-03-01 DIAGNOSIS — R26.81 UNSTEADY GAIT: ICD-10-CM

## 2024-03-01 PROCEDURE — 1157F ADVNC CARE PLAN IN RCRD: CPT | Mod: CPTII,95,, | Performed by: INTERNAL MEDICINE

## 2024-03-01 PROCEDURE — 1159F MED LIST DOCD IN RCRD: CPT | Mod: CPTII,95,, | Performed by: INTERNAL MEDICINE

## 2024-03-01 PROCEDURE — 1160F RVW MEDS BY RX/DR IN RCRD: CPT | Mod: CPTII,95,, | Performed by: INTERNAL MEDICINE

## 2024-03-01 PROCEDURE — 99213 OFFICE O/P EST LOW 20 MIN: CPT | Mod: 95,,, | Performed by: INTERNAL MEDICINE

## 2024-03-01 NOTE — PROGRESS NOTES
The patient location is: [place], [place]  The chief complaint leading to consultation is: back pain and follow up    Visit type: audiovisual    Face to Face time with patient: 5  25 minutes of total time spent on the encounter, which includes face to face time and non-face to face time preparing to see the patient (eg, review of tests), Obtaining and/or reviewing separately obtained history, Documenting clinical information in the electronic or other health record, Independently interpreting results (not separately reported) and communicating results to the patient/family/caregiver, or Care coordination (not separately reported).         Each patient to whom he or she provides medical services by telemedicine is:  (1) informed of the relationship between the physician and patient and the respective role of any other health care provider with respect to management of the patient; and (2) notified that he or she may decline to receive medical services by telemedicine and may withdraw from such care at any time.    Notes:     Subjective:       Patient ID: Paris Burris is a 78 y.o. female.    Back Pain  The current episode started 1 to 4 weeks ago. The problem occurs daily. The problem has been waxing and waning since onset. The pain is present in the lumbar spine. The quality of the pain is described as aching. The pain does not radiate. The pain is at a severity of 4/10. The pain is moderate. The pain is Worse during the day. The symptoms are aggravated by standing and stress. Stiffness is present All day. Risk factors include history of cancer. The treatment provided mild relief.   On tramadol 50mg 2 pills BID and mobic 7.5mg daily  Wanted to do PT.   No falls but does have trouble w/ balance.     Review of Systems   Musculoskeletal:  Positive for back pain.         Objective:     Gen - A+OX4, NAD  CHEST - completing full sentences. Normal work of breathing.     Assessment/Plan     Diagnoses and all orders for  this visit:    Chronic midline low back pain without sciatica  -     Ambulatory referral/consult to Physical/Occupational Therapy; Future    Unsteady gait  -     Ambulatory referral/consult to Physical/Occupational Therapy; Future    Was only able to speak w/ pt via visual-audio visit for 5 min before pt was disconnected. Pt was 35 min late for appt so might have been disconnected due to visit being over. Tried calling patient back but went straight to . Called 's phone but I think pt accidentally hung up on me. Was only able to discuss PT. Will put in referral. Prefers [facility].   She was having trouble hearing me via iPad for visit anyway due to cochlear implant and hearing aids and  had to repeat a lot of the things I said. Pt would benefit from in person appt instead of virtual visits.      Schedule in person visit in the next few weeks.       Mandi Huynh MD  Department of Internal Medicine - [facility]    Answers submitted by the patient for this visit:  Back Pain Questionnaire (Submitted on 3/1/2024)  Chief Complaint: Back pain

## 2024-03-01 NOTE — PROGRESS NOTES
Back Pain  The current episode started 1 to 4 weeks ago. The problem occurs daily. The problem has been waxing and waning since onset. The pain is present in the lumbar spine. The quality of the pain is described as aching. The pain does not radiate. The pain is at a severity of 4/10. The pain is moderate. The pain is Worse during the day. The symptoms are aggravated by standing and stress. Stiffness is present All day. Risk factors include history of cancer. The treatment provided mild relief.   On tramadol 50mg 2 pills BID and mobic 7.5mg daily  Wanted to do PT.   No falls but does have trouble w/ balance.     MDD - effexor 75mg daily, wellbutrin xl 300mg daily.   Alcohol dependence in remission. Part of AA.   Symptoms controlled.   Was following w/ Nayan Mora LCSW and health  Jacqueline Burns.    DM2 - ozempic 0.5mg weekly (started recently), metformin xr 500mg daily.   Hasn't checked digital DM or HTN program.   Would like freestyle yudith. Hasn't had low or high sugars that she knows of.   Due for A1C. Last one ewas 9/6/23.   MAC 10/17/23 neg.  UTD on foot exam w/ Dr. Dunbar.    HLD - zetia 10mg daily, crestor 20mg daily.   LDL 34.6 5/3/23.     HTN - toprol xl 25mg daily, losartan 100mg daily.   COOKIE - CPAP titration 11/2023 w/ Dr. Emmanuel.    L shoulder CT w/ advanced OA of L glenohumeral and SC joints. Left upper lobe calcified granuloma.     H/o L superior parathyroidectomy 2015. PTH 5/2023 35.3    H/o SVT and 2nd degree AVB. S/p PM 2019. Overdue to f/u in EP clinic.     C/o red bumps on the skin. No pain. Not itchy.     MMG 2/12/24 neg. DEXA 2022 osteopenia.  MCI on neuropsych testing 9/2023.

## 2024-03-04 ENCOUNTER — TELEPHONE (OUTPATIENT)
Dept: PRIMARY CARE CLINIC | Facility: CLINIC | Age: 79
End: 2024-03-04
Payer: MEDICARE

## 2024-03-04 NOTE — TELEPHONE ENCOUNTER
----- Message from Nanci Meraz MA sent at 3/4/2024  9:42 AM CST -----  Contact: Paris @ [phone]  The patient calling to get her and her 's (Kade Burris mrn:[id number]) appointment from Friday rescheduled due to having trouble during virtual visit. Says they would like to have a in person visit but no appointment showing for the provider. Please give her a call back at [phone]

## 2024-03-04 NOTE — TELEPHONE ENCOUNTER
Spoke to pt and she was upset about her virtual appointment last week not being completed, she requested a in person visit for her and her . In person appointment given to her for May. She was upset that she would have to wait that long because she has not seen Dr. Huynh in person in quite a while. I explained that the visit last week was originally in person, then changed on the day of the appointment to virtual because they were unable to make it. Pt ended the call. Apt made for May and added pt to the waitlist.

## 2024-03-08 ENCOUNTER — LAB VISIT (OUTPATIENT)
Dept: LAB | Facility: HOSPITAL | Age: 79
End: 2024-03-08
Payer: MEDICARE

## 2024-03-08 ENCOUNTER — TELEPHONE (OUTPATIENT)
Dept: PRIMARY CARE CLINIC | Facility: CLINIC | Age: 79
End: 2024-03-08
Payer: MEDICARE

## 2024-03-08 DIAGNOSIS — G89.29 CHRONIC MIDLINE LOW BACK PAIN, UNSPECIFIED WHETHER SCIATICA PRESENT: Primary | ICD-10-CM

## 2024-03-08 DIAGNOSIS — M47.896 OTHER SPONDYLOSIS, LUMBAR REGION: ICD-10-CM

## 2024-03-08 DIAGNOSIS — M54.50 CHRONIC MIDLINE LOW BACK PAIN, UNSPECIFIED WHETHER SCIATICA PRESENT: Primary | ICD-10-CM

## 2024-03-08 DIAGNOSIS — E11.21 CONTROLLED TYPE 2 DIABETES MELLITUS WITH DIABETIC NEPHROPATHY, WITHOUT LONG-TERM CURRENT USE OF INSULIN: ICD-10-CM

## 2024-03-08 LAB
ESTIMATED AVG GLUCOSE: 157 MG/DL (ref 68–131)
HBA1C MFR BLD: 7.1 % (ref 4–5.6)

## 2024-03-08 PROCEDURE — 83036 HEMOGLOBIN GLYCOSYLATED A1C: CPT | Performed by: INTERNAL MEDICINE

## 2024-03-08 PROCEDURE — 36415 COLL VENOUS BLD VENIPUNCTURE: CPT | Performed by: INTERNAL MEDICINE

## 2024-03-08 NOTE — TELEPHONE ENCOUNTER
----- Message from Irina Dutta sent at 3/8/2024 11:45 AM CST -----  Regarding: Acupuncture  Good Morning,     Ms. Burris requests an acupuncture appt.  If you think this is an appropriate course of treatment could you please add a referral using PRO1? Medicare covers up to 12 acupuncture visits in 90 days for chronic low back pain. Chronic low back pain is defined as: Lasting 12 weeks or longer     Can you please update the diagnosis on the referral to also reflect M54.5 Chronic Low Back Pain?     Thanks,   Irina

## 2024-03-08 NOTE — TELEPHONE ENCOUNTER
Subjective:       Suzanne Villeda is a 55 y.o. female who is an established patient who was referred by Dr Evans for evaluation of UI.      Urinary Incontinence  Patient complains of urinary incontinence. This has been present for several years. She leaks urine with coughing, lifting, laughing, sneezing, with urge. Patient describes the symptoms as frequent urination (manyx per day), nocturia 2 times per night, the urge to urinate recurs again shortly following micturition, urge to urinate with little or no warning and urine leakage with coughing/heavy physical activity. Factors associated with symptoms include none known. Evaluation to date includes none. Treatment to date includes oxybutynin, which was not very effective. Wearing heavy pads daily for 6 months.     She was planned for hysterectomy in mid-May with Dr Middleton and would like UI intervention at same time. Difficulty coordinating with three surgeons. She is now planned for procedures on 6/19/17 when I am not available.     Most bothersome symptom is urgency and UUI thought she is unsure severity of JULIO CÉSAR.    Cysto/UDS 3/21/17:  Findings of the Procedure: Normal sensation. No JULIO CÉSAR. No DO/IBC. Large capacity. Bladder contraction noted with minimal voided - large volume YAEL (not normal). Low flow (not normal). Cystoscopy and PE normal - no masses/JULIO CÉSAR/POP  Recommendations: No JULIO CÉSAR or UUI demonstrated. Not normal voiding pattern represented per pt. Symptoms c/w urgency and UUI. Consider Botox at time of gyn surgery. Risks of UR need to be reviewed prior.    PVR (bladder scan) last visit - 0cc, today - 37cc.       The following portions of the patient's history were reviewed and updated as appropriate: allergies, current medications, past family history, past medical history, past social history, past surgical history and problem list.    Review of Systems  Constitutional: no fever or chills  ENT: no nasal congestion or sore throat  Respiratory: no cough or  Next apt is 5/2, will she need to be seen to have this ordered?    "shortness of breath  Cardiovascular: no chest pain or palpitations  Gastrointestinal: no nausea or vomiting, tolerating diet  Genitourinary: as per HPI  Hematologic/Lymphatic: no easy bruising or lymphadenopathy  Musculoskeletal: no arthralgias or myalgias  Skin: no rashes or lesions  Neurological: no seizures or tremors  Behavioral/Psych: no auditory or visual hallucinations       Objective:    Vitals: /74   Pulse 68   Resp 16   Ht 5' 4" (1.626 m)   Wt 90.5 kg (199 lb 8.3 oz)   BMI 34.25 kg/m²     Physical Exam   General: well developed, well nourished in no acute distress  Head: normocephalic, atraumatic  Neck: supple, trachea midline, no obvious enlargement of thyroid  HEENT: EOMI, mucus membranes moist, sclera anicteric, no hearing impairment  Lungs: symmetric expansion, non-labored breathing  Cardiovascular: regular rate and rhythm, normal pulses  Abdomen: soft, non tender, non distended, no palpable masses, no hepatosplenomegaly, no hernias, no CVA tenderness  Musculoskeletal: no peripheral edema, normal ROM in bilateral upper and lower extremities  Lymphatics: no cervical or inguinal lymphadenopathy  Skin: no rashes or lesions  Neuro: alert and oriented x 3, no gross deficits  Psych: normal judgment and insight, normal mood/affect and non-anxious  Genitourinary:   patient declined exam       Lab Review   Urine analysis today in clinic shows - negative    Lab Results   Component Value Date    WBC 5.91 06/01/2017    HGB 13.9 06/01/2017    HCT 40.8 06/01/2017    MCV 85 06/01/2017     06/01/2017     Lab Results   Component Value Date    CREATININE 0.7 06/01/2017    BUN 21 (H) 06/01/2017       Imaging  NA       Assessment/Plan:      1. Mixed incontinence urge and stress    - Discussed difference of UUI and JULIO CÉSAR components. Reviewed etiology and workup of each.   - JULIO CÉSAR: Kegels, PFPT, bulking agent, MUS.   - UUI: Behavioral changes, PFPT, anticholinergics. Botox/InterStim for refractory UUI.   - No " improvement with Ditropan. Consider alternative.     - UUI most bothersome but unsure severity of JULIO CÉSAR   - s/p cysto/UDS 3/21/17 - did not represent normal voiding   - Discussed Botox at time of lap DENG and panniculectomy - unable to coordinate schedules   - PVR acceptable   - Trial of Detrol LA 4mg as unable to currently coordinate Botox       Follow up in 6 weeks with PVR

## 2024-03-12 ENCOUNTER — CLINICAL SUPPORT (OUTPATIENT)
Dept: CARDIOLOGY | Facility: HOSPITAL | Age: 79
End: 2024-03-12
Payer: MEDICARE

## 2024-03-12 ENCOUNTER — CLINICAL SUPPORT (OUTPATIENT)
Dept: CARDIOLOGY | Facility: HOSPITAL | Age: 79
End: 2024-03-12
Attending: INTERNAL MEDICINE
Payer: MEDICARE

## 2024-03-12 DIAGNOSIS — I42.9 CARDIOMYOPATHY, UNSPECIFIED: ICD-10-CM

## 2024-03-12 DIAGNOSIS — Z95.0 PRESENCE OF CARDIAC PACEMAKER: ICD-10-CM

## 2024-03-12 PROCEDURE — 93296 REM INTERROG EVL PM/IDS: CPT | Performed by: INTERNAL MEDICINE

## 2024-03-12 PROCEDURE — 93294 REM INTERROG EVL PM/LDLS PM: CPT | Mod: ,,, | Performed by: INTERNAL MEDICINE

## 2024-03-14 ENCOUNTER — TELEPHONE (OUTPATIENT)
Dept: PRIMARY CARE CLINIC | Facility: CLINIC | Age: 79
End: 2024-03-14
Payer: MEDICARE

## 2024-03-14 NOTE — TELEPHONE ENCOUNTER
Appointment Request   Paris Burris   Sent:   5:13 PM   To: [facility] Clinical Support Staff      Paris Burris   MRN: [id number] : 1945   Pt Home: [phone]     Entered: [phone]        Message    Appointment Request From: Paris Burris      With Provider: Mandi Huynh MD [[facility] 65[facility] [place]]      Preferred Date Range: Any      Preferred Times: Any Time      Reason for visit: I need to know my BMI      Comments:   My BMI

## 2024-03-15 LAB
OHS CV AF BURDEN PERCENT: < 1
OHS CV BIV PACING PERCENT: 99 %
OHS CV DC REMOTE DEVICE TYPE: NORMAL
OHS CV ICD SHOCK: NO

## 2024-03-16 ENCOUNTER — OFFICE VISIT (OUTPATIENT)
Dept: URGENT CARE | Facility: CLINIC | Age: 79
End: 2024-03-16
Payer: MEDICARE

## 2024-03-16 VITALS
OXYGEN SATURATION: 96 % | WEIGHT: 152 LBS | RESPIRATION RATE: 15 BRPM | SYSTOLIC BLOOD PRESSURE: 117 MMHG | HEIGHT: 61 IN | BODY MASS INDEX: 28.7 KG/M2 | HEART RATE: 96 BPM | DIASTOLIC BLOOD PRESSURE: 61 MMHG | TEMPERATURE: 98 F

## 2024-03-16 DIAGNOSIS — M54.50 LUMBAR BACK PAIN: Primary | ICD-10-CM

## 2024-03-16 DIAGNOSIS — Z87.39 HISTORY OF DEGENERATIVE DISC DISEASE: ICD-10-CM

## 2024-03-16 DIAGNOSIS — M47.812 CERVICAL SPINE ARTHRITIS: ICD-10-CM

## 2024-03-16 PROCEDURE — 99213 OFFICE O/P EST LOW 20 MIN: CPT | Mod: S$GLB,,, | Performed by: NURSE PRACTITIONER

## 2024-03-16 RX ORDER — TRAMADOL HYDROCHLORIDE 50 MG/1
50 TABLET ORAL EVERY 12 HOURS PRN
Qty: 14 TABLET | Refills: 0 | Status: SHIPPED | OUTPATIENT
Start: 2024-03-16 | End: 2024-03-23

## 2024-03-16 NOTE — PROGRESS NOTES
"Subjective:      Patient ID: Paris Burris is a 78 y.o. female.    Vitals:  height is 5' 1" (1.549 m) and weight is 68.9 kg (152 lb). Her oral temperature is 98.1 °F (36.7 °C). Her blood pressure is 117/61 and her pulse is 96. Her respiration is 15 and oxygen saturation is 96%.     Chief Complaint: Back Pain    Patient presents with c.o lower back pain. Pain is chronic in nature. Patient states she usually takes tramadol for her back pain but she is out of her rx. She requests a refill in clininc.     Back Pain  This is a chronic problem. Episode onset: months. The problem occurs constantly. The problem is unchanged. The pain is present in the lumbar spine. The quality of the pain is described as aching. The pain does not radiate. Pertinent negatives include no abdominal pain, bladder incontinence, chest pain, dysuria, fever or headaches. She has tried nothing for the symptoms.     Constitution: Negative for chills, sweating, fatigue and fever.   HENT:  Negative for ear pain, congestion and sore throat.    Neck: Negative for neck pain and neck stiffness.   Cardiovascular:  Negative for chest pain, leg swelling, palpitations and sob on exertion.   Eyes:  Negative for eye pain, eye redness and vision loss.   Respiratory:  Negative for cough, sputum production and shortness of breath.    Gastrointestinal:  Negative for abdominal pain, nausea, vomiting and diarrhea.   Genitourinary:  Negative for dysuria, frequency, urgency, flank pain, bladder incontinence and hematuria.   Musculoskeletal:  Positive for back pain. Negative for pain and trauma.   Skin:  Negative for color change and rash.   Neurological:  Negative for dizziness, headaches and disorientation.   Psychiatric/Behavioral:  Negative for disorientation.       Objective:     Physical Exam   Constitutional: She is oriented to person, place, and time. She appears well-developed. She is cooperative.   HENT:   Head: Normocephalic and atraumatic.   Ears:   Right " Ear: Hearing, tympanic membrane, external ear and ear canal normal.   Left Ear: Hearing, tympanic membrane, external ear and ear canal normal.   Nose: Nose normal.   Mouth/Throat: Uvula is midline, oropharynx is clear and moist and mucous membranes are normal. Mucous membranes are moist.   Eyes: Conjunctivae, EOM and lids are normal. Pupils are equal, round, and reactive to light.   Neck: Trachea normal and phonation normal. Neck supple. No thyromegaly present.   Cardiovascular: Normal rate, regular rhythm, S1 normal, S2 normal, normal heart sounds and normal pulses.   Pulmonary/Chest: Effort normal and breath sounds normal. No respiratory distress. She has no decreased breath sounds. She has no wheezes. She has no rhonchi.   Abdominal: Normal appearance and bowel sounds are normal. Soft. flat abdomen   Musculoskeletal:         General: Tenderness (Lower back lumbar sacral discomfort, negative for CVA tenderness, no history on recent injury or trauma, negative for falls) present.      Lumbar back: She exhibits decreased range of motion and tenderness. She exhibits no bony tenderness, no edema and no deformity.      Right lower leg: No edema.      Left lower leg: No edema.      Comments: Range of motion to all extremities, limited range of motion with reaching down to touch knees and toes.   Lymphadenopathy:     She has no cervical adenopathy.   Neurological: no focal deficit. She is alert and oriented to person, place, and time. She displays no weakness. Coordination and gait normal.   Skin: Skin is warm, dry and intact. Capillary refill takes less than 2 seconds.   Psychiatric: Her speech is normal and behavior is normal. Mood, judgment and thought content normal.   Nursing note and vitals reviewed.      Assessment:     1. Lumbar back pain    2. History of degenerative disc disease    3. Cervical spine arthritis        Plan:       Lumbar back pain  -     traMADoL (ULTRAM) 50 mg tablet; Take 1 tablet (50 mg total)  by mouth every 12 (twelve) hours as needed for Pain.  Dispense: 14 tablet; Refill: 0  -     Ambulatory referral/consult to Orthopedics    History of degenerative disc disease  -     Ambulatory referral/consult to Orthopedics    Cervical spine arthritis  -     Ambulatory referral/consult to Orthopedics      Patient Instructions   Discharge instructions for back pain  Referral to Orthopedic  See Clinical Reference Discharge instructions for lower back pain        1) See orders for this visit as documented in the electronic medical record.  2) Symptomatic therapy suggested: use acetaminophen/ibuprofen every 6-8 hours prn pain or fever, push fluids.   3) Call or return to clinic prn if these symptoms worsen or fail to improve as anticipated.    Discussed results/diagnosis/plan with patient in clinic.  We had shared decision making for patient's treatment. Patient verbalized understanding and in agreement with current treatment plan.     Patient was instructed to return for re-evaluation with urgent care or PCP for continued outpatient workup and management if symptoms do not improve/worsening symptoms. Strict ED versus clinic precautions given in depth.    Discharge and follow-up instructions given verbally/printed with the patient who expressed understanding. The instructions and results are also available on [employer] [facility].      - You must understand that you have received an Urgent Care treatment only and that you may be released before all of your medical problems are known or treated.   - You, the patient, will arrange for follow up care as instructed.   - Follow up with your PCP or specialty clinic as directed in the next 1-2 weeks if not improved or as needed.  You can call [phone] to schedule an appointment with the appropriate provider.   - If your condition worsens or fails to improve we recommend that you receive another evaluation at the ER immediately or contact your PCP to discuss your concerns or return  here.        Marleny López, FERNANDOP

## 2024-03-16 NOTE — PATIENT INSTRUCTIONS
Discharge instructions for back pain  Referral to Orthopedic  See Clinical Reference Discharge instructions for lower back pain        1) See orders for this visit as documented in the electronic medical record.  2) Symptomatic therapy suggested: use acetaminophen/ibuprofen every 6-8 hours prn pain or fever, push fluids.   3) Call or return to clinic prn if these symptoms worsen or fail to improve as anticipated.    Discussed results/diagnosis/plan with patient in clinic.  We had shared decision making for patient's treatment. Patient verbalized understanding and in agreement with current treatment plan.     Patient was instructed to return for re-evaluation with urgent care or PCP for continued outpatient workup and management if symptoms do not improve/worsening symptoms. Strict ED versus clinic precautions given in depth.    Discharge and follow-up instructions given verbally/printed with the patient who expressed understanding. The instructions and results are also available on [employer].      - You must understand that you have received an Urgent Care treatment only and that you may be released before all of your medical problems are known or treated.   - You, the patient, will arrange for follow up care as instructed.   - Follow up with your PCP or specialty clinic as directed in the next 1-2 weeks if not improved or as needed.  You can call [phone] to schedule an appointment with the appropriate provider.   - If your condition worsens or fails to improve we recommend that you receive another evaluation at the ER immediately or contact your PCP to discuss your concerns or return here.        VLADIMIR Licona

## 2024-03-20 ENCOUNTER — DOCUMENTATION ONLY (OUTPATIENT)
Dept: REHABILITATION | Facility: OTHER | Age: 79
End: 2024-03-20

## 2024-03-20 NOTE — PROGRESS NOTES
Patient failed to appear for scheduled PT appointment today at 10:45 am without prior notification or cancellation.    Stacie Nj, PT, DPT  3/20/2024

## 2024-03-21 ENCOUNTER — PATIENT MESSAGE (OUTPATIENT)
Dept: ORTHOPEDICS | Facility: CLINIC | Age: 79
End: 2024-03-21
Payer: MEDICARE

## 2024-04-08 DIAGNOSIS — F33.1 MODERATE EPISODE OF RECURRENT MAJOR DEPRESSIVE DISORDER: ICD-10-CM

## 2024-04-08 RX ORDER — BUPROPION HYDROCHLORIDE 300 MG/1
300 TABLET ORAL DAILY
Qty: 90 TABLET | Refills: 3 | Status: SHIPPED | OUTPATIENT
Start: 2024-04-08

## 2024-04-09 ENCOUNTER — OFFICE VISIT (OUTPATIENT)
Dept: PODIATRY | Facility: CLINIC | Age: 79
End: 2024-04-09
Payer: MEDICARE

## 2024-04-09 VITALS
HEIGHT: 61 IN | DIASTOLIC BLOOD PRESSURE: 69 MMHG | BODY MASS INDEX: 28.67 KG/M2 | SYSTOLIC BLOOD PRESSURE: 110 MMHG | HEART RATE: 96 BPM | WEIGHT: 151.88 LBS

## 2024-04-09 DIAGNOSIS — L84 CORN OR CALLUS: Primary | ICD-10-CM

## 2024-04-09 DIAGNOSIS — E11.42 TYPE 2 DIABETES MELLITUS WITH DIABETIC POLYNEUROPATHY, UNSPECIFIED WHETHER LONG TERM INSULIN USE: ICD-10-CM

## 2024-04-09 DIAGNOSIS — M20.42 HAMMER TOES OF BOTH FEET: ICD-10-CM

## 2024-04-09 DIAGNOSIS — M20.41 HAMMER TOES OF BOTH FEET: ICD-10-CM

## 2024-04-09 PROCEDURE — 99999 PR PBB SHADOW E&M-EST. PATIENT-LVL II: CPT | Mod: PBBFAC,,, | Performed by: PODIATRIST

## 2024-04-09 PROCEDURE — 1126F AMNT PAIN NOTED NONE PRSNT: CPT | Mod: CPTII,S$GLB,, | Performed by: PODIATRIST

## 2024-04-09 PROCEDURE — 11056 PARNG/CUTG B9 HYPRKR LES 2-4: CPT | Mod: S$GLB,,, | Performed by: PODIATRIST

## 2024-04-09 PROCEDURE — 1157F ADVNC CARE PLAN IN RCRD: CPT | Mod: CPTII,S$GLB,, | Performed by: PODIATRIST

## 2024-04-09 PROCEDURE — 1101F PT FALLS ASSESS-DOCD LE1/YR: CPT | Mod: CPTII,S$GLB,, | Performed by: PODIATRIST

## 2024-04-09 PROCEDURE — 3078F DIAST BP <80 MM HG: CPT | Mod: CPTII,S$GLB,, | Performed by: PODIATRIST

## 2024-04-09 PROCEDURE — 3074F SYST BP LT 130 MM HG: CPT | Mod: CPTII,S$GLB,, | Performed by: PODIATRIST

## 2024-04-09 PROCEDURE — 99214 OFFICE O/P EST MOD 30 MIN: CPT | Mod: 25,S$GLB,, | Performed by: PODIATRIST

## 2024-04-09 PROCEDURE — 3288F FALL RISK ASSESSMENT DOCD: CPT | Mod: CPTII,S$GLB,, | Performed by: PODIATRIST

## 2024-04-09 RX ORDER — UREA 40 %
CREAM (GRAM) TOPICAL DAILY
Qty: 85 G | Refills: 11 | Status: SHIPPED | OUTPATIENT
Start: 2024-04-09

## 2024-04-09 NOTE — PROGRESS NOTES
Subjective:      Patient ID: Paris Burris is a 78 y.o. female.    Chief Complaint: Callouses     Pain and hard skin  3rd toe left.  Gradual onset, worsening over the past month or so.  Aggravated by increased weight-bearing prolonged standing some shoes.  Periodic debridement helps symptoms.  Patient denies trauma and surgery left foot.    Cc2 Diabetes, increased risk amputation needing evaluation/management/optomization of foot care.    Chief Complaint   Patient presents with    Callouses      Chief Complaint   Patient presents with    Callouses        Casual shoes      Review of Systems   Constitutional: Negative for chills, diaphoresis, fever, malaise/fatigue and night sweats.   Cardiovascular:  Negative for claudication, cyanosis, leg swelling and syncope.   Skin:  Positive for suspicious lesions. Negative for color change, dry skin, nail changes, rash and unusual hair distribution.   Musculoskeletal:  Negative for falls, joint pain, joint swelling, muscle cramps, muscle weakness and stiffness.   Gastrointestinal:  Negative for constipation, diarrhea, nausea and vomiting.   Neurological:  Positive for paresthesias and sensory change. Negative for brief paralysis, disturbances in coordination, focal weakness, numbness and tremors.           Objective:      Physical Exam  Constitutional:       General: She is not in acute distress.     Appearance: She is well-developed. She is not diaphoretic.   Cardiovascular:      Pulses:           Popliteal pulses are 2+ on the right side and 2+ on the left side.        Dorsalis pedis pulses are 2+ on the right side and 2+ on the left side.        Posterior tibial pulses are 2+ on the right side and 2+ on the left side.      Comments: Capillary refill 3 seconds all toes/distal feet, all toes/both feet warm to touch.      Negative lymphadenopathy bilateral popliteal fossa and tarsal tunnel.      Negavie lower extremity edema bilateral.    Musculoskeletal:      Right ankle:  No swelling, deformity, ecchymosis or lacerations. Normal range of motion. Normal pulse.      Right Achilles Tendon: Normal. No defects. Martin's test negative.      Comments: Patient has hammertoes of digits   2-5 bilateral                partially reducible without symptom today.    Otherwise, Skin is normal age and health appropriate color, turgor, texture, and temperature bilateral lower extremities without ulceration, hyperpigmentation, discoloration, masses nodules or cords palpated.  No ecchymosis, erythema, edema, or cardinal signs of infection bilateral lower extremities.    Lymphadenopathy:      Lower Body: No right inguinal adenopathy. No left inguinal adenopathy.      Comments: Negative lymphadenopathy bilateral popliteal fossa and tarsal tunnel.    Negative lymphangitic streaking bilateral feet/ankles/legs.   Skin:     General: Skin is warm and dry.      Capillary Refill: Capillary refill takes 2 to 3 seconds.      Coloration: Skin is not pale.      Findings: No abrasion, bruising, burn, ecchymosis, erythema, laceration, lesion or rash.      Nails: There is no clubbing.      Comments:   Focal hyperkeratotic lesion consisting entirely of hyperkeratotic tissue without open skin, drainage, pus, fluctuance, malodor, or signs of infection plantar lateral distal left 3rd toe, and plantar 5th mtpj right and left     Otherwise, Skin thin, shiny, atrophic, with decreased density and distribution of pedal hair bilateral, but without hyperpigmentation, lamin discoloration,  ulcers, masses, nodules or cords palpated bilateral feet and legs.      Neurological:      Mental Status: She is alert and oriented to person, place, and time.      Sensory: No sensory deficit.      Motor: No tremor, atrophy or abnormal muscle tone.      Gait: Gait normal.      Comments: Paresthesias, and burning bilateral feet with no clearly identified trigger or source.    Negative tinel sign to percussion sural, superficial peroneal, deep  peroneal, saphenous, and posterior tibial nerves right and left ankles and feet.      Subjective numbness without loss protective sensation all 10 toes to 10 g monofilament   Psychiatric:         Behavior: Behavior is cooperative.             Assessment:       Encounter Diagnoses   Name Primary?    Corn or callus Yes    Type 2 diabetes mellitus with diabetic polyneuropathy, unspecified whether long term insulin use     Hammer toes of both feet          Plan:       Paris was seen today for callouses.    Diagnoses and all orders for this visit:    Corn or callus  -     DIABETIC SHOES FOR HOME USE    Type 2 diabetes mellitus with diabetic polyneuropathy, unspecified whether long term insulin use  -     DIABETIC SHOES FOR HOME USE    Hammer toes of both feet  -     DIABETIC SHOES FOR HOME USE    Other orders  -     urea (CARMOL) 40 % Crea; Apply topically once daily.      I counseled the patient on her conditions, their implications and medical management.        The patient has received literature on basic diabetic foot care.  Patient will inspect feet daily, wear protective shoe gear when ambulatory, and apply moisturizer to skin as needed to maintain elasticity and help prevent ulceration.    Discussed conservative treatment with shoes of adequate dimensions, material, and style to alleviate symptoms and delay or prevent surgical intervention.    Rx DM shoes, inserts, urea cream bid.    Inspect feet multiple times daily for signs of occurrence/recurrence ulceration.      With the patient's permission, I debrided hyperkeratotic lesion(s) as above totaling      1          to, not  Including dermis with sterile #15 blade.  Patient tolerated the procedure well and related significant relief.           Follow up in about 1 year (around 4/9/2025).

## 2024-04-23 ENCOUNTER — OFFICE VISIT (OUTPATIENT)
Dept: INTERNAL MEDICINE | Facility: CLINIC | Age: 79
End: 2024-04-23
Payer: MEDICARE

## 2024-04-23 VITALS
OXYGEN SATURATION: 98 % | HEART RATE: 98 BPM | DIASTOLIC BLOOD PRESSURE: 72 MMHG | HEIGHT: 61 IN | WEIGHT: 159.19 LBS | SYSTOLIC BLOOD PRESSURE: 104 MMHG | BODY MASS INDEX: 30.06 KG/M2

## 2024-04-23 DIAGNOSIS — F33.41 MDD (MAJOR DEPRESSIVE DISORDER), RECURRENT, IN PARTIAL REMISSION: ICD-10-CM

## 2024-04-23 DIAGNOSIS — E11.40 TYPE 2 DIABETES MELLITUS WITH DIABETIC NEUROPATHY, WITHOUT LONG-TERM CURRENT USE OF INSULIN: Primary | ICD-10-CM

## 2024-04-23 PROCEDURE — 99499 UNLISTED E&M SERVICE: CPT | Mod: S$GLB,,, | Performed by: PHYSICIAN ASSISTANT

## 2024-04-23 PROCEDURE — 99999 PR PBB SHADOW E&M-EST. PATIENT-LVL V: CPT | Mod: PBBFAC,,, | Performed by: PHYSICIAN ASSISTANT

## 2024-04-23 NOTE — PATIENT INSTRUCTIONS
I will contact the team to schedule you for the Medicare Enhanced Wellness Visit (annual screening visit) on a Tuesday Morning with me.     Keep your appt with Dr. Huynh.    I will reach out to Nayan to schedule a follow up therapy appointment.

## 2024-05-02 ENCOUNTER — OFFICE VISIT (OUTPATIENT)
Dept: PRIMARY CARE CLINIC | Facility: CLINIC | Age: 79
End: 2024-05-02
Payer: MEDICARE

## 2024-05-02 VITALS
TEMPERATURE: 98 F | RESPIRATION RATE: 18 BRPM | DIASTOLIC BLOOD PRESSURE: 63 MMHG | OXYGEN SATURATION: 97 % | BODY MASS INDEX: 29.27 KG/M2 | WEIGHT: 155 LBS | HEART RATE: 86 BPM | HEIGHT: 61 IN | SYSTOLIC BLOOD PRESSURE: 124 MMHG

## 2024-05-02 DIAGNOSIS — F43.21 GRIEF: ICD-10-CM

## 2024-05-02 DIAGNOSIS — G47.33 OSA (OBSTRUCTIVE SLEEP APNEA): ICD-10-CM

## 2024-05-02 DIAGNOSIS — G89.29 CHRONIC MIDLINE LOW BACK PAIN WITHOUT SCIATICA: ICD-10-CM

## 2024-05-02 DIAGNOSIS — D69.2 SENILE PURPURA: ICD-10-CM

## 2024-05-02 DIAGNOSIS — F10.21 ALCOHOL DEPENDENCE IN REMISSION: ICD-10-CM

## 2024-05-02 DIAGNOSIS — F33.0 MDD (MAJOR DEPRESSIVE DISORDER), RECURRENT EPISODE, MILD: ICD-10-CM

## 2024-05-02 DIAGNOSIS — I70.0 AORTIC ATHEROSCLEROSIS: ICD-10-CM

## 2024-05-02 DIAGNOSIS — Z98.890 S/P SUBTOTAL PARATHYROIDECTOMY: ICD-10-CM

## 2024-05-02 DIAGNOSIS — M54.50 CHRONIC MIDLINE LOW BACK PAIN WITHOUT SCIATICA: ICD-10-CM

## 2024-05-02 DIAGNOSIS — F41.1 GAD (GENERALIZED ANXIETY DISORDER): ICD-10-CM

## 2024-05-02 DIAGNOSIS — I49.5 SICK SINUS SYNDROME: ICD-10-CM

## 2024-05-02 DIAGNOSIS — Z90.89 S/P SUBTOTAL PARATHYROIDECTOMY: ICD-10-CM

## 2024-05-02 DIAGNOSIS — E11.69 HYPERLIPIDEMIA ASSOCIATED WITH TYPE 2 DIABETES MELLITUS: Primary | ICD-10-CM

## 2024-05-02 DIAGNOSIS — Z78.0 POSTMENOPAUSAL ESTROGEN DEFICIENCY: ICD-10-CM

## 2024-05-02 DIAGNOSIS — Z95.0 PACEMAKER: ICD-10-CM

## 2024-05-02 DIAGNOSIS — J84.10 LUNG GRANULOMA: ICD-10-CM

## 2024-05-02 DIAGNOSIS — E78.5 HYPERLIPIDEMIA ASSOCIATED WITH TYPE 2 DIABETES MELLITUS: Primary | ICD-10-CM

## 2024-05-02 PROBLEM — I42.9 CARDIOMYOPATHY: Status: RESOLVED | Noted: 2019-02-04 | Resolved: 2024-05-02

## 2024-05-02 PROCEDURE — 1101F PT FALLS ASSESS-DOCD LE1/YR: CPT | Mod: CPTII,S$GLB,, | Performed by: INTERNAL MEDICINE

## 2024-05-02 PROCEDURE — 99214 OFFICE O/P EST MOD 30 MIN: CPT | Mod: S$GLB,,, | Performed by: INTERNAL MEDICINE

## 2024-05-02 PROCEDURE — 3078F DIAST BP <80 MM HG: CPT | Mod: CPTII,S$GLB,, | Performed by: INTERNAL MEDICINE

## 2024-05-02 PROCEDURE — 1123F ACP DISCUSS/DSCN MKR DOCD: CPT | Mod: CPTII,S$GLB,, | Performed by: INTERNAL MEDICINE

## 2024-05-02 PROCEDURE — 1125F AMNT PAIN NOTED PAIN PRSNT: CPT | Mod: CPTII,S$GLB,, | Performed by: INTERNAL MEDICINE

## 2024-05-02 PROCEDURE — 99999 PR PBB SHADOW E&M-EST. PATIENT-LVL V: CPT | Mod: PBBFAC,,, | Performed by: INTERNAL MEDICINE

## 2024-05-02 PROCEDURE — 3074F SYST BP LT 130 MM HG: CPT | Mod: CPTII,S$GLB,, | Performed by: INTERNAL MEDICINE

## 2024-05-02 PROCEDURE — 1159F MED LIST DOCD IN RCRD: CPT | Mod: CPTII,S$GLB,, | Performed by: INTERNAL MEDICINE

## 2024-05-02 PROCEDURE — 3288F FALL RISK ASSESSMENT DOCD: CPT | Mod: CPTII,S$GLB,, | Performed by: INTERNAL MEDICINE

## 2024-05-02 PROCEDURE — 1160F RVW MEDS BY RX/DR IN RCRD: CPT | Mod: CPTII,S$GLB,, | Performed by: INTERNAL MEDICINE

## 2024-05-02 RX ORDER — SEMAGLUTIDE 1.34 MG/ML
1 INJECTION, SOLUTION SUBCUTANEOUS
Qty: 3 ML | Refills: 11 | Status: SHIPPED | OUTPATIENT
Start: 2024-05-02 | End: 2025-05-02

## 2024-05-02 NOTE — PATIENT INSTRUCTIONS
Increase ozempic from 0.5mg to 1mg weekly.   Replace your Freestyle Kendall meter.   Labs in 2 months.   Iman, our therapist, is going to contact you to schedule therapy sessions for the depression and grief.   Schedule your bone density scan.   Restart physical therapy and increase your walking.   RSV vaccine through local pharmacy or [facility].

## 2024-05-02 NOTE — PROGRESS NOTES
Subjective:       Patient ID: Paris Burris is a 78 y.o. female.    Chief Complaint: diabetes    HPI  MMG 2/12/24 neg  DEXA - osteopenia.  Colonoscopy 08/31/2022 tubular adenoma polyps.  She and  hasn't been driving as much. Daughter lives 5 min away.   Didn't bring hearing aid for R ear and didn't turn on the L cochlear implant. Difficult visit as pt cannot hear too well despite talking very loudly into the R ear.    DM2 - ozempic 0.5mg weekly (started recently), metformin xr 500mg daily.   Hasn't been testing lately. Hasn't put her freestyle yudith back on.   A1c 3/8/24 7.1  MAC 3/8/24 NEG.   UTD on foot exam w/ Dr. Dunbar. LOV 4/9/24    Lower Back pain  On tramadol 50mg 2 pills BID and mobic 7.5mg daily  Referred to PT 3/1/24 and was doing it but has been hectic lately.   No falls but does have trouble w/ balance.      MDD - effexor 75mg daily, wellbutrin xl 300mg daily.   Sister and REBECA passed away. Only sister. Has a lot of cousins. Some days will visit w/ friends.   Alcohol dependence in remission. Part of AA.       5/2/2024     1:04 PM 4/23/2024     1:45 PM 11/9/2023     1:02 PM 10/30/2023    10:11 AM 10/19/2023     9:03 AM 10/17/2023     3:41 PM 8/24/2023     9:58 AM   Depression Patient Health Questionnaire   Over the last two weeks how often have you been bothered by little interest or pleasure in doing things Several days Not at all Not at all Several days Not at all Several days Several days   Over the last two weeks how often have you been bothered by feeling down, depressed or hopeless Several days Nearly every day Several days Several days Several days Several days More than half the days   PHQ-2 Total Score 2 3 1 2 1 2 3   Over the last two weeks how often have you been bothered by trouble falling or staying asleep, or sleeping too much Not at all  Not at all  Not at all Not at all Several days   Over the last two weeks how often have you been bothered by feeling tired or having little  energy Not at all  Not at all  Several days Several days Several days   Over the last two weeks how often have you been bothered by a poor appetite or overeating Several days  Not at all  Not at all Several days Not at all   Over the last two weeks how often have you been bothered by feeling bad about yourself - or that you are a failure or have let yourself or your family down Several days  Not at all  Several days Several days Several days   Over the last two weeks how often have you been bothered by trouble concentrating on things, such as reading the newspaper or watching television Not at all  Not at all  Several days Not at all Not at all   Over the last two weeks how often have you been bothered by moving or speaking so slowly that other people could have noticed. Or the opposite - being so fidgety or restless that you have been moving around a lot more than usual. Not at all  Not at all  Not at all Not at all Not at all   Over the last two weeks how often have you been bothered by thoughts that you would be better off dead, or of hurting yourself Not at all  Not at all  Not at all Not at all Not at all   If you checked off any problems, how difficult have these problems made it for you to do your work, take care of things at home or get along with other people? Somewhat difficult  Somewhat difficult  Somewhat difficult  Somewhat difficult   PHQ-9 Score 4  1  4 5 6   PHQ-9 Interpretation Minimal or None  Minimal or None  Minimal or None Mild Mild         5/2/2024     1:05 PM 2/29/2024     2:41 PM 11/9/2023     1:02 PM   GAD7   1. Feeling nervous, anxious, or on edge? 1 0 0   2. Not being able to stop or control worrying? 1 0 0   3. Worrying too much about different things? 0 1 0   4. Trouble relaxing? 0 0 0   5. Being so restless that it is hard to sit still? 1 0 0   6. Becoming easily annoyed or irritable? 1 0 0   7. Feeling afraid as if something awful might happen? 1 0 0   8. If you checked off any  "problems, how difficult have these problems made it for you to do your work, take care of things at home, or get along with other people?   0   GABBY-7 Score 5 1 0      HLD - zetia 10mg daily, crestor 20mg daily.   Aortic atherosclerosis on CT L shoulder 2022  LDL 34.6 5/3/23.      HTN - toprol xl 25mg daily, losartan 100mg daily.   COOKIE  CPAP intolerance. LOV w/ Dr. Emmanuel 10/30/23. CPAP titration 11/2023.   Haven't restarted CPAP.      L shoulder CT w/ advanced OA of L glenohumeral and SC joints.   Left upper lobe calcified granuloma.      H/o L superior parathyroidectomy 2015. PTH 5/2023 35.3     H/o SVT and 2nd degree AVB. S/p PM 2019. Overdue to f/u in EP clinic.      Easy bruising.      [facility] on neuropsych testing 9/2023.    Review of Systems  as above in HPI.     Objective:      Physical Exam    /63 (BP Location: Left arm, Patient Position: Sitting, BP Method: Medium (Manual))   Pulse 86   Temp 97.8 °F (36.6 °C) (Oral)   Resp 18   Ht 5' 1" (1.549 m)   Wt 70.3 kg (154 lb 15.7 oz)   SpO2 97%   BMI 29.28 kg/m²     GEN - A+OX4, NAD   HEENT - PERRL, EOMI, OP clear.   CV - RRR, no m/r   Chest - CTAB, no wheezing or rhonchi  Abd - S/NT/ND/+BS.   Ext - 2+BDP and radial pulses. No LE edema.   Neuro - 5/5 BUE and BLE strength. Normal gait.   Skin - No rash. BUE w/ small bruising.     Labs reviewed.     Assessment/Plan     Diagnoses and all orders for this visit:    Hyperlipidemia associated with type 2 diabetes mellitus - inc ozempic from 0.5mg to 1mg weekly. Repeat FLP.  -     semaglutide (OZEMPIC) 1 mg/dose (4 mg/3 mL); Inject 1 mg into the skin every 7 days.  -     Comprehensive Metabolic Panel; Future  -     Lipid Panel; Future    Chronic midline low back pain without sciatica - cont PT.     MDD (major depressive disorder), recurrent episode, mild - cont current meds. Component of grief at this time due to only sister passing away.   -     Ambulatory referral/consult to [facility] " [facility]; Future    GABBY (generalized anxiety disorder) - as above.     Alcohol dependence in remission - still in remission. Doing well.     Aortic atherosclerosis - cont med.     COOKIE (obstructive sleep apnea) - pt reports hasn't tried CPAP again. Will give it a try.    Lung granuloma - asymptomatic.     S/P subtotal parathyroidectomy - PTH ok last yr.    Sick sinus syndromes/p Pacemaker - no issues.     Senile purpura - reassurance.     Grief  -     Ambulatory referral/consult to [facility]; Future    Postmenopausal estrogen deficiency  -     DXA Bone Density Axial Skeleton 1 or more sites; Future    Increase ozempic from 0.5mg to 1mg weekly.   Replace your Freestyle Kendall meter.   Labs in 2 months.   Iman, our therapist, is going to contact you to schedule therapy sessions for the depression and grief.   Schedule your bone density scan.   Restart physical therapy and increase your walking.   RSV vaccine through local pharmacy or [facility].    Advance Care Planning     Date: 05/02/2024    Living Will  During this visit, I engaged the patient  in the voluntary advance care planning process.  The patient and I reviewed the role for advance directives and their purpose in directing future healthcare if the patient's unable to speak for him/herself.  At this point in time, the patient does have full decision-making capacity.  We discussed different extreme health states that she could experience, and reviewed what kind of medical care she would want in those situations.  The patient communicated that if she were comatose and had little chance of a meaningful recovery, she would not want machines/life-sustaining treatments used. In addition to the above preference, other important end-of-life issues for the patient include  see paperwork . The patient has completed a living will to reflect these preferences and The patient has already designated a healthcare power of  to make  "decisions on her behalf.  I spent a total of 3 minutes engaging the patient in this advance care planning discussion.    Power of   I initiated the process of voluntary advance care planning today and explained the importance of this process to the patient.  I introduced the concept of advance directives to the patient, as well. Then the patient received detailed information about the importance of designating a Health Care Power of  (HCPOA). She was also instructed to communicate with this person about their wishes for future healthcare, should she become sick and lose decision-making capacity. The patient has previously appointed a HCPOA. After our discussion, the patient has decided to complete a HCPOA and has appointed her significant other, health care agent:  Jean Burris III  & health care agent number:  [phone]  I spent a total time of 3 minutes discussing this issue with the patient.     Bring hearing aid to next visit. Repeat SLUMs. Likely needs neuropsych re-eval but not sure how much of the "forgetfulness" is b/c she cannot hear.  Follow up in about 8 weeks (around 6/27/2024).      Mandi Huynh MD  Department of Internal Medicine - [facility]  8:38 AM  "

## 2024-05-03 ENCOUNTER — TELEPHONE (OUTPATIENT)
Dept: PRIMARY CARE CLINIC | Facility: CLINIC | Age: 79
End: 2024-05-03
Payer: MEDICARE

## 2024-05-03 RX ORDER — NITROFURANTOIN 25; 75 MG/1; MG/1
100 CAPSULE ORAL 2 TIMES DAILY
Qty: 10 CAPSULE | Refills: 0 | Status: SHIPPED | OUTPATIENT
Start: 2024-05-03 | End: 2024-05-13

## 2024-05-03 NOTE — TELEPHONE ENCOUNTER
No she did not. Can she submit a urine? Since it's the weekend, I'll call in something but I want her to get her urine done BEFORE she starts the antibiotics b/c it may be falsely negative if she takes the antibiotics first.

## 2024-05-03 NOTE — TELEPHONE ENCOUNTER
Tried to contact pt / Left a message for patient to call the office back.     Did pt mention this at her appointment?

## 2024-05-03 NOTE — TELEPHONE ENCOUNTER
----- Message from Marilynn Rodriguez sent at 5/3/2024 11:18 AM CDT -----  Contact: [phone]  1MEDICALADVICE     Patient is calling for Medical Advice regarding:pt is calling she has a UTI infection and she would like to get medication to help clear this up.    How long has patient had these symptoms:Wednesday    Pharmacy name and phone#:  [facility]  [address]  [facility] [address]  Phone: [phone] Fax: [phone]         Would like response via [employer]:  callback    Comments:

## 2024-05-08 ENCOUNTER — TELEPHONE (OUTPATIENT)
Dept: PRIMARY CARE CLINIC | Facility: CLINIC | Age: 79
End: 2024-05-08
Payer: MEDICARE

## 2024-05-08 DIAGNOSIS — L73.9 FOLLICULITIS: Primary | ICD-10-CM

## 2024-05-08 RX ORDER — MUPIROCIN 20 MG/G
OINTMENT TOPICAL 2 TIMES DAILY
Qty: 22 G | Refills: 0 | Status: SHIPPED | OUTPATIENT
Start: 2024-05-08 | End: 2024-05-15

## 2024-05-08 NOTE — TELEPHONE ENCOUNTER
----- Message from Margi Solitario sent at 5/8/2024  8:18 AM CDT -----  Contact: Self/ [phone]  1MEDICALADVICE     Patient is calling for Medical Advice regarding:Red spots    How long has patient had these symptoms:ongoing     Pharmacy name and phone#: [facility]  [address]  [facility] [address]  Phone: [phone] Fax: [phone]      Would like response via [employer]:  No, return call     Comments: pt said that she Is calling in regards to when she was seen in the office by Dr Huynh she told her she had red spots on top of the head, side and face and would like to know If the doctor can prescribe something for it. Please advise

## 2024-05-09 ENCOUNTER — OFFICE VISIT (OUTPATIENT)
Dept: URGENT CARE | Facility: CLINIC | Age: 79
End: 2024-05-09
Payer: MEDICARE

## 2024-05-09 VITALS
DIASTOLIC BLOOD PRESSURE: 80 MMHG | BODY MASS INDEX: 29.07 KG/M2 | HEIGHT: 61 IN | OXYGEN SATURATION: 98 % | TEMPERATURE: 97 F | SYSTOLIC BLOOD PRESSURE: 134 MMHG | HEART RATE: 75 BPM | WEIGHT: 154 LBS | RESPIRATION RATE: 17 BRPM

## 2024-05-09 DIAGNOSIS — S30.0XXA SACRAL CONTUSION, INITIAL ENCOUNTER: Primary | ICD-10-CM

## 2024-05-09 PROCEDURE — 99213 OFFICE O/P EST LOW 20 MIN: CPT | Mod: S$GLB,,, | Performed by: FAMILY MEDICINE

## 2024-05-09 NOTE — PROGRESS NOTES
"Subjective:      Patient ID: Paris Burris is a 78 y.o. female.    Vitals:  height is 5' 1" (1.549 m) and weight is 69.9 kg (154 lb). Her oral temperature is 97.3 °F (36.3 °C). Her blood pressure is 134/80 and her pulse is 75. Her respiration is 17 and oxygen saturation is 98%.     Chief Complaint: Fall    Pt states she was walking down the stairs 3 or 4 hours ago and her knees bent and she fell backwards and hurt her lower back/buttocks region, says she doesn't think anything is broken but its uncomfortable    Fall  The accident occurred 3 to 6 hours ago. The pain is at a severity of 6/10. The pain is mild.     ROS   Objective:     Physical Exam   Constitutional: She is oriented to person, place, and time. She does not appear ill. No distress. obesity  Cardiovascular: Normal rate, regular rhythm, normal heart sounds and normal pulses.   Pulmonary/Chest: Effort normal and breath sounds normal.   Abdominal: Normal appearance. Soft.   Neurological: no focal deficit. She is alert and oriented to person, place, and time. She displays no weakness and normal reflexes. Coordination and gait normal.   Skin: No bruising (no bruising buttocks or back noted)     Assessment:     1. Sacral contusion, initial encounter    Exam unremarkable. No focal finding on exam.     Plan:       Sacral contusion, initial encounter    Ice, tylenol OTC. RTC prn worsening symptoms                "

## 2024-05-10 NOTE — TELEPHONE ENCOUNTER
Called again, no answer. Bactroban sent in for red bumps on face.     OSW received a request from clinical nurse to refer patient to an in home physician and Palliative care. OSW sent a referral form to [facility]. OSW sent a referral to Palliative Care.     10/12/22 OSW contacted [facility] to notify of the referral for palliative care. The  reviewed the patient information and recommended OSW to enter a [facility] referral due to palliative care being 3 weeks out to schedule a visit.  stated hospice could screen the patient faster and if the family did not accept hospice care then [facility] would refer to palliative care. OSW submitted the referral to hospitals for a hospice referral.

## 2024-05-12 NOTE — PROGRESS NOTES
Subjective     Patient ID: Paris Burris is a 78 y.o. female.    Chief Complaint: Follow-up    HPI    Pt self-scheduled appt for ?HRA, unfortunately incorrectly.   inquires about depression meds, inquires about vaccines    Past Medical History:   Diagnosis Date    Allergy     Anemia     Anxiety     Breast cancer 2002    Left breast    Cancer 2002    L breast s/p lumpectomy    Cataract     Decreased hearing     Depression     Dermatochalasis of both upper eyelids     Diabetes mellitus     Diabetes with neurologic complications     Gastric ulcer 09/10/2013    EGD    Hiatal hernia     Hyperlipidemia     Migraine headache     Migraine headache 03/20/2015    Multiple gastric ulcers     Parathyroid disorder     Renal manifestation of secondary diabetes mellitus     Sleep apnea     no CPAP    Type 2 diabetes mellitus, controlled, 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      Social History     Tobacco Use    Smoking status: Never    Smokeless tobacco: Never   Substance Use Topics    Alcohol use: No     Comment: quit 2014 - alcohol abuse    Drug use: Not Currently     Review of patient's allergies indicates:   Allergen Reactions    Topamax [topiramate] Hallucinations     hallucinations         Review of Systems  Answers submitted by the patient for this visit:  Review of Systems Questionnaire (Submitted on 4/21/2024)  activity change: No  unexpected weight change: No  neck pain: No  hearing loss: Yes  rhinorrhea: No  trouble swallowing: No  eye discharge: No  visual disturbance: No  chest tightness: No  wheezing: No  chest pain: No  palpitations: No  blood in stool: No  constipation: No  vomiting: No  diarrhea: No  polydipsia: No  polyuria: No  difficulty urinating: No  hematuria: No  menstrual problem: No  dysuria: No  joint swelling: No  arthralgias: No  headaches: Yes  weakness: No  confusion: No  dysphoric mood: Yes     Objective     Physical Exam       Assessment and Plan     1. Type 2 diabetes  mellitus with diabetic neuropathy, without long-term current use of insulin    2. MDD (major depressive disorder), recurrent, in partial remission    No LOS today as appt incorrectly scheduled    Patient Instructions   I will contact the team to schedule you for the Medicare Enhanced Wellness Visit (annual screening visit) on a Tuesday Morning with me.     Keep your appt with Dr. Huynh.    I will reach out to Nayan to schedule a follow up therapy appointment.

## 2024-05-13 ENCOUNTER — PATIENT OUTREACH (OUTPATIENT)
Dept: PRIMARY CARE CLINIC | Facility: CLINIC | Age: 79
End: 2024-05-13
Payer: MEDICARE

## 2024-05-13 ENCOUNTER — TELEPHONE (OUTPATIENT)
Dept: PRIMARY CARE CLINIC | Facility: CLINIC | Age: 79
End: 2024-05-13
Payer: MEDICARE

## 2024-05-13 NOTE — TELEPHONE ENCOUNTER
Email sent to pt per message. LCSW offered assistance to with referral to 65+ [facility] LCSW for further sessions.  
GERD

## 2024-05-13 NOTE — TELEPHONE ENCOUNTER
LCSW received referral from Dr. Huynh.  LCSW called patient and left voicemail requesting a return call to discuss referral.

## 2024-05-15 ENCOUNTER — TELEPHONE (OUTPATIENT)
Dept: PRIMARY CARE CLINIC | Facility: CLINIC | Age: 79
End: 2024-05-15
Payer: MEDICARE

## 2024-05-15 NOTE — TELEPHONE ENCOUNTER
----- Message from Villa Christianson sent at 5/15/2024  9:18 AM CDT -----  Contact: [phone]  1MEDICALADVICE     Patient is calling for Medical Advice regarding: little red bumps on scalp    How long has patient had these symptoms: 2 months    Pharmacy name and phone#:     [facility]  [address]  [facility] [address]  Phone: [phone] Fax: [phone]          Would like response via [employer]:  call    Comments:

## 2024-05-15 NOTE — TELEPHONE ENCOUNTER
"Spoke w/ pt today on a separate encounter about her red "bumps" on her face, and that PCP sent in Bactroban for this.  Pt denies any urinary symptoms at this time.     "

## 2024-05-15 NOTE — TELEPHONE ENCOUNTER
Explained to pt that PCP sent in Bactroban for her red spots on her face, and that Rx is at [facility].   Pt also stated that she is not having any urinary symptoms at this time, no pain or discomfort when urinating.Will call us if anything changes.  Reminded pt that next appt is in July

## 2024-05-16 DIAGNOSIS — M51.36 DDD (DEGENERATIVE DISC DISEASE), LUMBAR: ICD-10-CM

## 2024-05-17 RX ORDER — TRAMADOL HYDROCHLORIDE 50 MG/1
TABLET ORAL
Qty: 60 TABLET | Refills: 0 | Status: SHIPPED | OUTPATIENT
Start: 2024-05-17

## 2024-05-29 ENCOUNTER — PATIENT MESSAGE (OUTPATIENT)
Dept: PRIMARY CARE CLINIC | Facility: CLINIC | Age: 79
End: 2024-05-29
Payer: MEDICARE

## 2024-05-31 ENCOUNTER — TELEPHONE (OUTPATIENT)
Dept: PRIMARY CARE CLINIC | Facility: CLINIC | Age: 79
End: 2024-05-31
Payer: MEDICARE

## 2024-05-31 NOTE — TELEPHONE ENCOUNTER
----- Message from Zoë Villavicencio sent at 5/31/2024 10:50 AM CDT -----  Contact: Mr. Burris Phone# [phone]  Please give patients  Mr. Burris a call, he would like to speak with you about a fall that his wife had and now she have lower back pain.

## 2024-05-31 NOTE — TELEPHONE ENCOUNTER
No answer on pt phone, called , and phone disconnected. Will try again.  Appts available w/ KEYLA Hylton on Monday or UC for check up / XR

## 2024-06-01 ENCOUNTER — OFFICE VISIT (OUTPATIENT)
Dept: INTERNAL MEDICINE | Facility: CLINIC | Age: 79
End: 2024-06-01
Payer: MEDICARE

## 2024-06-01 VITALS
DIASTOLIC BLOOD PRESSURE: 82 MMHG | WEIGHT: 152.56 LBS | BODY MASS INDEX: 28.8 KG/M2 | HEART RATE: 85 BPM | HEIGHT: 61 IN | SYSTOLIC BLOOD PRESSURE: 124 MMHG

## 2024-06-01 DIAGNOSIS — M54.50 CHRONIC MIDLINE LOW BACK PAIN WITHOUT SCIATICA: ICD-10-CM
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
## 2024-06-01 DIAGNOSIS — M53.3 SACRAL PAIN: ICD-10-CM

## 2024-06-01 DIAGNOSIS — E11.69 DIABETES MELLITUS TYPE 2 IN OBESE: ICD-10-CM

## 2024-06-01 DIAGNOSIS — G89.29 CHRONIC MIDLINE LOW BACK PAIN WITHOUT SCIATICA: ICD-10-CM

## 2024-06-01 DIAGNOSIS — M85.80 OSTEOPENIA, UNSPECIFIED LOCATION: ICD-10-CM

## 2024-06-01 DIAGNOSIS — E66.9 DIABETES MELLITUS TYPE 2 IN OBESE: ICD-10-CM

## 2024-06-01 PROCEDURE — 1159F MED LIST DOCD IN RCRD: CPT | Mod: CPTII,S$GLB,, | Performed by: INTERNAL MEDICINE

## 2024-06-01 PROCEDURE — 3288F FALL RISK ASSESSMENT DOCD: CPT | Mod: CPTII,S$GLB,, | Performed by: INTERNAL MEDICINE

## 2024-06-01 PROCEDURE — 1160F RVW MEDS BY RX/DR IN RCRD: CPT | Mod: CPTII,S$GLB,, | Performed by: INTERNAL MEDICINE

## 2024-06-01 PROCEDURE — 99213 OFFICE O/P EST LOW 20 MIN: CPT | Mod: S$GLB,,, | Performed by: INTERNAL MEDICINE

## 2024-06-01 PROCEDURE — 1126F AMNT PAIN NOTED NONE PRSNT: CPT | Mod: CPTII,S$GLB,, | Performed by: INTERNAL MEDICINE

## 2024-06-01 PROCEDURE — 99999 PR PBB SHADOW E&M-EST. PATIENT-LVL IV: CPT | Mod: PBBFAC,,, | Performed by: INTERNAL MEDICINE

## 2024-06-01 PROCEDURE — 1101F PT FALLS ASSESS-DOCD LE1/YR: CPT | Mod: CPTII,S$GLB,, | Performed by: INTERNAL MEDICINE

## 2024-06-01 PROCEDURE — 3074F SYST BP LT 130 MM HG: CPT | Mod: CPTII,S$GLB,, | Performed by: INTERNAL MEDICINE

## 2024-06-01 PROCEDURE — 3079F DIAST BP 80-89 MM HG: CPT | Mod: CPTII,S$GLB,, | Performed by: INTERNAL MEDICINE

## 2024-06-01 PROCEDURE — 1157F ADVNC CARE PLAN IN RCRD: CPT | Mod: CPTII,S$GLB,, | Performed by: INTERNAL MEDICINE

## 2024-06-01 NOTE — PROGRESS NOTES
Subjective:       Patient ID: Paris Burris is a 78 y.o. female.    Chief Complaint:   Tailbone Pain    HPI - Urgent care visit.  She fell 10 days ago while climbing stairs outside her house.  Landed on her buttocks.  She's had pain in her sacrum since then.  Now walking with a cane.  She feels unsteady and apprehensive of a fall when walking.  No sciatica.  Osteopenia on dxa 2022; not on bisphosphonate, calcium or vitamin D.  She is not a smoker.    Pmh/meds:  Reviewed and reconciled in EPIC with patient during visit today.    Review of Systems   Constitutional:  Negative for fever.   HENT:  Negative for congestion.    Respiratory:  Negative for shortness of breath.    Cardiovascular:  Negative for chest pain.   Gastrointestinal:  Negative for abdominal pain.   Genitourinary:  Negative for dysuria.   Musculoskeletal:  Positive for back pain and gait problem.   Skin:  Negative for rash.   Neurological:  Negative for headaches.   Psychiatric/Behavioral:  Negative for sleep disturbance.        Objective:      Physical Exam  Vitals reviewed.   Constitutional:       Appearance: She is well-developed.   HENT:      Head: Normocephalic and atraumatic.   Cardiovascular:      Rate and Rhythm: Normal rate and regular rhythm.      Heart sounds: Normal heart sounds. No murmur heard.     No friction rub. No gallop.   Pulmonary:      Effort: Pulmonary effort is normal. No respiratory distress.      Breath sounds: Normal breath sounds. No wheezing or rales.   Chest:      Chest wall: No tenderness.   Musculoskeletal:        Back:    Skin:     General: Skin is warm and dry.      Findings: No erythema.   Neurological:      General: No focal deficit present.      Mental Status: She is alert.   Psychiatric:         Mood and Affect: Mood normal.         Assessment:       1. Fall, initial encounter    2. Sacral pain    3. Osteopenia, unspecified location    4. Diabetes mellitus type 2 in obese    5. Chronic midline low back pain  without sciatica        Plan:       Paris was seen today for tailbone pain.    Diagnoses and all orders for this visit:    Fall, initial encounter - agreed that she should use a cane.  Need to do x-rays to r/o fx b/c of her openia  -     X-Ray Sacrum And Coccyx; Future    Sacral pain - as above.  Tylenol and topicals.  Provided reassurance that this should gradually improve  -     X-Ray Sacrum And Coccyx; Future    Osteopenia, unspecified location - this is the indication for plain films.  Due for dxa.  Monitor   -     X-Ray Sacrum And Coccyx; Future    Diabetes mellitus type 2 in obese - A1c at goal.  Continue to monitor    Chronic midline low back pain without sciatica    Rtc prn    SHIV Mccullough MD MPH  Staff Internist

## 2024-06-03 ENCOUNTER — HOSPITAL ENCOUNTER (OUTPATIENT)
Dept: RADIOLOGY | Facility: HOSPITAL | Age: 79
Discharge: HOME | End: 2024-06-03
Attending: INTERNAL MEDICINE
Payer: MEDICARE
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
## 2024-06-03 DIAGNOSIS — M85.80 OSTEOPENIA, UNSPECIFIED LOCATION: ICD-10-CM

## 2024-06-03 DIAGNOSIS — M53.3 SACRAL PAIN: ICD-10-CM

## 2024-06-03 PROCEDURE — 72220 X-RAY EXAM SACRUM TAILBONE: CPT | Mod: TC

## 2024-06-03 PROCEDURE — 72220 X-RAY EXAM SACRUM TAILBONE: CPT | Mod: 26,,, | Performed by: RADIOLOGY

## 2024-06-11 ENCOUNTER — CLINICAL SUPPORT (OUTPATIENT)
Dept: CARDIOLOGY | Facility: HOSPITAL | Age: 79
End: 2024-06-11
Payer: MEDICARE

## 2024-06-11 ENCOUNTER — CLINICAL SUPPORT (OUTPATIENT)
Dept: CARDIOLOGY | Facility: HOSPITAL | Age: 79
End: 2024-06-11
Attending: INTERNAL MEDICINE
Payer: MEDICARE

## 2024-06-11 DIAGNOSIS — I42.9 CARDIOMYOPATHY, UNSPECIFIED: ICD-10-CM

## 2024-06-11 DIAGNOSIS — Z95.0 PRESENCE OF CARDIAC PACEMAKER: ICD-10-CM

## 2024-06-11 PROCEDURE — 93294 REM INTERROG EVL PM/LDLS PM: CPT | Mod: ,,, | Performed by: INTERNAL MEDICINE

## 2024-06-11 PROCEDURE — 93296 REM INTERROG EVL PM/IDS: CPT | Performed by: INTERNAL MEDICINE

## 2024-06-17 ENCOUNTER — HOSPITAL ENCOUNTER (OUTPATIENT)
Dept: RADIOLOGY | Facility: CLINIC | Age: 79
Discharge: HOME | End: 2024-06-17
Attending: INTERNAL MEDICINE
Payer: MEDICARE

## 2024-06-17 ENCOUNTER — TELEPHONE (OUTPATIENT)
Dept: PRIMARY CARE CLINIC | Facility: CLINIC | Age: 79
End: 2024-06-17
Payer: MEDICARE

## 2024-06-17 DIAGNOSIS — Z78.0 POSTMENOPAUSAL ESTROGEN DEFICIENCY: ICD-10-CM

## 2024-06-17 PROCEDURE — 77080 DXA BONE DENSITY AXIAL: CPT | Mod: 26,,, | Performed by: INTERNAL MEDICINE

## 2024-06-17 PROCEDURE — 77080 DXA BONE DENSITY AXIAL: CPT | Mod: TC

## 2024-06-17 NOTE — TELEPHONE ENCOUNTER
Patient has not responded to previous attempts via telephone 5/13 or [employer] message sent on 5/29.  LCSW called and left 2nd voicemail requesting a return call to discuss referral. Dr. Huynh and staff updated.

## 2024-06-20 ENCOUNTER — TELEPHONE (OUTPATIENT)
Dept: PRIMARY CARE CLINIC | Facility: CLINIC | Age: 79
End: 2024-06-20
Payer: MEDICARE

## 2024-06-20 NOTE — TELEPHONE ENCOUNTER
Please call and notify pt:  Bone density scan shows osteoporosis, which means your bones are less dense than normal and at risk for fractures (broken bones). I recommend taking at least calcium 1200mg and Vitamin D of 1000 IU daily. In addition, I recommend light weight exercises. I would like to start you on a class of medication called bisphosphonates such as alendronate (Fosamax). One of the more common side effects is gastritis - upset stomach. You have to avoid lying down for 30 minutes after taking the medicine. In addition, a rare but severe side effect is osteonecrosis of the jaw (wearing away of the jaw bone). In order to start this medicine, you have to get clearance from your dentist. Once this is done, please let the office know if you want to proceed with taking the medicine.

## 2024-06-24 LAB
OHS CV AF BURDEN PERCENT: < 1
OHS CV BIV PACING PERCENT: 99 %
OHS CV DC REMOTE DEVICE TYPE: NORMAL

## 2024-07-02 ENCOUNTER — PATIENT MESSAGE (OUTPATIENT)
Dept: AUDIOLOGY | Facility: CLINIC | Age: 79
End: 2024-07-02
Payer: MEDICARE

## 2024-07-02 ENCOUNTER — PATIENT MESSAGE (OUTPATIENT)
Dept: PRIMARY CARE CLINIC | Facility: CLINIC | Age: 79
End: 2024-07-02
Payer: MEDICARE

## 2024-07-08 ENCOUNTER — CLINICAL SUPPORT (OUTPATIENT)
Dept: AUDIOLOGY | Facility: CLINIC | Age: 79
End: 2024-07-08
Payer: MEDICARE

## 2024-07-08 DIAGNOSIS — H90.3 SENSORINEURAL HEARING LOSS OF BOTH EARS: Primary | ICD-10-CM

## 2024-07-08 PROCEDURE — 92557 COMPREHENSIVE HEARING TEST: CPT | Mod: 52,S$GLB,, | Performed by: AUDIOLOGIST

## 2024-07-08 NOTE — PROGRESS NOTES
Ms. Paris Burris was seen in the clinic today for an audiological evaluation.  Ms. Burris has a documented, long-standing history of bilateral hearing loss.  She uses a hearing aid for the right ear and a cochlear implant for the left ear.    Audiological testing revealed a moderately-severe to profound sensorineural hearing loss for the right ear.  A speech reception threshold was obtained at 65 dBHL for the right ear.  Speech discrimination was 36% for the right ear.      Recommendations:  1. Otologic evaluation  2. Annual audiological evaluation  3. Hearing protection when in noise   4. Continue use of bimodal amplification

## 2024-07-10 ENCOUNTER — CLINICAL SUPPORT (OUTPATIENT)
Dept: AUDIOLOGY | Facility: CLINIC | Age: 79
End: 2024-07-10
Payer: MEDICARE

## 2024-07-10 DIAGNOSIS — H90.3 SENSORINEURAL HEARING LOSS (SNHL) OF BOTH EARS: Primary | ICD-10-CM

## 2024-07-10 NOTE — PROGRESS NOTES
Hearing Aid Consult    Paris Burris, 78 year old female, was seen for a hearing aid consult. Patient has a history of progressive hearing loss bilaterally and currently wears a Cochlear Americas device for the left ear. Patient previously had a ReSound 7 hearing aid for the right ear that is lost. A ReSound hearing aid was recommended for compatibility purposes with the cochlear implant sound processor. Patient chose the advanced technology level and a bundled package. An earmold impression was obtained without incident and will be mailed to [facility] for a custom earmold. Patient was counseled on the 30 day trial period and the 3 year warranty. Patient confirmed understanding of all topics discussed and will return in two weeks for a hearing aid fitting.

## 2024-07-25 ENCOUNTER — OFFICE VISIT (OUTPATIENT)
Dept: PRIMARY CARE CLINIC | Facility: CLINIC | Age: 79
End: 2024-07-25
Payer: MEDICARE

## 2024-07-25 ENCOUNTER — LAB VISIT (OUTPATIENT)
Dept: LAB | Facility: HOSPITAL | Age: 79
End: 2024-07-25
Attending: INTERNAL MEDICINE
Payer: MEDICARE

## 2024-07-25 VITALS
SYSTOLIC BLOOD PRESSURE: 110 MMHG | TEMPERATURE: 98 F | BODY MASS INDEX: 28.85 KG/M2 | HEART RATE: 87 BPM | OXYGEN SATURATION: 96 % | DIASTOLIC BLOOD PRESSURE: 70 MMHG | WEIGHT: 152.69 LBS

## 2024-07-25 DIAGNOSIS — E78.5 HYPERLIPIDEMIA ASSOCIATED WITH TYPE 2 DIABETES MELLITUS: ICD-10-CM

## 2024-07-25 DIAGNOSIS — E66.9 DIABETES MELLITUS TYPE 2 IN OBESE: Primary | ICD-10-CM

## 2024-07-25 DIAGNOSIS — M81.0 SENILE OSTEOPOROSIS: ICD-10-CM

## 2024-07-25 DIAGNOSIS — E11.69 HYPERLIPIDEMIA ASSOCIATED WITH TYPE 2 DIABETES MELLITUS: ICD-10-CM

## 2024-07-25 DIAGNOSIS — R26.81 GAIT INSTABILITY: ICD-10-CM

## 2024-07-25 DIAGNOSIS — E11.69 DIABETES MELLITUS TYPE 2 IN OBESE: Primary | ICD-10-CM

## 2024-07-25 DIAGNOSIS — F43.21 GRIEF: ICD-10-CM

## 2024-07-25 DIAGNOSIS — F33.42 RECURRENT MAJOR DEPRESSIVE DISORDER, IN FULL REMISSION: ICD-10-CM

## 2024-07-25 LAB
ALBUMIN SERPL BCP-MCNC: 4 G/DL (ref 3.5–5.2)
ALP SERPL-CCNC: 74 U/L (ref 55–135)
ALT SERPL W/O P-5'-P-CCNC: 32 U/L (ref 10–44)
ANION GAP SERPL CALC-SCNC: 8 MMOL/L (ref 8–16)
AST SERPL-CCNC: 32 U/L (ref 10–40)
BILIRUB SERPL-MCNC: 0.5 MG/DL (ref 0.1–1)
BUN SERPL-MCNC: 12 MG/DL (ref 8–23)
CALCIUM SERPL-MCNC: 9.5 MG/DL (ref 8.7–10.5)
CHLORIDE SERPL-SCNC: 104 MMOL/L (ref 95–110)
CHOLEST SERPL-MCNC: 172 MG/DL (ref 120–199)
CHOLEST/HDLC SERPL: 4.9 {RATIO} (ref 2–5)
CO2 SERPL-SCNC: 28 MMOL/L (ref 23–29)
CREAT SERPL-MCNC: 0.9 MG/DL (ref 0.5–1.4)
EST. GFR  (NO RACE VARIABLE): >60 ML/MIN/1.73 M^2
GLUCOSE SERPL-MCNC: 149 MG/DL (ref 70–110)
HDLC SERPL-MCNC: 35 MG/DL (ref 40–75)
HDLC SERPL: 20.3 % (ref 20–50)
LDLC SERPL CALC-MCNC: ABNORMAL MG/DL (ref 63–159)
NONHDLC SERPL-MCNC: 137 MG/DL
POTASSIUM SERPL-SCNC: 4.3 MMOL/L (ref 3.5–5.1)
PROT SERPL-MCNC: 7.4 G/DL (ref 6–8.4)
SODIUM SERPL-SCNC: 140 MMOL/L (ref 136–145)
TRIGL SERPL-MCNC: 415 MG/DL (ref 30–150)

## 2024-07-25 PROCEDURE — 80061 LIPID PANEL: CPT | Performed by: INTERNAL MEDICINE

## 2024-07-25 PROCEDURE — 36415 COLL VENOUS BLD VENIPUNCTURE: CPT | Performed by: INTERNAL MEDICINE

## 2024-07-25 PROCEDURE — 80053 COMPREHEN METABOLIC PANEL: CPT | Performed by: INTERNAL MEDICINE

## 2024-07-25 PROCEDURE — 99999 PR PBB SHADOW E&M-EST. PATIENT-LVL V: CPT | Mod: PBBFAC,,, | Performed by: INTERNAL MEDICINE

## 2024-07-25 RX ORDER — SEMAGLUTIDE 1.34 MG/ML
1 INJECTION, SOLUTION SUBCUTANEOUS
Qty: 3 ML | Refills: 11 | Status: SHIPPED | OUTPATIENT
Start: 2024-07-25 | End: 2025-07-25

## 2024-07-25 RX ORDER — ALENDRONATE SODIUM 70 MG/1
70 TABLET ORAL
Qty: 4 TABLET | Refills: 11 | Status: SHIPPED | OUTPATIENT
Start: 2024-07-25 | End: 2025-07-25

## 2024-07-25 NOTE — Clinical Note
B, will you call her in a week or two to make sure she's taking her meds?  Iman, she's getting her new hearing aids tomorrow so hopefully she can hear your call. If she doesn't answer, can also try 's cell. Thanks!

## 2024-07-25 NOTE — PROGRESS NOTES
Subjective:       Patient ID: Paris Burris is a 78 y.o. female.    Chief Complaint: Diabetes    HPI    DM2 - metformin xr 500mg qd, ozempic 1mg weekly (inc at last visit 5/2/24).  Pt reports that she's slacked off on taking all her medicines. Doesn't have any reason why she's not taking it.   A1c - 3/8/24 7.1.   MAC 3/2024 neg.     Referred to Iman Dee LCSW for grief due to her only sister passing away. However Iman had contacted patient several times but was not able to connect w/ pt.   Has been visiting w/ friends. Walking and cooking helps. MDD on wellbutrin xl 300mg daily and effexor 75mg daily.     Had a fall in May walking up the stairs - shoes were wet and the stairs were wet so she slipped.   Previously referred to PT for instability but pt had to pause it as her schedule was very hectic.  Sacrum xr 6/3/24 - Mild DJD. No fracture or dislocation. No bone destruction identified   Started walking more. Walks w/ a cane.     DEXA 6/17/24 - osteoporosis. High FRAX score.     Had hearing aid consult w/ audiologist 7/10/24.    Pillpacking via PCW. Last pack 6/27/24:  Morning card:   Bupropion  mg   Ezetimibe 10 mg   Losartan 100 mg   Metformin  mg   Metoprolol succinate ER 25 mg   Rosuvastatin 20 mg   Venlafaxine 75 mg       Ozempic    Review of Systems  Comprehensive review of systems otherwise negative. See history/subjective section for more details.    Objective:      Physical Exam    /70 (BP Location: Right arm, Patient Position: Sitting, BP Method: Medium (Manual))   Pulse 87   Temp 98.2 °F (36.8 °C) (Oral)   Wt 69.3 kg (152 lb 10.7 oz)   SpO2 96%   BMI 28.85 kg/m²     GEN - A+OX4, NAD   HEENT - PERRL, EOMI, OP clear. Can barely hear despite loud volume.  CV - RRR, no m/r   Chest - CTAB, no wheezing or rhonchi  Abd - S/NT/ND/+BS.   Ext - 2+BDP and radial pulses. No LE edema.   Neuro - 5/5 BUE and BLE strength. Normal gait.   Skin - No rash.     Previous labs reviewed.      Assessment/Plan     Paris was seen today for diabetes.    Diagnoses and all orders for this visit:    Diabetes mellitus type 2 in obese - intermittently taking ozempic. Discussed after the injection, has to wait a few seconds before taking off needle. Will try again.     Gait instability  -     Ambulatory referral/consult to Physical/Occupational Therapy; Future    Senile osteoporosis - risks discussed. Follows w/ dentist. Pt has large hiatal hernia. Make jeovany eto sit up for 2 hours after taking meds but if reflux gets worse, the pt to let me know and we cna consider prolia.  -     alendronate (FOSAMAX) 70 MG tablet; Take 1 tablet (70 mg total) by mouth every 7 days.    Hyperlipidemia associated with type 2 diabetes mellitus - cont current meds.   -     semaglutide (OZEMPIC) 1 mg/dose (4 mg/3 mL); Inject 1 mg into the skin every 7 days.    Recurrent major depressive disorder, in full remission - cont current meds.     Grief - will re-refer to Iman.     Pt apparently hasn't been taking her pillpacks - she doesn't know why. She had said she takes it about 1/2 or 2/3 of the time but  sitting next to her says otherwise. Last fill was a mo ago and  said during our conversation that they still have about a month left of pillpacks. Given calendar for pt to fabien down days she's taking the pillpacks and to bring it back to next visit w/ me.    She's getting her new hearing aids tomorrow so hopefully that'll help.    Follow up in about 2 months (around 9/25/2024).      Mandi Huynh MD  Department of Internal Medicine - [facility]  1:04 PM

## 2024-07-26 ENCOUNTER — CLINICAL SUPPORT (OUTPATIENT)
Dept: AUDIOLOGY | Facility: CLINIC | Age: 79
End: 2024-07-26
Payer: MEDICARE

## 2024-07-26 DIAGNOSIS — H90.3 SENSORINEURAL HEARING LOSS (SNHL), BILATERAL: Primary | ICD-10-CM

## 2024-07-26 DIAGNOSIS — H93.293 IMPAIRMENT OF AUDITORY DISCRIMINATION OF BOTH EARS: ICD-10-CM

## 2024-07-26 DIAGNOSIS — H90.3 SENSORINEURAL HEARING LOSS (SNHL) OF BOTH EARS: Primary | ICD-10-CM

## 2024-07-26 NOTE — PROGRESS NOTES
7/26/2024    Cochlear Implant Programming Session:    Left Ear: Dr. Julian  :  [employer]  Internal Device/Serial Number:  [id number] / [id number]  Processor:  [id number]   SN of Processors: [id number]  DOS:  34/26/21  DIS:  6/1/21  Processor Color: Sand  Magnet Strength: 3i   Coil Length:  6cm  Battery Type:  Standard rechargeable  Warranty:  5 year     Right ear: RESOUND HA  ReSound Nexia 7   Champagne   RT SN: [id number]   Repair + L/D Exp: 8/16/2027      SN: [id number]   Repair Exp: 8/16/2027   L/D Exp: 8/16/2025     Earmold, Hard, Full Shell   SN: [id number]   Repair + Remake Exp: 1/16/2025           Paris Burris was seen for a follow up programming session with her cochlear implant sound processor and to issue a Resound Nexia BTE hearing aid for her right ear.  Mrs. Burris had lost her right hearing aid that she had obtained from [employer].  The hearing aid was no longer covered under the loss/damage warranty and replacement was recommended.  Mrs. Burris stated she had not been wearing the cochlear implant sound processor.      The hearing aid was programmed based on most recent audiometric data.  Specific notes regarding programming the hearing aid can be found in the medical record.      Impedance testing was completed with the cochlear implant sound processor.  T and C levels were measured across the electrode array.  The processor was set with one map for ease of use.  Mrs. Burris stated she was comfortable with the level of stimulation.  The processor was linked to her hearing aid.  Mrs. Burris did not bring her iPhone to the appointment today.  The rechargeable batteries had not been charged.  The disposable rack was not available today although she had the battery cover and some disposable batteries.  Mrs. Burris will look at her home for the  for her sound processor.  Replacement parts from [employer] may be necessary.    Recommend:  1.  Charge batteries for the  cochlear implant sound processor.  2.  Consistent daily use of the sound processor and the hearing aid.  3.  Follow up programming in 2 weeks or sooner if needed.  4.  Replacement supplies from [employer] as needed.  5.  Hearing aid care in 2 weeks or sooner if needed.

## 2024-07-26 NOTE — PROGRESS NOTES
Hearing Aid Evaluation:    Mrs. Burris was seen today for a hearing aid evaluation to be fit with a right Resound Nexia 7 BTE R. Mrs. Burris has a Cochlear Americas implant for the left ear, but admits she has not been wearing it. Mrs. Burris's reported comfortable physical fit of the acrylic ear mold. However, she had significant difficulty with insertion/removal. A remake order will be placed with resound for a half shell silicon mold. The hearing aid was programmed to 90% target gain. Mrs. Burris reported comfortable and clear sound quality. She demonstrated understanding of recharging and care/maintenance of the hearing aid. She was counseled on the 30 day trial period, 3 year warranty, $250 non-refundable deposit, and that we cannot file with insurance on the patient's behalf. Ms. Burris did not have her phone at today's visit so bluetooth pairing could not be completed. She will bring her phone to her f/u appointment in 2 weeks.       ReSound Nexia 7   Champagne   RT SN: [id number]   Repair + L/D Exp: 8/16/2027      SN: [id number]   Repair Exp: 8/16/2027   L/D Exp: 8/16/2025     Earmold, Hard, Full Shell   SN: [id number]   Repair + Remake Exp: 1/16/2025

## 2024-07-28 ENCOUNTER — HOSPITAL ENCOUNTER (INPATIENT)
Facility: HOSPITAL | Age: 79
LOS: 1 days | Discharge: HOME | DRG: 690 | End: 2024-07-31
Attending: EMERGENCY MEDICINE | Admitting: STUDENT IN AN ORGANIZED HEALTH CARE EDUCATION/TRAINING PROGRAM
Payer: MEDICARE

## 2024-07-28 DIAGNOSIS — N39.0 URINARY TRACT INFECTION WITHOUT HEMATURIA, SITE UNSPECIFIED: Primary | ICD-10-CM

## 2024-07-28 DIAGNOSIS — R07.9 CHEST PAIN: ICD-10-CM
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
## 2024-07-28 DIAGNOSIS — R94.31 PROLONGED Q-T INTERVAL ON ECG: ICD-10-CM

## 2024-07-28 DIAGNOSIS — G47.00 INSOMNIA, UNSPECIFIED TYPE: ICD-10-CM

## 2024-07-28 DIAGNOSIS — G31.84 MILD COGNITIVE IMPAIRMENT WITH MEMORY LOSS: ICD-10-CM

## 2024-07-28 PROBLEM — R29.6 FREQUENT FALLS: Status: ACTIVE | Noted: 2024-07-28

## 2024-07-28 LAB
ALBUMIN SERPL BCP-MCNC: 4.2 G/DL (ref 3.5–5.2)
ALP SERPL-CCNC: 68 U/L (ref 55–135)
ALT SERPL W/O P-5'-P-CCNC: 28 U/L (ref 10–44)
ANION GAP SERPL CALC-SCNC: 13 MMOL/L (ref 8–16)
AST SERPL-CCNC: 27 U/L (ref 10–40)
BACTERIA #/AREA URNS AUTO: ABNORMAL /HPF
BASOPHILS # BLD AUTO: 0.03 K/UL (ref 0–0.2)
BASOPHILS NFR BLD: 0.3 % (ref 0–1.9)
BILIRUB SERPL-MCNC: 0.8 MG/DL (ref 0.1–1)
BILIRUB UR QL STRIP: NEGATIVE
BUN SERPL-MCNC: 19 MG/DL (ref 8–23)
CALCIUM SERPL-MCNC: 9.3 MG/DL (ref 8.7–10.5)
CHLORIDE SERPL-SCNC: 106 MMOL/L (ref 95–110)
CLARITY UR REFRACT.AUTO: CLEAR
CO2 SERPL-SCNC: 22 MMOL/L (ref 23–29)
COLOR UR AUTO: YELLOW
CREAT SERPL-MCNC: 0.9 MG/DL (ref 0.5–1.4)
DIFFERENTIAL METHOD BLD: NORMAL
EOSINOPHIL # BLD AUTO: 0 K/UL (ref 0–0.5)
EOSINOPHIL NFR BLD: 0.3 % (ref 0–8)
ERYTHROCYTE [DISTWIDTH] IN BLOOD BY AUTOMATED COUNT: 12.4 % (ref 11.5–14.5)
EST. GFR  (NO RACE VARIABLE): >60 ML/MIN/1.73 M^2
ESTIMATED AVG GLUCOSE: 143 MG/DL (ref 68–131)
GLUCOSE SERPL-MCNC: 99 MG/DL (ref 70–110)
GLUCOSE UR QL STRIP: NEGATIVE
HBA1C MFR BLD: 6.6 % (ref 4–5.6)
HCT VFR BLD AUTO: 40.7 % (ref 37–48.5)
HGB BLD-MCNC: 13.4 G/DL (ref 12–16)
HGB UR QL STRIP: NEGATIVE
IMM GRANULOCYTES # BLD AUTO: 0.04 K/UL (ref 0–0.04)
IMM GRANULOCYTES NFR BLD AUTO: 0.4 % (ref 0–0.5)
KETONES UR QL STRIP: ABNORMAL
LEUKOCYTE ESTERASE UR QL STRIP: ABNORMAL
LYMPHOCYTES # BLD AUTO: 2.3 K/UL (ref 1–4.8)
LYMPHOCYTES NFR BLD: 22 % (ref 18–48)
MAGNESIUM SERPL-MCNC: 1.9 MG/DL (ref 1.6–2.6)
MCH RBC QN AUTO: 30.5 PG (ref 27–31)
MCHC RBC AUTO-ENTMCNC: 32.9 G/DL (ref 32–36)
MCV RBC AUTO: 93 FL (ref 82–98)
MICROSCOPIC COMMENT: ABNORMAL
MONOCYTES # BLD AUTO: 0.9 K/UL (ref 0.3–1)
MONOCYTES NFR BLD: 9 % (ref 4–15)
NEUTROPHILS # BLD AUTO: 7 K/UL (ref 1.8–7.7)
NEUTROPHILS NFR BLD: 68 % (ref 38–73)
NITRITE UR QL STRIP: NEGATIVE
NRBC BLD-RTO: 0 /100 WBC
PH UR STRIP: 6 [PH] (ref 5–8)
PHOSPHATE SERPL-MCNC: 3.7 MG/DL (ref 2.7–4.5)
PLATELET # BLD AUTO: 166 K/UL (ref 150–450)
PMV BLD AUTO: 10.4 FL (ref 9.2–12.9)
POCT GLUCOSE: 176 MG/DL (ref 70–110)
POTASSIUM SERPL-SCNC: 3.5 MMOL/L (ref 3.5–5.1)
PROT SERPL-MCNC: 7.5 G/DL (ref 6–8.4)
PROT UR QL STRIP: NEGATIVE
RBC # BLD AUTO: 4.4 M/UL (ref 4–5.4)
RBC #/AREA URNS AUTO: 2 /HPF (ref 0–4)
SARS-COV-2 RDRP RESP QL NAA+PROBE: NEGATIVE
SODIUM SERPL-SCNC: 141 MMOL/L (ref 136–145)
SP GR UR STRIP: 1.03 (ref 1–1.03)
SQUAMOUS #/AREA URNS AUTO: 0 /HPF
URN SPEC COLLECT METH UR: ABNORMAL
WBC # BLD AUTO: 10.34 K/UL (ref 3.9–12.7)
WBC #/AREA URNS AUTO: 18 /HPF (ref 0–5)

## 2024-07-28 PROCEDURE — 25000003 PHARM REV CODE 250: Performed by: FAMILY MEDICINE

## 2024-07-28 PROCEDURE — 25000003 PHARM REV CODE 250: Performed by: EMERGENCY MEDICINE

## 2024-07-28 PROCEDURE — 81001 URINALYSIS AUTO W/SCOPE: CPT | Mod: XB

## 2024-07-28 PROCEDURE — 93005 ELECTROCARDIOGRAM TRACING: CPT

## 2024-07-28 PROCEDURE — 96372 THER/PROPH/DIAG INJ SC/IM: CPT | Performed by: FAMILY MEDICINE

## 2024-07-28 PROCEDURE — 83036 HEMOGLOBIN GLYCOSYLATED A1C: CPT | Performed by: FAMILY MEDICINE

## 2024-07-28 PROCEDURE — U0002 COVID-19 LAB TEST NON-CDC: HCPCS | Performed by: EMERGENCY MEDICINE

## 2024-07-28 PROCEDURE — 82962 GLUCOSE BLOOD TEST: CPT

## 2024-07-28 PROCEDURE — 93010 ELECTROCARDIOGRAM REPORT: CPT | Mod: ,,, | Performed by: INTERNAL MEDICINE

## 2024-07-28 PROCEDURE — 96365 THER/PROPH/DIAG IV INF INIT: CPT

## 2024-07-28 PROCEDURE — 83735 ASSAY OF MAGNESIUM: CPT

## 2024-07-28 PROCEDURE — 87086 URINE CULTURE/COLONY COUNT: CPT

## 2024-07-28 PROCEDURE — G0378 HOSPITAL OBSERVATION PER HR: HCPCS

## 2024-07-28 PROCEDURE — 80053 COMPREHEN METABOLIC PANEL: CPT

## 2024-07-28 PROCEDURE — 84100 ASSAY OF PHOSPHORUS: CPT

## 2024-07-28 PROCEDURE — 63600175 PHARM REV CODE 636 W HCPCS: Performed by: FAMILY MEDICINE

## 2024-07-28 PROCEDURE — 99285 EMERGENCY DEPT VISIT HI MDM: CPT | Mod: 25

## 2024-07-28 PROCEDURE — 80307 DRUG TEST PRSMV CHEM ANLYZR: CPT

## 2024-07-28 PROCEDURE — 85025 COMPLETE CBC W/AUTO DIFF WBC: CPT

## 2024-07-28 PROCEDURE — 63600175 PHARM REV CODE 636 W HCPCS: Performed by: EMERGENCY MEDICINE

## 2024-07-28 RX ORDER — METOPROLOL SUCCINATE 25 MG/1
25 TABLET, EXTENDED RELEASE ORAL DAILY
Status: DISCONTINUED | OUTPATIENT
Start: 2024-07-29 | End: 2024-07-31 | Stop reason: HOSPADM

## 2024-07-28 RX ORDER — GLUCAGON 1 MG
1 KIT INJECTION
Status: DISCONTINUED | OUTPATIENT
Start: 2024-07-28 | End: 2024-07-31 | Stop reason: HOSPADM

## 2024-07-28 RX ORDER — ACETAMINOPHEN 500 MG
1000 TABLET ORAL EVERY 8 HOURS PRN
Status: DISCONTINUED | OUTPATIENT
Start: 2024-07-28 | End: 2024-07-31 | Stop reason: HOSPADM

## 2024-07-28 RX ORDER — ATORVASTATIN CALCIUM 40 MG/1
80 TABLET, FILM COATED ORAL DAILY
Status: DISCONTINUED | OUTPATIENT
Start: 2024-07-29 | End: 2024-07-31 | Stop reason: HOSPADM

## 2024-07-28 RX ORDER — BUPROPION HYDROCHLORIDE 300 MG/1
300 TABLET ORAL DAILY
Status: DISCONTINUED | OUTPATIENT
Start: 2024-07-29 | End: 2024-07-31 | Stop reason: HOSPADM

## 2024-07-28 RX ORDER — IBUPROFEN 200 MG
16 TABLET ORAL
Status: DISCONTINUED | OUTPATIENT
Start: 2024-07-28 | End: 2024-07-31 | Stop reason: HOSPADM

## 2024-07-28 RX ORDER — ONDANSETRON HYDROCHLORIDE 2 MG/ML
4 INJECTION, SOLUTION INTRAVENOUS EVERY 8 HOURS PRN
Status: DISCONTINUED | OUTPATIENT
Start: 2024-07-28 | End: 2024-07-31 | Stop reason: HOSPADM

## 2024-07-28 RX ORDER — ALBUTEROL SULFATE 90 UG/1
2 INHALANT RESPIRATORY (INHALATION) EVERY 6 HOURS PRN
Status: DISCONTINUED | OUTPATIENT
Start: 2024-07-28 | End: 2024-07-31 | Stop reason: HOSPADM

## 2024-07-28 RX ORDER — SODIUM CHLORIDE 0.9 % (FLUSH) 0.9 %
10 SYRINGE (ML) INJECTION EVERY 12 HOURS PRN
Status: DISCONTINUED | OUTPATIENT
Start: 2024-07-28 | End: 2024-07-31 | Stop reason: HOSPADM

## 2024-07-28 RX ORDER — TALC
6 POWDER (GRAM) TOPICAL NIGHTLY PRN
Status: DISCONTINUED | OUTPATIENT
Start: 2024-07-28 | End: 2024-07-30

## 2024-07-28 RX ORDER — INSULIN ASPART 100 [IU]/ML
0-5 INJECTION, SOLUTION INTRAVENOUS; SUBCUTANEOUS
Status: DISCONTINUED | OUTPATIENT
Start: 2024-07-28 | End: 2024-07-31 | Stop reason: HOSPADM

## 2024-07-28 RX ORDER — NALOXONE HCL 0.4 MG/ML
0.02 VIAL (ML) INJECTION
Status: DISCONTINUED | OUTPATIENT
Start: 2024-07-28 | End: 2024-07-31 | Stop reason: HOSPADM

## 2024-07-28 RX ORDER — QUETIAPINE FUMARATE 25 MG/1
25 TABLET, FILM COATED ORAL ONCE
Status: DISCONTINUED | OUTPATIENT
Start: 2024-07-28 | End: 2024-07-28

## 2024-07-28 RX ORDER — IBUPROFEN 200 MG
24 TABLET ORAL
Status: DISCONTINUED | OUTPATIENT
Start: 2024-07-28 | End: 2024-07-31 | Stop reason: HOSPADM

## 2024-07-28 RX ORDER — OXYCODONE HYDROCHLORIDE 5 MG/1
5 TABLET ORAL EVERY 6 HOURS PRN
Status: DISCONTINUED | OUTPATIENT
Start: 2024-07-28 | End: 2024-07-31 | Stop reason: HOSPADM

## 2024-07-28 RX ORDER — ENOXAPARIN SODIUM 100 MG/ML
40 INJECTION SUBCUTANEOUS EVERY 24 HOURS
Status: DISCONTINUED | OUTPATIENT
Start: 2024-07-28 | End: 2024-07-31 | Stop reason: HOSPADM

## 2024-07-28 RX ORDER — QUETIAPINE FUMARATE 25 MG/1
50 TABLET, FILM COATED ORAL ONCE
Status: COMPLETED | OUTPATIENT
Start: 2024-07-28 | End: 2024-07-28

## 2024-07-28 RX ORDER — LOSARTAN POTASSIUM 50 MG/1
100 TABLET ORAL DAILY
Status: DISCONTINUED | OUTPATIENT
Start: 2024-07-29 | End: 2024-07-31 | Stop reason: HOSPADM

## 2024-07-28 RX ORDER — ALUMINUM HYDROXIDE, MAGNESIUM HYDROXIDE, AND SIMETHICONE 1200; 120; 1200 MG/30ML; MG/30ML; MG/30ML
30 SUSPENSION ORAL 4 TIMES DAILY PRN
Status: DISCONTINUED | OUTPATIENT
Start: 2024-07-28 | End: 2024-07-31 | Stop reason: HOSPADM

## 2024-07-28 RX ADMIN — CEFTRIAXONE 2 G: 2 INJECTION, POWDER, FOR SOLUTION INTRAMUSCULAR; INTRAVENOUS at 07:07

## 2024-07-28 RX ADMIN — ENOXAPARIN SODIUM 40 MG: 40 INJECTION SUBCUTANEOUS at 08:07

## 2024-07-28 RX ADMIN — QUETIAPINE FUMARATE 50 MG: 25 TABLET ORAL at 08:07

## 2024-07-29 ENCOUNTER — TELEPHONE (OUTPATIENT)
Dept: PRIMARY CARE CLINIC | Facility: CLINIC | Age: 79
End: 2024-07-29
Payer: MEDICARE

## 2024-07-29 LAB
ALBUMIN SERPL BCP-MCNC: 3.5 G/DL (ref 3.5–5.2)
ALP SERPL-CCNC: 57 U/L (ref 55–135)
ALT SERPL W/O P-5'-P-CCNC: 23 U/L (ref 10–44)
AMPHET+METHAMPHET UR QL: NEGATIVE
ANION GAP SERPL CALC-SCNC: 10 MMOL/L (ref 8–16)
AST SERPL-CCNC: 23 U/L (ref 10–40)
BACTERIA UR CULT: NO GROWTH
BARBITURATES UR QL SCN>200 NG/ML: NEGATIVE
BASOPHILS # BLD AUTO: 0.03 K/UL (ref 0–0.2)
BASOPHILS NFR BLD: 0.4 % (ref 0–1.9)
BENZODIAZ UR QL SCN>200 NG/ML: NEGATIVE
BILIRUB SERPL-MCNC: 0.5 MG/DL (ref 0.1–1)
BUN SERPL-MCNC: 15 MG/DL (ref 8–23)
BZE UR QL SCN: NEGATIVE
CALCIUM SERPL-MCNC: 8.6 MG/DL (ref 8.7–10.5)
CANNABINOIDS UR QL SCN: NEGATIVE
CHLORIDE SERPL-SCNC: 107 MMOL/L (ref 95–110)
CO2 SERPL-SCNC: 24 MMOL/L (ref 23–29)
CREAT SERPL-MCNC: 0.9 MG/DL (ref 0.5–1.4)
CREAT UR-MCNC: 147 MG/DL (ref 15–325)
DIFFERENTIAL METHOD BLD: ABNORMAL
EOSINOPHIL # BLD AUTO: 0.1 K/UL (ref 0–0.5)
EOSINOPHIL NFR BLD: 1.1 % (ref 0–8)
ERYTHROCYTE [DISTWIDTH] IN BLOOD BY AUTOMATED COUNT: 12.6 % (ref 11.5–14.5)
EST. GFR  (NO RACE VARIABLE): >60 ML/MIN/1.73 M^2
ETHANOL UR-MCNC: <10 MG/DL
FOLATE SERPL-MCNC: 14 NG/ML (ref 4–24)
GLUCOSE SERPL-MCNC: 110 MG/DL (ref 70–110)
HCT VFR BLD AUTO: 36.7 % (ref 37–48.5)
HGB BLD-MCNC: 12 G/DL (ref 12–16)
IMM GRANULOCYTES # BLD AUTO: 0.02 K/UL (ref 0–0.04)
IMM GRANULOCYTES NFR BLD AUTO: 0.2 % (ref 0–0.5)
LYMPHOCYTES # BLD AUTO: 2.9 K/UL (ref 1–4.8)
LYMPHOCYTES NFR BLD: 35.9 % (ref 18–48)
MAGNESIUM SERPL-MCNC: 1.9 MG/DL (ref 1.6–2.6)
MCH RBC QN AUTO: 30.4 PG (ref 27–31)
MCHC RBC AUTO-ENTMCNC: 32.7 G/DL (ref 32–36)
MCV RBC AUTO: 93 FL (ref 82–98)
METHADONE UR QL SCN>300 NG/ML: NEGATIVE
MONOCYTES # BLD AUTO: 0.8 K/UL (ref 0.3–1)
MONOCYTES NFR BLD: 9.7 % (ref 4–15)
NEUTROPHILS # BLD AUTO: 4.2 K/UL (ref 1.8–7.7)
NEUTROPHILS NFR BLD: 52.7 % (ref 38–73)
NRBC BLD-RTO: 0 /100 WBC
OHS QRS DURATION: 130 MS
OHS QTC CALCULATION: 526 MS
OPIATES UR QL SCN: NEGATIVE
PCP UR QL SCN>25 NG/ML: NEGATIVE
PHOSPHATE SERPL-MCNC: 4 MG/DL (ref 2.7–4.5)
PLATELET # BLD AUTO: 142 K/UL (ref 150–450)
PMV BLD AUTO: 10.7 FL (ref 9.2–12.9)
POCT GLUCOSE: 117 MG/DL (ref 70–110)
POCT GLUCOSE: 127 MG/DL (ref 70–110)
POTASSIUM SERPL-SCNC: 3.2 MMOL/L (ref 3.5–5.1)
PROT SERPL-MCNC: 6.3 G/DL (ref 6–8.4)
RBC # BLD AUTO: 3.95 M/UL (ref 4–5.4)
SODIUM SERPL-SCNC: 141 MMOL/L (ref 136–145)
TOXICOLOGY INFORMATION: NORMAL
TREPONEMA PALLIDUM IGG+IGM AB [PRESENCE] IN SERUM OR PLASMA BY IMMUNOASSAY: NONREACTIVE
VIT B12 SERPL-MCNC: 349 PG/ML (ref 210–950)
WBC # BLD AUTO: 8.05 K/UL (ref 3.9–12.7)

## 2024-07-29 PROCEDURE — 84100 ASSAY OF PHOSPHORUS: CPT | Performed by: FAMILY MEDICINE

## 2024-07-29 PROCEDURE — 96372 THER/PROPH/DIAG INJ SC/IM: CPT | Performed by: FAMILY MEDICINE

## 2024-07-29 PROCEDURE — 25000003 PHARM REV CODE 250: Performed by: FAMILY MEDICINE

## 2024-07-29 PROCEDURE — 63600175 PHARM REV CODE 636 W HCPCS: Performed by: FAMILY MEDICINE

## 2024-07-29 PROCEDURE — 97161 PT EVAL LOW COMPLEX 20 MIN: CPT

## 2024-07-29 PROCEDURE — 80053 COMPREHEN METABOLIC PANEL: CPT | Performed by: FAMILY MEDICINE

## 2024-07-29 PROCEDURE — 36415 COLL VENOUS BLD VENIPUNCTURE: CPT

## 2024-07-29 PROCEDURE — 96366 THER/PROPH/DIAG IV INF ADDON: CPT

## 2024-07-29 PROCEDURE — 86593 SYPHILIS TEST NON-TREP QUANT: CPT

## 2024-07-29 PROCEDURE — 97535 SELF CARE MNGMENT TRAINING: CPT

## 2024-07-29 PROCEDURE — 85025 COMPLETE CBC W/AUTO DIFF WBC: CPT | Performed by: FAMILY MEDICINE

## 2024-07-29 PROCEDURE — 97116 GAIT TRAINING THERAPY: CPT

## 2024-07-29 PROCEDURE — 82746 ASSAY OF FOLIC ACID SERUM: CPT

## 2024-07-29 PROCEDURE — G0378 HOSPITAL OBSERVATION PER HR: HCPCS

## 2024-07-29 PROCEDURE — 25000003 PHARM REV CODE 250: Performed by: STUDENT IN AN ORGANIZED HEALTH CARE EDUCATION/TRAINING PROGRAM

## 2024-07-29 PROCEDURE — 82607 VITAMIN B-12: CPT

## 2024-07-29 PROCEDURE — 97165 OT EVAL LOW COMPLEX 30 MIN: CPT

## 2024-07-29 PROCEDURE — 83735 ASSAY OF MAGNESIUM: CPT | Performed by: FAMILY MEDICINE

## 2024-07-29 RX ORDER — POTASSIUM CHLORIDE 20 MEQ/1
40 TABLET, EXTENDED RELEASE ORAL ONCE
Status: COMPLETED | OUTPATIENT
Start: 2024-07-29 | End: 2024-07-29

## 2024-07-29 RX ADMIN — CEFTRIAXONE 1 G: 1 INJECTION, POWDER, FOR SOLUTION INTRAMUSCULAR; INTRAVENOUS at 08:07

## 2024-07-29 RX ADMIN — ENOXAPARIN SODIUM 40 MG: 40 INJECTION SUBCUTANEOUS at 04:07

## 2024-07-29 RX ADMIN — BUPROPION HYDROCHLORIDE 300 MG: 300 TABLET, FILM COATED, EXTENDED RELEASE ORAL at 08:07

## 2024-07-29 RX ADMIN — ATORVASTATIN CALCIUM 80 MG: 40 TABLET, FILM COATED ORAL at 08:07

## 2024-07-29 RX ADMIN — LOSARTAN POTASSIUM 100 MG: 50 TABLET, FILM COATED ORAL at 08:07

## 2024-07-29 RX ADMIN — METOPROLOL SUCCINATE 25 MG: 25 TABLET, EXTENDED RELEASE ORAL at 08:07

## 2024-07-29 RX ADMIN — POTASSIUM CHLORIDE 40 MEQ: 1500 TABLET, EXTENDED RELEASE ORAL at 08:07

## 2024-07-29 NOTE — TELEPHONE ENCOUNTER
----- Message from Mandi Huynh MD sent at 7/25/2024  2:26 PM CDT -----  B, will you call her in a week or two to make sure she's taking her meds?    Iman, she's getting her new hearing aids tomorrow so hopefully she can hear your call. If she doesn't answer, can also try 's cell. Thanks!

## 2024-07-30 LAB
ALBUMIN SERPL BCP-MCNC: 3.7 G/DL (ref 3.5–5.2)
ALP SERPL-CCNC: 65 U/L (ref 55–135)
ALT SERPL W/O P-5'-P-CCNC: 26 U/L (ref 10–44)
ANION GAP SERPL CALC-SCNC: 8 MMOL/L (ref 8–16)
AST SERPL-CCNC: 29 U/L (ref 10–40)
BASOPHILS # BLD AUTO: 0.02 K/UL (ref 0–0.2)
BASOPHILS NFR BLD: 0.3 % (ref 0–1.9)
BILIRUB SERPL-MCNC: 0.6 MG/DL (ref 0.1–1)
BUN SERPL-MCNC: 13 MG/DL (ref 8–23)
CALCIUM SERPL-MCNC: 8.8 MG/DL (ref 8.7–10.5)
CHLORIDE SERPL-SCNC: 108 MMOL/L (ref 95–110)
CO2 SERPL-SCNC: 24 MMOL/L (ref 23–29)
CREAT SERPL-MCNC: 0.9 MG/DL (ref 0.5–1.4)
DIFFERENTIAL METHOD BLD: NORMAL
EOSINOPHIL # BLD AUTO: 0.2 K/UL (ref 0–0.5)
EOSINOPHIL NFR BLD: 2.6 % (ref 0–8)
ERYTHROCYTE [DISTWIDTH] IN BLOOD BY AUTOMATED COUNT: 12.6 % (ref 11.5–14.5)
EST. GFR  (NO RACE VARIABLE): >60 ML/MIN/1.73 M^2
GLUCOSE SERPL-MCNC: 130 MG/DL (ref 70–110)
HCT VFR BLD AUTO: 40.6 % (ref 37–48.5)
HGB BLD-MCNC: 13.1 G/DL (ref 12–16)
IMM GRANULOCYTES # BLD AUTO: 0.02 K/UL (ref 0–0.04)
IMM GRANULOCYTES NFR BLD AUTO: 0.3 % (ref 0–0.5)
LYMPHOCYTES # BLD AUTO: 1.7 K/UL (ref 1–4.8)
LYMPHOCYTES NFR BLD: 27.8 % (ref 18–48)
MAGNESIUM SERPL-MCNC: 1.9 MG/DL (ref 1.6–2.6)
MCH RBC QN AUTO: 30.1 PG (ref 27–31)
MCHC RBC AUTO-ENTMCNC: 32.3 G/DL (ref 32–36)
MCV RBC AUTO: 93 FL (ref 82–98)
MONOCYTES # BLD AUTO: 0.7 K/UL (ref 0.3–1)
MONOCYTES NFR BLD: 11.3 % (ref 4–15)
NEUTROPHILS # BLD AUTO: 3.5 K/UL (ref 1.8–7.7)
NEUTROPHILS NFR BLD: 57.7 % (ref 38–73)
NRBC BLD-RTO: 0 /100 WBC
OHS QRS DURATION: 126 MS
OHS QRS DURATION: 84 MS
OHS QTC CALCULATION: 258 MS
OHS QTC CALCULATION: 523 MS
PHOSPHATE SERPL-MCNC: 3.8 MG/DL (ref 2.7–4.5)
PLATELET # BLD AUTO: 152 K/UL (ref 150–450)
PMV BLD AUTO: 11.1 FL (ref 9.2–12.9)
POCT GLUCOSE: 105 MG/DL (ref 70–110)
POCT GLUCOSE: 137 MG/DL (ref 70–110)
POTASSIUM SERPL-SCNC: 3.8 MMOL/L (ref 3.5–5.1)
PROT SERPL-MCNC: 6.7 G/DL (ref 6–8.4)
RBC # BLD AUTO: 4.35 M/UL (ref 4–5.4)
SODIUM SERPL-SCNC: 140 MMOL/L (ref 136–145)
WBC # BLD AUTO: 6.04 K/UL (ref 3.9–12.7)

## 2024-07-30 PROCEDURE — 25000003 PHARM REV CODE 250: Performed by: FAMILY MEDICINE

## 2024-07-30 PROCEDURE — 93010 ELECTROCARDIOGRAM REPORT: CPT | Mod: ,,, | Performed by: INTERNAL MEDICINE

## 2024-07-30 PROCEDURE — 93010 ELECTROCARDIOGRAM REPORT: CPT | Mod: 76,,, | Performed by: INTERNAL MEDICINE

## 2024-07-30 PROCEDURE — 83735 ASSAY OF MAGNESIUM: CPT | Performed by: FAMILY MEDICINE

## 2024-07-30 PROCEDURE — 85025 COMPLETE CBC W/AUTO DIFF WBC: CPT | Performed by: FAMILY MEDICINE

## 2024-07-30 PROCEDURE — 90792 PSYCH DIAG EVAL W/MED SRVCS: CPT | Mod: ,,, | Performed by: PSYCHIATRY & NEUROLOGY

## 2024-07-30 PROCEDURE — 63600175 PHARM REV CODE 636 W HCPCS

## 2024-07-30 PROCEDURE — 21400001 HC TELEMETRY ROOM

## 2024-07-30 PROCEDURE — 80053 COMPREHEN METABOLIC PANEL: CPT | Performed by: FAMILY MEDICINE

## 2024-07-30 PROCEDURE — 84100 ASSAY OF PHOSPHORUS: CPT | Performed by: FAMILY MEDICINE

## 2024-07-30 PROCEDURE — 97530 THERAPEUTIC ACTIVITIES: CPT | Mod: CQ

## 2024-07-30 PROCEDURE — 36415 COLL VENOUS BLD VENIPUNCTURE: CPT | Performed by: FAMILY MEDICINE

## 2024-07-30 PROCEDURE — 96367 TX/PROPH/DG ADDL SEQ IV INF: CPT

## 2024-07-30 PROCEDURE — 93005 ELECTROCARDIOGRAM TRACING: CPT

## 2024-07-30 PROCEDURE — 63600175 PHARM REV CODE 636 W HCPCS: Performed by: FAMILY MEDICINE

## 2024-07-30 PROCEDURE — 25000003 PHARM REV CODE 250

## 2024-07-30 RX ORDER — MAGNESIUM SULFATE HEPTAHYDRATE 40 MG/ML
2 INJECTION, SOLUTION INTRAVENOUS ONCE
Status: COMPLETED | OUTPATIENT
Start: 2024-07-30 | End: 2024-07-30

## 2024-07-30 RX ORDER — TALC
9 POWDER (GRAM) TOPICAL NIGHTLY
Status: DISCONTINUED | OUTPATIENT
Start: 2024-07-30 | End: 2024-07-31 | Stop reason: HOSPADM

## 2024-07-30 RX ADMIN — BUPROPION HYDROCHLORIDE 300 MG: 300 TABLET, FILM COATED, EXTENDED RELEASE ORAL at 08:07

## 2024-07-30 RX ADMIN — LOSARTAN POTASSIUM 100 MG: 50 TABLET, FILM COATED ORAL at 08:07

## 2024-07-30 RX ADMIN — Medication 9 MG: at 09:07

## 2024-07-30 RX ADMIN — ENOXAPARIN SODIUM 40 MG: 40 INJECTION SUBCUTANEOUS at 05:07

## 2024-07-30 RX ADMIN — CEFTRIAXONE 1 G: 1 INJECTION, POWDER, FOR SOLUTION INTRAMUSCULAR; INTRAVENOUS at 05:07

## 2024-07-30 RX ADMIN — ATORVASTATIN CALCIUM 80 MG: 40 TABLET, FILM COATED ORAL at 08:07

## 2024-07-30 RX ADMIN — METOPROLOL SUCCINATE 25 MG: 25 TABLET, EXTENDED RELEASE ORAL at 08:07

## 2024-07-30 RX ADMIN — MAGNESIUM SULFATE HEPTAHYDRATE 2 G: 40 INJECTION, SOLUTION INTRAVENOUS at 10:07

## 2024-07-31 ENCOUNTER — PATIENT MESSAGE (OUTPATIENT)
Dept: PRIMARY CARE CLINIC | Facility: CLINIC | Age: 79
End: 2024-07-31
Payer: MEDICARE

## 2024-07-31 ENCOUNTER — TELEPHONE (OUTPATIENT)
Dept: PRIMARY CARE CLINIC | Facility: CLINIC | Age: 79
End: 2024-07-31
Payer: MEDICARE

## 2024-07-31 VITALS
SYSTOLIC BLOOD PRESSURE: 127 MMHG | OXYGEN SATURATION: 96 % | HEIGHT: 61 IN | WEIGHT: 150 LBS | RESPIRATION RATE: 18 BRPM | TEMPERATURE: 98 F | BODY MASS INDEX: 28.32 KG/M2 | HEART RATE: 76 BPM | DIASTOLIC BLOOD PRESSURE: 64 MMHG

## 2024-07-31 LAB
ALBUMIN SERPL BCP-MCNC: 3.7 G/DL (ref 3.5–5.2)
ALP SERPL-CCNC: 65 U/L (ref 55–135)
ALT SERPL W/O P-5'-P-CCNC: 26 U/L (ref 10–44)
ANION GAP SERPL CALC-SCNC: 8 MMOL/L (ref 8–16)
AST SERPL-CCNC: 24 U/L (ref 10–40)
BASOPHILS # BLD AUTO: 0.04 K/UL (ref 0–0.2)
BASOPHILS NFR BLD: 0.7 % (ref 0–1.9)
BILIRUB SERPL-MCNC: 0.6 MG/DL (ref 0.1–1)
BUN SERPL-MCNC: 13 MG/DL (ref 8–23)
CALCIUM SERPL-MCNC: 8.9 MG/DL (ref 8.7–10.5)
CHLORIDE SERPL-SCNC: 107 MMOL/L (ref 95–110)
CO2 SERPL-SCNC: 23 MMOL/L (ref 23–29)
CREAT SERPL-MCNC: 0.9 MG/DL (ref 0.5–1.4)
DIFFERENTIAL METHOD BLD: NORMAL
EOSINOPHIL # BLD AUTO: 0.1 K/UL (ref 0–0.5)
EOSINOPHIL NFR BLD: 2.2 % (ref 0–8)
ERYTHROCYTE [DISTWIDTH] IN BLOOD BY AUTOMATED COUNT: 12.8 % (ref 11.5–14.5)
EST. GFR  (NO RACE VARIABLE): >60 ML/MIN/1.73 M^2
GLUCOSE SERPL-MCNC: 143 MG/DL (ref 70–110)
HCT VFR BLD AUTO: 41.7 % (ref 37–48.5)
HGB BLD-MCNC: 13.7 G/DL (ref 12–16)
IMM GRANULOCYTES # BLD AUTO: 0.02 K/UL (ref 0–0.04)
IMM GRANULOCYTES NFR BLD AUTO: 0.3 % (ref 0–0.5)
LYMPHOCYTES # BLD AUTO: 2.1 K/UL (ref 1–4.8)
LYMPHOCYTES NFR BLD: 36 % (ref 18–48)
MAGNESIUM SERPL-MCNC: 2.1 MG/DL (ref 1.6–2.6)
MCH RBC QN AUTO: 30.1 PG (ref 27–31)
MCHC RBC AUTO-ENTMCNC: 32.9 G/DL (ref 32–36)
MCV RBC AUTO: 92 FL (ref 82–98)
MONOCYTES # BLD AUTO: 0.6 K/UL (ref 0.3–1)
MONOCYTES NFR BLD: 9.5 % (ref 4–15)
NEUTROPHILS # BLD AUTO: 3 K/UL (ref 1.8–7.7)
NEUTROPHILS NFR BLD: 51.3 % (ref 38–73)
NRBC BLD-RTO: 0 /100 WBC
OHS QRS DURATION: 128 MS
OHS QTC CALCULATION: 504 MS
PHOSPHATE SERPL-MCNC: 3.9 MG/DL (ref 2.7–4.5)
PLATELET # BLD AUTO: 167 K/UL (ref 150–450)
PMV BLD AUTO: 10.6 FL (ref 9.2–12.9)
POCT GLUCOSE: 137 MG/DL (ref 70–110)
POCT GLUCOSE: 157 MG/DL (ref 70–110)
POTASSIUM SERPL-SCNC: 3.9 MMOL/L (ref 3.5–5.1)
PROT SERPL-MCNC: 6.8 G/DL (ref 6–8.4)
RBC # BLD AUTO: 4.55 M/UL (ref 4–5.4)
SODIUM SERPL-SCNC: 138 MMOL/L (ref 136–145)
WBC # BLD AUTO: 5.92 K/UL (ref 3.9–12.7)

## 2024-07-31 PROCEDURE — 94761 N-INVAS EAR/PLS OXIMETRY MLT: CPT

## 2024-07-31 PROCEDURE — 84100 ASSAY OF PHOSPHORUS: CPT | Performed by: FAMILY MEDICINE

## 2024-07-31 PROCEDURE — 80053 COMPREHEN METABOLIC PANEL: CPT | Performed by: FAMILY MEDICINE

## 2024-07-31 PROCEDURE — 97530 THERAPEUTIC ACTIVITIES: CPT

## 2024-07-31 PROCEDURE — 36415 COLL VENOUS BLD VENIPUNCTURE: CPT | Performed by: FAMILY MEDICINE

## 2024-07-31 PROCEDURE — 85025 COMPLETE CBC W/AUTO DIFF WBC: CPT | Performed by: FAMILY MEDICINE

## 2024-07-31 PROCEDURE — 83735 ASSAY OF MAGNESIUM: CPT | Performed by: FAMILY MEDICINE

## 2024-07-31 PROCEDURE — 63600175 PHARM REV CODE 636 W HCPCS

## 2024-07-31 PROCEDURE — 99233 SBSQ HOSP IP/OBS HIGH 50: CPT | Mod: GC,,, | Performed by: PSYCHIATRY & NEUROLOGY

## 2024-07-31 PROCEDURE — 25000003 PHARM REV CODE 250: Performed by: FAMILY MEDICINE

## 2024-07-31 RX ORDER — MAGNESIUM SULFATE HEPTAHYDRATE 40 MG/ML
2 INJECTION, SOLUTION INTRAVENOUS ONCE
Status: COMPLETED | OUTPATIENT
Start: 2024-07-31 | End: 2024-07-31

## 2024-07-31 RX ORDER — CEFPODOXIME PROXETIL 100 MG/1
100 TABLET, FILM COATED ORAL 2 TIMES DAILY
Qty: 6 TABLET | Refills: 0 | Status: SHIPPED | OUTPATIENT
Start: 2024-07-31 | End: 2024-08-03

## 2024-07-31 RX ADMIN — MAGNESIUM SULFATE HEPTAHYDRATE 2 G: 40 INJECTION, SOLUTION INTRAVENOUS at 11:07

## 2024-07-31 RX ADMIN — METOPROLOL SUCCINATE 25 MG: 25 TABLET, EXTENDED RELEASE ORAL at 08:07

## 2024-07-31 RX ADMIN — LOSARTAN POTASSIUM 100 MG: 50 TABLET, FILM COATED ORAL at 08:07

## 2024-07-31 RX ADMIN — BUPROPION HYDROCHLORIDE 300 MG: 300 TABLET, FILM COATED, EXTENDED RELEASE ORAL at 08:07

## 2024-07-31 RX ADMIN — ATORVASTATIN CALCIUM 80 MG: 40 TABLET, FILM COATED ORAL at 08:07

## 2024-07-31 NOTE — TELEPHONE ENCOUNTER
LCSW received 2nd referral from Dr. Huynh.  Patient was previously unable to be reached to schedule.  LCSW called patient and left voicemail requesting a return call to discuss referral.   LCSW spoke with /Kade @ [phone] at Dr. Huynh's rec if patient cannot be reached.   reports patient recently in the hospital and will have her call when feeling up to it.  LCSW will send patient portal message since  did not have pen/paper with him.

## 2024-08-01 ENCOUNTER — PATIENT OUTREACH (OUTPATIENT)
Dept: ADMINISTRATIVE | Facility: CLINIC | Age: 79
End: 2024-08-01
Payer: MEDICARE

## 2024-08-01 NOTE — PROGRESS NOTES
C3 nurse attempted to contact Paris Burris for a TCC post hospital discharge follow up call. No answer, left voicemail with callback information. The patient has a scheduled Appt with her PCP, Mandi Huynh MD on 9/30/24 at 1400, but does not have a HOSFU scheduled within the first 5-7 days post discharge date of 7/31/24. No messages routed at this time.

## 2024-08-02 ENCOUNTER — PATIENT MESSAGE (OUTPATIENT)
Dept: PRIMARY CARE CLINIC | Facility: CLINIC | Age: 79
End: 2024-08-02
Payer: MEDICARE

## 2024-08-02 NOTE — TELEPHONE ENCOUNTER
Tried to call pt, spouse and talked to Daughter Janiya, I informed daughter I called a couple of times but did not answered call, left vm, daughter stated she could not speak at the time of call but will let pt know we were trying to contact her and to call us back.        Outgoing calls in different encounter.

## 2024-08-02 NOTE — PROGRESS NOTES
C3 nurse attempted to contact Paris Burris for a TCC post hospital discharge follow up call. No answer, left voicemail with callback information. The patient has a scheduled Appt with her PCP, Mandi Huynh MD on 9/30/24 at 1400, but does not have a HOSFU scheduled within the first 5-7 days post discharge date of 7/31/24. Message routed to Mandi Huynh MD for appointment assistance.

## 2024-08-02 NOTE — TELEPHONE ENCOUNTER
Tried to call pt, spouse and talked to Daughter Janiya, I informed daughter I called a couple of times but did not answered call, left vm, daughter stated she could not speak at the time of call but will let pt know we were trying to contact her and to call us back.

## 2024-08-05 ENCOUNTER — TELEPHONE (OUTPATIENT)
Dept: PRIMARY CARE CLINIC | Facility: CLINIC | Age: 79
End: 2024-08-05
Payer: MEDICARE

## 2024-08-07 ENCOUNTER — CLINICAL SUPPORT (OUTPATIENT)
Dept: AUDIOLOGY | Facility: CLINIC | Age: 79
End: 2024-08-07
Payer: MEDICARE

## 2024-08-07 DIAGNOSIS — H90.3 SENSORINEURAL HEARING LOSS (SNHL), BILATERAL: Primary | ICD-10-CM

## 2024-08-07 DIAGNOSIS — H93.293 IMPAIRMENT OF AUDITORY DISCRIMINATION OF BOTH EARS: ICD-10-CM

## 2024-08-07 PROCEDURE — 92604 REPROGRAM COCHLEAR IMPLT 7/>: CPT | Mod: S$GLB,,, | Performed by: AUDIOLOGIST

## 2024-08-07 PROCEDURE — 99499 UNLISTED E&M SERVICE: CPT | Mod: S$GLB,,, | Performed by: AUDIOLOGIST

## 2024-08-08 ENCOUNTER — OFFICE VISIT (OUTPATIENT)
Dept: PRIMARY CARE CLINIC | Facility: CLINIC | Age: 79
End: 2024-08-08
Payer: MEDICARE

## 2024-08-08 VITALS
WEIGHT: 146.94 LBS | BODY MASS INDEX: 27.76 KG/M2 | SYSTOLIC BLOOD PRESSURE: 118 MMHG | HEART RATE: 78 BPM | OXYGEN SATURATION: 98 % | DIASTOLIC BLOOD PRESSURE: 78 MMHG

## 2024-08-08 DIAGNOSIS — F33.0 MDD (MAJOR DEPRESSIVE DISORDER), RECURRENT EPISODE, MILD: ICD-10-CM
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
## 2024-08-08 DIAGNOSIS — G31.84 MILD COGNITIVE IMPAIRMENT WITH MEMORY LOSS: ICD-10-CM

## 2024-08-08 DIAGNOSIS — F41.1 GAD (GENERALIZED ANXIETY DISORDER): ICD-10-CM
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
## 2024-08-08 PROCEDURE — 99999 PR PBB SHADOW E&M-EST. PATIENT-LVL IV: CPT | Mod: PBBFAC,,, | Performed by: PHYSICIAN ASSISTANT

## 2024-08-09 ENCOUNTER — TELEPHONE (OUTPATIENT)
Dept: ADMINISTRATIVE | Facility: CLINIC | Age: 79
End: 2024-08-09
Payer: MEDICARE

## 2024-08-09 ENCOUNTER — PATIENT OUTREACH (OUTPATIENT)
Dept: ADMINISTRATIVE | Facility: OTHER | Age: 79
End: 2024-08-09
Payer: MEDICARE

## 2024-08-12 ENCOUNTER — TELEPHONE (OUTPATIENT)
Dept: PRIMARY CARE CLINIC | Facility: CLINIC | Age: 79
End: 2024-08-12
Payer: MEDICARE

## 2024-08-14 ENCOUNTER — TELEPHONE (OUTPATIENT)
Dept: PRIMARY CARE CLINIC | Facility: CLINIC | Age: 79
End: 2024-08-14
Payer: MEDICARE

## 2024-08-14 NOTE — TELEPHONE ENCOUNTER
Pt was supposed to see Iman Monday and do a nurse visit after for med rec. She is now coming in today. Can we please call her to remind her to bring her meds to the visit today and can add a nurse visit  after to go over her meds.   Thanks

## 2024-08-23 DIAGNOSIS — F41.9 ANXIETY: ICD-10-CM

## 2024-08-23 DIAGNOSIS — F33.41 MDD (MAJOR DEPRESSIVE DISORDER), RECURRENT, IN PARTIAL REMISSION: ICD-10-CM

## 2024-08-23 RX ORDER — VENLAFAXINE 75 MG/1
75 TABLET ORAL DAILY
Qty: 90 TABLET | Refills: 3 | Status: SHIPPED | OUTPATIENT
Start: 2024-08-23 | End: 2025-08-23

## 2024-08-24 ENCOUNTER — OFFICE VISIT (OUTPATIENT)
Dept: URGENT CARE | Facility: CLINIC | Age: 79
End: 2024-08-24
Payer: MEDICARE

## 2024-08-24 VITALS
RESPIRATION RATE: 20 BRPM | OXYGEN SATURATION: 97 % | DIASTOLIC BLOOD PRESSURE: 73 MMHG | TEMPERATURE: 99 F | HEIGHT: 61 IN | WEIGHT: 146 LBS | HEART RATE: 88 BPM | BODY MASS INDEX: 27.56 KG/M2 | SYSTOLIC BLOOD PRESSURE: 131 MMHG

## 2024-08-24 DIAGNOSIS — M79.672 LEFT FOOT PAIN: ICD-10-CM

## 2024-08-24 DIAGNOSIS — L84 FOOT CALLUS: Primary | ICD-10-CM

## 2024-08-24 PROCEDURE — 99213 OFFICE O/P EST LOW 20 MIN: CPT | Mod: S$GLB,,, | Performed by: FAMILY MEDICINE

## 2024-08-24 NOTE — PROGRESS NOTES
"Subjective:      Patient ID: Paris Burris is a 78 y.o. female.    Vitals:  height is 5' 1" (1.549 m) and weight is 66.2 kg (146 lb). Her temperature is 98.6 °F (37 °C). Her blood pressure is 131/73 and her pulse is 88. Her respiration is 20 and oxygen saturation is 97%.     Chief Complaint: Foot Pain    Pt presents with complaint of left foot pain x1-2 weeks.  Pt states she has calluses on her foot and states when she walks around her house barefoot she sometimes has discomfort.  Pt states she has not taken anything for her pain.  Pt denies any hx of fractures or surgeries to the affected foot.     Foot Pain  This is a new problem. The current episode started 1 to 4 weeks ago. The problem occurs intermittently. The problem has been unchanged. The symptoms are aggravated by walking. She has tried nothing for the symptoms. The treatment provided no relief.     ROS   Objective:     Physical Exam   Constitutional: She is oriented to person, place, and time. She appears well-developed.   HENT:   Head: Normocephalic and atraumatic. Head is without abrasion, without contusion and without laceration.   Ears:   Right Ear: External ear normal.   Left Ear: External ear normal.   Nose: Nose normal.   Mouth/Throat: Oropharynx is clear and moist and mucous membranes are normal.   Eyes: Conjunctivae, EOM and lids are normal. Pupils are equal, round, and reactive to light.   Neck: Trachea normal and phonation normal. Neck supple.   Cardiovascular: Normal rate, regular rhythm and normal heart sounds.   No murmur heard.  Pulmonary/Chest: Effort normal and breath sounds normal. No stridor. No respiratory distress. She has no wheezes. She has no rhonchi. She has no rales.   Musculoskeletal: Normal range of motion.         General: Normal range of motion.   Neurological: She is alert and oriented to person, place, and time.   Skin: Skin is warm, dry and intact. Capillary refill takes less than 2 seconds. No abrasion, No burn and No " ecchymosis         Comments:   Left foot:  Positive callus at plantar aspect of great toe and midfoot lateral aspect plantar aspect.  No redness, induration, fluctuation.    No significant tenderness.   Psychiatric: Her speech is normal and behavior is normal. Judgment and thought content normal.   Nursing note and vitals reviewed.      Assessment:     1. Foot callus    2. Left foot pain        Plan:   Discussed exam findings/diagnosis/plan with patient. Advised to f/u with PCP within 2-5 days. All questions answered. Patient verbally understood and agreed with treatment plan.  Educational materials and instructions regarding the visit diagnosis and management provided.     Foot callus  -     Ambulatory referral/consult to Podiatry    Left foot pain

## 2024-08-26 ENCOUNTER — PATIENT MESSAGE (OUTPATIENT)
Dept: PRIMARY CARE CLINIC | Facility: CLINIC | Age: 79
End: 2024-08-26
Payer: MEDICARE

## 2024-08-26 ENCOUNTER — TELEPHONE (OUTPATIENT)
Dept: PRIMARY CARE CLINIC | Facility: CLINIC | Age: 79
End: 2024-08-26
Payer: MEDICARE

## 2024-08-26 NOTE — TELEPHONE ENCOUNTER
/Kade left  stating patient does want to reschedule intake and will call tomorrow.  She is having her hearing aid connected to her phone and should be able to better communicate at that time.

## 2024-08-26 NOTE — TELEPHONE ENCOUNTER
B, can you check w/ pt? Per Sheryl:  Hi Dr. Huynh! Looks like patient was seen at urgent care today and reported not taking a few of her pillpacks meds... They have def been in her packs and I know she can be confused sometimes. You mind reviewing her chart when you get a minute and sending me new Rxs if appropriate?  TN  Zetia, Metformin, and Toprol are the few that the Urgent care MD d/c on my end.

## 2024-08-27 ENCOUNTER — OFFICE VISIT (OUTPATIENT)
Dept: PODIATRY | Facility: CLINIC | Age: 79
End: 2024-08-27
Payer: MEDICARE

## 2024-08-27 ENCOUNTER — CLINICAL SUPPORT (OUTPATIENT)
Dept: AUDIOLOGY | Facility: CLINIC | Age: 79
End: 2024-08-27
Payer: MEDICARE

## 2024-08-27 VITALS — WEIGHT: 145.94 LBS | BODY MASS INDEX: 27.55 KG/M2 | HEIGHT: 61 IN

## 2024-08-27 DIAGNOSIS — M20.41 HAMMER TOES OF BOTH FEET: ICD-10-CM

## 2024-08-27 DIAGNOSIS — M20.42 HAMMER TOES OF BOTH FEET: ICD-10-CM

## 2024-08-27 DIAGNOSIS — H90.3 SENSORINEURAL HEARING LOSS (SNHL), BILATERAL: Primary | ICD-10-CM

## 2024-08-27 DIAGNOSIS — H93.293 IMPAIRMENT OF AUDITORY DISCRIMINATION OF BOTH EARS: ICD-10-CM

## 2024-08-27 DIAGNOSIS — L84 CORN OR CALLUS: Primary | ICD-10-CM

## 2024-08-27 DIAGNOSIS — E11.42 TYPE 2 DIABETES MELLITUS WITH DIABETIC POLYNEUROPATHY, UNSPECIFIED WHETHER LONG TERM INSULIN USE: ICD-10-CM

## 2024-08-27 PROCEDURE — 1101F PT FALLS ASSESS-DOCD LE1/YR: CPT | Mod: CPTII,S$GLB,, | Performed by: PODIATRIST

## 2024-08-27 PROCEDURE — 99213 OFFICE O/P EST LOW 20 MIN: CPT | Mod: 25,S$GLB,, | Performed by: PODIATRIST

## 2024-08-27 PROCEDURE — 99999 PR PBB SHADOW E&M-EST. PATIENT-LVL III: CPT | Mod: PBBFAC,,, | Performed by: PODIATRIST

## 2024-08-27 PROCEDURE — 3288F FALL RISK ASSESSMENT DOCD: CPT | Mod: CPTII,S$GLB,, | Performed by: PODIATRIST

## 2024-08-27 PROCEDURE — 1126F AMNT PAIN NOTED NONE PRSNT: CPT | Mod: CPTII,S$GLB,, | Performed by: PODIATRIST

## 2024-08-27 PROCEDURE — 1157F ADVNC CARE PLAN IN RCRD: CPT | Mod: CPTII,S$GLB,, | Performed by: PODIATRIST

## 2024-08-27 PROCEDURE — 92604 REPROGRAM COCHLEAR IMPLT 7/>: CPT | Mod: S$GLB,,, | Performed by: AUDIOLOGIST

## 2024-08-27 PROCEDURE — 1159F MED LIST DOCD IN RCRD: CPT | Mod: CPTII,S$GLB,, | Performed by: PODIATRIST

## 2024-08-27 PROCEDURE — 11056 PARNG/CUTG B9 HYPRKR LES 2-4: CPT | Mod: Q9,S$GLB,, | Performed by: PODIATRIST

## 2024-08-27 PROCEDURE — 99499 UNLISTED E&M SERVICE: CPT | Mod: S$GLB,,, | Performed by: AUDIOLOGIST

## 2024-08-27 PROCEDURE — 1111F DSCHRG MED/CURRENT MED MERGE: CPT | Mod: CPTII,S$GLB,, | Performed by: PODIATRIST

## 2024-08-27 PROCEDURE — 1160F RVW MEDS BY RX/DR IN RCRD: CPT | Mod: CPTII,S$GLB,, | Performed by: PODIATRIST

## 2024-08-27 RX ORDER — UREA 40 %
CREAM (GRAM) TOPICAL DAILY
Qty: 85 G | Refills: 11 | Status: SHIPPED | OUTPATIENT
Start: 2024-08-27

## 2024-08-27 NOTE — PROGRESS NOTES
8/27/2024    Hearing Aid Care: BUNDLED    ReSound Earmold   New RT SN: [id number]   Repair and Remake Exp: 1/16/25      ReSound Nexia 7    Champagne   RT SN: [id number]   Repair + L/D Exp: 8/16/2027      SN: [id number]   Repair Exp: 8/16/2027   L/D Exp: 8/16/2025         Paris Burris was seen for follow up hearing aid care.  Mrs. Burris stated the earmold was fitting better and there was no more discomfort with the soft half shell earmold.  Mrs. Burris was concerned the earmold was coming off of the earhook at times.  She also wanted to have her phone paired to the hearing aid.    The hearing aid was paired to her phone today.   Mrs. Burris was instructed on the use of the Resound MLEIDA to control the hearing aid.  She was also instructed on removal and insertion of the earmold to put less pressure on the tubing connection to the hearing aid.      Mrs. Burris was pleased with the sound quality of the new hearing aid.    Recommend:  Follow up hearing aid care as needed.  Annual evaluation.

## 2024-08-27 NOTE — PROGRESS NOTES
Subjective:      Patient ID: Paris Burris is a 78 y.o. female.    Chief Complaint: Bunions     Pain and hard skin  3rd toe left.  Gradual onset, worsening over the past month or so.  Aggravated by increased weight-bearing prolonged standing some shoes.  Periodic debridement helps symptoms.  Patient denies trauma and surgery left foot.    Cc2 Diabetes, increased risk amputation needing evaluation/management/optomization of foot care.    Chief Complaint   Patient presents with    Bunions      Chief Complaint   Patient presents with    Bunions        Casual shoes      Review of Systems   Constitutional: Negative for chills, diaphoresis, fever, malaise/fatigue and night sweats.   Cardiovascular:  Negative for claudication, cyanosis, leg swelling and syncope.   Skin:  Positive for suspicious lesions. Negative for color change, dry skin, nail changes, rash and unusual hair distribution.   Musculoskeletal:  Negative for falls, joint pain, joint swelling, muscle cramps, muscle weakness and stiffness.   Gastrointestinal:  Negative for constipation, diarrhea, nausea and vomiting.   Neurological:  Positive for paresthesias and sensory change. Negative for brief paralysis, disturbances in coordination, focal weakness, numbness and tremors.           Objective:      Physical Exam  Constitutional:       General: She is not in acute distress.     Appearance: She is well-developed. She is not diaphoretic.   Cardiovascular:      Pulses:           Popliteal pulses are 2+ on the right side and 2+ on the left side.        Dorsalis pedis pulses are 2+ on the right side and 2+ on the left side.        Posterior tibial pulses are 2+ on the right side and 2+ on the left side.      Comments: Capillary refill 3 seconds all toes/distal feet, all toes/both feet warm to touch.      Negative lymphadenopathy bilateral popliteal fossa and tarsal tunnel.      Negavie lower extremity edema bilateral.    Musculoskeletal:      Right ankle: No  swelling, deformity, ecchymosis or lacerations. Normal range of motion. Normal pulse.      Right Achilles Tendon: Normal. No defects. Martin's test negative.      Comments: Patient has hammertoes of digits   2-5 bilateral                partially reducible without symptom today.    Otherwise, Skin is normal age and health appropriate color, turgor, texture, and temperature bilateral lower extremities without ulceration, hyperpigmentation, discoloration, masses nodules or cords palpated.  No ecchymosis, erythema, edema, or cardinal signs of infection bilateral lower extremities.    Lymphadenopathy:      Lower Body: No right inguinal adenopathy. No left inguinal adenopathy.      Comments: Negative lymphadenopathy bilateral popliteal fossa and tarsal tunnel.    Negative lymphangitic streaking bilateral feet/ankles/legs.   Skin:     General: Skin is warm and dry.      Capillary Refill: Capillary refill takes 2 to 3 seconds.      Coloration: Skin is not pale.      Findings: No abrasion, bruising, burn, ecchymosis, erythema, laceration, lesion or rash.      Nails: There is no clubbing.      Comments:   Focal hyperkeratotic lesion consisting entirely of hyperkeratotic tissue without open skin, drainage, pus, fluctuance, malodor, or signs of infection plantar hallux IPJ and 5th MTPJ bilateral     Otherwise, Skin thin, shiny, atrophic, with decreased density and distribution of pedal hair bilateral, but without hyperpigmentation, lamin discoloration,  ulcers, masses, nodules or cords palpated bilateral feet and legs.      Neurological:      Mental Status: She is alert and oriented to person, place, and time.      Sensory: No sensory deficit.      Motor: No tremor, atrophy or abnormal muscle tone.      Gait: Gait normal.      Comments: Paresthesias, and burning bilateral feet with no clearly identified trigger or source.    Negative tinel sign to percussion sural, superficial peroneal, deep peroneal, saphenous, and  posterior tibial nerves right and left ankles and feet.      Subjective numbness without loss protective sensation all 10 toes to 10 g monofilament   Psychiatric:         Behavior: Behavior is cooperative.               Assessment:       Encounter Diagnoses   Name Primary?    Corn or callus Yes    Type 2 diabetes mellitus with diabetic polyneuropathy, unspecified whether long term insulin use     Hammer toes of both feet          Plan:       Paris was seen today for bunions.    Diagnoses and all orders for this visit:    Corn or callus  -     DIABETIC SHOES FOR HOME USE    Type 2 diabetes mellitus with diabetic polyneuropathy, unspecified whether long term insulin use  -     DIABETIC SHOES FOR HOME USE    Hammer toes of both feet  -     DIABETIC SHOES FOR HOME USE    Other orders  -     urea (CARMOL) 40 % Crea; Apply topically once daily.      I counseled the patient on her conditions, their implications and medical management.        The patient has received literature on basic diabetic foot care.  Patient will inspect feet daily, wear protective shoe gear when ambulatory, and apply moisturizer to skin as needed to maintain elasticity and help prevent ulceration.    Discussed conservative treatment with shoes of adequate dimensions, material, and style to alleviate symptoms and delay or prevent surgical intervention.    Rx DM shoes, inserts, urea cream bid.    Inspect feet multiple times daily for signs of occurrence/recurrence ulceration.      With the patient's permission, I debrided hyperkeratotic lesion(s) as above totaling      4          to, not  Including dermis with sterile #15 blade.  Patient tolerated the procedure well and related significant relief.           Follow up in about 1 year (around 8/27/2025).

## 2024-08-27 NOTE — PROGRESS NOTES
8/27/2024    Cochlear Implant Programming Session:    Left Ear: Dr. Julian  :  Cochlear Zigfus  Internal Device/Serial Number:  [id number] / [id number]  Processor:  [id number]   SN of Processors: [id number]  DOS:  34/26/21  DIS:  6/1/21  Processor Color: Sand  Magnet Strength: 3i   Coil Length:  6cm  Battery Type:  Standard rechargeable  Warranty:  5 year     Right ear: RESOUND HA  BUNDLED  ReSound Nexia 7   Champagne   RT SN: [id number]   Repair + L/D Exp: 8/16/2027      SN: [id number]   Repair Exp: 8/16/2027   L/D Exp: 8/16/2025     Earmold, Hard, Full Shell   (REPLACED with SILICONE half shell mold today)  SN: [id number]   Repair + Remake Exp: 1/16/2025         Paris Burris was seen for a follow up programming session with her cochlear implant sound processor and her hearing aid.  Mrs. Burris arrived wearing her hearing aid but not her cochlear implant sound processor.  She had brought her phone to be paired with her devices.    The processor and the hearing aid were paired to her iPhone for calls and streaming as requested. The hearing aid was also connected to the [employer] MELIDA since it is most likely that she will wear the hearing aid more consistently than the sound processor.  Mrs. Burris was instructed on the use of the MELIDA.  The phone is paired to both the hearing and and the implant so phone calls should stream to both ears.    Recommend:  Follow up programming and cochlear implant supplies as needed.  Hearing aid care as needed.  Annual evaluation.

## 2024-09-03 ENCOUNTER — PATIENT MESSAGE (OUTPATIENT)
Dept: PRIMARY CARE CLINIC | Facility: CLINIC | Age: 79
End: 2024-09-03
Payer: MEDICARE

## 2024-09-03 DIAGNOSIS — M54.50 CHRONIC LOW BACK PAIN, UNSPECIFIED BACK PAIN LATERALITY, UNSPECIFIED WHETHER SCIATICA PRESENT: Primary | ICD-10-CM

## 2024-09-03 DIAGNOSIS — G89.29 CHRONIC LOW BACK PAIN, UNSPECIFIED BACK PAIN LATERALITY, UNSPECIFIED WHETHER SCIATICA PRESENT: Primary | ICD-10-CM

## 2024-09-10 ENCOUNTER — CLINICAL SUPPORT (OUTPATIENT)
Dept: CARDIOLOGY | Facility: HOSPITAL | Age: 79
End: 2024-09-10
Payer: MEDICARE

## 2024-09-10 ENCOUNTER — CLINICAL SUPPORT (OUTPATIENT)
Dept: CARDIOLOGY | Facility: HOSPITAL | Age: 79
End: 2024-09-10
Attending: INTERNAL MEDICINE
Payer: MEDICARE

## 2024-09-10 DIAGNOSIS — I42.9 CARDIOMYOPATHY, UNSPECIFIED: ICD-10-CM

## 2024-09-10 DIAGNOSIS — Z95.0 PRESENCE OF CARDIAC PACEMAKER: ICD-10-CM

## 2024-09-10 PROCEDURE — 93294 REM INTERROG EVL PM/LDLS PM: CPT | Mod: ,,, | Performed by: INTERNAL MEDICINE

## 2024-09-10 PROCEDURE — 93296 REM INTERROG EVL PM/IDS: CPT | Performed by: INTERNAL MEDICINE

## 2024-09-12 ENCOUNTER — PATIENT MESSAGE (OUTPATIENT)
Dept: REHABILITATION | Facility: OTHER | Age: 79
End: 2024-09-12
Payer: MEDICARE

## 2024-09-12 ENCOUNTER — TELEPHONE (OUTPATIENT)
Dept: PRIMARY CARE CLINIC | Facility: CLINIC | Age: 79
End: 2024-09-12
Payer: MEDICARE

## 2024-09-12 NOTE — PROGRESS NOTES
INTERNAL MEDICINE ANNUAL VISIT NOTE      CHIEF COMPLAINT     Chief Complaint   Patient presents with    Annual Exam     HPI     Paris Burris is a 72 y.o. C female who presents for her yearly exam.    Reports feeling more tired a lot lately. Has been on cipro at the beginning of the month for UTI. Still w/ urinary frequency. Rechecked UA and UCx. UA w/ some leuks so pre-emptively called in second course of cipro. Ucx came back neg 5/17/18. Told pt can stop.   Thinks this has caused her to feel tired and clammy sometimes. Reports sweaty at times. No fevers. No increased back pain from baseline. No nausea/vomiting/diarrhea. Able to eat normally. No hematuria.   No constipation. Does have some anxiety about .     DM2 - off of metformin.   Weight loss intentionally. Hasn't been out to the gym and walking.   a1c 5/3/18 - 5.8  Foot - 10/30/17  MAC 10/30/17 - neg  Eye - 3/22/18    MDD/GABBY - effexor XR 37.5mg QD, ativan prn.   Dr. Sweeney 3/20/18 - f/u in 6 mo.     COOKIE - last saw Dr. Feng 3/13/18. On APAP 7-18cm H2O nasal mask.     ESTEFANIA - Dr. Patterson - last seen 3/2/18. VCE w/ congested small bowel mucosa of unknown significant. Plan for SBE.     HLD - was rx crestor 20mg daily. Only taking 10mg daily. Cholesterol is high - restarted taking 20mg daily.     Past Medical History:  Past Medical History:   Diagnosis Date    Allergy     Anemia     Anxiety     Cancer 2002    L breast s/p lumpectomy    Decreased hearing     Depression     Diabetes mellitus     Diabetes with neurologic complications     Gastric ulcer 9/10/13    EGD    Hiatal hernia     Hyperlipidemia     Migraine headache     Migraine headache 3/20/2015    Multiple gastric ulcers     Parathyroid disorder     Pre-diabetes     Renal manifestation of secondary diabetes mellitus     Sleep apnea     no CPAP    Type 2 diabetes mellitus, controlled, 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    Wrist fracture        Past Surgical History:  Past  Surgical History:   Procedure Laterality Date    ADENOIDECTOMY      BREAST LUMPECTOMY      COLONOSCOPY N/A 12/2/2016    Procedure: COLONOSCOPY;  Surgeon: Dustin Patterson MD;  Location: [facility] ([address] [place] [address]);  Service: Endoscopy;  Laterality: N/A;  PM prep    EYE SURGERY Bilateral     cataracts    lipoma removal  1999    TONSILLECTOMY         Allergies:  Review of patient's allergies indicates:   Allergen Reactions    Topamax [topiramate] Other (See Comments)     hallucinations       Medicines reviewed.    Family History:  Family History   Problem Relation Age of Onset    Suicide Mother     Depression Mother     Alcohol abuse Father     Headaches Father     Diabetes Sister         prediabetes    Psoriasis Sister     Depression Sister     Depression Daughter     Colon cancer Neg Hx     Esophageal cancer Neg Hx        Social History:  Social History   Substance Use Topics    Smoking status: Never Smoker    Smokeless tobacco: Never Used    Alcohol use No      Comment: quit 2014 - alcohol abuse       Review of Systems:  Review of Systems   Constitutional: Positive for activity change and unexpected weight change.   HENT: Negative for hearing loss, rhinorrhea and trouble swallowing.    Eyes: Negative for discharge and visual disturbance.   Respiratory: Negative for chest tightness and wheezing.    Cardiovascular: Negative for chest pain and palpitations.   Gastrointestinal: Negative for blood in stool, constipation, diarrhea and vomiting.   Endocrine: Positive for polyuria. Negative for polydipsia.   Genitourinary: Negative for difficulty urinating, dysuria, hematuria and menstrual problem.   Musculoskeletal: Negative for arthralgias, joint swelling and neck pain.   Neurological: Positive for weakness and headaches.   Psychiatric/Behavioral: Positive for dysphoric mood. Negative for confusion.     Health Maintainence:   Td 6/16/16  Flu 10/19/17  Prevnar 3/14/16  Pneumovax   Zoster - interested.   MMG  "4/4/17  C-SCOPE 12/2/16  DEXA 4/7/17    PHYSICAL EXAM     /68 (BP Location: Left arm, Patient Position: Sitting, BP Method: Small (Manual))   Pulse 86   Temp 98.3 °F (36.8 °C)   Ht 5' 1" (1.549 m)   Wt 74.7 kg (164 lb 11.2 oz)   BMI 31.12 kg/m²     GEN - A+OX4, NAD   HEENT - PERRL, EOMI, OP clear. MMM. Hearing aid.   Neck - No thyromegaly or cervical LAD. No thyroid masses felt.  CV - RRR, no m/r   Chest - CTAB, no wheezing or rhonchi  Abd - S/NT/ND/+BS.   Ext - 2+BDP and radial pulses. No LE edema.  Neuro - 5/5 BUE and BLE strength. 2+ DTRs.  Skin - No rash.    LABS     Previous labs reviewed.    ASSESSMENT/PLAN     Paris Burris is a 72 y.o. female with  Paris was seen today for annual exam.    Diagnoses and all orders for this visit:    Controlled type 2 diabetes mellitus without complication, without long-term current use of insulin - diet controlled. Recommended going back to exercising.   -     rosuvastatin (CRESTOR) 20 MG tablet; Take 1 tablet (20 mg total) by mouth once daily.    COOKIE (obstructive sleep apnea) - cont APAP.     MDD (major depressive disorder), recurrent, in partial remission - cont effexor. F/u w/ Dr. Sweeney.     GABBY (generalized anxiety disorder) - as above.     Urinary frequency  -     Urine culture  -     Ambulatory Referral to Urogynecology    Iron deficiency anemia, unspecified iron deficiency anemia type - f/u w/ GI. On ferrous sulfate.     Hyperlipidemia, unspecified hyperlipidemia type  -     rosuvastatin (CRESTOR) 20 MG tablet; Take 1 tablet (20 mg total) by mouth once daily.    Gastroesophageal reflux disease, esophagitis presence not specified  -     esomeprazole (NEXIUM) 40 MG capsule; Take 1 capsule (40 mg total) by mouth before breakfast.    Encounter for screening mammogram for breast cancer  -     Mammo Digital Screening Bilat with CAD; Future; Expected date: 05/21/2018    Other orders  -     gabapentin (NEURONTIN) 300 MG capsule; Take 1 capsule (300 mg total) by " mouth 2 (two) times daily.    RTC in 6 months, sooner if needed and depending on labs.    Mandi Huynh MD  Department of Internal Medicine - [facility]  11:17 AM   Detail Level: Detailed

## 2024-09-12 NOTE — TELEPHONE ENCOUNTER
LCSW made multiple attempts to reach patient regarding 64+ LCSW [facility] referral.  Patient was a no show 8/14/24 for intake.  See documentation in chart.  LCSW will close referral at this time and remain available for any future needs.  PCP updated.

## 2024-09-13 ENCOUNTER — DOCUMENTATION ONLY (OUTPATIENT)
Dept: REHABILITATION | Facility: OTHER | Age: 79
End: 2024-09-13

## 2024-09-13 NOTE — PROGRESS NOTES
Physical Therapy Treatment Note    Patient was scheduled for an initial evaluation for physical therapy at [facility] for 9/13/2024. Pt failed to appear for appointment without prior notification for today.     Pt will need to contact the clinic for future appointments.    Thank you for your referral.    Sincerely,    Patsy Elaine, PT

## 2024-09-23 ENCOUNTER — CLINICAL SUPPORT (OUTPATIENT)
Dept: REHABILITATION | Facility: OTHER | Age: 79
End: 2024-09-23
Payer: MEDICARE

## 2024-09-23 DIAGNOSIS — M62.81 MUSCLE WEAKNESS OF LOWER EXTREMITY: ICD-10-CM

## 2024-09-23 DIAGNOSIS — Z74.09 IMPAIRED FUNCTIONAL MOBILITY, BALANCE, GAIT, AND ENDURANCE: Primary | ICD-10-CM

## 2024-09-23 DIAGNOSIS — M54.50 CHRONIC MIDLINE LOW BACK PAIN WITHOUT SCIATICA: ICD-10-CM

## 2024-09-23 DIAGNOSIS — G89.29 CHRONIC MIDLINE LOW BACK PAIN WITHOUT SCIATICA: ICD-10-CM

## 2024-09-23 PROCEDURE — 97162 PT EVAL MOD COMPLEX 30 MIN: CPT | Mod: PN

## 2024-09-23 PROCEDURE — 97112 NEUROMUSCULAR REEDUCATION: CPT | Mod: PN

## 2024-09-23 NOTE — PATIENT INSTRUCTIONS
"BRIDGING        While lying on your back with knees bent, tighten your lower abdominal muscles, squeeze your buttocks and then raise your buttocks off the floor/bed as creating a "Bridge" with your body. Hold and then lower yourself and repeat.    Hold for 3 seconds. Perform 20 repetitions    Copyright © 2024 [employer]    SUPINE HIP ABDUCTION - ELASTIC BAND CLAMS - [place]          Lie down on your back with your knees bent. Place an elastic band around your knees and then pull your knees apart. Return your knees together and repeat.     Hold for 3 seconds. Perform 20 repetitions with both lower extremities, 20 repetitions with right leg, and 20 repetitions with left leg.    Copyright © 2024 [employer]    Seated Hip Internal Rotation        While seated with feet dangling, with knees and feet together, do reps of turning feet outward as far as comfortably possible and hold. Then return to start. Repeat.    Hold for 3 seconds. Perform 20 repetitions with both lower extremities, 20 repetitions with right leg, and 20 repetitions with left leg.    Copyright © 2024 [employer]    "

## 2024-09-23 NOTE — PROGRESS NOTES
[facility]  Physical Therapy Initial Evaluation    Name: Paris Burris  Clinic Number: [id number]    Therapy Diagnosis:   Encounter Diagnoses   Name Primary?    Impaired functional mobility, balance, gait, and endurance Yes    Chronic midline low back pain without sciatica     Muscle weakness of lower extremity      Physician: Mandi Huynh MD    Physician Orders: PT Eval and Treat   Medical Diagnosis: M54.50,G89.29 (ICD-10-CM) - Chronic low back pain, unspecified back pain laterality, unspecified whether sciatica present   Evaluation Date: 9/23/2024  Authorization Period Expiration: 9/9/25  Plan of Care Certification Period: 11/18/2924  Visit # / Visits authorized: 1/ 1    Time In: 1446  Time Out: 1542  Total Billable Time separate from evaluation time: 11 minutes    Precautions: Standard, Diabetes, and decreased hearing      Subjective   Date of onset: chronic  History of current condition - Paris reports: she is generally not welling well. Her back is not bothering her much today. States she has more difficulty getting up in the morning. States her walking is limited       Past Medical History:   Diagnosis Date    Allergy     Anemia     Anxiety     Breast cancer 2002    Left breast    Cancer 2002    L breast s/p lumpectomy    Cataract     Decreased hearing     Depression     Dermatochalasis of both upper eyelids     Diabetes mellitus     Diabetes with neurologic complications     Gastric ulcer 09/10/2013    EGD    Hiatal hernia     Hyperlipidemia     Migraine headache     Migraine headache 03/20/2015    Multiple gastric ulcers     Parathyroid disorder     Renal manifestation of secondary diabetes mellitus     Sleep apnea     no CPAP    Type 2 diabetes mellitus, controlled, 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      Paris Burris  has a past surgical history that includes lipoma removal (1999); Colonoscopy (N/A, 12/2/2016); Tonsillectomy; Adenoidectomy; Eye surgery  (Bilateral); Breast lumpectomy (Left, 2002); Esophagogastroduodenoscopy (N/A, 9/12/2018); Esophageal manometry with measurement of impedance (N/A, 10/8/2018); Laparoscopic Toupet fundoplication (N/A, 1/29/2019); Esophagogastroduodenoscopy (N/A, 1/29/2019); Implantation of biventricular heart pacemaker (N/A, 1/30/2019); Esophagogastroduodenoscopy (N/A, 5/31/2019); Implantation of cochlear prosthesis (Left, 4/26/2021); Cataract extraction w/  intraocular lens implant (Bilateral); and Colonoscopy (N/A, 8/31/2022).    Paris has a current medication list which includes the following prescription(s): acetaminophen, alendronate, bupropion, ezetimibe, freestyle yudith 2 reader, freestyle yudith 2 sensor, lancets, losartan, meloxicam, metformin, metoprolol succinate, rosuvastatin, ozempic, tramadol, urea, and venlafaxine.    Review of patient's allergies indicates:   Allergen Reactions    Topamax [topiramate] Hallucinations     hallucinations        Falls: has had a fall since last therapist therapy    Imaging:   X-ray  6/3/2024  FINDINGS:  Mild DJD.  No fracture or dislocation.  No bone destruction identified    Prior Therapy: yes  Social History:  lives with their spouse  Occupation: retired  Prior Level of Function: ambulation without assistive device.   Current Level of Function: ambulation without assistive device,     Pain:  Current 0/10, worst 5/10, best 0/10   Location: middle back   Description: unable to describe  Aggravating Factors: walking, house work, negotiating steps, and sitting.  Easing Factors: rest    Radicular symptoms: none  Bowel and Bladder incontinence: none    Sleep is not disturbed. Sleeping position:     Pts goals: to be able to do her house work, walk farther distances    Objective     30 Seconds Sit to Stand: 11 repetitions  Timed Up and Go: 10.42 seconds    Postural examination in sitting:   - decreased lumbar lordosis  - forward head  - forward shoulders      Functional assessment:   -  walking: ambulating without assistive device   - sit to stand: Independent   - sit to supine: Independent    - supine to sit: Independent   - supine to prone:  independent      Lumbar active range of motion in standing is:  Flexion 60 degrees    Extension 25 degrees    Left side bending 15 degrees    Right side bending 15 degrees    Left rotation Decreased 75%   Right rotation Decreased 50%     Flexibility testing:  - hamstrings:              bilateral: tight, right: -30 degrees, left: -30 degrees   - gastrocnemius:        bilateral: tight, right: 3 degrees, left: 4 degrees      - piriformis:                bilateral tight, decreased 50%  - quadriceps:              bilateral: decreased 25%  - hip flexors:                bilateral: within functional limits   - hip adductors:          bilateral: within normal limits   - IT Bands:                 bilateral: tight, decreased 25%     MMT R L   Hip flexion 5/5 4/5   Hip abduction 3+/5 3+/5   Hip extension 3+/5 3+/5   Hip external rotation   5/5 5/5   Hip internal rotation  4/5 4/5   Knee extension 5/5 5/5   Knee flexion 5/5 5/5   Ankle dorsiflexion 5/5 5/5   Ankle plantar flexion 5/5 5/5   Ankle inversion 5/5 5/5   Ankle eversion 5/5 5/5       Endurance is good.    Hip mobility                                    right                   left  Hip external rotation                 56 degrees         60 degrees   Hip internal rotation                  30 degrees         22 degrees       Lumbar Special tests    Straight Leg Raises  negative   Cross Straight Leg Raises  negative   SHANITA Bilateral hip tightness left > right    Prone Instability Test positive     Palpation: tender to palpation in bilateral lumbar paraspinals     Sensation:  - Light Touch: intact  - Saddle: intact    Reflexes:   - Knee Jerk: 2+    Intake Outcome Measure for FOTO Lumbar Spine Survey    Therapist reviewed FOTO scores for Paris Burris on 9/23/2024.   FOTO report - see Media section or FOTO  account episode details.    Intake Score: 59%           TREATMENT   Treatment Time In: 1530  Treatment Time Out: 1541  Total Treatment time separate from Evaluation time: 11 minutes      Paris participated in neuromuscular re-education activities to improve: Posture, muscle activation, sequencing, and strengthening for 11 minutes. The following activities were included:    [x] Bridging x 20 repetitions   [x] Hook lying 3 way clams with green band x 20 repetitions each  [x] Seated 3 way hip internal rotation x 20 repetitions each      Home Exercises Provided and Patient Education Provided     Education provided:     Discussed the role of the PTA on the Rehab Team. Discussed patient will be seen by a physical therapist minimally every 6th visit or every 30 days prior to being seen by PTA. Also discussed the use of the [facility] for communication.     Bridging  Hook lying 3 way clams   Seated 3 way hip internal rotation     Written Home Exercises Provided: yes.  Exercises were reviewed and Paris was able to demonstrate them prior to the end of the session.  Paris demonstrated good  understanding of the education provided.     See Electronic Medical Record under Patient Instructions for exercises provided 9/23/2024.    Assessment   Paris is a 78 y.o. female referred to outpatient Physical Therapy with a medical diagnosis of M54.50,G89.29 (ICD-10-CM) - Chronic low back pain, unspecified back pain laterality, unspecified whether sciatica present . Patient was referred to outpatient physical therapy secondary low back pain limiting functional activities, house hold chores, and community walking distances. Patient presenting with postural imbalance, decreased lower extremity strength and flexibility, weak core contributing to functional limitations. Will benefit from outpatient physical therapy for postural reeducation, functional activity training, balance training, and lower extremity strengthening and  strengthening to progress to below listed goals.     Patient prognosis is Good.   Patient will benefit from skilled outpatient Physical Therapy to address the deficits stated above and in the chart below, provide pt/family education, and to maximize pt's level of independence.     Plan of care discussed with patient: No  Patient's spiritual, cultural and educational needs considered and patient is agreeable to the plan of care and goals as stated below:     Anticipated Barriers for therapy: none    Medical Necessity is demonstrated by the following      Medical Necessity is demonstrated by the following  History  Co-morbidities and personal factors that may impact the plan of care [] LOW: no personal factors / co-morbidities  [] MODERATE: 1-2 personal factors / co-morbidities  [x] HIGH: 3+ personal factors / co-morbidities    Moderate / High Support Documentation:   Co-morbidities affecting plan of care: decreased hearing, Diabetes Mellitus, depression, anxiety    Personal Factors:   age     Examination  Body Structures and Functions, activity limitations and participation restrictions that may impact the plan of care [] LOW: addressing 1-2 elements  [] MODERATE: 3+ elements  [] HIGH: 4+ elements (please support below)    Moderate / High Support Documentation: muscle weakness, decreased hip range of motion,      Clinical Presentation [] LOW: stable  [x] MODERATE: Evolving  [] HIGH: Unstable     Decision Making/ Complexity Score: moderate           Goals:  Short Term Goals: 4 weeks   Independent with home exercise program .  Report decreased lumbar pain < or =  4/10 with activities of daily living such as walking, house work, negotiating steps, and sitting.  Increased manual muscle test for bilateral lower extremities by 1/2 muscle grade to promote proper pelvic stability to decrease lumbar pain < or =  4/10 with activities of daily living such as walking, house work, negotiating steps, and sitting.     Long Term  Goals: 8 weeks   INcrease bilateral ankle dorsiflexion to 10 degrees  Report decreased lumbar pain < or =  2/10 with activities of daily living such as walking, house work, negotiating steps, and sitting.  Increased manual muscle test for bilateral lower extremities  by 1 muscle grade to promote proper pelvic stability to decrease lumbar pain < or =  2/10 with activities of daily living such as walking, house work, negotiating steps, and sitting.   Increased flexibility in bilateral  hamstrings to -20 deg with 90/90 test to promote proper pelvic stability to decrease lumbar pain < or =  2/10 with activities of daily living such as walking, house work, negotiating steps, and sitting.   Increased flexibility in bilateral piriformis, bilateral  iliotibial band, and bilateral quadriceps to promote proper pelvic stability to decrease lumbar pain < or =  2/10 with activities of daily living such as walking, house work, negotiating steps, and sitting.      Plan   Certification Period/Plan of care expiration: 9/23/2024 to 11/18/2024.    Outpatient Physical Therapy 2 times weekly for 8 weeks to include the following interventions: Gait Training, Manual Therapy, Moist Heat/ Ice, Neuromuscular Re-ed, Patient Education, Therapeutic Activities, and Therapeutic Exercise.     Suman Malave, PT

## 2024-09-27 ENCOUNTER — TELEPHONE (OUTPATIENT)
Dept: NEUROLOGY | Facility: CLINIC | Age: 79
End: 2024-09-27
Payer: MEDICARE

## 2024-09-27 LAB
OHS CV AF BURDEN PERCENT: < 1
OHS CV BIV PACING PERCENT: 99 %
OHS CV DC REMOTE DEVICE TYPE: NORMAL

## 2024-09-30 ENCOUNTER — OFFICE VISIT (OUTPATIENT)
Dept: NEUROLOGY | Facility: CLINIC | Age: 79
End: 2024-09-30
Payer: MEDICARE

## 2024-09-30 DIAGNOSIS — G30.1 MILD LATE ONSET ALZHEIMER'S DEMENTIA WITHOUT BEHAVIORAL DISTURBANCE, PSYCHOTIC DISTURBANCE, MOOD DISTURBANCE, OR ANXIETY: ICD-10-CM

## 2024-09-30 DIAGNOSIS — G47.33 OSA (OBSTRUCTIVE SLEEP APNEA): ICD-10-CM

## 2024-09-30 DIAGNOSIS — F02.A0 MILD LATE ONSET ALZHEIMER'S DEMENTIA WITHOUT BEHAVIORAL DISTURBANCE, PSYCHOTIC DISTURBANCE, MOOD DISTURBANCE, OR ANXIETY: ICD-10-CM

## 2024-09-30 DIAGNOSIS — G47.00 INSOMNIA, UNSPECIFIED TYPE: ICD-10-CM

## 2024-09-30 DIAGNOSIS — F51.01 PRIMARY INSOMNIA: Primary | ICD-10-CM

## 2024-09-30 DIAGNOSIS — F03.90 MAJOR NEUROCOGNITIVE DISORDER: Primary | ICD-10-CM

## 2024-09-30 PROCEDURE — 96138 PSYCL/NRPSYC TECH 1ST: CPT | Mod: S$GLB,,, | Performed by: PSYCHIATRY & NEUROLOGY

## 2024-09-30 PROCEDURE — 99999 PR PBB SHADOW E&M-EST. PATIENT-LVL I: CPT | Mod: PBBFAC,,,

## 2024-09-30 PROCEDURE — 96132 NRPSYC TST EVAL PHYS/QHP 1ST: CPT | Mod: S$GLB,,, | Performed by: PSYCHIATRY & NEUROLOGY

## 2024-09-30 PROCEDURE — 99499 UNLISTED E&M SERVICE: CPT | Mod: S$GLB,,, | Performed by: PSYCHIATRY & NEUROLOGY

## 2024-09-30 PROCEDURE — 96139 PSYCL/NRPSYC TST TECH EA: CPT | Mod: S$GLB,,, | Performed by: PSYCHIATRY & NEUROLOGY

## 2024-09-30 PROCEDURE — 96116 NUBHVL XM PHYS/QHP 1ST HR: CPT | Mod: S$GLB,,, | Performed by: PSYCHIATRY & NEUROLOGY

## 2024-09-30 PROCEDURE — 96133 NRPSYC TST EVAL PHYS/QHP EA: CPT | Mod: S$GLB,,, | Performed by: PSYCHIATRY & NEUROLOGY

## 2024-09-30 NOTE — PROGRESS NOTES
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 Appropriate interpersonal skills. Good interpretation of nonverbal cues.   Orientation:   Fully oriented and Disoriented to age. Reported being "77"  Sensory:   Pt wore eyeglasses and hearing aids. Difficulty hearing, need to speak loudly,   Gait:   Unremarkable and Pt ambulates independently.   Psychomotor:  Within Normal Limits  Speech:  Fluent, spontaneous, normal tone, normal rate, normal pitch, normal volume, normal prosody  Language:  Receptive and expressive language appear intact. Comprehends conversational speech., No evidence of word-finding difficulties in conversational speech.  Mood:   euthymic and anxious  Affect:   mood-congruent  Thought Process: goal directed and linear. Ocassionally tangential though sometimes due to poor hearing impacting ability to hear questions  Thought Content: Denied current SI/HI. and No evidence of psychotic symptoms.  Judgment/Insight: Poor  Validity:  Performance on standalone and/or embedded performance validity measures all suggested adequate engagement. "   Behaviorally, the pt appeared to put forth good effort, and cognitive test scores are generally commensurate with their overall functional level.   As a result, scores are considered a valid reflection of the pt's functioning.  Test Taking Bx:  Patient worked at a slow pace but was primarily impacted by frequent forgetting of instructions, and needing continuous clarification of instructions. Some nonverbal instructions needed to be verbalized to be understood and many instructions were made briefer to reduce confusion and forgetting, still Pt struggled to understand. Pt was aware of difficulties on all tasks and often made excuses to explain the challenges (e.g., that it was too late in the day to do testing).       PROCEDURES/TESTS ADMINISTERED:  In addition to performing a review of pertinent medical records, reviewing limits to confidentiality, conducting a clinical interview, and explaining procedures, the following measures were administered:   Toby Cognitive Assessment (MOCA), Wechsler Adult Intelligence Scale - Fourth Edition (WAIS-IV, demographically adjusted norms), selected subtests, Neuropsychological Assessment Battery (NAB) Naming subtest, Controlled Oral Word Association Test (CFL/MOANS/MOAANS + ed, 2005), Animal Naming (Alessandra et al., 2004), Trail Making Test (MOANS/MOAANS + Ed, 2005), Stroop Color Word Test (MOANS/MOAANS + Ed, 2005), Yandel Complex Figure Test  (Copy Trial) , [place] Verbal Learning Test - Second Edition (CVLT-II), Short Form, Wechsler Memory Scale - Fourth Edition (WMS-IV; demographically adjusted norms), selected subtests; Clock Drawing (Schretlen et al. 2006 norms), Independent Living Scales (ILS), selected subtests, Generalized Anxiety Disorder-7 Item Scale (GABBY-7), Geriatric Depression Scale (GDS), and Pillbox Test . Manual norms were used unless otherwise indicated.     NEUROPSYCHOLOGICAL ASSESSMENT RESULTS:  The following should not be interpreted in isolation from the  neuropsychological evaluation report.  Scores on stand-alone and/or embedded performance validity measures were WNL.    COGNITIVE SCREENING Raw Score Type of Standardized Score Standardized Score Percentile/CP Descriptor   MoCA 13 - - - Impaired   Orientation - Place 2/2 - - - -   Orientation - Date 3/4 - - - -   LANGUAGE FUNCTIONING Raw Score Type of Standardized Score Standardized Score Percentile/CP Descriptor   Similarities 21 Tscore 39 13.6 Low Average   NAB Naming 30 Tscore 59 82 High Average   CFL 36 ss 10 50 Average   Animal Naming 13 Tscore 34 5 Below Average   VISUOSPATIAL FUNCTIONING Raw Score Type of Standardized Score Standardized Score Percentile/CP Descriptor   Matrix Reasoning 5 Tscore 28 1.4 Exceptionally Low   RCFT Copy 26 - - 2-5 Below Average   RCFT Time to Copy 359 - - >16 WNL   Clock Request 1 - - <2.6 Below Average   Clock Copy 5 - - 100 WNL   Clock Total 6 - - <2.3 Below Average   LEARNING & MEMORY Raw Score Type of Standardized Score Standardized Score Percentile/CP Descriptor   CVLT-II Short         Trials 1-4  10 Tscore 15.0 <0.1 Exceptionally Low    Trial 1 2 zscore -3.0 0 Exceptionally Low    Trial 4 2 zscore -3.0 0 Exceptionally Low    SDFR 0 zscore -2.5 1 Exceptionally Low    LDFR 0 zscore -2.0 2 Below Average   LDCR 0 zscore -3.0 0     Semantic Clustering -0.2 zscore 0.0 50 Average   Learning Crosby 0.2 zscore 0.0 50 Average   Repetitions 0 zscore -0.5 31 Average   Intrusions 6 zscore 2.5 99 Exceptionally High   Recognition Hits 2 zscore -5.0 0     False Positives 3 zscore 1.0 84 High Average   Discriminability 0.2 zscore -2.5 1 Exceptionally Low    Forced Choice 100 - - - -   WMS-IV Subtests         LM I 19 Tscore 32 3.6 Below Average   LM II 12 Tscore 43 24.2 Exceptionally Low   LM Recognition 15 - - 17-25 Low Average   ATTENTION/WORKING MEMORY Raw Score Type of Standardized Score Standardized Score Percentile/CP Descriptor   Digit Span 18 Tscore 33 4.5 Below Average         DS  Forward 6 ss 6 9 Low Average         DS Backward 6 ss 8 25 Average         DS Sequence 6 ss 9 37 Average         Longest Digit Forward 4 - - - -         Longest Digit Backward 4 - - - -         Longest Digit Sequence 5 - - - -   MENTAL PROCESSING SPEED Raw Score Type of Standardized Score Standardized Score Percentile/CP Descriptor   Coding 17 Tscore 18 0.1 Exceptionally Low   TMT A  153 ss 2 0 Exceptionally Low   TMT A errors 0 - - - -   Stroop: Word Reading 74 ss 8 25 Average   Stroop: Color Naming 20 ss 2 0.4 Exceptionally Low    EXECUTIVE FUNCTIONING Raw Score Type of Standardized Score Standardized Score Percentile/CP Descriptor   TMT B DC ss       TMT B errors N/A - - - -   Stroop: C/W 0 ss 2 0.4 Exceptionally Low   Stroop: Interference -14 Tscore N/A N/A N/A   MERCEDEZ 10     -     Clock Request 1 - - <1 Exceptionally Low   Similarities 21 Tscore 39 13.6 Low Average   WAIS-IV Matrix Reasoning 5 ss 6 9 Low Average   FUNCTIONAL  Raw Score Type of Standardized Score Standardized Score Percentile/CP Descriptor   Pillbox Test Errors 24 - - - Below Expectation   ILS Health & Safety 31 Tscore 39 14 Dependent   MOOD & PERSONALITY Raw Score Type of Standardized Score Standardized Score Percentile/CP Descriptor   GDS-30 7 - - - WNL   GABBY-7 5 - - - Mild   ss = scaled score (mean = 10, SD = 3); SS = standard score (mean = 100, SD = 15); Tscore mean = 50, SD = 10; zscore (mean = 0.00, SD = 1)       Billing/Services Summary          Neurobehavioral Status Exam Base Code (43793)  Total Units: 1    Face-to-face Total Time: 60 min.         Professional Neuropsychological Testing Evaluation Services Base Code (26611)   Total Units: 1 Add-on (37978)  Total Units: 3   Referral review/initial test selection 15 min.    Intra-session Clinical Decision-Making          Tech consult/test review/modification 0 min.         Patient behavior management 0 min.    Face-to-face Interpretive Feedback 60 min.    Record Review/Integration/Report  Generation 180 min.     Total Time: 255 min.         Test Administration by Technician  Technician Name: Antionette Bernabe PsyD Base Code (30247)   Total Units: 1 Add-on (51788)\  Total Units: 9   Face-to-Face Testin min.    Scoring 135 min.     Total Time: 305 min.    DOS is the date of the evaluation unless specified

## 2024-10-02 DIAGNOSIS — I10 BENIGN ESSENTIAL HYPERTENSION: ICD-10-CM

## 2024-10-02 DIAGNOSIS — E78.5 HYPERLIPIDEMIA, UNSPECIFIED HYPERLIPIDEMIA TYPE: ICD-10-CM

## 2024-10-02 DIAGNOSIS — E11.9 CONTROLLED TYPE 2 DIABETES MELLITUS WITHOUT COMPLICATION, WITHOUT LONG-TERM CURRENT USE OF INSULIN: ICD-10-CM

## 2024-10-02 RX ORDER — ROSUVASTATIN CALCIUM 20 MG/1
20 TABLET, COATED ORAL DAILY
Qty: 90 TABLET | Refills: 3 | Status: SHIPPED | OUTPATIENT
Start: 2024-10-02

## 2024-10-02 RX ORDER — METOPROLOL SUCCINATE 25 MG/1
25 TABLET, EXTENDED RELEASE ORAL DAILY
Qty: 90 TABLET | Refills: 3 | Status: SHIPPED | OUTPATIENT
Start: 2024-10-02 | End: 2025-10-02

## 2024-10-02 RX ORDER — METFORMIN HYDROCHLORIDE 500 MG/1
500 TABLET, EXTENDED RELEASE ORAL
Qty: 90 TABLET | Refills: 3 | Status: SHIPPED | OUTPATIENT
Start: 2024-10-02 | End: 2025-10-02

## 2024-10-02 RX ORDER — LOSARTAN POTASSIUM 100 MG/1
100 TABLET ORAL DAILY
Qty: 90 TABLET | Refills: 3 | Status: SHIPPED | OUTPATIENT
Start: 2024-10-02

## 2024-10-06 PROBLEM — F03.90 MAJOR NEUROCOGNITIVE DISORDER: Status: ACTIVE | Noted: 2024-10-06

## 2024-10-14 PROBLEM — F03.A0 MILD DEMENTIA: Status: ACTIVE | Noted: 2024-10-06

## 2024-10-21 ENCOUNTER — DOCUMENTATION ONLY (OUTPATIENT)
Dept: AUDIOLOGY | Facility: CLINIC | Age: 79
End: 2024-10-21
Payer: MEDICARE

## 2024-10-21 NOTE — PROGRESS NOTES
Patient and her  came to the hearing aid window. Her tubing was hard and had extruded from her earmold. The tubing was replaced and re-glued to the earmold. A clean and check was performed. Mrs. Burris reported good subjective benefit from the changes made today.    Nicolas De Leon, CCC-A

## 2024-10-25 ENCOUNTER — TELEPHONE (OUTPATIENT)
Dept: PRIMARY CARE CLINIC | Facility: CLINIC | Age: 79
End: 2024-10-25
Payer: MEDICARE

## 2024-10-25 NOTE — TELEPHONE ENCOUNTER
----- Message from Clemencia sent at 10/25/2024  9:30 AM CDT -----  Contact: [phone]  Pt  called and stated that they would like an appt.     Please call and advise

## 2024-10-28 PROBLEM — N39.0 UTI (URINARY TRACT INFECTION): Status: RESOLVED | Noted: 2018-07-30 | Resolved: 2024-10-28

## 2024-10-29 ENCOUNTER — TELEPHONE (OUTPATIENT)
Dept: NEUROLOGY | Facility: CLINIC | Age: 79
End: 2024-10-29
Payer: MEDICARE

## 2024-11-04 ENCOUNTER — TELEPHONE (OUTPATIENT)
Dept: PRIMARY CARE CLINIC | Facility: CLINIC | Age: 79
End: 2024-11-04
Payer: MEDICARE

## 2024-11-04 NOTE — TELEPHONE ENCOUNTER
LCSW received VM from  asking to get patient scheduled for intake.  He is asking is appts can be made at [facility] and if not Ok to come to [facility].  Call back to [phone]. LCSW left VM for  informing appt would be at [facility] and requested return call to schedule.     Dr Huynh/Staff:  Can you place referral for patient?  It was close din 09/2024

## 2024-11-07 ENCOUNTER — OFFICE VISIT (OUTPATIENT)
Dept: PRIMARY CARE CLINIC | Facility: CLINIC | Age: 79
End: 2024-11-07
Payer: MEDICARE

## 2024-11-07 VITALS
BODY MASS INDEX: 28.12 KG/M2 | WEIGHT: 148.81 LBS | SYSTOLIC BLOOD PRESSURE: 115 MMHG | OXYGEN SATURATION: 96 % | HEART RATE: 99 BPM | DIASTOLIC BLOOD PRESSURE: 79 MMHG

## 2024-11-07 DIAGNOSIS — N18.2 STAGE 2 CHRONIC KIDNEY DISEASE: ICD-10-CM

## 2024-11-07 DIAGNOSIS — E11.59 HYPERTENSION ASSOCIATED WITH DIABETES: Primary | ICD-10-CM

## 2024-11-07 DIAGNOSIS — F33.42 MDD (MAJOR DEPRESSIVE DISORDER), RECURRENT, IN FULL REMISSION: ICD-10-CM

## 2024-11-07 DIAGNOSIS — I70.0 AORTIC ATHEROSCLEROSIS: ICD-10-CM

## 2024-11-07 DIAGNOSIS — F41.1 GAD (GENERALIZED ANXIETY DISORDER): ICD-10-CM

## 2024-11-07 DIAGNOSIS — E53.8 B12 DEFICIENCY: ICD-10-CM

## 2024-11-07 DIAGNOSIS — I15.2 HYPERTENSION ASSOCIATED WITH DIABETES: Primary | ICD-10-CM

## 2024-11-07 PROBLEM — M62.81 MUSCLE WEAKNESS: Status: RESOLVED | Noted: 2023-10-05 | Resolved: 2024-11-07

## 2024-11-07 PROBLEM — R53.83 FATIGUE: Status: RESOLVED | Noted: 2019-10-07 | Resolved: 2024-11-07

## 2024-11-07 PROBLEM — G89.29 CHRONIC MIDLINE LOW BACK PAIN WITHOUT SCIATICA: Status: RESOLVED | Noted: 2024-09-23 | Resolved: 2024-11-07

## 2024-11-07 PROBLEM — M54.50 CHRONIC BILATERAL LOW BACK PAIN WITHOUT SCIATICA: Status: RESOLVED | Noted: 2018-08-20 | Resolved: 2024-11-07

## 2024-11-07 PROBLEM — M54.50 CHRONIC MIDLINE LOW BACK PAIN WITHOUT SCIATICA: Status: RESOLVED | Noted: 2024-09-23 | Resolved: 2024-11-07

## 2024-11-07 PROBLEM — E66.9 OBESITY (BMI 30-39.9): Status: RESOLVED | Noted: 2017-10-30 | Resolved: 2024-11-07

## 2024-11-07 PROBLEM — G89.29 CHRONIC BILATERAL LOW BACK PAIN WITHOUT SCIATICA: Status: RESOLVED | Noted: 2018-08-20 | Resolved: 2024-11-07

## 2024-11-07 PROBLEM — S82.024A CLOSED NONDISPLACED LONGITUDINAL FRACTURE OF RIGHT PATELLA: Status: RESOLVED | Noted: 2021-10-29 | Resolved: 2024-11-07

## 2024-11-07 PROCEDURE — 99999 PR PBB SHADOW E&M-EST. PATIENT-LVL IV: CPT | Mod: PBBFAC,,, | Performed by: INTERNAL MEDICINE

## 2024-11-07 NOTE — PROGRESS NOTES
Subjective:       Patient ID: Paris Burris is a 79 y.o. female.    Chief Complaint: No chief complaint on file.    HPI  Last seen pt 7/2024  Hearing loss. H/o cochlear implants. Recently had hearing aids checked.    DM2 - ozempic 1mg weekly, metformin xr 500mg qam w/ breakfast.   Checks her sugars every few days. Lost a few lbs since July.   No nausea/vomiting. No abdominal pain.  A1c - 7/28/24 6.6.  Foot - Dr. Dunbar 8/27/24  Eye - due.  MAC - 3/8/24 neg.    HTN - Losartan 100mg qd, toprol xl 25mg daily.  Doesn't check BP at home.     MDD/GABBY - effexor 75mg daily (XR too sedating/dizzy), wellbutrin xl 300mg daily.   Thinks anxiety/depression are doing the same. Issues w/ daughter. Does get to see grandkids.   Hasn't followed with Jacqueline Perry - health  - as much.   Walks 15-20 min a day about 4 days a week - looking to go up on it.      11/7/2024     1:41 PM 5/2/2024     1:04 PM 4/23/2024     1:45 PM 11/9/2023     1:02 PM 10/30/2023    10:11 AM 10/19/2023     9:03 AM 10/17/2023     3:41 PM   Depression Patient Health Questionnaire   Over the last two weeks how often have you been bothered by little interest or pleasure in doing things Not at all Several days Not at all Not at all Several days Not at all Several days   Over the last two weeks how often have you been bothered by feeling down, depressed or hopeless Several days Several days Nearly every day Several days Several days Several days Several days   PHQ-2 Total Score 1 2 3 1 2 1 2   Over the last two weeks how often have you been bothered by trouble falling or staying asleep, or sleeping too much Not at all Not at all  Not at all  Not at all Not at all   Over the last two weeks how often have you been bothered by feeling tired or having little energy Not at all Not at all  Not at all  Several days Several days   Over the last two weeks how often have you been bothered by a poor appetite or overeating Not at all Several days  Not at all  Not at all  Several days   Over the last two weeks how often have you been bothered by feeling bad about yourself - or that you are a failure or have let yourself or your family down Not at all Several days  Not at all  Several days Several days   Over the last two weeks how often have you been bothered by trouble concentrating on things, such as reading the newspaper or watching television Not at all Not at all  Not at all  Several days Not at all   Over the last two weeks how often have you been bothered by moving or speaking so slowly that other people could have noticed. Or the opposite - being so fidgety or restless that you have been moving around a lot more than usual. Not at all Not at all  Not at all  Not at all Not at all   Over the last two weeks how often have you been bothered by thoughts that you would be better off dead, or of hurting yourself Not at all Not at all  Not at all  Not at all Not at all   If you checked off any problems, how difficult have these problems made it for you to do your work, take care of things at home or get along with other people? Not difficult at all Somewhat difficult  Somewhat difficult  Somewhat difficult    PHQ-9 Score 1 4  1  4 5   PHQ-9 Interpretation Minimal or None Minimal or None  Minimal or None  Minimal or None Mild           5/2/2024     1:05 PM 10/4/2024     8:39 AM 11/7/2024     1:45 PM   GABBY-7   Was test performed? Yes Yes Yes   1. Feeling nervous, anxious, or on edge? Several days Not at all Several days   2. Not being able to stop or control worrying? Several days Not at all Not at all   3. Worrying too much about different things? Not at all Not at all Not at all   4. Trouble relaxing? Not at all More than half the days Not at all   5. Being so restless that it is hard to sit still? Several days Several days Not at all   6. Becoming easily annoyed or irritable? Several days Several days Several days   7. Feeling afraid as if something awful might happen? Several days  Several days Not at all   8. If you checked off any problems, how difficult have these problems made it for you to do your work, take care of things at home, or get along with other people?  Somewhat difficult Not difficult at all   GABBY-7 Score 5 5 2   Number answered (out of first 7) 7 7 7   Interpretation Mild Anxiety Mild Anxiety Normal         MCI w/ memory loss. Last seen neuropsych 9/22/23. Canceled recent appt. Thinks her memory is ok and stable. Taking vitamin B12  COOKIE - not using the machine b/c it actually made it harder to go to sleep. Doesn't have trouble sleeping. Not excessively tired per pt.   Sleep titration study 11/28/23 - rec aPAP 10-12    Last fall was a few months ago.     Pillpacking  Morning card:   Bupropion  mg   Ezetimibe 10 mg   Losartan 100 mg   Metformin  mg   Metoprolol succinate ER 25 mg   Rosuvastatin 20 mg   Venlafaxine 75 mg    Review of Systems  Comprehensive review of systems otherwise negative. See history/subjective section for more details.    Objective:      Physical Exam    /79 (BP Location: Right arm, Patient Position: Sitting)   Pulse 99   Wt 67.5 kg (148 lb 13 oz)   SpO2 96%   BMI 28.12 kg/m²     GEN - A+OX4, NAD   HEENT - PERRL, EOMI, OP clear. Difficulty hearing.  CV - RRR, no m/r   Chest - CTAB, no wheezing or rhonchi  Abd - S/NT/ND/+BS.   Ext - 2+BDP and radial pulses. No LE edema.   Neuro - PERRL, EOMI, no nystagmus, eyebrow raise, facial sensation, hearing, m of mastication, smile, palatal raise, shoulder shrug, tongue protrusion symmetric and intact. 5/5 BUE and BLE strength. Normal gait.   Skin - No rash.      Previous labs reviewed.     Assessment/Plan     Diagnoses and all orders for this visit:    Hypertension associated with diabetes - Stable and controlled. Continue current medications.  -     Ambulatory referral/consult to Optometry; Future  -     Hemoglobin A1C; Future    Aortic atherosclerosis - cont zetia and crestor  -     Lipid  Panel; Future    Stage 2 chronic kidney disease  -     Comprehensive Metabolic Panel; Future  -     CBC Auto Differential; Future    B12 deficiency - on b12 OTC.   -     Vitamin B12; Future    MDD (major depressive disorder), recurrent, in full remission/GABBY (generalized anxiety disorder) - cont wellbutrin and venlafaxine. Doing well.         Follow up in about 3 months (around 2/7/2025).      Mandi Huynh MD  Department of Internal Medicine - [facility]  12:58 PM

## 2024-11-08 ENCOUNTER — LAB VISIT (OUTPATIENT)
Dept: LAB | Facility: HOSPITAL | Age: 79
End: 2024-11-08
Attending: INTERNAL MEDICINE
Payer: MEDICARE

## 2024-11-08 DIAGNOSIS — E11.59 HYPERTENSION ASSOCIATED WITH DIABETES: ICD-10-CM

## 2024-11-08 DIAGNOSIS — E53.8 B12 DEFICIENCY: ICD-10-CM

## 2024-11-08 DIAGNOSIS — N18.2 STAGE 2 CHRONIC KIDNEY DISEASE: ICD-10-CM

## 2024-11-08 DIAGNOSIS — I70.0 AORTIC ATHEROSCLEROSIS: ICD-10-CM

## 2024-11-08 DIAGNOSIS — I15.2 HYPERTENSION ASSOCIATED WITH DIABETES: ICD-10-CM

## 2024-11-08 LAB
ALBUMIN SERPL BCP-MCNC: 4.2 G/DL (ref 3.5–5.2)
ALP SERPL-CCNC: 67 U/L (ref 40–150)
ALT SERPL W/O P-5'-P-CCNC: 24 U/L (ref 10–44)
ANION GAP SERPL CALC-SCNC: 11 MMOL/L (ref 8–16)
AST SERPL-CCNC: 24 U/L (ref 10–40)
BASOPHILS # BLD AUTO: 0.04 K/UL (ref 0–0.2)
BASOPHILS NFR BLD: 0.6 % (ref 0–1.9)
BILIRUB SERPL-MCNC: 0.6 MG/DL (ref 0.1–1)
BUN SERPL-MCNC: 14 MG/DL (ref 8–23)
CALCIUM SERPL-MCNC: 9.3 MG/DL (ref 8.7–10.5)
CHLORIDE SERPL-SCNC: 103 MMOL/L (ref 95–110)
CHOLEST SERPL-MCNC: 173 MG/DL (ref 120–199)
CHOLEST/HDLC SERPL: 4.7 {RATIO} (ref 2–5)
CO2 SERPL-SCNC: 26 MMOL/L (ref 23–29)
CREAT SERPL-MCNC: 0.9 MG/DL (ref 0.5–1.4)
DIFFERENTIAL METHOD BLD: ABNORMAL
EOSINOPHIL # BLD AUTO: 0.2 K/UL (ref 0–0.5)
EOSINOPHIL NFR BLD: 2.5 % (ref 0–8)
ERYTHROCYTE [DISTWIDTH] IN BLOOD BY AUTOMATED COUNT: 13.2 % (ref 11.5–14.5)
EST. GFR  (NO RACE VARIABLE): >60 ML/MIN/1.73 M^2
ESTIMATED AVG GLUCOSE: 146 MG/DL (ref 68–131)
GLUCOSE SERPL-MCNC: 145 MG/DL (ref 70–110)
HBA1C MFR BLD: 6.7 % (ref 4–5.6)
HCT VFR BLD AUTO: 46.6 % (ref 37–48.5)
HDLC SERPL-MCNC: 37 MG/DL (ref 40–75)
HDLC SERPL: 21.4 % (ref 20–50)
HGB BLD-MCNC: 14.8 G/DL (ref 12–16)
IMM GRANULOCYTES # BLD AUTO: 0.02 K/UL (ref 0–0.04)
IMM GRANULOCYTES NFR BLD AUTO: 0.3 % (ref 0–0.5)
LDLC SERPL CALC-MCNC: 75.4 MG/DL (ref 63–159)
LYMPHOCYTES # BLD AUTO: 2.4 K/UL (ref 1–4.8)
LYMPHOCYTES NFR BLD: 38.1 % (ref 18–48)
MCH RBC QN AUTO: 30.6 PG (ref 27–31)
MCHC RBC AUTO-ENTMCNC: 31.8 G/DL (ref 32–36)
MCV RBC AUTO: 97 FL (ref 82–98)
MONOCYTES # BLD AUTO: 0.6 K/UL (ref 0.3–1)
MONOCYTES NFR BLD: 9 % (ref 4–15)
NEUTROPHILS # BLD AUTO: 3.2 K/UL (ref 1.8–7.7)
NEUTROPHILS NFR BLD: 49.5 % (ref 38–73)
NONHDLC SERPL-MCNC: 136 MG/DL
NRBC BLD-RTO: 0 /100 WBC
PLATELET # BLD AUTO: 186 K/UL (ref 150–450)
PMV BLD AUTO: 11.2 FL (ref 9.2–12.9)
POTASSIUM SERPL-SCNC: 5 MMOL/L (ref 3.5–5.1)
PROT SERPL-MCNC: 7.6 G/DL (ref 6–8.4)
RBC # BLD AUTO: 4.83 M/UL (ref 4–5.4)
SODIUM SERPL-SCNC: 140 MMOL/L (ref 136–145)
TRIGL SERPL-MCNC: 303 MG/DL (ref 30–150)
VIT B12 SERPL-MCNC: 672 PG/ML (ref 210–950)
WBC # BLD AUTO: 6.36 K/UL (ref 3.9–12.7)

## 2024-11-08 PROCEDURE — 36415 COLL VENOUS BLD VENIPUNCTURE: CPT | Performed by: INTERNAL MEDICINE

## 2024-11-08 PROCEDURE — 85025 COMPLETE CBC W/AUTO DIFF WBC: CPT | Performed by: INTERNAL MEDICINE

## 2024-11-08 PROCEDURE — 82607 VITAMIN B-12: CPT | Performed by: INTERNAL MEDICINE

## 2024-11-08 PROCEDURE — 80053 COMPREHEN METABOLIC PANEL: CPT | Performed by: INTERNAL MEDICINE

## 2024-11-08 PROCEDURE — 80061 LIPID PANEL: CPT | Performed by: INTERNAL MEDICINE

## 2024-11-08 PROCEDURE — 83036 HEMOGLOBIN GLYCOSYLATED A1C: CPT | Performed by: INTERNAL MEDICINE

## 2024-11-14 ENCOUNTER — TELEPHONE (OUTPATIENT)
Dept: PRIMARY CARE CLINIC | Facility: CLINIC | Age: 79
End: 2024-11-14
Payer: MEDICARE

## 2024-11-14 NOTE — TELEPHONE ENCOUNTER
----- Message from Gladys sent at 11/14/2024  4:18 PM CST -----  Contact: [phone]  .Type: Returning a call    Who left a message? Missed a call     When did the practice call? Today Early morning     Does patient know what this is regarding: No     Would the patient rather a call back or a response via [facility]? Call back     Comments:

## 2024-11-25 ENCOUNTER — TELEPHONE (OUTPATIENT)
Dept: INTERNAL MEDICINE | Facility: CLINIC | Age: 79
End: 2024-11-25

## 2024-11-25 ENCOUNTER — CLINICAL SUPPORT (OUTPATIENT)
Dept: INTERNAL MEDICINE | Facility: CLINIC | Age: 79
End: 2024-11-25
Payer: MEDICARE

## 2024-11-25 VITALS — BODY MASS INDEX: 28.14 KG/M2 | WEIGHT: 149.06 LBS | HEIGHT: 61 IN

## 2024-11-25 DIAGNOSIS — E78.1 HYPERTRIGLYCERIDEMIA: Primary | ICD-10-CM

## 2024-11-25 NOTE — TELEPHONE ENCOUNTER
----- Message from Tawny aGlvan MD sent at 11/25/2024 12:10 PM CST -----  Regarding: RE: new patient transfer from Dr. Huynh  Yes of course.     Tra can you help her schedule?    Thanks!     -SB  ----- Message -----  From: Jacqueline Perry RN  Sent: 11/25/2024  11:55 AM CST  To: Tawny Galvan MD  Subject: new patient transfer from Dr. Huynh                 This patient would like to transfer to you. She wants to be closer to home. She was here prior to Dr. Huynh moving. Can you take her? Please, beth buchanan.   Thanks.   I sent a message to Tra also.   Thanks

## 2024-11-25 NOTE — PROGRESS NOTES
Health  Follow-Up Note   [x] Office  [] Phone  Notes from previous session reviewed.   [] Previous Session Goals unchanged.   [x] Patient/Caregiver Change in Goals.  Goals added or changed by Patient/Caregiver since program participation:  Get healthier  Lose weight   Spend more time with friends  Get back to the gym  Walking 3-4 x day   Transfer to PCP at Primary care clinic (request sent to Dr. Galvan)  Try Berenice erin   Increase fiber and nuts Walnuts     Additional/Changed support that patient/caregiver has experienced/sought?  (Indicate readiness, support from family, friends, others, community groups, etc)       Additional/Changed obstacles that could prevent patient/caregiver from reaching their goals?   Eating ice cream     Feedback provided:  Praised for good job down 6.42 lbs total loss 31 lbs     Diagnostic values/Desriptors for follow-up as needed for chronic condition(s)   Weight: 67.6 kg 149 lbs  Blood Glucose: last A1c 6.7    Interventions:   Health  listened reflectively, validated thoughts and feelings, offered support and encouragement.   Allowed patient to express themselves in a non-biased atmosphere.  Health  assisted pt to problem-solve obstacles such as being in a challenging environment and dealing with these challenges.   Motivational Interviewed interventions utilized (OARS).   Patient responded favorably to interventions and remained actively engaged in the session.   Health  will remain available and connected for patient by phone and/or office visits.   Positive reinforcement, emotional support and encouragement provided.   Focused Education: MI    Plan:  [x] Pt will work on goals as stated above.   [x] Pt will contact Health  for any questions, concerns or needs.  [x] Pt will follow up with Health  in office on  will call for appt  [] Pt will follow up with Health  on phone in:        [x] Health  will remain available.   [] Health  will  contact patient by phone in:        [] Health  will consult:        [] Health  will inform Provider via EPIC messaging.     Impression:  1. Behavior is consistent with Action Stage of Change.   2. Participation level:       [x] Receptive      [x] Interactive      [] Guarded and Resistant      [x] Self Motivated      [] Refused/Declined to participate   3. [x] Pt voiced understanding of all information presented.       [] Pt voiced needing further information/education. This will be arranged.       [x] Pt would benefit from further education/information as identified by this health . This will be arranged.     Jacqueline Perry RN HC

## 2024-12-02 ENCOUNTER — OFFICE VISIT (OUTPATIENT)
Dept: INTERNAL MEDICINE | Facility: CLINIC | Age: 79
End: 2024-12-02
Payer: MEDICARE

## 2024-12-02 VITALS
BODY MASS INDEX: 28.64 KG/M2 | SYSTOLIC BLOOD PRESSURE: 118 MMHG | HEIGHT: 61 IN | OXYGEN SATURATION: 96 % | DIASTOLIC BLOOD PRESSURE: 71 MMHG | WEIGHT: 151.69 LBS | HEART RATE: 93 BPM

## 2024-12-02 DIAGNOSIS — E11.40 TYPE 2 DIABETES MELLITUS WITH DIABETIC NEUROPATHY, WITHOUT LONG-TERM CURRENT USE OF INSULIN: Primary | ICD-10-CM

## 2024-12-02 DIAGNOSIS — N18.2 STAGE 2 CHRONIC KIDNEY DISEASE: ICD-10-CM

## 2024-12-02 DIAGNOSIS — D50.0 IRON DEFICIENCY ANEMIA DUE TO CHRONIC BLOOD LOSS: ICD-10-CM

## 2024-12-02 DIAGNOSIS — K76.0 FATTY LIVER: ICD-10-CM

## 2024-12-02 DIAGNOSIS — Z76.89 ENCOUNTER TO ESTABLISH CARE: ICD-10-CM

## 2024-12-02 PROCEDURE — 99999 PR PBB SHADOW E&M-EST. PATIENT-LVL III: CPT | Mod: PBBFAC,,, | Performed by: INTERNAL MEDICINE

## 2024-12-02 PROCEDURE — 1101F PT FALLS ASSESS-DOCD LE1/YR: CPT | Mod: CPTII,S$GLB,, | Performed by: INTERNAL MEDICINE

## 2024-12-02 PROCEDURE — 3078F DIAST BP <80 MM HG: CPT | Mod: CPTII,S$GLB,, | Performed by: INTERNAL MEDICINE

## 2024-12-02 PROCEDURE — 1157F ADVNC CARE PLAN IN RCRD: CPT | Mod: CPTII,S$GLB,, | Performed by: INTERNAL MEDICINE

## 2024-12-02 PROCEDURE — 99214 OFFICE O/P EST MOD 30 MIN: CPT | Mod: S$GLB,,, | Performed by: INTERNAL MEDICINE

## 2024-12-02 PROCEDURE — 3074F SYST BP LT 130 MM HG: CPT | Mod: CPTII,S$GLB,, | Performed by: INTERNAL MEDICINE

## 2024-12-02 PROCEDURE — 3288F FALL RISK ASSESSMENT DOCD: CPT | Mod: CPTII,S$GLB,, | Performed by: INTERNAL MEDICINE

## 2024-12-02 PROCEDURE — 1126F AMNT PAIN NOTED NONE PRSNT: CPT | Mod: CPTII,S$GLB,, | Performed by: INTERNAL MEDICINE

## 2024-12-02 NOTE — PROGRESS NOTES
Subjective:       Patient ID: Paris Burris is a 79 y.o. female.    Chief Complaint: Establish Care    HPI    Presents to establish care.     PMHx    HTN: on losartan and metoprolol.     HLD: on rosuvastatin and zetia     Nephrosis on Fosamax.    History of depression on Wellbutrin.    Health Maintenance:  Mammogram:  Normal February 2024  Lipids:  Ordered today  Vaccines:  Up-to-date      Review of Systems   Constitutional:  Negative for activity change, appetite change and chills.   HENT:  Negative for ear pain, sinus pressure/congestion and sneezing.    Respiratory:  Negative for cough and shortness of breath.    Cardiovascular:  Negative for chest pain, palpitations and leg swelling.   Gastrointestinal:  Negative for abdominal distention, abdominal pain, constipation, diarrhea, nausea and vomiting.   Genitourinary:  Negative for dysuria and hematuria.   Musculoskeletal:  Negative for arthralgias, back pain and myalgias.   Neurological:  Negative for dizziness and headaches.   Psychiatric/Behavioral:  Negative for agitation. The patient is not nervous/anxious.            Past Medical History:   Diagnosis Date    Allergy     Anemia     Anxiety     Breast cancer 2002    Left breast    Cancer 2002    L breast s/p lumpectomy    Cataract     Decreased hearing     Depression     Dermatochalasis of both upper eyelids     Diabetes mellitus     Diabetes with neurologic complications     Gastric ulcer 09/10/2013    EGD    Hiatal hernia     Hyperlipidemia     Major neurocognitive disorder 10/6/2024    Migraine headache     Migraine headache 03/20/2015    Multiple gastric ulcers     Parathyroid disorder     Renal manifestation of secondary diabetes mellitus     Sleep apnea     no CPAP    Type 2 diabetes mellitus, controlled, 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      Past Surgical History:   Procedure Laterality Date    ADENOIDECTOMY      BREAST LUMPECTOMY Left 2002    CATARACT EXTRACTION W/  INTRAOCULAR  LENS IMPLANT Bilateral     COLONOSCOPY N/A 12/2/2016    Procedure: COLONOSCOPY;  Surgeon: Dustin Patterson MD;  Location: [facility] ENDO (4TH FLR);  Service: Endoscopy;  Laterality: N/A;  PM prep    COLONOSCOPY N/A 8/31/2022    Procedure: COLONOSCOPY;  Surgeon: Delfino Sanchez MD;  Location: [facility] ENDO (4TH FLR);  Service: Endoscopy;  Laterality: N/A;  vacc-inst portal-am prep-clears 4 hrs prior-any md per pt-tb-pacer [facility]    ESOPHAGEAL MANOMETRY WITH MEASUREMENT OF IMPEDANCE N/A 10/8/2018    Procedure: MANOMETRY, ESOPHAGUS, WITH IMPEDANCE MEASUREMENT;  Surgeon: Renato Maria MD;  Location: [facility] ENDO (4TH FLR);  Service: Endoscopy;  Laterality: N/A;    ESOPHAGOGASTRODUODENOSCOPY N/A 9/12/2018    Procedure: ESOPHAGOGASTRODUODENOSCOPY (EGD);  Surgeon: Dustin Patterson MD;  [facility]  Service: Endoscopy;  Laterality: N/A;  6-8 weeks from visit    ESOPHAGOGASTRODUODENOSCOPY N/A 1/29/2019    Procedure: EGD (ESOPHAGOGASTRODUODENOSCOPY) intraop;  Surgeon: Renato Perez Jr., MD;  [facility];  Service: General;  Laterality: N/A;    ESOPHAGOGASTRODUODENOSCOPY N/A 5/31/2019    Procedure: EGD (ESOPHAGOGASTRODUODENOSCOPY);  Surgeon: Maria Elena Little MD;  [facility]  Service: Endoscopy;  Laterality: N/A;  [employer] pacemaker - sm    EYE SURGERY Bilateral     cataracts    IMPLANTATION OF BIVENTRICULAR HEART PACEMAKER N/A 1/30/2019    Procedure: INSERTION, CARDIAC PACEMAKER, BIVENTRICULAR;  Surgeon: Stone Livingston MD;  Location: [facility] EP LAB;  Service: Cardiology;  Laterality: N/A;    IMPLANTATION OF COCHLEAR PROSTHESIS Left 4/26/2021    Procedure: INSERTION, PROSTHESIS, COCHLEAR;  Surgeon: Michael Julian MD;  Location: [facility] OR 2ND FLR;  Service: ENT;  Laterality: Left;    LAPAROSCOPIC TOUPET FUNDOPLICATION N/A 1/29/2019    Procedure: FUNDOPLICATION, LAPAROSCOPIC, TOUPET;  Surgeon: Renato Perez Jr., MD;  Location: [facility] OR [address] [place] [address];  Service: General;   Laterality: N/A;    lipoma removal  1999    TONSILLECTOMY        Patient Active Problem List   Diagnosis    GABBY (generalized anxiety disorder)    Insomnia    History of breast cancer in female    Vitamin D deficiency    Hyperlipidemia    Leg weakness, bilateral    DDD (degenerative disc disease), lumbar    Midline low back pain without sciatica    Diabetic polyneuropathy associated with type 2 diabetes mellitus    ESTEFANIA (iron deficiency anemia)    Thoracic back pain    Fatty liver    Stage 2 chronic kidney disease    Type 2 diabetes mellitus with diabetic neuropathy    COOKIE (obstructive sleep apnea)    Aortic atherosclerosis    Alcohol dependence, in remission    History of gastric ulcer    Cervical spine arthritis    History of vertebral compression fracture    Voiding dysfunction    Urinary urgency    Urinary frequency    Urinary hesitancy    Vaginal atrophy    Mixed stress and urge urinary incontinence    Status post parathyroidectomy    Hiatal hernia    Pelvic floor dysfunction    AV block, Mobitz II    SVT (supraventricular tachycardia)    Presence of cardiac resynchronization therapy pacemaker (CRT-P)    GERD (gastroesophageal reflux disease)    Tender lymph node    Profound hearing loss of left ear    At risk for falls    Chronic pain of right knee    Gait abnormality    Sensorineural hearing loss (SNHL) of both ears    Cochlear implant in place    Calcified granuloma of lung (noted on CT chest 10/2022 and 10/2015, stable)    Frequent falls    MDD (major depressive disorder), recurrent episode, mild    Fall at home    Impaired functional mobility, balance, gait, and endurance    Muscle weakness of lower extremity    Mild dementia        Objective:      Physical Exam  Constitutional:       Appearance: Normal appearance.   HENT:      Head: Normocephalic.   Cardiovascular:      Rate and Rhythm: Normal rate and regular rhythm.      Pulses: Normal pulses.      Heart sounds: Normal heart sounds.   Pulmonary:       Effort: Pulmonary effort is normal.      Breath sounds: Normal breath sounds.   Abdominal:      General: Abdomen is flat. Bowel sounds are normal.      Palpations: Abdomen is soft.   Musculoskeletal:         General: Normal range of motion.      Cervical back: Normal range of motion and neck supple.   Skin:     General: Skin is warm and dry.   Neurological:      General: No focal deficit present.      Mental Status: She is alert and oriented to person, place, and time.   Psychiatric:         Mood and Affect: Mood normal.         Assessment:       Problem List Items Addressed This Visit          Renal/    Stage 2 chronic kidney disease       Oncology    ESTEFANIA (iron deficiency anemia)    Relevant Orders    CBC Auto Differential       Endocrine    Type 2 diabetes mellitus with diabetic neuropathy - Primary    Relevant Orders    Lipid Panel    Hemoglobin A1C    TSH       GI    Fatty liver    Relevant Orders    Comprehensive Metabolic Panel     Other Visit Diagnoses       Encounter to establish care                Plan:         Paris was seen today for establish care.    Diagnoses and all orders for this visit:    Type 2 diabetes mellitus with diabetic neuropathy, without long-term current use of insulin  Check A1c today.  Continue semaglutide and metformin.    Iron deficiency anemia due to chronic blood loss  -     CBC Auto Differential; Future  Check labs today.    Stage 2 chronic kidney disease  Check renal function today    Fatty liver  -     Comprehensive Metabolic Panel; Future    Encounter to establish care  Mammogram:  Normal February 2024  Lipids:  Ordered today  Vaccines:  Up-to-date               Tawny Galvan MD   Internal Medicine   Primary Care

## 2024-12-10 ENCOUNTER — CLINICAL SUPPORT (OUTPATIENT)
Dept: CARDIOLOGY | Facility: HOSPITAL | Age: 79
End: 2024-12-10
Attending: INTERNAL MEDICINE
Payer: MEDICARE

## 2024-12-10 DIAGNOSIS — Z95.0 PRESENCE OF CARDIAC PACEMAKER: ICD-10-CM

## 2024-12-10 DIAGNOSIS — I42.9 CARDIOMYOPATHY, UNSPECIFIED: ICD-10-CM

## 2024-12-10 PROCEDURE — 93296 REM INTERROG EVL PM/IDS: CPT | Performed by: INTERNAL MEDICINE

## 2024-12-10 PROCEDURE — 93294 REM INTERROG EVL PM/LDLS PM: CPT | Mod: ,,, | Performed by: INTERNAL MEDICINE

## 2024-12-13 ENCOUNTER — OFFICE VISIT (OUTPATIENT)
Dept: URGENT CARE | Facility: CLINIC | Age: 79
End: 2024-12-13
Payer: MEDICARE

## 2024-12-13 VITALS
HEIGHT: 61 IN | BODY MASS INDEX: 28.51 KG/M2 | HEART RATE: 63 BPM | RESPIRATION RATE: 20 BRPM | TEMPERATURE: 98 F | OXYGEN SATURATION: 99 % | DIASTOLIC BLOOD PRESSURE: 73 MMHG | SYSTOLIC BLOOD PRESSURE: 112 MMHG | WEIGHT: 151 LBS

## 2024-12-13 DIAGNOSIS — R68.84 JAW PAIN, NON-TMJ: Primary | ICD-10-CM

## 2024-12-13 RX ORDER — NAPROXEN 500 MG/1
500 TABLET ORAL 2 TIMES DAILY PRN
Qty: 10 TABLET | Refills: 0 | Status: SHIPPED | OUTPATIENT
Start: 2024-12-13

## 2024-12-13 RX ORDER — METHOCARBAMOL 500 MG/1
500 TABLET, FILM COATED ORAL 3 TIMES DAILY PRN
Qty: 14 TABLET | Refills: 0 | Status: SHIPPED | OUTPATIENT
Start: 2024-12-13

## 2024-12-13 NOTE — PATIENT INSTRUCTIONS
Muscle relaxers as needed   Naproxen as needed   Do not take naproxen in the same days that you take Mobic  Follow-up with a dentist on Monday if not improving

## 2024-12-13 NOTE — PROGRESS NOTES
"Subjective:      Patient ID: Paris Burris is a 79 y.o. female.    Vitals:  height is 5' 1" (1.549 m) and weight is 68.5 kg (151 lb). Her temperature is 98.2 °F (36.8 °C). Her blood pressure is 112/73 and her pulse is 63. Her respiration is 20 and oxygen saturation is 99%.     Chief Complaint: Jaw Pain    Pt presents with complaint of right sided jaw pain starting this morning.  Pt states she woke up with discomfort and states the pain intensified when eating dinner. Pt states he has had no injuries or falls to onset pain.  Pt states she has taken tylenol.  Provider note begins below    Patient reports pain that started this morning in her right jaw.  The pain is not inside her mouth or on her gums.  Has never had this pain before denies any teeth grinding at night or hard showing during the day.  Denies any sensitivity to cold or hot fluids.  Reports pain when talking or chewing.  Denies any swelling to face.    Pain  This is a new problem. The current episode started today. The problem occurs constantly. The problem has been unchanged. Pertinent negatives include no coughing, diaphoresis, fatigue, fever, nausea or vomiting. The symptoms are aggravated by eating. She has tried acetaminophen for the symptoms. The treatment provided mild relief.       Constitution: Negative for activity change, appetite change, sweating, fatigue and fever.   HENT:  Negative for ear pain, ear discharge, dental problem, mouth sores, tongue pain and trouble swallowing.    Respiratory:  Negative for cough and shortness of breath.    Gastrointestinal:  Negative for nausea, vomiting, constipation and diarrhea.      Objective:     Physical Exam   Constitutional: She is oriented to person, place, and time.   HENT:   Head: Normocephalic and atraumatic.   Ears:   Right Ear: Decreased hearing is noted.   Left Ear: Decreased hearing is noted.   Nose: Nose normal.   Mouth/Throat: She does not have dentures. No oral lesions. No trismus in the " jaw. Normal dentition. No dental abscesses, uvula swelling, lacerations or dental caries.       Discoloration possibly old until tattooing or lead   No erythema or ulcerations noted  No popping of jaw when opening or closing      Comments: Discoloration possibly old until tattooing or lead   No erythema or ulcerations noted  No popping of jaw when opening or closing  Cardiovascular: Normal rate.   Pulmonary/Chest: Effort normal. No respiratory distress.   Abdominal: Normal appearance.   Neurological: She is alert and oriented to person, place, and time.   Skin: Skin is warm and dry.   Psychiatric: Her behavior is normal. Mood normal.       Assessment:     1. Jaw pain, non-TMJ        Plan:   Soft Foods for 3 days  Muscle relaxers as needed   Naproxen as needed   Do not take naproxen in the same days that you take Mobic  Follow-up with a dentist on Monday if not improving  Differential diagnosis patient is on Fosamax osteonecrosis, arthritis, developing infection            Jaw pain, non-TMJ  -     methocarbamoL (ROBAXIN) 500 MG Tab; Take 1 tablet (500 mg total) by mouth 3 (three) times daily as needed (jaw pain).  Dispense: 14 tablet; Refill: 0  -     naproxen (NAPROSYN) 500 MG tablet; Take 1 tablet (500 mg total) by mouth 2 (two) times daily as needed (for jaw pain, take with food).  Dispense: 10 tablet; Refill: 0

## 2025-01-09 ENCOUNTER — LAB VISIT (OUTPATIENT)
Dept: LAB | Facility: HOSPITAL | Age: 80
End: 2025-01-09
Attending: INTERNAL MEDICINE
Payer: MEDICARE

## 2025-01-09 DIAGNOSIS — E11.21 CONTROLLED TYPE 2 DIABETES MELLITUS WITH DIABETIC NEPHROPATHY, WITHOUT LONG-TERM CURRENT USE OF INSULIN: ICD-10-CM

## 2025-01-09 LAB
ESTIMATED AVG GLUCOSE: 140 MG/DL (ref 68–131)
HBA1C MFR BLD: 6.5 % (ref 4–5.6)

## 2025-01-09 PROCEDURE — 36415 COLL VENOUS BLD VENIPUNCTURE: CPT | Performed by: INTERNAL MEDICINE

## 2025-01-09 PROCEDURE — 83036 HEMOGLOBIN GLYCOSYLATED A1C: CPT | Performed by: INTERNAL MEDICINE

## 2025-01-14 LAB
OHS CV AF BURDEN PERCENT: < 1
OHS CV BIV PACING PERCENT: 99 %
OHS CV DC REMOTE DEVICE TYPE: NORMAL

## 2025-02-14 ENCOUNTER — HOSPITAL ENCOUNTER (OUTPATIENT)
Dept: RADIOLOGY | Facility: HOSPITAL | Age: 80
Discharge: HOME | End: 2025-02-14
Attending: INTERNAL MEDICINE
Payer: MEDICARE

## 2025-02-14 VITALS — WEIGHT: 150 LBS | BODY MASS INDEX: 28.34 KG/M2

## 2025-02-14 DIAGNOSIS — Z12.31 ENCOUNTER FOR SCREENING MAMMOGRAM FOR BREAST CANCER: ICD-10-CM

## 2025-02-14 PROCEDURE — 77063 BREAST TOMOSYNTHESIS BI: CPT | Mod: TC

## 2025-02-17 ENCOUNTER — RESULTS FOLLOW-UP (OUTPATIENT)
Dept: INTERNAL MEDICINE | Facility: CLINIC | Age: 80
End: 2025-02-17

## 2025-02-28 ENCOUNTER — DOCUMENTATION ONLY (OUTPATIENT)
Dept: AUDIOLOGY | Facility: CLINIC | Age: 80
End: 2025-02-28
Payer: MEDICARE

## 2025-03-06 ENCOUNTER — PATIENT MESSAGE (OUTPATIENT)
Dept: INTERNAL MEDICINE | Facility: CLINIC | Age: 80
End: 2025-03-06
Payer: MEDICARE

## 2025-03-06 ENCOUNTER — LAB VISIT (OUTPATIENT)
Dept: LAB | Facility: HOSPITAL | Age: 80
End: 2025-03-06
Payer: MEDICARE

## 2025-03-06 DIAGNOSIS — K76.0 FATTY LIVER: ICD-10-CM

## 2025-03-06 DIAGNOSIS — D50.0 IRON DEFICIENCY ANEMIA DUE TO CHRONIC BLOOD LOSS: ICD-10-CM

## 2025-03-06 DIAGNOSIS — E11.40 TYPE 2 DIABETES MELLITUS WITH DIABETIC NEUROPATHY, WITHOUT LONG-TERM CURRENT USE OF INSULIN: ICD-10-CM

## 2025-03-06 LAB
ALBUMIN SERPL BCP-MCNC: 4.2 G/DL (ref 3.5–5.2)
ALP SERPL-CCNC: 59 U/L (ref 40–150)
ALT SERPL W/O P-5'-P-CCNC: 28 U/L (ref 10–44)
ANION GAP SERPL CALC-SCNC: 10 MMOL/L (ref 8–16)
AST SERPL-CCNC: 35 U/L (ref 10–40)
BASOPHILS # BLD AUTO: 0.04 K/UL (ref 0–0.2)
BASOPHILS NFR BLD: 0.7 % (ref 0–1.9)
BILIRUB SERPL-MCNC: 0.3 MG/DL (ref 0.1–1)
BUN SERPL-MCNC: 12 MG/DL (ref 8–23)
CALCIUM SERPL-MCNC: 9.5 MG/DL (ref 8.7–10.5)
CHLORIDE SERPL-SCNC: 104 MMOL/L (ref 95–110)
CHOLEST SERPL-MCNC: 217 MG/DL (ref 120–199)
CHOLEST/HDLC SERPL: 5.7 {RATIO} (ref 2–5)
CO2 SERPL-SCNC: 26 MMOL/L (ref 23–29)
CREAT SERPL-MCNC: 0.8 MG/DL (ref 0.5–1.4)
DIFFERENTIAL METHOD BLD: ABNORMAL
EOSINOPHIL # BLD AUTO: 0.3 K/UL (ref 0–0.5)
EOSINOPHIL NFR BLD: 4.3 % (ref 0–8)
ERYTHROCYTE [DISTWIDTH] IN BLOOD BY AUTOMATED COUNT: 12.9 % (ref 11.5–14.5)
EST. GFR  (NO RACE VARIABLE): >60 ML/MIN/1.73 M^2
ESTIMATED AVG GLUCOSE: 131 MG/DL (ref 68–131)
GLUCOSE SERPL-MCNC: 146 MG/DL (ref 70–110)
HBA1C MFR BLD: 6.2 % (ref 4–5.6)
HCT VFR BLD AUTO: 51.1 % (ref 37–48.5)
HDLC SERPL-MCNC: 38 MG/DL (ref 40–75)
HDLC SERPL: 17.5 % (ref 20–50)
HGB BLD-MCNC: 15.9 G/DL (ref 12–16)
IMM GRANULOCYTES # BLD AUTO: 0.01 K/UL (ref 0–0.04)
IMM GRANULOCYTES NFR BLD AUTO: 0.2 % (ref 0–0.5)
LDLC SERPL CALC-MCNC: 109.2 MG/DL (ref 63–159)
LYMPHOCYTES # BLD AUTO: 2 K/UL (ref 1–4.8)
LYMPHOCYTES NFR BLD: 32.5 % (ref 18–48)
MCH RBC QN AUTO: 29.8 PG (ref 27–31)
MCHC RBC AUTO-ENTMCNC: 31.1 G/DL (ref 32–36)
MCV RBC AUTO: 96 FL (ref 82–98)
MONOCYTES # BLD AUTO: 0.5 K/UL (ref 0.3–1)
MONOCYTES NFR BLD: 7.9 % (ref 4–15)
NEUTROPHILS # BLD AUTO: 3.3 K/UL (ref 1.8–7.7)
NEUTROPHILS NFR BLD: 54.4 % (ref 38–73)
NONHDLC SERPL-MCNC: 179 MG/DL
NRBC BLD-RTO: 0 /100 WBC
PLATELET # BLD AUTO: 184 K/UL (ref 150–450)
PMV BLD AUTO: 11.1 FL (ref 9.2–12.9)
POTASSIUM SERPL-SCNC: 4.6 MMOL/L (ref 3.5–5.1)
PROT SERPL-MCNC: 8 G/DL (ref 6–8.4)
RBC # BLD AUTO: 5.33 M/UL (ref 4–5.4)
SODIUM SERPL-SCNC: 140 MMOL/L (ref 136–145)
TRIGL SERPL-MCNC: 349 MG/DL (ref 30–150)
TSH SERPL DL<=0.005 MIU/L-ACNC: 1.52 UIU/ML (ref 0.4–4)
WBC # BLD AUTO: 6.09 K/UL (ref 3.9–12.7)

## 2025-03-06 PROCEDURE — 83036 HEMOGLOBIN GLYCOSYLATED A1C: CPT | Performed by: INTERNAL MEDICINE

## 2025-03-06 PROCEDURE — 84443 ASSAY THYROID STIM HORMONE: CPT | Performed by: INTERNAL MEDICINE

## 2025-03-06 PROCEDURE — 80061 LIPID PANEL: CPT | Performed by: INTERNAL MEDICINE

## 2025-03-06 PROCEDURE — 85025 COMPLETE CBC W/AUTO DIFF WBC: CPT | Performed by: INTERNAL MEDICINE

## 2025-03-06 PROCEDURE — 80053 COMPREHEN METABOLIC PANEL: CPT | Performed by: INTERNAL MEDICINE

## 2025-03-11 ENCOUNTER — CLINICAL SUPPORT (OUTPATIENT)
Dept: CARDIOLOGY | Facility: HOSPITAL | Age: 80
End: 2025-03-11
Payer: MEDICARE

## 2025-03-11 ENCOUNTER — CLINICAL SUPPORT (OUTPATIENT)
Dept: CARDIOLOGY | Facility: HOSPITAL | Age: 80
End: 2025-03-11
Attending: INTERNAL MEDICINE
Payer: MEDICARE

## 2025-03-11 DIAGNOSIS — Z95.0 PRESENCE OF CARDIAC PACEMAKER: ICD-10-CM

## 2025-03-11 DIAGNOSIS — I42.9 CARDIOMYOPATHY, UNSPECIFIED: ICD-10-CM

## 2025-03-11 PROCEDURE — 93294 REM INTERROG EVL PM/LDLS PM: CPT | Mod: ,,, | Performed by: INTERNAL MEDICINE

## 2025-03-11 PROCEDURE — 93296 REM INTERROG EVL PM/IDS: CPT | Performed by: INTERNAL MEDICINE

## 2025-03-13 ENCOUNTER — OFFICE VISIT (OUTPATIENT)
Dept: URGENT CARE | Facility: CLINIC | Age: 80
End: 2025-03-13
Payer: MEDICARE

## 2025-03-13 VITALS
HEART RATE: 82 BPM | DIASTOLIC BLOOD PRESSURE: 75 MMHG | WEIGHT: 150 LBS | RESPIRATION RATE: 16 BRPM | HEIGHT: 61 IN | OXYGEN SATURATION: 95 % | SYSTOLIC BLOOD PRESSURE: 108 MMHG | TEMPERATURE: 98 F | BODY MASS INDEX: 28.32 KG/M2

## 2025-03-13 DIAGNOSIS — M54.41 ACUTE RIGHT-SIDED LOW BACK PAIN WITH RIGHT-SIDED SCIATICA: ICD-10-CM

## 2025-03-13 DIAGNOSIS — M25.551 ACUTE HIP PAIN, RIGHT: Primary | ICD-10-CM

## 2025-03-13 PROCEDURE — 99213 OFFICE O/P EST LOW 20 MIN: CPT | Mod: S$GLB,,, | Performed by: NURSE PRACTITIONER

## 2025-03-13 NOTE — PROGRESS NOTES
"Subjective:      Patient ID: Paris Burris is a 79 y.o. female.    Vitals:  height is 5' 1" (1.549 m) and weight is 68 kg (150 lb). Her oral temperature is 98 °F (36.7 °C). Her blood pressure is 108/75 and her pulse is 82. Her respiration is 16 and oxygen saturation is 95%.     Chief Complaint: Hip Pain    Pt presents complaints of hip pain. Pt states no injury. Pain started 1 week ago. Unchanged. Pain comes and goes. Pain level 4. OTC none     79-year-old female presents to clinic with complaints of hip pain. History of DDD lumbar, cervical spine arthritis, vertebral compression fracture, pelvic floor dysfunction, leg weakness, midline low back pain without sciatica, thoracic back pain, muscle weakness of lower extremity, gait abnormality, frequent falls, impaired functional mobility, balance, gait and endurance. Past treatment Tramadol 50mg, Naproxen 500 mg, Mobic 7.5 mg, Tylenol 500 mg, Robaxin 500 mg. Labs 3/6/25 GFR >60, BUN 12, Creatinine 0.8     Hip Pain   The incident occurred more than 1 week ago. The incident occurred at home. There was no injury mechanism. The pain is at a severity of 4/10. The pain is mild. The pain has been Fluctuating since onset. Pertinent negatives include no inability to bear weight, loss of motion, loss of sensation, muscle weakness, numbness or tingling. She reports no foreign bodies present. The symptoms are aggravated by movement. She has tried nothing for the symptoms.     Musculoskeletal:  Positive for pain and muscle ache. Negative for trauma, joint pain, joint swelling, abnormal ROM of joint and muscle cramps.   Neurological:  Negative for numbness and tingling.      Objective:     Physical Exam   Constitutional: She is oriented to person, place, and time. She appears well-developed. She is cooperative. No distress.   HENT:   Head: Normocephalic and atraumatic.   Nose: Nose normal.   Mouth/Throat: Oropharynx is clear and moist and mucous membranes are normal.   Eyes: Lids " are normal. Extraocular movement intact   Neck: Trachea normal and phonation normal. Neck supple.   Cardiovascular: Normal rate.   Pulmonary/Chest: Effort normal.   Abdominal: Normal appearance.   Musculoskeletal:         General: No deformity.      Right hip: She exhibits tenderness. She exhibits normal range of motion, no bony tenderness and no deformity.        Legs:    Neurological: She is alert and oriented to person, place, and time. She has normal strength and normal reflexes. No sensory deficit.   Skin: Skin is warm, dry, intact and not diaphoretic.   Psychiatric: Her speech is normal and behavior is normal. Judgment and thought content normal.   Nursing note and vitals reviewed.      Assessment:     1. Acute hip pain, right    2. Acute right-sided low back pain with right-sided sciatica        Plan:       Acute hip pain, right    Acute right-sided low back pain with right-sided sciatica      Patient Instructions   Stay as active as you can without causing too much pain. It is OK to rest your back for a day or so. Be sure to get up and move around gently during the day as you are able. After a few days, slowly start to increase your activity level as you are able to. If something causes your pain to come back or get worse, stop and go back to doing easier activities that did not hurt.  Do not sit or  one position for a long time. You may want to sleep with a pillow under or between your knees if this eases your pain.  You may want to take medicines like ibuprofen or naproxen for swelling and pain. These are nonsteroidal anti-inflammatory drugs (NSAIDS).  Please return here or go to the Emergency Department for any concerns or worsening of condition.  Please follow up with your primary care doctor or specialist as needed.

## 2025-03-13 NOTE — PATIENT INSTRUCTIONS
Stay as active as you can without causing too much pain. It is OK to rest your back for a day or so. Be sure to get up and move around gently during the day as you are able. After a few days, slowly start to increase your activity level as you are able to. If something causes your pain to come back or get worse, stop and go back to doing easier activities that did not hurt.  Do not sit or  one position for a long time. You may want to sleep with a pillow under or between your knees if this eases your pain.  You may want to take medicines like ibuprofen or naproxen for swelling and pain. These are nonsteroidal anti-inflammatory drugs (NSAIDS).  Please return here or go to the Emergency Department for any concerns or worsening of condition.  Please follow up with your primary care doctor or specialist as needed.

## 2025-03-18 ENCOUNTER — TELEPHONE (OUTPATIENT)
Dept: CARDIOLOGY | Facility: HOSPITAL | Age: 80
End: 2025-03-18
Payer: MEDICARE

## 2025-03-18 NOTE — TELEPHONE ENCOUNTER
Attempted to contact the patient regarding overdue annual follow up appt. No answer. LVM with clinic call back number to call and schedule follow up.

## 2025-03-20 ENCOUNTER — OFFICE VISIT (OUTPATIENT)
Dept: INTERNAL MEDICINE | Facility: CLINIC | Age: 80
End: 2025-03-20
Payer: MEDICARE

## 2025-03-20 VITALS
OXYGEN SATURATION: 96 % | BODY MASS INDEX: 26.65 KG/M2 | HEIGHT: 61 IN | SYSTOLIC BLOOD PRESSURE: 120 MMHG | HEART RATE: 90 BPM | WEIGHT: 141.13 LBS | DIASTOLIC BLOOD PRESSURE: 87 MMHG

## 2025-03-20 DIAGNOSIS — G30.1 MODERATE LATE ONSET ALZHEIMER'S DEMENTIA WITH OTHER BEHAVIORAL DISTURBANCE: Primary | ICD-10-CM

## 2025-03-20 DIAGNOSIS — E11.40 TYPE 2 DIABETES MELLITUS WITH DIABETIC NEUROPATHY, WITHOUT LONG-TERM CURRENT USE OF INSULIN: ICD-10-CM

## 2025-03-20 DIAGNOSIS — F02.B18 MODERATE LATE ONSET ALZHEIMER'S DEMENTIA WITH OTHER BEHAVIORAL DISTURBANCE: Primary | ICD-10-CM

## 2025-03-20 PROBLEM — F10.21 ALCOHOL DEPENDENCE, IN REMISSION: Status: RESOLVED | Noted: 2017-06-14 | Resolved: 2025-03-20

## 2025-03-20 LAB
ALBUMIN/CREAT UR: 11.7 UG/MG (ref 0–30)
BACTERIA #/AREA URNS AUTO: ABNORMAL /HPF
BILIRUB UR QL STRIP: NEGATIVE
CAOX CRY UR QL COMP ASSIST: ABNORMAL
CLARITY UR REFRACT.AUTO: ABNORMAL
COLOR UR AUTO: YELLOW
CREAT UR-MCNC: 231 MG/DL (ref 15–325)
GLUCOSE UR QL STRIP: NEGATIVE
HGB UR QL STRIP: NEGATIVE
KETONES UR QL STRIP: ABNORMAL
LEUKOCYTE ESTERASE UR QL STRIP: ABNORMAL
MICROALBUMIN UR DL<=1MG/L-MCNC: 27 UG/ML
MICROSCOPIC COMMENT: ABNORMAL
NITRITE UR QL STRIP: NEGATIVE
NON-SQ EPI CELLS #/AREA URNS AUTO: 2 /HPF
PH UR STRIP: 5 [PH] (ref 5–8)
PROT UR QL STRIP: ABNORMAL
RBC #/AREA URNS AUTO: 3 /HPF (ref 0–4)
SP GR UR STRIP: >=1.03 (ref 1–1.03)
SQUAMOUS #/AREA URNS AUTO: 2 /HPF
URN SPEC COLLECT METH UR: ABNORMAL
WBC #/AREA URNS AUTO: 19 /HPF (ref 0–5)

## 2025-03-20 PROCEDURE — 87086 URINE CULTURE/COLONY COUNT: CPT | Performed by: INTERNAL MEDICINE

## 2025-03-20 PROCEDURE — 1157F ADVNC CARE PLAN IN RCRD: CPT | Mod: CPTII,S$GLB,, | Performed by: INTERNAL MEDICINE

## 2025-03-20 PROCEDURE — 3079F DIAST BP 80-89 MM HG: CPT | Mod: CPTII,S$GLB,, | Performed by: INTERNAL MEDICINE

## 2025-03-20 PROCEDURE — 81001 URINALYSIS AUTO W/SCOPE: CPT | Performed by: INTERNAL MEDICINE

## 2025-03-20 PROCEDURE — 99999 PR PBB SHADOW E&M-EST. PATIENT-LVL IV: CPT | Mod: PBBFAC,,, | Performed by: INTERNAL MEDICINE

## 2025-03-20 PROCEDURE — 1101F PT FALLS ASSESS-DOCD LE1/YR: CPT | Mod: CPTII,S$GLB,, | Performed by: INTERNAL MEDICINE

## 2025-03-20 PROCEDURE — 1126F AMNT PAIN NOTED NONE PRSNT: CPT | Mod: CPTII,S$GLB,, | Performed by: INTERNAL MEDICINE

## 2025-03-20 PROCEDURE — 3288F FALL RISK ASSESSMENT DOCD: CPT | Mod: CPTII,S$GLB,, | Performed by: INTERNAL MEDICINE

## 2025-03-20 PROCEDURE — 99214 OFFICE O/P EST MOD 30 MIN: CPT | Mod: S$GLB,,, | Performed by: INTERNAL MEDICINE

## 2025-03-20 PROCEDURE — 3074F SYST BP LT 130 MM HG: CPT | Mod: CPTII,S$GLB,, | Performed by: INTERNAL MEDICINE

## 2025-03-20 PROCEDURE — 82570 ASSAY OF URINE CREATININE: CPT | Performed by: INTERNAL MEDICINE

## 2025-03-20 NOTE — PROGRESS NOTES
Subjective:       Patient ID: Paris Burris is a 79 y.o. female.    Chief Complaint: Follow-up        Presents for follow up.     PMHx    HTN: on losartan and metoprolol.     HLD: on rosuvastatin and zetia     Nephrosis on Fosamax.    History of depression on Wellbutrin.    Health Maintenance:  Mammogram:  Normal February 2024  Lipids:  Ordered today  Vaccines:  Up-to-date      Review of Systems   Constitutional:  Negative for activity change, appetite change and chills.   HENT:  Negative for ear pain, sinus pressure/congestion and sneezing.    Respiratory:  Negative for cough and shortness of breath.    Cardiovascular:  Negative for chest pain, palpitations and leg swelling.   Gastrointestinal:  Negative for abdominal distention, abdominal pain, constipation, diarrhea, nausea and vomiting.   Genitourinary:  Negative for dysuria and hematuria.   Musculoskeletal:  Negative for arthralgias, back pain and myalgias.   Neurological:  Negative for dizziness and headaches.   Psychiatric/Behavioral:  Negative for agitation. The patient is not nervous/anxious.            Past Medical History:   Diagnosis Date    Allergy     Anemia     Anxiety     Breast cancer 2002    Left breast    Cancer 2002    L breast s/p lumpectomy    Cataract     Decreased hearing     Depression     Dermatochalasis of both upper eyelids     Diabetes mellitus     Diabetes with neurologic complications     Gastric ulcer 09/10/2013    EGD    Hiatal hernia     Hyperlipidemia     Major neurocognitive disorder 10/6/2024    Migraine headache     Migraine headache 03/20/2015    Multiple gastric ulcers     Parathyroid disorder     Renal manifestation of secondary diabetes mellitus     Sleep apnea     no CPAP    Type 2 diabetes mellitus, controlled, 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      Past Surgical History:   Procedure Laterality Date    ADENOIDECTOMY      BREAST LUMPECTOMY Left 2002    CATARACT EXTRACTION W/  INTRAOCULAR LENS IMPLANT  Bilateral     COLONOSCOPY N/A 12/2/2016    Procedure: COLONOSCOPY;  Surgeon: Dustin Patterson MD;  Location: [facility] ENDO (4TH FLR);  Service: Endoscopy;  Laterality: N/A;  PM prep    COLONOSCOPY N/A 8/31/2022    Procedure: COLONOSCOPY;  Surgeon: Delfino Sanchez MD;  Location: [facility] ENDO (4TH FLR);  Service: Endoscopy;  Laterality: N/A;  vacc-inst portal-am prep-clears 4 hrs prior-any md per pt-tb-pacer [facility]    ESOPHAGEAL MANOMETRY WITH MEASUREMENT OF IMPEDANCE N/A 10/8/2018    Procedure: MANOMETRY, ESOPHAGUS, WITH IMPEDANCE MEASUREMENT;  Surgeon: Renato Maria MD;  Location: [facility] (4TH FLR);  Service: Endoscopy;  Laterality: N/A;    ESOPHAGOGASTRODUODENOSCOPY N/A 9/12/2018    Procedure: ESOPHAGOGASTRODUODENOSCOPY (EGD);  Surgeon: Dustin Patterson MD;  [facility]  Service: Endoscopy;  Laterality: N/A;  6-8 weeks from visit    ESOPHAGOGASTRODUODENOSCOPY N/A 1/29/2019    Procedure: EGD (ESOPHAGOGASTRODUODENOSCOPY) intraop;  Surgeon: Renato Perez Jr., MD;  Location: [facility] OR [address] [place] [address];  Service: General;  Laterality: N/A;    ESOPHAGOGASTRODUODENOSCOPY N/A 5/31/2019    Procedure: EGD (ESOPHAGOGASTRODUODENOSCOPY);  Surgeon: Maria Elena Little MD;  [facility]  Service: Endoscopy;  Laterality: N/A;  [employer] pacemaker - sm    EYE SURGERY Bilateral     cataracts    IMPLANTATION OF BIVENTRICULAR HEART PACEMAKER N/A 1/30/2019    Procedure: INSERTION, CARDIAC PACEMAKER, BIVENTRICULAR;  Surgeon: Stone Livingston MD;  Location: [facility] EP LAB;  Service: Cardiology;  Laterality: N/A;    IMPLANTATION OF COCHLEAR PROSTHESIS Left 4/26/2021    Procedure: INSERTION, PROSTHESIS, COCHLEAR;  Surgeon: Michael Julian MD;  [facility];  Service: ENT;  Laterality: Left;    LAPAROSCOPIC TOUPET FUNDOPLICATION N/A 1/29/2019    Procedure: FUNDOPLICATION, LAPAROSCOPIC, TOUPET;  Surgeon: Renato Perez Jr., MD;  Location: [facility] OR [address] [place] [address];  Service: General;  Laterality: N/A;     lipoma removal  1999    TONSILLECTOMY        Patient Active Problem List   Diagnosis    GABBY (generalized anxiety disorder)    Insomnia    History of breast cancer in female    Vitamin D deficiency    Hyperlipidemia    Leg weakness, bilateral    DDD (degenerative disc disease), lumbar    Midline low back pain without sciatica    Diabetic polyneuropathy associated with type 2 diabetes mellitus    ESTEFANIA (iron deficiency anemia)    Thoracic back pain    Fatty liver    Stage 2 chronic kidney disease    Type 2 diabetes mellitus with diabetic neuropathy    COOKIE (obstructive sleep apnea)    Aortic atherosclerosis    Alcohol dependence, in remission    History of gastric ulcer    Cervical spine arthritis    History of vertebral compression fracture    Voiding dysfunction    Urinary urgency    Urinary frequency    Urinary hesitancy    Vaginal atrophy    Mixed stress and urge urinary incontinence    Status post parathyroidectomy    Hiatal hernia    Pelvic floor dysfunction    AV block, Mobitz II    SVT (supraventricular tachycardia)    Presence of cardiac resynchronization therapy pacemaker (CRT-P)    GERD (gastroesophageal reflux disease)    Tender lymph node    Profound hearing loss of left ear    At risk for falls    Chronic pain of right knee    Gait abnormality    Sensorineural hearing loss (SNHL) of both ears    Cochlear implant in place    Calcified granuloma of lung (noted on CT chest 10/2022 and 10/2015, stable)    Frequent falls    MDD (major depressive disorder), recurrent episode, mild    Fall at home    Impaired functional mobility, balance, gait, and endurance    Muscle weakness of lower extremity    Mild dementia        Objective:      Physical Exam  Constitutional:       Appearance: Normal appearance.   HENT:      Head: Normocephalic.   Cardiovascular:      Rate and Rhythm: Normal rate and regular rhythm.      Pulses: Normal pulses.      Heart sounds: Normal heart sounds.   Pulmonary:      Effort: Pulmonary effort  is normal.      Breath sounds: Normal breath sounds.   Abdominal:      General: Abdomen is flat. Bowel sounds are normal.      Palpations: Abdomen is soft.   Musculoskeletal:         General: Normal range of motion.      Cervical back: Normal range of motion and neck supple.   Skin:     General: Skin is warm and dry.   Neurological:      General: No focal deficit present.      Mental Status: She is alert and oriented to person, place, and time.   Psychiatric:         Mood and Affect: Mood normal.         Assessment:       Problem List Items Addressed This Visit    None        Plan:         Paris was seen today for establish care.    Diagnoses and all orders for this visit:    Type 2 diabetes mellitus with diabetic neuropathy, without long-term current use of insulin  Check A1c today.  Continue semaglutide and metformin.    Iron deficiency anemia due to chronic blood loss  -     CBC Auto Differential; Future  Check labs today.    Stage 2 chronic kidney disease  Check renal function today    Fatty liver  -     Comprehensive Metabolic Panel; Future    Encounter to establish care  Mammogram:  Normal February 2024  Lipids:  Ordered today  Vaccines:  Up-to-date               Tawny Galvan MD   Internal Medicine   Primary Care             Tawny Galvan MD   Internal Medicine   Primary Care

## 2025-03-21 ENCOUNTER — TELEPHONE (OUTPATIENT)
Dept: HOME HEALTH SERVICES | Facility: CLINIC | Age: 80
End: 2025-03-21
Payer: MEDICARE

## 2025-03-21 ENCOUNTER — PATIENT MESSAGE (OUTPATIENT)
Dept: HOME HEALTH SERVICES | Facility: CLINIC | Age: 80
End: 2025-03-21
Payer: MEDICARE

## 2025-03-21 LAB
BACTERIA UR CULT: NORMAL
BACTERIA UR CULT: NORMAL

## 2025-03-21 NOTE — TELEPHONE ENCOUNTER
Spouse Jean returned call to schedule an appointment regarding a referral placed for a medical home visit with a nurse practitioner. Patient has been scheduled

## 2025-03-21 NOTE — TELEPHONE ENCOUNTER
Attempted to contact pt to schedule an appointment regarding a referral placed for a medical home visit with a nurse practitioner.

## 2025-03-22 LAB
OHS CV AF BURDEN PERCENT: < 1
OHS CV BIV PACING PERCENT: 99 %
OHS CV DC REMOTE DEVICE TYPE: NORMAL

## 2025-03-26 ENCOUNTER — PATIENT OUTREACH (OUTPATIENT)
Dept: ADMINISTRATIVE | Facility: OTHER | Age: 80
End: 2025-03-26
Payer: MEDICARE

## 2025-03-26 NOTE — PROGRESS NOTES
CHW - Outreach Attempt    Community Health Worker left a voicemail message for 1st attempt to contact patient regarding: Placement   Transportation   Housing   Caregiver support   Mental Health     Community Health Worker to attempt to contact patient on: 3/26/25

## 2025-04-01 ENCOUNTER — PATIENT OUTREACH (OUTPATIENT)
Dept: ADMINISTRATIVE | Facility: OTHER | Age: 80
End: 2025-04-01
Payer: MEDICARE

## 2025-04-01 DIAGNOSIS — F33.1 MODERATE EPISODE OF RECURRENT MAJOR DEPRESSIVE DISORDER: ICD-10-CM

## 2025-04-01 RX ORDER — BUPROPION HYDROCHLORIDE 300 MG/1
300 TABLET ORAL DAILY
Qty: 90 TABLET | Refills: 3 | Status: CANCELLED | OUTPATIENT
Start: 2025-04-01

## 2025-04-01 NOTE — PROGRESS NOTES
CHW - Outreach Attempt    Community Health Worker left a voicemail message for 2nd attempt to contact patient regarding: Placement   Transportation   Housing   Caregiver support   Mental Health     Community Health Worker to attempt to contact patient on: 4/1/25

## 2025-04-02 ENCOUNTER — PATIENT MESSAGE (OUTPATIENT)
Dept: INTERNAL MEDICINE | Facility: CLINIC | Age: 80
End: 2025-04-02
Payer: MEDICARE

## 2025-04-02 ENCOUNTER — PATIENT MESSAGE (OUTPATIENT)
Dept: NEUROLOGY | Facility: CLINIC | Age: 80
End: 2025-04-02
Payer: MEDICARE

## 2025-04-02 ENCOUNTER — HOSPITAL ENCOUNTER (EMERGENCY)
Facility: HOSPITAL | Age: 80
Discharge: PSYCHIATRIC OR SUBSTANCE USE FACILITY | End: 2025-04-02
Attending: EMERGENCY MEDICINE
Payer: MEDICARE

## 2025-04-02 ENCOUNTER — OFFICE VISIT (OUTPATIENT)
Dept: INTERNAL MEDICINE | Facility: CLINIC | Age: 80
End: 2025-04-02
Payer: MEDICARE

## 2025-04-02 VITALS
SYSTOLIC BLOOD PRESSURE: 136 MMHG | DIASTOLIC BLOOD PRESSURE: 85 MMHG | HEIGHT: 61 IN | WEIGHT: 139.56 LBS | HEART RATE: 98 BPM | OXYGEN SATURATION: 97 % | BODY MASS INDEX: 26.35 KG/M2

## 2025-04-02 VITALS
TEMPERATURE: 98 F | WEIGHT: 136 LBS | BODY MASS INDEX: 25.68 KG/M2 | DIASTOLIC BLOOD PRESSURE: 68 MMHG | OXYGEN SATURATION: 96 % | HEIGHT: 61 IN | RESPIRATION RATE: 19 BRPM | HEART RATE: 83 BPM | SYSTOLIC BLOOD PRESSURE: 148 MMHG

## 2025-04-02 DIAGNOSIS — G30.1 MODERATE LATE ONSET ALZHEIMER'S DEMENTIA WITH OTHER BEHAVIORAL DISTURBANCE: Primary | ICD-10-CM

## 2025-04-02 DIAGNOSIS — F02.A0 MILD LATE ONSET ALZHEIMER'S DEMENTIA WITHOUT BEHAVIORAL DISTURBANCE, PSYCHOTIC DISTURBANCE, MOOD DISTURBANCE, OR ANXIETY: Primary | ICD-10-CM

## 2025-04-02 DIAGNOSIS — F02.B18 MODERATE LATE ONSET ALZHEIMER'S DEMENTIA WITH OTHER BEHAVIORAL DISTURBANCE: Primary | ICD-10-CM

## 2025-04-02 DIAGNOSIS — F33.1 MODERATE EPISODE OF RECURRENT MAJOR DEPRESSIVE DISORDER: ICD-10-CM

## 2025-04-02 DIAGNOSIS — F03.918 DEMENTIA WITH BEHAVIORAL DISTURBANCE: Primary | ICD-10-CM

## 2025-04-02 DIAGNOSIS — N30.00 ACUTE CYSTITIS WITHOUT HEMATURIA: ICD-10-CM

## 2025-04-02 DIAGNOSIS — G30.1 MILD LATE ONSET ALZHEIMER'S DEMENTIA WITHOUT BEHAVIORAL DISTURBANCE, PSYCHOTIC DISTURBANCE, MOOD DISTURBANCE, OR ANXIETY: Primary | ICD-10-CM

## 2025-04-02 LAB
ALBUMIN SERPL BCP-MCNC: 4.1 G/DL (ref 3.5–5.2)
ALP SERPL-CCNC: 60 UNIT/L (ref 40–150)
ALT SERPL W/O P-5'-P-CCNC: 18 UNIT/L (ref 10–44)
AMPHET UR QL SCN: NEGATIVE
ANION GAP (OHS): 8 MMOL/L (ref 8–16)
APAP SERPL-MCNC: <3 UG/ML (ref 10–20)
AST SERPL-CCNC: 30 UNIT/L (ref 11–45)
BACTERIA #/AREA URNS AUTO: ABNORMAL /HPF
BARBITURATE SCN PRESENT UR: NEGATIVE
BENZODIAZ UR QL SCN: NEGATIVE
BILIRUB SERPL-MCNC: 0.4 MG/DL (ref 0.1–1)
BILIRUB UR QL STRIP.AUTO: NEGATIVE
BUN SERPL-MCNC: 19 MG/DL (ref 8–23)
CALCIUM SERPL-MCNC: 9.2 MG/DL (ref 8.7–10.5)
CANNABINOIDS UR QL SCN: NEGATIVE
CHLORIDE SERPL-SCNC: 106 MMOL/L (ref 95–110)
CLARITY UR: CLEAR
CO2 SERPL-SCNC: 26 MMOL/L (ref 23–29)
COCAINE UR QL SCN: NEGATIVE
COLOR UR AUTO: YELLOW
CREAT SERPL-MCNC: 0.8 MG/DL (ref 0.5–1.4)
CREAT UR-MCNC: 156 MG/DL (ref 15–325)
ERYTHROCYTE [DISTWIDTH] IN BLOOD BY AUTOMATED COUNT: 12.9 % (ref 11.5–14.5)
ETHANOL SERPL-MCNC: <10 MG/DL
GFR SERPLBLD CREATININE-BSD FMLA CKD-EPI: >60 ML/MIN/1.73/M2
GLUCOSE SERPL-MCNC: 94 MG/DL (ref 70–110)
GLUCOSE UR QL STRIP: NEGATIVE
HCT VFR BLD AUTO: 44.3 % (ref 37–48.5)
HGB BLD-MCNC: 14.5 GM/DL (ref 12–16)
HGB UR QL STRIP: NEGATIVE
HYALINE CASTS UR QL AUTO: 29 /LPF (ref 0–1)
KETONES UR QL STRIP: ABNORMAL
LEUKOCYTE ESTERASE UR QL STRIP: ABNORMAL
MAGNESIUM SERPL-MCNC: 2.2 MG/DL (ref 1.6–2.6)
MCH RBC QN AUTO: 30.9 PG (ref 27–31)
MCHC RBC AUTO-ENTMCNC: 32.7 G/DL (ref 32–36)
MCV RBC AUTO: 94 FL (ref 82–98)
METHADONE UR QL SCN: NEGATIVE
MICROSCOPIC COMMENT: ABNORMAL
NITRITE UR QL STRIP: NEGATIVE
OPIATES UR QL SCN: NEGATIVE
PCP UR QL: NEGATIVE
PH UR STRIP: 6 [PH]
PLATELET # BLD AUTO: 132 K/UL (ref 150–450)
PMV BLD AUTO: 10.9 FL (ref 9.2–12.9)
POTASSIUM SERPL-SCNC: 5 MMOL/L (ref 3.5–5.1)
PROT SERPL-MCNC: 8.2 GM/DL (ref 6–8.4)
PROT UR QL STRIP: ABNORMAL
RBC # BLD AUTO: 4.7 M/UL (ref 4–5.4)
RBC #/AREA URNS AUTO: 3 /HPF (ref 0–4)
SALICYLATES SERPL-MCNC: <5 MG/DL (ref 15–30)
SODIUM SERPL-SCNC: 140 MMOL/L (ref 136–145)
SP GR UR STRIP: 1.03
SQUAMOUS #/AREA URNS AUTO: 1 /HPF
UROBILINOGEN UR STRIP-ACNC: NEGATIVE EU/DL
WBC # BLD AUTO: 6.17 K/UL (ref 3.9–12.7)
WBC #/AREA URNS AUTO: 10 /HPF (ref 0–5)

## 2025-04-02 PROCEDURE — 81003 URINALYSIS AUTO W/O SCOPE: CPT | Performed by: EMERGENCY MEDICINE

## 2025-04-02 PROCEDURE — 82077 ASSAY SPEC XCP UR&BREATH IA: CPT | Performed by: EMERGENCY MEDICINE

## 2025-04-02 PROCEDURE — 80143 DRUG ASSAY ACETAMINOPHEN: CPT | Performed by: EMERGENCY MEDICINE

## 2025-04-02 PROCEDURE — 80307 DRUG TEST PRSMV CHEM ANLYZR: CPT | Performed by: EMERGENCY MEDICINE

## 2025-04-02 PROCEDURE — 85027 COMPLETE CBC AUTOMATED: CPT | Performed by: EMERGENCY MEDICINE

## 2025-04-02 PROCEDURE — 80053 COMPREHEN METABOLIC PANEL: CPT | Performed by: EMERGENCY MEDICINE

## 2025-04-02 PROCEDURE — 99285 EMERGENCY DEPT VISIT HI MDM: CPT | Mod: 25

## 2025-04-02 PROCEDURE — 99999 PR PBB SHADOW E&M-EST. PATIENT-LVL II: CPT | Mod: PBBFAC,,, | Performed by: INTERNAL MEDICINE

## 2025-04-02 PROCEDURE — 83735 ASSAY OF MAGNESIUM: CPT | Performed by: EMERGENCY MEDICINE

## 2025-04-02 PROCEDURE — 80179 DRUG ASSAY SALICYLATE: CPT | Performed by: EMERGENCY MEDICINE

## 2025-04-02 RX ORDER — CEPHALEXIN 500 MG/1
500 CAPSULE ORAL EVERY 12 HOURS
Qty: 10 CAPSULE | Refills: 0 | Status: SHIPPED | OUTPATIENT
Start: 2025-04-02 | End: 2025-04-07

## 2025-04-02 NOTE — ED PROVIDER NOTES
Vitals:    04/02/25 1134   BP: 112/75   Pulse: 93   Resp: 16   Temp: 98.4 °F (36.9 °C)       Patient received in sign-out from Dr. Bynum.  We will provide Keflex for mildly positive urinalysis.  Other labs indicate the patient is medically stable. She is cleared and appropriate for transport to an inpatient geriatric psychiatric facility.     Cosmo Diaz MD  04/02/25 5545

## 2025-04-02 NOTE — PLAN OF CARE
Discharge Planning Assessment:    Patient admitted on: 4-2-25  Chart reviewed today  Care plan discussed with ER treatment team, attending Dr Bynum    Current Dispo: ongoing, PEC'd  Transportation: per PEC policy  Case management to follow for plans and arrangements as needed

## 2025-04-02 NOTE — ED PROVIDER NOTES
Emergency Department Encounter  Provider Note    Paris Burris  [id number]  4/2/2025    Evaluation:    History Acquisition:     Chief Complaint   Patient presents with    Hallucinations      says she has been forgetful and hearing voices wants to get her checked out for possible dementia       History of Present Illness:  Paris Burris is a 79 y.o. 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    The patient presents to the ED due to worsening dementia.     On arrival, patient is pleasant and calm and denies any medical complaints.  She is accompanied by her daughter at bedside, who states over the last several months she has been getting worse.  She has had worsening auditory and visual hallucinations associated with confusion and wandering around at night.  She currently lives with her spouse, who is unable to care for her at home any longer.  She has been evaluated by her PCP and social work consult was ordered, however, the daughter states she has never been contacted by anyone.  She is concerned about the patient's well-being at home now.  She is not able to take care of herself any longer.      Additional historians utilized:  Daughter at bedside - see HPI    Prior medical records were reviewed:   IM visit earlier today for follow-up  IM visit 03/24 follow-up hypertension, hyperlipidemia, depression, dementia  Urgent care visit 03/13 for hip  pain    The patient's list of active medical history, family/social history, medications, and allergies as documented has been reviewed.     Past Medical History:   Diagnosis Date    Allergy     Anemia     Anxiety     Breast cancer 2002    Left breast    Cancer 2002    L breast s/p lumpectomy    Cataract     Decreased hearing     Depression     Dermatochalasis of both upper eyelids     Diabetes mellitus     Diabetes with neurologic complications     Gastric ulcer 09/10/2013    EGD    Hiatal hernia     Hyperlipidemia     Major neurocognitive disorder 10/6/2024    Migraine headache     Migraine headache 03/20/2015    Multiple gastric ulcers     Parathyroid disorder     Renal manifestation of secondary diabetes mellitus     Sleep apnea     no CPAP    Type 2 diabetes mellitus, controlled, 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      Past Surgical History:   Procedure Laterality Date    ADENOIDECTOMY      BREAST LUMPECTOMY Left 2002    CATARACT EXTRACTION W/  INTRAOCULAR LENS IMPLANT Bilateral     COLONOSCOPY N/A 12/2/2016    Procedure: COLONOSCOPY;  Surgeon: Dustin Patterson MD;  [facility]  Service: Endoscopy;  Laterality: N/A;  PM prep    COLONOSCOPY N/A 8/31/2022    Procedure: COLONOSCOPY;  Surgeon: Delfino Sanchez MD;  [facility]  Service: Endoscopy;  Laterality: N/A;  vacc-inst portal-am prep-clears 4 hrs prior-any md per pt-tb-pacer [facility]    ESOPHAGEAL MANOMETRY WITH MEASUREMENT OF IMPEDANCE N/A 10/8/2018    Procedure: MANOMETRY, ESOPHAGUS, WITH IMPEDANCE MEASUREMENT;  Surgeon: Renato Maria MD;  [facility]  Service: Endoscopy;  Laterality: N/A;    ESOPHAGOGASTRODUODENOSCOPY N/A 9/12/2018    Procedure: ESOPHAGOGASTRODUODENOSCOPY (EGD);  Surgeon: Dustin Patterson MD;  Location: [facility] ([address] [place] [address]);  Service: Endoscopy;  Laterality: N/A;  6-8 weeks from visit    ESOPHAGOGASTRODUODENOSCOPY N/A 1/29/2019    Procedure: EGD  (ESOPHAGOGASTRODUODENOSCOPY) intraop;  Surgeon: Renato Perez Jr., MD;  Location: [facility] OR 2ND FLR;  Service: General;  Laterality: N/A;    ESOPHAGOGASTRODUODENOSCOPY N/A 5/31/2019    Procedure: EGD (ESOPHAGOGASTRODUODENOSCOPY);  Surgeon: Maria Elena Little MD;  Location: [facility] ENDO (4TH FLR);  Service: Endoscopy;  Laterality: N/A;  [facility] pacemaker - sm    EYE SURGERY Bilateral     cataracts    IMPLANTATION OF BIVENTRICULAR HEART PACEMAKER N/A 1/30/2019    Procedure: INSERTION, CARDIAC PACEMAKER, BIVENTRICULAR;  Surgeon: Stone Livingston MD;  Location: [facility] EP LAB;  Service: Cardiology;  Laterality: N/A;    IMPLANTATION OF COCHLEAR PROSTHESIS Left 4/26/2021    Procedure: INSERTION, PROSTHESIS, COCHLEAR;  Surgeon: Michael Julian MD;  Location: [facility] OR 2ND FLR;  Service: ENT;  Laterality: Left;    LAPAROSCOPIC TOUPET FUNDOPLICATION N/A 1/29/2019    Procedure: FUNDOPLICATION, LAPAROSCOPIC, TOUPET;  Surgeon: Renato Perez Jr., MD;  Location: [facility] OR 2ND FLR;  Service: General;  Laterality: N/A;    lipoma removal  1999    TONSILLECTOMY       Family History   Problem Relation Name Age of Onset    Suicide Mother      Depression Mother      Alcohol abuse Father      Headaches Father      Diabetes Sister          prediabetes    Psoriasis Sister      Depression Sister      Depression Daughter      Colon cancer Neg Hx      Esophageal cancer Neg Hx       Social History     Socioeconomic History    Marital status:    Occupational History    Occupation:  supervisor   Tobacco Use    Smoking status: Never    Smokeless tobacco: Never   Substance and Sexual Activity    Alcohol use: No     Comment: quit 2014 - alcohol abuse    Drug use: Not Currently    Sexual activity: Yes     Partners: Male     Social Drivers of Health     Financial Resource Strain: Low Risk  (3/1/2024)    Overall Financial Resource Strain (CARDIA)     Difficulty of Paying Living Expenses: Not hard at all   Food Insecurity: No Food Insecurity  (3/1/2024)    Hunger Vital Sign     Worried About Running Out of Food in the Last Year: Never true     Ran Out of Food in the Last Year: Never true   Transportation Needs: No Transportation Needs (3/1/2024)    PRAPARE - Transportation     Lack of Transportation (Medical): No     Lack of Transportation (Non-Medical): No   Physical Activity: Insufficiently Active (3/1/2024)    Exercise Vital Sign     Days of Exercise per Week: 4 days     Minutes of Exercise per Session: 30 min   Stress: Stress Concern Present (3/1/2024)    [place] [employer] - Occupational Stress Questionnaire     Feeling of Stress : To some extent   Housing Stability: Low Risk  (3/1/2024)    Housing Stability Vital Sign     Unable to Pay for Housing in the Last Year: No     Number of Places Lived in the Last Year: 1     Unstable Housing in the Last Year: No       Medications:  Medication List with Changes/Refills   New Medications    CEPHALEXIN (KEFLEX) 500 MG CAPSULE    Take 1 capsule (500 mg total) by mouth every 12 (twelve) hours. for 5 days   Current Medications    ACETAMINOPHEN (TYLENOL) 500 MG TABLET    Take 1 tablet (500 mg total) by mouth every 6 (six) hours as needed for Pain.    ALENDRONATE (FOSAMAX) 70 MG TABLET    Take 1 tablet (70 mg total) by mouth every 7 days.    BUPROPION (WELLBUTRIN XL) 300 MG 24 HR TABLET    Take 1 tablet (300 mg total) by mouth once daily.    EZETIMIBE (ZETIA) 10 MG TABLET    Take 1 tablet (10 mg total) by mouth once daily.    FLASH GLUCOSE SCANNING READER (FREESTYLE RAMON 2 READER) MISC    Use continuously.    FLASH GLUCOSE SENSOR (FREESTYLE RAMON 2 SENSOR) KIT    Use continuously.    LANCETS 33 GAUGE MISC    Use to test blood glcuose 2 (two) times daily with meals.    METFORMIN (GLUCOPHAGE-XR) 500 MG ER 24HR TABLET    Take 1 tablet (500 mg total) by mouth daily with breakfast.    METOPROLOL SUCCINATE (TOPROL-XL) 25 MG 24 HR TABLET    Take 1 tablet (25 mg total) by mouth once daily.     NAPROXEN (NAPROSYN) 500 MG TABLET    Take 1 tablet (500 mg total) by mouth 2 (two) times daily as needed (for jaw pain, take with food).    ROSUVASTATIN (CRESTOR) 20 MG TABLET    Take 1 tablet (20 mg total) by mouth once daily.    TRAMADOL (ULTRAM) 50 MG TABLET    TAKE 2 TABLETS EVERY 12 HOURS AS NEEDED FOR PAIN    UREA (CARMOL) 40 % CREA    Apply topically once daily.    VENLAFAXINE (EFFEXOR) 75 MG TABLET    Take 1 tablet (75 mg total) by mouth once daily.       Allergies:  Review of patient's allergies indicates:  No Known Allergies      Physical Exam:     Initial Vitals [04/02/25 1134]   BP Pulse Resp Temp SpO2   112/75 93 16 98.4 °F (36.9 °C) 99 %      MAP       --         Physical Exam    Nursing note and vitals reviewed.  Constitutional: She appears well-developed and well-nourished. She is not diaphoretic. No distress.   HENT:   Head: Normocephalic and atraumatic. Mouth/Throat: Oropharynx is clear and moist.   Eyes: EOM are normal. Pupils are equal, round, and reactive to light.   Neck: No tracheal deviation present.   Cardiovascular:  Normal rate, regular rhythm, normal heart sounds and intact distal pulses.           Pulmonary/Chest: Breath sounds normal. No stridor. No respiratory distress. She has no wheezes.   Abdominal: Abdomen is soft. Bowel sounds are normal. She exhibits no distension and no mass. There is no abdominal tenderness.   Musculoskeletal:         General: No edema. Normal range of motion.     Neurological: She is alert. She has normal strength. She is disoriented. No cranial nerve deficit or sensory deficit. GCS eye subscore is 4. GCS verbal subscore is 4. GCS motor subscore is 6.   Skin: Skin is warm and dry. Capillary refill takes less than 2 seconds. No pallor.   Psychiatric: She has a normal mood and affect. Her behavior is normal. Thought content normal.         Differential Diagnoses:   Based on available information and initial assessment, Differential Diagnosis includes, but is not  limited to:  CVA/TIA, seizure, status epilepticus, post-ictal state, meningitis/encephalitis, sepsis, MI/ACS, arrhythmia, syncope, intracranial mass/hemorrhage, head trauma, anaphylaxis, substance abuse, alcohol intoxication/withdrawal, medication reaction, intentional medication overdose, neuroleptic malignant syndrome, serotonin syndrome, CO poisoning, hypoxia/hypercapnea, hepatic encephalopathy, metabolic disturbance, thyroid disease, hypoglycemia.      ED Management:   Procedures    Orders Placed This Encounter    CT Head Without Contrast    CBC Without Differential    Comprehensive metabolic panel    Magnesium    Urinalysis, Reflex to Urine Culture    Drug screen panel, emergency    Ethanol    Acetaminophen level    Salicylate level    Urinalysis Microscopic    Diet Adult Regular Safety Tray    Vital signs while awake    Undress patient and allow them to wear facility provided apparel.    Direct Psych Observation    Insert peripheral IV    cephALEXin (KEFLEX) 500 MG capsule    PEC/Psych Hold - Physicians Emergency Certificate / 72 Hour Psych Hold          EKG:       Labs:     Labs Reviewed   CBC WITHOUT DIFFERENTIAL - Abnormal       Result Value    WBC 6.17      RBC 4.70      HGB 14.5      HCT 44.3      MCV 94      MCHC 32.7      RDW 12.9      Platelet Count 132 (*)     MCH 30.9      MPV 10.9     URINALYSIS, REFLEX TO URINE CULTURE - Abnormal    Color, UA Yellow      Appearance, UA Clear      pH, UA 6.0      Spec Grav UA 1.030      Protein, UA Trace (*)     Glucose, UA Negative      Ketones, UA Trace (*)     Bilirubin, UA Negative      Blood, UA Negative      Nitrites, UA Negative      Urobilinogen, UA Negative      Leukocyte Esterase, UA 1+ (*)    ACETAMINOPHEN LEVEL - Abnormal    Acetaminophen Level <3.0 (*)    SALICYLATE LEVEL - Abnormal    Salicylate Level <5.0 (*)    URINALYSIS MICROSCOPIC - Abnormal    RBC, UA 3      WBC, UA 10 (*)     Bacteria, UA Few (*)     Squamous Epithelial Cells, UA 1      Hyaline  Casts, UA 29 (*)     Microscopic Comment       COMPREHENSIVE METABOLIC PANEL - Normal    Sodium 140      Potassium 5.0      Chloride 106      CO2 26      Glucose 94      BUN 19      Creatinine 0.8      Calcium 9.2      Protein Total 8.2      Albumin 4.1      Bilirubin Total 0.4      ALP 60      AST 30      ALT 18      Anion Gap 8      eGFR >60     MAGNESIUM - Normal    Magnesium  2.2     ALCOHOL,MEDICAL (ETHANOL) - Normal    Alcohol, Serum <10     DRUG SCREEN PANEL, URINE EMERGENCY     Independent review of the labs ordered include:   See ED course    Imaging:     Imaging Results              CT Head Without Contrast (Final result)  Result time 04/02/25 13:24:20      Final result by Emil Smith MD (04/02/25 13:24:20)                   Impression:      No acute intracranial hemorrhage or major vascular territory infarct.    Postoperative change including left canal wall up mastoidectomy with left cochlear implant.      Electronically signed by: Emil Smith  Date:    04/02/2025  Time:    13:24               Narrative:    EXAMINATION:  CT HEAD WITHOUT CONTRAST    CLINICAL HISTORY:  Mental status change, unknown cause;    TECHNIQUE:  Low dose axial images were obtained through the head.  Coronal and sagittal reformations were also performed. Contrast was not administered.    COMPARISON:  CT head without contrast 07/28/2024.    FINDINGS:  Left cochlear implant with diffuse surrounding streak artifact limiting evaluation of the surrounding parenchyma.    Mild prominence of the ventricles with compensatory enlargement of the sulci, in keeping with central volume loss.  No hydrocephalus.  No new or enlarging extra-axial blood or fluid collections.    Brain parenchyma demonstrates no evidence for parenchymal mass, edema, hemorrhage, or major vascular territory infarct.    No calvarial or skull base fracture or osseous destructive lesion.  Paranasal sinuses are essentially clear.  Left canal wall up mastoidectomy.                                          Medications Given:   Medications - No data to display     Medical Decision Making:    Additional Consideration:   Additional testing considered during clinical course: none    Social determinants of health considered during development of treatment plan include: poor access to care    Current co-morbidities considered which impacted clinical decision making: HTN, HLD, dementia, depression    Case discussed with additional provider: none      ED Course as of 04/02/25 1415   Wed Apr 02, 2025   1348 SpO2: 99 % [SS]   1348 Resp: 16 [SS]   1348 Pulse: 93 [SS]   1348 Temp Source: Oral [SS]   1348 Temp: 98.4 °F (36.9 °C) [SS]   1348 BP: 112/75  Vitals reassuring, afebrile [SS]   1348 Salicylate level(!)  Negative [SS]   1348 Ethanol  Normal [SS]   1348 Comprehensive metabolic panel  Unremarkable [SS]   1348 Acetaminophen level(!)  Negative [SS]   1348 Magnesium  Normal [SS]   1409 Salicylate level(!)  WNL [SS]   1412 CT Head Without Contrast  CT head independently interpreted: no intracranial hemorrhage, mass effect, or midline shift.  Agree with radiologist interpretation.    [SS]      ED Course User Index  [SS] Cosmo Bynum MD            Medical Decision Making  Family concerned about patient's safety at home.  Patient placed under PEC and obtained labs, CT head, and UA for medical clearance.  Workup reassuring.  Will seek inpatient [facility] Placement.    Problems Addressed:  Dementia with behavioral disturbance: chronic illness or injury with exacerbation, progression, or side effects of treatment    Amount and/or Complexity of Data Reviewed  Independent Historian: caregiver  External Data Reviewed: notes.  Labs: ordered. Decision-making details documented in ED Course.  Radiology: ordered and independent interpretation performed. Decision-making details documented in ED Course.    Risk  Decision regarding hospitalization.  Diagnosis or treatment significantly limited by social  determinants of health.        Clinical Impression:       ICD-10-CM ICD-9-CM   1. Dementia with behavioral disturbance  F03.918 294.21   2. Acute cystitis without hematuria  N30.00 595.0       Current Discharge Medication List        START taking these medications    Details   cephALEXin (KEFLEX) 500 MG capsule Take 1 capsule (500 mg total) by mouth every 12 (twelve) hours. for 5 days  Qty: 10 capsule, Refills: 0                 Follow-up Information    None          ED Disposition Condition    Transfer to Psych Facility Stable                 Cosmo Bynum MD  04/02/25 8721

## 2025-04-02 NOTE — TELEPHONE ENCOUNTER
No care due was identified.  [facility] Embedded Care Due Messages. Reference number: [id number].   4/02/2025 12:39:39 PM CDT

## 2025-04-02 NOTE — PLAN OF CARE
[facility] - Emergency Dept  Initial Discharge Assessment       Primary Care Provider: Tawny Galvan MD    Admission Diagnosis: No admission diagnoses are documented for this encounter.    Admission Date: 4/2/2025  Expected Discharge Date:     Transition of Care Barriers: (P) None    Payor: [employer]ARE ADVANTAGE / Plan: [employer] H[employer] POS MCARE / Product Type: Medicare Advantage /     Extended Emergency Contact Information  Primary Emergency Contact: Kade Burris  Address: [address] [place] [address] [facility]  Home Phone: [phone]  Mobile Phone: [phone]  Relation: Spouse  Secondary Emergency Contact: Janiya Gar  Address: unknown   [place]  Mobile Phone: [phone]  Relation: Daughter    Discharge Plan A: (P) Psychiatric hospital  Discharge Plan B: (P) Psychiatric hospital      [facility] [place]  [address] [address]  Phone: [phone] Fax: [phone]    [facility] #[id number] [place], [place] [address]  [address] [facility] [address]  Phone: [phone] Fax: [phone]    [place], [place] - [address]  [address] [facility] [address]  Phone: [phone] Fax: [phone]    [facility]  [address]  [place] [address]  Phone: [phone] Fax: [phone]    [id number] [place], [place] - [address] [facility] [address]  [address]  [facility] [address]  Phone: [phone] Fax: [phone]      Initial Assessment (most recent)       Adult Discharge Assessment - 04/02/25 1415          Discharge Assessment    Assessment Type Discharge Planning Assessment (P)      Confirmed/corrected address, phone number and insurance Yes (P)      Confirmed Demographics Correct on Facesheet (P)      Source of Information patient;health record;family (P)      Communicated LUCIA with  patient/caregiver Yes (P)      People in Home spouse (P)      Do you expect to return to your current living situation? Yes (P)      Do you have help at home or someone to help you manage your care at home? Yes (P)      Prior to hospitilization cognitive status: Not Oriented to Time (P)      Current cognitive status: Not Oriented to Time (P)      Equipment Currently Used at Home none (P)      Readmission within 30 days? No (P)      Patient currently being followed by outpatient case management? No (P)      Do you currently have service(s) that help you manage your care at home? No (P)      Do you take prescription medications? Yes (P)      Do you have prescription coverage? Yes (P)      Do you have any problems affording any of your prescribed medications? No (P)      Is the patient taking medications as prescribed? yes (P)      How do you get to doctors appointments? family or friend will provide (P)      Are you on dialysis? No (P)      Discharge Plan A Psychiatric hospital (P)      Discharge Plan B Psychiatric hospital (P)      DME Needed Upon Discharge  none (P)      Discharge Plan discussed with: Patient;Adult children;Spouse/sig other (P)      Transition of Care Barriers None (P)         Physical Activity    On average, how many days per week do you engage in moderate to strenuous exercise (like a brisk walk)? 0 days (P)         Financial Resource Strain    How hard is it for you to pay for the very basics like food, housing, medical care, and heating? Not very hard (P)         Housing Stability    In the last 12 months, was there a time when you were not able to pay the mortgage or rent on time? No (P)      At any time in the past 12 months, were you homeless or living in a shelter (including now)? No (P)         Transportation Needs    In the past 12 months, has lack of transportation kept you from medical appointments or from getting medications? No (P)      In the past 12 months, has lack of transportation  kept you from meetings, work, or from getting things needed for daily living? No (P)         Food Insecurity    Within the past 12 months, you worried that your food would run out before you got the money to buy more. Never true (P)         Stress    Do you feel stress - tense, restless, nervous, or anxious, or unable to sleep at night because your mind is troubled all the time - these days? Patient unable to answer (P)         Social Isolation    How often do you feel lonely or isolated from those around you?  Patient unable to answer (P)         Alcohol Use    Q1: How often do you have a drink containing alcohol? Patient declined (P)         Utilities    In the past 12 months has the electric, gas, oil, or water company threatened to shut off services in your home? No (P)         Health Literacy    How often do you need to have someone help you when you read instructions, pamphlets, or other written material from your doctor or pharmacy? Always (P)

## 2025-04-02 NOTE — PROGRESS NOTES
Subjective:       Patient ID: Paris Burris is a 79 y.o. female.    Chief Complaint: Follow-up        Presents for follow up.     She is accompanied by her daughter and  as her dementia has worsened and she has a difficult time providing a history.  Her family states she has become more paranoid over the last 6 months.  She has been having auditory and visual hallucinations which are associated with confusion and she has begun to wander around at night and has even left the house on occasion which is putting her safety at risk.  She lives with her  who is also elderly and is unable to manage her care at home any further especially when she is trying to leave the house or wonder.  The family states that she sees people who were not there and hears things that the rest of the family does not here.      PMHx    HTN: on losartan and metoprolol.     HLD: on rosuvastatin and zetia     Nephrosis on Fosamax.    History of depression on Wellbutrin.    Health Maintenance:  Mammogram:  Normal February 2024  Lipids:  Ordered today  Vaccines:  Up-to-date      Review of Systems   Constitutional:  Negative for activity change, appetite change and chills.   HENT:  Negative for ear pain, sinus pressure/congestion and sneezing.    Respiratory:  Negative for cough and shortness of breath.    Cardiovascular:  Negative for chest pain, palpitations and leg swelling.   Gastrointestinal:  Negative for abdominal distention, abdominal pain, constipation, diarrhea, nausea and vomiting.   Genitourinary:  Negative for dysuria and hematuria.   Musculoskeletal:  Negative for arthralgias, back pain and myalgias.   Neurological:  Negative for dizziness and headaches.   Psychiatric/Behavioral:  Negative for agitation. The patient is not nervous/anxious.            Past Medical History:   Diagnosis Date    Allergy     Anemia     Anxiety     Breast cancer 2002    Left breast    Cancer 2002    L breast s/p lumpectomy    Cataract      Decreased hearing     Depression     Dermatochalasis of both upper eyelids     Diabetes mellitus     Diabetes with neurologic complications     Gastric ulcer 09/10/2013    EGD    Hiatal hernia     Hyperlipidemia     Major neurocognitive disorder 10/6/2024    Migraine headache     Migraine headache 03/20/2015    Multiple gastric ulcers     Parathyroid disorder     Renal manifestation of secondary diabetes mellitus     Sleep apnea     no CPAP    Type 2 diabetes mellitus, controlled, 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      Past Surgical History:   Procedure Laterality Date    ADENOIDECTOMY      BREAST LUMPECTOMY Left 2002    CATARACT EXTRACTION W/  INTRAOCULAR LENS IMPLANT Bilateral     COLONOSCOPY N/A 12/2/2016    Procedure: COLONOSCOPY;  Surgeon: Dustin Patterson MD;  [facility]  Service: Endoscopy;  Laterality: N/A;  PM prep    COLONOSCOPY N/A 8/31/2022    Procedure: COLONOSCOPY;  Surgeon: Delfino Sanchez MD;  [facility]  Service: Endoscopy;  Laterality: N/A;  vacc-inst portal-am prep-clears 4 hrs prior-any md per pt-tb-pacer [facility]    ESOPHAGEAL MANOMETRY WITH MEASUREMENT OF IMPEDANCE N/A 10/8/2018    Procedure: MANOMETRY, ESOPHAGUS, WITH IMPEDANCE MEASUREMENT;  Surgeon: Renato Maria MD;  [facility]  Service: Endoscopy;  Laterality: N/A;    ESOPHAGOGASTRODUODENOSCOPY N/A 9/12/2018    Procedure: ESOPHAGOGASTRODUODENOSCOPY (EGD);  Surgeon: Dustin Patterson MD;  [facility]  Service: Endoscopy;  Laterality: N/A;  6-8 weeks from visit    ESOPHAGOGASTRODUODENOSCOPY N/A 1/29/2019    Procedure: EGD (ESOPHAGOGASTRODUODENOSCOPY) intraop;  Surgeon: Renato Perez Jr., MD;  [address];  Service: General;  Laterality: N/A;    ESOPHAGOGASTRODUODENOSCOPY N/A 5/31/2019    Procedure: EGD (ESOPHAGOGASTRODUODENOSCOPY);  Surgeon: Maria Elena Little MD;  [address]  Service: Endoscopy;   Laterality: N/A;  [facility] [facility] pacemaker -     EYE SURGERY Bilateral     cataracts    IMPLANTATION OF BIVENTRICULAR HEART PACEMAKER N/A 1/30/2019    Procedure: INSERTION, CARDIAC PACEMAKER, BIVENTRICULAR;  Surgeon: Stone Livingston MD;  Location: [facility] EP LAB;  Service: Cardiology;  Laterality: N/A;    IMPLANTATION OF COCHLEAR PROSTHESIS Left 4/26/2021    Procedure: INSERTION, PROSTHESIS, COCHLEAR;  Surgeon: Michael Juilan MD;  Location: [facility] OR [address] [place] [address];  Service: ENT;  Laterality: Left;    LAPAROSCOPIC TOUPET FUNDOPLICATION N/A 1/29/2019    Procedure: FUNDOPLICATION, LAPAROSCOPIC, TOUPET;  Surgeon: Renato Perez Jr., MD;  Location: [facility] OR [address] [place] [address];  Service: General;  Laterality: N/A;    lipoma removal  1999    TONSILLECTOMY        Patient Active Problem List   Diagnosis    GABBY (generalized anxiety disorder)    Insomnia    History of breast cancer in female    Vitamin D deficiency    Hyperlipidemia    Leg weakness, bilateral    DDD (degenerative disc disease), lumbar    Midline low back pain without sciatica    Diabetic polyneuropathy associated with type 2 diabetes mellitus    ESTEFANIA (iron deficiency anemia)    Thoracic back pain    Fatty liver    Stage 2 chronic kidney disease    Type 2 diabetes mellitus with diabetic neuropathy    COOKIE (obstructive sleep apnea)    Aortic atherosclerosis    History of gastric ulcer    Cervical spine arthritis    History of vertebral compression fracture    Voiding dysfunction    Urinary urgency    Urinary frequency    Urinary hesitancy    Vaginal atrophy    Mixed stress and urge urinary incontinence    Status post parathyroidectomy    Hiatal hernia    Pelvic floor dysfunction    AV block, Mobitz II    SVT (supraventricular tachycardia)    Presence of cardiac resynchronization therapy pacemaker (CRT-P)    GERD (gastroesophageal reflux disease)    Tender lymph node    Profound hearing loss of left ear    At risk for falls    Chronic pain of right knee    Gait abnormality     Sensorineural hearing loss (SNHL) of both ears    Cochlear implant in place    Calcified granuloma of lung (noted on CT chest 10/2022 and 10/2015, stable)    Frequent falls    MDD (major depressive disorder), recurrent episode, mild    Fall at home    Impaired functional mobility, balance, gait, and endurance    Muscle weakness of lower extremity    Mild dementia        Objective:      Physical Exam  Constitutional:       Appearance: Normal appearance.   HENT:      Head: Normocephalic.   Cardiovascular:      Rate and Rhythm: Normal rate and regular rhythm.      Pulses: Normal pulses.      Heart sounds: Normal heart sounds.   Pulmonary:      Effort: Pulmonary effort is normal.      Breath sounds: Normal breath sounds.   Abdominal:      General: Abdomen is flat. Bowel sounds are normal.      Palpations: Abdomen is soft.   Musculoskeletal:         General: Normal range of motion.      Cervical back: Normal range of motion and neck supple.   Skin:     General: Skin is warm and dry.   Neurological:      General: No focal deficit present.      Mental Status: She is alert and oriented to person, place, and time.   Psychiatric:         Mood and Affect: Mood normal.         Assessment:       Problem List Items Addressed This Visit    None  Visit Diagnoses         Moderate late onset Alzheimer's dementia with other behavioral disturbance    -  Primary              Plan:           Moderate late onset Alzheimer's dementia with other behavioral disturbance       She is accompanied by her daughter and  as her dementia has worsened and she has a difficult time providing a history.  Her family states she has become more paranoid over the last 6 months.  She has been having auditory and visual hallucinations which are associated with confusion and she has begun to wander around at night and has even left the house on occasion which is putting her safety at risk.  She lives with her  who is also elderly and is unable to  manage her care at home any further especially when she is trying to leave the house or wonder.  The family states that she sees people who were not there and hears things that the rest of the family does not here.    During previous visit attempted to place referral to neuropsychology for more support and case management but her paranoid delusions have acutely worsened over the last weekend in his become unsafe for her to be at home.  She wonders in the night which puts her safety at risk.  I have advised her to go to the emergency room today for further evaluation of auditory and visual hallucinations which may be due to the progression of her dementia but need to rule out other causes.  This will also facilitate admission into memory care unit versus psych unit temporarily until more permanent placement is found        The we will be going to the ER today.    Tawny Galvan MD   Internal Medicine   Primary Care      Follow-up  Pertinent negatives include no abdominal pain, arthralgias, chest pain, chills, coughing, headaches, myalgias, nausea or vomiting.

## 2025-04-02 NOTE — TELEPHONE ENCOUNTER
LVM for the patient. Offered next available appointment this Friday at 9 am. Left direct contact information and sent portal message as well.   Pt is currently being evaluated in the ED for +AVH.  
right normal/left normal

## 2025-04-03 ENCOUNTER — PATIENT MESSAGE (OUTPATIENT)
Dept: INTERNAL MEDICINE | Facility: CLINIC | Age: 80
End: 2025-04-03
Payer: MEDICARE

## 2025-04-03 NOTE — TELEPHONE ENCOUNTER
Copied from CRM #[id number]. Topic: General Inquiry - Status Check  >> Apr 2, 2025  9:47 AM Becky wrote:  Patient would like to get medical advice.  Symptoms (please be specific):   Pt daughter is following up on a pt portal message she sent earlier today regarding a course of action regarding the pt behavior.   How long have you had these symptoms: N/A  Would you like a call back, or a response through your [facility] portal?: call back Janiya/daughter     Pharmacy name and phone # (copy from chart):   N/A  Comments:

## 2025-04-03 NOTE — ED NOTES
Assumed care of the patient. Report received from ABE Cali. Pt in paper scrubs, side rails up X2.. No family/visitors at bedside at this time. Pt denies any complaints or needs. ED sitter at bedside     Paris Conner Fatuma, a 79 y.o. female presents to the ED w/ complaint of hallucinations     Triage note:  Chief Complaint   Patient presents with    Hallucinations      says she has been forgetful and hearing voices wants to get her checked out for possible dementia     Review of patient's allergies indicates:  No Known Allergies  Past Medical History:   Diagnosis Date    Allergy     Anemia     Anxiety     Breast cancer 2002    Left breast    Cancer 2002    L breast s/p lumpectomy    Cataract     Decreased hearing     Depression     Dermatochalasis of both upper eyelids     Diabetes mellitus     Diabetes with neurologic complications     Gastric ulcer 09/10/2013    EGD    Hiatal hernia     Hyperlipidemia     Major neurocognitive disorder 10/6/2024    Migraine headache     Migraine headache 03/20/2015    Multiple gastric ulcers     Parathyroid disorder     Renal manifestation of secondary diabetes mellitus     Sleep apnea     no CPAP    Type 2 diabetes mellitus, controlled, 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

## 2025-04-03 NOTE — TELEPHONE ENCOUNTER
LOV with Tawny Galvan MD , 4/2/2025    FYI: patient has been admitted to [facility] in [place] on 4/2/25

## 2025-04-10 ENCOUNTER — PATIENT OUTREACH (OUTPATIENT)
Dept: ADMINISTRATIVE | Facility: OTHER | Age: 80
End: 2025-04-10
Payer: MEDICARE

## 2025-04-10 RX ORDER — BUPROPION HYDROCHLORIDE 300 MG/1
300 TABLET ORAL DAILY
Qty: 90 TABLET | Refills: 3 | OUTPATIENT
Start: 2025-04-10

## 2025-04-10 NOTE — PROGRESS NOTES
CHW - Unable to Contact    Community Health Worker to close episode at this time due to three missed attempts for patient contact.

## 2025-04-11 ENCOUNTER — TELEPHONE (OUTPATIENT)
Dept: PHARMACY | Facility: CLINIC | Age: 80
End: 2025-04-11
Payer: MEDICARE

## 2025-04-11 NOTE — TELEPHONE ENCOUNTER
[facility] Chart Review & Patient Outreach Details For Medication Adherence Project    Reason for Outreach Encounter: 3rd Party payor non-compliance report ([employer], [employer], C, etc)  2.  Patient Outreach Method: Reviewed Patient Chart  3.   Medication in question: losartan   LAST FILLED: n/a  Hypertension Medications              metoprolol succinate (TOPROL-XL) 25 MG 24 hr tablet Take 1 tablet (25 mg total) by mouth once daily.              4.  Reviewed and or Updates Made To: Patient Chart  5. Outreach Outcomes and/or actions taken: Medication discontinued    Additional Notes:

## 2025-04-14 ENCOUNTER — TELEPHONE (OUTPATIENT)
Dept: PHARMACY | Facility: CLINIC | Age: 80
End: 2025-04-14
Payer: MEDICARE

## 2025-04-14 NOTE — TELEPHONE ENCOUNTER
[facility] Chart Review & Patient Outreach Details For Medication Adherence Project    Reason for Outreach Encounter: 3rd Party payor non-compliance report ([employer], [employer], [facility], etc)  2.  Patient Outreach Method: [employer] message  3.   Medication in question: rosuvastatin/metformin   LAST FILLED: 3/6/25 for 28 day supply  Diabetes Medications              metFORMIN (GLUCOPHAGE-XR) 500 MG ER 24hr tablet Take 1 tablet (500 mg total) by mouth daily with breakfast.              Hypertension Medications              metoprolol succinate (TOPROL-XL) 25 MG 24 hr tablet Take 1 tablet (25 mg total) by mouth once daily.              4.  Reviewed and or Updates Made To: Patient Chart  5. Outreach Outcomes and/or actions taken: Sent inquiry to patient: Waiting for response; pt is currently hospitalized; rosuvastatin was d/c per recent hospital orders.

## 2025-04-16 ENCOUNTER — HOSPITAL ENCOUNTER (EMERGENCY)
Facility: HOSPITAL | Age: 80
Discharge: HOME | End: 2025-04-17
Attending: EMERGENCY MEDICINE
Payer: MEDICARE

## 2025-04-16 ENCOUNTER — TELEPHONE (OUTPATIENT)
Dept: PHARMACY | Facility: CLINIC | Age: 80
End: 2025-04-16
Payer: MEDICARE

## 2025-04-16 DIAGNOSIS — S62.397A CLOSED NONDISPLACED FRACTURE OF OTHER PART OF FIFTH METACARPAL BONE OF LEFT HAND, INITIAL ENCOUNTER: ICD-10-CM

## 2025-04-16 DIAGNOSIS — S62.603A CLOSED DISPLACED FRACTURE OF PHALANX OF LEFT MIDDLE FINGER, UNSPECIFIED PHALANX, INITIAL ENCOUNTER: ICD-10-CM
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
## 2025-04-16 PROCEDURE — 29125 APPL SHORT ARM SPLINT STATIC: CPT | Mod: LT

## 2025-04-16 PROCEDURE — 25000003 PHARM REV CODE 250: Performed by: NURSE PRACTITIONER

## 2025-04-16 PROCEDURE — 99283 EMERGENCY DEPT VISIT LOW MDM: CPT | Mod: 25

## 2025-04-16 RX ORDER — HYDROXYZINE PAMOATE 50 MG/1
25 CAPSULE ORAL EVERY 6 HOURS PRN
COMMUNITY

## 2025-04-16 RX ORDER — ACETAMINOPHEN 325 MG/1
650 TABLET ORAL EVERY 4 HOURS PRN
COMMUNITY

## 2025-04-16 RX ORDER — IBUPROFEN 200 MG
200 TABLET ORAL EVERY 6 HOURS PRN
COMMUNITY

## 2025-04-16 RX ORDER — MEMANTINE HYDROCHLORIDE 10 MG/1
5 TABLET ORAL 2 TIMES DAILY
COMMUNITY

## 2025-04-16 RX ORDER — ALUMINUM HYDROXIDE, MAGNESIUM HYDROXIDE, AND SIMETHICONE 2400; 240; 2400 MG/30ML; MG/30ML; MG/30ML
30 SUSPENSION ORAL EVERY 4 HOURS PRN
COMMUNITY

## 2025-04-16 RX ORDER — IBUPROFEN 400 MG/1
400 TABLET ORAL EVERY 6 HOURS PRN
COMMUNITY

## 2025-04-16 RX ORDER — TALC
6 POWDER (GRAM) TOPICAL NIGHTLY
COMMUNITY

## 2025-04-16 RX ORDER — VENLAFAXINE HYDROCHLORIDE 150 MG/1
150 CAPSULE, EXTENDED RELEASE ORAL DAILY
COMMUNITY

## 2025-04-16 RX ORDER — ADHESIVE BANDAGE
30 BANDAGE TOPICAL DAILY PRN
COMMUNITY

## 2025-04-16 RX ORDER — CLONIDINE HYDROCHLORIDE 0.1 MG/1
0.1 TABLET ORAL EVERY 4 HOURS PRN
COMMUNITY

## 2025-04-16 RX ORDER — LOSARTAN POTASSIUM 100 MG/1
100 TABLET ORAL DAILY
COMMUNITY

## 2025-04-16 RX ORDER — ACETAMINOPHEN 500 MG
1000 TABLET ORAL
Status: COMPLETED | OUTPATIENT
Start: 2025-04-16 | End: 2025-04-16

## 2025-04-16 RX ORDER — ATORVASTATIN CALCIUM 40 MG/1
40 TABLET, FILM COATED ORAL NIGHTLY
COMMUNITY

## 2025-04-16 RX ADMIN — ACETAMINOPHEN 1000 MG: 500 TABLET ORAL at 11:04

## 2025-04-16 NOTE — ED NOTES
Pt presents to the ED via EMS with c/o Fall. Pt presents from [facility] with an witness fall on pt left hand. Pt does have swelling on 3rd and 4th digits. Staff denies hitting her head. Not currently on blood thinners or LOC. AXO1. Pt not a good historian. No signs of respiratory distress noted. Pt sitting in bed with ED sitter at bedside.

## 2025-04-17 ENCOUNTER — TELEPHONE (OUTPATIENT)
Dept: ORTHOPEDICS | Facility: CLINIC | Age: 80
End: 2025-04-17
Payer: MEDICARE

## 2025-04-17 VITALS
TEMPERATURE: 98 F | WEIGHT: 140 LBS | HEIGHT: 61 IN | OXYGEN SATURATION: 98 % | DIASTOLIC BLOOD PRESSURE: 77 MMHG | BODY MASS INDEX: 26.43 KG/M2 | HEART RATE: 95 BPM | SYSTOLIC BLOOD PRESSURE: 178 MMHG | RESPIRATION RATE: 18 BRPM

## 2025-04-17 NOTE — PHARMACY MED REC
"  [facility]           Pharmacy  Admission Medication History     The home medication history was taken by Gladys Fraire.      Medication history obtained from Medications listed below were obtained from: Medical records    Based on information gathered for medication list, you may go to "Admission" then "Reconcile Home Medications" tabs to review and/or act upon those items.     The home medication list has been updated by the Pharmacy department.   Please read ALL comments highlighted in yellow.   Please address this information as you see fit.    Feel free to contact us if you have any questions or require assistance.    The medications listed below were removed from the home medication list.  Please reorder if appropriate:    Patient reports NOT TAKING the following medication(s):  Bupropion xl 300mg  Naproxen 500mg  Crestor 20mg  Tramadol 52mg  Urea cream    No current facility-administered medications on file prior to encounter.     Current Outpatient Medications on File Prior to Encounter   Medication Sig Dispense Refill    acetaminophen (TYLENOL) 325 MG tablet Take 650 mg by mouth every 4 (four) hours as needed for Pain.      aluminum & magnesium hydroxide-simethicone (MAALOX MAXIMUM STRENGTH) 400-400-40 mg/5 mL suspension Take 30 mLs by mouth every 4 (four) hours as needed for Indigestion.      atorvastatin (LIPITOR) 40 MG tablet Take 40 mg by mouth every evening.      cloNIDine (CATAPRES) 0.1 MG tablet Take 0.1 mg by mouth every 4 (four) hours as needed (sbp greater than 160,dbp greater than 90).      hydrOXYzine pamoate (VISTARIL) 50 MG Cap Take 25 mg by mouth every 6 (six) hours as needed (anxiety).      ibuprofen (ADVIL,MOTRIN) 200 MG tablet Take 200 mg by mouth every 6 (six) hours as needed for Pain. Give with 400mg= 600mg      ibuprofen (ADVIL,MOTRIN) 400 MG tablet Take 400 mg by mouth every 6 (six) hours as needed. Give with 200mg= 600mg      insulin regular (HUMULIN R REGULAR " U-100 INSULN) 100 unit/mL Inj injection Inject 4-11 Units into the skin 4 (four) times daily before meals and nightly. =0    201-250=4u  251-300=6u  301-350=8u  351-400=10u  Greater than 400 CALL MD      losartan (COZAAR) 100 MG tablet Take 100 mg by mouth once daily. Hold for sbp less than 90, dbp less than 60, or pulse less than 60      magnesium hydroxide 400 mg/5 ml (MILK OF MAGNESIA) 400 mg/5 mL Susp Take 30 mLs by mouth daily as needed (constipation).      melatonin (MELATIN) 3 mg tablet Take 6 mg by mouth nightly.      memantine (NAMENDA) 10 MG Tab Take 5 mg by mouth 2 (two) times daily.      venlafaxine (EFFEXOR-XR) 150 MG Cp24 Take 150 mg by mouth once daily.      alendronate (FOSAMAX) 70 MG tablet Take 1 tablet (70 mg total) by mouth every 7 days. (Patient taking differently: Take 70 mg by mouth every Sunday.) 4 tablet 11    ezetimibe (ZETIA) 10 mg tablet Take 1 tablet (10 mg total) by mouth once daily. 90 tablet 3    flash glucose scanning reader (FREESTYLE RAMON 2 READER) Misc Use continuously. 2 each 11    flash glucose sensor (FREESTYLE RAMON 2 SENSOR) Kit Use continuously. 2 kit 11    lancets 33 gauge Misc Use to test blood glcuose 2 (two) times daily with meals. 100 each 11    metFORMIN (GLUCOPHAGE-XR) 500 MG ER 24hr tablet Take 1 tablet (500 mg total) by mouth daily with breakfast. 90 tablet 3    metoprolol succinate (TOPROL-XL) 25 MG 24 hr tablet Take 1 tablet (25 mg total) by mouth once daily. (Patient taking differently: Take 25 mg by mouth once daily. Hold for sbp less than 90, dbp less than 60, or pulse less than 60) 90 tablet 3       Please address this information as you see fit.  Feel free to contact us if you have any questions or require assistance.    Gladys Fraire  [phone]                .

## 2025-04-17 NOTE — DISCHARGE INSTRUCTIONS
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
## 2025-04-17 NOTE — TELEPHONE ENCOUNTER
Message  Received: Today  Kathe Thomason Staff  Patient has a referral for Closed displaced fracture of phalanx of left middle finger, unspecified phalanx,...Closed nondisplaced fracture of other part of fifth metacarpal bone of left hand. Please call patient to schedule. Thanks

## 2025-04-21 ENCOUNTER — PATIENT OUTREACH (OUTPATIENT)
Facility: OTHER | Age: 80
End: 2025-04-21
Payer: MEDICARE

## 2025-04-22 ENCOUNTER — PATIENT MESSAGE (OUTPATIENT)
Dept: INTERNAL MEDICINE | Facility: CLINIC | Age: 80
End: 2025-04-22
Payer: MEDICARE

## 2025-04-23 ENCOUNTER — PATIENT MESSAGE (OUTPATIENT)
Dept: INTERNAL MEDICINE | Facility: CLINIC | Age: 80
End: 2025-04-23
Payer: MEDICARE

## 2025-05-12 ENCOUNTER — PATIENT OUTREACH (OUTPATIENT)
Dept: NEUROLOGY | Facility: CLINIC | Age: 80
End: 2025-05-12
Payer: MEDICARE

## 2025-05-12 NOTE — PROGRESS NOTES
Social Work - Dementia Care Management:    Phoned caregiver,  Jean, to offer Dementia Care Management services. I advised  that I was aware from chart review that since the time of original referral, pt had been hospitalized at [facility] and that [facility] was working on nsg home placement.  informed me that nursing home placement had occurred and pressing issues resolved. Advised  that I can still assist with support/information as needed and he is welcome to call any time.

## 2025-05-14 ENCOUNTER — TELEPHONE (OUTPATIENT)
Dept: PHARMACY | Facility: CLINIC | Age: 80
End: 2025-05-14
Payer: MEDICARE

## 2025-05-14 NOTE — TELEPHONE ENCOUNTER
[facility]/Population Health Chart Review & Patient Outreach Details For Medication Adherence Project    Reason for Outreach Encounter: 3rd Party payor non-compliance report ([employer], [employer], [facility], etc)  2.  Patient Outreach Method: Reviewed Patient Chart  3.   Medication in question: atorvastatin, losartan, metformin   LAST FILLED: 4/28/25 for 30 day supply  Diabetes Medications              insulin regular (HUMULIN R REGULAR U-100 INSULN) 100 unit/mL Inj injection Inject 4-11 Units into the skin 4 (four) times daily before meals and nightly. =0    201-250=4u  251-300=6u  301-350=8u  351-400=10u  Greater than 400 CALL MD    metFORMIN (GLUCOPHAGE-XR) 500 MG ER 24hr tablet Take 1 tablet (500 mg total) by mouth daily with breakfast.              Hypertension Medications              cloNIDine (CATAPRES) 0.1 MG tablet Take 0.1 mg by mouth every 4 (four) hours as needed (sbp greater than 160,dbp greater than 90).    losartan (COZAAR) 100 MG tablet Take 100 mg by mouth once daily. Hold for sbp less than 90, dbp less than 60, or pulse less than 60    metoprolol succinate (TOPROL-XL) 25 MG 24 hr tablet Take 1 tablet (25 mg total) by mouth once daily.              Hyperlipidemia Medications              atorvastatin (LIPITOR) 40 MG tablet Take 40 mg by mouth every evening.    ezetimibe (ZETIA) 10 mg tablet Take 1 tablet (10 mg total) by mouth once daily.               4.  Reviewed and or Updates Made To: Patient Chart  5. Outreach Outcomes and/or actions taken: Patient filled medication and is on track to be adherent

## 2025-06-02 ENCOUNTER — TELEPHONE (OUTPATIENT)
Dept: CARDIOLOGY | Facility: HOSPITAL | Age: 80
End: 2025-06-02
Payer: MEDICARE

## 2025-06-12 ENCOUNTER — TELEPHONE (OUTPATIENT)
Dept: PHARMACY | Facility: CLINIC | Age: 80
End: 2025-06-12
Payer: MEDICARE

## 2025-06-12 NOTE — TELEPHONE ENCOUNTER
[facility]/Population Health Chart Review & Patient Outreach Details For Medication Adherence Project    Reason for Outreach Encounter: 3rd Party payor non-compliance report ([employer], [employer], [facility], etc)  2.  Patient Outreach Method: Reviewed patient chart   3.   Medication in question:    Hypertension Medications              cloNIDine (CATAPRES) 0.1 MG tablet Take 0.1 mg by mouth every 4 (four) hours as needed (sbp greater than 160,dbp greater than 90).    losartan (COZAAR) 100 MG tablet Take 100 mg by mouth once daily. Hold for sbp less than 90, dbp less than 60, or pulse less than 60    metoprolol succinate (TOPROL-XL) 25 MG 24 hr tablet Take 1 tablet (25 mg total) by mouth once daily.              Hyperlipidemia Medications              atorvastatin (LIPITOR) 40 MG tablet Take 40 mg by mouth every evening.    ezetimibe (ZETIA) 10 mg tablet Take 1 tablet (10 mg total) by mouth once daily.                  LF 30 ds 5/21/25  Metformin LF 30 ds 5/20/25    4.  Reviewed and or Updates Made To: Patient Chart  5. Outreach Outcomes and/or actions taken: Patient filled medication and is on track to be adherent  Additional Notes:

## 2025-06-25 ENCOUNTER — TELEPHONE (OUTPATIENT)
Dept: CARDIOLOGY | Facility: HOSPITAL | Age: 80
End: 2025-06-25
Payer: MEDICARE

## 2025-06-25 NOTE — TELEPHONE ENCOUNTER
Attempted to contact pt's  on this am to confirm if new RHM has been received as [facility] remote site still shows monitor not communicating.  Message was left on the VM asking that he please call the [facility]. Call back # for the [facility] was left on the .

## 2025-06-25 NOTE — TELEPHONE ENCOUNTER
Spoke with pt and informed him that he can call [employer] and ask if they have a monitor to accommodate his wife. I gave him the number to [employer].   ----- Message from Med Assistant Rocio sent at 6/25/2025  2:29 PM CDT -----  Regarding: Pacemaker  Pt's , Jean, is calling, stating that his wife needs a battery operated pacemaker because her living space does not have outlets. Call back at [phone].    Thank you.

## 2025-07-14 ENCOUNTER — TELEPHONE (OUTPATIENT)
Dept: CARDIOLOGY | Facility: HOSPITAL | Age: 80
End: 2025-07-14
Payer: MEDICARE

## 2025-07-14 NOTE — TELEPHONE ENCOUNTER
----- Message from Juanita sent at 7/14/2025 12:02 PM CDT -----  Regarding: Device  Patient nurse Alina need to speak with staff regarding her device. They don't have phone lines in the  room and wondering if she can have another one ship to there facility. Please call back @ [phone]. Thank you Juanita

## 2025-07-14 NOTE — TELEPHONE ENCOUNTER
Attempted to return the call to Alina on this afternoon.  Was placed on hold and after 9 mins I had to hang up.    Of note, telephone outlet is not needed for pt's PPM RHM as is uses cellular connection.

## 2025-07-22 ENCOUNTER — TELEPHONE (OUTPATIENT)
Dept: PHARMACY | Facility: CLINIC | Age: 80
End: 2025-07-22
Payer: MEDICARE

## 2025-07-22 NOTE — TELEPHONE ENCOUNTER
[facility]/Population Health Chart Review & Patient Outreach Details For Medication Adherence Project    Reason for Outreach Encounter: 3rd Party payor non-compliance report ([employer], [employer], [facility], etc)  2.  Patient Outreach Method: [employer] message  3.   Medication in question: atorvastatin/losartan   LAST FILLED: 6/23/25 for 30 day supply  Hypertension Medications              cloNIDine (CATAPRES) 0.1 MG tablet Take 0.1 mg by mouth every 4 (four) hours as needed (sbp greater than 160,dbp greater than 90).    losartan (COZAAR) 100 MG tablet Take 100 mg by mouth once daily. Hold for sbp less than 90, dbp less than 60, or pulse less than 60    metoprolol succinate (TOPROL-XL) 25 MG 24 hr tablet Take 1 tablet (25 mg total) by mouth once daily.              Hyperlipidemia Medications              atorvastatin (LIPITOR) 40 MG tablet Take 40 mg by mouth every evening.    ezetimibe (ZETIA) 10 mg tablet Take 1 tablet (10 mg total) by mouth once daily.               4.  Reviewed and or Updates Made To: Patient Chart  5. Outreach Outcomes and/or actions taken: Sent inquiry to patient: Waiting for response.

## (undated) DEVICE — COVERS PROBE NR-48 STERILE

## (undated) DEVICE — LUBRICANT SURGILUBE 2 OZ

## (undated) DEVICE — SUT MCRYL PLUS 4-0 PS2 27IN

## (undated) DEVICE — WARMER DRAPE STERILE LF

## (undated) DEVICE — TROCAR ENDOPATH XCEL 5X100MM

## (undated) DEVICE — DRESSING TELFA STRL 4X3 LF

## (undated) DEVICE — BUR ELITE TAPR DMND EXT 2.0MM

## (undated) DEVICE — PACK PACER PERMANENT

## (undated) DEVICE — ADHESIVE MASTISOL VIAL 48/BX

## (undated) DEVICE — TUBING HF INSUFFLATION W/ FLTR

## (undated) DEVICE — SUT BONE WAX 2.5 GRMS 12/BX

## (undated) DEVICE — ELECTRODE NDL

## (undated) DEVICE — SUT 0 VICRYL / UR6 (J603)

## (undated) DEVICE — SUT 3/0 18IN COATED VICRYLP

## (undated) DEVICE — CORD BIPOLAR 12 FOOT

## (undated) DEVICE — Device

## (undated) DEVICE — SUT ENDOLOOP PDSII 18 LIGA

## (undated) DEVICE — KIT DRESS GLASSCK EAR ADLT ST

## (undated) DEVICE — BURR PRECISION 5MM ROUND

## (undated) DEVICE — GAUZE FLUFF XXLG 36X36 2 PLY

## (undated) DEVICE — NDL HYPO REG 25G X 1 1/2

## (undated) DEVICE — KIT EAR EAOFE

## (undated) DEVICE — DRAPE ABDOMINAL TIBURON 14X11

## (undated) DEVICE — SUT SURGIDAC ENDO STITCH2-0

## (undated) DEVICE — SHEATH SAFESHEATH II ULTRA 6FR

## (undated) DEVICE — SUCTION SURGICAL FRAZR

## (undated) DEVICE — TRAY MINOR GEN SURG

## (undated) DEVICE — CLOSURE SKIN STERI STRIP 1/2X4

## (undated) DEVICE — WIRE GUIDE WHOLEY MOD J .035

## (undated) DEVICE — BURR ROUND DIAMOND TAPR 1.0MM

## (undated) DEVICE — SHEARS HARMONIC 5CM 36CM

## (undated) DEVICE — TROCAR ENDOPATH XCEL 12X100MM

## (undated) DEVICE — SPONGE GAUZE 16PLY 4X4

## (undated) DEVICE — ELECTRODE REM PLYHSV RETURN 9

## (undated) DEVICE — ADHESIVE DERMABOND ADVANCED

## (undated) DEVICE — SLING AND SWATHE UNIV ADLT

## (undated) DEVICE — SEE MEDLINE ITEM 157103

## (undated) DEVICE — DRESSING AQUACEL FOAM 5 X 5

## (undated) DEVICE — PAD RADIOLUCENT STAT ADULT

## (undated) DEVICE — SEE MEDLINE ITEM 152622

## (undated) DEVICE — WIRE WHISPER MS 014 X 190

## (undated) DEVICE — SEE MEDLINE ITEM 152487

## (undated) DEVICE — TROCAR ENDOPATH XCEL 12MM 10CM

## (undated) DEVICE — SOL NS 1000CC

## (undated) DEVICE — LOOP VESSEL BLUE MAXI

## (undated) DEVICE — STRIP STERI REIN CLSR 1/2X2IN

## (undated) DEVICE — CANNULA ENDOPATH XCEL 5X100MM

## (undated) DEVICE — TUBE FRAZIER 5MM 2FT SOFT TIP

## (undated) DEVICE — CLIPPER BLADE MOD 4406 (CAREF)

## (undated) DEVICE — PLUG IS4/DF4 PORT

## (undated) DEVICE — BURR PRECISION ROUND 4.0MM

## (undated) DEVICE — BANDAGE ELASTOPLAST 3INX5YDS

## (undated) DEVICE — SOL IRR NACL .9% 3000ML

## (undated) DEVICE — GAUZE DERMACEA LOW PLY 3X4YRDS

## (undated) DEVICE — CATH BLLN ATTAIN VENOGRAM

## (undated) DEVICE — BURR ROUND DIAMOND TAPR 1.5MM

## (undated) DEVICE — ELECTRODE POLYHESIVEPRE-ATTACH

## (undated) DEVICE — SHEATH SAFE ULTRA 9FR

## (undated) DEVICE — SEE MEDLINE ITEM 157128

## (undated) DEVICE — DRAPE INCISE IOBAN 2 23X17IN

## (undated) DEVICE — DRAPE SURGICAL STERI IRRG PCH

## (undated) DEVICE — CATH CS OCTAPOLAR DX 7FRX110CM

## (undated) DEVICE — GUIDEWIRE CPS DIRECT 54CM 7FR

## (undated) DEVICE — GUIDEWIRE EMERALD 150CM PTFE

## (undated) DEVICE — ENDOSTITCH INSTRUMENT

## (undated) DEVICE — FORCEP CURVED DISP

## (undated) DEVICE — KIT SUCTION IRRIGATION

## (undated) DEVICE — DRESSING SPONGE 8PLY 4X4 STRL